# Patient Record
Sex: MALE | Race: WHITE | NOT HISPANIC OR LATINO | Employment: OTHER | URBAN - METROPOLITAN AREA
[De-identification: names, ages, dates, MRNs, and addresses within clinical notes are randomized per-mention and may not be internally consistent; named-entity substitution may affect disease eponyms.]

---

## 2017-03-09 DIAGNOSIS — I10 ESSENTIAL HYPERTENSION, BENIGN: ICD-10-CM

## 2017-03-09 NOTE — TELEPHONE ENCOUNTER
lisinopril (PRINIVIL,ZESTRIL) 40 MG tablet  Last Written Prescription Date: 9/8/2016  Last Fill Quantity: 90, # refills: 2  Last Office Visit with G, P or [facility] prescribing provider: 12/7/2016       Potassium   Date Value Ref Range Status   10/14/2015 3.8 3.4 - 5.3 mmol/L Final     Creatinine   Date Value Ref Range Status   09/03/2016 0.92 0.66 - 1.25 mg/dL Final   04/11/2003 0.8 0.8 - 2.0 mg/dL      BP Readings from Last 3 Encounters:   12/07/16 130/80   09/07/16 135/75   06/10/16 151/78

## 2017-03-10 RX ORDER — LISINOPRIL 40 MG/1
40 TABLET ORAL DAILY
Qty: 90 TABLET | Refills: 2 | Status: SHIPPED | OUTPATIENT
Start: 2017-03-10 | End: 2018-01-10

## 2017-03-10 NOTE — TELEPHONE ENCOUNTER
Routing refill request to provider for review/approval because:  Labs not current:  K+ 10/2015    Thank you  Gina STEVEN RN

## 2017-03-13 DIAGNOSIS — C61 MALIGNANT NEOPLASM OF PROSTATE (H): ICD-10-CM

## 2017-03-13 LAB — PSA SERPL-MCNC: 0.52 UG/L (ref 0–4)

## 2017-03-13 PROCEDURE — 84153 ASSAY OF PSA TOTAL: CPT | Performed by: RADIOLOGY

## 2017-03-13 PROCEDURE — 36415 COLL VENOUS BLD VENIPUNCTURE: CPT | Performed by: RADIOLOGY

## 2017-03-14 ENCOUNTER — OFFICE VISIT (OUTPATIENT)
Dept: RADIATION THERAPY | Facility: OUTPATIENT CENTER | Age: 82
End: 2017-03-14

## 2017-03-14 VITALS
RESPIRATION RATE: 18 BRPM | HEART RATE: 58 BPM | OXYGEN SATURATION: 98 % | SYSTOLIC BLOOD PRESSURE: 177 MMHG | WEIGHT: 248.2 LBS | DIASTOLIC BLOOD PRESSURE: 84 MMHG | BODY MASS INDEX: 34.86 KG/M2

## 2017-03-14 DIAGNOSIS — C61 MALIGNANT NEOPLASM OF PROSTATE (H): Primary | ICD-10-CM

## 2017-03-14 ASSESSMENT — PAIN SCALES - GENERAL: PAINLEVEL: NO PAIN (0)

## 2017-03-14 NOTE — PROGRESS NOTES
RADIATION ONCOLOGY FOLLOW UP  March 14, 2017    DISEASE TREATED: High-risk prostate cancer. Pretreatment PSA 25.5, Chaka score of 4+3=7, clinical stage [id number].     RADIATION THERAPY GIVEN: 8041 cGy in 43 planned treatments, via IMRT     INTERVAL SINCE COMPLETION OF THERAPY:  5 years and 1 months since completion on 02/10/2012.     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 He underwent biopsies in 2002 and 2005 and pathology was negative for malignancy. Due to the persistently rising PSA Dr. Mcintyre performed an MRI-guided biopsy of the prostate gland which revealed Chaka 4+3= 7 disease in 2 of 2 cores with 90% of the cores being positive in the right inferior base and right inferior mid gland. Androgen ablation was discussed with the patient due to his high-risk disease and the patient declined due to comorbidities of the drug. Overall, he completed radiotherapy with very little toxicity.     He now presents to clinic for routine follow-up exam. Today, he denies any pressing issues or complaints and reports that all symptoms have essentially remained stable since his last visit with us one year ago. His AUA is 13/35 (stable) and MARINA is 1/25. Main urinary symptoms include nocturia and urgency. However, he denies any dysuria, hematuria, hematochezia, diarrhea, or loose stools.      PHYSICAL EXAMINATION:   Vital Signs: /84  Pulse 58  Resp 18  Wt 112.6 kg (248 lb 3.2 oz)  SpO2 98%  BMI 34.86 kg/m2   Abdomin is soft with no tenderness.  Bowel sound is active.    LABORATORY DATA:  PSA   Date Value Ref Range Status   03/13/2017 0.52 0 - 4 ug/L Final     Comment:     Assay Method:  Chemiluminescence using Siemens Vista analyzer   06/07/2016 0.48 0 - 4 ug/L Final   12/04/2015 0.43 0 - 4 ug/L Final   06/12/2015 0.33 0 - 4 ug/L Final   06/11/2014 0.24 0 - 4 ug/L Final   04/17/2014 0.24 0 - 4 ug/L  Final   12/12/2013 0.31 0 - 4 ug/L Final   05/22/2013 0.35 0 - 4 ug/L Final   12/13/2012 0.52 0 - 4 ug/L Final   07/12/2012 1.35 0 - 4 ug/L Final   03/12/2012 3.24 0 - 4 ug/L Final   02/23/2012 3.50 0 - 4 ug/L Final   07/06/2011 25.50 (H) 0 - 4 ug/L Final   04/15/2011 22.40 (H) 0 - 4 ug/L Final   01/31/2011 22.70 (H) 0 - 4 ug/L Final   09/10/2010 20.10 (H) 0 - 4 ug/L Final   05/12/2010 20.10 (H) 0 - 4 ug/L Final   09/03/2009 20.10 (H) 0 - 4 ug/L Final   04/10/2009 19.10 (H) 0 - 4 ug/L Final   03/24/2004 8.93 (H) 0 - 4 ug/L Final     ASSESSMENT: Mr. Garcia is a 82 yo gentleman with high-risk prostate cancer s/p definitive radiation therapy completed almost 5 years and 1 months ago. Urinary symptoms are stable and he continues to be biochemically HAYDER.     PLAN:   1. RTC in one year   2. PSA prior to visit  3. Continue routine health maintenance with PCP         Melissa Parnell M.D., Ph.D.      Radiation Oncologist   [facility]   [facility]   Phone: [phone]      CC  Patient Care Team:  Dave Jefferson MD as PCP - General  Melissa Parnell MD as MD (Radiation Oncology)  Stewart Mcintyre MD as MD (Urology)  STEWART MCINTYRE

## 2017-03-14 NOTE — MR AVS SNAPSHOT
After Visit Summary   3/14/2017    Fawad Garcia    MRN: [id number]           Patient Information     Date Of Birth          1935        Visit Information        Provider Department      3/14/2017 11:15 AM Melissa Parnell MD [facility]        Today's Diagnoses     Malignant neoplasm of prostate (H)    -  1       Follow-ups after your visit        Your next 10 appointments already scheduled     Mar 12, 2018 11:00 AM CDT   LAB with [facility] ([facility])    [address] [address] [phone]           Patient must bring picture ID.  Patient should be prepared to give a urine specimen  Please do not eat 10-12 hours before your appointment if you are coming in fasting for labs on lipids, cholesterol, or glucose (sugar).  Pregnant women should follow their Care Team instructions. Water with medications is okay. Do not drink coffee or other fluids.   If you have concerns about taking  your medications, please ask at office or if scheduling via [employer], send a message by clicking on Secure Messaging, Message Your Care Team.            Mar 13, 2018 11:15 AM CDT   Return Visit with Melissa Parnell MD   [facility] ([facility])    [address], [address]  [place] [id number] [phone]              Future tests that were ordered for you today     Open Future Orders        Priority Expected Expires Ordered    PSA tumor marker Routine 3/14/2018 3/14/2018 3/14/2017            Who to contact     Please call your clinic at [phone] to:    Ask questions about your health    Make or cancel appointments    Discuss your medicines    Learn about your test results    Speak to your doctor   If you have compliments or concerns about an experience at your clinic, or if you wish to file a complaint, please contact [facility] Patient Relations at [phone] or email us at  [email or web address]         Additional Information About Your Visit        Keashart Information     Ke[employer]art gives you secure access to your electronic health record. If you see a primary care provider, you can also send messages to your care team and make appointments. If you have questions, please call your primary care clinic.  If you do not have a primary care provider, please call [phone] and they will assist you.      IM[employer]Exchange is an electronic gateway that provides easy, online access to your medical records. With IMedExchange, you can request a clinic appointment, read your test results, renew a prescription or communicate with your care team.     To access your existing account, please contact your [facility] or call [phone] for assistance.        Care EveryWhere ID     This is your Care EveryWhere ID. This could be used by other organizations to access your [facility] medical records  [id number]        Your Vitals Were     Pulse Respirations Pulse Oximetry BMI (Body Mass Index)          58 18 98% 34.86 kg/m2         Blood Pressure from Last 3 Encounters:   03/14/17 177/84   12/07/16 130/80   09/07/16 135/75    Weight from Last 3 Encounters:   03/14/17 112.6 kg (248 lb 3.2 oz)   12/07/16 112.5 kg (248 lb)   09/07/16 111.1 kg (245 lb)               Primary Care Provider Office Phone # Fax #    Dave Jefferson [phone]       [address] [address]        Thank you!     Thank you for choosing [facility]  for your care. Our goal is always to provide you with excellent care. Hearing back from our patients is one way we can continue to improve our services. Please take a few minutes to complete the written survey that you may receive in the mail after your visit with us. Thank you!             Your Updated Medication List - Protect others around you: Learn how to safely use, store and  throw away your medicines at www.disposemymeds.org.          This list is accurate as of: 3/14/17 11:51 AM.  Always use your most recent med list.                   Brand Name Dispense Instructions for use    amLODIPine 5 MG tablet    NORVASC    90 tablet    Take 1 tablet (5 mg) by mouth daily Takes prn for high blood pressure       aspirin  MG EC tablet     90 tablet    Take 1 tablet (325 mg) by mouth daily       * ASPIRIN NOT PRESCRIBED    INTENTIONAL     by Other route continuous prn.       blood glucose monitoring test strip    no brand specified    3 Box    1 strip by In Vitro route daily One touch Ultra.       dorzolamide-timolol 2-0.5 % ophthalmic solution    COSOPT         FIRST-MOUTHWASH BLM Susp     178 mL    Swish and swallow 5-10 mLs in mouth every 6 hours as needed       hydrochlorothiazide 25 MG tablet    HYDRODIURIL    90 tablet    Take 1 tablet (25 mg) by mouth daily       insulin pen needle 31G X 8 MM    B-D U/F    90 each    1 Device daily Use once daily or as directed.       * LANTUS SOLOSTAR 100 UNIT/ML injection   Generic drug:  insulin glargine          * insulin glargine 100 UNIT/ML injection    LANTUS VIAL    3 Month    30 units at bedtime       * insulin glargine 100 UNIT/ML injection    LANTUS SOLOSTAR    3 Month    Inject 30 Units Subcutaneous At Bedtime       lisinopril 40 MG tablet    PRINIVIL/ZESTRIL    90 tablet    Take 1 tablet (40 mg) by mouth daily       metFORMIN 500 MG tablet    GLUCOPHAGE    360 tablet    2 tabs with lunch and  tabs with dinner.       omeprazole 20 MG CR capsule    priLOSEC    90 capsule    Take 1 capsule (20 mg) by mouth daily       ONE TOUCH LANCETS Misc     100 each    1 Device daily One touch delica lancets, what ever is covered by insurance.       ONE TOUCH ULTRA SYSTEM KIT W/DEVICE Kit          order for DME     1 Device    Equipment being ordered:  Glucometer       * STATIN NOT PRESCRIBED (INTENTIONAL)      by Other route continuous prn.        triamcinolone 0.1 % cream    KENALOG         VITAMIN D PO      Take 4,000 Int'l Units/day by mouth daily       * Notice:  This list has 5 medication(s) that are the same as other medications prescribed for you. Read the directions carefully, and ask your doctor or other care provider to review them with you.

## 2017-03-23 DIAGNOSIS — E11.9 TYPE 2 DIABETES, HBA1C GOAL < 7% (H): ICD-10-CM

## 2017-03-23 NOTE — TELEPHONE ENCOUNTER
Ultrafine pen short      Last Written Prescription Date: 03/29/20016  Last Fill Quantity: 90,  # refills: 3   Last Office Visit with G, [facility] or [facility] prescribing provider: 12/07/2016    Carmelo YUEN)

## 2017-04-12 ENCOUNTER — TRANSFERRED RECORDS (OUTPATIENT)
Dept: HEALTH INFORMATION MANAGEMENT | Facility: CLINIC | Age: 82
End: 2017-04-12

## 2017-04-13 ENCOUNTER — TELEPHONE (OUTPATIENT)
Dept: FAMILY MEDICINE | Facility: CLINIC | Age: 82
End: 2017-04-13

## 2017-04-13 DIAGNOSIS — Z79.4 TYPE 2 DIABETES MELLITUS WITHOUT COMPLICATION, WITH LONG-TERM CURRENT USE OF INSULIN (H): Primary | ICD-10-CM

## 2017-04-13 DIAGNOSIS — E11.9 TYPE 2 DIABETES MELLITUS WITHOUT COMPLICATION, WITH LONG-TERM CURRENT USE OF INSULIN (H): Primary | ICD-10-CM

## 2017-04-17 DIAGNOSIS — Z79.4 TYPE 2 DIABETES MELLITUS WITHOUT COMPLICATION, WITH LONG-TERM CURRENT USE OF INSULIN (H): ICD-10-CM

## 2017-04-17 DIAGNOSIS — E11.9 TYPE 2 DIABETES MELLITUS WITHOUT COMPLICATION, WITH LONG-TERM CURRENT USE OF INSULIN (H): ICD-10-CM

## 2017-04-17 DIAGNOSIS — I10 HYPERTENSION, GOAL BELOW 140/90: ICD-10-CM

## 2017-04-17 LAB
ANION GAP SERPL CALCULATED.3IONS-SCNC: 9 MMOL/L (ref 3–14)
BUN SERPL-MCNC: 19 MG/DL (ref 7–30)
CALCIUM SERPL-MCNC: 8.7 MG/DL (ref 8.5–10.1)
CHLORIDE SERPL-SCNC: 103 MMOL/L (ref 94–109)
CO2 SERPL-SCNC: 25 MMOL/L (ref 20–32)
CREAT SERPL-MCNC: 0.93 MG/DL (ref 0.66–1.25)
CREAT UR-MCNC: 101 MG/DL
GFR SERPL CREATININE-BSD FRML MDRD: 78 ML/MIN/1.7M2
GLUCOSE SERPL-MCNC: 118 MG/DL (ref 70–99)
HBA1C MFR BLD: 7.6 % (ref 4.3–6)
MICROALBUMIN UR-MCNC: 204 MG/L
MICROALBUMIN/CREAT UR: 201.98 MG/G CR (ref 0–17)
POTASSIUM SERPL-SCNC: 3.8 MMOL/L (ref 3.4–5.3)
SODIUM SERPL-SCNC: 137 MMOL/L (ref 133–144)
TSH SERPL DL<=0.005 MIU/L-ACNC: 1.8 MU/L (ref 0.4–4)

## 2017-04-17 PROCEDURE — 82043 UR ALBUMIN QUANTITATIVE: CPT | Performed by: FAMILY MEDICINE

## 2017-04-17 PROCEDURE — 36415 COLL VENOUS BLD VENIPUNCTURE: CPT | Performed by: FAMILY MEDICINE

## 2017-04-17 PROCEDURE — 84443 ASSAY THYROID STIM HORMONE: CPT | Performed by: FAMILY MEDICINE

## 2017-04-17 PROCEDURE — 80048 BASIC METABOLIC PNL TOTAL CA: CPT | Performed by: FAMILY MEDICINE

## 2017-04-17 PROCEDURE — 83036 HEMOGLOBIN GLYCOSYLATED A1C: CPT | Performed by: FAMILY MEDICINE

## 2017-04-17 NOTE — TELEPHONE ENCOUNTER
HCTZ      Last Written Prescription Date: 11/27/2015  Last Fill Quantity: 90, # refills: 3  Last Office Visit with G, P or [facility] prescribing provider: 12/07/2016  Next 5 appointments (look out 90 days)     Apr 19, 2017 11:00 AM CDT   SHORT with Dave Jefferson MD   [facility] ([facility])    [address] [address]   [phone]                   Potassium   Date Value Ref Range Status   10/14/2015 3.8 3.4 - 5.3 mmol/L Final     Creatinine   Date Value Ref Range Status   09/03/2016 0.92 0.66 - 1.25 mg/dL Final     BP Readings from Last 3 Encounters:   03/14/17 177/84   12/07/16 130/80   09/07/16 135/75     Carmelo BRANHAM (R)

## 2017-04-18 RX ORDER — HYDROCHLOROTHIAZIDE 25 MG/1
25 TABLET ORAL DAILY
Qty: 90 TABLET | Refills: 3 | Status: SHIPPED | OUTPATIENT
Start: 2017-04-18 | End: 2018-04-04

## 2017-04-19 ENCOUNTER — OFFICE VISIT (OUTPATIENT)
Dept: FAMILY MEDICINE | Facility: CLINIC | Age: 82
End: 2017-04-19
Payer: COMMERCIAL

## 2017-04-19 VITALS
HEIGHT: 71 IN | OXYGEN SATURATION: 95 % | DIASTOLIC BLOOD PRESSURE: 85 MMHG | BODY MASS INDEX: 34.58 KG/M2 | HEART RATE: 57 BPM | WEIGHT: 247 LBS | TEMPERATURE: 98.7 F | SYSTOLIC BLOOD PRESSURE: 135 MMHG

## 2017-04-19 DIAGNOSIS — E11.9 TYPE 2 DIABETES MELLITUS WITHOUT COMPLICATION, WITH LONG-TERM CURRENT USE OF INSULIN (H): Primary | ICD-10-CM

## 2017-04-19 DIAGNOSIS — Z79.4 TYPE 2 DIABETES MELLITUS WITHOUT COMPLICATION, WITH LONG-TERM CURRENT USE OF INSULIN (H): Primary | ICD-10-CM

## 2017-04-19 PROCEDURE — 99207 C FOOT EXAM  NO CHARGE: CPT | Performed by: FAMILY MEDICINE

## 2017-04-19 PROCEDURE — 99214 OFFICE O/P EST MOD 30 MIN: CPT | Performed by: FAMILY MEDICINE

## 2017-04-19 ASSESSMENT — PAIN SCALES - GENERAL: PAINLEVEL: NO PAIN (0)

## 2017-04-19 NOTE — PROGRESS NOTES
SUBJECTIVE:                                                    Fawad Garcia is a 81 year old male who presents to clinic today for the following health issues:    Diabetes: His hemoglobin A1c is 7.6.    Hypertension: His blood pressures are averaging 135/85.    His wife is ill and she is in the house practically all the time. He will be playing golf twice a week but is interested in getting out of the house more often. I talked with him about possibly volunteering at the [facility] or working at the golf course.  Total face to face time: 25 minutes.  Time spent counseling on 15 minutes as outline above.      Diabetes Follow-up    Patient is checking blood sugars: once daily.  Results are as follows:         am - 9:00 AM     Diabetic concerns: None     Symptoms of hypoglycemia (low blood sugar): none     Paresthesias (numbness or burning in feet) or sores: No     Date of last diabetic eye exam:  4/12/2017 With Dr. Acosta        Amount of exercise or physical activity: 2-3 days/week for an average of 30-45 minutes    Problems taking medications regularly: No    Medication side effects: none    Diet: diabetic          Problem list and histories reviewed & adjusted, as indicated.  Additional history: as documented        Reviewed and updated as needed this visit by clinical staff  Tobacco  Allergies  Med Hx  Surg Hx  Fam Hx  Soc Hx      Reviewed and updated as needed this visit by Provider        Medical, surgical, family, social histories, allergies and meds reviewed and updated.    ROS:  General: No change in weight, sleep or appetite.  Normal energy.  No fever or chills  Resp: No coughing, wheezing or shortness of breath  CV: No chest pains or palpitations  GI: No nausea, vomiting,  heartburn, abdominal pain, diarrhea, constipation or change in bowel habits      Exam:  GENERAL APPEARANCE ADULT: Alert, no acute distress  NECK: No adenopathy,masses or thyromegaly  RESP: lungs clear to  auscultation   CV: normal rate, regular rhythm, no murmur or gallop  ABDOMEN: soft, no organomegaly, masses or tenderness  MS: Feet: Sensation 1/4 bilaterally, pulses 2/4 bilaterally    ASSESSMENT:  (E11.9) Type 2 diabetes, HbA1c goal < 7% (H)  Comment:   Plan: blood glucose monitoring (NO BRAND SPECIFIED)         test strip, FOOT EXAM              PLAN:  Orders Placed This Encounter     FOOT EXAM   Recheck in 6 months.    There are no Patient Instructions on file for this visit.      Dave Jefferson

## 2017-04-19 NOTE — NURSING NOTE
"Chief Complaint   Patient presents with     RECHECK     diabetes       Initial /83 (BP Location: Left arm, Patient Position: Chair, Cuff Size: Adult Large)  Pulse 57  Temp 98.7  F (37.1  C) (Tympanic)  Ht 5' 10.75\" (1.797 m)  Wt 247 lb (112 kg)  SpO2 95%  BMI 34.69 kg/m2 Estimated body mass index is 34.69 kg/(m^2) as calculated from the following:    Height as of this encounter: 5' 10.75\" (1.797 m).    Weight as of this encounter: 247 lb (112 kg).  Medication Reconciliation: complete   Yuliya Etienne CMA       "

## 2017-04-19 NOTE — MR AVS SNAPSHOT
After Visit Summary   4/19/2017    Fawad Garcia    MRN: [id number]           Patient Information     Date Of Birth          1935        Visit Information        Provider Department      4/19/2017 11:00 AM Dave Jefferson MD [facility]        Today's Diagnoses     Type 2 diabetes, HbA1c goal < 7% (H)           Follow-ups after your visit        Your next 10 appointments already scheduled     Mar 12, 2018 11:00 AM CDT   LAB with [facility] ([facility])    [address] [address]   [phone]           Patient must bring picture ID.  Patient should be prepared to give a urine specimen  Please do not eat 10-12 hours before your appointment if you are coming in fasting for labs on lipids, cholesterol, or glucose (sugar).  Pregnant women should follow their Care Team instructions. Water with medications is okay. Do not drink coffee or other fluids.   If you have concerns about taking  your medications, please ask at office or if scheduling via [employer], send a message by clicking on Secure Messaging, Message Your Care Team.            Mar 13, 2018 11:15 AM CDT   Return Visit with Melissa Parnell MD   [facility] ([facility])    [address], [address]  [place] [address]   [phone]              Who to contact     If you have questions or need follow up information about today's clinic visit or your schedule please contact [facility] directly at [phone].  Normal or non-critical lab and imaging results will be communicated to you by MyChart, letter or phone within 4 business days after the clinic has received the results. If you do not hear from us within 7 days, please contact the clinic through [employer] or phone. If you have a critical or abnormal lab result, we will notify you by phone as soon as possible.  Submit refill requests through [employer] or call  "your pharmacy and they will forward the refill request to us. Please allow 3 business days for your refill to be completed.          Additional Information About Your Visit        Coderwallhart Information     [employer] gives you secure access to your electronic health record. If you see a primary care provider, you can also send messages to your care team and make appointments. If you have questions, please call your primary care clinic.  If you do not have a primary care provider, please call [phone] and they will assist you.        Care EveryWhere ID     This is your Care EveryWhere ID. This could be used by other organizations to access your [facility] medical records  [id number]        Your Vitals Were     Pulse Temperature Height Pulse Oximetry BMI (Body Mass Index)       57 98.7  F (37.1  C) (Tympanic) 5' 10.75\" (1.797 m) 95% 34.69 kg/m2        Blood Pressure from Last 3 Encounters:   04/19/17 135/85   03/14/17 177/84   12/07/16 130/80    Weight from Last 3 Encounters:   04/19/17 247 lb (112 kg)   03/14/17 248 lb 3.2 oz (112.6 kg)   12/07/16 248 lb (112.5 kg)              We Performed the Following     FOOT EXAM        Primary Care Provider Office Phone # Fax #    Dave Jefferson [phone]       [facility] [address] [address] [address]        Thank you!     Thank you for choosing [facility]  for your care. Our goal is always to provide you with excellent care. Hearing back from our patients is one way we can continue to improve our services. Please take a few minutes to complete the written survey that you may receive in the mail after your visit with us. Thank you!             Your Updated Medication List - Protect others around you: Learn how to safely use, store and throw away your medicines at www.disposemymeds.org.          This list is accurate as of: 4/19/17 11:54 AM.  Always use your most recent med list.                   Brand Name " Dispense Instructions for use    amLODIPine 5 MG tablet    NORVASC    90 tablet    Take 1 tablet (5 mg) by mouth daily Takes prn for high blood pressure       * ASPIRIN NOT PRESCRIBED    INTENTIONAL     by Other route continuous prn Reported on 4/19/2017       blood glucose monitoring test strip    no brand specified    3 Box    1 strip by In Vitro route daily One touch Ultra.       dorzolamide-timolol 2-0.5 % ophthalmic solution    COSOPT         hydrochlorothiazide 25 MG tablet    HYDRODIURIL    90 tablet    Take 1 tablet (25 mg) by mouth daily       insulin pen needle 31G X 8 MM    B-D U/F    90 each    1 Device daily Use once daily or as directed.       * LANTUS SOLOSTAR 100 UNIT/ML injection   Generic drug:  insulin glargine          * insulin glargine 100 UNIT/ML injection    LANTUS VIAL    3 Month    30 units at bedtime       * insulin glargine 100 UNIT/ML injection    LANTUS SOLOSTAR    3 Month    Inject 30 Units Subcutaneous At Bedtime       lidocaine visc 2% & diphenhydramine 12.5mg/5mL & maalox/mylanta w/simethicone (1:1:1 v/v/v) Susp compounding kit     178 mL    Swish and swallow 5-10 mLs in mouth every 6 hours as needed       lisinopril 40 MG tablet    PRINIVIL/ZESTRIL    90 tablet    Take 1 tablet (40 mg) by mouth daily       metFORMIN 500 MG tablet    GLUCOPHAGE    360 tablet    2 tabs with lunch and  tabs with dinner.       omeprazole 20 MG CR capsule    priLOSEC    90 capsule    Take 1 capsule (20 mg) by mouth daily       ONE TOUCH LANCETS Misc     100 each    1 Device daily One touch delica lancets, what ever is covered by insurance.       ONE TOUCH ULTRA SYSTEM KIT W/DEVICE Kit          order for DME     1 Device    Equipment being ordered:  Glucometer       * STATIN NOT PRESCRIBED (INTENTIONAL)      by Other route continuous prn Reported on 4/19/2017       triamcinolone 0.1 % cream    KENALOG         VITAMIN D PO      Take 4,000 Int'l Units/day by mouth daily       * Notice:  This list has 5  medication(s) that are the same as other medications prescribed for you. Read the directions carefully, and ask your doctor or other care provider to review them with you.

## 2017-04-20 ENCOUNTER — TELEPHONE (OUTPATIENT)
Dept: FAMILY MEDICINE | Facility: CLINIC | Age: 82
End: 2017-04-20

## 2017-04-20 NOTE — TELEPHONE ENCOUNTER
"Reason for Call:  Other prescription    Detailed comments[facility]  \"Pretty sure he tests more than daily.  Can we get Rx to reflect that?\"  Request to RN's desk      Call taken on 4/20/2017 at 9:15 AM by Tawana Jonas      "

## 2017-06-14 ENCOUNTER — OFFICE VISIT (OUTPATIENT)
Dept: AUDIOLOGY | Facility: CLINIC | Age: 82
End: 2017-06-14
Payer: COMMERCIAL

## 2017-06-14 DIAGNOSIS — H90.3 SENSORINEURAL HEARING LOSS, BILATERAL: Primary | ICD-10-CM

## 2017-06-14 PROCEDURE — 92557 COMPREHENSIVE HEARING TEST: CPT | Performed by: AUDIOLOGIST

## 2017-06-14 NOTE — PROGRESS NOTES
AUDIOLOGY REPORT    SUBJECTIVE:  Fawad Garcia is a 81 year old male who was seen in the Audiology Clinic at [facility] for an audiologic evaluation, referred by self.  No previous audiograms are available at today's appointment.  The patient reports he would like to know where his hearing is at. He reports that he is having very little difficulty hearing in his environment. The patient denies bilateral tinnitus, bilateral otalgia, bilateral drainage and history of noise exposure.     OBJECTIVE:  Otoscopic exam indicates ears are clear of cerumen bilaterally     Pure Tone Thresholds assessed using conventional audiometry with good  reliability from 250-8000 Hz bilaterally using circumaural headphones     RIGHT:  normal and severe sensorineural hearing loss    LEFT:    normal and severe sensorineural hearing loss    Tympanogram:    RIGHT: DNT    LEFT:   DNT    Speech Reception Threshold:    RIGHT: 15 dB HL    LEFT:   15 dB HL  Word Recognition Score:     RIGHT: 92% at 55 dB HL using W22 recorded word list.    LEFT:   92% at 55 dB HL using W22 recorded word list.      ASSESSMENT:   Normal hearing through 2000 Hz sloping to a severe high frequency sensorineural hearing loss bilaterally.     Today s results were discussed with the patient in detail.     PLAN: It is recommended that the patient return to clinic if notes changes in his hearing. Patient was counseled regarding hearing loss and impact on communication.    Please call this clinic with questions regarding these results or recommendations.        Sindi Washington M.A. RIVAS-AAA  Clinical audiologist [place] # [id number]  6/14/2017

## 2017-06-14 NOTE — MR AVS SNAPSHOT
After Visit Summary   6/14/2017    Fawad Garcia    MRN: [id number]           Patient Information     Date Of Birth          1935        Visit Information        Provider Department      6/14/2017 10:30 AM Sindi [place] [facility]        Today's Diagnoses     Sensorineural hearing loss, bilateral    -  1       Follow-ups after your visit        Your next 10 appointments already scheduled     Mar 12, 2018 11:00 AM CDT   LAB with [facility] ([facility])    [address] [address] [phone]           Patient must bring picture ID.  Patient should be prepared to give a urine specimen  Please do not eat 10-12 hours before your appointment if you are coming in fasting for labs on lipids, cholesterol, or glucose (sugar).  Pregnant women should follow their Care Team instructions. Water with medications is okay. Do not drink coffee or other fluids.   If you have concerns about taking  your medications, please ask at office or if scheduling via PT PAL, send a message by clicking on Secure Messaging, Message Your Care Team.            Mar 13, 2018 11:15 AM CDT   Return Visit with Melissa Parnell MD   [facility])    [address], [address]  [place] [facility] [place] [address]   [phone]              Who to contact     If you have questions or need follow up information about today's clinic visit or your schedule please contact [facility] directly at [phone].  Normal or non-critical lab and imaging results will be communicated to you by MyChart, letter or phone within 4 business days after the clinic has received the results. If you do not hear from us within 7 days, please contact the clinic through MyChart or phone. If you have a critical or abnormal lab result, we will notify you by phone as soon as possible.  Submit refill requests through PT PAL or call your pharmacy  and they will forward the refill request to us. Please allow 3 business days for your refill to be completed.          Additional Information About Your Visit        [employer] Information     [employer] gives you secure access to your electronic health record. If you see a primary care provider, you can also send messages to your care team and make appointments. If you have questions, please call your primary care clinic.  If you do not have a primary care provider, please call [phone] and they will assist you.        Care EveryWhere ID     This is your Care EveryWhere ID. This could be used by other organizations to access your [place] medical records  [id number]         Blood Pressure from Last 3 Encounters:   04/19/17 135/85   03/14/17 177/84   12/07/16 130/80    Weight from Last 3 Encounters:   04/19/17 247 lb (112 kg)   03/14/17 248 lb 3.2 oz (112.6 kg)   12/07/16 248 lb (112.5 kg)              We Performed the Following     AUDIOGRAM/TYMPANOGRAM - INTERFACE     COMPREHENSIVE HEARING TEST        Primary Care Provider Office Phone # Fax #    Dave Jefferson [phone]       [facility] [address] [address] [address]        Thank you!     Thank you for choosing [facility]  for your care. Our goal is always to provide you with excellent care. Hearing back from our patients is one way we can continue to improve our services. Please take a few minutes to complete the written survey that you may receive in the mail after your visit with us. Thank you!             Your Updated Medication List - Protect others around you: Learn how to safely use, store and throw away your medicines at www.disposemymeds.org.          This list is accurate as of: 6/14/17 11:22 AM.  Always use your most recent med list.                   Brand Name Dispense Instructions for use    amLODIPine 5 MG tablet    NORVASC    90 tablet    Take 1 tablet (5 mg) by mouth daily Takes prn  for high blood pressure       * ASPIRIN NOT PRESCRIBED    INTENTIONAL     by Other route continuous prn Reported on 4/19/2017       blood glucose monitoring test strip    no brand specified    3 Box    1 strip by In Vitro route daily One touch Ultra.       dorzolamide-timolol 2-0.5 % ophthalmic solution    COSOPT         hydrochlorothiazide 25 MG tablet    HYDRODIURIL    90 tablet    Take 1 tablet (25 mg) by mouth daily       insulin pen needle 31G X 8 MM    B-D U/F    90 each    1 Device daily Use once daily or as directed.       * LANTUS SOLOSTAR 100 UNIT/ML injection   Generic drug:  insulin glargine          * insulin glargine 100 UNIT/ML injection    LANTUS VIAL    3 Month    30 units at bedtime       * insulin glargine 100 UNIT/ML injection    LANTUS SOLOSTAR    3 Month    Inject 30 Units Subcutaneous At Bedtime       lidocaine visc 2% & diphenhydramine 12.5mg/5mL & maalox/mylanta w/simethicone (1:1:1 v/v/v) Susp compounding kit     178 mL    Swish and swallow 5-10 mLs in mouth every 6 hours as needed       lisinopril 40 MG tablet    PRINIVIL/ZESTRIL    90 tablet    Take 1 tablet (40 mg) by mouth daily       metFORMIN 500 MG tablet    GLUCOPHAGE    360 tablet    2 tabs with lunch and  tabs with dinner.       omeprazole 20 MG CR capsule    priLOSEC    90 capsule    Take 1 capsule (20 mg) by mouth daily       ONE TOUCH LANCETS Misc     100 each    1 Device daily One touch delica lancets, what ever is covered by insurance.       ONE TOUCH ULTRA SYSTEM KIT W/DEVICE Kit          order for DME     1 Device    Equipment being ordered:  Glucometer       * STATIN NOT PRESCRIBED (INTENTIONAL)      by Other route continuous prn Reported on 4/19/2017       triamcinolone 0.1 % cream    KENALOG         VITAMIN D PO      Take 4,000 Int'l Units/day by mouth daily       * Notice:  This list has 5 medication(s) that are the same as other medications prescribed for you. Read the directions carefully, and ask your doctor or other  care provider to review them with you.

## 2017-09-15 ENCOUNTER — ALLIED HEALTH/NURSE VISIT (OUTPATIENT)
Dept: FAMILY MEDICINE | Facility: CLINIC | Age: 82
End: 2017-09-15
Payer: COMMERCIAL

## 2017-09-15 DIAGNOSIS — Z23 NEED FOR PROPHYLACTIC VACCINATION AND INOCULATION AGAINST INFLUENZA: Primary | ICD-10-CM

## 2017-09-15 PROCEDURE — 90662 IIV NO PRSV INCREASED AG IM: CPT

## 2017-09-15 PROCEDURE — 99207 ZZC NO CHARGE NURSE ONLY: CPT

## 2017-09-15 PROCEDURE — 90471 IMMUNIZATION ADMIN: CPT

## 2017-09-15 NOTE — MR AVS SNAPSHOT
After Visit Summary   9/15/2017    Fawad Garcia    MRN: [id number]           Patient Information     Date Of Birth          1935        Visit Information        Provider Department      9/15/2017 8:05 AM [facility]        Today's Diagnoses     Need for prophylactic vaccination and inoculation against influenza    -  1       Follow-ups after your visit        Your next 10 appointments already scheduled     Mar 12, 2018 11:00 AM CDT   LAB with [facility]   [facility]    [address] [address] [phone]           Patient must bring picture ID. Patient should be prepared to give a urine specimen  Please do not eat 10-12 hours before your appointment if you are coming in fasting for labs on lipids, cholesterol, or glucose (sugar). Pregnant women should follow their Care Team instructions. Water with medications is okay. Do not drink coffee or other fluids. If you have concerns about taking  your medications, please ask at office or if scheduling via [employer], send a message by clicking on Secure Messaging, Message Your Care Team.            Mar 13, 2018 11:15 AM CDT   Return Visit with Melissa Parnell MD   [facility] ([facility])    [address], [address]  [facility] [place] [address]   [phone]              Who to contact     If you have questions or need follow up information about today's clinic visit or your schedule please contact [facility] directly at [phone].  Normal or non-critical lab and imaging results will be communicated to you by MyChart, letter or phone within 4 business days after the clinic has received the results. If you do not hear from us within 7 days, please contact the clinic through MyChart or phone. If you have a critical or abnormal lab result, we will notify you by phone as soon as possible.  Submit refill requests through  [employer] or call your pharmacy and they will forward the refill request to us. Please allow 3 business days for your refill to be completed.          Additional Information About Your Visit        MyChart Information     [employer] gives you secure access to your electronic health record. If you see a primary care provider, you can also send messages to your care team and make appointments. If you have questions, please call your primary care clinic.  If you do not have a primary care provider, please call [phone] and they will assist you.        Care EveryWhere ID     This is your Care EveryWhere ID. This could be used by other organizations to access your [place] medical records  [id number]         Blood Pressure from Last 3 Encounters:   04/19/17 135/85   03/14/17 177/84   12/07/16 130/80    Weight from Last 3 Encounters:   04/19/17 247 lb (112 kg)   03/14/17 248 lb 3.2 oz (112.6 kg)   12/07/16 248 lb (112.5 kg)              We Performed the Following     FLU VACCINE, INCREASED ANTIGEN, PRESV FREE, AGE 65+ [41018]     Vaccine Administration, Initial [14387]        Primary Care Provider Office Phone # Fax #    Dave Callum Jefferson [phone] [address] [address]        Equal Access to Services     JACKY DELACRUZ : Hadii aad ku hadasho Soomaali, waaxda luqadaha, qaybta kaalmada adeegyada, waxay ankurin hayazael whipple. So [facility] [phone].    ATENCIÓN: Si habla español, tiene a aaron disposición servicios gratuitos de asistencia lingüística. ame al [phone].    We comply with applicable federal civil rights laws and [place] laws. We do not discriminate on the basis of race, color, national origin, age, disability sex, sexual orientation or gender identity.            Thank you!     Thank you for choosing [facility]  for your care. Our goal is always to provide you with excellent care. Hearing back from our patients is one way we can  continue to improve our services. Please take a few minutes to complete the written survey that you may receive in the mail after your visit with us. Thank you!             Your Updated Medication List - Protect others around you: Learn how to safely use, store and throw away your medicines at www.disposemymeds.org.          This list is accurate as of: 9/15/17  8:13 AM.  Always use your most recent med list.                   Brand Name Dispense Instructions for use Diagnosis    amLODIPine 5 MG tablet    NORVASC    90 tablet    Take 1 tablet (5 mg) by mouth daily Takes prn for high blood pressure    Hypertension, goal below 140/90       * ASPIRIN NOT PRESCRIBED    INTENTIONAL     by Other route continuous prn Reported on 4/19/2017        blood glucose monitoring test strip    no brand specified    3 Box    1 strip by In Vitro route daily One touch Ultra.    Type 2 diabetes mellitus without complication, with long-term current use of insulin (H)       dorzolamide-timolol 2-0.5 % ophthalmic solution    COSOPT          hydrochlorothiazide 25 MG tablet    HYDRODIURIL    90 tablet    Take 1 tablet (25 mg) by mouth daily    Hypertension, goal below 140/90       insulin pen needle 31G X 8 MM    B-D U/F    90 each    1 Device daily Use once daily or as directed.    Type 2 diabetes, HbA1c goal < 7% (H)       * LANTUS SOLOSTAR 100 UNIT/ML injection   Generic drug:  insulin glargine           * insulin glargine 100 UNIT/ML injection    LANTUS VIAL    3 Month    30 units at bedtime    Type 2 diabetes mellitus without complication, with long-term current use of insulin (H)       * insulin glargine 100 UNIT/ML injection    LANTUS SOLOSTAR    3 Month    Inject 30 Units Subcutaneous At Bedtime    Type 2 diabetes mellitus with diabetic peripheral angiopathy without gangrene, with long-term current use of insulin (H)       lidocaine visc 2% & diphenhydramine 12.5mg/5mL & maalox/mylanta w/simethicone (1:1:1 v/v/v) Susp compounding  kit     178 mL    Swish and swallow 5-10 mLs in mouth every 6 hours as needed        lisinopril 40 MG tablet    PRINIVIL/ZESTRIL    90 tablet    Take 1 tablet (40 mg) by mouth daily    Essential hypertension, benign       metFORMIN 500 MG tablet    GLUCOPHAGE    360 tablet    2 tabs with lunch and  tabs with dinner.    Type 2 diabetes mellitus without complication, with long-term current use of insulin (H)       omeprazole 20 MG CR capsule    priLOSEC    90 capsule    Take 1 capsule (20 mg) by mouth daily    GERD (gastroesophageal reflux disease)       ONE TOUCH LANCETS Misc     100 each    1 Device daily One touch delica lancets, what ever is covered by insurance.    Type 2 diabetes mellitus with diabetic peripheral angiopathy without gangrene (H)       ONE TOUCH ULTRA SYSTEM KIT W/DEVICE Kit           order for DME     1 Device    Equipment being ordered:  Glucometer    Type 2 diabetes, HbA1c goal < 7% (H)       * STATIN NOT PRESCRIBED (INTENTIONAL)      by Other route continuous prn Reported on 4/19/2017        triamcinolone 0.1 % cream    KENALOG          VITAMIN D PO      Take 4,000 Int'l Units/day by mouth daily        * Notice:  This list has 5 medication(s) that are the same as other medications prescribed for you. Read the directions carefully, and ask your doctor or other care provider to review them with you.

## 2017-09-15 NOTE — PROGRESS NOTES
Injectable Influenza Immunization Documentation    1.  Are you sick today? (Fever of 100.5 or higher on the day of the clinic)   No    2.  Have you ever had Guillain-Br[place]ville Syndrome within 6 weeks of an influenza vaccionation?  No    3. Do you have a life-threatening allergy to eggs?  No    4. Do you have a life-threatening allergy to a component of the vaccine? May include antibiotics, gelatin or latex.  No     5. Have you ever had a reaction to a dose of flu vaccine that needed immediate medical attention?  No     Form completed by ARLEN VENTURA

## 2017-10-03 ENCOUNTER — TELEPHONE (OUTPATIENT)
Dept: FAMILY MEDICINE | Facility: CLINIC | Age: 82
End: 2017-10-03

## 2017-10-03 DIAGNOSIS — Z79.4 TYPE 2 DIABETES MELLITUS WITHOUT COMPLICATION, WITH LONG-TERM CURRENT USE OF INSULIN (H): Primary | ICD-10-CM

## 2017-10-03 DIAGNOSIS — E11.9 TYPE 2 DIABETES MELLITUS WITHOUT COMPLICATION, WITH LONG-TERM CURRENT USE OF INSULIN (H): Primary | ICD-10-CM

## 2017-10-03 NOTE — TELEPHONE ENCOUNTER
Reason for Call:  Other Orders    Detailed comments: He has a 9:35 appt at [facility] tomorrow.  He needs orders put in for a A1C.  Please advise.    Phone Number Patient can be reached at: Home number on file [phone] (home)    Best Time: any    Can we leave a detailed message on this number? YES    Call taken on 10/3/2017 at 3:58 PM by Isabelle Oquendo

## 2017-10-04 ENCOUNTER — OFFICE VISIT (OUTPATIENT)
Dept: FAMILY MEDICINE | Facility: CLINIC | Age: 82
End: 2017-10-04
Payer: COMMERCIAL

## 2017-10-04 VITALS
SYSTOLIC BLOOD PRESSURE: 113 MMHG | OXYGEN SATURATION: 96 % | DIASTOLIC BLOOD PRESSURE: 65 MMHG | HEART RATE: 67 BPM | WEIGHT: 237 LBS | BODY MASS INDEX: 33.29 KG/M2 | TEMPERATURE: 97.6 F

## 2017-10-04 DIAGNOSIS — E11.9 TYPE 2 DIABETES MELLITUS WITHOUT COMPLICATION, WITH LONG-TERM CURRENT USE OF INSULIN (H): ICD-10-CM

## 2017-10-04 DIAGNOSIS — Z79.4 TYPE 2 DIABETES MELLITUS WITHOUT COMPLICATION, WITH LONG-TERM CURRENT USE OF INSULIN (H): ICD-10-CM

## 2017-10-04 DIAGNOSIS — E11.9 TYPE 2 DIABETES MELLITUS WITHOUT COMPLICATION, WITH LONG-TERM CURRENT USE OF INSULIN (H): Primary | ICD-10-CM

## 2017-10-04 DIAGNOSIS — Z79.4 TYPE 2 DIABETES MELLITUS WITHOUT COMPLICATION, WITH LONG-TERM CURRENT USE OF INSULIN (H): Primary | ICD-10-CM

## 2017-10-04 LAB — HBA1C MFR BLD: 7.2 % (ref 4.3–6)

## 2017-10-04 PROCEDURE — 99214 OFFICE O/P EST MOD 30 MIN: CPT | Performed by: FAMILY MEDICINE

## 2017-10-04 PROCEDURE — 36415 COLL VENOUS BLD VENIPUNCTURE: CPT | Performed by: FAMILY MEDICINE

## 2017-10-04 PROCEDURE — 83036 HEMOGLOBIN GLYCOSYLATED A1C: CPT | Performed by: FAMILY MEDICINE

## 2017-10-04 ASSESSMENT — PAIN SCALES - GENERAL: PAINLEVEL: NO PAIN (0)

## 2017-10-04 NOTE — NURSING NOTE
"Chief Complaint   Patient presents with     RECHECK     diabetes       Initial /65 (BP Location: Right arm, Patient Position: Chair, Cuff Size: Adult Large)  Pulse 67  Temp 97.6  F (36.4  C) (Tympanic)  Wt 237 lb (107.5 kg)  SpO2 96%  BMI 33.29 kg/m2 Estimated body mass index is 33.29 kg/(m^2) as calculated from the following:    Height as of 4/19/17: 5' 10.75\" (1.797 m).    Weight as of this encounter: 237 lb (107.5 kg).  Medication Reconciliation: complete   Yuliya Etienne CMA       "

## 2017-10-04 NOTE — PROGRESS NOTES
SUBJECTIVE:   Fawad Garcia is a 81 year old male who presents to clinic today for the following health issues:    Diabetes: His hemoglobin A1c today is 7.2.  His blood pressures have been running 120-130/70-80.  He is refusing to take statins.  He has refused to take aspirin until now and he says he will consider it.  He plays golf several times a week.  In the last 3 years he has lost 17 pounds. In the last 6 months he has lost 11 pounds.  His lowest blood sugar has been 73.  Total face to face time: 25 minutes.  Time spent counseling on 15 minutes as outline above.        Diabetes Follow-up    Patient is checking blood sugars: once daily.  Results are as follows:       am - 110-130        Diabetic concerns: None     Symptoms of hypoglycemia (low blood sugar): none     Paresthesias (numbness or burning in feet) or sores: Yes sometimes   Date of last diabetic eye exam: 2017      Amount of exercise or physical activity: 2-3 days/week for an average of 30-45 minutes    Problems taking medications regularly: No    Medication side effects: none  Diet: regular (no restrictions)          Problem list and histories reviewed & adjusted, as indicated.  Additional history: as documented        Reviewed and updated as needed this visit by clinical staffTobacco  Allergies  Med Hx  Surg Hx  Fam Hx  Soc Hx      Reviewed and updated as needed this visit by Provider      Vitals: /65 (BP Location: Right arm, Patient Position: Chair, Cuff Size: Adult Large)  Pulse 67  Temp 97.6  F (36.4  C) (Tympanic)  Wt 237 lb (107.5 kg)  SpO2 96%  BMI 33.29 kg/m2  BMI= Body mass index is 33.29 kg/(m^2).    Medical, surgical, family, social histories, allergies and meds reviewed and updated.    ROS:  General: No change in weight, sleep or appetite.  Normal energy.  No fever or chills  Resp: No coughing, wheezing or shortness of breath  CV: No chest pains or palpitations  GI: No nausea, vomiting,  heartburn, abdominal pain,  diarrhea, constipation or change in bowel habits    Exam:  GENERAL APPEARANCE ADULT: Alert, no acute distress  NECK: No adenopathy,masses or thyromegaly  RESP: lungs clear to auscultation   CV: normal rate, regular rhythm, no murmur or gallop    ASSESSMENT:  Diabetes type 2 under good control.    PLAN:  Recheck in 6 months.      Dave Jefferson

## 2017-10-04 NOTE — MR AVS SNAPSHOT
After Visit Summary   10/4/2017    Fawad Garcia    MRN: [id number]           Patient Information     Date Of Birth          1935        Visit Information        Provider Department      10/4/2017 3:20 PM Dave Jefferson MD [facility]         Follow-ups after your visit        Your next 10 appointments already scheduled     Mar 12, 2018 11:00 AM CDT   LAB with [facility] ([facility])    [address] [address]   [phone]           Patient must bring picture ID. Patient should be prepared to give a urine specimen  Please do not eat 10-12 hours before your appointment if you are coming in fasting for labs on lipids, cholesterol, or glucose (sugar). Pregnant women should follow their Care Team instructions. Water with medications is okay. Do not drink coffee or other fluids. If you have concerns about taking  your medications, please ask at office or if scheduling via Luxera, send a message by clicking on Secure Messaging, Message Your Care Team.            Mar 13, 2018 11:15 AM CDT   Return Visit with Melissa Parnell MD   [facility] ([facility])    [address], [address]  [place] [facility] [address]   [phone]              Who to contact     If you have questions or need follow up information about today's clinic visit or your schedule please contact [facility] directly at [phone].  Normal or non-critical lab and imaging results will be communicated to you by MyChart, letter or phone within 4 business days after the clinic has received the results. If you do not hear from us within 7 days, please contact the clinic through MyChart or phone. If you have a critical or abnormal lab result, we will notify you by phone as soon as possible.  Submit refill requests through Luxera or call your pharmacy and they will forward the refill request to us. Please allow  3 business days for your refill to be completed.          Additional Information About Your Visit        MyChart Information     [employer] gives you secure access to your electronic health record. If you see a primary care provider, you can also send messages to your care team and make appointments. If you have questions, please call your primary care clinic.  If you do not have a primary care provider, please call [phone] and they will assist you.        Care EveryWhere ID     This is your Care EveryWhere ID. This could be used by other organizations to access your [place] medical records  [id number]        Your Vitals Were     Pulse Temperature Pulse Oximetry BMI (Body Mass Index)          67 97.6  F (36.4  C) (Tympanic) 96% 33.29 kg/m2         Blood Pressure from Last 3 Encounters:   10/04/17 113/65   04/19/17 135/85   03/14/17 177/84    Weight from Last 3 Encounters:   10/04/17 237 lb (107.5 kg)   04/19/17 247 lb (112 kg)   03/14/17 248 lb 3.2 oz (112.6 kg)              Today, you had the following     No orders found for display       Primary Care Provider Office Phone # Fax #    Dave Callum Jefferson [phone] [address] [address]        Equal Access to Services     JACKY DELACRUZ : Hadii aad ku hadasho Soomaali, waaxda luqadaha, qaybta kaalmada adeegyada, waxay idiin haybrandonn rudy marie la'azael whipple. So [facility] [phone].    ATENCIÓN: Si habla español, tiene a aaron disposición servicios gratuitos de asistencia lingüística. Llame al [phone].    We comply with applicable federal civil rights laws and [place] laws. We do not discriminate on the basis of race, color, national origin, age, disability, sex, sexual orientation, or gender identity.            Thank you!     Thank you for choosing [facility] [place][note 2]  for your care. Our goal is always to provide you with excellent care. Hearing back from our patients is one way we can continue to improve  our services. Please take a few minutes to complete the written survey that you may receive in the mail after your visit with us. Thank you!             Your Updated Medication List - Protect others around you: Learn how to safely use, store and throw away your medicines at www.disposemymeds.org.          This list is accurate as of: 10/4/17  4:31 PM.  Always use your most recent med list.                   Brand Name Dispense Instructions for use Diagnosis    amLODIPine 5 MG tablet    NORVASC    90 tablet    Take 1 tablet (5 mg) by mouth daily Takes prn for high blood pressure    Hypertension, goal below 140/90       * ASPIRIN NOT PRESCRIBED    INTENTIONAL     by Other route continuous prn Reported on 4/19/2017        blood glucose monitoring test strip    no brand specified    3 Box    1 strip by In Vitro route daily One touch Ultra.    Type 2 diabetes mellitus without complication, with long-term current use of insulin (H)       dorzolamide-timolol 2-0.5 % ophthalmic solution    COSOPT          FIRST-MOUTHWASH BLM MT Susp compounding kit     178 mL    Swish and swallow 5-10 mLs in mouth every 6 hours as needed        hydrochlorothiazide 25 MG tablet    HYDRODIURIL    90 tablet    Take 1 tablet (25 mg) by mouth daily    Hypertension, goal below 140/90       insulin pen needle 31G X 8 MM    B-D U/F    90 each    1 Device daily Use once daily or as directed.    Type 2 diabetes, HbA1c goal < 7% (H)       * LANTUS SOLOSTAR 100 UNIT/ML injection   Generic drug:  insulin glargine           * insulin glargine 100 UNIT/ML injection    LANTUS VIAL    3 Month    30 units at bedtime    Type 2 diabetes mellitus without complication, with long-term current use of insulin (H)       * insulin glargine 100 UNIT/ML injection    LANTUS SOLOSTAR    3 Month    Inject 30 Units Subcutaneous At Bedtime    Type 2 diabetes mellitus with diabetic peripheral angiopathy without gangrene, with long-term current use of insulin (H)        lisinopril 40 MG tablet    PRINIVIL/ZESTRIL    90 tablet    Take 1 tablet (40 mg) by mouth daily    Essential hypertension, benign       metFORMIN 500 MG tablet    GLUCOPHAGE    360 tablet    2 tabs with lunch and  tabs with dinner.    Type 2 diabetes mellitus without complication, with long-term current use of insulin (H)       omeprazole 20 MG CR capsule    priLOSEC    90 capsule    Take 1 capsule (20 mg) by mouth daily    GERD (gastroesophageal reflux disease)       ONE TOUCH LANCETS Misc     100 each    1 Device daily One touch delica lancets, what ever is covered by insurance.    Type 2 diabetes mellitus with diabetic peripheral angiopathy without gangrene (H)       ONE TOUCH ULTRA SYSTEM KIT W/DEVICE Kit           order for DME     1 Device    Equipment being ordered:  Glucometer    Type 2 diabetes, HbA1c goal < 7% (H)       * STATIN NOT PRESCRIBED (INTENTIONAL)      by Other route continuous prn Reported on 4/19/2017        triamcinolone 0.1 % cream    KENALOG          VITAMIN D PO      Take 4,000 Int'l Units/day by mouth daily        * Notice:  This list has 5 medication(s) that are the same as other medications prescribed for you. Read the directions carefully, and ask your doctor or other care provider to review them with you.

## 2017-10-11 ENCOUNTER — TRANSFERRED RECORDS (OUTPATIENT)
Dept: HEALTH INFORMATION MANAGEMENT | Facility: CLINIC | Age: 82
End: 2017-10-11

## 2017-10-18 ENCOUNTER — OFFICE VISIT (OUTPATIENT)
Dept: FAMILY MEDICINE | Facility: CLINIC | Age: 82
End: 2017-10-18
Payer: COMMERCIAL

## 2017-10-18 DIAGNOSIS — J20.9 ACUTE BRONCHITIS, UNSPECIFIED ORGANISM: Primary | ICD-10-CM

## 2017-10-18 PROCEDURE — 99213 OFFICE O/P EST LOW 20 MIN: CPT | Performed by: FAMILY MEDICINE

## 2017-10-18 RX ORDER — AZITHROMYCIN 250 MG/1
TABLET, FILM COATED ORAL
Qty: 6 TABLET | Refills: 0 | Status: SHIPPED | OUTPATIENT
Start: 2017-10-18 | End: 2018-04-04

## 2017-10-18 ASSESSMENT — PAIN SCALES - GENERAL: PAINLEVEL: NO PAIN (0)

## 2017-10-18 NOTE — MR AVS SNAPSHOT
After Visit Summary   10/18/2017    Fawad Garcia    MRN: [id number]           Patient Information     Date Of Birth          1935        Visit Information        Provider Department      10/18/2017 1:40 PM Dave Jefferson MD [facility]        Today's Diagnoses     Acute bronchitis, unspecified organism    -  1       Follow-ups after your visit        Your next 10 appointments already scheduled     Mar 12, 2018 11:00 AM CDT   LAB with [facility] ([facility])    [address] [address] [phone]           Patient must bring picture ID. Patient should be prepared to give a urine specimen  Please do not eat 10-12 hours before your appointment if you are coming in fasting for labs on lipids, cholesterol, or glucose (sugar). Pregnant women should follow their Care Team instructions. Water with medications is okay. Do not drink coffee or other fluids. If you have concerns about taking  your medications, please ask at office or if scheduling via OpenSp[employer], send a message by clicking on Secure Messaging, Message Your Care Team.            Mar 13, 2018 11:15 AM CDT   Return Visit with Melissa Parnell MD   [facility] ([facility])    [address], [address]  [facility] [place] [facility] [place] [address]   [phone]              Who to contact     If you have questions or need follow up information about today's clinic visit or your schedule please contact [facility] directly at [phone].  Normal or non-critical lab and imaging results will be communicated to you by MyChart, letter or phone within 4 business days after the clinic has received the results. If you do not hear from us within 7 days, please contact the clinic through MyChart or phone. If you have a critical or abnormal lab result, we will notify you by phone as soon as possible.  Submit refill requests through [employer]ac[employer] or call  your pharmacy and they will forward the refill request to us. Please allow 3 business days for your refill to be completed.          Additional Information About Your Visit        [employer] Information     [employer] gives you secure access to your electronic health record. If you see a primary care provider, you can also send messages to your care team and make appointments. If you have questions, please call your primary care clinic.  If you do not have a primary care provider, please call [phone] and they will assist you.        Care EveryWhere ID     This is your Care EveryWhere ID. This could be used by other organizations to access your [place] medical records  [id number]        Your Vitals Were     Pulse Temperature Pulse Oximetry BMI (Body Mass Index)          94 97.9  F (36.6  C) (Tympanic) 94% 32.45 kg/m2         Blood Pressure from Last 3 Encounters:   10/18/17 150/77   10/04/17 113/65   04/19/17 135/85    Weight from Last 3 Encounters:   10/18/17 231 lb (104.8 kg)   10/04/17 237 lb (107.5 kg)   04/19/17 247 lb (112 kg)              Today, you had the following     No orders found for display         Today's Medication Changes          These changes are accurate as of: 10/18/17  4:00 PM.  If you have any questions, ask your nurse or doctor.               Start taking these medicines.        Dose/Directions    azithromycin 250 MG tablet   Commonly known as:  ZITHROMAX Z-KAUR   Used for:  Acute bronchitis, unspecified organism   Started by:  Dave Jefferson MD        2 tabs stat and 1 tab on Day 2-5   Quantity:  6 tablet   Refills:  0            Where to get your medicines      These medications were sent to [address] [address]     Phone:  [phone]     azithromycin 250 MG tablet                Primary Care Provider Office Phone # Fax #    Dave Jefferson [phone] [address]  [facility]  [address]        Equal Access to Services     [facility]: Hadii hollie thomas zak Dye, wajuan carlosda luqemi, qadorie filemonzacariasgabriel park, bradford whipple. So [facility] [phone].    ATENCIÓN: Si habla español, tiene a aaron disposición servicios gratuitos de asistencia lingüística. Bailey al [phone].    We comply with applicable federal civil rights laws and [place] laws. We do not discriminate on the basis of race, color, national origin, age, disability, sex, sexual orientation, or gender identity.            Thank you!     Thank you for choosing [facility]  for your care. Our goal is always to provide you with excellent care. Hearing back from our patients is one way we can continue to improve our services. Please take a few minutes to complete the written survey that you may receive in the mail after your visit with us. Thank you!             Your Updated Medication List - Protect others around you: Learn how to safely use, store and throw away your medicines at www.disposemymeds.org.          This list is accurate as of: 10/18/17  4:00 PM.  Always use your most recent med list.                   Brand Name Dispense Instructions for use Diagnosis    amLODIPine 5 MG tablet    NORVASC    90 tablet    Take 1 tablet (5 mg) by mouth daily Takes prn for high blood pressure    Hypertension, goal below 140/90       * ASPIRIN NOT PRESCRIBED    INTENTIONAL     by Other route continuous prn Reported on 4/19/2017        azithromycin 250 MG tablet    ZITHROMAX Z-KAUR    6 tablet    2 tabs stat and 1 tab on Day 2-5    Acute bronchitis, unspecified organism       blood glucose monitoring test strip    no brand specified    3 Box    1 strip by In Vitro route daily One touch Ultra.    Type 2 diabetes mellitus without complication, with long-term current use of insulin (H)       dorzolamide-timolol 2-0.5 % ophthalmic solution    COSOPT          FIRST-MOUTHWASH BLM MT Susp compounding kit      178 mL    Swish and swallow 5-10 mLs in mouth every 6 hours as needed        hydrochlorothiazide 25 MG tablet    HYDRODIURIL    90 tablet    Take 1 tablet (25 mg) by mouth daily    Hypertension, goal below 140/90       insulin pen needle 31G X 8 MM    B-D U/F    90 each    1 Device daily Use once daily or as directed.    Type 2 diabetes, HbA1c goal < 7% (H)       * LANTUS SOLOSTAR 100 UNIT/ML injection   Generic drug:  insulin glargine           * insulin glargine 100 UNIT/ML injection    LANTUS VIAL    3 Month    30 units at bedtime    Type 2 diabetes mellitus without complication, with long-term current use of insulin (H)       * insulin glargine 100 UNIT/ML injection    LANTUS SOLOSTAR    3 Month    Inject 30 Units Subcutaneous At Bedtime    Type 2 diabetes mellitus with diabetic peripheral angiopathy without gangrene, with long-term current use of insulin (H)       lisinopril 40 MG tablet    PRINIVIL/ZESTRIL    90 tablet    Take 1 tablet (40 mg) by mouth daily    Essential hypertension, benign       metFORMIN 500 MG tablet    GLUCOPHAGE    360 tablet    2 tabs with lunch and  tabs with dinner.    Type 2 diabetes mellitus without complication, with long-term current use of insulin (H)       omeprazole 20 MG CR capsule    priLOSEC    90 capsule    Take 1 capsule (20 mg) by mouth daily    GERD (gastroesophageal reflux disease)       ONE TOUCH LANCETS Misc     100 each    1 Device daily One touch delica lancets, what ever is covered by insurance.    Type 2 diabetes mellitus with diabetic peripheral angiopathy without gangrene (H)       ONE TOUCH ULTRA SYSTEM KIT W/DEVICE Kit           order for DME     1 Device    Equipment being ordered:  Glucometer    Type 2 diabetes, HbA1c goal < 7% (H)       * STATIN NOT PRESCRIBED (INTENTIONAL)      by Other route continuous prn Reported on 4/19/2017        triamcinolone 0.1 % cream    KENALOG          VITAMIN D PO      Take 4,000 Int'l Units/day by mouth daily        * Notice:   This list has 5 medication(s) that are the same as other medications prescribed for you. Read the directions carefully, and ask your doctor or other care provider to review them with you.

## 2017-10-18 NOTE — NURSING NOTE
"Chief Complaint   Patient presents with     URI     cough, congestion, sinus drainage x 10 days       Initial /77 (BP Location: Right arm, Patient Position: Chair, Cuff Size: Adult Large)  Pulse 94  Temp 97.9  F (36.6  C) (Tympanic)  Wt 231 lb (104.8 kg)  SpO2 94%  BMI 32.45 kg/m2 Estimated body mass index is 32.45 kg/(m^2) as calculated from the following:    Height as of 4/19/17: 5' 10.75\" (1.797 m).    Weight as of this encounter: 231 lb (104.8 kg).  Medication Reconciliation: complete   Yuliya Etienne CMA       "

## 2017-10-18 NOTE — PROGRESS NOTES
SUBJECTIVE:   Fawad Garcia is a 81 year old male who presents to clinic today for the following health issues:  It has been 10 days and is not improving.    Acute Illness   Acute illness concerns: URI  Onset: 10 days    Fever: no    Chills/Sweats: YES    Headache (location?): no    Sinus Pressure:no    Conjunctivitis:  no    Ear Pain: no    Rhinorrhea: YES    Congestion: YES    Sore Throat: no     Cough: YES-non-productive, with shortness of breath    Wheeze: YES    Decreased Appetite: YES    Nausea: YES- little bit    Vomiting: yno    Diarrhea:  no    Dysuria/Freq.: no    Fatigue/Achiness: YES    Sick/Strep Exposure: no     Therapies Tried and outcome: OTC cough syrup            Problem list and histories reviewed & adjusted, as indicated.  Additional history: as documented    Patient Active Problem List   Diagnosis     Obesity     Personal history of noncompliance with medical treatment, presenting hazards to health     Ulcer of other part of foot     Transient cerebral ischemia     Elevated Prostate Specific Antigen (PSA)     HYPERLIPIDEMIA LDL GOAL <100     GERD (gastroesophageal reflux disease)     Peripheral vascular disease (H)     Advanced directives, counseling/discussion     Health Care Home     Malignant neoplasm of prostate (H)     Hypertension, goal below 140/90     Senile nuclear sclerosis     Type 2 diabetes mellitus with diabetic peripheral angiopathy without gangrene (H)     Type 2 diabetes mellitus without complication, with long-term current use of insulin (H)     Past Surgical History:   Procedure Laterality Date     CL AFF SURGICAL PATHOLOGY  2002    prostate biopsy elevated PSA     COLONOSCOPY       HC REMOVE TONSILS/ADENOIDS,<11 Y/O      T & A     PHACOEMULSIFICATION WITH STANDARD INTRAOCULAR LENS IMPLANT Left 4/2/2015    Procedure: PHACOEMULSIFICATION WITH STANDARD INTRAOCULAR LENS IMPLANT;  Surgeon: Joseph Hernandez MD;  Location: [facility] OR     PHACOEMULSIFICATION WITH STANDARD  INTRAOCULAR LENS IMPLANT Right 5/4/2015    Procedure: PHACOEMULSIFICATION WITH STANDARD INTRAOCULAR LENS IMPLANT;  Surgeon: Joseph Hernandez MD;  Location: [facility] OR       Social History   Substance Use Topics     Smoking status: Never Smoker     Smokeless tobacco: Never Used     Alcohol use 1.0 oz/week      Comment: drinks rarely     Family History   Problem Relation Age of Onset     CANCER Mother      intestinal CA     DIABETES Father      DIABETES Brother      DIABETES Son              Reviewed and updated as needed this visit by clinical staffTobacco  Allergies  Meds  Problems  Med Hx  Surg Hx  Fam Hx  Soc Hx        Reviewed and updated as needed this visit by Provider  Meds  Problems         ROS:  CONSTITUTIONAL:fatigue  ENT/MOUTH: NEGATIVE for ear, mouth and throat problems  RESP:cough-productive    OBJECTIVE:     /77 (BP Location: Right arm, Patient Position: Chair, Cuff Size: Adult Large)  Pulse 94  Temp 97.9  F (36.6  C) (Tympanic)  Wt 231 lb (104.8 kg)  SpO2 94%  BMI 32.45 kg/m2  Body mass index is 32.45 kg/(m^2).  GENERAL: healthy, alert and no distress  HENT: ear canals and TM's normal, nose and mouth without ulcers or lesions  NECK: no adenopathy, no asymmetry, masses, or scars and thyroid normal to palpation  RESP: lungs clear to auscultation - no rales, rhonchi or wheezes        ASSESSMENT/PLAN:               ICD-10-CM    1. Acute bronchitis, unspecified organism J20.9 azithromycin (ZITHROMAX Z-KAUR) 250 MG tablet       Recheck in 1 week, if not improving.      Dave Jefferson MD  [facility]

## 2017-11-22 DIAGNOSIS — K21.9 GERD (GASTROESOPHAGEAL REFLUX DISEASE): ICD-10-CM

## 2017-11-28 NOTE — TELEPHONE ENCOUNTER
Requested Prescriptions   Pending Prescriptions Disp Refills     omeprazole (PRILOSEC) 20 MG CR capsule 90 capsule 3     Sig: Take 1 capsule (20 mg) by mouth daily    PPI Protocol Passed    11/22/2017  9:59 AM       Passed - Not on Clopidogrel (unless Pantoprazole ordered)       Passed - No diagnosis of osteoporosis on record       Passed - Recent or future visit with authorizing provider's specialty    Patient had office visit in the last year or has a visit in the next 30 days with authorizing provider.  See chart review.              Passed - Patient is age 18 or older        Prescription approved per [facility] Refill Protocol.

## 2018-01-10 DIAGNOSIS — I10 ESSENTIAL HYPERTENSION, BENIGN: ICD-10-CM

## 2018-01-10 NOTE — TELEPHONE ENCOUNTER
"Requested Prescriptions   Pending Prescriptions Disp Refills     lisinopril (PRINIVIL/ZESTRIL) 40 MG tablet  Last Written Prescription Date:  03/10/2017  Last Fill Quantity: 90,  # refills: 2   Last Office Visit with G, P or [facility] prescribing provider:  10/18/2017   Future Office Visit:      90 tablet 2     Sig: Take 1 tablet (40 mg) by mouth daily    ACE Inhibitors (Including Combos) Protocol Failed    1/10/2018  9:42 AM       Failed - Blood pressure under 140/90    BP Readings from Last 3 Encounters:   10/18/17 150/77   10/04/17 113/65   04/19/17 135/85                Passed - Recent or future visit with authorizing provider's specialty    Patient had office visit in the last year or has a visit in the next 30 days with authorizing provider.  See \"Patient Info\" tab in inbasket, or \"Choose Columns\" in Meds & Orders section of the refill encounter.              Passed - Patient is age 18 or older       Passed - Normal serum creatinine on file in past 12 months    Recent Labs   Lab Test  04/17/17   1102   CR  0.93            Passed - Normal serum potassium on file in past 12 months    Recent Labs   Lab Test  04/17/17   1102   POTASSIUM  3.8             Carmelo Pal RT (R)    "

## 2018-01-11 DIAGNOSIS — E11.51 TYPE 2 DIABETES MELLITUS WITH DIABETIC PERIPHERAL ANGIOPATHY WITHOUT GANGRENE (H): ICD-10-CM

## 2018-01-11 RX ORDER — LISINOPRIL 40 MG/1
40 TABLET ORAL DAILY
Qty: 90 TABLET | Refills: 0 | Status: SHIPPED | OUTPATIENT
Start: 2018-01-11 | End: 2018-04-04

## 2018-01-11 NOTE — TELEPHONE ENCOUNTER
Prescription approved per [facility] Refill Protocol.    Pt ill at last visit with elevated BP.  Odilia MESA RN

## 2018-01-11 NOTE — TELEPHONE ENCOUNTER
Requested Prescriptions   Pending Prescriptions Disp Refills     ONETOUCH LANCETS MISC  Last Written Prescription Date:  10/04/2016  Last Fill Quantity: 100,  # refills: 5   Last Office Visit with G, P or [facility] prescribing provider:  10/18/2017   Future Office Visit:      100 each 5     Si Device daily One touch delica lancets, what ever is covered by insurance.    Diabetic Supplies Protocol Passed    2018  1:13 PM       Passed - Patient is 18 years of age or older       Passed - Patient has had appt within past 6 mos    IV to PO - Antibiotics     None                    Carmelo BRANHAM (R)

## 2018-02-05 DIAGNOSIS — E11.51 TYPE 2 DIABETES MELLITUS WITH DIABETIC PERIPHERAL ANGIOPATHY WITHOUT GANGRENE, WITH LONG-TERM CURRENT USE OF INSULIN (H): ICD-10-CM

## 2018-02-05 DIAGNOSIS — Z79.4 TYPE 2 DIABETES MELLITUS WITH DIABETIC PERIPHERAL ANGIOPATHY WITHOUT GANGRENE, WITH LONG-TERM CURRENT USE OF INSULIN (H): ICD-10-CM

## 2018-02-05 NOTE — TELEPHONE ENCOUNTER
"Requested Prescriptions   Pending Prescriptions Disp Refills     insulin glargine (LANTUS SOLOSTAR) 100 UNIT/ML injection  Last Written Prescription Date:  12/07/2016  Last Fill Quantity: 3 months,  # refills: 3   Last Office Visit with [facility] provider:  10/18/2017   Future Office Visit:            Sig: Inject 30 Units Subcutaneous At Bedtime    Long Acting Insulin Protocol Failed    2/5/2018  1:06 PM       Failed - Blood pressure less than 140/90 in past 6 months    BP Readings from Last 3 Encounters:   10/18/17 150/77   10/04/17 113/65   04/19/17 135/85                Failed - LDL on file in past 12 months    Recent Labs   Lab Test  09/03/16   1041   LDL  95            Passed - Microalbumin on file in past 12 months    Recent Labs   Lab Test  04/17/17   1102   MICROL  204   UMALCR  201.98*            Passed - Serum creatinine on file in past 12 months    Recent Labs   Lab Test  04/17/17   1102   CR  0.93            Passed - HgbA1C in past 3 or 6 months    Recent Labs   Lab Test  10/04/17   0937   A1C  7.2*            Passed - Patient is age 18 or older       Passed - Recent visit with authorizing provider's specialty in past 6 months    Patient had office visit in the last 6 months or has a visit in the next 30 days with authorizing provider.  See \"Patient Info\" tab in inbasket, or \"Choose Columns\" in Meds & Orders section of the refill encounter.            Carmelo Pal RT (R)    "

## 2018-02-06 NOTE — TELEPHONE ENCOUNTER
Per office visit note 10/4/17:    ASSESSMENT:  Diabetes type 2 under good control.     PLAN:  Recheck in 6 months.        Dave Jefferson     Will refill through April 2018, then patient needs appointment.

## 2018-03-09 DIAGNOSIS — C61 MALIGNANT NEOPLASM OF PROSTATE (H): ICD-10-CM

## 2018-03-09 LAB — PSA SERPL-MCNC: 0.79 UG/L (ref 0–4)

## 2018-03-09 PROCEDURE — 84153 ASSAY OF PSA TOTAL: CPT | Performed by: RADIOLOGY

## 2018-03-09 PROCEDURE — 36415 COLL VENOUS BLD VENIPUNCTURE: CPT | Performed by: RADIOLOGY

## 2018-03-12 ENCOUNTER — OFFICE VISIT (OUTPATIENT)
Dept: RADIATION THERAPY | Facility: OUTPATIENT CENTER | Age: 83
End: 2018-03-12
Payer: COMMERCIAL

## 2018-03-12 VITALS
SYSTOLIC BLOOD PRESSURE: 119 MMHG | OXYGEN SATURATION: 98 % | BODY MASS INDEX: 32.92 KG/M2 | RESPIRATION RATE: 18 BRPM | HEART RATE: 63 BPM | DIASTOLIC BLOOD PRESSURE: 77 MMHG | WEIGHT: 234.4 LBS

## 2018-03-12 DIAGNOSIS — C61 MALIGNANT NEOPLASM OF PROSTATE (H): Primary | ICD-10-CM

## 2018-03-12 ASSESSMENT — PAIN SCALES - GENERAL: PAINLEVEL: NO PAIN (0)

## 2018-03-12 NOTE — PROGRESS NOTES
RADIATION ONCOLOGY FOLLOW UP  March 12, 2018    DISEASE TREATED: High-risk prostate cancer. Pretreatment PSA 25.5, Chaka score of 4+3=7, clinical stage [id number].     RADIATION THERAPY GIVEN: 8041 cGy in 43 planned treatments, via IMRT     INTERVAL SINCE COMPLETION OF THERAPY:  5 years and 1 months since completion on 02/10/2012.     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 He underwent biopsies in 2002 and 2005 and pathology was negative for malignancy. Due to the persistently rising PSA Dr. Mcintyre performed an MRI-guided biopsy of the prostate gland which revealed Chaka 4+3= 7 disease in 2 of 2 cores with 90% of the cores being positive in the right inferior base and right inferior mid gland. Androgen ablation was discussed with the patient due to his high-risk disease and the patient declined due to comorbidities of the drug. Overall, he completed radiotherapy with very little toxicity.     He now presents to clinic for routine follow-up exam. Today, he denies any pressing issues or complaints and reports that all symptoms have essentially remained stable since his last visit with us one year ago. His AUA is 12/35 (stable) and MARINA is 0/25. Main urinary symptoms include nocturia and urgency. However, he denies any dysuria, hematuria, hematochezia, diarrhea, or loose stools.  He denies any pain or swelling.    PHYSICAL EXAMINATION:   Vital Signs: /77  Pulse 63  Resp 18  Wt 106.3 kg (234 lb 6.4 oz)  SpO2 98%  BMI 32.92 kg/m2   Abdomin is soft with no tenderness.  Bowel sound is active.  No lymphadenopathy.    LABORATORY DATA:  PSA   Date Value Ref Range Status   03/09/2018 0.79 0 - 4 ug/L Final     Comment:     Assay Method:  Chemiluminescence using Siemens Vista analyzer   03/13/2017 0.52 0 - 4 ug/L Final     Comment:     Assay Method:  Chemiluminescence using Siemens Vista  analyzer   06/07/2016 0.48 0 - 4 ug/L Final   12/04/2015 0.43 0 - 4 ug/L Final   06/12/2015 0.33 0 - 4 ug/L Final   06/11/2014 0.24 0 - 4 ug/L Final   04/17/2014 0.24 0 - 4 ug/L Final   12/12/2013 0.31 0 - 4 ug/L Final   05/22/2013 0.35 0 - 4 ug/L Final   12/13/2012 0.52 0 - 4 ug/L Final   07/12/2012 1.35 0 - 4 ug/L Final   03/12/2012 3.24 0 - 4 ug/L Final   02/23/2012 3.50 0 - 4 ug/L Final   07/06/2011 25.50 (H) 0 - 4 ug/L Final   04/15/2011 22.40 (H) 0 - 4 ug/L Final   01/31/2011 22.70 (H) 0 - 4 ug/L Final   09/10/2010 20.10 (H) 0 - 4 ug/L Final   05/12/2010 20.10 (H) 0 - 4 ug/L Final   09/03/2009 20.10 (H) 0 - 4 ug/L Final   04/10/2009 19.10 (H) 0 - 4 ug/L Final     ASSESSMENT: Mr. Garcia is a 82 yo gentleman with high-risk prostate cancer s/p definitive radiation therapy completed almost 6 years and 1 months ago. Urinary symptoms are stable. PSA continues to very slowly rise.    PLAN:   1. RTC in one year   2. PSA prior to visit  3. Continue routine health maintenance with PCP   4.  PSA's have been very slowly rising.  Would not be concerned at this point and will plan recheck in 1 year.        Kamille Rodríguez NP  Radiation Oncology  [facility]   Rad[facility] Center   Phone: [phone]      CC  Patient Care Team:  Dave Jefferson MD as PCP - Stewart Uriarte MD as MD (Urology)

## 2018-03-12 NOTE — LETTER
3/12/2018      RE: Fawad Garcia  [address] [facility] [address]       RADIATION ONCOLOGY FOLLOW UP  March 12, 2018    DISEASE TREATED: High-risk prostate cancer. Pretreatment PSA 25.5, Chambers score of 4+3=7, clinical stage [id number].     RADIATION THERAPY GIVEN: 8041 cGy in 43 planned treatments, via IMRT     INTERVAL SINCE COMPLETION OF THERAPY:  5 years and 1 months since completion on 02/10/2012.     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 He underwent biopsies in 2002 and 2005 and pathology was negative for malignancy. Due to the persistently rising PSA Dr. Mcintyre performed an MRI-guided biopsy of the prostate gland which revealed Chaka 4+3= 7 disease in 2 of 2 cores with 90% of the cores being positive in the right inferior base and right inferior mid gland. Androgen ablation was discussed with the patient due to his high-risk disease and the patient declined due to comorbidities of the drug. Overall, he completed radiotherapy with very little toxicity.     He now presents to clinic for routine follow-up exam. Today, he denies any pressing issues or complaints and reports that all symptoms have essentially remained stable since his last visit with us one year ago. His AUA is 12/35 (stable) and MARINA is 0/25. Main urinary symptoms include nocturia and urgency. However, he denies any dysuria, hematuria, hematochezia, diarrhea, or loose stools.  He denies any pain or swelling.    PHYSICAL EXAMINATION:   Vital Signs: /77  Pulse 63  Resp 18  Wt 106.3 kg (234 lb 6.4 oz)  SpO2 98%  BMI 32.92 kg/m2   Abdomin is soft with no tenderness.  Bowel sound is active.  No lymphadenopathy.    LABORATORY DATA:  PSA   Date Value Ref Range Status   03/09/2018 0.79 0 - 4 ug/L Final     Comment:     Assay Method:  Chemiluminescence using Siemens Vista analyzer   03/13/2017 0.52 0 - 4  ug/L Final     Comment:     Assay Method:  Chemiluminescence using Siemens Vista analyzer   06/07/2016 0.48 0 - 4 ug/L Final   12/04/2015 0.43 0 - 4 ug/L Final   06/12/2015 0.33 0 - 4 ug/L Final   06/11/2014 0.24 0 - 4 ug/L Final   04/17/2014 0.24 0 - 4 ug/L Final   12/12/2013 0.31 0 - 4 ug/L Final   05/22/2013 0.35 0 - 4 ug/L Final   12/13/2012 0.52 0 - 4 ug/L Final   07/12/2012 1.35 0 - 4 ug/L Final   03/12/2012 3.24 0 - 4 ug/L Final   02/23/2012 3.50 0 - 4 ug/L Final   07/06/2011 25.50 (H) 0 - 4 ug/L Final   04/15/2011 22.40 (H) 0 - 4 ug/L Final   01/31/2011 22.70 (H) 0 - 4 ug/L Final   09/10/2010 20.10 (H) 0 - 4 ug/L Final   05/12/2010 20.10 (H) 0 - 4 ug/L Final   09/03/2009 20.10 (H) 0 - 4 ug/L Final   04/10/2009 19.10 (H) 0 - 4 ug/L Final     ASSESSMENT: Mr. Garcia is a 80 yo gentleman with high-risk prostate cancer s/p definitive radiation therapy completed almost 6 years and 1 months ago. Urinary symptoms are stable. PSA continues to very slowly rise.    PLAN:   1. RTC in one year   2. PSA prior to visit  3. Continue routine health maintenance with PCP   4.  PSA's have been very slowly rising.  Would not be concerned at this point and will plan recheck in 1 year.        Kamille Rodríguez NP  Radiation Oncology  [facility]   Radiation Therapy Center   Phone: [phone]      CC  Patient Care Team:  Dave Jefferson MD as PCP - Stewart Uriarte MD as MD (Urology)

## 2018-03-12 NOTE — MR AVS SNAPSHOT
After Visit Summary   3/12/2018    Fawad Garcia    MRN: [id number]           Patient Information     Date Of Birth          1935        Visit Information        Provider Department      3/12/2018 11:30 AM Kamille Rodríguez APRN CNP Rad[facility]        Today's Diagnoses     Malignant neoplasm of prostate (H)    -  1       Follow-ups after your visit        Future tests that were ordered for you today     Open Future Orders        Priority Expected Expires Ordered    PSA tumor marker Routine 3/12/2019 5/12/2019 3/12/2018            Who to contact     Please call your clinic at [phone] to:    Ask questions about your health    Make or cancel appointments    Discuss your medicines    Learn about your test results    Speak to your doctor            Additional Information About Your Visit        [employer] [employer] Information     [employer] gives you secure access to your electronic health record. If you see a primary care provider, you can also send messages to your care team and make appointments. If you have questions, please call your primary care clinic.  If you do not have a primary care provider, please call [phone] and they will assist you.      [facility] is an electronic gateway that provides easy, online access to your medical records. With [employer], you can request a clinic appointment, read your test results, renew a prescription or communicate with your care team.     To access your existing account, please contact your [facility] or call [phone] for assistance.        Care EveryWhere ID     This is your Care EveryWhere ID. This could be used by other organizations to access your [facility] medical records  [id number]        Your Vitals Were     Pulse Respirations Pulse Oximetry BMI (Body Mass Index)          63 18 98% 32.92 kg/m2         Blood Pressure from Last 3 Encounters:   03/12/18 119/77   10/18/17 150/77   10/04/17 113/65    Weight  from Last 3 Encounters:   03/12/18 106.3 kg (234 lb 6.4 oz)   10/18/17 104.8 kg (231 lb)   10/04/17 107.5 kg (237 lb)               Primary Care Provider Office Phone # Fax #    Dave Callum Jefferson [phone] [id number]  [facility] [address]        Equal Access to Services     MADHAVI DELACRUZ : Hadii aad ku hadasho Soomaali, waaxda luqadaha, qaybta kaalmada adeegyada, waxay idiin hayaan adeeg kharash la'aan ah. So [facility] [place] [phone].    ATENCIÓN: Si sergo mustafa, tiene a aaron disposición servicios gratuitos de asistencia lingüística. Llame al [phone].    We comply with applicable federal civil rights laws and [place] laws. We do not discriminate on the basis of race, color, national origin, age, disability, sex, sexual orientation, or gender identity.            Thank you!     Thank you for choosing [facility]  for your care. Our goal is always to provide you with excellent care. Hearing back from our patients is one way we can continue to improve our services. Please take a few minutes to complete the written survey that you may receive in the mail after your visit with us. Thank you!             Your Updated Medication List - Protect others around you: Learn how to safely use, store and throw away your medicines at www.disposemymeds.org.          This list is accurate as of 3/12/18  1:10 PM.  Always use your most recent med list.                   Brand Name Dispense Instructions for use Diagnosis    amLODIPine 5 MG tablet    NORVASC    90 tablet    Take 1 tablet (5 mg) by mouth daily Takes prn for high blood pressure    Hypertension, goal below 140/90       * ASPIRIN NOT PRESCRIBED    INTENTIONAL     by Other route continuous prn Reported on 4/19/2017        azithromycin 250 MG tablet    ZITHROMAX Z-KAUR    6 tablet    2 tabs stat and 1 tab on Day 2-5    Acute bronchitis, unspecified organism       blood glucose monitoring test strip    no brand specified    3 Box     1 strip by In Vitro route daily One touch Ultra.    Type 2 diabetes mellitus without complication, with long-term current use of insulin (H)       dorzolamide-timolol 2-0.5 % ophthalmic solution    COSOPT          hydrochlorothiazide 25 MG tablet    HYDRODIURIL    90 tablet    Take 1 tablet (25 mg) by mouth daily    Hypertension, goal below 140/90       insulin pen needle 31G X 8 MM    B-D U/F    90 each    1 Device daily Use once daily or as directed.    Type 2 diabetes, HbA1c goal < 7% (H)       * LANTUS SOLOSTAR 100 UNIT/ML injection   Generic drug:  insulin glargine           * insulin glargine 100 UNIT/ML injection    LANTUS VIAL    3 Month    30 units at bedtime    Type 2 diabetes mellitus without complication, with long-term current use of insulin (H)       * insulin glargine 100 UNIT/ML injection    LANTUS SOLOSTAR    9 mL    Inject 30 Units Subcutaneous At Bedtime    Type 2 diabetes mellitus with diabetic peripheral angiopathy without gangrene, with long-term current use of insulin (H)       lisinopril 40 MG tablet    PRINIVIL/ZESTRIL    90 tablet    Take 1 tablet (40 mg) by mouth daily    Essential hypertension, benign       magic mouthwash suspension (diphenhydrAMINE, lidocaine, aluminum-magnesium & simethicone) Susp compounding kit     178 mL    Swish and swallow 5-10 mLs in mouth every 6 hours as needed        metFORMIN 500 MG tablet    GLUCOPHAGE    360 tablet    2 tabs with lunch and  tabs with dinner.    Type 2 diabetes mellitus without complication, with long-term current use of insulin (H)       omeprazole 20 MG CR capsule    priLOSEC    90 capsule    Take 1 capsule (20 mg) by mouth daily    GERD (gastroesophageal reflux disease)       ONETOUCH LANCETS Misc     100 each    1 Device daily One touch delica lancets, what ever is covered by insurance.    Type 2 diabetes mellitus with diabetic peripheral angiopathy without gangrene (H)       Hire [employer] ULTRA SYSTEM KIT W/DEVICE Kit           order for  DME     1 Device    Equipment being ordered:  Glucometer    Type 2 diabetes, HbA1c goal < 7% (H)       * STATIN NOT PRESCRIBED (INTENTIONAL)      by Other route continuous prn Reported on 4/19/2017        triamcinolone 0.1 % cream    KENALOG          VITAMIN D PO      Take 4,000 Int'l Units/day by mouth daily        * Notice:  This list has 5 medication(s) that are the same as other medications prescribed for you. Read the directions carefully, and ask your doctor or other care provider to review them with you.

## 2018-03-14 VITALS
HEART RATE: 94 BPM | WEIGHT: 231 LBS | SYSTOLIC BLOOD PRESSURE: 119 MMHG | TEMPERATURE: 97.9 F | DIASTOLIC BLOOD PRESSURE: 77 MMHG | BODY MASS INDEX: 32.45 KG/M2 | OXYGEN SATURATION: 94 %

## 2018-03-16 DIAGNOSIS — Z79.4 TYPE 2 DIABETES MELLITUS WITHOUT COMPLICATION, WITH LONG-TERM CURRENT USE OF INSULIN (H): ICD-10-CM

## 2018-03-16 DIAGNOSIS — I10 HYPERTENSION, GOAL BELOW 140/90: ICD-10-CM

## 2018-03-16 DIAGNOSIS — E11.9 TYPE 2 DIABETES MELLITUS WITHOUT COMPLICATION, WITH LONG-TERM CURRENT USE OF INSULIN (H): ICD-10-CM

## 2018-03-16 RX ORDER — AMLODIPINE BESYLATE 5 MG/1
5 TABLET ORAL DAILY
Qty: 90 TABLET | Refills: 1 | Status: SHIPPED | OUTPATIENT
Start: 2018-03-16 | End: 2018-04-04

## 2018-03-16 NOTE — TELEPHONE ENCOUNTER
Message left for patient to check the pharmacy.  Needs labs and office visit.  Chrissie BURCH RN

## 2018-03-22 ENCOUNTER — DOCUMENTATION ONLY (OUTPATIENT)
Dept: LAB | Facility: CLINIC | Age: 83
End: 2018-03-22

## 2018-03-22 DIAGNOSIS — Z79.4 TYPE 2 DIABETES MELLITUS WITHOUT COMPLICATION, WITH LONG-TERM CURRENT USE OF INSULIN (H): Primary | ICD-10-CM

## 2018-03-22 DIAGNOSIS — I10 HYPERTENSION, GOAL BELOW 140/90: ICD-10-CM

## 2018-03-22 DIAGNOSIS — E11.9 TYPE 2 DIABETES MELLITUS WITHOUT COMPLICATION, WITH LONG-TERM CURRENT USE OF INSULIN (H): Primary | ICD-10-CM

## 2018-03-22 NOTE — PROGRESS NOTES
This patient has a lab only appointment on Tuesday 03/27/2018. There are no orders in place. He needs refills on medications and was told by nurse that he needed labs and an office visit. There are no orders in place. Please place any lab orders he may need. Thank you.

## 2018-03-27 DIAGNOSIS — Z79.4 TYPE 2 DIABETES MELLITUS WITHOUT COMPLICATION, WITH LONG-TERM CURRENT USE OF INSULIN (H): ICD-10-CM

## 2018-03-27 DIAGNOSIS — E11.9 TYPE 2 DIABETES MELLITUS WITHOUT COMPLICATION, WITH LONG-TERM CURRENT USE OF INSULIN (H): ICD-10-CM

## 2018-03-27 LAB
ANION GAP SERPL CALCULATED.3IONS-SCNC: 7 MMOL/L (ref 3–14)
BUN SERPL-MCNC: 18 MG/DL (ref 7–30)
CALCIUM SERPL-MCNC: 8.7 MG/DL (ref 8.5–10.1)
CHLORIDE SERPL-SCNC: 102 MMOL/L (ref 94–109)
CHOLEST SERPL-MCNC: 178 MG/DL
CO2 SERPL-SCNC: 27 MMOL/L (ref 20–32)
CREAT SERPL-MCNC: 0.86 MG/DL (ref 0.66–1.25)
CREAT UR-MCNC: 105 MG/DL
GFR SERPL CREATININE-BSD FRML MDRD: 85 ML/MIN/1.7M2
GLUCOSE SERPL-MCNC: 125 MG/DL (ref 70–99)
HBA1C MFR BLD: 7.7 % (ref 4.3–6)
HDLC SERPL-MCNC: 54 MG/DL
LDLC SERPL CALC-MCNC: 107 MG/DL
MICROALBUMIN UR-MCNC: 193 MG/L
MICROALBUMIN/CREAT UR: 183.81 MG/G CR (ref 0–17)
NONHDLC SERPL-MCNC: 124 MG/DL
POTASSIUM SERPL-SCNC: 3.9 MMOL/L (ref 3.4–5.3)
SODIUM SERPL-SCNC: 136 MMOL/L (ref 133–144)
TRIGL SERPL-MCNC: 84 MG/DL
TSH SERPL DL<=0.005 MIU/L-ACNC: 1.74 MU/L (ref 0.4–4)

## 2018-03-27 PROCEDURE — 84443 ASSAY THYROID STIM HORMONE: CPT | Performed by: FAMILY MEDICINE

## 2018-03-27 PROCEDURE — 83036 HEMOGLOBIN GLYCOSYLATED A1C: CPT | Performed by: FAMILY MEDICINE

## 2018-03-27 PROCEDURE — 36415 COLL VENOUS BLD VENIPUNCTURE: CPT | Performed by: FAMILY MEDICINE

## 2018-03-27 PROCEDURE — 80061 LIPID PANEL: CPT | Performed by: FAMILY MEDICINE

## 2018-03-27 PROCEDURE — 80048 BASIC METABOLIC PNL TOTAL CA: CPT | Performed by: FAMILY MEDICINE

## 2018-03-27 PROCEDURE — 82043 UR ALBUMIN QUANTITATIVE: CPT | Performed by: FAMILY MEDICINE

## 2018-03-27 NOTE — LETTER
[facility]  [address] [facility] [address]  Phone: [phone]      3/27/2018     Fawad Garcia  [address] [facility] [address]      Dear Fawad:    Thank you for allowing me to participate in your care. Your recent test results were reviewed and listed below.      Your results are provided below for your review    Your hemoglobin A1c is excellent at 7.7.  Bad cholesterol or LDL cholesterol is very good at 107.   The urine for kidney function shows that you still have some mild decrease in function but it has not changed in the last year.  Remainder of labs are normal.     Results for orders placed or performed in visit on 03/27/18   **A1C FUTURE 1yr   Result Value Ref Range    Hemoglobin A1C 7.7 (H) 4.3 - 6.0 %   **TSH with free T4 reflex FUTURE 1yr   Result Value Ref Range    TSH 1.74 0.40 - 4.00 mU/L   **Basic metabolic panel FUTURE 1yr   Result Value Ref Range    Sodium 136 133 - 144 mmol/L    Potassium 3.9 3.4 - 5.3 mmol/L    Chloride 102 94 - 109 mmol/L    Carbon Dioxide 27 20 - 32 mmol/L    Anion Gap 7 3 - 14 mmol/L    Glucose 125 (H) 70 - 99 mg/dL    Urea Nitrogen 18 7 - 30 mg/dL    Creatinine 0.86 0.66 - 1.25 mg/dL    GFR Estimate 85 >60 mL/min/1.7m2    GFR Estimate If Black >90 >60 mL/min/1.7m2    Calcium 8.7 8.5 - 10.1 mg/dL   Lipid panel reflex to direct LDL Fasting   Result Value Ref Range    Cholesterol 178 <200 mg/dL    Triglycerides 84 <150 mg/dL    HDL Cholesterol 54 >39 mg/dL    LDL Cholesterol Calculated 107 (H) <100 mg/dL    Non HDL Cholesterol 124 <130 mg/dL   Albumin Random Urine Quantitative with Creat Ratio   Result Value Ref Range    Creatinine Urine 105 mg/dL    Albumin Urine mg/L 193 mg/L    Albumin Urine mg/g Cr 183.81 (H) 0 - 17 mg/g Cr                 Thank you for choosing [facility]. As a result, please continue with the treatment plan discussed in the office. Return as discussed or sooner if symptoms worsen or fail to improve. If you  have any further questions or concerns, please do not hesitate to contact us.    Sincerely,      Dr. Dave Jefferson /la

## 2018-04-02 ENCOUNTER — MEDICAL CORRESPONDENCE (OUTPATIENT)
Dept: HEALTH INFORMATION MANAGEMENT | Facility: CLINIC | Age: 83
End: 2018-04-02

## 2018-04-02 ENCOUNTER — TELEPHONE (OUTPATIENT)
Dept: FAMILY MEDICINE | Facility: CLINIC | Age: 83
End: 2018-04-02

## 2018-04-02 NOTE — TELEPHONE ENCOUNTER
Reason for Call:  Form, our goal is to have forms completed with 72 hours, however, some forms may require a visit or additional information.    Type of letter, form or note:  medical    Who is the form from?: [employer] (if other please explain)    Where did the form come from: form was faxed in    What clinic location was the form placed at?: [facility]    Where the form was placed: Dr's Box    What number is listed as a contact on the form?: [phone]       Additional comments: form placed in PCP box    Call taken on 4/2/2018 at 11:54 AM by Isabelle Oquendo

## 2018-04-04 ENCOUNTER — OFFICE VISIT (OUTPATIENT)
Dept: FAMILY MEDICINE | Facility: CLINIC | Age: 83
End: 2018-04-04
Payer: COMMERCIAL

## 2018-04-04 VITALS
TEMPERATURE: 97.4 F | DIASTOLIC BLOOD PRESSURE: 72 MMHG | HEIGHT: 71 IN | RESPIRATION RATE: 16 BRPM | BODY MASS INDEX: 33.04 KG/M2 | HEART RATE: 68 BPM | OXYGEN SATURATION: 97 % | WEIGHT: 236 LBS | SYSTOLIC BLOOD PRESSURE: 137 MMHG

## 2018-04-04 DIAGNOSIS — K21.9 GASTROESOPHAGEAL REFLUX DISEASE WITHOUT ESOPHAGITIS: ICD-10-CM

## 2018-04-04 DIAGNOSIS — I10 HYPERTENSION, GOAL BELOW 140/90: ICD-10-CM

## 2018-04-04 DIAGNOSIS — Z79.4 TYPE 2 DIABETES MELLITUS WITHOUT COMPLICATION, WITH LONG-TERM CURRENT USE OF INSULIN (H): Primary | ICD-10-CM

## 2018-04-04 DIAGNOSIS — E11.9 TYPE 2 DIABETES MELLITUS WITHOUT COMPLICATION, WITH LONG-TERM CURRENT USE OF INSULIN (H): Primary | ICD-10-CM

## 2018-04-04 DIAGNOSIS — I10 ESSENTIAL HYPERTENSION, BENIGN: ICD-10-CM

## 2018-04-04 PROCEDURE — 99207 C FOOT EXAM  NO CHARGE: CPT | Performed by: FAMILY MEDICINE

## 2018-04-04 PROCEDURE — 99214 OFFICE O/P EST MOD 30 MIN: CPT | Performed by: FAMILY MEDICINE

## 2018-04-04 RX ORDER — AMLODIPINE BESYLATE 5 MG/1
5 TABLET ORAL DAILY
Qty: 90 TABLET | Refills: 3 | Status: SHIPPED | OUTPATIENT
Start: 2018-04-04 | End: 2019-04-24

## 2018-04-04 RX ORDER — HYDROCHLOROTHIAZIDE 25 MG/1
25 TABLET ORAL DAILY
Qty: 90 TABLET | Refills: 3 | Status: SHIPPED | OUTPATIENT
Start: 2018-04-04 | End: 2019-04-10

## 2018-04-04 RX ORDER — LISINOPRIL 40 MG/1
40 TABLET ORAL DAILY
Qty: 90 TABLET | Refills: 3 | Status: SHIPPED | OUTPATIENT
Start: 2018-04-04 | End: 2019-04-10

## 2018-04-04 ASSESSMENT — PAIN SCALES - GENERAL: PAINLEVEL: NO PAIN (0)

## 2018-04-04 NOTE — PROGRESS NOTES
SUBJECTIVE:   Fawad Garcia is a 82 year old male who presents to clinic today for the following health issues:    Obesity: His body mass index is 31.  He lost some weight 5 years ago and has kept it off.  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    Hypertension: Blood pressure is 137/72    Diabetes Follow-up    Hemoglobin A1c is 7.7  Patient is checking blood sugars: once daily.  Results are as follows:         am - 100-120    Diabetic concerns: None     Symptoms of hypoglycemia (low blood sugar): none     Paresthesias (numbness or burning in feet) or sores: Yes      Date of last diabetic eye exam: 2017    BP Readings from Last 2 Encounters:   04/04/18 137/72   03/12/18 119/77     Hemoglobin A1C (%)   Date Value   03/27/2018 7.7 (H)   10/04/2017 7.2 (H)     LDL Cholesterol Calculated (mg/dL)   Date Value   03/27/2018 107 (H)   09/03/2016 95       Amount of exercise or physical activity: 2-3 days/week for an average of 45-60 minutes    Problems taking medications regularly: No    Medication side effects: none    Diet: regular (no restrictions)            Problem list and histories reviewed & adjusted, as indicated.  Additional history: as documented    Patient Active Problem List   Diagnosis     Obesity     Personal history of noncompliance with medical treatment, presenting hazards to health     Ulcer of other part of foot     Transient cerebral ischemia     Elevated Prostate Specific Antigen (PSA)     HYPERLIPIDEMIA LDL GOAL <100     GERD (gastroesophageal reflux disease)     Peripheral vascular disease (H)     Advanced directives, counseling/discussion     Health Care Home     Malignant neoplasm of prostate (H)     Hypertension, goal below 140/90     Senile nuclear sclerosis     Type 2 diabetes mellitus with diabetic peripheral angiopathy without gangrene (H)     Type 2 diabetes mellitus without complication, with long-term current use of insulin (H)     Past Surgical History:   Procedure Laterality  "Date     CL AFF SURGICAL PATHOLOGY  2002    prostate biopsy elevated PSA     COLONOSCOPY       HC REMOVE TONSILS/ADENOIDS,<13 Y/O      T & A     PHACOEMULSIFICATION WITH STANDARD INTRAOCULAR LENS IMPLANT Left 4/2/2015    Procedure: PHACOEMULSIFICATION WITH STANDARD INTRAOCULAR LENS IMPLANT;  Surgeon: Joseph Hernandez MD;  Location: [facility] OR     PHACOEMULSIFICATION WITH STANDARD INTRAOCULAR LENS IMPLANT Right 5/4/2015    Procedure: PHACOEMULSIFICATION WITH STANDARD INTRAOCULAR LENS IMPLANT;  Surgeon: Joseph Hernandez MD;  Location: [facility] OR       Social History   Substance Use Topics     Smoking status: Never Smoker     Smokeless tobacco: Never Used     Alcohol use 1.0 oz/week      Comment: drinks rarely     Family History   Problem Relation Age of Onset     CANCER Mother      intestinal CA     DIABETES Father      DIABETES Brother      DIABETES Son            Reviewed and updated as needed this visit by clinical staff  Tobacco  Allergies  Med Hx  Surg Hx  Fam Hx  Soc Hx      Reviewed and updated as needed this visit by Provider         ROS:  Constitutional, HEENT, cardiovascular, pulmonary, gi and gu systems are negative, except as otherwise noted.    OBJECTIVE:     /72 (BP Location: Right arm, Patient Position: Chair, Cuff Size: Adult Large)  Pulse 68  Temp 97.4  F (36.3  C) (Tympanic)  Resp 16  Ht 5' 10.75\" (1.797 m)  Wt 236 lb (107 kg)  SpO2 97%  BMI 33.15 kg/m2  Body mass index is 33.15 kg/(m^2).  GENERAL: healthy, alert and no distress  NECK: no adenopathy, no asymmetry, masses, or scars and thyroid normal to palpation  RESP: lungs clear to auscultation - no rales, rhonchi or wheezes  CV: regular rate and rhythm, normal S1 S2, no S3 or S4, no murmur, click or rub, no peripheral edema and peripheral pulses strong        ASSESSMENT/PLAN:     Diabetes Type II, A1c Controlled, insulin dependent   Associated with the following complications    Renal Complications:  None    Ophthalmologic " Complications: None    Neurologic Complications: None    Peripheral Vascular Complications:  None    Other: None   Plan:  No changes in the patient's current treatment plan      Hyperlipidemia; controlled   Plan:  No changes in the patient's current treatment plan    Hypertension; controlled   Associated with the following complications:    Diabetes   Plan:  No changes in the patient's current treatment plan            ICD-10-CM    1. Type 2 diabetes mellitus without complication, with long-term current use of insulin (H) E11.9 FOOT EXAM    Z79.4 metFORMIN (GLUCOPHAGE) 500 MG tablet     insulin glargine (LANTUS SOLOSTAR) 100 UNIT/ML injection   2. Hypertension, goal below 140/90 I10 amLODIPine (NORVASC) 5 MG tablet     hydrochlorothiazide (HYDRODIURIL) 25 MG tablet   3. BENIGN HYPERTENSION I10 lisinopril (PRINIVIL/ZESTRIL) 40 MG tablet   4. Gastroesophageal reflux disease without esophagitis K21.9 omeprazole (PRILOSEC) 20 MG CR capsule       Recheck in 6 months.    Dave Jefferson MD  [facility]

## 2018-04-11 ENCOUNTER — TRANSFERRED RECORDS (OUTPATIENT)
Dept: HEALTH INFORMATION MANAGEMENT | Facility: CLINIC | Age: 83
End: 2018-04-11

## 2018-04-24 ENCOUNTER — TELEPHONE (OUTPATIENT)
Dept: FAMILY MEDICINE | Facility: CLINIC | Age: 83
End: 2018-04-24

## 2018-04-24 NOTE — TELEPHONE ENCOUNTER
Patients MD is retiring and wanting Dr. KEIKO Scott to be his new primary. He was just seen by Dr. ASHLEY Jefferson, so he will not need to be seen for 6 months.  Jazzmine Greene  [facility] [place] [facility]

## 2018-05-01 DIAGNOSIS — E11.9 TYPE 2 DIABETES MELLITUS WITHOUT COMPLICATION, WITH LONG-TERM CURRENT USE OF INSULIN (H): Primary | ICD-10-CM

## 2018-05-01 DIAGNOSIS — Z79.4 TYPE 2 DIABETES MELLITUS WITHOUT COMPLICATION, WITH LONG-TERM CURRENT USE OF INSULIN (H): Primary | ICD-10-CM

## 2018-05-01 NOTE — TELEPHONE ENCOUNTER
"Requested Prescriptions   Pending Prescriptions Disp Refills     insulin pen needle (B-D U/F) 31G X 8 MM  Last Written Prescription Date:  17  Last Fill Quantity: 90,  # refills: 3   Last office visit: 2018 with prescribing provider:  18   Future Office Visit:   90 each 3     Si Device daily Use once daily or as directed.    Diabetic Supplies Protocol Passed    2018  9:43 AM       Passed - Patient is 18 years of age or older       Passed - Recent (6 mo) or future (30 days) visit within the authorizing provider's specialty    Patient had office visit in the last 6 months or has a visit in the next 30 days with authorizing provider.  See \"Patient Info\" tab in inbasket, or \"Choose Columns\" in Meds & Orders section of the refill encounter.              "

## 2018-06-18 DIAGNOSIS — Z79.4 TYPE 2 DIABETES MELLITUS WITHOUT COMPLICATION, WITH LONG-TERM CURRENT USE OF INSULIN (H): ICD-10-CM

## 2018-06-18 DIAGNOSIS — E11.9 TYPE 2 DIABETES MELLITUS WITHOUT COMPLICATION, WITH LONG-TERM CURRENT USE OF INSULIN (H): ICD-10-CM

## 2018-06-18 NOTE — TELEPHONE ENCOUNTER
"Requested Prescriptions   Pending Prescriptions Disp Refills     blood glucose monitoring (NO BRAND SPECIFIED) test strip  Last Written Prescription Date:  2017  Last Fill Quantity: 3,  # refills: 3   Last office visit: 2018 with prescribing provider:  [place]   Future Office Visit:     3 Box 3     Si strip by In Vitro route daily One touch Ultra.    Diabetic Supplies Protocol Passed    2018  3:57 PM       Passed - Patient is 18 years of age or older       Passed - Recent (6 mo) or future (30 days) visit within the authorizing provider's specialty    Patient had office visit in the last 6 months or has a visit in the next 30 days with authorizing provider.  See \"Patient Info\" tab in inbasket, or \"Choose Columns\" in Meds & Orders section of the refill encounter.              "

## 2018-07-30 ENCOUNTER — OFFICE VISIT (OUTPATIENT)
Dept: FAMILY MEDICINE | Facility: CLINIC | Age: 83
End: 2018-07-30
Payer: COMMERCIAL

## 2018-07-30 VITALS
HEIGHT: 71 IN | BODY MASS INDEX: 32.62 KG/M2 | WEIGHT: 233 LBS | HEART RATE: 60 BPM | DIASTOLIC BLOOD PRESSURE: 74 MMHG | SYSTOLIC BLOOD PRESSURE: 130 MMHG | TEMPERATURE: 98.5 F

## 2018-07-30 DIAGNOSIS — E11.29 TYPE 2 DIABETES MELLITUS WITH MICROALBUMINURIA, WITH LONG-TERM CURRENT USE OF INSULIN (H): Primary | ICD-10-CM

## 2018-07-30 DIAGNOSIS — R80.9 TYPE 2 DIABETES MELLITUS WITH MICROALBUMINURIA, WITH LONG-TERM CURRENT USE OF INSULIN (H): Primary | ICD-10-CM

## 2018-07-30 DIAGNOSIS — L98.9 SKIN LESION: ICD-10-CM

## 2018-07-30 DIAGNOSIS — Z79.4 TYPE 2 DIABETES MELLITUS WITH MICROALBUMINURIA, WITH LONG-TERM CURRENT USE OF INSULIN (H): Primary | ICD-10-CM

## 2018-07-30 DIAGNOSIS — E78.5 HYPERLIPIDEMIA LDL GOAL <100: ICD-10-CM

## 2018-07-30 DIAGNOSIS — C61 MALIGNANT NEOPLASM OF PROSTATE (H): ICD-10-CM

## 2018-07-30 LAB
CHOLEST SERPL-MCNC: 176 MG/DL
CREAT UR-MCNC: 70 MG/DL
HBA1C MFR BLD: 7.4 % (ref 0–5.6)
HDLC SERPL-MCNC: 56 MG/DL
LDLC SERPL CALC-MCNC: 108 MG/DL
MICROALBUMIN UR-MCNC: 105 MG/L
MICROALBUMIN/CREAT UR: 149.15 MG/G CR (ref 0–17)
NONHDLC SERPL-MCNC: 120 MG/DL
TRIGL SERPL-MCNC: 59 MG/DL

## 2018-07-30 PROCEDURE — 99214 OFFICE O/P EST MOD 30 MIN: CPT | Performed by: FAMILY MEDICINE

## 2018-07-30 PROCEDURE — 83036 HEMOGLOBIN GLYCOSYLATED A1C: CPT | Performed by: FAMILY MEDICINE

## 2018-07-30 PROCEDURE — 36415 COLL VENOUS BLD VENIPUNCTURE: CPT | Performed by: FAMILY MEDICINE

## 2018-07-30 PROCEDURE — 82043 UR ALBUMIN QUANTITATIVE: CPT | Performed by: FAMILY MEDICINE

## 2018-07-30 PROCEDURE — 80061 LIPID PANEL: CPT | Performed by: FAMILY MEDICINE

## 2018-07-30 NOTE — PROGRESS NOTES
SUBJECTIVE:   Fawad Garcia is a 82 year old male who presents to clinic today for the following health issues:  Chief Complaint   Patient presents with     Diabetes     questions about his macular degeneration     Derm Problem     wants spots on his face checked     He has some brown skin lesions on his face. Wondering what to do about them.    Has one lesion in left ear that cracks and drains.  This has been present for one year.      Diabetes Follow-up    Patient is checking blood sugars: once daily.  Results are as follows:         am - 110-120    Diabetic concerns:  other - macular degeneration.  Lower appetite     Symptoms of hypoglycemia (low blood sugar): shaky, weak, Symptoms occur if sugars < 90.     Paresthesias (numbness or burning in feet) or sores: No     Date of last diabetic eye exam: April 2018 Dr. Colon    Diabetes Management Resources    Hyperlipidemia Follow-Up      Rate your low fat/cholesterol diet?: good    Taking statin?  No  Other lipid medications/supplements?:  Fish oil/Omega 3    Hypertension Follow-up      Outpatient blood pressures are being checked at store.  Results are 130/70s.    Low Salt Diet: no added salt    BP Readings from Last 2 Encounters:   04/04/18 137/72   03/12/18 119/77     Hemoglobin A1C (%)   Date Value   03/27/2018 7.7 (H)   10/04/2017 7.2 (H)     LDL Cholesterol Calculated (mg/dL)   Date Value   03/27/2018 107 (H)   09/03/2016 95     Spots on his face and right ear      Duration: month or so    Description (location/character/radiation): slightly raised    Intensity:  mild    Accompanying signs and symptoms: none    History (similar episodes/previous evaluation): some past cryo of spots    Precipitating or alleviating factors: None    Therapies tried and outcome: none         Amount of exercise or physical activity: [employer] 2 times a week-stays active    Problems taking medications regularly: No    Medication side effects: none    Diet: portion  "control          Problem list and histories reviewed & adjusted, as indicated.  Additional history: as documented        Reviewed and updated as needed this visit by clinical staff       Reviewed and updated as needed this visit by Provider         ROS:  CONSTITUTIONAL:NEGATIVE for fever, chills, change in weight  INTEGUMENTARY/SKIN: as above  EYES: macular degeneration and it is getting worse  RESP:NEGATIVE for significant cough or SOB  CV: NEGATIVE for chest pain, palpitations or peripheral edema  MUSCULOSKELETAL: NEGATIVE for significant arthralgias or myalgia  NEURO: NEGATIVE for weakness, dizziness or paresthesias  ENDOCRINE: diabetes has been controlled  PSYCHIATRIC: NEGATIVE for changes in mood or affect    OBJECTIVE:                                                    /74 (Cuff Size: Adult Large)  Pulse 60  Temp 98.5  F (36.9  C) (Tympanic)  Ht 5' 10.75\" (1.797 m)  Wt 233 lb (105.7 kg)  BMI 32.73 kg/m2  Body mass index is 32.73 kg/(m^2).  GENERAL APPEARANCE: alert, no distress and cooperative  RESP: lungs clear to auscultation - no rales, rhonchi or wheezes  CV: regular rates and rhythm, normal S1 S2, no S3 or S4 and no murmur, click or rub  ABDOMEN: soft, nontender, without hepatosplenomegaly or masses and bowel sounds normal  MS: extremities normal- no gross deformities noted  SKIN: has numerous SK on his face, has one skin lesion in left ear, antihelical region. Mild red, flaky and cracked.    NEURO: Normal strength and tone, mentation intact and speech normal  PSYCH: mentation appears normal and affect normal/bright         ASSESSMENT/PLAN:                                                    1. Type 2 diabetes mellitus with microalbuminuria, with long-term current use of insulin (H)  Need to check labs and make plan  - Albumin Random Urine Quantitative with Creat Ratio  - Hemoglobin A1c  - Lipid panel reflex to direct LDL Fasting    2. Hyperlipidemia LDL goal <100  Need to check    3. Malignant " neoplasm of prostate (H)  Seeing his specialist    4. Skin lesion  AK vs eczema, due to one year duration will get consult  - DERMATOLOGY REFERRAL      See Patient Instructions    Chris Scott MD  [facility]

## 2018-07-30 NOTE — LETTER
July 31, 2018      Fawad Garcia  [address] [facility] [address]        Dear ,    We are writing to inform you of your test results.  Brook Celestin,   Your diabetes has improved to 7.4% which is great! Your urine microalbumin has also improved which is great!  Your cholesterol is ok.  We want to see the LDL less than 100.   Overall very good labs!     Resulted Orders   Albumin Random Urine Quantitative with Creat Ratio   Result Value Ref Range    Creatinine Urine 70 mg/dL    Albumin Urine mg/L 105 mg/L    Albumin Urine mg/g Cr 149.15 (H) 0 - 17 mg/g Cr   Hemoglobin A1c   Result Value Ref Range    Hemoglobin A1C 7.4 (H) 0 - 5.6 %      Comment:      Normal <5.7% Prediabetes 5.7-6.4%  Diabetes 6.5% or higher - adopted from ADA   consensus guidelines.     Lipid panel reflex to direct LDL Fasting   Result Value Ref Range    Cholesterol 176 <200 mg/dL    Triglycerides 59 <150 mg/dL      Comment:      Fasting specimen    HDL Cholesterol 56 >39 mg/dL    LDL Cholesterol Calculated 108 (H) <100 mg/dL      Comment:      Above desirable:  100-129 mg/dl  Borderline High:  130-159 mg/dL  High:             160-189 mg/dL  Very high:       >189 mg/dl      Non HDL Cholesterol 120 <130 mg/dL   If you have any questions or concerns, please call the clinic at the number listed above.   Sincerely,  Chris Scott MD

## 2018-07-30 NOTE — MR AVS SNAPSHOT
After Visit Summary   7/30/2018    Fawad Garcia    MRN: [id number]           Patient Information     Date Of Birth          1935        Visit Information        Provider Department      7/30/2018 10:20 AM Chris Scott MD [facility]        Today's Diagnoses     Type 2 diabetes mellitus with microalbuminuria, with long-term current use of insulin (H)    -  1    Hyperlipidemia LDL goal <100        Malignant neoplasm of prostate (H)        Skin lesion          Care Instructions    Please see dermatology.    Please go to lab.    I will review you labs and let you know the follow up.  If your diabetes is controlled I will recommend 6 month to follow up.      Thank you for choosing [facility].  You may be receiving a survey in the mail from [employer] regarding your visit today.  Please take a few minutes to complete and return the survey to let us know how we are doing.      If you have questions or concerns, please contact us via [employer] or you can contact your care team at [phone].    Our Clinic hours are:  Monday 6:40 am  to 7:00 pm  Tuesday -Friday 6:40 am to 5:00 pm    The [place] outpatient lab hours are:  Monday - Friday 6:10 am to 4:45 pm  Saturdays 7:00 am to 11:00 am  Appointments are required, call [phone]    If you have clinical questions after hours or would like to schedule an appointment,  call the clinic at [phone].            Follow-ups after your visit        Additional Services     DERMATOLOGY REFERRAL       Your provider has referred you to: [facility]: [facility] [phone]   [email or web address]    Please be aware that coverage of these services is subject to the terms and limitations of your health insurance plan.  Call member services at your health plan with any benefit or coverage questions.      Please bring the following with you to your appointment:    (1) Any X-Rays, CTs or MRIs  which have been performed.  Contact the facility where they were done to arrange for  prior to your scheduled appointment.    (2) List of current medications  (3) This referral request   (4) Any documents/labs given to you for this referral    Skin lesion in left ear, crusty for one year.  Not healing. AK vs other skin cancer                  Follow-up notes from your care team     Return in about 6 months (around 1/30/2019).      Your next 10 appointments already scheduled     Mar 18, 2019 11:00 AM CDT   Return Visit with [facility] Provider   [facility] ([facility])    [address], [address]  [place] [address]   [phone]              Who to contact     If you have questions or need follow up information about today's clinic visit or your schedule please contact [facility] directly at [phone].  Normal or non-critical lab and imaging results will be communicated to you by MyChart, letter or phone within 4 business days after the clinic has received the results. If you do not hear from us within 7 days, please contact the clinic through [employer] or phone. If you have a critical or abnormal lab result, we will notify you by phone as soon as possible.  Submit refill requests through [employer] or call your pharmacy and they will forward the refill request to us. Please allow 3 business days for your refill to be completed.          Additional Information About Your Visit        MyChart Information     [employer] gives you secure access to your electronic health record. If you see a primary care provider, you can also send messages to your care team and make appointments. If you have questions, please call your primary care clinic.  If you do not have a primary care provider, please call [phone] and they will assist you.        Care EveryWhere ID     This is your Care EveryWhere ID. This could be used by other organizations to access your [facility]  "records  [id number]        Your Vitals Were     Pulse Temperature Height BMI (Body Mass Index)          60 98.5  F (36.9  C) (Tympanic) 5' 10.75\" (1.797 m) 32.73 kg/m2         Blood Pressure from Last 3 Encounters:   07/30/18 130/74   04/04/18 137/72   03/12/18 119/77    Weight from Last 3 Encounters:   07/30/18 233 lb (105.7 kg)   04/04/18 236 lb (107 kg)   03/12/18 234 lb 6.4 oz (106.3 kg)              We Performed the Following     Albumin Random Urine Quantitative with Creat Ratio     DERMATOLOGY REFERRAL     Hemoglobin A1c     Lipid panel reflex to direct LDL Fasting        Primary Care Provider Office Phone # Fax #    Chris Scott [phone] [address] [address]        Equal Access to Services     JACKY DELACRUZ : Hadii hollie thomas hadasho Soomaali, waaxda luqadaha, qaybta kaalmada adeegyada, waxay ankurin haybrandonn rudy dorado . So [facility] [phone].    ATENCIÓN: Si habla español, tiene a aaron disposición servicios gratuitos de asistencia lingüística. Llame al [phone].    We comply with applicable federal civil rights laws and [place] laws. We do not discriminate on the basis of race, color, national origin, age, disability, sex, sexual orientation, or gender identity.            Thank you!     Thank you for choosing [facility]  for your care. Our goal is always to provide you with excellent care. Hearing back from our patients is one way we can continue to improve our services. Please take a few minutes to complete the written survey that you may receive in the mail after your visit with us. Thank you!             Your Updated Medication List - Protect others around you: Learn how to safely use, store and throw away your medicines at www.disposemymeds.org.          This list is accurate as of 7/30/18 10:54 AM.  Always use your most recent med list.                   Brand Name Dispense Instructions for use Diagnosis    amLODIPine 5 MG tablet    " NORVASC    90 tablet    Take 1 tablet (5 mg) by mouth daily Takes prn for high blood pressure    Hypertension, goal below 140/90       * ASPIRIN NOT PRESCRIBED    INTENTIONAL     by Other route continuous prn Reported on 4/19/2017        blood glucose monitoring test strip    no brand specified    100 each    1 strip by In Vitro route daily One touch Ultra.    Type 2 diabetes mellitus without complication, with long-term current use of insulin (H)       dorzolamide-timolol 2-0.5 % ophthalmic solution    COSOPT          hydrochlorothiazide 25 MG tablet    HYDRODIURIL    90 tablet    Take 1 tablet (25 mg) by mouth daily    Hypertension, goal below 140/90       insulin pen needle 31G X 8 MM    B-D U/F    100 each    1 Device daily Use once daily or as directed.    Type 2 diabetes mellitus without complication, with long-term current use of insulin (H)       * LANTUS SOLOSTAR 100 UNIT/ML injection   Generic drug:  insulin glargine           * insulin glargine 100 UNIT/ML injection    LANTUS VIAL    3 Month    30 units at bedtime    Type 2 diabetes mellitus without complication, with long-term current use of insulin (H)       * insulin glargine 100 UNIT/ML injection    LANTUS SOLOSTAR    9 mL    Inject 30 Units Subcutaneous At Bedtime    Type 2 diabetes mellitus without complication, with long-term current use of insulin (H)       lisinopril 40 MG tablet    PRINIVIL/ZESTRIL    90 tablet    Take 1 tablet (40 mg) by mouth daily    Essential hypertension, benign       metFORMIN 500 MG tablet    GLUCOPHAGE    360 tablet    2 tabs with lunch and 2 tabs with dinner.    Type 2 diabetes mellitus without complication, with long-term current use of insulin (H)       omeprazole 20 MG CR capsule    priLOSEC    90 capsule    Take 1 capsule (20 mg) by mouth daily    Gastroesophageal reflux disease without esophagitis       ONETOUCH LANCETS Misc     100 each    1 Device daily One touch delica lancets, what ever is covered by insurance.     Type 2 diabetes mellitus with diabetic peripheral angiopathy without gangrene (H)       [employer] ULTRA SYSTEM KIT w/Device Kit           order for DME     1 Device    Equipment being ordered:  Glucometer    Type 2 diabetes, HbA1c goal < 7% (H)       PRESERVISION AREDS PO      Take 1 tablet by mouth        * STATIN NOT PRESCRIBED (INTENTIONAL)      by Other route continuous prn Reported on 4/19/2017        triamcinolone 0.1 % cream    KENALOG          VITAMIN D PO      Take 4,000 Int'l Units/day by mouth daily        * Notice:  This list has 5 medication(s) that are the same as other medications prescribed for you. Read the directions carefully, and ask your doctor or other care provider to review them with you.

## 2018-07-30 NOTE — PATIENT INSTRUCTIONS
Please see dermatology.    Please go to lab.    I will review you labs and let you know the follow up.  If your diabetes is controlled I will recommend 6 month to follow up.      Thank you for choosing [facility].  You may be receiving a survey in the mail from Marcia Millard regarding your visit today.  Please take a few minutes to complete and return the survey to let us know how we are doing.      If you have questions or concerns, please contact us via [employer] or you can contact your care team at [phone].    Our Clinic hours are:  Monday 6:40 am  to 7:00 pm  Tuesday -Friday 6:40 am to 5:00 pm    The [place] outpatient lab hours are:  Monday - Friday 6:10 am to 4:45 pm  Saturdays 7:00 am to 11:00 am  Appointments are required, call [phone]    If you have clinical questions after hours or would like to schedule an appointment,  call the clinic at [phone].

## 2018-08-08 ENCOUNTER — TRANSFERRED RECORDS (OUTPATIENT)
Dept: HEALTH INFORMATION MANAGEMENT | Facility: CLINIC | Age: 83
End: 2018-08-08

## 2018-09-11 ENCOUNTER — OFFICE VISIT (OUTPATIENT)
Dept: DERMATOLOGY | Facility: CLINIC | Age: 83
End: 2018-09-11
Payer: COMMERCIAL

## 2018-09-11 VITALS — DIASTOLIC BLOOD PRESSURE: 86 MMHG | HEART RATE: 80 BPM | SYSTOLIC BLOOD PRESSURE: 168 MMHG | OXYGEN SATURATION: 98 %

## 2018-09-11 DIAGNOSIS — D48.5 NEOPLASM OF UNCERTAIN BEHAVIOR OF SKIN: ICD-10-CM

## 2018-09-11 DIAGNOSIS — L57.0 AK (ACTINIC KERATOSIS): Primary | ICD-10-CM

## 2018-09-11 DIAGNOSIS — L82.1 SK (SEBORRHEIC KERATOSIS): ICD-10-CM

## 2018-09-11 DIAGNOSIS — L81.4 LENTIGO: ICD-10-CM

## 2018-09-11 PROCEDURE — 99203 OFFICE O/P NEW LOW 30 MIN: CPT | Mod: 25 | Performed by: DERMATOLOGY

## 2018-09-11 PROCEDURE — 17003 DESTRUCT PREMALG LES 2-14: CPT | Performed by: DERMATOLOGY

## 2018-09-11 PROCEDURE — 11100 HC DESTRUCT PREMALIGNANT LESION, FIRST: CPT | Mod: 59 | Performed by: DERMATOLOGY

## 2018-09-11 PROCEDURE — 17000 DESTRUCT PREMALG LESION: CPT | Mod: 51 | Performed by: DERMATOLOGY

## 2018-09-11 PROCEDURE — 88305 TISSUE EXAM BY PATHOLOGIST: CPT | Mod: TC | Performed by: DERMATOLOGY

## 2018-09-11 NOTE — PROGRESS NOTES
Fawad Garcia , a 82 year old year old male patient, I was asked to see by Dr. Scott for beleding spot on left ear.  Patient states this has been present for months.  Patient reports the following symptoms:  draining .  Patient reports the following previous treatments none.  Patient reports the following modifying factors none.  Associated symptoms: none.  Patient has no other skin complaints today.  Remainder of the HPI, Meds, PMH, Allergies, FH, and SH was reviewed in chart.      Past Medical History:   Diagnosis Date     Diabetes mellitus (H)      Hypertension        Past Surgical History:   Procedure Laterality Date     CL AFF SURGICAL PATHOLOGY  2002    prostate biopsy elevated PSA     COLONOSCOPY       HC REMOVE TONSILS/ADENOIDS,<13 Y/O      T & A     PHACOEMULSIFICATION WITH STANDARD INTRAOCULAR LENS IMPLANT Left 4/2/2015    Procedure: PHACOEMULSIFICATION WITH STANDARD INTRAOCULAR LENS IMPLANT;  Surgeon: Joseph Hernandez MD;  Location: [facility] OR     PHACOEMULSIFICATION WITH STANDARD INTRAOCULAR LENS IMPLANT Right 5/4/2015    Procedure: PHACOEMULSIFICATION WITH STANDARD INTRAOCULAR LENS IMPLANT;  Surgeon: Joseph Hernandez MD;  Location: [facility] OR        Family History   Problem Relation Age of Onset     Cancer Mother      intestinal CA     Diabetes Father      Diabetes Brother      Other Cancer Son      emagan tumor     Other Cancer Brother      pancreatic cancer     Diabetes Son        Social History     Social History     Marital status:      Spouse name: N/A     Number of children: N/A     Years of education: N/A     Occupational History     Not on file.     Social History Main Topics     Smoking status: Never Smoker     Smokeless tobacco: Never Used     Alcohol use 1.0 oz/week      Comment: drinks rarely     Drug use: No     Sexual activity: Not Currently     Partners: Female     Other Topics Concern     Parent/Sibling W/ Cabg, Mi Or Angioplasty Before 65f 55m? No     Social History Narrative        Outpatient Encounter Prescriptions as of 9/11/2018   Medication Sig Dispense Refill     amLODIPine (NORVASC) 5 MG tablet Take 1 tablet (5 mg) by mouth daily Takes prn for high blood pressure 90 tablet 3     ASPIRIN NOT PRESCRIBED, INTENTIONAL, by Other route continuous prn Reported on 4/19/2017  0     blood glucose monitoring (NO BRAND SPECIFIED) test strip 1 strip by In Vitro route daily One touch Ultra. 100 each 3     Blood Glucose Monitoring Suppl (ONE TOUCH ULTRA SYSTEM KIT) W/DEVICE KIT        Cholecalciferol (VITAMIN D PO) Take 4,000 Int'l Units/day by mouth daily       dorzolamide-timolol (COSOPT) 2-0.5 % ophthalmic solution        hydrochlorothiazide (HYDRODIURIL) 25 MG tablet Take 1 tablet (25 mg) by mouth daily 90 tablet 3     insulin glargine (LANTUS SOLOSTAR) 100 UNIT/ML injection Inject 30 Units Subcutaneous At Bedtime 9 mL 11     insulin pen needle (B-D U/F) 31G X 8 MM 1 Device daily Use once daily or as directed. 100 each 3     LANTUS SOLOSTAR 100 UNIT/ML soln        lisinopril (PRINIVIL/ZESTRIL) 40 MG tablet Take 1 tablet (40 mg) by mouth daily 90 tablet 3     metFORMIN (GLUCOPHAGE) 500 MG tablet 2 tabs with lunch and 2 tabs with dinner. 360 tablet 3     Multiple Vitamins-Minerals (PRESERVISION AREDS PO) Take 1 tablet by mouth       omeprazole (PRILOSEC) 20 MG CR capsule Take 1 capsule (20 mg) by mouth daily 90 capsule 3     ONETOUCH LANCETS MISC 1 Device daily One touch delica lancets, what ever is covered by insurance. 100 each 5     ORDER FOR DME Equipment being ordered:   Glucometer 1 Device 1     STATIN NOT PRESCRIBED, INTENTIONAL, by Other route continuous prn Reported on 4/19/2017  0     triamcinolone (KENALOG) 0.1 % cream        insulin glargine (LANTUS VIAL) 100 UNIT/ML injection 30 units at bedtime (Patient not taking: Reported on 7/30/2018) 3 Month 3     No facility-administered encounter medications on file as of 9/11/2018.              Review Of Systems  Skin: As above  Eyes:  negative  Ears/Nose/Throat: negative  Respiratory: No shortness of breath, dyspnea on exertion, cough, or hemoptysis  Cardiovascular: negative  Gastrointestinal: negative  Genitourinary: negative  Musculoskeletal: negative  Neurologic: negative  Psychiatric: negative  Hematologic/Lymphatic/Immunologic: negative  Endocrine: negative      O:   NAD, WDWN, Alert & Oriented, Mood & Affect wnl, Vitals stable   Here today alone   /86  Pulse 80  SpO2 98%   General appearance dona ii   Vitals stable   Alert, oriented and in no acute distress      Following lymph nodes palpated: Occipital, Cervical, Supraclavicular no lad   Stuck on papules and brown macules on trunk and ext    Gritty papules on face   L anithelix 1.2cm bleeding scaly papule           The remainder of expanded problem focused exam was unremarkable; the following areas were examined:  scalp/hair, conjunctiva/lids, face, neck, lips, chest, digits/nails, RUE, LUE.      Eyes: Conjunctivae/lids:Normal     ENT: Lips, buccal mucosa, tongue: normal    MSK:Normal    Cardiovascular: peripheral edema none    Pulm: Breathing Normal    Lymph Nodes: No Head and Neck Lymphadenopathy     Neuro/Psych: Orientation:Normal; Mood/Affect:Normal      A/P:  1. L antihelix r/o squamous cell carcinoma   TANGENTIAL BIOPSY SENT OUT:  After consent, anesthesia with LEC and prep, tangential excision performed and specimen sent out for permanent section histology.  No complications and routine wound care. Patient told to call our office in 1-2 weeks for result.      2. Seborrheic keratosis, letnigo  3. Actinic keratosis  R cheek x2  L cheek x4  LN2:  Treated with LN2 for 5s for 1-2 cycles. Warned risks of blistering, pain, pigment change, scarring, and incomplete resolution.  Advised patient to return if lesions do not completely resolve.  Wound care sheet given.  BENIGN LESIONS DISCUSSED WITH PATIENT:  I discussed the specifics of tumor, prognosis, and genetics of benign lesions.   I explained that treatment of these lesions would be purely cosmetic and not medically neccessary.  I discussed with patient different removal options including excision, cautery and /or laser.      Nature and genetics of benign skin lesions dicussed with patient.  Signs and Symptoms of skin cancer discussed with patient.  ABCDEs of melanoma reviewed with patient.  Patient encouraged to perform monthly skin exams.  UV precautions reviewed with patient.  Patient to follow up with Primary Care provider regarding elevated blood pressure.    Skin care regimen reviewed with patient: Eliminate harsh soaps, i.e. Dial, zest, irsih spring; Mild soaps such as Cetaphil or Dove sensitive skin, avoid hot or cold showers, aggressive use of emollients including vanicream, cetaphil or cerave discussed with patient.    Risks of non-melanoma skin cancer discussed with patient   Return to clinic pending path  Patient to follow up with Primary Care provider regarding elevated blood pressure.

## 2018-09-11 NOTE — LETTER
9/11/2018         RE: Fawad Garcia  [address] [facility] [id number]        Dear Colleague,    Thank you for referring your patient, Fawad Garcia, to the [facility]. Please see a copy of my visit note below.    Fawad Garcia , a 82 year old year old male patient, I was asked to see by Dr. Scott for beleding spot on left ear.  Patient states this has been present for months.  Patient reports the following symptoms:  draining .  Patient reports the following previous treatments none.  Patient reports the following modifying factors none.  Associated symptoms: none.  Patient has no other skin complaints today.  Remainder of the HPI, Meds, PMH, Allergies, FH, and SH was reviewed in chart.      Past Medical History:   Diagnosis Date     Diabetes mellitus (H)      Hypertension        Past Surgical History:   Procedure Laterality Date     CL AFF SURGICAL PATHOLOGY  2002    prostate biopsy elevated PSA     COLONOSCOPY       HC REMOVE TONSILS/ADENOIDS,<11 Y/O      T & A     PHACOEMULSIFICATION WITH STANDARD INTRAOCULAR LENS IMPLANT Left 4/2/2015    Procedure: PHACOEMULSIFICATION WITH STANDARD INTRAOCULAR LENS IMPLANT;  Surgeon: Joseph Hernandez MD;  Location: [facility] OR     PHACOEMULSIFICATION WITH STANDARD INTRAOCULAR LENS IMPLANT Right 5/4/2015    Procedure: PHACOEMULSIFICATION WITH STANDARD INTRAOCULAR LENS IMPLANT;  Surgeon: Joseph Hernandez MD;  Location: [facility] OR        Family History   Problem Relation Age of Onset     Cancer Mother      intestinal CA     Diabetes Father      Diabetes Brother      Other Cancer Son      meagan tumor     Other Cancer Brother      pancreatic cancer     Diabetes Son        Social History     Social History     Marital status:      Spouse name: N/A     Number of children: N/A     Years of education: N/A     Occupational History     Not on file.     Social History Main Topics     Smoking status: Never Smoker     Smokeless tobacco: Never  Used     Alcohol use 1.0 oz/week      Comment: drinks rarely     Drug use: No     Sexual activity: Not Currently     Partners: Female     Other Topics Concern     Parent/Sibling W/ Cabg, Mi Or Angioplasty Before 65f 55m? No     Social History Narrative       Outpatient Encounter Prescriptions as of 9/11/2018   Medication Sig Dispense Refill     amLODIPine (NORVASC) 5 MG tablet Take 1 tablet (5 mg) by mouth daily Takes prn for high blood pressure 90 tablet 3     ASPIRIN NOT PRESCRIBED, INTENTIONAL, by Other route continuous prn Reported on 4/19/2017  0     blood glucose monitoring (NO BRAND SPECIFIED) test strip 1 strip by In Vitro route daily One touch Ultra. 100 each 3     Blood Glucose Monitoring Suppl (ONE TOUCH ULTRA SYSTEM KIT) W/DEVICE KIT        Cholecalciferol (VITAMIN D PO) Take 4,000 Int'l Units/day by mouth daily       dorzolamide-timolol (COSOPT) 2-0.5 % ophthalmic solution        hydrochlorothiazide (HYDRODIURIL) 25 MG tablet Take 1 tablet (25 mg) by mouth daily 90 tablet 3     insulin glargine (LANTUS SOLOSTAR) 100 UNIT/ML injection Inject 30 Units Subcutaneous At Bedtime 9 mL 11     insulin pen needle (B-D U/F) 31G X 8 MM 1 Device daily Use once daily or as directed. 100 each 3     LANTUS SOLOSTAR 100 UNIT/ML soln        lisinopril (PRINIVIL/ZESTRIL) 40 MG tablet Take 1 tablet (40 mg) by mouth daily 90 tablet 3     metFORMIN (GLUCOPHAGE) 500 MG tablet 2 tabs with lunch and 2 tabs with dinner. 360 tablet 3     Multiple Vitamins-Minerals (PRESERVISION AREDS PO) Take 1 tablet by mouth       omeprazole (PRILOSEC) 20 MG CR capsule Take 1 capsule (20 mg) by mouth daily 90 capsule 3     ONETOUCH LANCETS MISC 1 Device daily One touch delica lancets, what ever is covered by insurance. 100 each 5     ORDER FOR DME Equipment being ordered:   Glucometer 1 Device 1     STATIN NOT PRESCRIBED, INTENTIONAL, by Other route continuous prn Reported on 4/19/2017  0     triamcinolone (KENALOG) 0.1 % cream        insulin  glargine (LANTUS VIAL) 100 UNIT/ML injection 30 units at bedtime (Patient not taking: Reported on 7/30/2018) 3 Month 3     No facility-administered encounter medications on file as of 9/11/2018.              Review Of Systems  Skin: As above  Eyes: negative  Ears/Nose/Throat: negative  Respiratory: No shortness of breath, dyspnea on exertion, cough, or hemoptysis  Cardiovascular: negative  Gastrointestinal: negative  Genitourinary: negative  Musculoskeletal: negative  Neurologic: negative  Psychiatric: negative  Hematologic/Lymphatic/Immunologic: negative  Endocrine: negative      O:   NAD, WDWN, Alert & Oriented, Mood & Affect wnl, Vitals stable   Here today alone   /86  Pulse 80  SpO2 98%   General appearance dona ii   Vitals stable   Alert, oriented and in no acute distress      Following lymph nodes palpated: Occipital, Cervical, Supraclavicular no lad   Stuck on papules and brown macules on trunk and ext    Gritty papules on face   L anithelix 1.2cm bleeding scaly papule           The remainder of expanded problem focused exam was unremarkable; the following areas were examined:  scalp/hair, conjunctiva/lids, face, neck, lips, chest, digits/nails, RUE, LUE.      Eyes: Conjunctivae/lids:Normal     ENT: Lips, buccal mucosa, tongue: normal    MSK:Normal    Cardiovascular: peripheral edema none    Pulm: Breathing Normal    Lymph Nodes: No Head and Neck Lymphadenopathy     Neuro/Psych: Orientation:Normal; Mood/Affect:Normal      A/P:  1. L antihelix r/o squamous cell carcinoma   TANGENTIAL BIOPSY SENT OUT:  After consent, anesthesia with LEC and prep, tangential excision performed and specimen sent out for permanent section histology.  No complications and routine wound care. Patient told to call our office in 1-2 weeks for result.      2. Seborrheic keratosis, letnigo  3. Actinic keratosis  R cheek x2  L cheek x4  LN2:  Treated with LN2 for 5s for 1-2 cycles. Warned risks of blistering, pain, pigment  change, scarring, and incomplete resolution.  Advised patient to return if lesions do not completely resolve.  Wound care sheet given.  BENIGN LESIONS DISCUSSED WITH PATIENT:  I discussed the specifics of tumor, prognosis, and genetics of benign lesions.  I explained that treatment of these lesions would be purely cosmetic and not medically neccessary.  I discussed with patient different removal options including excision, cautery and /or laser.      Nature and genetics of benign skin lesions dicussed with patient.  Signs and Symptoms of skin cancer discussed with patient.  ABCDEs of melanoma reviewed with patient.  Patient encouraged to perform monthly skin exams.  UV precautions reviewed with patient.  Patient to follow up with Primary Care provider regarding elevated blood pressure.    Skin care regimen reviewed with patient: Eliminate harsh soaps, i.e. Dial, zest, irsih spring; Mild soaps such as Cetaphil or Dove sensitive skin, avoid hot or cold showers, aggressive use of emollients including vanicream, cetaphil or cerave discussed with patient.    Risks of non-melanoma skin cancer discussed with patient   Return to clinic pending path  Patient to follow up with Primary Care provider regarding elevated blood pressure.        Again, thank you for allowing me to participate in the care of your patient.        Sincerely,        Evgeny Torres MD

## 2018-09-11 NOTE — PATIENT INSTRUCTIONS
Wound Care Instructions     FOR SUPERFICIAL WOUNDS     [facility] [phone]    [facility] [phone]      ear                 AFTER 24 HOURS YOU SHOULD REMOVE THE BANDAGE AND BEGIN DAILY DRESSING CHANGES AS FOLLOWS:     1) Remove Dressing.     2) Clean and dry the area with tap water using a Q-tip or sterile gauze pad.     3) Apply Vaseline, Aquaphor, Polysporin ointment or Bacitracin ointment over entire wound.  Do NOT use Neosporin ointment.     4) Cover the wound with a band-aid, or a sterile non-stick gauze pad and micropore paper tape      REPEAT THESE INSTRUCTIONS AT LEAST ONCE A DAY UNTIL THE WOUND HAS COMPLETELY HEALED.    It is an old wives tale that a wound heals better when it is exposed to air and allowed to dry out. The wound will heal faster with a better cosmetic result if it is kept moist with ointment and covered with a bandage.    **Do not let the wound dry out.**      Supplies Needed:      *Cotton tipped applicators (Q-tips)    *Polysporin Ointment or Bacitracin Ointment (NOT NEOSPORIN)    *Band-aids or non-stick gauze pads and micropore paper tape.      PATIENT INFORMATION:    During the healing process you will notice a number of changes. All wounds develop a small halo of redness surrounding the wound.  This means healing is occurring. Severe itching with extensive redness usually indicates sensitivity to the ointment or bandage tape used to dress the wound.  You should call our office if this develops.      Swelling  and/or discoloration around your surgical site is common, particularly when performed around the eye.    All wounds normally drain.  The larger the wound the more drainage there will be.  After 7-10 days, you will notice the wound beginning to shrink and new skin will begin to grow.  The wound is healed when you can see skin has formed over the entire area.  A healed wound has a healthy, shiny look to the surface and is red to dark pink in  color to normalize.  Wounds may take approximately 4-6 weeks to heal.  Larger wounds may take 6-8 weeks.  After the wound is healed you may discontinue dressing changes.    You may experience a sensation of tightness as your wound heals. This is normal and will gradually subside.    Your healed wound may be sensitive to temperature changes. This sensitivity improves with time, but if you re having a lot of discomfort, try to avoid temperature extremes.    Patients frequently experience itching after their wound appears to have healed because of the continue healing under the skin.  Plain Vaseline will help relieve the itching.        POSSIBLE COMPLICATIONS    BLEEDIN. Leave the bandage in place.  2. Use tightly rolled up gauze or a cloth to apply direct pressure over the bandage for 30  minutes.  3. Reapply pressure for an additional 30 minutes if necessary  4. Use additional gauze and tape to maintain pressure once the bleeding has stopped.    WOUND CARE INSTRUCTIONS   FOR CRYOSURGERY   This area treated with liquid nitrogen should form a blister (areas treated may or may not blister-skin may just turn dark and slough off). You do not need to bandage the area unless a blister forms and breaks (which may be a few days). When the blister breaks, begin daily dressing changes as follows:  1) Clean and dry the area with tap water using clean Q-tip or sterile gauze pad.   2) Apply Polysporin ointment or Bacitracin ointment over entire wound. Do NOT use Neosporin ointment.   3) Cover the wound with a band-aid or sterile non-stick gauze pad and micropore paper tape.   REPEAT THESE INSTRUCTIONS AT LEAST ONCE A DAY UNTIL THE WOUND HAS COMPLETELY HEALED.   It is an old wives tale that a wound heals better when it is exposed to air and allowed to dry out. The wound will heal faster with a better cosmetic result if it is kept moist with ointment and covered with a bandage.   Do not let the wound dry out.   IMPORTANT  INFORMATION ON REVERSE SIDE   Supplies Needed:   *Cotton tipped applicators (Q-tips)   *Polysporin ointment or Bacitracin ointment (NOT NEOSPORIN)   *Band-aids, or non stick gauze pads and micropore paper tape   PATIENT INFORMATION   During the healing process you will notice a number of changes. All wounds develop a small halo of redness surrounding the wound. This means healing is occurring. Severe itching with extensive redness usually indicates sensitivity to the ointment or bandage tape used to dress the wound. You should call our office if this develops.   Swelling and/or discoloration around your surgical site is common, particularly when performed around the eye.   All wounds normally drain. The larger the wound the more drainage there will be. After 7-10 days, you will notice the wound beginning to shrink and new skin will begin to grow. The wound is healed when you can see skin has formed over the entire area. A healed wound has a healthy, shiny look to the surface and is red to dark pink in color to normalize. Wounds may take approximately 4-6 weeks to heal. Larger wounds may take 6-8 weeks. After the wound is healed you may discontinue dressing changes.   You may experience a sensation of tightness as your wound heals. This is normal and will gradually subside.   Your healed wound may be sensitive to temperature changes. This sensitivity improves with time, but if you re having a lot of discomfort, try to avoid temperature extremes.   Patients frequently experience itching after their wound appears to have healed because of the continue healing under the skin. Plain Vaseline will help relieve the itching.

## 2018-09-11 NOTE — NURSING NOTE
Chief Complaint   Patient presents with     Derm Problem       Vitals:    09/11/18 1433   Pulse: 80   SpO2: 98%     Wt Readings from Last 1 Encounters:   07/30/18 105.7 kg (233 lb)       Luz Doss LPN.................9/11/2018

## 2018-09-11 NOTE — MR AVS SNAPSHOT
After Visit Summary   9/11/2018    Fawad Garcia    MRN: [id number]           Patient Information     Date Of Birth          1935        Visit Information        Provider Department      9/11/2018 2:30 PM Evgeny Torres MD [facility]        Today's Diagnoses     AK (actinic keratosis)    -  1    Neoplasm of uncertain behavior of skin        Lentigo        SK (seborrheic keratosis)          Care Instructions          Wound Care Instructions     FOR SUPERFICIAL WOUNDS     [facility] [phone]    [facility] [phone]      ear                 AFTER 24 HOURS YOU SHOULD REMOVE THE BANDAGE AND BEGIN DAILY DRESSING CHANGES AS FOLLOWS:     1) Remove Dressing.     2) Clean and dry the area with tap water using a Q-tip or sterile gauze pad.     3) Apply Vaseline, Aquaphor, Polysporin ointment or Bacitracin ointment over entire wound.  Do NOT use Neosporin ointment.     4) Cover the wound with a band-aid, or a sterile non-stick gauze pad and micropore paper tape      REPEAT THESE INSTRUCTIONS AT LEAST ONCE A DAY UNTIL THE WOUND HAS COMPLETELY HEALED.    It is an old wives tale that a wound heals better when it is exposed to air and allowed to dry out. The wound will heal faster with a better cosmetic result if it is kept moist with ointment and covered with a bandage.    **Do not let the wound dry out.**      Supplies Needed:      *Cotton tipped applicators (Q-tips)    *Polysporin Ointment or Bacitracin Ointment (NOT NEOSPORIN)    *Band-aids or non-stick gauze pads and micropore paper tape.      PATIENT INFORMATION:    During the healing process you will notice a number of changes. All wounds develop a small halo of redness surrounding the wound.  This means healing is occurring. Severe itching with extensive redness usually indicates sensitivity to the ointment or bandage tape used to dress the wound.  You should call our office if this develops.       Swelling  and/or discoloration around your surgical site is common, particularly when performed around the eye.    All wounds normally drain.  The larger the wound the more drainage there will be.  After 7-10 days, you will notice the wound beginning to shrink and new skin will begin to grow.  The wound is healed when you can see skin has formed over the entire area.  A healed wound has a healthy, shiny look to the surface and is red to dark pink in color to normalize.  Wounds may take approximately 4-6 weeks to heal.  Larger wounds may take 6-8 weeks.  After the wound is healed you may discontinue dressing changes.    You may experience a sensation of tightness as your wound heals. This is normal and will gradually subside.    Your healed wound may be sensitive to temperature changes. This sensitivity improves with time, but if you re having a lot of discomfort, try to avoid temperature extremes.    Patients frequently experience itching after their wound appears to have healed because of the continue healing under the skin.  Plain Vaseline will help relieve the itching.        POSSIBLE COMPLICATIONS    BLEEDIN. Leave the bandage in place.  2. Use tightly rolled up gauze or a cloth to apply direct pressure over the bandage for 30  minutes.  3. Reapply pressure for an additional 30 minutes if necessary  4. Use additional gauze and tape to maintain pressure once the bleeding has stopped.    WOUND CARE INSTRUCTIONS   FOR CRYOSURGERY   This area treated with liquid nitrogen should form a blister (areas treated may or may not blister-skin may just turn dark and slough off). You do not need to bandage the area unless a blister forms and breaks (which may be a few days). When the blister breaks, begin daily dressing changes as follows:  1) Clean and dry the area with tap water using clean Q-tip or sterile gauze pad.   2) Apply Polysporin ointment or Bacitracin ointment over entire wound. Do NOT use Neosporin  ointment.   3) Cover the wound with a band-aid or sterile non-stick gauze pad and micropore paper tape.   REPEAT THESE INSTRUCTIONS AT LEAST ONCE A DAY UNTIL THE WOUND HAS COMPLETELY HEALED.   It is an old wives tale that a wound heals better when it is exposed to air and allowed to dry out. The wound will heal faster with a better cosmetic result if it is kept moist with ointment and covered with a bandage.   Do not let the wound dry out.   IMPORTANT INFORMATION ON REVERSE SIDE   Supplies Needed:   *Cotton tipped applicators (Q-tips)   *Polysporin ointment or Bacitracin ointment (NOT NEOSPORIN)   *Band-aids, or non stick gauze pads and micropore paper tape   PATIENT INFORMATION   During the healing process you will notice a number of changes. All wounds develop a small halo of redness surrounding the wound. This means healing is occurring. Severe itching with extensive redness usually indicates sensitivity to the ointment or bandage tape used to dress the wound. You should call our office if this develops.   Swelling and/or discoloration around your surgical site is common, particularly when performed around the eye.   All wounds normally drain. The larger the wound the more drainage there will be. After 7-10 days, you will notice the wound beginning to shrink and new skin will begin to grow. The wound is healed when you can see skin has formed over the entire area. A healed wound has a healthy, shiny look to the surface and is red to dark pink in color to normalize. Wounds may take approximately 4-6 weeks to heal. Larger wounds may take 6-8 weeks. After the wound is healed you may discontinue dressing changes.   You may experience a sensation of tightness as your wound heals. This is normal and will gradually subside.   Your healed wound may be sensitive to temperature changes. This sensitivity improves with time, but if you re having a lot of discomfort, try to avoid temperature extremes.   Patients frequently  experience itching after their wound appears to have healed because of the continue healing under the skin. Plain Vaseline will help relieve the itching.                   Follow-ups after your visit        Your next 10 appointments already scheduled     Mar 18, 2019 11:00 AM CDT   Return Visit with [facility] Provider   [facility]    [address] [place], [address]  [facility] [address]   [phone]              Who to contact     If you have questions or need follow up information about today's clinic visit or your schedule please contact [facility] directly at [phone].  Normal or non-critical lab and imaging results will be communicated to you by Insticatorhart, letter or phone within 4 business days after the clinic has received the results. If you do not hear from us within 7 days, please contact the clinic through Insticatorhart or phone. If you have a critical or abnormal lab result, we will notify you by phone as soon as possible.  Submit refill requests through [employer] or call your pharmacy and they will forward the refill request to us. Please allow 3 business days for your refill to be completed.          Additional Information About Your Visit        MyChart Information     [employer] gives you secure access to your electronic health record. If you see a primary care provider, you can also send messages to your care team and make appointments. If you have questions, please call your primary care clinic.  If you do not have a primary care provider, please call [phone] and they will assist you.        Care EveryWhere ID     This is your Care EveryWhere ID. This could be used by other organizations to access your [place] medical records  [id number]        Your Vitals Were     Pulse Pulse Oximetry                80 98%           Blood Pressure from Last 3 Encounters:   09/11/18 168/86   07/30/18 130/74   04/04/18 137/72    Weight from Last 3 Encounters:    07/30/18 105.7 kg (233 lb)   04/04/18 107 kg (236 lb)   03/12/18 106.3 kg (234 lb 6.4 oz)              We Performed the Following     BIOPSY SKIN/SUBQ/MUC MEM, SINGLE LESION     Dermatological path order and indications     DESTRUCT PREMALIGNANT LESION, 2-14     DESTRUCT PREMALIGNANT LESION, FIRST        Primary Care Provider Office Phone # Fax #    Chris Scott [phone] [address] [facility] [address]        Equal Access to Services     MADHAVI DELACRUZ : Hadii aad ku hadasho Soomaali, waaxda luqadaha, qaybta kaalmada adeegyada, waxay ankurin hayazael dorado . So [facility] [phone].    ATENCIÓN: Si habla español, tiene a aaron disposición servicios gratuitos de asistencia lingüística. Llame al [phone].    We comply with applicable federal civil rights laws and [place] laws. We do not discriminate on the basis of race, color, national origin, age, disability, sex, sexual orientation, or gender identity.            Thank you!     Thank you for choosing [facility]  for your care. Our goal is always to provide you with excellent care. Hearing back from our patients is one way we can continue to improve our services. Please take a few minutes to complete the written survey that you may receive in the mail after your visit with us. Thank you!             Your Updated Medication List - Protect others around you: Learn how to safely use, store and throw away your medicines at www.disposemymeds.org.          This list is accurate as of 9/11/18  2:44 PM.  Always use your most recent med list.                   Brand Name Dispense Instructions for use Diagnosis    amLODIPine 5 MG tablet    NORVASC    90 tablet    Take 1 tablet (5 mg) by mouth daily Takes prn for high blood pressure    Hypertension, goal below 140/90       * ASPIRIN NOT PRESCRIBED    INTENTIONAL     by Other route continuous prn Reported on 4/19/2017        blood glucose monitoring test strip    no  brand specified    100 each    1 strip by In Vitro route daily One touch Ultra.    Type 2 diabetes mellitus without complication, with long-term current use of insulin (H)       dorzolamide-timolol 2-0.5 % ophthalmic solution    COSOPT          hydrochlorothiazide 25 MG tablet    HYDRODIURIL    90 tablet    Take 1 tablet (25 mg) by mouth daily    Hypertension, goal below 140/90       insulin pen needle 31G X 8 MM    B-D U/F    100 each    1 Device daily Use once daily or as directed.    Type 2 diabetes mellitus without complication, with long-term current use of insulin (H)       * LANTUS SOLOSTAR 100 UNIT/ML injection   Generic drug:  insulin glargine           * insulin glargine 100 UNIT/ML injection    LANTUS VIAL    3 Month    30 units at bedtime    Type 2 diabetes mellitus without complication, with long-term current use of insulin (H)       * insulin glargine 100 UNIT/ML injection    LANTUS SOLOSTAR    9 mL    Inject 30 Units Subcutaneous At Bedtime    Type 2 diabetes mellitus without complication, with long-term current use of insulin (H)       lisinopril 40 MG tablet    PRINIVIL/ZESTRIL    90 tablet    Take 1 tablet (40 mg) by mouth daily    Essential hypertension, benign       metFORMIN 500 MG tablet    GLUCOPHAGE    360 tablet    2 tabs with lunch and 2 tabs with dinner.    Type 2 diabetes mellitus without complication, with long-term current use of insulin (H)       omeprazole 20 MG CR capsule    priLOSEC    90 capsule    Take 1 capsule (20 mg) by mouth daily    Gastroesophageal reflux disease without esophagitis       ONETOUCH LANCETS Misc     100 each    1 Device daily One touch delica lancets, what ever is covered by insurance.    Type 2 diabetes mellitus with diabetic peripheral angiopathy without gangrene (H)       ONETOUCH ULTRA SYSTEM KIT w/Device Kit           order for DME     1 Device    Equipment being ordered:  Glucometer    Type 2 diabetes, HbA1c goal < 7% (H)       PRESERVISION AREDS PO       Take 1 tablet by mouth        * STATIN NOT PRESCRIBED (INTENTIONAL)      by Other route continuous prn Reported on 4/19/2017        triamcinolone 0.1 % cream    KENALOG          VITAMIN D PO      Take 4,000 Int'l Units/day by mouth daily        * Notice:  This list has 5 medication(s) that are the same as other medications prescribed for you. Read the directions carefully, and ask your doctor or other care provider to review them with you.

## 2018-09-17 ENCOUNTER — MYC MEDICAL ADVICE (OUTPATIENT)
Dept: DERMATOLOGY | Facility: CLINIC | Age: 83
End: 2018-09-17

## 2018-09-17 LAB — COPATH REPORT: NORMAL

## 2018-09-17 NOTE — LETTER
[facility]  [address] [place]  [facility] [address]  Phone: [phone]    September 18, 2018    Fawad Garcia                                                                                                           [address] [facility] [address]            Dear Mr. Garcia,    You are scheduled for Mohs Surgery on Wednesday October 24 th at 8:00 am.    Please check in at Dermatology Clinic.   (2nd Floor, last  Clinic on right up staircase or elevator -past OB/GYN clinic)    You don't need to arrive more than 5-10 minutes prior to your appointment time.     Be sure to eat a good breakfast and bathe and wash your hair prior to Surgery.    If you are taking any anti-coagulants that are prescribed by your Doctor (such as Coumadin/warfarin, Plavix, Aspirin, Ibuprofen), please continue taking them.     However, If you are taking anti-coagulants over the counter without  a Doctor's order for a Medical condition, please discontinue them 10 days prior to Surgery.      Please wear loose comfortable clothing as it could possibly be 4-6 hours until your surgery is completed depending upon how many layers of tissue need to be removed.     Wi-fi access is available.     Thank you,      Evgeny Torres MD/ Claudette oHlden RN

## 2018-09-18 NOTE — TELEPHONE ENCOUNTER
Pt called - read message as noted below. Mohs appt scheduled 10/24 @ 8:00 am.     Denise Behrendt  [facility] CSS

## 2018-09-24 ENCOUNTER — OFFICE VISIT (OUTPATIENT)
Dept: PODIATRY | Facility: CLINIC | Age: 83
End: 2018-09-24
Payer: COMMERCIAL

## 2018-09-24 VITALS
WEIGHT: 233 LBS | BODY MASS INDEX: 32.62 KG/M2 | SYSTOLIC BLOOD PRESSURE: 133 MMHG | DIASTOLIC BLOOD PRESSURE: 87 MMHG | HEART RATE: 70 BPM | HEIGHT: 71 IN

## 2018-09-24 DIAGNOSIS — L97.401 DIABETIC ULCER OF HEEL ASSOCIATED WITH DIABETES MELLITUS DUE TO UNDERLYING CONDITION, LIMITED TO BREAKDOWN OF SKIN, UNSPECIFIED LATERALITY (H): Primary | ICD-10-CM

## 2018-09-24 DIAGNOSIS — E08.621 DIABETIC ULCER OF HEEL ASSOCIATED WITH DIABETES MELLITUS DUE TO UNDERLYING CONDITION, LIMITED TO BREAKDOWN OF SKIN, UNSPECIFIED LATERALITY (H): Primary | ICD-10-CM

## 2018-09-24 PROCEDURE — 99203 OFFICE O/P NEW LOW 30 MIN: CPT | Mod: 25 | Performed by: PODIATRIST

## 2018-09-24 PROCEDURE — 11042 DBRDMT SUBQ TIS 1ST 20SQCM/<: CPT | Performed by: PODIATRIST

## 2018-09-24 NOTE — PATIENT INSTRUCTIONS
Dr. Rhoades's Silicone Toe cap: size medium  House slippers with memory foam  Lamisil cream rubbed in to feet daily for two weeks.    FOOT ULCER (WOUND) EDUCATION  Ulceration ofthe foot involves a break or hole in the skin. Skin is our best protection against infection. Skin is quite durable, however, the underlying tissues are fragile. For this reason, the wound is likely to deepen rapidly. Deep wounds usually get infected and require amputation. Prompt healing is therefore essential to avoid limb loss.     Foot ulcers do not heal without intervention. Walking on the foot and living your normal life is not typically compatible with healing the sore. Successful healing will require several months of significant alteration of your daily activities.   Ulcer complications frequently develop. This primarily includes infection of skin, which then spreads deep into your joints, bones and tendons. Spreading infection may travel up your leg and into other parts of your body. Deep infection is usually treated with amputation ofpart ofyour foot or your leg. Signs of infection include fever, chills, nausea, vomiting, erratic blood sugars, local redness, pus, strong odor and localized warmth. Signs of infection should be taken seriously. Prompt evaluation in the clinic or hospital emergency room is required.   Ulcer treatment requires debridement or surgical removal of devitalized tissue. Your doctor will trim away callused, moistened, unhealthy tissue from the wound surface and margin. This helps to clean the wound and allows proper inspection. Debridement also stimulates healing even though the wound originally appears larger. Expect some bleeding with each debridement. You will be given instruction regarding wound bandaging. This often includes ointment and gauze. Avoid tape directly on the skin. Hand washing is essential since most infections will come from your fingertips. Ulcer care requires a no touch technique. Your  fingers should not touch the margin or base of the wound.    HELPFUL HEALING TIPS:  1. Debridement: Getting rid of bad tissue makes way for good tissue to promote healing  2. Addressing Foot Deformities: Hammertoes and bunions can cause increased pressure  3. Pressure Reduction: If pressure remains to the wound, it won't heal  4. Good Pulses: If bloodflow is not getting to the foot, the ulcer will not heal  5. Good Nutrition: If you are not getting proper nutrition your body can't heal.Protein!  6. Infection Control: Keep the ulcer clean with wound cleanser. DO NOT SOAK IT!  7. Moisture Control: Keep edema down and make sure that drainage is getting pulled away from the ulcer    IMPORTANCE OF DEBRIDEMENT    Reduces bioburden to help control or reduce infection. Even if an ulcer is not  infected,  the bacterial bioburden causes increased local inflammation.    Allows more accurate visualization of the wound base and edges, which allows for more precise staging.    Removes necrotic/non-viable tissue, which impedes wound healing, causes protein loss and can be a nidus for infection.    Stimulates new circulation (angiogenesis) and allows adequate oxygen delivery to the wound.    Removes undermining and tunneling, and may help reduce abscess formation.    Releases healing growth factors at the edge of the wound.    Prepares the wound bed by leaving only tissues that are capable of regenerating.

## 2018-09-24 NOTE — MR AVS SNAPSHOT
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 Rhoades's Silicone Toe cap: size medium  House slippers with memory foam  Lamisil cream rubbed in to feet daily for two weeks.    FOOT ULCER (WOUND) EDUCATION  Ulceration ofthe foot involves a break or hole in the skin. Skin is our best protection against infection. Skin is quite durable, however, the underlying tissues are fragile. For this reason, the wound is likely to deepen rapidly. Deep wounds usually get infected and require amputation. Prompt healing is therefore essential to avoid limb loss.     Foot ulcers do not heal without intervention. Walking on the foot and living your normal life is not typically compatible with healing the sore. Successful healing will require several months of significant alteration of your daily activities.   Ulcer complications frequently develop. This primarily includes infection of skin, which then spreads deep into your joints, bones and tendons. Spreading infection may travel up your leg and into other parts of your body. Deep infection is usually treated with amputation ofpart ofyour foot or your leg. Signs of infection include fever, chills, nausea, vomiting, erratic blood sugars, local redness, pus, strong odor and localized warmth. Signs of infection should be taken seriously. Prompt evaluation in the clinic or hospital emergency room is required.   Ulcer treatment requires debridement or surgical removal of devitalized tissue. Your doctor will trim away callused, moistened,  unhealthy tissue from the wound surface and margin. This helps to clean the wound and allows proper inspection. Debridement also stimulates healing even though the wound originally appears larger. Expect some bleeding with each debridement. You will be given instruction regarding wound bandaging. This often includes ointment and gauze. Avoid tape directly on the skin. Hand washing is essential since most infections will come from your fingertips. Ulcer care requires a no touch technique. Your fingers should not touch the margin or base of the wound.    HELPFUL HEALING TIPS:  1. Debridement: Getting rid of bad tissue makes way for good tissue to promote healing  2. Addressing Foot Deformities: Hammertoes and bunions can cause increased pressure  3. Pressure Reduction: If pressure remains to the wound, it won't heal  4. Good Pulses: If bloodflow is not getting to the foot, the ulcer will not heal  5. Good Nutrition: If you are not getting proper nutrition your body can't heal.Protein!  6. Infection Control: Keep the ulcer clean with wound cleanser. DO NOT SOAK IT!  7. Moisture Control: Keep edema down and make sure that drainage is getting pulled away from the ulcer    IMPORTANCE OF DEBRIDEMENT    Reduces bioburden to help control or reduce infection. Even if an ulcer is not  infected,  the bacterial bioburden causes increased local inflammation.    Allows more accurate visualization of the wound base and edges, which allows for more precise staging.    Removes necrotic/non-viable tissue, which impedes wound healing, causes protein loss and can be a nidus for infection.    Stimulates new circulation (angiogenesis) and allows adequate oxygen delivery to the wound.    Removes undermining and tunneling, and may help reduce abscess formation.    Releases healing growth factors at the edge of the wound.    Prepares the wound bed by leaving only tissues that are capable of regenerating.              Follow-ups after your  "visit        Follow-up notes from your care team     Return in about 2 weeks (around 10/8/2018).      Your next 10 appointments already scheduled     Oct 24, 2018  8:00 AM CDT   [facility]    [address] [address]   [phone]            Mar 18, 2019 11:00 AM CDT   Return Visit with [facility] Provider   [facility]    [address], [address]  [place] [address]   [phone]              Who to contact     If you have questions or need follow up information about today's clinic visit or your schedule please contact [facility] directly at [phone].  Normal or non-critical lab and imaging results will be communicated to you by [employer], letter or phone within 4 business days after the clinic has received the results. If you do not hear from us within 7 days, please contact the clinic through [employer] or phone. If you have a critical or abnormal lab result, we will notify you by phone as soon as possible.  Submit refill requests through [employer] or call your pharmacy and they will forward the refill request to us. Please allow 3 business days for your refill to be completed.          Additional Information About Your Visit        MyChart Information     [employer] gives you secure access to your electronic health record. If you see a primary care provider, you can also send messages to your care team and make appointments. If you have questions, please call your primary care clinic.  If you do not have a primary care provider, please call [phone] and they will assist you.        Care EveryWhere ID     This is your Care EveryWhere ID. This could be used by other organizations to access your [facility] medical records  [id number]        Your Vitals Were     Pulse Height BMI (Body Mass Index)             70 5' 10.75\" (1.797 m) 32.73 " kg/m2          Blood Pressure from Last 3 Encounters:   09/24/18 133/87   09/11/18 168/86   07/30/18 130/74    Weight from Last 3 Encounters:   09/24/18 233 lb (105.7 kg)   07/30/18 233 lb (105.7 kg)   04/04/18 236 lb (107 kg)              We Performed the Following     DEBRIDE SKIN/SUBQ TISSUE        Primary Care Provider Office Phone # Fax #    Chris Scott [phone] [address] [address]        Equal Access to Services     [facility] : Hadii aad ku hadasho Soomaali, waaxda luqadaha, qaybta kaalmada adeegyagabriel, bradford dorado . So [facility] [phone].    ATENCIÓN: Si habla español, tiene a aaron disposición servicios gratuitos de asistencia lingüística. Llame al [phone].    We comply with applicable federal civil rights laws and [place] laws. We do not discriminate on the basis of race, color, national origin, age, disability, sex, sexual orientation, or gender identity.            Thank you!     Thank you for choosing [facility]  for your care. Our goal is always to provide you with excellent care. Hearing back from our patients is one way we can continue to improve our services. Please take a few minutes to complete the written survey that you may receive in the mail after your visit with us. Thank you!             Your Updated Medication List - Protect others around you: Learn how to safely use, store and throw away your medicines at www.disposemymeds.org.          This list is accurate as of 9/24/18  4:47 PM.  Always use your most recent med list.                   Brand Name Dispense Instructions for use Diagnosis    amLODIPine 5 MG tablet    NORVASC    90 tablet    Take 1 tablet (5 mg) by mouth daily Takes prn for high blood pressure    Hypertension, goal below 140/90       * ASPIRIN NOT PRESCRIBED    INTENTIONAL     by Other route continuous prn Reported on 4/19/2017        blood glucose monitoring  test strip    no brand specified    100 each    1 strip by In Vitro route daily One touch Ultra.    Type 2 diabetes mellitus without complication, with long-term current use of insulin (H)       dorzolamide-timolol 2-0.5 % ophthalmic solution    COSOPT          hydrochlorothiazide 25 MG tablet    HYDRODIURIL    90 tablet    Take 1 tablet (25 mg) by mouth daily    Hypertension, goal below 140/90       insulin pen needle 31G X 8 MM    B-D U/F    100 each    1 Device daily Use once daily or as directed.    Type 2 diabetes mellitus without complication, with long-term current use of insulin (H)       * LANTUS SOLOSTAR 100 UNIT/ML injection   Generic drug:  insulin glargine           * insulin glargine 100 UNIT/ML injection    LANTUS VIAL    3 Month    30 units at bedtime    Type 2 diabetes mellitus without complication, with long-term current use of insulin (H)       * insulin glargine 100 UNIT/ML injection    LANTUS SOLOSTAR    9 mL    Inject 30 Units Subcutaneous At Bedtime    Type 2 diabetes mellitus without complication, with long-term current use of insulin (H)       lisinopril 40 MG tablet    PRINIVIL/ZESTRIL    90 tablet    Take 1 tablet (40 mg) by mouth daily    Essential hypertension, benign       metFORMIN 500 MG tablet    GLUCOPHAGE    360 tablet    2 tabs with lunch and 2 tabs with dinner.    Type 2 diabetes mellitus without complication, with long-term current use of insulin (H)       omeprazole 20 MG CR capsule    priLOSEC    90 capsule    Take 1 capsule (20 mg) by mouth daily    Gastroesophageal reflux disease without esophagitis       ONETOUCH LANCETS Misc     100 each    1 Device daily One touch delica lancets, what ever is covered by insurance.    Type 2 diabetes mellitus with diabetic peripheral angiopathy without gangrene (H)       ONETOUCH ULTRA SYSTEM KIT w/Device Kit           order for DME     1 Device    Equipment being ordered:  Glucometer    Type 2 diabetes, HbA1c goal < 7% (H)        PRESERVISION AREDS PO      Take 1 tablet by mouth        * STATIN NOT PRESCRIBED (INTENTIONAL)      by Other route continuous prn Reported on 4/19/2017        triamcinolone 0.1 % cream    KENALOG          VITAMIN D PO      Take 4,000 Int'l Units/day by mouth daily        * Notice:  This list has 5 medication(s) that are the same as other medications prescribed for you. Read the directions carefully, and ask your doctor or other care provider to review them with you.

## 2018-09-24 NOTE — LETTER
9/24/2018         RE: Fawad Garcia  [address] [facility] [address]        Dear Colleague,    Thank you for referring your patient, Fawad Garcia, to the [facility]. Please see a copy of my visit note below.    Fawad Garcia is a 82 year old male who presents to the office with a chief complaint of an ulcer on the bottom of the left foot.  The patient relates the ulcer has been present for the past several weeks.  The patient denies any fever, chills, nausea or vomiting.  The patient denies any pus or blood draining from the ulcer.  The patient denies any redness or swelling around the ulcer.  The patient denies of any pain involving the ulcer.  The patient denies any previous ulceration on either foot or leg.  The patient relates blood sugars are under control.         REVIEW OF SYSTEMS:  Constitutional, HEENT, cardiovascular, pulmonary, GI, , musculoskeletal, neuro, skin, endocrine and psych systems are negative, except as otherwise noted.     PAST MEDICAL HISTORY:   Past Medical History:   Diagnosis Date     Diabetes mellitus (H)      Hypertension         PAST SURGICAL HISTORY:   Past Surgical History:   Procedure Laterality Date     CL AFF SURGICAL PATHOLOGY  2002    prostate biopsy elevated PSA     COLONOSCOPY       HC REMOVE TONSILS/ADENOIDS,<13 Y/O      T & A     PHACOEMULSIFICATION WITH STANDARD INTRAOCULAR LENS IMPLANT Left 4/2/2015    Procedure: PHACOEMULSIFICATION WITH STANDARD INTRAOCULAR LENS IMPLANT;  Surgeon: Joseph Hernandez MD;  Location: [facility] OR     PHACOEMULSIFICATION WITH STANDARD INTRAOCULAR LENS IMPLANT Right 5/4/2015    Procedure: PHACOEMULSIFICATION WITH STANDARD INTRAOCULAR LENS IMPLANT;  Surgeon: Joseph Hernandez MD;  Location: [facility] OR        MEDICATIONS:   Current Outpatient Prescriptions:      amLODIPine (NORVASC) 5 MG tablet, Take 1 tablet (5 mg) by mouth daily Takes prn for high blood pressure, Disp: 90 tablet, Rfl: 3      ASPIRIN NOT PRESCRIBED, INTENTIONAL,, by Other route continuous prn Reported on 4/19/2017, Disp: , Rfl: 0     blood glucose monitoring (NO BRAND SPECIFIED) test strip, 1 strip by In Vitro route daily One touch Ultra., Disp: 100 each, Rfl: 3     Blood Glucose Monitoring Suppl (ONE TOUCH ULTRA SYSTEM KIT) W/DEVICE KIT, , Disp: , Rfl:      Cholecalciferol (VITAMIN D PO), Take 4,000 Int'l Units/day by mouth daily, Disp: , Rfl:      dorzolamide-timolol (COSOPT) 2-0.5 % ophthalmic solution, , Disp: , Rfl:      hydrochlorothiazide (HYDRODIURIL) 25 MG tablet, Take 1 tablet (25 mg) by mouth daily, Disp: 90 tablet, Rfl: 3     insulin glargine (LANTUS SOLOSTAR) 100 UNIT/ML injection, Inject 30 Units Subcutaneous At Bedtime, Disp: 9 mL, Rfl: 11     insulin pen needle (B-D U/F) 31G X 8 MM, 1 Device daily Use once daily or as directed., Disp: 100 each, Rfl: 3     LANTUS SOLOSTAR 100 UNIT/ML soln, , Disp: , Rfl:      lisinopril (PRINIVIL/ZESTRIL) 40 MG tablet, Take 1 tablet (40 mg) by mouth daily, Disp: 90 tablet, Rfl: 3     metFORMIN (GLUCOPHAGE) 500 MG tablet, 2 tabs with lunch and 2 tabs with dinner., Disp: 360 tablet, Rfl: 3     Multiple Vitamins-Minerals (PRESERVISION AREDS PO), Take 1 tablet by mouth, Disp: , Rfl:      omeprazole (PRILOSEC) 20 MG CR capsule, Take 1 capsule (20 mg) by mouth daily, Disp: 90 capsule, Rfl: 3     ONETOUCH LANCETS MISC, 1 Device daily One touch delica lancets, what ever is covered by insurance., Disp: 100 each, Rfl: 5     ORDER FOR DME, Equipment being ordered:  Glucometer, Disp: 1 Device, Rfl: 1     STATIN NOT PRESCRIBED, INTENTIONAL,, by Other route continuous prn Reported on 4/19/2017, Disp: , Rfl: 0     triamcinolone (KENALOG) 0.1 % cream, , Disp: , Rfl:      insulin glargine (LANTUS VIAL) 100 UNIT/ML injection, 30 units at bedtime (Patient not taking: Reported on 7/30/2018), Disp: 3 Month, Rfl: 3     ALLERGIES:    Allergies   Allergen Reactions     Zocor [Simvastatin - High Dose]       "Bilateral hip aching        SOCIAL HISTORY:   Social History     Social History     Marital status:      Spouse name: N/A     Number of children: N/A     Years of education: N/A     Occupational History     Not on file.     Social History Main Topics     Smoking status: Never Smoker     Smokeless tobacco: Never Used     Alcohol use 1.0 oz/week      Comment: drinks rarely     Drug use: No     Sexual activity: Not Currently     Partners: Female     Other Topics Concern     Parent/Sibling W/ Cabg, Mi Or Angioplasty Before 65f 55m? No     Social History Narrative        FAMILY HISTORY:   Family History   Problem Relation Age of Onset     Cancer Mother      intestinal CA     Diabetes Father      Diabetes Brother      Other Cancer Son      meagan tumor     Other Cancer Brother      pancreatic cancer     Diabetes Son         EXAM:Vitals: /87 (BP Location: Left arm, Patient Position: Sitting, Cuff Size: Adult Regular)  Pulse 70  Ht 5' 10.75\" (1.797 m)  Wt 233 lb (105.7 kg)  BMI 32.73 kg/m2  BMI= Body mass index is 32.73 kg/(m^2).  Weight management plan: Patient was referred to their PCP to discuss a diet and exercise plan.    General appearance: Patient is alert and fully cooperative with history & exam.  No sign of distress is noted during the visit.     Psychiatric: Affect is pleasant & appropriate.  Patient appears motivated to improve health.     Respiratory: Breathing is regular & unlabored while sitting.     HEENT: Hearing is intact to spoken word.  Speech is clear.  No gross evidence of visual impairment that would impact ambulation.     Dermatologic:  One notes grade II ulceration located on the plantar aspect of the heel on the left.  One notes a full-thickness ulceration on the plantar aspect of the left heel with no surrounding erythema or edema noted.  No drainage noted.  Negative probe to bone.    Vascular: DP & PT pulses are intact & regular bilaterally.  No significant edema or varicosities " noted.  CFT and skin temperature is normal to both lower extremities.     Neurologic: Lower extremity sensation is absent to light touch.  No evidence of weakness or contracture in the lower extremities.  Noted evidence of neuropathy.     Musculoskeletal: Patient is ambulatory without assistive device or brace.  No gross ankle deformity noted.  No foot or ankle joint effusion is noted.       Assessment: Grade diabetic ulcer on the left heel.    Plan:  I have explained to Fawad the condition of the diabetic malperforans ulceration.  After verbal consent, #15 blade was used to debride ulcer down to and including subcutaneous tissue.  Bleeding controlled with light pressure.   No drainage noted.  No anesthesia was used due to neuropathy. Dry dressing applied to foot.  Patient tolerated procedure well.  I explained to him  potential risks infection and loss of tissue, including loss of limb.  The patient will follow up in two weeks for further evaluation.        MIMA Vela.PAMANDA., F.A.C.F.A.S.    Again, thank you for allowing me to participate in the care of your patient.        Sincerely,        Ethan Montesinos DPM

## 2018-09-24 NOTE — PROGRESS NOTES
Fawad Garcia is a 82 year old male who presents to the office with a chief complaint of an ulcer on the bottom of the left foot.  The patient relates the ulcer has been present for the past several weeks.  The patient denies any fever, chills, nausea or vomiting.  The patient denies any pus or blood draining from the ulcer.  The patient denies any redness or swelling around the ulcer.  The patient denies of any pain involving the ulcer.  The patient denies any previous ulceration on either foot or leg.  The patient relates blood sugars are under control.         REVIEW OF SYSTEMS:  Constitutional, HEENT, cardiovascular, pulmonary, GI, , musculoskeletal, neuro, skin, endocrine and psych systems are negative, except as otherwise noted.     PAST MEDICAL HISTORY:   Past Medical History:   Diagnosis Date     Diabetes mellitus (H)      Hypertension         PAST SURGICAL HISTORY:   Past Surgical History:   Procedure Laterality Date     CL AFF SURGICAL PATHOLOGY  2002    prostate biopsy elevated PSA     COLONOSCOPY       HC REMOVE TONSILS/ADENOIDS,<13 Y/O      T & A     PHACOEMULSIFICATION WITH STANDARD INTRAOCULAR LENS IMPLANT Left 4/2/2015    Procedure: PHACOEMULSIFICATION WITH STANDARD INTRAOCULAR LENS IMPLANT;  Surgeon: Joseph Hernandez MD;  Location: [facility]     PHACOEMULSIFICATION WITH STANDARD INTRAOCULAR LENS IMPLANT Right 5/4/2015    Procedure: PHACOEMULSIFICATION WITH STANDARD INTRAOCULAR LENS IMPLANT;  Surgeon: Joseph Hernandez MD;  Location: [facility]        MEDICATIONS:   Current Outpatient Prescriptions:      amLODIPine (NORVASC) 5 MG tablet, Take 1 tablet (5 mg) by mouth daily Takes prn for high blood pressure, Disp: 90 tablet, Rfl: 3     ASPIRIN NOT PRESCRIBED, INTENTIONAL,, by Other route continuous prn Reported on 4/19/2017, Disp: , Rfl: 0     blood glucose monitoring (NO BRAND SPECIFIED) test strip, 1 strip by In Vitro route daily One touch Ultra., Disp: 100 each, Rfl: 3     Blood Glucose  Monitoring Suppl (ONE TOUCH ULTRA SYSTEM KIT) W/DEVICE KIT, , Disp: , Rfl:      Cholecalciferol (VITAMIN D PO), Take 4,000 Int'l Units/day by mouth daily, Disp: , Rfl:      dorzolamide-timolol (COSOPT) 2-0.5 % ophthalmic solution, , Disp: , Rfl:      hydrochlorothiazide (HYDRODIURIL) 25 MG tablet, Take 1 tablet (25 mg) by mouth daily, Disp: 90 tablet, Rfl: 3     insulin glargine (LANTUS SOLOSTAR) 100 UNIT/ML injection, Inject 30 Units Subcutaneous At Bedtime, Disp: 9 mL, Rfl: 11     insulin pen needle (B-D U/F) 31G X 8 MM, 1 Device daily Use once daily or as directed., Disp: 100 each, Rfl: 3     LANTUS SOLOSTAR 100 UNIT/ML soln, , Disp: , Rfl:      lisinopril (PRINIVIL/ZESTRIL) 40 MG tablet, Take 1 tablet (40 mg) by mouth daily, Disp: 90 tablet, Rfl: 3     metFORMIN (GLUCOPHAGE) 500 MG tablet, 2 tabs with lunch and 2 tabs with dinner., Disp: 360 tablet, Rfl: 3     Multiple Vitamins-Minerals (PRESERVISION AREDS PO), Take 1 tablet by mouth, Disp: , Rfl:      omeprazole (PRILOSEC) 20 MG CR capsule, Take 1 capsule (20 mg) by mouth daily, Disp: 90 capsule, Rfl: 3     ONETOUCH LANCETS MISC, 1 Device daily One touch delica lancets, what ever is covered by insurance., Disp: 100 each, Rfl: 5     ORDER FOR DME, Equipment being ordered:  Glucometer, Disp: 1 Device, Rfl: 1     STATIN NOT PRESCRIBED, INTENTIONAL,, by Other route continuous prn Reported on 4/19/2017, Disp: , Rfl: 0     triamcinolone (KENALOG) 0.1 % cream, , Disp: , Rfl:      insulin glargine (LANTUS VIAL) 100 UNIT/ML injection, 30 units at bedtime (Patient not taking: Reported on 7/30/2018), Disp: 3 Month, Rfl: 3     ALLERGIES:    Allergies   Allergen Reactions     Zocor [Simvastatin - High Dose]      Bilateral hip aching        SOCIAL HISTORY:   Social History     Social History     Marital status:      Spouse name: N/A     Number of children: N/A     Years of education: N/A     Occupational History     Not on file.     Social History Main Topics      "Smoking status: Never Smoker     Smokeless tobacco: Never Used     Alcohol use 1.0 oz/week      Comment: drinks rarely     Drug use: No     Sexual activity: Not Currently     Partners: Female     Other Topics Concern     Parent/Sibling W/ Cabg, Mi Or Angioplasty Before 65f 55m? No     Social History Narrative        FAMILY HISTORY:   Family History   Problem Relation Age of Onset     Cancer Mother      intestinal CA     Diabetes Father      Diabetes Brother      Other Cancer Son      emagan tumor     Other Cancer Brother      pancreatic cancer     Diabetes Son         EXAM:Vitals: /87 (BP Location: Left arm, Patient Position: Sitting, Cuff Size: Adult Regular)  Pulse 70  Ht 5' 10.75\" (1.797 m)  Wt 233 lb (105.7 kg)  BMI 32.73 kg/m2  BMI= Body mass index is 32.73 kg/(m^2).  Weight management plan: Patient was referred to their PCP to discuss a diet and exercise plan.    General appearance: Patient is alert and fully cooperative with history & exam.  No sign of distress is noted during the visit.     Psychiatric: Affect is pleasant & appropriate.  Patient appears motivated to improve health.     Respiratory: Breathing is regular & unlabored while sitting.     HEENT: Hearing is intact to spoken word.  Speech is clear.  No gross evidence of visual impairment that would impact ambulation.     Dermatologic:  One notes grade II ulceration located on the plantar aspect of the heel on the left.  One notes a full-thickness ulceration on the plantar aspect of the left heel with no surrounding erythema or edema noted.  No drainage noted.  Negative probe to bone.    Vascular: DP & PT pulses are intact & regular bilaterally.  No significant edema or varicosities noted.  CFT and skin temperature is normal to both lower extremities.     Neurologic: Lower extremity sensation is absent to light touch.  No evidence of weakness or contracture in the lower extremities.  Noted evidence of neuropathy.     Musculoskeletal: Patient " is ambulatory without assistive device or brace.  No gross ankle deformity noted.  No foot or ankle joint effusion is noted.       Assessment: Grade diabetic ulcer on the left heel.    Plan:  I have explained to Fawad the condition of the diabetic malperforans ulceration.  After verbal consent, #15 blade was used to debride ulcer down to and including subcutaneous tissue.  Bleeding controlled with light pressure.   No drainage noted.  No anesthesia was used due to neuropathy. Dry dressing applied to foot.  Patient tolerated procedure well.  I explained to him  potential risks infection and loss of tissue, including loss of limb.  The patient will follow up in two weeks for further evaluation.        LIANG Montesinos D.P.M., FWILLIE.F.A.S.

## 2018-10-08 ENCOUNTER — OFFICE VISIT (OUTPATIENT)
Dept: PODIATRY | Facility: CLINIC | Age: 83
End: 2018-10-08
Payer: COMMERCIAL

## 2018-10-08 VITALS
WEIGHT: 233 LBS | DIASTOLIC BLOOD PRESSURE: 82 MMHG | HEART RATE: 68 BPM | BODY MASS INDEX: 32.62 KG/M2 | HEIGHT: 71 IN | SYSTOLIC BLOOD PRESSURE: 126 MMHG

## 2018-10-08 DIAGNOSIS — E11.29 TYPE 2 DIABETES MELLITUS WITH MICROALBUMINURIA, WITH LONG-TERM CURRENT USE OF INSULIN (H): Primary | ICD-10-CM

## 2018-10-08 DIAGNOSIS — L97.511: ICD-10-CM

## 2018-10-08 DIAGNOSIS — Z79.4 TYPE 2 DIABETES MELLITUS WITH MICROALBUMINURIA, WITH LONG-TERM CURRENT USE OF INSULIN (H): Primary | ICD-10-CM

## 2018-10-08 DIAGNOSIS — R80.9 TYPE 2 DIABETES MELLITUS WITH MICROALBUMINURIA, WITH LONG-TERM CURRENT USE OF INSULIN (H): Primary | ICD-10-CM

## 2018-10-08 DIAGNOSIS — E08.621 DIABETIC ULCER OF LEFT MIDFOOT ASSOCIATED WITH DIABETES MELLITUS DUE TO UNDERLYING CONDITION, LIMITED TO BREAKDOWN OF SKIN (H): ICD-10-CM

## 2018-10-08 DIAGNOSIS — L97.421 DIABETIC ULCER OF LEFT MIDFOOT ASSOCIATED WITH DIABETES MELLITUS DUE TO UNDERLYING CONDITION, LIMITED TO BREAKDOWN OF SKIN (H): ICD-10-CM

## 2018-10-08 PROCEDURE — 11042 DBRDMT SUBQ TIS 1ST 20SQCM/<: CPT | Performed by: PODIATRIST

## 2018-10-08 NOTE — LETTER
10/8/2018         RE: Fawad Garcia  [address] [facility] [place] [address]        Dear Colleague,    Thank you for referring your patient, Fawad Garcia, to the [facility]. Please see a copy of my visit note below.    Fawad returns to the office for reevaluation of the left foot.  The patient relates following the instructions given at the last visit with noted less pain.  The patient relates overall more  improvement in pain and function of the left foot.  The patient relates no other problems.    PAST MEDICAL HISTORY:   Past Medical History:   Diagnosis Date     Diabetes mellitus (H)      Hypertension        BMI= Body mass index is 32.73 kg/(m^2).    Weight management plan: Patient was referred to their PCP to discuss a diet and exercise plan.    Physical Exam:    General: The patient appears to have a pleasant mental affect.    Lower extremity physical exam:  Neurovascular status is intact with palpable pedal pulses and intact epicritic sensations.  Muscular exam is within normal limits to major muscle groups.  Integument is intact.      One notes decreased edema.  One notes full-thickness ulceration on the plantar aspect of the left foot.  No surrounding erythema noted.  Hyperkeratotic skin buildup noted around the border of the ulceration.  One notes a second ulceration on the plantar aspect of the right great toe.  No drainage noted.  No surrounding erythema noted.       Assessment:      ICD-10-CM    1. Type 2 diabetes mellitus with microalbuminuria, with long-term current use of insulin (H) E11.29     R80.9     Z79.4    2. 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  At this time, the ulcer on the plantar aspect  of the left foot was sharply debrided with #10 blade down to healthy epidermis and dermis.  Bleeding was stopped with compression.  The wound was dressed with a sterile bandage.  The patient will return in 2 weeks for reevaluation.    Disclaimer: This note consists of symbols derived from keyboarding, dictation and/or voice recognition software. As a result, there may be errors in the script that have gone undetected. Please consider this when interpreting information found in this chart.       MIMA Vela.P.M., F.A.C.F.A.S.      Again, thank you for allowing me to participate in the care of your patient.        Sincerely,        Ethan Montesinos DPM

## 2018-10-08 NOTE — PATIENT INSTRUCTIONS
FOOT ULCER (WOUND) EDUCATION  Ulceration ofthe foot involves a break or hole in the skin. Skin is our best protection against infection. Skin is quite durable, however, the underlying tissues are fragile. For this reason, the wound is likely to deepen rapidly. Deep wounds usually get infected and require amputation. Prompt healing is therefore essential to avoid limb loss.     Foot ulcers do not heal without intervention. Walking on the foot and living your normal life is not typically compatible with healing the sore. Successful healing will require several months of significant alteration of your daily activities.   Ulcer complications frequently develop. This primarily includes infection of skin, which then spreads deep into your joints, bones and tendons. Spreading infection may travel up your leg and into other parts of your body. Deep infection is usually treated with amputation ofpart ofyour foot or your leg. Signs of infection include fever, chills, nausea, vomiting, erratic blood sugars, local redness, pus, strong odor and localized warmth. Signs of infection should be taken seriously. Prompt evaluation in the clinic or hospital emergency room is required.   Ulcer treatment requires debridement or surgical removal of devitalized tissue. Your doctor will trim away callused, moistened, unhealthy tissue from the wound surface and margin. This helps to clean the wound and allows proper inspection. Debridement also stimulates healing even though the wound originally appears larger. Expect some bleeding with each debridement. You will be given instruction regarding wound bandaging. This often includes ointment and gauze. Avoid tape directly on the skin. Hand washing is essential since most infections will come from your fingertips. Ulcer care requires a no touch technique. Your fingers should not touch the margin or base of the wound.    HELPFUL HEALING TIPS:  1. Debridement: Getting rid of bad tissue makes way  for good tissue to promote healing  2. Addressing Foot Deformities: Hammertoes and bunions can cause increased pressure  3. Pressure Reduction: If pressure remains to the wound, it won't heal  4. Good Pulses: If bloodflow is not getting to the foot, the ulcer will not heal  5. Good Nutrition: If you are not getting proper nutrition your body can't heal.Protein!  6. Infection Control: Keep the ulcer clean with wound cleanser. DO NOT SOAK IT!  7. Moisture Control: Keep edema down and make sure that drainage is getting pulled away from the ulcer    IMPORTANCE OF DEBRIDEMENT    Reduces bioburden to help control or reduce infection. Even if an ulcer is not  infected,  the bacterial bioburden causes increased local inflammation.    Allows more accurate visualization of the wound base and edges, which allows for more precise staging.    Removes necrotic/non-viable tissue, which impedes wound healing, causes protein loss and can be a nidus for infection.    Stimulates new circulation (angiogenesis) and allows adequate oxygen delivery to the wound.    Removes undermining and tunneling, and may help reduce abscess formation.    Releases healing growth factors at the edge of the wound.    Prepares the wound bed by leaving only tissues that are capable of regenerating.

## 2018-10-08 NOTE — PROGRESS NOTES
Fawad returns to the office for reevaluation of the left foot.  The patient relates following the instructions given at the last visit with noted less pain.  The patient relates overall more  improvement in pain and function of the left foot.  The patient relates no other problems.    PAST MEDICAL HISTORY:   Past Medical History:   Diagnosis Date     Diabetes mellitus (H)      Hypertension        BMI= Body mass index is 32.73 kg/(m^2).    Weight management plan: Patient was referred to their PCP to discuss a diet and exercise plan.    Physical Exam:    General: The patient appears to have a pleasant mental affect.    Lower extremity physical exam:  Neurovascular status is intact with palpable pedal pulses and intact epicritic sensations.  Muscular exam is within normal limits to major muscle groups.  Integument is intact.      One notes decreased edema.  One notes full-thickness ulceration on the plantar aspect of the left foot.  No surrounding erythema noted.  Hyperkeratotic skin buildup noted around the border of the ulceration.  One notes a second ulceration on the plantar aspect of the right great toe.  No drainage noted.  No surrounding erythema noted.       Assessment:      ICD-10-CM    1. Type 2 diabetes mellitus with microalbuminuria, with long-term current use of insulin (H) E11.29     R80.9     Z79.4    2. 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  At this time, the ulcer on the plantar aspect of the left foot was sharply debrided with #10 blade down to healthy epidermis and dermis.  Bleeding was stopped with compression.  The wound was dressed with a sterile bandage.  The patient will return in 2 weeks for reevaluation.    Disclaimer: This note consists of  symbols derived from keyboarding, dictation and/or voice recognition software. As a result, there may be errors in the script that have gone undetected. Please consider this when interpreting information found in this chart.       LIANG Montesinos D.P.M., FIDEL.F.LORRAINES.

## 2018-10-08 NOTE — MR AVS SNAPSHOT
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 Skin is our best protection against infection. Skin is quite durable, however, the underlying tissues are fragile. For this reason, the wound is likely to deepen rapidly. Deep wounds usually get infected and require amputation. Prompt healing is therefore essential to avoid limb loss.     Foot ulcers do not heal without intervention. Walking on the foot and living your normal life is not typically compatible with healing the sore. Successful healing will require several months of significant alteration of your daily activities.   Ulcer complications frequently develop. This primarily includes infection of skin, which then spreads deep into your joints, bones and tendons. Spreading infection may travel up your leg and into other parts of your body. Deep infection is usually treated with amputation ofpart ofyour foot or your leg. Signs of infection include fever, chills, nausea, vomiting, erratic blood sugars, local redness, pus, strong odor and localized warmth. Signs of infection should be taken seriously. Prompt evaluation in the clinic or hospital emergency room is required.   Ulcer treatment requires debridement or surgical removal of devitalized tissue. Your doctor will trim  away callused, moistened, unhealthy tissue from the wound surface and margin. This helps to clean the wound and allows proper inspection. Debridement also stimulates healing even though the wound originally appears larger. Expect some bleeding with each debridement. You will be given instruction regarding wound bandaging. This often includes ointment and gauze. Avoid tape directly on the skin. Hand washing is essential since most infections will come from your fingertips. Ulcer care requires a no touch technique. Your fingers should not touch the margin or base of the wound.    HELPFUL HEALING TIPS:  1. Debridement: Getting rid of bad tissue makes way for good tissue to promote healing  2. Addressing Foot Deformities: Hammertoes and bunions can cause increased pressure  3. Pressure Reduction: If pressure remains to the wound, it won't heal  4. Good Pulses: If bloodflow is not getting to the foot, the ulcer will not heal  5. Good Nutrition: If you are not getting proper nutrition your body can't heal.Protein!  6. Infection Control: Keep the ulcer clean with wound cleanser. DO NOT SOAK IT!  7. Moisture Control: Keep edema down and make sure that drainage is getting pulled away from the ulcer    IMPORTANCE OF DEBRIDEMENT    Reduces bioburden to help control or reduce infection. Even if an ulcer is not  infected,  the bacterial bioburden causes increased local inflammation.    Allows more accurate visualization of the wound base and edges, which allows for more precise staging.    Removes necrotic/non-viable tissue, which impedes wound healing, causes protein loss and can be a nidus for infection.    Stimulates new circulation (angiogenesis) and allows adequate oxygen delivery to the wound.    Removes undermining and tunneling, and may help reduce abscess formation.    Releases healing growth factors at the edge of the wound.    Prepares the wound bed by leaving only tissues that are capable of regenerating.               Follow-ups after your visit        Follow-up notes from your care team     Return in about 4 weeks (around 11/5/2018), or if symptoms worsen or fail to improve.      Your next 10 appointments already scheduled     Oct 12, 2018  2:50 PM CDT   Nurse Only with [facility] [place] [facility]   [facility]    [address] [address]   [phone]            Oct 24, 2018  8:00 AM CDT   MO[facility] with Evgeny Torres MD   [facility]    [address] [address]   [phone]            Mar 18, 2019 11:00 AM CDT   Return Visit with [facility] Provider   [facility]    [address], [address]  [facility] [address]   [phone]              Who to contact     If you have questions or need follow up information about today's clinic visit or your schedule please contact [facility] directly at [phone].  Normal or non-critical lab and imaging results will be communicated to you by [employer]arm[employer], letter or phone within 4 business days after the clinic has received the results. If you do not hear from us within 7 days, please contact the clinic through [employer]en[employer] or phone. If you have a critical or abnormal lab result, we will notify you by phone as soon as possible.  Submit refill requests through [employer] or call your pharmacy and they will forward the refill request to us. Please allow 3 business days for your refill to be completed.          Additional Information About Your Visit        M[employer] Information     Click[employer] gives you secure access to your electronic health record. If you see a primary care provider, you can also send messages to your care team and make appointments. If you have questions, please call your primary care clinic.  If you do not have a primary care provider, please call [phone] and they will assist you.       "  Care EveryWhere ID     This is your Care EveryWhere ID. This could be used by other organizations to access your [place] medical records  [id number]        Your Vitals Were     Pulse Height BMI (Body Mass Index)             68 5' 10.75\" (1.797 m) 32.73 kg/m2          Blood Pressure from Last 3 Encounters:   10/08/18 126/82   09/24/18 133/87   09/11/18 168/86    Weight from Last 3 Encounters:   10/08/18 233 lb (105.7 kg)   09/24/18 233 lb (105.7 kg)   07/30/18 233 lb (105.7 kg)              We Performed the Following     DEBRIDE SKIN/SUBQ TISSUE        Primary Care Provider Office Phone # Fax #    Chris Scott [phone] [address] [address]        Equal Access to Services     JACKY DELACRUZ : Hadii hollie thomas hadasho Sojannet, waaxda luqadaha, qaybta kaalmada adeegyada, bradford dorado . So [facility] [phone].    ATENCIÓN: Si habla español, tiene a aaron disposición servicios gratuitos de asistencia lingüística. Bailey al [phone].    We comply with applicable federal civil rights laws and [place] laws. We do not discriminate on the basis of race, color, national origin, age, disability, sex, sexual orientation, or gender identity.            Thank you!     Thank you for choosing [facility]  for your care. Our goal is always to provide you with excellent care. Hearing back from our patients is one way we can continue to improve our services. Please take a few minutes to complete the written survey that you may receive in the mail after your visit with us. Thank you!             Your Updated Medication List - Protect others around you: Learn how to safely use, store and throw away your medicines at www.disposemymeds.org.          This list is accurate as of 10/8/18 11:59 PM.  Always use your most recent med list.                   Brand Name Dispense Instructions for use Diagnosis    amLODIPine 5 MG tablet    " NORVASC    90 tablet    Take 1 tablet (5 mg) by mouth daily Takes prn for high blood pressure    Hypertension, goal below 140/90       * ASPIRIN NOT PRESCRIBED    INTENTIONAL     by Other route continuous prn Reported on 4/19/2017        blood glucose monitoring test strip    no brand specified    100 each    1 strip by In Vitro route daily One touch Ultra.    Type 2 diabetes mellitus without complication, with long-term current use of insulin (H)       dorzolamide-timolol 2-0.5 % ophthalmic solution    COSOPT          hydrochlorothiazide 25 MG tablet    HYDRODIURIL    90 tablet    Take 1 tablet (25 mg) by mouth daily    Hypertension, goal below 140/90       insulin pen needle 31G X 8 MM    B-D U/F    100 each    1 Device daily Use once daily or as directed.    Type 2 diabetes mellitus without complication, with long-term current use of insulin (H)       * LANTUS SOLOSTAR 100 UNIT/ML injection   Generic drug:  insulin glargine           * insulin glargine 100 UNIT/ML injection    LANTUS VIAL    3 Month    30 units at bedtime    Type 2 diabetes mellitus without complication, with long-term current use of insulin (H)       * insulin glargine 100 UNIT/ML injection    LANTUS SOLOSTAR    9 mL    Inject 30 Units Subcutaneous At Bedtime    Type 2 diabetes mellitus without complication, with long-term current use of insulin (H)       lisinopril 40 MG tablet    PRINIVIL/ZESTRIL    90 tablet    Take 1 tablet (40 mg) by mouth daily    Essential hypertension, benign       metFORMIN 500 MG tablet    GLUCOPHAGE    360 tablet    2 tabs with lunch and 2 tabs with dinner.    Type 2 diabetes mellitus without complication, with long-term current use of insulin (H)       omeprazole 20 MG CR capsule    priLOSEC    90 capsule    Take 1 capsule (20 mg) by mouth daily    Gastroesophageal reflux disease without esophagitis       ONETOUCH LANCETS Misc     100 each    1 Device daily One touch delica lancets, what ever is covered by insurance.     Type 2 diabetes mellitus with diabetic peripheral angiopathy without gangrene (H)       [employer] ULTRA SYSTEM KIT w/Device Kit           order for DME     1 Device    Equipment being ordered:  Glucometer    Type 2 diabetes, HbA1c goal < 7% (H)       PRESERVISION AREDS PO      Take 1 tablet by mouth        * STATIN NOT PRESCRIBED (INTENTIONAL)      by Other route continuous prn Reported on 4/19/2017        triamcinolone 0.1 % cream    KENALOG          VITAMIN D PO      Take 4,000 Int'l Units/day by mouth daily        * Notice:  This list has 5 medication(s) that are the same as other medications prescribed for you. Read the directions carefully, and ask your doctor or other care provider to review them with you.

## 2018-10-12 ENCOUNTER — ALLIED HEALTH/NURSE VISIT (OUTPATIENT)
Dept: FAMILY MEDICINE | Facility: CLINIC | Age: 83
End: 2018-10-12
Payer: COMMERCIAL

## 2018-10-12 DIAGNOSIS — Z23 NEED FOR PROPHYLACTIC VACCINATION AND INOCULATION AGAINST INFLUENZA: Primary | ICD-10-CM

## 2018-10-12 PROCEDURE — G0008 ADMIN INFLUENZA VIRUS VAC: HCPCS

## 2018-10-12 PROCEDURE — 90662 IIV NO PRSV INCREASED AG IM: CPT

## 2018-10-12 NOTE — PROGRESS NOTES

## 2018-10-12 NOTE — MR AVS SNAPSHOT
After Visit Summary   10/12/2018    Fawad Garcia    MRN: [id number]           Patient Information     Date Of Birth          1935        Visit Information        Provider Department      10/12/2018 2:50 PM [facility]        Today's Diagnoses     Need for prophylactic vaccination and inoculation against influenza    -  1       Follow-ups after your visit        Your next 10 appointments already scheduled     Oct 24, 2018  8:00 AM CDT   [facility] with Evgeny Torres MD   [facility]    [address] [address]   [phone]            Mar 18, 2019 11:00 AM CDT   Return Visit with [facility] Provider   [facility])    [address], [address]  [facility] [address]   [phone]              Who to contact     If you have questions or need follow up information about today's clinic visit or your schedule please contact [facility] directly at [phone].  Normal or non-critical lab and imaging results will be communicated to you by [employer] Mobile[employer], letter or phone within 4 business days after the clinic has received the results. If you do not hear from us within 7 days, please contact the clinic through [employer] or phone. If you have a critical or abnormal lab result, we will notify you by phone as soon as possible.  Submit refill requests through [employer] or call your pharmacy and they will forward the refill request to us. Please allow 3 business days for your refill to be completed.          Additional Information About Your Visit        [employer] Information     [employer] gives you secure access to your electronic health record. If you see a primary care provider, you can also send messages to your care team and make appointments. If you have questions, please call your primary care clinic.  If you do not have a primary care provider, please call  [phone] and they will assist you.        Care EveryWhere ID     This is your Care EveryWhere ID. This could be used by other organizations to access your [place] medical records  [id number]         Blood Pressure from Last 3 Encounters:   10/08/18 126/82   09/24/18 133/87   09/11/18 168/86    Weight from Last 3 Encounters:   10/08/18 233 lb (105.7 kg)   09/24/18 233 lb (105.7 kg)   07/30/18 233 lb (105.7 kg)              We Performed the Following     FLU VACCINE, INCREASED ANTIGEN, PRESV FREE, AGE 65+ [08606]     Vaccine Administration, Initial [36821]        Primary Care Provider Office Phone # Fax #    Chris Scott [phone] [address] [address]        Equal Access to Services     JACKY DELACRUZ : Hadii hollie thomas hadasho Sojannet, waaxda luqadaha, qaybta kaalmada ademichelleyada, bradford knight hayazael dorado . So [facility] [phone].    ATENCIÓN: Si habla español, tiene a aaron disposición servicios gratuitos de asistencia lingüística. Bailey al [phone].    We comply with applicable federal civil rights laws and [place] laws. We do not discriminate on the basis of race, color, national origin, age, disability, sex, sexual orientation, or gender identity.            Thank you!     Thank you for choosing [facility]  for your care. Our goal is always to provide you with excellent care. Hearing back from our patients is one way we can continue to improve our services. Please take a few minutes to complete the written survey that you may receive in the mail after your visit with us. Thank you!             Your Updated Medication List - Protect others around you: Learn how to safely use, store and throw away your medicines at www.disposemymeds.org.          This list is accurate as of 10/12/18  3:35 PM.  Always use your most recent med list.                   Brand Name Dispense Instructions for use Diagnosis    amLODIPine 5 MG tablet    NORVASC     90 tablet    Take 1 tablet (5 mg) by mouth daily Takes prn for high blood pressure    Hypertension, goal below 140/90       * ASPIRIN NOT PRESCRIBED    INTENTIONAL     by Other route continuous prn Reported on 4/19/2017        blood glucose monitoring test strip    no brand specified    100 each    1 strip by In Vitro route daily One touch Ultra.    Type 2 diabetes mellitus without complication, with long-term current use of insulin (H)       dorzolamide-timolol 2-0.5 % ophthalmic solution    COSOPT          hydrochlorothiazide 25 MG tablet    HYDRODIURIL    90 tablet    Take 1 tablet (25 mg) by mouth daily    Hypertension, goal below 140/90       insulin pen needle 31G X 8 MM    B-D U/F    100 each    1 Device daily Use once daily or as directed.    Type 2 diabetes mellitus without complication, with long-term current use of insulin (H)       * LANTUS SOLOSTAR 100 UNIT/ML injection   Generic drug:  insulin glargine           * insulin glargine 100 UNIT/ML injection    LANTUS VIAL    3 Month    30 units at bedtime    Type 2 diabetes mellitus without complication, with long-term current use of insulin (H)       * insulin glargine 100 UNIT/ML injection    LANTUS SOLOSTAR    9 mL    Inject 30 Units Subcutaneous At Bedtime    Type 2 diabetes mellitus without complication, with long-term current use of insulin (H)       lisinopril 40 MG tablet    PRINIVIL/ZESTRIL    90 tablet    Take 1 tablet (40 mg) by mouth daily    Essential hypertension, benign       metFORMIN 500 MG tablet    GLUCOPHAGE    360 tablet    2 tabs with lunch and 2 tabs with dinner.    Type 2 diabetes mellitus without complication, with long-term current use of insulin (H)       omeprazole 20 MG CR capsule    priLOSEC    90 capsule    Take 1 capsule (20 mg) by mouth daily    Gastroesophageal reflux disease without esophagitis       ONETOUCH LANCETS Misc     100 each    1 Device daily One touch delica lancets, what ever is covered by insurance.    Type 2  diabetes mellitus with diabetic peripheral angiopathy without gangrene (H)       [employer] ULTRA SYSTEM KIT w/Device Kit           order for DME     1 Device    Equipment being ordered:  Glucometer    Type 2 diabetes, HbA1c goal < 7% (H)       PRESERVISION AREDS PO      Take 1 tablet by mouth        * STATIN NOT PRESCRIBED (INTENTIONAL)      by Other route continuous prn Reported on 4/19/2017        triamcinolone 0.1 % cream    KENALOG          VITAMIN D PO      Take 4,000 Int'l Units/day by mouth daily        * Notice:  This list has 5 medication(s) that are the same as other medications prescribed for you. Read the directions carefully, and ask your doctor or other care provider to review them with you.

## 2018-10-24 ENCOUNTER — OFFICE VISIT (OUTPATIENT)
Dept: DERMATOLOGY | Facility: CLINIC | Age: 83
End: 2018-10-24
Payer: COMMERCIAL

## 2018-10-24 VITALS
BODY MASS INDEX: 31.6 KG/M2 | SYSTOLIC BLOOD PRESSURE: 164 MMHG | DIASTOLIC BLOOD PRESSURE: 89 MMHG | HEART RATE: 64 BPM | HEIGHT: 72 IN

## 2018-10-24 DIAGNOSIS — C44.229: Primary | ICD-10-CM

## 2018-10-24 PROCEDURE — 17311 MOHS 1 STAGE H/N/HF/G: CPT | Performed by: DERMATOLOGY

## 2018-10-24 PROCEDURE — 17312 MOHS ADDL STAGE: CPT | Performed by: DERMATOLOGY

## 2018-10-24 NOTE — NURSING NOTE
Initial /89  Pulse 64  Ht 1.829 m (6')  BMI 31.6 kg/m2 Estimated body mass index is 31.6 kg/(m^2) as calculated from the following:    Height as of this encounter: 1.829 m (6').    Weight as of 10/8/18: 105.7 kg (233 lb). .

## 2018-10-24 NOTE — MR AVS SNAPSHOT
After Visit Summary   10/24/2018    Fawad Garcia    MRN: [id number]           Patient Information     Date Of Birth          1935        Visit Information        Provider Department      10/24/2018 8:00 AM Evgeny Torres MD [facility]        Care Instructions    Open Wound Care     for ____left Ear_    ? No strenuous activity for 48 hours    ? Take Tylenol as needed for discomfort.                                                .         ? Do not drink alcoholic beverages for 48 hours.    ? Keep the pressure bandage in place for 24 hours. If the bandage becomes blood tinged or loose, reinforce it with gauze and tape.        (Refer to the reverse side of this page for management of bleeding).    ? Remove bandage in 24 hours and begin wound care as follows:     1. Clean area with tap water using a Q tip or gauze pad, (shower / bathe normally)  2. Dry wound with Q tip or gauze pad  3. Apply Aquaphor, Vaseline, Polysporin or Bacitracin Ointment with a Q tip    Do NOT use Neosporin Ointment *  4. Cover the wound with a band-aid or nonstick gauze pad and paper tape.  5. Repeat wound care once a day until wound is completely healed.    It is an old wives tale that a wound heals better when it is exposed to air and allowed to dry out. The wound will heal faster with a better cosmetic result if it is kept moist with ointment and covered with a bandage.  Do not let the wound dry out.      Supplies Needed:                Qtips or gauze pads                Polysporin or Bacitracin Ointment                Bandaids or nonstick gauze pads and paper tape    Wound care kits and brown paper tape are available for purchase at   the pharmacy.    BLEEDIN. Use tightly rolled up gauze or cloth to apply direct pressure over the bandage for 20   minutes.  2. Reapply pressure for an additional 20 minutes if necessary  3. Call the office or go to the nearest emergency room if pressure  fails to stop the bleeding.  4. Use additional gauze and tape to maintain pressure once the bleeding has stopped.  5. Begin wound care 24 hours after surgery as directed.                  WOUND HEALING    1. One week after surgery a pink / red halo will form around the outside of the wound.   This is new skin.  2. The center of the wound will appear yellowish white and produce some drainage.  3. The pink halo will slowly migrate in toward the center of the wound until the wound is covered with new shiny pink skin.  4. There will be no more drainage when the wound is completely healed.  5. It will take six months to one year for the redness to fade.  6. The scar may be itchy, tight and sensitive to extreme temperatures for a year after the surgery.  7. Massaging the area several times a day for several minutes after the wound is completely healed will help the scar soften and normalize faster. Begin massage only after healing is complete.      In case of emergency call: Dr Torres: [phone]     [facility]: [phone]    [facility]: [phone]              Follow-ups after your visit        Follow-up notes from your care team     Return in about 6 months (around 2019) for Skin Check.      Your next 10 appointments already scheduled     Mar 18, 2019  1:00 PM CDT   Return Visit with Kieran Hua MD   [facility])    [address], [address]  [place] [address] [phone]              Who to contact     If you have questions or need follow up information about today's clinic visit or your schedule please contact [facility] directly at [phone].  Normal or non-critical lab and imaging results will be communicated to you by MyChart, letter or phone within 4 business days after the clinic has received the results. If you do not hear from us within 7 days, please contact the clinic through MyChart or phone. If you have a  critical or abnormal lab result, we will notify you by phone as soon as possible.  Submit refill requests through [employer] or call your pharmacy and they will forward the refill request to us. Please allow 3 business days for your refill to be completed.          Additional Information About Your Visit        MyChart Information     [employer] gives you secure access to your electronic health record. If you see a primary care provider, you can also send messages to your care team and make appointments. If you have questions, please call your primary care clinic.  If you do not have a primary care provider, please call [phone] and they will assist you.        Care EveryWhere ID     This is your Care EveryWhere ID. This could be used by other organizations to access your [place] medical records  [id number]        Your Vitals Were     Pulse Height BMI (Body Mass Index)             64 1.829 m (6') 31.6 kg/m2          Blood Pressure from Last 3 Encounters:   10/24/18 164/89   10/08/18 126/82   09/24/18 133/87    Weight from Last 3 Encounters:   10/08/18 105.7 kg (233 lb)   09/24/18 105.7 kg (233 lb)   07/30/18 105.7 kg (233 lb)              Today, you had the following     No orders found for display       Primary Care Provider Office Phone # Fax #    Chris Scott [phone] [address] [address]        Equal Access to Services     [facility] : Hadii aad ku hadasho Soomaali, waaxda luqadaha, qaybta kaalmada ademichelleyada, bradford dorado . So [facility] [phone].    ATENCIÓN: Si habla español, tiene a aaron disposición servicios gratuitos de asistencia lingüística. Llame al [phone].    We comply with applicable federal civil rights laws and [place] laws. We do not discriminate on the basis of race, color, national origin, age, disability, sex, sexual orientation, or gender identity.            Thank you!     Thank you for choosing [facility]  [place]  for your care. Our goal is always to provide you with excellent care. Hearing back from our patients is one way we can continue to improve our services. Please take a few minutes to complete the written survey that you may receive in the mail after your visit with us. Thank you!             Your Updated Medication List - Protect others around you: Learn how to safely use, store and throw away your medicines at www.disposemymeds.org.          This list is accurate as of 10/24/18 10:32 AM.  Always use your most recent med list.                   Brand Name Dispense Instructions for use Diagnosis    amLODIPine 5 MG tablet    NORVASC    90 tablet    Take 1 tablet (5 mg) by mouth daily Takes prn for high blood pressure    Hypertension, goal below 140/90       * ASPIRIN NOT PRESCRIBED    INTENTIONAL     by Other route continuous prn Reported on 4/19/2017        blood glucose monitoring test strip    no brand specified    100 each    1 strip by In Vitro route daily One touch Ultra.    Type 2 diabetes mellitus without complication, with long-term current use of insulin (H)       dorzolamide-timolol 2-0.5 % ophthalmic solution    COSOPT          hydrochlorothiazide 25 MG tablet    HYDRODIURIL    90 tablet    Take 1 tablet (25 mg) by mouth daily    Hypertension, goal below 140/90       insulin pen needle 31G X 8 MM    B-D U/F    100 each    1 Device daily Use once daily or as directed.    Type 2 diabetes mellitus without complication, with long-term current use of insulin (H)       * LANTUS SOLOSTAR 100 UNIT/ML injection   Generic drug:  insulin glargine           * insulin glargine 100 UNIT/ML injection    LANTUS VIAL    3 Month    30 units at bedtime    Type 2 diabetes mellitus without complication, with long-term current use of insulin (H)       * insulin glargine 100 UNIT/ML injection    LANTUS SOLOSTAR    9 mL    Inject 30 Units Subcutaneous At Bedtime    Type 2 diabetes mellitus without complication, with  long-term current use of insulin (H)       lisinopril 40 MG tablet    PRINIVIL/ZESTRIL    90 tablet    Take 1 tablet (40 mg) by mouth daily    Essential hypertension, benign       metFORMIN 500 MG tablet    GLUCOPHAGE    360 tablet    2 tabs with lunch and 2 tabs with dinner.    Type 2 diabetes mellitus without complication, with long-term current use of insulin (H)       omeprazole 20 MG CR capsule    priLOSEC    90 capsule    Take 1 capsule (20 mg) by mouth daily    Gastroesophageal reflux disease without esophagitis       ONETOUCH LANCETS Misc     100 each    1 Device daily One touch delica lancets, what ever is covered by insurance.    Type 2 diabetes mellitus with diabetic peripheral angiopathy without gangrene (H)       Sybari ULTRA SYSTEM KIT w/Device Kit           order for DME     1 Device    Equipment being ordered:  Glucometer    Type 2 diabetes, HbA1c goal < 7% (H)       PRESERVISION AREDS PO      Take 1 tablet by mouth        * STATIN NOT PRESCRIBED (INTENTIONAL)      by Other route continuous prn Reported on 4/19/2017        triamcinolone 0.1 % cream    KENALOG          VITAMIN D PO      Take 4,000 Int'l Units/day by mouth daily        * Notice:  This list has 5 medication(s) that are the same as other medications prescribed for you. Read the directions carefully, and ask your doctor or other care provider to review them with you.

## 2018-10-24 NOTE — PROGRESS NOTES
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  Associated symptoms: none.  Patient has no other skin complaints today.  Remainder of the HPI, Meds, PMH, Allergies, FH, and SH was reviewed in chart.      Past Medical History:   Diagnosis Date     Diabetes mellitus (H)      Hypertension        Past Surgical History:   Procedure Laterality Date     CL AFF SURGICAL PATHOLOGY  2002    prostate biopsy elevated PSA     COLONOSCOPY       HC REMOVE TONSILS/ADENOIDS,<13 Y/O      T & A     PHACOEMULSIFICATION WITH STANDARD INTRAOCULAR LENS IMPLANT Left 4/2/2015    Procedure: PHACOEMULSIFICATION WITH STANDARD INTRAOCULAR LENS IMPLANT;  Surgeon: Joseph Hernandez MD;  Location: [facility]     PHACOEMULSIFICATION WITH STANDARD INTRAOCULAR LENS IMPLANT Right 5/4/2015    Procedure: PHACOEMULSIFICATION WITH STANDARD INTRAOCULAR LENS IMPLANT;  Surgeon: Joseph Hernandez MD;  Location: [facility] OR        Family History   Problem Relation Age of Onset     Cancer Mother      intestinal CA     Diabetes Father      Diabetes Brother      Other Cancer Son      meagan tumor     Other Cancer Brother      pancreatic cancer     Diabetes Son        Social History     Social History     Marital status:      Spouse name: N/A     Number of children: N/A     Years of education: N/A     Occupational History     Not on file.     Social History Main Topics     Smoking status: Never Smoker     Smokeless tobacco: Never Used     Alcohol use 1.0 oz/week      Comment: drinks rarely     Drug use: No     Sexual activity: Not Currently     Partners: Female     Other Topics Concern     Parent/Sibling W/ Cabg, Mi Or Angioplasty Before 65f 55m? No     Social History Narrative       Outpatient Encounter Prescriptions as of 10/24/2018   Medication Sig Dispense Refill     amLODIPine (NORVASC) 5 MG tablet Take 1 tablet (5 mg) by mouth daily Takes prn for high blood pressure 90 tablet 3      ASPIRIN NOT PRESCRIBED, INTENTIONAL, by Other route continuous prn Reported on 4/19/2017  0     blood glucose monitoring (NO BRAND SPECIFIED) test strip 1 strip by In Vitro route daily One touch Ultra. 100 each 3     Blood Glucose Monitoring Suppl (ONE TOUCH ULTRA SYSTEM KIT) W/DEVICE KIT        Cholecalciferol (VITAMIN D PO) Take 4,000 Int'l Units/day by mouth daily       dorzolamide-timolol (COSOPT) 2-0.5 % ophthalmic solution        hydrochlorothiazide (HYDRODIURIL) 25 MG tablet Take 1 tablet (25 mg) by mouth daily 90 tablet 3     insulin glargine (LANTUS SOLOSTAR) 100 UNIT/ML injection Inject 30 Units Subcutaneous At Bedtime 9 mL 11     insulin glargine (LANTUS VIAL) 100 UNIT/ML injection 30 units at bedtime (Patient not taking: Reported on 7/30/2018) 3 Month 3     insulin pen needle (B-D U/F) 31G X 8 MM 1 Device daily Use once daily or as directed. 100 each 3     LANTUS SOLOSTAR 100 UNIT/ML soln        lisinopril (PRINIVIL/ZESTRIL) 40 MG tablet Take 1 tablet (40 mg) by mouth daily 90 tablet 3     metFORMIN (GLUCOPHAGE) 500 MG tablet 2 tabs with lunch and 2 tabs with dinner. 360 tablet 3     Multiple Vitamins-Minerals (PRESERVISION AREDS PO) Take 1 tablet by mouth       omeprazole (PRILOSEC) 20 MG CR capsule Take 1 capsule (20 mg) by mouth daily 90 capsule 3     ONETOUCH LANCETS MISC 1 Device daily One touch delica lancets, what ever is covered by insurance. 100 each 5     ORDER FOR DME Equipment being ordered:   Glucometer 1 Device 1     STATIN NOT PRESCRIBED, INTENTIONAL, by Other route continuous prn Reported on 4/19/2017  0     triamcinolone (KENALOG) 0.1 % cream        No facility-administered encounter medications on file as of 10/24/2018.              Review Of Systems  Skin: As above  Eyes: negative  Ears/Nose/Throat: negative  Respiratory: No shortness of breath, dyspnea on exertion, cough, or hemoptysis  Cardiovascular: negative  Gastrointestinal: negative  Genitourinary: negative  Musculoskeletal:  negative  Neurologic: negative  Psychiatric: negative  Hematologic/Lymphatic/Immunologic: negative  Endocrine: negative      O:   NAD, WDWN, Alert & Oriented, Mood & Affect wnl, Vitals stable   Here today alone   /89  Pulse 64  Ht 1.829 m (6')  BMI 31.6 kg/m2   General appearance normal   Vitals stable   Alert, oriented and in no acute distress      Following lymph nodes palpated: Occipital, Cervical, Supraclavicular no lad   L antihelix 1.2cm red plaque       Eyes: Conjunctivae/lids:Normal     ENT: Lips, buccal mucosa, tongue: normal    MSK:Normal    Cardiovascular: peripheral edema none    Pulm: Breathing Normal    Lymph Nodes: No Head and Neck Lymphadenopathy     Neuro/Psych: Orientation:Normal; Mood/Affect:Normal      A/P:  1. L antihelix squamous cell carcinoma   MOHS:   Location    After PGACAC discussed with patient, decision for Mohs surgery was made. Indication for Mohs was Location. Patient confirmed the site with Dr. Torres.  After anesthesia with LEC, the tumor was excised using standard Mohs technique in 2 stages(s).  CLEAR MARGINS OBTAINED and Final defect size was 2 cm.       REPAIR SECOND INTENT: We discussed the options for wound management in full with the patient including risks/benefits/possible outcomes. Decision made to allow the wound to heal by second intention. EBL minimal; complications none; wound care routine.  The patient was discharged in good condition and will return in one month or prn for wound evaluation.  BENIGN LESIONS DISCUSSED WITH PATIENT:  I discussed the specifics of tumor, prognosis, and genetics of benign lesions.  I explained that treatment of these lesions would be purely cosmetic and not medically neccessary.  I discussed with patient different removal options including excision, cautery and /or laser.      Nature and genetics of benign skin lesions dicussed with patient.  Signs and Symptoms of skin cancer discussed with patient.  ABCDEs of melanoma reviewed  with patient.  Patient encouraged to perform monthly skin exams.  UV precautions reviewed with patient.  Patient to follow up with Primary Care provider regarding elevated blood pressure.  Skin care regimen reviewed with patient: Eliminate harsh soaps, i.e. Dial, zest, irsih spring; Mild soaps such as Cetaphil or Dove sensitive skin, avoid hot or cold showers, aggressive use of emollients including vanicream, cetaphil or cerave discussed with patient.    Risks of non-melanoma skin cancer discussed with patient   Return to clinic 6 months  Patient to follow up with Primary Care provider regarding elevated blood pressure.

## 2018-10-24 NOTE — PATIENT INSTRUCTIONS
Open Wound Care     for ____left Ear_    ? No strenuous activity for 48 hours    ? Take Tylenol as needed for discomfort.                                                .         ? Do not drink alcoholic beverages for 48 hours.    ? Keep the pressure bandage in place for 24 hours. If the bandage becomes blood tinged or loose, reinforce it with gauze and tape.        (Refer to the reverse side of this page for management of bleeding).    ? Remove bandage in 24 hours and begin wound care as follows:     1. Clean area with tap water using a Q tip or gauze pad, (shower / bathe normally)  2. Dry wound with Q tip or gauze pad  3. Apply Aquaphor, Vaseline, Polysporin or Bacitracin Ointment with a Q tip    Do NOT use Neosporin Ointment *  4. Cover the wound with a band-aid or nonstick gauze pad and paper tape.  5. Repeat wound care once a day until wound is completely healed.    It is an old wives tale that a wound heals better when it is exposed to air and allowed to dry out. The wound will heal faster with a better cosmetic result if it is kept moist with ointment and covered with a bandage.  Do not let the wound dry out.      Supplies Needed:                Qtips or gauze pads                Polysporin or Bacitracin Ointment                Bandaids or nonstick gauze pads and paper tape    Wound care kits and brown paper tape are available for purchase at   the pharmacy.    BLEEDIN. Use tightly rolled up gauze or cloth to apply direct pressure over the bandage for 20   minutes.  2. Reapply pressure for an additional 20 minutes if necessary  3. Call the office or go to the nearest emergency room if pressure fails to stop the bleeding.  4. Use additional gauze and tape to maintain pressure once the bleeding has stopped.  5. Begin wound care 24 hours after surgery as directed.                  WOUND HEALING    1. One week after surgery a pink / red halo will form around the outside of the wound.   This is new  skin.  2. The center of the wound will appear yellowish white and produce some drainage.  3. The pink halo will slowly migrate in toward the center of the wound until the wound is covered with new shiny pink skin.  4. There will be no more drainage when the wound is completely healed.  5. It will take six months to one year for the redness to fade.  6. The scar may be itchy, tight and sensitive to extreme temperatures for a year after the surgery.  7. Massaging the area several times a day for several minutes after the wound is completely healed will help the scar soften and normalize faster. Begin massage only after healing is complete.      In case of emergency call: Dr Torres: [phone]     [facility]: [phone]    [facility]: [phone]

## 2018-10-24 NOTE — LETTER
10/24/2018         RE: Fawad Garcia  [address] [facility] [place] [address]        Dear Colleague,    Thank you for referring your patient, Fawad Garcia, to the [facility]. Please see a copy of my visit note below.    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  Associated symptoms: none.  Patient has no other skin complaints today.  Remainder of the HPI, Meds, PMH, Allergies, FH, and SH was reviewed in chart.      Past Medical History:   Diagnosis Date     Diabetes mellitus (H)      Hypertension        Past Surgical History:   Procedure Laterality Date     CL AFF SURGICAL PATHOLOGY  2002    prostate biopsy elevated PSA     COLONOSCOPY       HC REMOVE TONSILS/ADENOIDS,<11 Y/O      T & A     PHACOEMULSIFICATION WITH STANDARD INTRAOCULAR LENS IMPLANT Left 4/2/2015    Procedure: PHACOEMULSIFICATION WITH STANDARD INTRAOCULAR LENS IMPLANT;  Surgeon: Joseph Hernandez MD;  Location: [facility]     PHACOEMULSIFICATION WITH STANDARD INTRAOCULAR LENS IMPLANT Right 5/4/2015    Procedure: PHACOEMULSIFICATION WITH STANDARD INTRAOCULAR LENS IMPLANT;  Surgeon: Joseph Hernandez MD;  Location: [facility] OR        Family History   Problem Relation Age of Onset     Cancer Mother      intestinal CA     Diabetes Father      Diabetes Brother      Other Cancer Son      meagan tumor     Other Cancer Brother      pancreatic cancer     Diabetes Son        Social History     Social History     Marital status:      Spouse name: N/A     Number of children: N/A     Years of education: N/A     Occupational History     Not on file.     Social History Main Topics     Smoking status: Never Smoker     Smokeless tobacco: Never Used     Alcohol use 1.0 oz/week      Comment: drinks rarely     Drug use: No     Sexual activity: Not Currently     Partners: Female     Other Topics Concern     Parent/Sibling W/ Cabg, Mi Or Angioplasty Before 65f  55m? No     Social History Narrative       Outpatient Encounter Prescriptions as of 10/24/2018   Medication Sig Dispense Refill     amLODIPine (NORVASC) 5 MG tablet Take 1 tablet (5 mg) by mouth daily Takes prn for high blood pressure 90 tablet 3     ASPIRIN NOT PRESCRIBED, INTENTIONAL, by Other route continuous prn Reported on 4/19/2017  0     blood glucose monitoring (NO BRAND SPECIFIED) test strip 1 strip by In Vitro route daily One touch Ultra. 100 each 3     Blood Glucose Monitoring Suppl (ONE TOUCH ULTRA SYSTEM KIT) W/DEVICE KIT        Cholecalciferol (VITAMIN D PO) Take 4,000 Int'l Units/day by mouth daily       dorzolamide-timolol (COSOPT) 2-0.5 % ophthalmic solution        hydrochlorothiazide (HYDRODIURIL) 25 MG tablet Take 1 tablet (25 mg) by mouth daily 90 tablet 3     insulin glargine (LANTUS SOLOSTAR) 100 UNIT/ML injection Inject 30 Units Subcutaneous At Bedtime 9 mL 11     insulin glargine (LANTUS VIAL) 100 UNIT/ML injection 30 units at bedtime (Patient not taking: Reported on 7/30/2018) 3 Month 3     insulin pen needle (B-D U/F) 31G X 8 MM 1 Device daily Use once daily or as directed. 100 each 3     LANTUS SOLOSTAR 100 UNIT/ML soln        lisinopril (PRINIVIL/ZESTRIL) 40 MG tablet Take 1 tablet (40 mg) by mouth daily 90 tablet 3     metFORMIN (GLUCOPHAGE) 500 MG tablet 2 tabs with lunch and 2 tabs with dinner. 360 tablet 3     Multiple Vitamins-Minerals (PRESERVISION AREDS PO) Take 1 tablet by mouth       omeprazole (PRILOSEC) 20 MG CR capsule Take 1 capsule (20 mg) by mouth daily 90 capsule 3     ONETOUCH LANCETS MISC 1 Device daily One touch delica lancets, what ever is covered by insurance. 100 each 5     ORDER FOR DME Equipment being ordered:   Glucometer 1 Device 1     STATIN NOT PRESCRIBED, INTENTIONAL, by Other route continuous prn Reported on 4/19/2017  0     triamcinolone (KENALOG) 0.1 % cream        No facility-administered encounter medications on file as of 10/24/2018.              Review  Of Systems  Skin: As above  Eyes: negative  Ears/Nose/Throat: negative  Respiratory: No shortness of breath, dyspnea on exertion, cough, or hemoptysis  Cardiovascular: negative  Gastrointestinal: negative  Genitourinary: negative  Musculoskeletal: negative  Neurologic: negative  Psychiatric: negative  Hematologic/Lymphatic/Immunologic: negative  Endocrine: negative      O:   NAD, WDWN, Alert & Oriented, Mood & Affect wnl, Vitals stable   Here today alone   /89  Pulse 64  Ht 1.829 m (6')  BMI 31.6 kg/m2   General appearance normal   Vitals stable   Alert, oriented and in no acute distress      Following lymph nodes palpated: Occipital, Cervical, Supraclavicular no lad   L antihelix 1.2cm red plaque       Eyes: Conjunctivae/lids:Normal     ENT: Lips, buccal mucosa, tongue: normal    MSK:Normal    Cardiovascular: peripheral edema none    Pulm: Breathing Normal    Lymph Nodes: No Head and Neck Lymphadenopathy     Neuro/Psych: Orientation:Normal; Mood/Affect:Normal      A/P:  1. L antihelix squamous cell carcinoma   MOHS:   Location    After PGACAC discussed with patient, decision for Mohs surgery was made. Indication for Mohs was Location. Patient confirmed the site with Dr. Torres.  After anesthesia with LEC, the tumor was excised using standard Mohs technique in 2 stages(s).  CLEAR MARGINS OBTAINED and Final defect size was 2 cm.       REPAIR SECOND INTENT: We discussed the options for wound management in full with the patient including risks/benefits/possible outcomes. Decision made to allow the wound to heal by second intention. EBL minimal; complications none; wound care routine.  The patient was discharged in good condition and will return in one month or prn for wound evaluation.  BENIGN LESIONS DISCUSSED WITH PATIENT:  I discussed the specifics of tumor, prognosis, and genetics of benign lesions.  I explained that treatment of these lesions would be purely cosmetic and not medically neccessary.  I  discussed with patient different removal options including excision, cautery and /or laser.      Nature and genetics of benign skin lesions dicussed with patient.  Signs and Symptoms of skin cancer discussed with patient.  ABCDEs of melanoma reviewed with patient.  Patient encouraged to perform monthly skin exams.  UV precautions reviewed with patient.  Patient to follow up with Primary Care provider regarding elevated blood pressure.  Skin care regimen reviewed with patient: Eliminate harsh soaps, i.e. Dial, zest, irsih spring; Mild soaps such as Cetaphil or Dove sensitive skin, avoid hot or cold showers, aggressive use of emollients including vanicream, cetaphil or cerave discussed with patient.    Risks of non-melanoma skin cancer discussed with patient   Return to clinic 6 months  Patient to follow up with Primary Care provider regarding elevated blood pressure.        Again, thank you for allowing me to participate in the care of your patient.        Sincerely,        Evgeny Torres MD

## 2018-10-29 ENCOUNTER — TRANSFERRED RECORDS (OUTPATIENT)
Dept: HEALTH INFORMATION MANAGEMENT | Facility: CLINIC | Age: 83
End: 2018-10-29

## 2018-11-05 ENCOUNTER — TELEPHONE (OUTPATIENT)
Dept: DERMATOLOGY | Facility: CLINIC | Age: 83
End: 2018-11-05

## 2018-11-05 NOTE — TELEPHONE ENCOUNTER
Pt reports that graft site is not painful, no redness and no discharge.  Area is greenish to white in color and pt was advised that this is normal healing.  What to monitor for is if this area becomes discolored like it turns grayish to dark brown or black he will need to be seen.  He was advised to continue the treatment that he is doing currently and if site becomes red and inflamed or hot to the touch, or painful to f/u with dermatology triage RN.    Pt agrees with this plan.    Kourtney Perales  [facility] RN

## 2018-11-05 NOTE — TELEPHONE ENCOUNTER
Reason for Call:  Other ear, post surgery    Detailed comments: Pt states his ear is greenish white where surgery was done, is that normal? Please call    Phone Number Patient can be reached at: Home number on file [phone] (home)    Best Time: today    Can we leave a detailed message on this number? YES    Call taken on 11/5/2018 at 12:29 PM by Tasha Willis

## 2018-11-19 ENCOUNTER — OFFICE VISIT (OUTPATIENT)
Dept: FAMILY MEDICINE | Facility: CLINIC | Age: 83
End: 2018-11-19
Payer: COMMERCIAL

## 2018-11-19 ENCOUNTER — NURSE TRIAGE (OUTPATIENT)
Dept: NURSING | Facility: CLINIC | Age: 83
End: 2018-11-19

## 2018-11-19 VITALS
OXYGEN SATURATION: 98 % | SYSTOLIC BLOOD PRESSURE: 134 MMHG | BODY MASS INDEX: 31.46 KG/M2 | WEIGHT: 232 LBS | RESPIRATION RATE: 16 BRPM | HEART RATE: 71 BPM | TEMPERATURE: 98.1 F | DIASTOLIC BLOOD PRESSURE: 68 MMHG

## 2018-11-19 DIAGNOSIS — L98.491 INFECTED SKIN ULCER LIMITED TO BREAKDOWN OF SKIN (H): ICD-10-CM

## 2018-11-19 DIAGNOSIS — L98.499 CALLOUS ULCER, UNSPECIFIED ULCER STAGE (H): ICD-10-CM

## 2018-11-19 DIAGNOSIS — I73.9 PERIPHERAL VASCULAR DISEASE (H): Primary | ICD-10-CM

## 2018-11-19 DIAGNOSIS — E11.29 TYPE 2 DIABETES MELLITUS WITH MICROALBUMINURIA, WITH LONG-TERM CURRENT USE OF INSULIN (H): ICD-10-CM

## 2018-11-19 DIAGNOSIS — Z79.4 TYPE 2 DIABETES MELLITUS WITH MICROALBUMINURIA, WITH LONG-TERM CURRENT USE OF INSULIN (H): ICD-10-CM

## 2018-11-19 DIAGNOSIS — L97.311 CHRONIC ULCER OF RIGHT ANKLE LIMITED TO BREAKDOWN OF SKIN (H): ICD-10-CM

## 2018-11-19 DIAGNOSIS — L08.9 INFECTED SKIN ULCER LIMITED TO BREAKDOWN OF SKIN (H): ICD-10-CM

## 2018-11-19 DIAGNOSIS — R80.9 TYPE 2 DIABETES MELLITUS WITH MICROALBUMINURIA, WITH LONG-TERM CURRENT USE OF INSULIN (H): ICD-10-CM

## 2018-11-19 LAB
BASOPHILS # BLD AUTO: 0.1 10E9/L (ref 0–0.2)
BASOPHILS NFR BLD AUTO: 0.8 %
CRP SERPL-MCNC: 21.5 MG/L (ref 0–8)
DIFFERENTIAL METHOD BLD: NORMAL
EOSINOPHIL # BLD AUTO: 0.5 10E9/L (ref 0–0.7)
EOSINOPHIL NFR BLD AUTO: 5 %
ERYTHROCYTE [DISTWIDTH] IN BLOOD BY AUTOMATED COUNT: 13.1 % (ref 10–15)
HCT VFR BLD AUTO: 40.8 % (ref 40–53)
HGB BLD-MCNC: 13.4 G/DL (ref 13.3–17.7)
LYMPHOCYTES # BLD AUTO: 1.9 10E9/L (ref 0.8–5.3)
LYMPHOCYTES NFR BLD AUTO: 19.4 %
MCH RBC QN AUTO: 29.4 PG (ref 26.5–33)
MCHC RBC AUTO-ENTMCNC: 32.8 G/DL (ref 31.5–36.5)
MCV RBC AUTO: 90 FL (ref 78–100)
MONOCYTES # BLD AUTO: 1 10E9/L (ref 0–1.3)
MONOCYTES NFR BLD AUTO: 9.9 %
NEUTROPHILS # BLD AUTO: 6.3 10E9/L (ref 1.6–8.3)
NEUTROPHILS NFR BLD AUTO: 64.9 %
PLATELET # BLD AUTO: 315 10E9/L (ref 150–450)
RBC # BLD AUTO: 4.56 10E12/L (ref 4.4–5.9)
WBC # BLD AUTO: 9.8 10E9/L (ref 4–11)

## 2018-11-19 PROCEDURE — 36415 COLL VENOUS BLD VENIPUNCTURE: CPT | Performed by: NURSE PRACTITIONER

## 2018-11-19 PROCEDURE — 99215 OFFICE O/P EST HI 40 MIN: CPT | Performed by: NURSE PRACTITIONER

## 2018-11-19 PROCEDURE — 86140 C-REACTIVE PROTEIN: CPT | Performed by: NURSE PRACTITIONER

## 2018-11-19 PROCEDURE — 85025 COMPLETE CBC W/AUTO DIFF WBC: CPT | Performed by: NURSE PRACTITIONER

## 2018-11-19 RX ORDER — CEPHALEXIN 500 MG/1
500 CAPSULE ORAL 4 TIMES DAILY
Qty: 40 CAPSULE | Refills: 0 | Status: ON HOLD | OUTPATIENT
Start: 2018-11-19 | End: 2018-12-11

## 2018-11-19 NOTE — PROGRESS NOTES
SUBJECTIVE:   Fawad Garcia is a 82 year old male who presents to clinic today for the following health issues:    Toe Pain/ Sore    Onset: x 1 month. Patient states he has seen Dr. Montesinos the foot doctor in the past but his schedule was full.    Description:   Location: Right foot, big toe.  Character: red, swollen, and bleeding. Patient denies pain. He state she can't feel a whole lot in his feet.    Intensity: 0/10    Progression of Symptoms: same    Accompanying Signs & Symptoms:  none    History:   Previous similar pain: No      Precipitating factors:   Trauma or overuse: no     Alleviating factors:  Improved by: nothing    Therapies Tried and outcome: polysporin and wrapped it up.    No fever/chills  Reports mild pain or throbbing in right foot - non specific but no worsening.  Some pink/mild erythema surrounding big toe.  Scant bloody drainage from proximal big toe - keeps toe shield on and dressing in place.    Reports neuropathy in bilateral LE.    Reports Type II diabetes.    Left foot great toe with callus and ulcerations - seeing Podiatry and has this wrapped and toe shield  Has appointment with Podiatry in 1 week - but wanted to be seen sooner.      History of chronic right foot ulceration for months.      Problem list and histories reviewed & adjusted, as indicated.  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)     Past Surgical History:   Procedure Laterality Date     CL AFF SURGICAL  PATHOLOGY  2002    prostate biopsy elevated PSA     COLONOSCOPY       HC REMOVE TONSILS/ADENOIDS,<11 Y/O      T & A     PHACOEMULSIFICATION WITH STANDARD INTRAOCULAR LENS IMPLANT Left 4/2/2015    Procedure: PHACOEMULSIFICATION WITH STANDARD INTRAOCULAR LENS IMPLANT;  Surgeon: Joseph Hernandez MD;  Location: [facility]     PHACOEMULSIFICATION WITH STANDARD INTRAOCULAR LENS IMPLANT Right 5/4/2015    Procedure: PHACOEMULSIFICATION WITH STANDARD INTRAOCULAR LENS IMPLANT;  Surgeon: Joseph Hernandez MD;  Location: [facility] OR       Social History   Substance Use Topics     Smoking status: Never Smoker     Smokeless tobacco: Never Used     Alcohol use 1.0 oz/week      Comment: drinks rarely     Family History   Problem Relation Age of Onset     Cancer Mother      intestinal CA     Diabetes Father      Diabetes Brother      Other Cancer Son      meagan tumor     Other Cancer Brother      pancreatic cancer     Diabetes Son          Current Outpatient Prescriptions   Medication Sig Dispense Refill     amLODIPine (NORVASC) 5 MG tablet Take 1 tablet (5 mg) by mouth daily Takes prn for high blood pressure 90 tablet 3     blood glucose monitoring (NO BRAND SPECIFIED) test strip 1 strip by In Vitro route daily One touch Ultra. 100 each 3     Blood Glucose Monitoring Suppl (ONE TOUCH ULTRA SYSTEM KIT) W/DEVICE KIT        cephALEXin (KEFLEX) 500 MG capsule Take 1 capsule (500 mg) by mouth 4 times daily 40 capsule 0     Cholecalciferol (VITAMIN D PO) Take 4,000 Int'l Units/day by mouth daily       dorzolamide-timolol (COSOPT) 2-0.5 % ophthalmic solution        hydrochlorothiazide (HYDRODIURIL) 25 MG tablet Take 1 tablet (25 mg) by mouth daily 90 tablet 3     insulin glargine (LANTUS SOLOSTAR) 100 UNIT/ML injection Inject 30 Units Subcutaneous At Bedtime 9 mL 11     insulin glargine (LANTUS VIAL) 100 UNIT/ML injection 30 units at bedtime 3 Month 3     insulin pen needle (B-D U/F) 31G X 8 MM 1 Device daily Use once daily or as directed. 100  each 3     LANTUS SOLOSTAR 100 UNIT/ML soln        lisinopril (PRINIVIL/ZESTRIL) 40 MG tablet Take 1 tablet (40 mg) by mouth daily 90 tablet 3     metFORMIN (GLUCOPHAGE) 500 MG tablet 2 tabs with lunch and 2 tabs with dinner. 360 tablet 3     Multiple Vitamins-Minerals (PRESERVISION AREDS PO) Take 1 tablet by mouth       omeprazole (PRILOSEC) 20 MG CR capsule Take 1 capsule (20 mg) by mouth daily 90 capsule 3     ONETOUCH LANCETS MISC 1 Device daily One touch delica lancets, what ever is covered by insurance. 100 each 5     ORDER FOR DME Equipment being ordered:   Glucometer 1 Device 1     triamcinolone (KENALOG) 0.1 % cream        ASPIRIN NOT PRESCRIBED, INTENTIONAL, by Other route continuous prn Reported on 4/19/2017  0     STATIN NOT PRESCRIBED, INTENTIONAL, by Other route continuous prn Reported on 4/19/2017  0     Allergies   Allergen Reactions     Zocor [Simvastatin - High Dose]      Bilateral hip aching     Recent Labs   Lab Test  07/30/18   1100  03/27/18   1012  10/04/17   0937  04/17/17   1102  09/03/16   1041   A1C  7.4*  7.7*  7.2*  7.6*  7.8*   LDL  108*  107*   --    --   95   HDL  56  54   --    --   50   TRIG  59  84   --    --   78   CR   --   0.86   --   0.93  0.92   GFRESTIMATED   --   85   --   78  80   GFRESTBLACK   --   >90   --   >90   GFR Calc    >90   GFR Calc     POTASSIUM   --   3.9   --   3.8   --    TSH   --   1.74   --   1.80   --       BP Readings from Last 3 Encounters:   11/19/18 134/68   10/24/18 164/89   10/08/18 126/82    Wt Readings from Last 3 Encounters:   11/19/18 232 lb (105.2 kg)   10/08/18 233 lb (105.7 kg)   09/24/18 233 lb (105.7 kg)                  Labs reviewed in EPIC    Reviewed and updated as needed this visit by clinical staff       Reviewed and updated as needed this visit by Provider         ROS:  Constitutional, HEENT, cardiovascular, pulmonary, GI, , musculoskeletal, neuro, skin, endocrine and psych systems are negative, except  as otherwise noted.    OBJECTIVE:     /68  Pulse 71  Temp 98.1  F (36.7  C) (Tympanic)  Resp 16  Wt 232 lb (105.2 kg)  SpO2 98%  BMI 31.46 kg/m2  Body mass index is 31.46 kg/(m^2).  GENERAL: alert, no distress and obese  RESP: lungs clear to auscultation - no rales, rhonchi or wheezes  CV: regular rate and rhythm, normal S1 S2, no S3 or S4, no murmur, click or rub, no peripheral edema and peripheral pulses strong  ABDOMEN: soft, nontender, no hepatosplenomegaly, no masses and bowel sounds normal  MS: Left foot great toe wrapped.  Right toe with moderate swelling - mild erythema to distal MT area.  No erythema in mid foot or medial foot.  Diminished pedal pulses +1.  + CMS.  Great toe with callus formation and distal with ulceration superficial appearance white surrounding tissue. No necrotic tissue observed.    Diagnostic Test Results:  No results found for this or any previous visit (from the past 24 hour(s)).     CBC/plt and CRP - pending      ASSESSMENT/PLAN:     1. Peripheral vascular disease (H)   No new symptoms    2. Type 2 diabetes mellitus with microalbuminuria, with long-term current use of insulin (H)  Lab Results   Component Value Date    A1C 7.4 07/30/2018    A1C 7.7 03/27/2018    A1C 7.2 10/04/2017    A1C 7.6 04/17/2017    A1C 7.8 09/03/2016     Managed by Dr. Scott as PCP - unable to get in with him today.  Advised close follow up with him to recheck ulcer and diabetes   Has appointment for follow up on Friday due to holidays.    3. Callous ulcer, unspecified ulcer stage (H)   Bilateral - following with Podiatry - [facility] assisting with scheduling follow up in the next week for recheck with new worsening ulcer now infected.  ? Need for debridement.    - CBC with platelets differential  - CRP inflammation  - WOUND CARE REFERRAL  - cephALEXin (KEFLEX) 500 MG capsule; Take 1 capsule (500 mg) by mouth 4 times daily  Dispense: 40 capsule; Refill: 0    4. Chronic ulcer of right ankle limited to  breakdown of skin (H)     - CBC with platelets differential  - CRP inflammation  - WOUND CARE REFERRAL  - cephALEXin (KEFLEX) 500 MG capsule; Take 1 capsule (500 mg) by mouth 4 times daily  Dispense: 40 capsule; Refill: 0    5. Infected skin ulcer limited to breakdown of skin (H)   Labs - unlikely osteomyelitis but will check labs.  Afebrile   Discussed red flags, care at home.  Referral Wound Care - has appointment on Tuesday.    Cephalexin QID X 10 days.    - CBC with platelets differential  - CRP inflammation  - WOUND CARE REFERRAL    Total times spent with patient 40 minutes of which > 50% of the time was spent counseling and coordination of care discussion of above, referrals, labs, discussion of red flags - reasons to go to UC/ED, recommendations, treatment plan and follow up       Patient Instructions   Follow-up with Podiatry in the next 3-5 days for infected toe ulcer - possible debridement of toe ulcer.  Follow-up with PCP on Friday to recheck infection after starting antibiotics.    Referral to Wound care to make recommendations.    Prescription written for antibiotics to be taken four times daily for 10 days.  Advised to take entire course of antibiotic despite improvement in symptoms.    Keep wound clean and dry, cover and pad area to avoid further damage.      Red Flags discussed - will Return to clinic sooner if he develops worsening symptoms, redness, pain or new fever, chills.    Labs today.    PARMJIT Villanueva            Diabetic Foot Care  Diabetes can lead to a number of foot complications. Fortunately, you can prevent most of these with a little extra foot care. If diabetes is not well controlled, it can cause damage to blood vessels and result in poor circulation to the foot. When the skin does not get enough blood flow, it becomes prone to pressure sores and ulcers, which heal slowly.  Diabetes can also damage nerves, interfering with the ability to feel pain and pressure. When you can t  feel your foot normally, it is easy to injure your skin, bones, and joints without knowing it. For these reasons diabetes increases the risk of fungal infections, bunions, and ulcers. An ulcer is a sore or break in the skin. With ulcers, often the skin seems to have worn away. Deep ulcers can lead to bone infection.  Gangrene is the most serious foot complication of diabetes. It usually occurs on the tips of the toes as blackened areas of skin. The black area is dead tissue. In severe cases, gangrene spreads to involve the entire toe, other toes, and the entire foot. Foot or toe amputation may be required. Good foot care and blood sugar control can prevent this.  Home care    Wear comfortable, well-fitting shoes.    Wash your feet daily with warm water and mild soap.    After drying, apply a moisturizing cream or lotion to the top and bottom of your feet. Don't put lotion between toes.    Check your feet daily for skin breaks, blisters, swelling, or redness. Look between your toes as well. If you cannot see the bottoms of your feet, ask someone to look or use a mirror.    Wear cotton socks and change them every day.    Trim toenails carefully, and do not cut your cuticles.    Strive to keep your blood sugar under control with a combination of medicines, diet, and activity.    If you smoke and have diabetes, it is very important that you stop. Smoking reduces blood flow to your foot.    Schedule foot exams at least every year, or more often if you have foot problems.    Put your feet up when sitting, wiggle toes, and move ankles to help improve blood flow.  Avoid activities that increase your risk of foot injury:    Do not walk barefoot.    Do not use heating pads or hot water bottles on your feet.    Do not put your foot in a hot tub without first checking the temperature with your hand.  Follow-up care  Follow up with your healthcare provider, or as advised. Be sure to take off your shoes and socks before your  appointment starts so your healthcare provider will be sure to check your feet. Report any cut, puncture, scrape, blister, or other injury to your foot. Also report if you have a bunion, hammertoes, ingrown toenail, or ulcer on your foot.  When to seek medical advice  Call your healthcare provider right away if any of these occur:    Black skin color anywhere on the foot    Open ulcer with pus draining from the wound    Increasing foot or leg pain    New areas of redness or swelling or tender areas of the foot    Fever of 100.4 F (38 C) or greater  Date Last Reviewed: 5/25/2016 2000-2018 [employer]. [address] [place], [place] [id number]. All rights reserved. This information is not intended as a substitute for professional medical care. Always follow your healthcare professional's instructions.            Maria Antonia Felton NP  [facility]

## 2018-11-19 NOTE — PATIENT INSTRUCTIONS
Follow-up with Podiatry in the next 3-5 days for infected toe ulcer - possible debridement of toe ulcer.  Follow-up with PCP on Friday to recheck infection after starting antibiotics.    Referral to Wound care to make recommendations.    Prescription written for antibiotics to be taken four times daily for 10 days.  Advised to take entire course of antibiotic despite improvement in symptoms.    Keep wound clean and dry, cover and pad area to avoid further damage.      Red Flags discussed - will Return to clinic sooner if he develops worsening symptoms, redness, pain or new fever, chills.    Labs today.    PARMJIT Villanueva            Diabetic Foot Care  Diabetes can lead to a number of foot complications. Fortunately, you can prevent most of these with a little extra foot care. If diabetes is not well controlled, it can cause damage to blood vessels and result in poor circulation to the foot. When the skin does not get enough blood flow, it becomes prone to pressure sores and ulcers, which heal slowly.  Diabetes can also damage nerves, interfering with the ability to feel pain and pressure. When you can t feel your foot normally, it is easy to injure your skin, bones, and joints without knowing it. For these reasons diabetes increases the risk of fungal infections, bunions, and ulcers. An ulcer is a sore or break in the skin. With ulcers, often the skin seems to have worn away. Deep ulcers can lead to bone infection.  Gangrene is the most serious foot complication of diabetes. It usually occurs on the tips of the toes as blackened areas of skin. The black area is dead tissue. In severe cases, gangrene spreads to involve the entire toe, other toes, and the entire foot. Foot or toe amputation may be required. Good foot care and blood sugar control can prevent this.  Home care    Wear comfortable, well-fitting shoes.    Wash your feet daily with warm water and mild soap.    After drying, apply a moisturizing cream  or lotion to the top and bottom of your feet. Don't put lotion between toes.    Check your feet daily for skin breaks, blisters, swelling, or redness. Look between your toes as well. If you cannot see the bottoms of your feet, ask someone to look or use a mirror.    Wear cotton socks and change them every day.    Trim toenails carefully, and do not cut your cuticles.    Strive to keep your blood sugar under control with a combination of medicines, diet, and activity.    If you smoke and have diabetes, it is very important that you stop. Smoking reduces blood flow to your foot.    Schedule foot exams at least every year, or more often if you have foot problems.    Put your feet up when sitting, wiggle toes, and move ankles to help improve blood flow.  Avoid activities that increase your risk of foot injury:    Do not walk barefoot.    Do not use heating pads or hot water bottles on your feet.    Do not put your foot in a hot tub without first checking the temperature with your hand.  Follow-up care  Follow up with your healthcare provider, or as advised. Be sure to take off your shoes and socks before your appointment starts so your healthcare provider will be sure to check your feet. Report any cut, puncture, scrape, blister, or other injury to your foot. Also report if you have a bunion, hammertoes, ingrown toenail, or ulcer on your foot.  When to seek medical advice  Call your healthcare provider right away if any of these occur:    Black skin color anywhere on the foot    Open ulcer with pus draining from the wound    Increasing foot or leg pain    New areas of redness or swelling or tender areas of the foot    Fever of 100.4 F (38 C) or greater  Date Last Reviewed: 5/25/2016 2000-2018 [employer] [address] [place] [address], [place] [address]. All rights reserved. This information is not intended as a substitute for professional medical care. Always follow your healthcare professional's  instructions.

## 2018-11-19 NOTE — TELEPHONE ENCOUNTER
Fawad's big toe on right foot  is inflamed and red and is swollen.  Open sore on toe and is bleeding.  Fawad is diabetic.    Fawad feels he needs an antibiotic and was told Podiatry was booked up for over one month.

## 2018-11-19 NOTE — TELEPHONE ENCOUNTER
Reason for Disposition    Looks like a boil, infected sore, or deep ulcer    Additional Information    Negative: Foot is cool or blue in comparison to other foot    Negative: Purple or black skin on toe  (Exception: simple recalled injury with bruise)    Negative: [1] Looks infected (e.g., spreading redness, red streak, pus) AND [2] fever    Negative: Patient sounds very sick or weak to the triager    Negative: [1] SEVERE pain (e.g., excruciating, unable to do any normal activities) AND [2] not improved after 2 hours of pain medicine    Negative: [1] Redness spreading into foot or red streak into foot AND [2] no fever    Protocols used: TOE PAIN-ADULT-AH

## 2018-11-19 NOTE — MR AVS SNAPSHOT
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  Follow-up with PCP on Friday to recheck infection after starting antibiotics.    Referral to Wound care to make recommendations.    Prescription written for antibiotics to be taken four times daily for 10 days.  Advised to take entire course of antibiotic despite improvement in symptoms.    Keep wound clean and dry, cover and pad area to avoid further damage.      Red Flags discussed - will Return to clinic sooner if he develops worsening symptoms, redness, pain or new fever, chills.    Labs today.    PARMJIT Villanueva            Diabetic Foot Care  Diabetes can lead to a number of foot complications. Fortunately, you can prevent most of these with a little extra foot care. If diabetes is not well controlled, it can cause damage to blood vessels and result in poor circulation to the foot. When the skin does not get enough blood flow, it becomes prone to pressure sores and ulcers, which heal slowly.  Diabetes can also damage nerves, interfering with the ability to feel pain and pressure. When you can t feel your foot normally, it is easy to injure your skin, bones, and joints without knowing it. For these reasons diabetes increases the risk of fungal infections,  bunions, and ulcers. An ulcer is a sore or break in the skin. With ulcers, often the skin seems to have worn away. Deep ulcers can lead to bone infection.  Gangrene is the most serious foot complication of diabetes. It usually occurs on the tips of the toes as blackened areas of skin. The black area is dead tissue. In severe cases, gangrene spreads to involve the entire toe, other toes, and the entire foot. Foot or toe amputation may be required. Good foot care and blood sugar control can prevent this.  Home care    Wear comfortable, well-fitting shoes.    Wash your feet daily with warm water and mild soap.    After drying, apply a moisturizing cream or lotion to the top and bottom of your feet. Don't put lotion between toes.    Check your feet daily for skin breaks, blisters, swelling, or redness. Look between your toes as well. If you cannot see the bottoms of your feet, ask someone to look or use a mirror.    Wear cotton socks and change them every day.    Trim toenails carefully, and do not cut your cuticles.    Strive to keep your blood sugar under control with a combination of medicines, diet, and activity.    If you smoke and have diabetes, it is very important that you stop. Smoking reduces blood flow to your foot.    Schedule foot exams at least every year, or more often if you have foot problems.    Put your feet up when sitting, wiggle toes, and move ankles to help improve blood flow.  Avoid activities that increase your risk of foot injury:    Do not walk barefoot.    Do not use heating pads or hot water bottles on your feet.    Do not put your foot in a hot tub without first checking the temperature with your hand.  Follow-up care  Follow up with your healthcare provider, or as advised. Be sure to take off your shoes and socks before your appointment starts so your healthcare provider will be sure to check your feet. Report any cut, puncture, scrape, blister, or other injury to your foot. Also report if  you have a bunion, hammertoes, ingrown toenail, or ulcer on your foot.  When to seek medical advice  Call your healthcare provider right away if any of these occur:    Black skin color anywhere on the foot    Open ulcer with pus draining from the wound    Increasing foot or leg pain    New areas of redness or swelling or tender areas of the foot    Fever of 100.4 F (38 C) or greater  Date Last Reviewed: 5/25/2016 2000-2018 [employer]. [address] [place] [address]. All rights reserved. This information is not intended as a substitute for professional medical care. Always follow your healthcare professional's instructions.                Follow-ups after your visit        Additional Services     WOUND CARE REFERRAL       Your provider has referred you to: [place]: [facility] (Wound/Enterostomal Therapy Nurse) [phone]   [email or web address]   To schedule an appointment, please contact the Wound Care/Enterostomal Therapy Nurse at [phone] at [facility].      Reason for referral: Wound care      1. [facility] Wound Consult appointment is related to what kind of wound: Diabetic foot ulcer    2. Location of wound: Lower extremity    3. Reason for referral: Assess and treat as indicated    4. Desired treatment if any: Per [facility] nurse     Please be aware that coverage of these services is subject to the terms and limitations of your health insurance plan.  Call member services at your health plan with any benefit or coverage questions.      Please bring the following with you to your appointment:    (1) Any X-Rays, CTs or MRIs which have been performed.  Contact the facility where they were done to arrange for  prior to your scheduled appointment.    (2) List of current medications   (3) This referral request   (4) Any documents/labs given to you for this referral                  Your next 10 appointments  already scheduled     Nov 23, 2018  9:40 AM CST   [facility] Chris Scott MD   [facility]    [address] [address] [phone]            Mar 18, 2019  1:00 PM CDT   Return Visit with Kieran Hua MD   [facility] ([facility])    [address], [address]  [facility] [address]   [phone]              Who to contact     If you have questions or need follow up information about today's clinic visit or your schedule please contact [facility] directly at [phone].  Normal or non-critical lab and imaging results will be communicated to you by [employer], letter or phone within 4 business days after the clinic has received the results. If you do not hear from us within 7 days, please contact the clinic through [employer] or phone. If you have a critical or abnormal lab result, we will notify you by phone as soon as possible.  Submit refill requests through [employer] or call your pharmacy and they will forward the refill request to us. Please allow 3 business days for your refill to be completed.          Additional Information About Your Visit        [employer] Information     [employer] gives you secure access to your electronic health record. If you see a primary care provider, you can also send messages to your care team and make appointments. If you have questions, please call your primary care clinic.  If you do not have a primary care provider, please call [phone] and they will assist you.        Care EveryWhere ID     This is your Care EveryWhere ID. This could be used by other organizations to access your [place] medical records  [id number]        Your Vitals Were     Pulse Temperature Respirations Pulse Oximetry BMI (Body Mass Index)       71 98.1  F (36.7  C) (Tympanic) 16 98% 31.46 kg/m2        Blood Pressure from Last 3 Encounters:   11/19/18 134/68   10/24/18 164/89   10/08/18 126/82     Weight from Last 3 Encounters:   11/19/18 232 lb (105.2 kg)   10/08/18 233 lb (105.7 kg)   09/24/18 233 lb (105.7 kg)              We Performed the Following     CBC with platelets differential     CRP inflammation     WOUND CARE REFERRAL          Today's Medication Changes          These changes are accurate as of 11/19/18  8:52 AM.  If you have any questions, ask your nurse or doctor.               Start taking these medicines.        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 [address] [address]     Phone:  [phone]     cephALEXin 500 MG capsule                Primary Care Provider Office Phone # Fax #    Chris Scott [phone] [address] [facility] [address]        Equal Access to Services     JACKY DELACRUZ AH: Pedrito perezo Sojannet, waaxda luqadaha, qaybta kaalmada adeegyada, bradford whipple. So [facility] [phone].    ATENCIÓN: Si habla español, tiene a aaron disposición servicios gratuitos de asistencia lingüística. Bailey al [phone].    We comply with applicable federal civil rights laws and [place] laws. We do not discriminate on the basis of race, color, national origin, age, disability, sex, sexual orientation, or gender identity.            Thank you!     Thank you for choosing [facility]  for your care. Our goal is always to provide you with excellent care. Hearing back from our patients is one way we can continue to improve our services. Please take a  few minutes to complete the written survey that you may receive in the mail after your visit with us. Thank you!             Your Updated Medication List - Protect others around you: Learn how to safely use, store and throw away your medicines at www.disposemymeds.org.          This list is accurate as of 11/19/18  8:52 AM.  Always use your most recent med list.                   Brand Name Dispense Instructions for use Diagnosis    amLODIPine 5 MG tablet    NORVASC    90 tablet    Take 1 tablet (5 mg) by mouth daily Takes prn for high blood pressure    Hypertension, goal below 140/90       * ASPIRIN NOT PRESCRIBED    INTENTIONAL     by Other route continuous prn Reported on 4/19/2017        blood glucose monitoring test strip    no brand specified    100 each    1 strip by In Vitro route daily One touch Ultra.    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    Type 2 diabetes mellitus without complication, with long-term current use of insulin (H)       * LANTUS SOLOSTAR 100 UNIT/ML injection   Generic drug:  insulin glargine           * insulin glargine 100 UNIT/ML injection    LANTUS VIAL    3 Month    30 units at bedtime    Type 2 diabetes mellitus without complication, with long-term current use of insulin (H)       * insulin glargine 100 UNIT/ML injection    LANTUS SOLOSTAR    9 mL    Inject 30 Units Subcutaneous At Bedtime    Type  2 diabetes mellitus without complication, with long-term current use of insulin (H)       lisinopril 40 MG tablet    PRINIVIL/ZESTRIL    90 tablet    Take 1 tablet (40 mg) by mouth daily    Essential hypertension, benign       metFORMIN 500 MG tablet    GLUCOPHAGE    360 tablet    2 tabs with lunch and 2 tabs with dinner.    Type 2 diabetes mellitus without complication, with long-term current use of insulin (H)       omeprazole 20 MG CR capsule    priLOSEC    90 capsule    Take 1 capsule (20 mg) by mouth daily    Gastroesophageal reflux disease without esophagitis       ONETOUCH LANCETS Misc     100 each    1 Device daily One touch delica lancets, what ever is covered by insurance.    Type 2 diabetes mellitus with diabetic peripheral angiopathy without gangrene (H)       Fwd: Power ULTRA SYSTEM KIT w/Device Kit           order for DME     1 Device    Equipment being ordered:  Glucometer    Type 2 diabetes, HbA1c goal < 7% (H)       PRESERVISION AREDS PO      Take 1 tablet by mouth        * STATIN NOT PRESCRIBED (INTENTIONAL)      by Other route continuous prn Reported on 4/19/2017        triamcinolone 0.1 % cream    KENALOG          VITAMIN D PO      Take 4,000 Int'l Units/day by mouth daily        * Notice:  This list has 5 medication(s) that are the same as other medications prescribed for you. Read the directions carefully, and ask your doctor or other care provider to review them with you.

## 2018-11-20 ENCOUNTER — TELEPHONE (OUTPATIENT)
Dept: PODIATRY | Facility: CLINIC | Age: 83
End: 2018-11-20

## 2018-11-20 NOTE — TELEPHONE ENCOUNTER
Discussed case with Dr Montesinos, would recommend patient follow up at [facility] tomorrow for reassessment.      Spoke to spouse discussed recommendation for follow up appointment.  Patient scheduled for work in appointment at [facility] tomorrow with Dr Montesinos.    Wayne Wolfe ATC

## 2018-11-20 NOTE — TELEPHONE ENCOUNTER
Reason for Call:  Other     Detailed comments: Pt states he needs to be seen - Pt was seen yesterday and started on antibiotics (Cephalexin). Rt big toe is swollen, red - there is yellowish-white dead looking skin at the tip of the toe. The bottom of the toe is open, some drainage. A red ring underneath the tip of the toe. (Information taken from foot care nurse - Madeline)- The whole rt foot is swollen. Bright redness around the nail area, the whole inside of the foot is red. Symptoms have been going on for a few days.      Left foot is odorous and also has an open area.     Pt states both feet are painful. Nurse feels patient needs to be seen.     Phone Number Patient can be reached at: [phone]    Best Time: Any    Can we leave a detailed message on this number? YES    Call taken on 11/20/2018 at 11:30 AM by Denise Behrendt

## 2018-11-21 ENCOUNTER — OFFICE VISIT (OUTPATIENT)
Dept: PODIATRY | Facility: CLINIC | Age: 83
End: 2018-11-21
Payer: COMMERCIAL

## 2018-11-21 ENCOUNTER — RADIANT APPOINTMENT (OUTPATIENT)
Dept: GENERAL RADIOLOGY | Facility: CLINIC | Age: 83
End: 2018-11-21
Attending: PODIATRIST
Payer: COMMERCIAL

## 2018-11-21 VITALS — BODY MASS INDEX: 31.42 KG/M2 | RESPIRATION RATE: 16 BRPM | HEIGHT: 72 IN | WEIGHT: 232 LBS

## 2018-11-21 DIAGNOSIS — E08.621 DIABETIC ULCER OF LEFT MIDFOOT ASSOCIATED WITH DIABETES MELLITUS DUE TO UNDERLYING CONDITION, LIMITED TO BREAKDOWN OF SKIN (H): ICD-10-CM

## 2018-11-21 DIAGNOSIS — L97.421 DIABETIC ULCER OF LEFT MIDFOOT ASSOCIATED WITH DIABETES MELLITUS DUE TO UNDERLYING CONDITION, LIMITED TO BREAKDOWN OF SKIN (H): ICD-10-CM

## 2018-11-21 DIAGNOSIS — L97.512 TOE ULCER, RIGHT, WITH FAT LAYER EXPOSED (H): ICD-10-CM

## 2018-11-21 DIAGNOSIS — R80.9 TYPE 2 DIABETES MELLITUS WITH MICROALBUMINURIA, WITH LONG-TERM CURRENT USE OF INSULIN (H): Primary | ICD-10-CM

## 2018-11-21 DIAGNOSIS — E11.29 TYPE 2 DIABETES MELLITUS WITH MICROALBUMINURIA, WITH LONG-TERM CURRENT USE OF INSULIN (H): Primary | ICD-10-CM

## 2018-11-21 DIAGNOSIS — Z79.4 TYPE 2 DIABETES MELLITUS WITH MICROALBUMINURIA, WITH LONG-TERM CURRENT USE OF INSULIN (H): Primary | ICD-10-CM

## 2018-11-21 DIAGNOSIS — Z78.9 NONPALPABLE PULSE: ICD-10-CM

## 2018-11-21 PROCEDURE — 99213 OFFICE O/P EST LOW 20 MIN: CPT | Performed by: PODIATRIST

## 2018-11-21 PROCEDURE — 73660 X-RAY EXAM OF TOE(S): CPT | Mod: RT

## 2018-11-21 NOTE — PROGRESS NOTES
Fawad returns to the office for reevaluation of the right and left foot.  The patient relates following the instructions given at the last visit with noted more pain.  The patient relates overall less  improvement in pain and function of the right and left foot.  The patient relates no other problems.    PAST MEDICAL HISTORY:   Past Medical History:   Diagnosis Date     Diabetes mellitus (H)      Hypertension        BMI= Body mass index is 31.46 kg/(m^2).    Weight management plan: Patient was referred to their PCP to discuss a diet and exercise plan.    Physical Exam:    General: The patient appears to have a pleasant mental affect.    Lower extremity physical exam:  Neurovascular status reveal non palpable pulses and loss of epicritic sensations.  Muscular exam is within normal limits to major muscle groups.  Integument is intact.      One notes decreased edema.  One notes full thickness ulcer on the plantar aspect of the right great toe.  Negative probe to bone.  Noted full thickness ulcer on the lateral aspect of the right foot limited to breakdown in the skin.    Radiograph evaluation including weightbearing AP, lateral and medial oblique views of the right great toe reveals no periosteal reaction or erosion of the underlying phalanges.      Assessment:      ICD-10-CM    1. Type 2 diabetes mellitus with microalbuminuria, with long-term current use of insulin (H) E11.29     R80.9     Z79.4    2. 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 Toe ulcer, right, with fat layer exposed (H) L97.512 XR Toe Right G/E 2 Views    right great toe   4. Nonpalpable pulse Z78.9     PT/DP bilateral lower extremity       Plan:  I have explained to Fawad about the conditions.  At this time, the patient was ordered an RENATE along with a vascular referral for further evaluation and treatment of possible PVD.       Disclaimer: This note consists of symbols derived from keyboarding, dictation and/or voice recognition software. As a result, there may be errors in the script that have gone undetected. Please consider this when interpreting information found in this chart.       LIANG Montesinos D.P.M., [email or web address].

## 2018-11-21 NOTE — MR AVS SNAPSHOT
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 Skin is our best protection against infection. Skin is quite durable, however, the underlying tissues are fragile. For this reason, the wound is likely to deepen rapidly. Deep wounds usually get infected and require amputation. Prompt healing is therefore essential to avoid limb loss.     Foot ulcers do not heal without intervention. Walking on the foot and living your normal life is not typically compatible with healing the sore. Successful healing will require several months of significant alteration of your daily activities.   Ulcer complications frequently develop. This primarily includes infection of skin, which then spreads deep into your joints, bones and tendons. Spreading infection may travel up your leg and into other parts of your body. Deep infection is usually treated with amputation ofpart ofyour foot or your leg. Signs of infection include fever, chills, nausea, vomiting, erratic blood sugars, local redness, pus, strong odor and localized warmth. Signs of infection should be taken seriously. Prompt evaluation in the clinic or hospital emergency room is required.   Ulcer treatment requires debridement or surgical removal of devitalized tissue. Your doctor will trim away  callused, moistened, unhealthy tissue from the wound surface and margin. This helps to clean the wound and allows proper inspection. Debridement also stimulates healing even though the wound originally appears larger. Expect some bleeding with each debridement. You will be given instruction regarding wound bandaging. This often includes ointment and gauze. Avoid tape directly on the skin. Hand washing is essential since most infections will come from your fingertips. Ulcer care requires a no touch technique. Your fingers should not touch the margin or base of the wound.    HELPFUL HEALING TIPS:  1. Debridement: Getting rid of bad tissue makes way for good tissue to promote healing  2. Addressing Foot Deformities: Hammertoes and bunions can cause increased pressure  3. Pressure Reduction: If pressure remains to the wound, it won't heal  4. Good Pulses: If bloodflow is not getting to the foot, the ulcer will not heal  5. Good Nutrition: If you are not getting proper nutrition your body can't heal.Protein!  6. Infection Control: Keep the ulcer clean with wound cleanser. DO NOT SOAK IT!  7. Moisture Control: Keep edema down and make sure that drainage is getting pulled away from the ulcer    IMPORTANCE OF DEBRIDEMENT    Reduces bioburden to help control or reduce infection. Even if an ulcer is not  infected,  the bacterial bioburden causes increased local inflammation.    Allows more accurate visualization of the wound base and edges, which allows for more precise staging.    Removes necrotic/non-viable tissue, which impedes wound healing, causes protein loss and can be a nidus for infection.    Stimulates new circulation (angiogenesis) and allows adequate oxygen delivery to the wound.    Removes undermining and tunneling, and may help reduce abscess formation.    Releases healing growth factors at the edge of the wound.    Prepares the wound bed by leaving only tissues that are capable of regenerating.               Follow-ups after your visit        Your next 10 appointments already scheduled     Nov 23, 2018  9:40 AM CST   [facility] with [facility] MD   [facility]    [address] [address]   [phone]            Nov 27, 2018 11:00 AM CST   Office Visit with Et Nurse - [facility] ([facility])    [address] [address]   [phone]           Bring a current list of meds and any records pertaining to this visit. For Physicals, please bring immunization records and any forms needing to be filled out. Please arrive 10 minutes early to complete paperwork.            Mar 18, 2019  1:00 PM CDT   Return Visit with Kieran Hua MD   [facility])    [address], [address]  [place] [address]   [phone]              Future tests that were ordered for you today     Open Future Orders        Priority Expected Expires Ordered    US Low Ext Arterial Doppler Seg Pres w Exer Sukhi Routine  11/21/2019 11/21/2018            Who to contact     If you have questions or need follow up information about today's clinic visit or your schedule please contact [facility] directly at [phone].  Normal or non-critical lab and imaging results will be communicated to you by MyChart, letter or phone within 4 business days after the clinic has received the results. If you do not hear from us within 7 days, please contact the clinic through MyChart or phone. If you have a critical or abnormal lab result, we will notify you by phone as soon as possible.  Submit refill requests through [employer] or call your pharmacy and they will forward the refill request to us. Please allow 3 business days for your refill to be completed.          Additional Information About Your Visit        [employer] Information     [employer] gives you secure access to your electronic  health record. If you see a primary care provider, you can also send messages to your care team and make appointments. If you have questions, please call your primary care clinic.  If you do not have a primary care provider, please call [phone] and they will assist you.        Care EveryWhere ID     This is your Care EveryWhere ID. This could be used by other organizations to access your [facility] medical records  [id number]        Your Vitals Were     Respirations Height BMI (Body Mass Index)             16 6' (1.829 m) 31.46 kg/m2          Blood Pressure from Last 3 Encounters:   11/19/18 134/68   10/24/18 164/89   10/08/18 126/82    Weight from Last 3 Encounters:   11/21/18 232 lb (105.2 kg)   11/19/18 232 lb (105.2 kg)   10/08/18 233 lb (105.7 kg)              We Performed the Following     XR Toe Right G/E 2 Views        Primary Care Provider Office Phone # Fax #    Chris Scott [phone] [address] [address]        Equal Access to Services     MADHAVI DELACRUZ : Hadii aad ku hadasho Sobetoali, waaxda luqadaha, qaybta kaalmada xavier, bradford dorado . So [facility] [phone].    ATENCIÓN: Si habla español, tiene a aaron disposición servicios gratuitos de asistencia lingüística. [facility] [phone].    We comply with applicable federal civil rights laws and [place] laws. We do not discriminate on the basis of race, color, national origin, age, disability, sex, sexual orientation, or gender identity.            Thank you!     Thank you for choosing [facility]  for your care. Our goal is always to provide you with excellent care. Hearing back from our patients is one way we can continue to improve our services. Please take a few minutes to complete the written survey that you may receive in the mail after your visit with us. Thank you!             Your Updated Medication List - Protect others around you: Learn how to  safely use, store and throw away your medicines at www.disposemymeds.org.          This list is accurate as of 11/21/18  2:34 PM.  Always use your most recent med list.                   Brand Name Dispense Instructions for use Diagnosis    amLODIPine 5 MG tablet    NORVASC    90 tablet    Take 1 tablet (5 mg) by mouth daily Takes prn for high blood pressure    Hypertension, goal below 140/90       * ASPIRIN NOT PRESCRIBED    INTENTIONAL     by Other route continuous prn Reported on 4/19/2017        blood glucose monitoring test strip    no brand specified    100 each    1 strip by In Vitro route daily One touch Ultra.    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    Type 2 diabetes mellitus without complication, with long-term current use of insulin (H)       * LANTUS SOLOSTAR 100 UNIT/ML injection   Generic drug:  insulin glargine           * insulin glargine 100 UNIT/ML injection    LANTUS VIAL    3 Month    30 units at bedtime    Type 2 diabetes mellitus without complication, with long-term current use of insulin (H)       * insulin glargine 100 UNIT/ML injection    LANTUS SOLOSTAR    9 mL    Inject 30 Units Subcutaneous At Bedtime    Type 2 diabetes mellitus without complication, with long-term current use of insulin (H)       lisinopril 40 MG tablet    PRINIVIL/ZESTRIL    90 tablet    Take 1 tablet (40 mg) by mouth daily     Essential hypertension, benign       metFORMIN 500 MG tablet    GLUCOPHAGE    360 tablet    2 tabs with lunch and 2 tabs with dinner.    Type 2 diabetes mellitus without complication, with long-term current use of insulin (H)       omeprazole 20 MG CR capsule    priLOSEC    90 capsule    Take 1 capsule (20 mg) by mouth daily    Gastroesophageal reflux disease without esophagitis       ONETOUCH LANCETS Misc     100 each    1 Device daily One touch delica lancets, what ever is covered by insurance.    Type 2 diabetes mellitus with diabetic peripheral angiopathy without gangrene (H)       PalkionTOUCH ULTRA SYSTEM KIT w/Device Kit           order for DME     1 Device    Equipment being ordered:  Glucometer    Type 2 diabetes, HbA1c goal < 7% (H)       PRESERVISION AREDS PO      Take 1 tablet by mouth        * STATIN NOT PRESCRIBED (INTENTIONAL)      by Other route continuous prn Reported on 4/19/2017        triamcinolone 0.1 % cream    KENALOG          VITAMIN D PO      Take 4,000 Int'l Units/day by mouth daily        * Notice:  This list has 5 medication(s) that are the same as other medications prescribed for you. Read the directions carefully, and ask your doctor or other care provider to review them with you.

## 2018-11-21 NOTE — NURSING NOTE
Chief Complaint   Patient presents with     RECHECK     infection in right great toe       Initial Resp 16  Ht 6' (1.829 m)  Wt 232 lb (105.2 kg)  BMI 31.46 kg/m2 Estimated body mass index is 31.46 kg/(m^2) as calculated from the following:    Height as of this encounter: 6' (1.829 m).    Weight as of this encounter: 232 lb (105.2 kg).  Medications and allergies reviewed.    Basilio GUILLERMO, CMA

## 2018-11-21 NOTE — LETTER
11/21/2018         RE: Fawad Garcia  [address] [facility] [address]        Dear Colleague,    Thank you for referring your patient, Fawad Garcia, to the [facility]. Please see a copy of my visit note below.    Fawad returns to the office for reevaluation of the right and left foot.  The patient relates following the instructions given at the last visit with noted more pain.  The patient relates overall less  improvement in pain and function of the right and left foot.  The patient relates no other problems.    PAST MEDICAL HISTORY:   Past Medical History:   Diagnosis Date     Diabetes mellitus (H)      Hypertension        BMI= Body mass index is 31.46 kg/(m^2).    Weight management plan: Patient was referred to their PCP to discuss a diet and exercise plan.    Physical Exam:    General: The patient appears to have a pleasant mental affect.    Lower extremity physical exam:  Neurovascular status reveal non palpable pulses and loss of epicritic sensations.  Muscular exam is within normal limits to major muscle groups.  Integument is intact.      One notes decreased edema.  One notes full thickness ulcer on the plantar aspect of the right great toe.  Negative probe to bone.  Noted full thickness ulcer on the lateral aspect of the right foot limited to breakdown in the skin.    Radiograph evaluation including weightbearing AP, lateral and medial oblique views of the right great toe reveals no periosteal reaction or erosion of the underlying phalanges.      Assessment:      ICD-10-CM    1. Type 2 diabetes mellitus with microalbuminuria, with long-term current use of insulin (H) E11.29     R80.9     Z79.4    2. 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 Toe ulcer, right, with fat layer exposed (H) L97.512 XR Toe Right G/E 2 Views    right great toe   4. Nonpalpable  pulse Z78.9     PT/DP bilateral lower extremity       Plan:  I have explained to Fawad about the conditions.  At this time, the patient was ordered an RENATE along with a vascular referral for further evaluation and treatment of possible PVD.      Disclaimer: This note consists of symbols derived from keyboarding, dictation and/or voice recognition software. As a result, there may be errors in the script that have gone undetected. Please consider this when interpreting information found in this chart.       MIMA Vela.P.M., F.A.C.F.A.S.      Again, thank you for allowing me to participate in the care of your patient.        Sincerely,        Ethan Montesinos DPM

## 2018-11-27 ENCOUNTER — HOSPITAL ENCOUNTER (OUTPATIENT)
Dept: ULTRASOUND IMAGING | Facility: CLINIC | Age: 83
End: 2018-11-27
Attending: PODIATRIST
Payer: COMMERCIAL

## 2018-11-27 ENCOUNTER — HOSPITAL ENCOUNTER (OUTPATIENT)
Dept: WOUND CARE | Facility: CLINIC | Age: 83
Discharge: HOME | End: 2018-11-27
Attending: SURGERY | Admitting: SURGERY
Payer: COMMERCIAL

## 2018-11-27 DIAGNOSIS — E11.29 TYPE 2 DIABETES MELLITUS WITH MICROALBUMINURIA, WITH LONG-TERM CURRENT USE OF INSULIN (H): ICD-10-CM

## 2018-11-27 DIAGNOSIS — E08.621 DIABETIC ULCER OF LEFT MIDFOOT ASSOCIATED WITH DIABETES MELLITUS DUE TO UNDERLYING CONDITION, LIMITED TO BREAKDOWN OF SKIN (H): ICD-10-CM

## 2018-11-27 DIAGNOSIS — Z79.4 TYPE 2 DIABETES MELLITUS WITH MICROALBUMINURIA, WITH LONG-TERM CURRENT USE OF INSULIN (H): ICD-10-CM

## 2018-11-27 DIAGNOSIS — Z78.9 NONPALPABLE PULSE: ICD-10-CM

## 2018-11-27 DIAGNOSIS — R80.9 TYPE 2 DIABETES MELLITUS WITH MICROALBUMINURIA, WITH LONG-TERM CURRENT USE OF INSULIN (H): ICD-10-CM

## 2018-11-27 DIAGNOSIS — L97.421 DIABETIC ULCER OF LEFT MIDFOOT ASSOCIATED WITH DIABETES MELLITUS DUE TO UNDERLYING CONDITION, LIMITED TO BREAKDOWN OF SKIN (H): ICD-10-CM

## 2018-11-27 DIAGNOSIS — L97.512 TOE ULCER, RIGHT, WITH FAT LAYER EXPOSED (H): ICD-10-CM

## 2018-11-27 PROCEDURE — G0463 HOSPITAL OUTPT CLINIC VISIT: HCPCS | Mod: 25

## 2018-11-27 PROCEDURE — 93924 LWR XTR VASC STDY BILAT: CPT

## 2018-11-27 NOTE — IP AVS SNAPSHOT
MRN:[id number]                      After Visit Summary   11/27/2018    Fawad Garcia    MRN: [id number]           Visit Information        Provider Department      11/27/2018 11:00 AM Gaurav Mandujano Nurse - [facility]           Review of your medicines      UNREVIEWED medicines. 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)        Dose:  500 mg   Take 1 capsule (500 mg) by mouth 4 times daily   Quantity:  40 capsule   Refills:  0       dorzolamide-timolol 2-0.5 % ophthalmic solution   Commonly known as:  COSOPT        Refills:  0       hydrochlorothiazide 25 MG tablet   Commonly known as:  HYDRODIURIL   Used for:  Hypertension, goal below 140/90        Dose:  25 mg   Take 1 tablet (25 mg) by mouth daily   Quantity:  90 tablet   Refills:  3       * LANTUS SOLOSTAR 100 UNIT/ML pen   Generic drug:  insulin glargine        Refills:  0       * insulin glargine 100 UNIT/ML vial   Commonly known as:  LANTUS VIAL   Used for:  Type 2 diabetes mellitus without complication, with long-term current use of insulin (H)        30 units at bedtime   Quantity:  3 Month   Refills:  3       * insulin glargine 100 UNIT/ML pen   Commonly known as:  LANTUS SOLOSTAR   Used for:  Type 2 diabetes mellitus without complication, with long-term current use of insulin (H)        Dose:  30 Units   Inject 30 Units Subcutaneous At Bedtime   Quantity:  9  mL   Refills:  11       lisinopril 40 MG tablet   Commonly known as:  PRINIVIL/ZESTRIL   Used for:  Essential hypertension, benign        Dose:  40 mg   Take 1 tablet (40 mg) by mouth daily   Quantity:  90 tablet   Refills:  3       metFORMIN 500 MG tablet   Commonly known as:  GLUCOPHAGE   Used for:  Type 2 diabetes mellitus without complication, with long-term current use of insulin (H)        2 tabs with lunch and 2 tabs with dinner.   Quantity:  360 tablet   Refills:  3       omeprazole 20 MG DR capsule   Commonly known as:  priLOSEC   Used for:  Gastroesophageal reflux disease without esophagitis        Dose:  20 mg   Take 1 capsule (20 mg) by mouth daily   Quantity:  90 capsule   Refills:  3       PRESERVISION AREDS PO        Dose:  1 tablet   Take 1 tablet by mouth   Refills:  0       * STATIN NOT PRESCRIBED   Commonly known as:  INTENTIONAL        by Other route continuous prn Reported on 4/19/2017   Refills:  0       triamcinolone 0.1 % cream   Commonly known as:  KENALOG        Refills:  0       VITAMIN D PO        Dose:  4000 Int'l Units/day   Take 4,000 Int'l Units/day by mouth daily   Refills:  0       * Notice:  This list has 5 medication(s) that are the same as other medications prescribed for you. Read the directions carefully, and ask your doctor or other care provider to review them with you.      CONTINUE these medicines which have NOT CHANGED        Dose / Directions    blood glucose monitoring test strip   Commonly known as:  NO BRAND SPECIFIED   Used for:  Type 2 diabetes mellitus without complication, with long-term current use of insulin (H)        Dose:  1 strip   1 strip by In Vitro route daily One touch Ultra.   Quantity:  100 each   Refills:  3       insulin pen needle 31G X 8 MM miscellaneous   Commonly known as:  B-D U/F   Used for:  Type 2 diabetes mellitus without complication, with long-term current use of insulin (H)        Dose:  1 Device   1 Device daily Use once daily or as  directed.   Quantity:  100 each   Refills:  3       ONETOUCH LANCETS Misc   Used for:  Type 2 diabetes mellitus with diabetic peripheral angiopathy without gangrene (H)        Dose:  1 Device   1 Device daily One touch delica lancets, what ever is covered by insurance.   Quantity:  100 each   Refills:  5       ONETOUCH ULTRA SYSTEM KIT w/Device Kit        Refills:  0       order for DME   Used for:  Type 2 diabetes, HbA1c goal < 7% (H)        Equipment being ordered:  Glucometer   Quantity:  1 Device   Refills:  1                Protect others around you: Learn how to safely use, store and throw away your medicines at www.disposemymeds.org.         Follow-ups after your visit        Your next 10 appointments already scheduled     Nov 27, 2018  3:30 PM CST   US LOWER EXTREMITY ARTERIAL DOPPLER SEG PRESSURES W EXER BILAT with [email or web address]   [facility] ([facility])    [address] [address]   [phone]           How do I prepare for my exam? (Food and drink instructions) No Food and Drink Restrictions.  How do I prepare for my exam? (Other instructions) You do not need to do anything special to prepare for your exam.  What should I wear: Wear comfortable clothes.  How long does the exam take: Most ultrasounds take 30 to 60 minutes.  What should I bring: Bring a list of your medicines, including vitamins, minerals and over-the-counter drugs. It is safest to leave personal items at home.  Do I need a :  No  is needed.  What do I need to tell my doctor: Tell your doctor about any allergies you may have.  What should I do after the exam: No restrictions, You may resume normal activities.  What is this test: An ultrasound uses sound waves to make pictures of the body. Sound waves do not cause pain. The only discomfort may be the pressure of the wand against your skin or full bladder.  Who should I call with questions: If you have any questions, please call the  Imaging Department where you will have your exam. Directions, parking instructions, and other information is available on our website, [email or web address].            Dec 11, 2018 11:00 AM CST   Office Visit with Et Nurse - [facility] ([facility])    [address] [address] [phone]           Bring a current list of meds and any records pertaining to this visit. For Physicals, please bring immunization records and any forms needing to be filled out. Please arrive 10 minutes early to complete paperwork.            Mar 18, 2019  1:00 PM CDT   Return Visit with Kieran Hua MD   [facility]    [address], [address]  [place] [address]   [phone]               Care Instructions        Further instructions from your care team       Bandage wounds daily as follows:  1.) Clean with wound cleanser, dry  2.) Apply Iodosorb gel to wound   3.) Then cover with gauze, secure with minimal medipore tape      Use the wound shoe when you are up on your feet.  Try to limit your time on your feet as much as possible      Obtain a U shaped cushion to apply over your wound bandage on your left foot, wound should be centered in the hole to take pressure off this wound.          Signs and symptoms of infection:  Green drainage  Foul odor  Increased pain to wound  Redness or swelling at the site of the wound  Fever    Follow up with your provider ASAP if you notice any of these signs or symptoms.    Kamille Calzada RN, [facility] [phone]           Additional Information About Your Visit        [employer] Information     [employer] gives you secure access to your electronic health record. If you see a primary care provider, you can also send messages to your care team and make appointments. If you have questions, please call your primary care clinic.  If you do not have a primary care provider, please call [phone] and  they will assist you.        Care EveryWhere ID     This is your Care EveryWhere ID. This could be used by other organizations to access your [place] medical records  [id number]         Primary Care Provider Office Phone # Fax #    Chris Scott [phone]      Equal Access to Services     JACKY DELACRUZ : Hadii aad ku hadasho Soomaali, waaxda luqadaha, qaybta kaalmada adeegyada, bradford knight izzyn rudy juan ramonmaryellen whipple. So [facility] [phone].    ATENCIÓN: Si habla español, tiene a aaron disposición servicios gratuitos de asistencia lingüística. [facility] [phone].    We comply with applicable federal civil rights laws and [place] laws. We do not discriminate on the basis of race, color, national origin, age, disability, sex, sexual orientation, or gender identity.            Thank you!     Thank you for choosing [facility] for your care. Our goal is always to provide you with excellent care. Hearing back from our patients is one way we can continue to improve our services. Please take a few minutes to complete the written survey that you may receive in the mail after you visit with us. Thank you!             Medication List: This is a list of all your medications and when to take them. Check marks below indicate your daily home schedule. Keep this list as a reference.      Medications           Morning Afternoon Evening Bedtime As Needed    amLODIPine 5 MG tablet   Commonly known as:  NORVASC   Take 1 tablet (5 mg) by mouth daily Takes prn for high blood pressure                                * ASPIRIN NOT PRESCRIBED   Commonly known as:  INTENTIONAL   by Other route continuous prn Reported on 4/19/2017                                blood glucose monitoring test strip   Commonly known as:  NO BRAND SPECIFIED   1 strip by In Vitro route daily One touch Ultra.                                cephALEXin 500 MG capsule   Commonly known as:  KEFLEX   Take 1 capsule (500 mg) by mouth 4 times  daily                                dorzolamide-timolol 2-0.5 % ophthalmic solution   Commonly known as:  COSOPT                                hydrochlorothiazide 25 MG tablet   Commonly known as:  HYDRODIURIL   Take 1 tablet (25 mg) by mouth daily                                insulin pen needle 31G X 8 MM miscellaneous   Commonly known as:  B-D U/F   1 Device daily Use once daily or as directed.                                * LANTUS SOLOSTAR 100 UNIT/ML pen   Generic drug:  insulin glargine                                * insulin glargine 100 UNIT/ML vial   Commonly known as:  LANTUS VIAL   30 units at bedtime                                * insulin glargine 100 UNIT/ML pen   Commonly known as:  LANTUS SOLOSTAR   Inject 30 Units Subcutaneous At Bedtime                                lisinopril 40 MG tablet   Commonly known as:  PRINIVIL/ZESTRIL   Take 1 tablet (40 mg) by mouth daily                                metFORMIN 500 MG tablet   Commonly known as:  GLUCOPHAGE   2 tabs with lunch and 2 tabs with dinner.                                omeprazole 20 MG DR capsule   Commonly known as:  priLOSEC   Take 1 capsule (20 mg) by mouth daily                                ONETOUCH LANCETS Misc   1 Device daily One touch delica lancets, what ever is covered by insurance.                                ONETOUCH ULTRA SYSTEM KIT w/Device Kit                                order for DME   Equipment being ordered:  Glucometer                                PRESERVISION AREDS PO   Take 1 tablet by mouth                                * STATIN NOT PRESCRIBED   Commonly known as:  INTENTIONAL   by Other route continuous prn Reported on 4/19/2017                                triamcinolone 0.1 % cream   Commonly known as:  KENALOG                                VITAMIN D PO   Take 4,000 Int'l Units/day by mouth daily                                * Notice:  This list has 5 medication(s) that are the same as other  medications prescribed for you. Read the directions carefully, and ask your doctor or other care provider to review them with you.

## 2018-11-27 NOTE — DISCHARGE INSTRUCTIONS
Bandage wounds daily as follows:  1.) Clean with wound cleanser, dry  2.) Apply Iodosorb gel to wound   3.) Then cover with gauze, secure with minimal medipore tape      Use the wound shoe when you are up on your feet.  Try to limit your time on your feet as much as possible      Obtain a U shaped cushion to apply over your wound bandage on your left foot, wound should be centered in the hole to take pressure off this wound.          Signs and symptoms of infection:  Green drainage  Foul odor  Increased pain to wound  Redness or swelling at the site of the wound  Fever    Follow up with your provider ASAP if you notice any of these signs or symptoms.    Kamille Calzada RN, CWOCN [phone]

## 2018-11-27 NOTE — PROGRESS NOTES
"Reason For Visit: Fawad Garcia, 82 year old here on a wound care consultation for a right 1st toe wound.  Pt has been referred by Maria Antonia Felton, GABRIEL but is also seeing Dr. Montesinos, podiatry.  Per pt, wound started on the 16th and occured due to a bandage that was balled up in his stocking that stuck to his toe, when removing it skin was ripped off.  Though pt also states that he has been working with Dr. Montesinos for a \"hole\" this toe prior. In reviewing [facility] records see that he was being treated for a left foot ulceration beginning on 9/24/18 and right toe starting on 10/8/18.  He is on keflex for 10 days, this started 11/19/18.      Pertainent Medical/Surgical History:  Significant for PVD, (he will be having RENATE this afternoon,) and DM II with HgbA1C of 7.4 on 7/30/18.    Personal/social history: Lives with spouse who will not help with wound cares, son helps him.  He is a retired pharmacist.  Typically fairly active walking around home, yard and out to stores, etc.        Wound #1: Right 1st toe, plantar aspect        Toe has some contracture at interphalangeal joint causing increased pressure this location       1.5cm L x 1.5cm W x 0.2cm D with linear extension toward toenail that is 1cm x 0.2cm x0.1cm  Tunneling: no  Undermining: no  Wound bed type/amount: primarily red and in early granulation, scant strands of white near center where is deepest   Wound Edges: attached  Periwound: yellow soft callus, toe with some erythema from end of toe to joint  Drainage amount/type:  Moderate serous  Odor: no  Stage/tissue depth: full thickness  Pain: none  Current treatment: antibiotic ointment and telfa plus bandaide    Wound #2: Left plantar 5th met head        0.6cm L x 0.8cm W x 0.3cm D   Tunneling: no  Undermining: no  Wound bed type/amount: pink and clean   Wound Edges: attached  Periwound: minimal callus, states was recently debrided by Dr. Montesinos  Drainage amount/type:  Small to moderate serous  Odor: " no  Stage/tissue depth: full thickness  Pain: none  Current treatment: had bandaide over area        Mobility: independant  Current offloading/footwear: has orthotic shoes with heat moldable inserts, does not appear inserts have been heat molded but have been molded by wear with impression where this toe lies.  Shoes nearly a year old, (Jan of '18,) and he received them from [employer]  Sensation: minimal feet  HgbA1C: 7.4 Date: 7/30/18  Checks Blood Glucose?:  Average Readings:   Other callusing/areas of concern: States has a callous on left foot so this was assessed also - actually has wound this foot, states this is new however when reviewed EPIC records it appear this is not new.    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  Seems to have poor insight as to cause and presence of wounds and pressure issues      Plan:  Topically recommend Iodosorb and gauze to both wounds for absorption and antimicrobial, changed daily for now.  States will have son help.      Emphasized to pt that most important thing now is getting pressure off wounds.  Pt was fit for DH walker shoe right foot, pegs removed to take pressure off toe.  Emphasized need to wear when up on feet and to limit weight bearing activity as much as able.  Recommend use of motorized cart in store when possible and despite all time spent explaining why offloading is so important pt was surprised to hear this recommendation, (cart use).        Offloading: DH walker shoe right foot.  Pt's  own diabetic shoe left but recommended U shaped corn pad over area to offload wound, printed some examples to share with pt so he may look for one and for today cut a Mepilex 4x4 in shape of one and placed over bandage over area on foot to further illustrate how this works.  Once he is closer to being healed, needs to be fit for new diabetic shoes with CUSTOM offloading inserts.      The following discharge recommendations were provided to pt and reviewed:  Bandage wounds daily as follows:  1.) Clean with wound cleanser, dry  2.) Apply Iodosorb gel to wound   3.) Then cover with gauze, secure with minimal medipore tape      Use the wound shoe when you are up on your feet.  Try to limit your time on your feet as much as possible      Obtain a U shaped cushion to apply over your wound bandage on your left foot, wound should be centered in the hole to take pressure off this wound.          Signs and symptoms of infection:  Green drainage  Foul odor  Increased pain to wound  Redness or swelling at the site of the wound  Fever    Follow up with your provider ASAP if you notice any of these signs or symptoms.    Kamille Calzada RN, CWOCN [phone]          Return visit: 12/11/18 - provided 3 10gm tubes Iodosorb, wound cleanser, cotton topped applicators medipore tape and some gauze - pt may purchase additional gauze.    Approximately 60min spent face to face, much of this time spent educating pt    Kamille Calzada RN, CWOCN

## 2018-11-28 ENCOUNTER — TELEPHONE (OUTPATIENT)
Dept: OTHER | Facility: CLINIC | Age: 83
End: 2018-11-28

## 2018-11-28 NOTE — TELEPHONE ENCOUNTER
"Pt referred to [facility] by Dr. Montesinos for right great toe ulcer and bilateral non-palpable PT/DP pulses.    11/27/18 RENATE in Epic:  \"Right RENATE: 0.89  Left RENATE: 0.85\"    Pt needs to be scheduled for consult with Vascular Surgery.  Will route to scheduling to coordinate an appointment at next available.    GUADALUPE NevilleN RN    "

## 2018-11-28 NOTE — TELEPHONE ENCOUNTER
Left message on home number for patient to call back to schedule consult appointment with vascular jignesh.    Lakeisha Beatty MA

## 2018-12-04 ENCOUNTER — APPOINTMENT (OUTPATIENT)
Dept: LAB | Facility: CLINIC | Age: 83
End: 2018-12-04
Payer: COMMERCIAL

## 2018-12-04 ENCOUNTER — OFFICE VISIT (OUTPATIENT)
Dept: VASCULAR SURGERY | Facility: CLINIC | Age: 83
End: 2018-12-04
Payer: COMMERCIAL

## 2018-12-04 VITALS — RESPIRATION RATE: 16 BRPM | HEART RATE: 90 BPM | SYSTOLIC BLOOD PRESSURE: 152 MMHG | DIASTOLIC BLOOD PRESSURE: 90 MMHG

## 2018-12-04 DIAGNOSIS — Z79.4 TYPE 2 DIABETES MELLITUS WITH DIABETIC NEPHROPATHY, WITH LONG-TERM CURRENT USE OF INSULIN (H): Primary | ICD-10-CM

## 2018-12-04 DIAGNOSIS — E11.21 TYPE 2 DIABETES MELLITUS WITH DIABETIC NEPHROPATHY, WITH LONG-TERM CURRENT USE OF INSULIN (H): Primary | ICD-10-CM

## 2018-12-04 LAB
ANION GAP SERPL CALCULATED.3IONS-SCNC: 6 MMOL/L (ref 3–14)
BUN SERPL-MCNC: 21 MG/DL (ref 7–30)
CALCIUM SERPL-MCNC: 8.8 MG/DL (ref 8.5–10.1)
CHLORIDE SERPL-SCNC: 103 MMOL/L (ref 94–109)
CO2 SERPL-SCNC: 29 MMOL/L (ref 20–32)
CREAT SERPL-MCNC: 0.92 MG/DL (ref 0.66–1.25)
GFR SERPL CREATININE-BSD FRML MDRD: 79 ML/MIN/1.7M2
GLUCOSE SERPL-MCNC: 100 MG/DL (ref 70–99)
POTASSIUM SERPL-SCNC: 3.7 MMOL/L (ref 3.4–5.3)
SODIUM SERPL-SCNC: 138 MMOL/L (ref 133–144)

## 2018-12-04 PROCEDURE — 80048 BASIC METABOLIC PNL TOTAL CA: CPT | Performed by: SURGERY

## 2018-12-04 PROCEDURE — 36415 COLL VENOUS BLD VENIPUNCTURE: CPT | Performed by: SURGERY

## 2018-12-04 PROCEDURE — 99203 OFFICE O/P NEW LOW 30 MIN: CPT | Performed by: SURGERY

## 2018-12-04 NOTE — NURSING NOTE
Initial /90 (BP Location: Right arm, Patient Position: Chair, Cuff Size: Adult Regular)  Pulse 90  Resp 16 Estimated body mass index is 31.46 kg/(m^2) as calculated from the following:    Height as of 11/21/18: 1.829 m (6').    Weight as of 11/21/18: 105.2 kg (232 lb). .    Patient is here for a consult  Great toe right foot ulcer.  alberto gay LPN

## 2018-12-04 NOTE — PROGRESS NOTES
Mr. Garcia is a 83 year old male who has been referred to us for further evaluation of a nonhealing ulcer at the bottom of the right great toe along with bilateral tibial artery disease.  He tells me that this happened started about mid November and has been quite slow to be.  Interestingly he also had a CRP drawn and that was quite elevated.  This started due to a Band-Aid that was balled up in the soft and stuck to his toe and when removing it the skin was ripped off.  In reviewing the epic records the left foot ulcer has been under treatment since 24 September 2018 and right toe starting on 8 October 2018.  He also been on antibiotics for 10 days starting 19 November 2018.  Next    Past medical history is positive for diabetes, microalbuminuria and peripheral artery disease.    Past surgical history: He has had tonsillectomy and adenoidectomy and cataract surgery.    He does not smoke and occasionally consumes alcohol.    On examination: He appears comfortable he is in no acute distress.  Vital signs are reviewed.  HEENT: Head is atraumatic and normocephalic, mucosa pink.  Eyes: Extraocular motions are intact, sclerae are anicteric.  Mental: Alert and oriented x4, judgment and insight are good.  Cardiac: Regular rate and rhythm, S1 plus S2 +0.  Chest: Clear to auscultation bilaterally.  Abdomen: Soft, obese and nontender.  No organomegaly is noted.  Vascular: No carotid bruits.  3+ bilateral radial pulses.,  2+ bilateral femoral pulses.,  Right dorsalis pedis and posterior tibial are monophasic.    Diagnosis: Poor healing of right great toe ulcer and left heel ulcer.    Plan: The right great toe ulcer is bigger than the left heel ulcer and I find the C-reactive protein elevation would be concerning.  As far as the vascular approach is concerned I would recommend proceeding with a right lower extremity arteriogram with an attempt for revascularization to improve tissue perfusion and healing.  He will be booked  for that procedure at [facility].  His last creatinine was evaluated in July 2018.  We will repeat a basic metabolic profile before he leaves today.  All questions are answered.

## 2018-12-06 ENCOUNTER — TELEPHONE (OUTPATIENT)
Dept: OTHER | Facility: CLINIC | Age: 83
End: 2018-12-06

## 2018-12-06 NOTE — TELEPHONE ENCOUNTER
Type of surgery: RIGHT LOWER EXTREMITY ARTERIOGRAM, ANGIOPLASTY AND STENTING  Location of surgery: [facility]  Date and time of surgery: 12/11/18 @ 1:00PM  Surgeon: DR. CASANOVA  Pre-Op Appt Date: PT TO SCHEDULE W/DR. LES MILLER  Post-Op Appt Date: PT TO SCHEDULE   Packet sent out: Yes 12/0/18  Pre-cert/Authorization completed:  Yes  Date: 12/06/18

## 2018-12-11 ENCOUNTER — OFFICE VISIT (OUTPATIENT)
Dept: SURGERY | Facility: PHYSICIAN GROUP | Age: 83
End: 2018-12-11
Payer: COMMERCIAL

## 2018-12-11 ENCOUNTER — HOSPITAL ENCOUNTER (OUTPATIENT)
Facility: CLINIC | Age: 83
Discharge: HOME | End: 2018-12-11
Attending: SURGERY | Admitting: SURGERY
Payer: COMMERCIAL

## 2018-12-11 ENCOUNTER — APPOINTMENT (OUTPATIENT)
Dept: INTERVENTIONAL RADIOLOGY/VASCULAR | Facility: CLINIC | Age: 83
End: 2018-12-11
Attending: SURGERY
Payer: COMMERCIAL

## 2018-12-11 VITALS
WEIGHT: 231.3 LBS | OXYGEN SATURATION: 97 % | HEART RATE: 67 BPM | SYSTOLIC BLOOD PRESSURE: 164 MMHG | HEIGHT: 72 IN | TEMPERATURE: 98 F | BODY MASS INDEX: 31.33 KG/M2 | DIASTOLIC BLOOD PRESSURE: 81 MMHG | RESPIRATION RATE: 15 BRPM

## 2018-12-11 DIAGNOSIS — E11.29 TYPE 2 DIABETES MELLITUS WITH MICROALBUMINURIA, WITH LONG-TERM CURRENT USE OF INSULIN (H): ICD-10-CM

## 2018-12-11 DIAGNOSIS — I70.221 ATHEROSCLEROSIS OF NATIVE ARTERY OF RIGHT LEG WITH REST PAIN (H): ICD-10-CM

## 2018-12-11 DIAGNOSIS — E78.5 HYPERLIPIDEMIA LDL GOAL <70: Primary | ICD-10-CM

## 2018-12-11 DIAGNOSIS — Z79.4 TYPE 2 DIABETES MELLITUS WITH MICROALBUMINURIA, WITH LONG-TERM CURRENT USE OF INSULIN (H): ICD-10-CM

## 2018-12-11 DIAGNOSIS — R80.9 TYPE 2 DIABETES MELLITUS WITH MICROALBUMINURIA, WITH LONG-TERM CURRENT USE OF INSULIN (H): ICD-10-CM

## 2018-12-11 LAB
APTT PPP: 32 SEC (ref 22–37)
CHOLEST SERPL-MCNC: 181 MG/DL
CREAT SERPL-MCNC: 0.94 MG/DL (ref 0.66–1.25)
ERYTHROCYTE [DISTWIDTH] IN BLOOD BY AUTOMATED COUNT: 13.3 % (ref 10–15)
GFR SERPL CREATININE-BSD FRML MDRD: 77 ML/MIN/1.7M2
HBA1C MFR BLD: 8.3 % (ref 0–5.6)
HCT VFR BLD AUTO: 42.3 % (ref 40–53)
HDLC SERPL-MCNC: 59 MG/DL
HGB BLD-MCNC: 14.7 G/DL (ref 13.3–17.7)
INR PPP: 0.93 (ref 0.86–1.14)
LDLC SERPL CALC-MCNC: 107 MG/DL
MCH RBC QN AUTO: 30.2 PG (ref 26.5–33)
MCHC RBC AUTO-ENTMCNC: 34.8 G/DL (ref 31.5–36.5)
MCV RBC AUTO: 87 FL (ref 78–100)
NONHDLC SERPL-MCNC: 122 MG/DL
PLATELET # BLD AUTO: 295 10E9/L (ref 150–450)
RBC # BLD AUTO: 4.87 10E12/L (ref 4.4–5.9)
TRIGL SERPL-MCNC: 75 MG/DL
WBC # BLD AUTO: 10.2 10E9/L (ref 4–11)

## 2018-12-11 PROCEDURE — C1760 CLOSURE DEV, VASC: HCPCS

## 2018-12-11 PROCEDURE — 85610 PROTHROMBIN TIME: CPT | Performed by: SURGERY

## 2018-12-11 PROCEDURE — 80061 LIPID PANEL: CPT | Performed by: SURGERY

## 2018-12-11 PROCEDURE — 36247 INS CATH ABD/L-EXT ART 3RD: CPT | Performed by: SURGERY

## 2018-12-11 PROCEDURE — 75774 ARTERY X-RAY EACH VESSEL: CPT | Mod: 26 | Performed by: SURGERY

## 2018-12-11 PROCEDURE — 27210845 ZZH DEVICE INFLATION CR5

## 2018-12-11 PROCEDURE — 40000853 ZZH STATISTIC ANGIOGRAM, STENT, VERTEBRO PLASTY

## 2018-12-11 PROCEDURE — 36415 COLL VENOUS BLD VENIPUNCTURE: CPT

## 2018-12-11 PROCEDURE — 99152 MOD SED SAME PHYS/QHP 5/>YRS: CPT | Performed by: SURGERY

## 2018-12-11 PROCEDURE — 27210742 ZZH CATH CR1

## 2018-12-11 PROCEDURE — 99204 OFFICE O/P NEW MOD 45 MIN: CPT | Performed by: INTERNAL MEDICINE

## 2018-12-11 PROCEDURE — C1769 GUIDE WIRE: HCPCS

## 2018-12-11 PROCEDURE — 25000128 H RX IP 250 OP 636: Performed by: SURGERY

## 2018-12-11 PROCEDURE — 27210808 ZZH SHEATH CR7

## 2018-12-11 PROCEDURE — 25000125 ZZHC RX 250: Performed by: SURGERY

## 2018-12-11 PROCEDURE — 75710 ARTERY X-RAYS ARM/LEG: CPT | Mod: 26 | Performed by: SURGERY

## 2018-12-11 PROCEDURE — 27210886 ZZH ACCESSORY CR5

## 2018-12-11 PROCEDURE — 25500064 ZZH RX 255 OP 636: Performed by: SURGERY

## 2018-12-11 PROCEDURE — 75710 ARTERY X-RAYS ARM/LEG: CPT | Mod: RT

## 2018-12-11 PROCEDURE — 83036 HEMOGLOBIN GLYCOSYLATED A1C: CPT | Performed by: SURGERY

## 2018-12-11 PROCEDURE — 82565 ASSAY OF CREATININE: CPT | Performed by: SURGERY

## 2018-12-11 PROCEDURE — 85027 COMPLETE CBC AUTOMATED: CPT | Performed by: SURGERY

## 2018-12-11 PROCEDURE — 85730 THROMBOPLASTIN TIME PARTIAL: CPT | Performed by: SURGERY

## 2018-12-11 PROCEDURE — 27210906 ZZH KIT CR8

## 2018-12-11 RX ORDER — FENTANYL CITRATE 50 UG/ML
25-50 INJECTION, SOLUTION INTRAMUSCULAR; INTRAVENOUS EVERY 5 MIN PRN
Status: DISCONTINUED | OUTPATIENT
Start: 2018-12-11 | End: 2018-12-11 | Stop reason: HOSPADM

## 2018-12-11 RX ORDER — LIDOCAINE HYDROCHLORIDE 10 MG/ML
1-30 INJECTION, SOLUTION EPIDURAL; INFILTRATION; INTRACAUDAL; PERINEURAL
Status: COMPLETED | OUTPATIENT
Start: 2018-12-11 | End: 2018-12-11

## 2018-12-11 RX ORDER — FENTANYL CITRATE 50 UG/ML
INJECTION, SOLUTION INTRAMUSCULAR; INTRAVENOUS
Status: DISCONTINUED
Start: 2018-12-11 | End: 2018-12-11 | Stop reason: HOSPADM

## 2018-12-11 RX ORDER — DEXTROSE MONOHYDRATE 25 G/50ML
25-50 INJECTION, SOLUTION INTRAVENOUS
Status: DISCONTINUED | OUTPATIENT
Start: 2018-12-11 | End: 2018-12-11 | Stop reason: HOSPADM

## 2018-12-11 RX ORDER — SODIUM CHLORIDE 9 MG/ML
INJECTION, SOLUTION INTRAVENOUS CONTINUOUS
Status: DISCONTINUED | OUTPATIENT
Start: 2018-12-11 | End: 2018-12-11 | Stop reason: HOSPADM

## 2018-12-11 RX ORDER — PRAVASTATIN SODIUM 10 MG
10 TABLET ORAL DAILY
Qty: 90 TABLET | Refills: 0 | Status: SHIPPED | OUTPATIENT
Start: 2018-12-11 | End: 2019-02-27

## 2018-12-11 RX ORDER — NICOTINE POLACRILEX 4 MG
15-30 LOZENGE BUCCAL
Status: DISCONTINUED | OUTPATIENT
Start: 2018-12-11 | End: 2018-12-11 | Stop reason: HOSPADM

## 2018-12-11 RX ORDER — ACETAMINOPHEN 500 MG
500 TABLET ORAL EVERY 6 HOURS PRN
Status: DISCONTINUED | OUTPATIENT
Start: 2018-12-11 | End: 2018-12-11 | Stop reason: HOSPADM

## 2018-12-11 RX ORDER — IODIXANOL 320 MG/ML
150 INJECTION, SOLUTION INTRAVASCULAR ONCE
Status: COMPLETED | OUTPATIENT
Start: 2018-12-11 | End: 2018-12-11

## 2018-12-11 RX ORDER — LIDOCAINE HYDROCHLORIDE 10 MG/ML
INJECTION, SOLUTION INFILTRATION; PERINEURAL
Status: DISCONTINUED
Start: 2018-12-11 | End: 2018-12-11 | Stop reason: HOSPADM

## 2018-12-11 RX ORDER — HEPARIN SODIUM 1000 [USP'U]/ML
500-6000 INJECTION, SOLUTION INTRAVENOUS; SUBCUTANEOUS
Status: COMPLETED | OUTPATIENT
Start: 2018-12-11 | End: 2018-12-11

## 2018-12-11 RX ORDER — LIDOCAINE 40 MG/G
CREAM TOPICAL
Status: DISCONTINUED | OUTPATIENT
Start: 2018-12-11 | End: 2018-12-11 | Stop reason: HOSPADM

## 2018-12-11 RX ORDER — FLUMAZENIL 0.1 MG/ML
0.2 INJECTION, SOLUTION INTRAVENOUS
Status: DISCONTINUED | OUTPATIENT
Start: 2018-12-11 | End: 2018-12-11 | Stop reason: HOSPADM

## 2018-12-11 RX ORDER — LIRAGLUTIDE 6 MG/ML
INJECTION SUBCUTANEOUS
Qty: 27 ML | Refills: 3 | Status: SHIPPED | OUTPATIENT
Start: 2018-12-11 | End: 2019-04-10

## 2018-12-11 RX ORDER — NALOXONE HYDROCHLORIDE 0.4 MG/ML
.1-.4 INJECTION, SOLUTION INTRAMUSCULAR; INTRAVENOUS; SUBCUTANEOUS
Status: DISCONTINUED | OUTPATIENT
Start: 2018-12-11 | End: 2018-12-11 | Stop reason: HOSPADM

## 2018-12-11 RX ADMIN — FENTANYL CITRATE 25 MCG: 50 INJECTION, SOLUTION INTRAMUSCULAR; INTRAVENOUS at 14:55

## 2018-12-11 RX ADMIN — FENTANYL CITRATE 50 MCG: 50 INJECTION, SOLUTION INTRAMUSCULAR; INTRAVENOUS at 13:22

## 2018-12-11 RX ADMIN — HEPARIN SODIUM 10000 UNITS: 1000 INJECTION INTRAVENOUS; SUBCUTANEOUS at 13:30

## 2018-12-11 RX ADMIN — FENTANYL CITRATE 50 MCG: 50 INJECTION, SOLUTION INTRAMUSCULAR; INTRAVENOUS at 14:16

## 2018-12-11 RX ADMIN — HEPARIN SODIUM 10000 UNITS: 10000 INJECTION, SOLUTION INTRAVENOUS; SUBCUTANEOUS at 13:10

## 2018-12-11 RX ADMIN — MIDAZOLAM HYDROCHLORIDE 1 MG: 1 INJECTION, SOLUTION INTRAMUSCULAR; INTRAVENOUS at 14:16

## 2018-12-11 RX ADMIN — IODIXANOL 55 ML: 320 INJECTION, SOLUTION INTRAVASCULAR at 15:03

## 2018-12-11 RX ADMIN — LIDOCAINE HYDROCHLORIDE 10 ML: 10 INJECTION, SOLUTION INFILTRATION; PERINEURAL at 13:53

## 2018-12-11 RX ADMIN — MIDAZOLAM HYDROCHLORIDE 0.5 MG: 1 INJECTION, SOLUTION INTRAMUSCULAR; INTRAVENOUS at 14:55

## 2018-12-11 RX ADMIN — SODIUM CHLORIDE: 9 INJECTION, SOLUTION INTRAVENOUS at 12:15

## 2018-12-11 RX ADMIN — MIDAZOLAM HYDROCHLORIDE 1 MG: 1 INJECTION, SOLUTION INTRAMUSCULAR; INTRAVENOUS at 13:22

## 2018-12-11 ASSESSMENT — MIFFLIN-ST. JEOR: SCORE: 1787.17

## 2018-12-11 NOTE — PROGRESS NOTES
Returned from IR per bed. (L) groin site C/D/I without hematoma. Angioseal was used. Pulses doppler. VSS. Denies pain. IV infusing at 100/hr. Offered po fluids.

## 2018-12-11 NOTE — CONSULTS
[facility]    Vascular Medicine Consultation     Date of Admission:  12/11/2018  Date of Consult (When I saw the patient): 12/11/18    Assessment & Plan   1. Peripheral arterial disease with critical limb ischemia of non-healing ulcerations     He presented today for an angiogram. Post-procedure cares and follow-up per Dr. Cali. He will at a minimum need aspirin 81 mg daily. If additional interventions are undertaken, then he may also require Plavix for several months. He was hesitant on taking any of these, but does agree to take whatever is needed.     2. Hyperlipidemia    His lipid panel today reveals an LDL of 107, HDL 59, triglycerides 75, and total cholesterol 181. Given his diabetes and peripheral arterial disease, he should optimally be on a moderate to high dose of a maximally efficacious statin with a goal LDL of less than 70. However, he has had trouble with myalgias in the past while on Simvastatin. He is very hesitant on starting a statin. As such, it is recommended that he initiate pravastatin 10 mg daily as this tends to be the best tolerated in patients, despite its low efficacy. He should have a lipid panel repeated in 3 months through his primary care provider. If his LDL is still not at goal, he should see Dr. Encarnacion in the [facility] to discuss statin intensification versus addition of zetia with addition eventually of Repatha if not at goal on a statin and zetia.     3. Type 2 diabetes mellitus    His A1C today is 8.3% while taking Lantus 24 units every evening and metformin 1000 mg BID. Given his PAD and wound, he should be treated more aggressively to an A1C of less than 7.0%. Therefore, we will ask him to continue metformin and Lantus, but add Victoza (so as to control glucose and reduce CV events) 0.6 mg daily times two weeks then 1.2 mg SQ daily times two weeks, then 1.8 mg daily thereafter. He should have an A1C checked in three months through his PCP. If  not at goal of < 7% then he should see Dr. Encarnacion consultatively at the [facility].      Reason for Consult   Reason for consult: Asked by Dr. Cali to evaluate and help manage vascular risk factors in this 83 year old non-smoking male with diabates, peripheral neuropathy, and hypertension who presents today for a lower extremity angiogram due to critical limb ischemia of non-healing wounds.                                                                                               Primary Care Physician   Chris Scott      History of Present Illness   Fawad Garcia is a 83 year old non-smoking male with diabetes, peripheral neuropathy and hypertension who was being seen by Dr. Montesinos of Podiatry since September. He originally presented to Podiatry with a few month history of a left heel ulcer. Then in October he had also developed a new right great toe ulceration. By November, his right great toe ulceration had worsened. He was seen again and it was noted that he had non-palpable pedal pulses. ABIs were obtained and were 0.89 at rest on the right, changing to 0.98 after exercise and 0.85 on the left, changing to 0.94 after exercise. Waveforms were triphasic in the femoral and popliteal arteries, but became monophasic in the tibial vessels. He was seen in consultation with Dr. Cali of Vascular Surgery and an angiogram was recommended for additional evaluation and possible treatment. He presents for this today.     Past Medical History   Past Medical History:   Diagnosis Date     Diabetes mellitus (H)      Hypertension        Past Surgical History   Past Surgical History:   Procedure Laterality Date     [facility] AFF SURGICAL PATHOLOGY  2002    prostate biopsy elevated PSA     COLONOSCOPY       HC REMOVE TONSILS/ADENOIDS,<13 Y/O      T & A     PHACOEMULSIFICATION WITH STANDARD INTRAOCULAR LENS IMPLANT Left 4/2/2015    Procedure: PHACOEMULSIFICATION WITH STANDARD INTRAOCULAR LENS IMPLANT;   Surgeon: Joseph Hernandez MD;  Location: [facility] OR     PHACOEMULSIFICATION WITH STANDARD INTRAOCULAR LENS IMPLANT Right 2015    Procedure: PHACOEMULSIFICATION WITH STANDARD INTRAOCULAR LENS IMPLANT;  Surgeon: Joseph Hernandez MD;  Location: [facility] OR       Prior to Admission Medications   Prior to Admission Medications   Prescriptions Last Dose Informant Patient Reported? Taking?   ASPIRIN NOT PRESCRIBED, INTENTIONAL, Unknown at Unknown time  Yes No   Sig: by Other route continuous prn Reported on 2017   Blood Glucose Monitoring Suppl (ONE TOUCH ULTRA SYSTEM KIT) W/DEVICE KIT 2018 at Unknown time  Yes Yes   Cholecalciferol (VITAMIN D PO) More than a month at Unknown time  Yes No   Sig: Take 4,000 Int'l Units/day by mouth daily   Multiple Vitamins-Minerals (PRESERVISION AREDS PO) 12/10/2018 at Unknown time  Yes Yes   Sig: Take 1 tablet by mouth   ONETOUCH LANCETS MISC 12/10/2018 at Unknown time  No Yes   Si Device daily One touch delica lancets, what ever is covered by insurance.   ORDER FOR DME 12/10/2018 at Unknown time  No Yes   Sig: Equipment being ordered:   Glucometer   STATIN NOT PRESCRIBED, INTENTIONAL, Unknown at Unknown time  Yes No   Sig: by Other route continuous prn Reported on 2017   amLODIPine (NORVASC) 5 MG tablet 12/10/2018 at 1200  No Yes   Sig: Take 1 tablet (5 mg) by mouth daily Takes prn for high blood pressure   blood glucose monitoring (NO BRAND SPECIFIED) test strip 2018 at Unknown time  No Yes   Si strip by In Vitro route daily One touch Ultra.   dorzolamide-timolol (COSOPT) 2-0.5 % ophthalmic solution 12/10/2018 at Unknown time  Yes Yes   hydrochlorothiazide (HYDRODIURIL) 25 MG tablet 12/10/2018 at Unknown time  No Yes   Sig: Take 1 tablet (25 mg) by mouth daily   insulin glargine (LANTUS SOLOSTAR) 100 UNIT/ML injection 12/10/2018 at 2100  No Yes   Sig: Inject 30 Units Subcutaneous At Bedtime   insulin pen needle (B-D U/F) 31G X 8 MM 12/10/2018 at Unknown time   No Yes   Si Device daily Use once daily or as directed.   lisinopril (PRINIVIL/ZESTRIL) 40 MG tablet 12/10/2018 at Unknown time  No Yes   Sig: Take 1 tablet (40 mg) by mouth daily   metFORMIN (GLUCOPHAGE) 500 MG tablet 12/10/2018 at Unknown time  No Yes   Si tabs with lunch and 2 tabs with dinner.   omeprazole (PRILOSEC) 20 MG CR capsule 12/10/2018 at Unknown time  No Yes   Sig: Take 1 capsule (20 mg) by mouth daily   triamcinolone (KENALOG) 0.1 % cream Unknown at Unknown time  Yes No      Facility-Administered Medications: None     Allergies   Allergies   Allergen Reactions     Zocor [Simvastatin - High Dose]      Bilateral hip aching       Social History   Fawad Garcia  reports that  has never smoked. he has never used smokeless tobacco. He reports that he drinks about 1.0 oz of alcohol per week. He reports that he does not use drugs. He's a retired pharmacist.     Family History   Family History   Problem Relation Age of Onset     Cancer Mother         intestinal CA     Diabetes Father      Diabetes Brother      Other Cancer Son         meagan tumor     Other Cancer Brother         pancreatic cancer     Diabetes Son        Review of Systems   The 10 point Review of Systems is negative other than noted in the HPI or here.     Physical Exam   Temp: 98  F (36.7  C) Temp src: Oral BP: 144/86 Pulse: 67 Heart Rate: 56 Resp: 16 SpO2: 98 % O2 Device: None (Room air)    Vital Signs with Ranges  Temp:  [98  F (36.7  C)] 98  F (36.7  C)  Pulse:  [67] 67  Heart Rate:  [54-70] 56  Resp:  [11-25] 16  BP: (127-185)/(72-99) 144/86  SpO2:  [92 %-100 %] 98 %  231 lbs 4.8 oz    Constitutional: awake, alert, cooperative, no apparent distress, and appears stated age  Eyes: Lids and lashes normal, pupils equal, round and reactive to light, extra ocular muscles intact, sclera clear, conjunctiva normal  ENT: normocepalic, without obvious abnormality, oropharynx pink and moist  Hematologic / Lymphatic: no  lymphadenopathy  Respiratory: No increased work of breathing, good air exchange, clear to auscultation bilaterally, no crackles or wheezing  Cardiovascular: regular rate and rhythm, normal S1 and S2 and no murmur noted  GI: Normal bowel sounds, soft, non-distended, non-tender  Skin: no redness, warmth, or swelling, no rashes. Right great toe with wound on plantar surface.   Musculoskeletal: There is no redness, warmth, or swelling of the joints.  Full range of motion noted.  Motor strength is 5 out of 5 all extremities bilaterally.  Tone is normal.  Neurologic: Awake, alert, oriented to name, place and time.  Cranial nerves II-XII are grossly intact.  Motor is 5 out of 5 bilaterally.    Neuropsychiatric:  Normal affect, memory, insight.  Pulses: Monophasic pedal pulses bilaterally. No carotid bruits appreciated.     Data   Most Recent 3 CBC's:  Recent Labs   Lab Test 12/11/18  1150 11/19/18  1201 10/14/15  1035   WBC 10.2 9.8 8.6   HGB 14.7 13.4 14.5   MCV 87 90 89    315 259     Most Recent 3 BMP's:  Recent Labs   Lab Test 12/11/18  1150 12/04/18  0854 03/27/18  1012 04/17/17  1102   NA  --  138 136 137   POTASSIUM  --  3.7 3.9 3.8   CHLORIDE  --  103 102 103   CO2  --  29 27 25   BUN  --  21 18 19   CR 0.94 0.92 0.86 0.93   ANIONGAP  --  6 7 9   CAIN  --  8.8 8.7 8.7   GLC  --  100* 125* 118*     Most Recent 3 INR's:  Recent Labs   Lab Test 12/11/18  1150   INR 0.93     Most Recent Cholesterol Panel:  Recent Labs   Lab Test 12/11/18  1150   CHOL 181   *   HDL 59   TRIG 75     Most Recent Hemoglobin A1c:  Recent Labs   Lab Test 12/11/18  1150   A1C 8.3*

## 2018-12-11 NOTE — IP AVS SNAPSHOT
MRN:[id number]                      After Visit Summary   12/11/2018    Fawad Garcia    MRN: [id number]           Visit Information        Department      12/11/2018 11:02 AM [facility]          Review of your medicines      UNREVIEWED medicines. Ask your doctor about these medicines       Dose / Directions   hydrochlorothiazide 25 MG tablet  Commonly known as:  HYDRODIURIL  Used for:  Hypertension, goal below 140/90      Dose:  25 mg  Take 1 tablet (25 mg) by mouth daily  Quantity:  90 tablet  Refills:  3        START taking       Dose / Directions   liraglutide 18 MG/3ML solution  Commonly known as:  VICTOZA  Used for:  Type 2 diabetes mellitus with microalbuminuria, with long-term current use of insulin (H)      0.6 mg SQ daily times two weeks then  1.2 mg SQ daily times two weeks then  1.8 mg SQ daily thereafter  Quantity:  27 mL  Refills:  3     pravastatin 10 MG tablet  Commonly known as:  PRAVACHOL  Used for:  Hyperlipidemia LDL goal <70      Dose:  10 mg  Take 1 tablet (10 mg) by mouth daily  Quantity:  90 tablet  Refills:  0        CONTINUE these medicines which may have CHANGED, or have new prescriptions. If we are uncertain of the size of tablets/capsules you have at home, strength may be listed as something that might have changed.       Dose / Directions   ASPIRIN NOT PRESCRIBED  Commonly known as:  INTENTIONAL  This may have changed:  Another medication with the same name was removed. Continue taking this medication, and follow the directions you see here.      by Other route continuous prn Reported on 4/19/2017  Refills:  0        CONTINUE these medicines which have NOT CHANGED       Dose / Directions   amLODIPine 5 MG tablet  Commonly known as:  NORVASC  Used for:  Hypertension, goal below 140/90      Dose:  5 mg  Take 1 tablet (5 mg) by mouth daily Takes prn for high blood pressure  Quantity:  90 tablet  Refills:  3     blood glucose monitoring test  strip  Commonly known as:  NO BRAND SPECIFIED  Used for:  Type 2 diabetes mellitus without complication, with long-term current use of insulin (H)      Dose:  1 strip  1 strip by In Vitro route daily One touch Ultra.  Quantity:  100 each  Refills:  3     dorzolamide-timolol 2-0.5 % ophthalmic solution  Commonly known as:  COSOPT      Refills:  0     insulin glargine 100 UNIT/ML pen  Commonly known as:  LANTUS SOLOSTAR  Used for:  Type 2 diabetes mellitus without complication, with long-term current use of insulin (H)      Dose:  30 Units  Inject 30 Units Subcutaneous At Bedtime  Quantity:  9 mL  Refills:  11     insulin pen needle 31G X 8 MM miscellaneous  Commonly known as:  B-D U/F  Used for:  Type 2 diabetes mellitus without complication, with long-term current use of insulin (H)      Dose:  1 Device  1 Device daily Use once daily or as directed.  Quantity:  100 each  Refills:  3     lisinopril 40 MG tablet  Commonly known as:  PRINIVIL/ZESTRIL  Used for:  BENIGN HYPERTENSION      Dose:  40 mg  Take 1 tablet (40 mg) by mouth daily  Quantity:  90 tablet  Refills:  3     metFORMIN 500 MG tablet  Commonly known as:  GLUCOPHAGE  Used for:  Type 2 diabetes mellitus without complication, with long-term current use of insulin (H)      2 tabs with lunch and 2 tabs with dinner.  Quantity:  360 tablet  Refills:  3     omeprazole 20 MG DR capsule  Commonly known as:  priLOSEC  Used for:  Gastroesophageal reflux disease without esophagitis      Dose:  20 mg  Take 1 capsule (20 mg) by mouth daily  Quantity:  90 capsule  Refills:  3     ONETOUCH LANCETS Misc  Used for:  Type 2 diabetes mellitus with diabetic peripheral angiopathy without gangrene (H)      Dose:  1 Device  1 Device daily One touch delica lancets, what ever is covered by insurance.  Quantity:  100 each  Refills:  5     Superpro[employer] ULTRA SYSTEM KIT w/Device Kit      Refills:  0     order for DME  Used for:  Type 2 diabetes, HbA1c goal < 7% (H)      Equipment being  ordered:   Glucometer  Quantity:  1 Device  Refills:  1     PRESERVISION AREDS PO      Dose:  1 tablet  Take 1 tablet by mouth  Refills:  0     triamcinolone 0.1 % external cream  Commonly known as:  KENALOG      Refills:  0     VITAMIN D PO      Dose:  4000 Int'l Units/day  Take 4,000 Int'l Units/day by mouth daily  Refills:  0           Where to get your medicines      These medications were sent to [facility] [place], [place] - [address]  [address], [facility] [address]    Phone:  [phone]   pravastatin 10 MG tablet     Some of these will need a paper prescription and others can be bought over the counter. Ask your nurse if you have questions.    Bring a paper prescription for each of these medications  liraglutide 18 MG/3ML solution           Prescriptions were sent or printed at these locations (2 Prescriptions)            [facility], [place] - [address]   [address], [place] [address]    Telephone:  [phone]   Fax:  [phone]   Hours:                  E-Prescribed (1 of 2)         pravastatin (PRAVACHOL) 10 MG tablet                 Printed at Department/Unit printer (1 of 2)         liraglutide (VICTOZA) 18 MG/3ML solution                Protect others around you: Learn how to safely use, store and throw away your medicines at www.disposemymeds.org.       Follow-ups after your visit       Your next 10 appointments already scheduled    Mar 18, 2019  1:00 PM CDT  Return Visit with Kieran Hua MD  [facility] [address], [address]  [facility] [address] [phone]      Care Instructions       After Care Instructions     Discharge Instructions      Pt to have lipid panel and HgbA1C checked through his PCP in three months. Pt to see Dr. Encarnacion at [facility] ([phone]) if those labs do not reveal an LDL cholesterol < 70 mg/dl and a HgbA1C of < 7%.            Further instructions from your care team       Peripheral Angiogram Discharge Instructions - Femoral     After you go home:      Have an adult stay with you until tomorrow.    Drink extra fluids for 2 days.    You may resume your normal diet.    No smoking       For 24 hours - due to the sedation you received:    Relax and take it easy.    Do NOT make any important or legal decisions.    Do NOT drive or operate machines at home or at work.    Do NOT drink alcohol.    Care of Groin Puncture Site:      For the first 24 hrs - check the puncture site every 1-2 hours while awake.    For 2 days, when you cough, sneeze, laugh or move your bowels, hold your hand over the puncture site and press firmly.    Remove the bandaid after 24 hours. If there is minor oozing, apply another bandaid and remove it after 12 hours.    It is normal to have a small bruise or pea size lump at the site.    You may shower tomorrow.  Do NOT take a bath, or use a hot tub or pool for at least 3 days. Do NOT scrub the site. Do not use lotion or powder near the puncture site.     Activity:            For 2 days:    No stooping or squatting    Do NOT do any heavy activity such as exercise, lifting, or straining.     No housework, yard work or any activity that make you sweat    Do NOT lift more than 10 pounds    Bleeding:      If you start bleeding from the site in your groin, lie down flat and press firmly on/above the site for 10 minutes.     Once bleeding stops, lay flat for 2 hours.     Call the [facility] as soon as you can.       Call 911 right away if you have heavy bleeding or bleeding that does not stop.      Medicines:      If you are on Metformin (Glucophage) and your GFR (kidney function level) is >30, you may continue taking your Metformin.    If you are on Metformin (Glucophage) and your GFR (kidney function level) is <30, do not restart the Metformin for 48 hours after your procedure. Check with your primary care giver  before restarting the Metformin to see if you need to have blood drawn to recheck your kidney function (GFR).    If you are taking an antiplatelet medication such as Plavix, do not stop taking it until you talk to your provider.       Take your medications, including blood thinners, unless your provider tells you not to.  If you take Coumadin (Warfarin), have your INR checked by your provider in  3-5 days. Call your clinic to schedule this.    If you have stopped any medicines, check with your provider about when to restart them.        Follow Up Appointments:      Follow up with [facility] as directed.    Call the clinic if:      You have increased pain or a large or growing hard lump around the site.    The site is red, swollen, hot or tender.    Blood or fluid is draining from the site.    You have chills or a fever greater than 101 F (38 C).    Your leg feels numb, cool or changes color.    You have hives, a rash or unusual itching.    New pain in the back or belly that you cannot control with Tylenol.    Any questions or concerns.    Other Instructions:      If you received a stent - carry your stent card with you at all times.      If you have questions or your original symptoms do not improve, call:         [facility] @ [phone]    Call the above listed clinic in the morning at the above number to ask about taking a baby aspirin.  Call your primary care clinic to see if you need to have your GFR (kidney blood test) checked before starting back on your metformin.  Follow up with your surgeon in 6 weeks. Call the above number to schedule.      Additional Information About Your Visit       [facility] [employer] Information    [employer] gives you secure access to your electronic health record. If you see a primary care provider, you can also send messages to your care team and make appointments. If you have questions, please call your primary care clinic.  If you do not have a primary care provider,  please call [phone] and they will assist you.       Care EveryWhere ID    This is your Care EveryWhere ID. This could be used by other organizations to access your [facility] medical records  [id number]       Your Vitals Were     Blood Pressure   157/83          Pulse   67          Temperature   98  F (36.7  C) (Oral)          Respirations   16          Height   1.829 m (6')             Weight   104.9 kg (231 lb 4.8 oz)    Pulse Oximetry   98%    BMI (Body Mass Index)   31.37 kg/m           Primary Care Provider Office Phone # Fax #    Chris Scott [phone]      Equal Access to Services    [facility] : Hadii aad ku hadasho Soomaali, waaxda luqadaha, qaybta kaalmada adeegyada, waxlaura dorado . So [facility] [phone].    ATENCIÓN: Si habla español, tiene a aaron disposición servicios gratuitos de asistencia lingüística. Llame al [phone].    We comply with applicable federal civil rights laws and [place] laws. We do not discriminate on the basis of race, color, national origin, age, disability, sex, sexual orientation, or gender identity.           Thank you!    Thank you for choosing [facility] for your care. Our goal is always to provide you with excellent care. Hearing back from our patients is one way we can continue to improve our services. Please take a few minutes to complete the written survey that you may receive in the mail after you visit with us. Thank you!            Medication List      Medications          Morning Afternoon Evening Bedtime As Needed    amLODIPine 5 MG tablet  Commonly known as:  NORVASC  Take 1 tablet (5 mg) by mouth daily Takes prn for high blood pressure                     ASPIRIN NOT PRESCRIBED  Commonly known as:  INTENTIONAL  by Other route continuous prn Reported on 4/19/2017  What changed:  Another medication with the same name was removed. Continue taking this medication, and follow the directions you see here.                      blood glucose monitoring test strip  Commonly known as:  NO BRAND SPECIFIED  1 strip by In Vitro route daily One touch Ultra.                     dorzolamide-timolol 2-0.5 % ophthalmic solution  Commonly known as:  COSOPT                     insulin glargine 100 UNIT/ML pen  Commonly known as:  LANTUS SOLOSTAR  Inject 30 Units Subcutaneous At Bedtime                     insulin pen needle 31G X 8 MM miscellaneous  Commonly known as:  B-D U/F  1 Device daily Use once daily or as directed.                     liraglutide 18 MG/3ML solution  Commonly known as:  VICTOZA  0.6 mg SQ daily times two weeks then  1.2 mg SQ daily times two weeks then  1.8 mg SQ daily thereafter                     lisinopril 40 MG tablet  Commonly known as:  PRINIVIL/ZESTRIL  Take 1 tablet (40 mg) by mouth daily                     metFORMIN 500 MG tablet  Commonly known as:  GLUCOPHAGE  2 tabs with lunch and 2 tabs with dinner.                     omeprazole 20 MG DR capsule  Commonly known as:  priLOSEC  Take 1 capsule (20 mg) by mouth daily                     ONETOUCH LANCETS Misc  1 Device daily One touch delica lancets, what ever is covered by insurance.                     ONETOUCH ULTRA SYSTEM KIT w/Device Kit                     order for DME  Equipment being ordered:   Glucometer                     pravastatin 10 MG tablet  Commonly known as:  PRAVACHOL  Take 1 tablet (10 mg) by mouth daily                     PRESERVISION AREDS PO  Take 1 tablet by mouth                     triamcinolone 0.1 % external cream  Commonly known as:  KENALOG                     VITAMIN D PO  Take 4,000 Int'l Units/day by mouth daily                       ASK your doctor about these medications          Morning Afternoon Evening Bedtime As Needed    hydrochlorothiazide 25 MG tablet  Commonly known as:  HYDRODIURIL  Take 1 tablet (25 mg) by mouth daily

## 2018-12-11 NOTE — IP AVS SNAPSHOT
[address] [place] [address]  Phone:  [phone]                                    After Visit Summary   12/11/2018    Fawad Garcia    MRN: [id number]           After Visit Summary Signature Page    I have received my discharge instructions, and my questions have been answered. I have discussed any challenges I see with this plan with the nurse or doctor.    ..........................................................................................................................................  Patient/Patient Representative Signature      ..........................................................................................................................................  Patient Representative Print Name and Relationship to Patient    ..................................................               ................................................  Date                                   Time    ..........................................................................................................................................  Reviewed by Signature/Title    ...................................................              ..............................................  Date                                               Time          22EPIC Rev 08/18

## 2018-12-11 NOTE — IR NOTE
Interventional Radiology Intra-procedural Nursing Note    Patient Name: Fawad Garcia  Medical Record Number: [id number]  Today's Date: December 11, 2018    Start Time: 1322  End of procedure time: 1504  Procedure: right lower extremity angiogram  Report given to: rn in [facility]  Time pt departs:    :     Other Notes: Pt transferred to IR table. Prepped and draped appropriately. Monitoring equipment applied. NC applied. No complaints of pain at this time. Timeout recorded. 5F sheath to the left groin. Sheath pulled at 1500. Angioseal in place. Site CDI, soft. VSS. Pt remains on RA and is flat.       SAUL HORAN

## 2018-12-11 NOTE — CONSULTS
[facility]  Vascular Medicine Consultation     Please see Dr. Encarnacion's full Vascular Medicine consult on this date.

## 2018-12-11 NOTE — DISCHARGE INSTRUCTIONS
Peripheral Angiogram Discharge Instructions - Femoral     After you go home:      Have an adult stay with you until tomorrow.    Drink extra fluids for 2 days.    You may resume your normal diet.    No smoking       For 24 hours - due to the sedation you received:    Relax and take it easy.    Do NOT make any important or legal decisions.    Do NOT drive or operate machines at home or at work.    Do NOT drink alcohol.    Care of Groin Puncture Site:      For the first 24 hrs - check the puncture site every 1-2 hours while awake.    For 2 days, when you cough, sneeze, laugh or move your bowels, hold your hand over the puncture site and press firmly.    Remove the bandaid after 24 hours. If there is minor oozing, apply another bandaid and remove it after 12 hours.    It is normal to have a small bruise or pea size lump at the site.    You may shower tomorrow.  Do NOT take a bath, or use a hot tub or pool for at least 3 days. Do NOT scrub the site. Do not use lotion or powder near the puncture site.     Activity:            For 2 days:    No stooping or squatting    Do NOT do any heavy activity such as exercise, lifting, or straining.     No housework, yard work or any activity that make you sweat    Do NOT lift more than 10 pounds    Bleeding:      If you start bleeding from the site in your groin, lie down flat and press firmly on/above the site for 10 minutes.     Once bleeding stops, lay flat for 2 hours.     Call the [facility] as soon as you can.       Call 911 right away if you have heavy bleeding or bleeding that does not stop.      Medicines:      If you are on Metformin (Glucophage) and your GFR (kidney function level) is >30, you may continue taking your Metformin.    If you are on Metformin (Glucophage) and your GFR (kidney function level) is <30, do not restart the Metformin for 48 hours after your procedure. Check with your primary care giver before restarting the Metformin to see if you need  to have blood drawn to recheck your kidney function (GFR).    If you are taking an antiplatelet medication such as Plavix, do not stop taking it until you talk to your provider.       Take your medications, including blood thinners, unless your provider tells you not to.  If you take Coumadin (Warfarin), have your INR checked by your provider in  3-5 days. Call your clinic to schedule this.    If you have stopped any medicines, check with your provider about when to restart them.        Follow Up Appointments:      Follow up with [facility] as directed.    Call the clinic if:      You have increased pain or a large or growing hard lump around the site.    The site is red, swollen, hot or tender.    Blood or fluid is draining from the site.    You have chills or a fever greater than 101 F (38 C).    Your leg feels numb, cool or changes color.    You have hives, a rash or unusual itching.    New pain in the back or belly that you cannot control with Tylenol.    Any questions or concerns.    Other Instructions:      If you received a stent - carry your stent card with you at all times.      If you have questions or your original symptoms do not improve, call:         [facility] @ [phone]    Call the above listed clinic in the morning at the above number to ask about taking a baby aspirin.  Call your primary care clinic to see if you need to have your GFR (kidney blood test) checked before starting back on your metformin.  Follow up with your surgeon in 6 weeks. Call the above number to schedule.

## 2018-12-11 NOTE — PROGRESS NOTES
Care Suites Arrival    Patient arrived ambulatory to [facility].  Patient is alert, oriented, cooperative and pleasant. Denies pain. PIV started in left arm. Labs drawn. IV fluids begun infusing. Patient denies skin issues. Contrast D/C instructions reviewed with pt with verbal understanding received. All questions & concerns addressed. Pt resting in bed, denies additional needs at this time, call light within reach. Dr Cali here to explain procedure to patient and obtain consent. Pt up to void then taken ambulatory to cath lab with RN

## 2018-12-12 NOTE — PROGRESS NOTES
Discharged per w/c to home with son. 2 new rx's and discharge instructions reviewed and given. IV dc'd. (L) groin site remains stable. Ambulated to  to void and ate dinner, tolerated well. Reinforced follow up appts.

## 2018-12-13 ENCOUNTER — TELEPHONE (OUTPATIENT)
Dept: FAMILY MEDICINE | Facility: CLINIC | Age: 83
End: 2018-12-13

## 2018-12-13 NOTE — TELEPHONE ENCOUNTER
Victoza was prescribed by vascular medicine, Dr Ismael Encarnacion, on 12/11/18.    Dr Encarnacion notes:  Type 2 diabetes mellitus     His A1C today is 8.3% while taking Lantus 24 units every evening and metformin 1000 mg BID. Given his PAD and wound, he should be treated more aggressively to an A1C of less than 7.0%. Therefore, we will ask him to continue metformin and Lantus, but add Victoza (so as to control glucose and reduce CV events) 0.6 mg daily times two weeks then 1.2 mg SQ daily times two weeks, then 1.8 mg daily thereafter. He should have an A1C checked in three months through his PCP. If not at goal of < 7% then he should see Dr. Encarnacion consultatively at the [facility].    Lab Results   Component Value Date    A1C 8.3 12/11/2018    A1C 7.4 07/30/2018    A1C 7.7 03/27/2018    A1C 7.2 10/04/2017    A1C 7.6 04/17/2017         I shared this information with pt from Dr Encarnacion.  Pt will try Victoza.    He was worried about low blood sugar episodes but he knows how to manage these if hypoglycemic events occur.  Pt will call if he has any problems tolerating the addition of Victoza.    Rosa Hernandez RN

## 2018-12-13 NOTE — TELEPHONE ENCOUNTER
Reason for Call:  Other med question    Detailed comments: Patient was started on Victoza and has some questions about whether he should take this or not.    Phone Number Patient can be reached at: Home number on file [phone] (home)    Best Time: any    Can we leave a detailed message on this number? YES    Call taken on 12/13/2018 at 9:11 AM by Мария Mcarthur

## 2018-12-14 DIAGNOSIS — I70.229 CRITICAL ISCHEMIA OF LOWER EXTREMITY (H): Primary | ICD-10-CM

## 2018-12-20 ENCOUNTER — TELEPHONE (OUTPATIENT)
Dept: WOUND CARE | Facility: CLINIC | Age: 83
End: 2018-12-20

## 2018-12-20 DIAGNOSIS — L97.502: Primary | ICD-10-CM

## 2018-12-20 NOTE — TELEPHONE ENCOUNTER
Call from pt.  He is being followed by vascular and did have and angiogram.  States wound seems to be improving and he will be seeing Dr. Montesinos next week.  He is in need of more Iodosorb.      Will place order from [employer] for a 40 gm tube of Iodosorb to be mailed to pt.  Pt will continue to see vascular and Dr. Montesinos and return to [facility] nurse clinic if not healing.    Kamille Calzada RN, CWOCN

## 2018-12-27 ENCOUNTER — OFFICE VISIT (OUTPATIENT)
Dept: PODIATRY | Facility: CLINIC | Age: 83
End: 2018-12-27
Payer: COMMERCIAL

## 2018-12-27 VITALS
SYSTOLIC BLOOD PRESSURE: 140 MMHG | DIASTOLIC BLOOD PRESSURE: 81 MMHG | WEIGHT: 231.26 LBS | HEART RATE: 72 BPM | BODY MASS INDEX: 31.32 KG/M2 | HEIGHT: 72 IN

## 2018-12-27 DIAGNOSIS — E11.51 TYPE 2 DIABETES MELLITUS WITH PERIPHERAL VASCULAR DISEASE (H): Primary | ICD-10-CM

## 2018-12-27 DIAGNOSIS — L97.512 TOE ULCER, RIGHT, WITH FAT LAYER EXPOSED (H): ICD-10-CM

## 2018-12-27 PROCEDURE — 11042 DBRDMT SUBQ TIS 1ST 20SQCM/<: CPT | Performed by: PODIATRIST

## 2018-12-27 ASSESSMENT — MIFFLIN-ST. JEOR: SCORE: 1787

## 2018-12-27 NOTE — NURSING NOTE
Chief Complaint   Patient presents with     RECHECK     ulcer on both feet       Initial /81 (BP Location: Left arm, Patient Position: Chair, Cuff Size: Adult Large)   Pulse 72   Ht 1.829 m (6')   Wt 104.9 kg (231 lb 4.2 oz)   BMI 31.36 kg/m   Estimated body mass index is 31.36 kg/m  as calculated from the following:    Height as of this encounter: 1.829 m (6').    Weight as of this encounter: 104.9 kg (231 lb 4.2 oz).  Medications and allergies reviewed.    Basilio GUILLERMO, CMA

## 2018-12-27 NOTE — PROGRESS NOTES
Fawad returns to the office for reevaluation of the right and left foot.  The patient relates being seen by vascular surgery and subsequently had an attempted angioplasty procedure performed which was unsuccessful due to the amount of plaque buildup in his lower extremity arteries.  The patient relates callus buildup around the ulcerations on the right great toe and bottom of the left foot.      PAST MEDICAL HISTORY:   Past Medical History:   Diagnosis Date     Diabetes mellitus (H)      Hypertension        BMI= Body mass index is 31.36 kg/m .    Weight management plan: Patient was referred to their PCP to discuss a diet and exercise plan.    Physical Exam:    General: The patient appears to have a pleasant mental affect.    Lower extremity physical exam:  Neurovascular status is intact with palpable pedal pulses and intact epicritic sensations.  Muscular exam is within normal limits to major muscle groups.  Integument is intact.      One notes decreased edema.  One notes a full-thickness ulceration on the distal aspect of the right great toe with hyperkeratotic skin buildup around.  One notes a full-thickness ulceration on the plantar aspect of the fifth metatarsal head with hyperkeratotic peripheral wound with undermining.  No erythema or edema noted.  No drainage noted.         Assessment:      ICD-10-CM    1. Type 2 diabetes mellitus with peripheral vascular disease (H) E11.51    2. Toe ulcer, right, with fat layer exposed (H) L97.512        Plan:  I have explained to Fawad about the conditions.  At this time, the ulcers were sharply debrided with a #10 blade down to healthy epidermis.  Bleeding noted to the base of the wounds on both feet.  Wounds were dressed with a sterile dressing.  The patient will follow up with vascular surgery for any further revascularization options.  The patient will return in 2 weeks for reevaluation and repeat wound debridement as necessary.    Disclaimer: This note consists of  symbols derived from keyboarding, dictation and/or voice recognition software. As a result, there may be errors in the script that have gone undetected. Please consider this when interpreting information found in this chart.       LIANG Montesinos D.P.M., FIDEL.F.LORRAINES.

## 2018-12-27 NOTE — LETTER
12/27/2018         RE: Fawad Garcia  [address] [place] [facility] [address]        Dear Colleague,    Thank you for referring your patient, Fawad Garcia, to the [facility]. Please see a copy of my visit note below.    Fawad returns to the office for reevaluation of the right and left foot.  The patient relates being seen by vascular surgery and subsequently had an attempted angioplasty procedure performed which was unsuccessful due to the amount of plaque buildup in his lower extremity arteries.  The patient relates callus buildup around the ulcerations on the right great toe and bottom of the left foot.      PAST MEDICAL HISTORY:   Past Medical History:   Diagnosis Date     Diabetes mellitus (H)      Hypertension        BMI= Body mass index is 31.36 kg/m .    Weight management plan: Patient was referred to their PCP to discuss a diet and exercise plan.    Physical Exam:    General: The patient appears to have a pleasant mental affect.    Lower extremity physical exam:  Neurovascular status is intact with palpable pedal pulses and intact epicritic sensations.  Muscular exam is within normal limits to major muscle groups.  Integument is intact.      One notes decreased edema.  One notes a full-thickness ulceration on the distal aspect of the right great toe with hyperkeratotic skin buildup around.  One notes a full-thickness ulceration on the plantar aspect of the fifth metatarsal head with hyperkeratotic peripheral wound with undermining.  No erythema or edema noted.  No drainage noted.         Assessment:      ICD-10-CM    1. Type 2 diabetes mellitus with peripheral vascular disease (H) E11.51    2. Toe ulcer, right, with fat layer exposed (H) L97.512        Plan:  I have explained to Fawad about the conditions.  At this time, the ulcers were sharply debrided with a #10 blade down to healthy epidermis.  Bleeding noted to the base of the wounds on both feet.   Wounds were dressed with a sterile dressing.  The patient will follow up with vascular surgery for any further revascularization options.  The patient will return in 2 weeks for reevaluation and repeat wound debridement as necessary.    Disclaimer: This note consists of symbols derived from keyboarding, dictation and/or voice recognition software. As a result, there may be errors in the script that have gone undetected. Please consider this when interpreting information found in this chart.       LIANG Montesinos D.P.M., F.A.C.F.A.S.      Again, thank you for allowing me to participate in the care of your patient.        Sincerely,        tEhan Montesinos DPM

## 2019-01-01 ENCOUNTER — NURSE TRIAGE (OUTPATIENT)
Dept: NURSING | Facility: CLINIC | Age: 84
End: 2019-01-01

## 2019-01-01 NOTE — TELEPHONE ENCOUNTER
Patient calling to ask about Iodosorb. States he saw the wound care nurse last week and she prescribed Iodosorb for wound on his foot but it hasn't arrived yet. She told him he could get a couple of little tubes.    Explained that clinics are not open today. Patient will call tomorrow.    Protocol and care advice reviewed  Caller states understanding of the recommended telephone advice.    Advised to call back if further questions or concerns      Additional Information    Caller has medication question only, adult not sick, and triager answers question    Protocols used: MEDICATION QUESTION CALL-ADULT-

## 2019-01-01 NOTE — TELEPHONE ENCOUNTER
Clinic Action Needed: Yes  [facility] Triage Call  Presenting Problem:    Patient calling to ask about Iodosorb. States he saw the wound care nurse last week and she prescribed Iodosorb for wound on his foot but it hasn't arrived yet. She told him he could get a couple of little tubes.    Explained that clinics are not open today. Patient will call tomorrow.    Routed to: Barb Hood RN  [facility]

## 2019-01-03 NOTE — TELEPHONE ENCOUNTER
Iodosorb left at desk for pt, order placed via [employer] for 40 gram tube. Pt notified.    Kamille Calzada RN, CWOCN

## 2019-01-04 ENCOUNTER — TRANSFERRED RECORDS (OUTPATIENT)
Dept: HEALTH INFORMATION MANAGEMENT | Facility: CLINIC | Age: 84
End: 2019-01-04

## 2019-01-07 ENCOUNTER — TELEPHONE (OUTPATIENT)
Dept: OTHER | Facility: CLINIC | Age: 84
End: 2019-01-07

## 2019-01-07 DIAGNOSIS — I10 ESSENTIAL HYPERTENSION, BENIGN: ICD-10-CM

## 2019-01-07 RX ORDER — LISINOPRIL 40 MG/1
40 TABLET ORAL DAILY
Qty: 90 TABLET | Refills: 3 | Status: CANCELLED | OUTPATIENT
Start: 2019-01-07

## 2019-01-07 NOTE — TELEPHONE ENCOUNTER
Per Dr. Cali, pt to be scheduled for OV the week of 1-14-19.     I called pt and left vm to scheduled either the 14th or 17th at [facility] with Dr. Cali.   Awaiting return call.     Venita Villasenor, GUADALUPEN, RN

## 2019-01-07 NOTE — TELEPHONE ENCOUNTER
"Requested Prescriptions   Pending Prescriptions Disp Refills     lisinopril (PRINIVIL/ZESTRIL) 40 MG tablet 90 tablet 3    Last Written Prescription Date:  4/4/18  Last Fill Quantity: 90,  # refills: 3   Last office visit: 11/19/2018 with prescribing provider:  Purnima   Future Office Visit:     Sig: Take 1 tablet (40 mg) by mouth daily    ACE Inhibitors (Including Combos) Protocol Failed - 1/7/2019 11:22 AM       Failed - Blood pressure under 140/90 in past 12 months    BP Readings from Last 3 Encounters:   12/27/18 140/81   12/11/18 164/81   12/04/18 152/90                Passed - Recent (12 mo) or future (30 days) visit within the authorizing provider's specialty    Patient had office visit in the last 12 months or has a visit in the next 30 days with authorizing provider or within the authorizing provider's specialty.  See \"Patient Info\" tab in inbasket, or \"Choose Columns\" in Meds & Orders section of the refill encounter.             Passed - Medication is active on med list       Passed - Patient is age 18 or older       Passed - Normal serum creatinine on file in past 12 months    Recent Labs   Lab Test 12/11/18  1150   CR 0.94            Passed - Normal serum potassium on file in past 12 months    Recent Labs   Lab Test 12/04/18  0854   POTASSIUM 3.7               "

## 2019-01-10 DIAGNOSIS — E11.29 TYPE 2 DIABETES MELLITUS WITH MICROALBUMINURIA, WITH LONG-TERM CURRENT USE OF INSULIN (H): Primary | ICD-10-CM

## 2019-01-10 DIAGNOSIS — Z79.4 TYPE 2 DIABETES MELLITUS WITH MICROALBUMINURIA, WITH LONG-TERM CURRENT USE OF INSULIN (H): Primary | ICD-10-CM

## 2019-01-10 DIAGNOSIS — R80.9 TYPE 2 DIABETES MELLITUS WITH MICROALBUMINURIA, WITH LONG-TERM CURRENT USE OF INSULIN (H): Primary | ICD-10-CM

## 2019-01-10 PROBLEM — E11.3293 TYPE 2 DIABETES MELLITUS WITH BOTH EYES AFFECTED BY MILD NONPROLIFERATIVE RETINOPATHY WITHOUT MACULAR EDEMA, WITH LONG-TERM CURRENT USE OF INSULIN (H): Status: ACTIVE | Noted: 2018-07-30

## 2019-01-10 PROBLEM — H35.30 MACULAR DEGENERATION (SENILE) OF RETINA: Status: ACTIVE | Noted: 2019-01-10

## 2019-01-14 ENCOUNTER — OFFICE VISIT (OUTPATIENT)
Dept: PODIATRY | Facility: CLINIC | Age: 84
End: 2019-01-14
Payer: COMMERCIAL

## 2019-01-14 VITALS
DIASTOLIC BLOOD PRESSURE: 82 MMHG | HEIGHT: 72 IN | SYSTOLIC BLOOD PRESSURE: 149 MMHG | HEART RATE: 79 BPM | BODY MASS INDEX: 31.29 KG/M2 | WEIGHT: 231 LBS

## 2019-01-14 DIAGNOSIS — L97.421 DIABETIC ULCER OF LEFT MIDFOOT ASSOCIATED WITH DIABETES MELLITUS DUE TO UNDERLYING CONDITION, LIMITED TO BREAKDOWN OF SKIN (H): ICD-10-CM

## 2019-01-14 DIAGNOSIS — E11.51 TYPE 2 DIABETES MELLITUS WITH PERIPHERAL VASCULAR DISEASE (H): ICD-10-CM

## 2019-01-14 DIAGNOSIS — E08.621 DIABETIC ULCER OF LEFT MIDFOOT ASSOCIATED WITH DIABETES MELLITUS DUE TO UNDERLYING CONDITION, LIMITED TO BREAKDOWN OF SKIN (H): ICD-10-CM

## 2019-01-14 DIAGNOSIS — L97.512 TOE ULCER, RIGHT, WITH FAT LAYER EXPOSED (H): Primary | ICD-10-CM

## 2019-01-14 PROCEDURE — 11042 DBRDMT SUBQ TIS 1ST 20SQCM/<: CPT | Performed by: PODIATRIST

## 2019-01-14 ASSESSMENT — MIFFLIN-ST. JEOR: SCORE: 1785.81

## 2019-01-14 NOTE — PROGRESS NOTES
Fawad returns to the office for reevaluation of the right and left foot.  The patient relates following the instructions given at the last visit with noted less pain.  The patient relates overall more  improvement in pain and function of the right and left foot.  The patient relates no other problems.      PAST MEDICAL HISTORY:   Past Medical History:   Diagnosis Date     Diabetes mellitus (H)      Hypertension        BMI= Body mass index is 31.33 kg/m .    Weight management plan: Patient was referred to their PCP to discuss a diet and exercise plan.    Physical Exam:    General: The patient appears to have a pleasant mental affect.    Lower extremity physical exam:  Neurovascular status is intact with palpable pedal pulses and intact epicritic sensations.  Muscular exam is within normal limits to major muscle groups.  Integument is intact.      One notes decreased edema.  One notes full-thickness ulcer on the plantar aspect of the right foot.  Noted full thickness ulcer on the plantar aspect of the fifth metatarsal on the left.  No surrounding erythema noted.  Hyperkeratotic borders noted on both ulcers.  Negative probe to bone         Assessment:      ICD-10-CM    1. Toe ulcer, right, with fat layer exposed (H) L97.512 DEBRIDE SKIN/SUBQ TISSUE   2. Type 2 diabetes mellitus with peripheral vascular disease (H) E11.51 DEBRIDE SKIN/SUBQ TISSUE   3. 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  At this time, the ulcers were sharply debrided with a #10 blade down to healthy subdermal tissue.  Bleeding noted.  Wounds were dressed with a sterile bandage.  Patient is to follow-up with vascular for further evaluation and treatment options.  Patient will return in 2 weeks for reevaluation.    Disclaimer: This note consists of symbols  derived from keyboarding, dictation and/or voice recognition software. As a result, there may be errors in the script that have gone undetected. Please consider this when interpreting information found in this chart.       LIANG Montesinos D.P.M., FIDEL.F.A.S.

## 2019-01-14 NOTE — TELEPHONE ENCOUNTER
Pt called back, I offered pt earlier appointments at [facility], pt declined and requested appt in [place]. Dr. Cali in [place] next is 2-5-19, pt scheduled then. Pt notes understanding.     Venita Villasenor, GUADALUPEN, RN

## 2019-01-14 NOTE — LETTER
1/14/2019         RE: Fawad Garcia  [address] [facility] [address]        Dear Colleague,    Thank you for referring your patient, Fawad Garcia, to the [place] [facility]. Please see a copy of my visit note below.    Fawad returns to the office for reevaluation of the right and left foot.  The patient relates following the instructions given at the last visit with noted less pain.  The patient relates overall more  improvement in pain and function of the right and left foot.  The patient relates no other problems.      PAST MEDICAL HISTORY:   Past Medical History:   Diagnosis Date     Diabetes mellitus (H)      Hypertension        BMI= Body mass index is 31.33 kg/m .    Weight management plan: Patient was referred to their PCP to discuss a diet and exercise plan.    Physical Exam:    General: The patient appears to have a pleasant mental affect.    Lower extremity physical exam:  Neurovascular status is intact with palpable pedal pulses and intact epicritic sensations.  Muscular exam is within normal limits to major muscle groups.  Integument is intact.      One notes decreased edema.  One notes full-thickness ulcer on the plantar aspect of the right foot.  Noted full thickness ulcer on the plantar aspect of the fifth metatarsal on the left.  No surrounding erythema noted.  Hyperkeratotic borders noted on both ulcers.  Negative probe to bone         Assessment:      ICD-10-CM    1. Toe ulcer, right, with fat layer exposed (H) L97.512 DEBRIDE SKIN/SUBQ TISSUE   2. Type 2 diabetes mellitus with peripheral vascular disease (H) E11.51 DEBRIDE SKIN/SUBQ TISSUE   3. 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  At this time, the ulcers were sharply debrided with a #10 blade down to  healthy subdermal tissue.  Bleeding noted.  Wounds were dressed with a sterile bandage.  Patient is to follow-up with vascular for further evaluation and treatment options.  Patient will return in 2 weeks for reevaluation.    Disclaimer: This note consists of symbols derived from keyboarding, dictation and/or voice recognition software. As a result, there may be errors in the script that have gone undetected. Please consider this when interpreting information found in this chart.       LIANG Montesinos D.P.M., F.A.C.F.A.S.      Again, thank you for allowing me to participate in the care of your patient.        Sincerely,        Ethan Montesinos DPM

## 2019-01-14 NOTE — PATIENT INSTRUCTIONS
Patient to follow up with Primary Care provider regarding elevated blood pressure.      Follow up with Dr. Cali.

## 2019-01-14 NOTE — NURSING NOTE
Chief Complaint   Patient presents with     RECHECK     Rt great toe pain, callus on left foot, BL feet check       Initial /82 (BP Location: Left arm, Patient Position: Sitting, Cuff Size: Adult Large)   Pulse 79   Ht 1.829 m (6')   Wt 104.8 kg (231 lb)   BMI 31.33 kg/m   Estimated body mass index is 31.33 kg/m  as calculated from the following:    Height as of this encounter: 1.829 m (6').    Weight as of this encounter: 104.8 kg (231 lb).  Medications and allergies reviewed.      Dana GUILLERMO MA

## 2019-01-22 DIAGNOSIS — I10 HYPERTENSION, GOAL BELOW 140/90: ICD-10-CM

## 2019-01-22 DIAGNOSIS — Z79.4 TYPE 2 DIABETES MELLITUS WITH BOTH EYES AFFECTED BY MILD NONPROLIFERATIVE RETINOPATHY WITHOUT MACULAR EDEMA, WITH LONG-TERM CURRENT USE OF INSULIN (H): Primary | ICD-10-CM

## 2019-01-22 DIAGNOSIS — E11.3293 TYPE 2 DIABETES MELLITUS WITH BOTH EYES AFFECTED BY MILD NONPROLIFERATIVE RETINOPATHY WITHOUT MACULAR EDEMA, WITH LONG-TERM CURRENT USE OF INSULIN (H): Primary | ICD-10-CM

## 2019-01-22 RX ORDER — HYDROCHLOROTHIAZIDE 25 MG/1
25 TABLET ORAL DAILY
Qty: 90 TABLET | Refills: 3 | OUTPATIENT
Start: 2019-01-22

## 2019-01-22 NOTE — TELEPHONE ENCOUNTER
"Requested Prescriptions   Pending Prescriptions Disp Refills     hydrochlorothiazide (HYDRODIURIL) 25 MG tablet 90 tablet 3    Last Written Prescription Date:  4/4/18  Last Fill Quantity: 90 tab,  # refills: 3   Last office visit: 11/19/2018 with prescribing provider:  Purnima   Future Office Visit:   Next 5 appointments (look out 90 days)    Jan 30, 2019  2:40 PM CST  [facility] [address], [place] [address]  [phone]   Feb 05, 2019 11:45 AM CST  Return Visit with Ernie Cali MD  [facility] [address] [address]  [phone]          Sig: Take 1 tablet (25 mg) by mouth daily    Diuretics (Including Combos) Protocol Failed - 1/22/2019  8:54 AM       Failed - Blood pressure under 140/90 in past 12 months    BP Readings from Last 3 Encounters:   01/14/19 149/82   12/27/18 140/81   12/11/18 164/81                Passed - Recent (12 mo) or future (30 days) visit within the authorizing provider's specialty    Patient had office visit in the last 12 months or has a visit in the next 30 days with authorizing provider or within the authorizing provider's specialty.  See \"Patient Info\" tab in inbasket, or \"Choose Columns\" in Meds & Orders section of the refill encounter.             Passed - Medication is active on med list       Passed - Patient is age 18 or older       Passed - Normal serum creatinine on file in past 12 months    Recent Labs   Lab Test 12/11/18  1150   CR 0.94             Passed - Normal serum potassium on file in past 12 months    Recent Labs   Lab Test 12/04/18  0854   POTASSIUM 3.7                   Passed - Normal serum sodium on file in past 12 months    Recent Labs   Lab Test 12/04/18  0854                   "

## 2019-01-22 NOTE — TELEPHONE ENCOUNTER
Duplicate. Should not be out.   Medication Detail      Disp Refills Start End JAMARI   hydrochlorothiazide (HYDRODIURIL) 25 MG tablet 90 tablet 3 4/4/2018  No   Sig - Route: Take 1 tablet (25 mg) by mouth daily - Oral   Sent to pharmacy as: hydrochlorothiazide (HYDRODIURIL) 25 MG tablet   Class: E-Prescribe   Order: [id number]   E-Prescribing Status: Receipt confirmed by pharmacy (4/4/2018 12:23 PM CDT)     Radha THOMAS RN

## 2019-01-23 ENCOUNTER — OFFICE VISIT (OUTPATIENT)
Dept: FAMILY MEDICINE | Facility: CLINIC | Age: 84
End: 2019-01-23
Payer: COMMERCIAL

## 2019-01-23 VITALS
HEART RATE: 91 BPM | DIASTOLIC BLOOD PRESSURE: 68 MMHG | SYSTOLIC BLOOD PRESSURE: 128 MMHG | BODY MASS INDEX: 31.18 KG/M2 | HEIGHT: 72 IN | OXYGEN SATURATION: 96 % | TEMPERATURE: 100.7 F | WEIGHT: 230.2 LBS

## 2019-01-23 DIAGNOSIS — E11.621 DIABETIC ULCER OF TOE OF RIGHT FOOT ASSOCIATED WITH TYPE 2 DIABETES MELLITUS, UNSPECIFIED ULCER STAGE (H): ICD-10-CM

## 2019-01-23 DIAGNOSIS — J20.9 ACUTE BRONCHITIS WITH SYMPTOMS > 10 DAYS: Primary | ICD-10-CM

## 2019-01-23 DIAGNOSIS — L97.519 DIABETIC ULCER OF TOE OF RIGHT FOOT ASSOCIATED WITH TYPE 2 DIABETES MELLITUS, UNSPECIFIED ULCER STAGE (H): ICD-10-CM

## 2019-01-23 DIAGNOSIS — C61 MALIGNANT NEOPLASM OF PROSTATE (H): ICD-10-CM

## 2019-01-23 PROCEDURE — 99213 OFFICE O/P EST LOW 20 MIN: CPT | Performed by: FAMILY MEDICINE

## 2019-01-23 RX ORDER — AZITHROMYCIN 250 MG/1
TABLET, FILM COATED ORAL
Qty: 6 TABLET | Refills: 0 | Status: SHIPPED | OUTPATIENT
Start: 2019-01-23 | End: 2019-02-27

## 2019-01-23 ASSESSMENT — MIFFLIN-ST. JEOR: SCORE: 1782.18

## 2019-01-23 NOTE — PATIENT INSTRUCTIONS
I am hearing that you have a bronchitis.    Due to your health conditions and duration I am putting you on azithromycin for 5 days.  This is an antibiotic.    Drink fluids.          Thank you for choosing [facility].  You may be receiving a survey in the mail from Marcia Millard regarding your visit today.  Please take a few minutes to complete and return the survey to let us know how we are doing.      If you have questions or concerns, please contact us via [email or web address] or you can contact your care team at [phone].    Our Clinic hours are:  Monday 6:40 am  to 7:00 pm  Tuesday -Friday 6:40 am to 5:00 pm    The [place] outpatient lab hours are:  Monday - Friday 6:10 am to 4:45 pm  Saturdays 7:00 am to 11:00 am  Appointments are required, call [phone]    If you have clinical questions after hours or would like to schedule an appointment,  call the clinic at [phone].

## 2019-01-23 NOTE — PROGRESS NOTES
SUBJECTIVE:   Fawad Garcia is a 83 year old male who presents to clinic today for the following health issues:        ENT Symptoms             Symptoms: cc Present Absent Comment   Fever/Chills  x  100.7   Fatigue  x     Muscle Aches   x    Eye Irritation   x    Sneezing  x     Nasal Gorge/Drg  x  Drainage, Congestion, runny nose    Sinus Pressure/Pain   x    Loss of smell   x    Dental pain   x    Sore Throat   x    Swollen Glands   x    Ear Pain/Fullness       Cough  x  Productive white/ yellow phlegm    Wheeze  x     Chest Pain   x    Shortness of breath   x    Rash   x    Other         Symptom duration:  week in a half    Symptom severity:  moderate    Treatments tried:  none   Contacts: Wife has same symptoms              Problem list and histories reviewed & adjusted, as indicated.  Additional history: as documented        Reviewed and updated as needed this visit by clinical staff  Tobacco  Allergies  Meds  Med Hx  Surg Hx  Fam Hx  Soc Hx      Reviewed and updated as needed this visit by Provider         ROS:  CONSTITUTIONAL:as above  INTEGUMENTARY/SKIN: NEGATIVE for worrisome rashes, moles or lesions  RESP:as above  CV: NEGATIVE for chest pain, palpitations or peripheral edema  GI: NEGATIVE for nausea, abdominal pain, heartburn, or change in bowel habits  MUSCULOSKELETAL: NEGATIVE for significant arthralgias or myalgia  NEURO: NEGATIVE for weakness, dizziness or paresthesias  PSYCHIATRIC: NEGATIVE for changes in mood or affect    OBJECTIVE:                                                    /68   Pulse 91   Temp 100.7  F (38.2  C) (Tympanic)   Ht 1.829 m (6')   Wt 104.4 kg (230 lb 3.2 oz)   SpO2 96%   BMI 31.22 kg/m     Body mass index is 31.22 kg/m .  GENERAL APPEARANCE: alert, no distress and cooperative  HENT: ear canals and TM's normal and nose and mouth without ulcers or lesions  NECK: no adenopathy, no asymmetry, masses, or scars and thyroid normal to palpation  RESP: diffuse  rhonchi and also productive cough today  CV: regular rates and rhythm, normal S1 S2, no S3 or S4 and no murmur, click or rub  SKIN: no suspicious lesions or rashes  NEURO: Normal strength and tone, mentation intact and speech normal  PSYCH: mentation appears normal and affect normal/bright         ASSESSMENT/PLAN:                                                    1. Acute bronchitis with symptoms > 10 days  Will treat with abx.  - azithromycin (ZITHROMAX) 250 MG tablet; Take 2 tablets (500 mg) by mouth daily for 1 day, THEN 1 tablet (250 mg) daily for 4 days.  Dispense: 6 tablet; Refill: 0    2. Malignant neoplasm of prostate (H)  Seeing specialist    3. Diabetic ulcer of toe of right foot associated with type 2 diabetes mellitus, unspecified ulcer stage (H)  He is seeing specialist.      See Patient Instructions    Chris Scott MD  [facility]

## 2019-02-05 ENCOUNTER — OFFICE VISIT (OUTPATIENT)
Dept: VASCULAR SURGERY | Facility: CLINIC | Age: 84
End: 2019-02-05
Payer: COMMERCIAL

## 2019-02-05 VITALS — RESPIRATION RATE: 16 BRPM | SYSTOLIC BLOOD PRESSURE: 154 MMHG | HEART RATE: 77 BPM | DIASTOLIC BLOOD PRESSURE: 86 MMHG

## 2019-02-05 DIAGNOSIS — I70.229 CRITICAL ISCHEMIA OF LOWER EXTREMITY (H): Primary | ICD-10-CM

## 2019-02-05 PROCEDURE — 99213 OFFICE O/P EST LOW 20 MIN: CPT | Performed by: SURGERY

## 2019-02-05 NOTE — LETTER
[facility] [address]  [place] [address]  Phone: [phone]  Fax: [phone]      2019       Re: Fawad Garcia - 1935    Mr. Garcia is an 83-year-old male who had seen previously for nonhealing wound in the right great toe.  He underwent an arteriogram of the right lower extremity on 2018. This showed there was 1 short segment of a cauliflower-like lesion in the right popliteal artery. Anterior and posterior tibial arteries are occluded.  The runoff was by way of the peroneal artery.  In the terminal leg the peroneal artery reconstituted at the distal posterior tibial artery. There was reconstitution of the dorsalis pedis artery as well.  At that point we had decided to give more time to the right foot wound.  And if the wound is not healing then we can proceed with the popliteal to posterior tibial artery bypass.     In the interval between his angiogram and now there is significant improvement in the wound in his right great toe.     I think it is worthwhile to give him more time to let this heal on its own.  He has been using Iodosorb.     I will see him back for a clinic visit in 3 months.  If the wound is not healing then we can evaluate the possibility of a bypass.  At present I would not recommend any surgical intervention from the vascular surgery standpoint.      LAUREN CASANOVA MD

## 2019-02-05 NOTE — NURSING NOTE
Initial /86 (BP Location: Right arm, Patient Position: Chair, Cuff Size: Adult Regular)   Pulse 77   Resp 16  Estimated body mass index is 31.22 kg/m  as calculated from the following:    Height as of 1/23/19: 1.829 m (6').    Weight as of 1/23/19: 104.4 kg (230 lb 3.2 oz). .    alberto gay LPN

## 2019-02-05 NOTE — PROGRESS NOTES
Mr. Garcia is an 83-year-old male who had seen previously for nonhealing wound in the right great toe.  He underwent an arteriogram of the right lower extremity on 11 December 2018.  This showed there was 1 short segment of a cauliflower-like lesion in the right popliteal artery.  Anterior and posterior tibial arteries are occluded.  The runoff was by way of the peroneal artery.  In the terminal leg the peroneal artery reconstituted at the distal posterior tibial artery. There was reconstitution of the dorsalis pedis artery as well.  At that point we had decided to give more time to the right foot wound.  And if the wound is not healing then we can proceed with the popliteal to posterior tibial artery bypass.    In the interval between his angiogram and now there is significant improvement in the wound in his right great toe.    I think it is worthwhile to give him more time to let this heal on its own.  He has been using Iodosorb.    I will see him back for a clinic visit in 3 months.  If the wound is not healing then we can evaluate the possibility of a bypass.  At present I would not recommend any surgical intervention from the vascular surgery standpoint.

## 2019-02-11 ENCOUNTER — TELEPHONE (OUTPATIENT)
Dept: FAMILY MEDICINE | Facility: CLINIC | Age: 84
End: 2019-02-11

## 2019-02-12 NOTE — TELEPHONE ENCOUNTER
[place] Dept of Trans - diabetic driving form started and routed to DR. Scott for review and signature.

## 2019-02-21 ENCOUNTER — OFFICE VISIT (OUTPATIENT)
Dept: PODIATRY | Facility: CLINIC | Age: 84
End: 2019-02-21
Payer: COMMERCIAL

## 2019-02-21 VITALS
HEIGHT: 72 IN | DIASTOLIC BLOOD PRESSURE: 64 MMHG | WEIGHT: 230 LBS | BODY MASS INDEX: 31.15 KG/M2 | SYSTOLIC BLOOD PRESSURE: 113 MMHG | HEART RATE: 55 BPM

## 2019-02-21 DIAGNOSIS — L97.512 TOE ULCER, RIGHT, WITH FAT LAYER EXPOSED (H): Primary | ICD-10-CM

## 2019-02-21 DIAGNOSIS — L97.421 DIABETIC ULCER OF LEFT MIDFOOT ASSOCIATED WITH DIABETES MELLITUS DUE TO UNDERLYING CONDITION, LIMITED TO BREAKDOWN OF SKIN (H): ICD-10-CM

## 2019-02-21 DIAGNOSIS — E11.51 TYPE 2 DIABETES MELLITUS WITH PERIPHERAL VASCULAR DISEASE (H): ICD-10-CM

## 2019-02-21 DIAGNOSIS — E08.621 DIABETIC ULCER OF LEFT MIDFOOT ASSOCIATED WITH DIABETES MELLITUS DUE TO UNDERLYING CONDITION, LIMITED TO BREAKDOWN OF SKIN (H): ICD-10-CM

## 2019-02-21 PROCEDURE — 11042 DBRDMT SUBQ TIS 1ST 20SQCM/<: CPT | Performed by: PODIATRIST

## 2019-02-21 ASSESSMENT — MIFFLIN-ST. JEOR: SCORE: 1781.27

## 2019-02-21 NOTE — LETTER
2/21/2019         RE: Fawad Garcia  [address] [facility] [address]        Dear Colleague,    Thank you for referring your patient, Fawad Garcia, to the [facility]. Please see a copy of my visit note below.    Fawad returns to the office for reevaluation of the right and left foot.  The patient has recently been seen by vascular who deemed his lower extremity vascular condition stable at this point.  No further treatment was recommended.  The patient has been having his wounds cared for by visiting nurses.  Patient denies any fevers or chills.    PAST MEDICAL HISTORY:   Past Medical History:   Diagnosis Date     Diabetes mellitus (H)      Hypertension        BMI= Body mass index is 31.19 kg/m .    Weight management plan: Patient was referred to their PCP to discuss a diet and exercise plan.    Physical Exam:    General: The patient appears to have a pleasant mental affect.    Lower extremity physical exam:  Neurovascular status is intact with palpable pedal pulses and intact epicritic sensations.  Muscular exam is within normal limits to major muscle groups.  Integument is intact.      One notes decreased edema.  One notes full-thickness ulcer on the plantar aspect of the right foot.  Noted full thickness ulcer on the plantar aspect of the fifth metatarsal on the left.  No surrounding erythema noted.  Hyperkeratotic borders noted on both ulcers.  Negative probe to bone         Assessment:      ICD-10-CM    1. Toe ulcer, right, with fat layer exposed (H) L97.512    2. Type 2 diabetes mellitus with peripheral vascular disease (H) E11.51    3. 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  At this time, the ulcers were sharply debrided with a #10 blade down to healthy subdermal tissue.  Bleeding  noted.  Wounds were dressed with a sterile bandage.  Patient is to follow-up with vascular for further evaluation and treatment options.  Patient will return in 2 weeks for reevaluation.    Disclaimer: This note consists of symbols derived from keyboarding, dictation and/or voice recognition software. As a result, there may be errors in the script that have gone undetected. Please consider this when interpreting information found in this chart.       LIANG Montesinos D.P.M., F.A.C.F.A.S.    Again, thank you for allowing me to participate in the care of your patient.        Sincerely,        Ethan Montesinos DPM

## 2019-02-21 NOTE — PROGRESS NOTES
Fawad returns to the office for reevaluation of the right and left foot.  The patient has recently been seen by vascular who deemed his lower extremity vascular condition stable at this point.  No further treatment was recommended.  The patient has been having his wounds cared for by visiting nurses.  Patient denies any fevers or chills.    PAST MEDICAL HISTORY:   Past Medical History:   Diagnosis Date     Diabetes mellitus (H)      Hypertension        BMI= Body mass index is 31.19 kg/m .    Weight management plan: Patient was referred to their PCP to discuss a diet and exercise plan.    Physical Exam:    General: The patient appears to have a pleasant mental affect.    Lower extremity physical exam:  Neurovascular status is intact with palpable pedal pulses and intact epicritic sensations.  Muscular exam is within normal limits to major muscle groups.  Integument is intact.      One notes decreased edema.  One notes full-thickness ulcer on the plantar aspect of the right foot.  Noted full thickness ulcer on the plantar aspect of the fifth metatarsal on the left.  No surrounding erythema noted.  Hyperkeratotic borders noted on both ulcers.  Negative probe to bone         Assessment:      ICD-10-CM    1. Toe ulcer, right, with fat layer exposed (H) L97.512    2. Type 2 diabetes mellitus with peripheral vascular disease (H) E11.51    3. 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  At this time, the ulcers were sharply debrided with a #10 blade down to healthy subdermal tissue.  Bleeding noted.  Wounds were dressed with a sterile bandage.  Patient is to follow-up with vascular for further evaluation and treatment options.  Patient will return in 2 weeks for reevaluation.    Disclaimer: This note consists of symbols derived from keyboarding, dictation  and/or voice recognition software. As a result, there may be errors in the script that have gone undetected. Please consider this when interpreting information found in this chart.       LIANG Montesinos D.P.M., FRODRICK.[email or web address].F.A.S.

## 2019-02-21 NOTE — NURSING NOTE
Chief Complaint   Patient presents with     RECHECK     F/u on wounds BL foot       Initial /64 (BP Location: Left arm, Patient Position: Sitting, Cuff Size: Adult Regular)   Pulse 55   Ht 1.829 m (6')   Wt 104.3 kg (230 lb)   BMI 31.19 kg/m   Estimated body mass index is 31.19 kg/m  as calculated from the following:    Height as of this encounter: 1.829 m (6').    Weight as of this encounter: 104.3 kg (230 lb).  Medications and allergies reviewed.      Dana GUILLERMO MA

## 2019-02-25 ENCOUNTER — TELEPHONE (OUTPATIENT)
Dept: FAMILY MEDICINE | Facility: CLINIC | Age: 84
End: 2019-02-25

## 2019-02-25 NOTE — TELEPHONE ENCOUNTER
"Fawad Garcia is a 83 year old male who calls with chest pain.     PRESENTING PROBLEM:  Chest discomfort since 2/18/19.     NURSING ASSESSMENT:  Patient complains of chest pain and chest pressure/discomfort.  Onset:  2/18/18  Pain is characterized as squeeze and tightness.    Severity moderate  Located left chest and right chest  Radiates to none.  Duration when rising from a lying to standing position, especially the left side when getting up.   Associated symptoms: increased pain when sneezing or coughing. Patient described as \"if feels like a seatbelt tightening.\" Patient reports there is slight bruising to the left side of chest  Exacerbated by deep inspiration or coughing, sitting up.  Relieved by rest.  Cardiac risk factors: hypertension, diabetes, high cholesterol, obesity, PVD, and Previous TCI.    Allergies:   Allergies   Allergen Reactions     Zocor [Simvastatin - High Dose]      Bilateral hip aching       MEDICATIONS:  Taking medication(s) as prescribed? Yes  Taking over the counter medication(s) ?Yes  Any medication side effects? No significant side effects    Any barriers to taking medication(s) as prescribed?  No  Medication(s) improving/managing symptoms?  Yes  Medication reconciliation completed: Yes    Last exam/Treatment:  1/23/19     NURSING PLAN: Nursing advice to patient be seen today.     RECOMMENDED DISPOSITION:  Call 911, Emergency Department NOW for evaluation, another person to drive - patient declined several times. Patient declined due to below comments. Patient advised several times to call 911, patient declined and was hesitant to schedule an appointment stating \"can't Dr. Scott just see me?\" Advised PCP schedule full, Again advised call 911. Patient again declines and is hesitant to be seen.    Will comply with recommendation: No- Barriers to comply with plan of care Declined to call 911, or seek [facility] to cost and transportation. Patient declined apts offered today, " tomorrow and declined Emergency department. .     Scheduled Wednesday at patient's request with alternate provider Dr. Pa at 2:20pm.   If further questions/concerns or if symptoms do not improve, worsen or new symptoms develop, call your PCP or [facility] as soon as possible.      Guideline used:  Telephone Triage Protocols for Nurses, Fifth Edition, Yuliya Gauthier RN

## 2019-02-27 ENCOUNTER — ANCILLARY PROCEDURE (OUTPATIENT)
Dept: GENERAL RADIOLOGY | Facility: CLINIC | Age: 84
End: 2019-02-27
Attending: INTERNAL MEDICINE
Payer: COMMERCIAL

## 2019-02-27 ENCOUNTER — OFFICE VISIT (OUTPATIENT)
Dept: FAMILY MEDICINE | Facility: CLINIC | Age: 84
End: 2019-02-27
Payer: COMMERCIAL

## 2019-02-27 VITALS
DIASTOLIC BLOOD PRESSURE: 66 MMHG | SYSTOLIC BLOOD PRESSURE: 100 MMHG | WEIGHT: 225.2 LBS | BODY MASS INDEX: 30.5 KG/M2 | HEIGHT: 72 IN | HEART RATE: 82 BPM | TEMPERATURE: 98.5 F | RESPIRATION RATE: 24 BRPM | OXYGEN SATURATION: 98 %

## 2019-02-27 DIAGNOSIS — E78.5 HYPERLIPIDEMIA LDL GOAL <70: ICD-10-CM

## 2019-02-27 DIAGNOSIS — R07.89 LEFT-SIDED CHEST WALL PAIN: Primary | ICD-10-CM

## 2019-02-27 PROCEDURE — 99213 OFFICE O/P EST LOW 20 MIN: CPT | Performed by: INTERNAL MEDICINE

## 2019-02-27 PROCEDURE — 71101 X-RAY EXAM UNILAT RIBS/CHEST: CPT | Mod: LT

## 2019-02-27 RX ORDER — PRAVASTATIN SODIUM 10 MG
10 TABLET ORAL DAILY
Qty: 90 TABLET | Refills: 2 | Status: SHIPPED | OUTPATIENT
Start: 2019-02-27 | End: 2019-04-10

## 2019-02-27 ASSESSMENT — MIFFLIN-ST. JEOR: SCORE: 1759.5

## 2019-02-27 ASSESSMENT — PAIN SCALES - GENERAL: PAINLEVEL: NO PAIN (0)

## 2019-02-27 NOTE — PATIENT INSTRUCTIONS
Consider an antihistamine to decrease sneezing or a cough med to decrease cough so the pain isn't bother you as much.  Continue the ibuprofen as needed for the pain.       Thank you for choosing [facility].  You may be receiving a survey in the mail from Marcia Millard regarding your visit today.  Please take a few minutes to complete and return the survey to let us know how we are doing.      If you have questions or concerns, please contact us via [employer] or you can contact your care team at [phone].    Our Clinic hours are:  Monday 6:40 am  to 7:00 pm  Tuesday -Friday 6:40 am to 5:00 pm    The [place] outpatient lab hours are:  Monday - Friday 6:10 am to 4:45 pm  Saturdays 7:00 am to 11:00 am  Appointments are required, call [phone]    If you have clinical questions after hours or would like to schedule an appointment,  call the clinic at [phone].    Patient Education     Rib Contusion     A rib contusion is a bruise to one or more rib bones. It may cause pain, tenderness, swelling and a purplish discoloration. There may be a sharp pain while breathing.  You will be assessed for other injuries. You will likely be given pain medicine. Rib contusions heal on their own, without further treatment. However, pain may take weeks to months to go away.   Note that a small crack (fracture) in the rib may cause the same symptoms as a rib contusion. The small crack may not be seen on a chest X-ray. However, the conditions are managed in the same way.  Home care    Rest. Avoid heavy lifting, strenuous exertion, or any activity that causes pain.    Ice the area to reduce pain and swelling. Put ice cubes in a plastic bag or use a cold pack. (Wrap the cold source in a thin towel. Do not place it directly on your skin.) Ice the injured area for 20 minutes every 1 to 2 hours the first day. Continue with ice packs 3 to 4 times a day for the next 2 days, then as needed for the relief of pain and swelling.    Take  any prescribed pain medicine as directed by your healthcare provider. If none was prescribed, take acetaminophen, ibuprofen, or naproxen to control pain.    If you have a significant injury, you may be given a device called an incentive spirometer to keep your lungs healthy. Use as directed.  Follow-up care  Follow up with your healthcare provider during the next week or as directed.  When to seek medical advice  Call your healthcare provider for any of the following:    Shortness of breath or trouble breathing    Increasing chest pain with breathing    Coughing    Dizziness, weakness, or fainting    New or worsening pain    Fever of 100.4 F (38 C) or higher, or as directed by your healthcare provider  Date Last Reviewed: 2/1/2017 2000-2018 [employer]. [address] [place] [address], [place] [address]. All rights reserved. This information is not intended as a substitute for professional medical care. Always follow your healthcare professional's instructions.
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
## 2019-03-11 DIAGNOSIS — E11.9 TYPE 2 DIABETES MELLITUS WITHOUT COMPLICATION, WITH LONG-TERM CURRENT USE OF INSULIN (H): ICD-10-CM

## 2019-03-11 DIAGNOSIS — Z79.4 TYPE 2 DIABETES MELLITUS WITHOUT COMPLICATION, WITH LONG-TERM CURRENT USE OF INSULIN (H): ICD-10-CM

## 2019-03-11 NOTE — TELEPHONE ENCOUNTER
"Requested Prescriptions   Pending Prescriptions Disp Refills     blood glucose (NO BRAND SPECIFIED) test strip  Last Written Prescription Date:  18  Last Fill Quantity: 100,  # refills: 3   Last office visit: 2019 with prescribing provider:  Thierno   Future Office Visit:   Next 5 appointments (look out 90 days)    Apr 10, 2019  2:20 PM CDT  SHORT with Chris Scott MD  [facility] ([facility] [address] [address]  [phone]          100 each 3     Si strip by In Vitro route daily One touch Ultra.    Diabetic Supplies Protocol Passed - 3/11/2019  1:53 PM       Passed - Medication is active on med list       Passed - Patient is 18 years of age or older       Passed - Recent (6 mo) or future (30 days) visit within the authorizing provider's specialty    Patient had office visit in the last 6 months or has a visit in the next 30 days with authorizing provider.  See \"Patient Info\" tab in inbasket, or \"Choose Columns\" in Meds & Orders section of the refill encounter.            According to paper faxed refill request, pt is looking for the One Touch Ultra test strips.  "

## 2019-03-12 NOTE — TELEPHONE ENCOUNTER
Prescription approved per [facility] Refill Protocol.    Radha THOMAS RN

## 2019-03-15 ENCOUNTER — TELEPHONE (OUTPATIENT)
Dept: FAMILY MEDICINE | Facility: CLINIC | Age: 84
End: 2019-03-15
Payer: COMMERCIAL

## 2019-03-15 DIAGNOSIS — C61 MALIGNANT NEOPLASM OF PROSTATE (H): ICD-10-CM

## 2019-03-15 DIAGNOSIS — E11.3293 TYPE 2 DIABETES MELLITUS WITH BOTH EYES AFFECTED BY MILD NONPROLIFERATIVE RETINOPATHY WITHOUT MACULAR EDEMA, WITH LONG-TERM CURRENT USE OF INSULIN (H): Primary | ICD-10-CM

## 2019-03-15 DIAGNOSIS — Z79.4 TYPE 2 DIABETES MELLITUS WITH BOTH EYES AFFECTED BY MILD NONPROLIFERATIVE RETINOPATHY WITHOUT MACULAR EDEMA, WITH LONG-TERM CURRENT USE OF INSULIN (H): Primary | ICD-10-CM

## 2019-03-15 LAB
HBA1C MFR BLD: 6.6 % (ref 0–5.6)
PSA SERPL-MCNC: 1.1 UG/L (ref 0–4)

## 2019-03-15 PROCEDURE — 36415 COLL VENOUS BLD VENIPUNCTURE: CPT | Performed by: FAMILY MEDICINE

## 2019-03-15 PROCEDURE — 84153 ASSAY OF PSA TOTAL: CPT | Performed by: NURSE PRACTITIONER

## 2019-03-15 PROCEDURE — 83036 HEMOGLOBIN GLYCOSYLATED A1C: CPT | Performed by: FAMILY MEDICINE

## 2019-03-15 NOTE — TELEPHONE ENCOUNTER
Fawad was in lab today for his PSA check and would like to do his GLYHB at the same time (to save himself another poke). Please place order for him in Epic as he has a follow up scheduled with you for the 10th of April.  Thanks you    Martha Cardona

## 2019-03-18 ENCOUNTER — OFFICE VISIT (OUTPATIENT)
Dept: RADIATION THERAPY | Facility: OUTPATIENT CENTER | Age: 84
End: 2019-03-18
Payer: COMMERCIAL

## 2019-03-18 VITALS
HEART RATE: 75 BPM | RESPIRATION RATE: 16 BRPM | SYSTOLIC BLOOD PRESSURE: 125 MMHG | DIASTOLIC BLOOD PRESSURE: 78 MMHG | OXYGEN SATURATION: 97 % | BODY MASS INDEX: 30.79 KG/M2 | WEIGHT: 227 LBS

## 2019-03-18 DIAGNOSIS — C61 MALIGNANT NEOPLASM OF PROSTATE (H): Primary | ICD-10-CM

## 2019-03-18 NOTE — NURSING NOTE
FOLLOW-UP VISIT    Patient Name: Fawad Garcia      : 1935     Age: 83 year old        ______________________________________________________________________________     Chief Complaint   Patient presents with     Radiation Therapy     Follow up with Dr. Hua     /78 (BP Location: Left arm, Patient Position: Sitting, Cuff Size: Adult Large)   Pulse 75   Resp 16   Wt 103 kg (227 lb)   SpO2 97%   BMI 30.79 kg/m       Date Radiation Completed: 2/10/2012    Pain  Denies pain related to visit.     Meds  Current Med List Reviewed: Yes  Medication Note:     AUA: AUA Score: 13 (19 1300)  MARINA: MARINA Score: 1 (19 0700)    PSA   Date Value Ref Range Status   03/15/2019 1.10 0 - 4 ug/L Final     Comment:     Assay Method:  Chemiluminescence using Siemens Vista analyzer   2018 0.79 0 - 4 ug/L Final     Comment:     Assay Method:  Chemiluminescence using Siemens Vista analyzer   2017 0.52 0 - 4 ug/L Final     Comment:     Assay Method:  Chemiluminescence using Siemens Vista analyzer   2016 0.48 0 - 4 ug/L Final   2015 0.43 0 - 4 ug/L Final   2015 0.33 0 - 4 ug/L Final       Bowel: Normal    Bladder: nocturia, frequency and urgency  Nocturia: 4 - Once every 2 hours    Energy Level: normal    Appointments:   Urologist:       Other Notes:

## 2019-03-18 NOTE — PROGRESS NOTES
RADIATION ONCOLOGY FOLLOW UP  March 18, 2019    DISEASE TREATED: High-risk prostate cancer. Pretreatment PSA 25.5, [place] score of 4+3=7, clinical stage [id number].     RADIATION THERAPY GIVEN: 8041 cGy in 43 planned treatments, via IMRT     INTERVAL SINCE COMPLETION OF THERAPY: 7 years since completion on 02/10/2012.     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 He underwent biopsies in 2002 and 2005 and pathology was negative for malignancy. Due to the persistently rising PSA Dr. Mcintyre performed an MRI-guided biopsy of the prostate gland which revealed [place] 4+3= 7 disease in 2 of 2 cores with 90% of the cores being positive in the right inferior base and right inferior mid gland. Androgen ablation was discussed with the patient due to his high-risk disease and the patient declined due to comorbidities of the drug. Overall, he completed radiotherapy with very little toxicity.     He now presents to clinic for routine follow-up exam. Today, he denies any pressing issues or complaints and reports that all symptoms have essentially remained stable since his last visit with us one year ago. His AUA is 13/35 (stable) and MARINA is 0/25. Main urinary symptoms include nocturia and urgency. However, he denies any dysuria, hematuria, hematochezia, diarrhea, or loose stools.  He denies any pain or swelling.    PSA on 3/15/19 has continued to slowly increase to value of 1.10, previous value of 0.79 on 3/9/18. Estimated PSA-DT is 2 years. Has not met [place] definition of failure from treatment (Lester = 0.24).      PHYSICAL EXAMINATION:   Vital Signs: /78 (BP Location: Left arm, Patient Position: Sitting, Cuff Size: Adult Large)   Pulse 75   Resp 16   Wt 103 kg (227 lb)   SpO2 97%   BMI 30.79 kg/m    Abdomen is soft with no tenderness.  Bowel sound is active.  No lymphadenopathy.    LABORATORY  DATA:  PSA   Date Value Ref Range Status   03/15/2019 1.10 0 - 4 ug/L Final     Comment:     Assay Method:  Chemiluminescence using Siemens Vista analyzer   03/09/2018 0.79 0 - 4 ug/L Final     Comment:     Assay Method:  Chemiluminescence using Siemens Vista analyzer   03/13/2017 0.52 0 - 4 ug/L Final     Comment:     Assay Method:  Chemiluminescence using Siemens Vista analyzer   06/07/2016 0.48 0 - 4 ug/L Final   12/04/2015 0.43 0 - 4 ug/L Final         ASSESSMENT: Mr. Garcia is a 83 year old male with high-risk prostate cancer s/p definitive radiation therapy (prostate alone) completed almost on 2/10/12. Urinary symptoms are stable. PSA continues to very slowly rise.    PLAN:   1. PSA continues to rise and is concerning for biochemical recurrence. However, it has not yet met [place] definition of biochemical failure (2.0 above zohaib). Biochemical failure would be 2.24. Will continue to monitor at this time.     2. PSA in 6 months. RTC thereafter.       Kieran Hua M.D.  Department of Radiation Oncology  [facility]

## 2019-03-18 NOTE — LETTER
3/18/2019      RE: Fawad Garcia  [address] [facility] [address]       RADIATION ONCOLOGY FOLLOW UP  March 18, 2019    DISEASE TREATED: High-risk prostate cancer. Pretreatment PSA 25.5, [place] score of 4+3=7, clinical stage [id number].     RADIATION THERAPY GIVEN: 8041 cGy in 43 planned treatments, via IMRT     INTERVAL SINCE COMPLETION OF THERAPY: 7 years since completion on 02/10/2012.     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 He underwent biopsies in 2002 and 2005 and pathology was negative for malignancy. Due to the persistently rising PSA Dr. Mcintyre performed an MRI-guided biopsy of the prostate gland which revealed [place] 4+3= 7 disease in 2 of 2 cores with 90% of the cores being positive in the right inferior base and right inferior mid gland. Androgen ablation was discussed with the patient due to his high-risk disease and the patient declined due to comorbidities of the drug. Overall, he completed radiotherapy with very little toxicity.     He now presents to clinic for routine follow-up exam. Today, he denies any pressing issues or complaints and reports that all symptoms have essentially remained stable since his last visit with us one year ago. His AUA is 13/35 (stable) and MARINA is 0/25. Main urinary symptoms include nocturia and urgency. However, he denies any dysuria, hematuria, hematochezia, diarrhea, or loose stools.  He denies any pain or swelling.    PSA on 3/15/19 has continued to slowly increase to value of 1.10, previous value of 0.79 on 3/9/18. Estimated PSA-DT is 2 years. Has not met [place] definition of failure from treatment (Lester = 0.24).      PHYSICAL EXAMINATION:   Vital Signs: /78 (BP Location: Left arm, Patient Position: Sitting, Cuff Size: Adult Large)   Pulse 75   Resp 16   Wt 103 kg (227 lb)   SpO2 97%   BMI 30.79 kg/m     Abdomen  is soft with no tenderness.  Bowel sound is active.  No lymphadenopathy.    LABORATORY DATA:  PSA   Date Value Ref Range Status   03/15/2019 1.10 0 - 4 ug/L Final     Comment:     Assay Method:  Chemiluminescence using Siemens Vista analyzer   03/09/2018 0.79 0 - 4 ug/L Final     Comment:     Assay Method:  Chemiluminescence using Siemens Vista analyzer   03/13/2017 0.52 0 - 4 ug/L Final     Comment:     Assay Method:  Chemiluminescence using Siemens Vista analyzer   06/07/2016 0.48 0 - 4 ug/L Final   12/04/2015 0.43 0 - 4 ug/L Final         ASSESSMENT: Mr. Garcia is a 83 year old male with high-risk prostate cancer s/p definitive radiation therapy (prostate alone) completed almost on 2/10/12. Urinary symptoms are stable. PSA continues to very slowly rise.    PLAN:   1. PSA continues to rise and is concerning for biochemical recurrence. However, it has not yet met [place] definition of biochemical failure (2.0 above zohaib). Biochemical failure would be 2.24. Will continue to monitor at this time.     2. PSA in 6 months. RTC thereafter.       Kieran Hua M.D.  Department of Radiation Oncology  [facility]

## 2019-03-19 DIAGNOSIS — E11.51 TYPE 2 DIABETES MELLITUS WITH DIABETIC PERIPHERAL ANGIOPATHY WITHOUT GANGRENE (H): ICD-10-CM

## 2019-03-19 NOTE — TELEPHONE ENCOUNTER
"Requested Prescriptions   Pending Prescriptions Disp Refills     ONETOUCH LANCETS MISC  Last Written Prescription Date:  2018  Last Fill Quantity: 100,  # refills: 5   Last office visit: 2019 with prescribing provider:  Thierno   Future Office Visit:   Next 5 appointments (look out 90 days)    Mar 28, 2019  2:00 PM CDT  Return Visit with Ethan Montesinos DPM  [facility] [place] ([facility]) [address]  [address]  [place] [address]  [phone]   Apr 10, 2019  2:20 PM CDT  SHORT with Chris Scott MD  [facility] ([facility]) [address] [facility] [address]  [phone]          100 each 5     Si Device daily One touch delica lancets, what ever is covered by insurance.    Diabetic Supplies Protocol Passed - 3/19/2019 10:39 AM       Passed - Medication is active on med list       Passed - Patient is 18 years of age or older       Passed - Recent (6 mo) or future (30 days) visit within the authorizing provider's specialty    Patient had office visit in the last 6 months or has a visit in the next 30 days with authorizing provider.  See \"Patient Info\" tab in inbasket, or \"Choose Columns\" in Meds & Orders section of the refill encounter.              "

## 2019-03-28 ENCOUNTER — TELEPHONE (OUTPATIENT)
Dept: PODIATRY | Facility: CLINIC | Age: 84
End: 2019-03-28

## 2019-03-28 ENCOUNTER — ANCILLARY PROCEDURE (OUTPATIENT)
Dept: GENERAL RADIOLOGY | Facility: CLINIC | Age: 84
End: 2019-03-28
Attending: PODIATRIST
Payer: COMMERCIAL

## 2019-03-28 ENCOUNTER — OFFICE VISIT (OUTPATIENT)
Dept: PODIATRY | Facility: CLINIC | Age: 84
End: 2019-03-28
Payer: COMMERCIAL

## 2019-03-28 VITALS
DIASTOLIC BLOOD PRESSURE: 66 MMHG | HEART RATE: 75 BPM | BODY MASS INDEX: 30.75 KG/M2 | WEIGHT: 227 LBS | HEIGHT: 72 IN | SYSTOLIC BLOOD PRESSURE: 119 MMHG

## 2019-03-28 DIAGNOSIS — M86.272 SUBACUTE OSTEOMYELITIS OF LEFT FOOT (H): ICD-10-CM

## 2019-03-28 DIAGNOSIS — L97.512 TOE ULCER, RIGHT, WITH FAT LAYER EXPOSED (H): Primary | ICD-10-CM

## 2019-03-28 DIAGNOSIS — E11.51 TYPE 2 DIABETES MELLITUS WITH PERIPHERAL VASCULAR DISEASE (H): ICD-10-CM

## 2019-03-28 PROCEDURE — 73630 X-RAY EXAM OF FOOT: CPT | Mod: LT

## 2019-03-28 PROCEDURE — 99214 OFFICE O/P EST MOD 30 MIN: CPT | Performed by: PODIATRIST

## 2019-03-28 RX ORDER — CEPHALEXIN 500 MG/1
500 CAPSULE ORAL 4 TIMES DAILY
Qty: 40 CAPSULE | Refills: 0 | Status: SHIPPED | OUTPATIENT
Start: 2019-03-28 | End: 2019-07-22

## 2019-03-28 ASSESSMENT — MIFFLIN-ST. JEOR: SCORE: 1767.67

## 2019-03-28 NOTE — TELEPHONE ENCOUNTER
Type of surgery: [place]  Location of surgery: [place] [facility]  Date and time of surgery: 4-2-19  Surgeon: Dr Montesinos  Pre-Op Appt Date: 4-1-19 @ 2:00 Rodney Hoover  Post-Op Appt Date: 4-8-19 @4:40   Packet sent out: Not Applicable  Pre-cert/Authorization completed:  Not Applicable  Date: 3-28-19

## 2019-03-28 NOTE — PATIENT INSTRUCTIONS
You have elected to proceed with Surgery for fifth metatarsal head resection left foot  Surgeries are performed on Tuesdays at [facility].   To schedule your surgery date please call [phone].  Please leave a message with a good time for our staff to call you back.    - Please have a date in mind for your surgery, you can feel free to leave that date on the message, and we will schedule and call back to confirm.     You can expect receive a call back the same day or on the next business day from Dr. Montesinos s team to assist in the scheduling.   - We will schedule the date of your surgery.  The time will be determined a few days ahead of time.  You can expect a call from Same Day Surgery 2-3 days ahead of time with specific instructions for what time to arrive at the hospital as well as any other preparations you should take prior to surgery.    - You may need to obtain a pre-operative physical from your primary medical provider. This must be done within 30 days of your surgery date.    - We will also schedule your first post-operative appointment for a bandage and wound check for the Monday following your surgery at the [facility].    - You may be non-weight bearing for a period of up to 6 weeks.  Options for this include: (Please indicate which you would prefer so we can provide you with an order and instructions)  o Crutches  o Walker  o Roll-a-bout knee walker.    - If you will need paperwork filled out for your employer you may drop those off at the clinic directly or you may have those faxed to us at [phone].  Please indicate on the form the date you would like the LA to begin if it will not be your surgery date.    The forms are typically filled out for up to 12 weeks, however you may be cleared to return prior to that time depending on your individual healing and job requirements.

## 2019-03-28 NOTE — PROGRESS NOTES
Fawad returns to the office for reevaluation of the right and left foot.  The patient relates increased redness and swelling of the left foot.  The patient relates that the wound has not improved with regards to healing.  The patient relates that his foot care nurse had concerns about the ulcer on the left foot.    PAST MEDICAL HISTORY:   Past Medical History:   Diagnosis Date     Diabetes mellitus (H)      Hypertension        BMI= Body mass index is 30.79 kg/m .        Physical Exam:    General: The patient appears to have a pleasant mental affect.    Lower extremity physical exam:  Neurovascular status is intact with palpable pedal pulses and intact epicritic sensations.  Muscular exam is within normal limits to major muscle groups.  Integument is intact.      One notes decreased edema.  One notes positive exposed bone of the fifth metatarsal head within the center of the ulceration on the left foot.  Slight erythema and edema noted.    Radiograph evaluation including weightbearing AP, lateral and medial oblique views of the left foot reveals no evidence of osseous destruction of the fifth metatarsal head.    Assessment:      ICD-10-CM    1. Toe ulcer, right, with fat layer exposed (H) L97.512    2. Type 2 diabetes mellitus with peripheral vascular disease (H) E11.51    3. Subacute osteomyelitis of left foot (H) M86.272        Plan:  I have explained to Fawad about the conditions.  At this time, the ulcers were sharply debrided with a #15 blade down to healthy epidermis.  The wounds were dressed with bandaging.    At this point, I am recommending surgical treatment of the condition involving fifth metatarsal head resection  on the left foot.  I informed the patient in risks and benefits of the procedure including but not limited to infection, wound complications, swelling, pain, diminished range of motion and function, DVT and reoccurrence of condition.  The procedure will be performed under local with  monitored anesthesia care.  The patient will obtain a preoperative history and physical by the primary care provider.  Consents will be reviewed and signed on the day of surgery.      Disclaimer: This note consists of symbols derived from keyboarding, dictation and/or voice recognition software. As a result, there may be errors in the script that have gone undetected. Please consider this when interpreting information found in this chart.       LIANG Montesinos D.P.M., F.[email or web address].[email or web address].S.

## 2019-03-28 NOTE — LETTER
3/28/2019         RE: Fawad Garcia  [address] [facility] [id number]        Dear Colleague,    Thank you for referring your patient, Fawad Garcia, to the [facility]. Please see a copy of my visit note below.    Fawad returns to the office for reevaluation of the right and left foot.  The patient relates increased redness and swelling of the left foot.  The patient relates that the wound has not improved with regards to healing.  The patient relates that his foot care nurse had concerns about the ulcer on the left foot.    PAST MEDICAL HISTORY:   Past Medical History:   Diagnosis Date     Diabetes mellitus (H)      Hypertension        BMI= Body mass index is 30.79 kg/m .        Physical Exam:    General: The patient appears to have a pleasant mental affect.    Lower extremity physical exam:  Neurovascular status is intact with palpable pedal pulses and intact epicritic sensations.  Muscular exam is within normal limits to major muscle groups.  Integument is intact.      One notes decreased edema.  One notes positive exposed bone of the fifth metatarsal head within the center of the ulceration on the left foot.  Slight erythema and edema noted.    Radiograph evaluation including weightbearing AP, lateral and medial oblique views of the left foot reveals no evidence of osseous destruction of the fifth metatarsal head.    Assessment:      ICD-10-CM    1. Toe ulcer, right, with fat layer exposed (H) L97.512    2. Type 2 diabetes mellitus with peripheral vascular disease (H) E11.51    3. Subacute osteomyelitis of left foot (H) M86.272        Plan:  I have explained to Fawad about the conditions.  At this time, the ulcers were sharply debrided with a #15 blade down to healthy epidermis.  The wounds were dressed with bandaging.    At this point, I am recommending surgical treatment of the condition involving fifth metatarsal head resection  on the left foot.  I  informed the patient in risks and benefits of the procedure including but not limited to infection, wound complications, swelling, pain, diminished range of motion and function, DVT and reoccurrence of condition.  The procedure will be performed under local with monitored anesthesia care.  The patient will obtain a preoperative history and physical by the primary care provider.  Consents will be reviewed and signed on the day of surgery.      Disclaimer: This note consists of symbols derived from keyboarding, dictation and/or voice recognition software. As a result, there may be errors in the script that have gone undetected. Please consider this when interpreting information found in this chart.       LIANG Montesinos D.P.M., F.DELANEY.C.F.A.S.      Again, thank you for allowing me to participate in the care of your patient.        Sincerely,        Ethan Montesinos DPM

## 2019-03-28 NOTE — NURSING NOTE
Chief Complaint   Patient presents with     RECHECK     Left callus, check both feet       Initial /66 (BP Location: Other (Comment), Patient Position: Sitting, Cuff Size: Adult Regular)   Pulse 75   Ht 1.829 m (6')   Wt 103 kg (227 lb)   BMI 30.79 kg/m   Estimated body mass index is 30.79 kg/m  as calculated from the following:    Height as of this encounter: 1.829 m (6').    Weight as of this encounter: 103 kg (227 lb).  Medications and allergies reviewed.      Dana GUILLERMO MA

## 2019-03-29 ENCOUNTER — ANESTHESIA EVENT (OUTPATIENT)
Dept: SURGERY | Facility: CLINIC | Age: 84
End: 2019-03-29
Payer: COMMERCIAL

## 2019-04-01 ENCOUNTER — OFFICE VISIT (OUTPATIENT)
Dept: FAMILY MEDICINE | Facility: CLINIC | Age: 84
End: 2019-04-01
Payer: COMMERCIAL

## 2019-04-01 VITALS
DIASTOLIC BLOOD PRESSURE: 60 MMHG | WEIGHT: 227.4 LBS | RESPIRATION RATE: 14 BRPM | BODY MASS INDEX: 30.84 KG/M2 | OXYGEN SATURATION: 95 % | TEMPERATURE: 99 F | HEART RATE: 91 BPM | SYSTOLIC BLOOD PRESSURE: 118 MMHG

## 2019-04-01 DIAGNOSIS — I10 HYPERTENSION, GOAL BELOW 140/90: ICD-10-CM

## 2019-04-01 DIAGNOSIS — E11.9 TYPE 2 DIABETES MELLITUS WITHOUT COMPLICATION, WITH LONG-TERM CURRENT USE OF INSULIN (H): ICD-10-CM

## 2019-04-01 DIAGNOSIS — Z79.4 TYPE 2 DIABETES MELLITUS WITH BOTH EYES AFFECTED BY MILD NONPROLIFERATIVE RETINOPATHY WITHOUT MACULAR EDEMA, WITH LONG-TERM CURRENT USE OF INSULIN (H): ICD-10-CM

## 2019-04-01 DIAGNOSIS — E11.3293 TYPE 2 DIABETES MELLITUS WITH BOTH EYES AFFECTED BY MILD NONPROLIFERATIVE RETINOPATHY WITHOUT MACULAR EDEMA, WITH LONG-TERM CURRENT USE OF INSULIN (H): ICD-10-CM

## 2019-04-01 DIAGNOSIS — M86.272 SUBACUTE OSTEOMYELITIS OF LEFT FOOT (H): ICD-10-CM

## 2019-04-01 DIAGNOSIS — Z79.4 TYPE 2 DIABETES MELLITUS WITHOUT COMPLICATION, WITH LONG-TERM CURRENT USE OF INSULIN (H): ICD-10-CM

## 2019-04-01 DIAGNOSIS — Z01.818 PREOP GENERAL PHYSICAL EXAM: Primary | ICD-10-CM

## 2019-04-01 LAB
ANION GAP SERPL CALCULATED.3IONS-SCNC: 6 MMOL/L (ref 3–14)
BUN SERPL-MCNC: 22 MG/DL (ref 7–30)
CALCIUM SERPL-MCNC: 8.9 MG/DL (ref 8.5–10.1)
CHLORIDE SERPL-SCNC: 101 MMOL/L (ref 94–109)
CO2 SERPL-SCNC: 27 MMOL/L (ref 20–32)
CREAT SERPL-MCNC: 0.91 MG/DL (ref 0.66–1.25)
GFR SERPL CREATININE-BSD FRML MDRD: 78 ML/MIN/{1.73_M2}
GLUCOSE SERPL-MCNC: 133 MG/DL (ref 70–99)
HGB BLD-MCNC: 13.2 G/DL (ref 13.3–17.7)
POTASSIUM SERPL-SCNC: 3.8 MMOL/L (ref 3.4–5.3)
SODIUM SERPL-SCNC: 134 MMOL/L (ref 133–144)

## 2019-04-01 PROCEDURE — 80048 BASIC METABOLIC PNL TOTAL CA: CPT | Performed by: INTERNAL MEDICINE

## 2019-04-01 PROCEDURE — 36415 COLL VENOUS BLD VENIPUNCTURE: CPT | Performed by: INTERNAL MEDICINE

## 2019-04-01 PROCEDURE — 99215 OFFICE O/P EST HI 40 MIN: CPT | Performed by: INTERNAL MEDICINE

## 2019-04-01 PROCEDURE — 93000 ELECTROCARDIOGRAM COMPLETE: CPT | Performed by: INTERNAL MEDICINE

## 2019-04-01 PROCEDURE — 85018 HEMOGLOBIN: CPT | Performed by: INTERNAL MEDICINE

## 2019-04-01 NOTE — PATIENT INSTRUCTIONS
Take only 16 units of glargine insulin (Lantus) the night before surgery.  Hold Victoza the evening before surgery.  Do not take metformin when you are NPO before surgery.  Hold lisinopril 24 hours before surgery.    Avoid ibuprofen for 1 day prior to surgery, avoid naproxen for 3 days prior, and avoid products containing aspirin for 1 week before surgery.  Tylenol is okay to take for pain if needed.    You are due to follow-up with dermatology later this month- please call them to schedule soon.        Before Your Surgery      Call your surgeon if there is any change in your health. This includes signs of a cold or flu (such as a sore throat, runny nose, cough, rash or fever).    Do not smoke, drink alcohol or take over the counter medicine (unless your surgeon or primary care doctor tells you to) for the 24 hours before and after surgery.    If you take prescribed drugs: Follow your doctor s orders about which medicines to take and which to stop until after surgery.    Eating and drinking prior to surgery: follow the instructions from your surgeon    Take a shower or bath the night before surgery. Use the soap your surgeon gave you to gently clean your skin. If you do not have soap from your surgeon, use your regular soap. Do not shave or scrub the surgery site.  Wear clean pajamas and have clean sheets on your bed.

## 2019-04-01 NOTE — H&P (VIEW-ONLY)
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 Montesinos.  He requires evaluation and anesthesia risk assessment prior to undergoing surgery/procedure for treatment of foot ulcer on left foot with osteomyelitis.    Proposed Surgery/ Procedure: 5th Metatarsal Head Resection  Date of Surgery/ Procedure: 19  Time of Surgery/ Procedure: 10:00  Hospital/Surgical Facility: [facility]     Primary Physician: Chris Scott  Type of Anesthesia Anticipated: Local with MAC    Patient has a Health Care Directive or Living Will:  NO, not interested in information at this time.      1. NO - Do you have a history of heart attack, stroke, stent, bypass or surgery on an artery in the head, neck, heart or legs?  2. NO - Do you ever have any pain or discomfort in your chest?  3. NO - Do you have a history of  Heart Failure?  4. NO - Are you troubled by shortness of breath when: walking on the level, up a slight hill or at night?  5. NO - Do you currently have a cold, bronchitis or other respiratory infection?  6. NO - Do you have a cough, shortness of breath or wheezing?  7. NO - Do you sometimes get pains in the calves of your legs when you walk?  8. NO - Do you or anyone in your family have previous history of blood clots?  9. NO - Do you or does anyone in your family have a serious bleeding problem such as prolonged bleeding following surgeries or cuts?  10. NO - Have you ever had problems with anemia or been told to take iron pills?  11. NO - Have you had any abnormal blood loss such as black, tarry or bloody stools, or abnormal vaginal bleeding?  12. NO - Have you ever had a blood transfusion?  13. NO - Have you  "or any of your relatives ever had problems with anesthesia?  14. NO - Do you have sleep apnea, excessive snoring or daytime drowsiness?  15. NO - Do you have any prosthetic heart valves?  16. NO - Do you have prosthetic joints?  17. NO - Is there any chance that you may be pregnant?      HPI:     HPI related to upcoming procedure: Fawad has been having issues with ongoing ulceration of the left foot and was recently found to have corresponding osteomyelitis, so fifth metatarsal head resection is planned.  He is otherwise feeling well.      See problem list for active medical problems.  Problems all longstanding and stable, except as noted/documented.  See ROS for pertinent symptoms related to these conditions.                                                                                                                                                          .    MEDICAL HISTORY:     Patient Active Problem List    Diagnosis Date Noted     Macular degeneration (senile) of retina 01/10/2019     Priority: Medium     Nearly legally blind per eye doctor.       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) 11/19/2018     Priority: Medium     Type 2 diabetes mellitus with both eyes affected by mild nonproliferative retinopathy without macular edema, with long-term current use of insulin (H) 07/30/2018     Priority: Medium     Senile nuclear sclerosis 04/01/2015     Priority: Medium     Hypertension, goal below 140/90 03/25/2015     Priority: Medium     Malignant neoplasm of prostate (H) 06/16/2014     Priority: Medium     Health Care Home 12/28/2012     Priority: Medium     Nury Prater RN-PHN  A / [facility]   [phone]    DX V65.8 REPLACED WITH [id number] HEALTH CARE HOME (04/08/2013)       Advanced directives, counseling/discussion 02/24/2012     Priority: Medium     Patient does not have an Advance/Health Care Directive (HCD), given \"What is Advance Care Planning?\" flyer, " accepts referral to Facilitator and requests blank HCD form.    Roro Wright  February 24, 2012         Peripheral vascular disease (H) 11/18/2011     Priority: Medium     Problem list name updated by automated process. Provider to review       GERD (gastroesophageal reflux disease) 11/03/2011     Priority: Medium     HYPERLIPIDEMIA LDL GOAL <100 10/31/2010     Priority: Medium     Transient cerebral ischemia 09/03/2009     Priority: Medium     Diagnosis updated by automated process. Provider to review and confirm.       Obesity      Priority: Medium     Obesity  Problem list name updated by automated process. Provider to review        Past Medical History:   Diagnosis Date     Diabetes mellitus (H)      Hypertension      Past Surgical History:   Procedure Laterality Date     [facility] AFF SURGICAL PATHOLOGY  2002    prostate biopsy elevated PSA     COLONOSCOPY       HC REMOVE TONSILS/ADENOIDS,<11 Y/O      T & A     IR LOWER EXTREMITY ANGIOGRAM RIGHT  12/11/2018     PHACOEMULSIFICATION WITH STANDARD INTRAOCULAR LENS IMPLANT Left 4/2/2015    Procedure: PHACOEMULSIFICATION WITH STANDARD INTRAOCULAR LENS IMPLANT;  Surgeon: Joseph Hernandez MD;  Location: [facility] OR     PHACOEMULSIFICATION WITH STANDARD INTRAOCULAR LENS IMPLANT Right 5/4/2015    Procedure: PHACOEMULSIFICATION WITH STANDARD INTRAOCULAR LENS IMPLANT;  Surgeon: Joseph Hernandez MD;  Location: [facility] OR     Current Outpatient Medications   Medication Sig Dispense Refill     amLODIPine (NORVASC) 5 MG tablet Take 1 tablet (5 mg) by mouth daily Takes prn for high blood pressure 90 tablet 3     cephALEXin (KEFLEX) 500 MG capsule Take 1 capsule (500 mg) by mouth 4 times daily 40 capsule 0     dorzolamide-timolol (COSOPT) 2-0.5 % ophthalmic solution        hydrochlorothiazide (HYDRODIURIL) 25 MG tablet Take 1 tablet (25 mg) by mouth daily 90 tablet 3     insulin glargine (LANTUS SOLOSTAR PEN) 100 UNIT/ML pen Inject 24 Units Subcutaneous At Bedtime 21 mL 3     liraglutide  (VICTOZA) 18 MG/3ML solution 0.6 mg SQ daily times two weeks then  1.2 mg SQ daily times two weeks then  1.8 mg SQ daily thereafter 27 mL 3     lisinopril (PRINIVIL/ZESTRIL) 40 MG tablet Take 1 tablet (40 mg) by mouth daily 90 tablet 3     metFORMIN (GLUCOPHAGE) 500 MG tablet 2 tabs with lunch and 2 tabs with dinner. 360 tablet 3     Multiple Vitamins-Minerals (PRESERVISION AREDS PO) Take 1 tablet by mouth       omeprazole (PRILOSEC) 20 MG CR capsule Take 1 capsule (20 mg) by mouth daily 90 capsule 3     pravastatin (PRAVACHOL) 10 MG tablet Take 1 tablet (10 mg) by mouth daily 90 tablet 2     blood glucose (NO BRAND SPECIFIED) test strip 1 strip by In Vitro route daily One touch Ultra. 100 each 0     Blood Glucose Monitoring Suppl (ONE TOUCH ULTRA SYSTEM KIT) W/DEVICE KIT        Cholecalciferol (VITAMIN D PO) Take 4,000 Int'l Units/day by mouth daily       insulin pen needle (B-D U/F) 31G X 8 MM 1 Device daily Use once daily or as directed. 100 each 3     ONETOUCH LANCETS MISC 1 Device daily One touch delica lancets, what ever is covered by insurance. 100 each 5     ORDER FOR DME Equipment being ordered:   Glucometer 1 Device 1     triamcinolone (KENALOG) 0.1 % cream        OTC products: None, except as noted above    Allergies   Allergen Reactions     Zocor [Simvastatin - High Dose]      Bilateral hip aching      Latex Allergy: NO    Social History     Tobacco Use     Smoking status: Never Smoker     Smokeless tobacco: Never Used   Substance Use Topics     Alcohol use: Yes     Alcohol/week: 1.0 oz     Comment: drinks rarely     History   Drug Use No       REVIEW OF SYSTEMS:   Constitutional, neuro, ENT, endocrine, pulmonary, cardiac, gastrointestinal, genitourinary, musculoskeletal, integument and psychiatric systems are negative, except as otherwise noted.    EXAM:   /60 (BP Location: Right arm, Patient Position: Sitting, Cuff Size: Adult Regular)   Pulse 91   Temp 99  F (37.2  C) (Tympanic)   Resp 14    Wt 103.1 kg (227 lb 6.4 oz)   SpO2 95%   BMI 30.84 kg/m        GENERAL APPEARANCE: alert and no distress     HENT: normal ear canals and TMs, scarring and red lesion noted on the helix of the left ear, nose and mouth without ulcers or lesions     NECK: no adenopathy, no asymmetry, masses, or scars     RESP: lungs clear to auscultation - no rales, rhonchi or wheezes     CV: regular rates and rhythm, normal S1 S2, no S3 or S4 and no murmur, click or rub -     ABDOMEN:  soft, nontender, no HSM or masses and bowel sounds normal     MS: Swelling noted in the left lower leg greater than the right, detailed examination of the feet was not performed       NEURO: mentation intact and speech normal     PSYCH: mentation appears normal. and affect normal/bright     LYMPHATICS: No cervical or supraclavicular nodes     DIAGNOSTICS:     EKG: Low voltage but otherwise normal  Labs Resulted Today:   Results for orders placed or performed in visit on 04/01/19   Basic metabolic panel   Result Value Ref Range    Sodium 134 133 - 144 mmol/L    Potassium 3.8 3.4 - 5.3 mmol/L    Chloride 101 94 - 109 mmol/L    Carbon Dioxide 27 20 - 32 mmol/L    Anion Gap 6 3 - 14 mmol/L    Glucose 133 (H) 70 - 99 mg/dL    Urea Nitrogen 22 7 - 30 mg/dL    Creatinine 0.91 0.66 - 1.25 mg/dL    GFR Estimate 78 >60 mL/min/[1.73_m2]    GFR Estimate If Black 90 >60 mL/min/[1.73_m2]    Calcium 8.9 8.5 - 10.1 mg/dL   Hemoglobin   Result Value Ref Range    Hemoglobin 13.2 (L) 13.3 - 17.7 g/dL       Recent Labs   Lab Test 03/15/19  1047 12/11/18  1150 12/04/18  0854 11/19/18  1201  03/27/18  1012   HGB  --  14.7  --  13.4  --   --    PLT  --  295  --  315  --   --    INR  --  0.93  --   --   --   --    NA  --   --  138  --   --  136   POTASSIUM  --   --  3.7  --   --  3.9   CR  --  0.94 0.92  --   --  0.86   A1C 6.6* 8.3*  --   --    < > 7.7*    < > = values in this interval not displayed.        IMPRESSION:   Reason for surgery/procedure: Left fifth metatarsal  head resection for osteomyelitis  Diagnosis/reason for consult: Preop evaluation    The proposed surgical procedure is considered INTERMEDIATE risk.    REVISED CARDIAC RISK INDEX  The patient has the following serious cardiovascular risks for perioperative complications such as (MI, PE, VFib and 3  AV Block):  Diabetes Mellitus (on Insulin)  INTERPRETATION: 1 risks: Class II (low risk - 0.9% complication rate)    The patient has the following additional risks for perioperative complications:  No identified additional risks      ICD-10-CM    1. Preop general physical exam Z01.818 EKG 12-lead complete w/read - Clinics     Basic metabolic panel     Hemoglobin   2. Subacute osteomyelitis of left foot (H) M86.272 EKG 12-lead complete w/read - Clinics     Basic metabolic panel     Hemoglobin   3. Type 2 diabetes mellitus with both eyes affected by mild nonproliferative retinopathy without macular edema, with long-term current use of insulin (H) E11.3293     Z79.4    4. Hypertension, goal below 140/90 I10 Basic metabolic panel   5. Type 2 diabetes mellitus without complication, with long-term current use of insulin (H) E11.9 insulin glargine (LANTUS SOLOSTAR PEN) 100 UNIT/ML pen    Z79.4        RECOMMENDATIONS:       Cardiovascular Risk  EKG today was normal except for low voltage, no cardiac symptoms, no further workup needed      --Patient is to take all scheduled medications on the day of surgery EXCEPT for modifications listed below.    Diabetes Medication Use  Well-controlled  -----Hold usual oral and non-insulin diabetic meds (e.g. Metformin, Actos, Glipizide) while NPO.   -----Take 16 units of long acting insulin (e.g. Lantus, NPH) the night prior to surgery rather than his usual 24      ACE Inhibitor or Angiotensin Receptor Blocker (ARB) Use  Ace inhibitor or Angiotensin Receptor Blocker (ARB) and should HOLD this medication for the 24 hours prior to surgery.      APPROVAL GIVEN to proceed with proposed procedure,  without further diagnostic evaluation       Signed Electronically by: Rodney Pa MD    Copy of this evaluation report is provided to requesting physician.    [facility] Preop Guidelines    Revised Cardiac Risk Index

## 2019-04-01 NOTE — PROGRESS NOTES
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 Montesinos.  He requires evaluation and anesthesia risk assessment prior to undergoing surgery/procedure for treatment of foot ulcer on left foot with osteomyelitis.    Proposed Surgery/ Procedure: 5th Metatarsal Head Resection  Date of Surgery/ Procedure: 19  Time of Surgery/ Procedure: 10:00  Hospital/Surgical Facility: [facility]     Primary Physician: Chris Scott  Type of Anesthesia Anticipated: Local with MAC    Patient has a Health Care Directive or Living Will:  NO, not interested in information at this time.      1. NO - Do you have a history of heart attack, stroke, stent, bypass or surgery on an artery in the head, neck, heart or legs?  2. NO - Do you ever have any pain or discomfort in your chest?  3. NO - Do you have a history of  Heart Failure?  4. NO - Are you troubled by shortness of breath when: walking on the level, up a slight hill or at night?  5. NO - Do you currently have a cold, bronchitis or other respiratory infection?  6. NO - Do you have a cough, shortness of breath or wheezing?  7. NO - Do you sometimes get pains in the calves of your legs when you walk?  8. NO - Do you or anyone in your family have previous history of blood clots?  9. NO - Do you or does anyone in your family have a serious bleeding problem such as prolonged bleeding following surgeries or cuts?  10. NO - Have you ever had problems with anemia or been told to take iron pills?  11. NO - Have you had any abnormal blood loss such as black, tarry or bloody stools, or abnormal vaginal bleeding?  12. NO - Have you ever had a blood transfusion?  13. NO - Have you  "or any of your relatives ever had problems with anesthesia?  14. NO - Do you have sleep apnea, excessive snoring or daytime drowsiness?  15. NO - Do you have any prosthetic heart valves?  16. NO - Do you have prosthetic joints?  17. NO - Is there any chance that you may be pregnant?      HPI:     HPI related to upcoming procedure: Fawad has been having issues with ongoing ulceration of the left foot and was recently found to have corresponding osteomyelitis, so fifth metatarsal head resection is planned.  He is otherwise feeling well.      See problem list for active medical problems.  Problems all longstanding and stable, except as noted/documented.  See ROS for pertinent symptoms related to these conditions.                                                                                                                                                          .    MEDICAL HISTORY:     Patient Active Problem List    Diagnosis Date Noted     Macular degeneration (senile) of retina 01/10/2019     Priority: Medium     Nearly legally blind per eye doctor.       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) 11/19/2018     Priority: Medium     Type 2 diabetes mellitus with both eyes affected by mild nonproliferative retinopathy without macular edema, with long-term current use of insulin (H) 07/30/2018     Priority: Medium     Senile nuclear sclerosis 04/01/2015     Priority: Medium     Hypertension, goal below 140/90 03/25/2015     Priority: Medium     Malignant neoplasm of prostate (H) 06/16/2014     Priority: Medium     Health Care Home 12/28/2012     Priority: Medium     Nury Prater RN-PHN  A / [facility]   [phone]    DX V65.8 REPLACED WITH 19533 HEALTH CARE HOME (04/08/2013)       Advanced directives, counseling/discussion 02/24/2012     Priority: Medium     Patient does not have an Advance/Health Care Directive (HCD), given \"What is Advance Care Planning?\" flyer, " accepts referral to Facilitator and requests blank HCD form.    Roro Wright  February 24, 2012         Peripheral vascular disease (H) 11/18/2011     Priority: Medium     Problem list name updated by automated process. Provider to review       GERD (gastroesophageal reflux disease) 11/03/2011     Priority: Medium     HYPERLIPIDEMIA LDL GOAL <100 10/31/2010     Priority: Medium     Transient cerebral ischemia 09/03/2009     Priority: Medium     Diagnosis updated by automated process. Provider to review and confirm.       Obesity      Priority: Medium     Obesity  Problem list name updated by automated process. Provider to review        Past Medical History:   Diagnosis Date     Diabetes mellitus (H)      Hypertension      Past Surgical History:   Procedure Laterality Date     CL AFF SURGICAL PATHOLOGY  2002    prostate biopsy elevated PSA     COLONOSCOPY       HC REMOVE TONSILS/ADENOIDS,<13 Y/O      T & A     IR LOWER EXTREMITY ANGIOGRAM RIGHT  12/11/2018     PHACOEMULSIFICATION WITH STANDARD INTRAOCULAR LENS IMPLANT Left 4/2/2015    Procedure: PHACOEMULSIFICATION WITH STANDARD INTRAOCULAR LENS IMPLANT;  Surgeon: Joseph Hernandez MD;  Location: [facility] OR     PHACOEMULSIFICATION WITH STANDARD INTRAOCULAR LENS IMPLANT Right 5/4/2015    Procedure: PHACOEMULSIFICATION WITH STANDARD INTRAOCULAR LENS IMPLANT;  Surgeon: Joseph Hernandez MD;  Location: [facility] OR     Current Outpatient Medications   Medication Sig Dispense Refill     amLODIPine (NORVASC) 5 MG tablet Take 1 tablet (5 mg) by mouth daily Takes prn for high blood pressure 90 tablet 3     cephALEXin (KEFLEX) 500 MG capsule Take 1 capsule (500 mg) by mouth 4 times daily 40 capsule 0     dorzolamide-timolol (COSOPT) 2-0.5 % ophthalmic solution        hydrochlorothiazide (HYDRODIURIL) 25 MG tablet Take 1 tablet (25 mg) by mouth daily 90 tablet 3     insulin glargine (LANTUS SOLOSTAR PEN) 100 UNIT/ML pen Inject 24 Units Subcutaneous At Bedtime 21 mL 3     liraglutide  (VICTOZA) 18 MG/3ML solution 0.6 mg SQ daily times two weeks then  1.2 mg SQ daily times two weeks then  1.8 mg SQ daily thereafter 27 mL 3     lisinopril (PRINIVIL/ZESTRIL) 40 MG tablet Take 1 tablet (40 mg) by mouth daily 90 tablet 3     metFORMIN (GLUCOPHAGE) 500 MG tablet 2 tabs with lunch and 2 tabs with dinner. 360 tablet 3     Multiple Vitamins-Minerals (PRESERVISION AREDS PO) Take 1 tablet by mouth       omeprazole (PRILOSEC) 20 MG CR capsule Take 1 capsule (20 mg) by mouth daily 90 capsule 3     pravastatin (PRAVACHOL) 10 MG tablet Take 1 tablet (10 mg) by mouth daily 90 tablet 2     blood glucose (NO BRAND SPECIFIED) test strip 1 strip by In Vitro route daily One touch Ultra. 100 each 0     Blood Glucose Monitoring Suppl (ONE TOUCH ULTRA SYSTEM KIT) W/DEVICE KIT        Cholecalciferol (VITAMIN D PO) Take 4,000 Int'l Units/day by mouth daily       insulin pen needle (B-D U/F) 31G X 8 MM 1 Device daily Use once daily or as directed. 100 each 3     ONETOUCH LANCETS MISC 1 Device daily One touch delica lancets, what ever is covered by insurance. 100 each 5     ORDER FOR DME Equipment being ordered:   Glucometer 1 Device 1     triamcinolone (KENALOG) 0.1 % cream        OTC products: None, except as noted above    Allergies   Allergen Reactions     Zocor [Simvastatin - High Dose]      Bilateral hip aching      Latex Allergy: NO    Social History     Tobacco Use     Smoking status: Never Smoker     Smokeless tobacco: Never Used   Substance Use Topics     Alcohol use: Yes     Alcohol/week: 1.0 oz     Comment: drinks rarely     History   Drug Use No       REVIEW OF SYSTEMS:   Constitutional, neuro, ENT, endocrine, pulmonary, cardiac, gastrointestinal, genitourinary, musculoskeletal, integument and psychiatric systems are negative, except as otherwise noted.    EXAM:   /60 (BP Location: Right arm, Patient Position: Sitting, Cuff Size: Adult Regular)   Pulse 91   Temp 99  F (37.2  C) (Tympanic)   Resp 14    Wt 103.1 kg (227 lb 6.4 oz)   SpO2 95%   BMI 30.84 kg/m        GENERAL APPEARANCE: alert and no distress     HENT: normal ear canals and TMs, scarring and red lesion noted on the helix of the left ear, nose and mouth without ulcers or lesions     NECK: no adenopathy, no asymmetry, masses, or scars     RESP: lungs clear to auscultation - no rales, rhonchi or wheezes     CV: regular rates and rhythm, normal S1 S2, no S3 or S4 and no murmur, click or rub -     ABDOMEN:  soft, nontender, no HSM or masses and bowel sounds normal     MS: Swelling noted in the left lower leg greater than the right, detailed examination of the feet was not performed       NEURO: mentation intact and speech normal     PSYCH: mentation appears normal. and affect normal/bright     LYMPHATICS: No cervical or supraclavicular nodes     DIAGNOSTICS:     EKG: Low voltage but otherwise normal  Labs Resulted Today:   Results for orders placed or performed in visit on 04/01/19   Basic metabolic panel   Result Value Ref Range    Sodium 134 133 - 144 mmol/L    Potassium 3.8 3.4 - 5.3 mmol/L    Chloride 101 94 - 109 mmol/L    Carbon Dioxide 27 20 - 32 mmol/L    Anion Gap 6 3 - 14 mmol/L    Glucose 133 (H) 70 - 99 mg/dL    Urea Nitrogen 22 7 - 30 mg/dL    Creatinine 0.91 0.66 - 1.25 mg/dL    GFR Estimate 78 >60 mL/min/[1.73_m2]    GFR Estimate If Black 90 >60 mL/min/[1.73_m2]    Calcium 8.9 8.5 - 10.1 mg/dL   Hemoglobin   Result Value Ref Range    Hemoglobin 13.2 (L) 13.3 - 17.7 g/dL       Recent Labs   Lab Test 03/15/19  1047 12/11/18  1150 12/04/18  0854 11/19/18  1201  03/27/18  1012   HGB  --  14.7  --  13.4  --   --    PLT  --  295  --  315  --   --    INR  --  0.93  --   --   --   --    NA  --   --  138  --   --  136   POTASSIUM  --   --  3.7  --   --  3.9   CR  --  0.94 0.92  --   --  0.86   A1C 6.6* 8.3*  --   --    < > 7.7*    < > = values in this interval not displayed.        IMPRESSION:   Reason for surgery/procedure: Left fifth metatarsal  head resection for osteomyelitis  Diagnosis/reason for consult: Preop evaluation    The proposed surgical procedure is considered INTERMEDIATE risk.    REVISED CARDIAC RISK INDEX  The patient has the following serious cardiovascular risks for perioperative complications such as (MI, PE, VFib and 3  AV Block):  Diabetes Mellitus (on Insulin)  INTERPRETATION: 1 risks: Class II (low risk - 0.9% complication rate)    The patient has the following additional risks for perioperative complications:  No identified additional risks      ICD-10-CM    1. Preop general physical exam Z01.818 EKG 12-lead complete w/read - Clinics     Basic metabolic panel     Hemoglobin   2. Subacute osteomyelitis of left foot (H) M86.272 EKG 12-lead complete w/read - Clinics     Basic metabolic panel     Hemoglobin   3. Type 2 diabetes mellitus with both eyes affected by mild nonproliferative retinopathy without macular edema, with long-term current use of insulin (H) E11.3293     Z79.4    4. Hypertension, goal below 140/90 I10 Basic metabolic panel   5. Type 2 diabetes mellitus without complication, with long-term current use of insulin (H) E11.9 insulin glargine (LANTUS SOLOSTAR PEN) 100 UNIT/ML pen    Z79.4        RECOMMENDATIONS:       Cardiovascular Risk  EKG today was normal except for low voltage, no cardiac symptoms, no further workup needed      --Patient is to take all scheduled medications on the day of surgery EXCEPT for modifications listed below.    Diabetes Medication Use  Well-controlled  -----Hold usual oral and non-insulin diabetic meds (e.g. Metformin, Actos, Glipizide) while NPO.   -----Take 16 units of long acting insulin (e.g. Lantus, NPH) the night prior to surgery rather than his usual 24      ACE Inhibitor or Angiotensin Receptor Blocker (ARB) Use  Ace inhibitor or Angiotensin Receptor Blocker (ARB) and should HOLD this medication for the 24 hours prior to surgery.      APPROVAL GIVEN to proceed with proposed procedure,  without further diagnostic evaluation       Signed Electronically by: Rodney Pa MD    Copy of this evaluation report is provided to requesting physician.    [place] Preop Guidelines    Revised Cardiac Risk Index

## 2019-04-02 ENCOUNTER — ANESTHESIA (OUTPATIENT)
Dept: SURGERY | Facility: CLINIC | Age: 84
End: 2019-04-02
Payer: COMMERCIAL

## 2019-04-02 ENCOUNTER — HOSPITAL ENCOUNTER (OUTPATIENT)
Facility: CLINIC | Age: 84
Discharge: HOME | End: 2019-04-02
Attending: PODIATRIST | Admitting: PODIATRIST
Payer: COMMERCIAL

## 2019-04-02 VITALS
BODY MASS INDEX: 30.75 KG/M2 | TEMPERATURE: 98.3 F | WEIGHT: 227 LBS | SYSTOLIC BLOOD PRESSURE: 132 MMHG | HEIGHT: 72 IN | DIASTOLIC BLOOD PRESSURE: 77 MMHG | OXYGEN SATURATION: 97 % | HEART RATE: 78 BPM | RESPIRATION RATE: 16 BRPM

## 2019-04-02 DIAGNOSIS — M86.172 ACUTE OSTEOMYELITIS OF METATARSAL BONE OF LEFT FOOT (H): Primary | ICD-10-CM

## 2019-04-02 LAB
GRAM STN SPEC: NORMAL
GRAM STN SPEC: NORMAL
SPECIMEN SOURCE: NORMAL

## 2019-04-02 PROCEDURE — 88311 DECALCIFY TISSUE: CPT | Performed by: PODIATRIST

## 2019-04-02 PROCEDURE — 25000125 ZZHC RX 250: Performed by: NURSE ANESTHETIST, CERTIFIED REGISTERED

## 2019-04-02 PROCEDURE — 87205 SMEAR GRAM STAIN: CPT | Performed by: PODIATRIST

## 2019-04-02 PROCEDURE — 28113 PART REMOVAL OF METATARSAL: CPT | Mod: LT | Performed by: PODIATRIST

## 2019-04-02 PROCEDURE — 87070 CULTURE OTHR SPECIMN AEROBIC: CPT | Performed by: PODIATRIST

## 2019-04-02 PROCEDURE — 37000009 ZZH ANESTHESIA TECHNICAL FEE, EACH ADDTL 15 MIN: Performed by: PODIATRIST

## 2019-04-02 PROCEDURE — 88304 TISSUE EXAM BY PATHOLOGIST: CPT | Performed by: PODIATRIST

## 2019-04-02 PROCEDURE — 25800030 ZZH RX IP 258 OP 636: Performed by: NURSE ANESTHETIST, CERTIFIED REGISTERED

## 2019-04-02 PROCEDURE — 87186 SC STD MICRODIL/AGAR DIL: CPT | Performed by: PODIATRIST

## 2019-04-02 PROCEDURE — 25000128 H RX IP 250 OP 636: Performed by: NURSE ANESTHETIST, CERTIFIED REGISTERED

## 2019-04-02 PROCEDURE — 88304 TISSUE EXAM BY PATHOLOGIST: CPT | Mod: 26 | Performed by: PODIATRIST

## 2019-04-02 PROCEDURE — 88311 DECALCIFY TISSUE: CPT | Mod: 26 | Performed by: PODIATRIST

## 2019-04-02 PROCEDURE — 71000027 ZZH RECOVERY PHASE 2 EACH 15 MINS: Performed by: PODIATRIST

## 2019-04-02 PROCEDURE — 36000058 ZZH SURGERY LEVEL 3 EA 15 ADDTL MIN: Performed by: PODIATRIST

## 2019-04-02 PROCEDURE — 25000125 ZZHC RX 250: Performed by: PODIATRIST

## 2019-04-02 PROCEDURE — 87106 FUNGI IDENTIFICATION YEAST: CPT | Performed by: PODIATRIST

## 2019-04-02 PROCEDURE — 87075 CULTR BACTERIA EXCEPT BLOOD: CPT | Performed by: PODIATRIST

## 2019-04-02 PROCEDURE — 87077 CULTURE AEROBIC IDENTIFY: CPT | Performed by: PODIATRIST

## 2019-04-02 PROCEDURE — 37000008 ZZH ANESTHESIA TECHNICAL FEE, 1ST 30 MIN: Performed by: PODIATRIST

## 2019-04-02 PROCEDURE — 40000305 ZZH STATISTIC PRE PROC ASSESS I: Performed by: PODIATRIST

## 2019-04-02 PROCEDURE — 36000056 ZZH SURGERY LEVEL 3 1ST 30 MIN: Performed by: PODIATRIST

## 2019-04-02 PROCEDURE — 27210794 ZZH OR GENERAL SUPPLY STERILE: Performed by: PODIATRIST

## 2019-04-02 PROCEDURE — 25000128 H RX IP 250 OP 636: Performed by: PODIATRIST

## 2019-04-02 RX ORDER — CEFAZOLIN SODIUM 2 G/100ML
2 INJECTION, SOLUTION INTRAVENOUS
Status: COMPLETED | OUTPATIENT
Start: 2019-04-02 | End: 2019-04-02

## 2019-04-02 RX ORDER — PROPOFOL 10 MG/ML
INJECTION, EMULSION INTRAVENOUS PRN
Status: DISCONTINUED | OUTPATIENT
Start: 2019-04-02 | End: 2019-04-02

## 2019-04-02 RX ORDER — BUPIVACAINE HYDROCHLORIDE 5 MG/ML
INJECTION, SOLUTION PERINEURAL PRN
Status: DISCONTINUED | OUTPATIENT
Start: 2019-04-02 | End: 2019-04-02 | Stop reason: HOSPADM

## 2019-04-02 RX ORDER — SODIUM CHLORIDE, SODIUM LACTATE, POTASSIUM CHLORIDE, CALCIUM CHLORIDE 600; 310; 30; 20 MG/100ML; MG/100ML; MG/100ML; MG/100ML
INJECTION, SOLUTION INTRAVENOUS CONTINUOUS
Status: DISCONTINUED | OUTPATIENT
Start: 2019-04-02 | End: 2019-04-02 | Stop reason: HOSPADM

## 2019-04-02 RX ORDER — OXYCODONE HYDROCHLORIDE 5 MG/1
5 TABLET ORAL
Status: DISCONTINUED | OUTPATIENT
Start: 2019-04-02 | End: 2019-04-02 | Stop reason: HOSPADM

## 2019-04-02 RX ORDER — ONDANSETRON 2 MG/ML
INJECTION INTRAMUSCULAR; INTRAVENOUS PRN
Status: DISCONTINUED | OUTPATIENT
Start: 2019-04-02 | End: 2019-04-02

## 2019-04-02 RX ORDER — LIDOCAINE HYDROCHLORIDE 10 MG/ML
INJECTION, SOLUTION INFILTRATION; PERINEURAL PRN
Status: DISCONTINUED | OUTPATIENT
Start: 2019-04-02 | End: 2019-04-02 | Stop reason: HOSPADM

## 2019-04-02 RX ORDER — LIDOCAINE 40 MG/G
CREAM TOPICAL
Status: DISCONTINUED | OUTPATIENT
Start: 2019-04-02 | End: 2019-04-02 | Stop reason: HOSPADM

## 2019-04-02 RX ORDER — AMOXICILLIN 250 MG
1-2 CAPSULE ORAL 2 TIMES DAILY
Qty: 30 TABLET | Refills: 0 | Status: SHIPPED | OUTPATIENT
Start: 2019-04-02 | End: 2019-08-12

## 2019-04-02 RX ORDER — ONDANSETRON 2 MG/ML
4 INJECTION INTRAMUSCULAR; INTRAVENOUS EVERY 30 MIN PRN
Status: DISCONTINUED | OUTPATIENT
Start: 2019-04-02 | End: 2019-04-02 | Stop reason: HOSPADM

## 2019-04-02 RX ORDER — FENTANYL CITRATE 50 UG/ML
INJECTION, SOLUTION INTRAMUSCULAR; INTRAVENOUS PRN
Status: DISCONTINUED | OUTPATIENT
Start: 2019-04-02 | End: 2019-04-02

## 2019-04-02 RX ORDER — ONDANSETRON 4 MG/1
4 TABLET, ORALLY DISINTEGRATING ORAL EVERY 30 MIN PRN
Status: DISCONTINUED | OUTPATIENT
Start: 2019-04-02 | End: 2019-04-02 | Stop reason: HOSPADM

## 2019-04-02 RX ORDER — HYDROMORPHONE HYDROCHLORIDE 1 MG/ML
.3-.5 INJECTION, SOLUTION INTRAMUSCULAR; INTRAVENOUS; SUBCUTANEOUS EVERY 10 MIN PRN
Status: DISCONTINUED | OUTPATIENT
Start: 2019-04-02 | End: 2019-04-02 | Stop reason: HOSPADM

## 2019-04-02 RX ORDER — NALOXONE HYDROCHLORIDE 0.4 MG/ML
.1-.4 INJECTION, SOLUTION INTRAMUSCULAR; INTRAVENOUS; SUBCUTANEOUS
Status: DISCONTINUED | OUTPATIENT
Start: 2019-04-02 | End: 2019-04-02 | Stop reason: HOSPADM

## 2019-04-02 RX ORDER — OXYCODONE AND ACETAMINOPHEN 5; 325 MG/1; MG/1
1-2 TABLET ORAL EVERY 4 HOURS PRN
Qty: 30 TABLET | Refills: 0 | Status: SHIPPED | OUTPATIENT
Start: 2019-04-02 | End: 2019-07-22

## 2019-04-02 RX ORDER — PROPOFOL 10 MG/ML
INJECTION, EMULSION INTRAVENOUS CONTINUOUS PRN
Status: DISCONTINUED | OUTPATIENT
Start: 2019-04-02 | End: 2019-04-02

## 2019-04-02 RX ORDER — FENTANYL CITRATE 50 UG/ML
25-50 INJECTION, SOLUTION INTRAMUSCULAR; INTRAVENOUS
Status: DISCONTINUED | OUTPATIENT
Start: 2019-04-02 | End: 2019-04-02 | Stop reason: HOSPADM

## 2019-04-02 RX ORDER — CEFAZOLIN SODIUM 1 G/50ML
1 INJECTION, SOLUTION INTRAVENOUS SEE ADMIN INSTRUCTIONS
Status: DISCONTINUED | OUTPATIENT
Start: 2019-04-02 | End: 2019-04-02 | Stop reason: HOSPADM

## 2019-04-02 RX ORDER — MEPERIDINE HYDROCHLORIDE 25 MG/ML
12.5 INJECTION INTRAMUSCULAR; INTRAVENOUS; SUBCUTANEOUS
Status: DISCONTINUED | OUTPATIENT
Start: 2019-04-02 | End: 2019-04-02 | Stop reason: HOSPADM

## 2019-04-02 RX ADMIN — MIDAZOLAM 2 MG: 1 INJECTION INTRAMUSCULAR; INTRAVENOUS at 10:24

## 2019-04-02 RX ADMIN — LIDOCAINE HYDROCHLORIDE 0.1 ML: 10 INJECTION, SOLUTION EPIDURAL; INFILTRATION; INTRACAUDAL; PERINEURAL at 08:59

## 2019-04-02 RX ADMIN — CEFAZOLIN SODIUM 2 G: 2 INJECTION, SOLUTION INTRAVENOUS at 10:50

## 2019-04-02 RX ADMIN — PROPOFOL 50 MCG/KG/MIN: 10 INJECTION, EMULSION INTRAVENOUS at 10:28

## 2019-04-02 RX ADMIN — ONDANSETRON 4 MG: 2 INJECTION INTRAMUSCULAR; INTRAVENOUS at 10:37

## 2019-04-02 RX ADMIN — PROPOFOL 30 MG: 10 INJECTION, EMULSION INTRAVENOUS at 10:28

## 2019-04-02 RX ADMIN — FENTANYL CITRATE 50 MCG: 50 INJECTION, SOLUTION INTRAMUSCULAR; INTRAVENOUS at 10:27

## 2019-04-02 RX ADMIN — FENTANYL CITRATE 50 MCG: 50 INJECTION, SOLUTION INTRAMUSCULAR; INTRAVENOUS at 10:48

## 2019-04-02 RX ADMIN — SODIUM CHLORIDE, POTASSIUM CHLORIDE, SODIUM LACTATE AND CALCIUM CHLORIDE: 600; 310; 30; 20 INJECTION, SOLUTION INTRAVENOUS at 08:58

## 2019-04-02 ASSESSMENT — MIFFLIN-ST. JEOR: SCORE: 1767.67

## 2019-04-02 NOTE — BRIEF OP NOTE
[facility] Brief Operative Note    Pre-operative diagnosis: osteomyelitis 5th metatarsal left foot   Post-operative diagnosis osteomyelitis 5th metatarsal left foot   Procedure: Procedure(s):  5th Metatarsal Head Resection   Surgeon(s): Surgeon(s) and Role:     * Ethan Montesinos DPM - Primary   Estimated blood loss: 10 mL    Specimens: ID Type Source Tests Collected by Time Destination   1 : 5th metatarsal head Bone Foot, Left ANAEROBIC BACTERIAL CULTURE, BONE CULTURE AEROBIC BACTERIAL, GRAM STAIN Ethan Montesinos DPM 4/2/2019 10:48 AM    2 : Left foot open wound Wound Foot, Left ANAEROBIC BACTERIAL CULTURE, GRAM STAIN, WOUND CULTURE AEROBIC BACTERIAL Ethan Montesinos DPM 4/2/2019 10:49 AM    A : 5th metatarsal Bone Foot, Left SURGICAL PATHOLOGY EXAM Ethan Montesinos DPM 4/2/2019 10:50 AM       Findings: Necrotic bone of the fifth metatarsal head, left foot

## 2019-04-02 NOTE — DISCHARGE INSTRUCTIONS
"                    Same Day Surgery Discharge Instructions  Special Precautions After Surgery - Adult    1. It is not unusual to feel lightheaded or faint, up to 24 hours after surgery or while taking pain medication.  If you have these symptoms; sit for a few minutes before standing and have someone assist you when getting up.  2. You should rest and relax for the next 24 hours and must have someone stay with you for at least 24 hours after your discharge.  3. DO NOT DRIVE any vehicle or operate mechanical equipment for 24 hours following the end of your surgery.  DO NOT DRIVE while taking narcotic pain medications that have been prescribed by your physician.  If you had a limb operated on, you must be able to use it fully to drive.  4. DO NOT drink alcoholic beverages for 24 hours following surgery or while taking prescription pain medication.  5. Drink clear liquids (apple juice, ginger ale, broth, 7-Up, etc.).  Progress to your regular diet as you feel able.  6. Any questions call your physician and do not make important decisions for 24 hours.    ACTIVITY  ? { :7055121}     INCISIONAL CARE  ? { :7428199}     Call for an appointment to return to the clinic { :[id number]}    Medications:  ? { :1080172::\"Follow the instructions on the bottle.\"}     Additional discharge instructions: { :1557643::\"None\"}  __________________________________________________________________________________________________________________________________  IMPORTANT NUMBERS:    [facility] [place] Number:  [phone], [phone]  Pharmacy:  [phone]  Same Day Surgery:  [phone], Monday - Friday until 8:30 p.m.                                                                            [facility]:  [phone] option 1          "

## 2019-04-02 NOTE — OP NOTE
PREOPERATIVE DIAGNOSIS: 1.  Osteomyelitis fifth metatarsal, left foot.   POSTOPERATIVE DIAGNOSIS: 1. Osteomyelitis, fifth metatarsal, left foot.   PROCEDURE: 1. Fifth metatarsal head resection, left foot.   PATHOLOGY: Bone and soft tissue, fifth metatarsal , left foot.   ANESTHESIA: Local MAC   ESTIMATED BLOOD LOSS: Less than 10 mL   INDICATIONS FOR PROCEDURE: Fawad Garcia is a 83 year old-year-old male with chronic ulceration of the left foot with clinical evidence of osteomyelitis of the fifth metatarsal head of the left foot. The patient was consented for fifth metatarsal head resection, left foot.   PROCEDURE IN DETAIL: Under mild sedation, the patient in the operating room and placed on the operating table in the supine position.   After adequate sedation, approximately 16 cc of 1% buffered lidocaine was injected around the fifth ray of the left foot. The foot was then scrubbed, prepped and draped in the usual aseptic manner.    Following this, an operative time out was performed according to our institution's policy which confirmed the laterality of the procedure. Preoperative antibiotics were administered to the patient prior to arrival to the OR.     The procedure began with a linear longitudinal incision over the dorsal aspect of the fifth metatarsophalangeal joint  on the left.  The incision was made full thickness down to subcutaneous tissue with care taken to avoid all vital neurovascular structures.  Any active bleeders were cauterized and ligated as needed.  Further dissection was carried down to the fifth metatarsal head.  Utilizing a sagittal saw, the fifth metatarsal head was transected mid shaft on the fifth metatarsal.  The distal segment was resected in toto and sent to microbiology and pathology.  The head of the fifth metatarsal was necrotic and friable.  No purulence noted.  The wound was irrigated with copious amounts of normal sterile saline.  The skin was reapproximated utilizing 3-0  nylon suture in a simple interrupted technique. The area was blocked with approximately 10 cc of 0.5% Marcaine plain. The wound was dressed with sterile Jumpstart dressing,  4 x 4s, cast padding and an Ace wrap.   The patient tolerated these procedures well and was transferred from the operative table to the transport cart and taken from the OR to the PACU.  In PACU, patient and staff given orders and instructions for post-operative care in the following areas: post op pain management, weight-bearing status, dressing/splint care, weight-bearing assistive devices, elevation of the extremity, ice/cold therapy, DVT/blood clot prevention, nutrition and post-operative concerns to be aware.  Case and post-operative care measures were reviewed with the patient and family members present.  A post operative instruction sheet was provided.  If concerns or questions arise they will contact our clinic.  If acute concerns develop they will present emergently to a nearby medical center.      RONNY MERCADO DPM, FACFAS

## 2019-04-02 NOTE — ANESTHESIA PREPROCEDURE EVALUATION
Anesthesia Pre-Procedure Evaluation    Patient: Fawad Garcia   MRN: [id number] : 1935          Preoperative Diagnosis: osteomyelitis 5th metatarsal left foot    Procedure(s):  5th Metatarsal Head Resection    Past Medical History:   Diagnosis Date     Diabetes mellitus (H)      Hypertension      Past Surgical History:   Procedure Laterality Date     CL AFF SURGICAL PATHOLOGY      prostate biopsy elevated PSA     COLONOSCOPY       HC REMOVE TONSILS/ADENOIDS,<14 Y/O      T & A     IR LOWER EXTREMITY ANGIOGRAM RIGHT  2018     PHACOEMULSIFICATION WITH STANDARD INTRAOCULAR LENS IMPLANT Left 2015    Procedure: PHACOEMULSIFICATION WITH STANDARD INTRAOCULAR LENS IMPLANT;  Surgeon: Joseph Hernandez MD;  Location: [facility] OR     PHACOEMULSIFICATION WITH STANDARD INTRAOCULAR LENS IMPLANT Right 2015    Procedure: PHACOEMULSIFICATION WITH STANDARD INTRAOCULAR LENS IMPLANT;  Surgeon: Joseph Hernandez MD;  Location: [facility] OR       Anesthesia Evaluation     . Pt has had prior anesthetic. Type: General and MAC    No history of anesthetic complications          ROS/MED HX    ENT/Pulmonary:  - neg pulmonary ROS     Neurologic: Comment: Transient cerebral ischemia      Cardiovascular:     (+) Dyslipidemia, hypertension-Peripheral Vascular Disease---. : . . . :. .       METS/Exercise Tolerance:     Hematologic:  - neg hematologic  ROS       Musculoskeletal: Comment: Left foot osteomyelitis        GI/Hepatic:     (+) GERD Asymptomatic on medication,       Renal/Genitourinary:  - ROS Renal section negative       Endo:     (+) type II DM Last HgA1c: 6.6 date: 3/15/19 Obesity, .      Psychiatric:  - neg psychiatric ROS       Infectious Disease:  - neg infectious disease ROS       Malignancy:   (+) Malignancy History of Prostate          Other:                          Physical Exam  Normal systems: cardiovascular, pulmonary and dental    Airway   Mallampati: III  TM distance: >3 FB  Neck ROM: full    Dental      Cardiovascular       Pulmonary             Lab Results   Component Value Date    WBC 10.2 12/11/2018    HGB 13.2 (L) 04/01/2019    HCT 42.3 12/11/2018     12/11/2018    CRP 21.5 (H) 11/19/2018    SED 9 03/17/2014     04/01/2019    POTASSIUM 3.8 04/01/2019    CHLORIDE 101 04/01/2019    CO2 27 04/01/2019    BUN 22 04/01/2019    CR 0.91 04/01/2019     (H) 04/01/2019    CAIN 8.9 04/01/2019    AST 16 06/12/2015    PTT 32 12/11/2018    INR 0.93 12/11/2018    TSH 1.74 03/27/2018       Preop Vitals  BP Readings from Last 3 Encounters:   04/02/19 129/83   04/01/19 118/60   03/28/19 119/66    Pulse Readings from Last 3 Encounters:   04/01/19 91   03/28/19 75   03/18/19 75      Resp Readings from Last 3 Encounters:   04/02/19 18   04/01/19 14   03/18/19 16    SpO2 Readings from Last 3 Encounters:   04/02/19 98%   04/01/19 95%   03/18/19 97%      Temp Readings from Last 1 Encounters:   04/02/19 36.8  C (98.3  F) (Oral)    Ht Readings from Last 1 Encounters:   04/02/19 1.829 m (6')      Wt Readings from Last 1 Encounters:   04/02/19 103 kg (227 lb)    Estimated body mass index is 30.79 kg/m  as calculated from the following:    Height as of this encounter: 1.829 m (6').    Weight as of this encounter: 103 kg (227 lb).       Anesthesia Plan      History & Physical Review  History and physical reviewed and following examination; no interval change.    ASA Status:  3 .    NPO Status:  > 8 hours    Plan for MAC Reason for MAC:  Deep or markedly invasive procedure (G8)  PONV prophylaxis:  Ondansetron (or other 5HT-3)       Postoperative Care  Postoperative pain management:  IV analgesics and Oral pain medications.      Consents  Anesthetic plan, risks, benefits and alternatives discussed with:  Patient.  Use of blood products discussed: No .   .                 CORINA Dudley CRNA

## 2019-04-02 NOTE — ANESTHESIA CARE TRANSFER NOTE
Patient: Fawad Garcia    Procedure(s):  5th Metatarsal Head Resection    Diagnosis: osteomyelitis 5th metatarsal left foot  Diagnosis Additional Information: No value filed.    Anesthesia Type:   MAC     Note:  Airway :Face Mask  Patient transferred to:Phase II  Handoff Report: Identifed the Patient, Identified the Reponsible Provider, Reviewed the pertinent medical history, Discussed the surgical course, Reviewed Intra-OP anesthesia mangement and issues during anesthesia, Set expectations for post-procedure period and Allowed opportunity for questions and acknowledgement of understanding      Vitals: (Last set prior to Anesthesia Care Transfer)    CRNA VITALS  4/2/2019 1033 - 4/2/2019 1110      4/2/2019             Pulse:  80    SpO2:  97 %                Electronically Signed By: Alfie Lunsford  April 2, 2019  11:10 AM

## 2019-04-02 NOTE — ANESTHESIA POSTPROCEDURE EVALUATION
Patient: Fawad Cooperga    Procedure(s):  5th Metatarsal Head Resection    Diagnosis:osteomyelitis 5th metatarsal left foot  Diagnosis Additional Information: No value filed.    Anesthesia Type:  MAC    Note:  Anesthesia Post Evaluation    Patient location during evaluation: Phase 2 and Bedside  Patient participation: Able to fully participate in evaluation  Level of consciousness: awake and alert  Pain management: adequate  Airway patency: patent  Cardiovascular status: acceptable  Respiratory status: acceptable  Hydration status: acceptable  PONV: none     Anesthetic complications: None          Last vitals:  Vitals:    04/02/19 0822 04/02/19 1108   BP: 129/83 110/63   Pulse:  78   Resp: 18 16   Temp: 36.8  C (98.3  F)    SpO2: 98%          Electronically Signed By: Alfie Lunsford  April 2, 2019  11:11 AM

## 2019-04-03 LAB
GRAM STN SPEC: NORMAL
SPECIMEN SOURCE: NORMAL

## 2019-04-05 LAB — COPATH REPORT: NORMAL

## 2019-04-07 LAB
BACTERIA SPEC CULT: ABNORMAL
BACTERIA SPEC CULT: ABNORMAL
Lab: ABNORMAL
SPECIMEN SOURCE: ABNORMAL

## 2019-04-08 ENCOUNTER — OFFICE VISIT (OUTPATIENT)
Dept: PODIATRY | Facility: CLINIC | Age: 84
End: 2019-04-08
Payer: COMMERCIAL

## 2019-04-08 ENCOUNTER — ANCILLARY PROCEDURE (OUTPATIENT)
Dept: GENERAL RADIOLOGY | Facility: CLINIC | Age: 84
End: 2019-04-08
Attending: PODIATRIST
Payer: COMMERCIAL

## 2019-04-08 VITALS
WEIGHT: 227 LBS | HEIGHT: 72 IN | DIASTOLIC BLOOD PRESSURE: 85 MMHG | HEART RATE: 76 BPM | SYSTOLIC BLOOD PRESSURE: 160 MMHG | BODY MASS INDEX: 30.75 KG/M2

## 2019-04-08 DIAGNOSIS — M86.172: Primary | ICD-10-CM

## 2019-04-08 LAB
BACTERIA SPEC CULT: ABNORMAL
BACTERIA SPEC CULT: ABNORMAL
SPECIMEN SOURCE: ABNORMAL

## 2019-04-08 PROCEDURE — 73630 X-RAY EXAM OF FOOT: CPT | Mod: LT

## 2019-04-08 PROCEDURE — 99024 POSTOP FOLLOW-UP VISIT: CPT | Performed by: PODIATRIST

## 2019-04-08 ASSESSMENT — MIFFLIN-ST. JEOR: SCORE: 1767.67

## 2019-04-08 NOTE — PROGRESS NOTES
Fawad presents to the office s/p one week fifth metatarsal head resection of the left foot .  The patient relates keeping the bandages clean, dry and intact.  The patient relates good compliance with postoperative instructions.  The patient denies any severe pain, fevers, chills, nausea or vomiting.      There were no vitals taken for this visit.      Physical Exam:    General: The patient appears to be in no distress and in good spirits.  The bandages appear clean, dry and intact with no strikethrough noted. Vascular exam: Neurovascular status unchanged with < 3 sec capillary refill to all digits.  Positive pedal pulses and epicritic sensations intact with no evidence of allodynia.  One notes moderate edema to the forefoot on the left.  Sutures are intact and the skin is well coapted with no erythema noted.      Radiograph:  AP, lateral and medial oblique evaluation of left foot reveals surgical resection of the fifth metatarsal head.    Assessment: Osteomyelitis status post one week fifth metatarsal head resection of the left foot.    Plan:  Sutures remain intact.  A sterile dressing was reapplied.  The patient was instructed to continue non-weightbearing to the left foot.  The patient is to keep the dressings clean, dry and intact and to continue with elevation of the left foot.  The patient will return to the office in one week for suture removal.    Disclaimer: This note consists of symbols derived from keyboarding, dictation and/or voice recognition software. As a result, there may be errors in the script that have gone undetected. Please consider this when interpreting information found in this chart.       LIANG Montesinos D.P.M., FWILLIE.F.A.S.

## 2019-04-08 NOTE — LETTER
4/8/2019         RE: Fawad Garcia  [address] [facility] [address]        Dear Colleague,    Thank you for referring your patient, Fawad Garcia, to the [facility]. Please see a copy of my visit note below.    Fawad presents to the office s/p one week fifth metatarsal head resection of the left foot .  The patient relates keeping the bandages clean, dry and intact.  The patient relates good compliance with postoperative instructions.  The patient denies any severe pain, fevers, chills, nausea or vomiting.      There were no vitals taken for this visit.      Physical Exam:    General: The patient appears to be in no distress and in good spirits.  The bandages appear clean, dry and intact with no strikethrough noted. Vascular exam: Neurovascular status unchanged with < 3 sec capillary refill to all digits.  Positive pedal pulses and epicritic sensations intact with no evidence of allodynia.  One notes moderate edema to the forefoot on the left.  Sutures are intact and the skin is well coapted with no erythema noted.      Radiograph:  AP, lateral and medial oblique evaluation of left foot reveals surgical resection of the fifth metatarsal head.    Assessment: Osteomyelitis status post one week fifth metatarsal head resection of the left foot.    Plan:  Sutures remain intact.  A sterile dressing was reapplied.  The patient was instructed to continue non-weightbearing to the left foot.  The patient is to keep the dressings clean, dry and intact and to continue with elevation of the left foot.  The patient will return to the office in one week for suture removal.    Disclaimer: This note consists of symbols derived from keyboarding, dictation and/or voice recognition software. As a result, there may be errors in the script that have gone undetected. Please consider this when interpreting information found in this chart.       LIANG Montesinos D.P.M.,  RIVAS.[email or web address].S.      Again, thank you for allowing me to participate in the care of your patient.        Sincerely,        Ethan Montesinos DPM

## 2019-04-08 NOTE — NURSING NOTE
Chief Complaint   Patient presents with     Surgical Followup     DOS 4/2/19, XR today, Fifth metatarsal head resection, left foot.        Initial /85 (BP Location: Left arm, Patient Position: Sitting, Cuff Size: Adult Regular)   Pulse 76   Ht 1.829 m (6')   Wt 103 kg (227 lb)   BMI 30.79 kg/m   Estimated body mass index is 30.79 kg/m  as calculated from the following:    Height as of this encounter: 1.829 m (6').    Weight as of this encounter: 103 kg (227 lb).  Medications and allergies reviewed.      Dana GUILLERMO MA

## 2019-04-09 LAB
BACTERIA SPEC CULT: ABNORMAL
BACTERIA SPEC CULT: ABNORMAL
BACTERIA SPEC CULT: NORMAL
Lab: ABNORMAL
Lab: NORMAL
SPECIMEN SOURCE: ABNORMAL
SPECIMEN SOURCE: NORMAL

## 2019-04-10 ENCOUNTER — NURSE TRIAGE (OUTPATIENT)
Dept: NURSING | Facility: CLINIC | Age: 84
End: 2019-04-10

## 2019-04-10 ENCOUNTER — OFFICE VISIT (OUTPATIENT)
Dept: FAMILY MEDICINE | Facility: CLINIC | Age: 84
End: 2019-04-10
Payer: COMMERCIAL

## 2019-04-10 VITALS
DIASTOLIC BLOOD PRESSURE: 62 MMHG | HEART RATE: 80 BPM | RESPIRATION RATE: 16 BRPM | OXYGEN SATURATION: 96 % | HEIGHT: 72 IN | WEIGHT: 226 LBS | SYSTOLIC BLOOD PRESSURE: 110 MMHG | BODY MASS INDEX: 30.61 KG/M2 | TEMPERATURE: 98.1 F

## 2019-04-10 DIAGNOSIS — I10 ESSENTIAL HYPERTENSION, BENIGN: ICD-10-CM

## 2019-04-10 DIAGNOSIS — Z79.4 TYPE 2 DIABETES MELLITUS WITHOUT COMPLICATION, WITH LONG-TERM CURRENT USE OF INSULIN (H): ICD-10-CM

## 2019-04-10 DIAGNOSIS — Z79.4 TYPE 2 DIABETES MELLITUS WITH BOTH EYES AFFECTED BY MILD NONPROLIFERATIVE RETINOPATHY WITHOUT MACULAR EDEMA, WITH LONG-TERM CURRENT USE OF INSULIN (H): Primary | ICD-10-CM

## 2019-04-10 DIAGNOSIS — K21.9 GASTROESOPHAGEAL REFLUX DISEASE WITHOUT ESOPHAGITIS: ICD-10-CM

## 2019-04-10 DIAGNOSIS — E11.9 TYPE 2 DIABETES MELLITUS WITHOUT COMPLICATION, WITH LONG-TERM CURRENT USE OF INSULIN (H): ICD-10-CM

## 2019-04-10 DIAGNOSIS — I10 HYPERTENSION, GOAL BELOW 140/90: ICD-10-CM

## 2019-04-10 DIAGNOSIS — E78.5 HYPERLIPIDEMIA LDL GOAL <70: ICD-10-CM

## 2019-04-10 DIAGNOSIS — E11.3293 TYPE 2 DIABETES MELLITUS WITH BOTH EYES AFFECTED BY MILD NONPROLIFERATIVE RETINOPATHY WITHOUT MACULAR EDEMA, WITH LONG-TERM CURRENT USE OF INSULIN (H): Primary | ICD-10-CM

## 2019-04-10 PROCEDURE — 99214 OFFICE O/P EST MOD 30 MIN: CPT | Performed by: FAMILY MEDICINE

## 2019-04-10 RX ORDER — PRAVASTATIN SODIUM 10 MG
10 TABLET ORAL DAILY
Qty: 90 TABLET | Refills: 3 | Status: SHIPPED | OUTPATIENT
Start: 2019-04-10 | End: 2019-12-18

## 2019-04-10 RX ORDER — HYDROCHLOROTHIAZIDE 25 MG/1
25 TABLET ORAL DAILY
Qty: 90 TABLET | Refills: 3 | Status: SHIPPED | OUTPATIENT
Start: 2019-04-10 | End: 2019-12-18

## 2019-04-10 RX ORDER — LISINOPRIL 40 MG/1
40 TABLET ORAL DAILY
Qty: 90 TABLET | Refills: 3 | Status: SHIPPED | OUTPATIENT
Start: 2019-04-10 | End: 2019-12-18

## 2019-04-10 ASSESSMENT — PAIN SCALES - GENERAL: PAINLEVEL: NO PAIN (0)

## 2019-04-10 ASSESSMENT — MIFFLIN-ST. JEOR: SCORE: 1763.13

## 2019-04-10 NOTE — PROGRESS NOTES
"  SUBJECTIVE:   Fawad Garcia is a 83 year old male who presents to clinic today for the following   health issues:      Diabetes Follow-up    Patient is checking blood sugars: once daily.  Results are as follows:         am - before breakfast - not much of an appetite - blood sugars in the 80s this am    Diabetic concerns: None and other - foot problems 4/2/19 foot procedure - still has stiches     Symptoms of hypoglycemia (low blood sugar): none     Paresthesias (numbness or burning in feet) or sores: No     Date of last diabetic eye exam: Dr. Joseph Hernandez, Dr. Howard and month ago    BP Readings from Last 2 Encounters:   04/10/19 110/62   04/08/19 160/85     Hemoglobin A1C (%)   Date Value   03/15/2019 6.6 (H)   12/11/2018 8.3 (H)     LDL Cholesterol Calculated (mg/dL)   Date Value   12/11/2018 107 (H)   07/30/2018 108 (H)       Diabetes Management Resources    Amount of exercise or physical activity: trying to be active as possible  - problem with foot has held him back recently    Problems taking medications regularly: No    Medication side effects: none    Diet: regular (no restrictions) and not much of an appetite recently.      Wants to ask about \"Victoza\" and a handicap parking  He is not driving.  He needs to have a parking sticker completed, has visual impairement      Additional history: as documented    Reviewed  and updated as needed this visit by clinical staff         Reviewed and updated as needed this visit by Provider             ROS:  CONSTITUTIONAL:NEGATIVE for fever, chills, change in weight  RESP:NEGATIVE for significant cough or SOB  CV: NEGATIVE for chest pain, palpitations or peripheral edema  MUSCULOSKELETAL: just had foot surgery    ENDOCRINE: blood sugars have been under control  PSYCHIATRIC: NEGATIVE for changes in mood or affect    OBJECTIVE:                                                    /62 (BP Location: Right arm, Patient Position: Sitting, Cuff Size: Adult " Large)   Pulse 80   Temp 98.1  F (36.7  C) (Tympanic)   Resp 16   Ht 1.829 m (6')   Wt 102.5 kg (226 lb)   SpO2 96%   BMI 30.65 kg/m    Body mass index is 30.65 kg/m .  GENERAL APPEARANCE: alert, no distress and cooperative  RESP: lungs clear to auscultation - no rales, rhonchi or wheezes  CV: regular rates and rhythm, normal S1 S2, no S3 or S4 and no murmur, click or rub  MS: has post op shoes on  SKIN: no suspicious lesions or rashes  NEURO: Normal strength and tone, mentation intact and speech normal  PSYCH: mentation appears normal and affect normal/bright    Reviewed prior labs     ASSESSMENT/PLAN:                                                    1. Type 2 diabetes mellitus with both eyes affected by mild nonproliferative retinopathy without macular edema, with long-term current use of insulin (H)  Discussed his desires to cut expense.  Will stop victoza.  Monitor blood sugars, recheck hgba1c in 3 months.  Did not want to return in one month. Refilled other meds  - Hemoglobin A1c; Future    2. Hypertension, goal below 140/90  controlled  - hydrochlorothiazide (HYDRODIURIL) 25 MG tablet; Take 1 tablet (25 mg) by mouth daily Hold on file until needed  Dispense: 90 tablet; Refill: 3    3. BENIGN HYPERTENSION    - lisinopril (PRINIVIL/ZESTRIL) 40 MG tablet; Take 1 tablet (40 mg) by mouth daily Hold on file until needed  Dispense: 90 tablet; Refill: 3    4. Type 2 diabetes mellitus without complication, with long-term current use of insulin (H)  Controlled and refilled  - metFORMIN (GLUCOPHAGE) 500 MG tablet; Take 2 tablets (1,000 mg) by mouth 2 times daily (with meals) Hold on file until needed  Dispense: 360 tablet; Refill: 3    5. Gastroesophageal reflux disease without esophagitis  refilled  - omeprazole (PRILOSEC) 20 MG DR capsule; Take 1 capsule (20 mg) by mouth daily Hold on file until needed  Dispense: 90 capsule; Refill: 3    6. Hyperlipidemia LDL goal <70  refilled  - pravastatin (PRAVACHOL) 10 MG  tablet; Take 1 tablet (10 mg) by mouth daily Hold on file until needed  Dispense: 90 tablet; Refill: 3    Completed parking sticker for [place].    See Patient Instructions    Chris Scott MD  [facility]

## 2019-04-10 NOTE — PATIENT INSTRUCTIONS
I refilled your medications.    Follow up in 3 months.    I recommend to recheck your hemoglobin a1c.    Please stop the victoza and monitor your blood sugars.    Thank you for choosing [facility].  You may be receiving an email and/or telephone survey request from [facility] [employer] regarding your visit today.  Please take a few minutes to respond to the survey to let us know how we are doing.      If you have questions or concerns, please contact us via [employer] or you can contact your care team at [phone].    Our Clinic hours are:  Monday 6:40 am  to 7:00 pm  Tuesday -Friday 6:40 am to 5:00 pm    The [place] outpatient lab hours are:  Monday - Friday 6:10 am to 4:45 pm  Saturdays 7:00 am to 11:00 am  Appointments are required, call [phone]    If you have clinical questions after hours or would like to schedule an appointment,  call the clinic at [phone].

## 2019-04-11 NOTE — TELEPHONE ENCOUNTER
Patient calling reporting his dressing on his foot came off. States was told to come in to clinic if the dressing fell off. States he put the dressing back on and applied antibiotic ointment on his foot. Denies fever. Denies pain. Informed RN will page on call Surgeon to call patient directly for second level triage.     Paged on call surgeon BARTOLOME MERCADO MD for [facility] at 9:21pm to call patient directly at [phone].    Advised patient to call back if he has not received a call from surgeon within 20 minutes. Caller verbalized understanding. Denies further questions.      Tej Glasgow RN  [facility]       Reason for Disposition    Caller has URGENT question and triager unable to answer question    Additional Information    Negative: Sounds like a life-threatening emergency to the triager    Negative: [1] Widespread rash AND [2] bright red, sunburn-like    Negative: [1] SEVERE headache AND [2] after spinal (epidural) anesthesia    Negative: [1] Vomiting AND [2] persists > 4 hours    Negative: [1] Vomiting AND [2] abdomen looks much more swollen than usual    Negative: [1] Drinking very little AND [2] dehydration suspected (e.g., no urine > 12 hours, very dry mouth, very lightheaded)    Negative: Patient sounds very sick or weak to the triager    Negative: Sounds like a serious complication to the triager    Negative: Fever > 100.5 F (38.1 C)    Negative: [1] SEVERE post-op pain (e.g., excruciating, pain scale 8-10) AND [2] not controlled with pain medications    Protocols used: POST-OP SYMPTOMS AND QUESTIONS-[facility]

## 2019-04-15 ENCOUNTER — OFFICE VISIT (OUTPATIENT)
Dept: PODIATRY | Facility: CLINIC | Age: 84
End: 2019-04-15
Payer: COMMERCIAL

## 2019-04-15 VITALS
HEIGHT: 72 IN | HEART RATE: 66 BPM | SYSTOLIC BLOOD PRESSURE: 156 MMHG | BODY MASS INDEX: 30.61 KG/M2 | DIASTOLIC BLOOD PRESSURE: 81 MMHG | WEIGHT: 226 LBS

## 2019-04-15 DIAGNOSIS — M86.172: Primary | ICD-10-CM

## 2019-04-15 DIAGNOSIS — Z98.890 S/P FOOT SURGERY, LEFT: ICD-10-CM

## 2019-04-15 PROCEDURE — 99024 POSTOP FOLLOW-UP VISIT: CPT | Performed by: PODIATRIST

## 2019-04-15 RX ORDER — LIRAGLUTIDE 6 MG/ML
INJECTION SUBCUTANEOUS
COMMUNITY
Start: 2018-12-11 | End: 2019-09-18

## 2019-04-15 ASSESSMENT — MIFFLIN-ST. JEOR: SCORE: 1763.13

## 2019-04-15 NOTE — NURSING NOTE
Chief Complaint   Patient presents with     Suture Removal     DOS 4/2/19, Fifth metatarsal head resection, left foot       Initial /81   Pulse 66   Ht 1.829 m (6')   Wt 102.5 kg (226 lb)   BMI 30.65 kg/m   Estimated body mass index is 30.65 kg/m  as calculated from the following:    Height as of this encounter: 1.829 m (6').    Weight as of this encounter: 102.5 kg (226 lb).  Medications and allergies reviewed.      Dana GUILLERMO MA

## 2019-04-15 NOTE — LETTER
4/15/2019         RE: Fawad Garcia  [address] [facility] [address]        Dear Colleague,    Thank you for referring your patient, Fawad Garcia, to the [place] [facility]. Please see a copy of my visit note below.    Fawad presents to the office s/p two weeks fifth metatarsal head resection of the left foot .  The patient relates keeping the bandages clean, dry and intact.  The patient relates good compliance with postoperative instructions.  The patient denies any severe pain, fevers, chills, nausea or vomiting.      /81   Pulse 66   Ht 1.829 m (6')   Wt 102.5 kg (226 lb)   BMI 30.65 kg/m         Physical Exam:    General: The patient appears to be in no distress and in good spirits.  The bandages appear clean, dry and intact with no strikethrough noted. Vascular exam: Neurovascular status unchanged with < 3 sec capillary refill to all digits.  Positive pedal pulses and epicritic sensations intact with no evidence of allodynia.  One notes moderate edema to the forefoot on the left.  Sutures are intact and the skin is well coapted with no erythema noted.           Assessment: Osteomyelitis status post two weeks fifth metatarsal head resection of the left foot.    Plan:  Sutures remain intact.  A sterile dressing was reapplied.  The patient was instructed to continue non-weightbearing to the left foot.  The patient is to keep the dressings clean, dry and intact and to continue with elevation of the left foot.  The patient will return to the office in one week for suture removal.    Disclaimer: This note consists of symbols derived from keyboarding, dictation and/or voice recognition software. As a result, there may be errors in the script that have gone undetected. Please consider this when interpreting information found in this chart.       LIANG Montesinos D.P.M., F.DELANEY.C.F.A.S.      Again, thank you for allowing me to participate in the care of your  patient.        Sincerely,        Ethan Montesinos DPM

## 2019-04-22 ENCOUNTER — OFFICE VISIT (OUTPATIENT)
Dept: PODIATRY | Facility: CLINIC | Age: 84
End: 2019-04-22
Payer: COMMERCIAL

## 2019-04-22 VITALS
WEIGHT: 226 LBS | HEART RATE: 75 BPM | HEIGHT: 72 IN | BODY MASS INDEX: 30.61 KG/M2 | DIASTOLIC BLOOD PRESSURE: 93 MMHG | SYSTOLIC BLOOD PRESSURE: 189 MMHG

## 2019-04-22 DIAGNOSIS — Z98.890 S/P FOOT SURGERY, LEFT: ICD-10-CM

## 2019-04-22 DIAGNOSIS — M86.172: Primary | ICD-10-CM

## 2019-04-22 PROCEDURE — 99024 POSTOP FOLLOW-UP VISIT: CPT | Performed by: PODIATRIST

## 2019-04-22 ASSESSMENT — MIFFLIN-ST. JEOR: SCORE: 1763.13

## 2019-04-22 NOTE — LETTER
4/22/2019         RE: Fawad Garcia  [address] [facility] [address]        Dear Colleague,    Thank you for referring your patient, Fawad Garcia, to the [place] [facility]. Please see a copy of my visit note below.    Fawad presents to the office s/p 3 weeks fifth metatarsal head resection of the left foot .  The patient relates keeping the bandages clean, dry and intact.  The patient relates good compliance with postoperative instructions.  The patient denies any severe pain, fevers, chills, nausea or vomiting.      BP (!) 189/93   Pulse 75   Ht 1.829 m (6')   Wt 102.5 kg (226 lb)   BMI 30.65 kg/m         Physical Exam:    General: The patient appears to be in no distress and in good spirits.  The bandages appear clean, dry and intact with no strikethrough noted. Vascular exam: Neurovascular status unchanged with < 3 sec capillary refill to all digits.  Positive pedal pulses and epicritic sensations intact with no evidence of allodynia.  One notes moderate edema to the forefoot on the left.  Sutures are intact and the skin is well coapted with no erythema noted.           Assessment: Osteomyelitis status post 3 weeks fifth metatarsal head resection of the left foot.    Plan:  Sutures were removed and Steri-Strips applied.  The patient may shower.  The patient was instructed return to the office in 1 month for reevaluation.      Disclaimer: This note consists of symbols derived from keyboarding, dictation and/or voice recognition software. As a result, there may be errors in the script that have gone undetected. Please consider this when interpreting information found in this chart.       LIANG Montesinos D.P.M., F.DELANEY.C.F.A.S.      Again, thank you for allowing me to participate in the care of your patient.        Sincerely,        Ethan Montesinos DPM

## 2019-04-22 NOTE — PATIENT INSTRUCTIONS
Fawad to follow up with Primary Care provider regarding elevated blood pressure.  Your sutures were removed today.   You may now take a shower but do not soak your foot..     .       Return in 2 weeeks

## 2019-04-22 NOTE — NURSING NOTE
Chief Complaint   Patient presents with     Suture Removal     DOS 4/02/19, Fifth metatarsal head resection, left foot.       Initial BP (!) 189/93   Pulse 75   Ht 1.829 m (6')   Wt 102.5 kg (226 lb)   BMI 30.65 kg/m   Estimated body mass index is 30.65 kg/m  as calculated from the following:    Height as of this encounter: 1.829 m (6').    Weight as of this encounter: 102.5 kg (226 lb).  Medications and allergies reviewed.      Dana GUILLERMO MA

## 2019-04-23 NOTE — PROGRESS NOTES
Fawad presents to the office s/p two weeks fifth metatarsal head resection of the left foot .  The patient relates keeping the bandages clean, dry and intact.  The patient relates good compliance with postoperative instructions.  The patient denies any severe pain, fevers, chills, nausea or vomiting.      /81   Pulse 66   Ht 1.829 m (6')   Wt 102.5 kg (226 lb)   BMI 30.65 kg/m        Physical Exam:    General: The patient appears to be in no distress and in good spirits.  The bandages appear clean, dry and intact with no strikethrough noted. Vascular exam: Neurovascular status unchanged with < 3 sec capillary refill to all digits.  Positive pedal pulses and epicritic sensations intact with no evidence of allodynia.  One notes moderate edema to the forefoot on the left.  Sutures are intact and the skin is well coapted with no erythema noted.           Assessment: Osteomyelitis status post two weeks fifth metatarsal head resection of the left foot.    Plan:  Sutures remain intact.  A sterile dressing was reapplied.  The patient was instructed to continue non-weightbearing to the left foot.  The patient is to keep the dressings clean, dry and intact and to continue with elevation of the left foot.  The patient will return to the office in one week for suture removal.    Disclaimer: This note consists of symbols derived from keyboarding, dictation and/or voice recognition software. As a result, there may be errors in the script that have gone undetected. Please consider this when interpreting information found in this chart.       LIANG Montesinos D.P.M., FWILLIE.F.A.S.

## 2019-04-24 DIAGNOSIS — I10 HYPERTENSION, GOAL BELOW 140/90: ICD-10-CM

## 2019-04-24 RX ORDER — AMLODIPINE BESYLATE 5 MG/1
5 TABLET ORAL DAILY
Qty: 90 TABLET | Refills: 3 | Status: SHIPPED | OUTPATIENT
Start: 2019-04-24 | End: 2019-12-18

## 2019-04-24 NOTE — TELEPHONE ENCOUNTER
Sent refills.  Please call the patient,  If the patient is on this medication it should not be a PRN.  It should be taken daily. If 5 mg causes his blood pressure to go to low then he should only take 2.5 mg a day.    Chris Scott MD  Family Medicine

## 2019-04-24 NOTE — TELEPHONE ENCOUNTER
"S:  Request for refill of amlodipine    B:  LOV 4/10/19 no mention of amlodipine  Medication last filled 4/4/18 for a 12 month supply  From 4/10/19 office visit:  \"2. Hypertension, goal below 140/90  controlled  - hydrochlorothiazide (HYDRODIURIL) 25 MG tablet; Take 1 tablet (25 mg) by mouth daily Hold on file until needed  Dispense: 90 tablet; Refill: 3     3. BENIGN HYPERTENSION     - lisinopril (PRINIVIL/ZESTRIL) 40 MG tablet; Take 1 tablet (40 mg) by mouth daily Hold on file until needed  Dispense: 90 tablet; Refill: 3\"      A:  Patient fails [facility] refill protocol   No recent visit with authorizing prescriber   Last BP readings not in range:  Vital Signs 4/10/2019 4/15/2019 4/22/2019   Systolic 110 156 189   Diastolic 62 81 93   Pulse 80 66 75   Temperature 98.1     Respirations 16     Weight (LB) 226 lb 226 lb 226 lb   Height 6' 0\" 6' 0\" 6' 0\"   BMI (Calculated) 30.65 30.65 30.65   Pain      O2 96         R:  Routed to provider:   BP trending upwards  No authorizing prescriber    Murali Teixeira RN        "

## 2019-04-29 NOTE — PROGRESS NOTES
Fawad presents to the office s/p 3 weeks fifth metatarsal head resection of the left foot .  The patient relates keeping the bandages clean, dry and intact.  The patient relates good compliance with postoperative instructions.  The patient denies any severe pain, fevers, chills, nausea or vomiting.      BP (!) 189/93   Pulse 75   Ht 1.829 m (6')   Wt 102.5 kg (226 lb)   BMI 30.65 kg/m        Physical Exam:    General: The patient appears to be in no distress and in good spirits.  The bandages appear clean, dry and intact with no strikethrough noted. Vascular exam: Neurovascular status unchanged with < 3 sec capillary refill to all digits.  Positive pedal pulses and epicritic sensations intact with no evidence of allodynia.  One notes moderate edema to the forefoot on the left.  Sutures are intact and the skin is well coapted with no erythema noted.           Assessment: Osteomyelitis status post 3 weeks fifth metatarsal head resection of the left foot.    Plan:  Sutures were removed and Steri-Strips applied.  The patient may shower.  The patient was instructed return to the office in 1 month for reevaluation.      Disclaimer: This note consists of symbols derived from keyboarding, dictation and/or voice recognition software. As a result, there may be errors in the script that have gone undetected. Please consider this when interpreting information found in this chart.       LIANG Montesinos D.P.M., FIDEL.F.A.S.

## 2019-04-30 ENCOUNTER — OFFICE VISIT (OUTPATIENT)
Dept: DERMATOLOGY | Facility: CLINIC | Age: 84
End: 2019-04-30
Payer: COMMERCIAL

## 2019-04-30 VITALS — OXYGEN SATURATION: 98 % | SYSTOLIC BLOOD PRESSURE: 138 MMHG | DIASTOLIC BLOOD PRESSURE: 74 MMHG | HEART RATE: 83 BPM

## 2019-04-30 DIAGNOSIS — L81.4 LENTIGO: ICD-10-CM

## 2019-04-30 DIAGNOSIS — Z85.828 HISTORY OF SKIN CANCER: Primary | ICD-10-CM

## 2019-04-30 DIAGNOSIS — L82.1 SEBORRHEIC KERATOSIS: ICD-10-CM

## 2019-04-30 DIAGNOSIS — D18.00 ANGIOMA: ICD-10-CM

## 2019-04-30 PROCEDURE — 99213 OFFICE O/P EST LOW 20 MIN: CPT | Performed by: DERMATOLOGY

## 2019-04-30 NOTE — LETTER
4/30/2019         RE: Fawad Garcia  [address] [facility] [address]        Dear Colleague,    Thank you for referring your patient, Fawad Garcia, to the [facility]. Please see a copy of my visit note below.    Fawad Garcia is a 83 year old year old male patient here today for spot on left helix.   .  Patient states this has been present for ?.  Patient reports the following symptoms:  none.  Patient reports the following previous treatments none.  Patient reports the following modifying factors none.  Associated symptoms: none.  Patient has no other skin complaints today.  Remainder of the HPI, Meds, PMH, Allergies, FH, and SH was reviewed in chart.      Past Medical History:   Diagnosis Date     Diabetes mellitus (H)      Hypertension      Squamous cell carcinoma        Past Surgical History:   Procedure Laterality Date     CL AFF SURGICAL PATHOLOGY  2002    prostate biopsy elevated PSA     COLONOSCOPY       HC REMOVE TONSILS/ADENOIDS,<13 Y/O      T & A     IR LOWER EXTREMITY ANGIOGRAM RIGHT  12/11/2018     PHACOEMULSIFICATION WITH STANDARD INTRAOCULAR LENS IMPLANT Left 4/2/2015    Procedure: PHACOEMULSIFICATION WITH STANDARD INTRAOCULAR LENS IMPLANT;  Surgeon: Joseph Hernandez MD;  Location: [facility]     PHACOEMULSIFICATION WITH STANDARD INTRAOCULAR LENS IMPLANT Right 5/4/2015    Procedure: PHACOEMULSIFICATION WITH STANDARD INTRAOCULAR LENS IMPLANT;  Surgeon: Joseph Hernandez MD;  Location: [facility]     REPAIR HAMMER TOE Left 4/2/2019    Procedure: 5th Metatarsal Head Resection;  Surgeon: Ethan Montesinos DPM;  Location: [facility] OR        Family History   Problem Relation Age of Onset     Cancer Mother         intestinal CA     Diabetes Father      Diabetes Brother      Other Cancer Son         meagan tumor     Other Cancer Brother         pancreatic cancer     Diabetes Son        Social History     Socioeconomic History     Marital status:      Spouse name: Not  on file     Number of children: Not on file     Years of education: Not on file     Highest education level: Not on file   Occupational History     Not on file   Social Needs     Financial resource strain: Not on file     Food insecurity:     Worry: Not on file     Inability: Not on file     Transportation needs:     Medical: Not on file     Non-medical: Not on file   Tobacco Use     Smoking status: Never Smoker     Smokeless tobacco: Never Used   Substance and Sexual Activity     Alcohol use: Yes     Alcohol/week: 1.0 oz     Comment: drinks rarely     Drug use: No     Sexual activity: Not Currently     Partners: Female   Lifestyle     Physical activity:     Days per week: Not on file     Minutes per session: Not on file     Stress: Not on file   Relationships     Social connections:     Talks on phone: Not on file     Gets together: Not on file     Attends Hinduism service: Not on file     Active member of club or organization: Not on file     Attends meetings of clubs or organizations: Not on file     Relationship status: Not on file     Intimate partner violence:     Fear of current or ex partner: Not on file     Emotionally abused: Not on file     Physically abused: Not on file     Forced sexual activity: Not on file   Other Topics Concern     Parent/sibling w/ CABG, MI or angioplasty before 65F 55M? No   Social History Narrative     Not on file       Outpatient Encounter Medications as of 4/30/2019   Medication Sig Dispense Refill     amLODIPine (NORVASC) 5 MG tablet Take 1 tablet (5 mg) by mouth daily 90 tablet 3     blood glucose (NO BRAND SPECIFIED) test strip 1 strip by In Vitro route daily One touch Ultra. 100 each 0     Blood Glucose Monitoring Suppl (ONE TOUCH ULTRA SYSTEM KIT) W/DEVICE KIT        cephALEXin (KEFLEX) 500 MG capsule Take 1 capsule (500 mg) by mouth 4 times daily 40 capsule 0     Cholecalciferol (VITAMIN D PO) Take 4,000 Int'l Units/day by mouth daily       dorzolamide-timolol (COSOPT)  2-0.5 % ophthalmic solution        hydrochlorothiazide (HYDRODIURIL) 25 MG tablet Take 1 tablet (25 mg) by mouth daily Hold on file until needed 90 tablet 3     insulin glargine (LANTUS SOLOSTAR PEN) 100 UNIT/ML pen Inject 24 Units Subcutaneous At Bedtime 21 mL 3     insulin pen needle (B-D U/F) 31G X 8 MM 1 Device daily Use once daily or as directed. 100 each 3     lisinopril (PRINIVIL/ZESTRIL) 40 MG tablet Take 1 tablet (40 mg) by mouth daily Hold on file until needed 90 tablet 3     metFORMIN (GLUCOPHAGE) 500 MG tablet Take 2 tablets (1,000 mg) by mouth 2 times daily (with meals) Hold on file until needed 360 tablet 3     Multiple Vitamins-Minerals (PRESERVISION AREDS PO) Take 1 tablet by mouth       omeprazole (PRILOSEC) 20 MG DR capsule Take 1 capsule (20 mg) by mouth daily Hold on file until needed 90 capsule 3     ONETOUCH LANCETS MISC 1 Device daily One touch delica lancets, what ever is covered by insurance. 100 each 5     ORDER FOR DME Equipment being ordered:   Glucometer 1 Device 1     pravastatin (PRAVACHOL) 10 MG tablet Take 1 tablet (10 mg) by mouth daily Hold on file until needed 90 tablet 3     senna-docusate (SENOKOT-S/PERICOLACE) 8.6-50 MG tablet Take 1-2 tablets by mouth 2 times daily 30 tablet 0     triamcinolone (KENALOG) 0.1 % cream        VICTOZA PEN 18 MG/3ML soln        oxyCODONE-acetaminophen (PERCOCET) 5-325 MG tablet Take 1-2 tablets by mouth every 4 hours as needed for moderate to severe pain (Patient not taking: Reported on 4/10/2019) 30 tablet 0     No facility-administered encounter medications on file as of 4/30/2019.              Review Of Systems  Skin: As above  Eyes: negative  Ears/Nose/Throat: negative  Respiratory: No shortness of breath, dyspnea on exertion, cough, or hemoptysis  Cardiovascular: negative  Gastrointestinal: negative  Genitourinary: negative  Musculoskeletal: negative  Neurologic: negative  Psychiatric: negative  Hematologic/Lymphatic/Immunologic:  negative  Endocrine: negative      O:   NAD, WDWN, Alert & Oriented, Mood & Affect wnl, Vitals stable   Here today alone   /74   Pulse 83   SpO2 98%    General appearance normal   Vitals stable   Alert, oriented and in no acute distress      Following lymph nodes palpated: Occipital, Cervical, Supraclavicular no lad   L root of helix stuck on brown papule       Stuck on papules and brown macules on trunk and ext   Red papules on trunk     The remainder of expanded problem focused exam was unremarkable; the following areas were examined:  scalp/hair, conjunctiva/lids, face, neck, lips, chest, digits/nails, RUE, LUE.      Eyes: Conjunctivae/lids:Normal     ENT: Lips, buccal mucosa, tongue: normal    MSK:Normal    Cardiovascular: peripheral edema none    Pulm: Breathing Normal    Lymph Nodes: No Head and Neck Lymphadenopathy     Neuro/Psych: Orientation:Normal; Mood/Affect:Normal      A/P:  1. Seborrheic keratosis, lentigo, angioma, hx of non-melanoma skin cancer   BENIGN LESIONS DISCUSSED WITH PATIENT:  I discussed the specifics of tumor, prognosis, and genetics of benign lesions.  I explained that treatment of these lesions would be purely cosmetic and not medically neccessary.  I discussed with patient different removal options including excision, cautery and /or laser.      Nature and genetics of benign skin lesions dicussed with patient.  Signs and Symptoms of skin cancer discussed with patient.  ABCDEs of melanoma reviewed with patient.  Patient encouraged to perform monthly skin exams.  UV precautions reviewed with patient.  Patient to follow up with Primary Care provider regarding elevated blood pressure.  Skin care regimen reviewed with patient: Eliminate harsh soaps, i.e. Dial, zest, irsih spring; Mild soaps such as Cetaphil or Dove sensitive skin, avoid hot or cold showers, aggressive use of emollients including vanicream, cetaphil or cerave discussed with patient.    Risks of non-melanoma skin  cancer discussed with patient   Return to clinic 6 months      Again, thank you for allowing me to participate in the care of your patient.        Sincerely,        Evgeny Torres MD

## 2019-04-30 NOTE — NURSING NOTE
Chief Complaint   Patient presents with     Derm Problem       Vitals:    04/30/19 1416   BP: 138/74   Pulse: 83   SpO2: 98%     Wt Readings from Last 1 Encounters:   04/22/19 102.5 kg (226 lb)       Luz Doss LPN.................4/30/2019

## 2019-04-30 NOTE — PROGRESS NOTES
Fawad Garcia is a 83 year old year old male patient here today for spot on left helix.   .  Patient states this has been present for ?.  Patient reports the following symptoms:  none.  Patient reports the following previous treatments none.  Patient reports the following modifying factors none.  Associated symptoms: none.  Patient has no other skin complaints today.  Remainder of the HPI, Meds, PMH, Allergies, FH, and SH was reviewed in chart.      Past Medical History:   Diagnosis Date     Diabetes mellitus (H)      Hypertension      Squamous cell carcinoma        Past Surgical History:   Procedure Laterality Date     CL AFF SURGICAL PATHOLOGY  2002    prostate biopsy elevated PSA     COLONOSCOPY       HC REMOVE TONSILS/ADENOIDS,<13 Y/O      T & A     IR LOWER EXTREMITY ANGIOGRAM RIGHT  12/11/2018     PHACOEMULSIFICATION WITH STANDARD INTRAOCULAR LENS IMPLANT Left 4/2/2015    Procedure: PHACOEMULSIFICATION WITH STANDARD INTRAOCULAR LENS IMPLANT;  Surgeon: Joseph Hernandez MD;  Location: [facility]     PHACOEMULSIFICATION WITH STANDARD INTRAOCULAR LENS IMPLANT Right 5/4/2015    Procedure: PHACOEMULSIFICATION WITH STANDARD INTRAOCULAR LENS IMPLANT;  Surgeon: Joseph Hernandez MD;  Location: [facility] OR     REPAIR HAMMER TOE Left 4/2/2019    Procedure: 5th Metatarsal Head Resection;  Surgeon: Ethan Montesinos DPM;  Location: [facility] OR        Family History   Problem Relation Age of Onset     Cancer Mother         intestinal CA     Diabetes Father      Diabetes Brother      Other Cancer Son         meagan tumor     Other Cancer Brother         pancreatic cancer     Diabetes Son        Social History     Socioeconomic History     Marital status:      Spouse name: Not on file     Number of children: Not on file     Years of education: Not on file     Highest education level: Not on file   Occupational History     Not on file   Social Needs     Financial resource strain: Not on file     Food insecurity:     Worry:  Not on file     Inability: Not on file     Transportation needs:     Medical: Not on file     Non-medical: Not on file   Tobacco Use     Smoking status: Never Smoker     Smokeless tobacco: Never Used   Substance and Sexual Activity     Alcohol use: Yes     Alcohol/week: 1.0 oz     Comment: drinks rarely     Drug use: No     Sexual activity: Not Currently     Partners: Female   Lifestyle     Physical activity:     Days per week: Not on file     Minutes per session: Not on file     Stress: Not on file   Relationships     Social connections:     Talks on phone: Not on file     Gets together: Not on file     Attends Restorationism service: Not on file     Active member of club or organization: Not on file     Attends meetings of clubs or organizations: Not on file     Relationship status: Not on file     Intimate partner violence:     Fear of current or ex partner: Not on file     Emotionally abused: Not on file     Physically abused: Not on file     Forced sexual activity: Not on file   Other Topics Concern     Parent/sibling w/ CABG, MI or angioplasty before 65F 55M? No   Social History Narrative     Not on file       Outpatient Encounter Medications as of 4/30/2019   Medication Sig Dispense Refill     amLODIPine (NORVASC) 5 MG tablet Take 1 tablet (5 mg) by mouth daily 90 tablet 3     blood glucose (NO BRAND SPECIFIED) test strip 1 strip by In Vitro route daily One touch Ultra. 100 each 0     Blood Glucose Monitoring Suppl (ONE TOUCH ULTRA SYSTEM KIT) W/DEVICE KIT        cephALEXin (KEFLEX) 500 MG capsule Take 1 capsule (500 mg) by mouth 4 times daily 40 capsule 0     Cholecalciferol (VITAMIN D PO) Take 4,000 Int'l Units/day by mouth daily       dorzolamide-timolol (COSOPT) 2-0.5 % ophthalmic solution        hydrochlorothiazide (HYDRODIURIL) 25 MG tablet Take 1 tablet (25 mg) by mouth daily Hold on file until needed 90 tablet 3     insulin glargine (LANTUS SOLOSTAR PEN) 100 UNIT/ML pen Inject 24 Units Subcutaneous At  Bedtime 21 mL 3     insulin pen needle (B-D U/F) 31G X 8 MM 1 Device daily Use once daily or as directed. 100 each 3     lisinopril (PRINIVIL/ZESTRIL) 40 MG tablet Take 1 tablet (40 mg) by mouth daily Hold on file until needed 90 tablet 3     metFORMIN (GLUCOPHAGE) 500 MG tablet Take 2 tablets (1,000 mg) by mouth 2 times daily (with meals) Hold on file until needed 360 tablet 3     Multiple Vitamins-Minerals (PRESERVISION AREDS PO) Take 1 tablet by mouth       omeprazole (PRILOSEC) 20 MG DR capsule Take 1 capsule (20 mg) by mouth daily Hold on file until needed 90 capsule 3     ONETOUCH LANCETS MISC 1 Device daily One touch delica lancets, what ever is covered by insurance. 100 each 5     ORDER FOR DME Equipment being ordered:   Glucometer 1 Device 1     pravastatin (PRAVACHOL) 10 MG tablet Take 1 tablet (10 mg) by mouth daily Hold on file until needed 90 tablet 3     senna-docusate (SENOKOT-S/PERICOLACE) 8.6-50 MG tablet Take 1-2 tablets by mouth 2 times daily 30 tablet 0     triamcinolone (KENALOG) 0.1 % cream        VICTOZA PEN 18 MG/3ML soln        oxyCODONE-acetaminophen (PERCOCET) 5-325 MG tablet Take 1-2 tablets by mouth every 4 hours as needed for moderate to severe pain (Patient not taking: Reported on 4/10/2019) 30 tablet 0     No facility-administered encounter medications on file as of 4/30/2019.              Review Of Systems  Skin: As above  Eyes: negative  Ears/Nose/Throat: negative  Respiratory: No shortness of breath, dyspnea on exertion, cough, or hemoptysis  Cardiovascular: negative  Gastrointestinal: negative  Genitourinary: negative  Musculoskeletal: negative  Neurologic: negative  Psychiatric: negative  Hematologic/Lymphatic/Immunologic: negative  Endocrine: negative      O:   NAD, WDWN, Alert & Oriented, Mood & Affect wnl, Vitals stable   Here today alone   /74   Pulse 83   SpO2 98%    General appearance normal   Vitals stable   Alert, oriented and in no acute distress      Following  lymph nodes palpated: Occipital, Cervical, Supraclavicular no lad   L root of helix stuck on brown papule       Stuck on papules and brown macules on trunk and ext   Red papules on trunk     The remainder of expanded problem focused exam was unremarkable; the following areas were examined:  scalp/hair, conjunctiva/lids, face, neck, lips, chest, digits/nails, RUE, LUE.      Eyes: Conjunctivae/lids:Normal     ENT: Lips, buccal mucosa, tongue: normal    MSK:Normal    Cardiovascular: peripheral edema none    Pulm: Breathing Normal    Lymph Nodes: No Head and Neck Lymphadenopathy     Neuro/Psych: Orientation:Normal; Mood/Affect:Normal      A/P:  1. Seborrheic keratosis, lentigo, angioma, hx of non-melanoma skin cancer   BENIGN LESIONS DISCUSSED WITH PATIENT:  I discussed the specifics of tumor, prognosis, and genetics of benign lesions.  I explained that treatment of these lesions would be purely cosmetic and not medically neccessary.  I discussed with patient different removal options including excision, cautery and /or laser.      Nature and genetics of benign skin lesions dicussed with patient.  Signs and Symptoms of skin cancer discussed with patient.  ABCDEs of melanoma reviewed with patient.  Patient encouraged to perform monthly skin exams.  UV precautions reviewed with patient.  Patient to follow up with Primary Care provider regarding elevated blood pressure.  Skin care regimen reviewed with patient: Eliminate harsh soaps, i.e. Dial, zest, irsih spring; Mild soaps such as Cetaphil or Dove sensitive skin, avoid hot or cold showers, aggressive use of emollients including vanicream, cetaphil or cerave discussed with patient.    Risks of non-melanoma skin cancer discussed with patient   Return to clinic 6 months

## 2019-05-06 ENCOUNTER — OFFICE VISIT (OUTPATIENT)
Dept: PODIATRY | Facility: CLINIC | Age: 84
End: 2019-05-06
Payer: COMMERCIAL

## 2019-05-06 VITALS
SYSTOLIC BLOOD PRESSURE: 133 MMHG | WEIGHT: 226 LBS | HEART RATE: 90 BPM | DIASTOLIC BLOOD PRESSURE: 77 MMHG | BODY MASS INDEX: 30.61 KG/M2 | HEIGHT: 72 IN

## 2019-05-06 DIAGNOSIS — Z98.890 S/P FOOT SURGERY, LEFT: Primary | ICD-10-CM

## 2019-05-06 PROCEDURE — 99024 POSTOP FOLLOW-UP VISIT: CPT | Performed by: PODIATRIST

## 2019-05-06 ASSESSMENT — MIFFLIN-ST. JEOR: SCORE: 1763.13

## 2019-05-06 NOTE — NURSING NOTE
Chief Complaint   Patient presents with     Surgical Followup     DOS 4/02/19, Fifth metatarsal head resection, left foot.       Initial /77   Pulse 90   Ht 1.829 m (6')   Wt 102.5 kg (226 lb)   BMI 30.65 kg/m   Estimated body mass index is 30.65 kg/m  as calculated from the following:    Height as of this encounter: 1.829 m (6').    Weight as of this encounter: 102.5 kg (226 lb).  Medications and allergies reviewed.      Dana GUILLERMO MA

## 2019-05-06 NOTE — PROGRESS NOTES
Fawad returns to the office for reevaluation of the left foot.  The patient relates following the instructions given at the last visit with noted improvement in healing of both wounds on both feet.  Patient denies any redness swelling or drainage.    PAST MEDICAL HISTORY:   Past Medical History:   Diagnosis Date     Diabetes mellitus (H)      Hypertension      Squamous cell carcinoma        BMI= Body mass index is 30.65 kg/m .        Physical Exam:    General: The patient appears to have a pleasant mental affect.    Lower extremity physical exam:  Neurovascular status is intact with palpable pedal pulses and intact epicritic sensations.  Muscular exam is within normal limits to major muscle groups.  Integument is intact.      One notes decreased edema.  One notes decreased size of the ulceration on the plantar aspect of the fifth metatarsal head on the left foot as well as the distal aspect of the great toe on the right foot.  No surrounding erythema noted.  No drainage noted.         Assessment:      ICD-10-CM    1. S/P foot surgery, left Z98.890        Plan:  I have explained to Fawad about the conditions.  At this time, the patient will continue offloading both feet for further healing to take place.  Patient return in 1 month for reevaluation.    Disclaimer: This note consists of symbols derived from keyboarding, dictation and/or voice recognition software. As a result, there may be errors in the script that have gone undetected. Please consider this when interpreting information found in this chart.       LIANG Montesinos D.P.M., FIDEL.F.A.S.

## 2019-05-06 NOTE — LETTER
5/6/2019         RE: Fawad Garcia  [address] [facility] [address]        Dear Colleague,    Thank you for referring your patient, Fawad Garcia, to the [facility]. Please see a copy of my visit note below.    Fawad returns to the office for reevaluation of the left foot.  The patient relates following the instructions given at the last visit with noted improvement in healing of both wounds on both feet.  Patient denies any redness swelling or drainage.    PAST MEDICAL HISTORY:   Past Medical History:   Diagnosis Date     Diabetes mellitus (H)      Hypertension      Squamous cell carcinoma        BMI= Body mass index is 30.65 kg/m .        Physical Exam:    General: The patient appears to have a pleasant mental affect.    Lower extremity physical exam:  Neurovascular status is intact with palpable pedal pulses and intact epicritic sensations.  Muscular exam is within normal limits to major muscle groups.  Integument is intact.      One notes decreased edema.  One notes decreased size of the ulceration on the plantar aspect of the fifth metatarsal head on the left foot as well as the distal aspect of the great toe on the right foot.  No surrounding erythema noted.  No drainage noted.         Assessment:      ICD-10-CM    1. S/P foot surgery, left Z98.890        Plan:  I have explained to Fawad about the conditions.  At this time, the patient will continue offloading both feet for further healing to take place.  Patient return in 1 month for reevaluation.    Disclaimer: This note consists of symbols derived from keyboarding, dictation and/or voice recognition software. As a result, there may be errors in the script that have gone undetected. Please consider this when interpreting information found in this chart.       GOPI VelaPAMANDA., F.[email or web address].[email or web address].S.      Again, thank you for allowing me to participate in the care of your patient.         Sincerely,        Ethan Montesinos DPM

## 2019-05-15 NOTE — TELEPHONE ENCOUNTER
"Requested Prescriptions   Pending Prescriptions Disp Refills     metFORMIN (GLUCOPHAGE) 500 MG tablet  Last Written Prescription Date:  2016  Last Fill Quantity: 360,  # refills: 3   Last office visit: 10/18/2017 with prescribing provider:  MADHAVI Jefferson   Future Office Visit:     360 tablet 3     Si tabs with lunch and  tabs with dinner.    Biguanide Agents Failed    3/16/2018  4:06 PM       Failed - Patient has documented LDL within the past 12 mos.    Recent Labs   Lab Test  16   1041   LDL  95            Passed - Blood pressure less than 140/90 in past 6 months    BP Readings from Last 3 Encounters:   18 119/77   18 119/77   10/04/17 113/65                Passed - Patient has had a Microalbumin in the past 12 mos.    Recent Labs   Lab Test  17   1102   MICROL  204   UMALCR  201.98*            Passed - Patient is age 10 or older       Passed - Patient has documented A1c within the specified period of time.    Recent Labs   Lab Test  10/04/17   0937   A1C  7.2*            Passed - Patient's CR is NOT>1.4 OR Patient's EGFR is NOT<45 within past 12 mos.    Recent Labs   Lab Test  17   1102   GFRESTIMATED  78   GFRESTBLACK  >90   GFR Calc         Recent Labs   Lab Test  17   1102   CR  0.93            Passed - Patient does NOT have a diagnosis of CHF.       Passed - Recent (6 mo) or future (30 days) visit within the authorizing provider's specialty    Patient had office visit in the last 6 months or has a visit in the next 30 days with authorizing provider or within the authorizing provider's specialty.  See \"Patient Info\" tab in inbasket, or \"Choose Columns\" in Meds & Orders section of the refill encounter.            amLODIPine (NORVASC) 5 MG tablet  Last Written Prescription Date:  2016  Last Fill Quantity: 90,  # refills: 3   Last office visit: 10/18/2017 with prescribing provider:  MADHAVI Jefferson   Future Office Visit:     90 tablet 3     Sig: Take 1 " "tablet (5 mg) by mouth daily Takes prn for high blood pressure    Calcium Channel Blockers Protocol  Passed    3/16/2018  4:06 PM       Passed - Blood pressure under 140/90 in past 12 months    BP Readings from Last 3 Encounters:   03/12/18 119/77   03/14/18 119/77   10/04/17 113/65                Passed - Recent (12 mo) or future (30 days) visit within the authorizing provider's specialty    Patient had office visit in the last 12 months or has a visit in the next 30 days with authorizing provider or within the authorizing provider's specialty.  See \"Patient Info\" tab in inbasket, or \"Choose Columns\" in Meds & Orders section of the refill encounter.           Passed - Patient is age 18 or older       Passed - Normal serum creatinine on file in past 12 months    Recent Labs   Lab Test  04/17/17   1102   CR  0.93             Carmelo Pal RT (R)    " no

## 2019-05-20 ENCOUNTER — OFFICE VISIT (OUTPATIENT)
Dept: PODIATRY | Facility: CLINIC | Age: 84
End: 2019-05-20
Payer: COMMERCIAL

## 2019-05-20 VITALS
HEART RATE: 84 BPM | HEIGHT: 72 IN | BODY MASS INDEX: 30.61 KG/M2 | SYSTOLIC BLOOD PRESSURE: 113 MMHG | DIASTOLIC BLOOD PRESSURE: 79 MMHG | WEIGHT: 226 LBS

## 2019-05-20 DIAGNOSIS — Z98.890 S/P FOOT SURGERY, LEFT: Primary | ICD-10-CM

## 2019-05-20 DIAGNOSIS — L97.511: ICD-10-CM

## 2019-05-20 PROCEDURE — 99024 POSTOP FOLLOW-UP VISIT: CPT | Performed by: PODIATRIST

## 2019-05-20 ASSESSMENT — MIFFLIN-ST. JEOR: SCORE: 1763.13

## 2019-05-20 NOTE — LETTER
5/20/2019         RE: Fawad Garcia  [address] [facility] [address]        Dear Colleague,    Thank you for referring your patient, Fawad Garcia, to the [facility]. Please see a copy of my visit note below.    Fawad returns to the office for reevaluation of the left foot.  The patient relates following the instructions given at the last visit with noted improvement in healing of both wounds on both feet.  Patient denies any redness swelling or drainage.    PAST MEDICAL HISTORY:   Past Medical History:   Diagnosis Date     Diabetes mellitus (H)      Hypertension      Squamous cell carcinoma        BMI= Body mass index is 30.65 kg/m .        Physical Exam:    General: The patient appears to have a pleasant mental affect.    Lower extremity physical exam:  Neurovascular status is intact with palpable pedal pulses and intact epicritic sensations.  Muscular exam is within normal limits to major muscle groups.  Integument is intact.      One notes decreased edema.  One notes decreased size of the ulceration on the plantar aspect of the fifth metatarsal head on the left foot as well as the distal aspect of the great toe on the right foot.  No surrounding erythema noted.  No drainage noted.         Assessment:      ICD-10-CM    1. S/P foot surgery, left Z98.890    2. 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  At this time, the patient will continue offloading both feet for further healing to take place.  The hyperkeratotic skin of tissue was sharply debrided with a #10 blade down to healthy epidermis on the right great toe.  Patient return in 1 month for reevaluation.    Disclaimer: This note consists of symbols derived from keyboarding, dictation and/or voice recognition software. As a result, there may be errors in the script that have gone undetected. Please  consider this when interpreting information found in this chart.       LIANG Montesinos D.P.M., F.[email or web address].[email or web address].S.      Again, thank you for allowing me to participate in the care of your patient.        Sincerely,        Ethan Montesinos DPM

## 2019-05-20 NOTE — NURSING NOTE
Chief Complaint   Patient presents with     Surgical Followup     DOS 4/2/19, Fifth metatarsal head resection, left foot.        Initial /79   Pulse 84   Ht 1.829 m (6')   Wt 102.5 kg (226 lb)   BMI 30.65 kg/m   Estimated body mass index is 30.65 kg/m  as calculated from the following:    Height as of this encounter: 1.829 m (6').    Weight as of this encounter: 102.5 kg (226 lb).  Medications and allergies reviewed.      Dana GUILLERMO MA

## 2019-05-24 NOTE — PROGRESS NOTES
Fawad returns to the office for reevaluation of the left foot.  The patient relates following the instructions given at the last visit with noted improvement in healing of both wounds on both feet.  Patient denies any redness swelling or drainage.    PAST MEDICAL HISTORY:   Past Medical History:   Diagnosis Date     Diabetes mellitus (H)      Hypertension      Squamous cell carcinoma        BMI= Body mass index is 30.65 kg/m .        Physical Exam:    General: The patient appears to have a pleasant mental affect.    Lower extremity physical exam:  Neurovascular status is intact with palpable pedal pulses and intact epicritic sensations.  Muscular exam is within normal limits to major muscle groups.  Integument is intact.      One notes decreased edema.  One notes decreased size of the ulceration on the plantar aspect of the fifth metatarsal head on the left foot as well as the distal aspect of the great toe on the right foot.  No surrounding erythema noted.  No drainage noted.         Assessment:      ICD-10-CM    1. S/P foot surgery, left Z98.890    2. 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  At this time, the patient will continue offloading both feet for further healing to take place.  The hyperkeratotic skin of tissue was sharply debrided with a #10 blade down to healthy epidermis on the right great toe.  Patient return in 1 month for reevaluation.    Disclaimer: This note consists of symbols derived from keyboarding, dictation and/or voice recognition software. As a result, there may be errors in the script that have gone undetected. Please consider this when interpreting information found in this chart.       LIANG Montesinos D.P.M., [email or web address].

## 2019-05-29 DIAGNOSIS — E11.9 TYPE 2 DIABETES MELLITUS WITHOUT COMPLICATION, WITH LONG-TERM CURRENT USE OF INSULIN (H): ICD-10-CM

## 2019-05-29 DIAGNOSIS — Z79.4 TYPE 2 DIABETES MELLITUS WITHOUT COMPLICATION, WITH LONG-TERM CURRENT USE OF INSULIN (H): ICD-10-CM

## 2019-05-29 NOTE — TELEPHONE ENCOUNTER
"Requested Prescriptions   Pending Prescriptions Disp Refills     insulin pen needle (B-D U/F) 31G X 8 MM miscellaneous 100 each 3     Si Device daily Use once daily or as directed.   Last Written Prescription Date:  18  Last Fill Quantity: 100 each,  # refills: 3   Last office visit: 4/10/2019 with prescribing provider:  Chris Scott     Future Office Visit:   Next 5 appointments (look out 90 days)    2019  4:40 PM CDT  Return Visit with Ethan Montesinos DPM  [facility] ([facility]) [address]  [address]  [facility] [address]  [phone]   2019  2:45 PM CDT  Return Visit with Evgeny Torres MD  [facility] ([facility]) [address] [facility] [address]  [phone]             Diabetic Supplies Protocol Passed - 2019  2:17 PM        Passed - Medication is active on med list        Passed - Patient is 18 years of age or older        Passed - Recent (6 mo) or future (30 days) visit within the authorizing provider's specialty     Patient had office visit in the last 6 months or has a visit in the next 30 days with authorizing provider.  See \"Patient Info\" tab in inbasket, or \"Choose Columns\" in Meds & Orders section of the refill encounter.              "

## 2019-05-31 NOTE — TELEPHONE ENCOUNTER
Routing refill request to provider for review/approval because:  Last ordered by Dr. Dave THOMAS RN

## 2019-06-03 ENCOUNTER — OFFICE VISIT (OUTPATIENT)
Dept: PODIATRY | Facility: CLINIC | Age: 84
End: 2019-06-03
Payer: COMMERCIAL

## 2019-06-03 VITALS
WEIGHT: 226 LBS | DIASTOLIC BLOOD PRESSURE: 73 MMHG | BODY MASS INDEX: 30.61 KG/M2 | HEIGHT: 72 IN | SYSTOLIC BLOOD PRESSURE: 140 MMHG | HEART RATE: 70 BPM

## 2019-06-03 DIAGNOSIS — Z98.890 S/P FOOT SURGERY, LEFT: Primary | ICD-10-CM

## 2019-06-03 PROCEDURE — 99024 POSTOP FOLLOW-UP VISIT: CPT | Performed by: PODIATRIST

## 2019-06-03 ASSESSMENT — MIFFLIN-ST. JEOR: SCORE: 1763.13

## 2019-06-03 NOTE — LETTER
6/3/2019         RE: Fawad Garcia  [address] [facility] [address]        Dear Colleague,    Thank you for referring your patient, Fawad Garcia, to the [facility]. Please see a copy of my visit note below.    Fawad returns to the office for reevaluation of the left foot.  The patient relates following the instructions given at the last visit with noted less pain.  The patient relates overall more  improvement in pain and function of the left foot.  The patient relates no other problems.    PAST MEDICAL HISTORY:   Past Medical History:   Diagnosis Date     Diabetes mellitus (H)      Hypertension      Squamous cell carcinoma        BMI= Body mass index is 30.65 kg/m .        Physical Exam:    General: The patient appears to have a pleasant mental affect.    Lower extremity physical exam:  Neurovascular status is intact with palpable pedal pulses and intact epicritic sensations.  Muscular exam is within normal limits to major muscle groups.  Integument is intact.      One notes decreased edema.  One notes complete epithelialization of the ulcerations on the plantar aspect of the left foot and on the distal aspect of the right great toe.  No surrounding erythema noted.       Assessment:      ICD-10-CM    1. S/P foot surgery, left Z98.890        Plan:  I have explained to Fawad about the conditions.  At this time, patient will continue with normal weightbearing and diabetic shoes.  The patient was instructed return to the office if any re-ulceration is noted.    Disclaimer: This note consists of symbols derived from keyboarding, dictation and/or voice recognition software. As a result, there may be errors in the script that have gone undetected. Please consider this when interpreting information found in this chart.       LIANG Montesinos D.P.M., FRUBIC.F.A.S.      Again, thank you for allowing me to participate in the care of your patient.         Sincerely,        Ethan Montesinos DPM

## 2019-06-03 NOTE — PROGRESS NOTES
Fawad returns to the office for reevaluation of the left foot.  The patient relates following the instructions given at the last visit with noted less pain.  The patient relates overall more  improvement in pain and function of the left foot.  The patient relates no other problems.    PAST MEDICAL HISTORY:   Past Medical History:   Diagnosis Date     Diabetes mellitus (H)      Hypertension      Squamous cell carcinoma        BMI= Body mass index is 30.65 kg/m .        Physical Exam:    General: The patient appears to have a pleasant mental affect.    Lower extremity physical exam:  Neurovascular status is intact with palpable pedal pulses and intact epicritic sensations.  Muscular exam is within normal limits to major muscle groups.  Integument is intact.      One notes decreased edema.  One notes complete epithelialization of the ulcerations on the plantar aspect of the left foot and on the distal aspect of the right great toe.  No surrounding erythema noted.       Assessment:      ICD-10-CM    1. S/P foot surgery, left Z98.890        Plan:  I have explained to Fawad about the conditions.  At this time, patient will continue with normal weightbearing and diabetic shoes.  The patient was instructed return to the office if any re-ulceration is noted.    Disclaimer: This note consists of symbols derived from keyboarding, dictation and/or voice recognition software. As a result, there may be errors in the script that have gone undetected. Please consider this when interpreting information found in this chart.       LIANG Montesinos D.P.M., [email or web address].

## 2019-06-03 NOTE — NURSING NOTE
Chief Complaint   Patient presents with     Surgical Followup     DOS 4/2/19, Fifth metatarsal head resection, left foot. Right foot sore.       Initial /73   Pulse 70   Ht 1.829 m (6')   Wt 102.5 kg (226 lb)   BMI 30.65 kg/m   Estimated body mass index is 30.65 kg/m  as calculated from the following:    Height as of this encounter: 1.829 m (6').    Weight as of this encounter: 102.5 kg (226 lb).  Medications and allergies reviewed.      Dana GUILLERMO MA

## 2019-06-05 ENCOUNTER — TELEPHONE (OUTPATIENT)
Dept: DERMATOLOGY | Facility: CLINIC | Age: 84
End: 2019-06-05

## 2019-06-05 ENCOUNTER — OFFICE VISIT (OUTPATIENT)
Dept: DERMATOLOGY | Facility: CLINIC | Age: 84
End: 2019-06-05
Payer: COMMERCIAL

## 2019-06-05 VITALS — TEMPERATURE: 98.7 F | DIASTOLIC BLOOD PRESSURE: 70 MMHG | HEART RATE: 69 BPM | SYSTOLIC BLOOD PRESSURE: 126 MMHG

## 2019-06-05 DIAGNOSIS — D23.9 DERMAL NEVUS: ICD-10-CM

## 2019-06-05 DIAGNOSIS — D18.00 ANGIOMA: ICD-10-CM

## 2019-06-05 DIAGNOSIS — L81.4 LENTIGO: ICD-10-CM

## 2019-06-05 DIAGNOSIS — Z85.828 HISTORY OF SKIN CANCER: Primary | ICD-10-CM

## 2019-06-05 DIAGNOSIS — L82.1 SEBORRHEIC KERATOSIS: ICD-10-CM

## 2019-06-05 DIAGNOSIS — C44.229 SQUAMOUS CELL CANCER OF SKIN OF HELIX OF LEFT EAR: ICD-10-CM

## 2019-06-05 PROCEDURE — 99213 OFFICE O/P EST LOW 20 MIN: CPT | Mod: 25 | Performed by: DERMATOLOGY

## 2019-06-05 PROCEDURE — 69100 BIOPSY OF EXTERNAL EAR: CPT | Performed by: DERMATOLOGY

## 2019-06-05 PROCEDURE — 88331 PATH CONSLTJ SURG 1 BLK 1SPC: CPT | Performed by: DERMATOLOGY

## 2019-06-05 NOTE — PROGRESS NOTES
Fawad Garcia is a 83 year old year old male patient here today for hx of non-melanoma skin cancer.  He notes growth on left ear.   .  Patient states this has been present for ?.  Patient reports the following symptoms:  growing.  Patient reports the following previous treatments none.  Patient reports the following modifying factors none.  Associated symptoms: none.  Patient has no other skin complaints today.  Remainder of the HPI, Meds, PMH, Allergies, FH, and SH was reviewed in chart.      Past Medical History:   Diagnosis Date     Diabetes mellitus (H)      Hypertension      Squamous cell carcinoma        Past Surgical History:   Procedure Laterality Date     CL AFF SURGICAL PATHOLOGY  2002    prostate biopsy elevated PSA     COLONOSCOPY       HC REMOVE TONSILS/ADENOIDS,<11 Y/O      T & A     IR LOWER EXTREMITY ANGIOGRAM RIGHT  12/11/2018     PHACOEMULSIFICATION WITH STANDARD INTRAOCULAR LENS IMPLANT Left 4/2/2015    Procedure: PHACOEMULSIFICATION WITH STANDARD INTRAOCULAR LENS IMPLANT;  Surgeon: Joseph Hernandez MD;  Location: [facility] OR     PHACOEMULSIFICATION WITH STANDARD INTRAOCULAR LENS IMPLANT Right 5/4/2015    Procedure: PHACOEMULSIFICATION WITH STANDARD INTRAOCULAR LENS IMPLANT;  Surgeon: Joseph Hernandez MD;  Location: [facility] OR     REPAIR HAMMER TOE Left 4/2/2019    Procedure: 5th Metatarsal Head Resection;  Surgeon: Ethan Montesinos DPM;  Location: [facility] OR        Family History   Problem Relation Age of Onset     Cancer Mother         intestinal CA     Diabetes Father      Diabetes Brother      Other Cancer Son         meagan tumor     Other Cancer Brother         pancreatic cancer     Diabetes Son      Melanoma No family hx of        Social History     Socioeconomic History     Marital status:      Spouse name: Not on file     Number of children: Not on file     Years of education: Not on file     Highest education level: Not on file   Occupational History     Not on file   Social Needs      Financial resource strain: Not on file     Food insecurity:     Worry: Not on file     Inability: Not on file     Transportation needs:     Medical: Not on file     Non-medical: Not on file   Tobacco Use     Smoking status: Never Smoker     Smokeless tobacco: Never Used   Substance and Sexual Activity     Alcohol use: Yes     Alcohol/week: 1.0 oz     Comment: drinks rarely     Drug use: No     Sexual activity: Not Currently     Partners: Female   Lifestyle     Physical activity:     Days per week: Not on file     Minutes per session: Not on file     Stress: Not on file   Relationships     Social connections:     Talks on phone: Not on file     Gets together: Not on file     Attends Restorationist service: Not on file     Active member of club or organization: Not on file     Attends meetings of clubs or organizations: Not on file     Relationship status: Not on file     Intimate partner violence:     Fear of current or ex partner: Not on file     Emotionally abused: Not on file     Physically abused: Not on file     Forced sexual activity: Not on file   Other Topics Concern     Parent/sibling w/ CABG, MI or angioplasty before 65F 55M? No   Social History Narrative     Not on file       Outpatient Encounter Medications as of 6/5/2019   Medication Sig Dispense Refill     amLODIPine (NORVASC) 5 MG tablet Take 1 tablet (5 mg) by mouth daily 90 tablet 3     blood glucose (NO BRAND SPECIFIED) test strip 1 strip by In Vitro route daily One touch Ultra. 100 each 0     Blood Glucose Monitoring Suppl (ONE TOUCH ULTRA SYSTEM KIT) W/DEVICE KIT        cephALEXin (KEFLEX) 500 MG capsule Take 1 capsule (500 mg) by mouth 4 times daily 40 capsule 0     Cholecalciferol (VITAMIN D PO) Take 4,000 Int'l Units/day by mouth daily       dorzolamide-timolol (COSOPT) 2-0.5 % ophthalmic solution        hydrochlorothiazide (HYDRODIURIL) 25 MG tablet Take 1 tablet (25 mg) by mouth daily Hold on file until needed 90 tablet 3     insulin glargine  (LANTUS SOLOSTAR PEN) 100 UNIT/ML pen Inject 24 Units Subcutaneous At Bedtime 21 mL 3     insulin pen needle (B-D U/F) 31G X 8 MM miscellaneous 1 Device daily Use once daily or as directed. 100 each 3     lisinopril (PRINIVIL/ZESTRIL) 40 MG tablet Take 1 tablet (40 mg) by mouth daily Hold on file until needed 90 tablet 3     metFORMIN (GLUCOPHAGE) 500 MG tablet Take 2 tablets (1,000 mg) by mouth 2 times daily (with meals) Hold on file until needed 360 tablet 3     Multiple Vitamins-Minerals (PRESERVISION AREDS PO) Take 1 tablet by mouth       omeprazole (PRILOSEC) 20 MG DR capsule Take 1 capsule (20 mg) by mouth daily Hold on file until needed 90 capsule 3     ONETOUCH LANCETS MISC 1 Device daily One touch delica lancets, what ever is covered by insurance. 100 each 5     ORDER FOR DME Equipment being ordered:   Glucometer 1 Device 1     pravastatin (PRAVACHOL) 10 MG tablet Take 1 tablet (10 mg) by mouth daily Hold on file until needed 90 tablet 3     senna-docusate (SENOKOT-S/PERICOLACE) 8.6-50 MG tablet Take 1-2 tablets by mouth 2 times daily 30 tablet 0     triamcinolone (KENALOG) 0.1 % cream        VICTOZA PEN 18 MG/3ML soln        oxyCODONE-acetaminophen (PERCOCET) 5-325 MG tablet Take 1-2 tablets by mouth every 4 hours as needed for moderate to severe pain (Patient not taking: Reported on 4/10/2019) 30 tablet 0     No facility-administered encounter medications on file as of 6/5/2019.              Review Of Systems  Skin: As above  Eyes: negative  Ears/Nose/Throat: negative  Respiratory: No shortness of breath, dyspnea on exertion, cough, or hemoptysis  Cardiovascular: negative  Gastrointestinal: negative  Genitourinary: negative  Musculoskeletal: negative  Neurologic: negative  Psychiatric: negative  Hematologic/Lymphatic/Immunologic: negative  Endocrine: negative      O:   NAD, WDWN, Alert & Oriented, Mood & Affect wnl, Vitals stable   Here today alone   /70   Pulse 69   Temp 98.7  F (37.1  C)  (Tympanic)    General appearance normal   Vitals stable   Alert, oriented and in no acute distress      Following lymph nodes palpated: Occipital, Cervical, Supraclavicular no lad   Lhelical root 7mm horn      Stuck on papules and brown macules on trunk and ext   Red papules on trunk  Flesh colored papules on trunk     The remainder of the full exam was unremarkable; the following areas were examined:  conjunctiva/lids, oral mucosa, neck, peripheral vascular system, abdomen, lymph nodes, digits/nails, eccrine and apocrine glands, scalp/hair, face, neck, chest, abdomen, buttocks, back, RUE, LUE, RLE, LLE       Eyes: Conjunctivae/lids:Normal     ENT: Lips, buccal mucosa, tongue: normal    MSK:Normal    Cardiovascular: peripheral edema none    Pulm: Breathing Normal    Lymph Nodes: No Head and Neck Lymphadenopathy     Neuro/Psych: Orientation:Normal; Mood/Affect:Normal      MICRO:   L helical root:There is hyperkeratosis & parakeratosis of the epidermis, with full thickness epidermal involvement by atypical keratinocytes with rare pale vacuolated cells invading dermis.    A/P:  1. 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  No complications and routine wound care.  Patient told result squamous cell carcinoma shceudle excision .      BENIGN LESIONS DISCUSSED WITH PATIENT:  I discussed the specifics of tumor, prognosis, and genetics of benign lesions.  I explained that treatment of these lesions would be purely cosmetic and not medically neccessary.  I discussed with patient different removal options including excision, cautery and /or laser.      Nature and genetics of benign skin lesions dicussed with patient.  Signs and Symptoms of skin cancer discussed with  patient.  ABCDEs of melanoma reviewed with patient.  Patient encouraged to perform monthly skin exams.  UV precautions reviewed with patient.  Patient to follow up with Primary Care provider regarding elevated blood pressure.  Skin care regimen reviewed with patient: Eliminate harsh soaps, i.e. Dial, zest, irsih spring; Mild soaps such as Cetaphil or Dove sensitive skin, avoid hot or cold showers, aggressive use of emollients including vanicream, cetaphil or cerave discussed with patient.    Risks of non-melanoma skin cancer discussed with patient   Return to clinic next appt

## 2019-06-05 NOTE — LETTER
6/5/2019         RE: Fawad Garcia  [address] [facility] [address]        Dear Colleague,    Thank you for referring your patient, Fawad Garcia, to the [facility]. Please see a copy of my visit note below.    Fawad Garcia is a 83 year old year old male patient here today for hx of non-melanoma skin cancer.  He notes growth on left ear.   .  Patient states this has been present for ?.  Patient reports the following symptoms:  growing.  Patient reports the following previous treatments none.  Patient reports the following modifying factors none.  Associated symptoms: none.  Patient has no other skin complaints today.  Remainder of the HPI, Meds, PMH, Allergies, FH, and SH was reviewed in chart.      Past Medical History:   Diagnosis Date     Diabetes mellitus (H)      Hypertension      Squamous cell carcinoma        Past Surgical History:   Procedure Laterality Date     CL AFF SURGICAL PATHOLOGY  2002    prostate biopsy elevated PSA     COLONOSCOPY       HC REMOVE TONSILS/ADENOIDS,<13 Y/O      T & A     IR LOWER EXTREMITY ANGIOGRAM RIGHT  12/11/2018     PHACOEMULSIFICATION WITH STANDARD INTRAOCULAR LENS IMPLANT Left 4/2/2015    Procedure: PHACOEMULSIFICATION WITH STANDARD INTRAOCULAR LENS IMPLANT;  Surgeon: Joseph Hernandez MD;  Location: [facility]     PHACOEMULSIFICATION WITH STANDARD INTRAOCULAR LENS IMPLANT Right 5/4/2015    Procedure: PHACOEMULSIFICATION WITH STANDARD INTRAOCULAR LENS IMPLANT;  Surgeon: Joseph Hernandez MD;  Location: [facility] OR     REPAIR HAMMER TOE Left 4/2/2019    Procedure: 5th Metatarsal Head Resection;  Surgeon: Ethan Montesinos DPM;  Location: [facility] OR        Family History   Problem Relation Age of Onset     Cancer Mother         intestinal CA     Diabetes Father      Diabetes Brother      Other Cancer Son         meagan tumor     Other Cancer Brother         pancreatic cancer     Diabetes Son      Melanoma No family hx of        Social History      Socioeconomic History     Marital status:      Spouse name: Not on file     Number of children: Not on file     Years of education: Not on file     Highest education level: Not on file   Occupational History     Not on file   Social Needs     Financial resource strain: Not on file     Food insecurity:     Worry: Not on file     Inability: Not on file     Transportation needs:     Medical: Not on file     Non-medical: Not on file   Tobacco Use     Smoking status: Never Smoker     Smokeless tobacco: Never Used   Substance and Sexual Activity     Alcohol use: Yes     Alcohol/week: 1.0 oz     Comment: drinks rarely     Drug use: No     Sexual activity: Not Currently     Partners: Female   Lifestyle     Physical activity:     Days per week: Not on file     Minutes per session: Not on file     Stress: Not on file   Relationships     Social connections:     Talks on phone: Not on file     Gets together: Not on file     Attends Church service: Not on file     Active member of club or organization: Not on file     Attends meetings of clubs or organizations: Not on file     Relationship status: Not on file     Intimate partner violence:     Fear of current or ex partner: Not on file     Emotionally abused: Not on file     Physically abused: Not on file     Forced sexual activity: Not on file   Other Topics Concern     Parent/sibling w/ CABG, MI or angioplasty before 65F 55M? No   Social History Narrative     Not on file       Outpatient Encounter Medications as of 6/5/2019   Medication Sig Dispense Refill     amLODIPine (NORVASC) 5 MG tablet Take 1 tablet (5 mg) by mouth daily 90 tablet 3     blood glucose (NO BRAND SPECIFIED) test strip 1 strip by In Vitro route daily One touch Ultra. 100 each 0     Blood Glucose Monitoring Suppl (ONE TOUCH ULTRA SYSTEM KIT) W/DEVICE KIT        cephALEXin (KEFLEX) 500 MG capsule Take 1 capsule (500 mg) by mouth 4 times daily 40 capsule 0     Cholecalciferol (VITAMIN D PO) Take  4,000 Int'l Units/day by mouth daily       dorzolamide-timolol (COSOPT) 2-0.5 % ophthalmic solution        hydrochlorothiazide (HYDRODIURIL) 25 MG tablet Take 1 tablet (25 mg) by mouth daily Hold on file until needed 90 tablet 3     insulin glargine (LANTUS SOLOSTAR PEN) 100 UNIT/ML pen Inject 24 Units Subcutaneous At Bedtime 21 mL 3     insulin pen needle (B-D U/F) 31G X 8 MM miscellaneous 1 Device daily Use once daily or as directed. 100 each 3     lisinopril (PRINIVIL/ZESTRIL) 40 MG tablet Take 1 tablet (40 mg) by mouth daily Hold on file until needed 90 tablet 3     metFORMIN (GLUCOPHAGE) 500 MG tablet Take 2 tablets (1,000 mg) by mouth 2 times daily (with meals) Hold on file until needed 360 tablet 3     Multiple Vitamins-Minerals (PRESERVISION AREDS PO) Take 1 tablet by mouth       omeprazole (PRILOSEC) 20 MG DR capsule Take 1 capsule (20 mg) by mouth daily Hold on file until needed 90 capsule 3     ONETOUCH LANCETS MISC 1 Device daily One touch delica lancets, what ever is covered by insurance. 100 each 5     ORDER FOR DME Equipment being ordered:   Glucometer 1 Device 1     pravastatin (PRAVACHOL) 10 MG tablet Take 1 tablet (10 mg) by mouth daily Hold on file until needed 90 tablet 3     senna-docusate (SENOKOT-S/PERICOLACE) 8.6-50 MG tablet Take 1-2 tablets by mouth 2 times daily 30 tablet 0     triamcinolone (KENALOG) 0.1 % cream        VICTOZA PEN 18 MG/3ML soln        oxyCODONE-acetaminophen (PERCOCET) 5-325 MG tablet Take 1-2 tablets by mouth every 4 hours as needed for moderate to severe pain (Patient not taking: Reported on 4/10/2019) 30 tablet 0     No facility-administered encounter medications on file as of 6/5/2019.              Review Of Systems  Skin: As above  Eyes: negative  Ears/Nose/Throat: negative  Respiratory: No shortness of breath, dyspnea on exertion, cough, or hemoptysis  Cardiovascular: negative  Gastrointestinal: negative  Genitourinary: negative  Musculoskeletal:  negative  Neurologic: negative  Psychiatric: negative  Hematologic/Lymphatic/Immunologic: negative  Endocrine: negative      O:   NAD, WDWN, Alert & Oriented, Mood & Affect wnl, Vitals stable   Here today alone   /70   Pulse 69   Temp 98.7  F (37.1  C) (Tympanic)    General appearance normal   Vitals stable   Alert, oriented and in no acute distress      Following lymph nodes palpated: Occipital, Cervical, Supraclavicular no lad   Lhelical root 7mm horn      Stuck on papules and brown macules on trunk and ext   Red papules on trunk  Flesh colored papules on trunk     The remainder of the full exam was unremarkable; the following areas were examined:  conjunctiva/lids, oral mucosa, neck, peripheral vascular system, abdomen, lymph nodes, digits/nails, eccrine and apocrine glands, scalp/hair, face, neck, chest, abdomen, buttocks, back, RUE, LUE, RLE, LLE       Eyes: Conjunctivae/lids:Normal     ENT: Lips, buccal mucosa, tongue: normal    MSK:Normal    Cardiovascular: peripheral edema none    Pulm: Breathing Normal    Lymph Nodes: No Head and Neck Lymphadenopathy     Neuro/Psych: Orientation:Normal; Mood/Affect:Normal      MICRO:   L helical root:There is hyperkeratosis & parakeratosis of the epidermis, with full thickness epidermal involvement by atypical keratinocytes with rare pale vacuolated cells invading dermis.    A/P:  1. 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  No complications and routine wound care.  Patient told result squamous cell carcinoma shceudle excision .      BENIGN LESIONS DISCUSSED WITH PATIENT:  I discussed the specifics of tumor, prognosis, and genetics of benign lesions.  I explained that treatment of these lesions would be purely cosmetic  and not medically neccessary.  I discussed with patient different removal options including excision, cautery and /or laser.      Nature and genetics of benign skin lesions dicussed with patient.  Signs and Symptoms of skin cancer discussed with patient.  ABCDEs of melanoma reviewed with patient.  Patient encouraged to perform monthly skin exams.  UV precautions reviewed with patient.  Patient to follow up with Primary Care provider regarding elevated blood pressure.  Skin care regimen reviewed with patient: Eliminate harsh soaps, i.e. Dial, zest, irsih spring; Mild soaps such as Cetaphil or Dove sensitive skin, avoid hot or cold showers, aggressive use of emollients including vanicream, cetaphil or cerave discussed with patient.    Risks of non-melanoma skin cancer discussed with patient   Return to clinic next appt     Again, thank you for allowing me to participate in the care of your patient.        Sincerely,        Evgeny Torres MD

## 2019-06-05 NOTE — PATIENT INSTRUCTIONS
Wound Care Instructions     FOR SUPERFICIAL WOUNDS     [facility] [phone]    [facility] [phone]                       AFTER 24 HOURS YOU SHOULD REMOVE THE BANDAGE AND BEGIN DAILY DRESSING CHANGES AS FOLLOWS:     1) Remove Dressing.     2) Clean and dry the area with tap water using a Q-tip or sterile gauze pad.     3) Apply Vaseline, Aquaphor, Polysporin ointment or Bacitracin ointment over entire wound.  Do NOT use Neosporin ointment.     4) Cover the wound with a band-aid, or a sterile non-stick gauze pad and micropore paper tape      REPEAT THESE INSTRUCTIONS AT LEAST ONCE A DAY UNTIL THE WOUND HAS COMPLETELY HEALED.    It is an old wives tale that a wound heals better when it is exposed to air and allowed to dry out. The wound will heal faster with a better cosmetic result if it is kept moist with ointment and covered with a bandage.    **Do not let the wound dry out.**      Supplies Needed:      *Cotton tipped applicators (Q-tips)    *Polysporin Ointment or Bacitracin Ointment (NOT NEOSPORIN)    *Band-aids or non-stick gauze pads and micropore paper tape.      PATIENT INFORMATION:    During the healing process you will notice a number of changes. All wounds develop a small halo of redness surrounding the wound.  This means healing is occurring. Severe itching with extensive redness usually indicates sensitivity to the ointment or bandage tape used to dress the wound.  You should call our office if this develops.      Swelling  and/or discoloration around your surgical site is common, particularly when performed around the eye.    All wounds normally drain.  The larger the wound the more drainage there will be.  After 7-10 days, you will notice the wound beginning to shrink and new skin will begin to grow.  The wound is healed when you can see skin has formed over the entire area.  A healed wound has a healthy, shiny look to the surface and is red to dark pink in color  to normalize.  Wounds may take approximately 4-6 weeks to heal.  Larger wounds may take 6-8 weeks.  After the wound is healed you may discontinue dressing changes.    You may experience a sensation of tightness as your wound heals. This is normal and will gradually subside.    Your healed wound may be sensitive to temperature changes. This sensitivity improves with time, but if you re having a lot of discomfort, try to avoid temperature extremes.    Patients frequently experience itching after their wound appears to have healed because of the continue healing under the skin.  Plain Vaseline will help relieve the itching.        POSSIBLE COMPLICATIONS    BLEEDIN. Leave the bandage in place.  2. Use tightly rolled up gauze or a cloth to apply direct pressure over the bandage for 30  minutes.  3. Reapply pressure for an additional 30 minutes if necessary  4. Use additional gauze and tape to maintain pressure once the bleeding has stopped.

## 2019-06-05 NOTE — LETTER
[facility]  [address]  [facility] [address]  Phone: [phone]    Kinsey 10, 2019    Fawad Garcia                                                                                                           [address] [facility] [address]            Dear Mr. Garcia,    You are scheduled for Mohs Surgery on     Please check in at Dermatology Clinic.   (2nd Floor, last  Clinic on right up staircase or elevator -past OB/GYN clinic)    You don't need to arrive more than 5-10 minutes prior to your appointment time.     Be sure to eat a good breakfast and bathe and wash your hair prior to Surgery.    If you are taking any anti-coagulants that are prescribed by your Doctor (such as Coumadin/warfarin, Plavix, Aspirin, Ibuprofen), please continue taking them.     However, If you are taking anti-coagulants over the counter without  a Doctor's order for a Medical condition, please discontinue them 10 days prior to Surgery.      Please wear loose comfortable clothing as it could possibly be 4-6 hours until your surgery is completed depending upon how many layers of tissue need to be removed.     Wi-fi access is available.     Thank you,      Evgeny Torres MD/ Claudette Holden RN

## 2019-06-05 NOTE — TELEPHONE ENCOUNTER
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

## 2019-06-05 NOTE — NURSING NOTE
Initial /70   Pulse 69   Temp 98.7  F (37.1  C) (Tympanic)  Estimated body mass index is 30.65 kg/m  as calculated from the following:    Height as of 6/3/19: 1.829 m (6').    Weight as of 6/3/19: 102.5 kg (226 lb). .    Lyssa Holley LPN

## 2019-06-10 NOTE — TELEPHONE ENCOUNTER
Message left on listed cell and with female who answered home phone to return call. Claudette Holden RN

## 2019-06-19 NOTE — PROGRESS NOTES
Surgical Office Location :   [facility]  [address] [place] [address]

## 2019-06-24 ENCOUNTER — OFFICE VISIT (OUTPATIENT)
Dept: DERMATOLOGY | Facility: CLINIC | Age: 84
End: 2019-06-24
Payer: COMMERCIAL

## 2019-06-24 VITALS — TEMPERATURE: 97.9 F | DIASTOLIC BLOOD PRESSURE: 85 MMHG | SYSTOLIC BLOOD PRESSURE: 162 MMHG | HEART RATE: 65 BPM

## 2019-06-24 DIAGNOSIS — C44.229 SQUAMOUS CELL CANCER OF SKIN OF HELIX OF LEFT EAR: Primary | ICD-10-CM

## 2019-06-24 PROCEDURE — 17311 MOHS 1 STAGE H/N/HF/G: CPT | Performed by: DERMATOLOGY

## 2019-06-24 NOTE — LETTER
6/24/2019         RE: Fawad Garcia  [address]  [facility] [address]        Dear Colleague,    Thank you for referring your patient, Fawad Garcia, to the [facility]. Please see a copy of my visit note below.    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 Patient reports the following modifying factors none.  Associated symptoms: none.  Patient has no other skin complaints today.  Remainder of the HPI, Meds, PMH, Allergies, FH, and SH was reviewed in chart.      Past Medical History:   Diagnosis Date     Diabetes mellitus (H)      Hypertension      Squamous cell carcinoma        Past Surgical History:   Procedure Laterality Date     CL AFF SURGICAL PATHOLOGY  2002    prostate biopsy elevated PSA     COLONOSCOPY       HC REMOVE TONSILS/ADENOIDS,<12 Y/O      T & A     IR LOWER EXTREMITY ANGIOGRAM RIGHT  12/11/2018     PHACOEMULSIFICATION WITH STANDARD INTRAOCULAR LENS IMPLANT Left 4/2/2015    Procedure: PHACOEMULSIFICATION WITH STANDARD INTRAOCULAR LENS IMPLANT;  Surgeon: Joseph Hernandez MD;  Location: [facility] OR     PHACOEMULSIFICATION WITH STANDARD INTRAOCULAR LENS IMPLANT Right 5/4/2015    Procedure: PHACOEMULSIFICATION WITH STANDARD INTRAOCULAR LENS IMPLANT;  Surgeon: Joseph Hernandez MD;  Location: [facility] OR     REPAIR HAMMER TOE Left 4/2/2019    Procedure: 5th Metatarsal Head Resection;  Surgeon: Ethan Montesinos DPM;  Location: [facility] OR        Family History   Problem Relation Age of Onset     Cancer Mother         intestinal CA     Diabetes Father      Diabetes Brother      Other Cancer Son         meagan tumor     Other Cancer Brother         pancreatic cancer     Diabetes Son      Melanoma No family hx of        Social History     Socioeconomic History     Marital status:       Spouse name: Not on file     Number of children: Not on file     Years of education: Not on file     Highest education level: Not on file   Occupational History     Not on file   Social Needs     Financial resource strain: Not on file     Food insecurity:     Worry: Not on file     Inability: Not on file     Transportation needs:     Medical: Not on file     Non-medical: Not on file   Tobacco Use     Smoking status: Never Smoker     Smokeless tobacco: Never Used   Substance and Sexual Activity     Alcohol use: Yes     Alcohol/week: 1.0 oz     Comment: drinks rarely     Drug use: No     Sexual activity: Not Currently     Partners: Female   Lifestyle     Physical activity:     Days per week: Not on file     Minutes per session: Not on file     Stress: Not on file   Relationships     Social connections:     Talks on phone: Not on file     Gets together: Not on file     Attends Christianity service: Not on file     Active member of club or organization: Not on file     Attends meetings of clubs or organizations: Not on file     Relationship status: Not on file     Intimate partner violence:     Fear of current or ex partner: Not on file     Emotionally abused: Not on file     Physically abused: Not on file     Forced sexual activity: Not on file   Other Topics Concern     Parent/sibling w/ CABG, MI or angioplasty before 65F 55M? No   Social History Narrative     Not on file       Outpatient Encounter Medications as of 6/24/2019   Medication Sig Dispense Refill     amLODIPine (NORVASC) 5 MG tablet Take 1 tablet (5 mg) by mouth daily 90 tablet 3     blood glucose (NO BRAND SPECIFIED) test strip 1 strip by In Vitro route daily One touch Ultra. 100 each 0     Blood Glucose Monitoring Suppl (ONE TOUCH ULTRA SYSTEM KIT) W/DEVICE KIT        cephALEXin (KEFLEX) 500 MG capsule Take 1 capsule (500 mg) by mouth 4 times daily 40 capsule 0     Cholecalciferol (VITAMIN D PO) Take 4,000 Int'l Units/day by mouth daily        dorzolamide-timolol (COSOPT) 2-0.5 % ophthalmic solution        hydrochlorothiazide (HYDRODIURIL) 25 MG tablet Take 1 tablet (25 mg) by mouth daily Hold on file until needed 90 tablet 3     insulin glargine (LANTUS SOLOSTAR PEN) 100 UNIT/ML pen Inject 24 Units Subcutaneous At Bedtime 21 mL 3     insulin pen needle (B-D U/F) 31G X 8 MM miscellaneous 1 Device daily Use once daily or as directed. 100 each 3     lisinopril (PRINIVIL/ZESTRIL) 40 MG tablet Take 1 tablet (40 mg) by mouth daily Hold on file until needed 90 tablet 3     metFORMIN (GLUCOPHAGE) 500 MG tablet Take 2 tablets (1,000 mg) by mouth 2 times daily (with meals) Hold on file until needed 360 tablet 3     Multiple Vitamins-Minerals (PRESERVISION AREDS PO) Take 1 tablet by mouth       omeprazole (PRILOSEC) 20 MG DR capsule Take 1 capsule (20 mg) by mouth daily Hold on file until needed 90 capsule 3     ONETOUCH LANCETS MISC 1 Device daily One touch delica lancets, what ever is covered by insurance. 100 each 5     ORDER FOR DME Equipment being ordered:   Glucometer 1 Device 1     pravastatin (PRAVACHOL) 10 MG tablet Take 1 tablet (10 mg) by mouth daily Hold on file until needed 90 tablet 3     senna-docusate (SENOKOT-S/PERICOLACE) 8.6-50 MG tablet Take 1-2 tablets by mouth 2 times daily 30 tablet 0     triamcinolone (KENALOG) 0.1 % cream        VICTOZA PEN 18 MG/3ML soln        oxyCODONE-acetaminophen (PERCOCET) 5-325 MG tablet Take 1-2 tablets by mouth every 4 hours as needed for moderate to severe pain (Patient not taking: Reported on 4/10/2019) 30 tablet 0     No facility-administered encounter medications on file as of 6/24/2019.              Review Of Systems  Skin: As above  Eyes: negative  Ears/Nose/Throat: negative  Respiratory: No shortness of breath, dyspnea on exertion, cough, or hemoptysis  Cardiovascular: negative  Gastrointestinal: negative  Genitourinary: negative  Musculoskeletal: negative  Neurologic: negative  Psychiatric:  negative  Hematologic/Lymphatic/Immunologic: negative  Endocrine: negative      O:   NAD, WDWN, Alert & Oriented, Mood & Affect wnl, Vitals stable   Here today alone   /85 (BP Location: Left arm, Patient Position: Chair, Cuff Size: Adult Regular)   Pulse 65   Temp 97.9  F (36.6  C) (Tympanic)    General appearance normal   Vitals stable   Alert, oriented and in no acute distress      Following lymph nodes palpated: Occipital, Cervical, Supraclavicular no lad   7MM SCALY PAPULE  l HELICAL root      Eyes: Conjunctivae/lids:Normal     ENT: Lips, buccal mucosa, tongue: normal    MSK:Normal    Cardiovascular: peripheral edema none    Pulm: Breathing Normal    Lymph Nodes: No Head and Neck Lymphadenopathy     Neuro/Psych: Orientation:Normal; Mood/Affect:Normal      A/P:  1. L helical root squamous cell carcinoma   MOHS:   Location    After PGACAC discussed with patient, decision for Mohs surgery was made. Indication for Mohs was Location. Patient confirmed the site with Dr. Torres.  After anesthesia with LEC, the tumor was excised using standard Mohs technique in 1 stages(s).  CLEAR MARGINS OBTAINED and Final defect size was 1 x 1.2 cm.       REPAIR SECOND INTENT: We discussed the options for wound management in full with the patient including risks/benefits/possible outcomes. Decision made to allow the wound to heal by second intention. EBL minimal; complications none; wound care routine.  The patient was discharged in good condition and will return in one month or prn for wound evaluation.  BENIGN LESIONS DISCUSSED WITH PATIENT:  I discussed the specifics of tumor, prognosis, and genetics of benign lesions.  I explained that treatment of these lesions would be purely cosmetic and not medically neccessary.  I discussed with patient different removal options including excision, cautery and /or laser.      Nature and genetics of benign skin lesions dicussed with patient.  Signs and Symptoms of skin cancer  discussed with patient.  Patient encouraged to perform monthly skin exams.  UV precautions reviewed with patient.  Fawad to follow up with Primary Care provider regarding elevated blood pressure.  Skin care regimen reviewed with patient: Eliminate harsh soaps, i.e. Dial, zest, irsih spring; Mild soaps such as Cetaphil or Dove sensitive skin, avoid hot or cold showers, aggressive use of emollients including vanicream, cetaphil or cerave discussed with patient.    Risks of non-melanoma skin cancer discussed with patient   Return to clinic 6 months      Again, thank you for allowing me to participate in the care of your patient.        Sincerely,        Evgney Torres MD

## 2019-06-24 NOTE — NURSING NOTE
Initial /85 (BP Location: Left arm, Patient Position: Chair, Cuff Size: Adult Regular)   Pulse 65   Temp 97.9  F (36.6  C) (Tympanic)  Estimated body mass index is 30.65 kg/m  as calculated from the following:    Height as of 6/3/19: 1.829 m (6').    Weight as of 6/3/19: 102.5 kg (226 lb). .    Samuel HENDRICKSON, CMA

## 2019-06-24 NOTE — PATIENT INSTRUCTIONS
Open Wound Care     for __left ear__        ? No strenuous activity for 48 hours    ? Take Tylenol as needed for discomfort.                                                .         ? Do not drink alcoholic beverages for 48 hours.    ? Keep the pressure bandage in place for 24 hours. If the bandage becomes blood tinged or loose, reinforce it with gauze and tape.        (Refer to the reverse side of this page for management of bleeding).    ? Remove bandage in 24 hours and begin wound care as follows:     1. Clean area with tap water using a Q tip or gauze pad, (shower / bathe normally)  2. Dry wound with Q tip or gauze pad  3. Apply Aquaphor, Vaseline, Polysporin or Bacitracin Ointment with a Q tip    Do NOT use Neosporin Ointment *  4. Cover the wound with a band-aid or nonstick gauze pad and paper tape.  5. Repeat wound care once a day until wound is completely healed.    It is an old wives tale that a wound heals better when it is exposed to air and allowed to dry out. The wound will heal faster with a better cosmetic result if it is kept moist with ointment and covered with a bandage.  Do not let the wound dry out.      Supplies Needed:                Qtips or gauze pads                Polysporin or Bacitracin Ointment                Bandaids or nonstick gauze pads and paper tape    Wound care kits and brown paper tape are available for purchase at   the pharmacy.    BLEEDIN. Use tightly rolled up gauze or cloth to apply direct pressure over the bandage for 20   minutes.  2. Reapply pressure for an additional 20 minutes if necessary  3. Call the office or go to the nearest emergency room if pressure fails to stop the bleeding.  4. Use additional gauze and tape to maintain pressure once the bleeding has stopped.  5. Begin wound care 24 hours after surgery as directed.                  WOUND HEALING    1. One week after surgery a pink / red halo will form around the outside of the wound.   This is new  skin.  2. The center of the wound will appear yellowish white and produce some drainage.  3. The pink halo will slowly migrate in toward the center of the wound until the wound is covered with new shiny pink skin.  4. There will be no more drainage when the wound is completely healed.  5. It will take six months to one year for the redness to fade.  6. The scar may be itchy, tight and sensitive to extreme temperatures for a year after the surgery.  7. Massaging the area several times a day for several minutes after the wound is completely healed will help the scar soften and normalize faster. Begin massage only after healing is complete.      In case of emergency call: Dr Torres: [phone]     [facility]: [phone]    [facility]: [phone]

## 2019-06-24 NOTE — PROGRESS NOTES
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 Patient reports the following modifying factors none.  Associated symptoms: none.  Patient has no other skin complaints today.  Remainder of the HPI, Meds, PMH, Allergies, FH, and SH was reviewed in chart.      Past Medical History:   Diagnosis Date     Diabetes mellitus (H)      Hypertension      Squamous cell carcinoma        Past Surgical History:   Procedure Laterality Date     CL AFF SURGICAL PATHOLOGY  2002    prostate biopsy elevated PSA     COLONOSCOPY       HC REMOVE TONSILS/ADENOIDS,<11 Y/O      T & A     IR LOWER EXTREMITY ANGIOGRAM RIGHT  12/11/2018     PHACOEMULSIFICATION WITH STANDARD INTRAOCULAR LENS IMPLANT Left 4/2/2015    Procedure: PHACOEMULSIFICATION WITH STANDARD INTRAOCULAR LENS IMPLANT;  Surgeon: Joseph Hernandez MD;  Location: [facility]     PHACOEMULSIFICATION WITH STANDARD INTRAOCULAR LENS IMPLANT Right 5/4/2015    Procedure: PHACOEMULSIFICATION WITH STANDARD INTRAOCULAR LENS IMPLANT;  Surgeon: Joseph Hernandez MD;  Location: [facility] OR     REPAIR HAMMER TOE Left 4/2/2019    Procedure: 5th Metatarsal Head Resection;  Surgeon: Ethan Montesinos DPM;  Location: [facility] OR        Family History   Problem Relation Age of Onset     Cancer Mother         intestinal CA     Diabetes Father      Diabetes Brother      Other Cancer Son         meagan tumor     Other Cancer Brother         pancreatic cancer     Diabetes Son      Melanoma No family hx of        Social History     Socioeconomic History     Marital status:      Spouse name: Not on file     Number of children: Not on file     Years of education: Not on file     Highest education level: Not on file   Occupational History     Not on file   Social Needs     Financial resource strain: Not on file     Food insecurity:     Worry: Not on file     Inability: Not on file     Transportation needs:      Medical: Not on file     Non-medical: Not on file   Tobacco Use     Smoking status: Never Smoker     Smokeless tobacco: Never Used   Substance and Sexual Activity     Alcohol use: Yes     Alcohol/week: 1.0 oz     Comment: drinks rarely     Drug use: No     Sexual activity: Not Currently     Partners: Female   Lifestyle     Physical activity:     Days per week: Not on file     Minutes per session: Not on file     Stress: Not on file   Relationships     Social connections:     Talks on phone: Not on file     Gets together: Not on file     Attends [employer] service: Not on file     Active member of club or organization: Not on file     Attends meetings of clubs or organizations: Not on file     Relationship status: Not on file     Intimate partner violence:     Fear of current or ex partner: Not on file     Emotionally abused: Not on file     Physically abused: Not on file     Forced sexual activity: Not on file   Other Topics Concern     Parent/sibling w/ CABG, MI or angioplasty before 65F 55M? No   Social History Narrative     Not on file       Outpatient Encounter Medications as of 6/24/2019   Medication Sig Dispense Refill     amLODIPine (NORVASC) 5 MG tablet Take 1 tablet (5 mg) by mouth daily 90 tablet 3     blood glucose (NO BRAND SPECIFIED) test strip 1 strip by In Vitro route daily One touch Ultra. 100 each 0     Blood Glucose Monitoring Suppl (ONE TOUCH ULTRA SYSTEM KIT) W/DEVICE KIT        cephALEXin (KEFLEX) 500 MG capsule Take 1 capsule (500 mg) by mouth 4 times daily 40 capsule 0     Cholecalciferol (VITAMIN D PO) Take 4,000 Int'l Units/day by mouth daily       dorzolamide-timolol (COSOPT) 2-0.5 % ophthalmic solution        hydrochlorothiazide (HYDRODIURIL) 25 MG tablet Take 1 tablet (25 mg) by mouth daily Hold on file until needed 90 tablet 3     insulin glargine (LANTUS SOLOSTAR PEN) 100 UNIT/ML pen Inject 24 Units Subcutaneous At Bedtime 21 mL 3     insulin pen needle (B-D U/F) 31G X 8 MM miscellaneous  1 Device daily Use once daily or as directed. 100 each 3     lisinopril (PRINIVIL/ZESTRIL) 40 MG tablet Take 1 tablet (40 mg) by mouth daily Hold on file until needed 90 tablet 3     metFORMIN (GLUCOPHAGE) 500 MG tablet Take 2 tablets (1,000 mg) by mouth 2 times daily (with meals) Hold on file until needed 360 tablet 3     Multiple Vitamins-Minerals (PRESERVISION AREDS PO) Take 1 tablet by mouth       omeprazole (PRILOSEC) 20 MG DR capsule Take 1 capsule (20 mg) by mouth daily Hold on file until needed 90 capsule 3     ONETOUCH LANCETS MISC 1 Device daily One touch delica lancets, what ever is covered by insurance. 100 each 5     ORDER FOR DME Equipment being ordered:   Glucometer 1 Device 1     pravastatin (PRAVACHOL) 10 MG tablet Take 1 tablet (10 mg) by mouth daily Hold on file until needed 90 tablet 3     senna-docusate (SENOKOT-S/PERICOLACE) 8.6-50 MG tablet Take 1-2 tablets by mouth 2 times daily 30 tablet 0     triamcinolone (KENALOG) 0.1 % cream        VICTOZA PEN 18 MG/3ML soln        oxyCODONE-acetaminophen (PERCOCET) 5-325 MG tablet Take 1-2 tablets by mouth every 4 hours as needed for moderate to severe pain (Patient not taking: Reported on 4/10/2019) 30 tablet 0     No facility-administered encounter medications on file as of 6/24/2019.              Review Of Systems  Skin: As above  Eyes: negative  Ears/Nose/Throat: negative  Respiratory: No shortness of breath, dyspnea on exertion, cough, or hemoptysis  Cardiovascular: negative  Gastrointestinal: negative  Genitourinary: negative  Musculoskeletal: negative  Neurologic: negative  Psychiatric: negative  Hematologic/Lymphatic/Immunologic: negative  Endocrine: negative      O:   NAD, WDWN, Alert & Oriented, Mood & Affect wnl, Vitals stable   Here today alone   /85 (BP Location: Left arm, Patient Position: Chair, Cuff Size: Adult Regular)   Pulse 65   Temp 97.9  F (36.6  C) (Tympanic)    General appearance normal   Vitals stable   Alert, oriented  and in no acute distress      Following lymph nodes palpated: Occipital, Cervical, Supraclavicular no lad   7MM SCALY PAPULE  l HELICAL root      Eyes: Conjunctivae/lids:Normal     ENT: Lips, buccal mucosa, tongue: normal    MSK:Normal    Cardiovascular: peripheral edema none    Pulm: Breathing Normal    Lymph Nodes: No Head and Neck Lymphadenopathy     Neuro/Psych: Orientation:Normal; Mood/Affect:Normal      A/P:  1. L helical root squamous cell carcinoma   MOHS:   Location    After PGACAC discussed with patient, decision for Mohs surgery was made. Indication for Mohs was Location. Patient confirmed the site with Dr. Torres.  After anesthesia with LEC, the tumor was excised using standard Mohs technique in 1 stages(s).  CLEAR MARGINS OBTAINED and Final defect size was 1 x 1.2 cm.       REPAIR SECOND INTENT: We discussed the options for wound management in full with the patient including risks/benefits/possible outcomes. Decision made to allow the wound to heal by second intention. EBL minimal; complications none; wound care routine.  The patient was discharged in good condition and will return in one month or prn for wound evaluation.  BENIGN LESIONS DISCUSSED WITH PATIENT:  I discussed the specifics of tumor, prognosis, and genetics of benign lesions.  I explained that treatment of these lesions would be purely cosmetic and not medically neccessary.  I discussed with patient different removal options including excision, cautery and /or laser.      Nature and genetics of benign skin lesions dicussed with patient.  Signs and Symptoms of skin cancer discussed with patient.  Patient encouraged to perform monthly skin exams.  UV precautions reviewed with patient.  Fawad to follow up with Primary Care provider regarding elevated blood pressure.  Skin care regimen reviewed with patient: Eliminate harsh soaps, i.e. Dial, zest, irsih spring; Mild soaps such as Cetaphil or Dove sensitive skin, avoid hot or cold showers,  aggressive use of emollients including vanicream, cetaphil or cerave discussed with patient.    Risks of non-melanoma skin cancer discussed with patient   Return to clinic 6 months

## 2019-07-14 DIAGNOSIS — Z79.4 TYPE 2 DIABETES MELLITUS WITHOUT COMPLICATION, WITH LONG-TERM CURRENT USE OF INSULIN (H): ICD-10-CM

## 2019-07-14 DIAGNOSIS — E11.9 TYPE 2 DIABETES MELLITUS WITHOUT COMPLICATION, WITH LONG-TERM CURRENT USE OF INSULIN (H): ICD-10-CM

## 2019-07-15 NOTE — TELEPHONE ENCOUNTER
"Requested Prescriptions   Pending Prescriptions Disp Refills     ONETOUCH ULTRA test strip [Pharmacy Med Name: ONETOUCH ULTRA BLUE TEST STRP  STRIP] 100 strip      Si strip by In Vitro route daily One touch Ultra.  Last Written Prescription Date:  3/12/2019  Last Fill Quantity: 100,  # refills: 0   Last office visit: 4/10/2019 with prescribing provider:  Tyler   Future Office Visit:           Diabetic Supplies Protocol Passed - 2019  5:29 PM        Passed - Medication is active on med list        Passed - Patient is 18 years of age or older        Passed - Recent (6 mo) or future (30 days) visit within the authorizing provider's specialty     Patient had office visit in the last 6 months or has a visit in the next 30 days with authorizing provider.  See \"Patient Info\" tab in inbasket, or \"Choose Columns\" in Meds & Orders section of the refill encounter.              "

## 2019-07-16 NOTE — TELEPHONE ENCOUNTER
Prescription approved per [facility] Refill Protocol.    Thank you    Gina STEVEN RN

## 2019-07-19 ENCOUNTER — OFFICE VISIT (OUTPATIENT)
Dept: PODIATRY | Facility: CLINIC | Age: 84
End: 2019-07-19
Payer: COMMERCIAL

## 2019-07-19 ENCOUNTER — TELEPHONE (OUTPATIENT)
Dept: PODIATRY | Facility: CLINIC | Age: 84
End: 2019-07-19

## 2019-07-19 ENCOUNTER — ANCILLARY PROCEDURE (OUTPATIENT)
Dept: GENERAL RADIOLOGY | Facility: CLINIC | Age: 84
End: 2019-07-19
Attending: PODIATRIST
Payer: COMMERCIAL

## 2019-07-19 VITALS
SYSTOLIC BLOOD PRESSURE: 152 MMHG | BODY MASS INDEX: 30.61 KG/M2 | HEART RATE: 84 BPM | DIASTOLIC BLOOD PRESSURE: 80 MMHG | WEIGHT: 226 LBS | HEIGHT: 72 IN

## 2019-07-19 DIAGNOSIS — M86.9 OSTEOMYELITIS OF GREAT TOE OF RIGHT FOOT (H): Primary | ICD-10-CM

## 2019-07-19 LAB
BASOPHILS # BLD AUTO: 0.1 10E9/L (ref 0–0.2)
BASOPHILS NFR BLD AUTO: 0.7 %
CRP SERPL-MCNC: 82.2 MG/L (ref 0–8)
DIFFERENTIAL METHOD BLD: ABNORMAL
EOSINOPHIL # BLD AUTO: 0.5 10E9/L (ref 0–0.7)
EOSINOPHIL NFR BLD AUTO: 3.5 %
ERYTHROCYTE [DISTWIDTH] IN BLOOD BY AUTOMATED COUNT: 13.2 % (ref 10–15)
ERYTHROCYTE [SEDIMENTATION RATE] IN BLOOD BY WESTERGREN METHOD: 44 MM/H (ref 0–20)
HCT VFR BLD AUTO: 40.5 % (ref 40–53)
HGB BLD-MCNC: 13 G/DL (ref 13.3–17.7)
IMM GRANULOCYTES # BLD: 0.1 10E9/L (ref 0–0.4)
IMM GRANULOCYTES NFR BLD: 0.4 %
LYMPHOCYTES # BLD AUTO: 2 10E9/L (ref 0.8–5.3)
LYMPHOCYTES NFR BLD AUTO: 15.5 %
MCH RBC QN AUTO: 28.4 PG (ref 26.5–33)
MCHC RBC AUTO-ENTMCNC: 32.1 G/DL (ref 31.5–36.5)
MCV RBC AUTO: 89 FL (ref 78–100)
MONOCYTES # BLD AUTO: 1.1 10E9/L (ref 0–1.3)
MONOCYTES NFR BLD AUTO: 8.7 %
NEUTROPHILS # BLD AUTO: 9.3 10E9/L (ref 1.6–8.3)
NEUTROPHILS NFR BLD AUTO: 71.2 %
NRBC # BLD AUTO: 0 10*3/UL
NRBC BLD AUTO-RTO: 0 /100
PLATELET # BLD AUTO: 388 10E9/L (ref 150–450)
RBC # BLD AUTO: 4.57 10E12/L (ref 4.4–5.9)
WBC # BLD AUTO: 13 10E9/L (ref 4–11)

## 2019-07-19 PROCEDURE — 36415 COLL VENOUS BLD VENIPUNCTURE: CPT | Performed by: PODIATRIST

## 2019-07-19 PROCEDURE — 86140 C-REACTIVE PROTEIN: CPT | Performed by: PODIATRIST

## 2019-07-19 PROCEDURE — 99214 OFFICE O/P EST MOD 30 MIN: CPT | Performed by: PODIATRIST

## 2019-07-19 PROCEDURE — 85025 COMPLETE CBC W/AUTO DIFF WBC: CPT | Performed by: PODIATRIST

## 2019-07-19 PROCEDURE — 85652 RBC SED RATE AUTOMATED: CPT | Performed by: PODIATRIST

## 2019-07-19 PROCEDURE — 73630 X-RAY EXAM OF FOOT: CPT | Mod: RT

## 2019-07-19 RX ORDER — CLINDAMYCIN HCL 300 MG
300 CAPSULE ORAL 3 TIMES DAILY
Qty: 42 CAPSULE | Refills: 0 | Status: SHIPPED | OUTPATIENT
Start: 2019-07-19 | End: 2019-08-01

## 2019-07-19 RX ORDER — CIPROFLOXACIN 500 MG/1
500 TABLET, FILM COATED ORAL 2 TIMES DAILY
Qty: 28 TABLET | Refills: 0 | Status: SHIPPED | OUTPATIENT
Start: 2019-07-19 | End: 2019-09-09

## 2019-07-19 ASSESSMENT — MIFFLIN-ST. JEOR: SCORE: 1763.13

## 2019-07-19 NOTE — TELEPHONE ENCOUNTER
Pt left a VM this morning at 7:43 requesting an appointment today to be seen for enlarged toe and swollen ankle.     Please contact pt @: [phone]    Denise Behrendt  [facility] CSS

## 2019-07-19 NOTE — PROGRESS NOTES
Fawad Garcia is a 83 year old male who presents to the office with his son with a chief complaint of an ulcer on the great toe of the right foot.  The patient relates the ulcer has been present for the past several months.  The patient denies any fever, chills, nausea or vomiting.  The patient denies any pus or blood draining from the ulcer.  The patient relates  redness or swelling around the ulcer.  The patient denies of any pain involving the ulcer.  The patient relates a follow odor.  The patient relates blood sugars are under control.        REVIEW OF SYSTEMS:  Constitutional, HEENT, cardiovascular, pulmonary, GI, , musculoskeletal, neuro, skin, endocrine and psych systems are negative, except as otherwise noted.     PAST MEDICAL HISTORY:   Past Medical History:   Diagnosis Date     Diabetes mellitus (H)      Hypertension      Squamous cell carcinoma         PAST SURGICAL HISTORY:   Past Surgical History:   Procedure Laterality Date     CL AFF SURGICAL PATHOLOGY  2002    prostate biopsy elevated PSA     COLONOSCOPY       HC REMOVE TONSILS/ADENOIDS,<13 Y/O      T & A     IR LOWER EXTREMITY ANGIOGRAM RIGHT  12/11/2018     PHACOEMULSIFICATION WITH STANDARD INTRAOCULAR LENS IMPLANT Left 4/2/2015    Procedure: PHACOEMULSIFICATION WITH STANDARD INTRAOCULAR LENS IMPLANT;  Surgeon: Joseph Hernandez MD;  Location: [facility]     PHACOEMULSIFICATION WITH STANDARD INTRAOCULAR LENS IMPLANT Right 5/4/2015    Procedure: PHACOEMULSIFICATION WITH STANDARD INTRAOCULAR LENS IMPLANT;  Surgeon: Joseph Hernandez MD;  Location: [facility]     REPAIR HAMMER TOE Left 4/2/2019    Procedure: 5th Metatarsal Head Resection;  Surgeon: Ethan Montesinos DPM;  Location: [facility] OR        MEDICATIONS:   Current Outpatient Medications:      amLODIPine (NORVASC) 5 MG tablet, Take 1 tablet (5 mg) by mouth daily, Disp: 90 tablet, Rfl: 3     Blood Glucose Monitoring Suppl (ONE TOUCH ULTRA SYSTEM KIT) W/DEVICE KIT, , Disp: , Rfl:      cephALEXin  (KEFLEX) 500 MG capsule, Take 1 capsule (500 mg) by mouth 4 times daily, Disp: 40 capsule, Rfl: 0     Cholecalciferol (VITAMIN D PO), Take 4,000 Int'l Units/day by mouth daily, Disp: , Rfl:      dorzolamide-timolol (COSOPT) 2-0.5 % ophthalmic solution, , Disp: , Rfl:      hydrochlorothiazide (HYDRODIURIL) 25 MG tablet, Take 1 tablet (25 mg) by mouth daily Hold on file until needed, Disp: 90 tablet, Rfl: 3     insulin glargine (LANTUS SOLOSTAR PEN) 100 UNIT/ML pen, Inject 24 Units Subcutaneous At Bedtime, Disp: 21 mL, Rfl: 3     insulin pen needle (B-D U/F) 31G X 8 MM miscellaneous, 1 Device daily Use once daily or as directed., Disp: 100 each, Rfl: 3     lisinopril (PRINIVIL/ZESTRIL) 40 MG tablet, Take 1 tablet (40 mg) by mouth daily Hold on file until needed, Disp: 90 tablet, Rfl: 3     metFORMIN (GLUCOPHAGE) 500 MG tablet, Take 2 tablets (1,000 mg) by mouth 2 times daily (with meals) Hold on file until needed, Disp: 360 tablet, Rfl: 3     Multiple Vitamins-Minerals (PRESERVISION AREDS PO), Take 1 tablet by mouth, Disp: , Rfl:      omeprazole (PRILOSEC) 20 MG DR capsule, Take 1 capsule (20 mg) by mouth daily Hold on file until needed, Disp: 90 capsule, Rfl: 3     ONETOUCH LANCETS MISC, 1 Device daily One touch delica lancets, what ever is covered by insurance., Disp: 100 each, Rfl: 5     ONETOUCH ULTRA test strip, 1 strip by In Vitro route daily One touch Ultra., Disp: 100 strip, Rfl: 1     ORDER FOR DME, Equipment being ordered:  Glucometer, Disp: 1 Device, Rfl: 1     pravastatin (PRAVACHOL) 10 MG tablet, Take 1 tablet (10 mg) by mouth daily Hold on file until needed, Disp: 90 tablet, Rfl: 3     senna-docusate (SENOKOT-S/PERICOLACE) 8.6-50 MG tablet, Take 1-2 tablets by mouth 2 times daily, Disp: 30 tablet, Rfl: 0     triamcinolone (KENALOG) 0.1 % cream, , Disp: , Rfl:      VICTOZA PEN 18 MG/3ML soln, , Disp: , Rfl:      oxyCODONE-acetaminophen (PERCOCET) 5-325 MG tablet, Take 1-2 tablets by mouth every 4 hours as  needed for moderate to severe pain (Patient not taking: Reported on 4/10/2019), Disp: 30 tablet, Rfl: 0     ALLERGIES:    Allergies   Allergen Reactions     Zocor [Simvastatin - High Dose]      Bilateral hip aching        SOCIAL HISTORY:   Social History     Socioeconomic History     Marital status:      Spouse name: Not on file     Number of children: Not on file     Years of education: Not on file     Highest education level: Not on file   Occupational History     Not on file   Social Needs     Financial resource strain: Not on file     Food insecurity:     Worry: Not on file     Inability: Not on file     Transportation needs:     Medical: Not on file     Non-medical: Not on file   Tobacco Use     Smoking status: Never Smoker     Smokeless tobacco: Never Used   Substance and Sexual Activity     Alcohol use: Yes     Alcohol/week: 1.0 oz     Comment: drinks rarely     Drug use: No     Sexual activity: Not Currently     Partners: Female   Lifestyle     Physical activity:     Days per week: Not on file     Minutes per session: Not on file     Stress: Not on file   Relationships     Social connections:     Talks on phone: Not on file     Gets together: Not on file     Attends [employer] service: Not on file     Active member of club or organization: Not on file     Attends meetings of clubs or organizations: Not on file     Relationship status: Not on file     Intimate partner violence:     Fear of current or ex partner: Not on file     Emotionally abused: Not on file     Physically abused: Not on file     Forced sexual activity: Not on file   Other Topics Concern     Parent/sibling w/ CABG, MI or angioplasty before 65F 55M? No   Social History Narrative     Not on file        FAMILY HISTORY:   Family History   Problem Relation Age of Onset     Cancer Mother         intestinal CA     Diabetes Father      Diabetes Brother      Other Cancer Son         meagan tumor     Other Cancer Brother         pancreatic cancer      Diabetes Son      Melanoma No family hx of         EXAM:Vitals: /80   Pulse 84   Ht 1.829 m (6')   Wt 102.5 kg (226 lb)   BMI 30.65 kg/m    BMI= Body mass index is 30.65 kg/m .      General appearance: Patient is alert and fully cooperative with history & exam.  No sign of distress is noted during the visit.     Psychiatric: Affect is pleasant & appropriate.  Patient appears motivated to improve health.     Respiratory: Breathing is regular & unlabored while sitting.     HEENT: Hearing is intact to spoken word.  Speech is clear.  No gross evidence of visual impairment that would impact ambulation.     Dermatologic:  One notes grade II ulceration located on the plantar aspect of the foot underlying the great toe  on the right.  The ulcer measures out to be approximately 2 cm x 2 cm x .5  cm.  One notes positive probing to bone, positive tracking.  One notes a slight serous drainage and presents of mal odor.  There is  noted swelling, purulent drainage and surrounding erythema.    Vascular: DP & PT pulses are intact & regular bilaterally.  No significant edema or varicosities noted.  CFT and skin temperature is normal to both lower extremities.     Neurologic: Lower extremity sensation is absent to light touch.  No evidence of weakness or contracture in the lower extremities.  Noted evidence of neuropathy.     Musculoskeletal: Patient is ambulatory without assistive device or brace.  No gross ankle deformity noted.  No foot or ankle joint effusion is noted.    Radiographic evaluation including an AP, lateral and medial oblique evaluation of the right foot reveals cortical destruction of the distal phalanx of the right great toe.    Assessment: Acute osteomyelitis of the right great toe.      Plan:  I have explained to Fawad and his son the condition of the bone infection.  At this point, I am recommending surgical treatment of the condition involving amputation of the distal phalanx of the great toe on  the right foot.  I informed the patient in risks and benefits of the procedure including but not limited to infection, wound complications, swelling, pain, diminished range of motion and function, DVT and reoccurrence of condition.  The procedure will be performed under local with monitored anesthesia care.  The patient will obtain a preoperative history and physical by the primary care provider.  Consents will be reviewed and signed on the day of surgery.  An MRI was ordered for preoperative evaluation to determine extent of amputation.  Labs were taken for CBC, ESR, CRP.  The patient was placed on oral clindamycin 300 mg 3 times daily and oral ciprofloxacin 500 mg twice daily for 2 weeks.  The patient was instructed to report to the ER if worsening signs of infection are noted.      LIANG Montesinos D.P.M., [email or web address].

## 2019-07-19 NOTE — NURSING NOTE
Chief Complaint   Patient presents with     RECHECK     DOS 4/2/19, Fifth metatarsal head resection, left foot. Right foot sore., Swelling in foot with an oder       Initial /80   Pulse 84   Ht 1.829 m (6')   Wt 102.5 kg (226 lb)   BMI 30.65 kg/m   Estimated body mass index is 30.65 kg/m  as calculated from the following:    Height as of this encounter: 1.829 m (6').    Weight as of this encounter: 102.5 kg (226 lb).  Medications and allergies reviewed.      Dana GUILLERMO MA

## 2019-07-19 NOTE — PATIENT INSTRUCTIONS
Fawad to follow up with Primary Care provider regarding elevated blood pressure.    You have elected to proceed with Surgery for partial amputation of the great toe on the right foot  Surgeries are performed on Tuesdays at [facility].   To schedule your surgery date please call [phone].  Please leave a message with a good time for our staff to call you back.    - Please have a date in mind for your surgery, you can feel free to leave that date on the message, and we will schedule and call back to confirm.     You can expect receive a call back the same day or on the next business day from Dr. Montesinos s team to assist in the scheduling.   - We will schedule the date of your surgery.  The time will be determined a few days ahead of time.  You can expect a call from Same Day Surgery 2-3 days ahead of time with specific instructions for what time to arrive at the hospital as well as any other preparations you should take prior to surgery.    - You may need to obtain a pre-operative physical from your primary medical provider. This must be done within 30 days of your surgery date.    - We will also schedule your first post-operative appointment for a bandage and wound check for the Monday following your surgery at the [facility].    - You may be non-weight bearing for a period of up to 6 weeks.  Options for this include: (Please indicate which you would prefer so we can provide you with an order and instructions)  o Crutches  o Walker  o Roll-a-bout knee walker.    - If you will need paperwork filled out for your employer you may drop those off at the clinic directly or you may have those faxed to us at [phone].  Please indicate on the form the date you would like the FMLA to begin if it will not be your surgery date.    The forms are typically filled out for up to 12 weeks, however you may be cleared to return prior to that time depending on your individual healing and job  requirements.

## 2019-07-19 NOTE — LETTER
7/19/2019         RE: Fawad Garcia  [address]  [facility] [address]        Dear Colleague,    Thank you for referring your patient, Fawad Garcia, to the [facility]. Please see a copy of my visit note below.    Fawad Garcia is a 83 year old male who presents to the office with his son with a chief complaint of an ulcer on the great toe of the right foot.  The patient relates the ulcer has been present for the past several months.  The patient denies any fever, chills, nausea or vomiting.  The patient denies any pus or blood draining from the ulcer.  The patient relates  redness or swelling around the ulcer.  The patient denies of any pain involving the ulcer.  The patient relates a follow odor.  The patient relates blood sugars are under control.        REVIEW OF SYSTEMS:  Constitutional, HEENT, cardiovascular, pulmonary, GI, , musculoskeletal, neuro, skin, endocrine and psych systems are negative, except as otherwise noted.     PAST MEDICAL HISTORY:   Past Medical History:   Diagnosis Date     Diabetes mellitus (H)      Hypertension      Squamous cell carcinoma         PAST SURGICAL HISTORY:   Past Surgical History:   Procedure Laterality Date     CL AFF SURGICAL PATHOLOGY  2002    prostate biopsy elevated PSA     COLONOSCOPY       HC REMOVE TONSILS/ADENOIDS,<11 Y/O      T & A     IR LOWER EXTREMITY ANGIOGRAM RIGHT  12/11/2018     PHACOEMULSIFICATION WITH STANDARD INTRAOCULAR LENS IMPLANT Left 4/2/2015    Procedure: PHACOEMULSIFICATION WITH STANDARD INTRAOCULAR LENS IMPLANT;  Surgeon: Joseph Hernandez MD;  Location: [facility]     PHACOEMULSIFICATION WITH STANDARD INTRAOCULAR LENS IMPLANT Right 5/4/2015    Procedure: PHACOEMULSIFICATION WITH STANDARD INTRAOCULAR LENS IMPLANT;  Surgeon: Joseph Hernandez MD;  Location: [facility]     REPAIR HAMMER TOE Left 4/2/2019    Procedure: 5th Metatarsal Head Resection;  Surgeon: Ethan Montesinos DPM;  Location: [facility]         MEDICATIONS:   Current Outpatient Medications:      amLODIPine (NORVASC) 5 MG tablet, Take 1 tablet (5 mg) by mouth daily, Disp: 90 tablet, Rfl: 3     Blood Glucose Monitoring Suppl (ONE TOUCH ULTRA SYSTEM KIT) W/DEVICE KIT, , Disp: , Rfl:      cephALEXin (KEFLEX) 500 MG capsule, Take 1 capsule (500 mg) by mouth 4 times daily, Disp: 40 capsule, Rfl: 0     Cholecalciferol (VITAMIN D PO), Take 4,000 Int'l Units/day by mouth daily, Disp: , Rfl:      dorzolamide-timolol (COSOPT) 2-0.5 % ophthalmic solution, , Disp: , Rfl:      hydrochlorothiazide (HYDRODIURIL) 25 MG tablet, Take 1 tablet (25 mg) by mouth daily Hold on file until needed, Disp: 90 tablet, Rfl: 3     insulin glargine (LANTUS SOLOSTAR PEN) 100 UNIT/ML pen, Inject 24 Units Subcutaneous At Bedtime, Disp: 21 mL, Rfl: 3     insulin pen needle (B-D U/F) 31G X 8 MM miscellaneous, 1 Device daily Use once daily or as directed., Disp: 100 each, Rfl: 3     lisinopril (PRINIVIL/ZESTRIL) 40 MG tablet, Take 1 tablet (40 mg) by mouth daily Hold on file until needed, Disp: 90 tablet, Rfl: 3     metFORMIN (GLUCOPHAGE) 500 MG tablet, Take 2 tablets (1,000 mg) by mouth 2 times daily (with meals) Hold on file until needed, Disp: 360 tablet, Rfl: 3     Multiple Vitamins-Minerals (PRESERVISION AREDS PO), Take 1 tablet by mouth, Disp: , Rfl:      omeprazole (PRILOSEC) 20 MG DR capsule, Take 1 capsule (20 mg) by mouth daily Hold on file until needed, Disp: 90 capsule, Rfl: 3     ONETOUCH LANCETS MISC, 1 Device daily One touch delica lancets, what ever is covered by insurance., Disp: 100 each, Rfl: 5     ONETOUCH ULTRA test strip, 1 strip by In Vitro route daily One touch Ultra., Disp: 100 strip, Rfl: 1     ORDER FOR DME, Equipment being ordered:  Glucometer, Disp: 1 Device, Rfl: 1     pravastatin (PRAVACHOL) 10 MG tablet, Take 1 tablet (10 mg) by mouth daily Hold on file until needed, Disp: 90 tablet, Rfl: 3     senna-docusate (SENOKOT-S/PERICOLACE) 8.6-50 MG tablet, Take 1-2  tablets by mouth 2 times daily, Disp: 30 tablet, Rfl: 0     triamcinolone (KENALOG) 0.1 % cream, , Disp: , Rfl:      VICTOZA PEN 18 MG/3ML soln, , Disp: , Rfl:      oxyCODONE-acetaminophen (PERCOCET) 5-325 MG tablet, Take 1-2 tablets by mouth every 4 hours as needed for moderate to severe pain (Patient not taking: Reported on 4/10/2019), Disp: 30 tablet, Rfl: 0     ALLERGIES:    Allergies   Allergen Reactions     Zocor [Simvastatin - High Dose]      Bilateral hip aching        SOCIAL HISTORY:   Social History     Socioeconomic History     Marital status:      Spouse name: Not on file     Number of children: Not on file     Years of education: Not on file     Highest education level: Not on file   Occupational History     Not on file   Social Needs     Financial resource strain: Not on file     Food insecurity:     Worry: Not on file     Inability: Not on file     Transportation needs:     Medical: Not on file     Non-medical: Not on file   Tobacco Use     Smoking status: Never Smoker     Smokeless tobacco: Never Used   Substance and Sexual Activity     Alcohol use: Yes     Alcohol/week: 1.0 oz     Comment: drinks rarely     Drug use: No     Sexual activity: Not Currently     Partners: Female   Lifestyle     Physical activity:     Days per week: Not on file     Minutes per session: Not on file     Stress: Not on file   Relationships     Social connections:     Talks on phone: Not on file     Gets together: Not on file     Attends Shinto service: Not on file     Active member of club or organization: Not on file     Attends meetings of clubs or organizations: Not on file     Relationship status: Not on file     Intimate partner violence:     Fear of current or ex partner: Not on file     Emotionally abused: Not on file     Physically abused: Not on file     Forced sexual activity: Not on file   Other Topics Concern     Parent/sibling w/ CABG, MI or angioplasty before 65F 55M? No   Social History Narrative      Not on file        FAMILY HISTORY:   Family History   Problem Relation Age of Onset     Cancer Mother         intestinal CA     Diabetes Father      Diabetes Brother      Other Cancer Son         meagan tumor     Other Cancer Brother         pancreatic cancer     Diabetes Son      Melanoma No family hx of         EXAM:Vitals: /80   Pulse 84   Ht 1.829 m (6')   Wt 102.5 kg (226 lb)   BMI 30.65 kg/m     BMI= Body mass index is 30.65 kg/m .      General appearance: Patient is alert and fully cooperative with history & exam.  No sign of distress is noted during the visit.     Psychiatric: Affect is pleasant & appropriate.  Patient appears motivated to improve health.     Respiratory: Breathing is regular & unlabored while sitting.     HEENT: Hearing is intact to spoken word.  Speech is clear.  No gross evidence of visual impairment that would impact ambulation.     Dermatologic:  One notes grade II ulceration located on the plantar aspect of the foot underlying the great toe  on the right.  The ulcer measures out to be approximately 2 cm x 2 cm x .5  cm.  One notes positive probing to bone, positive tracking.  One notes a slight serous drainage and presents of mal odor.  There is  noted swelling, purulent drainage and surrounding erythema.    Vascular: DP & PT pulses are intact & regular bilaterally.  No significant edema or varicosities noted.  CFT and skin temperature is normal to both lower extremities.     Neurologic: Lower extremity sensation is absent to light touch.  No evidence of weakness or contracture in the lower extremities.  Noted evidence of neuropathy.     Musculoskeletal: Patient is ambulatory without assistive device or brace.  No gross ankle deformity noted.  No foot or ankle joint effusion is noted.    Radiographic evaluation including an AP, lateral and medial oblique evaluation of the right foot reveals cortical destruction of the distal phalanx of the right great toe.    Assessment:  Acute osteomyelitis of the right great toe.      Plan:  I have explained to Fawad and his son the condition of the bone infection.  At this point, I am recommending surgical treatment of the condition involving amputation of the distal phalanx of the great toe on the right foot.  I informed the patient in risks and benefits of the procedure including but not limited to infection, wound complications, swelling, pain, diminished range of motion and function, DVT and reoccurrence of condition.  The procedure will be performed under local with monitored anesthesia care.  The patient will obtain a preoperative history and physical by the primary care provider.  Consents will be reviewed and signed on the day of surgery.  An MRI was ordered for preoperative evaluation to determine extent of amputation.  Labs were taken for CBC, ESR, CRP.  The patient was placed on oral clindamycin 300 mg 3 times daily and oral ciprofloxacin 500 mg twice daily for 2 weeks.  The patient was instructed to report to the ER if worsening signs of infection are noted.      LIANG Montesinos D.P.M., F.A.C.F.A.S.    Again, thank you for allowing me to participate in the care of your patient.        Sincerely,        Ethan Montesinos DPM

## 2019-07-22 ENCOUNTER — OFFICE VISIT (OUTPATIENT)
Dept: FAMILY MEDICINE | Facility: CLINIC | Age: 84
End: 2019-07-22
Payer: COMMERCIAL

## 2019-07-22 ENCOUNTER — ANESTHESIA EVENT (OUTPATIENT)
Dept: SURGERY | Facility: CLINIC | Age: 84
End: 2019-07-22
Payer: COMMERCIAL

## 2019-07-22 ENCOUNTER — HOSPITAL ENCOUNTER (OUTPATIENT)
Dept: MRI IMAGING | Facility: CLINIC | Age: 84
Discharge: HOME | End: 2019-07-22
Attending: PODIATRIST | Admitting: PODIATRIST
Payer: COMMERCIAL

## 2019-07-22 VITALS
RESPIRATION RATE: 16 BRPM | OXYGEN SATURATION: 98 % | SYSTOLIC BLOOD PRESSURE: 122 MMHG | BODY MASS INDEX: 30.46 KG/M2 | WEIGHT: 224.6 LBS | HEART RATE: 80 BPM | TEMPERATURE: 98.4 F | DIASTOLIC BLOOD PRESSURE: 60 MMHG

## 2019-07-22 DIAGNOSIS — I10 HYPERTENSION, GOAL BELOW 140/90: ICD-10-CM

## 2019-07-22 DIAGNOSIS — Z79.4 TYPE 2 DIABETES MELLITUS WITH BOTH EYES AFFECTED BY MILD NONPROLIFERATIVE RETINOPATHY WITHOUT MACULAR EDEMA, WITH LONG-TERM CURRENT USE OF INSULIN (H): ICD-10-CM

## 2019-07-22 DIAGNOSIS — M86.9 OSTEOMYELITIS OF GREAT TOE OF RIGHT FOOT (H): ICD-10-CM

## 2019-07-22 DIAGNOSIS — E11.3293 TYPE 2 DIABETES MELLITUS WITH BOTH EYES AFFECTED BY MILD NONPROLIFERATIVE RETINOPATHY WITHOUT MACULAR EDEMA, WITH LONG-TERM CURRENT USE OF INSULIN (H): ICD-10-CM

## 2019-07-22 DIAGNOSIS — Z01.818 PREOP GENERAL PHYSICAL EXAM: Primary | ICD-10-CM

## 2019-07-22 LAB
CREAT BLD-MCNC: 1.3 MG/DL (ref 0.66–1.25)
GFR SERPL CREATININE-BSD FRML MDRD: 53 ML/MIN/{1.73_M2}

## 2019-07-22 PROCEDURE — 99215 OFFICE O/P EST HI 40 MIN: CPT | Performed by: FAMILY MEDICINE

## 2019-07-22 PROCEDURE — 25500064 ZZH RX 255 OP 636: Performed by: PODIATRIST

## 2019-07-22 PROCEDURE — A9585 GADOBUTROL INJECTION: HCPCS | Performed by: PODIATRIST

## 2019-07-22 PROCEDURE — 73720 MRI LWR EXTREMITY W/O&W/DYE: CPT | Mod: RT

## 2019-07-22 PROCEDURE — 82565 ASSAY OF CREATININE: CPT

## 2019-07-22 RX ORDER — GADOBUTROL 604.72 MG/ML
10 INJECTION INTRAVENOUS ONCE
Status: COMPLETED | OUTPATIENT
Start: 2019-07-22 | End: 2019-07-22

## 2019-07-22 RX ADMIN — GADOBUTROL 10 ML: 604.72 INJECTION INTRAVENOUS at 19:47

## 2019-07-22 NOTE — PATIENT INSTRUCTIONS
Before Your Surgery      Call your surgeon if there is any change in your health. This includes signs of a cold or flu (such as a sore throat, runny nose, cough, rash or fever).    Do not smoke, drink alcohol or take over the counter medicine (unless your surgeon or primary care doctor tells you to) for the 24 hours before and after surgery.    If you take prescribed drugs: Follow your doctor s orders about which medicines to take and which to stop until after surgery.    Eating and drinking prior to surgery: follow the instructions from your surgeon    Take a shower or bath the night before surgery. Use the soap your surgeon gave you to gently clean your skin. If you do not have soap from your surgeon, use your regular soap. Do not shave or scrub the surgery site.  Wear clean pajamas and have clean sheets on your bed.   Patient Education     Presurgery Checklist  You are scheduled to have surgery. The healthcare staff will try to make your stay comfortable. Use the guidelines below to remind yourself what to do before surgery. Be sure to follow any specific pre-op instructions from your surgeon or nurse.  Preparing for surgery  If you are having abdominal surgery, ask what you need to do to clear your bowel.  Tell your surgeon if you have allergies to any medicines, latex, or foods.  Ask your surgeon if you might need a blood transfusion during surgery and if so, how to prepare for it. In some cases, you can donate blood before surgery. If needed, this blood can be given back (transfused) to you during or after surgery.  Arrange for an adult family member or friend to drive you home after surgery. If possible, have someone ready to help you at home as you recover.  Call the surgeon if you get a cold, fever, sore throat, diarrhea, or other health problem just before surgery. Your surgeon can decide whether or not to postpone the surgery.  Medicines  Tell your surgeon about all medicines you take, including  prescription and over-the-counter products such as herbal remedies and vitamins. Ask if you should continue taking them.  If you take ibuprofen, naproxen, or blood thinners (anticoagulants) such as aspirin, clopidogrel, or warfarin, ask your surgeon whether you should stop taking them and how long before surgery you should stop.  You may be told to take antibiotics just before surgery to prevent infection. If so, follow instructions carefully on how to take them.  If you are told to take blood thinners to help prevent blood clots after surgery, be sure to follow the instructions on how to take them.  Stop smoking  If you smoke, healing may take longer. So at least 2 week(s) before surgery, stop smoking.  Bathing or showering before surgery  If instructed, wash with antibacterial soap. Afterward, do not use lotions, oils, or powders.  If you are having surgery on the head, you may be asked to shampoo with antibacterial soap. Follow instructions for doing so.  Do not remove hair from the surgery site  Do not shave hair from the incision site, unless you are given specific instructions to do so. Usually, if hair needs to be removed, it will be done at the hospital right before surgery.  Don t eat or drink  Follow any directions you are given for not eating or drinking before surgery. If you don't follow instructions about when to stop eating and drinking, your procedure may be postponed or rescheduled for another day. This is a safety issue.  You can brush your teeth and rinse your mouth, but don t swallow any water.  Day of surgery  Don't wear makeup. Don't use perfume, deodorant, or hairspray. Remove nail polish and artificial nails.  Leave jewelry (including rings), watches, and other valuables at home.  Be sure to bring health insurance cards or forms and a photo ID.  Bring a list of your medicines (include the name, dose, how often you take them, and the time last dose was taken).  Arrive on time at the hospital  or surgery facility.  Date Last Reviewed: 12/1/2016 2000-2018 [employer]. [address] [place] [address], [place] [address]. All rights reserved. This information is not intended as a substitute for professional medical care. Always follow your healthcare professional's instructions.

## 2019-07-22 NOTE — ANESTHESIA PREPROCEDURE EVALUATION
Anesthesia Pre-Procedure Evaluation    Patient: Fawad Garcia   MRN: [id number] : 1935          Preoperative Diagnosis: osteomyelitis 5th metatarsal left foot    Procedure(s):  5th Metatarsal Head Resection    Past Medical History:   Diagnosis Date     Diabetes mellitus (H)      Hypertension      Squamous cell carcinoma      Past Surgical History:   Procedure Laterality Date     CL AFF SURGICAL PATHOLOGY      prostate biopsy elevated PSA     COLONOSCOPY       HC REMOVE TONSILS/ADENOIDS,<14 Y/O      T & A     IR LOWER EXTREMITY ANGIOGRAM RIGHT  2018     PHACOEMULSIFICATION WITH STANDARD INTRAOCULAR LENS IMPLANT Left 2015    Procedure: PHACOEMULSIFICATION WITH STANDARD INTRAOCULAR LENS IMPLANT;  Surgeon: Joseph Hernandez MD;  Location: [facility]     PHACOEMULSIFICATION WITH STANDARD INTRAOCULAR LENS IMPLANT Right 2015    Procedure: PHACOEMULSIFICATION WITH STANDARD INTRAOCULAR LENS IMPLANT;  Surgeon: Joseph Hernandez MD;  Location: [facility]     REPAIR HAMMER TOE Left 2019    Procedure: 5th Metatarsal Head Resection;  Surgeon: Ethan Montesinos DPM;  Location: [facility]       Anesthesia Evaluation     . Pt has had prior anesthetic. Type: General and MAC    No history of anesthetic complications          ROS/MED HX    ENT/Pulmonary:  - neg pulmonary ROS     Neurologic: Comment: Transient cerebral ischemia      Cardiovascular: Comment: PVD    (+) Dyslipidemia, hypertension-Peripheral Vascular Disease---. : . . . :. . Previous cardiac testing Echodate:11results:Order   Echo bubble complete (ECH09) (Order [id number])   Exam Information     Exam Date Exam Time Accession # Results   11  8:56 AM [id number]   Component  XCELERA     Interpretation Summary  Left ventricular systolic function is normal. The visual ejection fraction is   estimated at 55-60%. Grade II left ventricular diastolic dysfunction is   noted. The ascending aorta is Borderline dilated. There is no color  Doppler   evidence of an atrial shunt. Bubble study was of suboptimal quality and   inconclusive. Suggest ALEKSANDR if clinically apprpriate.  PatientHeight: 72 in  PatientWeight: 271 lbs  SystolicPressure: 128 mmHg  DiastolicPressure: 77 mmHg  HeartRate: 59 bpm  BSA 2.4 m^2        Left Ventricle  The left ventricle is normal in size.  There is moderate concentric left ventricular hypertrophy.  Left ventricular systolic function is normal.  The visual ejection fraction is estimated at 55-60%.  Grade II left ventricular diastolic dysfunction is noted.  E by E prime ratio is between 8 and 15, which is indeterminate for assessment   of left ventricular filling pressures.  No regional wall motion abnormalities noted.     Right Ventricle  The right ventricle is normal size.  The right ventricular systolic function is normal.     Atria  The left atrium is mildly dilated.  Right atrial size is normal.  There is no color Doppler evidence of an atrial shunt.  Bubble study was of suboptimal quality and inconclusive. Suggest ALEKSANDR if   clinically apprpriate.     Mitral Valve  There is no mitral regurgitation noted.     Tricuspid Valve  No tricuspid regurgitation.  Right ventricular systolic pressure could not be approximated due to   inadequate tricuspid regurgitation.     Aortic Valve  The aortic valve is trileaflet.  There is mild trileaflet aortic sclerosis.  No aortic regurgitation is present.  No aortic stenosis is present.     Pulmonic Valve  The pulmonic valve is not well visualized.     Vessels  The aortic root is normal size.  The ascending aorta is Borderline dilated.     Pericardium  There is no pericardial effusion.     Rhythm  The rhythm was normal sinus.     Procedure  Limited Bubble Echo Adult.     MMode 2D Measurements & Calculations  IVSd: 1.6 cm  IVSs: 1.9 cm  LVIDd: 3.6 cm  LVIDs: 2.1 cm  LVPWd: 1.4 cm  LVPWs: 1.7 cm  FS: 42 %  LV mass(C)d: 193 grams  Ao root diam: 3.1 cm  LA dimension: 4.4 cm  asc Aorta: 3.7  cm  LA/Ao: 1.4   Doppler Measurements & Calculations  MV E point: 91 cm/sec  MV A point: 98 cm/sec  MV E/A: 0.93   MV dec time: 0.32 sec  TR Max P mmHg     Interpreting Physician:  Koko Polo MD electronically signed on 2011   14:09:01    date: results:ECG reviewed date:19 results:appears normal, NSR, normal axis, normal intervals, no acute ST/T changes c/w ischemia, no LVH by voltage criteria, unchanged from previous tracings date: results:          METS/Exercise Tolerance:     Hematologic:  - neg hematologic  ROS       Musculoskeletal: Comment: Left foot osteomyelitis        GI/Hepatic:     (+) GERD Asymptomatic on medication,       Renal/Genitourinary:  - ROS Renal section negative       Endo:     (+) type II DM Last HgA1c: 6.6 date: 3/15/19 Obesity, .      Psychiatric:  - neg psychiatric ROS       Infectious Disease:  - neg infectious disease ROS       Malignancy:   (+) Malignancy History of Prostate          Other:                            Physical Exam  Normal systems: cardiovascular, pulmonary and dental    Airway   Mallampati: III  TM distance: >3 FB  Neck ROM: full    Dental     Cardiovascular       Pulmonary             Lab Results   Component Value Date    WBC 13.0 (H) 2019    HGB 13.0 (L) 2019    HCT 40.5 2019     2019    CRP 82.2 (H) 2019    SED 44 (H) 2019     2019    POTASSIUM 3.8 2019    CHLORIDE 101 2019    CO2 27 2019    BUN 22 2019    CR 0.91 2019     (H) 2019    CAIN 8.9 2019    AST 16 2015    PTT 32 2018    INR 0.93 2018    TSH 1.74 2018       Preop Vitals  BP Readings from Last 3 Encounters:   19 152/80   19 162/85   19 126/70    Pulse Readings from Last 3 Encounters:   19 84   19 65   19 69      Resp Readings from Last 3 Encounters:   04/10/19 16   19 16   19 14    SpO2 Readings from Last 3 Encounters:    04/30/19 98%   04/10/19 96%   04/02/19 97%      Temp Readings from Last 1 Encounters:   06/24/19 36.6  C (97.9  F) (Tympanic)    Ht Readings from Last 1 Encounters:   07/19/19 1.829 m (6')      Wt Readings from Last 1 Encounters:   07/19/19 102.5 kg (226 lb)    Estimated body mass index is 30.65 kg/m  as calculated from the following:    Height as of 7/19/19: 1.829 m (6').    Weight as of 7/19/19: 102.5 kg (226 lb).       Anesthesia Plan      History & Physical Review  History and physical reviewed and following examination; no interval change.    ASA Status:  3 .    NPO Status:  > 8 hours    Plan for MAC Reason for MAC:  Deep or markedly invasive procedure (G8)  PONV prophylaxis:  Ondansetron (or other 5HT-3)       Postoperative Care  Postoperative pain management:  IV analgesics and Oral pain medications.      Consents  Anesthetic plan, risks, benefits and alternatives discussed with:  Patient.  Use of blood products discussed: No .   .                   CORINA Still CRNA

## 2019-07-22 NOTE — PROGRESS NOTES
[facility] PRACTICE  [address] [place] [address]  [phone]  Dept: [phone]    PRE-OP EVALUATION:  Today's date: 2019    Fawad Garcia (: 1935) presents for pre-operative evaluation assessment as requested by Dr. Montesinos.  He requires evaluation and anesthesia risk assessment prior to undergoing surgery/procedure for treatment of right greater toe, partial amputation.     Proposed Surgery/ Procedure: Partial amputation, great toe (RIGHT)   Date of Surgery/ Procedure: 2019  Time of Surgery/ Procedure: 8:30am  Hospital/Surgical Facility: [place], [place]  Primary Physician: Chris Scott  Type of Anesthesia Anticipated: Local with MAC    Patient has a Health Care Directive or Living Will:  YES, copy has been scanned into patients chart.     1. NO - Do you have a history of heart attack, stroke, stent, bypass or surgery on an artery in the head, neck, heart or legs?  2. NO - Do you ever have any pain or discomfort in your chest?  3. NO - Do you have a history of  Heart Failure?  4. NO - Are you troubled by shortness of breath when: walking on the level, up a slight hill or at night?  5. NO - Do you currently have a cold, bronchitis or other respiratory infection?  6. NO - Do you have a cough, shortness of breath or wheezing?  7. NO - Do you sometimes get pains in the calves of your legs when you walk?  8. NO - Do you or anyone in your family have previous history of blood clots?  9. NO - Do you or does anyone in your family have a serious bleeding problem such as prolonged bleeding following surgeries or cuts?  10. NO - Have you ever had problems with anemia or been told to take iron pills?  11. NO - Have you had any abnormal blood loss such as black, tarry or bloody stools, or abnormal vaginal bleeding?  12. NO - Have you ever had a blood transfusion?  13. NO - Have you or any of your relatives ever had problems with anesthesia?  14.  NO - Do you have sleep apnea, excessive snoring or daytime drowsiness?  15. NO - Do you have any prosthetic heart valves?  16. NO - Do you have prosthetic joints?  17. NO - Is there any chance that you may be pregnant?      HPI:     HPI related to upcoming procedure: Patient is a 83-year-old male who comes in today for a preop but evaluation.  He has a great toe amputation tomorrow at 8:30 AM.  He has a past medical history significant for type 2 diabetes, hypertension, macular degeneration of the retina, he reports no acute symptoms today.  Denies any history of heart disease that is aware of.  Denies shortness of breath or chest pain.      DIABETES - Patient has a longstanding history of DiabetesType Type II . Patient is being treated with oral agents and insulin injections and denies significant side effects. Control has been good. Complicating factors include but are not limited to: hypertension.     HYPERTENSION - Patient has longstanding history of HTN , currently denies any symptoms referable to elevated blood pressure. Specifically denies chest pain, palpitations, dyspnea, orthopnea, PND or peripheral edema. Blood pressure readings have been in normal range. Current medication regimen is as listed below. Patient denies any side effects of medication.       MEDICAL HISTORY:     Patient Active Problem List    Diagnosis Date Noted     Macular degeneration (senile) of retina 01/10/2019     Priority: Medium     Nearly legally blind per eye doctor.       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) 11/19/2018     Priority: Medium     Type 2 diabetes mellitus with both eyes affected by mild nonproliferative retinopathy without macular edema, with long-term current use of insulin (H) 07/30/2018     Priority: Medium     Senile nuclear sclerosis 04/01/2015     Priority: Medium     Hypertension, goal below 140/90 03/25/2015     Priority: Medium     Malignant neoplasm of prostate (H) 06/16/2014      "Priority: Medium     Health Care Home 12/28/2012     Priority: Medium     Nury Josi RN-PHN  [facility] / [facility]   [phone]    DX V65.8 REPLACED WITH 45047 HEALTH CARE HOME (04/08/2013)       Advanced directives, counseling/discussion 02/24/2012     Priority: Medium     Patient does not have an Advance/Health Care Directive (HCD), given \"What is Advance Care Planning?\" flyer, accepts referral to Facilitator and requests blank HCD form.    Roro Wright  February 24, 2012         Peripheral vascular disease (H) 11/18/2011     Priority: Medium     Problem list name updated by automated process. Provider to review       GERD (gastroesophageal reflux disease) 11/03/2011     Priority: Medium     HYPERLIPIDEMIA LDL GOAL <100 10/31/2010     Priority: Medium     Transient cerebral ischemia 09/03/2009     Priority: Medium     Diagnosis updated by automated process. Provider to review and confirm.       Obesity      Priority: Medium     Obesity  Problem list name updated by automated process. Provider to review        Past Medical History:   Diagnosis Date     Diabetes mellitus (H)      Hypertension      Squamous cell carcinoma      Past Surgical History:   Procedure Laterality Date     CL AFF SURGICAL PATHOLOGY  2002    prostate biopsy elevated PSA     COLONOSCOPY       HC REMOVE TONSILS/ADENOIDS,<11 Y/O      T & A     IR LOWER EXTREMITY ANGIOGRAM RIGHT  12/11/2018     PHACOEMULSIFICATION WITH STANDARD INTRAOCULAR LENS IMPLANT Left 4/2/2015    Procedure: PHACOEMULSIFICATION WITH STANDARD INTRAOCULAR LENS IMPLANT;  Surgeon: Joseph Hernandez MD;  Location: [facility] OR     PHACOEMULSIFICATION WITH STANDARD INTRAOCULAR LENS IMPLANT Right 5/4/2015    Procedure: PHACOEMULSIFICATION WITH STANDARD INTRAOCULAR LENS IMPLANT;  Surgeon: Joseph Hernandez MD;  Location: [facility] OR     REPAIR HAMMER TOE Left 4/2/2019    Procedure: 5th Metatarsal Head Resection;  Surgeon: Ethan Montesinos DPM;  Location: [facility] OR "     Current Outpatient Medications   Medication Sig Dispense Refill     amLODIPine (NORVASC) 5 MG tablet Take 1 tablet (5 mg) by mouth daily 90 tablet 3     Blood Glucose Monitoring Suppl (ONE TOUCH ULTRA SYSTEM KIT) W/DEVICE KIT        ciprofloxacin (CIPRO) 500 MG tablet Take 1 tablet (500 mg) by mouth 2 times daily for 14 days 28 tablet 0     clindamycin (CLEOCIN) 300 MG capsule Take 1 capsule (300 mg) by mouth 3 times daily for 14 days 42 capsule 0     dorzolamide-timolol (COSOPT) 2-0.5 % ophthalmic solution        hydrochlorothiazide (HYDRODIURIL) 25 MG tablet Take 1 tablet (25 mg) by mouth daily Hold on file until needed 90 tablet 3     insulin glargine (LANTUS SOLOSTAR PEN) 100 UNIT/ML pen Inject 24 Units Subcutaneous At Bedtime 21 mL 3     insulin pen needle (B-D U/F) 31G X 8 MM miscellaneous 1 Device daily Use once daily or as directed. 100 each 3     lisinopril (PRINIVIL/ZESTRIL) 40 MG tablet Take 1 tablet (40 mg) by mouth daily Hold on file until needed 90 tablet 3     metFORMIN (GLUCOPHAGE) 500 MG tablet Take 2 tablets (1,000 mg) by mouth 2 times daily (with meals) Hold on file until needed 360 tablet 3     Multiple Vitamins-Minerals (PRESERVISION AREDS PO) Take 1 tablet by mouth       omeprazole (PRILOSEC) 20 MG DR capsule Take 1 capsule (20 mg) by mouth daily Hold on file until needed 90 capsule 3     ONETOUCH LANCETS MISC 1 Device daily One touch delica lancets, what ever is covered by insurance. 100 each 5     ONETOUCH ULTRA test strip 1 strip by In Vitro route daily One touch Ultra. 100 strip 1     ORDER FOR DME Equipment being ordered:   Glucometer 1 Device 1     pravastatin (PRAVACHOL) 10 MG tablet Take 1 tablet (10 mg) by mouth daily Hold on file until needed 90 tablet 3     Cholecalciferol (VITAMIN D PO) Take 4,000 Int'l Units/day by mouth daily       senna-docusate (SENOKOT-S/PERICOLACE) 8.6-50 MG tablet Take 1-2 tablets by mouth 2 times daily (Patient not taking: Reported on 7/22/2019) 30  tablet 0     VICTOZA PEN 18 MG/3ML soln        OTC products: None, except as noted above    Allergies   Allergen Reactions     Zocor [Simvastatin - High Dose]      Bilateral hip aching      Latex Allergy: NO    Social History     Tobacco Use     Smoking status: Never Smoker     Smokeless tobacco: Never Used   Substance Use Topics     Alcohol use: Yes     Alcohol/week: 1.0 oz     Comment: drinks rarely     History   Drug Use No       REVIEW OF SYSTEMS:   CONSTITUTIONAL: NEGATIVE for fever, chills, change in weight  INTEGUMENTARY/SKIN: NEGATIVE for worrisome rashes, moles or lesions  EYES: NEGATIVE for vision changes or irritation  ENT/MOUTH: NEGATIVE for ear, mouth and throat problems  RESP: NEGATIVE for significant cough or SOB  CV: NEGATIVE for chest pain, palpitations or peripheral edema  GI: NEGATIVE for nausea, abdominal pain, heartburn, or change in bowel habits  : NEGATIVE for frequency, dysuria, or hematuria  MUSCULOSKELETAL:POSITIVE  for joint pain foot ulcer  HEME: NEGATIVE for bleeding problems  PSYCHIATRIC: NEGATIVE for changes in mood or affect    EXAM:   /60   Pulse 80   Temp 98.4  F (36.9  C) (Tympanic)   Resp 16   Wt 101.9 kg (224 lb 9.6 oz)   SpO2 98%   BMI 30.46 kg/m      GENERAL APPEARANCE: healthy, alert and no distress     EYES: EOMI,  PERRL     HENT: ear canals and TM's normal and nose and mouth without ulcers or lesions     NECK: no adenopathy, no asymmetry, masses, or scars and thyroid normal to palpation     RESP: lungs clear to auscultation - no rales, rhonchi or wheezes     CV: regular rates and rhythm, normal S1 S2, no S3 or S4 and no murmur, click or rub     ABDOMEN:  soft, nontender, no HSM or masses and bowel sounds normal     MS: extremities normal- no gross deformities noted, no evidence of inflammation in joints, FROM in all extremities.     PSYCH: mentation appears normal. and affect normal/bright    DIAGNOSTICS:   EKG: appears normal, NSR, normal axis, normal  intervals, no acute ST/T changes c/w ischemia, no LVH by voltage criteria, unchanged from previous tracings    Recent Labs   Lab Test 07/19/19  0932 04/01/19  1451 03/15/19  1047 12/11/18  1150 12/04/18  0854   HGB 13.0* 13.2*  --  14.7  --      --   --  295  --    INR  --   --   --  0.93  --    NA  --  134  --   --  138   POTASSIUM  --  3.8  --   --  3.7   CR  --  0.91  --  0.94 0.92   A1C  --   --  6.6* 8.3*  --         IMPRESSION:   Reason for surgery/procedure: diabetic foot ulcer  Diagnosis/reason for consult: Pre op[ evaluation     The proposed surgical procedure is considered LOW risk.    REVISED CARDIAC RISK INDEX  The patient has the following serious cardiovascular risks for perioperative complications such as (MI, PE, VFib and 3  AV Block):  Diabetes Mellitus (on Insulin)  INTERPRETATION: 0 risks: Class I (very low risk - 0.4% complication rate)    The patient has the following additional risks for perioperative complications:  No identified additional risks  The ASCVD Risk score (Vinay FARAZ Jr., et al., 2013) failed to calculate for the following reasons:    The 2013 ASCVD risk score is only valid for ages 40 to 79      ICD-10-CM    1. Preop general physical exam Z01.818    2. Osteomyelitis of great toe of right foot (H) M86.9    3. Hypertension, goal below 140/90 I10    4. Type 2 diabetes mellitus with both eyes affected by mild nonproliferative retinopathy without macular edema, with long-term current use of insulin (H) E11.3293     Z79.4        RECOMMENDATIONS:       Cardiovascular Risk  Performs 4 METs exercise without symptoms (Light housework (dusting, washing dishes) and Climb a flight of stairs) .       Pulmonary Risk  Incentive spirometry post op  Respiratory Therapy (Respiratory Care IP Consult)  post op  NG tube decompression if abdominal distension or significant vomiting       --Patient is to take all scheduled medications on the day of surgery EXCEPT for modifications listed  below.    Diabetes Medication Use  -----Hold usual oral and non-insulin diabetic meds (e.g. Metformin, Actos, Glipizide) while NPO.   -----Take 80% of long acting insulin (e.g. Lantus, NPH) while NPO (fasting)      Hypertension  Anticoagulant or Antiplatelet Medication Use  ASPIRIN: Discontinue ASA 7-10 days prior to procedure to reduce bleeding risk.  It should be resumed post-operatively.  NSAIDS: Ibuprofen (Motrin):         Stop one day prior to surgery  Naproxen (Naprosyn):   Stop 2-3 days prior to surgery        ACE Inhibitor or Angiotensin Receptor Blocker (ARB) Use  Ace inhibitor or Angiotensin Receptor Blocker (ARB) and should HOLD this medication for the 24 hours prior to surgery.      APPROVAL GIVEN to proceed with proposed procedure, without further diagnostic evaluation       Signed Electronically by: Jasmeet Winters MD    Copy of this evaluation report is provided to requesting physician.    [facility] Preop Guidelines    Revised Cardiac Risk Index

## 2019-07-23 ENCOUNTER — ANESTHESIA (OUTPATIENT)
Dept: SURGERY | Facility: CLINIC | Age: 84
End: 2019-07-23
Payer: COMMERCIAL

## 2019-07-23 ENCOUNTER — HOSPITAL ENCOUNTER (OUTPATIENT)
Facility: CLINIC | Age: 84
Discharge: HOME | End: 2019-07-23
Attending: PODIATRIST | Admitting: PODIATRIST
Payer: COMMERCIAL

## 2019-07-23 VITALS
HEART RATE: 73 BPM | WEIGHT: 225 LBS | SYSTOLIC BLOOD PRESSURE: 130 MMHG | DIASTOLIC BLOOD PRESSURE: 68 MMHG | RESPIRATION RATE: 16 BRPM | TEMPERATURE: 98.4 F | OXYGEN SATURATION: 95 % | BODY MASS INDEX: 31.5 KG/M2 | HEIGHT: 71 IN

## 2019-07-23 DIAGNOSIS — M86.9 OSTEOMYELITIS OF GREAT TOE OF RIGHT FOOT (H): Primary | ICD-10-CM

## 2019-07-23 LAB — GLUCOSE BLDC GLUCOMTR-MCNC: 162 MG/DL (ref 70–99)

## 2019-07-23 PROCEDURE — 25000125 ZZHC RX 250: Performed by: NURSE ANESTHETIST, CERTIFIED REGISTERED

## 2019-07-23 PROCEDURE — 88305 TISSUE EXAM BY PATHOLOGIST: CPT | Mod: 26 | Performed by: PODIATRIST

## 2019-07-23 PROCEDURE — 87070 CULTURE OTHR SPECIMN AEROBIC: CPT | Performed by: PODIATRIST

## 2019-07-23 PROCEDURE — 36000050 ZZH SURGERY LEVEL 2 1ST 30 MIN: Performed by: PODIATRIST

## 2019-07-23 PROCEDURE — 87186 SC STD MICRODIL/AGAR DIL: CPT | Performed by: PODIATRIST

## 2019-07-23 PROCEDURE — 88311 DECALCIFY TISSUE: CPT | Mod: 26,76 | Performed by: PODIATRIST

## 2019-07-23 PROCEDURE — 88305 TISSUE EXAM BY PATHOLOGIST: CPT | Performed by: PODIATRIST

## 2019-07-23 PROCEDURE — 88311 DECALCIFY TISSUE: CPT | Performed by: PODIATRIST

## 2019-07-23 PROCEDURE — 28825 PARTIAL AMPUTATION OF TOE: CPT | Mod: T5 | Performed by: PODIATRIST

## 2019-07-23 PROCEDURE — 25000125 ZZHC RX 250: Performed by: PODIATRIST

## 2019-07-23 PROCEDURE — 82962 GLUCOSE BLOOD TEST: CPT

## 2019-07-23 PROCEDURE — 36000052 ZZH SURGERY LEVEL 2 EA 15 ADDTL MIN: Performed by: PODIATRIST

## 2019-07-23 PROCEDURE — 87075 CULTR BACTERIA EXCEPT BLOOD: CPT | Performed by: PODIATRIST

## 2019-07-23 PROCEDURE — 37000009 ZZH ANESTHESIA TECHNICAL FEE, EACH ADDTL 15 MIN: Performed by: PODIATRIST

## 2019-07-23 PROCEDURE — 71000027 ZZH RECOVERY PHASE 2 EACH 15 MINS: Performed by: PODIATRIST

## 2019-07-23 PROCEDURE — 25000128 H RX IP 250 OP 636: Performed by: PODIATRIST

## 2019-07-23 PROCEDURE — 87076 CULTURE ANAEROBE IDENT EACH: CPT | Performed by: PODIATRIST

## 2019-07-23 PROCEDURE — 40000306 ZZH STATISTIC PRE PROC ASSESS II: Performed by: PODIATRIST

## 2019-07-23 PROCEDURE — 25800030 ZZH RX IP 258 OP 636: Performed by: NURSE ANESTHETIST, CERTIFIED REGISTERED

## 2019-07-23 PROCEDURE — 37000008 ZZH ANESTHESIA TECHNICAL FEE, 1ST 30 MIN: Performed by: PODIATRIST

## 2019-07-23 PROCEDURE — 87077 CULTURE AEROBIC IDENTIFY: CPT | Performed by: PODIATRIST

## 2019-07-23 PROCEDURE — 27210794 ZZH OR GENERAL SUPPLY STERILE: Performed by: PODIATRIST

## 2019-07-23 PROCEDURE — 25000128 H RX IP 250 OP 636: Performed by: NURSE ANESTHETIST, CERTIFIED REGISTERED

## 2019-07-23 PROCEDURE — 87147 CULTURE TYPE IMMUNOLOGIC: CPT | Performed by: PODIATRIST

## 2019-07-23 RX ORDER — LIDOCAINE HYDROCHLORIDE 10 MG/ML
INJECTION, SOLUTION INFILTRATION; PERINEURAL PRN
Status: DISCONTINUED | OUTPATIENT
Start: 2019-07-23 | End: 2019-07-23 | Stop reason: HOSPADM

## 2019-07-23 RX ORDER — BACITRACIN ZINC 500 [USP'U]/G
OINTMENT TOPICAL PRN
Status: DISCONTINUED | OUTPATIENT
Start: 2019-07-23 | End: 2019-07-23 | Stop reason: HOSPADM

## 2019-07-23 RX ORDER — SODIUM CHLORIDE, SODIUM LACTATE, POTASSIUM CHLORIDE, CALCIUM CHLORIDE 600; 310; 30; 20 MG/100ML; MG/100ML; MG/100ML; MG/100ML
INJECTION, SOLUTION INTRAVENOUS CONTINUOUS
Status: DISCONTINUED | OUTPATIENT
Start: 2019-07-23 | End: 2019-07-23 | Stop reason: HOSPADM

## 2019-07-23 RX ORDER — PROPOFOL 10 MG/ML
INJECTION, EMULSION INTRAVENOUS PRN
Status: DISCONTINUED | OUTPATIENT
Start: 2019-07-23 | End: 2019-07-23

## 2019-07-23 RX ORDER — PROPOFOL 10 MG/ML
INJECTION, EMULSION INTRAVENOUS CONTINUOUS PRN
Status: DISCONTINUED | OUTPATIENT
Start: 2019-07-23 | End: 2019-07-23

## 2019-07-23 RX ORDER — AMOXICILLIN 250 MG
1-2 CAPSULE ORAL 2 TIMES DAILY
Qty: 30 TABLET | Refills: 0 | Status: SHIPPED | OUTPATIENT
Start: 2019-07-23 | End: 2019-08-12

## 2019-07-23 RX ORDER — LIDOCAINE HYDROCHLORIDE 10 MG/ML
INJECTION, SOLUTION INFILTRATION; PERINEURAL PRN
Status: DISCONTINUED | OUTPATIENT
Start: 2019-07-23 | End: 2019-07-23

## 2019-07-23 RX ORDER — CEFAZOLIN SODIUM 2 G/100ML
2 INJECTION, SOLUTION INTRAVENOUS
Status: COMPLETED | OUTPATIENT
Start: 2019-07-23 | End: 2019-07-23

## 2019-07-23 RX ORDER — LIDOCAINE 40 MG/G
CREAM TOPICAL
Status: DISCONTINUED | OUTPATIENT
Start: 2019-07-23 | End: 2019-07-23 | Stop reason: HOSPADM

## 2019-07-23 RX ORDER — OXYCODONE HYDROCHLORIDE 5 MG/1
5 TABLET ORAL
Status: DISCONTINUED | OUTPATIENT
Start: 2019-07-23 | End: 2019-07-23 | Stop reason: HOSPADM

## 2019-07-23 RX ORDER — CEFAZOLIN SODIUM 1 G/3ML
1 INJECTION, POWDER, FOR SOLUTION INTRAMUSCULAR; INTRAVENOUS SEE ADMIN INSTRUCTIONS
Status: DISCONTINUED | OUTPATIENT
Start: 2019-07-23 | End: 2019-07-23 | Stop reason: HOSPADM

## 2019-07-23 RX ORDER — FENTANYL CITRATE 50 UG/ML
INJECTION, SOLUTION INTRAMUSCULAR; INTRAVENOUS PRN
Status: DISCONTINUED | OUTPATIENT
Start: 2019-07-23 | End: 2019-07-23

## 2019-07-23 RX ORDER — OXYCODONE AND ACETAMINOPHEN 5; 325 MG/1; MG/1
1-2 TABLET ORAL EVERY 4 HOURS PRN
Qty: 20 TABLET | Refills: 0 | Status: SHIPPED | OUTPATIENT
Start: 2019-07-23 | End: 2019-08-12

## 2019-07-23 RX ADMIN — FENTANYL CITRATE 50 MCG: 50 INJECTION, SOLUTION INTRAMUSCULAR; INTRAVENOUS at 08:41

## 2019-07-23 RX ADMIN — LIDOCAINE HYDROCHLORIDE 50 MG: 10 INJECTION, SOLUTION INFILTRATION; PERINEURAL at 08:48

## 2019-07-23 RX ADMIN — CEFAZOLIN SODIUM 2 G: 2 INJECTION, SOLUTION INTRAVENOUS at 08:41

## 2019-07-23 RX ADMIN — PROPOFOL 40 MCG/KG/MIN: 10 INJECTION, EMULSION INTRAVENOUS at 08:51

## 2019-07-23 RX ADMIN — PROPOFOL 40 MG: 10 INJECTION, EMULSION INTRAVENOUS at 08:48

## 2019-07-23 RX ADMIN — SODIUM CHLORIDE, POTASSIUM CHLORIDE, SODIUM LACTATE AND CALCIUM CHLORIDE 1000 ML: 600; 310; 30; 20 INJECTION, SOLUTION INTRAVENOUS at 07:46

## 2019-07-23 RX ADMIN — LIDOCAINE HYDROCHLORIDE 1 ML: 10 INJECTION, SOLUTION EPIDURAL; INFILTRATION; INTRACAUDAL; PERINEURAL at 07:47

## 2019-07-23 RX ADMIN — FENTANYL CITRATE 50 MCG: 50 INJECTION, SOLUTION INTRAMUSCULAR; INTRAVENOUS at 08:44

## 2019-07-23 ASSESSMENT — MIFFLIN-ST. JEOR: SCORE: 1742.72

## 2019-07-23 NOTE — ANESTHESIA CARE TRANSFER NOTE
Patient: Fawad Garcia    Procedure(s):  Partial amputation great toe    Diagnosis: Osteomyelitis distal phalanx right great toe.  Diagnosis Additional Information: No value filed.    Anesthesia Type:   MAC     Note:  Airway :Room Air  Patient transferred to:Phase II  Comments: Patient's VSS. Spontaneous respirations. Patient awake and oriented. IV patent. Report to RN.Handoff Report: Identifed the Patient, Identified the Reponsible Provider, Reviewed the pertinent medical history, Discussed the surgical course, Reviewed Intra-OP anesthesia mangement and issues during anesthesia, Set expectations for post-procedure period and Allowed opportunity for questions and acknowledgement of understanding      Vitals: (Last set prior to Anesthesia Care Transfer)    CRNA VITALS  7/23/2019 0851 - 7/23/2019 0921      7/23/2019             SpO2:  97 %                Electronically Signed By: CORINA Dudley CRNA  July 23, 2019  9:21 AM

## 2019-07-23 NOTE — ANESTHESIA POSTPROCEDURE EVALUATION
Patient: Fawad HENDRICKSON Radha    Procedure(s):  Partial amputation great toe    Diagnosis:Osteomyelitis distal phalanx right great toe.  Diagnosis Additional Information: No value filed.    Anesthesia Type:  MAC    Note:  Anesthesia Post Evaluation    Patient location during evaluation: Phase 2 and Bedside  Patient participation: Able to fully participate in evaluation  Level of consciousness: awake and alert  Pain management: adequate  Airway patency: patent  Cardiovascular status: acceptable  Respiratory status: acceptable  Hydration status: acceptable  PONV: none     Anesthetic complications: None          Last vitals:  Vitals:    07/23/19 0712   BP: 156/74   Resp: 18   Temp: 36.9  C (98.4  F)   SpO2: 99%         Electronically Signed By: CORINA Dudley CRNA  July 23, 2019  9:29 AM

## 2019-07-23 NOTE — OP NOTE
PREOPERATIVE DIAGNOSIS: 1.  Osteomyelitis, distal phalanx, great toe, right foot.   POSTOPERATIVE DIAGNOSIS: 1. Osteomyelitis, distal phalanx, great toe, right foot.   PROCEDURE: 1. Partial amputation of the great toe, right foot.   PATHOLOGY: bone right great toe.   ANESTHESIA: local MAC   ESTIMATED BLOOD LOSS: Less than 10 mL   INDICATIONS FOR PROCEDURE: Fawad Garcia is a 83 year old-year-old male with acute osteomyelitis of the distal phalanx of the great toe of the right foot. The patient was consented for partial amputation of the great toe, right foot.   PROCEDURE IN DETAIL: Under mild sedation, the patient in the operating room and placed on the operating table in the supine position.   After adequate sedation, approximately 10cc of 1% buffered lidocaine was injected around the base of the great toe of the right foot. The foot was then scrubbed, prepped and draped in the usual aseptic manner.    Following this, an operative time out was performed according to our institution's policy which confirmed the laterality of the procedure. Preoperative antibiotics were administered to the patient prior to arrival to the OR.     The procedure began with a circumferential incision around the interphalangeal joint of the great toe on the right.  The incision was made full thickness down to the joint.  The distal phalanx was disarticulated and removed from the operative field.  Cultures were taken of the bone of the distal phalanx and the specimen was sent to pathology.  The head of the proximal phalanx was then resected utilizing a sagittal saw.  As sample of bone was also sent off to pathology for clean margins.  The wound was irrigated with copious amounts of normal sterile saline.    The skin was reapproximated utilizing 3-0 nylon suture in a simple interrupted technique.  The wound was packed with iodoform packing strips and dressed with sterile bacitracin, Xeroform, 4 x 4s, cast padding and an Ace wrap.   The  patient tolerated these procedures well and was transferred from the operative table to the transport cart and taken from the OR to the PACU.  In PACU, patient and staff given orders and instructions for post-operative care in the following areas: post op pain management, weight-bearing status, dressing/splint care, weight-bearing assistive devices, elevation of the extremity, ice/cold therapy, DVT/blood clot prevention, nutrition and post-operative concerns to be aware.  Case and post-operative care measures were reviewed with the patient and family members present.  A post operative instruction sheet was provided.  If concerns or questions arise they will contact our clinic.  If acute concerns develop they will present emergently to a nearby medical center.      RONNY MERCADO DPM, FACFAS

## 2019-07-23 NOTE — OR NURSING
Pt sitting up in chair. Would like more toast to eat. Stated started to feel a little light headed. Bedside chair reclined. Cool cloth to forehead. Denies any nausea.

## 2019-07-23 NOTE — DISCHARGE INSTRUCTIONS
Same Day Surgery Discharge Instructions  Special Precautions After Surgery - Adult    1. It is not unusual to feel lightheaded or faint, up to 24 hours after surgery or while taking pain medication.  If you have these symptoms; sit for a few minutes before standing and have someone assist you when getting up.  2. You should rest and relax for the next 24 hours and must have someone stay with you for at least 24 hours after your discharge.  3. DO NOT DRIVE any vehicle or operate mechanical equipment for 24 hours following the end of your surgery.  DO NOT DRIVE while taking narcotic pain medications that have been prescribed by your physician.  If you had a limb operated on, you must be able to use it fully to drive.  4. DO NOT drink alcoholic beverages for 24 hours following surgery or while taking prescription pain medication.  5. Drink clear liquids (apple juice, ginger ale, broth, 7-Up, etc.).  Progress to your regular diet as you feel able.  6. Any questions call your physician and do not make important decisions for 24 hours.  7. Call for signs of infection such as fever, drainage from wound/incision or increasing pain.  8. Call for bleeding that soaks through your bandage/  9. To avoid nausea take pain pills with a small amount of solid food.  __________________________________________________________________________________________________________________________________  IMPORTANT NUMBERS:    [facility] Main Number:  [phone], [phone]  Pharmacy:  [phone]  Same Day Surgery:  [phone], Monday - Friday until 8:30 p.m.  Urgent Care:  [phone]  Emergency Room:  [phone]      [facility]:  [phone]                                                                             [facility]:  [phone] option 1  [facility]:  [phone]     [facility]:  [phone]   [facility]:  [phone]   Home Medical Equipment:  [phone]  [facility]:  [phone]

## 2019-07-23 NOTE — BRIEF OP NOTE
[facility] Brief Operative Note    Pre-operative diagnosis: Osteomyelitis distal phalanx right great toe.   Post-operative diagnosis Osteomyelitis distal phalanx right great toe   Procedure: Procedure(s):  Partial amputation great toe   Surgeon(s): Surgeon(s) and Role:     * Ethan Montesinos DPM - Primary   Estimated blood loss: * No values recorded between 7/23/2019  8:59 AM and 7/23/2019  9:17 AM *    Specimens: ID Type Source Tests Collected by Time Destination   1 : right great toe Wound Toe WOUND CULTURE AEROBIC BACTERIAL Ethan Montesinos DPM 7/23/2019  9:11 AM    2 : right great toe Wound Toe ANAEROBIC BACTERIAL CULTURE Ethan Montesinos DPM 7/23/2019  9:12 AM    A : proximal phalynx Tissue Toe SURGICAL PATHOLOGY EXAM Ethan Montesinos DPM 7/23/2019  9:03 AM    B : distal phalynx Tissue Toe SURGICAL PATHOLOGY EXAM Ethan Montesinos DPM 7/23/2019  9:04 AM       Findings: Necrotic bone of the distal phalanx.  Proximal phalanx appears viable and healthy.

## 2019-07-27 LAB
BACTERIA SPEC CULT: ABNORMAL
Lab: ABNORMAL
SPECIMEN SOURCE: ABNORMAL

## 2019-07-30 LAB
BACTERIA SPEC CULT: ABNORMAL
BACTERIA SPEC CULT: ABNORMAL
Lab: ABNORMAL
SPECIMEN SOURCE: ABNORMAL

## 2019-08-01 ENCOUNTER — ANCILLARY PROCEDURE (OUTPATIENT)
Dept: GENERAL RADIOLOGY | Facility: CLINIC | Age: 84
End: 2019-08-01
Attending: PODIATRIST
Payer: COMMERCIAL

## 2019-08-01 ENCOUNTER — OFFICE VISIT (OUTPATIENT)
Dept: PODIATRY | Facility: CLINIC | Age: 84
End: 2019-08-01
Payer: COMMERCIAL

## 2019-08-01 VITALS
RESPIRATION RATE: 16 BRPM | SYSTOLIC BLOOD PRESSURE: 130 MMHG | BODY MASS INDEX: 31.5 KG/M2 | HEART RATE: 65 BPM | WEIGHT: 225 LBS | DIASTOLIC BLOOD PRESSURE: 77 MMHG | HEIGHT: 71 IN

## 2019-08-01 DIAGNOSIS — Z98.890 S/P FOOT SURGERY, RIGHT: ICD-10-CM

## 2019-08-01 DIAGNOSIS — Z98.890 S/P FOOT SURGERY, LEFT: ICD-10-CM

## 2019-08-01 DIAGNOSIS — Z98.890 S/P FOOT SURGERY, RIGHT: Primary | ICD-10-CM

## 2019-08-01 DIAGNOSIS — M86.9 OSTEOMYELITIS OF GREAT TOE OF RIGHT FOOT (H): ICD-10-CM

## 2019-08-01 PROCEDURE — 73630 X-RAY EXAM OF FOOT: CPT | Mod: RT

## 2019-08-01 PROCEDURE — 99024 POSTOP FOLLOW-UP VISIT: CPT | Performed by: PODIATRIST

## 2019-08-01 RX ORDER — CLINDAMYCIN HCL 300 MG
300 CAPSULE ORAL 3 TIMES DAILY
Qty: 42 CAPSULE | Refills: 0 | Status: SHIPPED | OUTPATIENT
Start: 2019-08-01 | End: 2019-09-09

## 2019-08-01 ASSESSMENT — MIFFLIN-ST. JEOR: SCORE: 1742.72

## 2019-08-01 NOTE — NURSING NOTE
"Chief Complaint   Patient presents with     Surgical Followup     Partial amputation of the great toe, right foot DOS 7/23/19 XR today       Initial /77 (BP Location: Left arm, Patient Position: Chair, Cuff Size: Adult Regular)   Pulse 65   Resp 16   Ht 1.803 m (5' 11\")   Wt 102.1 kg (225 lb)   BMI 31.38 kg/m   Estimated body mass index is 31.38 kg/m  as calculated from the following:    Height as of this encounter: 1.803 m (5' 11\").    Weight as of this encounter: 102.1 kg (225 lb).  Medications and allergies reviewed.    Basilio GUILLERMO CMA (AAMA)    "

## 2019-08-01 NOTE — PROGRESS NOTES
"Fawad presents to the office s/p one week partial amputation of the great toe of the right foot .  The patient relates keeping the bandages clean, dry and intact.  The patient relates good compliance with postoperative instructions.  The patient denies any severe pain, fevers, chills, nausea or vomiting.      /77 (BP Location: Left arm, Patient Position: Chair, Cuff Size: Adult Regular)   Pulse 65   Resp 16   Ht 1.803 m (5' 11\")   Wt 102.1 kg (225 lb)   BMI 31.38 kg/m    Weight management plan: Patient was referred to their PCP to discuss a diet and exercise plan.    Physical Exam:    General: The patient appears to be in no distress and in good spirits.  The bandages appear clean, dry and intact with no strikethrough noted. Vascular exam: Neurovascular status unchanged with < 3 sec capillary refill to all digits.  Positive pedal pulses and epicritic sensations intact with no evidence of allodynia.  One notes moderate edema to the forefoot on the right.  Sutures are intact and the skin is well coapted with no erythema noted.      Radiograph:  AP, lateral and medial oblique evaluation of right foot reveals surgical amputation of the distal aspect of the great toe..      Assessment: Osteomyelitis status post one week partial amputation great toe of the right foot.    Plan:  Sutures remain intact.  A sterile dressing was reapplied.  The patient was instructed to continue non-weightbearing to the right foot.  The patient is to keep the dressings clean, dry and intact and to continue with elevation of the right foot.  The patient will return to the office in one week for suture removal.  The patient was given a refill for clindamycin 300 mg p.o. daily for 10 days.    Disclaimer: This note consists of symbols derived from keyboarding, dictation and/or voice recognition software. As a result, there may be errors in the script that have gone undetected. Please consider this when interpreting information found " in this chart.       LIANG Montesinos D.P.M., [email or web address].

## 2019-08-11 LAB — COPATH REPORT: NORMAL

## 2019-08-12 ENCOUNTER — OFFICE VISIT (OUTPATIENT)
Dept: PODIATRY | Facility: CLINIC | Age: 84
End: 2019-08-12
Payer: COMMERCIAL

## 2019-08-12 VITALS
BODY MASS INDEX: 31.5 KG/M2 | HEART RATE: 65 BPM | HEIGHT: 71 IN | SYSTOLIC BLOOD PRESSURE: 129 MMHG | DIASTOLIC BLOOD PRESSURE: 77 MMHG | WEIGHT: 225 LBS

## 2019-08-12 DIAGNOSIS — Z98.890 S/P FOOT SURGERY, RIGHT: Primary | ICD-10-CM

## 2019-08-12 PROCEDURE — 99024 POSTOP FOLLOW-UP VISIT: CPT | Performed by: PODIATRIST

## 2019-08-12 RX ORDER — CLINDAMYCIN HCL 150 MG
CAPSULE ORAL
Refills: 0 | COMMUNITY
Start: 2019-08-01 | End: 2019-09-09

## 2019-08-12 ASSESSMENT — MIFFLIN-ST. JEOR: SCORE: 1742.72

## 2019-08-12 NOTE — PATIENT INSTRUCTIONS
Your sutures were removed today.   You may now take a shower but do not soak your foot for the next 3 days. The steri strips will fall off by themselves. Please do not pull them off, trim edges as needed.         Return in 1 month

## 2019-08-12 NOTE — NURSING NOTE
"Chief Complaint   Patient presents with     Suture Removal     DOS 7/23/19, Partial amputation of the great toe, right foot.       Initial /77   Pulse 65   Ht 1.803 m (5' 11\")   Wt 102.1 kg (225 lb)   BMI 31.38 kg/m   Estimated body mass index is 31.38 kg/m  as calculated from the following:    Height as of this encounter: 1.803 m (5' 11\").    Weight as of this encounter: 102.1 kg (225 lb).  Medications and allergies reviewed.      Dana GUILLERMO MA    "

## 2019-08-12 NOTE — LETTER
"    8/12/2019         RE: Fawad Garcia  [address] [facility] [address]        Dear Colleague,    Thank you for referring your patient, Fawad Garcia, to the [facility]. Please see a copy of my visit note below.    Fawad presents to the office s/p 3 weeks partial amputation of the great toe of the right foot .  The patient relates keeping the bandages clean, dry and intact.  The patient relates good compliance with postoperative instructions.  The patient denies any severe pain, fevers, chills, nausea or vomiting.      /77   Pulse 65   Ht 1.803 m (5' 11\")   Wt 102.1 kg (225 lb)   BMI 31.38 kg/m     Weight management plan: Patient was referred to their PCP to discuss a diet and exercise plan.    Physical Exam:    General: The patient appears to be in no distress and in good spirits.  The bandages appear clean, dry and intact with no strikethrough noted. Vascular exam: Neurovascular status unchanged with < 3 sec capillary refill to all digits.  Positive pedal pulses and epicritic sensations intact with no evidence of allodynia.  One notes moderate edema to the forefoot on the right.  Sutures are intact and the skin is well coapted with no erythema noted.           Assessment: Osteomyelitis status post one week partial amputation great toe of the right foot.    Plan:  Sutures were removed.  A sterile dressing was reapplied.  The patient was instructed to continue protected-weightbearing to the right foot.  The patient is to keep the dressings clean, dry and intact for 3 days.  Patient may resume normal activities as tolerated.  Patient return in 1 month for reevaluation.    Disclaimer: This note consists of symbols derived from keyboarding, dictation and/or voice recognition software. As a result, there may be errors in the script that have gone undetected. Please consider this when interpreting information found in this chart.       LIANG Siddiqi " OLEKSANDR Montesinos., F.A.C.F.A.S.    Again, thank you for allowing me to participate in the care of your patient.        Sincerely,        Ethan Montesinos DPM

## 2019-08-14 ENCOUNTER — TELEPHONE (OUTPATIENT)
Dept: PODIATRY | Facility: CLINIC | Age: 84
End: 2019-08-14

## 2019-08-14 ENCOUNTER — TRANSFERRED RECORDS (OUTPATIENT)
Dept: HEALTH INFORMATION MANAGEMENT | Facility: CLINIC | Age: 84
End: 2019-08-14

## 2019-08-14 NOTE — TELEPHONE ENCOUNTER
Pt called back. He wonders about what he can do as far as activity. How much can he be up on it. Please call back.

## 2019-08-14 NOTE — TELEPHONE ENCOUNTER
Reason for Call:  Patient is calling in states that he has been given conflicting instructions in regards to soaking his foot and ambulatory restrictions    Detailed comments: please advise and clarify    Phone Number Patient can be reached at: Home number on file [phone] (home)    Best Time: NA    Can we leave a detailed message on this number? Not Applicable    Call taken on 8/14/2019 at 9:20 AM by Tejal Hernandez

## 2019-08-14 NOTE — TELEPHONE ENCOUNTER
Called patient back and spoke with his wife. I informed her that he does not need to soak the foot. If he would like to, he should wait one to two weeks then soak but Dr. Montesinos stated it was not necessary. Shower as normal.    Dana Epstein MA on 8/14/2019 at 11:17 AM

## 2019-08-16 NOTE — PROGRESS NOTES
"Fawad presents to the office s/p 3 weeks partial amputation of the great toe of the right foot .  The patient relates keeping the bandages clean, dry and intact.  The patient relates good compliance with postoperative instructions.  The patient denies any severe pain, fevers, chills, nausea or vomiting.      /77   Pulse 65   Ht 1.803 m (5' 11\")   Wt 102.1 kg (225 lb)   BMI 31.38 kg/m    Weight management plan: Patient was referred to their PCP to discuss a diet and exercise plan.    Physical Exam:    General: The patient appears to be in no distress and in good spirits.  The bandages appear clean, dry and intact with no strikethrough noted. Vascular exam: Neurovascular status unchanged with < 3 sec capillary refill to all digits.  Positive pedal pulses and epicritic sensations intact with no evidence of allodynia.  One notes moderate edema to the forefoot on the right.  Sutures are intact and the skin is well coapted with no erythema noted.           Assessment: Osteomyelitis status post one week partial amputation great toe of the right foot.    Plan:  Sutures were removed.  A sterile dressing was reapplied.  The patient was instructed to continue protected-weightbearing to the right foot.  The patient is to keep the dressings clean, dry and intact for 3 days.  Patient may resume normal activities as tolerated.  Patient return in 1 month for reevaluation.    Disclaimer: This note consists of symbols derived from keyboarding, dictation and/or voice recognition software. As a result, there may be errors in the script that have gone undetected. Please consider this when interpreting information found in this chart.       LIANG Montesinos D.P.M., [email or web address].  "

## 2019-08-19 ENCOUNTER — OFFICE VISIT (OUTPATIENT)
Dept: AUDIOLOGY | Facility: CLINIC | Age: 84
End: 2019-08-19
Payer: COMMERCIAL

## 2019-08-19 DIAGNOSIS — H90.3 SENSORINEURAL HEARING LOSS, BILATERAL: Primary | ICD-10-CM

## 2019-08-19 PROCEDURE — 99207 ZZC NO CHARGE LOS: CPT | Performed by: AUDIOLOGIST

## 2019-08-19 PROCEDURE — 92550 TYMPANOMETRY & REFLEX THRESH: CPT | Performed by: AUDIOLOGIST

## 2019-08-19 PROCEDURE — 92557 COMPREHENSIVE HEARING TEST: CPT | Performed by: AUDIOLOGIST

## 2019-08-19 NOTE — PROGRESS NOTES
AUDIOLOGY REPORT    SUBJECTIVE:  Fawad Garcia is a 83 year old male who was seen in the Audiology Clinic at [facility] for an audiologic evaluation, referred by self. An ABN was filled out and signed as patient does not have an order for today's toesting.  The patient has been seen previously in this clinic  for assessment and results indicated a high frequency hearing loss bilaterally. The patient reports his spouse feels he is not hearing well. Patient notes he is hearing fine except for soft speech. The patient denies bilateral tinnitus, bilateral otalgia and history of noise exposure.     OBJECTIVE:  Abuse Screening:  Do you feel unsafe at home or work/school? No   Do you feel threatened by someone? No   Does anyone try to keep you from having contact with others, or doing things outside of your home? No   Physical signs of abuse present? No      Otoscopic exam indicates ears are clear of cerumen bilaterally       Pure Tone Thresholds assessed using conventional audiometry with good  reliability from 250-8000 Hz bilaterally using circumaural headphones     RIGHT:  normal, mild-moderate and severe sensorineural hearing loss    LEFT:    normal, moderate and severe sensorineural hearing loss    Tympanogram:    RIGHT: normal eardrum mobility ,1000 Hz ipsi/contra reflexes absent    LEFT:   normal eardrum mobility ,1000 Hz ipsi/contra reflexes absent     Note: Difficulty maintaining seal for reflex testing    Speech Reception Threshold:    RIGHT: 20 dB HL    LEFT:   20 dB HL  Word Recognition Score:     RIGHT: 88% at 60 dB HL using NU-6 recorded word list.    LEFT:   92% at 60 dB HL using NU-6 recorded word list.      ASSESSMENT:   Normal hearing through 1000 Hz in the right ear and 2000 Hz in the left ear sloping to a mild to moderate to severe sensorineural hearing loss bilaterally.     Compared to patient's previous audiogram dated 6/14/2017, hearing has remained stable bilaterally.   Today s results were discussed with the patient in detail. A copy of today's audiogram was given to the patient.     PLAN:  Patient was counseled regarding hearing loss and impact on communication.    Please call this clinic with questions regarding these results or recommendations.        Sindi Washington M.A. RIVAS-AAA  Clinical audiologist [place] [facility] [id number]  8/19/2019

## 2019-09-09 ENCOUNTER — OFFICE VISIT (OUTPATIENT)
Dept: PODIATRY | Facility: CLINIC | Age: 84
End: 2019-09-09
Payer: COMMERCIAL

## 2019-09-09 VITALS
HEART RATE: 76 BPM | SYSTOLIC BLOOD PRESSURE: 150 MMHG | WEIGHT: 225 LBS | BODY MASS INDEX: 31.5 KG/M2 | DIASTOLIC BLOOD PRESSURE: 88 MMHG | HEIGHT: 71 IN

## 2019-09-09 DIAGNOSIS — Z98.890 S/P FOOT SURGERY, RIGHT: Primary | ICD-10-CM

## 2019-09-09 PROCEDURE — 99024 POSTOP FOLLOW-UP VISIT: CPT | Performed by: PODIATRIST

## 2019-09-09 ASSESSMENT — MIFFLIN-ST. JEOR: SCORE: 1742.72

## 2019-09-09 NOTE — NURSING NOTE
"Chief Complaint   Patient presents with     Surgical Followup     DOS 7/23/19, Partial amputation of the great toe, right foot.       Initial BP (!) 150/88   Pulse 76   Ht 1.803 m (5' 11\")   Wt 102.1 kg (225 lb)   BMI 31.38 kg/m   Estimated body mass index is 31.38 kg/m  as calculated from the following:    Height as of this encounter: 1.803 m (5' 11\").    Weight as of this encounter: 102.1 kg (225 lb).  Medications and allergies reviewed.      Dana GUILLERMO MA    "

## 2019-09-09 NOTE — PROGRESS NOTES
Fawad returns to the office for reevaluation of the right foot.  The patient relates following the instructions given at the last visit with noted less pain.  The patient relates overall more  improvement in pain and function of the right foot.  The patient relates no other problems.    PAST MEDICAL HISTORY:   Past Medical History:   Diagnosis Date     Diabetes mellitus (H)      Hypertension      Squamous cell carcinoma        BMI= Body mass index is 31.38 kg/m .    Weight management plan: Patient was referred to their PCP to discuss a diet and exercise plan.    Physical Exam:    General: The patient appears to have a pleasant mental affect.    Lower extremity physical exam:  Neurovascular status is intact with palpable pedal pulses and intact epicritic sensations.  Muscular exam is within normal limits to major muscle groups.  Integument is intact.      One notes decreased edema.  One notes complete epithelialization of the incision of the amputated site of right great toe.  No surrounding erythema.       Assessment:      ICD-10-CM    1. S/P foot surgery, right Z98.890        Plan:  I have explained to Fawad about the conditions.  At this time, the patient was instructed to wear protective supportive shoes to prevent further tissue breakdown on the remaining lesser toes right foot.  Patient was instructed to return to the office if any redness or drainage is noted.    Disclaimer: This note consists of symbols derived from keyboarding, dictation and/or voice recognition software. As a result, there may be errors in the script that have gone undetected. Please consider this when interpreting information found in this chart.       LIANG Montesinos D.P.M., FIDEL.F.A.S.

## 2019-09-09 NOTE — LETTER
9/9/2019         RE: Fawad Garcia  [address]  [facility] [address]        Dear Colleague,    Thank you for referring your patient, Fawad Garcia, to the [facility]. Please see a copy of my visit note below.    Fawad returns to the office for reevaluation of the right foot.  The patient relates following the instructions given at the last visit with noted less pain.  The patient relates overall more  improvement in pain and function of the right foot.  The patient relates no other problems.    PAST MEDICAL HISTORY:   Past Medical History:   Diagnosis Date     Diabetes mellitus (H)      Hypertension      Squamous cell carcinoma        BMI= Body mass index is 31.38 kg/m .    Weight management plan: Patient was referred to their PCP to discuss a diet and exercise plan.    Physical Exam:    General: The patient appears to have a pleasant mental affect.    Lower extremity physical exam:  Neurovascular status is intact with palpable pedal pulses and intact epicritic sensations.  Muscular exam is within normal limits to major muscle groups.  Integument is intact.      One notes decreased edema.  One notes complete epithelialization of the incision of the amputated site of right great toe.  No surrounding erythema.       Assessment:      ICD-10-CM    1. S/P foot surgery, right Z98.890        Plan:  I have explained to Fawad about the conditions.  At this time, the patient was instructed to wear protective supportive shoes to prevent further tissue breakdown on the remaining lesser toes right foot.  Patient was instructed to return to the office if any redness or drainage is noted.    Disclaimer: This note consists of symbols derived from keyboarding, dictation and/or voice recognition software. As a result, there may be errors in the script that have gone undetected. Please consider this when interpreting information found in this chart.       LIANG Siddiqi  OLEKSANDR Montesinos., F.A.C.F.A.S.      Again, thank you for allowing me to participate in the care of your patient.        Sincerely,        Ethan Montesinos DPM

## 2019-09-12 DIAGNOSIS — C61 MALIGNANT NEOPLASM OF PROSTATE (H): ICD-10-CM

## 2019-09-12 DIAGNOSIS — E11.3293 TYPE 2 DIABETES MELLITUS WITH BOTH EYES AFFECTED BY MILD NONPROLIFERATIVE RETINOPATHY WITHOUT MACULAR EDEMA, WITH LONG-TERM CURRENT USE OF INSULIN (H): ICD-10-CM

## 2019-09-12 DIAGNOSIS — Z79.4 TYPE 2 DIABETES MELLITUS WITH BOTH EYES AFFECTED BY MILD NONPROLIFERATIVE RETINOPATHY WITHOUT MACULAR EDEMA, WITH LONG-TERM CURRENT USE OF INSULIN (H): ICD-10-CM

## 2019-09-12 LAB
HBA1C MFR BLD: 6.7 % (ref 0–5.6)
PSA SERPL-MCNC: 1.08 UG/L (ref 0–4)

## 2019-09-12 PROCEDURE — 84153 ASSAY OF PSA TOTAL: CPT | Performed by: RADIOLOGY

## 2019-09-12 PROCEDURE — 83036 HEMOGLOBIN GLYCOSYLATED A1C: CPT | Performed by: FAMILY MEDICINE

## 2019-09-12 PROCEDURE — 36415 COLL VENOUS BLD VENIPUNCTURE: CPT | Performed by: FAMILY MEDICINE

## 2019-09-16 ENCOUNTER — OFFICE VISIT (OUTPATIENT)
Dept: RADIATION THERAPY | Facility: OUTPATIENT CENTER | Age: 84
End: 2019-09-16
Payer: COMMERCIAL

## 2019-09-16 VITALS
WEIGHT: 220.2 LBS | BODY MASS INDEX: 30.71 KG/M2 | SYSTOLIC BLOOD PRESSURE: 112 MMHG | RESPIRATION RATE: 16 BRPM | OXYGEN SATURATION: 97 % | DIASTOLIC BLOOD PRESSURE: 70 MMHG | HEART RATE: 75 BPM

## 2019-09-16 DIAGNOSIS — C61 MALIGNANT NEOPLASM OF PROSTATE (H): Primary | ICD-10-CM

## 2019-09-16 ASSESSMENT — PAIN SCALES - GENERAL: PAINLEVEL: NO PAIN (0)

## 2019-09-16 NOTE — NURSING NOTE
FOLLOW-UP VISIT    Patient Name: Fawad Garcia      : 1935     Age: 83 year old        ______________________________________________________________________________     Chief Complaint   Patient presents with     Radiation Therapy     Return appointment with Dr. Hua     /70 (BP Location: Left arm, Patient Position: Sitting, Cuff Size: Adult Regular)   Pulse 75   Resp 16   Wt 99.9 kg (220 lb 3.2 oz)   SpO2 97%   BMI 30.71 kg/m       Date Radiation Completed: 2/10/12    Pain  Denies    Meds  Current Med List Reviewed: Yes  Medication Note:     AUA: AUA Score: 18 (19 1500)  MARINA:      PSA   Date Value Ref Range Status   2019 1.08 0 - 4 ug/L Final     Comment:     Assay Method:  Chemiluminescence using Siemens Vista analyzer   03/15/2019 1.10 0 - 4 ug/L Final     Comment:     Assay Method:  Chemiluminescence using Siemens Vista analyzer   2018 0.79 0 - 4 ug/L Final     Comment:     Assay Method:  Chemiluminescence using Siemens Vista analyzer   2017 0.52 0 - 4 ug/L Final     Comment:     Assay Method:  Chemiluminescence using Siemens Vista analyzer   2016 0.48 0 - 4 ug/L Final   2015 0.43 0 - 4 ug/L Final       Bowel: Normal    Bladder: nocturia, frequency and hesitancy  Nocturia: 4 - Once every 2 hours    Energy Level: normal    Appointments:   Urologist:       Other Notes:

## 2019-09-16 NOTE — PROGRESS NOTES
RADIATION ONCOLOGY FOLLOW UP  September 16, 2019    DISEASE TREATED: High-risk prostate cancer. Pretreatment PSA 25.5, Chaka score of 4+3=7, clinical stage [id number].     RADIATION THERAPY GIVEN: 8041 cGy in 43 planned treatments, via IMRT     INTERVAL SINCE COMPLETION OF THERAPY: 7.5 years since completion on 02/10/2012.     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 He underwent biopsies in 2002 and 2005 and pathology was negative for malignancy. Due to the persistently rising PSA Dr. Mcintyre performed an MRI-guided biopsy of the prostate gland which revealed Chaka 4+3= 7 disease in 2 of 2 cores with 90% of the cores being positive in the right inferior base and right inferior mid gland. Androgen ablation was discussed with the patient due to his high-risk disease and the patient declined due to comorbidities of the drug. Overall, he completed radiotherapy with very little toxicity.     He now presents to clinic for routine follow-up exam. Today, he denies any pressing issues or complaints and reports that all symptoms have essentially remained stable since his last visit with us one year ago. His AUA is 18/35 (prior 13, but patient reports overall stable) and MARINA is 0/25. Main urinary symptoms include nocturia and urgency. However, he denies any dysuria, hematuria, hematochezia, diarrhea, or loose stools.  He denies any pain or swelling.    PSA on 9/12/19  has continued to remain overall stable at 1.08, prior value of 1.10. Has not met [place] definition of failure from treatment (Lester = 0.24).      PHYSICAL EXAMINATION:   /70 (BP Location: Left arm, Patient Position: Sitting, Cuff Size: Adult Regular)   Pulse 75   Resp 16   Wt 99.9 kg (220 lb 3.2 oz)   SpO2 97%   BMI 30.71 kg/m    Abdomen is soft with no tenderness.  Bowel sound is active.  No lymphadenopathy.    LABORATORY  DATA:  PSA   Date Value Ref Range Status   09/12/2019 1.08 0 - 4 ug/L Final     Comment:     Assay Method:  Chemiluminescence using Siemens Vista analyzer   03/15/2019 1.10 0 - 4 ug/L Final     Comment:     Assay Method:  Chemiluminescence using Siemens Vista analyzer   03/09/2018 0.79 0 - 4 ug/L Final     Comment:     Assay Method:  Chemiluminescence using Siemens Vista analyzer   03/13/2017 0.52 0 - 4 ug/L Final     Comment:     Assay Method:  Chemiluminescence using Siemens Vista analyzer   06/07/2016 0.48 0 - 4 ug/L Final           ASSESSMENT: Mr. Garcia is a 83 year old male with high-risk prostate cancer s/p definitive radiation therapy (prostate alone) completed almost on 2/10/12. Urinary symptoms are stable. PSA continues to very slowly rise.    PLAN:   1. PSA overall stable. Slightly up from zohaib (0.24), but does not yet met [place] definition of biochemical failure (2.0 above zohaib). Biochemical failure would be 2.24. Will continue to monitor at this time.     2. Discussed consideration of flomax at bedtime for nocturia and obstructive flow symptoms. These are overall stable and tolerable to him, and patient defers at this time.     2. PSA in 6 months. RTC thereafter.  Will see NP.     Kieran Hua M.D.  Department of Radiation Oncology  [facility]

## 2019-09-16 NOTE — LETTER
9/16/2019      RE: Fawad Garcia  [address] [facility] [address]       RADIATION ONCOLOGY FOLLOW UP  September 16, 2019    DISEASE TREATED: High-risk prostate cancer. Pretreatment PSA 25.5, Chaka score of 4+3=7, clinical stage [id number].     RADIATION THERAPY GIVEN: 8041 cGy in 43 planned treatments, via IMRT     INTERVAL SINCE COMPLETION OF THERAPY: 7.5 years since completion on 02/10/2012.     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 He underwent biopsies in 2002 and 2005 and pathology was negative for malignancy. Due to the persistently rising PSA Dr. Mcintyre performed an MRI-guided biopsy of the prostate gland which revealed Chaka 4+3= 7 disease in 2 of 2 cores with 90% of the cores being positive in the right inferior base and right inferior mid gland. Androgen ablation was discussed with the patient due to his high-risk disease and the patient declined due to comorbidities of the drug. Overall, he completed radiotherapy with very little toxicity.     He now presents to clinic for routine follow-up exam. Today, he denies any pressing issues or complaints and reports that all symptoms have essentially remained stable since his last visit with us one year ago. His AUA is 18/35 (prior 13, but patient reports overall stable) and MARINA is 0/25. Main urinary symptoms include nocturia and urgency. However, he denies any dysuria, hematuria, hematochezia, diarrhea, or loose stools.  He denies any pain or swelling.    PSA on 9/12/19  has continued to remain overall stable at 1.08, prior value of 1.10. Has not met [place] definition of failure from treatment (Lester = 0.24).      PHYSICAL EXAMINATION:   /70 (BP Location: Left arm, Patient Position: Sitting, Cuff Size: Adult Regular)   Pulse 75   Resp 16   Wt 99.9 kg (220 lb 3.2 oz)   SpO2 97%   BMI 30.71 kg/m      Abdomen is soft with no tenderness.  Bowel sound is active.  No lymphadenopathy.    LABORATORY DATA:  PSA   Date Value Ref Range Status   09/12/2019 1.08 0 - 4 ug/L Final     Comment:     Assay Method:  Chemiluminescence using Siemens Vista analyzer   03/15/2019 1.10 0 - 4 ug/L Final     Comment:     Assay Method:  Chemiluminescence using Siemens Vista analyzer   03/09/2018 0.79 0 - 4 ug/L Final     Comment:     Assay Method:  Chemiluminescence using Siemens Vista analyzer   03/13/2017 0.52 0 - 4 ug/L Final     Comment:     Assay Method:  Chemiluminescence using Siemens Vista analyzer   06/07/2016 0.48 0 - 4 ug/L Final           ASSESSMENT: Mr. Garcia is a 83 year old male with high-risk prostate cancer s/p definitive radiation therapy (prostate alone) completed almost on 2/10/12. Urinary symptoms are stable. PSA continues to very slowly rise.    PLAN:   1. PSA overall stable. Slightly up from zohaib (0.24), but does not yet met [place] definition of biochemical failure (2.0 above zohaib). Biochemical failure would be 2.24. Will continue to monitor at this time.     2. Discussed consideration of flomax at bedtime for nocturia and obstructive flow symptoms. These are overall stable and tolerable to him, and patient defers at this time.     2. PSA in 6 months. RTC thereafter.  Will see NP.     Kieran Hua M.D.  Department of Radiation Oncology  [facility]             Kieran Hua MD

## 2019-09-18 ENCOUNTER — OFFICE VISIT (OUTPATIENT)
Dept: FAMILY MEDICINE | Facility: CLINIC | Age: 84
End: 2019-09-18
Payer: COMMERCIAL

## 2019-09-18 VITALS
HEART RATE: 70 BPM | OXYGEN SATURATION: 96 % | DIASTOLIC BLOOD PRESSURE: 60 MMHG | BODY MASS INDEX: 30.38 KG/M2 | WEIGHT: 217 LBS | HEIGHT: 71 IN | TEMPERATURE: 98.7 F | SYSTOLIC BLOOD PRESSURE: 104 MMHG

## 2019-09-18 DIAGNOSIS — Z79.4 TYPE 2 DIABETES MELLITUS WITH BOTH EYES AFFECTED BY MILD NONPROLIFERATIVE RETINOPATHY WITHOUT MACULAR EDEMA, WITH LONG-TERM CURRENT USE OF INSULIN (H): Primary | ICD-10-CM

## 2019-09-18 DIAGNOSIS — Z79.4 TYPE 2 DIABETES MELLITUS WITHOUT COMPLICATION, WITH LONG-TERM CURRENT USE OF INSULIN (H): ICD-10-CM

## 2019-09-18 DIAGNOSIS — E11.9 TYPE 2 DIABETES MELLITUS WITHOUT COMPLICATION, WITH LONG-TERM CURRENT USE OF INSULIN (H): ICD-10-CM

## 2019-09-18 DIAGNOSIS — R35.1 BENIGN PROSTATIC HYPERPLASIA WITH NOCTURIA: ICD-10-CM

## 2019-09-18 DIAGNOSIS — N40.1 BENIGN PROSTATIC HYPERPLASIA WITH NOCTURIA: ICD-10-CM

## 2019-09-18 DIAGNOSIS — E11.3293 TYPE 2 DIABETES MELLITUS WITH BOTH EYES AFFECTED BY MILD NONPROLIFERATIVE RETINOPATHY WITHOUT MACULAR EDEMA, WITH LONG-TERM CURRENT USE OF INSULIN (H): Primary | ICD-10-CM

## 2019-09-18 LAB
CREAT UR-MCNC: 298 MG/DL
MICROALBUMIN UR-MCNC: 103 MG/L
MICROALBUMIN/CREAT UR: 34.56 MG/G CR (ref 0–17)

## 2019-09-18 PROCEDURE — 82043 UR ALBUMIN QUANTITATIVE: CPT | Performed by: FAMILY MEDICINE

## 2019-09-18 PROCEDURE — 99214 OFFICE O/P EST MOD 30 MIN: CPT | Performed by: FAMILY MEDICINE

## 2019-09-18 RX ORDER — TAMSULOSIN HYDROCHLORIDE 0.4 MG/1
0.4 CAPSULE ORAL DAILY
Qty: 30 CAPSULE | Refills: 11 | Status: SHIPPED | OUTPATIENT
Start: 2019-09-18 | End: 2019-12-18

## 2019-09-18 ASSESSMENT — MIFFLIN-ST. JEOR: SCORE: 1706.44

## 2019-09-18 NOTE — PROGRESS NOTES
Subjective     Fawad Garcia is a 83 year old male who presents to clinic today for the following health issues:  Chief Complaint   Patient presents with     Diabetes     Health Maintenance     declinedflu vaccine when offered       HPI   Diabetes Follow-up      How often are you checking your blood sugar? One time daily    What time of day are you checking your blood sugars (select all that apply)?  Before meals    Have you had any blood sugars above 200?  Yes  Once every 6 months    Have you had any blood sugars below 70?  No    What symptoms do you notice when your blood sugar is low?  Shaky, dizzy and Other: stomach feels funny    What concerns do you have today about your diabetes? None     Do you have any of these symptoms? (Select all that apply)  Numbness in feet, Redness, sores, or blisters on feet, Excessive thirst and Blurry vision  Sees Dr. Montesinos regularly     Have you had a diabetic eye exam in the last 12 months? Yes- Date of last eye exam: goes regularly to [facility]    Diabetes Management Resources    Hyperlipidemia Follow-Up      Are you having any of the following symptoms? (Select all that apply)  No complaints of shortness of breath, chest pain or pressure.  No increased sweating or nausea with activity.  No left-sided neck or arm pain.  No complaints of pain in calves when walking 1-2 blocks.    Are you regularly taking any medication or supplement to lower your cholesterol?   Yes- pravastatin    Are you having muscle aches or other side effects that you think could be caused by your cholesterol lowering medication?  Yes- some slight muscle pain    Hypertension Follow-up      Do you check your blood pressure regularly outside of the clinic? No     Are you following a low salt diet? Yes    Are your blood pressures ever more than 140 on the top number (systolic) OR more   than 90 on the bottom number (diastolic), for example 140/90? No    BP Readings from Last 2 Encounters:   09/18/19  "104/60   09/16/19 112/70     Hemoglobin A1C (%)   Date Value   09/12/2019 6.7 (H)   03/15/2019 6.6 (H)     LDL Cholesterol Calculated (mg/dL)   Date Value   12/11/2018 107 (H)   07/30/2018 108 (H)       How many days per week do you miss taking your medication? 0  He has bph, getting up 3 times a night but waking up 5 times total.  Wondering about flomax.    He has decreased insulin due to lower blood sugars.  Only use 20 units at night.      He also has some mild anxiety due to no longer driving.  He cannot read.  He listens to TV.  Has not been using any audio books which I suggested.                  Reviewed and updated as needed this visit by Provider         Review of Systems   ROS COMP: CONSTITUTIONAL:NEGATIVE for fever, chills, change in weight  INTEGUMENTARY/SKIN: NEGATIVE for worrisome rashes, moles or lesions  EYES: macular degeneration  RESP:NEGATIVE for significant cough or SOB  CV: NEGATIVE for chest pain, palpitations or peripheral edema  GI: NEGATIVE for nausea, abdominal pain, heartburn, or change in bowel habits  NEURO: has some decrease sensation in his feet  ENDOCRINE: as above  HEME/ALLERGY/IMMUNE: NEGATIVE for bleeding problems  PSYCHIATRIC: as above      Objective    /60 (Cuff Size: Adult Large)   Pulse 70   Temp 98.7  F (37.1  C) (Tympanic)   Ht 1.803 m (5' 11\")   Wt 98.4 kg (217 lb)   SpO2 96%   BMI 30.27 kg/m     Body mass index is 30.27 kg/m .  Physical Exam   GENERAL APPEARANCE: alert, no distress and cooperative  RESP: lungs clear to auscultation - no rales, rhonchi or wheezes  CV: regular rates and rhythm, normal S1 S2, no S3 or S4 and no murmur, click or rub  MS: extremities normal- no gross deformities noted  SKIN: noted right great toe amputation  NEURO: Normal strength and tone, mentation intact and speech normal  DIABETIC FOOT EXAM: great toe is gone, no other ulcers, has decrease sensation in both feet.   PSYCH: mentation appears normal and affect " "normal/bright    Results for orders placed or performed in visit on 09/12/19   Hemoglobin A1c   Result Value Ref Range    Hemoglobin A1C 6.7 (H) 0 - 5.6 %   PSA tumor marker   Result Value Ref Range    PSA 1.08 0 - 4 ug/L     Reviewed his labs.        Assessment & Plan     (E11.3293,  Z79.4) Type 2 diabetes mellitus with both eyes affected by mild nonproliferative retinopathy without macular edema, with long-term current use of insulin (H)  (primary encounter diagnosis)  Comment: need to check labs, changed insulin dose to what he is using  Plan: Albumin Random Urine Quantitative with Creat         Ratio, Hemoglobin A1c            (N40.1,  R35.1) Benign prostatic hyperplasia with nocturia  Comment: trial of med and follow up  Plan: tamsulosin (FLOMAX) 0.4 MG capsule            (E11.9,  Z79.4) Type 2 diabetes mellitus without complication, with long-term current use of insulin (H)  Comment:   Plan: insulin glargine (LANTUS SOLOSTAR) 100 UNIT/ML         pen               BMI:   Estimated body mass index is 30.27 kg/m  as calculated from the following:    Height as of this encounter: 1.803 m (5' 11\").    Weight as of this encounter: 98.4 kg (217 lb).   Weight management plan: diet        See Patient Instructions    Return in about 3 months (around 12/18/2019).    Chris Scott MD  [facility]      "

## 2019-09-18 NOTE — PATIENT INSTRUCTIONS
Your diabetes is well controlled.    Please try the flomax 0.4 mg taking one tab a day.    Be careful when getting up at night for the first few nights.    Follow up in 3 months.          Thank you for choosing [facility].  You may be receiving an email and/or telephone survey request from [facility] [employer] regarding your visit today.  Please take a few minutes to respond to the survey to let us know how we are doing.      If you have questions or concerns, please contact us via PushCall or you can contact your care team at [phone].    Our Clinic hours are:  Monday 6:40 am  to 7:00 pm  Tuesday -Friday 6:40 am to 5:00 pm    The [place] outpatient lab hours are:  Monday - Friday 6:10 am to 4:45 pm  Saturdays 7:00 am to 11:00 am  Appointments are required, call [phone]    If you have clinical questions after hours or would like to schedule an appointment,  call the clinic at [phone].

## 2019-09-18 NOTE — LETTER
September 23, 2019      Fawad Garcia  [address] [place] [facility] [address]        Dear ,    We are writing to inform you of your test results.  Brook Celestin,   Your urine microalbumin has improved!  It is getting back to normal.   This is a good result.     Resulted Orders   Albumin Random Urine Quantitative with Creat Ratio   Result Value Ref Range    Creatinine Urine 298 mg/dL    Albumin Urine mg/L 103 mg/L    Albumin Urine mg/g Cr 34.56 (H) 0 - 17 mg/g Cr       If you have any questions or concerns, please call the clinic at the number listed above.       Sincerely,        Chris Scott MD

## 2019-09-30 ENCOUNTER — IMMUNIZATION (OUTPATIENT)
Dept: FAMILY MEDICINE | Facility: CLINIC | Age: 84
End: 2019-09-30
Payer: COMMERCIAL

## 2019-09-30 PROCEDURE — G0008 ADMIN INFLUENZA VIRUS VAC: HCPCS

## 2019-09-30 PROCEDURE — 90662 IIV NO PRSV INCREASED AG IM: CPT

## 2019-11-02 ENCOUNTER — HEALTH MAINTENANCE LETTER (OUTPATIENT)
Age: 84
End: 2019-11-02

## 2019-11-29 ENCOUNTER — OFFICE VISIT (OUTPATIENT)
Dept: PODIATRY | Facility: CLINIC | Age: 84
End: 2019-11-29
Payer: COMMERCIAL

## 2019-11-29 ENCOUNTER — ANCILLARY PROCEDURE (OUTPATIENT)
Dept: GENERAL RADIOLOGY | Facility: CLINIC | Age: 84
End: 2019-11-29
Attending: PODIATRIST
Payer: COMMERCIAL

## 2019-11-29 VITALS
BODY MASS INDEX: 30.68 KG/M2 | WEIGHT: 220 LBS | DIASTOLIC BLOOD PRESSURE: 76 MMHG | HEART RATE: 80 BPM | SYSTOLIC BLOOD PRESSURE: 140 MMHG

## 2019-11-29 DIAGNOSIS — S91.209A AVULSION OF TOENAIL, INITIAL ENCOUNTER: ICD-10-CM

## 2019-11-29 DIAGNOSIS — S92.425B OPEN NONDISPLACED FRACTURE OF DISTAL PHALANX OF LEFT GREAT TOE, INITIAL ENCOUNTER: ICD-10-CM

## 2019-11-29 DIAGNOSIS — E11.51 TYPE 2 DIABETES MELLITUS WITH PERIPHERAL VASCULAR DISEASE (H): ICD-10-CM

## 2019-11-29 DIAGNOSIS — S91.212A LACERATION OF LEFT GREAT TOE WITHOUT FOREIGN BODY WITH DAMAGE TO NAIL, INITIAL ENCOUNTER: ICD-10-CM

## 2019-11-29 DIAGNOSIS — S91.209A AVULSION OF TOENAIL, INITIAL ENCOUNTER: Primary | ICD-10-CM

## 2019-11-29 PROCEDURE — 87070 CULTURE OTHR SPECIMN AEROBIC: CPT | Performed by: PODIATRIST

## 2019-11-29 PROCEDURE — 87186 SC STD MICRODIL/AGAR DIL: CPT | Performed by: PODIATRIST

## 2019-11-29 PROCEDURE — 13131 CMPLX RPR F/C/C/M/N/AX/G/H/F: CPT | Mod: 59 | Performed by: PODIATRIST

## 2019-11-29 PROCEDURE — 11730 AVULSION NAIL PLATE SIMPLE 1: CPT | Mod: TA | Performed by: PODIATRIST

## 2019-11-29 PROCEDURE — 73660 X-RAY EXAM OF TOE(S): CPT | Mod: LT

## 2019-11-29 PROCEDURE — 87077 CULTURE AEROBIC IDENTIFY: CPT | Performed by: PODIATRIST

## 2019-11-29 PROCEDURE — 99204 OFFICE O/P NEW MOD 45 MIN: CPT | Mod: 25 | Performed by: PODIATRIST

## 2019-11-29 RX ORDER — CEPHALEXIN 500 MG/1
500 CAPSULE ORAL 3 TIMES DAILY
Qty: 30 CAPSULE | Refills: 0 | Status: SHIPPED | OUTPATIENT
Start: 2019-11-29 | End: 2019-12-18

## 2019-11-29 NOTE — LETTER
11/29/2019         RE: Fawad Garcia  [address]  [facility] [address]        Dear Colleague,    Thank you for referring your patient, Fawad Garcia, to the [facility]. Please see a copy of my visit note below.    Subjective:    Pt is seen today as a new pt referral with the c/c of stubbing his left big toe.  Patient states that 5 days ago he was trying to get his left sock off with his contralateral foot and he believes he scraped his skin at this time.  He noticed some bleeding with this.  He did not have any pain however the patient has loss of protective sensation in his foot.  He continued to have a little bit of bleeding and drainage throughout the week.  He has not noticed any purulence or odor.  He  cannot really see his toe good so he is not sure if he has had any redness or blistering.  He denies any calf pain fever chills or shortness of breath.  Patient has diabetes, peripheral arterial disease, peripheral neuropathy.  He has a history of a left fifth metatarsal head resection.  He recently had an amputation of his right hallux.  He is a retired pharmacist.  Both his father and brother have diabetes.  He wears diabetic shoes and inserts when he walks.  Patient typically sees Dr. Montesinos but has presented here today since he was off for the holiday      ROS:  A 10-point review of systems was performed and is positive for that noted in the HPI and as seen below.  All other areas are negative.          Allergies   Allergen Reactions     Zocor [Simvastatin - High Dose]      Bilateral hip aching       Current Outpatient Medications   Medication Sig Dispense Refill     cephALEXin (KEFLEX) 500 MG capsule Take 1 capsule (500 mg) by mouth 3 times daily 30 capsule 0     amLODIPine (NORVASC) 5 MG tablet Take 1 tablet (5 mg) by mouth daily 90 tablet 3     Blood Glucose Monitoring Suppl (ONE TOUCH ULTRA SYSTEM KIT) W/DEVICE KIT        dorzolamide-timolol (COSOPT) 2-0.5 %  ophthalmic solution        hydrochlorothiazide (HYDRODIURIL) 25 MG tablet Take 1 tablet (25 mg) by mouth daily Hold on file until needed 90 tablet 3     insulin glargine (LANTUS SOLOSTAR) 100 UNIT/ML pen Inject 20 Units Subcutaneous At Bedtime Hold on file until needed 21 mL 3     insulin pen needle (B-D U/F) 31G X 8 MM miscellaneous 1 Device daily Use once daily or as directed. 100 each 3     lisinopril (PRINIVIL/ZESTRIL) 40 MG tablet Take 1 tablet (40 mg) by mouth daily Hold on file until needed 90 tablet 3     metFORMIN (GLUCOPHAGE) 500 MG tablet Take 2 tablets (1,000 mg) by mouth 2 times daily (with meals) Hold on file until needed 360 tablet 3     Multiple Vitamins-Minerals (PRESERVISION AREDS PO) Take 1 tablet by mouth       omeprazole (PRILOSEC) 20 MG DR capsule Take 1 capsule (20 mg) by mouth daily Hold on file until needed 90 capsule 3     ONETOUCH LANCETS MISC 1 Device daily One touch delica lancets, what ever is covered by insurance. 100 each 5     ONETOUCH ULTRA test strip 1 strip by In Vitro route daily One touch Ultra. 100 strip 1     ORDER FOR DME Equipment being ordered:   Glucometer 1 Device 1     pravastatin (PRAVACHOL) 10 MG tablet Take 1 tablet (10 mg) by mouth daily Hold on file until needed 90 tablet 3     tamsulosin (FLOMAX) 0.4 MG capsule Take 1 capsule (0.4 mg) by mouth daily 30 capsule 11       Patient Active Problem List   Diagnosis     Obesity     Transient cerebral ischemia     HYPERLIPIDEMIA LDL GOAL <100     GERD (gastroesophageal reflux disease)     Peripheral vascular disease (H)     Advanced directives, counseling/discussion     Health Care Home     Malignant neoplasm of prostate (H)     Hypertension, goal below 140/90     Senile nuclear sclerosis     Type 2 diabetes mellitus with both eyes affected by mild nonproliferative retinopathy without macular edema, with long-term current use of insulin (H)     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)     Macular  degeneration (senile) of retina       Past Medical History:   Diagnosis Date     Diabetes mellitus (H)      Hypertension      Squamous cell carcinoma        Past Surgical History:   Procedure Laterality Date     AMPUTATE TOE(S) Right 7/23/2019    Procedure: Partial amputation great toe;  Surgeon: Ethan Montesinos DPM;  Location: [facility] OR     [facility] AFF SURGICAL PATHOLOGY  2002    prostate biopsy elevated PSA     COLONOSCOPY       HC REMOVE TONSILS/ADENOIDS,<13 Y/O      T & A     IR LOWER EXTREMITY ANGIOGRAM RIGHT  12/11/2018     PHACOEMULSIFICATION WITH STANDARD INTRAOCULAR LENS IMPLANT Left 4/2/2015    Procedure: PHACOEMULSIFICATION WITH STANDARD INTRAOCULAR LENS IMPLANT;  Surgeon: Joseph Hernandez MD;  Location: [facility] OR     PHACOEMULSIFICATION WITH STANDARD INTRAOCULAR LENS IMPLANT Right 5/4/2015    Procedure: PHACOEMULSIFICATION WITH STANDARD INTRAOCULAR LENS IMPLANT;  Surgeon: Joseph Hernandez MD;  Location: [facility] OR     REPAIR HAMMER TOE Left 4/2/2019    Procedure: 5th Metatarsal Head Resection;  Surgeon: Ethan Montesinos DPM;  Location: [facility] OR       Family History   Problem Relation Age of Onset     Cancer Mother         intestinal CA     Diabetes Father      Diabetes Brother      Other Cancer Son         meagan tumor     Other Cancer Brother         pancreatic cancer     Diabetes Son      Melanoma No family hx of        Social History     Tobacco Use     Smoking status: Never Smoker     Smokeless tobacco: Never Used   Substance Use Topics     Alcohol use: Yes     Alcohol/week: 1.7 standard drinks     Comment: drinks rarely         Exam:    Vitals: BP (!) 140/76 (BP Location: Right arm)   Pulse 80   Wt 99.8 kg (220 lb)   BMI 30.68 kg/m     BMI: Body mass index is 30.68 kg/m .  Height: Data Unavailable    Constitutional/ general:  Pt is in no apparent distress, appears well-nourished.  Cooperative with history and physical exam.  Patient seen with his son today    Psych:  The patient answered questions  appropriately.  Normal affect.  Seems to have reasonable expectations, in terms of treatment.     Eyes:  Visual scanning/ tracking without deficit.     Ears:  Response to auditory stimuli is normal.  negative hearing aid devices.  Auricles in proper alignment.     Lymphatic:  Popliteal lymph nodes not enlarged.     Lungs:  Non labored breathing, non labored speech. No cough.  No audible wheezing. Even, quiet breathing.       Vascular:  negative pedal pulses bilaterally for both the DP and PT arteries.  CFT < 3 sec. patient has bilateral foot and ankle edema and varicosities making it difficult to palpate his pulses.  negative pedal hair growth.  ULTRASOUND L EXT ART DOPPLER SEG PRESSURES W EXER SOTO November 27, 2018 5:00 PM      HISTORY: Type 2 diabetes mellitus with microalbuminuria, with  long-term current use of insulin (H). Diabetic ulcer of left midfoot  associated with diabetes mellitus due to underlying condition, limited  to breakdown of skin (H).     COMPARISON: None.     FINDINGS:  Right RENATE: 0.89.  Left RENATE: 0.85.     Waveforms:   Bilateral: Triphasic in the femoral, popliteal and monophasic in the  the posterior tibial and dorsalis pedis, suggesting disease in the  distal popliteal tibial peroneal trunk.     Exercise: Patient did foot pedals for 5 minutes.     Right exercise RENATE: 0.98.  Left exercise RENATE: 0.94.                                                                      IMPRESSION:   1. Right RENATE shows moderate arterial insufficiency which normalizes  with exercise.  2. Left RENATE shows moderate arterial insufficiency which becomes mild  with exercise.  3. Triphasic waveforms in the femoral and popliteal arteries  bilaterally with monophasic waveforms in the tibial vessels,  suggesting disease in the distal popliteal or tibial peroneal trunk.    Neuro:  Alert and oriented x 3. Coordinated gait.  Light touch sensation is intact to the L4, L5, S1 distributions. No obvious deficits.  No evidence of  neurological-based weakness, spasticity, or contracture in the lower extremities.  Monofilament absent to mid tibial region bilaterally    Derm: Skin thin shiny and atrophic with no hair growth noted.    Musculoskeletal:    Lower extremity muscle strength is normal.  Patient is ambulatory without an assistive device or brace.  Wearing diabetic shoes and inserts.  No gross deformities.  All his muscle compartments are intact and I see no signs of tendon avulsion on the left foot.  We can see an incision where his left fifth metatarsal head has been resected.  This is healed and healthy.  He has a well-healed right hallux amputation.  Left hallux nail was loose.  The proximal nail border has erythema and blistering.  There is no pain on palpation.  There is no crepitus on palpation.  With removing the nail there is a laceration transversely but proximal nail bed visualized with lifting the proximal nail border.  We can easily see the underlying bone.  There is no purulence odor or drainage at all    Radiographic Exam:  X-Ray Findings: Transverse fracture of the left hallux base distal phalanx noted.  This is nondisplaced.    A:  Diabetes mellitus with peripheral neuropathy, LOPS, history of amputation  Peripheral arterial disease  Left hallux fracture  Left hallux nailbed laceration  Left hallux nail avulsion    P: X-rays 3 views were taken of the left hallux.  We discussed with the patient he has a transverse fracture at the base of his distal phalanx.  It is not displaced and discussed that we will just need immobilization to heal.  We discussed nail avulsion to explore his nail bed.  Patient is in agreement.  No block needed since he has no feeling in his foot.  We had him sign a consent form.  His toe was prepped and draped in normal sterile manner.  His left hallux nail was quite loose.  The remaining soft tissue was removed off this and resected this in toto.  Underneath the proximal nail border was a 13 mm long  laceration which we could visualize the underlying bone.  We took a culture of this.  We sutured this together with 5 simple sutures utilizing 5-0 nylon.  We dressed this with Adaptic gauze and Covan.  Patient tolerated procedure well.  There were no complications.  We will start the patient on Keflex 500 mg 1 p.o. 3 times daily for 10 days.  We gave him a postop shoe to accommodate the dressing and to immobilize his fracture.  He will minimize his walking.  He will try to keep this elevated.  He will not get this wet at all.  The patient will leave the dressing on his foot and follow-up with myself or Dr. Montesinos next week.  We note that if he has increased erythema and edema he should go to either urgent care of the ED.    Rosendo Sarmiento DPM DPM, FACFAS            Again, thank you for allowing me to participate in the care of your patient.        Sincerely,        Rosendo Sarmiento DPM

## 2019-11-29 NOTE — PROGRESS NOTES
Subjective:    Pt is seen today as a new pt referral with the c/c of stubbing his left big toe.  Patient states that 5 days ago he was trying to get his left sock off with his contralateral foot and he believes he scraped his skin at this time.  He noticed some bleeding with this.  He did not have any pain however the patient has loss of protective sensation in his foot.  He continued to have a little bit of bleeding and drainage throughout the week.  He has not noticed any purulence or odor.  He  cannot really see his toe good so he is not sure if he has had any redness or blistering.  He denies any calf pain fever chills or shortness of breath.  Patient has diabetes, peripheral arterial disease, peripheral neuropathy, LOPS.  He has a history of a left fifth metatarsal head resection, right hallux amputation.  He is a retired pharmacist.  Both his father and brother have diabetes.  He wears diabetic shoes and inserts when he walks.  Patient typically sees Dr. Montesinos but has presented here today since he was off for the holiday      ROS:  A 10-point review of systems was performed and is positive for that noted in the HPI and as seen below.  All other areas are negative.          Allergies   Allergen Reactions     Zocor [Simvastatin - High Dose]      Bilateral hip aching       Current Outpatient Medications   Medication Sig Dispense Refill     cephALEXin (KEFLEX) 500 MG capsule Take 1 capsule (500 mg) by mouth 3 times daily 30 capsule 0     amLODIPine (NORVASC) 5 MG tablet Take 1 tablet (5 mg) by mouth daily 90 tablet 3     Blood Glucose Monitoring Suppl (ONE TOUCH ULTRA SYSTEM KIT) W/DEVICE KIT        dorzolamide-timolol (COSOPT) 2-0.5 % ophthalmic solution        hydrochlorothiazide (HYDRODIURIL) 25 MG tablet Take 1 tablet (25 mg) by mouth daily Hold on file until needed 90 tablet 3     insulin glargine (LANTUS SOLOSTAR) 100 UNIT/ML pen Inject 20 Units Subcutaneous At Bedtime Hold on file until needed 21 mL 3      insulin pen needle (B-D U/F) 31G X 8 MM miscellaneous 1 Device daily Use once daily or as directed. 100 each 3     lisinopril (PRINIVIL/ZESTRIL) 40 MG tablet Take 1 tablet (40 mg) by mouth daily Hold on file until needed 90 tablet 3     metFORMIN (GLUCOPHAGE) 500 MG tablet Take 2 tablets (1,000 mg) by mouth 2 times daily (with meals) Hold on file until needed 360 tablet 3     Multiple Vitamins-Minerals (PRESERVISION AREDS PO) Take 1 tablet by mouth       omeprazole (PRILOSEC) 20 MG DR capsule Take 1 capsule (20 mg) by mouth daily Hold on file until needed 90 capsule 3     ONETOUCH LANCETS MISC 1 Device daily One touch delica lancets, what ever is covered by insurance. 100 each 5     ONETOUCH ULTRA test strip 1 strip by In Vitro route daily One touch Ultra. 100 strip 1     ORDER FOR DME Equipment being ordered:   Glucometer 1 Device 1     pravastatin (PRAVACHOL) 10 MG tablet Take 1 tablet (10 mg) by mouth daily Hold on file until needed 90 tablet 3     tamsulosin (FLOMAX) 0.4 MG capsule Take 1 capsule (0.4 mg) by mouth daily 30 capsule 11       Patient Active Problem List   Diagnosis     Obesity     Transient cerebral ischemia     HYPERLIPIDEMIA LDL GOAL <100     GERD (gastroesophageal reflux disease)     Peripheral vascular disease (H)     Advanced directives, counseling/discussion     Health Care Home     Malignant neoplasm of prostate (H)     Hypertension, goal below 140/90     Senile nuclear sclerosis     Type 2 diabetes mellitus with both eyes affected by mild nonproliferative retinopathy without macular edema, with long-term current use of insulin (H)     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)     Macular degeneration (senile) of retina       Past Medical History:   Diagnosis Date     Diabetes mellitus (H)      Hypertension      Squamous cell carcinoma        Past Surgical History:   Procedure Laterality Date     AMPUTATE TOE(S) Right 7/23/2019    Procedure: Partial amputation great toe;   Surgeon: Ethan Montesinos DPM;  Location: [facility]     [facility]  2002    prostate biopsy elevated PSA     COLONOSCOPY       HC REMOVE TONSILS/ADENOIDS,<13 Y/O      T & A     IR LOWER EXTREMITY ANGIOGRAM RIGHT  12/11/2018     PHACOEMULSIFICATION WITH STANDARD INTRAOCULAR LENS IMPLANT Left 4/2/2015    Procedure: PHACOEMULSIFICATION WITH STANDARD INTRAOCULAR LENS IMPLANT;  Surgeon: Joseph Hernandez MD;  Location: [facility]     PHACOEMULSIFICATION WITH STANDARD INTRAOCULAR LENS IMPLANT Right 5/4/2015    Procedure: PHACOEMULSIFICATION WITH STANDARD INTRAOCULAR LENS IMPLANT;  Surgeon: Joseph Hernandez MD;  Location: [facility]     REPAIR HAMMER TOE Left 4/2/2019    Procedure: 5th Metatarsal Head Resection;  Surgeon: Ethan Montesinos DPM;  Location: [facility]       Family History   Problem Relation Age of Onset     Cancer Mother         intestinal CA     Diabetes Father      Diabetes Brother      Other Cancer Son         meagan tumor     Other Cancer Brother         pancreatic cancer     Diabetes Son      Melanoma No family hx of        Social History     Tobacco Use     Smoking status: Never Smoker     Smokeless tobacco: Never Used   Substance Use Topics     Alcohol use: Yes     Alcohol/week: 1.7 standard drinks     Comment: drinks rarely         Exam:    Vitals: BP (!) 140/76 (BP Location: Right arm)   Pulse 80   Wt 99.8 kg (220 lb)   BMI 30.68 kg/m    BMI: Body mass index is 30.68 kg/m .  Height: Data Unavailable    Constitutional/ general:  Pt is in no apparent distress, appears well-nourished.  Cooperative with history and physical exam.  Patient seen with his son today    Psych:  The patient answered questions appropriately.  Normal affect.  Seems to have reasonable expectations, in terms of treatment.     Eyes:  Visual scanning/ tracking without deficit.     Ears:  Response to auditory stimuli is normal.  negative hearing aid devices.  Auricles in proper alignment.     Lymphatic:  Popliteal lymph  nodes not enlarged.     Lungs:  Non labored breathing, non labored speech. No cough.  No audible wheezing. Even, quiet breathing.       Vascular:  negative pedal pulses bilaterally for both the DP and PT arteries.  CFT < 3 sec. patient has bilateral foot and ankle edema and varicosities making it difficult to palpate his pulses.  negative pedal hair growth.  ULTRASOUND L EXT ART DOPPLER SEG PRESSURES W EXER SOTO November 27, 2018 5:00 PM      HISTORY: Type 2 diabetes mellitus with microalbuminuria, with  long-term current use of insulin (H). Diabetic ulcer of left midfoot  associated with diabetes mellitus due to underlying condition, limited  to breakdown of skin (H).     COMPARISON: None.     FINDINGS:  Right RENATE: 0.89.  Left RENATE: 0.85.     Waveforms:   Bilateral: Triphasic in the femoral, popliteal and monophasic in the  the posterior tibial and dorsalis pedis, suggesting disease in the  distal popliteal tibial peroneal trunk.     Exercise: Patient did foot pedals for 5 minutes.     Right exercise RENATE: 0.98.  Left exercise RENATE: 0.94.                                                                      IMPRESSION:   1. Right RENATE shows moderate arterial insufficiency which normalizes  with exercise.  2. Left RENATE shows moderate arterial insufficiency which becomes mild  with exercise.  3. Triphasic waveforms in the femoral and popliteal arteries  bilaterally with monophasic waveforms in the tibial vessels,  suggesting disease in the distal popliteal or tibial peroneal trunk.    Neuro:  Alert and oriented x 3. Coordinated gait.  Light touch sensation is intact to the L4, L5, S1 distributions. No obvious deficits.  No evidence of neurological-based weakness, spasticity, or contracture in the lower extremities.  Monofilament absent to mid tibial region bilaterally    Derm: Skin thin shiny and atrophic with no hair growth noted.    Musculoskeletal:    Lower extremity muscle strength is normal.  Patient is ambulatory  without an assistive device or brace.  Wearing diabetic shoes and inserts.  No gross deformities.  All his muscle compartments are intact and I see no signs of tendon avulsion on the left foot.  We can see an incision where his left fifth metatarsal head has been resected.  This is healed and healthy.  He has a well-healed right hallux amputation.  Left hallux nail was loose.  The proximal nail border has erythema and blistering.  There is no pain on palpation.  There is no crepitus on palpation.  With removing the nail there is a laceration transversely measuring 1.2 cm.   Laceration located proximal nail bed visualized with lifting the proximal nail border.  We can easily see the underlying bone. Wound clean with no foreign debris noted.   There is no purulence odor or drainage at all    Radiographic Exam:  X-Ray Findings: Transverse fracture of the left hallux base distal phalanx noted.  This is nondisplaced.    A:  Diabetes mellitus with peripheral neuropathy, LOPS, history of amputation  Peripheral arterial disease  Left hallux open fracture  Left hallux nailbed laceration  Left hallux nail avulsion    P: X-rays 3 views were taken of the left hallux.  We discussed with the patient he has a transverse fracture at the base of his distal phalanx.  It is not displaced and discussed that we will just need immobilization to heal.  We discussed nail avulsion to explore his nail bed.  Patient is in agreement.  No block needed since he has no feeling in his foot.  We had him sign a consent form.  His toe was prepped and draped in normal sterile manner.  His left hallux nail was quite loose.  The remaining soft tissue was removed off this and resected this in toto.  Underneath the proximal nail border was a 12 mm long laceration which we could visualize the underlying bone.  We took a culture of this.  Would cleaned with copious amounts of betadine.  We sutured this together with 5 simple sutures utilizing 5-0 nylon.  We  dressed this with Adaptic gauze and Covan.  Patient tolerated procedure well.  There were no complications.  We will start the patient on Keflex 500 mg 1 p.o. 3 times daily for 10 days.  We gave him a postop shoe to accommodate the dressing and to immobilize his fracture.  He will minimize his walking.  He will try to keep this elevated.  He will not get this wet at all.  The patient will leave the dressing on his foot and follow-up with myself or Dr. Montesinos next week.  We note that if he has increased erythema and edema he should go to either urgent care of the ED.    Rosendo Sarmiento, VAN ARAUJO, FACFAS

## 2019-11-29 NOTE — PATIENT INSTRUCTIONS
Weight management plan: Patient was referred to their PCP to discuss a diet and exercise plan.     Ray to follow up with Primary Care provider regarding elevated blood pressure.    We wish you continued good healing. If you have any questions or concerns, please do not hesitate to contact us at [phone]    Please remember to call and schedule a follow up appointment if one was recommended at your earliest convenience.   PODIATRY CLINIC HOURS  TELEPHONE NUMBER    Dr. Rosendo Sarmiento D.P.M FAC FAS    Clinics:  [place]  [place]  [facility]    [place]   Tuesday 1PM-6PM  [place]  Wednesday 7AM-2PM  [place]  Thursday 10AM-6PM  [place]  Friday 7AM-3PM  [place]  Specialty schedulers:   [phone] to make an appointment with any Specialty Provider.        Urgent Care locations:    [facility] Monday-Friday 5 pm - 9 pm. Saturday-Sunday 9 am -5pm    Monday-Friday 11 am - 9 pm Saturday 9 am - 5 pm     Monday-Sunday 12 noon-8PM [phone]     If you need a medication refill, please contact us you may need lab work and/or a follow up visit prior to your refill (i.e. Antifungal medications).    [facility] (secure e-mail communication and access to your chart) to send a message or to make an appointment.    If MRI needed please call [facility] at [phone]

## 2019-12-02 LAB
BACTERIA SPEC CULT: ABNORMAL
BACTERIA SPEC CULT: ABNORMAL
Lab: ABNORMAL
SPECIMEN SOURCE: ABNORMAL

## 2019-12-03 ENCOUNTER — TELEPHONE (OUTPATIENT)
Dept: PODIATRY | Facility: CLINIC | Age: 84
End: 2019-12-03

## 2019-12-03 NOTE — TELEPHONE ENCOUNTER
Reason for Call:  Other call back    Detailed comments: pt ankle red, swollen and infected. Taking keflex from Dr. Sarmiento. The toenail has been removed.     Phone Number Patient can be reached at: Home number on file [phone] (home)    Best Time: any     Can we leave a detailed message on this number? YES    Call taken on 12/3/2019 at 2:27 PM by Jessi Tavarez

## 2019-12-04 NOTE — TELEPHONE ENCOUNTER
Called and spoke with patient. Informed patient that he can change the bandage, but not to get it wet because james are stitched in the toe. I told patient to keep taking the antibiotic and that we would see him in clinic tomorrow at 4pm.

## 2019-12-05 ENCOUNTER — APPOINTMENT (OUTPATIENT)
Dept: LAB | Facility: CLINIC | Age: 84
End: 2019-12-05
Payer: COMMERCIAL

## 2019-12-05 ENCOUNTER — OFFICE VISIT (OUTPATIENT)
Dept: PODIATRY | Facility: CLINIC | Age: 84
End: 2019-12-05
Payer: COMMERCIAL

## 2019-12-05 VITALS
DIASTOLIC BLOOD PRESSURE: 77 MMHG | WEIGHT: 220 LBS | SYSTOLIC BLOOD PRESSURE: 158 MMHG | BODY MASS INDEX: 30.8 KG/M2 | HEART RATE: 71 BPM | HEIGHT: 71 IN

## 2019-12-05 DIAGNOSIS — L03.032 CELLULITIS OF GREAT TOE OF LEFT FOOT: Primary | ICD-10-CM

## 2019-12-05 LAB
BASOPHILS # BLD AUTO: 0.1 10E9/L (ref 0–0.2)
BASOPHILS NFR BLD AUTO: 0.8 %
DIFFERENTIAL METHOD BLD: ABNORMAL
EOSINOPHIL # BLD AUTO: 0.4 10E9/L (ref 0–0.7)
EOSINOPHIL NFR BLD AUTO: 4.2 %
ERYTHROCYTE [DISTWIDTH] IN BLOOD BY AUTOMATED COUNT: 13 % (ref 10–15)
HCT VFR BLD AUTO: 37.5 % (ref 40–53)
HGB BLD-MCNC: 12.3 G/DL (ref 13.3–17.7)
LYMPHOCYTES # BLD AUTO: 2 10E9/L (ref 0.8–5.3)
LYMPHOCYTES NFR BLD AUTO: 21.2 %
MCH RBC QN AUTO: 29.6 PG (ref 26.5–33)
MCHC RBC AUTO-ENTMCNC: 32.8 G/DL (ref 31.5–36.5)
MCV RBC AUTO: 90 FL (ref 78–100)
MONOCYTES # BLD AUTO: 1.2 10E9/L (ref 0–1.3)
MONOCYTES NFR BLD AUTO: 12.4 %
NEUTROPHILS # BLD AUTO: 5.7 10E9/L (ref 1.6–8.3)
NEUTROPHILS NFR BLD AUTO: 61.4 %
PLATELET # BLD AUTO: 297 10E9/L (ref 150–450)
RBC # BLD AUTO: 4.16 10E12/L (ref 4.4–5.9)
WBC # BLD AUTO: 9.3 10E9/L (ref 4–11)

## 2019-12-05 PROCEDURE — 36415 COLL VENOUS BLD VENIPUNCTURE: CPT | Performed by: PODIATRIST

## 2019-12-05 PROCEDURE — 99213 OFFICE O/P EST LOW 20 MIN: CPT | Performed by: PODIATRIST

## 2019-12-05 PROCEDURE — 85025 COMPLETE CBC W/AUTO DIFF WBC: CPT | Performed by: PODIATRIST

## 2019-12-05 RX ORDER — CLINDAMYCIN HCL 300 MG
300 CAPSULE ORAL 3 TIMES DAILY
Qty: 30 CAPSULE | Refills: 0 | Status: SHIPPED | OUTPATIENT
Start: 2019-12-05 | End: 2019-12-26

## 2019-12-05 ASSESSMENT — MIFFLIN-ST. JEOR: SCORE: 1715.04

## 2019-12-05 NOTE — PROGRESS NOTES
PATIENT HISTORY:  Fawad returns to clinic for a painful left foot .  The patient relates that he recently suffered an avulsion of the great toenail on the left great toe.  He was seen by Dr. Sarmiento who examined the left great toe and found underlying laceration of the nail bed along with radiographic evidence of transverse fracture of the base of the distal phalanx of the left great toe.  The laceration and fracture was treated and the patient was placed on oral Keflex antibiotics.  The patient relates that the redness and swelling has not subsided.  The patient denies any fevers or chills nausea or vomiting.  The patient relates that his blood sugars are slightly elevated.    REVIEW OF SYSTEMS:  Constitutional, HEENT, cardiovascular, pulmonary, GI, , musculoskeletal, neuro, skin, endocrine and psych systems are negative, except as otherwise noted.     PAST MEDICAL HISTORY:   Past Medical History:   Diagnosis Date     Diabetes mellitus (H)      Hypertension      Squamous cell carcinoma         PAST SURGICAL HISTORY:   Past Surgical History:   Procedure Laterality Date     AMPUTATE TOE(S) Right 7/23/2019    Procedure: Partial amputation great toe;  Surgeon: Ethan Montesinos DPM;  Location: [facility] OR     [facility] SURGICAL PATHOLOGY  2002    prostate biopsy elevated PSA     COLONOSCOPY       HC REMOVE TONSILS/ADENOIDS,<11 Y/O      T & A     IR LOWER EXTREMITY ANGIOGRAM RIGHT  12/11/2018     PHACOEMULSIFICATION WITH STANDARD INTRAOCULAR LENS IMPLANT Left 4/2/2015    Procedure: PHACOEMULSIFICATION WITH STANDARD INTRAOCULAR LENS IMPLANT;  Surgeon: Joseph Hernandez MD;  Location: [facility]     PHACOEMULSIFICATION WITH STANDARD INTRAOCULAR LENS IMPLANT Right 5/4/2015    Procedure: PHACOEMULSIFICATION WITH STANDARD INTRAOCULAR LENS IMPLANT;  Surgeon: Joseph Hernandez MD;  Location: [facility] OR     REPAIR HAMMER TOE Left 4/2/2019    Procedure: 5th Metatarsal Head Resection;  Surgeon: Ethan Montesinos DPM;  Location: [facility]  OR        MEDICATIONS:   Current Outpatient Medications:      amLODIPine (NORVASC) 5 MG tablet, Take 1 tablet (5 mg) by mouth daily, Disp: 90 tablet, Rfl: 3     Blood Glucose Monitoring Suppl (ONE TOUCH ULTRA SYSTEM KIT) W/DEVICE KIT, , Disp: , Rfl:      cephALEXin (KEFLEX) 500 MG capsule, Take 1 capsule (500 mg) by mouth 3 times daily, Disp: 30 capsule, Rfl: 0     dorzolamide-timolol (COSOPT) 2-0.5 % ophthalmic solution, , Disp: , Rfl:      hydrochlorothiazide (HYDRODIURIL) 25 MG tablet, Take 1 tablet (25 mg) by mouth daily Hold on file until needed, Disp: 90 tablet, Rfl: 3     insulin glargine (LANTUS SOLOSTAR) 100 UNIT/ML pen, Inject 20 Units Subcutaneous At Bedtime Hold on file until needed, Disp: 21 mL, Rfl: 3     insulin pen needle (B-D U/F) 31G X 8 MM miscellaneous, 1 Device daily Use once daily or as directed., Disp: 100 each, Rfl: 3     lisinopril (PRINIVIL/ZESTRIL) 40 MG tablet, Take 1 tablet (40 mg) by mouth daily Hold on file until needed, Disp: 90 tablet, Rfl: 3     metFORMIN (GLUCOPHAGE) 500 MG tablet, Take 2 tablets (1,000 mg) by mouth 2 times daily (with meals) Hold on file until needed, Disp: 360 tablet, Rfl: 3     Multiple Vitamins-Minerals (PRESERVISION AREDS PO), Take 1 tablet by mouth, Disp: , Rfl:      omeprazole (PRILOSEC) 20 MG DR capsule, Take 1 capsule (20 mg) by mouth daily Hold on file until needed, Disp: 90 capsule, Rfl: 3     ONETOUCH LANCETS MISC, 1 Device daily One touch delica lancets, what ever is covered by insurance., Disp: 100 each, Rfl: 5     ONETOUCH ULTRA test strip, 1 strip by In Vitro route daily One touch Ultra., Disp: 100 strip, Rfl: 1     ORDER FOR DME, Equipment being ordered:  Glucometer, Disp: 1 Device, Rfl: 1     pravastatin (PRAVACHOL) 10 MG tablet, Take 1 tablet (10 mg) by mouth daily Hold on file until needed, Disp: 90 tablet, Rfl: 3     tamsulosin (FLOMAX) 0.4 MG capsule, Take 1 capsule (0.4 mg) by mouth daily, Disp: 30 capsule, Rfl: 11     ALLERGIES:    Allergies  "  Allergen Reactions     Zocor [Simvastatin - High Dose]      Bilateral hip aching        SOCIAL HISTORY:   Social History     Socioeconomic History     Marital status:      Spouse name: Not on file     Number of children: Not on file     Years of education: Not on file     Highest education level: Not on file   Occupational History     Not on file   Social Needs     Financial resource strain: Not on file     Food insecurity:     Worry: Not on file     Inability: Not on file     Transportation needs:     Medical: Not on file     Non-medical: Not on file   Tobacco Use     Smoking status: Never Smoker     Smokeless tobacco: Never Used   Substance and Sexual Activity     Alcohol use: Yes     Alcohol/week: 1.7 standard drinks     Comment: drinks rarely     Drug use: No     Sexual activity: Not Currently     Partners: Female   Lifestyle     Physical activity:     Days per week: Not on file     Minutes per session: Not on file     Stress: Not on file   Relationships     Social connections:     Talks on phone: Not on file     Gets together: Not on file     Attends Yarsanism service: Not on file     Active member of club or organization: Not on file     Attends meetings of clubs or organizations: Not on file     Relationship status: Not on file     Intimate partner violence:     Fear of current or ex partner: Not on file     Emotionally abused: Not on file     Physically abused: Not on file     Forced sexual activity: Not on file   Other Topics Concern     Parent/sibling w/ CABG, MI or angioplasty before 65F 55M? No   Social History Narrative     Not on file        FAMILY HISTORY:   Family History   Problem Relation Age of Onset     Cancer Mother         intestinal CA     Diabetes Father      Diabetes Brother      Other Cancer Son         meagan tumor     Other Cancer Brother         pancreatic cancer     Diabetes Son      Melanoma No family hx of         EXAM:Vitals: BP (!) 158/77   Pulse 71   Ht 1.803 m (5' 11\")  "  Wt 99.8 kg (220 lb)   BMI 30.68 kg/m    BMI= Body mass index is 30.68 kg/m .    Weight management plan: Patient was referred to their PCP to discuss a diet and exercise plan.    General appearance: Patient is alert and fully cooperative with history & exam.  No sign of distress is noted during the visit.     Psychiatric: Affect is pleasant & appropriate.  Patient appears motivated to improve health.     Respiratory: Breathing is regular & unlabored while sitting.     HEENT: Hearing is intact to spoken word.  Speech is clear.  No gross evidence of visual impairment that would impact ambulation.     Dermatologic: One notes a laceration over the nailbed of the left great toe with sutures intact.  Surrounding erythema and edema noted extending up above the ankle joint.  No exposed bone noted.    Vascular: DP & PT pulses are intact & regular on the left.  Noted edema and erythema to the left foot.  CFT and skin temperature is warm to touch on the left.    Neurologic: Lower extremity sensation is intact to light touch.  No evidence of weakness or contracture in the left lower extremities.  Noted evidence of neuropathy.     Musculoskeletal: Patient is ambulatory without assistive device or brace.  No gross ankle deformity noted.  No foot or ankle joint effusion is noted.       Labs:  CBC: WBC: 9.3    Cultures from 11/29/2019:  Specimen Description Toe    Special Requests Specimen collected in eSwab transport (white cap)    Culture Micro Heavy growth   Normal skin min    Culture Micro Abnormal    Heavy growth   Staphylococcus lugdunensis     Resulting Agency [facility]   Susceptibility      Staphylococcus lugdunensis     MATHEUS     CLINDAMYCIN <=0.25 ug/mL Sensitive     ERYTHROMYCIN <=0.25 ug/mL Sensitive     GENTAMICIN <=0.5 ug/mL Sensitive     OXACILLIN 2 ug/mL Sensitive     TETRACYCLINE <=1 ug/mL Sensitive     Trimethoprim/Sulfa <=0.5/9.5 u... Sensitive     VANCOMYCIN <=0.5 ug/mL  Sensitive           Radiographs were reviewed including AP, lateral and medial oblique views of the left great toe reveals a non-displaced fracture at the base of the distal phalanx of the great toe.    ASSESSMENT / PLAN:     ICD-10-CM    1. Cellulitis of great toe of left foot L03.032        I have explained to Fawad and his son about the conditions.  We discussed the nature of the condition as well as the treatment plan and expected length of recovery.  Due to the increased redness and swelling to the patient was switched to oral clindamycin 300 mg p.o. 3 times daily.  The patient was instructed to return in 1 week for reevaluation.  The patient was also instructed that at any point if any increased redness and swelling are noted or if the patient feels feverish that he report to the ER for further evaluation and treatment.  The patient was informed that he has an increased risk of possible amputation of the left great toe if the infection becomes more severe.    Fawad verbalized agreement with and understanding of the rational for the diagnosis and treatment plan.  All questions were answered to best of my ability and the patient's satisfaction. The patient was advised to contact the clinic with any questions that may arise after the clinic visit.      Disclaimer: This note consists of symbols derived from keyboarding, dictation and/or voice recognition software. As a result, there may be errors in the script that have gone undetected. Please consider this when interpreting information found in this chart.       LIANG Montesinos D.P.M., FWILLIE.F.A.S.

## 2019-12-05 NOTE — NURSING NOTE
"Chief Complaint   Patient presents with     RECHECK     Ankle swelling, total nail avulsion by tammie 11/29/19       Initial BP (!) 158/77   Pulse 71   Ht 1.803 m (5' 11\")   Wt 99.8 kg (220 lb)   BMI 30.68 kg/m   Estimated body mass index is 30.68 kg/m  as calculated from the following:    Height as of this encounter: 1.803 m (5' 11\").    Weight as of this encounter: 99.8 kg (220 lb).  Medications and allergies reviewed.      Dana GUILLERMO MA    "

## 2019-12-05 NOTE — LETTER
12/5/2019         RE: Fawad Garcia  [address]  [facility] [address]        Dear Colleague,    Thank you for referring your patient, Fawad Garcia, to the [facility]. Please see a copy of my visit note below.    PATIENT HISTORY:  Fawad returns to clinic for a painful left foot .  The patient relates that he recently suffered an avulsion of the great toenail on the left great toe.  He was seen by Dr. Sarmiento who examined the left great toe and found underlying laceration of the nail bed along with radiographic evidence of transverse fracture of the base of the distal phalanx of the left great toe.  The laceration and fracture was treated and the patient was placed on oral Keflex antibiotics.  The patient relates that the redness and swelling has not subsided.  The patient denies any fevers or chills nausea or vomiting.  The patient relates that his blood sugars are slightly elevated.    REVIEW OF SYSTEMS:  Constitutional, HEENT, cardiovascular, pulmonary, GI, , musculoskeletal, neuro, skin, endocrine and psych systems are negative, except as otherwise noted.     PAST MEDICAL HISTORY:   Past Medical History:   Diagnosis Date     Diabetes mellitus (H)      Hypertension      Squamous cell carcinoma         PAST SURGICAL HISTORY:   Past Surgical History:   Procedure Laterality Date     AMPUTATE TOE(S) Right 7/23/2019    Procedure: Partial amputation great toe;  Surgeon: Ethan Montesinos DPM;  Location: [facility] OR     [facility] SURGICAL PATHOLOGY  2002    prostate biopsy elevated PSA     COLONOSCOPY       HC REMOVE TONSILS/ADENOIDS,<14 Y/O      T & A     IR LOWER EXTREMITY ANGIOGRAM RIGHT  12/11/2018     PHACOEMULSIFICATION WITH STANDARD INTRAOCULAR LENS IMPLANT Left 4/2/2015    Procedure: PHACOEMULSIFICATION WITH STANDARD INTRAOCULAR LENS IMPLANT;  Surgeon: Joseph Hernandez MD;  Location: [facility] OR     PHACOEMULSIFICATION WITH STANDARD INTRAOCULAR LENS IMPLANT  Right 5/4/2015    Procedure: PHACOEMULSIFICATION WITH STANDARD INTRAOCULAR LENS IMPLANT;  Surgeon: Joseph Hernandez MD;  Location: [facility] OR     REPAIR HAMMER TOE Left 4/2/2019    Procedure: 5th Metatarsal Head Resection;  Surgeon: Ethan Montesinos DPM;  Location: [facility] OR        MEDICATIONS:   Current Outpatient Medications:      amLODIPine (NORVASC) 5 MG tablet, Take 1 tablet (5 mg) by mouth daily, Disp: 90 tablet, Rfl: 3     Blood Glucose Monitoring Suppl (ONE TOUCH ULTRA SYSTEM KIT) W/DEVICE KIT, , Disp: , Rfl:      cephALEXin (KEFLEX) 500 MG capsule, Take 1 capsule (500 mg) by mouth 3 times daily, Disp: 30 capsule, Rfl: 0     dorzolamide-timolol (COSOPT) 2-0.5 % ophthalmic solution, , Disp: , Rfl:      hydrochlorothiazide (HYDRODIURIL) 25 MG tablet, Take 1 tablet (25 mg) by mouth daily Hold on file until needed, Disp: 90 tablet, Rfl: 3     insulin glargine (LANTUS SOLOSTAR) 100 UNIT/ML pen, Inject 20 Units Subcutaneous At Bedtime Hold on file until needed, Disp: 21 mL, Rfl: 3     insulin pen needle (B-D U/F) 31G X 8 MM miscellaneous, 1 Device daily Use once daily or as directed., Disp: 100 each, Rfl: 3     lisinopril (PRINIVIL/ZESTRIL) 40 MG tablet, Take 1 tablet (40 mg) by mouth daily Hold on file until needed, Disp: 90 tablet, Rfl: 3     metFORMIN (GLUCOPHAGE) 500 MG tablet, Take 2 tablets (1,000 mg) by mouth 2 times daily (with meals) Hold on file until needed, Disp: 360 tablet, Rfl: 3     Multiple Vitamins-Minerals (PRESERVISION AREDS PO), Take 1 tablet by mouth, Disp: , Rfl:      omeprazole (PRILOSEC) 20 MG DR capsule, Take 1 capsule (20 mg) by mouth daily Hold on file until needed, Disp: 90 capsule, Rfl: 3     ONETOUCH LANCETS MISC, 1 Device daily One touch delica lancets, what ever is covered by insurance., Disp: 100 each, Rfl: 5     ONETOUCH ULTRA test strip, 1 strip by In Vitro route daily One touch Ultra., Disp: 100 strip, Rfl: 1     ORDER FOR DME, Equipment being ordered:  Glucometer, Disp: 1  Device, Rfl: 1     pravastatin (PRAVACHOL) 10 MG tablet, Take 1 tablet (10 mg) by mouth daily Hold on file until needed, Disp: 90 tablet, Rfl: 3     tamsulosin (FLOMAX) 0.4 MG capsule, Take 1 capsule (0.4 mg) by mouth daily, Disp: 30 capsule, Rfl: 11     ALLERGIES:    Allergies   Allergen Reactions     Zocor [Simvastatin - High Dose]      Bilateral hip aching        SOCIAL HISTORY:   Social History     Socioeconomic History     Marital status:      Spouse name: Not on file     Number of children: Not on file     Years of education: Not on file     Highest education level: Not on file   Occupational History     Not on file   Social Needs     Financial resource strain: Not on file     Food insecurity:     Worry: Not on file     Inability: Not on file     Transportation needs:     Medical: Not on file     Non-medical: Not on file   Tobacco Use     Smoking status: Never Smoker     Smokeless tobacco: Never Used   Substance and Sexual Activity     Alcohol use: Yes     Alcohol/week: 1.7 standard drinks     Comment: drinks rarely     Drug use: No     Sexual activity: Not Currently     Partners: Female   Lifestyle     Physical activity:     Days per week: Not on file     Minutes per session: Not on file     Stress: Not on file   Relationships     Social connections:     Talks on phone: Not on file     Gets together: Not on file     Attends Church service: Not on file     Active member of club or organization: Not on file     Attends meetings of clubs or organizations: Not on file     Relationship status: Not on file     Intimate partner violence:     Fear of current or ex partner: Not on file     Emotionally abused: Not on file     Physically abused: Not on file     Forced sexual activity: Not on file   Other Topics Concern     Parent/sibling w/ CABG, MI or angioplasty before 65F 55M? No   Social History Narrative     Not on file        FAMILY HISTORY:   Family History   Problem Relation Age of Onset     Cancer  "Mother         intestinal CA     Diabetes Father      Diabetes Brother      Other Cancer Son         meagan tumor     Other Cancer Brother         pancreatic cancer     Diabetes Son      Melanoma No family hx of         EXAM:Vitals: BP (!) 158/77   Pulse 71   Ht 1.803 m (5' 11\")   Wt 99.8 kg (220 lb)   BMI 30.68 kg/m     BMI= Body mass index is 30.68 kg/m .    Weight management plan: Patient was referred to their PCP to discuss a diet and exercise plan.    General appearance: Patient is alert and fully cooperative with history & exam.  No sign of distress is noted during the visit.     Psychiatric: Affect is pleasant & appropriate.  Patient appears motivated to improve health.     Respiratory: Breathing is regular & unlabored while sitting.     HEENT: Hearing is intact to spoken word.  Speech is clear.  No gross evidence of visual impairment that would impact ambulation.     Dermatologic: One notes a laceration over the nailbed of the left great toe with sutures intact.  Surrounding erythema and edema noted extending up above the ankle joint.  No exposed bone noted.    Vascular: DP & PT pulses are intact & regular on the left.  Noted edema and erythema to the left foot.  CFT and skin temperature is warm to touch on the left.    Neurologic: Lower extremity sensation is intact to light touch.  No evidence of weakness or contracture in the left lower extremities.  Noted evidence of neuropathy.     Musculoskeletal: Patient is ambulatory without assistive device or brace.  No gross ankle deformity noted.  No foot or ankle joint effusion is noted.       Labs:  CBC: WBC: 9.3    Cultures from 11/29/2019:  Specimen Description Toe    Special Requests Specimen collected in eSwab transport (white cap)    Culture Micro Heavy growth   Normal skin min    Culture Micro Abnormal    Heavy growth   Staphylococcus lugdunensis     [facility]   Susceptibility      Staphylococcus " lugdunensis     MATHEUS     CLINDAMYCIN <=0.25 ug/mL Sensitive     ERYTHROMYCIN <=0.25 ug/mL Sensitive     GENTAMICIN <=0.5 ug/mL Sensitive     OXACILLIN 2 ug/mL Sensitive     TETRACYCLINE <=1 ug/mL Sensitive     Trimethoprim/Sulfa <=0.5/9.5 u... Sensitive     VANCOMYCIN <=0.5 ug/mL Sensitive           Radiographs were reviewed including AP, lateral and medial oblique views of the left great toe reveals a non-displaced fracture at the base of the distal phalanx of the great toe.    ASSESSMENT / PLAN:     ICD-10-CM    1. Cellulitis of great toe of left foot L03.032        I have explained to Fawad and his son about the conditions.  We discussed the nature of the condition as well as the treatment plan and expected length of recovery.  Due to the increased redness and swelling to the patient was switched to oral clindamycin 300 mg p.o. 3 times daily.  The patient was instructed to return in 1 week for reevaluation.  The patient was also instructed that at any point if any increased redness and swelling are noted or if the patient feels feverish that he report to the ER for further evaluation and treatment.  The patient was informed that he has an increased risk of possible amputation of the left great toe if the infection becomes more severe.    Fawad verbalized agreement with and understanding of the rational for the diagnosis and treatment plan.  All questions were answered to best of my ability and the patient's satisfaction. The patient was advised to contact the clinic with any questions that may arise after the clinic visit.      Disclaimer: This note consists of symbols derived from keyboarding, dictation and/or voice recognition software. As a result, there may be errors in the script that have gone undetected. Please consider this when interpreting information found in this chart.       LIANG Montesinos D.P.M., F.DELANEY.C.F.A.S.        Again, thank you for allowing me to participate in the care of your patient.         Sincerely,        Ethan Montesinos DPM

## 2019-12-13 ENCOUNTER — OFFICE VISIT (OUTPATIENT)
Dept: PODIATRY | Facility: CLINIC | Age: 84
End: 2019-12-13
Payer: COMMERCIAL

## 2019-12-13 VITALS
HEART RATE: 78 BPM | BODY MASS INDEX: 30.8 KG/M2 | HEIGHT: 71 IN | SYSTOLIC BLOOD PRESSURE: 134 MMHG | WEIGHT: 220 LBS | DIASTOLIC BLOOD PRESSURE: 86 MMHG

## 2019-12-13 DIAGNOSIS — L03.032 CELLULITIS OF GREAT TOE OF LEFT FOOT: Primary | ICD-10-CM

## 2019-12-13 DIAGNOSIS — S92.425B OPEN NONDISPLACED FRACTURE OF DISTAL PHALANX OF LEFT GREAT TOE, INITIAL ENCOUNTER: ICD-10-CM

## 2019-12-13 DIAGNOSIS — S91.209A AVULSION OF TOENAIL, INITIAL ENCOUNTER: ICD-10-CM

## 2019-12-13 PROCEDURE — 99213 OFFICE O/P EST LOW 20 MIN: CPT | Performed by: PODIATRIST

## 2019-12-13 RX ORDER — LEVOFLOXACIN 500 MG/1
500 TABLET, FILM COATED ORAL DAILY
Qty: 10 TABLET | Refills: 0 | Status: SHIPPED | OUTPATIENT
Start: 2019-12-13 | End: 2019-12-23

## 2019-12-13 ASSESSMENT — MIFFLIN-ST. JEOR: SCORE: 1715.04

## 2019-12-13 NOTE — LETTER
12/13/2019         RE: Fawad Garcia  [address]  [facility] [address]        Dear Colleague,    Thank you for referring your patient, Fawad Garcia, to the [facility]. Please see a copy of my visit note below.    Fawad returns to the office for reevaluation of the right foot.  The patient relates following the instructions given at the last visit with noted decreased redness and swelling.  The patient denies any fevers or chills.  The patient relates his blood sugars are averaging in the 120s.  The patient does relate forming a blister over the dorsal medial aspect of the left great toe.    PAST MEDICAL HISTORY:   Past Medical History:   Diagnosis Date     Diabetes mellitus (H)      Hypertension      Squamous cell carcinoma        BMI= Body mass index is 30.68 kg/m .        Physical Exam:    General: The patient appears to have a pleasant mental affect.    Lower extremity physical exam:  Neurovascular status is intact with palpable pedal pulses and intact epicritic sensations.  Muscular exam is within normal limits to major muscle groups.  Integument is intact.      One notes decreased edema.  One notes decreased erythema with remaining erythema around the left great toe.  One notes superficial blistering of the dorsal medial aspect of the left great toe.  Serous drainage noted.  The sutures are intact.       Assessment:      ICD-10-CM    1. Cellulitis of great toe of left foot L03.032    2. Avulsion of toenail, initial encounter S91.209A    3. Open nondisplaced fracture of distal phalanx of left great toe, initial encounter S92.425B    4. Type 2 diabetes mellitus with peripheral vascular disease (H) E11.51        Plan:  I have explained to Fwaad about the conditions.  At this time, the superficial blister was sharply debrided with a #10 blade.  The wound was dressed with sterile dressing.  The patient was instructed to cleanse the wound with Hibiclens and provide  daily or twice daily dressing changes as needed.  Additional antibiotic coverage consisting of Levaquin 500 mg p.o. daily for 10 days.  The patient will return in 1 week for reevaluation and repeat x-ray.    Disclaimer: This note consists of symbols derived from keyboarding, dictation and/or voice recognition software. As a result, there may be errors in the script that have gone undetected. Please consider this when interpreting information found in this chart.       MIMA Vela.PAMANDA., F.A.C.F.A.S.      Again, thank you for allowing me to participate in the care of your patient.        Sincerely,        Ethan Montesinos DPM

## 2019-12-13 NOTE — PROGRESS NOTES
Fawad returns to the office for reevaluation of the right foot.  The patient relates following the instructions given at the last visit with noted decreased redness and swelling.  The patient denies any fevers or chills.  The patient relates his blood sugars are averaging in the 120s.  The patient does relate forming a blister over the dorsal medial aspect of the left great toe.    PAST MEDICAL HISTORY:   Past Medical History:   Diagnosis Date     Diabetes mellitus (H)      Hypertension      Squamous cell carcinoma        BMI= Body mass index is 30.68 kg/m .        Physical Exam:    General: The patient appears to have a pleasant mental affect.    Lower extremity physical exam:  Neurovascular status is intact with palpable pedal pulses and intact epicritic sensations.  Muscular exam is within normal limits to major muscle groups.  Integument is intact.      One notes decreased edema.  One notes decreased erythema with remaining erythema around the left great toe.  One notes superficial blistering of the dorsal medial aspect of the left great toe.  Serous drainage noted.  The sutures are intact.       Assessment:      ICD-10-CM    1. Cellulitis of great toe of left foot L03.032    2. Avulsion of toenail, initial encounter S91.209A    3. Open nondisplaced fracture of distal phalanx of left great toe, initial encounter S92.425B    4. Type 2 diabetes mellitus with peripheral vascular disease (H) E11.51        Plan:  I have explained to Fawad about the conditions.  At this time, the superficial blister was sharply debrided with a #10 blade.  The wound was dressed with sterile dressing.  The patient was instructed to cleanse the wound with Hibiclens and provide daily or twice daily dressing changes as needed.  Additional antibiotic coverage consisting of Levaquin 500 mg p.o. daily for 10 days.  The patient will return in 1 week for reevaluation and repeat x-ray.    Disclaimer: This note consists of symbols derived  from keyboarding, dictation and/or voice recognition software. As a result, there may be errors in the script that have gone undetected. Please consider this when interpreting information found in this chart.       LIANG Montesinos D.P.M., FIDEL.F.A.S.

## 2019-12-13 NOTE — NURSING NOTE
"Chief Complaint   Patient presents with     RECHECK     Cellulitis of great toe of left foot, \"swelling\"       Initial BP (!) 159/68   Pulse (!) 41   Ht 1.803 m (5' 11\")   Wt 99.8 kg (220 lb)   BMI 30.68 kg/m   Estimated body mass index is 30.68 kg/m  as calculated from the following:    Height as of this encounter: 1.803 m (5' 11\").    Weight as of this encounter: 99.8 kg (220 lb).  Medications and allergies reviewed.      Dana GUILLERMO MA    "

## 2019-12-14 DIAGNOSIS — Z79.4 TYPE 2 DIABETES MELLITUS WITH BOTH EYES AFFECTED BY MILD NONPROLIFERATIVE RETINOPATHY WITHOUT MACULAR EDEMA, WITH LONG-TERM CURRENT USE OF INSULIN (H): ICD-10-CM

## 2019-12-14 DIAGNOSIS — E11.3293 TYPE 2 DIABETES MELLITUS WITH BOTH EYES AFFECTED BY MILD NONPROLIFERATIVE RETINOPATHY WITHOUT MACULAR EDEMA, WITH LONG-TERM CURRENT USE OF INSULIN (H): ICD-10-CM

## 2019-12-14 LAB — HBA1C MFR BLD: 8.1 % (ref 0–5.6)

## 2019-12-14 PROCEDURE — 83036 HEMOGLOBIN GLYCOSYLATED A1C: CPT | Performed by: FAMILY MEDICINE

## 2019-12-14 PROCEDURE — 36415 COLL VENOUS BLD VENIPUNCTURE: CPT | Performed by: FAMILY MEDICINE

## 2019-12-16 ENCOUNTER — OFFICE VISIT (OUTPATIENT)
Dept: DERMATOLOGY | Facility: CLINIC | Age: 84
End: 2019-12-16
Payer: COMMERCIAL

## 2019-12-16 VITALS — DIASTOLIC BLOOD PRESSURE: 53 MMHG | SYSTOLIC BLOOD PRESSURE: 124 MMHG | HEART RATE: 44 BPM | OXYGEN SATURATION: 99 %

## 2019-12-16 DIAGNOSIS — Z85.828 HISTORY OF SKIN CANCER: Primary | ICD-10-CM

## 2019-12-16 DIAGNOSIS — D23.9 DERMAL NEVUS: ICD-10-CM

## 2019-12-16 DIAGNOSIS — L82.1 SEBORRHEIC KERATOSIS: ICD-10-CM

## 2019-12-16 DIAGNOSIS — L81.4 LENTIGO: ICD-10-CM

## 2019-12-16 DIAGNOSIS — D18.01 ANGIOMA OF SKIN: ICD-10-CM

## 2019-12-16 PROCEDURE — 99214 OFFICE O/P EST MOD 30 MIN: CPT | Performed by: DERMATOLOGY

## 2019-12-16 NOTE — NURSING NOTE
"Chief Complaint   Patient presents with     Skin Check       Initial /53 (BP Location: Left arm, Patient Position: Chair, Cuff Size: Adult Regular)   Pulse (!) 44   SpO2 99%  Estimated body mass index is 30.68 kg/m  as calculated from the following:    Height as of 12/13/19: 1.803 m (5' 11\").    Weight as of 12/13/19: 99.8 kg (220 lb).  Medications and allergies reviewed.    Basilio GUILLERMO CMA ([facility])    "

## 2019-12-16 NOTE — LETTER
12/16/2019         RE: Fawad Garcia  [address]  [facility] [id number]        Dear Colleague,    Thank you for referring your patient, Fawad Garcia, to the [facility]. Please see a copy of my visit note below.    Fawad Garcia is a 84 year old year old male patient here today for hx of non-melanoma skin cancer  He notes rough spots on neck.   .  Patient states this has been present for a while.  Patient reports the following symptoms:  none.  Patient reports the following previous treatments none.  These treatments did not work.  Patient reports the following modifying factors none.  Associated symptoms: none.  Patient has no other skin complaints today.  Remainder of the HPI, Meds, PMH, Allergies, FH, and SH was reviewed in chart.      Past Medical History:   Diagnosis Date     Diabetes mellitus (H)      Hypertension      Squamous cell carcinoma        Past Surgical History:   Procedure Laterality Date     AMPUTATE TOE(S) Right 7/23/2019    Procedure: Partial amputation great toe;  Surgeon: Ethan Montesinos DPM;  Location: [facility] OR     [facility] SURGICAL PATHOLOGY  2002    prostate biopsy elevated PSA     COLONOSCOPY       HC REMOVE TONSILS/ADENOIDS,<11 Y/O      T & A     IR LOWER EXTREMITY ANGIOGRAM RIGHT  12/11/2018     PHACOEMULSIFICATION WITH STANDARD INTRAOCULAR LENS IMPLANT Left 4/2/2015    Procedure: PHACOEMULSIFICATION WITH STANDARD INTRAOCULAR LENS IMPLANT;  Surgeon: Joseph Hernandez MD;  Location: [facility] OR     PHACOEMULSIFICATION WITH STANDARD INTRAOCULAR LENS IMPLANT Right 5/4/2015    Procedure: PHACOEMULSIFICATION WITH STANDARD INTRAOCULAR LENS IMPLANT;  Surgeon: Joseph Hernandez MD;  Location: [facility] OR     REPAIR HAMMER TOE Left 4/2/2019    Procedure: 5th Metatarsal Head Resection;  Surgeon: Ethan Montesinos DPM;  Location: [facility] OR        Family History   Problem Relation Age of Onset     Cancer Mother         intestinal CA     Diabetes Father       Diabetes Brother      Other Cancer Son         meagan tumor     Other Cancer Brother         pancreatic cancer     Diabetes Son      Melanoma No family hx of        Social History     Socioeconomic History     Marital status:      Spouse name: Not on file     Number of children: Not on file     Years of education: Not on file     Highest education level: Not on file   Occupational History     Not on file   Social Needs     Financial resource strain: Not on file     Food insecurity:     Worry: Not on file     Inability: Not on file     Transportation needs:     Medical: Not on file     Non-medical: Not on file   Tobacco Use     Smoking status: Never Smoker     Smokeless tobacco: Never Used   Substance and Sexual Activity     Alcohol use: Yes     Alcohol/week: 1.7 standard drinks     Comment: drinks rarely     Drug use: No     Sexual activity: Not Currently     Partners: Female   Lifestyle     Physical activity:     Days per week: Not on file     Minutes per session: Not on file     Stress: Not on file   Relationships     Social connections:     Talks on phone: Not on file     Gets together: Not on file     Attends [employer] service: Not on file     Active member of club or organization: Not on file     Attends meetings of clubs or organizations: Not on file     Relationship status: Not on file     Intimate partner violence:     Fear of current or ex partner: Not on file     Emotionally abused: Not on file     Physically abused: Not on file     Forced sexual activity: Not on file   Other Topics Concern     Parent/sibling w/ CABG, MI or angioplasty before 65F 55M? No   Social History Narrative     Not on file       Outpatient Encounter Medications as of 12/16/2019   Medication Sig Dispense Refill     amLODIPine (NORVASC) 5 MG tablet Take 1 tablet (5 mg) by mouth daily 90 tablet 3     Blood Glucose Monitoring Suppl (ONE TOUCH ULTRA SYSTEM KIT) W/DEVICE KIT        cephALEXin (KEFLEX) 500 MG capsule Take 1 capsule  (500 mg) by mouth 3 times daily 30 capsule 0     dorzolamide-timolol (COSOPT) 2-0.5 % ophthalmic solution        hydrochlorothiazide (HYDRODIURIL) 25 MG tablet Take 1 tablet (25 mg) by mouth daily Hold on file until needed 90 tablet 3     insulin glargine (LANTUS SOLOSTAR) 100 UNIT/ML pen Inject 20 Units Subcutaneous At Bedtime Hold on file until needed 21 mL 3     insulin pen needle (B-D U/F) 31G X 8 MM miscellaneous 1 Device daily Use once daily or as directed. 100 each 3     levofloxacin (LEVAQUIN) 500 MG tablet Take 1 tablet (500 mg) by mouth daily for 10 days 10 tablet 0     lisinopril (PRINIVIL/ZESTRIL) 40 MG tablet Take 1 tablet (40 mg) by mouth daily Hold on file until needed 90 tablet 3     metFORMIN (GLUCOPHAGE) 500 MG tablet Take 2 tablets (1,000 mg) by mouth 2 times daily (with meals) Hold on file until needed 360 tablet 3     Multiple Vitamins-Minerals (PRESERVISION AREDS PO) Take 1 tablet by mouth       omeprazole (PRILOSEC) 20 MG DR capsule Take 1 capsule (20 mg) by mouth daily Hold on file until needed 90 capsule 3     ONETOUCH LANCETS MISC 1 Device daily One touch delica lancets, what ever is covered by insurance. 100 each 5     ONETOUCH ULTRA test strip 1 strip by In Vitro route daily One touch Ultra. 100 strip 1     ORDER FOR DME Equipment being ordered:   Glucometer 1 Device 1     pravastatin (PRAVACHOL) 10 MG tablet Take 1 tablet (10 mg) by mouth daily Hold on file until needed 90 tablet 3     [] clindamycin (CLEOCIN) 300 MG capsule Take 1 capsule (300 mg) by mouth 3 times daily for 10 days 30 capsule 0     tamsulosin (FLOMAX) 0.4 MG capsule Take 1 capsule (0.4 mg) by mouth daily (Patient not taking: Reported on 2019) 30 capsule 11     No facility-administered encounter medications on file as of 2019.              Review Of Systems  Skin: As above  Eyes: negative  Ears/Nose/Throat: negative  Respiratory: No shortness of breath, dyspnea on exertion, cough, or  hemoptysis  Cardiovascular: negative  Gastrointestinal: negative  Genitourinary: negative  Musculoskeletal: negative  Neurologic: negative  Psychiatric: negative  Hematologic/Lymphatic/Immunologic: negative  Endocrine: negative      O:   NAD, WDWN, Alert & Oriented, Mood & Affect wnl, Vitals stable   Here today alone   /53 (BP Location: Left arm, Patient Position: Chair, Cuff Size: Adult Regular)   Pulse (!) 44   SpO2 99%    General appearance normal   Vitals stable   Alert, oriented and in no acute distress      Following lymph nodes palpated: Occipital, Cervical, Supraclavicular no lad   L ear well healed  Neck with stuck o nappules      Stuck on papules and brown macules on trunk and ext   Red papules on trunk  Flesh colored papules on trunk     The remainder of the full exam was unremarkable; the following areas were examined:  conjunctiva/lids, oral mucosa, neck, peripheral vascular system, abdomen, lymph nodes, digits/nails, eccrine and apocrine glands, scalp/hair, face, neck, chest, abdomen, buttocks, back, RUE, LUE, RLE, LLE       Eyes: Conjunctivae/lids:Normal     ENT: Lips, buccal mucosa, tongue: normal    MSK:Normal    Cardiovascular: peripheral edema 2+    Pulm: Breathing Normal    Lymph Nodes: No Head and Neck Lymphadenopathy     Neuro/Psych: Orientation:Alert and Orientedx3 ; Mood/Affect:normal       A/P:  1. Seborrheic keratosis, lentigo, angioma, dermal nevus, hx of non-melanoma skin cancer  2. Edema  Compression discussed with patient he will follow up with pcp   BENIGN LESIONS DISCUSSED WITH PATIENT:  I discussed the specifics of tumor, prognosis, and genetics of benign lesions.  I explained that treatment of these lesions would be purely cosmetic and not medically neccessary.  I discussed with patient different removal options including excision, cautery and /or laser.      Nature and genetics of benign skin lesions dicussed with patient.  Signs and Symptoms of skin cancer discussed with  patient.  ABCDEs of melanoma reviewed with patient.  Patient encouraged to perform monthly skin exams.  UV precautions reviewed with patient.  Patient to follow up with Primary Care provider regarding elevated blood pressure.  Skin care regimen reviewed with patient: Eliminate harsh soaps, i.e. Dial, zest, irsih spring; Mild soaps such as Cetaphil or Dove sensitive skin, avoid hot or cold showers, aggressive use of emollients including vanicream, cetaphil or cerave discussed with patient.    Risks of non-melanoma skin cancer discussed with patient   Return to clinic 12 months      Again, thank you for allowing me to participate in the care of your patient.        Sincerely,        Evgeny Torres MD

## 2019-12-16 NOTE — PROGRESS NOTES
Fawad Garcia is a 84 year old year old male patient here today for hx of non-melanoma skin cancer  He notes rough spots on neck.   .  Patient states this has been present for a while.  Patient reports the following symptoms:  none.  Patient reports the following previous treatments none.  These treatments did not work.  Patient reports the following modifying factors none.  Associated symptoms: none.  Patient has no other skin complaints today.  Remainder of the HPI, Meds, PMH, Allergies, FH, and SH was reviewed in chart.      Past Medical History:   Diagnosis Date     Diabetes mellitus (H)      Hypertension      Squamous cell carcinoma        Past Surgical History:   Procedure Laterality Date     AMPUTATE TOE(S) Right 7/23/2019    Procedure: Partial amputation great toe;  Surgeon: Ethan Montesinos DPM;  Location: [facility] OR     [facility] SURGICAL PATHOLOGY  2002    prostate biopsy elevated PSA     COLONOSCOPY       HC REMOVE TONSILS/ADENOIDS,<13 Y/O      T & A     IR LOWER EXTREMITY ANGIOGRAM RIGHT  12/11/2018     PHACOEMULSIFICATION WITH STANDARD INTRAOCULAR LENS IMPLANT Left 4/2/2015    Procedure: PHACOEMULSIFICATION WITH STANDARD INTRAOCULAR LENS IMPLANT;  Surgeon: Joseph Hernandez MD;  Location: [facility] OR     PHACOEMULSIFICATION WITH STANDARD INTRAOCULAR LENS IMPLANT Right 5/4/2015    Procedure: PHACOEMULSIFICATION WITH STANDARD INTRAOCULAR LENS IMPLANT;  Surgeon: Joseph Hernandez MD;  Location: [facility] OR     REPAIR HAMMER TOE Left 4/2/2019    Procedure: 5th Metatarsal Head Resection;  Surgeon: Ethan Montesinos DPM;  Location: [facility] OR        Family History   Problem Relation Age of Onset     Cancer Mother         intestinal CA     Diabetes Father      Diabetes Brother      Other Cancer Son         meagan tumor     Other Cancer Brother         pancreatic cancer     Diabetes Son      Melanoma No family hx of        Social History     Socioeconomic History     Marital status:      Spouse name: Not on  file     Number of children: Not on file     Years of education: Not on file     Highest education level: Not on file   Occupational History     Not on file   Social Needs     Financial resource strain: Not on file     Food insecurity:     Worry: Not on file     Inability: Not on file     Transportation needs:     Medical: Not on file     Non-medical: Not on file   Tobacco Use     Smoking status: Never Smoker     Smokeless tobacco: Never Used   Substance and Sexual Activity     Alcohol use: Yes     Alcohol/week: 1.7 standard drinks     Comment: drinks rarely     Drug use: No     Sexual activity: Not Currently     Partners: Female   Lifestyle     Physical activity:     Days per week: Not on file     Minutes per session: Not on file     Stress: Not on file   Relationships     Social connections:     Talks on phone: Not on file     Gets together: Not on file     Attends [employer] service: Not on file     Active member of club or organization: Not on file     Attends meetings of clubs or organizations: Not on file     Relationship status: Not on file     Intimate partner violence:     Fear of current or ex partner: Not on file     Emotionally abused: Not on file     Physically abused: Not on file     Forced sexual activity: Not on file   Other Topics Concern     Parent/sibling w/ CABG, MI or angioplasty before 65F 55M? No   Social History Narrative     Not on file       Outpatient Encounter Medications as of 12/16/2019   Medication Sig Dispense Refill     amLODIPine (NORVASC) 5 MG tablet Take 1 tablet (5 mg) by mouth daily 90 tablet 3     Blood Glucose Monitoring Suppl (ONE TOUCH ULTRA SYSTEM KIT) W/DEVICE KIT        cephALEXin (KEFLEX) 500 MG capsule Take 1 capsule (500 mg) by mouth 3 times daily 30 capsule 0     dorzolamide-timolol (COSOPT) 2-0.5 % ophthalmic solution        hydrochlorothiazide (HYDRODIURIL) 25 MG tablet Take 1 tablet (25 mg) by mouth daily Hold on file until needed 90 tablet 3     insulin glargine  (LANTUS SOLOSTAR) 100 UNIT/ML pen Inject 20 Units Subcutaneous At Bedtime Hold on file until needed 21 mL 3     insulin pen needle (B-D U/F) 31G X 8 MM miscellaneous 1 Device daily Use once daily or as directed. 100 each 3     levofloxacin (LEVAQUIN) 500 MG tablet Take 1 tablet (500 mg) by mouth daily for 10 days 10 tablet 0     lisinopril (PRINIVIL/ZESTRIL) 40 MG tablet Take 1 tablet (40 mg) by mouth daily Hold on file until needed 90 tablet 3     metFORMIN (GLUCOPHAGE) 500 MG tablet Take 2 tablets (1,000 mg) by mouth 2 times daily (with meals) Hold on file until needed 360 tablet 3     Multiple Vitamins-Minerals (PRESERVISION AREDS PO) Take 1 tablet by mouth       omeprazole (PRILOSEC) 20 MG DR capsule Take 1 capsule (20 mg) by mouth daily Hold on file until needed 90 capsule 3     ONETOUCH LANCETS MISC 1 Device daily One touch delica lancets, what ever is covered by insurance. 100 each 5     ONETOUCH ULTRA test strip 1 strip by In Vitro route daily One touch Ultra. 100 strip 1     ORDER FOR DME Equipment being ordered:   Glucometer 1 Device 1     pravastatin (PRAVACHOL) 10 MG tablet Take 1 tablet (10 mg) by mouth daily Hold on file until needed 90 tablet 3     [] clindamycin (CLEOCIN) 300 MG capsule Take 1 capsule (300 mg) by mouth 3 times daily for 10 days 30 capsule 0     tamsulosin (FLOMAX) 0.4 MG capsule Take 1 capsule (0.4 mg) by mouth daily (Patient not taking: Reported on 2019) 30 capsule 11     No facility-administered encounter medications on file as of 2019.              Review Of Systems  Skin: As above  Eyes: negative  Ears/Nose/Throat: negative  Respiratory: No shortness of breath, dyspnea on exertion, cough, or hemoptysis  Cardiovascular: negative  Gastrointestinal: negative  Genitourinary: negative  Musculoskeletal: negative  Neurologic: negative  Psychiatric: negative  Hematologic/Lymphatic/Immunologic: negative  Endocrine: negative      O:   NAD, WDWN, Alert & Oriented, Mood &  Affect wnl, Vitals stable   Here today alone   /53 (BP Location: Left arm, Patient Position: Chair, Cuff Size: Adult Regular)   Pulse (!) 44   SpO2 99%    General appearance normal   Vitals stable   Alert, oriented and in no acute distress      Following lymph nodes palpated: Occipital, Cervical, Supraclavicular no lad   L ear well healed  Neck with stuck o nappules      Stuck on papules and brown macules on trunk and ext   Red papules on trunk  Flesh colored papules on trunk     The remainder of the full exam was unremarkable; the following areas were examined:  conjunctiva/lids, oral mucosa, neck, peripheral vascular system, abdomen, lymph nodes, digits/nails, eccrine and apocrine glands, scalp/hair, face, neck, chest, abdomen, buttocks, back, RUE, LUE, RLE, LLE       Eyes: Conjunctivae/lids:Normal     ENT: Lips, buccal mucosa, tongue: normal    MSK:Normal    Cardiovascular: peripheral edema 2+    Pulm: Breathing Normal    Lymph Nodes: No Head and Neck Lymphadenopathy     Neuro/Psych: Orientation:Alert and Orientedx3 ; Mood/Affect:normal       A/P:  1. Seborrheic keratosis, lentigo, angioma, dermal nevus, hx of non-melanoma skin cancer  2. Edema  Compression discussed with patient he will follow up with pcp   BENIGN LESIONS DISCUSSED WITH PATIENT:  I discussed the specifics of tumor, prognosis, and genetics of benign lesions.  I explained that treatment of these lesions would be purely cosmetic and not medically neccessary.  I discussed with patient different removal options including excision, cautery and /or laser.      Nature and genetics of benign skin lesions dicussed with patient.  Signs and Symptoms of skin cancer discussed with patient.  ABCDEs of melanoma reviewed with patient.  Patient encouraged to perform monthly skin exams.  UV precautions reviewed with patient.  Patient to follow up with Primary Care provider regarding elevated blood pressure.  Skin care regimen reviewed with patient:  Eliminate harsh soaps, i.e. Dial, zest, irsih spring; Mild soaps such as Cetaphil or Dove sensitive skin, avoid hot or cold showers, aggressive use of emollients including vanicream, cetaphil or cerave discussed with patient.    Risks of non-melanoma skin cancer discussed with patient   Return to clinic 12 months

## 2019-12-17 ASSESSMENT — ENCOUNTER SYMPTOMS
HEARTBURN: 0
SORE THROAT: 0
FEVER: 0
HEMATURIA: 0
FREQUENCY: 0
CONSTIPATION: 0
DIARRHEA: 0
NERVOUS/ANXIOUS: 0
COUGH: 0
DYSURIA: 0
WEAKNESS: 0
JOINT SWELLING: 0
DIZZINESS: 0
NAUSEA: 0
MYALGIAS: 0
SHORTNESS OF BREATH: 0
HEMATOCHEZIA: 0
ABDOMINAL PAIN: 0
CHILLS: 0
HEADACHES: 0
PARESTHESIAS: 0
EYE PAIN: 0
ARTHRALGIAS: 0
PALPITATIONS: 0

## 2019-12-17 ASSESSMENT — ACTIVITIES OF DAILY LIVING (ADL): CURRENT_FUNCTION: TRANSPORTATION REQUIRES ASSISTANCE

## 2019-12-18 ENCOUNTER — OFFICE VISIT (OUTPATIENT)
Dept: FAMILY MEDICINE | Facility: CLINIC | Age: 84
End: 2019-12-18
Payer: COMMERCIAL

## 2019-12-18 VITALS
OXYGEN SATURATION: 98 % | WEIGHT: 220 LBS | HEIGHT: 71 IN | BODY MASS INDEX: 30.8 KG/M2 | SYSTOLIC BLOOD PRESSURE: 120 MMHG | TEMPERATURE: 97.5 F | DIASTOLIC BLOOD PRESSURE: 68 MMHG | HEART RATE: 80 BPM

## 2019-12-18 DIAGNOSIS — E78.5 HYPERLIPIDEMIA LDL GOAL <70: ICD-10-CM

## 2019-12-18 DIAGNOSIS — E11.3293 TYPE 2 DIABETES MELLITUS WITH BOTH EYES AFFECTED BY MILD NONPROLIFERATIVE RETINOPATHY WITHOUT MACULAR EDEMA, WITH LONG-TERM CURRENT USE OF INSULIN (H): ICD-10-CM

## 2019-12-18 DIAGNOSIS — E11.65 UNCONTROLLED TYPE 2 DIABETES MELLITUS WITH HYPERGLYCEMIA (H): ICD-10-CM

## 2019-12-18 DIAGNOSIS — L03.032 CELLULITIS OF GREAT TOE, LEFT: ICD-10-CM

## 2019-12-18 DIAGNOSIS — I49.9 IRREGULAR HEART BEAT: ICD-10-CM

## 2019-12-18 DIAGNOSIS — K21.9 GASTROESOPHAGEAL REFLUX DISEASE WITHOUT ESOPHAGITIS: ICD-10-CM

## 2019-12-18 DIAGNOSIS — I10 ESSENTIAL HYPERTENSION, BENIGN: ICD-10-CM

## 2019-12-18 DIAGNOSIS — Z00.00 ENCOUNTER FOR MEDICARE ANNUAL WELLNESS EXAM: Primary | ICD-10-CM

## 2019-12-18 DIAGNOSIS — Z79.4 TYPE 2 DIABETES MELLITUS WITHOUT COMPLICATION, WITH LONG-TERM CURRENT USE OF INSULIN (H): ICD-10-CM

## 2019-12-18 DIAGNOSIS — Z79.4 TYPE 2 DIABETES MELLITUS WITH BOTH EYES AFFECTED BY MILD NONPROLIFERATIVE RETINOPATHY WITHOUT MACULAR EDEMA, WITH LONG-TERM CURRENT USE OF INSULIN (H): ICD-10-CM

## 2019-12-18 DIAGNOSIS — E11.9 TYPE 2 DIABETES MELLITUS WITHOUT COMPLICATION, WITH LONG-TERM CURRENT USE OF INSULIN (H): ICD-10-CM

## 2019-12-18 DIAGNOSIS — I10 HYPERTENSION, GOAL BELOW 140/90: ICD-10-CM

## 2019-12-18 PROCEDURE — 93000 ELECTROCARDIOGRAM COMPLETE: CPT | Performed by: FAMILY MEDICINE

## 2019-12-18 PROCEDURE — G0438 PPPS, INITIAL VISIT: HCPCS | Performed by: FAMILY MEDICINE

## 2019-12-18 PROCEDURE — 99214 OFFICE O/P EST MOD 30 MIN: CPT | Mod: 25 | Performed by: FAMILY MEDICINE

## 2019-12-18 RX ORDER — HYDROCHLOROTHIAZIDE 25 MG/1
25 TABLET ORAL DAILY
Qty: 90 TABLET | Refills: 3 | Status: SHIPPED | OUTPATIENT
Start: 2019-12-18 | End: 2021-01-04

## 2019-12-18 RX ORDER — AMLODIPINE BESYLATE 5 MG/1
5 TABLET ORAL DAILY
Qty: 90 TABLET | Refills: 3 | Status: SHIPPED | OUTPATIENT
Start: 2019-12-18 | End: 2021-01-04

## 2019-12-18 RX ORDER — LISINOPRIL 40 MG/1
40 TABLET ORAL DAILY
Qty: 90 TABLET | Refills: 3 | Status: SHIPPED | OUTPATIENT
Start: 2019-12-18 | End: 2021-01-04

## 2019-12-18 RX ORDER — PRAVASTATIN SODIUM 10 MG
10 TABLET ORAL DAILY
Qty: 90 TABLET | Refills: 3 | Status: ON HOLD | OUTPATIENT
Start: 2019-12-18 | End: 2020-02-26

## 2019-12-18 ASSESSMENT — ENCOUNTER SYMPTOMS
EYE PAIN: 0
HEMATURIA: 0
JOINT SWELLING: 0
HEADACHES: 0
NAUSEA: 0
CONSTIPATION: 0
WEAKNESS: 0
MYALGIAS: 0
DIZZINESS: 0
DIARRHEA: 0
FREQUENCY: 0
NERVOUS/ANXIOUS: 0
DYSURIA: 0
ARTHRALGIAS: 0
PARESTHESIAS: 0
SHORTNESS OF BREATH: 0
ABDOMINAL PAIN: 0
HEARTBURN: 0
PALPITATIONS: 0
HEMATOCHEZIA: 0
CHILLS: 0
SORE THROAT: 0
FEVER: 0
COUGH: 0

## 2019-12-18 ASSESSMENT — MIFFLIN-ST. JEOR: SCORE: 1715.04

## 2019-12-18 ASSESSMENT — ACTIVITIES OF DAILY LIVING (ADL): CURRENT_FUNCTION: TRANSPORTATION REQUIRES ASSISTANCE

## 2019-12-18 NOTE — PATIENT INSTRUCTIONS
Your diabetes is not controlled.    I am increasing your lantus medication to 23 units a day.    Please make a fasting lab visit in 3 months to recheck your diabetes and also your cholesterol.        Please be aware that there will be an additional charge during your preventative visit due to either a new diagnosis and/or chronic disease management.    Preventative visits screen for diseases prior to they occur.  They do not cover for any new diagnosis or chronic disease management.     If you have questions regarding your coverage please check with your insurance provider.  At [facility] we need to code correctly to be in compliance with all insurance companies.          Thank you for choosing [facility].  You may be receiving an email and/or telephone survey request from [facility] Customer Experience regarding your visit today.  Please take a few minutes to respond to the survey to let us know how we are doing.      If you have questions or concerns, please contact us via [employer] or you can contact your care team at [phone].    Our Clinic hours are:  Monday 6:40 am  to 7:00 pm  Tuesday -Friday 6:40 am to 5:00 pm    The [place] outpatient lab hours are:  Monday - Friday 6:10 am to 4:45 pm  Saturdays 7:00 am to 11:00 am  Appointments are required, call [phone]    If you have clinical questions after hours or would like to schedule an appointment,  call the clinic at [phone].    Patient Education   Personalized Prevention Plan  You are due for the preventive services outlined below.  Your care team is available to assist you in scheduling these services.  If you have already completed any of these items, please share that information with your care team to update in your medical record.  Health Maintenance Due   Topic Date Due     Zoster (Shingles) Vaccine (1 of 2) 12/04/1986     Annual Wellness Visit  09/13/2011     Discuss Advance Care Planning  02/24/2017     Diabetic Foot Exam  04/04/2019      Cholesterol Lab  12/11/2019     Eye Exam  01/04/2020        Patient Education   Personalized Prevention Plan  You are due for the preventive services outlined below.  Your care team is available to assist you in scheduling these services.  If you have already completed any of these items, please share that information with your care team to update in your medical record.  Health Maintenance Due   Topic Date Due     Zoster (Shingles) Vaccine (1 of 2) 12/04/1986     Annual Wellness Visit  09/13/2011     Discuss Advance Care Planning  02/24/2017     Diabetic Foot Exam  04/04/2019     Cholesterol Lab  12/11/2019     Eye Exam  01/04/2020       Understanding Gist MyPlate  The USDA (U.S. Department of Agriculture) has guidelines to help you make healthy food choices. These are called MyPlate. MyPlate shows the food groups that make up healthy meals using the image of a place setting. Before you eat, think about the healthiest choices for what to put onto your plate or into your cup or bowl. To learn more about building a healthy plate, visit www.choosemyplate.gov.    The food groups    Fruits. Any fruit or 100% fruit juice counts as part of the Fruit Group. Fruits may be fresh, canned, frozen, or dried, and may be whole, cut-up, or pureed. Make half your plate fruits and vegetables.    Vegetables. Any vegetable or 100% vegetable juice counts as a member of the Vegetable Group. Vegetables may be fresh, frozen, canned, or dried. They can be served raw or cooked and may be whole, cut-up, or mashed. Make half your plate fruits and vegetables.    Grains. All foods made from grains are part of the Grains Group. These include wheat, rice, oats, cornmeal, and barley such as bread, pasta, oatmeal, cereal, tortillas, and grits. Grains should be no more than a quarter of your plate. At least half of your grains should be whole grains.    Protein. This group includes meat, poultry, seafood, beans and peas, eggs, processed soy products  (like tofu), nuts (including nut butters), and seeds. Make protein choices no more than a quarter of your plate. Meat and poultry choices should be lean or low fat.    Dairy. All fluid milk products and foods made from milk that contain calcium, like yogurt and cheese, are part of the Dairy Group. (Foods that have little calcium, such as cream, butter, and cream cheese, are not part of the group.) Most dairy choices should be low-fat or fat-free.    Oils. These are fats that are liquid at room temperature. They include canola, corn, olive, soybean, and sunflower oil. Foods that are mainly oil include mayonnaise, certain salad dressings, and soft margarines. You should have only 5 to 7 teaspoons of oils a day. You probably already get this much from the food you eat.  Date Last Reviewed: 8/1/2017 2000-2018 [employer]. [address] [place] [address]. All rights reserved. This information is not intended as a substitute for professional medical care. Always follow your healthcare professional's instructions.        Activities of Daily Living    Your Health Risk Assessment indicates you have difficulties with activities of daily living such as housework, bathing, preparing meals, taking medication, etc. Please make a follow up appointment for us to address this issue in more detail.    Signs of Hearing Loss     Hearing much better with one ear can be a sign of hearing loss.     Hearing loss is a problem shared by many people. In fact, it is one of the most common health conditions, particularly as people age. Most people over age 65 have some hearing loss, and by age 80, almost everyone does. Because hearing loss usually occurs slowly over the years, you may not realize your hearing ability has gotten worse.  Have your hearing checked  Contact your healthcare provider if you:    Have to strain to hear normal conversation    Have to watch other people s faces very carefully to follow what  they re saying    Need to ask people to repeat what they ve said    Often misunderstand what people are saying    Turn the volume of the television or radio up so high that others complain    Feel that people are mumbling when they re talking to you    Find that the effort to hear leaves you feeling tired and irritated    Notice, when using the phone, that you hear better with one ear than the other  Date Last Reviewed: 12/1/2016 2000-2018 [employer] [address] [place] [address]. All rights reserved. This information is not intended as a substitute for professional medical care. Always follow your healthcare professional's instructions.

## 2019-12-18 NOTE — PROGRESS NOTES
"ekgSUBJECTIVE:   Fawad Garcia is a 84 year old male who presents for Preventive Visit.  Are you in the first 12 months of your Medicare coverage?  No    Healthy Habits:     In general, how would you rate your overall health?  Good    Frequency of exercise:  4-5 days/week    Duration of exercise:  N/A    Do you usually eat at least 4 servings of fruit and vegetables a day, include whole grains    & fiber and avoid regularly eating high fat or \"junk\" foods?  No    Taking medications regularly:  Yes    Medication side effects:  Not applicable    Ability to successfully perform activities of daily living:  Transportation requires assistance    Home Safety:  No safety concerns identified    Hearing Impairment:  Difficulty following a conversation in a noisy restaurant or crowded room, feel that people are mumbling or not speaking clearly, difficult to understand a speaker at a public meeting or Confucianist service, need to ask people to speak up or repeat themselves and difficulty understanding soft or whispered speech    In the past 6 months, have you been bothered by leaking of urine?  No    In general, how would you rate your overall mental or emotional health?  Good      PHQ-2 Total Score: 0    Additional concerns today:  No    Do you feel safe in your environment? Yes    Have you ever done Advance Care Planning? (For example, a Health Directive, POLST, or a discussion with a medical provider or your loved ones about your wishes): No, advance care planning information given to patient to review.  Advanced care planning was discussed at today's visit.    There was a concern about his pulse rate was low in dermatology.  Today it is fine.  No resp or cardiac symptoms.  No prior h/o with heart arrhythmias.      Will need medication refills. No side effects.      Fall risk  Fallen 2 or more times in the past year?: No  Any fall with injury in the past year?: No    Cognitive Screening:  Unable to do screening due to " vision impairment.    Diabetes Follow-up    How often are you checking your blood sugar? One time daily  What time of day are you checking your blood sugars (select all that apply)?  Before meals  Have you had any blood sugars above 200?  No  Have you had any blood sugars below 70?  No    What symptoms do you notice when your blood sugar is low?  Shaky, Weak and sweaty    What concerns do you have today about your diabetes? None     Do you have any of these symptoms? (Select all that apply)  Redness, sores, or blisters on feet and Blurry vision. Sees podiatry and opthalmology     Have you had a diabetic eye exam in the last 12 months? Yes- Date of last eye exam: 2 months ago. also goes to retina center    Diabetes Management Resources    Hyperlipidemia Follow-Up      Are you regularly taking any medication or supplement to lower your cholesterol?   Yes- pravastatin    Are you having muscle aches or other side effects that you think could be caused by your cholesterol lowering medication?  No    Hypertension Follow-up      Do you check your blood pressure regularly outside of the clinic? No     Are you following a low salt diet? No    Are your blood pressures ever more than 140 on the top number (systolic) OR more   than 90 on the bottom number (diastolic), for example 140/90? na    BP Readings from Last 2 Encounters:   12/18/19 120/68   12/16/19 124/53     Hemoglobin A1C (%)   Date Value   12/14/2019 8.1 (H)   09/12/2019 6.7 (H)     LDL Cholesterol Calculated (mg/dL)   Date Value   12/11/2018 107 (H)   07/30/2018 108 (H)     Reviewed and updated as needed this visit by clinical staff  Tobacco  Allergies  Meds  Problems  Med Hx  Surg Hx  Fam Hx  Soc Hx        Reviewed and updated as needed this visit by Provider  Tobacco  Allergies  Meds  Problems  Med Hx  Surg Hx  Fam Hx        Social History     Tobacco Use     Smoking status: Never Smoker     Smokeless tobacco: Never Used   Substance Use Topics      Alcohol use: Yes     Alcohol/week: 1.7 standard drinks     Comment: very little       Alcohol Use 12/17/2019   Prescreen: >3 drinks/day or >7 drinks/week? No             Current providers sharing in care for this patient include:   Patient Care Team:  Chris Scott MD as PCP - General (Family Practice)  Stewart Mcintyre MD as MD (Urology)  Chris Scott MD as Assigned PCP  Kieran Hua MD as MD (Radiation Oncology)    The following health maintenance items are reviewed in Epic and correct as of today:  Health Maintenance   Topic Date Due     ZOSTER IMMUNIZATION (1 of 2) 12/04/1986     MEDICARE ANNUAL WELLNESS VISIT  09/13/2011     ADVANCE CARE PLANNING  02/24/2017     DIABETIC FOOT EXAM  04/04/2019     LIPID  12/11/2019     EYE EXAM  01/04/2020     TSH W/FREE T4 REFLEX  03/27/2020     BMP  04/01/2020     FALL RISK ASSESSMENT  04/10/2020     A1C  06/14/2020     MICROALBUMIN  09/18/2020     DTAP/TDAP/TD IMMUNIZATION (3 - Td) 04/17/2024     PHQ-2  Completed     INFLUENZA VACCINE  Completed     PNEUMOCOCCAL IMMUNIZATION 65+ LOW/MEDIUM RISK  Completed     IPV IMMUNIZATION  Aged Out     MENINGITIS IMMUNIZATION  Aged Out           Review of Systems   Constitutional: Negative for chills and fever.   HENT: Negative for congestion, ear pain and sore throat.    Eyes: Negative for pain and visual disturbance.   Respiratory: Negative for cough and shortness of breath.    Cardiovascular: Positive for peripheral edema. Negative for chest pain and palpitations.   Gastrointestinal: Negative for abdominal pain, constipation, diarrhea, heartburn, hematochezia and nausea.   Genitourinary: Negative for discharge, dysuria, frequency, genital sores, hematuria, impotence and urgency.   Musculoskeletal: Negative for arthralgias, joint swelling and myalgias.   Skin: Negative for rash.   Neurological: Negative for dizziness, weakness, headaches and paresthesias.   Psychiatric/Behavioral: Negative for mood changes.  "The patient is not nervous/anxious.          OBJECTIVE:   /68   Pulse 80   Temp 97.5  F (36.4  C) (Tympanic)   Ht 1.803 m (5' 11\")   Wt 99.8 kg (220 lb)   SpO2 98%   BMI 30.68 kg/m   Estimated body mass index is 30.68 kg/m  as calculated from the following:    Height as of this encounter: 1.803 m (5' 11\").    Weight as of this encounter: 99.8 kg (220 lb).  Physical Exam  GENERAL APPEARANCE: alert, no distress and cooperative  HENT: ear canals and TM's normal and nose and mouth without ulcers or lesions  NECK: no adenopathy, no asymmetry, masses, or scars and thyroid normal to palpation  RESP: lungs clear to auscultation - no rales, rhonchi or wheezes  CV: heart has irregular rate will get ekg, no murmur  ABDOMEN: soft, nontender, without hepatosplenomegaly or masses and bowel sounds normal  MS: noted left ankle was swollen, left great toe is bandage has some redness at base, followed by podiatry for cellulitis on levaquin and clinidamycin  SKIN: as above  NEURO: Normal strength and tone, mentation intact and speech normal  DIABETIC FOOT EXAM: normal DP and PT pulses, no trophic changes or ulcerative lesions, noted toe amputations, on each foot has no sensation with monofilament test at toes and plantar surface, not until ankle does he have sensation.  PSYCH: mentation appears normal and affect normal/bright         EKG Interpretation:      Interpreted by Chris Scott MD  Time reviewed:1712   Symptoms at time of EKG: None   Rhythm: Normal sinus   Rate: Normal  Axis: Normal  Ectopy: None  Conduction: Normal  ST Segments/ T Waves: No ST-T wave changes and No acute ischemic changes  Q Waves: None  Comparison to prior: Unchanged    Clinical Impression: normal EKG        ASSESSMENT / PLAN:   (Z00.00) Encounter for Medicare annual wellness exam  (primary encounter diagnosis)  Comment: Discussed healthy lifestyle and preventative cares.    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, with long-term current use of insulin (H)  Comment: not controlled and increase medication  Plan: Hemoglobin A1c, insulin glargine (LANTUS         SOLOSTAR) 100 UNIT/ML pen            (E11.65) Uncontrolled type 2 diabetes mellitus with hyperglycemia (H)  Comment:   Plan: Hemoglobin A1c            (I10) Hypertension, goal below 140/90  Comment: controlled  Plan:     (E78.5) Hyperlipidemia LDL goal <70  Comment: will check lab in 3 months  Plan: Lipid panel reflex to direct LDL Fasting            (L03.032) Cellulitis of great toe, left  Comment: seeing podiatry at this time  Plan:     (I49.9) Irregular heart beat  Comment: checked EKG and it was normal no heart blocks noted  Plan: EKG 12-lead complete w/read - Clinics              COUNSELING:  Reviewed preventive health counseling, as reflected in patient instructions       Regular exercise       Healthy diet/nutrition       Vision screening    Estimated body mass index is 30.68 kg/m  as calculated from the following:    Height as of this encounter: 1.803 m (5' 11\").    Weight as of this encounter: 99.8 kg (220 lb).         reports that he has never smoked. He has never used smokeless tobacco.      Appropriate preventive services were discussed with this patient, including applicable screening as appropriate for cardiovascular disease, diabetes, osteopenia/osteoporosis, and glaucoma.  As appropriate for age/gender, discussed screening for colorectal cancer, prostate cancer, breast cancer, and cervical cancer. Checklist reviewing preventive services available has been given to the patient.    Reviewed patients plan of care and provided an AVS. The Basic Care Plan (routine screening as documented in Health Maintenance) for Fawad meets the Care Plan requirement. This Care Plan has been established and reviewed with the Patient.    Counseling Resources:  ATP IV Guidelines  Pooled Cohorts Equation " Calculator  Breast Cancer Risk Calculator  FRAX Risk Assessment  ICSI Preventive Guidelines  Dietary Guidelines for Americans, 2010  USDA's MyPlate  ASA Prophylaxis  Lung CA Screening    Chris Scott MD  [facility]    Identified Health Risks:    The patient was counseled and encouraged to consider modifying their diet and eating habits. He was provided with information on recommended healthy diet options.    The patient was provided with written information regarding signs of hearing loss.

## 2019-12-20 ENCOUNTER — ANCILLARY PROCEDURE (OUTPATIENT)
Dept: GENERAL RADIOLOGY | Facility: CLINIC | Age: 84
End: 2019-12-20
Attending: PODIATRIST
Payer: COMMERCIAL

## 2019-12-20 ENCOUNTER — OFFICE VISIT (OUTPATIENT)
Dept: PODIATRY | Facility: CLINIC | Age: 84
End: 2019-12-20
Payer: COMMERCIAL

## 2019-12-20 VITALS
HEART RATE: 79 BPM | HEIGHT: 71 IN | SYSTOLIC BLOOD PRESSURE: 143 MMHG | BODY MASS INDEX: 30.8 KG/M2 | WEIGHT: 220 LBS | DIASTOLIC BLOOD PRESSURE: 77 MMHG

## 2019-12-20 DIAGNOSIS — L03.032 CELLULITIS OF GREAT TOE OF LEFT FOOT: ICD-10-CM

## 2019-12-20 DIAGNOSIS — L03.032 CELLULITIS OF GREAT TOE OF LEFT FOOT: Primary | ICD-10-CM

## 2019-12-20 DIAGNOSIS — M86.9 OSTEOMYELITIS OF GREAT TOE OF LEFT FOOT (H): ICD-10-CM

## 2019-12-20 PROCEDURE — 99214 OFFICE O/P EST MOD 30 MIN: CPT | Performed by: PODIATRIST

## 2019-12-20 PROCEDURE — 73630 X-RAY EXAM OF FOOT: CPT | Mod: LT

## 2019-12-20 ASSESSMENT — MIFFLIN-ST. JEOR: SCORE: 1715.04

## 2019-12-20 NOTE — PROGRESS NOTES
Fawad returns to the office for reevaluation of the left foot.  The patient relates following the instructions given at the last visit with noted increased redness and swelling of the left great toe.  The patient denies any fevers or chills.    PAST MEDICAL HISTORY:   Past Medical History:   Diagnosis Date     Diabetes mellitus (H)      Hypertension      Squamous cell carcinoma        BMI= Body mass index is 30.68 kg/m .        Physical Exam:    General: The patient appears to have a pleasant mental affect.    Lower extremity physical exam:  Neurovascular status is intact with palpable pedal pulses and intact epicritic sensations.  Muscular exam is within normal limits to major muscle groups.  Integument is intact.      One notes increased edema.  One notes residual erythema around the left great toe.    Radiograph evaluation including weightbearing AP, lateral and medial oblique views of the left foot reveals osseous destruction of the distal interphalangeal joint involving the distal phalanx primarily.  No gas in the tissue noted.    Assessment:      ICD-10-CM    1. Cellulitis of great toe of left foot L03.032 XR Foot Left G/E 3 Views       Plan:  I have explained to Fawad about the conditions.  At this point, I am recommending surgical treatment of the condition involving amputation of the great toe on the left foot.  I informed the patient in risks and benefits of the procedure including but not limited to infection, wound complications, swelling, pain, diminished range of motion and function, DVT and reoccurrence of condition.  The procedure will be performed under local with monitored anesthesia care.  The patient will obtain a preoperative history and physical by the primary care provider.  Consents will be reviewed and signed on the day of surgery.  Until then the patient will continue with daily bandaging and continue taking the oral clindamycin and Levaquin.  Patient was instructed to report to the ER  if any systemic signs of infection are noted including fevers chills nausea vomiting or uncontrolled blood sugars.    Disclaimer: This note consists of symbols derived from keyboarding, dictation and/or voice recognition software. As a result, there may be errors in the script that have gone undetected. Please consider this when interpreting information found in this chart.       LIANG Montesinos D.P.M., FIDEL.F.A.S.

## 2019-12-20 NOTE — PATIENT INSTRUCTIONS
You have elected to proceed with Surgery for amputation of the great toe , left foot  Surgeries are performed on Tuesdays at [facility].   To schedule your surgery date please call [phone].  Please leave a message with a good time for our staff to call you back.    - Please have a date in mind for your surgery, you can feel free to leave that date on the message, and we will schedule and call back to confirm.     You can expect receive a call back the same day or on the next business day from Dr. Montesinos s team to assist in the scheduling.   - We will schedule the date of your surgery.  The time will be determined a few days ahead of time.  You can expect a call from Same Day Surgery 2-3 days ahead of time with specific instructions for what time to arrive at the hospital as well as any other preparations you should take prior to surgery.    - You may need to obtain a pre-operative physical from your primary medical provider. This must be done within 30 days of your surgery date.    - We will also schedule your first post-operative appointment for a bandage and wound check for the Monday following your surgery at the [facility].    - You may be non-weight bearing for a period of up to 6 weeks.  Options for this include: (Please indicate which you would prefer so we can provide you with an order and instructions)  o Crutches  o Walker  o Roll-a-bout knee walker.    - If you will need paperwork filled out for your employer you may drop those off at the clinic directly or you may have those faxed to us at [phone].  Please indicate on the form the date you would like the LA to begin if it will not be your surgery date.    The forms are typically filled out for up to 12 weeks, however you may be cleared to return prior to that time depending on your individual healing and job requirements.

## 2019-12-20 NOTE — LETTER
12/20/2019         RE: Fawad Garcia  [address]  [place] [address]        Dear Colleague,    Thank you for referring your patient, Fawad Garcia, to the [facility]. Please see a copy of my visit note below.    Fawad returns to the office for reevaluation of the left foot.  The patient relates following the instructions given at the last visit with noted increased redness and swelling of the left great toe.  The patient denies any fevers or chills.    PAST MEDICAL HISTORY:   Past Medical History:   Diagnosis Date     Diabetes mellitus (H)      Hypertension      Squamous cell carcinoma        BMI= Body mass index is 30.68 kg/m .        Physical Exam:    General: The patient appears to have a pleasant mental affect.    Lower extremity physical exam:  Neurovascular status is intact with palpable pedal pulses and intact epicritic sensations.  Muscular exam is within normal limits to major muscle groups.  Integument is intact.      One notes increased edema.  One notes residual erythema around the left great toe.    Radiograph evaluation including weightbearing AP, lateral and medial oblique views of the left foot reveals osseous destruction of the distal interphalangeal joint involving the distal phalanx primarily.  No gas in the tissue noted.    Assessment:      ICD-10-CM    1. Cellulitis of great toe of left foot L03.032 XR Foot Left G/E 3 Views       Plan:  I have explained to Fawad about the conditions.  At this point, I am recommending surgical treatment of the condition involving amputation of the great toe on the left foot.  I informed the patient in risks and benefits of the procedure including but not limited to infection, wound complications, swelling, pain, diminished range of motion and function, DVT and reoccurrence of condition.  The procedure will be performed under local with monitored anesthesia care.  The patient will obtain a preoperative history and  physical by the primary care provider.  Consents will be reviewed and signed on the day of surgery.  Until then the patient will continue with daily bandaging and continue taking the oral clindamycin and Levaquin.  Patient was instructed to report to the ER if any systemic signs of infection are noted including fevers chills nausea vomiting or uncontrolled blood sugars.    Disclaimer: This note consists of symbols derived from keyboarding, dictation and/or voice recognition software. As a result, there may be errors in the script that have gone undetected. Please consider this when interpreting information found in this chart.       LIANG Montesinos D.P.M., F.A.C.F.A.S.      Again, thank you for allowing me to participate in the care of your patient.        Sincerely,        Ethan Montesinos DPM

## 2019-12-20 NOTE — NURSING NOTE
"Chief Complaint   Patient presents with     Suture Removal     Cellulitis of great toe of left foot,XR today       Initial BP (!) 143/77   Pulse 79   Ht 1.803 m (5' 11\")   Wt 99.8 kg (220 lb)   BMI 30.68 kg/m   Estimated body mass index is 30.68 kg/m  as calculated from the following:    Height as of this encounter: 1.803 m (5' 11\").    Weight as of this encounter: 99.8 kg (220 lb).  Medications and allergies reviewed.      Dana GUILLERMO MA    "

## 2019-12-21 ENCOUNTER — ANESTHESIA EVENT (OUTPATIENT)
Dept: SURGERY | Facility: CLINIC | Age: 84
End: 2019-12-21
Payer: COMMERCIAL

## 2019-12-23 NOTE — ANESTHESIA PREPROCEDURE EVALUATION
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 (H) [M86.9]    Procedure(s):  Amputation of the great toe, left foot    Past Medical History:   Diagnosis Date     Diabetes mellitus (H)      Hypertension      Squamous cell carcinoma      Past Surgical History:   Procedure Laterality Date     AMPUTATE TOE(S) Right 2019    Procedure: Partial amputation great toe;  Surgeon: Ethan Montesinos DPM;  Location: [facility] OR     [facility] SURGICAL PATHOLOGY  2002    prostate biopsy elevated PSA     COLONOSCOPY       HC REMOVE TONSILS/ADENOIDS,<13 Y/O      T & A     IR LOWER EXTREMITY ANGIOGRAM RIGHT  2018     PHACOEMULSIFICATION WITH STANDARD INTRAOCULAR LENS IMPLANT Left 2015    Procedure: PHACOEMULSIFICATION WITH STANDARD INTRAOCULAR LENS IMPLANT;  Surgeon: Joseph Hernandez MD;  Location: [facility] OR     PHACOEMULSIFICATION WITH STANDARD INTRAOCULAR LENS IMPLANT Right 2015    Procedure: PHACOEMULSIFICATION WITH STANDARD INTRAOCULAR LENS IMPLANT;  Surgeon: Joseph Hernandez MD;  Location: [facility] OR     REPAIR HAMMER TOE Left 2019    Procedure: 5th Metatarsal Head Resection;  Surgeon: Ethan Montesinos DPM;  Location: [facility] OR       Anesthesia Evaluation     . Pt has had prior anesthetic. Type: General and MAC           ROS/MED HX    ENT/Pulmonary:       Neurologic:     (+)TIA     Cardiovascular:     (+) Dyslipidemia, hypertension-Peripheral Vascular Disease---. : . . . :. . Previous cardiac testing date:results:date: results:ECG reviewed date:19 results:Sinus Rhythm   WITHIN NORMAL LIMITS date: results:          METS/Exercise Tolerance:     Hematologic:         Musculoskeletal:   (+)  other musculoskeletal- Osteomyelitis Left foot      GI/Hepatic:     (+) GERD       Renal/Genitourinary:         Endo:     (+) type II DM Last HgA1c: 8.1 date: 19 Using insulin Diabetic  "complications: retinopathy, Obesity, .      Psychiatric:         Infectious Disease:         Malignancy:   (+) Malignancy History of Prostate          Other: Comment: Macular degeneration                         Physical Exam  Normal systems: cardiovascular, pulmonary and dental    Airway   Mallampati: II  TM distance: >3 FB  Neck ROM: full    Dental     Cardiovascular       Pulmonary             Lab Results   Component Value Date    WBC 9.3 12/05/2019    HGB 12.3 (L) 12/05/2019    HCT 37.5 (L) 12/05/2019     12/05/2019    CRP 82.2 (H) 07/19/2019    SED 44 (H) 07/19/2019     04/01/2019    POTASSIUM 3.8 04/01/2019    CHLORIDE 101 04/01/2019    CO2 27 04/01/2019    BUN 22 04/01/2019    CR 0.91 04/01/2019     (H) 04/01/2019    CAIN 8.9 04/01/2019    AST 16 06/12/2015    PTT 32 12/11/2018    INR 0.93 12/11/2018    TSH 1.74 03/27/2018       Preop Vitals  BP Readings from Last 3 Encounters:   12/20/19 (!) 143/77   12/18/19 120/68   12/16/19 124/53    Pulse Readings from Last 3 Encounters:   12/20/19 79   12/18/19 80   12/16/19 (!) 44      Resp Readings from Last 3 Encounters:   09/16/19 16   08/01/19 16   07/23/19 16    SpO2 Readings from Last 3 Encounters:   12/18/19 98%   12/16/19 99%   09/18/19 96%      Temp Readings from Last 1 Encounters:   12/18/19 36.4  C (97.5  F) (Tympanic)    Ht Readings from Last 1 Encounters:   12/20/19 1.803 m (5' 11\")      Wt Readings from Last 1 Encounters:   12/20/19 99.8 kg (220 lb)    Estimated body mass index is 30.68 kg/m  as calculated from the following:    Height as of 12/20/19: 1.803 m (5' 11\").    Weight as of 12/20/19: 99.8 kg (220 lb).       Anesthesia Plan      History & Physical Review  History and physical reviewed and following examination; no interval change.    ASA Status:  3 .    NPO Status:  > 6 hours    Plan for MAC Reason for MAC:  Deep or markedly invasive procedure (G8)         Postoperative Care      Consents  Anesthetic plan, risks, benefits and " alternatives discussed with:  Patient..                 CORINA Fernandez CRNA

## 2019-12-24 ENCOUNTER — NURSE TRIAGE (OUTPATIENT)
Dept: NURSING | Facility: CLINIC | Age: 84
End: 2019-12-24

## 2019-12-24 ENCOUNTER — OFFICE VISIT (OUTPATIENT)
Dept: FAMILY MEDICINE | Facility: CLINIC | Age: 84
End: 2019-12-24
Payer: COMMERCIAL

## 2019-12-24 ENCOUNTER — HOSPITAL ENCOUNTER (EMERGENCY)
Facility: CLINIC | Age: 84
Discharge: HOME | End: 2019-12-24
Attending: FAMILY MEDICINE | Admitting: FAMILY MEDICINE
Payer: COMMERCIAL

## 2019-12-24 ENCOUNTER — HOSPITAL ENCOUNTER (OUTPATIENT)
Facility: CLINIC | Age: 84
Discharge: HOME | End: 2019-12-24
Attending: PODIATRIST | Admitting: PODIATRIST
Payer: COMMERCIAL

## 2019-12-24 ENCOUNTER — ANESTHESIA (OUTPATIENT)
Dept: SURGERY | Facility: CLINIC | Age: 84
End: 2019-12-24
Payer: COMMERCIAL

## 2019-12-24 VITALS
OXYGEN SATURATION: 96 % | WEIGHT: 222 LBS | HEART RATE: 83 BPM | TEMPERATURE: 98.6 F | DIASTOLIC BLOOD PRESSURE: 70 MMHG | SYSTOLIC BLOOD PRESSURE: 118 MMHG | RESPIRATION RATE: 16 BRPM | BODY MASS INDEX: 31.08 KG/M2 | HEIGHT: 71 IN

## 2019-12-24 VITALS
WEIGHT: 222 LBS | RESPIRATION RATE: 16 BRPM | DIASTOLIC BLOOD PRESSURE: 68 MMHG | TEMPERATURE: 97.5 F | BODY MASS INDEX: 31.08 KG/M2 | SYSTOLIC BLOOD PRESSURE: 126 MMHG | OXYGEN SATURATION: 92 % | HEIGHT: 71 IN | HEART RATE: 67 BPM

## 2019-12-24 VITALS
DIASTOLIC BLOOD PRESSURE: 91 MMHG | SYSTOLIC BLOOD PRESSURE: 155 MMHG | HEIGHT: 71 IN | TEMPERATURE: 97.7 F | BODY MASS INDEX: 30.96 KG/M2 | OXYGEN SATURATION: 96 % | HEART RATE: 97 BPM | RESPIRATION RATE: 16 BRPM

## 2019-12-24 DIAGNOSIS — E78.5 HYPERLIPIDEMIA LDL GOAL <100: ICD-10-CM

## 2019-12-24 DIAGNOSIS — Z01.818 PREOP GENERAL PHYSICAL EXAM: Primary | ICD-10-CM

## 2019-12-24 DIAGNOSIS — M86.9 OSTEOMYELITIS OF GREAT TOE OF LEFT FOOT (H): ICD-10-CM

## 2019-12-24 DIAGNOSIS — Z79.4 TYPE 2 DIABETES MELLITUS WITH BOTH EYES AFFECTED BY MILD NONPROLIFERATIVE RETINOPATHY WITHOUT MACULAR EDEMA, WITH LONG-TERM CURRENT USE OF INSULIN (H): ICD-10-CM

## 2019-12-24 DIAGNOSIS — E11.3293 TYPE 2 DIABETES MELLITUS WITH BOTH EYES AFFECTED BY MILD NONPROLIFERATIVE RETINOPATHY WITHOUT MACULAR EDEMA, WITH LONG-TERM CURRENT USE OF INSULIN (H): ICD-10-CM

## 2019-12-24 DIAGNOSIS — I10 HYPERTENSION, GOAL BELOW 140/90: ICD-10-CM

## 2019-12-24 DIAGNOSIS — R33.9 URINARY RETENTION WITH INCOMPLETE BLADDER EMPTYING: ICD-10-CM

## 2019-12-24 DIAGNOSIS — I73.9 PERIPHERAL VASCULAR DISEASE (H): ICD-10-CM

## 2019-12-24 LAB
ALBUMIN UR-MCNC: NEGATIVE MG/DL
ANION GAP SERPL CALCULATED.3IONS-SCNC: 5 MMOL/L (ref 3–14)
APPEARANCE UR: CLEAR
BILIRUB UR QL STRIP: NEGATIVE
BUN SERPL-MCNC: 25 MG/DL (ref 7–30)
CALCIUM SERPL-MCNC: 8.8 MG/DL (ref 8.5–10.1)
CHLORIDE SERPL-SCNC: 107 MMOL/L (ref 94–109)
CO2 SERPL-SCNC: 28 MMOL/L (ref 20–32)
COLOR UR AUTO: YELLOW
CREAT SERPL-MCNC: 0.96 MG/DL (ref 0.66–1.25)
GFR SERPL CREATININE-BSD FRML MDRD: 73 ML/MIN/{1.73_M2}
GLUCOSE BLDC GLUCOMTR-MCNC: 95 MG/DL (ref 70–99)
GLUCOSE SERPL-MCNC: 150 MG/DL (ref 70–99)
GLUCOSE UR STRIP-MCNC: NEGATIVE MG/DL
GRAM STN SPEC: NORMAL
GRAM STN SPEC: NORMAL
HGB UR QL STRIP: ABNORMAL
HYALINE CASTS #/AREA URNS LPF: 1 /LPF (ref 0–2)
KETONES UR STRIP-MCNC: NEGATIVE MG/DL
LEUKOCYTE ESTERASE UR QL STRIP: NEGATIVE
Lab: NORMAL
NITRATE UR QL: NEGATIVE
PH UR STRIP: 6 PH (ref 5–7)
POTASSIUM SERPL-SCNC: 4.2 MMOL/L (ref 3.4–5.3)
RBC #/AREA URNS AUTO: 6 /HPF (ref 0–2)
SODIUM SERPL-SCNC: 140 MMOL/L (ref 133–144)
SOURCE: ABNORMAL
SP GR UR STRIP: 1.01 (ref 1–1.03)
SPECIMEN SOURCE: NORMAL
UROBILINOGEN UR STRIP-MCNC: 0 MG/DL (ref 0–2)
WBC #/AREA URNS AUTO: 1 /HPF (ref 0–5)

## 2019-12-24 PROCEDURE — 25000128 H RX IP 250 OP 636: Performed by: NURSE ANESTHETIST, CERTIFIED REGISTERED

## 2019-12-24 PROCEDURE — 71000027 ZZH RECOVERY PHASE 2 EACH 15 MINS: Performed by: PODIATRIST

## 2019-12-24 PROCEDURE — 88305 TISSUE EXAM BY PATHOLOGIST: CPT | Mod: 26 | Performed by: PODIATRIST

## 2019-12-24 PROCEDURE — 28820 AMPUTATION OF TOE: CPT | Mod: TA | Performed by: PODIATRIST

## 2019-12-24 PROCEDURE — 36000050 ZZH SURGERY LEVEL 2 1ST 30 MIN: Performed by: PODIATRIST

## 2019-12-24 PROCEDURE — 88305 TISSUE EXAM BY PATHOLOGIST: CPT | Performed by: PODIATRIST

## 2019-12-24 PROCEDURE — 27210794 ZZH OR GENERAL SUPPLY STERILE: Performed by: PODIATRIST

## 2019-12-24 PROCEDURE — 88311 DECALCIFY TISSUE: CPT | Mod: 26,59 | Performed by: PODIATRIST

## 2019-12-24 PROCEDURE — 82962 GLUCOSE BLOOD TEST: CPT

## 2019-12-24 PROCEDURE — 87205 SMEAR GRAM STAIN: CPT | Performed by: PODIATRIST

## 2019-12-24 PROCEDURE — 80048 BASIC METABOLIC PNL TOTAL CA: CPT | Performed by: FAMILY MEDICINE

## 2019-12-24 PROCEDURE — 25000125 ZZHC RX 250: Performed by: NURSE ANESTHETIST, CERTIFIED REGISTERED

## 2019-12-24 PROCEDURE — 87070 CULTURE OTHR SPECIMN AEROBIC: CPT | Performed by: PODIATRIST

## 2019-12-24 PROCEDURE — 81001 URINALYSIS AUTO W/SCOPE: CPT | Performed by: FAMILY MEDICINE

## 2019-12-24 PROCEDURE — 36415 COLL VENOUS BLD VENIPUNCTURE: CPT | Performed by: FAMILY MEDICINE

## 2019-12-24 PROCEDURE — 76775 US EXAM ABDO BACK WALL LIM: CPT | Performed by: FAMILY MEDICINE

## 2019-12-24 PROCEDURE — 88307 TISSUE EXAM BY PATHOLOGIST: CPT | Mod: 26 | Performed by: PODIATRIST

## 2019-12-24 PROCEDURE — 99285 EMERGENCY DEPT VISIT HI MDM: CPT | Mod: 25 | Performed by: FAMILY MEDICINE

## 2019-12-24 PROCEDURE — 36000052 ZZH SURGERY LEVEL 2 EA 15 ADDTL MIN: Performed by: PODIATRIST

## 2019-12-24 PROCEDURE — 99215 OFFICE O/P EST HI 40 MIN: CPT | Performed by: FAMILY MEDICINE

## 2019-12-24 PROCEDURE — 99284 EMERGENCY DEPT VISIT MOD MDM: CPT | Mod: 25 | Performed by: FAMILY MEDICINE

## 2019-12-24 PROCEDURE — 51798 US URINE CAPACITY MEASURE: CPT | Performed by: FAMILY MEDICINE

## 2019-12-24 PROCEDURE — 88311 DECALCIFY TISSUE: CPT | Performed by: PODIATRIST

## 2019-12-24 PROCEDURE — 88307 TISSUE EXAM BY PATHOLOGIST: CPT | Performed by: PODIATRIST

## 2019-12-24 PROCEDURE — 25000125 ZZHC RX 250: Performed by: PODIATRIST

## 2019-12-24 PROCEDURE — 87075 CULTR BACTERIA EXCEPT BLOOD: CPT | Performed by: PODIATRIST

## 2019-12-24 PROCEDURE — 25000128 H RX IP 250 OP 636: Performed by: PODIATRIST

## 2019-12-24 PROCEDURE — 40000306 ZZH STATISTIC PRE PROC ASSESS II: Performed by: PODIATRIST

## 2019-12-24 PROCEDURE — 76775 US EXAM ABDO BACK WALL LIM: CPT | Mod: 26 | Performed by: FAMILY MEDICINE

## 2019-12-24 PROCEDURE — 87176 TISSUE HOMOGENIZATION CULTR: CPT | Performed by: PODIATRIST

## 2019-12-24 PROCEDURE — 25800030 ZZH RX IP 258 OP 636: Performed by: NURSE ANESTHETIST, CERTIFIED REGISTERED

## 2019-12-24 PROCEDURE — 51702 INSERT TEMP BLADDER CATH: CPT | Mod: XU | Performed by: FAMILY MEDICINE

## 2019-12-24 PROCEDURE — 37000009 ZZH ANESTHESIA TECHNICAL FEE, EACH ADDTL 15 MIN: Performed by: PODIATRIST

## 2019-12-24 PROCEDURE — 87076 CULTURE ANAEROBE IDENT EACH: CPT | Performed by: PODIATRIST

## 2019-12-24 PROCEDURE — 37000008 ZZH ANESTHESIA TECHNICAL FEE, 1ST 30 MIN: Performed by: PODIATRIST

## 2019-12-24 RX ORDER — HYDROMORPHONE HYDROCHLORIDE 1 MG/ML
.3-.5 INJECTION, SOLUTION INTRAMUSCULAR; INTRAVENOUS; SUBCUTANEOUS EVERY 10 MIN PRN
Status: DISCONTINUED | OUTPATIENT
Start: 2019-12-24 | End: 2019-12-24 | Stop reason: HOSPADM

## 2019-12-24 RX ORDER — SODIUM CHLORIDE, SODIUM LACTATE, POTASSIUM CHLORIDE, CALCIUM CHLORIDE 600; 310; 30; 20 MG/100ML; MG/100ML; MG/100ML; MG/100ML
INJECTION, SOLUTION INTRAVENOUS CONTINUOUS
Status: DISCONTINUED | OUTPATIENT
Start: 2019-12-24 | End: 2019-12-24 | Stop reason: HOSPADM

## 2019-12-24 RX ORDER — CEFAZOLIN SODIUM 2 G/100ML
2 INJECTION, SOLUTION INTRAVENOUS
Status: COMPLETED | OUTPATIENT
Start: 2019-12-24 | End: 2019-12-24

## 2019-12-24 RX ORDER — BUPIVACAINE HYDROCHLORIDE 5 MG/ML
INJECTION, SOLUTION PERINEURAL PRN
Status: DISCONTINUED | OUTPATIENT
Start: 2019-12-24 | End: 2019-12-24 | Stop reason: HOSPADM

## 2019-12-24 RX ORDER — FENTANYL CITRATE 50 UG/ML
25-50 INJECTION, SOLUTION INTRAMUSCULAR; INTRAVENOUS
Status: DISCONTINUED | OUTPATIENT
Start: 2019-12-24 | End: 2019-12-24 | Stop reason: HOSPADM

## 2019-12-24 RX ORDER — LIDOCAINE 40 MG/G
CREAM TOPICAL
Status: DISCONTINUED | OUTPATIENT
Start: 2019-12-24 | End: 2019-12-24 | Stop reason: HOSPADM

## 2019-12-24 RX ORDER — HYDROCODONE BITARTRATE AND ACETAMINOPHEN 5; 325 MG/1; MG/1
1-2 TABLET ORAL EVERY 4 HOURS PRN
Qty: 30 TABLET | Refills: 0 | Status: SHIPPED | OUTPATIENT
Start: 2019-12-24 | End: 2020-02-28

## 2019-12-24 RX ORDER — NALOXONE HYDROCHLORIDE 0.4 MG/ML
.1-.4 INJECTION, SOLUTION INTRAMUSCULAR; INTRAVENOUS; SUBCUTANEOUS
Status: DISCONTINUED | OUTPATIENT
Start: 2019-12-24 | End: 2019-12-24 | Stop reason: HOSPADM

## 2019-12-24 RX ORDER — GLYCOPYRROLATE 0.2 MG/ML
INJECTION, SOLUTION INTRAMUSCULAR; INTRAVENOUS PRN
Status: DISCONTINUED | OUTPATIENT
Start: 2019-12-24 | End: 2019-12-24

## 2019-12-24 RX ORDER — ALBUTEROL SULFATE 0.83 MG/ML
2.5 SOLUTION RESPIRATORY (INHALATION) EVERY 4 HOURS PRN
Status: DISCONTINUED | OUTPATIENT
Start: 2019-12-24 | End: 2019-12-24 | Stop reason: HOSPADM

## 2019-12-24 RX ORDER — OXYCODONE HYDROCHLORIDE 5 MG/1
5 TABLET ORAL
Status: CANCELLED | OUTPATIENT
Start: 2019-12-24

## 2019-12-24 RX ORDER — LIDOCAINE HYDROCHLORIDE 10 MG/ML
INJECTION, SOLUTION INFILTRATION; PERINEURAL PRN
Status: DISCONTINUED | OUTPATIENT
Start: 2019-12-24 | End: 2019-12-24 | Stop reason: HOSPADM

## 2019-12-24 RX ORDER — AMOXICILLIN 250 MG
1-2 CAPSULE ORAL 2 TIMES DAILY
Qty: 30 TABLET | Refills: 0 | Status: SHIPPED | OUTPATIENT
Start: 2019-12-24 | End: 2020-02-28

## 2019-12-24 RX ORDER — CEFAZOLIN SODIUM 1 G/50ML
1 INJECTION, SOLUTION INTRAVENOUS SEE ADMIN INSTRUCTIONS
Status: DISCONTINUED | OUTPATIENT
Start: 2019-12-24 | End: 2019-12-24 | Stop reason: HOSPADM

## 2019-12-24 RX ORDER — DEXAMETHASONE SODIUM PHOSPHATE 4 MG/ML
INJECTION, SOLUTION INTRA-ARTICULAR; INTRALESIONAL; INTRAMUSCULAR; INTRAVENOUS; SOFT TISSUE PRN
Status: DISCONTINUED | OUTPATIENT
Start: 2019-12-24 | End: 2019-12-24

## 2019-12-24 RX ORDER — PROPOFOL 10 MG/ML
INJECTION, EMULSION INTRAVENOUS CONTINUOUS PRN
Status: DISCONTINUED | OUTPATIENT
Start: 2019-12-24 | End: 2019-12-24

## 2019-12-24 RX ORDER — ONDANSETRON 2 MG/ML
INJECTION INTRAMUSCULAR; INTRAVENOUS PRN
Status: DISCONTINUED | OUTPATIENT
Start: 2019-12-24 | End: 2019-12-24

## 2019-12-24 RX ORDER — ONDANSETRON 2 MG/ML
4 INJECTION INTRAMUSCULAR; INTRAVENOUS EVERY 30 MIN PRN
Status: DISCONTINUED | OUTPATIENT
Start: 2019-12-24 | End: 2019-12-24 | Stop reason: HOSPADM

## 2019-12-24 RX ORDER — ONDANSETRON 4 MG/1
4 TABLET, ORALLY DISINTEGRATING ORAL EVERY 30 MIN PRN
Status: DISCONTINUED | OUTPATIENT
Start: 2019-12-24 | End: 2019-12-24 | Stop reason: HOSPADM

## 2019-12-24 RX ORDER — FENTANYL CITRATE 50 UG/ML
INJECTION, SOLUTION INTRAMUSCULAR; INTRAVENOUS PRN
Status: DISCONTINUED | OUTPATIENT
Start: 2019-12-24 | End: 2019-12-24

## 2019-12-24 RX ORDER — MEPERIDINE HYDROCHLORIDE 25 MG/ML
12.5 INJECTION INTRAMUSCULAR; INTRAVENOUS; SUBCUTANEOUS
Status: DISCONTINUED | OUTPATIENT
Start: 2019-12-24 | End: 2019-12-24 | Stop reason: HOSPADM

## 2019-12-24 RX ADMIN — Medication 1 ML: at 11:00

## 2019-12-24 RX ADMIN — GLYCOPYRROLATE 0.2 MG: 0.2 INJECTION, SOLUTION INTRAMUSCULAR; INTRAVENOUS at 12:14

## 2019-12-24 RX ADMIN — DEXAMETHASONE SODIUM PHOSPHATE 4 MG: 4 INJECTION, SOLUTION INTRA-ARTICULAR; INTRALESIONAL; INTRAMUSCULAR; INTRAVENOUS; SOFT TISSUE at 12:14

## 2019-12-24 RX ADMIN — SODIUM CHLORIDE, POTASSIUM CHLORIDE, SODIUM LACTATE AND CALCIUM CHLORIDE 1000 ML: 600; 310; 30; 20 INJECTION, SOLUTION INTRAVENOUS at 11:00

## 2019-12-24 RX ADMIN — ONDANSETRON 4 MG: 2 INJECTION INTRAMUSCULAR; INTRAVENOUS at 12:14

## 2019-12-24 RX ADMIN — MIDAZOLAM 1 MG: 1 INJECTION INTRAMUSCULAR; INTRAVENOUS at 12:15

## 2019-12-24 RX ADMIN — FENTANYL CITRATE 50 MCG: 50 INJECTION, SOLUTION INTRAMUSCULAR; INTRAVENOUS at 12:20

## 2019-12-24 RX ADMIN — MIDAZOLAM 1 MG: 1 INJECTION INTRAMUSCULAR; INTRAVENOUS at 12:13

## 2019-12-24 RX ADMIN — PROPOFOL 125 MCG/KG/MIN: 10 INJECTION, EMULSION INTRAVENOUS at 12:13

## 2019-12-24 RX ADMIN — FENTANYL CITRATE 50 MCG: 50 INJECTION, SOLUTION INTRAMUSCULAR; INTRAVENOUS at 12:13

## 2019-12-24 RX ADMIN — CEFAZOLIN SODIUM 2 G: 2 INJECTION, SOLUTION INTRAVENOUS at 12:09

## 2019-12-24 RX ADMIN — MIDAZOLAM 2 MG: 1 INJECTION INTRAMUSCULAR; INTRAVENOUS at 12:09

## 2019-12-24 RX ADMIN — MIDAZOLAM 1 MG: 1 INJECTION INTRAMUSCULAR; INTRAVENOUS at 12:20

## 2019-12-24 ASSESSMENT — ENCOUNTER SYMPTOMS
NECK PAIN: 0
SHORTNESS OF BREATH: 0
DIFFICULTY URINATING: 1
ABDOMINAL PAIN: 0
BACK PAIN: 0
CONFUSION: 0
COUGH: 0
VOMITING: 0
NECK STIFFNESS: 0
HEADACHES: 0
DYSURIA: 0
EYE REDNESS: 0
COLOR CHANGE: 0
NAUSEA: 0
ARTHRALGIAS: 0
CONSTIPATION: 0
FREQUENCY: 0
DIARRHEA: 0
CHILLS: 0
SORE THROAT: 0
FEVER: 0

## 2019-12-24 ASSESSMENT — MIFFLIN-ST. JEOR
SCORE: 1724.12
SCORE: 1724.12

## 2019-12-24 NOTE — PATIENT INSTRUCTIONS
No food or drink until the surgery today.    BMP to be measured today in preparation for your surgery.      Before Your Surgery      Call your surgeon if there is any change in your health. This includes signs of a cold or flu (such as a sore throat, runny nose, cough, rash or fever).    Do not smoke, drink alcohol or take over the counter medicine (unless your surgeon or primary care doctor tells you to) for the 24 hours before and after surgery.    If you take prescribed drugs: Follow your doctor s orders about which medicines to take and which to stop until after surgery.    Eating and drinking prior to surgery: follow the instructions from your surgeon    Take a shower or bath the night before surgery. Use the soap your surgeon gave you to gently clean your skin. If you do not have soap from your surgeon, use your regular soap. Do not shave or scrub the surgery site.  Wear clean pajamas and have clean sheets on your bed.

## 2019-12-24 NOTE — ED AVS SNAPSHOT
[facility] Emergency Department  [address] [address]  Phone:  [phone]  Fax:  [phone]                                    Fawad Garcia   MRN: [id number]    Department:  [facility] Emergency Department   Date of Visit:  12/24/2019           After Visit Summary Signature Page    I have received my discharge instructions, and my questions have been answered. I have discussed any challenges I see with this plan with the nurse or doctor.    ..........................................................................................................................................  Patient/Patient Representative Signature      ..........................................................................................................................................  Patient Representative Print Name and Relationship to Patient    ..................................................               ................................................  Date                                   Time    ..........................................................................................................................................  Reviewed by Signature/Title    ...................................................              ..............................................  Date                                               Time          22EPIC Rev 08/18

## 2019-12-24 NOTE — ED PROVIDER NOTES
History     Chief Complaint   Patient presents with     Urinary Retention     HPI  Fawad Garcia is a 84 year old male with a history of prostate cancer, diabetes type 2 on insulin, TIA, and HTN.     Patient presents to the emergency room for urinary retention. Patient states that he had a toe amputation today due to osteomyelitis and was at home for 3 hours and states has been unable to urinate.  Felt the low abdominal sense of distention and discomfort no recent fever. no recent worsening of his BPH symptoms.   Patient states that he took Flomax prior to arrival.  This is been prescribed to him in the last month but he had not yet started it.  He is tolerating food and fluids without nausea or vomiting.    retired pharmacist      Allergies:  Allergies   Allergen Reactions     Zocor [Simvastatin - High Dose]      Bilateral hip aching       Problem List:    Patient Active Problem List    Diagnosis Date Noted     Osteomyelitis of great toe of left foot (H) 12/20/2019     Priority: Medium     Added automatically from request for surgery [id number]       Macular degeneration (senile) of retina 01/10/2019     Priority: Medium     Nearly legally blind per eye doctor.       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) 11/19/2018     Priority: Medium     Type 2 diabetes mellitus with both eyes affected by mild nonproliferative retinopathy without macular edema, with long-term current use of insulin (H) 07/30/2018     Priority: Medium     Senile nuclear sclerosis 04/01/2015     Priority: Medium     Hypertension, goal below 140/90 03/25/2015     Priority: Medium     Malignant neoplasm of prostate (H) 06/16/2014     Priority: Medium     Health Care Home 12/28/2012     Priority: Medium     Nury Prater RN-PHN  [facility] / [facility]   [phone]    DX V65.8 REPLACED WITH [id number] HEALTH CARE HOME (04/08/2013)       Advanced directives, counseling/discussion 02/24/2012     Priority:  "Medium     Patient does not have an Advance/Health Care Directive (HCD), given \"What is Advance Care Planning?\" flyer, accepts referral to Facilitator and requests blank HCD form.    Roro Wright  February 24, 2012         Peripheral vascular disease (H) 11/18/2011     Priority: Medium     Problem list name updated by automated process. Provider to review       GERD (gastroesophageal reflux disease) 11/03/2011     Priority: Medium     HYPERLIPIDEMIA LDL GOAL <100 10/31/2010     Priority: Medium     Transient cerebral ischemia 09/03/2009     Priority: Medium     Diagnosis updated by automated process. Provider to review and confirm.       Obesity      Priority: Medium     Obesity  Problem list name updated by automated process. Provider to review          Past Medical History:    Past Medical History:   Diagnosis Date     Diabetes mellitus (H)      Hypertension      Squamous cell carcinoma        Past Surgical History:    Past Surgical History:   Procedure Laterality Date     AMPUTATE TOE(S) Right 7/23/2019    Procedure: Partial amputation great toe;  Surgeon: Ethan Montesinos DPM;  Location: [place] OR     [facility] SURGICAL PATHOLOGY  2002    prostate biopsy elevated PSA     COLONOSCOPY       HC REMOVE TONSILS/ADENOIDS,<13 Y/O      T & A     IR LOWER EXTREMITY ANGIOGRAM RIGHT  12/11/2018     PHACOEMULSIFICATION WITH STANDARD INTRAOCULAR LENS IMPLANT Left 4/2/2015    Procedure: PHACOEMULSIFICATION WITH STANDARD INTRAOCULAR LENS IMPLANT;  Surgeon: Joseph Hernandez MD;  Location: [place] OR     PHACOEMULSIFICATION WITH STANDARD INTRAOCULAR LENS IMPLANT Right 5/4/2015    Procedure: PHACOEMULSIFICATION WITH STANDARD INTRAOCULAR LENS IMPLANT;  Surgeon: Joseph Hernandez MD;  Location: [place] OR     REPAIR HAMMER TOE Left 4/2/2019    Procedure: 5th Metatarsal Head Resection;  Surgeon: Ethan Montesinos DPM;  Location: [place] OR       Family History:    Family History   Problem Relation Age of Onset     Cancer Mother         " intestinal CA     Diabetes Father      Diabetes Brother      Other Cancer Son         meagan tumor     Other Cancer Brother         pancreatic cancer     Diabetes Son      Melanoma No family hx of        Social History:  Marital Status:   [2]  Social History     Tobacco Use     Smoking status: Never Smoker     Smokeless tobacco: Never Used   Substance Use Topics     Alcohol use: Yes     Alcohol/week: 1.7 standard drinks     Comment: very little     Drug use: No        Medications:    amLODIPine (NORVASC) 5 MG tablet  Blood Glucose Monitoring Suppl (ONE TOUCH ULTRA SYSTEM KIT) W/DEVICE KIT  clindamycin (CLEOCIN) 300 MG capsule  dorzolamide-timolol (COSOPT) 2-0.5 % ophthalmic solution  hydrochlorothiazide (HYDRODIURIL) 25 MG tablet  HYDROcodone-acetaminophen (NORCO) 5-325 MG tablet  insulin glargine (LANTUS SOLOSTAR) 100 UNIT/ML pen  insulin pen needle (B-D U/F) 31G X 8 MM miscellaneous  lisinopril (PRINIVIL/ZESTRIL) 40 MG tablet  metFORMIN (GLUCOPHAGE) 500 MG tablet  Multiple Vitamins-Minerals (PRESERVISION AREDS PO)  omeprazole (PRILOSEC) 20 MG DR capsule  ONETOUCH LANCETS MISC  ONETOUCH ULTRA test strip  ORDER FOR DME  pravastatin (PRAVACHOL) 10 MG tablet  senna-docusate (SENOKOT-S/PERICOLACE) 8.6-50 MG tablet          Review of Systems   Constitutional: Negative for chills and fever.   HENT: Negative for congestion and sore throat.    Eyes: Negative for redness.   Respiratory: Negative for cough and shortness of breath.    Cardiovascular: Negative for chest pain.   Gastrointestinal: Negative for abdominal pain, constipation, diarrhea, nausea and vomiting.   Genitourinary: Positive for difficulty urinating. Negative for discharge, dysuria, frequency, penile pain, penile swelling, scrotal swelling, testicular pain and urgency.   Musculoskeletal: Negative for arthralgias, back pain, neck pain and neck stiffness.   Skin: Negative for color change and rash.   Neurological: Negative for headaches.  "  Psychiatric/Behavioral: Negative for confusion.       Physical Exam   BP: (!) 155/91  Pulse: 97  Temp: 97.7  F (36.5  C)  Resp: 16  Height: 180.3 cm (5' 11\")  SpO2: 96 %      Physical Exam  Constitutional:       General: He is not in acute distress.     Appearance: He is not diaphoretic.   HENT:      Head: Atraumatic.   Eyes:      General: No scleral icterus.     Pupils: Pupils are equal, round, and reactive to light.   Cardiovascular:      Rate and Rhythm: Normal rate and regular rhythm.      Pulses: Normal pulses.      Heart sounds: Normal heart sounds.   Pulmonary:      Effort: Pulmonary effort is normal. No respiratory distress.      Breath sounds: Normal breath sounds. No decreased breath sounds, wheezing, rhonchi or rales.   Abdominal:      General: Bowel sounds are normal.      Palpations: Abdomen is soft.      Tenderness: There is no abdominal tenderness.   Musculoskeletal:         General: No tenderness.   Skin:     General: Skin is warm.      Findings: No rash.         ED Course        Procedures    Pre bladder length 9.45   Height:14.9   Width: 8cm   Urine amount:: 650cc                     Critical Care time:  none               Results for orders placed or performed during the hospital encounter of 12/24/19 (from the past 24 hour(s))   POC US ABDOMEN LIMITED    Impression    [facility] Procedure Note      Limited Bedside ED Ultrasound of the Urinary Bladder:    PERFORMED BY: Dr. Khai Salamanca MD  INDICATIONS:  Possible obstrucion and/or mass  PROBE: Low frequency convex probe  BODY LOCATION:  Abdomen  FINDINGS:  Visualization of the bladder in longitudinal and transverse views demonstrated a distended state.      The bladder dimensions measured:  Pre bladder length 9.45   Height:14.9   Width: 8cm   Urine amount:: 650cc      INTERPRETATION:  Total calculated volume was estimated at 650 cc.  The bladder is abnormally distended.  IMAGE DOCUMENTATION: Images were archived to PACs " system.               UA reflex to Microscopic and Culture   Result Value Ref Range    Color Urine Yellow     Appearance Urine Clear     Glucose Urine Negative NEG^Negative mg/dL    Bilirubin Urine Negative NEG^Negative    Ketones Urine Negative NEG^Negative mg/dL    Specific Gravity Urine 1.011 1.003 - 1.035    Blood Urine Small (A) NEG^Negative    pH Urine 6.0 5.0 - 7.0 pH    Protein Albumin Urine Negative NEG^Negative mg/dL    Urobilinogen mg/dL 0.0 0.0 - 2.0 mg/dL    Nitrite Urine Negative NEG^Negative    Leukocyte Esterase Urine Negative NEG^Negative    Source Catheterized Urine     RBC Urine 6 (H) 0 - 2 /HPF    WBC Urine 1 0 - 5 /HPF    Hyaline Casts 1 0 - 2 /LPF       Medications - No data to display     5:55 PM Patient Assessed.    Assessments & Plan (with Medical Decision Making)     MDM: Fawad Garcia is a 84 year old male who presents with urinary retention symptoms after having had podiatry surgery today, likely catheterize during that time and no urinalysis since arriving home.  Returns with upwards of 650 cc in the bladder.  Otherwise and benign evaluation without tachycardia, hypotension, fever.  Benign abdomen other than a sense of fullness inferiorly.   Discussed Crane catheter placement and he agrees to have this placed and will leave in place until follow-up with urology in approximately 1 week.  I encouraged him to stay on Flomax.  Precautions given for return.  I have reviewed the nursing notes.    I have reviewed the findings, diagnosis, plan and need for follow up with the patient.       New Prescriptions    No medications on file       Final diagnoses:   Urinary retention with incomplete bladder emptying - keep crane in for the next week.  follow-up urology early next week; return for fever.  urinalysis done before discharge       This document serves as a record of the services and decisions personally performed and made by Khai Salamanca MD. It was created on HIS/HER behalf by Judy  Alyssa, a trained medical scribe. The creation of this document is based the provider's statements to the medical scribe.  Judy Shah 5:55 PM 12/24/2019    Provider:   The information in this document, created by the medical scribe for me, accurately reflects the services I personally performed and the decisions made by me. I have reviewed and approved this document for accuracy prior to leaving the patient care area.  Khai Salamanca MD 5:55 PM 12/24/2019 12/24/2019   [facility] EMERGENCY DEPARTMENT     Khai Salamanca MD  12/25/19 0206

## 2019-12-24 NOTE — ADDENDUM NOTE
Addendum  created 12/24/19 1300 by Martha Lazo APRN CRNA    Intraprocedure Flowsheets edited, Intraprocedure Meds edited

## 2019-12-24 NOTE — PROGRESS NOTES
[facility]  [address] [address]  [phone]  Dept: [phone]    PRE-OP EVALUATION:  Today's date: 2019    Fawad Garcia (: 1935) presents for pre-operative evaluation assessment as requested by Dr. Montesinos.  He requires evaluation and anesthesia risk assessment prior to undergoing surgery/procedure for treatment of osteomylitis of left toe .    Fax number for surgical facility: [facility]  Primary Physician: Chris Scott  Type of Anesthesia Anticipated: Local with MAC    Patient has a Health Care Directive or Living Will:  NO    Preop Questions 2019   Who is doing your surgery? Dr. Montesinos   What are you having done? Amputation of the great toe, left foot   Date of Surgery/Procedure: 19 at 12:30   Facility or Hospital where procedure/surgery will be performed: [place], [place]   1.  Do you have a history of Heart attack, stroke, stent, coronary bypass surgery, or other heart surgery? No   2.  Do you ever have any pain or discomfort in your chest? No   3.  Do you have a history of  Heart Failure? No   4.   Are you troubled by shortness of breath when:  walking on a level surface, or up a slight hill, or at night? No   5.  Do you currently have a cold, bronchitis or other respiratory infection? No   6.  Do you have a cough, shortness of breath, or wheezing? No   7.  Do you sometimes get pains in the calves of your legs when you walk? No   8. Do you or anyone in your family have previous history of blood clots? No   9.  Do you or does anyone in your family have a serious bleeding problem such as prolonged bleeding following surgeries or cuts? No   10. Have you ever had problems with anemia or been told to take iron pills? No   11. Have you had any abnormal blood loss such as black, tarry or bloody stools? No   12. Have you ever had a blood transfusion? No   13. Have you or any of your relatives ever had problems with  anesthesia? No   14. Do you have sleep apnea, excessive snoring or daytime drowsiness? No   15. Do you have any prosthetic heart valves? No   16. Do you have prosthetic joints? No         HPI:     HPI related to upcoming procedure: Patient has osteomyelitis of the left great toe. Hence, planned surgery. Reviewed above with patient.      See problem list for active medical problems.  Problems all longstanding and stable, except as noted/documented.  See ROS for pertinent symptoms related to these conditions.    DIABETES - Patient has a longstanding history of DiabetesType Type II . Patient is being treated with oral agents and insulin injections and denies significant side effects. Control has been poor. Complicating factors include but are not limited to: hypertension, hyperlipidemia, foot ulcer and CAD/PVD.     HYPERLIPIDEMIA - Patient has a long history of significant Hyperlipidemia requiring medication for treatment with recent fair control. Patient reports no problems or side effects with the medication.     HYPERTENSION - Patient has longstanding history of HTN , currently denies any symptoms referable to elevated blood pressure. Specifically denies chest pain, palpitations, dyspnea, orthopnea, PND or peripheral edema. Blood pressure readings have been in normal range. Current medication regimen is as listed below. Patient denies any side effects of medication.       Patient had EKG 12/18/19 showing sinus rhythm with no ischemic tracing.    MEDICAL HISTORY:     Patient Active Problem List    Diagnosis Date Noted     Osteomyelitis of great toe of left foot (H) 12/20/2019     Priority: Medium     Added automatically from request for surgery [id number]       Macular degeneration (senile) of retina 01/10/2019     Priority: Medium     Nearly legally blind per eye doctor.       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, with long-term current use of insulin (H) 07/30/2018     Priority: Medium     Senile nuclear sclerosis 04/01/2015     Priority: Medium     Hypertension, goal below 140/90 03/25/2015     Priority: Medium     Malignant neoplasm of prostate (H) 06/16/2014     Priority: Medium     Health Care Home 12/28/2012     Priority: Medium     Nury Prater, RN-PHN  FPA / [facility]   [phone]    DX V65.8 REPLACED WITH 60049 HEALTH CARE HOME (04/08/2013)       Advanced directives, counseling/discussion 02/24/2012     Priority: Medium     Patient does not have an Advance/Health Care Directive (HCD), given \"What is Advance Care Planning?\" flyer, accepts referral to Facilitator and requests blank HCD form.    Roro Wright  February 24, 2012         Peripheral vascular disease (H) 11/18/2011     Priority: Medium     Problem list name updated by automated process. Provider to review       GERD (gastroesophageal reflux disease) 11/03/2011     Priority: Medium     HYPERLIPIDEMIA LDL GOAL <100 10/31/2010     Priority: Medium     Transient cerebral ischemia 09/03/2009     Priority: Medium     Diagnosis updated by automated process. Provider to review and confirm.       Obesity      Priority: Medium     Obesity  Problem list name updated by automated process. Provider to review        Past Medical History:   Diagnosis Date     Diabetes mellitus (H)      Hypertension      Squamous cell carcinoma      Past Surgical History:   Procedure Laterality Date     AMPUTATE TOE(S) Right 7/23/2019    Procedure: Partial amputation great toe;  Surgeon: Ethan Montesinos DPM;  Location: [place] OR     [facility] SURGICAL PATHOLOGY  2002    prostate biopsy elevated PSA     COLONOSCOPY       HC REMOVE TONSILS/ADENOIDS,<13 Y/O      T & A     IR LOWER EXTREMITY ANGIOGRAM RIGHT  12/11/2018     PHACOEMULSIFICATION WITH STANDARD INTRAOCULAR LENS IMPLANT Left 4/2/2015    " "Procedure: PHACOEMULSIFICATION WITH STANDARD INTRAOCULAR LENS IMPLANT;  Surgeon: Joseph Hernandez MD;  Location: [facility] OR     PHACOEMULSIFICATION WITH STANDARD INTRAOCULAR LENS IMPLANT Right 5/4/2015    Procedure: PHACOEMULSIFICATION WITH STANDARD INTRAOCULAR LENS IMPLANT;  Surgeon: Joseph Hernandez MD;  Location: [facility] OR     REPAIR HAMMER TOE Left 4/2/2019    Procedure: 5th Metatarsal Head Resection;  Surgeon: Ethan Montesinos DPM;  Location: [facility] OR     Medication list reviewed with patient today as listed in Epic.  Epic would not migrate list to this note.     OTC products: None, except as noted above    Patient took only 20 units of lantus last night.    Allergies   Allergen Reactions     Zocor [Simvastatin - High Dose]      Bilateral hip aching      Latex Allergy: NO    Social History     Tobacco Use     Smoking status: Never Smoker     Smokeless tobacco: Never Used   Substance Use Topics     Alcohol use: Yes     Alcohol/week: 1.7 standard drinks     Comment: very little     History   Drug Use No       REVIEW OF SYSTEMS:   CONSTITUTIONAL: NEGATIVE for fever, chills, change in weight  INTEGUMENTARY/SKIN: ulcer and swelling of left great toe  EYES: NEGATIVE for vision changes or irritation  ENT/MOUTH: NEGATIVE for ear, mouth and throat problems  RESP: NEGATIVE for significant cough or SOB  CV: NEGATIVE for chest pain, palpitations or peripheral edema  GI: NEGATIVE for nausea, abdominal pain, heartburn, or change in bowel habits  : NEGATIVE for frequency, dysuria, or hematuria  MUSCULOSKELETAL: NEGATIVE for significant arthralgias or myalgia  NEURO: NEGATIVE for weakness, dizziness or paresthesias  ENDOCRINE: NEGATIVE for temperature intolerance, skin/hair changes  HEME: NEGATIVE for bleeding problems  PSYCHIATRIC: NEGATIVE for changes in mood or affect    EXAM:   /70   Pulse 83   Temp 98.6  F (37  C) (Tympanic)   Resp 16   Ht 1.803 m (5' 11\")   Wt 100.7 kg (222 lb)   SpO2 96%   BMI 30.96 kg/m  "   GENERAL APPEARANCE: obese,  alert and no distress; ambulatory w/o assist  EYES: pink conj, no icterus, PERRL, EOMI  HENT: ear canals and TM's normal, nose and mouth without ulcers or lesions, oropharynx clear and oral mucous membranes moist  NECK: no adenopathy, no asymmetry, masses, or scars and thyroid normal to palpation  RESP: lungs clear to auscultation - no rales, rhonchi or wheezes  CV: regular rates and rhythm, normal S1 S2, no S3 or S4, no murmur, click or rub, no peripheral edema and peripheral pulses strong  ABDOMEN: soft, nontender, no hepatosplenomegaly, no masses and bowel sounds normal  MS: no musculoskeletal defects are noted and gait is age appropriate without ataxia; left great toe with intact dressing  SKIN: good turgor, no rash/jaundice/ecchymosis  NEURO: Normal strength and tone, sensory exam grossly normal, mentation intact and speech normal    DIAGNOSTICS:   EKG: appears normal, NSR, normal axis, normal intervals, no acute ST/T changes c/w ischemia, no LVH by voltage criteria, unchanged from previous tracings  Labs Resulted Today:   Results for orders placed or performed in visit on 12/24/19   Basic metabolic panel     Status: Abnormal   Result Value Ref Range    Sodium 140 133 - 144 mmol/L    Potassium 4.2 3.4 - 5.3 mmol/L    Chloride 107 94 - 109 mmol/L    Carbon Dioxide 28 20 - 32 mmol/L    Anion Gap 5 3 - 14 mmol/L    Glucose 150 (H) 70 - 99 mg/dL    Urea Nitrogen 25 7 - 30 mg/dL    Creatinine 0.96 0.66 - 1.25 mg/dL    GFR Estimate 73 >60 mL/min/[1.73_m2]    GFR Estimate If Black 85 >60 mL/min/[1.73_m2]    Calcium 8.8 8.5 - 10.1 mg/dL       Recent Labs   Lab Test 12/14/19  0838 12/05/19  1659 09/12/19  0915 07/19/19  0932 04/01/19  1451  12/11/18  1150 12/04/18  0854   HGB  --  12.3*  --  13.0* 13.2*  --  14.7  --    PLT  --  297  --  388  --   --  295  --    INR  --   --   --   --   --   --  0.93  --    NA  --   --   --   --  134  --   --  138   POTASSIUM  --   --   --   --  3.8  --    --  3.7   CR  --   --   --   --  0.91  --  0.94 0.92   A1C 8.1*  --  6.7*  --   --    < > 8.3*  --     < > = values in this interval not displayed.        IMPRESSION:   Reason for surgery/procedure: osteomyelitis left great toea  Diagnosis/reason for consult: preop evaluation; multiple chronic medical conditions    The proposed surgical procedure is considered INTERMEDIATE risk.    REVISED CARDIAC RISK INDEX  The patient has the following serious cardiovascular risks for perioperative complications such as (MI, PE, VFib and 3  AV Block):  Diabetes Mellitus (on Insulin)  INTERPRETATION: 1 risks: Class II (low risk - 0.9% complication rate)    The patient has the following additional risks for perioperative complications:  The ASCVD Risk score (Vinay RUTH Jr., et al., 2013) failed to calculate for the following reasons:    The 2013 ASCVD risk score is only valid for ages 40 to 79   Peripheral vascular disease      ICD-10-CM    1. Preop general physical exam Z01.818 Basic metabolic panel   2. Osteomyelitis of great toe of left foot (H) M86.9    3. 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 Hyperlipidemia LDL goal <100 E78.5    5. Hypertension, goal below 140/90 I10 Basic metabolic panel   6. Peripheral vascular disease (H) I73.9        RECOMMENDATIONS:     --Consult hospital rounder / IM to assist post-op medical management as appropriate  Routine DVT precautions recommended.    Cardiovascular Risk  Performs 4 METs exercise without symptoms (Light housework (dusting, washing dishes), Climb a flight of stairs and Walk on level ground at 15 minutes per mile (4 miles/hour)) .   Recent EKG did not show ischemic changes.  Blood pressure is in control.      --Patient is to take all scheduled medications on the day of surgery EXCEPT for modifications listed below.    Diabetes  Medication Use  -----Hold usual oral and non-insulin diabetic meds (e.g. Metformin, Actos, Glipizide) while NPO.   -----Take 80% of long acting insulin (e.g. Lantus, NPH) while NPO (fasting)      Anticoagulant or Antiplatelet Medication Use  ASPIRIN: Discontinue ASA 7-10 days prior to procedure to reduce bleeding risk.  It should be resumed post-operatively.  NSAIDS: avoid use        ACE Inhibitor or Angiotensin Receptor Blocker (ARB) Use  Ace inhibitor or Angiotensin Receptor Blocker (ARB) and will continue this medication due to the higher risk of uncontrolled perioperative hypertension.      APPROVAL GIVEN to proceed with proposed procedure, without further diagnostic evaluation       Signed Electronically by: Jimmy Catalan MD    Copy of this evaluation report is provided to requesting physician.    [place] Preop Guidelines    Revised Cardiac Risk Index

## 2019-12-24 NOTE — BRIEF OP NOTE
[facility]    Brief Operative Note    Pre-operative diagnosis: Osteomyelitis of great toe of left foot (H) [M86.9]  Post-operative diagnosis Same as pre-operative diagnosis    Procedure: Procedure(s):  Amputation of the great toe, left foot  Surgeon: Surgeon(s) and Role:     * Ethan Montesinos DPM - Primary  Anesthesia: Combined MAC with Local   Estimated blood loss: Less than 50 ml  Drains: Half-inch Nu Gauze stripping  Specimens:   ID Type Source Tests Collected by Time Destination   1 : Left great toe Wound Toe ANAEROBIC BACTERIAL CULTURE, GRAM STAIN, WOUND CULTURE AEROBIC BACTERIAL Ethan Montesinos DPM 12/24/2019 12:59 PM    A : Left great toe Bone Toe SURGICAL PATHOLOGY EXAM Ethan Montesinos DPM 12/24/2019 12:57 PM    B : Left first metatarsal head Bone Bone SURGICAL PATHOLOGY EXAM Ethan Montesinos DPM 12/24/2019 12:58 PM      Findings:   Necrotic distal phalanx of the left great toe.  Complications: None.  Implants: * No implants in log *

## 2019-12-24 NOTE — ANESTHESIA CARE TRANSFER NOTE
Patient: Fawad Garcia    Procedure(s):  Amputation of the great toe, left foot    Diagnosis: Osteomyelitis of great toe of left foot (H) [M86.9]  Diagnosis Additional Information: No value filed.    Anesthesia Type:   MAC     Note:  Airway :Face Mask  Patient transferred to:Phase II  Handoff Report: Identifed the Patient, Identified the Reponsible Provider, Reviewed the pertinent medical history, Discussed the surgical course, Reviewed Intra-OP anesthesia mangement and issues during anesthesia, Set expectations for post-procedure period and Allowed opportunity for questions and acknowledgement of understanding      Vitals: (Last set prior to Anesthesia Care Transfer)    CRNA VITALS  12/24/2019 1217 - 12/24/2019 1249      12/24/2019             Pulse:  80    SpO2:  99 %                Electronically Signed By: Martha Lazo CRNA, APRN CRNA  December 24, 2019  12:49 PM

## 2019-12-24 NOTE — DISCHARGE INSTRUCTIONS
Same Day Surgery Discharge Instructions  Special Precautions After Surgery - Adult    1. It is not unusual to feel lightheaded or faint, up to 24 hours after surgery or while taking pain medication.  If you have these symptoms; sit for a few minutes before standing and have someone assist you when getting up.  2. You should rest and relax for the next 24 hours and must have someone stay with you for at least 24 hours after your discharge.  3. DO NOT DRIVE any vehicle or operate mechanical equipment for 24 hours following the end of your surgery.  DO NOT DRIVE while taking narcotic pain medications that have been prescribed by your physician.  If you had a limb operated on, you must be able to use it fully to drive.  4. DO NOT drink alcoholic beverages for 24 hours following surgery or while taking prescription pain medication.  5. Drink clear liquids (apple juice, ginger ale, broth, 7-Up, etc.).  Progress to your regular diet as you feel able.  6. Any questions call your physician and do not make important decisions for 24 hours.        __________________________________________________________________________________________________________________________________  IMPORTANT NUMBERS:    [facility] Main Number:  [phone], [phone]  Pharmacy:  [phone]  Same Day Surgery:  [phone], Monday - Friday until 8:30 p.m.  Urgent Care:  [phone]  Emergency Room:  [phone]      [facility]:  [phone]                                                                             [facility]:  [phone] option 1  [facility]:  [phone]     [facility]:  [phone]   [facility]:  [phone]   Home Medical Equipment: [phone]  [facility]:  [phone]

## 2019-12-24 NOTE — PROGRESS NOTES
Blood sugar 95, contacted adam donovan crna who ordered apllejuice 2 ounces which was given to patient.

## 2019-12-24 NOTE — TELEPHONE ENCOUNTER
Patient reports he had a procedure this afternoon, toe removed. Surgery was at [facility]. He got home around 3:30 pm. He is unable to urinate. Feels like his bladder is very full. He said he took Flomax but is still unable to void.   Advised to head back into the ED.     Patient verbalized understanding and had no further questions.    Marcia Carpenter, RN/[facility]    Reason for Disposition    [1] Unable to urinate (or only a few drops) > 4 hours AND     [2] bladder feels very full (e.g., palpable bladder or strong urge to urinate)    Additional Information    Negative: Shock suspected (e.g., cold/pale/clammy skin, too weak to stand, low BP, rapid pulse)    Negative: Sounds like a life-threatening emergency to the triager    Protocols used: URINARY SYMPTOMS-A-AH

## 2019-12-24 NOTE — ANESTHESIA POSTPROCEDURE EVALUATION
Patient: Fawad Garcia    Procedure(s):  Amputation of the great toe, left foot    Diagnosis:Osteomyelitis of great toe of left foot (H) [M86.9]  Diagnosis Additional Information: No value filed.    Anesthesia Type:  MAC    Note:  Anesthesia Post Evaluation    Patient location during evaluation: Bedside  Patient participation: Able to fully participate in evaluation  Level of consciousness: awake and alert  Pain management: adequate  Airway patency: patent  Cardiovascular status: acceptable  Respiratory status: acceptable  Hydration status: acceptable  PONV: none     Anesthetic complications: None          Last vitals:  Vitals:    12/24/19 1025   BP: (!) 142/72   Resp: 18   Temp: 36.4  C (97.5  F)   SpO2: 97%         Electronically Signed By: Martha Lazo CRNA, APRN CRNA  December 24, 2019  12:50 PM

## 2019-12-24 NOTE — OP NOTE
PREOPERATIVE DIAGNOSIS: 1.  Osteomyelitis great toe, left foot.   POSTOPERATIVE DIAGNOSIS: 1.  Osteomyelitis great toe, left foot.   PROCEDURE: 1.  Amputation great toe, left foot.   PATHOLOGY: Bone left great toe and first metatarsal head.   ANESTHESIA: Local with monitored anesthesia care  ESTIMATED BLOOD LOSS: Less than 10 mL   INDICATIONS FOR PROCEDURE: Fawad Garcia is a 84 year old-year-old male with osteomyelitis of the distal phalanx of the  great toe of the left foot. The patient was consented for amputation of the great toe, left foot.   PROCEDURE IN DETAIL: Under mild sedation, the patient in the operating room and placed on the operating table in the supine position.  After adequate sedation, approximately 20 cc of 1% buffered lidocaine was injected around the first ray of the left foot. The foot was then scrubbed, prepped and draped in the usual aseptic manner.      Following this, an operative time out was performed according to our institution's policy which confirmed the laterality of the procedure. Preoperative antibiotics were administered to the patient prior to arrival to the OR.     The procedure began with a linear longitudinal incision over the dorsal aspect of the first metatarsophalangeal joint on the left.  The incision was made full thickness extending down and around the first metatarsal phalangeal joint.  The left great toe was then disarticulated from the first metatarsophalangeal joint and removed from the operative field to the back table where cultures of the distal phalanx were obtained for anaerobic and aerobic evaluation.  The left great toe was sent off to pathology.    Next utilizing a sagittal saw the distal aspect of the first metatarsal head was resected from dorsal distal to plantar proximal thereby removing the cartilage surface.  This bone sample was sent off to pathology for clean margins.  The wound was irrigated with copious amounts of normal sterile saline.   The  skin was reapproximated utilizing 3-0 nylon suture in a simple interrupted technique. The area was blocked with approximately 10 cc of 0.5% Marcaine plain.  The wound cavity was packed with half-inch Nu Gauze stripping.  The wound was dressed with sterile jumpstart dressing, 4 x 4s, ABD pads, cast padding and an Ace wrap.    The patient tolerated these procedures well and was transferred from the operative table to the transport cart and taken from the OR to the PACU.  In PACU, patient and staff given orders and instructions for post-operative care in the following areas: post op pain management, weight-bearing status, dressing/splint care, weight-bearing assistive devices, elevation of the extremity, ice/cold therapy, DVT/blood clot prevention, nutrition and post-operative concerns to be aware.  Case and post-operative care measures were reviewed with the patient and family members present.  A post operative instruction sheet was provided.  If concerns or questions arise they will contact our clinic.  If acute concerns develop they will present emergently to a nearby medical center.      RONNY MERCADO DPM, FACFAS

## 2019-12-25 ENCOUNTER — HOSPITAL ENCOUNTER (EMERGENCY)
Facility: CLINIC | Age: 84
Discharge: HOME | End: 2019-12-25
Attending: FAMILY MEDICINE | Admitting: FAMILY MEDICINE
Payer: COMMERCIAL

## 2019-12-25 ENCOUNTER — TELEPHONE (OUTPATIENT)
Dept: UROLOGY | Facility: CLINIC | Age: 84
End: 2019-12-25

## 2019-12-25 ENCOUNTER — NURSE TRIAGE (OUTPATIENT)
Dept: NURSING | Facility: CLINIC | Age: 84
End: 2019-12-25

## 2019-12-25 VITALS
OXYGEN SATURATION: 98 % | SYSTOLIC BLOOD PRESSURE: 159 MMHG | BODY MASS INDEX: 30.96 KG/M2 | DIASTOLIC BLOOD PRESSURE: 85 MMHG | TEMPERATURE: 98 F | WEIGHT: 222 LBS

## 2019-12-25 DIAGNOSIS — T83.9XXA PROBLEM WITH FOLEY CATHETER, INITIAL ENCOUNTER (H): ICD-10-CM

## 2019-12-25 PROCEDURE — 25000125 ZZHC RX 250: Performed by: FAMILY MEDICINE

## 2019-12-25 PROCEDURE — 99284 EMERGENCY DEPT VISIT MOD MDM: CPT | Mod: 25 | Performed by: FAMILY MEDICINE

## 2019-12-25 PROCEDURE — 76775 US EXAM ABDO BACK WALL LIM: CPT | Performed by: FAMILY MEDICINE

## 2019-12-25 PROCEDURE — 76775 US EXAM ABDO BACK WALL LIM: CPT | Mod: 26 | Performed by: FAMILY MEDICINE

## 2019-12-25 RX ORDER — LIDOCAINE HYDROCHLORIDE 20 MG/ML
JELLY TOPICAL ONCE
Status: COMPLETED | OUTPATIENT
Start: 2019-12-25 | End: 2019-12-25

## 2019-12-25 RX ADMIN — LIDOCAINE HYDROCHLORIDE: 20 JELLY TOPICAL at 11:48

## 2019-12-25 NOTE — DISCHARGE INSTRUCTIONS
Leave catheter in place until you are seen by urology hopefully this coming week.  Push fluids, rest.  Return to the emergency department if further concerns

## 2019-12-25 NOTE — ED PROVIDER NOTES
History     Chief Complaint   Patient presents with     Catheter Problem     pt states discomfort from catheter placed yesterday, was unable to urinate after toe surgery yesterday so catheter was placed.     CASTILLO Garcia is a 84 year old male, past medical history significant for type 2 diabetes, hypertension, prostate cancer, peripheral vascular disease, GERD, hyperlipidemia, TIA, obesity, presents to the emergency department with concerns of catheter discomfort.  History is obtained from the patient and I also reviewed his chart recently for the events occurring yesterday with his ER visit after his surgery occurring yesterday as well.  This patient had a Encarnacion catheter placed due to inability to void in the postoperative setting.  16 Kinyarwanda catheter and it was recommended that this be left in place until he is seen by urology this coming week.  The patient reports that the catheter is uncomfortable it feels like the meatus is being stretched.  He reports good urine output into the leg bag.  He is not taking any pain medication    Allergies:  Allergies   Allergen Reactions     Zocor [Simvastatin - High Dose]      Bilateral hip aching       Problem List:    Patient Active Problem List    Diagnosis Date Noted     Osteomyelitis of great toe of left foot (H) 12/20/2019     Priority: Medium     Added automatically from request for surgery [id number]       Macular degeneration (senile) of retina 01/10/2019     Priority: Medium     Nearly legally blind per eye doctor.       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) 11/19/2018     Priority: Medium     Type 2 diabetes mellitus with both eyes affected by mild nonproliferative retinopathy without macular edema, with long-term current use of insulin (H) 07/30/2018     Priority: Medium     Senile nuclear sclerosis 04/01/2015     Priority: Medium     Hypertension, goal below 140/90 03/25/2015     Priority: Medium     Malignant neoplasm of prostate  "(H) 06/16/2014     Priority: Medium     Health Care Home 12/28/2012     Priority: Medium     Nury Josi, RN-PHN  [facility] / [facility]   [phone]    DX V65.8 REPLACED WITH 99164 HEALTH CARE HOME (04/08/2013)       Advanced directives, counseling/discussion 02/24/2012     Priority: Medium     Patient does not have an Advance/Health Care Directive (HCD), given \"What is Advance Care Planning?\" flyer, accepts referral to Facilitator and requests blank HCD form.    Roro Wright  February 24, 2012         Peripheral vascular disease (H) 11/18/2011     Priority: Medium     Problem list name updated by automated process. Provider to review       GERD (gastroesophageal reflux disease) 11/03/2011     Priority: Medium     HYPERLIPIDEMIA LDL GOAL <100 10/31/2010     Priority: Medium     Transient cerebral ischemia 09/03/2009     Priority: Medium     Diagnosis updated by automated process. Provider to review and confirm.       Obesity      Priority: Medium     Obesity  Problem list name updated by automated process. Provider to review          Past Medical History:    Past Medical History:   Diagnosis Date     Diabetes mellitus (H)      Hypertension      Squamous cell carcinoma        Past Surgical History:    Past Surgical History:   Procedure Laterality Date     AMPUTATE TOE(S) Right 7/23/2019    Procedure: Partial amputation great toe;  Surgeon: Ethan Montesinos DPM;  Location: [facility] OR     [facility] SURGICAL PATHOLOGY  2002    prostate biopsy elevated PSA     COLONOSCOPY       HC REMOVE TONSILS/ADENOIDS,<13 Y/O      T & A     IR LOWER EXTREMITY ANGIOGRAM RIGHT  12/11/2018     PHACOEMULSIFICATION WITH STANDARD INTRAOCULAR LENS IMPLANT Left 4/2/2015    Procedure: PHACOEMULSIFICATION WITH STANDARD INTRAOCULAR LENS IMPLANT;  Surgeon: Joseph Hernandez MD;  Location: [facility] OR     PHACOEMULSIFICATION WITH STANDARD INTRAOCULAR LENS IMPLANT Right 5/4/2015    Procedure: PHACOEMULSIFICATION WITH STANDARD " INTRAOCULAR LENS IMPLANT;  Surgeon: Joseph Hernandez MD;  Location: [facility] OR     REPAIR HAMMER TOE Left 4/2/2019    Procedure: 5th Metatarsal Head Resection;  Surgeon: Ethan Montesinos DPM;  Location: [facility] OR       Family History:    Family History   Problem Relation Age of Onset     Cancer Mother         intestinal CA     Diabetes Father      Diabetes Brother      Other Cancer Son         meagan tumor     Other Cancer Brother         pancreatic cancer     Diabetes Son      Melanoma No family hx of        Social History:  Marital Status:   [2]  Social History     Tobacco Use     Smoking status: Never Smoker     Smokeless tobacco: Never Used   Substance Use Topics     Alcohol use: Yes     Alcohol/week: 1.7 standard drinks     Comment: very little     Drug use: No        Medications:    amLODIPine (NORVASC) 5 MG tablet  Blood Glucose Monitoring Suppl (ONE TOUCH ULTRA SYSTEM KIT) W/DEVICE KIT  dorzolamide-timolol (COSOPT) 2-0.5 % ophthalmic solution  hydrochlorothiazide (HYDRODIURIL) 25 MG tablet  HYDROcodone-acetaminophen (NORCO) 5-325 MG tablet  insulin glargine (LANTUS SOLOSTAR) 100 UNIT/ML pen  insulin pen needle (B-D U/F) 31G X 8 MM miscellaneous  lisinopril (PRINIVIL/ZESTRIL) 40 MG tablet  metFORMIN (GLUCOPHAGE) 500 MG tablet  Multiple Vitamins-Minerals (PRESERVISION AREDS PO)  omeprazole (PRILOSEC) 20 MG DR capsule  ONETOUCH LANCETS MISC  ONETOUCH ULTRA test strip  ORDER FOR DME  pravastatin (PRAVACHOL) 10 MG tablet  senna-docusate (SENOKOT-S/PERICOLACE) 8.6-50 MG tablet          Review of Systems   All other systems reviewed and are negative.      Physical Exam   BP: (!) 159/85  Heart Rate: 86  Temp: 98  F (36.7  C)  Weight: 100.7 kg (222 lb)  SpO2: 98 %      Physical Exam  Vitals signs and nursing note reviewed.   Constitutional:       General: He is not in acute distress.     Appearance: Normal appearance. He is not ill-appearing or toxic-appearing.      Comments: He appears comfortable, no acute  distress.   Abdominal:      General: Abdomen is flat. Bowel sounds are normal.      Palpations: Abdomen is soft.   Genitourinary:     Comments: Encarnacion catheter in place, no irritation around the meatus.  Clear yellow urine in the leg bag.  Neurological:      Mental Status: He is alert.         ED Course        Procedures               Critical Care time:  none               Results for orders placed or performed during the hospital encounter of 12/24/19 (from the past 24 hour(s))   POC US ABDOMEN LIMITED    Impression    [facility] Procedure Note      Limited Bedside ED Ultrasound of the Urinary Bladder:    PERFORMED BY: Dr. Khai Salamanca MD  INDICATIONS:  Possible obstrucion and/or mass  PROBE: Low frequency convex probe  BODY LOCATION:  Abdomen  FINDINGS:  Visualization of the bladder in longitudinal and transverse views demonstrated a distended state.      The bladder dimensions measured:  Pre bladder length 9.45   Height:14.9   Width: 8cm   Urine amount:: 650cc      INTERPRETATION:  Total calculated volume was estimated at 650 cc.  The bladder is abnormally distended.  IMAGE DOCUMENTATION: Images were archived to PACs system.               UA reflex to Microscopic and Culture   Result Value Ref Range    Color Urine Yellow     Appearance Urine Clear     Glucose Urine Negative NEG^Negative mg/dL    Bilirubin Urine Negative NEG^Negative    Ketones Urine Negative NEG^Negative mg/dL    Specific Gravity Urine 1.011 1.003 - 1.035    Blood Urine Small (A) NEG^Negative    pH Urine 6.0 5.0 - 7.0 pH    Protein Albumin Urine Negative NEG^Negative mg/dL    Urobilinogen mg/dL 0.0 0.0 - 2.0 mg/dL    Nitrite Urine Negative NEG^Negative    Leukocyte Esterase Urine Negative NEG^Negative    Source Catheterized Urine     RBC Urine 6 (H) 0 - 2 /HPF    WBC Urine 1 0 - 5 /HPF    Hyaline Casts 1 0 - 2 /LPF       Medications   lidocaine (XYLOCAINE) 2 % external gel (has no administration in time range)     11:46 AM  This  catheter is in appropriate position, is a reasonable size, and is functioning appropriately.  I encouraged the patient to leave this in until he is followed up in clinic with urology as originally recommended to him yesterday.  Discussed the option of removing the catheter, he would be at risk for recurrent retention, I did recommend this course of action and the patient ultimately decided to leave the catheter in.  Contact information for urology was given to him.    Assessments & Plan (with Medical Decision Making)   84-year-old male past medical history reviewed as above who presents the emergency department with concerns of discomfort with appropriate Encarnacion catheter position and function.  After discussion the patient elected to leave the catheter in place and will follow up with urology as planned.  He will return to the emergency department as necessary.      Disclaimer: This note consists of symbols derived from keyboarding, dictation and/or voice recognition software. As a result, there may be errors in the script that have gone undetected. Please consider this when interpreting information found in this chart.      I have reviewed the nursing notes.    I have reviewed the findings, diagnosis, plan and need for follow up with the patient.          New Prescriptions    No medications on file       Final diagnoses:   Problem with Encarnacion catheter, initial encounter (H) - Discomfort with catheter, functioning appropriately       12/25/2019   [facility] EMERGENCY DEPARTMENT     Mateo Nieves MD  12/25/19 1250

## 2019-12-25 NOTE — TELEPHONE ENCOUNTER
Emerson had catheter placed yesterday after toe surgery and experiencing urinary retention.    Reason for Disposition    Patient sounds very sick or weak to the triager    Additional Information    Negative: Shock suspected (e.g., cold/pale/clammy skin, too weak to stand, low BP, rapid pulse)    Negative: Sounds like a life-threatening emergency to the triager    Negative: [1] Catheter was accidentally pulled-out AND [2] bright red continuous bleeding    Negative: SEVERE abdominal pain    Negative: Fever > 100.5 F (38.1 C)    Negative: [1] Drinking very little AND [2] dehydration suspected (e.g., no urine > 12 hours, very dry mouth, very lightheaded)    Protocols used: URINARY CATHETER SYMPTOMS AND [id number] Alston RN  [facility]

## 2019-12-25 NOTE — ED AVS SNAPSHOT
[facility] Emergency Department  [address] [address]  Phone:  [phone]  Fax:  [phone]                                    Fawad Garcia   MRN: [id number]    Department:  [facility] Emergency Department   Date of Visit:  12/25/2019           After Visit Summary Signature Page    I have received my discharge instructions, and my questions have been answered. I have discussed any challenges I see with this plan with the nurse or doctor.    ..........................................................................................................................................  Patient/Patient Representative Signature      ..........................................................................................................................................  Patient Representative Print Name and Relationship to Patient    ..................................................               ................................................  Date                                   Time    ..........................................................................................................................................  Reviewed by Signature/Title    ...................................................              ..............................................  Date                                               Time          22EPIC Rev 08/18

## 2019-12-25 NOTE — DISCHARGE INSTRUCTIONS
1. Urinary retention with incomplete bladder emptying R33.9     keep crane in for the next week.  follow-up urology early next week; return for fever.  urinalysis done before discharge

## 2019-12-25 NOTE — ED NOTES
Education completed with the patient and the patient's Son. Pt aware that he needs to follow up with urology.

## 2019-12-25 NOTE — TELEPHONE ENCOUNTER
Reason for call:  Other   Patient called regarding (reason for call): appointment  Additional comments: Patient was recommended to see Dr. Madsen to have catheter removed on Monday. Dr. Madsen is fully booked. Can he be worked in? Please call patient to discuss as soon as possible.    Phone number to reach patient:  Home number on file [phone] (home)    Best Time:  any    Can we leave a detailed message on this number?  YES     Justine CHAVARRIA  Central Scheduler

## 2019-12-25 NOTE — TELEPHONE ENCOUNTER
Patient had procedure today with Dr. Montesinos to amputate left big toe.  Mahin, son, calls to say there is about a 2 inch x 2 inch area of blood soaking through his dressing at the bottom of his foot.  Reviewed care advice with caller per RN triage protocol.  Paged on-call provider, Dr. Montesinos, at 8:25 pm via page  to call Mahin back re: post-op bleeding.  FNA advised caller to phone back FNA in 20-30 minutes if no response from on call provider and caller agreed.        Reason for Disposition    Dressing soaked with blood or body fluid (e.g., drainage)    Additional Information    Negative: [1] Major abdominal surgical incision AND [2] wound gaping open AND [3] visible internal organs    Negative: Sounds like a life-threatening emergency to the triager    Negative: [1] Bleeding from incision AND [2] won't stop after 10 minutes of direct pressure    Negative: [1] Widespread rash AND [2] bright red, sunburn-like    Negative: Severe pain in the incision    Negative: [1] Suture came out early AND [2] wound gaping AND [3] < 48 hours since sutures placed    Negative: [1] Incision gaping open AND [2] length of opening > 2 inches (5 cm)    Negative: Patient sounds very sick or weak to the triager    Negative: Sounds like a serious complication to the triager    Negative: Fever > 100.5 F (38.1 C)    Negative: [1] Incision looks infected (spreading redness, pain) AND [2] fever > 99.5 F (37.5 C)    Negative: [1] Incision looks infected (spreading redness, pain) AND [2] large red area (> 2 in. or 5 cm)    Negative: [1] Incision looks infected (spreading redness, pain) AND [2] face wound    Negative: [1] Red streak runs from the incision AND [2] longer than 1 inch (2.5 cm)    Negative: [1] Pus or bad-smelling fluid draining from incision AND [2] no fever    Negative: [1] Post-op pain AND [2] not controlled with pain medications    Protocols used: POST-OP INCISION SYMPTOMS-A-AH

## 2019-12-26 ENCOUNTER — OFFICE VISIT (OUTPATIENT)
Dept: PODIATRY | Facility: CLINIC | Age: 84
End: 2019-12-26
Payer: COMMERCIAL

## 2019-12-26 ENCOUNTER — ANCILLARY PROCEDURE (OUTPATIENT)
Dept: GENERAL RADIOLOGY | Facility: CLINIC | Age: 84
End: 2019-12-26
Attending: PODIATRIST
Payer: COMMERCIAL

## 2019-12-26 VITALS
HEART RATE: 84 BPM | HEIGHT: 71 IN | SYSTOLIC BLOOD PRESSURE: 142 MMHG | BODY MASS INDEX: 31.08 KG/M2 | WEIGHT: 222 LBS | DIASTOLIC BLOOD PRESSURE: 71 MMHG

## 2019-12-26 DIAGNOSIS — M86.9 OSTEOMYELITIS OF GREAT TOE OF LEFT FOOT (H): Primary | ICD-10-CM

## 2019-12-26 DIAGNOSIS — L97.512: ICD-10-CM

## 2019-12-26 DIAGNOSIS — Z98.890 S/P FOOT SURGERY, LEFT: ICD-10-CM

## 2019-12-26 DIAGNOSIS — L03.032 CELLULITIS OF GREAT TOE OF LEFT FOOT: ICD-10-CM

## 2019-12-26 PROCEDURE — 99024 POSTOP FOLLOW-UP VISIT: CPT | Performed by: PODIATRIST

## 2019-12-26 PROCEDURE — 11042 DBRDMT SUBQ TIS 1ST 20SQCM/<: CPT | Mod: 58 | Performed by: PODIATRIST

## 2019-12-26 PROCEDURE — 73630 X-RAY EXAM OF FOOT: CPT | Mod: LT

## 2019-12-26 RX ORDER — CLINDAMYCIN HCL 300 MG
300 CAPSULE ORAL 3 TIMES DAILY
Qty: 30 CAPSULE | Refills: 0 | Status: ON HOLD | OUTPATIENT
Start: 2019-12-26 | End: 2020-02-26

## 2019-12-26 ASSESSMENT — MIFFLIN-ST. JEOR: SCORE: 1724.12

## 2019-12-26 NOTE — NURSING NOTE
"Chief Complaint   Patient presents with     Surgical Followup     bandage change     Consult     right foot, 2cd toe       Initial BP (!) 142/71   Pulse 84   Ht 1.803 m (5' 11\")   Wt 100.7 kg (222 lb)   BMI 30.96 kg/m   Estimated body mass index is 30.96 kg/m  as calculated from the following:    Height as of this encounter: 1.803 m (5' 11\").    Weight as of this encounter: 100.7 kg (222 lb).  Medications and allergies reviewed.      Dana GUILLERMO MA    "

## 2019-12-26 NOTE — LETTER
"    12/26/2019         RE: Fawad Garcia  [address] [facility] [address]        Dear Colleague,    Thank you for referring your patient, Fawad Garcia, to the [facility]. Please see a copy of my visit note below.    Fawad presents to the office s/p 2 days amputation of the great toe of the left foot .  The patient relates keeping the bandages clean, dry and intact.  The patient relates good compliance with postoperative instructions.  The patient denies any severe pain, fevers, chills, nausea or vomiting.      BP (!) 142/71   Pulse 84   Ht 1.803 m (5' 11\")   Wt 100.7 kg (222 lb)   BMI 30.96 kg/m         Physical Exam:    General: The patient appears to be in no distress and in good spirits.  The bandages appear clean, dry and intact with no strikethrough noted. Vascular exam: Neurovascular status unchanged with < 3 sec capillary refill to all digits.  Positive pedal pulses and epicritic sensations intact with no evidence of allodynia.  One notes moderate edema to the forefoot on the left.  Sutures are intact and the skin is well coapted with no erythema noted.    One notes a full-thickness ulceration on the distal aspect of the right second toe.  The right second toe is contracted at the PIPJ.  No exposed bone noted.  No surrounding erythema noted.  No drainage noted.    Radiograph:  AP, lateral and medial oblique evaluation of left foot reveals surgical amputation of the great toe, head of the first metatarsal and sesamoid apparatus    Assessment:   1.  Osteomyelitis status post 2 days amputation of the great toe, first metatarsal head and sesamoid apparatus of the left foot.  2.  Full-thickness ulceration right second toe    Plan:  Sutures remain intact.  The drain was removed A sterile dressing was reapplied.  The patient was instructed to continue heel-weightbearing to the left foot.  The patient is to keep the dressings clean, dry and intact and to " continue with elevation of the left foot.  The patient will return to the office in on Monday for reevaluation.  The ulcer was sharply debrided down to subcutaneous tissue on the distal aspect of right second toe.  A sterile bandage was applied.  The patient was instructed to continue bandaging and offloading the second toe on the right foot.  The patient was instructed to heel weight-bear on the left only.  A refill for clindamycin was placed to his pharmacy.    Disclaimer: This note consists of symbols derived from keyboarding, dictation and/or voice recognition software. As a result, there may be errors in the script that have gone undetected. Please consider this when interpreting information found in this chart.       MIMA Vela.P.EDELMIRA., F.A.C.F.A.S.      Again, thank you for allowing me to participate in the care of your patient.        Sincerely,        Ethan Montesinos DPM

## 2019-12-26 NOTE — TELEPHONE ENCOUNTER
Pt seen Francisco Javier last in 2011 and had Gold Seed.   Has followed with Dr Hua since.  Lately he has seen an increase in his PSA.  Discussed with patient that it may be time to return to Dr Mcintyre as well for follow up as he has not had Cysto in all these years.    After discussion, pt understands the benefit of not removing catheter too soon for possibility of continued Urinary retention.  And that this may not have been an acute issue and possibly has had undiagnosed retention over a period of time.    Patient wishes to know if you are willing to see work in early next week or if in your opinion he should see Francisco Javier for follow up and also determine cath removal?    Please advise.    Estephania PABLO   [facility]

## 2019-12-26 NOTE — PROGRESS NOTES
"Fawad presents to the office s/p 2 days amputation of the great toe of the left foot .  The patient relates keeping the bandages clean, dry and intact.  The patient relates good compliance with postoperative instructions.  The patient denies any severe pain, fevers, chills, nausea or vomiting.      BP (!) 142/71   Pulse 84   Ht 1.803 m (5' 11\")   Wt 100.7 kg (222 lb)   BMI 30.96 kg/m        Physical Exam:    General: The patient appears to be in no distress and in good spirits.  The bandages appear clean, dry and intact with no strikethrough noted. Vascular exam: Neurovascular status unchanged with < 3 sec capillary refill to all digits.  Positive pedal pulses and epicritic sensations intact with no evidence of allodynia.  One notes moderate edema to the forefoot on the left.  Sutures are intact and the skin is well coapted with no erythema noted.    One notes a full-thickness ulceration on the distal aspect of the right second toe.  The right second toe is contracted at the PIPJ.  No exposed bone noted.  No surrounding erythema noted.  No drainage noted.    Radiograph:  AP, lateral and medial oblique evaluation of left foot reveals surgical amputation of the great toe, head of the first metatarsal and sesamoid apparatus    Assessment:   1.  Osteomyelitis status post 2 days amputation of the great toe, first metatarsal head and sesamoid apparatus of the left foot.  2.  Full-thickness ulceration right second toe    Plan:  Sutures remain intact.  The drain was removed A sterile dressing was reapplied.  The patient was instructed to continue heel-weightbearing to the left foot.  The patient is to keep the dressings clean, dry and intact and to continue with elevation of the left foot.  The patient will return to the office in on Monday for reevaluation.  The ulcer was sharply debrided down to subcutaneous tissue on the distal aspect of right second toe.  A sterile bandage was applied.  The patient was instructed " to continue bandaging and offloading the second toe on the right foot.  The patient was instructed to heel weight-bear on the left only.  A refill for clindamycin was placed to his pharmacy.    Disclaimer: This note consists of symbols derived from keyboarding, dictation and/or voice recognition software. As a result, there may be errors in the script that have gone undetected. Please consider this when interpreting information found in this chart.       LIANG Montesinos D.P.M., F.DELANEY.C.F.A.S.

## 2019-12-26 NOTE — PATIENT INSTRUCTIONS
Ray to follow up with Primary Care provider regarding elevated blood pressure.    Postoperative instructions    1.  Keep dressings clean, dry and intact; reinforce if any blood comes through.  2.  Keep the foot elevated above your heart level.  3.  Ice the foot or behind the knee 20 min per hour.    Pain management:  1.  Keep the foot elevated and cool  2.  Take the pain medication as directed; do not hold off on taking too long.  3.  If the pain is still high, unwrap the ace wrap and put back on looser.  4.  If this does not work, take Ibuprofen 600 mg TID.  5.  If this does not work, call the nurse line for additional help.

## 2019-12-27 NOTE — TELEPHONE ENCOUNTER
"Should be seen by Francisco Javier per Dr Madsen.  Dr Mcintyre can decide about when cath can come out.    Yue:  I placed pt \"work in\" on Dr Mcintyre's schedule for thursday.   However if Dr Mcintyre would like trial void to see about possible cath removal now and then see for appt/ possible with time for cysto or other work up;  Patient would really like that.    Estephania PABLO   [facility] RN  "

## 2019-12-29 LAB
BACTERIA SPEC CULT: NO GROWTH
Lab: NORMAL
SPECIMEN SOURCE: NORMAL

## 2019-12-30 ENCOUNTER — OFFICE VISIT (OUTPATIENT)
Dept: PODIATRY | Facility: CLINIC | Age: 84
End: 2019-12-30
Payer: COMMERCIAL

## 2019-12-30 VITALS
HEART RATE: 78 BPM | SYSTOLIC BLOOD PRESSURE: 134 MMHG | DIASTOLIC BLOOD PRESSURE: 69 MMHG | WEIGHT: 222 LBS | BODY MASS INDEX: 31.08 KG/M2 | HEIGHT: 71 IN

## 2019-12-30 DIAGNOSIS — M86.9 OSTEOMYELITIS OF GREAT TOE OF LEFT FOOT (H): Primary | ICD-10-CM

## 2019-12-30 PROCEDURE — 99024 POSTOP FOLLOW-UP VISIT: CPT | Performed by: PODIATRIST

## 2019-12-30 RX ORDER — LEVOFLOXACIN 500 MG/1
500 TABLET, FILM COATED ORAL DAILY
Qty: 14 TABLET | Refills: 0 | Status: ON HOLD | OUTPATIENT
Start: 2019-12-30 | End: 2020-02-26

## 2019-12-30 ASSESSMENT — MIFFLIN-ST. JEOR: SCORE: 1724.12

## 2019-12-30 NOTE — NURSING NOTE
"Chief Complaint   Patient presents with     Surgical Followup     DOS 12/24/19, Amputation great toe, left foot. XR taken 12/26/19       Initial /69   Pulse 78   Ht 1.803 m (5' 11\")   Wt 100.7 kg (222 lb)   BMI 30.96 kg/m   Estimated body mass index is 30.96 kg/m  as calculated from the following:    Height as of this encounter: 1.803 m (5' 11\").    Weight as of this encounter: 100.7 kg (222 lb).  Medications and allergies reviewed.      Dana GUILLERMO MA    "

## 2019-12-30 NOTE — PROGRESS NOTES
"Fawad presents to the office s/p 1 week amputation of the great toe of the left foot .  The patient relates keeping the bandages clean, dry and intact.  The patient relates good compliance with postoperative instructions.  The patient denies any severe pain, fevers, chills, nausea or vomiting.      /69   Pulse 78   Ht 1.803 m (5' 11\")   Wt 100.7 kg (222 lb)   BMI 30.96 kg/m        Physical Exam:    General: The patient appears to be in no distress and in good spirits.  The bandages appear clean, dry and intact with no strikethrough noted. Vascular exam: Neurovascular status unchanged with < 3 sec capillary refill to all digits.  Positive pedal pulses and epicritic sensations intact with no evidence of allodynia.  One notes moderate edema to the forefoot on the left.  Sutures are intact and the skin is well coapted with no erythema noted.      One notes a full-thickness ulceration on the distal aspect of the right second toe.  The right second toe is contracted at the PIPJ.  No exposed bone noted.  No surrounding erythema noted.  No drainage noted.    Cultures:     Culture Micro Abnormal  12/24/2019 12:59    Light growth   Finegoldia magna (Peptostreptococcus kira)          Assessment:   1.  Osteomyelitis status post 1 week amputation of the great toe, first metatarsal head and sesamoid apparatus of the left foot.  2.  Full-thickness ulceration right second toe    Plan:  Sutures remain intact.  The drain was removed A sterile dressing was reapplied.  The patient was instructed to continue heel-weightbearing to the left foot.  The patient is to keep the dressings clean, dry and intact and to continue with elevation of the left foot.  The patient will return to the office in in 1 week for reevaluation.   The patient was instructed to heel weight-bear on the left only.  A refill for Levaquin  was placed to his pharmacy.     Disclaimer: This note consists of symbols derived from keyboarding, dictation " and/or voice recognition software. As a result, there may be errors in the script that have gone undetected. Please consider this when interpreting information found in this chart.       LIANG Montesinos D.P.M., [email or web address].

## 2019-12-30 NOTE — PATIENT INSTRUCTIONS
Pain management:  1.  Keep the foot elevated and cool  2.  Keep dressings clean dry and intact.  3.  Take antibiotics as prescribed

## 2019-12-31 LAB
BACTERIA SPEC CULT: ABNORMAL
Lab: ABNORMAL
SPECIMEN SOURCE: ABNORMAL

## 2019-12-31 NOTE — TELEPHONE ENCOUNTER
After speaking with Yue this am,  Confirmed pt needs to wait for Dr Mcintyre appt.    At this time: pt walk in to clinic office stating someone called him after RN spoke with him last week and told him to come in to clinic at 2:30pm Tuesday to get his catheter out.    RN unable to see any documentation of that call. No appt scheduled.  Advised pt and his son,  that Dr Madsen deferred to Dr Mcintyre.  Confirmed the appt once again with pt, that appt is scheduled with Dr Mcintyre.    Reviewed the need for bladder decompression and sufficient time needed to allow for as much normal function of bladder to return.    Pt will come back on Thursday to see Dr. Mcintyre.    Estephania PABLO   [facility]

## 2020-01-02 ENCOUNTER — OFFICE VISIT (OUTPATIENT)
Dept: UROLOGY | Facility: CLINIC | Age: 85
End: 2020-01-02
Payer: COMMERCIAL

## 2020-01-02 VITALS
DIASTOLIC BLOOD PRESSURE: 69 MMHG | TEMPERATURE: 97 F | SYSTOLIC BLOOD PRESSURE: 104 MMHG | HEART RATE: 91 BPM | RESPIRATION RATE: 20 BRPM

## 2020-01-02 DIAGNOSIS — R33.9 URINARY RETENTION: Primary | ICD-10-CM

## 2020-01-02 LAB — COPATH REPORT: NORMAL

## 2020-01-02 PROCEDURE — 51700 IRRIGATION OF BLADDER: CPT | Performed by: UROLOGY

## 2020-01-02 PROCEDURE — 99201 ZZC OFFICE/OUTPT VISIT, NEW, LEVEL I: CPT | Mod: 25 | Performed by: UROLOGY

## 2020-01-02 NOTE — PROGRESS NOTES
"Initial /69 (BP Location: Left arm, Patient Position: Sitting, Cuff Size: Adult Regular)   Pulse 91   Temp 97  F (36.1  C) (Oral)   Resp 20  Estimated body mass index is 30.96 kg/m  as calculated from the following:    Height as of 12/30/19: 1.803 m (5' 11\").    Weight as of 12/30/19: 100.7 kg (222 lb). .    Viktoriya HARDING RN   [facility]   "

## 2020-01-02 NOTE — PROGRESS NOTES
TOV done per Dr. Mcintyre.   210cc steril water instilled into bladder. Patient felt need to urinate. 150cc urinated into urinal. Small amount leaking out prior with deflation of balloon and removal or catheter. Bladder scan revealed 26cc.     Dr. Mcintyre notified of findings.     Viktoriya HARDING RN   [facility]

## 2020-01-03 NOTE — PROGRESS NOTES
Visit Date:   2020      REASON FOR VISIT TODAY:  Urinary retention.      Mr. Garcia is an 84-year-old gentleman who has been followed in our clinic previously for a history of prostate cancer.  The patient was diagnosed with Chaka 4+3 prostate cancer back at the end of  and underwent radiation therapy for this ultimately.  He has had no evidence of recurrence to date.      The patient was recently noted to undergo amputation of his left great toe following which the patient had urinary retention.  A Encarnacion catheter was placed and the patient presents today for possible voiding trial.  The patient notes the catheter is uncomfortable but has otherwise been tolerating it without too much difficulty.      PHYSICAL EXAMINATION:  On exam his blood pressure is 104/69, pulse 91.  He is in no acute distress.      ASSESSMENT AND PLAN:  Over half of today's 15-minute visit was spent counseling the patient regarding his urinary retention.  I suggested to Mr. Garcia that we will go ahead and give him a voiding trial, which he ultimately did pass today without difficulty.  The patient was instructed to take the Flomax tablets he had been prescribed in the short-term to increase the likelihood of him continuing to do well with his voiding at this time.  He can then wean himself off the medications as he did not require them prior to this incident.  The patient is in agreement with the plan.  He will otherwise follow up as needed.         TERRI CEE MD             D: 2020   T: 2020   MT: CRISS      Name:     DEYSI GARCIA   MRN:      [id number]        Account:      [id number]   :      1935           Visit Date:   2020      Document: [id number]

## 2020-01-06 ENCOUNTER — OFFICE VISIT (OUTPATIENT)
Dept: PODIATRY | Facility: CLINIC | Age: 85
End: 2020-01-06
Payer: COMMERCIAL

## 2020-01-06 VITALS
HEART RATE: 85 BPM | WEIGHT: 222 LBS | SYSTOLIC BLOOD PRESSURE: 137 MMHG | DIASTOLIC BLOOD PRESSURE: 75 MMHG | HEIGHT: 71 IN | BODY MASS INDEX: 31.08 KG/M2

## 2020-01-06 DIAGNOSIS — Z98.890 S/P FOOT SURGERY, LEFT: ICD-10-CM

## 2020-01-06 DIAGNOSIS — M86.9 OSTEOMYELITIS OF GREAT TOE OF LEFT FOOT (H): Primary | ICD-10-CM

## 2020-01-06 PROCEDURE — 99024 POSTOP FOLLOW-UP VISIT: CPT | Performed by: PODIATRIST

## 2020-01-06 ASSESSMENT — MIFFLIN-ST. JEOR: SCORE: 1724.12

## 2020-01-06 NOTE — PROGRESS NOTES
"Fawad presents to the office s/p 2 weeks amputation of the great toe of the left foot .  The patient relates keeping the bandages clean, dry and intact.  The patient relates good compliance with postoperative instructions.  The patient denies any severe pain, fevers, chills, nausea or vomiting.      /75   Pulse 85   Ht 1.803 m (5' 11\")   Wt 100.7 kg (222 lb)   BMI 30.96 kg/m        Physical Exam:    General: The patient appears to be in no distress and in good spirits.  The bandages appear clean, dry and intact with no strikethrough noted. Vascular exam: Neurovascular status unchanged with < 3 sec capillary refill to all digits.  Positive pedal pulses and epicritic sensations intact with no evidence of allodynia.  One notes moderate edema to the forefoot on the left.  Sutures are intact and the skin is well coapted with no erythema noted.      One notes a full-thickness ulceration on the distal aspect of the right second toe.  The right second toe is contracted at the PIPJ.  No exposed bone noted.  No surrounding erythema noted.  No drainage noted.           Assessment:   1.  Osteomyelitis status post 2 weeks amputation of the great toe, first metatarsal head and sesamoid apparatus of the left foot.  2.  Full-thickness ulceration right second toe    Plan:  Sutures remain intact.  The drain was removed A sterile dressing was reapplied.  The patient was instructed to continue heel-weightbearing to the left foot.  The patient is to keep the dressings clean, dry and intact and to continue with elevation of the left foot.  The patient will return to the office in in 1 week for reevaluation.   The patient was instructed to heel weight-bear on the left only.  Patient will continue taking Levaquin 500 mg p.o. daily.    Disclaimer: This note consists of symbols derived from keyboarding, dictation and/or voice recognition software. As a result, there may be errors in the script that have gone undetected. Please " consider this when interpreting information found in this chart.       LIANG Montesinos D.P.M., [email or web address].

## 2020-01-06 NOTE — NURSING NOTE
"Chief Complaint   Patient presents with     Suture Removal     DOS 12/24/19, Amputation great toe, left foot.       Initial /75   Pulse 85   Ht 1.803 m (5' 11\")   Wt 100.7 kg (222 lb)   BMI 30.96 kg/m   Estimated body mass index is 30.96 kg/m  as calculated from the following:    Height as of this encounter: 1.803 m (5' 11\").    Weight as of this encounter: 100.7 kg (222 lb).  Medications and allergies reviewed.      Dana GUILLREMO MA    "

## 2020-01-06 NOTE — PATIENT INSTRUCTIONS
Very limited weightbearing on the left foot mainly heel weightbearing only.    Keep the left foot dry.    Bandage changes every other day    Return in 1 week for suture removal

## 2020-01-06 NOTE — LETTER
"    1/6/2020         RE: Fawad Garcia  [address] [facility] [address]        Dear Colleague,    Thank you for referring your patient, Fawad Garcia, to the [facility]. Please see a copy of my visit note below.    Fawad presents to the office s/p 2 weeks amputation of the great toe of the left foot .  The patient relates keeping the bandages clean, dry and intact.  The patient relates good compliance with postoperative instructions.  The patient denies any severe pain, fevers, chills, nausea or vomiting.      /75   Pulse 85   Ht 1.803 m (5' 11\")   Wt 100.7 kg (222 lb)   BMI 30.96 kg/m         Physical Exam:    General: The patient appears to be in no distress and in good spirits.  The bandages appear clean, dry and intact with no strikethrough noted. Vascular exam: Neurovascular status unchanged with < 3 sec capillary refill to all digits.  Positive pedal pulses and epicritic sensations intact with no evidence of allodynia.  One notes moderate edema to the forefoot on the left.  Sutures are intact and the skin is well coapted with no erythema noted.      One notes a full-thickness ulceration on the distal aspect of the right second toe.  The right second toe is contracted at the PIPJ.  No exposed bone noted.  No surrounding erythema noted.  No drainage noted.           Assessment:   1.  Osteomyelitis status post 2 weeks amputation of the great toe, first metatarsal head and sesamoid apparatus of the left foot.  2.  Full-thickness ulceration right second toe    Plan:  Sutures remain intact.  The drain was removed A sterile dressing was reapplied.  The patient was instructed to continue heel-weightbearing to the left foot.  The patient is to keep the dressings clean, dry and intact and to continue with elevation of the left foot.  The patient will return to the office in in 1 week for reevaluation.   The patient was instructed to heel weight-bear on " the left only.  Patient will continue taking Levaquin 500 mg p.o. daily.    Disclaimer: This note consists of symbols derived from keyboarding, dictation and/or voice recognition software. As a result, there may be errors in the script that have gone undetected. Please consider this when interpreting information found in this chart.       LIANG Montesinos D.P.M., F.[email or web address].S.      Again, thank you for allowing me to participate in the care of your patient.        Sincerely,        Ethan Montesinos DPM

## 2020-01-13 ENCOUNTER — OFFICE VISIT (OUTPATIENT)
Dept: PODIATRY | Facility: CLINIC | Age: 85
End: 2020-01-13
Payer: COMMERCIAL

## 2020-01-13 VITALS
HEART RATE: 51 BPM | SYSTOLIC BLOOD PRESSURE: 139 MMHG | HEIGHT: 71 IN | WEIGHT: 222 LBS | BODY MASS INDEX: 31.08 KG/M2 | DIASTOLIC BLOOD PRESSURE: 63 MMHG

## 2020-01-13 DIAGNOSIS — M86.9 OSTEOMYELITIS OF GREAT TOE OF LEFT FOOT (H): Primary | ICD-10-CM

## 2020-01-13 DIAGNOSIS — Z98.890 S/P FOOT SURGERY, LEFT: ICD-10-CM

## 2020-01-13 PROCEDURE — 99024 POSTOP FOLLOW-UP VISIT: CPT | Performed by: PODIATRIST

## 2020-01-13 ASSESSMENT — MIFFLIN-ST. JEOR: SCORE: 1724.12

## 2020-01-13 NOTE — NURSING NOTE
"Chief Complaint   Patient presents with     Suture Removal     DOS 12/24/19, Amputation great toe, left foot.     RECHECK     New diabetic shoes       Initial /63   Pulse 51   Ht 1.803 m (5' 11\")   Wt 100.7 kg (222 lb)   BMI 30.96 kg/m   Estimated body mass index is 30.96 kg/m  as calculated from the following:    Height as of this encounter: 1.803 m (5' 11\").    Weight as of this encounter: 100.7 kg (222 lb).  Medications and allergies reviewed.      Dana GUILLERMO MA    "

## 2020-01-13 NOTE — PATIENT INSTRUCTIONS
Your sutures were removed today.      After 3 days wash your foot in warm water with Epsom's Salt  For 20 minutes 1-2 times per day.     Remain protected-weightbearing unless instructed otherwise     Start simple range of motion exercises      Return in 1 month

## 2020-01-13 NOTE — LETTER
"    1/13/2020         RE: Fawad Garcia  [address] [facility] [address]        Dear Colleague,    Thank you for referring your patient, Fawad Garcia, to the [facility]. Please see a copy of my visit note below.    Fawad presents to the office s/p 3 weeks amputation of the great toe of the left foot .  The patient relates keeping the bandages clean, dry and intact.  The patient relates good compliance with postoperative instructions.  The patient denies any severe pain, fevers, chills, nausea or vomiting.      /63   Pulse 51   Ht 1.803 m (5' 11\")   Wt 100.7 kg (222 lb)   BMI 30.96 kg/m         Physical Exam:    General: The patient appears to be in no distress and in good spirits.  The bandages appear clean, dry and intact with no strikethrough noted. Vascular exam: Neurovascular status unchanged with < 3 sec capillary refill to all digits.  Positive pedal pulses and epicritic sensations intact with no evidence of allodynia.  One notes moderate edema to the forefoot on the left.  Sutures are intact and the skin is well coapted with no erythema noted.      One notes a full-thickness ulceration on the distal aspect of the right second toe.  The right second toe is contracted at the PIPJ.  No exposed bone noted.  No surrounding erythema noted.  No drainage noted.           Assessment:   1.  Osteomyelitis status post 3 weeks amputation of the great toe, first metatarsal head and sesamoid apparatus of the left foot.  2.  Full-thickness ulceration right second toe    Plan:  Sutures were removed.  The drain was removed A sterile dressing was reapplied.  The patient was instructed to continue heel-weightbearing to the left foot.  The patient is to keep the dressings clean, dry and intact and to continue with elevation of the left foot.  The patient will return to the office in one month for reevaluation.   The patient was instructed to heel weight-bear on " the left only.       Disclaimer: This note consists of symbols derived from keyboarding, dictation and/or voice recognition software. As a result, there may be errors in the script that have gone undetected. Please consider this when interpreting information found in this chart.       LIANG Montesinos D.P.M., F.[email or web address].[email or web address].S.      Again, thank you for allowing me to participate in the care of your patient.        Sincerely,        Ethan Montesinos DPM

## 2020-01-13 NOTE — PROGRESS NOTES
"Fawad presents to the office s/p 3 weeks amputation of the great toe of the left foot .  The patient relates keeping the bandages clean, dry and intact.  The patient relates good compliance with postoperative instructions.  The patient denies any severe pain, fevers, chills, nausea or vomiting.      /63   Pulse 51   Ht 1.803 m (5' 11\")   Wt 100.7 kg (222 lb)   BMI 30.96 kg/m        Physical Exam:    General: The patient appears to be in no distress and in good spirits.  The bandages appear clean, dry and intact with no strikethrough noted. Vascular exam: Neurovascular status unchanged with < 3 sec capillary refill to all digits.  Positive pedal pulses and epicritic sensations intact with no evidence of allodynia.  One notes moderate edema to the forefoot on the left.  Sutures are intact and the skin is well coapted with no erythema noted.      One notes a full-thickness ulceration on the distal aspect of the right second toe.  The right second toe is contracted at the PIPJ.  No exposed bone noted.  No surrounding erythema noted.  No drainage noted.           Assessment:   1.  Osteomyelitis status post 3 weeks amputation of the great toe, first metatarsal head and sesamoid apparatus of the left foot.  2.  Full-thickness ulceration right second toe    Plan:  Sutures were removed.  The drain was removed A sterile dressing was reapplied.  The patient was instructed to continue heel-weightbearing to the left foot.  The patient is to keep the dressings clean, dry and intact and to continue with elevation of the left foot.  The patient will return to the office in one month for reevaluation.   The patient was instructed to heel weight-bear on the left only.       Disclaimer: This note consists of symbols derived from keyboarding, dictation and/or voice recognition software. As a result, there may be errors in the script that have gone undetected. Please consider this when interpreting information found in this " chart.       LIANG Montesinos D.P.M., [email or web address].

## 2020-01-17 ENCOUNTER — OFFICE VISIT (OUTPATIENT)
Dept: PODIATRY | Facility: CLINIC | Age: 85
End: 2020-01-17
Payer: COMMERCIAL

## 2020-01-17 VITALS
HEART RATE: 64 BPM | BODY MASS INDEX: 31.08 KG/M2 | WEIGHT: 222 LBS | DIASTOLIC BLOOD PRESSURE: 60 MMHG | HEIGHT: 71 IN | SYSTOLIC BLOOD PRESSURE: 132 MMHG

## 2020-01-17 DIAGNOSIS — Z98.890 S/P FOOT SURGERY, LEFT: ICD-10-CM

## 2020-01-17 DIAGNOSIS — M86.9 OSTEOMYELITIS OF GREAT TOE OF LEFT FOOT (H): Primary | ICD-10-CM

## 2020-01-17 DIAGNOSIS — T81.31XA DEHISCENCE OF OPERATIVE WOUND, INITIAL ENCOUNTER: ICD-10-CM

## 2020-01-17 PROCEDURE — 99024 POSTOP FOLLOW-UP VISIT: CPT | Performed by: PODIATRIST

## 2020-01-17 RX ORDER — LEVOFLOXACIN 500 MG/1
500 TABLET, FILM COATED ORAL DAILY
Qty: 10 TABLET | Refills: 0 | Status: SHIPPED | OUTPATIENT
Start: 2020-01-17 | End: 2020-01-27

## 2020-01-17 ASSESSMENT — MIFFLIN-ST. JEOR: SCORE: 1724.12

## 2020-01-17 NOTE — NURSING NOTE
"Chief Complaint   Patient presents with     Surgical Followup     \"open and draining\", DOS 12/24/19, Amputation great toe, left foot       Initial /60   Pulse 64   Ht 1.803 m (5' 11\")   Wt 100.7 kg (222 lb)   BMI 30.96 kg/m   Estimated body mass index is 30.96 kg/m  as calculated from the following:    Height as of this encounter: 1.803 m (5' 11\").    Weight as of this encounter: 100.7 kg (222 lb).  Medications and allergies reviewed.      Dana GUILLERMO MA    "

## 2020-01-17 NOTE — PROGRESS NOTES
Fawad returns to the office for reevaluation of the left foot.  The patient relates that the incision on the left foot has opened up with surrounding redness and scant amount of drainage.  The patient denies any fevers chills nausea vomiting.    PAST MEDICAL HISTORY:   Past Medical History:   Diagnosis Date     Diabetes mellitus (H)      Hypertension      Squamous cell carcinoma        BMI= Body mass index is 30.96 kg/m .        Physical Exam:    General: The patient appears to have a pleasant mental affect.    Lower extremity physical exam:  Neurovascular status is intact with palpable pedal pulses and intact epicritic sensations.  Muscular exam is within normal limits to major muscle groups.       One notes decreased edema.  One notes superficial wound dehiscence along the incision over the amputation site of the great toe.  Surrounding erythema noted.  No significant drainage noted.  No ascending cellulitis or lymphangitis noted.       Assessment:      ICD-10-CM    1. Osteomyelitis of great toe of left foot (H) M86.9    2. S/P foot surgery, left Z98.890    3. Dehiscence of operative wound, initial encounter T81.31XA        Plan:  I have explained to Fawad about the conditions.  At this time, the wound was dressed with a sterile dressing and an Ace wrap.  The patient will be prescribed a 10-day course of Levaquin 500 mg p.o. daily.  The patient was referred to wound care clinic for further wound care evaluation and treatment options.  The patient will return in 2 weeks for reevaluation.    Disclaimer: This note consists of symbols derived from keyboarding, dictation and/or voice recognition software. As a result, there may be errors in the script that have gone undetected. Please consider this when interpreting information found in this chart.       LIANG Montesinos D.P.M., FIDEL.F.A.S.

## 2020-01-17 NOTE — LETTER
1/17/2020         RE: Fawad Garcia  [address]  [facility] [address]        Dear Colleague,    Thank you for referring your patient, Fawad Garcia, to the [facility]. Please see a copy of my visit note below.    Fawad returns to the office for reevaluation of the left foot.  The patient relates that the incision on the left foot has opened up with surrounding redness and scant amount of drainage.  The patient denies any fevers chills nausea vomiting.    PAST MEDICAL HISTORY:   Past Medical History:   Diagnosis Date     Diabetes mellitus (H)      Hypertension      Squamous cell carcinoma        BMI= Body mass index is 30.96 kg/m .        Physical Exam:    General: The patient appears to have a pleasant mental affect.    Lower extremity physical exam:  Neurovascular status is intact with palpable pedal pulses and intact epicritic sensations.  Muscular exam is within normal limits to major muscle groups.       One notes decreased edema.  One notes superficial wound dehiscence along the incision over the amputation site of the great toe.  Surrounding erythema noted.  No significant drainage noted.  No ascending cellulitis or lymphangitis noted.       Assessment:      ICD-10-CM    1. Osteomyelitis of great toe of left foot (H) M86.9    2. S/P foot surgery, left Z98.890    3. Dehiscence of operative wound, initial encounter T81.31XA        Plan:  I have explained to Fawad about the conditions.  At this time, the wound was dressed with a sterile dressing and an Ace wrap.  The patient will be prescribed a 10-day course of Levaquin 500 mg p.o. daily.  The patient was referred to wound care clinic for further wound care evaluation and treatment options.  The patient will return in 2 weeks for reevaluation.    Disclaimer: This note consists of symbols derived from keyboarding, dictation and/or voice recognition software. As a result, there may be errors in the script that have  gone undetected. Please consider this when interpreting information found in this chart.       LIANG Montesinos D.P.M., F.DELANEY.C.[email or web address].S.      Again, thank you for allowing me to participate in the care of your patient.        Sincerely,        Ethan Montesinos DPM

## 2020-01-21 ENCOUNTER — HOSPITAL ENCOUNTER (OUTPATIENT)
Dept: WOUND CARE | Facility: CLINIC | Age: 85
Discharge: HOME | End: 2020-01-21
Attending: SURGERY | Admitting: SURGERY
Payer: COMMERCIAL

## 2020-01-21 PROCEDURE — G0463 HOSPITAL OUTPT CLINIC VISIT: HCPCS

## 2020-01-21 NOTE — DISCHARGE INSTRUCTIONS
Leave bandage in place until follow-up appointment on Thursday, change it only if needed if saturated.  If saturated remove bandage then replace with new foam, can leave gauze bandage inside wound until Thursday.    I will be asking Dr. Montesinos to place a homecare order so a nurse can help with wound care at home.    If you are not healing, I would highly recommend you follow up with the vascular doctor to check on your blood flow.    Control your blood sugar as much as able.  Consider a nutritional supplement if you are not healing well, there are some that are meant for diabetics, Glucerna is one of them.    Consider foam or gel toe caps to protect your other toes.    No ACE wraps or anything tight on your foot.    Kamille Calzada RN, CWOCN [phone]

## 2020-01-22 ENCOUNTER — TELEPHONE (OUTPATIENT)
Dept: FAMILY MEDICINE | Facility: CLINIC | Age: 85
End: 2020-01-22

## 2020-01-22 NOTE — ADDENDUM NOTE
Addended by: RONNY MERCADO on: 1/22/2020 01:15 PM     Modules accepted: Orders     General General Neuro VIEW ALL

## 2020-01-22 NOTE — TELEPHONE ENCOUNTER
S: Referral for homecare to help with foot wound  B: Amputation, wound dehiscence  A: Patient is not homebound  R: Patient educated to continue to be seen in wound clinic, no insurance for homecare due to not being homebound  Thank you  Ratna Monge RN  [facility] Clinical Coordinator  [phone]

## 2020-01-23 ENCOUNTER — HOSPITAL ENCOUNTER (OUTPATIENT)
Dept: WOUND CARE | Facility: CLINIC | Age: 85
Discharge: HOME | End: 2020-01-23
Attending: FAMILY MEDICINE | Admitting: FAMILY MEDICINE
Payer: COMMERCIAL

## 2020-01-23 PROCEDURE — A6235 HYDROCOLLD DRG >16<=48 W/O B: HCPCS

## 2020-01-23 PROCEDURE — G0463 HOSPITAL OUTPT CLINIC VISIT: HCPCS

## 2020-01-23 NOTE — TELEPHONE ENCOUNTER
Pt needs to be homebound status to stay off foot, [facility] will assess pt as are already seeing spouse. Will send orders to [facility].    Kamille Calzada RN, CWOCN

## 2020-01-23 NOTE — PROGRESS NOTES
Reason For Visit: Fawad Garcia, 82 year old here on a wound care consultation for left foot dehisced surgical wound.  Pt has been referred by Dr. Montesinos, podiatry.  Pt presents with son who is here for transportation but not otherwise helping with cares.  States another son does help with some wound care.      Pertainent Medical/Surgical History:  Left 1st toe amputation due to osteomyelitis on 12/24/19.  Had sutures removed on 1/13/20 and relays that soon after the incision dehisced.  He is now on a 10 day course of Levaquin started on 1/17/20.      History also significant for PAD, had angiogram of right LE Dec '18 and though was disease noted was determined to give it more time for healing as next step would be a popliteal to posterior tibial artery bypass.  He was healing at subsequent follow-up in Feb of '19 so has not had vascular follow-up since.  Last RENATE in Nov '18 was 0.8pre and 0.91post exercise on left but waveforms were monophasic, did not have arteriogram on left.  DM II with HgbA1C of 8.1.      Since last seen by [facility] nurse in Oct '18 has had right 1st toe partial amputation in June '19 and left 5th toe met head resection on April '19.  Does see Dr. Montesinos regularly.    Personal/social history: Lives with spouse who will not help with wound cares.  He is a retired pharmacist.          Wound #1: Left 1st toe amputation site      3.2cm L x 0.9cm W x 1.9cm D   Tunneling: no  Undermining: depth does extend some both proximally and distally, 1-2cm  Wound bed type/amount: Hard to visualize though small opening, noting some blood clotting and white tissue, no granulation tissue noted  Wound Edges: undermining superior and inferior  Periwound: mildly pink up to 4cm around, not warm to touch  Drainage amount/type:  Moderate serous  Odor: no  Stage/tissue depth: full thickness  Pain: states gets occasional shooting pain down leg but this is rare  Current treatment: had gauze and antibiotic ointment in place  secured with coban        Also noted are several areas of eschar -   #2 - Achilles    1.3cm x 0.5cm, dry and non-fluctuant.  Periwound intact    #3 Multiple areas over lateral 5th met head and 4th toe    4x6mm 4th toe, dry non-fluctuant    5.5x2.5cm area lateral 5th met head with several dark areas, these are dry non-fluctuant and stable.    Also has some callusing around left 2nd toe around nail.        Mobility: presents walking on foot, states was told he should limit his walking  Current offloading/footwear: has post-op shoe  Sensation: states has neuropathy but can feel touch  HgbA1C: 8.1 Date: 12 '19  Checks Blood Glucose?:  Average Readings:       Pedal Pulse, palpable: no doppler: monophasic, poor sounding quality as is PT  Hair growth: noted legs, not feet  Capillary Refill: <3 seconds  Feet/toes color: pink, warm  Edema: some foot trace      Diet: states he does not eat much    Smoking: no      Assessment:    Dehisced surgical wound    Multiple areas of eschar suspicious for pressure injury    Possible suboptimal nutrution    PAD          Plan:  Pocket in wound is in need of packing, would use Medihoney to promote debridement and healing. Cover with foam bandage for absorption, change every 2-4 days depending on drainage.  NO pressure wraps, (no ACE, no coban).  He does not think his son would be willing to do this.  Did review Dr. Montesinos's notes and pt has been told to weight bear on heel only.  This is difficult to do and did advise him that he really must limit time on feet as much as able due to this.  Would recommend home care evaluation due this, pt was in agreement.  Will request orders from Dr. Montesinos.    Suggest that since he has know PAD and no RNEATE in over a year and almost a year since last vascular follow-up that we do a follow-up RENATE and follow-up with vascular.  Pt rolls his eyes at this and does express that he does not want to at this time.  States that was told that surgery on arteries is  hard on them.  Discussed with pt that doppler sound of arteries, development of what appear to be multiple pressure areas with eschar and dehiscence if he is not healing really need to have some follow-up as will not heal well with poor blood flow.  Will reassess this in near future.    Discussed importance of good nutrition including protein and vitamins needed for healing.      Wound Care Plan:  1.) wound irrigated with spray wound cleanser  2.) Medihoney impregnated 1/2 inch strip gauze tucked into depth of wound to fill but not tightly  3.) barrier cream applied to periwound skin  4.) Mepilex border heel bandage applied to try to conform to area, did need to dart this and reinforce with Medipore tape - may try 6x6 next bandage change.    Dark areas left open to air though bandage did overlap some on 5th met head and did wrap bandaides over affected toe tips.  Advised pt obtain and use gel or foam toe caps for protection    Pt instructions provided and reviewed:  Leave bandage in place until follow-up appointment on Thursday, change it only if needed if saturated.  If saturated remove bandage then replace with new foam, can leave gauze bandage inside wound until Thursday.    I will be asking Dr. Montesinos to place a homecare order so a nurse can help with wound care at home.    If you are not healing, I would highly recommend you follow up with the vascular doctor to check on your blood flow.    Control your blood sugar as much as able.  Consider a nutritional supplement if you are not healing well, there are some that are meant for diabetics, Glucerna is one of them.    Consider foam or gel toe caps to protect your other toes.    No ACE wraps or anything tight on your foot.    Follow-up:  1/23/20 for bandage change, 1/27/20 for follow up with [facility] nurse and Dr. Montesinos - hoping pt may have home care visit for bandage change between these 2 visits.          Kamille Calzada RN, CWOCN

## 2020-01-23 NOTE — PROGRESS NOTES
"Reason For Visit: Fawad Garcia, 82 year old here for bandage change for left foot dehisced surgical wound.  Pt has been referred by Dr. Montesinos, podiatry.  Discussed with pt today Dr. Montesinos's recommendations of heel only weight bearing.  Pt walks into clinic including entire length of agrawal from parking lot.  States he \"tries to walk carefully\".        Pertainent Medical/Surgical History:  Left 1st toe amputation due to osteomyelitis on 12/24/19.  Had sutures removed on 1/13/20 and relays that soon after the incision dehisced.  He is now on a 10 day course of Levaquin started on 1/17/20.      History also significant for PAD, had angiogram of right LE Dec '18 and though was disease noted was determined to give it more time for healing as next step would be a popliteal to posterior tibial artery bypass.  He was healing at subsequent follow-up in Feb of '19 so has not had vascular follow-up since.  Last RENATE in Nov '18 was 0.8pre and 0.91post exercise on left but waveforms were monophasic, did not have arteriogram on left.  DM II with HgbA1C of 8.1.      Since last seen by [facility] nurse in Oct '18 has had right 1st toe partial amputation in June '19 and left 5th toe met head resection on April '19.  Does see Dr. Montesinos regularly.    Personal/social history: Lives with spouse who will not help with wound cares.  He is a retired pharmacist.  Son will help some with bandage change but pt didn't think would do packing    Assessment:  On exam Mepilex heel bandage and 1/2 inch gauze packing strip impregnated with Medihoney placed on Tuesday 1/21/20 is in place and is 75% saturated with serous drainage and some mild maceration of periwound.  No odor.  Scabbing over distal incision has fallen off and do not a few areas that appear to be open though only 1-2mm and not able to probe any depth.    Wound #1: Left 1st toe amputation site      3.2cm L x 0.9cm W x 1.9cm D (not remeasured today)  Tunneling: no  Undermining: depth does " extend some both proximally and distally, 1-2cm  Wound bed type/amount: Hard to visualize though small opening, noting some old blood clotting and whitish glossy soft gelatinous tissue, no granulation tissue noted  Wound Edges: undermining superior and inferior  Periwound: mildly pink up to 4cm around, not warm to touch  Drainage amount/type:  Moderate serous  Odor: no  Stage/tissue depth: full thickness  Pain: states gets occasional shooting pain down leg but this is rare  Current treatment: 1/2 inch gauze packing with medihoney, mepilex foam        Also noted are several areas of eschar -   #2 - Achilles    1.3cm x 0.5cm, dry and non-fluctuant.  Periwound intact    #3 Multiple areas over lateral 5th met head and 4th toe    4x6mm 4th toe, dry non-fluctuant    5.5x2.5cm area lateral 5th met head with several dark areas, these are dry non-fluctuant and stable.    Also has some callusing around left 2nd toe around nail.        Mobility: presents walking on foot, states was told he should limit his walking  Current offloading/footwear: has  walker- walks on foot, supposed to be heel only but is not doing so  Sensation: states has neuropathy but can feel touch  HgbA1C: 8.1 Date: 12 '19  Checks Blood Glucose?:  Average Readings:       Pedal Pulse, palpable: no doppler: monophasic, poor sounding quality as is PT  Hair growth: noted legs, not feet  Capillary Refill: <3 seconds  Feet/toes color: pink, warm  Edema: some foot trace      Diet: states he does not eat much    Smoking: no      Assessment:    Dehisced surgical wound - non healing    Multiple areas of eschar suspicious for pressure injury - stable    Possible suboptimal nutrution    PAD    Non-compliant with offloading recommendations of heel only weight bearing          Plan:  Pocket in wound is in need of packing but is too wet with Medihoney.  Begin packing with strip of Aquacel Ag for absorption and antimicrobial.  Barrier cream to periwound.  Did also apply  a strip of Mepilex transfer over this today to allow pt to change cover dressings over the next 2 days as needed and leave packing.  Covered with abd pad then kerlix.  Expect will need every 2-3 days bandage change.  [facility] will see pt and planning next visit on Sunday.  If drainage allows will cover with foam bandage rather than abd pad and kerlix.  NO pressure wraps, (no ACE, no coban).      Discussed heel only weight bearing today.  Pt advised this is very difficult to do adequately while still walking.  Encouraged him to consider a knee scooter.  He is not willing to do so at this time.  When asked states he does have a walker.  Have asked him to use this to at least transfer some body weight to upper body and will help balance to get weight more on heel.  He did agree to use a wheelchair to leave clinic.    At prior visit - suggest that since he has know PAD and no RENATE in over a year and almost a year since last vascular follow-up that we do a follow-up RENATE and follow-up with vascular.  Pt rolls his eyes at this and does express that he does not want to at this time.  States that was told that surgery on arteries is hard on them.  Discussed with pt that doppler sound of arteries, development of what appear to be multiple pressure areas with eschar and dehiscence if he is not healing really need to have some follow-up as will not heal well with poor blood flow.  Will reassess this in near future.    Discussed importance of good nutrition including protein and vitamins needed for healing.      Wound Care Plan:  1.) wound irrigated with spray wound cleanser  2.) about 1 inch strip of Aquacel Ag cut with the threading then tucked into depth of wound to fill cavity but not tightly  3.) barrier cream applied to periwound skin  4.) abd pad then kerlix applied followed by a hospital sock then pt's  walker shoe but did remove the toe piece from  walker for less pressure over wound area.    Dark areas are  covered by gauze.  Advised pt obtain and use gel or foam toe caps for protection.    Call placed to homecare nurse Ignacia [phone] to discuss plan.  Notes and orders have been faxed to [facility] at [phone]    Pt instructions provided and reviewed:  Told verbally to change outer bandages as needed until home care nurse sees on Sunday.  Supplies sent for now.  Will re-evalute then order more supplies on Monday    Follow-up:  1/27/20 for follow up with [facility] nurse and Dr. Yamel Calzada, RN, CWOCN [phone]

## 2020-01-27 ENCOUNTER — TELEPHONE (OUTPATIENT)
Dept: FAMILY MEDICINE | Facility: CLINIC | Age: 85
End: 2020-01-27

## 2020-01-27 ENCOUNTER — HOSPITAL ENCOUNTER (OUTPATIENT)
Dept: WOUND CARE | Facility: CLINIC | Age: 85
Discharge: HOME | End: 2020-01-27
Attending: FAMILY MEDICINE | Admitting: FAMILY MEDICINE
Payer: COMMERCIAL

## 2020-01-27 ENCOUNTER — OFFICE VISIT (OUTPATIENT)
Dept: PODIATRY | Facility: CLINIC | Age: 85
End: 2020-01-27
Payer: COMMERCIAL

## 2020-01-27 DIAGNOSIS — T81.31XA DEHISCENCE OF OPERATIVE WOUND, INITIAL ENCOUNTER: ICD-10-CM

## 2020-01-27 DIAGNOSIS — T81.31XA DEHISCENCE OF EXTERNAL SURGICAL WOUND: Primary | ICD-10-CM

## 2020-01-27 DIAGNOSIS — M86.9 OSTEOMYELITIS OF GREAT TOE OF LEFT FOOT (H): Primary | ICD-10-CM

## 2020-01-27 DIAGNOSIS — Z98.890 S/P FOOT SURGERY, LEFT: ICD-10-CM

## 2020-01-27 DIAGNOSIS — L97.529 TYPE 2 DIABETES MELLITUS WITH LEFT DIABETIC FOOT ULCER (H): ICD-10-CM

## 2020-01-27 DIAGNOSIS — E11.51 TYPE 2 DIABETES MELLITUS WITH PERIPHERAL VASCULAR DISEASE (H): ICD-10-CM

## 2020-01-27 DIAGNOSIS — M86.9 OSTEOMYELITIS OF GREAT TOE OF LEFT FOOT (H): ICD-10-CM

## 2020-01-27 DIAGNOSIS — E11.621 TYPE 2 DIABETES MELLITUS WITH LEFT DIABETIC FOOT ULCER (H): ICD-10-CM

## 2020-01-27 PROCEDURE — A6235 HYDROCOLLD DRG >16<=48 W/O B: HCPCS

## 2020-01-27 PROCEDURE — 99213 OFFICE O/P EST LOW 20 MIN: CPT | Mod: 25 | Performed by: PODIATRIST

## 2020-01-27 PROCEDURE — G0463 HOSPITAL OUTPT CLINIC VISIT: HCPCS

## 2020-01-27 PROCEDURE — 11042 DBRDMT SUBQ TIS 1ST 20SQCM/<: CPT | Performed by: PODIATRIST

## 2020-01-27 RX ORDER — LEVOFLOXACIN 500 MG/1
500 TABLET, FILM COATED ORAL DAILY
Qty: 10 TABLET | Refills: 0 | Status: SHIPPED | OUTPATIENT
Start: 2020-01-27 | End: 2020-02-05

## 2020-01-27 NOTE — LETTER
1/27/2020         RE: Fawad Garcia  [address] [facility] [address]        Dear Colleague,    Thank you for referring your patient, Fawad Garcia, to the [facility]. Please see a copy of my visit note below.    Fawad returns to the office for reevaluation of the left foot.  The patient relates the wound has become larger on the left foot.  The patient denies any fevers or chills.  The patient denies any purulent drainage.    PAST MEDICAL HISTORY:   Past Medical History:   Diagnosis Date     Diabetes mellitus (H)      Hypertension      Squamous cell carcinoma        BMI= There is no height or weight on file to calculate BMI.        Physical Exam:    General: The patient appears to have a pleasant mental affect.    Lower extremity physical exam:  Neurovascular status is diminished with palpable pedal pulses and intact epicritic sensations.  Muscular exam is within normal limits to major muscle groups.  Integument is intact.      One notes decreased edema.  One notes increased size and depth of the surgical wound dehiscence on the left foot.  One notes positive probe to bone of the first metatarsal.  No purulent drainage noted minimal surrounding erythema noted.       Assessment:      ICD-10-CM    1. Osteomyelitis of great toe of left foot (H) M86.9 levofloxacin (LEVAQUIN) 500 MG tablet   2. S/P foot surgery, left Z98.890 levofloxacin (LEVAQUIN) 500 MG tablet   3. Dehiscence of operative wound, initial encounter T81.31XA levofloxacin (LEVAQUIN) 500 MG tablet       Plan:  I have explained to Fawad about the conditions.  At this time, the wound was sharply debrided with a #15 blade down to healthy subcutaneous tissue.  The patient was placed on oral Levaquin 500 mg p.o. 4 times daily for 2 weeks.  The patient was prescribed a knee walker. The patient is unsteady utilizing crutches, quad walker or a cane.  The knee walker will allow the patient to ambulate  safely without the use of the operative foot and reduce risk of falls.  The patient was ordered noninvasive vascular testing of the left lower extremity.  The patient will return in 2 weeks for reevaluation.      Disclaimer: This note consists of symbols derived from keyboarding, dictation and/or voice recognition software. As a result, there may be errors in the script that have gone undetected. Please consider this when interpreting information found in this chart.       LIANG Montesinos D.P.M., F.A.C.F.A.S.      Again, thank you for allowing me to participate in the care of your patient.        Sincerely,        Ethan Montesinos DPM

## 2020-01-27 NOTE — PROGRESS NOTES
Fawad returns to the office for reevaluation of the left foot.  The patient relates the wound has become larger on the left foot.  The patient denies any fevers or chills.  The patient denies any purulent drainage.    PAST MEDICAL HISTORY:   Past Medical History:   Diagnosis Date     Diabetes mellitus (H)      Hypertension      Squamous cell carcinoma        BMI= There is no height or weight on file to calculate BMI.        Physical Exam:    General: The patient appears to have a pleasant mental affect.    Lower extremity physical exam:  Neurovascular status is diminished with palpable pedal pulses and intact epicritic sensations.  Muscular exam is within normal limits to major muscle groups.  Integument is intact.      One notes decreased edema.  One notes increased size and depth of the surgical wound dehiscence on the left foot.  One notes positive probe to bone of the first metatarsal.  No purulent drainage noted minimal surrounding erythema noted.       Assessment:      ICD-10-CM    1. Osteomyelitis of great toe of left foot (H) M86.9 levofloxacin (LEVAQUIN) 500 MG tablet   2. S/P foot surgery, left Z98.890 levofloxacin (LEVAQUIN) 500 MG tablet   3. Dehiscence of operative wound, initial encounter T81.31XA levofloxacin (LEVAQUIN) 500 MG tablet       Plan:  I have explained to Fawad about the conditions.  At this time, the wound was sharply debrided with a #15 blade down to healthy subcutaneous tissue.  The patient was placed on oral Levaquin 500 mg p.o. 4 times daily for 2 weeks.  The patient was prescribed a knee walker. The patient is unsteady utilizing crutches, quad walker or a cane.  The knee walker will allow the patient to ambulate safely without the use of the operative foot and reduce risk of falls.  The patient was ordered noninvasive vascular testing of the left lower extremity.  The patient will return in 2 weeks for reevaluation.      Disclaimer: This note consists of symbols derived from  keyboarding, dictation and/or voice recognition software. As a result, there may be errors in the script that have gone undetected. Please consider this when interpreting information found in this chart.       LIANG Montesinos D.P.M., FIDEL.F.DELANEY.S.

## 2020-01-27 NOTE — PROGRESS NOTES
Reason For Visit: Fawad Garcia, 82 year old here for recheck for left foot dehisced surgical wound, this is a co-visit with Dr. Montesinos.  Pt has been referred by Dr. Montesinos, podiatry.  Was seen by homecare on Sunday for bandage change.        Pertainent Medical/Surgical History:  Left 1st toe amputation due to osteomyelitis on 12/24/19.  Had sutures removed on 1/13/20 and relays that soon after the incision dehisced.  He is now on a 10 day course of Levaquin started on 1/17/20.      History also significant for PAD, had angiogram of right LE Dec '18 and though was disease noted was determined to give it more time for healing as next step would be a popliteal to posterior tibial artery bypass.  He was healing at subsequent follow-up in Feb of '19 so has not had vascular follow-up since.  Last RENATE in Nov '18 was 0.8pre and 0.91post exercise on left but waveforms were monophasic, did not have arteriogram on left.  DM II with HgbA1C of 8.1.      Since last seen by [facility] nurse in Oct '18 has had right 1st toe partial amputation in June '19 and left 5th toe met head resection on April '19.  Does see Dr. Montesinos regularly.    Personal/social history: Lives with spouse who will not help with wound cares.  He is a retired pharmacist.  Son will help some with bandage change but pt didn't think would do packing    Assessment:  On exam Mepilex 6x6 bandage and Aquacel Ag packing placed yesterday with saturated Aquacel and about 1 inch area of strike through to Mepilex border bandage.  No odor.  Scabbing over distal incision has fallen off and do not a few areas that appear to be open though only 1-2mm and not able to probe any depth.    Wound #1: Left 1st toe amputation site      3cm L x 1.5cm W x 3.2cm D   Tunneling: no  Undermining: distally 2.7cm and toward 2nd toe 1.7cm  Wound bed type/amount:  whitish glossy soft gelatinous tissue that is loose, when pushing on this serous fluid oozes from area.  This was partly  conservatively debrided by Dr. Montesinos to expose some red clean tissue.   Wound Edges: undermining as noted  Periwound: mildly pink up to 4cm around, not warm to touch  Drainage amount/type: copious serous  Odor: no  Stage/tissue depth: full thickness  Pain: states gets occasional shooting pain down leg but this is rare  Current treatment: Aquacel Ag packing with mepilex foam 6x6        Also noted are several areas of eschar -   #2 - Achilles    1.3cm x 0.5cm, dry and non-fluctuant.  Periwound intact    #3 Multiple areas over lateral 5th met head and 4th toe    4x6mm 4th toe, dry non-fluctuant    5.5x2.5cm area lateral 5th met head with several dark areas, these are dry non-fluctuant and stable.    Also has some callusing around left 2nd toe around nail.        Mobility: presents walking on foot, states was told he should limit his walking  Current offloading/footwear: has DH walker- walks on foot, supposed to be heel only but is not doing so  Sensation: states has neuropathy but can feel touch  HgbA1C: 8.1 Date: 12 '19  Checks Blood Glucose?:  Average Readings:       Pedal Pulse, palpable: no doppler: monophasic, poor sounding quality as is PT  Hair growth: noted legs, not feet  Capillary Refill: <3 seconds  Feet/toes color: pink, warm  Edema: some foot trace      Diet: states he does not eat much    Smoking: no      Assessment:    Dehisced surgical wound - non healing    Multiple areas of eschar suspicious for pressure injury - stable    Possible suboptimal nutrution    PAD    Non-compliant with offloading recommendations of heel only weight bearing          Plan:  Pocket in wound is in need of ongoing packing for absorption and debridement, will continue Aquacel Ag.  Barrier cream to periwound.  Covered with Mepilex border 6x6.  [facility] will see pt M-W-F minimally for bandage change.  NO pressure wraps, (no ACE, no coban).      Discussed heel only weight bearing again today.  Pt has been advised this is  very difficult to do adequately while still walking.  Encouraged him to consider a knee scooter, he is now willing to look into this.  He does have a walker but finds it easier to do with a cane, which he has with today.  He does use a wheelchair to and from clinic.    Since he has know PAD and no RENATE in over a year Dr. Montesinos did place order for follow-up RENATE and pt is willing to do this.    Discussed importance of good nutrition including protein and vitamins needed for healing at prior visit.      Wound Care Plan:  1.) wound irrigated with spray wound cleanser  2.) Strip of Aquacel Ag cut with the threading then tucked into depth of wound to fill cavity but not tightly  3.) barrier cream applied to periwound skin  4.) Mepilex 6x6 border applied and reinforced edges with medipore tape then pt's DH walker shoe applied without the toe piece for less pressure over wound area.    Advised pt obtain and use gel or foam toe caps for protection.    Call placed to homecare nurse Ignacia [phone] to discuss plan.      Pt instructions provided and reviewed:  No new, will order supplies    Follow-up:  2/10/20 with [facility] nurse and Dr. Yamel Calzada RN, CWOCN [phone]

## 2020-01-27 NOTE — TELEPHONE ENCOUNTER
Reason for Call:  Home Health Care  Verbal  Ignacia with  Homecare called regarding (reason for call): Verbal orders    Orders are needed for this patient.     Skilled Nursing: 3x a week for x5 weeks wound cares  Phone Number Homecare Nurse can be reached at: [phone]    Can we leave a detailed message on this number? YES    Phone number patient can be reached at: Home number on file [phone] (home)    Best Time: any    Call taken on 1/27/2020 at 8:29 AM by Julissa Hill

## 2020-01-31 ENCOUNTER — TELEPHONE (OUTPATIENT)
Dept: FAMILY MEDICINE | Facility: CLINIC | Age: 85
End: 2020-01-31

## 2020-01-31 DIAGNOSIS — Z53.9 DIAGNOSIS NOT YET DEFINED: Primary | ICD-10-CM

## 2020-01-31 PROCEDURE — G0180 MD CERTIFICATION HHA PATIENT: HCPCS | Performed by: FAMILY MEDICINE

## 2020-01-31 NOTE — TELEPHONE ENCOUNTER
Home Health Certification and Plan of Care    Signed, faxed and sent to [employer]. Jazzmine Camacho on 1/31/2020 at 12:30 PM

## 2020-02-04 ENCOUNTER — TELEPHONE (OUTPATIENT)
Dept: PODIATRY | Facility: CLINIC | Age: 85
End: 2020-02-04

## 2020-02-04 DIAGNOSIS — Z98.890 S/P FOOT SURGERY, LEFT: ICD-10-CM

## 2020-02-04 DIAGNOSIS — M86.9 OSTEOMYELITIS OF GREAT TOE OF LEFT FOOT (H): ICD-10-CM

## 2020-02-04 DIAGNOSIS — T81.31XA DEHISCENCE OF OPERATIVE WOUND, INITIAL ENCOUNTER: ICD-10-CM

## 2020-02-04 NOTE — TELEPHONE ENCOUNTER
Reason for Call:  Other     Detailed comments: Pt states he is almost out of antibiotic for his foot and thinks he is supposed to continue taking it - Please advise    Phone Number Patient can be reached at: Home number on file [phone] (home)    Best Time: Any    Can we leave a detailed message on this number? YES    Call taken on 2/4/2020 at 11:57 AM by Denise Behrendt

## 2020-02-05 ENCOUNTER — HOSPITAL ENCOUNTER (OUTPATIENT)
Dept: ULTRASOUND IMAGING | Facility: CLINIC | Age: 85
Discharge: HOME | End: 2020-02-05
Attending: PODIATRIST | Admitting: PODIATRIST
Payer: COMMERCIAL

## 2020-02-05 DIAGNOSIS — M86.9 OSTEOMYELITIS OF GREAT TOE OF LEFT FOOT (H): ICD-10-CM

## 2020-02-05 DIAGNOSIS — T81.31XA DEHISCENCE OF OPERATIVE WOUND, INITIAL ENCOUNTER: ICD-10-CM

## 2020-02-05 DIAGNOSIS — L97.529 TYPE 2 DIABETES MELLITUS WITH LEFT DIABETIC FOOT ULCER (H): ICD-10-CM

## 2020-02-05 DIAGNOSIS — E11.621 TYPE 2 DIABETES MELLITUS WITH LEFT DIABETIC FOOT ULCER (H): ICD-10-CM

## 2020-02-05 DIAGNOSIS — E11.51 TYPE 2 DIABETES MELLITUS WITH PERIPHERAL VASCULAR DISEASE (H): ICD-10-CM

## 2020-02-05 PROCEDURE — 93924 LWR XTR VASC STDY BILAT: CPT

## 2020-02-05 RX ORDER — LEVOFLOXACIN 500 MG/1
500 TABLET, FILM COATED ORAL DAILY
Qty: 10 TABLET | Refills: 0 | Status: SHIPPED | OUTPATIENT
Start: 2020-02-05 | End: 2020-02-20

## 2020-02-05 NOTE — TELEPHONE ENCOUNTER
"Pt calling to check status of this - Levoquin - states he is out of the medication. Pharmacy: [facility]  States he called yesterday and \"it looks like you aren't going to do anything for us\"  Upset as he has a ride right now and is avialble to come but it hasn't been addressed.    Informed pt that provider has not been in the clinic and is back in tomorrow.  Will forward again and call pt when addressed.    Ok to leave VM if need be at [phone].    -Juju Reilly  Clinic Station             "

## 2020-02-06 ENCOUNTER — TELEPHONE (OUTPATIENT)
Dept: OTHER | Facility: CLINIC | Age: 85
End: 2020-02-06

## 2020-02-06 NOTE — TELEPHONE ENCOUNTER
Ray is calling regarding a referral Dr. Montesinos stating he should see a vascular doctor regarding poor circulation in his legs.     Would like to be scheduled in [place] with Dr. Cali.

## 2020-02-06 NOTE — TELEPHONE ENCOUNTER
Spoke to spouse discussed that rx had been sent to pharmacy.  He will follow up with wound care as currently scheduled.    Wayne Wolfe, ATC

## 2020-02-06 NOTE — TELEPHONE ENCOUNTER
No referral received.  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 Office notes are in Epic along with RENATE US that was completed yesterday.    Routing to scheduling to schedule patient for consult with Dr. Cali at [facility].    Rose Mary BROWN, RN    [facility]  [facility]  Office: [phone]  Fax: [phone]

## 2020-02-10 ENCOUNTER — OFFICE VISIT (OUTPATIENT)
Dept: PODIATRY | Facility: CLINIC | Age: 85
End: 2020-02-10
Payer: COMMERCIAL

## 2020-02-10 ENCOUNTER — HOSPITAL ENCOUNTER (OUTPATIENT)
Dept: WOUND CARE | Facility: CLINIC | Age: 85
Discharge: HOME | End: 2020-02-10
Attending: FAMILY MEDICINE | Admitting: FAMILY MEDICINE
Payer: COMMERCIAL

## 2020-02-10 DIAGNOSIS — E11.51 TYPE 2 DIABETES MELLITUS WITH PERIPHERAL VASCULAR DISEASE (H): ICD-10-CM

## 2020-02-10 DIAGNOSIS — Z98.890 S/P FOOT SURGERY, LEFT: ICD-10-CM

## 2020-02-10 DIAGNOSIS — M86.9 OSTEOMYELITIS OF GREAT TOE OF LEFT FOOT (H): Primary | ICD-10-CM

## 2020-02-10 DIAGNOSIS — T81.31XA DEHISCENCE OF OPERATIVE WOUND, INITIAL ENCOUNTER: ICD-10-CM

## 2020-02-10 DIAGNOSIS — L97.512: ICD-10-CM

## 2020-02-10 PROCEDURE — A6261 WOUND FILLER GEL/PASTE /OZ: HCPCS

## 2020-02-10 PROCEDURE — G0463 HOSPITAL OUTPT CLINIC VISIT: HCPCS

## 2020-02-10 PROCEDURE — 99024 POSTOP FOLLOW-UP VISIT: CPT | Performed by: PODIATRIST

## 2020-02-10 NOTE — IP AVS SNAPSHOT
STOP!!! DO NOT PRINT OR REFERENCE THIS AVS!!!  AVS displayed here is NOT the version that was given to the patient at discharge.  GO TO CHART REVIEW to print or review a copy of the AVS that was frozen/printed at time of discharge.                           MRN:[id number]                      After Visit Summary   2/10/2020    Fawad Garcia    MRN: [id number]           Visit Information        Provider Department      2/10/2020  3:45 PM Gaurav Mandujano Nurse - [facility] Wound Ostomy           Review of your medicines      Notice    This visit has been closed. A record of the med list at the time of the visit is not available.           Protect others around you: Learn how to safely use, store and throw away your medicines at www.disposemymeds.org.       Follow-ups after your visit       Your next 10 appointments already scheduled    Feb 25, 2020 10:45 AM CST  New Visit with Ernie Cali MD  [facility] [address] [address]  [phone]      Mar 16, 2020  2:00 PM CDT  LAB with [facility] ([facility]) [address] [address]  [phone]   Please do not eat 10-12 hours before your appointment if you are coming in fasting for labs on lipids, cholesterol, or glucose (sugar). Does not apply to pregnant women. Water, tea and black coffee (with nothing added) is okay. Do not drink other fluids, diet soda or gum. If you have concerns about taking your medications, please send a message by clicking on Secure Messaging, Message Your Care Team.     Mar 20, 2020  2:00 PM CDT  Return Visit with CORINA Samayoa CNP  [facility]) [address], [address]  [place] [address]  [phone]         Care Instructions       Further instructions from your care team       Cleanse all wounds with spray wound cleanser and dry well.    Left great  toe incision:  Apply Iodosorb to the nugauze and fill wound cavity to skin level.                                       Apply barrier cream to the periwound skin.                                       Cover with 1/2 or folded 5x9 ABD                                        Secure with medipore tape or kerlex roll gauze if skin gets too irritated from tape.    Left 2nd toe:  Keep eschar dry.  If it breaks open or starts to drain then dress with iodosorb and dry gauze securing with tape.      Right 2nd toe:  Apply Iodosorb to open tip of toe                           Cover with Gauze and secure with tape    All foot care to be done daily until pt is seen by Vascular MD and has a plan/intervention for increasing the blood flow to his feet.    Call with questions [phone]    Emma Richmond RN CWON          Additional Information About Your Visit       [facility] [employer] Information    [facility] gives you secure access to your electronic health record. If you see a primary care provider, you can also send messages to your care team and make appointments. If you have questions, please call your primary care clinic.  If you do not have a primary care provider, please call [phone] and they will assist you.       Care EveryWhere ID    This is your Care EveryWhere ID. This could be used by other organizations to access your [facility] medical records  [id number]        Primary Care Provider Office Phone # Fax #    Chris Scott [phone]      Equal Access to Services    JACKY DELACRUZ : Hadii hollie thomas hadasho Sobetoali, waaxda luqadaha, qaybta kaalmada xavier, bradford whipple. So [facility] [phone].    ATENCIÓN: Si habla español, tiene a aaron disposición servicios gratuitos de asistencia lingüística. Llame al [phone].    We comply with applicable federal and state civil rights laws, including the [place] Human Rights Act. We do not discriminate on the basis of race, color, creed,  Voodoo, national origin, marital status, age, disability, sex, sexual orientation, or gender identity.       Thank you!    Thank you for choosing [facility] for your care. Our goal is always to provide you with excellent care. Hearing back from our patients is one way we can continue to improve our services. Please take a few minutes to complete the written survey that you may receive in the mail after you visit with us. Thank you!         Medication List     Notice    This visit has been closed. A record of the med list at the time of the visit is not available.

## 2020-02-10 NOTE — LETTER
2/10/2020         RE: Fawad Garcia  [address]  [place] [address]        Dear Colleague,    Thank you for referring your patient, Fawad Garcia, to the [facility]. Please see a copy of my visit note below.    Fawad returns to the office for reevaluation of the left foot.  The patient relates the wound has not demonstrated any signs of healing on the left foot.  The patient denies any fevers or chills.  The patient denies any purulent drainage.    PAST MEDICAL HISTORY:   Past Medical History:   Diagnosis Date     Diabetes mellitus (H)      Hypertension      Squamous cell carcinoma        BMI= There is no height or weight on file to calculate BMI.        Physical Exam:    General: The patient appears to have a pleasant mental affect.    Lower extremity physical exam:  Neurovascular status is diminished with palpable pedal pulses and intact epicritic sensations.  Muscular exam is within normal limits to major muscle groups.  Integument is intact.      One notes decreased edema.  One notes residual surgical wound dehiscence on the left foot.  One notes positive probe to bone of the first metatarsal.  No purulent drainage noted minimal surrounding erythema noted.  Noted small pinpoint ulceration on the distal aspect of the second toe on the right foot.  Positive probe to bone.  No surrounding erythema noted.  Slight edema noted.       Assessment:      ICD-10-CM    1. Osteomyelitis of great toe of left foot (H) M86.9    2. S/P foot surgery, left Z98.890    3. Dehiscence of operative wound, initial encounter T81.31XA    4. Type 2 diabetes mellitus with peripheral vascular disease (H) E11.51    5. Skin ulcer of second toe, right, with fat layer exposed (H) L97.512        Plan:  I have explained to Fawad about the conditions.  At this time, the patient will follow up with Dr. West for further vascular evaluation and treatment options.  The wounds were  bandaged by the wound care nurse.  The patient will return in 2 weeks for reevaluation.      Disclaimer: This note consists of symbols derived from keyboarding, dictation and/or voice recognition software. As a result, there may be errors in the script that have gone undetected. Please consider this when interpreting information found in this chart.       LIANG Montesinos D.P.M., F.DELANEY.JENNIFER.F.A.S.      Again, thank you for allowing me to participate in the care of your patient.        Sincerely,        Ethan Montesinos DPM

## 2020-02-10 NOTE — PROGRESS NOTES
Fawad returns to the office for reevaluation of the left foot.  The patient relates the wound has not demonstrated any signs of healing on the left foot.  The patient denies any fevers or chills.  The patient denies any purulent drainage.    PAST MEDICAL HISTORY:   Past Medical History:   Diagnosis Date     Diabetes mellitus (H)      Hypertension      Squamous cell carcinoma        BMI= There is no height or weight on file to calculate BMI.        Physical Exam:    General: The patient appears to have a pleasant mental affect.    Lower extremity physical exam:  Neurovascular status is diminished with palpable pedal pulses and intact epicritic sensations.  Muscular exam is within normal limits to major muscle groups.  Integument is intact.      One notes decreased edema.  One notes residual surgical wound dehiscence on the left foot.  One notes positive probe to bone of the first metatarsal.  No purulent drainage noted minimal surrounding erythema noted.  Noted small pinpoint ulceration on the distal aspect of the second toe on the right foot.  Positive probe to bone.  No surrounding erythema noted.  Slight edema noted.       Assessment:      ICD-10-CM    1. Osteomyelitis of great toe of left foot (H) M86.9    2. S/P foot surgery, left Z98.890    3. Dehiscence of operative wound, initial encounter T81.31XA    4. Type 2 diabetes mellitus with peripheral vascular disease (H) E11.51    5. Skin ulcer of second toe, right, with fat layer exposed (H) L97.512        Plan:  I have explained to Fawad about the conditions.  At this time, the patient will follow up with Dr. West for further vascular evaluation and treatment options.  The wounds were bandaged by the wound care nurse.  The patient will return in 2 weeks for reevaluation.      Disclaimer: This note consists of symbols derived from keyboarding, dictation and/or voice recognition software. As a result, there may be errors in the script that have gone  undetected. Please consider this when interpreting information found in this chart.       LIANG Montesinos D.P.M., FIDEL.F.LORRAINES.

## 2020-02-10 NOTE — DISCHARGE INSTRUCTIONS
Cleanse all wounds with spray wound cleanser and dry well.    Left great toe incision:  Apply Iodosorb to the nugauze and fill wound cavity to skin level.                                       Apply barrier cream to the periwound skin.                                       Cover with 1/2 or folded 5x9 ABD                                        Secure with medipore tape or kerlex roll gauze if skin gets too irritated from tape.    Left 2nd toe:  Keep eschar dry.  If it breaks open or starts to drain then dress with iodosorb and dry gauze securing with tape.      Right 2nd toe:  Apply Iodosorb to open tip of toe                           Cover with Gauze and secure with tape    All foot care to be done daily until pt is seen by Vascular MD and has a plan/intervention for increasing the blood flow to his feet.    Call with questions [phone]    DONALD SloanON

## 2020-02-10 NOTE — IP AVS SNAPSHOT
[facility] Wound Ostomy  [address] [address]  Phone:  [phone]  Fax:  [phone]                                    After Visit Summary   2/10/2020    Fawad Garcia    MRN: [id number]           After Visit Summary Signature Page    I have received my discharge instructions, and my questions have been answered. I have discussed any challenges I see with this plan with the nurse or doctor.    ..........................................................................................................................................  Patient/Patient Representative Signature      ..........................................................................................................................................  Patient Representative Print Name and Relationship to Patient    ..................................................               ................................................  Date                                   Time    ..........................................................................................................................................  Reviewed by Signature/Title    ...................................................              ..............................................  Date                                               Time          22EPIC Rev 08/18

## 2020-02-11 ENCOUNTER — OFFICE VISIT (OUTPATIENT)
Dept: VASCULAR SURGERY | Facility: CLINIC | Age: 85
End: 2020-02-11
Payer: COMMERCIAL

## 2020-02-11 ENCOUNTER — HOSPITAL ENCOUNTER (OUTPATIENT)
Dept: ULTRASOUND IMAGING | Facility: CLINIC | Age: 85
Discharge: HOME | End: 2020-02-11
Attending: SURGERY | Admitting: SURGERY
Payer: COMMERCIAL

## 2020-02-11 VITALS — RESPIRATION RATE: 18 BRPM | DIASTOLIC BLOOD PRESSURE: 72 MMHG | HEART RATE: 72 BPM | SYSTOLIC BLOOD PRESSURE: 130 MMHG

## 2020-02-11 DIAGNOSIS — I70.662: ICD-10-CM

## 2020-02-11 DIAGNOSIS — I70.229 CRITICAL ISCHEMIA OF LOWER EXTREMITY (H): Primary | ICD-10-CM

## 2020-02-11 DIAGNOSIS — I70.229 CRITICAL ISCHEMIA OF LOWER EXTREMITY (H): ICD-10-CM

## 2020-02-11 PROCEDURE — 99213 OFFICE O/P EST LOW 20 MIN: CPT | Performed by: SURGERY

## 2020-02-11 PROCEDURE — 93970 EXTREMITY STUDY: CPT

## 2020-02-11 NOTE — NURSING NOTE
"Initial /72 (BP Location: Left arm, Patient Position: Chair, Cuff Size: Adult Regular)   Pulse 72   Resp 18  Estimated body mass index is 30.96 kg/m  as calculated from the following:    Height as of 1/17/20: 1.803 m (5' 11\").    Weight as of 1/17/20: 100.7 kg (222 lb). .    Patient is here for a wound check.. alberto gay LPN      "

## 2020-02-11 NOTE — PROGRESS NOTES
Mr. Garcia is a pleasant 84-year-old male who is known to myself due to his peripheral arterial disease.  He had developed osteomyelitis of the great toe and his left foot.  On December 24, 2019 he underwent amputation of the great toe of the left foot.  The surgical wound has not healed over this time.    Noninvasive arterial studies show evidence of peripheral arterial disease.    We will proceed with left lower extremity arteriogram and intervention for limb salvage.  We will also get vein mapping.

## 2020-02-12 ENCOUNTER — TELEPHONE (OUTPATIENT)
Dept: FAMILY MEDICINE | Facility: CLINIC | Age: 85
End: 2020-02-12

## 2020-02-12 NOTE — TELEPHONE ENCOUNTER
S-(situation): irregular heart rate    B-(background): LOV 12/24/19 with Dr. Catalan and 12/18/19 with PCP.     A-(assessment): Spoke with Maria Antonia with [facility]. She reports that patient had irregular heart rate today. States it would be then pause, then beat a few times and pause again. Patient has no symptoms. BP was 138/60.     R-(recommendations): Routing to provider.      GUADALUPE JuanN, RN

## 2020-02-12 NOTE — PROGRESS NOTES
Reason For Visit: Fawad Garcia, 82 year old here for recheck for left foot dehisced surgical wound, this is a co-visit with Dr. Montesinos.  Pt has been referred by Dr. Montesinos, podiatry.  Being seen by Homecare 3x weekly.        Pertainent Medical/Surgical History:  Left 1st toe amputation due to osteomyelitis on 12/24/19.  Had sutures removed on 1/13/20 and relays that soon after the incision dehisced.  He is now on a 10 day course of Levaquin started on 1/17/20.      History also significant for PAD, had angiogram of right LE Dec '18 and though was disease noted was determined to give it more time for healing as next step would be a popliteal to posterior tibial artery bypass.  He was healing at subsequent follow-up in Feb of '19 so has not had vascular follow-up since.  Last RENATE in Nov '18 was 0.8pre and 0.91post exercise on left but waveforms were monophasic, did not have arteriogram on left.  DM II with HgbA1C of 8.1.      Since last seen by [facility] nurse in Oct '18 has had right 1st toe partial amputation in June '19 and left 5th toe met head resection on April '19.  Does see Dr. Montesinos regularly.    Personal/social history: Lives with spouse who will not help with wound cares.  He is a retired pharmacist.  Son will help some with bandage change but pt didn't think would do packing    Assessment:  On exam Mepilex 4x4 bandage and Aquacel Ag packing placed yesterday with saturated Aquacel and saturated foam.  No odor.     Wound #1: Left 1st toe amputation site        2/10/20  Dehisced Left great toe incision:  Measures:  3cm L x 1.5cm W x 3.2cm D  Tunneling: no  Undermining: distally 2.7cm and toward 2nd toe 1.7cm  Wound bed type/amount:  whitish glossy soft gelatinous tissue that is loose, when pushing on this serous fluid oozes from area.    Wound Edges: undermining as noted  Periwound: mildly pink up to 4cm around, not warm to touch  Drainage amount/type: copious serous  Odor: no  Stage/tissue depth: full  thickness  Pain: insensate  Current treatment: Aquacel Ag packing with mepilex foam 4x4    2/10/20 New Left 2nd toe:  Tip of toe around the nail is dry black and intact (see above)    #2 -  Left Achilles    1.3cm x 0.5cm, dry and non-fluctuant.  Periwound intact  No change from last visit    #3 Multiple areas over lateral 5th met head and 4th toe    2/10/20 - all areas of black have resolved and sloughed off.          Right 2nd toe - tip:  2/10/20 -Moist thick callous with copious drainage.  All callous was removed conservatively and it did reveal an open area 2mm in diameter at the tip which probed to bone.      Mobility: presents walking on foot, states was told he should limit his walking  Current offloading/footwear: has DH walker- walks on foot, supposed to be heel only but is not doing so  Sensation: states has neuropathy but can feel touch  HgbA1C: 8.1 Date: 12 '19  Checks Blood Glucose?:  Average Readings:       Pedal Pulse, palpable: no doppler: monophasic, poor sounding quality as is PT  Hair growth: noted legs, not feet  Capillary Refill: <3 seconds  Feet/toes color: pink, warm  Edema: some foot trace    US RENATE DOPPLER WITH EXERCISE BILATERAL   2/5/2020 4:05 PM      HISTORY: Type 2 diabetes mellitus with left diabetic foot ulcer (H).  Osteomyelitis of great toe of left foot (H). Dehiscence of operative  wound, initial encounter. Type 2 diabetes mellitus with peripheral  vascular disease (H).     COMPARISON: 11/27/2018     FINDINGS:  Right RENATE:   DP: 0.81  PT: 0.64.     Left RENATE:   DP: 0.61   PT: 0.47.     Waveforms: Monophasic bilaterally     Exercise: Patient exercised by doing foot pumps for 5 minutes.     Right exercise RENATE: 0.71, previously 0.98.  Left exercise RENATE: 0.52, previously 0.94.                                                                      IMPRESSION:  1. Right resting and exercise ABIs show moderate arterial  insufficiency. Resting and exercise ABIs are decreased when compared  to  the prior study.  2. Left resting and exercise ABIs show moderate arterial  insufficiency, resting and exercise ABIs are decreased when compared  to the prior study.     RENAET CRITERIA:  >0.95 Normal  0.90 - 0.94 Mild  0.5 - 0.89 Moderate  0.2 - 0.49 Severe  <0.2 Critical     BIANCA BAIN DO  Diet: states he does not eat much    Smoking: no      Assessment:   Dehisced surgical wound - non healing, worsening arterial insufficiency.  Needs daily cares to control drainage better.      New open area of right 2nd toe,  can palpate bone.    New areas of eschar of  left 2nd toe suspicious for pressure injury - stable    Possible suboptimal nutrution    PAD    Non-compliant with offloading recommendations of heel only weight bearing          Plan:  Pocket in wound is in need of ongoing packing for absorption and debridement, because of arterial issue and copious drainage will change dressing procedure to Nugauze packing impregnated with iodosorb to be changed daily.      Keep left 2nd toe dry.  If it opens or begins to drain then will treat with iodosorb as well.    Right 2nd toe.  Iodosorb and dry gauze daily.     Continue with offloading shoes.      Will attempt to move up patients vascular appointment from 2/25 if able.      Home[facility] contacted.  Talked with chester Beth RN and she will begin daily wound care as above starting tomorrow.      NO pressure wraps, (no ACE, no coban).      Discussed heel only weight bearing again today.  Pt has been advised this is very difficult to do adequately while still walking.  Encouraged him to consider a knee scooter, he is now willing to look into this.  He does have a walker but finds it easier to do with a cane, which he has with today.  He does use a wheelchair to and from clinic.    Discussed importance of good nutrition including protein and vitamins needed for healing at prior visit.      Pt instructions reviewed:  See AVS  Starter supplies sent home with patient.       Follow-up:  Dr. Cali as soon as possible.    F/U with wound care and [place] after pt has bloodflow established.          Emma Richmond RN CWON  [phone]

## 2020-02-12 NOTE — TELEPHONE ENCOUNTER
"Reason for call:  Patient reporting a symptom    Symptom or request: Maria Antonia with [facility] calling.  When she saw pt at home today, he had an \"irregular heartrate.\"  She has since left pt and pt did not have any symptoms, but she is just calling to report finding to MD.  Please call Maria Antonia and advise.      Duration (how long have symptoms been present): today    Have you been treated for this before? Yes    Additional comments:     Phone Number Maria Antonia can be reached at:  Other phone number:  [phone]    Best Time:  any    Can we leave a detailed message on this number:  YES    Call taken on 2/12/2020 at 12:17 PM by Tawana Jonas    "

## 2020-02-12 NOTE — TELEPHONE ENCOUNTER
Covering for PCP (out of clinic today):  I see that he had PVCs on EKG in 2011, so this may be what is causing his irregular rhythm.  However, clinic visit for further evaluation would certainly be reasonable.    Rodney Pa MD

## 2020-02-13 NOTE — TELEPHONE ENCOUNTER
I do recommend a clinic visit to get an EKG and possible further treatment or work up.  Sincerely,  Chris Scott MD

## 2020-02-14 ENCOUNTER — TELEPHONE (OUTPATIENT)
Dept: FAMILY MEDICINE | Facility: CLINIC | Age: 85
End: 2020-02-14

## 2020-02-14 ENCOUNTER — TELEPHONE (OUTPATIENT)
Dept: OTHER | Facility: CLINIC | Age: 85
End: 2020-02-14

## 2020-02-14 ENCOUNTER — MEDICAL CORRESPONDENCE (OUTPATIENT)
Dept: HEALTH INFORMATION MANAGEMENT | Facility: CLINIC | Age: 85
End: 2020-02-14

## 2020-02-14 NOTE — TELEPHONE ENCOUNTER
Pt states he was supposed to hear back from provider about future procedures that may need to be done for poor circulation    Please contact pt @:  [phone] (ok to leave message)    Denise Behrendt  Specialty CSS

## 2020-02-17 ENCOUNTER — TELEPHONE (OUTPATIENT)
Dept: OTHER | Facility: CLINIC | Age: 85
End: 2020-02-17

## 2020-02-17 DIAGNOSIS — I73.9 PAD (PERIPHERAL ARTERY DISEASE) (H): ICD-10-CM

## 2020-02-17 DIAGNOSIS — I70.562: ICD-10-CM

## 2020-02-17 DIAGNOSIS — I70.662: ICD-10-CM

## 2020-02-17 DIAGNOSIS — I70.229 CRITICAL ISCHEMIA OF LOWER EXTREMITY (H): Primary | ICD-10-CM

## 2020-02-17 DIAGNOSIS — M86.9 OSTEOMYELITIS OF GREAT TOE OF LEFT FOOT (H): ICD-10-CM

## 2020-02-17 NOTE — TELEPHONE ENCOUNTER
Discussed with Dr. Cali, he has discussed with pt during OV pt needs left lower extremity arteriogram with possible intervention.     Arteriogram orders placed in Epic.   Sx order given to  to coordinate.     GUADALUPE DamianN, RN  [facility]

## 2020-02-19 DIAGNOSIS — Z98.890 S/P FOOT SURGERY, LEFT: ICD-10-CM

## 2020-02-19 DIAGNOSIS — M86.9 OSTEOMYELITIS OF GREAT TOE OF LEFT FOOT (H): ICD-10-CM

## 2020-02-19 DIAGNOSIS — T81.31XA DEHISCENCE OF OPERATIVE WOUND, INITIAL ENCOUNTER: ICD-10-CM

## 2020-02-20 RX ORDER — LEVOFLOXACIN 500 MG/1
500 TABLET, FILM COATED ORAL DAILY
Qty: 10 TABLET | Refills: 0 | Status: SHIPPED | OUTPATIENT
Start: 2020-02-20 | End: 2020-02-27

## 2020-02-26 ENCOUNTER — APPOINTMENT (OUTPATIENT)
Dept: INTERVENTIONAL RADIOLOGY/VASCULAR | Facility: CLINIC | Age: 85
End: 2020-02-26
Attending: SURGERY
Payer: COMMERCIAL

## 2020-02-26 ENCOUNTER — HOSPITAL ENCOUNTER (OUTPATIENT)
Facility: CLINIC | Age: 85
Discharge: HOME | End: 2020-02-26
Attending: SURGERY | Admitting: SURGERY
Payer: COMMERCIAL

## 2020-02-26 ENCOUNTER — APPOINTMENT (OUTPATIENT)
Dept: SURGERY | Facility: PHYSICIAN GROUP | Age: 85
End: 2020-02-26
Payer: COMMERCIAL

## 2020-02-26 VITALS
BODY MASS INDEX: 29.69 KG/M2 | HEIGHT: 73 IN | TEMPERATURE: 97.6 F | OXYGEN SATURATION: 99 % | RESPIRATION RATE: 16 BRPM | HEART RATE: 71 BPM | WEIGHT: 224 LBS | SYSTOLIC BLOOD PRESSURE: 150 MMHG | DIASTOLIC BLOOD PRESSURE: 82 MMHG

## 2020-02-26 DIAGNOSIS — I70.229 CRITICAL ISCHEMIA OF LOWER EXTREMITY (H): Primary | ICD-10-CM

## 2020-02-26 DIAGNOSIS — E78.5 HYPERLIPIDEMIA LDL GOAL <70: ICD-10-CM

## 2020-02-26 DIAGNOSIS — I70.662: ICD-10-CM

## 2020-02-26 DIAGNOSIS — I70.562: ICD-10-CM

## 2020-02-26 DIAGNOSIS — K21.9 GASTROESOPHAGEAL REFLUX DISEASE WITHOUT ESOPHAGITIS: ICD-10-CM

## 2020-02-26 DIAGNOSIS — I73.9 PAD (PERIPHERAL ARTERY DISEASE) (H): ICD-10-CM

## 2020-02-26 DIAGNOSIS — M86.9 OSTEOMYELITIS OF GREAT TOE OF LEFT FOOT (H): ICD-10-CM

## 2020-02-26 LAB
APTT PPP: 33 SEC (ref 22–37)
CHOLEST SERPL-MCNC: 150 MG/DL
CREAT SERPL-MCNC: 0.82 MG/DL (ref 0.66–1.25)
ERYTHROCYTE [DISTWIDTH] IN BLOOD BY AUTOMATED COUNT: 15.3 % (ref 10–15)
GFR SERPL CREATININE-BSD FRML MDRD: 82 ML/MIN/{1.73_M2}
GLUCOSE BLDC GLUCOMTR-MCNC: 141 MG/DL (ref 70–99)
HBA1C MFR BLD: 7.7 % (ref 0–5.6)
HCT VFR BLD AUTO: 38.7 % (ref 40–53)
HDLC SERPL-MCNC: 54 MG/DL
HGB BLD-MCNC: 12.8 G/DL (ref 13.3–17.7)
INR PPP: 0.92 (ref 0.86–1.14)
LDLC SERPL CALC-MCNC: 85 MG/DL
MCH RBC QN AUTO: 28 PG (ref 26.5–33)
MCHC RBC AUTO-ENTMCNC: 33.1 G/DL (ref 31.5–36.5)
MCV RBC AUTO: 85 FL (ref 78–100)
NONHDLC SERPL-MCNC: 96 MG/DL
PLATELET # BLD AUTO: 264 10E9/L (ref 150–450)
RBC # BLD AUTO: 4.57 10E12/L (ref 4.4–5.9)
TRIGL SERPL-MCNC: 56 MG/DL
WBC # BLD AUTO: 9.5 10E9/L (ref 4–11)

## 2020-02-26 PROCEDURE — 85730 THROMBOPLASTIN TIME PARTIAL: CPT | Mod: GZ | Performed by: SURGERY

## 2020-02-26 PROCEDURE — 83036 HEMOGLOBIN GLYCOSYLATED A1C: CPT | Performed by: SURGERY

## 2020-02-26 PROCEDURE — 25000132 ZZH RX MED GY IP 250 OP 250 PS 637: Performed by: SURGERY

## 2020-02-26 PROCEDURE — C1769 GUIDE WIRE: HCPCS

## 2020-02-26 PROCEDURE — C1760 CLOSURE DEV, VASC: HCPCS

## 2020-02-26 PROCEDURE — 27210914 ZZH SHEATH CR8

## 2020-02-26 PROCEDURE — C1725 CATH, TRANSLUMIN NON-LASER: HCPCS

## 2020-02-26 PROCEDURE — 85027 COMPLETE CBC AUTOMATED: CPT | Performed by: SURGERY

## 2020-02-26 PROCEDURE — C1874 STENT, COATED/COV W/DEL SYS: HCPCS

## 2020-02-26 PROCEDURE — 82565 ASSAY OF CREATININE: CPT | Performed by: SURGERY

## 2020-02-26 PROCEDURE — 37228 ZZHC REVASC TIB/PERON ART, ANGIOPLASTY INIT VESSEL: CPT | Mod: LT | Performed by: SURGERY

## 2020-02-26 PROCEDURE — 27210742 ZZH CATH CR1

## 2020-02-26 PROCEDURE — 27210845 ZZH DEVICE INFLATION CR5

## 2020-02-26 PROCEDURE — 36415 COLL VENOUS BLD VENIPUNCTURE: CPT

## 2020-02-26 PROCEDURE — 75774 ARTERY X-RAY EACH VESSEL: CPT | Mod: XU

## 2020-02-26 PROCEDURE — 27210886 ZZH ACCESSORY CR5

## 2020-02-26 PROCEDURE — 37226 IR LOWER EXTREMITY ANGIOGRAM LEFT: CPT | Mod: LT | Performed by: SURGERY

## 2020-02-26 PROCEDURE — 27210805 ZZH SHEATH CR4

## 2020-02-26 PROCEDURE — 25000128 H RX IP 250 OP 636: Performed by: SURGERY

## 2020-02-26 PROCEDURE — 40000853 ZZH STATISTIC ANGIOGRAM, STENT, VERTEBRO PLASTY

## 2020-02-26 PROCEDURE — 99152 MOD SED SAME PHYS/QHP 5/>YRS: CPT | Performed by: SURGERY

## 2020-02-26 PROCEDURE — 27210743 ZZH CATH CR11

## 2020-02-26 PROCEDURE — 82962 GLUCOSE BLOOD TEST: CPT

## 2020-02-26 PROCEDURE — 85610 PROTHROMBIN TIME: CPT | Performed by: SURGERY

## 2020-02-26 PROCEDURE — 99204 OFFICE O/P NEW MOD 45 MIN: CPT | Performed by: INTERNAL MEDICINE

## 2020-02-26 PROCEDURE — 75710 ARTERY X-RAYS ARM/LEG: CPT | Mod: 26 | Performed by: SURGERY

## 2020-02-26 PROCEDURE — 25000128 H RX IP 250 OP 636

## 2020-02-26 PROCEDURE — 25000125 ZZHC RX 250

## 2020-02-26 PROCEDURE — 40000235 ZZH STATISTIC TELEMETRY

## 2020-02-26 PROCEDURE — 25500064 ZZH RX 255 OP 636: Performed by: SURGERY

## 2020-02-26 PROCEDURE — 25800030 ZZH RX IP 258 OP 636: Performed by: SURGERY

## 2020-02-26 PROCEDURE — 37226 IR LOWER EXTREMITY ANGIOGRAM LEFT: CPT

## 2020-02-26 PROCEDURE — 80061 LIPID PANEL: CPT | Performed by: SURGERY

## 2020-02-26 RX ORDER — HEPARIN SODIUM 1000 [USP'U]/ML
INJECTION, SOLUTION INTRAVENOUS; SUBCUTANEOUS
Status: COMPLETED
Start: 2020-02-26 | End: 2020-02-26

## 2020-02-26 RX ORDER — NICOTINE POLACRILEX 4 MG
15-30 LOZENGE BUCCAL
Status: DISCONTINUED | OUTPATIENT
Start: 2020-02-26 | End: 2020-02-26 | Stop reason: HOSPADM

## 2020-02-26 RX ORDER — FENTANYL CITRATE 50 UG/ML
INJECTION, SOLUTION INTRAMUSCULAR; INTRAVENOUS
Status: COMPLETED
Start: 2020-02-26 | End: 2020-02-26

## 2020-02-26 RX ORDER — HEPARIN SODIUM 1000 [USP'U]/ML
500-6000 INJECTION, SOLUTION INTRAVENOUS; SUBCUTANEOUS
Status: COMPLETED | OUTPATIENT
Start: 2020-02-26 | End: 2020-02-26

## 2020-02-26 RX ORDER — CLOPIDOGREL 300 MG/1
300 TABLET, FILM COATED ORAL ONCE
Status: COMPLETED | OUTPATIENT
Start: 2020-02-26 | End: 2020-02-26

## 2020-02-26 RX ORDER — PROTAMINE SULFATE 10 MG/ML
INJECTION, SOLUTION INTRAVENOUS
Status: COMPLETED
Start: 2020-02-26 | End: 2020-02-26

## 2020-02-26 RX ORDER — PROTAMINE SULFATE 10 MG/ML
50 INJECTION, SOLUTION INTRAVENOUS ONCE
Status: COMPLETED | OUTPATIENT
Start: 2020-02-26 | End: 2020-02-26

## 2020-02-26 RX ORDER — LIDOCAINE HYDROCHLORIDE 10 MG/ML
INJECTION, SOLUTION INFILTRATION; PERINEURAL
Status: COMPLETED
Start: 2020-02-26 | End: 2020-02-26

## 2020-02-26 RX ORDER — PANTOPRAZOLE SODIUM 20 MG/1
20 TABLET, DELAYED RELEASE ORAL DAILY
Qty: 90 TABLET | Refills: 3 | Status: SHIPPED | OUTPATIENT
Start: 2020-02-26 | End: 2021-01-04

## 2020-02-26 RX ORDER — FLUMAZENIL 0.1 MG/ML
0.2 INJECTION, SOLUTION INTRAVENOUS
Status: DISCONTINUED | OUTPATIENT
Start: 2020-02-26 | End: 2020-02-26 | Stop reason: HOSPADM

## 2020-02-26 RX ORDER — LIDOCAINE 40 MG/G
CREAM TOPICAL
Status: DISCONTINUED | OUTPATIENT
Start: 2020-02-26 | End: 2020-02-26 | Stop reason: HOSPADM

## 2020-02-26 RX ORDER — IODIXANOL 320 MG/ML
150 INJECTION, SOLUTION INTRAVASCULAR ONCE
Status: COMPLETED | OUTPATIENT
Start: 2020-02-26 | End: 2020-02-26

## 2020-02-26 RX ORDER — SODIUM CHLORIDE 9 MG/ML
INJECTION, SOLUTION INTRAVENOUS CONTINUOUS
Status: DISCONTINUED | OUTPATIENT
Start: 2020-02-26 | End: 2020-02-26 | Stop reason: HOSPADM

## 2020-02-26 RX ORDER — NALOXONE HYDROCHLORIDE 0.4 MG/ML
.1-.4 INJECTION, SOLUTION INTRAMUSCULAR; INTRAVENOUS; SUBCUTANEOUS
Status: DISCONTINUED | OUTPATIENT
Start: 2020-02-26 | End: 2020-02-26 | Stop reason: HOSPADM

## 2020-02-26 RX ORDER — ROSUVASTATIN CALCIUM 10 MG/1
10 TABLET, COATED ORAL AT BEDTIME
Qty: 90 TABLET | Refills: 3 | Status: SHIPPED | OUTPATIENT
Start: 2020-02-26 | End: 2021-01-04

## 2020-02-26 RX ORDER — ACETAMINOPHEN 500 MG
500 TABLET ORAL EVERY 6 HOURS PRN
Status: DISCONTINUED | OUTPATIENT
Start: 2020-02-26 | End: 2020-02-26 | Stop reason: HOSPADM

## 2020-02-26 RX ORDER — CLOPIDOGREL BISULFATE 75 MG/1
75 TABLET ORAL DAILY
Qty: 90 TABLET | Refills: 3 | Status: SHIPPED | OUTPATIENT
Start: 2020-02-26 | End: 2021-01-04

## 2020-02-26 RX ORDER — DEXTROSE MONOHYDRATE 25 G/50ML
25-50 INJECTION, SOLUTION INTRAVENOUS
Status: DISCONTINUED | OUTPATIENT
Start: 2020-02-26 | End: 2020-02-26 | Stop reason: HOSPADM

## 2020-02-26 RX ORDER — LIDOCAINE HYDROCHLORIDE 10 MG/ML
INJECTION, SOLUTION INFILTRATION; PERINEURAL
Status: DISCONTINUED
Start: 2020-02-26 | End: 2020-02-26 | Stop reason: WASHOUT

## 2020-02-26 RX ORDER — FENTANYL CITRATE 50 UG/ML
25-50 INJECTION, SOLUTION INTRAMUSCULAR; INTRAVENOUS EVERY 5 MIN PRN
Status: DISCONTINUED | OUTPATIENT
Start: 2020-02-26 | End: 2020-02-26 | Stop reason: HOSPADM

## 2020-02-26 RX ADMIN — IODIXANOL 43 ML: 320 INJECTION, SOLUTION INTRAVASCULAR at 10:29

## 2020-02-26 RX ADMIN — FENTANYL CITRATE 25 MCG: 50 INJECTION, SOLUTION INTRAMUSCULAR; INTRAVENOUS at 10:12

## 2020-02-26 RX ADMIN — MIDAZOLAM HYDROCHLORIDE 0.5 MG: 1 INJECTION, SOLUTION INTRAMUSCULAR; INTRAVENOUS at 09:24

## 2020-02-26 RX ADMIN — FENTANYL CITRATE 25 MCG: 50 INJECTION, SOLUTION INTRAMUSCULAR; INTRAVENOUS at 09:24

## 2020-02-26 RX ADMIN — HEPARIN SODIUM 5000 UNITS: 1000 INJECTION, SOLUTION INTRAVENOUS; SUBCUTANEOUS at 09:30

## 2020-02-26 RX ADMIN — PROTAMINE SULFATE 50 MG: 10 INJECTION, SOLUTION INTRAVENOUS at 10:26

## 2020-02-26 RX ADMIN — MIDAZOLAM HYDROCHLORIDE 0.5 MG: 1 INJECTION, SOLUTION INTRAMUSCULAR; INTRAVENOUS at 09:19

## 2020-02-26 RX ADMIN — SODIUM CHLORIDE 100 ML/HR: 9 INJECTION, SOLUTION INTRAVENOUS at 08:08

## 2020-02-26 RX ADMIN — CLOPIDOGREL BISULFATE 300 MG: 300 TABLET, FILM COATED ORAL at 13:19

## 2020-02-26 RX ADMIN — FENTANYL CITRATE 25 MCG: 50 INJECTION, SOLUTION INTRAMUSCULAR; INTRAVENOUS at 09:20

## 2020-02-26 RX ADMIN — LIDOCAINE HYDROCHLORIDE 10 ML: 10 INJECTION, SOLUTION INFILTRATION; PERINEURAL at 09:27

## 2020-02-26 RX ADMIN — HEPARIN SODIUM 5000 UNITS: 1000 INJECTION, SOLUTION INTRAVENOUS; SUBCUTANEOUS at 09:50

## 2020-02-26 RX ADMIN — MIDAZOLAM HYDROCHLORIDE 0.5 MG: 1 INJECTION, SOLUTION INTRAMUSCULAR; INTRAVENOUS at 09:51

## 2020-02-26 RX ADMIN — FENTANYL CITRATE 25 MCG: 50 INJECTION, SOLUTION INTRAMUSCULAR; INTRAVENOUS at 09:51

## 2020-02-26 RX ADMIN — MIDAZOLAM HYDROCHLORIDE 0.5 MG: 1 INJECTION, SOLUTION INTRAMUSCULAR; INTRAVENOUS at 10:11

## 2020-02-26 RX ADMIN — HEPARIN SODIUM 10000 UNITS: 10000 INJECTION INTRAVENOUS; SUBCUTANEOUS at 09:40

## 2020-02-26 RX ADMIN — HEPARIN SODIUM 10000 UNITS: 10000 INJECTION INTRAVENOUS; SUBCUTANEOUS at 09:41

## 2020-02-26 RX ADMIN — FENTANYL CITRATE 25 MCG: 50 INJECTION, SOLUTION INTRAMUSCULAR; INTRAVENOUS at 09:34

## 2020-02-26 RX ADMIN — MIDAZOLAM HYDROCHLORIDE 0.5 MG: 1 INJECTION, SOLUTION INTRAMUSCULAR; INTRAVENOUS at 09:34

## 2020-02-26 ASSESSMENT — MIFFLIN-ST. JEOR: SCORE: 1764.94

## 2020-02-26 NOTE — PLAN OF CARE
Plavix prescription called in to [facility] on [address]. Per pharmacist, medication will be ready for pickup this afternoon.

## 2020-02-26 NOTE — PROGRESS NOTES
Care Suites Post Procedure Note    Patient Information  Name: Fawad Garcia  Age: 84 year old    Post Procedure  Procedure: Left leg angio  Left perineal plasty  Left Left popliteal plasty and distal stent  Time patient returned to Care Suites: 1045  Concerns/ normal assessment: VSS, Right groin access site intact with angioseal  LE Pulses remain doppler  If abnormal assessment, provider notified: N/A  Plan/Other: Bedrest , VS and groin site check per orders.     Care Suites Discharge Nursing Note    Patient Information  Name: Fawad Garcia  Age: 84 year old    Discharge Education:  Discharge instructions reviewed: YES  AVS reviewed with patient and will review with son at time of discharge.   Additional education/resources provided: yes  Angio-seal booklet given, Stent booklet given   Patient/patient representative verbalizes understanding: YES  Patient discharging on new medications: YES  Medication education completed: YES  Plavix script called to Drug store  Script with patient.     Discharge Plans:   Discharge location: home  Discharge ride contacted: YES  Approximate discharge time: 1500  Discharged at 1500    Discharge Criteria:  Discharge criteria met and vital signs stable: YES    Patient Belongs:  Patient belongings returned to patient: YES    Estela Xie RN

## 2020-02-26 NOTE — CONSULTS
[facility]    Vascular Medicine Consultation     Date of Admission:  2/26/2020  Date of Consult (When I saw the patient): 02/26/20         Physician Supervisory Attestation:   I have reviewed and discussed with the physician assistant their history, physical and plan and independently interviewed and examined Fawad Garcia and agree with the plan as stated in the physician assistant note.    We were asked by Dr. Cali to evaluate and help manage vascular risk factors in this 84 year old male non-smoker with a history of diabetes, hyperlipidemia, hypertension, and GERD now presenting for a left lower extremity angiogram due to critical limb ischemia of non-healing left great toe amputation site.    He was seen and evaluated after the intervention today    He underwent distal popliteal artery stenting and angioplasty was done today to his peroneal artery.  Reviewed angiogram  Exercise looks good    He has a multiple poorly controlled atherosclerotic risk factors  A1c is not optimal, taking low-dose low potent statin Pravachol.  LDL is not at goal    At present our recommendations,    Follow post angiogram protocol, bedrest and monitor access site check CMS etc.  IV fluids  He received Plavix 300 mg loading dose today and starting tomorrow 75 mg daily  Discontinue omeprazole instead take pantoprazole 20 mg daily due to (DDI with Plavix)  Increase Lantus dose to 24 units a day  Hold metformin per protocol due to contrast dye load and restart  Discontinue pravastatin instead initiate Crestor 10 mg daily and get lipid panel in 3 months through primary MD    Follow-up with  for stent surveillance in a month    Discussed extensively med changes with the patient, he had a lot of questions and all of them were answered, discussed with nursing staff      Thank you for the consultation !    This note was dictated by utilizing Dragon software    Copy of this dictation to primary care physician and  Dr.Karimi Ibrahima Rodriguez MD, FA, [facility]  Vascular medicine service   2/26/2020          Assessment & Plan   1. Peripheral arterial disease with critical limb ischemia of non-healing left great toe amputation site      He presented today for an angiogram. His distal popliteal artery was stented and angioplasty was done to his peroneal artery. Post-procedure cares and follow-up per Dr. Cali. He will be initiated on Plavix 75 mg daily for at least one year.      2. Hyperlipidemia     His lipid panel today reveals an LDL of 85, HDL 54, triglycerides 56, and total cholesterol 150 while on Pravastatin 10 mg daily. Given his diabetes and peripheral arterial disease, he should optimally be on a moderate to high dose of a maximally efficacious statin with a goal LDL of less than 70. Therefore, his Pravastatin will be changed to Crestor 10 mg daily. He has had trouble with myalgias in the past while on Simvastatin. He is very hesitant on switching statins or increasing his dose. However, he was reassured that Crestor is metabolized down the same pathway as Pravastatin (which is different than the pathway for Simvastatin) and if he is tolerating Pravastatin he should hopefully tolerate Crestor.  He should have a lipid panel repeated in 3 months through his primary care provider. If his LDL is still not at goal and he is tolerating Crestor, consider increasing the Crestor dose or adding Zetia 10 mg daily.      3. Type 2 diabetes mellitus     His A1C today is 7.7% while taking Lantus 20 units every evening and metformin 500 mg BID. Given his PAD and wound, he should be treated more aggressively to an A1C of less than 7.0%. When last seen by us in 2018 he had been initiated on Victoza. However, this was too costly for him and was discontinued. His Lantus dose has been increased to a recommended 23 units at bedtime, but he states he is only taking about 20 units at bedtime. It was advised at this time that he  follow his PCP recommendations and increase his Lantus dose. He should then have an A1C repeated in 3 months through his primary care provider.      4. GERD    Given that he will now be on Plavix, his Omeprazole will be changed to Pantoprazole as the Omeprazole has the potential to decrease the effectiveness of his Plavix.       Reason for Consult   Reason for consult: Asked by Dr. Cali to evaluate and help manage vascular risk factors in this 84 year old male non-smoker with a history of diabetes, hyperlipidemia, hypertension, and GERD now presenting for a left lower extremity angiogram due to critical limb ischemia of non-healing left great toe amputation site.     Primary Care Physician   Chris Scott      History of Present Illness   Fawad Garcia is a 84 year old non-smoking male with diabetes, peripheral neuropathy and hypertension who was being seen by Dr. Montesinos of Podiatry since September 2018. He originally presented to Podiatry with a few month history of a left heel ulcer followed by a left great toe ulcer. These had failed to heal and on 12/11/18 he underwent an angiogram by Dr. Cali of Vascular Surgery where recanalization of an occluded posterior tibial artery was attempted but unsuccessful. He continued to have trouble with wound healing and developed osteomyelitis of the left great toe. He underwent amputation of the left great toe 12/24/19. Unfortunately this has failed to heal. An angiogram was again recommended for further evaluation and possible endovascular intervention to help optimize blood flow so his site can heal. He presented for this today.     Past Medical History   Past Medical History:   Diagnosis Date     Diabetes mellitus (H)      Hypertension      Squamous cell carcinoma        Past Surgical History   Past Surgical History:   Procedure Laterality Date     AMPUTATE TOE(S) Right 7/23/2019    Procedure: Partial amputation great toe;  Surgeon: Ethan Montesinos DPM;   Location: [facility] OR     AMPUTATE TOE(S) Left 2019    Procedure: Amputation of the great toe, left foot;  Surgeon: Ethan Montesinos DPM;  Location: [facility] OR     CL AFF SURGICAL PATHOLOGY  2002    prostate biopsy elevated PSA     COLONOSCOPY       HC REMOVE TONSILS/ADENOIDS,<13 Y/O      T & A     IR LOWER EXTREMITY ANGIOGRAM LEFT  2020     IR LOWER EXTREMITY ANGIOGRAM RIGHT  2018     PHACOEMULSIFICATION WITH STANDARD INTRAOCULAR LENS IMPLANT Left 2015    Procedure: PHACOEMULSIFICATION WITH STANDARD INTRAOCULAR LENS IMPLANT;  Surgeon: Joseph Hernandez MD;  Location: [facility] OR     PHACOEMULSIFICATION WITH STANDARD INTRAOCULAR LENS IMPLANT Right 2015    Procedure: PHACOEMULSIFICATION WITH STANDARD INTRAOCULAR LENS IMPLANT;  Surgeon: Joseph Hernandez MD;  Location: [facility] OR     REPAIR HAMMER TOE Left 2019    Procedure: 5th Metatarsal Head Resection;  Surgeon: Ethan Montesinos DPM;  Location: [facility] OR       Prior to Admission Medications   Prior to Admission Medications   Prescriptions Last Dose Informant Patient Reported? Taking?   Blood Glucose Monitoring Suppl (ONE TOUCH ULTRA SYSTEM KIT) W/DEVICE KIT 2020 at Unknown time  Yes Yes   HYDROcodone-acetaminophen (NORCO) 5-325 MG tablet Unknown at Unknown time  No No   Sig: Take 1-2 tablets by mouth every 4 hours as needed for moderate to severe pain   Multiple Vitamins-Minerals (PRESERVISION AREDS PO) 2020 at Unknown time  Yes Yes   Sig: Take 1 tablet by mouth   ONETOUCH LANCETS MISC   No No   Si Device daily One touch delica lancets, what ever is covered by insurance.   ONETOUCH ULTRA test strip   No No   Si strip by In Vitro route daily One touch Ultra.   ORDER FOR DME   No No   Sig: Equipment being ordered:   Glucometer   amLODIPine (NORVASC) 5 MG tablet 2020 at Unknown time  No Yes   Sig: Take 1 tablet (5 mg) by mouth daily Hold on file until needed.   dorzolamide-timolol (COSOPT) 2-0.5 % ophthalmic solution 2020  at Unknown time  Yes Yes   hydrochlorothiazide (HYDRODIURIL) 25 MG tablet 2020 at Unknown time  No Yes   Sig: Take 1 tablet (25 mg) by mouth daily Hold on file until needed   insulin glargine (LANTUS SOLOSTAR) 100 UNIT/ML pen 2020 at Unknown time  No Yes   Sig: Inject 23 Units Subcutaneous At Bedtime Hold on file until needed   Patient taking differently: Inject 16 Units Subcutaneous At Bedtime Hold on file until needed   insulin pen needle (B-D U/F) 31G X 8 MM miscellaneous   No No   Si Device daily Use once daily or as directed.   levofloxacin 500 MG PO tablet 2020 at Unknown time  No Yes   Sig: Take 1 tablet (500 mg) by mouth daily   lisinopril (PRINIVIL/ZESTRIL) 40 MG tablet 2020 at Unknown time  No Yes   Sig: Take 1 tablet (40 mg) by mouth daily Hold on file until needed   metFORMIN (GLUCOPHAGE) 500 MG tablet 2020 at Unknown time  No Yes   Sig: Take 2 tablets (1,000 mg) by mouth 2 times daily (with meals) Hold on file until needed   omeprazole (PRILOSEC) 20 MG DR capsule 2020 at Unknown time  No No   Sig: Take 1 capsule (20 mg) by mouth daily Hold on file until needed   order for DME   No No   Sig: Knee Walker  Length of use: Three months    The patient was prescribed a knee walker. The patient is unsteady utilizing crutches, quad walker or a cane.  The knee walker will allow the patient to ambulate safely without the use of the operative foot and reduce risk of falls.   pravastatin (PRAVACHOL) 10 MG tablet 2020 at Unknown time  No No   Sig: Take 1 tablet (10 mg) by mouth daily Hold on file until needed   senna-docusate (SENOKOT-S/PERICOLACE) 8.6-50 MG tablet Unknown at Unknown time  No No   Sig: Take 1-2 tablets by mouth 2 times daily      Facility-Administered Medications: None     Allergies   Allergies   Allergen Reactions     Zocor [Simvastatin - High Dose]      Bilateral hip aching       Social History   Fawad Garcia  reports that he has never smoked. He has  never used smokeless tobacco. He reports current alcohol use of about 1.7 standard drinks of alcohol per week. He reports that he does not use drugs.    Family History   Family History   Problem Relation Age of Onset     Cancer Mother         intestinal CA     Diabetes Father      Diabetes Brother      Other Cancer Son         meagan tumor     Other Cancer Brother         pancreatic cancer     Diabetes Son      Melanoma No family hx of        Review of Systems   The 10 point Review of Systems is negative other than noted in the HPI or here.     Physical Exam   Temp: 97.6  F (36.4  C) Temp src: Oral BP: (!) 172/81 Pulse: 76 Heart Rate: 59 Resp: 16 SpO2: 97 % O2 Device: None (Room air) Oxygen Delivery: 2 LPM  Vital Signs with Ranges  Temp:  [97.6  F (36.4  C)] 97.6  F (36.4  C)  Pulse:  [58-81] 76  Heart Rate:  [59-68] 59  Resp:  [10-25] 16  BP: (124-172)/(68-87) 172/81  SpO2:  [93 %-98 %] 97 %  224 lbs 0 oz    Constitutional: awake, alert, cooperative, no apparent distress, and appears stated age  Eyes: Lids and lashes normal, pupils equal, round and reactive to light, extra ocular muscles intact, sclera clear, conjunctiva normal  ENT: normocepalic, without obvious abnormality, oropharynx pink and moist  Hematologic / Lymphatic: no lymphadenopathy  Respiratory: No increased work of breathing, good air exchange, clear to auscultation bilaterally, no crackles or wheezing  Cardiovascular: regular rate and rhythm, normal S1 and S2 and no murmur noted  GI: Normal bowel sounds, soft, non-distended, non-tender  Skin: no redness, warmth, or swelling, no rashes and no lesions. Toe lesion is dressed.   Musculoskeletal: There is no redness, warmth, or swelling of the joints. Motor strength is 5 out of 5 all extremities bilaterally.  Tone is normal.  Neurologic: Awake, alert, oriented to name, place and time.  Cranial nerves II-XII are grossly intact.  Motor is 5 out of 5 bilaterally.    Neuropsychiatric:  Normal affect, memory,  insight.      Data   Most Recent 3 CBC's:  Recent Labs   Lab Test 02/26/20  0750 12/05/19  1659 07/19/19  0932   WBC 9.5 9.3 13.0*   HGB 12.8* 12.3* 13.0*   MCV 85 90 89    297 388     Most Recent 3 BMP's:  Recent Labs   Lab Test 02/26/20  0750 12/24/19  0742 04/01/19  1451  12/04/18  0854   NA  --  140 134  --  138   POTASSIUM  --  4.2 3.8  --  3.7   CHLORIDE  --  107 101  --  103   CO2  --  28 27  --  29   BUN  --  25 22  --  21   CR 0.82 0.96 0.91   < > 0.92   ANIONGAP  --  5 6  --  6   CAIN  --  8.8 8.9  --  8.8   GLC  --  150* 133*  --  100*    < > = values in this interval not displayed.     Most Recent 3 INR's:  Recent Labs   Lab Test 02/26/20  0750 12/11/18  1150   INR 0.92 0.93     Most Recent Cholesterol Panel:  Recent Labs   Lab Test 02/26/20  0750   CHOL 150   LDL 85   HDL 54   TRIG 56     Most Recent Hemoglobin A1c:  Recent Labs   Lab Test 02/26/20  0750   A1C 7.7*

## 2020-02-26 NOTE — PROGRESS NOTES
Care Suites Admission Nursing Note    Patient Information  Name: Fawad Garcia  Age: 84 year old  Reason for admission: Leg angio   Care Suites arrival time: 0720    Patient Admission/Assessment   Pre-procedure assessment complete: YES  DM Type 2- BGM 97  Pre-existing right foot, 2nd toe ulcer and left foot ulcers-UTV- wrapped daily by [facility] RN   If abnormal assessment/labs, provider notified: N/A  NPO: YES  Medications held per instructions/orders: YES and NO  Consent: obtained  Patient oriented to room: YES  Education/questions answered: YES  Plan/other: Left leg angio at 0900  0905- Pt. Voided and to IR on bed    Discharge Planning  Accompanied by: Mahin- son   ** went to work  Discharge name/phone number: cell# [phone]  Work #[phone]  Overnight post sedation caregiver: wife at home  Discharge location: home    Estela Xie RN

## 2020-02-26 NOTE — PRE-PROCEDURE
GENERAL PRE-PROCEDURE:   Procedure:  LEFT LOWER EXTREMITY ARTERIOGRAM, ANGIOPLASTY AND STENTING   Date/Time:  2/26/2020 8:39 AM    Verbal consent obtained?: Yes    Written consent obtained?: Yes    Risks and benefits: Risks, benefits and alternatives were discussed    Consent given by:  Patient  Patient states understanding of procedure being performed: Yes    Patient's understanding of procedure matches consent: Yes    Procedure consent matches procedure scheduled: Yes    Expected level of sedation:  Moderate  Appropriately NPO:  Yes  ASA Class:  Class 3- Severe systemic disease, definite functional limitations  Mallampati  :  Grade 2- soft palate, base of uvula, tonsillar pillars, and portion of posterior pharyngeal wall visible  Lungs:  Lungs clear with good breath sounds bilaterally  Heart:  Normal heart sounds and rate  History & Physical reviewed:  History and physical reviewed and no updates needed  Statement of review:  I have reviewed the lab findings, diagnostic data, medications, and the plan for sedation

## 2020-02-26 NOTE — IR NOTE
Interventional Radiology Intra-procedural Nursing Note    Patient Name: Fawad Garcia  Medical Record Number: [id number]  Today's Date: February 26, 2020    Start Time: 0925  End of procedure time: 1025  Procedure: left lower extremity angiogram, left distal popliteal stent placement, left perineal and popliteal balloon angioplasty  Report given to: DONALD Palmer, care suites  Time pt departs:  1035  : n/a    Other Notes:     Versed 2.5mg IV given  Fentanyl 125mcg IV given  Heparin 10,000 units IV given (total)  Protamine 50mg IV given    Patient tolerated procedure well. VSS. Patient alert, respirations regular and unlabored, no c/o pain at this time.   Right femoral arterial access site is c/d/i, 5f sheath was removed at 1025, 6 angioseal closure device used. No hematoma    Patient transferred back to care suites in stable condition accompanied by myself.    Mora Orellana RN on 2/26/2020 at 11:01 AM

## 2020-02-26 NOTE — DISCHARGE INSTRUCTIONS
Peripheral Angiogram Discharge Instructions - Femoral     After you go home:      Have an adult stay with you until tomorrow.    Drink extra fluids for 2 days.    You may resume your normal diet.    No smoking       For 24 hours - due to the sedation you received:    Relax and take it easy.    Do NOT make any important or legal decisions.    Do NOT drive or operate machines at home or at work.    Do NOT drink alcohol.    Care of Groin Puncture Site:         ** Angioseal Closure device in right groin - see phamphlet    For the first 24 hrs - check the puncture site every 1-2 hours while awake.    For 2 days, when you cough, sneeze, laugh or move your bowels, hold your hand over the puncture site and press firmly.    Remove the bandaid after 24 hours. If there is minor oozing, apply another bandaid and remove it after 12 hours.    It is normal to have a small bruise or pea size lump at the site.    You may shower tomorrow.  Do NOT take a bath, or use a hot tub or pool for at least 3 days. Do NOT scrub the site. Do not use lotion or powder near the puncture site.     Activity:            For 2 days:    No stooping or squatting    Do NOT do any heavy activity such as exercise, lifting, or straining.     No housework, yard work or any activity that make you sweat    Do NOT lift more than 10 pounds    Bleeding:      If you start bleeding from the site in your groin, lie down flat and press firmly on/above the site for 10 minutes.     Once bleeding stops, lay flat for 2 hours.     Call the [facility] as soon as you can.       Call 911 right away if you have heavy bleeding or bleeding that does not stop.      Medicines:      If you are on Metformin (Glucophage) and your GFR (kidney function level) is >30, you may continue taking your Metformin.    If you are taking an antiplatelet medication such as Plavix, do not stop taking it until you talk to your provider.   *Start Plavix 75 mg tomorrow    Take your  medications, including blood thinners, unless your provider tells you not to.      If you have stopped any medicines, check with your provider about when to restart them.        Follow Up Appointments:      Follow up with [facility] as directed.    Call the clinic if:      You have increased pain or a large or growing hard lump around the site.    The site is red, swollen, hot or tender.    Blood or fluid is draining from the site.    You have chills or a fever greater than 101 F (38 C).    Your leg feels numb, cool or changes color.    You have hives, a rash or unusual itching.    New pain in the back or belly that you cannot control with Tylenol.    Any questions or concerns.    Other Instructions:      If you received a stent - carry your stent card with you at all times.      If you have questions or your original symptoms do not improve, call:         [facility] @ [phone]

## 2020-02-27 ENCOUNTER — OFFICE VISIT (OUTPATIENT)
Dept: PODIATRY | Facility: CLINIC | Age: 85
End: 2020-02-27
Payer: COMMERCIAL

## 2020-02-27 ENCOUNTER — ANCILLARY PROCEDURE (OUTPATIENT)
Dept: GENERAL RADIOLOGY | Facility: CLINIC | Age: 85
End: 2020-02-27
Attending: PODIATRIST
Payer: COMMERCIAL

## 2020-02-27 VITALS
SYSTOLIC BLOOD PRESSURE: 135 MMHG | WEIGHT: 224 LBS | HEIGHT: 73 IN | BODY MASS INDEX: 29.69 KG/M2 | DIASTOLIC BLOOD PRESSURE: 83 MMHG | HEART RATE: 98 BPM

## 2020-02-27 DIAGNOSIS — M86.9 OSTEOMYELITIS OF SECOND TOE OF RIGHT FOOT (H): ICD-10-CM

## 2020-02-27 DIAGNOSIS — Z98.890 S/P FOOT SURGERY, LEFT: ICD-10-CM

## 2020-02-27 DIAGNOSIS — M86.9 OSTEOMYELITIS OF SECOND TOE OF RIGHT FOOT (H): Primary | ICD-10-CM

## 2020-02-27 DIAGNOSIS — M86.9 OSTEOMYELITIS OF GREAT TOE OF LEFT FOOT (H): ICD-10-CM

## 2020-02-27 DIAGNOSIS — C61 PROSTATE CANCER (H): Primary | ICD-10-CM

## 2020-02-27 DIAGNOSIS — T81.31XA DEHISCENCE OF OPERATIVE WOUND, INITIAL ENCOUNTER: ICD-10-CM

## 2020-02-27 PROCEDURE — 73660 X-RAY EXAM OF TOE(S): CPT | Mod: TC

## 2020-02-27 PROCEDURE — 99214 OFFICE O/P EST MOD 30 MIN: CPT | Performed by: PODIATRIST

## 2020-02-27 RX ORDER — LEVOFLOXACIN 500 MG/1
500 TABLET, FILM COATED ORAL DAILY
Qty: 10 TABLET | Refills: 0 | Status: SHIPPED | OUTPATIENT
Start: 2020-02-27 | End: 2020-03-20

## 2020-02-27 ASSESSMENT — MIFFLIN-ST. JEOR: SCORE: 1764.94

## 2020-02-27 NOTE — PATIENT INSTRUCTIONS
You have elected to proceed with Surgery for partial amputation of the second toe right foot.  Surgeries are performed on Tuesdays at [facility].   To schedule your surgery date please call [phone].  Please leave a message with a good time for our staff to call you back.    - Please have a date in mind for your surgery, you can feel free to leave that date on the message, and we will schedule and call back to confirm.     You can expect receive a call back the same day or on the next business day from Dr. Montesinos s team to assist in the scheduling.   - We will schedule the date of your surgery.  The time will be determined a few days ahead of time.  You can expect a call from Same Day Surgery 2-3 days ahead of time with specific instructions for what time to arrive at the hospital as well as any other preparations you should take prior to surgery.    - You may need to obtain a pre-operative physical from your primary medical provider. This must be done within 30 days of your surgery date.    - We will also schedule your first post-operative appointment for a bandage and wound check for the Monday following your surgery at the [facility].    - You may be non-weight bearing for a period of up to 6 weeks.  Options for this include: (Please indicate which you would prefer so we can provide you with an order and instructions)  o Crutches  o Walker  o Roll-a-bout knee walker.    - If you will need paperwork filled out for your employer you may drop those off at the clinic directly or you may have those faxed to us at [phone].  Please indicate on the form the date you would like the LA to begin if it will not be your surgery date.    The forms are typically filled out for up to 12 weeks, however you may be cleared to return prior to that time depending on your individual healing and job requirements.

## 2020-02-27 NOTE — PROGRESS NOTES
Fawad returns to the office for reevaluation of the right and left foot.  The patient just underwent revascularization of the left lower extremity.  The patient relates he is doing well.  The patient relates following the instructions given at the last visit with noted less pain on the left.  The patient relates overall more  improvement in pain and function of the left foot.  The patient relates increased swelling and redness of the second toe on the right foot.    PAST MEDICAL HISTORY:   Past Medical History:   Diagnosis Date     Diabetes mellitus (H)      Hypertension      Squamous cell carcinoma        BMI= Body mass index is 29.55 kg/m .        Physical Exam:    General: The patient appears to have a pleasant mental affect.    Lower extremity physical exam:  Neurovascular status is intact with palpable pedal pulses and intact epicritic sensations.  Muscular exam is within normal limits to major muscle groups.  Integument is intact.      One notes decreased edema.  One notes increased edema and erythema of the second toe on the right foot.  The wound on the left foot shows increased granulation tissue and decreased erythema..    Radiograph evaluation including weightbearing AP, lateral and medial oblique views of the right foot reveals osseous destruction of the distal phalanx of the second toe.  No gas in the tissue noted.    Assessment:      ICD-10-CM    1. Osteomyelitis of second toe of right foot (H) M86.9 XR Toe Right G/E 2 Views       Plan:  I have explained to Fawad and his son about the conditions.  At this point, I am recommending surgical treatment of the condition involving partial amputation of the second toe on the right foot.  I informed the patient in risks and benefits of the procedure including but not limited to infection, wound complications, swelling, pain, diminished range of motion and function, DVT and reoccurrence of condition.  The procedure will be performed under local anesthesia.   The patient has already obtained preoperative clearance from the recent vascular surgery.  Consents will be reviewed and signed on the day of surgery.    Disclaimer: This note consists of symbols derived from keyboarding, dictation and/or voice recognition software. As a result, there may be errors in the script that have gone undetected. Please consider this when interpreting information found in this chart.       LIANG Montesinos D.P.M., [email or web address].

## 2020-02-27 NOTE — LETTER
2/27/2020         RE: Fawad Garcia  [address]  [facility] [address]        Dear Colleague,    Thank you for referring your patient, Fawad Garcia, to the [facility]. Please see a copy of my visit note below.    Fawad returns to the office for reevaluation of the right and left foot.  The patient just underwent revascularization of the left lower extremity.  The patient relates he is doing well.  The patient relates following the instructions given at the last visit with noted less pain on the left.  The patient relates overall more  improvement in pain and function of the left foot.  The patient relates increased swelling and redness of the second toe on the right foot.    PAST MEDICAL HISTORY:   Past Medical History:   Diagnosis Date     Diabetes mellitus (H)      Hypertension      Squamous cell carcinoma        BMI= Body mass index is 29.55 kg/m .        Physical Exam:    General: The patient appears to have a pleasant mental affect.    Lower extremity physical exam:  Neurovascular status is intact with palpable pedal pulses and intact epicritic sensations.  Muscular exam is within normal limits to major muscle groups.  Integument is intact.      One notes decreased edema.  One notes increased edema and erythema of the second toe on the right foot.  The wound on the left foot shows increased granulation tissue and decreased erythema..    Radiograph evaluation including weightbearing AP, lateral and medial oblique views of the right foot reveals osseous destruction of the distal phalanx of the second toe.  No gas in the tissue noted.    Assessment:      ICD-10-CM    1. Osteomyelitis of second toe of right foot (H) M86.9 XR Toe Right G/E 2 Views       Plan:  I have explained to Fawad and his son about the conditions.  At this point, I am recommending surgical treatment of the condition involving partial amputation of the second toe on the right foot.   I informed the patient in risks and benefits of the procedure including but not limited to infection, wound complications, swelling, pain, diminished range of motion and function, DVT and reoccurrence of condition.  The procedure will be performed under local anesthesia.  The patient has already obtained preoperative clearance from the recent vascular surgery.  Consents will be reviewed and signed on the day of surgery.    Disclaimer: This note consists of symbols derived from keyboarding, dictation and/or voice recognition software. As a result, there may be errors in the script that have gone undetected. Please consider this when interpreting information found in this chart.       LIANG Montesinos D.P.M., F.A.C.F.A.S.      Again, thank you for allowing me to participate in the care of your patient.        Sincerely,        Ethan Montesinos DPM

## 2020-02-27 NOTE — NURSING NOTE
"Chief Complaint   Patient presents with     RECHECK     Both feet wounds and swollen toe of right foot       Initial /83   Pulse 98   Ht 1.854 m (6' 1\")   Wt 101.6 kg (224 lb)   BMI 29.55 kg/m   Estimated body mass index is 29.55 kg/m  as calculated from the following:    Height as of this encounter: 1.854 m (6' 1\").    Weight as of this encounter: 101.6 kg (224 lb).  Medications and allergies reviewed.      Dana GUILLERMO MA    "

## 2020-02-28 ENCOUNTER — TELEPHONE (OUTPATIENT)
Dept: FAMILY MEDICINE | Facility: CLINIC | Age: 85
End: 2020-02-28

## 2020-02-28 ENCOUNTER — TELEPHONE (OUTPATIENT)
Dept: OTHER | Facility: CLINIC | Age: 85
End: 2020-02-28

## 2020-02-28 ENCOUNTER — TELEPHONE (OUTPATIENT)
Dept: PODIATRY | Facility: CLINIC | Age: 85
End: 2020-02-28

## 2020-02-28 DIAGNOSIS — I73.9 PAD (PERIPHERAL ARTERY DISEASE) (H): Primary | ICD-10-CM

## 2020-02-28 DIAGNOSIS — I70.221 ATHEROSCLEROSIS OF NATIVE ARTERY OF RIGHT LEG WITH REST PAIN (H): ICD-10-CM

## 2020-02-28 PROBLEM — M86.9 OSTEOMYELITIS OF SECOND TOE OF RIGHT FOOT (H): Status: ACTIVE | Noted: 2020-02-28

## 2020-02-28 NOTE — TELEPHONE ENCOUNTER
Reason for Call:  Other prescription    Detailed comments: pt calling stating he is having a procedure done on Tues to remove part of his toe. He was prescribed Plavix on Wed, is it ok if he starts taking this.    Phone Number Patient can be reached at: Home number on file [phone] (home)    Best Time: any     Can we leave a detailed message on this number? YES    Call taken on 2/28/2020 at 3:28 PM by Jessi Tavarez

## 2020-02-28 NOTE — TELEPHONE ENCOUNTER
Pt called to inquire if he may hold Plavix for 3/3/20 partial amputation of right second toe with Dr. Montesinos.     I discussed with Dr. Cali, pt needs to continue Plavix as he has a stent. Pt should not hold Plavix.     I called pt back, explained this and encouraged him to notify Dr. Montesinos that he will Not be holding Plavix.     Pt notes understanding.     GUADALUPE DamianN, RN  [facility] [place]

## 2020-02-28 NOTE — TELEPHONE ENCOUNTER
February 28, 2020    Patient is scheduled for US & Follow-Up appointment on 4/14/2020 with Dr. Cali at [facility].     [facility]  Vascular [facility]    Office: [phone]  Fax: [phone]

## 2020-02-28 NOTE — TELEPHONE ENCOUNTER
Reason for Call:  Other appointment    Detailed comments: Pt calling stating he is seeing Podiatry for a partial toe amputation and is wondering if he should keep his appt with Dr Scott for Irregular HB see phone note 2/12, please advise.    Phone Number Patient can be reached at: Home number on file [phone] (home)    Best Time: any    Can we leave a detailed message on this number? YES    Call taken on 2/28/2020 at 11:35 AM by Kristi Oglesby

## 2020-02-28 NOTE — TELEPHONE ENCOUNTER
Pt is s/p left lower extremity balloon angioplasty on 2/26/20 with Dr. Cali.     Per Dr. Cali, recommended follow up in 1 month with Left lower extremity arterial duplex and OV.     Routing to  to coordinate.     GUADALUPE DamianN, RN  [facility]

## 2020-03-02 DIAGNOSIS — E78.5 HYPERLIPIDEMIA LDL GOAL <70: ICD-10-CM

## 2020-03-02 DIAGNOSIS — C61 MALIGNANT NEOPLASM OF PROSTATE (H): ICD-10-CM

## 2020-03-02 DIAGNOSIS — E11.65 UNCONTROLLED TYPE 2 DIABETES MELLITUS WITH HYPERGLYCEMIA (H): ICD-10-CM

## 2020-03-02 DIAGNOSIS — Z79.4 TYPE 2 DIABETES MELLITUS WITH BOTH EYES AFFECTED BY MILD NONPROLIFERATIVE RETINOPATHY WITHOUT MACULAR EDEMA, WITH LONG-TERM CURRENT USE OF INSULIN (H): ICD-10-CM

## 2020-03-02 DIAGNOSIS — E11.3293 TYPE 2 DIABETES MELLITUS WITH BOTH EYES AFFECTED BY MILD NONPROLIFERATIVE RETINOPATHY WITHOUT MACULAR EDEMA, WITH LONG-TERM CURRENT USE OF INSULIN (H): ICD-10-CM

## 2020-03-02 LAB
CHOLEST SERPL-MCNC: 127 MG/DL
HBA1C MFR BLD: 7.2 % (ref 0–5.6)
HDLC SERPL-MCNC: 59 MG/DL
LDLC SERPL CALC-MCNC: 60 MG/DL
NONHDLC SERPL-MCNC: 68 MG/DL
PSA SERPL-MCNC: 1.23 UG/L (ref 0–4)
TRIGL SERPL-MCNC: 42 MG/DL

## 2020-03-02 PROCEDURE — 80061 LIPID PANEL: CPT | Performed by: FAMILY MEDICINE

## 2020-03-02 PROCEDURE — 83036 HEMOGLOBIN GLYCOSYLATED A1C: CPT | Performed by: FAMILY MEDICINE

## 2020-03-02 PROCEDURE — 36415 COLL VENOUS BLD VENIPUNCTURE: CPT | Performed by: FAMILY MEDICINE

## 2020-03-02 PROCEDURE — 84153 ASSAY OF PSA TOTAL: CPT | Performed by: FAMILY MEDICINE

## 2020-03-02 NOTE — TELEPHONE ENCOUNTER
LVM with detailed information regarding that would recommending holding starting Plavix medication until after his procedure on 3/3.  May contact clinic with further questions.    Wayne Wolfe, ATC

## 2020-03-03 ENCOUNTER — HOSPITAL ENCOUNTER (OUTPATIENT)
Facility: CLINIC | Age: 85
Discharge: HOME | End: 2020-03-03
Attending: PODIATRIST | Admitting: PODIATRIST
Payer: COMMERCIAL

## 2020-03-03 ENCOUNTER — TELEPHONE (OUTPATIENT)
Dept: FAMILY MEDICINE | Facility: CLINIC | Age: 85
End: 2020-03-03

## 2020-03-03 ENCOUNTER — MEDICAL CORRESPONDENCE (OUTPATIENT)
Dept: HEALTH INFORMATION MANAGEMENT | Facility: CLINIC | Age: 85
End: 2020-03-03

## 2020-03-03 VITALS
BODY MASS INDEX: 29.69 KG/M2 | DIASTOLIC BLOOD PRESSURE: 72 MMHG | HEIGHT: 73 IN | WEIGHT: 224 LBS | SYSTOLIC BLOOD PRESSURE: 137 MMHG | RESPIRATION RATE: 16 BRPM | OXYGEN SATURATION: 97 % | HEART RATE: 73 BPM | TEMPERATURE: 98 F

## 2020-03-03 DIAGNOSIS — M86.9 OSTEOMYELITIS OF SECOND TOE OF RIGHT FOOT (H): ICD-10-CM

## 2020-03-03 LAB
GLUCOSE BLDC GLUCOMTR-MCNC: 281 MG/DL (ref 70–99)
GLUCOSE BLDC GLUCOMTR-MCNC: 350 MG/DL (ref 70–99)
GRAM STN SPEC: ABNORMAL
SPECIMEN SOURCE: ABNORMAL

## 2020-03-03 PROCEDURE — 25000128 H RX IP 250 OP 636: Performed by: PODIATRIST

## 2020-03-03 PROCEDURE — 40000306 ZZH STATISTIC PRE PROC ASSESS II: Performed by: PODIATRIST

## 2020-03-03 PROCEDURE — 87205 SMEAR GRAM STAIN: CPT | Performed by: PODIATRIST

## 2020-03-03 PROCEDURE — 88305 TISSUE EXAM BY PATHOLOGIST: CPT | Performed by: PODIATRIST

## 2020-03-03 PROCEDURE — 87181 SC STD AGAR DILUTION PER AGT: CPT | Performed by: PODIATRIST

## 2020-03-03 PROCEDURE — 87070 CULTURE OTHR SPECIMN AEROBIC: CPT | Performed by: PODIATRIST

## 2020-03-03 PROCEDURE — 25800030 ZZH RX IP 258 OP 636: Performed by: PODIATRIST

## 2020-03-03 PROCEDURE — 36000050 ZZH SURGERY LEVEL 2 1ST 30 MIN: Performed by: PODIATRIST

## 2020-03-03 PROCEDURE — 27210794 ZZH OR GENERAL SUPPLY STERILE: Performed by: PODIATRIST

## 2020-03-03 PROCEDURE — 25000125 ZZHC RX 250: Performed by: PODIATRIST

## 2020-03-03 PROCEDURE — 87075 CULTR BACTERIA EXCEPT BLOOD: CPT | Performed by: PODIATRIST

## 2020-03-03 PROCEDURE — 71000027 ZZH RECOVERY PHASE 2 EACH 15 MINS: Performed by: PODIATRIST

## 2020-03-03 PROCEDURE — 88311 DECALCIFY TISSUE: CPT | Mod: 26 | Performed by: PODIATRIST

## 2020-03-03 PROCEDURE — 88311 DECALCIFY TISSUE: CPT | Performed by: PODIATRIST

## 2020-03-03 PROCEDURE — 88305 TISSUE EXAM BY PATHOLOGIST: CPT | Mod: 26 | Performed by: PODIATRIST

## 2020-03-03 PROCEDURE — 87077 CULTURE AEROBIC IDENTIFY: CPT | Performed by: PODIATRIST

## 2020-03-03 PROCEDURE — 28825 PARTIAL AMPUTATION OF TOE: CPT | Mod: T6 | Performed by: PODIATRIST

## 2020-03-03 PROCEDURE — 82962 GLUCOSE BLOOD TEST: CPT

## 2020-03-03 RX ORDER — CEFAZOLIN SODIUM 1 G/50ML
1 INJECTION, SOLUTION INTRAVENOUS SEE ADMIN INSTRUCTIONS
Status: DISCONTINUED | OUTPATIENT
Start: 2020-03-03 | End: 2020-03-03 | Stop reason: HOSPADM

## 2020-03-03 RX ORDER — LIDOCAINE HYDROCHLORIDE 10 MG/ML
INJECTION, SOLUTION INFILTRATION; PERINEURAL PRN
Status: DISCONTINUED | OUTPATIENT
Start: 2020-03-03 | End: 2020-03-03 | Stop reason: HOSPADM

## 2020-03-03 RX ORDER — CEFAZOLIN SODIUM 2 G/100ML
2 INJECTION, SOLUTION INTRAVENOUS
Status: COMPLETED | OUTPATIENT
Start: 2020-03-03 | End: 2020-03-03

## 2020-03-03 RX ORDER — BACITRACIN ZINC 500 [USP'U]/G
OINTMENT TOPICAL PRN
Status: DISCONTINUED | OUTPATIENT
Start: 2020-03-03 | End: 2020-03-03 | Stop reason: HOSPADM

## 2020-03-03 RX ORDER — SODIUM CHLORIDE, SODIUM LACTATE, POTASSIUM CHLORIDE, CALCIUM CHLORIDE 600; 310; 30; 20 MG/100ML; MG/100ML; MG/100ML; MG/100ML
INJECTION, SOLUTION INTRAVENOUS CONTINUOUS
Status: DISCONTINUED | OUTPATIENT
Start: 2020-03-03 | End: 2020-03-03 | Stop reason: HOSPADM

## 2020-03-03 RX ADMIN — SODIUM CHLORIDE, POTASSIUM CHLORIDE, SODIUM LACTATE AND CALCIUM CHLORIDE: 600; 310; 30; 20 INJECTION, SOLUTION INTRAVENOUS at 13:59

## 2020-03-03 RX ADMIN — LIDOCAINE HYDROCHLORIDE 0.1 ML: 10 INJECTION, SOLUTION EPIDURAL; INFILTRATION; INTRACAUDAL; PERINEURAL at 13:59

## 2020-03-03 RX ADMIN — CEFAZOLIN SODIUM 2 G: 2 INJECTION, SOLUTION INTRAVENOUS at 14:19

## 2020-03-03 ASSESSMENT — MIFFLIN-ST. JEOR: SCORE: 1764.94

## 2020-03-03 NOTE — BRIEF OP NOTE
[facility] Brief Operative Note    Pre-operative diagnosis: Osteomyelitis of second toe of right foot (H) [M86.9]   Post-operative diagnosis Same as above   Procedure: Procedure(s):  Partial amputation, second toe, right foot   Surgeon(s): Surgeon(s) and Role:     * Ethan Montesinos DPM - Primary   Estimated blood loss: * No values recorded between 3/3/2020  2:40 PM and 3/3/2020  2:53 PM *    Specimens: ID Type Source Tests Collected by Time Destination   1 : Right Foot Second Toe Bone Foot, Right ANAEROBIC BACTERIAL CULTURE, BONE CULTURE AEROBIC BACTERIAL, GRAM STAIN Ethan Montesinos DPM 3/3/2020  2:48 PM    A : Right Foot Second Toe Bone Foot, Right SURGICAL PATHOLOGY EXAM Ethan Montesinos DPM 3/3/2020  2:47 PM       Findings: Necrotic bone of the distal and middle phalanx of the second toe, right foot

## 2020-03-03 NOTE — OP NOTE
PREOPERATIVE DIAGNOSIS: 1.  Osteomyelitis, second toe, right foot.   POSTOPERATIVE DIAGNOSIS: 1. Osteomyelitis, second toe, right foot.   PROCEDURE: 1. Partial amputation of the second toe, right foot.   PATHOLOGY: bone, second toe, right foot.   ANESTHESIA: Local    ESTIMATED BLOOD LOSS: Less than 10 mL   INDICATIONS FOR PROCEDURE: Fawad Garcia is a 84 year old-year-old male with osteomyelitis of the second toe of the right foot. The patient was consented for partial amputation of the second toe, right foot.   PROCEDURE IN DETAIL: Under mild sedation, the patient in the operating room and placed on the operating table in the supine position. Pneumatic ankle tourniquet was placed around the patient's right ankle. After adequate sedation, approximately 20 cc of 1% buffered lidocaine was injected around the base of the second toe of the right foot. The foot was then scrubbed, prepped and draped in the usual aseptic manner. Esmarch bandage was utilized to exsanguinate the patient's right lower extremity, and the pneumatic ankle tourniquet was inflated to 250 PSI.   Following this, an operative time out was performed according to our institution's policy which confirmed the laterality of the procedure. Preoperative antibiotics were administered to the patient prior to arrival to the OR.     The procedure began with a circumferential incision over the proximal interphalangeal joint of the second toe on the right.  The incision was made full thickness down to bone and the distal aspect of the second toe was resected in toto.  Specimens were sent off to pathology and microbiology for culture and sensitivity.    The wound was irrigated with copious amounts of normal sterile saline.  The skin was reapproximated utilizing 4-0 nylon suture in a continuous running interlocking fashion.  The wound was dressed with sterile bacitracin, Xeroform, 4 x 4s, cast padding and an Ace wrap.   The patient tolerated these procedures  well and was transferred from the operative table to the transport cart and taken from the OR to the PACU.  In PACU, patient and staff given orders and instructions for post-operative care in the following areas: post op pain management, weight-bearing status, dressing/splint care, weight-bearing assistive devices, elevation of the extremity, ice/cold therapy, DVT/blood clot prevention, nutrition and post-operative concerns to be aware.  Case and post-operative care measures were reviewed with the patient and family members present.  A post operative instruction sheet was provided.  If concerns or questions arise they will contact our clinic.  If acute concerns develop they will present emergently to a nearby medical center.      RONNY MERCADO DPM, FACFAS

## 2020-03-03 NOTE — DISCHARGE INSTRUCTIONS
__________________________________________________________________________________________________________________________________  IMPORTANT NUMBERS:    [facility] Main Number:  [phone], [phone]  Pharmacy:  [phone]  Same Day Surgery:  [phone], Monday - Friday until 8:30 p.m.  Urgent Care:  [phone]  Emergency Room:  [phone] [place] [address] Clinic:  [phone]                                                                             [facility]:  [phone] [address] Orthopedics:  [phone]     OB Clinic:  [phone]   [facility]:  [phone]   Home Medical Equipment: [phone]  [facility]:  [phone]

## 2020-03-04 ENCOUNTER — TELEPHONE (OUTPATIENT)
Dept: PODIATRY | Facility: CLINIC | Age: 85
End: 2020-03-04

## 2020-03-04 ENCOUNTER — OFFICE VISIT (OUTPATIENT)
Dept: FAMILY MEDICINE | Facility: CLINIC | Age: 85
End: 2020-03-04
Payer: COMMERCIAL

## 2020-03-04 VITALS
DIASTOLIC BLOOD PRESSURE: 62 MMHG | TEMPERATURE: 98.4 F | HEART RATE: 64 BPM | OXYGEN SATURATION: 98 % | SYSTOLIC BLOOD PRESSURE: 98 MMHG

## 2020-03-04 DIAGNOSIS — E11.3293 TYPE 2 DIABETES MELLITUS WITH BOTH EYES AFFECTED BY MILD NONPROLIFERATIVE RETINOPATHY WITHOUT MACULAR EDEMA, WITH LONG-TERM CURRENT USE OF INSULIN (H): ICD-10-CM

## 2020-03-04 DIAGNOSIS — E78.5 HYPERLIPIDEMIA LDL GOAL <100: ICD-10-CM

## 2020-03-04 DIAGNOSIS — Z79.4 TYPE 2 DIABETES MELLITUS WITH BOTH EYES AFFECTED BY MILD NONPROLIFERATIVE RETINOPATHY WITHOUT MACULAR EDEMA, WITH LONG-TERM CURRENT USE OF INSULIN (H): ICD-10-CM

## 2020-03-04 DIAGNOSIS — E78.5 HYPERLIPIDEMIA LDL GOAL <70: ICD-10-CM

## 2020-03-04 DIAGNOSIS — I49.9 IRREGULAR HEART BEATS: Primary | ICD-10-CM

## 2020-03-04 DIAGNOSIS — I10 HYPERTENSION, GOAL BELOW 140/90: ICD-10-CM

## 2020-03-04 PROCEDURE — 99214 OFFICE O/P EST MOD 30 MIN: CPT | Performed by: FAMILY MEDICINE

## 2020-03-04 PROCEDURE — 93000 ELECTROCARDIOGRAM COMPLETE: CPT | Performed by: FAMILY MEDICINE

## 2020-03-04 NOTE — PATIENT INSTRUCTIONS
You do have irregular heart likely PVC.    I do want to get a zio patch to confirm.    Please get this done to check it out.          Thank you for choosing [facility].  You may be receiving an email and/or telephone survey request from [facility] Customer Experience regarding your visit today.  Please take a few minutes to respond to the survey to let us know how we are doing.      If you have questions or concerns, please contact us via [facility] or you can contact your care team at [phone].    Our Clinic hours are:  Monday 6:40 am  to 7:00 pm  Tuesday -Friday 6:40 am to 5:00 pm    The [place] outpatient lab hours are:  Monday - Friday 6:10 am to 4:45 pm  Saturdays 7:00 am to 11:00 am  Appointments are required, call [phone]    If you have clinical questions after hours or would like to schedule an appointment,  call the clinic at [phone].

## 2020-03-04 NOTE — PROGRESS NOTES
Subjective     Fawad Garcia is a 84 year old male who presents to clinic today for the following health issues:  Chief Complaint   Patient presents with     Irregular Heart Beat     per 2/12/2020 phone note. Home Health nurse noted irregular heart beat during her visit. He never feels it.       HPI     Patient has a nurse that comes out to his place to do vitals.  Noted irregular heart beat.  He has noted it occasionally.  No chest pain pressure or tightness.  He has diabetes and just had a toe amputation done.  Has known PVD.      No edema.  No sob.  Never had this before.        Reviewed and updated as needed this visit by Provider         Review of Systems   ROS COMP: CONSTITUTIONAL:NEGATIVE for fever, chills, change in weight  RESP:NEGATIVE for significant cough or SOB  CV: as above  GI: NEGATIVE for nausea, abdominal pain, heartburn, or change in bowel habits  MUSCULOSKELETAL: just had one toe amputated.  NEURO: NEGATIVE for weakness, dizziness or paresthesias  HEME/ALLERGY/IMMUNE: no bleeding, just had a stent placed due to PVD      Objective    BP 98/62 (Cuff Size: Adult Large)   Pulse 64   Temp 98.4  F (36.9  C) (Tympanic)   SpO2 98%   There is no height or weight on file to calculate BMI.  Physical Exam   GENERAL APPEARANCE: alert, no distress and cooperative  RESP: lungs clear to auscultation - no rales, rhonchi or wheezes  CV: normal S1 S2, no S3 or S4, no murmur, click or rub and regular about 90% of the time, did note a few irregular heart beat.  ABDOMEN: soft, nontender, without hepatosplenomegaly or masses and bowel sounds normal  MS: feet are bandaged  SKIN: no suspicious lesions or rashes  NEURO: Normal strength and tone, mentation intact and speech normal  PSYCH: mentation appears normal and affect normal/bright         EKG Interpretation:      Interpreted by Chris Scott MD  Time reviewed:1700   Symptoms at time of EKG: None   Rhythm: Normal sinus  With a premature ventricular  contration   Rate: Normal  Axis: Left Axis Deviation  Ectopy: Premature ventricular contractions (unifocal)  Conduction: Normal  ST Segments/ T Waves: No ST-T wave changes and No acute ischemic changes  Q Waves: None  Comparison to prior: Unchanged    Clinical Impression: normal EKG            Assessment & Plan     (I49.9) Irregular heart beats  (primary encounter diagnosis)  Comment: likely PVC occurring.  Will get a zio patch to take a look at however frequently of this there is an issue.    Plan: EKG 12-lead complete w/read - Clinics, Zio         Patch Holter Adult Pediatric Greater than 48         hrs            (E78.5) Hyperlipidemia LDL goal <100  Comment:   Plan:     (I10) Hypertension, goal below 140/90  Comment: controlled  Plan: EKG 12-lead complete w/read - Clinics            (E11.3293,  Z79.4) Type 2 diabetes mellitus with both eyes affected by mild nonproliferative retinopathy without macular edema, with long-term current use of insulin (H)  Comment:   Plan: EKG 12-lead complete w/read - Clinics            (E78.5) Hyperlipidemia LDL goal <70  Comment:   Plan: EKG 12-lead complete w/read - Clinics          Noted he has diabetes which is controlled, reviewed his lab from 3/2/2020.  In addition he has hyperlipidemia which is controlled and on medication.    Discussed with his son too.  Counseling/Coordination of care is over 15 min in 25 min appt.         See Patient Instructions    Return in about 4 weeks (around 4/1/2020) for If not better.    Chris Scott MD  [facility]

## 2020-03-04 NOTE — TELEPHONE ENCOUNTER
Reason for Call:  Other call back    Detailed comments: Pt states he had surgery yesterday - states bandage fell off and they did the best they could to re bandage it.    Phone Number Patient can be reached at: Home number on file [phone] (home)    Best Time:     Can we leave a detailed message on this number? YES    Call taken on 3/4/2020 at 9:25 AM by Juju Reilly

## 2020-03-06 ENCOUNTER — HOSPITAL ENCOUNTER (OUTPATIENT)
Dept: WOUND CARE | Facility: CLINIC | Age: 85
Discharge: HOME | End: 2020-03-06
Attending: SURGERY | Admitting: SURGERY
Payer: COMMERCIAL

## 2020-03-06 DIAGNOSIS — T81.31XD POSTOPERATIVE WOUND DEHISCENCE, SUBSEQUENT ENCOUNTER: Primary | ICD-10-CM

## 2020-03-06 LAB
BACTERIA SPEC CULT: ABNORMAL
COPATH REPORT: NORMAL
SPECIMEN SOURCE: ABNORMAL

## 2020-03-06 PROCEDURE — G0463 HOSPITAL OUTPT CLINIC VISIT: HCPCS

## 2020-03-06 NOTE — DISCHARGE INSTRUCTIONS
Every other day wound care for left foot wound:  1.) Remove old packing strip and cleanse in depth of wound wound with sterile cotton tipped applicator and gauze, clean surrounding tissue with gauze and wound   2.) Use wooden end of sterile cotton tipped applicator to tuck 1/2 inch Iodoform gauze into wound taking care to fill in all directions, (but not too tightly) and fill to skin level  3.) Cover wound with a piece of Mepilex Transfer to protect periwound skin and help retain packing strip  4.) Cover with 1/2 of a 5x9 abd pad  5.) Apply Iodosorb to end of second to and apply a piece of gauze over to cover  6.) Secure all with rolled gauze     Kamille Calzada RN, CWOCN [phone]

## 2020-03-06 NOTE — PROGRESS NOTES
Reason For Visit: Fawad Garcia, 82 year old here for recheck for left foot dehisced surgical wound.  Pt has been referred by Dr. Montesinos, podiatry.  Being seen by Homecare for daily wound care, follow-up with WOC requested for left foot wound.  Has surgical bandage in place right foot wound following 2nd toe amputation on Tuesday, was told to leave in place until he sees Dr. Montesinos for follow-up on Monday but homecare nurse did have to re-adjust bandages once since were falling off.      Pertainent Medical/Surgical History:    (Recent) Left 1st toe amputation due to osteomyelitis on 12/24/19.  Had sutures removed on 1/13/20 and relays that soon after the incision dehisced.    On 2/26/20 underwent revascularization with Dr. Cali:  1. Ultrasound-guided right common femoral artery access placement with  selective catheterization of left common iliac, external iliac, common  femoral, superficial femoral, popliteal and peroneal arteries.  2. Left lower extremity arteriogram with runoff.  3. Left distal popliteal artery and proximal peroneal artery balloon  angioplasty using 3.5 mm x 15 cm angioplasty balloon with completion  arteriogram.  4. Left popliteal artery stenting using 4 mm x 24 mm drug eluting  balloon expandable coronary stent with completion arteriogram.  5. Right common femoral artery access site arteriogram and closure  with 6 [place] Angio-Seal device    On 3/3/20 underwent amputation of right second toe for osteomyelitis.         (Prior History) History also significant for PAD, had angiogram of right LE Dec '18 and though was disease noted was determined to give it more time for healing as next step would be a popliteal to posterior tibial artery bypass.  He was healing at subsequent follow-up in Feb of '19 so has not had vascular follow-up since.  Last RENATE in Nov '18 was 0.8pre and 0.91post exercise on left but waveforms were monophasic, did not have arteriogram on left.  DM II with HgbA1C of 8.1.       Since last seen by [facility] nurse in Oct '18 has had right 1st toe partial amputation in June '19 and left 5th toe met head resection on April '19.  Does see Dr. Montesinos regularly.    Personal/social history: Lives with spouse who will not help with wound cares.  He is a retired pharmacist.  Son will help some with bandage change but pt didn't think would do packing    Assessment:    Wound #1: Left 1st toe amputation site        2/10/20  Dehisced Left great toe incision:  Measures:  1.8cm L x 0.7cm W x 2.3cm D  Tunneling: no  Undermining: distally 1.5cm and proximally 1.7cm  Wound bed type/amount:  Appears to be granulating well where visible  Wound Edges: undermining as noted  Periwound: intact  Drainage amount/type: serosanguinous with some strike through on abd pad since yesterday, did not leak through kerlix, packing had fallen out of wound  Odor: no  Stage/tissue depth: full thickness  Pain: insensate  Current treatment: Iodosorb impregnated 1 inch strip gauze    2/10/20 Left 2nd toe:  Tip of toe around the nail is dry black and intact (see above) This is stable this visit and over all dark area measures 1.5x1cm.    #2 -  Left Achilles    0.5x0.3, dry tan scab.  Periwound intact, no signs of infection      #3 Multiple areas over lateral 5th met head and 4th toe    All areas of black have resolved and sloughed off - skin is intact now.          Right 2nd toe - tip:  S/p amputation of this toe on 3/3, site not assessed this visit      Mobility: presents in wheelchair with cane, comments he knows he is supposed to stay off foot and he is trying to follow orders  Current offloading/footwear: has DH walker left, post-op shoe right   Sensation: states has neuropathy but can feel touch  HgbA1C: 8.1 Date: 12 '19  Checks Blood Glucose?:  Average Readings:       Pedal Pulse, palpable: no doppler: monophasic, poor sounding quality as is PT  Hair growth: noted legs, not feet  Capillary Refill: <3 seconds  Feet/toes color:  pink, warm  Edema: some foot trace    US RENATE DOPPLER WITH EXERCISE BILATERAL   2/5/2020 4:05 PM      HISTORY: Type 2 diabetes mellitus with left diabetic foot ulcer (H).  Osteomyelitis of great toe of left foot (H). Dehiscence of operative  wound, initial encounter. Type 2 diabetes mellitus with peripheral  vascular disease (H).     COMPARISON: 11/27/2018     FINDINGS:  Right RENATE:   DP: 0.81  PT: 0.64.     Left RENATE:   DP: 0.61   PT: 0.47.     Waveforms: Monophasic bilaterally     Exercise: Patient exercised by doing foot pumps for 5 minutes.     Right exercise RENATE: 0.71, previously 0.98.  Left exercise RENATE: 0.52, previously 0.94.                                                                      IMPRESSION:  1. Right resting and exercise ABIs show moderate arterial  insufficiency. Resting and exercise ABIs are decreased when compared  to the prior study.  2. Left resting and exercise ABIs show moderate arterial  insufficiency, resting and exercise ABIs are decreased when compared  to the prior study.     RENATE CRITERIA:  >0.95 Normal  0.90 - 0.94 Mild  0.5 - 0.89 Moderate  0.2 - 0.49 Severe  <0.2 Critical     BIANCA BAIN DO  Diet: states he does not eat much    Smoking: no      Assessment:   Dehisced surgical wound - now appears to be granulating and measures smaller s/p revascularization      S/p amputation of right 2nd toe    Stable area of eschar of  left 2nd toe      Possible suboptimal nutrution    PAD    History of non-compliance with offloading recommendations of heel only weight bearing          Plan:  Will use 1/2 inch Iodoform packing with Mepilex transfer over to help hold in place, 1/2 5x9 abd to cover secured with rolled gauze.  See plan below for steps.      Keep left 2nd toe dry, did apply some Iodosorb to site to keep dry.    Right foot surgical bandages in place to be assessed Monday by Dr. Mnotesinos.     Continue with offloading shoe (DH walker) and limited weight bearing    .      Homecare  contacted.  Talked with chester Beth RN regardeing plan.      NO pressure wraps, (no ACE, no coban) left foot.      Previously discussed heel only weight bearing.  Pt has been advised this is very difficult to do adequately while still walking.  Encouraged him to consider a knee scooter, he is now willing to look into this.  He does have a walker but finds it easier to do with a cane, which he has with today.  He does use a wheelchair to and from clinic.    Previously discussed importance of good nutrition including protein and vitamins needed for healing at prior visit.      Pt instructions reviewed:    Every other day wound care for left foot wound:  1.) Remove old packing strip and cleanse in depth of wound wound with sterile cotton tipped applicator and gauze, clean surrounding tissue with gauze and wound   2.) Use wooden end of sterile cotton tipped applicator to tuck 1/2 inch Iodoform gauze into wound taking care to fill in all directions, (but not too tightly) and fill to skin level  3.) Cover wound with a piece of Mepilex Transfer to protect periwound skin and help retain packing strip  4.) Cover with 1/2 of a 5x9 abd pad  5.) Apply Iodosorb to end of second to and apply a piece of gauze over to cover  6.) Secure all with rolled gauze      Sent some supplies with pt and will order more.  Anticipate non-coverage of Mepilex transfer but can get at least 6 changes from one sheet so will send with pt until next visit.    Follow-up:  3/19 with [facility] nurse and Dr. Yamel Calzada RN, CWOCN   [phone]

## 2020-03-09 ENCOUNTER — ANCILLARY PROCEDURE (OUTPATIENT)
Dept: GENERAL RADIOLOGY | Facility: CLINIC | Age: 85
End: 2020-03-09
Attending: PODIATRIST
Payer: COMMERCIAL

## 2020-03-09 ENCOUNTER — OFFICE VISIT (OUTPATIENT)
Dept: PODIATRY | Facility: CLINIC | Age: 85
End: 2020-03-09
Payer: COMMERCIAL

## 2020-03-09 ENCOUNTER — HOSPITAL ENCOUNTER (OUTPATIENT)
Dept: CARDIOLOGY | Facility: CLINIC | Age: 85
Discharge: HOME | End: 2020-03-09
Attending: FAMILY MEDICINE | Admitting: FAMILY MEDICINE
Payer: COMMERCIAL

## 2020-03-09 VITALS
BODY MASS INDEX: 29.69 KG/M2 | HEART RATE: 42 BPM | HEIGHT: 73 IN | SYSTOLIC BLOOD PRESSURE: 110 MMHG | DIASTOLIC BLOOD PRESSURE: 56 MMHG | WEIGHT: 224 LBS

## 2020-03-09 DIAGNOSIS — Z98.890 S/P FOOT SURGERY, RIGHT: Primary | ICD-10-CM

## 2020-03-09 DIAGNOSIS — M86.9 OSTEOMYELITIS OF SECOND TOE OF RIGHT FOOT (H): ICD-10-CM

## 2020-03-09 DIAGNOSIS — I49.9 IRREGULAR HEART BEATS: ICD-10-CM

## 2020-03-09 DIAGNOSIS — Z98.890 S/P FOOT SURGERY, RIGHT: ICD-10-CM

## 2020-03-09 PROCEDURE — 99024 POSTOP FOLLOW-UP VISIT: CPT | Performed by: PODIATRIST

## 2020-03-09 PROCEDURE — 0298T ZIO PATCH HOLTER ADULT PEDIATRIC GREATER THAN 48 HRS: CPT | Performed by: FAMILY MEDICINE

## 2020-03-09 PROCEDURE — 73630 X-RAY EXAM OF FOOT: CPT | Mod: RT

## 2020-03-09 PROCEDURE — 0296T ZIO PATCH HOLTER ADULT PEDIATRIC GREATER THAN 48 HRS: CPT

## 2020-03-09 RX ORDER — SULFAMETHOXAZOLE/TRIMETHOPRIM 800-160 MG
1 TABLET ORAL 2 TIMES DAILY
Qty: 28 TABLET | Refills: 0 | Status: SHIPPED | OUTPATIENT
Start: 2020-03-09 | End: 2020-03-20

## 2020-03-09 ASSESSMENT — MIFFLIN-ST. JEOR: SCORE: 1764.94

## 2020-03-09 NOTE — NURSING NOTE
"Chief Complaint   Patient presents with     Surgical Followup     DOS 03/03/20, XR Today, Partial amputation of the second toe, right foot.        Initial /56   Pulse (!) 42   Ht 1.854 m (6' 1\")   Wt 101.6 kg (224 lb)   BMI 29.55 kg/m   Estimated body mass index is 29.55 kg/m  as calculated from the following:    Height as of this encounter: 1.854 m (6' 1\").    Weight as of this encounter: 101.6 kg (224 lb).  Medications and allergies reviewed.      Dana GUILLERMO MA    "

## 2020-03-09 NOTE — LETTER
"    3/9/2020         RE: Fawad Garcia  [address] [facility] [address]        Dear Colleague,    Thank you for referring your patient, Fawad Garcia, to the [facility]. Please see a copy of my visit note below.    Fawad presents to the office s/p one week partial ampuation second toe of the right foot .  The patient relates keeping the bandages clean, dry and intact.  The patient relates good compliance with postoperative instructions.  The patient denies any severe pain, fevers, chills, nausea or vomiting.      /56   Pulse (!) 42   Ht 1.854 m (6' 1\")   Wt 101.6 kg (224 lb)   BMI 29.55 kg/m        Physical Exam:    General: The patient appears to be in no distress and in good spirits.  The bandages appear clean, dry and intact with no strikethrough noted. Vascular exam: Neurovascular status unchanged with < 3 sec capillary refill to all digits.  Positive pedal pulses and epicritic sensations intact with no evidence of allodynia.  One notes moderate edema to the forefoot on the right.  Sutures are intact and the skin is well coapted with no erythema noted.      Radiograph:  AP, lateral and medial oblique evaluation of right foot reveals partial amputation of the second toe on the right.      Cultures:  Bone: Corynebacterium striatum     Antibiotic  Interpretation  Sensitivity  Method  Status    CEFTRIAXONE  Resistant  48.0  ug/mL  E-TEST  Final    CLINDAMYCIN  Resistant  16.0  ug/mL  E-TEST  Final    PENICILLIN  Resistant  4.0  ug/mL  E-TEST  Final    Trimethoprim/Sulfa  Sensitive  1.0  ug/mL  E-TEST  Final    VANCOMYCIN  Sensitive  0.25  ug/mL  E-TEST  Final          Assessment: Osteomyelitis, second toe status post one week partial amputation of the second toe of the right foot.    Plan:  Sutures remain intact.  A sterile dressing was reapplied.  The patient was instructed to continue non-weightbearing to the right foot.  The patient is to keep " the dressings clean, dry and intact and to continue with elevation of the right foot.  The patient will return to the office in one week for suture removal.  The patient was prescribed a 14 day course of Bactrim DS twice daily.    Disclaimer: This note consists of symbols derived from keyboarding, dictation and/or voice recognition software. As a result, there may be errors in the script that have gone undetected. Please consider this when interpreting information found in this chart.       MIMA Vela.P.M., F.A.C.F.A.S.      Again, thank you for allowing me to participate in the care of your patient.        Sincerely,        Ethan Montesinos DPM

## 2020-03-09 NOTE — PROGRESS NOTES
"Fawad presents to the office s/p one week partial ampuation second toe of the right foot .  The patient relates keeping the bandages clean, dry and intact.  The patient relates good compliance with postoperative instructions.  The patient denies any severe pain, fevers, chills, nausea or vomiting.      /56   Pulse (!) 42   Ht 1.854 m (6' 1\")   Wt 101.6 kg (224 lb)   BMI 29.55 kg/m        Physical Exam:    General: The patient appears to be in no distress and in good spirits.  The bandages appear clean, dry and intact with no strikethrough noted. Vascular exam: Neurovascular status unchanged with < 3 sec capillary refill to all digits.  Positive pedal pulses and epicritic sensations intact with no evidence of allodynia.  One notes moderate edema to the forefoot on the right.  Sutures are intact and the skin is well coapted with no erythema noted.      Radiograph:  AP, lateral and medial oblique evaluation of right foot reveals partial amputation of the second toe on the right.      Cultures:  Bone: Corynebacterium striatum     Antibiotic  Interpretation  Sensitivity  Method  Status    CEFTRIAXONE  Resistant  48.0  ug/mL  E-TEST  Final    CLINDAMYCIN  Resistant  16.0  ug/mL  E-TEST  Final    PENICILLIN  Resistant  4.0  ug/mL  E-TEST  Final    Trimethoprim/Sulfa  Sensitive  1.0  ug/mL  E-TEST  Final    VANCOMYCIN  Sensitive  0.25  ug/mL  E-TEST  Final          Assessment: Osteomyelitis, second toe status post one week partial amputation of the second toe of the right foot.    Plan:  Sutures remain intact.  A sterile dressing was reapplied.  The patient was instructed to continue non-weightbearing to the right foot.  The patient is to keep the dressings clean, dry and intact and to continue with elevation of the right foot.  The patient will return to the office in one week for suture removal.  The patient was prescribed a 14 day course of Bactrim DS twice daily.    Disclaimer: This note consists of symbols " derived from keyboarding, dictation and/or voice recognition software. As a result, there may be errors in the script that have gone undetected. Please consider this when interpreting information found in this chart.       LIANG Montesinos D.P.M., FIDEL.F.A.S.

## 2020-03-10 LAB
BACTERIA SPEC CULT: NORMAL
Lab: NORMAL
SPECIMEN SOURCE: NORMAL

## 2020-03-11 ENCOUNTER — TELEPHONE (OUTPATIENT)
Dept: FAMILY MEDICINE | Facility: CLINIC | Age: 85
End: 2020-03-11

## 2020-03-11 NOTE — TELEPHONE ENCOUNTER
Physician Orders    Signed, faxed and sent to [facility].  Jazzmine Camacho on 3/11/2020 at 9:02 AM

## 2020-03-16 DIAGNOSIS — C61 PROSTATE CANCER (H): Primary | ICD-10-CM

## 2020-03-16 DIAGNOSIS — C61 PROSTATE CANCER (H): ICD-10-CM

## 2020-03-18 ENCOUNTER — TELEPHONE (OUTPATIENT)
Dept: PODIATRY | Facility: CLINIC | Age: 85
End: 2020-03-18

## 2020-03-18 NOTE — TELEPHONE ENCOUNTER
Reason for Call:  Other call back    Detailed comments: pt calling stating he has been taking the Bactrim since Sunday. He has been experiencing diarrhea since sunday and low BP as well. Wondering if the bactrim is causing this     Phone Number Patient can be reached at: Home number on file [phone] (home)    Best Time: any     Can we leave a detailed message on this number? YES    Call taken on 3/18/2020 at 10:35 AM by Jessi Tavarez

## 2020-03-18 NOTE — PROGRESS NOTES
Department of Radiation Oncology  Radiation Therapy Center  [facility] [place] [address]  [phone]       Radiation Oncology Follow-up Visit  2020    Fawad Garcia  MRN: [id number]   : 1935     DISEASE TREATED: High-risk prostate cancer. Pretreatment PSA 25.5, Cushing score of 4+3=7, clinical stage [id number].     RADIATION THERAPY GIVEN: 8041 cGy in 43 planned treatments, via IMRT     INTERVAL SINCE COMPLETION OF THERAPY: 8 years since completion on 02/10/2012.     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 He underwent biopsies in  and  and pathology was negative for malignancy. Due to the persistently rising PSA Dr. Mcintyre performed an MRI-guided biopsy of the prostate gland which revealed Chaka 4+3= 7 disease in 2 of 2 cores with 90% of the cores being positive in the right inferior base and right inferior mid gland. Androgen ablation was discussed with the patient due to his high-risk disease and the patient declined due to comorbidities of the drug. Overall, he completed radiotherapy with very little toxicity.     SUBJECTIVE:   Fawad Garcia is a 84 year old male who is scheduled today for routine follow-up.      Today, he denies any pressing issues or complaints and reports that all symptoms have essentially remained stable since his last visit with us 6 months ago. AUA and MARINA not completed today (TC). Prior  AUA 18/35 (patient reported overall stable) and MARINA was 0/25. Main urinary symptoms continue to be nocturia and urgency. Patient states symptoms are worse with increased intake of fluids. Denies drinking alcohol, and has minimal caffeine. He denies any dysuria, hematuria, hematochezia, diarrhea, or loose stools.  He denies any pain or swelling.     Energy level is good and baseline.      Recent foot surgery 12/24/19 /partial amputation second toe of the right foot for osteomyelitis. Continues to follow-up with podiatry. Had catheter placed after surgery due to inability to void.  Urology seen 1/2/2020 for followup for urinary retention post foot surgery: discontinued catheter after voiding trial and started on flomax. Patient states took Flomax for 2 days but then stopped the medication due to side effects (dizziness and lightheadedness).     States currently wearing a ziopatch monitor: irregular heartbeat (noted at home health visit). Following up with cardiology.       ROS otherwise negative on a 12-system review.         Current Outpatient Medications   Medication     amLODIPine (NORVASC) 5 MG tablet     Blood Glucose Monitoring Suppl (ONE TOUCH ULTRA SYSTEM KIT) W/DEVICE KIT     clopidogrel (PLAVIX) 75 MG tablet     dorzolamide-timolol (COSOPT) 2-0.5 % ophthalmic solution     hydrochlorothiazide (HYDRODIURIL) 25 MG tablet     insulin glargine (LANTUS SOLOSTAR) 100 UNIT/ML pen     insulin pen needle (B-D U/F) 31G X 8 MM miscellaneous     levofloxacin (LEVAQUIN) 500 MG tablet     lisinopril (PRINIVIL/ZESTRIL) 40 MG tablet     metFORMIN (GLUCOPHAGE) 500 MG tablet     Multiple Vitamins-Minerals (PRESERVISION AREDS PO)     ONETOUCH LANCETS [facility]     ONETOUCH ULTRA test strip     order for DME     ORDER FOR DME     pantoprazole (PROTONIX) 20 MG EC tablet     rosuvastatin (CRESTOR) 10 MG tablet     sulfamethoxazole-trimethoprim (BACTRIM DS) 800-160 MG tablet     No current facility-administered medications for this visit.           Allergies   Allergen Reactions     Zocor [Simvastatin - High Dose]      Bilateral hip aching       Past Medical History:   Diagnosis Date     Diabetes mellitus (H)      Hypertension      Squamous cell carcinoma          PHYSICAL EXAM:  There were no vitals taken for this visit.  Telephone visit    LABS AND IMAGING:  Reviewed.  PSA Latest Ref Rng & Units 11/4/2002  3/24/2004 2/22/2005 3/24/2006   PSA 0 - 4 ug/L 6.22 (H) 8.93 (H) 11.96 (H) 11.09 (H)     PSA Latest Ref Rng & Units 10/6/2006 2/6/2007 7/18/2007 11/21/2007   PSA 0 - 4 ug/L 15.10 (H) 14.40 (H) 12.20 (H) 15.20 (H)     PSA Latest Ref Rng & Units 4/16/2008 10/22/2008 4/10/2009 9/3/2009   PSA 0 - 4 ug/L 14.10 (H) 16.10 (H) 19.10 (H) 20.10 (H)     PSA Latest Ref Rng & Units 5/12/2010 9/10/2010 1/31/2011 4/15/2011   PSA 0 - 4 ug/L 20.10 (H) 20.10 (H) 22.70 (H) 22.40 (H)     PSA Latest Ref Rng & Units 7/6/2011 2/23/2012 3/12/2012 7/12/2012   PSA 0 - 4 ug/L 25.50 (H) 3.50 3.24 1.35     PSA Latest Ref Rng & Units 12/13/2012 5/22/2013 12/12/2013 4/17/2014   PSA 0 - 4 ug/L 0.52 0.35 0.31 0.24     PSA Latest Ref Rng & Units 6/11/2014 6/12/2015 12/4/2015 6/7/2016   PSA 0 - 4 ug/L 0.24 0.33 0.43 0.48     PSA Latest Ref Rng & Units 3/13/2017 3/9/2018 3/15/2019 9/12/2019   PSA 0 - 4 ug/L 0.52 0.79 1.10 1.08     PSA Latest Ref Rng & Units 3/2/2020   PSA 0 - 4 ug/L 1.23         IMPRESSION:   Mr. Garcia is a 84 year old male with a high-risk prostate cancer. Pretreatment PSA 25.5, [place] score of 4+3=7, clinical stage [id number]. Complete RT 2/10/2012 8041 cGy in 43 planned treatments, via IMRT.     PSA 3/2/2020 at 1.23, increased from last PSA on 9/12/19 which was 1.08, and prior value of 1.10. Has not met [place] definition of failure from treatment (Zohaib = 0.24).    PLAN:     1. PSA increased from last PSA but does not yet met [place] definition of biochemical failure (2.0 above zohaib with zohaib of 0.24). Biochemical failure would be 2.24. Will continue to closely monitor at this time.      2. PSA in 6 months. RTC thereafter.     3. Discussed bladder irritants. To continue to avoid. Also discussed decreasing intake of fluids in the evenings.      4. Continue to see primary care provider for general health maintenance (HTN, HLD, diabetes) .        Anupama Hope NP  [facility] Radiation Oncology  [facility]    CC:  Patient Care Team:  Chris Scott MD as PCP - General (Family Practice)  Stewart Mcintyre MD as MD (Urology)  Chris Scott MD as Assigned PCP  Kiearn Hua MD as MD (Radiation Oncology)

## 2020-03-19 ENCOUNTER — HOSPITAL ENCOUNTER (OUTPATIENT)
Dept: WOUND CARE | Facility: CLINIC | Age: 85
Discharge: HOME | End: 2020-03-19
Attending: PODIATRIST | Admitting: PODIATRIST
Payer: COMMERCIAL

## 2020-03-19 ENCOUNTER — TELEPHONE (OUTPATIENT)
Dept: FAMILY MEDICINE | Facility: CLINIC | Age: 85
End: 2020-03-19

## 2020-03-19 ENCOUNTER — OFFICE VISIT (OUTPATIENT)
Dept: PODIATRY | Facility: CLINIC | Age: 85
End: 2020-03-19
Payer: COMMERCIAL

## 2020-03-19 ENCOUNTER — MEDICAL CORRESPONDENCE (OUTPATIENT)
Dept: HEALTH INFORMATION MANAGEMENT | Facility: CLINIC | Age: 85
End: 2020-03-19

## 2020-03-19 DIAGNOSIS — T81.31XD DEHISCENCE OF OPERATIVE WOUND, SUBSEQUENT ENCOUNTER: Primary | ICD-10-CM

## 2020-03-19 DIAGNOSIS — M86.9 OSTEOMYELITIS OF GREAT TOE OF LEFT FOOT (H): ICD-10-CM

## 2020-03-19 DIAGNOSIS — Z98.890 S/P FOOT SURGERY, RIGHT: Primary | ICD-10-CM

## 2020-03-19 PROCEDURE — G0463 HOSPITAL OUTPT CLINIC VISIT: HCPCS

## 2020-03-19 PROCEDURE — 99024 POSTOP FOLLOW-UP VISIT: CPT | Performed by: PODIATRIST

## 2020-03-19 NOTE — IP AVS SNAPSHOT
MRN:[id number]                      After Visit Summary   3/19/2020    Fawad Garcia    MRN: [id number]           Visit Information        Provider Department      3/19/2020  3:30 PM Gaurav Mandujano Nurse - [facility]           Review of your medicines      UNREVIEWED medicines. Ask your doctor about these medicines       Dose / Directions   amLODIPine 5 MG tablet  Commonly known as:  NORVASC  Used for:  Hypertension, goal below 140/90      Dose:  5 mg  Take 1 tablet (5 mg) by mouth daily Hold on file until needed.  Quantity:  90 tablet  Refills:  3     clopidogrel 75 MG tablet  Commonly known as:  PLAVIX  Used for:  Critical ischemia of lower extremity      Dose:  75 mg  Take 1 tablet (75 mg) by mouth daily Start taking medication the day after the procedure  Quantity:  90 tablet  Refills:  3     dorzolamide-timolol 2-0.5 % ophthalmic solution  Commonly known as:  COSOPT      Refills:  0     hydrochlorothiazide 25 MG tablet  Commonly known as:  HYDRODIURIL  Used for:  Hypertension, goal below 140/90      Dose:  25 mg  Take 1 tablet (25 mg) by mouth daily Hold on file until needed  Quantity:  90 tablet  Refills:  3     insulin glargine 100 UNIT/ML pen  Commonly known as:  Lantus SoloStar  Used for:  Type 2 diabetes mellitus with both eyes affected by mild nonproliferative retinopathy without macular edema, with long-term current use of insulin (H)      Dose:  23 Units  Inject 23 Units Subcutaneous At Bedtime Hold on file until needed  Quantity:  24 mL  Refills:  3     levofloxacin 500 MG tablet  Commonly known as:  LEVAQUIN  Used for:  Osteomyelitis of great toe of left foot (H), S/P foot surgery, left, Dehiscence of operative wound, initial encounter      Dose:  500 mg  Take 1 tablet (500 mg) by mouth daily  Quantity:  10 tablet  Refills:  0     lisinopril 40 MG tablet  Commonly known as:  ZESTRIL  Used for:  BENIGN HYPERTENSION      Dose:  40 mg  Take 1 tablet (40 mg) by mouth  daily Hold on file until needed  Quantity:  90 tablet  Refills:  3     metFORMIN 500 MG tablet  Commonly known as:  GLUCOPHAGE  Used for:  Type 2 diabetes mellitus without complication, with long-term current use of insulin (H)      Dose:  1,000 mg  Take 2 tablets (1,000 mg) by mouth 2 times daily (with meals) Hold on file until needed  Quantity:  360 tablet  Refills:  3     pantoprazole 20 MG EC tablet  Commonly known as:  PROTONIX  Used for:  Gastroesophageal reflux disease without esophagitis      Dose:  20 mg  Take 1 tablet (20 mg) by mouth daily  Quantity:  90 tablet  Refills:  3     PRESERVISION AREDS PO      Dose:  1 tablet  Take 1 tablet by mouth  Refills:  0     rosuvastatin 10 MG tablet  Commonly known as:  CRESTOR  Used for:  Hyperlipidemia LDL goal <70      Dose:  10 mg  Take 1 tablet (10 mg) by mouth At Bedtime  Quantity:  90 tablet  Refills:  3     sulfamethoxazole-trimethoprim 800-160 MG tablet  Commonly known as:  BACTRIM DS  Used for:  S/P foot surgery, right, Osteomyelitis of second toe of right foot (H)      Dose:  1 tablet  Take 1 tablet by mouth 2 times daily for 14 days  Quantity:  28 tablet  Refills:  0        CONTINUE these medicines which have NOT CHANGED       Dose / Directions   insulin pen needle 31G X 8 MM miscellaneous  Commonly known as:  B-D U/F  Used for:  Type 2 diabetes mellitus without complication, with long-term current use of insulin (H)      Dose:  1 Device  1 Device daily Use once daily or as directed.  Quantity:  100 each  Refills:  3     OneTouch Lancets Misc  Used for:  Type 2 diabetes mellitus with diabetic peripheral angiopathy without gangrene (H)      Dose:  1 Device  1 Device daily One touch delica lancets, what ever is covered by insurance.  Quantity:  100 each  Refills:  5     ONETOUCH ULTRA SYSTEM KIT w/Device Kit      Refills:  0     ONETOUCH ULTRA test strip  Used for:  Type 2 diabetes mellitus without complication, with long-term current use of insulin  (H)  Generic drug:  blood glucose      1 strip by In Vitro route daily One touch Ultra.  Quantity:  100 strip  Refills:  1     order for DME  Used for:  Type 2 diabetes, HbA1c goal < 7% (H)      Equipment being ordered:   Glucometer  Quantity:  1 Device  Refills:  1     order for DME  Used for:  Osteomyelitis of great toe of left foot (H), S/P foot surgery, left, Dehiscence of operative wound, initial encounter, Type 2 diabetes mellitus with peripheral vascular disease (H)      Knee Walker  Length of use: Three months    The patient was prescribed a knee walker. The patient is unsteady utilizing crutches, quad walker or a cane.  The knee walker will allow the patient to ambulate safely without the use of the operative foot and reduce risk of falls.  Quantity:  1 Units  Refills:  0              Protect others around you: Learn how to safely use, store and throw away your medicines at www.disposemymeds.org.       Follow-ups after your visit       Your next 10 appointments already scheduled    Mar 20, 2020  2:00 PM CDT  Telephone Visit with CORINA Samayoa CNP  [facility] ([facility]) [address], [address]  [facility] [place] [facility] [place] [address]  [phone]   Note: this is not an onsite visit; there is no need to come to the facility.     Apr 14, 2020 10:00 AM CDT  US LOWER EXTREMITY ARTERIAL DUPLEX LEFT with MIYA  [facility] ([facility]) [address] [address] [phone]   How do I prepare for my exam? (Food and drink instructions)  No Food and Drink Restrictions.    How do I prepare for my exam? (Other instructions)  You do not need to do anything special to prepare for your exam.    What should I wear: Wear comfortable clothes.    How long does the exam take: Most ultrasounds take 30 to 60 minutes.    What should I bring: Bring a list of your medicines, including vitamins, minerals and over-the-counter drugs. It is safest to leave  "personal items at home.    Do I need a :  No  is needed.    What do I need to tell my doctor: Tell your doctor about any allergies you may have.    What should I do after the exam: No restrictions, You may resume normal activities.    What is this test: An ultrasound uses sound waves to make pictures of the body. Sound waves do not cause pain. The only discomfort may be the pressure of the wand against your skin or full bladder.    Who should I call with questions: If you have any questions, please call the Imaging Department where you will have your exam. Directions, parking instructions, and other information is available on our website, [email or web address].     Apr 14, 2020 10:45 AM CDT  Return Visit with Ernie Cali MD  [facility] ([facility]) [address] [address]  [phone]      Apr 20, 2020  3:30 PM CDT  Return Visit with Ethan Montesinos DPM  [facility] ([facility]) [address]  [address]  [place] [address]  [phone]      Apr 20, 2020  3:45 PM CDT  Office Visit with Et Nurse - [facility] W[facility] Ostomy ([facility]) [address] [address]  [phone]   Bring a current list of meds and any records pertaining to this visit.  For Physicals, please bring immunization records and any forms needing to be filled out. Please arrive 10 minutes early to complete paperwork.        Care Instructions       Further instructions from your care team       Left foot wound:    Pack open area with 1/4\" iodosorb gauze.  The wound tunnels under the skin at 6 oclock so be sure to get the packing into that area.    Cover with foam adhesive   Change every other day I.e. MWF.        Right foot 2nd toe amp site.    Sutures were removed.  Keep covered with dry gauze and watch for any drainage or opening of the incision.  Change gauze MWF     Limit " walking to keep pressure off incisions and wound.     RTC in 1 month or sooner if there are problems.     Emma Richmond RN CWON      Additional Information About Your Visit       [employer] Information    [employer] gives you secure access to your electronic health record. If you see a primary care provider, you can also send messages to your care team and make appointments. If you have questions, please call your primary care clinic.  If you do not have a primary care provider, please call [phone] and they will assist you.       Care EveryWhere ID    This is your Care EveryWhere ID. This could be used by other organizations to access your [facility] medical records  [id number]        Primary Care Provider Office Phone # Fax #    Chris Scott [phone]      Equal Access to Services    JACKY DELACRUZ : Hadii hollie perezo Soomaali, waaxda luqadaha, qaybta kaalmada adeegyada, bradford whipple. So [facility] [phone].    ATENCIÓN: Si habla español, tiene a aaron disposición servicios gratuitos de asistencia lingüística. Llame al [phone].    We comply with applicable federal and state civil rights laws, including the [place] Human Rights Act. We do not discriminate on the basis of race, color, creed, Rastafari, national origin, marital status, age, disability, sex, sexual orientation, or gender identity.       Thank you!    Thank you for choosing [facility] for your care. Our goal is always to provide you with excellent care. Hearing back from our patients is one way we can continue to improve our services. Please take a few minutes to complete the written survey that you may receive in the mail after you visit with us. Thank you!            Medication List      Medications          Morning Afternoon Evening Bedtime As Needed    insulin pen needle 31G X 8 MM miscellaneous  Also known as:  B-D U/F  INSTRUCTIONS:  1 Device daily Use once daily or as directed.                      OneTouch Lancets Misc  INSTRUCTIONS:  1 Device daily One touch delica lancets, what ever is covered by insurance.                     ONETOUCH ULTRA SYSTEM KIT w/Device Kit                     ONETOUCH ULTRA test strip  INSTRUCTIONS:  1 strip by In Vitro route daily One touch Ultra.  Generic drug:  blood glucose                     order for DME  INSTRUCTIONS:  Equipment being ordered:   Glucometer                     order for DME  INSTRUCTIONS:  Knee Walker  Length of use: Three months    The patient was prescribed a knee walker. The patient is unsteady utilizing crutches, quad walker or a cane.  The knee walker will allow the patient to ambulate safely without the use of the operative foot and reduce risk of falls.                       ASK your doctor about these medications          Morning Afternoon Evening Bedtime As Needed    amLODIPine 5 MG tablet  Also known as:  NORVASC  INSTRUCTIONS:  Take 1 tablet (5 mg) by mouth daily Hold on file until needed.                     clopidogrel 75 MG tablet  Also known as:  PLAVIX  INSTRUCTIONS:  Take 1 tablet (75 mg) by mouth daily Start taking medication the day after the procedure  Doctor's comments:  TAKE FOR ONE YEAR AND THEN STOP                     dorzolamide-timolol 2-0.5 % ophthalmic solution  Also known as:  COSOPT                     hydrochlorothiazide 25 MG tablet  Also known as:  HYDRODIURIL  INSTRUCTIONS:  Take 1 tablet (25 mg) by mouth daily Hold on file until needed                     insulin glargine 100 UNIT/ML pen  Also known as:  Lantus SoloStar  INSTRUCTIONS:  Inject 23 Units Subcutaneous At Bedtime Hold on file until needed  Doctor's comments:  If Lantus is not covered by insurance, may substitute Basaglar at same dose and frequency.                       levofloxacin 500 MG tablet  Also known as:  LEVAQUIN  INSTRUCTIONS:  Take 1 tablet (500 mg) by mouth daily                     lisinopril 40 MG tablet  Also known as:   ZESTRIL  INSTRUCTIONS:  Take 1 tablet (40 mg) by mouth daily Hold on file until needed                     metFORMIN 500 MG tablet  Also known as:  GLUCOPHAGE  INSTRUCTIONS:  Take 2 tablets (1,000 mg) by mouth 2 times daily (with meals) Hold on file until needed                     pantoprazole 20 MG EC tablet  Also known as:  PROTONIX  INSTRUCTIONS:  Take 1 tablet (20 mg) by mouth daily                     PRESERVISION AREDS PO  INSTRUCTIONS:  Take 1 tablet by mouth                     rosuvastatin 10 MG tablet  Also known as:  CRESTOR  INSTRUCTIONS:  Take 1 tablet (10 mg) by mouth At Bedtime                     sulfamethoxazole-trimethoprim 800-160 MG tablet  Also known as:  BACTRIM DS  INSTRUCTIONS:  Take 1 tablet by mouth 2 times daily for 14 days

## 2020-03-19 NOTE — IP AVS SNAPSHOT
[facility]ound [facility]  [address] [address]  Phone:  [phone]  Fax:  [phone]                                    After Visit Summary   3/19/2020    Fawad Garcia    MRN: [id number]           After Visit Summary Signature Page    I have received my discharge instructions, and my questions have been answered. I have discussed any challenges I see with this plan with the nurse or doctor.    ..........................................................................................................................................  Patient/Patient Representative Signature      ..........................................................................................................................................  Patient Representative Print Name and Relationship to Patient    ..................................................               ................................................  Date                                   Time    ..........................................................................................................................................  Reviewed by Signature/Title    ...................................................              ..............................................  Date                                               Time          22EPIC Rev 08/18

## 2020-03-19 NOTE — DISCHARGE INSTRUCTIONS
"Left foot wound:    Pack open area with 1/4\" iodosorb gauze.  The wound tunnels under the skin at 6 oclock so be sure to get the packing into that area.    Cover with foam adhesive   Change every other day I.e. MWF.        Right foot 2nd toe amp site.    Sutures were removed.  Keep covered with dry gauze and watch for any drainage or opening of the incision.  Change gauze MWF     Limit walking to keep pressure off incisions and wound.     RTC in 1 month or sooner if there are problems.     Emma Richmond RN CWON    "

## 2020-03-19 NOTE — LETTER
3/19/2020         RE: Fawad Garcia  [address]  [place] [address]        Dear Colleague,    Thank you for referring your patient, Fawad Garcia, to the [facility]. Please see a copy of my visit note below.    Fawad presents to the office s/p two weeks partial ampuation second toe of the right foot .  The patient relates keeping the bandages clean, dry and intact.  The patient relates good compliance with postoperative instructions.  The patient denies any severe pain, fevers, chills, nausea or vomiting.      There were no vitals taken for this visit.      Physical Exam:    General: The patient appears to be in no distress and in good spirits.  The bandages appear clean, dry and intact with no strikethrough noted. Vascular exam: Neurovascular status unchanged with < 3 sec capillary refill to all digits.  Positive pedal pulses and epicritic sensations intact with no evidence of allodynia.  One notes moderate edema to the forefoot on the right.  Sutures are intact and the skin is well coapted with no erythema noted.      Radiograph:  AP, lateral and medial oblique evaluation of right foot reveals partial amputation of the second toe on the right.      Cultures:  Bone: Corynebacterium striatum     Antibiotic  Interpretation  Sensitivity  Method  Status    CEFTRIAXONE  Resistant  48.0  ug/mL  E-TEST  Final    CLINDAMYCIN  Resistant  16.0  ug/mL  E-TEST  Final    PENICILLIN  Resistant  4.0  ug/mL  E-TEST  Final    Trimethoprim/Sulfa  Sensitive  1.0  ug/mL  E-TEST  Final    VANCOMYCIN  Sensitive  0.25  ug/mL  E-TEST  Final          Assessment: Osteomyelitis, second toe status post one week partial amputation of the second toe of the right foot.    Plan:  Sutures were removed.  A sterile dressing was reapplied.  The patient was instructed to continue protected-weightbearing to the right foot.  The patient is to keep the dressings clean, dry and intact and to  continue with elevation of the right foot.  The patient will return to the office in one month for reevaluation.    Disclaimer: This note consists of symbols derived from keyboarding, dictation and/or voice recognition software. As a result, there may be errors in the script that have gone undetected. Please consider this when interpreting information found in this chart.       LIANG Montesinos D.P.M., F.[email or web address].[email or web address].S.      Again, thank you for allowing me to participate in the care of your patient.        Sincerely,        Ethan Montesinos DPM

## 2020-03-20 ENCOUNTER — VIRTUAL VISIT (OUTPATIENT)
Dept: RADIATION THERAPY | Facility: OUTPATIENT CENTER | Age: 85
End: 2020-03-20
Payer: COMMERCIAL

## 2020-03-20 DIAGNOSIS — C61 MALIGNANT NEOPLASM OF PROSTATE (H): Primary | ICD-10-CM

## 2020-03-23 ENCOUNTER — TELEPHONE (OUTPATIENT)
Dept: FAMILY MEDICINE | Facility: CLINIC | Age: 85
End: 2020-03-23

## 2020-03-23 ENCOUNTER — MEDICAL CORRESPONDENCE (OUTPATIENT)
Dept: HEALTH INFORMATION MANAGEMENT | Facility: CLINIC | Age: 85
End: 2020-03-23

## 2020-03-23 NOTE — PROGRESS NOTES
"Fawad Garcia is a 84 year old male who is being evaluated via a billable telephone visit.      The patient has been notified of following:     \"This telephone visit will be conducted via a call between you and your physician/provider. We have found that certain health care needs can be provided without the need for a physical exam.  This service lets us provide the care you need with a short phone conversation.  If a prescription is necessary we can send it directly to your pharmacy.  If lab work is needed we can place an order for that and you can then stop by our lab to have the test done at a later time.    If during the course of the call the physician/provider feels a telephone visit is not appropriate, you will not be charged for this service.\"     Fawad Garcia complains of    Chief Complaint   Patient presents with     Radiation Therapy       I have reviewed and updated the patient's Past Medical History, Social History, Family History and Medication List.    ALLERGIES  Zocor [simvastatin - high dose]     FOLLOW-UP VISIT    Patient Name: Fawad Garcia      : 1935     Age: 84 year old        ______________________________________________________________________________     Chief Complaint   Patient presents with     Radiation Therapy     There were no vitals taken for this visit.     Date Radiation Completed: 2/10/2012    Pain  Denies    Meds  Current Med List Reviewed: Yes  Medication Note:     AUA:  -unable to check over the phone  MARINA:   \"\"    PSA   Date Value Ref Range Status   2020 1.23 0 - 4 ug/L Final     Comment:     Assay Method:  Chemiluminescence using Siemens Vista analyzer   2019 1.08 0 - 4 ug/L Final     Comment:     Assay Method:  Chemiluminescence using Siemens Vista analyzer   03/15/2019 1.10 0 - 4 ug/L Final     Comment:     Assay Method:  Chemiluminescence using Siemens Vista analyzer   2018 0.79 0 - 4 ug/L Final     Comment:     Assay Method:  " Chemiluminescence using Siemens Vista analyzer   03/13/2017 0.52 0 - 4 ug/L Final     Comment:     Assay Method:  Chemiluminescence using Siemens Vista analyzer   06/07/2016 0.48 0 - 4 ug/L Final       Bowel: Normal    Bladder: negative  Nocturia: 1 - Once every 8 hours    Energy Level: normal    Appointments:   Urologist:       Other Notes:         Phone call duration:  8 minutes RN time    Angela GUEVARA RN

## 2020-03-23 NOTE — PROGRESS NOTES
Fawad presents to the office s/p two weeks partial ampuation second toe of the right foot .  The patient relates keeping the bandages clean, dry and intact.  The patient relates good compliance with postoperative instructions.  The patient denies any severe pain, fevers, chills, nausea or vomiting.      There were no vitals taken for this visit.      Physical Exam:    General: The patient appears to be in no distress and in good spirits.  The bandages appear clean, dry and intact with no strikethrough noted. Vascular exam: Neurovascular status unchanged with < 3 sec capillary refill to all digits.  Positive pedal pulses and epicritic sensations intact with no evidence of allodynia.  One notes moderate edema to the forefoot on the right.  Sutures are intact and the skin is well coapted with no erythema noted.      Radiograph:  AP, lateral and medial oblique evaluation of right foot reveals partial amputation of the second toe on the right.      Cultures:  Bone: Corynebacterium striatum     Antibiotic  Interpretation  Sensitivity  Method  Status    CEFTRIAXONE  Resistant  48.0  ug/mL  E-TEST  Final    CLINDAMYCIN  Resistant  16.0  ug/mL  E-TEST  Final    PENICILLIN  Resistant  4.0  ug/mL  E-TEST  Final    Trimethoprim/Sulfa  Sensitive  1.0  ug/mL  E-TEST  Final    VANCOMYCIN  Sensitive  0.25  ug/mL  E-TEST  Final          Assessment: Osteomyelitis, second toe status post one week partial amputation of the second toe of the right foot.    Plan:  Sutures were removed.  A sterile dressing was reapplied.  The patient was instructed to continue protected-weightbearing to the right foot.  The patient is to keep the dressings clean, dry and intact and to continue with elevation of the right foot.  The patient will return to the office in one month for reevaluation.    Disclaimer: This note consists of symbols derived from keyboarding, dictation and/or voice recognition software. As a result, there may be errors in the  script that have gone undetected. Please consider this when interpreting information found in this chart.       LIANG Montesinos D.P.M., [email or web address].

## 2020-03-25 ENCOUNTER — MEDICAL CORRESPONDENCE (OUTPATIENT)
Dept: HEALTH INFORMATION MANAGEMENT | Facility: CLINIC | Age: 85
End: 2020-03-25

## 2020-03-25 ENCOUNTER — TELEPHONE (OUTPATIENT)
Dept: FAMILY MEDICINE | Facility: CLINIC | Age: 85
End: 2020-03-25

## 2020-03-27 NOTE — PROGRESS NOTES
Reason For Visit: Fawad Garcia, 82 year old here for recheck for left foot dehisced surgical wound.  Pt has been referred by Dr. Montesinos, podiatry.  Being seen by [facility] for daily wound care.  This is a covisit with Dr. Montesinos     Subject:  Pt has homecare nursing and the foot is bandaged with dry dressing and roll gauze.        Pertainent Medical/Surgical History:    (Recent) Left 1st toe amputation due to osteomyelitis on 12/24/19.  Had sutures removed on 1/13/20 and relays that soon after the incision dehisced.    On 2/26/20 underwent revascularization with Dr. Cali:  1. Ultrasound-guided right common femoral artery access placement with  selective catheterization of left common iliac, external iliac, common  femoral, superficial femoral, popliteal and peroneal arteries.  2. Left lower extremity arteriogram with runoff.  3. Left distal popliteal artery and proximal peroneal artery balloon  angioplasty using 3.5 mm x 15 cm angioplasty balloon with completion  arteriogram.  4. Left popliteal artery stenting using 4 mm x 24 mm drug eluting  balloon expandable coronary stent with completion arteriogram.  5. Right common femoral artery access site arteriogram and closure  with 6 [place] Angio-Seal device    On 3/3/20 underwent amputation of right second toe for osteomyelitis.         (Prior History) History also significant for PAD, had angiogram of right LE Dec '18 and though was disease noted was determined to give it more time for healing as next step would be a popliteal to posterior tibial artery bypass.  He was healing at subsequent follow-up in Feb of '19 so has not had vascular follow-up since.  Last RENATE in Nov '18 was 0.8pre and 0.91post exercise on left but waveforms were monophasic, did not have arteriogram on left.  DM II with HgbA1C of 8.1.      Since last seen by [facility] nurse in Oct '18 has had right 1st toe partial amputation in June '19 and left 5th toe met head resection on April '19.  Does see   Yamel regularly.    Personal/social history: Lives with spouse who will not help with wound cares.  He is a retired pharmacist.  Son will help some with bandage change but pt didn't think would do packing    Assessment:    Wound #1: Left 1st toe amputation site        2/10/20    2/19/20 No photo    Dehisced Left great toe incision:  Measures: 1.5cm x 0.5cm x 0.3cm deep (previously, 1.8cm L x 0.7cm W x 2.3cm D)  Tunneling: no  Undermining: distally 2.0cm   Wound bed type/amount:  Appears to be granulating well where visible  Wound Edges: undermining as noted  Periwound: intact  Drainage amount/type: serosanguinous with some strike through on gauze.  Odor: no  Stage/tissue depth: full thickness  Pain: insensate  Current treatment: Iodosorb impregnated 1 inch strip gauze    #2 Left 2nd toe:  Tip of toe around the nail is dry black and intact (see above) This is stable this visit     #3 -  Left Achilles  0.5x0.3, dry tan well adherent scab.  Periwound intact, no signs of infection      #4  Right 2nd toe - tip:  S/p amputation of this toe on 3/3, stitches removed by Dr. Montesinos.  Well approximated and dry.      Mobility: presents in wheelchair with cane, comments he knows he is supposed to stay off foot and he is trying to follow orders  Current offloading/footwear: has DH walker left, post-op shoe right   Sensation: states has neuropathy but can feel touch  HgbA1C: 8.1 Date: 12 '19  Checks Blood Glucose?:  Average Readings:       Pedal Pulse, palpable: no doppler: monophasic, poor sounding quality as is PT  Hair growth: noted legs, not feet  Capillary Refill: <3 seconds  Feet/toes color: pink, warm  Edema: some foot trace    Pt underwent revascularization of the left LE on 2/26/20 at [facility] - see note in Epic      Smoking: no      Assessment:   Dehisced surgical wound left great toe - now appears to be granulating and measures smaller s/p revascularization      S/p amputation of right 2nd toe - well healed stitches  "removed today    Stable area of eschar of  left 2nd toe      Possible suboptimal nutrution    PAD    History of non-compliance with offloading recommendations of heel only weight bearing    Plan:    Dr. Montesinos removed the stitches today from right 2nd toe amputation.    Left foot wound:    Pack open area with 1/4\" iodosorb gauze.  The wound tunnels under the skin at 6 oclock so be sure to get the packing into that area.    Cover with foam adhesive   Change every other day I.e. MWF.      Right foot 2nd toe amp site.    Sutures were removed.  Keep covered with dry gauze and watch for any drainage or opening of the incision.  Change gauze [facility]     Starter supplies and writtten instructions sent with pt for [facility] DONALD Beth.    NO pressure wraps, (no ACE, no coban) left foot.      Previously discussed heel only weight bearing.  Pt has been advised this is very difficult to do adequately while still walking.  Encouraged him to consider a knee scooter, he is now willing to look into this.  He does have a walker but finds it easier to do with a cane, which he has with today.  He does use a wheelchair to and from clinic.    Previously discussed importance of good nutrition including protein and vitamins needed for healing at prior visit.      Follow-up:  4/14/20 Dr. Cali after ultrasound  4/20/20 Dr. Montesinos and [facility] DONALD Richmond RN CWON    [phone]              "

## 2020-04-02 ENCOUNTER — TELEPHONE (OUTPATIENT)
Dept: FAMILY MEDICINE | Facility: CLINIC | Age: 85
End: 2020-04-02

## 2020-04-02 DIAGNOSIS — Z53.9 DIAGNOSIS NOT YET DEFINED: Primary | ICD-10-CM

## 2020-04-02 PROCEDURE — G0179 MD RECERTIFICATION HHA PT: HCPCS | Performed by: FAMILY MEDICINE

## 2020-04-06 ENCOUNTER — TELEPHONE (OUTPATIENT)
Dept: FAMILY MEDICINE | Facility: CLINIC | Age: 85
End: 2020-04-06

## 2020-04-06 NOTE — TELEPHONE ENCOUNTER
Spoke with DONALD Medellin with [facility]. They are asking for wound care orders for 2nd toe on R foot. A scab came off and there is an actual wound underneath. Pt had a partial amputation of the second toe of the right foot 3/3/2020.  Per RN, wound care is to cleanse with wound cleanser, apply iodosorb to the wound bed, cover with gauze and secure with tape three times per week.      DONALD Mcdermott reports she left a message for Kamille at wound care clinic as well.    Routing to Dr. Scott as FYI - please let RN know if you do NOT agree with orders.    Radha THOMAS RN, BSN

## 2020-04-06 NOTE — TELEPHONE ENCOUNTER
Reason for Call:  Home Health Care    Lacie with [facility] called regarding (reason for call): Wound care orders    Orders are needed for this patient.     Skilled Nursing: Wound care on Right second toe     Pt Provider: Tyler    Phone Number Homecare Nurse can be reached at: Lacie [phone]    Can we leave a detailed message on this number? YES    Phone number patient can be reached at: Home number on file [phone] (home)    Best Time: any    Call taken on 4/6/2020 at 10:41 AM by Julissa Hill

## 2020-04-07 ENCOUNTER — MEDICAL CORRESPONDENCE (OUTPATIENT)
Dept: HEALTH INFORMATION MANAGEMENT | Facility: CLINIC | Age: 85
End: 2020-04-07

## 2020-04-07 ENCOUNTER — TELEPHONE (OUTPATIENT)
Dept: FAMILY MEDICINE | Facility: CLINIC | Age: 85
End: 2020-04-07

## 2020-04-23 ENCOUNTER — HOSPITAL ENCOUNTER (OUTPATIENT)
Dept: WOUND CARE | Facility: CLINIC | Age: 85
Discharge: HOME | End: 2020-04-23
Attending: PODIATRIST | Admitting: PODIATRIST
Payer: COMMERCIAL

## 2020-04-23 ENCOUNTER — OFFICE VISIT (OUTPATIENT)
Dept: PODIATRY | Facility: CLINIC | Age: 85
End: 2020-04-23
Payer: COMMERCIAL

## 2020-04-23 DIAGNOSIS — Z79.4 TYPE 2 DIABETES MELLITUS WITH BOTH EYES AFFECTED BY MILD NONPROLIFERATIVE RETINOPATHY WITHOUT MACULAR EDEMA, WITH LONG-TERM CURRENT USE OF INSULIN (H): ICD-10-CM

## 2020-04-23 DIAGNOSIS — L97.512: ICD-10-CM

## 2020-04-23 DIAGNOSIS — M86.9 OSTEOMYELITIS OF GREAT TOE OF LEFT FOOT (H): ICD-10-CM

## 2020-04-23 DIAGNOSIS — T81.31XD DEHISCENCE OF OPERATIVE WOUND, SUBSEQUENT ENCOUNTER: ICD-10-CM

## 2020-04-23 DIAGNOSIS — T81.31XD POSTOPERATIVE WOUND DEHISCENCE, SUBSEQUENT ENCOUNTER: Primary | ICD-10-CM

## 2020-04-23 DIAGNOSIS — E11.3293 TYPE 2 DIABETES MELLITUS WITH BOTH EYES AFFECTED BY MILD NONPROLIFERATIVE RETINOPATHY WITHOUT MACULAR EDEMA, WITH LONG-TERM CURRENT USE OF INSULIN (H): ICD-10-CM

## 2020-04-23 DIAGNOSIS — E11.51 TYPE 2 DIABETES MELLITUS WITH PERIPHERAL VASCULAR DISEASE (H): Primary | ICD-10-CM

## 2020-04-23 PROCEDURE — 99024 POSTOP FOLLOW-UP VISIT: CPT | Performed by: PODIATRIST

## 2020-04-23 PROCEDURE — G0463 HOSPITAL OUTPT CLINIC VISIT: HCPCS

## 2020-04-23 NOTE — LETTER
4/23/2020         RE: Fawad Garcia  [address]  [place] [address]        Dear Colleague,    Thank you for referring your patient, Fawad Garcia, to the [facility]. Please see a copy of my visit note below.    Fawad presents to the [facility] wound care clinic for reevaluation of the wound on the left foot and right second toe.  The patient relates no surrounding redness swelling or drainage.  The patient has been wearing his offloading healing shoes as directed.    There were no vitals taken for this visit.      Physical Exam:    General: The patient appears to be in no distress and in good spirits.  The bandages appear clean, dry and intact with no strikethrough noted. Vascular exam: Neurovascular status unchanged with < 3 sec capillary refill to all digits.  Positive pedal pulses and epicritic sensations intact with no evidence of allodynia.  The ulceration on the left foot has fully epithelialized.  Noted small opening  medially with noted increased granulation tissue formation.  Superficial ulceration on the distal aspect of the lesser toe on the right foot is noted with no surrounding erythema or drainage noted.       Assessment: Type 2 diabetes with peripheral vascular disease and ulceration on the right and left foot.    Plan: The wounds were dressed by the wound care nurse.  The patient will continue offloading both feet.  The patient will return in 2 weeks for reevaluation.  The patient was referred to [facility] for custom diabetic extra-depth shoes.    Disclaimer: This note consists of symbols derived from keyboarding, dictation and/or voice recognition software. As a result, there may be errors in the script that have gone undetected. Please consider this when interpreting information found in this chart.       LIANG Montesinos D.P.M., F.DELANEY.C.F.A.S.      Again, thank you for allowing me to participate in the  care of your patient.        Sincerely,        Ethan Montesinos DPM

## 2020-04-23 NOTE — DISCHARGE INSTRUCTIONS
"Left foot -     Continue to change M-W-F  To change:  Amputation site:  1.) Apply a very small amount of medihoney to the wound  2.) Cover with Mepilex Border light  If skin is becoming macerated then discontinue the Medihoney and use the Mepilex light alone    Second toe:  Keep covered to prevent the inadvertent removal of this eschar \"scab\" so we don't traumatize the skin.  This is falling off slowly on it's own.  Nurse can trim this as it comes loose as needed.    Right foot:  Keep covered with Iodosorb and a bandaide, may change M-W-F with nurse.  This is now scabbed and drying.    Call for an appointment to get fit for shoes when able, [phone].  Dr. Montesinos will put in an order.    You will need careful monitoring of your feet when homecare no longer seeing you.  Continue to work with your foot care nurse.    Kamille Calzada RN, CWOCN [phone]          "

## 2020-04-23 NOTE — PROGRESS NOTES
Fawad presents to the [facility] wound care clinic for reevaluation of the wound on the left foot and right second toe.  The patient relates no surrounding redness swelling or drainage.  The patient has been wearing his offloading healing shoes as directed.    There were no vitals taken for this visit.      Physical Exam:    General: The patient appears to be in no distress and in good spirits.  The bandages appear clean, dry and intact with no strikethrough noted. Vascular exam: Neurovascular status unchanged with < 3 sec capillary refill to all digits.  Positive pedal pulses and epicritic sensations intact with no evidence of allodynia.  The ulceration on the left foot has fully epithelialized.  Noted small opening  medially with noted increased granulation tissue formation.  Superficial ulceration on the distal aspect of the lesser toe on the right foot is noted with no surrounding erythema or drainage noted.       Assessment: Type 2 diabetes with peripheral vascular disease and ulceration on the right and left foot.    Plan: The wounds were dressed by the wound care nurse.  The patient will continue offloading both feet.  The patient will return in 2 weeks for reevaluation.  The patient was referred to [facility] for custom diabetic extra-depth shoes.    Disclaimer: This note consists of symbols derived from keyboarding, dictation and/or voice recognition software. As a result, there may be errors in the script that have gone undetected. Please consider this when interpreting information found in this chart.       LIANG Montesinos D.P.M., FIDEL.F.A.S.

## 2020-04-23 NOTE — PATIENT INSTRUCTIONS
"DIABETES AND YOUR FEET  Diabetes can result in several problems in the feet including ulcers (open sores) and amputations. Two of the most important reasons why people develop foot problems when they have diabetes is : 1. Neuropathy (loss of feeling)  2. Vascular disease (loss or decrease of blood flow).    Neuropathy is a term used to describe a loss of nerve function.  Patients with diabetes are at risk of developing neuropathy if their sugars continue to run high and are above the normal value. One theory for neuropathy is that the \"extra\" sugar in the body enters the nerves and is broken down. These by-products build up in the nerve causing it to swell and impairing nerve function. Often times, this can be prevented by controlling your sugars, dieting and exercise.    When a person develops neuropathy, they usually begin to feel numbness or tingling in their feet and sometime in their legs.  Other symptoms may include painful burning or hot feet, tingling or feeling like insects or ants are crawling on your feet or legs.  If the diabetes is sever and the sugars run high for long periods of time, neuropathy can also occur in the hands.    Vascular disease  is a term used to describe a loss or decrease in circulation (blood flow). There is a problem in getting blood and oxygen to areas that need it. Similar to neuropathy, sugars can build up in the walls of the arteries (blood vessels) and cause them to become swollen, thickened and hardened. This decreases the amount of blood that can go to an area that needs it. Though this is common in the legs of diabetic patients, it can also affect other arteries (blood vessels) in the body such as in the heart and eyes.    In the legs, vascular disease usually results in cramping. Patients who develop leg cramps after walking the same distance every time (i.e. One block, half a mile, ect.) need to let their doctors know so that their circulation may be checked. Cramps " causing severe pain in the feet and/or legs while sleeping and the cramps go away when you stand or hang your legs off the side of the bed, may also be a sign of poor blood circulation.  Occasional cramping in cold weather or on rare occasions with activity may not be due to poor circulation, but you should inform your doctor.    PREVENTION OF THESE DISEASES  The key to prevention is good blood sugar control. Poor blood sugar control is a big reason many of these problems start. Physical activity (exercise) is a very good way to help decrease your blood sugars. Exercise can lower your blood sugar, blood pressure, and cholesterol. It also reduces your risk for heart disease and stroke, relieves stress, and strengthens your heart, muscles and bones.  In addition, regular activity helps insulin work better, improves your blood circulation, and keeps your joints flexible. If you're trying to lose weight, a combination of exercise and wise food choices can help you reach your target weight and maintain it.      PAIN MANAGEMENT  1.Blood Sugar Control - Most important  2. Medications such as:  Amytriptylline, duloxetine, gabapentin, lyrica, tramadol  3. Nutritional therapy:  Vitamin B6 (100mg daily), Vitamin B12 (75mcg daily), Vitamin D 2000 IU daily), Alpha-Lipoic Acid (600-1800mg daily), Acetyl-L-Carnitine (500-1000mg TID, L-methyl folate (1500mcg daily)    ** Metformin can block Vitamin B6 and B12 so it is important to supplement**    FOOT CARE RECOMMENDATIONS   1. Wash your feet with lukewarm water and a mild soap and then dry them thoroughly, especially between the toes.     2. Examine your feet daily looking for cuts, corns, blisters, cracks, ect, especially after wearing new shoes. Make sure to look between your toes. If you cannot see the bottom of your feet, set a mirror on the floor and hold your foot over it, or ask a spouse, friend or family member to examine your feet for you. Contact your doctor immediately  if new problems are noted or if sores are not healing.     3. Immediately apply moisturizer to the tops and bottoms of your feet, avoiding areas between the toes. Hand lotion (Intesive Care, Nery, Eucerin, Neutrogena, Curel, ect) is sufficient unless your doctor prescribes a medicated lotion. Apply sunscreen to your feet when going swimming outside.     4. Use clean comfortable shoes, wear white socks (if you have any bleeding or drainage, you will see it on white socks). Socks should not have thick seams or cut off the circulation around the leg. Break in new shoes slowly and rotate with older shoes until broken in. Check the inside of your shoes with your hand to look for areas of irritation or objects that may have fallen into your shoes.       5. Keep slippers by the side of your bed for use during the night.     6.  Shoes should be fitted by a professional and should not cause areas of irritation.  Check your feet regularly when wearing a new pair of shoes and replace them as needed.     7.  Talk to your doctor about proper exercise. Exercise and stretching stimulate blood flow to your feet and maintain proper glucose levels.     8.  Monitor your blood glucose level as instructed by your doctor. Notify your doctor immediately if your blood sugar is abnormally high or low.    9. Cut your nails straight across, but then gently round any sharp edges with a cardboard nail file. If you have neuropathy, peripheral vascular disease or cannot see that well to trim your own toenails contact [employer] ([phone]) or [employer] ([phone]).      THINGS TO AVOID DOING   1.  Do not soak your feet if you have an open sore. Use only lukewarm water and always check the temperature with your hand as hot water can easily burn your feet.       2.  Never use a hot water bottle or heating pad on your feet. Also do not apply cold compresses to your feet. With decreased sensation, you could burn or freeze your feet.        3.  Do not apply any of these to your feet:    -  Over the counter medicine for corns or warts    -  Harsh chemicals like boric acid    -  Do not self-treat corns, cuts, blisters or infections. Always consult your doctor.       4.  Do not wear sandals, slippers or walk barefoot, especially on hot sand or concrete or other harsh surfaces.     5.  If you smoke, stop!!!

## 2020-04-28 NOTE — PROGRESS NOTES
Reason For Visit: Fawad Garcia, 82 year old here for recheck for left foot dehisced surgical wound.  Pt has been referred by Dr. Montesinos, podiatry.  Being seen by [facility] for daily wound care.  This is a covisit with Dr. Montesinos      Pt has homecare nursing seeing for regular wound care.      Pertainent Medical/Surgical History:    (Recent) Left 1st toe amputation due to osteomyelitis on 12/24/19.  Had sutures removed on 1/13/20 and relays that soon after the incision dehisced.    On 2/26/20 underwent revascularization with Dr. Cali:  1. Ultrasound-guided right common femoral artery access placement with  selective catheterization of left common iliac, external iliac, common  femoral, superficial femoral, popliteal and peroneal arteries.  2. Left lower extremity arteriogram with runoff.  3. Left distal popliteal artery and proximal peroneal artery balloon  angioplasty using 3.5 mm x 15 cm angioplasty balloon with completion  arteriogram.  4. Left popliteal artery stenting using 4 mm x 24 mm drug eluting  balloon expandable coronary stent with completion arteriogram.  5. Right common femoral artery access site arteriogram and closure  with 6 Dutch Angio-Seal device    On 3/3/20 underwent amputation of right second toe for osteomyelitis.         (Prior History) History also significant for PAD, had angiogram of right LE Dec '18 and though was disease noted was determined to give it more time for healing as next step would be a popliteal to posterior tibial artery bypass.  He was healing at subsequent follow-up in Feb of '19 so has not had vascular follow-up since.  Last RENATE in Nov '18 was 0.8pre and 0.91post exercise on left but waveforms were monophasic, did not have arteriogram on left.  DM II with HgbA1C of 8.1.      Since last seen by [facility] nurse in Oct '18 has had right 1st toe partial amputation in June '19 and left 5th toe met head resection on April '19.  Does see Dr. Montesinos regularly.    Personal/social  history: Lives with spouse who will not help with wound cares.  He is a retired pharmacist.  Son will help some with bandage change but pt didn't think would do packing    Assessment:    Wound #1: Left 1st toe amputation site        2/10/20  Dehisced Left great toe incision:  Measures: 0.5cm x 0.4cm x 0.1cm deep   Tunneling: no  Undermining: no  Wound bed type/amount:  granular  Wound Edges: attached  Periwound: intact  Drainage amount/type: serosanguinous moderate  Odor: no  Stage/tissue depth: full thickness  Pain: insensate  Current treatment: Iodosorb impregnated 1 inch strip gauze    #2 Left 2nd toe:  Tip of toe around the nail is dry black and intact (see above) This is stable and appears to be slowly detaching from edges with skin intact under    #3 -  Left Achilles  Resolved with small dried area      #4  Right 2nd toe - tip:    S/p amputation of this toe on 3/3.  Light colored area noted in photo is thick dry callus, distal to this there is healing scab. Well approximated and dry.      Mobility: presents in wheelchair with cane, comments he knows he is supposed to stay off foot and he is trying to follow orders  Current offloading/footwear: has DH walker left, post-op shoe right   Sensation: states has neuropathy but can feel touch  HgbA1C: 8.1 Date: 12 '19  Checks Blood Glucose?:  Average Readings:       Pedal Pulse, palpable: no doppler: monophasic, poor sounding quality as is PT  Hair growth: noted legs, not feet  Capillary Refill: <3 seconds  Feet/toes color: pink, warm  Edema: some foot trace    Pt underwent revascularization of the left LE on 2/26/20 at [facility] - see note in Epic      Smoking: no      Assessment:   Dehisced surgical wound left great toe - healing well s/p revascularization      S/p amputation of right 2nd toe - recent tear in skin here per nurse but this appears to be scabded and healing now    Stable area of eschar of  left 2nd toe      Possible suboptimal  "nutrution    PAD    History of non-compliance with offloading recommendations of heel only weight bearing    Plan:    Per discharge instructions provided to pt and reviewed:  Left foot -     Continue to change M-W-F  To change:  Amputation site:  1.) Apply a very small amount of medihoney to the wound  2.) Cover with Mepilex Border light  If skin is becoming macerated then discontinue the Medihoney and use the Mepilex light alone    Second toe:  Keep covered to prevent the inadvertent removal of this eschar \"scab\" so we don't traumatize the skin.  This is falling off slowly on it's own.  Nurse can trim this as it comes loose as needed.    Right foot:  Keep covered with Iodosorb and a bandaide, may change M-W-F with nurse.  This is now scabbed and drying.    Call for an appointment to get fit for shoes when able, [phone].  Dr. Montesinos will put in an order.    You will need careful monitoring of your feet when homecare no longer seeing you.  Continue to work with your foot care nurse.      NO pressure wraps, (no ACE, no coban) left foot.      Previously discussed importance of good nutrition including protein and vitamins needed for healing at prior visit.      Follow-up:  5/21/20 Dr. Montesinos and [facility] RN       Kamille Calzada RN, CWOCN     [phone]              "

## 2020-04-30 ENCOUNTER — MEDICAL CORRESPONDENCE (OUTPATIENT)
Dept: HEALTH INFORMATION MANAGEMENT | Facility: CLINIC | Age: 85
End: 2020-04-30

## 2020-04-30 ENCOUNTER — TELEPHONE (OUTPATIENT)
Dept: PODIATRY | Facility: CLINIC | Age: 85
End: 2020-04-30

## 2020-04-30 ENCOUNTER — TELEPHONE (OUTPATIENT)
Dept: FAMILY MEDICINE | Facility: CLINIC | Age: 85
End: 2020-04-30

## 2020-04-30 NOTE — TELEPHONE ENCOUNTER
Reason for Call:  Other call back    Detailed comments: pt calling stating he is needing to get diabetic shoes it will cost him $2000. Would like to know what other options are available.     Phone Number Patient can be reached at: Home number on file [phone] (home)    Best Time: any     Can we leave a detailed message on this number? YES    Call taken on 4/30/2020 at 3:11 PM by Jessi Tavarez

## 2020-05-01 ENCOUNTER — TELEPHONE (OUTPATIENT)
Dept: FAMILY MEDICINE | Facility: CLINIC | Age: 85
End: 2020-05-01

## 2020-05-01 ENCOUNTER — MEDICAL CORRESPONDENCE (OUTPATIENT)
Dept: HEALTH INFORMATION MANAGEMENT | Facility: CLINIC | Age: 85
End: 2020-05-01

## 2020-05-04 NOTE — TELEPHONE ENCOUNTER
Pt calling to check on status of this request please.    Please advise.    Thanks-    -Juju Reilly  [facility] [place]

## 2020-05-04 NOTE — TELEPHONE ENCOUNTER
Called patient and LVM. Informed patient that if it has been a year medicare should cover 80% of his shoes and that he can also contact prosthetics and discuss this with them as well.

## 2020-05-11 ENCOUNTER — TELEPHONE (OUTPATIENT)
Dept: FAMILY MEDICINE | Facility: CLINIC | Age: 85
End: 2020-05-11

## 2020-05-11 NOTE — TELEPHONE ENCOUNTER
He forgot to take his amlodipine yesterday. Nurse comes out two times a week. He does not check bp at home himself. MELISSA Galloway RN

## 2020-05-11 NOTE — TELEPHONE ENCOUNTER
Reason for Call:  Other High blood pressure reading    Detailed comments: Patient has a high bp reading  160/90 today, 5/11/2020. Patient is asymptomatic.    Phone Number Patient can be reached at: Other phone number:  [facility], [phone]    Best Time: Any    Can we leave a detailed message on this number? YES    Call taken on 5/11/2020 at 4:26 PM by Jazzmine Camacho

## 2020-05-21 ENCOUNTER — TELEPHONE (OUTPATIENT)
Dept: FAMILY MEDICINE | Facility: CLINIC | Age: 85
End: 2020-05-21

## 2020-05-21 ENCOUNTER — OFFICE VISIT (OUTPATIENT)
Dept: PODIATRY | Facility: CLINIC | Age: 85
End: 2020-05-21
Payer: COMMERCIAL

## 2020-05-21 ENCOUNTER — HOSPITAL ENCOUNTER (OUTPATIENT)
Dept: WOUND CARE | Facility: CLINIC | Age: 85
Discharge: HOME | End: 2020-05-21
Attending: FAMILY MEDICINE | Admitting: FAMILY MEDICINE
Payer: COMMERCIAL

## 2020-05-21 ENCOUNTER — MEDICAL CORRESPONDENCE (OUTPATIENT)
Dept: HEALTH INFORMATION MANAGEMENT | Facility: CLINIC | Age: 85
End: 2020-05-21

## 2020-05-21 DIAGNOSIS — E11.51 TYPE 2 DIABETES MELLITUS WITH PERIPHERAL VASCULAR DISEASE (H): Primary | ICD-10-CM

## 2020-05-21 DIAGNOSIS — Z89.421 H/O AMPUTATION OF LESSER TOE, RIGHT (H): ICD-10-CM

## 2020-05-21 PROCEDURE — 99024 POSTOP FOLLOW-UP VISIT: CPT | Performed by: PODIATRIST

## 2020-05-21 PROCEDURE — G0463 HOSPITAL OUTPT CLINIC VISIT: HCPCS

## 2020-05-21 NOTE — LETTER
5/21/2020         RE: Fawad Garcia  [address]  [facility] [address]        Dear Colleague,    Thank you for referring your patient, Fawad Garcia, to the [facility]. Please see a copy of my visit note below.    Fawad presents to the [facility] wound care clinic for reevaluation of the wound on the left foot and right second toe.  The patient relates that he has been following my advise on offloading the feet and has noted that the ulcers have finally healed fully.    There were no vitals taken for this visit.      Physical Exam:    General: The patient appears to be in no distress and in good spirits.  Vascular exam: Neurovascular status unchanged with < 3 sec capillary refill to all digits.  Positive pedal pulses and epicritic sensations intact with no evidence of allodynia.  The skin is intact and the ulcers appear to have fully epithelialized.     Assessment: Type 2 diabetes with peripheral vascular disease and recent  ulceration on the right and left foot. S/p amputation of toe left foot.    Plan: At this point the patient was instructed to continue with normal activity wearing his custom diabetic shoes.    Disclaimer: This note consists of symbols derived from keyboarding, dictation and/or voice recognition software. As a result, there may be errors in the script that have gone undetected. Please consider this when interpreting information found in this chart.       LIANG Montesinos D.P.M., F.A.C.F.A.S.      Again, thank you for allowing me to participate in the care of your patient.        Sincerely,        Ethan Montesinos DPM

## 2020-05-22 ENCOUNTER — MEDICAL CORRESPONDENCE (OUTPATIENT)
Dept: HEALTH INFORMATION MANAGEMENT | Facility: CLINIC | Age: 85
End: 2020-05-22

## 2020-05-22 NOTE — PROGRESS NOTES
Fawad presents to the [facility] wound care clinic for reevaluation of the wound on the left foot and right second toe.  The patient relates that he has been following my advise on offloading the feet and has noted that the ulcers have finally healed fully.    There were no vitals taken for this visit.      Physical Exam:    General: The patient appears to be in no distress and in good spirits.  Vascular exam: Neurovascular status unchanged with < 3 sec capillary refill to all digits.  Positive pedal pulses and epicritic sensations intact with no evidence of allodynia.  The skin is intact and the ulcers appear to have fully epithelialized.     Assessment: Type 2 diabetes with peripheral vascular disease and recent  ulceration on the right and left foot. S/p amputation of toe left foot.    Plan: At this point the patient was instructed to continue with normal activity wearing his custom diabetic shoes.    Disclaimer: This note consists of symbols derived from keyboarding, dictation and/or voice recognition software. As a result, there may be errors in the script that have gone undetected. Please consider this when interpreting information found in this chart.       LIANG Montesinos D.P.M., FWILLIE.F.A.S.

## 2020-06-01 NOTE — PROGRESS NOTES
Reason For Visit: Fawad Garcia, 82 year old here for recheck for left foot dehisced surgical wound.  Pt has been referred by Dr. Montesinos, podiatry.  Being seen by Homecare for daily wound care.  This is a covisit with Dr. Montesinos      Pt has homecare nursing seeing for regular wound care.      Pertainent Medical/Surgical History:    (Recent) Left 1st toe amputation due to osteomyelitis on 12/24/19.  Had sutures removed on 1/13/20 and relays that soon after the incision dehisced.    On 2/26/20 underwent revascularization with Dr. Cali:  1. Ultrasound-guided right common femoral artery access placement with  selective catheterization of left common iliac, external iliac, common  femoral, superficial femoral, popliteal and peroneal arteries.  2. Left lower extremity arteriogram with runoff.  3. Left distal popliteal artery and proximal peroneal artery balloon  angioplasty using 3.5 mm x 15 cm angioplasty balloon with completion  arteriogram.  4. Left popliteal artery stenting using 4 mm x 24 mm drug eluting  balloon expandable coronary stent with completion arteriogram.  5. Right common femoral artery access site arteriogram and closure  with 6 [place] Angio-Seal device    On 3/3/20 underwent amputation of right second toe for osteomyelitis.         (Prior History) History also significant for PAD, had angiogram of right LE Dec '18 and though was disease noted was determined to give it more time for healing as next step would be a popliteal to posterior tibial artery bypass.  He was healing at subsequent follow-up in Feb of '19 so has not had vascular follow-up since.  Last RENATE in Nov '18 was 0.8pre and 0.91post exercise on left but waveforms were monophasic, did not have arteriogram on left.  DM II with HgbA1C of 8.1.      Since last seen by [facility] nurse in Oct '18 has had right 1st toe partial amputation in June '19 and left 5th toe met head resection on April '19.  Does see Dr. Montesinos regularly.    Personal/social  history: Lives with spouse who will not help with wound cares.  He is a retired pharmacist.  Son will help some with bandage change but pt didn't think would do packing    Assessment:    Wound #1: Left 1st toe amputation site        2/10/20  Dehisced Left great toe incision:  Resolved with 2mm thin tan scab    #2 Left 2nd toe:  Raised thick eschar that is dry and loosening from edges, no drainage or signs of infection.  Due to being quite raised and loosening this was carefully trimmed and shaved to reduce to prevent traumatic removal    #3 -  Left Achilles  Resolved       #4  Right 2nd toe - tip:    Resolved with 2mm thin tan scab.     Mobility: presents in wheelchair with cane, comments he knows he is supposed to stay off foot and he is trying to follow orders  Current offloading/footwear: has DH walker left, post-op shoe right   Sensation: states has neuropathy but can feel touch  HgbA1C: 8.1 Date: 12 '19  Checks Blood Glucose?:  Average Readings:       Pedal Pulse, palpable: no doppler: monophasic, poor sounding quality as is PT (not reassessed since revascularization)  Hair growth: noted legs, not feet  Capillary Refill: <3 seconds  Feet/toes color: pink, warm  Edema: some foot trace    Pt underwent revascularization of the left LE on 2/26/20 at [facility] - see note in Epic      Smoking: no      Assessment:   Dehisced surgical wound left great toe - healed    S/p amputation of right 2nd toe - healed    Stable area of eschar of  left 2nd toe - healing      Possible suboptimal nutrution    PAD s/p revascularization    History of non-compliance with offloading recommendations of heel only weight bearing    Plan:  Dr. Montesinos as advised may walk in his new custom orthotic shoes now.  Discussed with pt importance of slowly increasing walking time and monitoring his feet daily for signs of breakdown and needs to be seen ASAP if noted.      Recommended bandaide on left 2nd toe to protect healing eschar, will continue  to loosen and eventually fall off.    Follow-up:  PRN, does have foot/nail care nurse he sees regularly as well       Kamille Calzada RN, CWOCN     [phone]

## 2020-06-03 ENCOUNTER — TELEPHONE (OUTPATIENT)
Dept: FAMILY MEDICINE | Facility: CLINIC | Age: 85
End: 2020-06-03

## 2020-06-03 DIAGNOSIS — Z53.9 DIAGNOSIS NOT YET DEFINED: Primary | ICD-10-CM

## 2020-06-03 PROCEDURE — G0179 MD RECERTIFICATION HHA PT: HCPCS | Performed by: FAMILY MEDICINE

## 2020-06-03 NOTE — TELEPHONE ENCOUNTER
Home Health Cert and plan of care faxed to Mel at [phone] and sent to scanning.    Chrissie Astudillo  [facility]

## 2020-06-04 ENCOUNTER — TELEPHONE (OUTPATIENT)
Dept: FAMILY MEDICINE | Facility: CLINIC | Age: 85
End: 2020-06-04

## 2020-06-04 NOTE — TELEPHONE ENCOUNTER
Reason for Call:  Home Health Care    Desira with [facility] called regarding (reason for call):     Orders are needed for this patient.     PT: eval and treat for balance    OT:     Skilled Nursing:     Pt Provider:     Phone Number Homecare Nurse can be reached at: [phone]    Can we leave a detailed message on this number? YES    Phone number patient can be reached at: Home number on file [phone] (home)    Best Time: any    Call taken on 6/4/2020 at 12:11 PM by Julissa Hill

## 2020-06-09 ENCOUNTER — TELEPHONE (OUTPATIENT)
Dept: FAMILY MEDICINE | Facility: CLINIC | Age: 85
End: 2020-06-09

## 2020-06-11 ENCOUNTER — TELEPHONE (OUTPATIENT)
Dept: FAMILY MEDICINE | Facility: CLINIC | Age: 85
End: 2020-06-11

## 2020-06-11 DIAGNOSIS — Z79.4 TYPE 2 DIABETES MELLITUS WITH BOTH EYES AFFECTED BY MILD NONPROLIFERATIVE RETINOPATHY WITHOUT MACULAR EDEMA, WITH LONG-TERM CURRENT USE OF INSULIN (H): Primary | ICD-10-CM

## 2020-06-11 DIAGNOSIS — E11.3293 TYPE 2 DIABETES MELLITUS WITH BOTH EYES AFFECTED BY MILD NONPROLIFERATIVE RETINOPATHY WITHOUT MACULAR EDEMA, WITH LONG-TERM CURRENT USE OF INSULIN (H): Primary | ICD-10-CM

## 2020-06-11 NOTE — TELEPHONE ENCOUNTER
Reason for Call: Request for an order or referral:    Order or referral being requested: Labs    Date needed: before my next appointment    Has the patient been seen by the PCP for this problem? YES    Additional comments: Patient has appt on Monday with Dr. Scott for a diabetic gaby.  Asking for lab orders to be placed to get done prior to appt.      Phone number Patient can be reached at:  Home number on file [phone] (home)     Best Time:  Any    Can we leave a detailed message on this number?  YES    Call taken on 6/11/2020 at 9:35 AM by Ruby Owens

## 2020-06-11 NOTE — TELEPHONE ENCOUNTER
Lab is ordered.  He can make a lab visit to have his hgba1c checked.  Chris Scott MD  Family Medicine

## 2020-06-15 ENCOUNTER — OFFICE VISIT (OUTPATIENT)
Dept: FAMILY MEDICINE | Facility: CLINIC | Age: 85
End: 2020-06-15
Payer: COMMERCIAL

## 2020-06-15 VITALS
BODY MASS INDEX: 30.48 KG/M2 | HEIGHT: 72 IN | OXYGEN SATURATION: 97 % | DIASTOLIC BLOOD PRESSURE: 72 MMHG | RESPIRATION RATE: 14 BRPM | HEART RATE: 65 BPM | TEMPERATURE: 98.2 F | SYSTOLIC BLOOD PRESSURE: 130 MMHG | WEIGHT: 225 LBS

## 2020-06-15 DIAGNOSIS — E11.65 UNCONTROLLED TYPE 2 DIABETES MELLITUS WITH HYPERGLYCEMIA (H): Primary | ICD-10-CM

## 2020-06-15 DIAGNOSIS — E11.9 TYPE 2 DIABETES MELLITUS WITHOUT COMPLICATION, WITH LONG-TERM CURRENT USE OF INSULIN (H): ICD-10-CM

## 2020-06-15 DIAGNOSIS — Z79.4 TYPE 2 DIABETES MELLITUS WITH BOTH EYES AFFECTED BY MILD NONPROLIFERATIVE RETINOPATHY WITHOUT MACULAR EDEMA, WITH LONG-TERM CURRENT USE OF INSULIN (H): ICD-10-CM

## 2020-06-15 DIAGNOSIS — Z79.4 TYPE 2 DIABETES MELLITUS WITHOUT COMPLICATION, WITH LONG-TERM CURRENT USE OF INSULIN (H): ICD-10-CM

## 2020-06-15 DIAGNOSIS — E11.3293 TYPE 2 DIABETES MELLITUS WITH BOTH EYES AFFECTED BY MILD NONPROLIFERATIVE RETINOPATHY WITHOUT MACULAR EDEMA, WITH LONG-TERM CURRENT USE OF INSULIN (H): ICD-10-CM

## 2020-06-15 LAB — HBA1C MFR BLD: 8.1 % (ref 0–5.6)

## 2020-06-15 PROCEDURE — 83036 HEMOGLOBIN GLYCOSYLATED A1C: CPT | Performed by: FAMILY MEDICINE

## 2020-06-15 PROCEDURE — 36415 COLL VENOUS BLD VENIPUNCTURE: CPT | Performed by: FAMILY MEDICINE

## 2020-06-15 PROCEDURE — 99214 OFFICE O/P EST MOD 30 MIN: CPT | Performed by: FAMILY MEDICINE

## 2020-06-15 ASSESSMENT — MIFFLIN-ST. JEOR: SCORE: 1753.59

## 2020-06-15 NOTE — PROGRESS NOTES
Subjective     Fawad Garcia is a 84 year old male who presents to clinic today for the following health issues:    HPI   Diabetes Follow-up    How often are you checking your blood sugar? One time daily avg is about 115  What time of day are you checking your blood sugars (select all that apply)?  Before meals  Have you had any blood sugars above 200?  No  Have you had any blood sugars below 70?  No    What symptoms do you notice when your blood sugar is low?  None    What concerns do you have today about your diabetes? None     Do you have any of these symptoms? (Select all that apply)  Excessive thirst    Have you had a diabetic eye exam in the last 12 months? Unsure but patient will make appt with eye doctor, patient does think he was within the last 6 mos but with the retina specialist         BP Readings from Last 2 Encounters:   06/15/20 130/72   03/09/20 110/56     Hemoglobin A1C (%)   Date Value   06/15/2020 8.1 (H)   03/02/2020 7.2 (H)     LDL Cholesterol Calculated (mg/dL)   Date Value   03/02/2020 60   02/26/2020 85           How many servings of fruits and vegetables do you eat daily?  0-1    On average, how many sweetened beverages do you drink each day (Examples: soda, juice, sweet tea, etc.  Do NOT count diet or artificially sweetened beverages)?   0    How many days per week do you exercise enough to make your heart beat faster? 3 or less    How many minutes a day do you exercise enough to make your heart beat faster? 10 - 19    How many days per week do you miss taking your medication? 0    Medication Followup of diabetic test strips     Taking Medication as prescribed: yes    Side Effects:  None    Medication Helping Symptoms:  not applicable             Review Of Systems  Skin: negative  Eyes: has macular degeneration, retinopathy, seeing specialist and need to see local eye doctor too. Last eye exam was 2/2020.  Ears/Nose/Throat: negative  Respiratory: No shortness of breath, dyspnea on  exertion, cough, or hemoptysis  Cardiovascular: negative  Gastrointestinal:   Genitourinary:   Musculoskeletal:   Neurologic:   Psychiatric:   Hematologic/Lymphatic/Immunologic:   Endocrine: he feels his blood sugars are controlled.    Reviewed and updated as needed this visit by Provider  Tobacco  Allergies  Meds  Problems  Med Hx  Surg Hx  Fam Hx         Review of Systems         Objective    /72   Pulse 65   Temp 98.2  F (36.8  C) (Tympanic)   Resp 14   Ht 1.829 m (6')   Wt 102.1 kg (225 lb)   SpO2 97%   BMI 30.52 kg/m    Body mass index is 30.52 kg/m .  Physical Exam   GENERAL APPEARANCE: alert, no distress and cooperative  RESP: lungs clear to auscultation - no rales, rhonchi or wheezes  CV: regular rates and rhythm, normal S1 S2, no S3 or S4 and no murmur, click or rub  MS: extremities normal- no gross deformities noted  SKIN: no suspicious lesions or rashes  NEURO: Normal strength and tone, mentation intact and speech normal  PSYCH: mentation appears normal and affect normal/bright    Reviewed his labs        Assessment & Plan     (E11.65) Uncontrolled type 2 diabetes mellitus with hyperglycemia (H)  (primary encounter diagnosis)  Comment: discussed increasing his insulin, he is resistant to increasing.  He was to be taking 23 units per the chart, he has only been taking 20 units.  Discussed going up to 22 units.  He will consider.  Plan: Hemoglobin A1c            (E11.3293,  Z79.4) Type 2 diabetes mellitus with both eyes affected by mild nonproliferative retinopathy without macular edema, with long-term current use of insulin (H)  Comment: seeing specialist and refilled his med with lantus at 22 units  Plan: insulin glargine (LANTUS SOLOSTAR) 100 UNIT/ML         pen, Hemoglobin A1c, DISCONTINUED: insulin         glargine (LANTUS SOLOSTAR) 100 UNIT/ML pen,         DISCONTINUED: insulin glargine (LANTUS         SOLOSTAR) 100 UNIT/ML pen            (E11.9,  Z79.4) Type 2 diabetes mellitus  without complication, with long-term current use of insulin (H)  Comment:   Plan: blood glucose (ONETOUCH ULTRA) test strip,         Hemoglobin A1c                 See Patient Instructions    Return in about 3 months (around 9/15/2020) for Lab Work.    Chris Scott MD  [facility]

## 2020-06-17 ENCOUNTER — MEDICAL CORRESPONDENCE (OUTPATIENT)
Dept: HEALTH INFORMATION MANAGEMENT | Facility: CLINIC | Age: 85
End: 2020-06-17

## 2020-06-17 ENCOUNTER — TELEPHONE (OUTPATIENT)
Dept: FAMILY MEDICINE | Facility: CLINIC | Age: 85
End: 2020-06-17

## 2020-06-17 NOTE — TELEPHONE ENCOUNTER
PT Orders    Signed, faxed to [phone] and sent to [facility]. Jazzmine Camacho on 6/17/2020 at 4:39 PM

## 2020-06-19 ENCOUNTER — TELEPHONE (OUTPATIENT)
Dept: FAMILY MEDICINE | Facility: CLINIC | Age: 85
End: 2020-06-19

## 2020-06-24 ENCOUNTER — HOSPITAL ENCOUNTER (OUTPATIENT)
Dept: ULTRASOUND IMAGING | Facility: CLINIC | Age: 85
Discharge: HOME | End: 2020-06-24
Attending: SURGERY | Admitting: SURGERY
Payer: COMMERCIAL

## 2020-06-24 ENCOUNTER — MEDICAL CORRESPONDENCE (OUTPATIENT)
Dept: HEALTH INFORMATION MANAGEMENT | Facility: CLINIC | Age: 85
End: 2020-06-24

## 2020-06-24 ENCOUNTER — TELEPHONE (OUTPATIENT)
Dept: FAMILY MEDICINE | Facility: CLINIC | Age: 85
End: 2020-06-24

## 2020-06-24 DIAGNOSIS — I73.9 PAD (PERIPHERAL ARTERY DISEASE) (H): ICD-10-CM

## 2020-06-24 DIAGNOSIS — I70.221 ATHEROSCLEROSIS OF NATIVE ARTERY OF RIGHT LEG WITH REST PAIN (H): ICD-10-CM

## 2020-06-24 PROCEDURE — 93926 LOWER EXTREMITY STUDY: CPT | Mod: LT

## 2020-06-29 ENCOUNTER — VIRTUAL VISIT (OUTPATIENT)
Dept: OTHER | Facility: CLINIC | Age: 85
End: 2020-06-29
Attending: SURGERY
Payer: COMMERCIAL

## 2020-06-29 VITALS — WEIGHT: 220 LBS | HEIGHT: 72 IN | BODY MASS INDEX: 29.8 KG/M2

## 2020-06-29 DIAGNOSIS — I73.9 PERIPHERAL VASCULAR DISEASE (H): ICD-10-CM

## 2020-06-29 DIAGNOSIS — I70.229 CRITICAL ISCHEMIA OF EXTREMITY WITH HISTORY OF REVASCULARIZATION OF SAME EXTREMITY (H): Primary | ICD-10-CM

## 2020-06-29 DIAGNOSIS — Z98.890 CRITICAL ISCHEMIA OF EXTREMITY WITH HISTORY OF REVASCULARIZATION OF SAME EXTREMITY (H): Primary | ICD-10-CM

## 2020-06-29 PROCEDURE — 99213 OFFICE O/P EST LOW 20 MIN: CPT | Mod: 95 | Performed by: SURGERY

## 2020-06-29 ASSESSMENT — MIFFLIN-ST. JEOR: SCORE: 1730.91

## 2020-06-29 NOTE — PROGRESS NOTES
"Fawad Garcia is a 84 year old male who is being evaluated via a billable telephone visit.      The patient has been notified of following:     \"This telephone visit will be conducted via a call between you and your physician/provider. We have found that certain health care needs can be provided without the need for a physical exam.  This service lets us provide the care you need with a short phone conversation.  If a prescription is necessary we can send it directly to your pharmacy.  If lab work is needed we can place an order for that and you can then stop by our lab to have the test done at a later time.    Telephone visits are billed at different rates depending on your insurance coverage. During this emergency period, for some insurers they may be billed the same as an in-person visit.  Please reach out to your insurance provider with any questions.    If during the course of the call the physician/provider feels a telephone visit is not appropriate, you will not be charged for this service.\"    Patient has given verbal consent for Telephone visit?  Yes    What phone number would you like to be contacted at? [phone]    How would you like to obtain your AVS? Mail a copy    Summer Briceño MA    I had a phone visit with Emerson corcoran.  He is a very pleasant 84-year-old male who has been under our care for nonhealing wound of the left foot.  In February 2020 he underwent left lower extremity arteriogram with balloon angioplasty of short segment of distal left popliteal artery followed by placement of a 4 mm coronary stent.  On a subsequent evaluations with Dr. Montesinos, the wounds have healed.  He last saw Dr. Montesinos on 21 May 2020.  He continues to wear a specialized shoe.  Interestingly though, he tells me that his blood sugars have been well controlled but his last hemoglobin A1c is 8.1%.  He tells me that if his blood sugar is below 100 then restart having diaphoresis and jitteriness.    I went over the " results which shows a patent distal popliteal artery going to the peroneal artery.  We will repeat the studies in 6 months time to evaluate further inline flow via the peroneal artery.  I have advised him that if there is any recurrent narrowing or recurrent wound that he will require additional repeat left lower extremity arteriogram.  He has verbalizes understanding.    Total phone call time: 5 minutes.

## 2020-07-02 ENCOUNTER — NURSE TRIAGE (OUTPATIENT)
Dept: NURSING | Facility: CLINIC | Age: 85
End: 2020-07-02

## 2020-07-02 ENCOUNTER — TELEPHONE (OUTPATIENT)
Dept: FAMILY MEDICINE | Facility: CLINIC | Age: 85
End: 2020-07-02

## 2020-07-03 ENCOUNTER — TELEPHONE (OUTPATIENT)
Dept: PODIATRY | Facility: CLINIC | Age: 85
End: 2020-07-03

## 2020-07-03 NOTE — TELEPHONE ENCOUNTER
"\"I am a diabetic and I had my great toe amputated(L) foot. I just saw Dr. Montesinos (podiatry) in May(see epic), he said it looked perfect but to watch is closely every day and if I noticed it looking black to call right away. I just noticed tonight at the top of it it's black and looks like it;s deteriorating. No pain. There is also some redness, no fever.\" Patient denies other sx. Triaged and advised ER. Pt is not sure if he will go in tonight, prefers to call MD in the morning.  Rosalina Villarreal RN [facility]    COVID 19 Nurse Triage Plan/Patient Instructions    Please be aware that novel coronavirus (COVID-19) may be circulating in the community. If you develop symptoms such as fever, cough, or SOB or if you have concerns about the presence of another infection including coronavirus (COVID-19), please contact your health care provider or visit [email or web address].     Disposition/Instructions    Patient to go to ED and follow protocol based instructions.     Bring Your Own Device:  Please also bring your smart device(s) (smart phones, tablets, laptops) and their charging cables for your personal use and to communicate with your care team during your visit.    Thank you for taking steps to prevent the spread of this virus.  o Limit your contact with others.  o Wear a simple mask to cover your cough.  o Wash your hands well and often.    Resources    [facility]: About COVID-19: [email or web address]    CDC: What to Do If You're Sick: www.cdc.gov/coronavirus/2019-ncov/about/steps-when-sick.html    CDC: Ending Home Isolation: www.cdc.gov/coronavirus/2019-ncov/hcp/disposition-in-home-patients.html     CDC: Caring for Someone: www.cdc.gov/coronavirus/2019-ncov/if-you-are-sick/care-for-someone.html     MD: Interim Guidance for Hospital Discharge to Home: www.health.[facility].[place]/diseases/coronavirus/hcp/hospdischarge.pdf    [facility] clinical trials (COVID-19 research studies): " clinicalaffairs.[facility]-clinical-trials     Below are the COVID-19 hotlines at the [place] Department of Health ([facility]). Interpreters are available.   o For health questions: Call 040-234-7644 or 1-977.632.8757 (7 a.m. to 7 p.m.)  o For questions about schools and childcare: Call 516-179-2865 or 1-923.598.1922 (7 a.m. to 7 p.m.)                       Additional Information    Negative: SEVERE pain    Negative: Foot is cool or blue in comparison to other side    Negative: [1] Looks infected (spreading redness, red streak, or pus) AND [2] fever    Negative: Patient sounds very sick or weak to the triager    Negative: Fever > 100.5 F (38.1 C)    Negative: [1] Drainage from foot AND [2] new or increased    Negative: [1] Foul-smelling odor from foot AND [2] new or increased    Negative: [1] Spreading redness or red streak AND [2] area > 2 in. (5 cm)    [1] Purple or black skin on foot or toe AND [2] new or increased    Protocols used: DIABETES - FOOT PROBLEMS AND [id number]

## 2020-07-03 NOTE — TELEPHONE ENCOUNTER
Pt reports that his son looked at his foot and noticed area that has turned black, he has redness with red lines.  No fever.    Pt was advised that he will need to be seen in ER for further evaluation, this should not wait.  With the red lines he was advised this could be blood poisoning.  If he is not seen he was advised he could have some serious problems.    He was advised to keep his appt with Dr. Montesinos on Monday but to be seen in the ER today.    Pt agrees with this plan.    Kourtney Perales  [facility] RN

## 2020-07-03 NOTE — TELEPHONE ENCOUNTER
Reason for Call:  Other call back    Detailed comments: pt calling stating he has had his toes amputated and a sore on his left foot. He was told to call if any sores pop up. He is scheduled for Monday.     Phone Number Patient can be reached at: Home number on file [phone] (home)    Best Time: any    Can we leave a detailed message on this number? YES    Call taken on 7/3/2020 at 8:40 AM by Jessi Tavarez

## 2020-07-03 NOTE — TELEPHONE ENCOUNTER
Reason for call:  Other   Patient called regarding (reason for call): call back  Additional comments: Issues with toe that was amputated, black spot that is concerning    Phone number to reach patient:  Home number on file [phone] (home)    Best Time:  As soon as possible    Can we leave a detailed message on this number?  YES    Travel screening: Not Applicable

## 2020-07-06 ENCOUNTER — OFFICE VISIT (OUTPATIENT)
Dept: PODIATRY | Facility: CLINIC | Age: 85
End: 2020-07-06
Payer: COMMERCIAL

## 2020-07-06 VITALS
HEART RATE: 67 BPM | BODY MASS INDEX: 29.8 KG/M2 | WEIGHT: 220 LBS | DIASTOLIC BLOOD PRESSURE: 71 MMHG | TEMPERATURE: 98 F | SYSTOLIC BLOOD PRESSURE: 135 MMHG | HEIGHT: 72 IN

## 2020-07-06 DIAGNOSIS — E11.51 TYPE 2 DIABETES MELLITUS WITH PERIPHERAL VASCULAR DISEASE (H): Primary | ICD-10-CM

## 2020-07-06 PROCEDURE — 99213 OFFICE O/P EST LOW 20 MIN: CPT | Performed by: PODIATRIST

## 2020-07-06 ASSESSMENT — MIFFLIN-ST. JEOR: SCORE: 1730.91

## 2020-07-06 NOTE — LETTER
7/6/2020         RE: Fawad Garcia  [address]  [facility] [address]        Dear Colleague,    Thank you for referring your patient, Fawad Garcia, to the [facility]. Please see a copy of my visit note below.    Fawad presents to clinic for reevaluation of the   the left foot.  The patient is concerned about the look of the left foot where the great toe was amputated.  Patient denies any fever chills redness or swelling.    /71   Pulse 67   Temp 98  F (36.7  C) (Tympanic)   Ht 1.829 m (6')   Wt 99.8 kg (220 lb)   BMI 29.84 kg/m        Physical Exam:    General: The patient appears to be in no distress and in good spirits.  Vascular exam: Neurovascular status unchanged with < 3 sec capillary refill to all digits.  Positive pedal pulses and epicritic sensations intact with no evidence of allodynia.  The skin is intact and the ulcers appear to have fully epithelialized.     Assessment: Type 2 diabetes with peripheral vascular disease and recent  ulceration on the right and left foot. S/p amputation of toe left foot.    Plan: At this point the patient was instructed to continue with normal activity wearing his custom diabetic shoes.    Disclaimer: This note consists of symbols derived from keyboarding, dictation and/or voice recognition software. As a result, there may be errors in the script that have gone undetected. Please consider this when interpreting information found in this chart.       LIANG Montesinos D.P.M., F.A.C.F.A.S.      Again, thank you for allowing me to participate in the care of your patient.        Sincerely,        Ethan Montesinos DPM

## 2020-07-06 NOTE — NURSING NOTE
Chief Complaint   Patient presents with     Wound Check     left foot open wound       Initial /71   Pulse 67   Temp 98  F (36.7  C) (Tympanic)   Ht 1.829 m (6')   Wt 99.8 kg (220 lb)   BMI 29.84 kg/m   Estimated body mass index is 29.84 kg/m  as calculated from the following:    Height as of this encounter: 1.829 m (6').    Weight as of this encounter: 99.8 kg (220 lb).  Medications and allergies reviewed.      Dana GUILLERMO MA

## 2020-07-06 NOTE — NURSING NOTE
Chief Complaint   Patient presents with     Wound Check     left foot small bruise and wrinkling of skin where great toe was amputated       Initial /71   Pulse 67   Temp 98  F (36.7  C) (Tympanic)   Ht 1.829 m (6')   Wt 99.8 kg (220 lb)   BMI 29.84 kg/m   Estimated body mass index is 29.84 kg/m  as calculated from the following:    Height as of this encounter: 1.829 m (6').    Weight as of this encounter: 99.8 kg (220 lb).  Medications and allergies reviewed.      Dana GUILLERMO MA

## 2020-07-06 NOTE — PROGRESS NOTES
Fawad presents to clinic for reevaluation of the   the left foot.  The patient is concerned about the look of the left foot where the great toe was amputated.  Patient denies any fever chills redness or swelling.    /71   Pulse 67   Temp 98  F (36.7  C) (Tympanic)   Ht 1.829 m (6')   Wt 99.8 kg (220 lb)   BMI 29.84 kg/m        Physical Exam:    General: The patient appears to be in no distress and in good spirits.  Vascular exam: Neurovascular status unchanged with < 3 sec capillary refill to all digits.  Positive pedal pulses and epicritic sensations intact with no evidence of allodynia.  The skin is intact and the ulcers appear to have fully epithelialized.     Assessment: Type 2 diabetes with peripheral vascular disease and recent  ulceration on the right and left foot. S/p amputation of toe left foot.    Plan: At this point the patient was instructed to continue with normal activity wearing his custom diabetic shoes.    Disclaimer: This note consists of symbols derived from keyboarding, dictation and/or voice recognition software. As a result, there may be errors in the script that have gone undetected. Please consider this when interpreting information found in this chart.       LIANG Montesinos D.P.M., [email or web address].

## 2020-07-16 ENCOUNTER — MEDICAL CORRESPONDENCE (OUTPATIENT)
Dept: HEALTH INFORMATION MANAGEMENT | Facility: CLINIC | Age: 85
End: 2020-07-16

## 2020-07-16 ENCOUNTER — TELEPHONE (OUTPATIENT)
Dept: FAMILY MEDICINE | Facility: CLINIC | Age: 85
End: 2020-07-16

## 2020-07-21 ENCOUNTER — MEDICAL CORRESPONDENCE (OUTPATIENT)
Dept: HEALTH INFORMATION MANAGEMENT | Facility: CLINIC | Age: 85
End: 2020-07-21

## 2020-08-11 ENCOUNTER — OFFICE VISIT (OUTPATIENT)
Dept: FAMILY MEDICINE | Facility: CLINIC | Age: 85
End: 2020-08-11
Payer: COMMERCIAL

## 2020-08-11 VITALS
HEART RATE: 91 BPM | WEIGHT: 220 LBS | RESPIRATION RATE: 18 BRPM | BODY MASS INDEX: 29.8 KG/M2 | DIASTOLIC BLOOD PRESSURE: 60 MMHG | SYSTOLIC BLOOD PRESSURE: 102 MMHG | HEIGHT: 72 IN | OXYGEN SATURATION: 97 % | TEMPERATURE: 98.1 F

## 2020-08-11 DIAGNOSIS — E11.3293 TYPE 2 DIABETES MELLITUS WITH BOTH EYES AFFECTED BY MILD NONPROLIFERATIVE RETINOPATHY WITHOUT MACULAR EDEMA, WITH LONG-TERM CURRENT USE OF INSULIN (H): Primary | ICD-10-CM

## 2020-08-11 DIAGNOSIS — Z79.4 TYPE 2 DIABETES MELLITUS WITH BOTH EYES AFFECTED BY MILD NONPROLIFERATIVE RETINOPATHY WITHOUT MACULAR EDEMA, WITH LONG-TERM CURRENT USE OF INSULIN (H): Primary | ICD-10-CM

## 2020-08-11 PROCEDURE — 99213 OFFICE O/P EST LOW 20 MIN: CPT | Performed by: FAMILY MEDICINE

## 2020-08-11 ASSESSMENT — MIFFLIN-ST. JEOR: SCORE: 1730.91

## 2020-08-11 NOTE — PATIENT INSTRUCTIONS
You do not need Hep B immunization.    You will be getting your labs done in September, this includes PSA test for cancer, hemoglobin a1c and urine micro albumin.    You do not need any further testing for cancer.    You can consider to get the Shringrex vaccine that is your decision.        Thank you for choosing [facility].  You may be receiving an email and/or telephone survey request from [facility] Customer Experience regarding your visit today.  Please take a few minutes to respond to the survey to let us know how we are doing.      If you have questions or concerns, please contact us via [employer] or you can contact your care team at [phone].    Our Clinic hours are:  Monday 6:40 am  to 7:00 pm  Tuesday -Friday 6:40 am to 5:00 pm    The [place] outpatient lab hours are:  Monday - Friday 6:10 am to 4:45 pm  Saturdays 7:00 am to 11:00 am  Appointments are required, call [phone]    If you have clinical questions after hours or would like to schedule an appointment,  call the clinic at [phone].

## 2020-08-11 NOTE — PROGRESS NOTES
Subjective     Fawad Garcia is a 84 year old male who presents to clinic today for the following health issues:    HPI       Chief Complaint   Patient presents with     Patient Request     Patient has questions about getting Hep B Adult; cancer screening through Medicare; Health Maintenance states due for kidney microalbumin; PSA tumor marker.       Son brought patient in to review health maintainence list.        Reviewed and updated as needed this visit by Provider         Review of Systems         Objective    /60 (BP Location: Left arm, Patient Position: Sitting, Cuff Size: Adult Regular)   Pulse 91   Temp 98.1  F (36.7  C) (Tympanic)   Resp 18   Ht 1.829 m (6')   Wt 99.8 kg (220 lb)   SpO2 97%   BMI 29.84 kg/m    Body mass index is 29.84 kg/m .  Physical Exam               Assessment & Plan     (E11.3293,  Z79.4) Type 2 diabetes mellitus with both eyes affected by mild nonproliferative retinopathy without macular edema, with long-term current use of insulin (H)  (primary encounter diagnosis)  Comment: reviewed his Epic health Maintenance record.  He does not need hep b, declined shingles vaccine.  He has turmor marker test for psa set up, also diabetic labs in 9/2020. He is up to date on everything.   Plan: Albumin Random Urine Quantitative with Creat         Ratio                 Counseling/Coordination of care is over 10 min in 15 min appt.      Return in about 4 weeks (around 9/8/2020) for Lab Work.    Chris Scott MD  [facility]

## 2020-08-12 ENCOUNTER — TRANSFERRED RECORDS (OUTPATIENT)
Dept: HEALTH INFORMATION MANAGEMENT | Facility: CLINIC | Age: 85
End: 2020-08-12

## 2020-08-12 LAB — RETINOPATHY: POSITIVE

## 2020-08-12 NOTE — TELEPHONE ENCOUNTER
Requested Prescriptions   Pending Prescriptions Disp Refills     levofloxacin 500 MG PO tablet 10 tablet 0     Sig: Take 1 tablet (500 mg) by mouth daily       There is no refill protocol information for this order        Pt only has enough for one day has been out since 2/13    Last Written Prescription Date:    Last Fill Quantity: ,  # refills:   Last office visit: 2/10/2020 with prescribing provider:  Yamel Garza Office Visit:   Next 5 appointments (look out 90 days)    Mar 04, 2020  4:20 PM CST  SHORT with Chris Scott MD  [facility]  Arrive at: Clinic [facility] [address] [facility] [id number] [phone]            no

## 2020-08-31 DIAGNOSIS — E11.51 TYPE 2 DIABETES MELLITUS WITH DIABETIC PERIPHERAL ANGIOPATHY WITHOUT GANGRENE (H): ICD-10-CM

## 2020-08-31 RX ORDER — LANCETS
EACH MISCELLANEOUS
Qty: 100 EACH | Refills: 0 | Status: SHIPPED | OUTPATIENT
Start: 2020-08-31 | End: 2021-02-23

## 2020-08-31 NOTE — TELEPHONE ENCOUNTER
"Requested Prescriptions   Pending Prescriptions Disp Refills     blood glucose monitoring (ONE TOUCH ULTRASOFT) lancets [Pharmacy Med Name: OneTouch UltraSoft Lancets] 100 each 0     Si Device daily One touch delica lancets, what ever is covered by insurance.       Diabetic Supplies Protocol Passed - 2020  9:32 AM        Passed - Medication is active on med list        Passed - Patient is 18 years of age or older        Passed - Recent (6 mo) or future (30 days) visit within the authorizing provider's specialty     Patient had office visit in the last 6 months or has a visit in the next 30 days with authorizing provider.  See \"Patient Info\" tab in inbasket, or \"Choose Columns\" in Meds & Orders section of the refill encounter.                 "

## 2020-08-31 NOTE — TELEPHONE ENCOUNTER
"Prescription approved per [facility] Refill Protocol.    Requested Prescriptions   Pending Prescriptions Disp Refills     blood glucose monitoring (ONE TOUCH ULTRASOFT) lancets [Pharmacy Med Name: OneTouch UltraSoft Lancets] 100 each 0     Si Device daily One touch delica lancets, what ever is covered by insurance.       Diabetic Supplies Protocol Passed - 2020 12:53 PM        Passed - Medication is active on med list        Passed - Patient is 18 years of age or older        Passed - Recent (6 mo) or future (30 days) visit within the authorizing provider's specialty     Patient had office visit in the last 6 months or has a visit in the next 30 days with authorizing provider.  See \"Patient Info\" tab in inbasket, or \"Choose Columns\" in Meds & Orders section of the refill encounter.               Radha THOMAS RN, BSN      "

## 2020-09-14 DIAGNOSIS — E11.65 UNCONTROLLED TYPE 2 DIABETES MELLITUS WITH HYPERGLYCEMIA (H): ICD-10-CM

## 2020-09-14 DIAGNOSIS — Z79.4 TYPE 2 DIABETES MELLITUS WITH BOTH EYES AFFECTED BY MILD NONPROLIFERATIVE RETINOPATHY WITHOUT MACULAR EDEMA, WITH LONG-TERM CURRENT USE OF INSULIN (H): ICD-10-CM

## 2020-09-14 DIAGNOSIS — C61 PROSTATE CANCER (H): ICD-10-CM

## 2020-09-14 DIAGNOSIS — E11.3293 TYPE 2 DIABETES MELLITUS WITH BOTH EYES AFFECTED BY MILD NONPROLIFERATIVE RETINOPATHY WITHOUT MACULAR EDEMA, WITH LONG-TERM CURRENT USE OF INSULIN (H): ICD-10-CM

## 2020-09-14 DIAGNOSIS — E11.9 TYPE 2 DIABETES MELLITUS WITHOUT COMPLICATION, WITH LONG-TERM CURRENT USE OF INSULIN (H): ICD-10-CM

## 2020-09-14 DIAGNOSIS — C61 MALIGNANT NEOPLASM OF PROSTATE (H): ICD-10-CM

## 2020-09-14 DIAGNOSIS — Z79.4 TYPE 2 DIABETES MELLITUS WITHOUT COMPLICATION, WITH LONG-TERM CURRENT USE OF INSULIN (H): ICD-10-CM

## 2020-09-14 LAB
CREAT UR-MCNC: 37 MG/DL
HBA1C MFR BLD: 7.9 % (ref 0–5.6)
MICROALBUMIN UR-MCNC: 90 MG/L
MICROALBUMIN/CREAT UR: 243.24 MG/G CR (ref 0–17)
PSA SERPL-MCNC: 1.53 UG/L (ref 0–4)

## 2020-09-14 PROCEDURE — 36415 COLL VENOUS BLD VENIPUNCTURE: CPT | Performed by: NURSE PRACTITIONER

## 2020-09-14 PROCEDURE — 83036 HEMOGLOBIN GLYCOSYLATED A1C: CPT | Performed by: FAMILY MEDICINE

## 2020-09-14 PROCEDURE — 84153 ASSAY OF PSA TOTAL: CPT | Performed by: NURSE PRACTITIONER

## 2020-09-14 PROCEDURE — 82043 UR ALBUMIN QUANTITATIVE: CPT | Performed by: FAMILY MEDICINE

## 2020-09-21 ENCOUNTER — OFFICE VISIT (OUTPATIENT)
Dept: RADIATION THERAPY | Facility: OUTPATIENT CENTER | Age: 85
End: 2020-09-21
Payer: COMMERCIAL

## 2020-09-21 VITALS
RESPIRATION RATE: 16 BRPM | BODY MASS INDEX: 29.51 KG/M2 | HEART RATE: 61 BPM | WEIGHT: 217.6 LBS | DIASTOLIC BLOOD PRESSURE: 80 MMHG | SYSTOLIC BLOOD PRESSURE: 167 MMHG

## 2020-09-21 DIAGNOSIS — C61 MALIGNANT NEOPLASM OF PROSTATE (H): Primary | ICD-10-CM

## 2020-09-21 NOTE — NURSING NOTE
FOLLOW-UP VISIT    Patient Name: Fawad Garcia      : 1935     Age: 84 year old        ______________________________________________________________________________     Chief Complaint   Patient presents with     Radiation Therapy     Return visit, prostate cancer     BP (!) 167/80 (BP Location: Left arm, Cuff Size: Adult Large)   Pulse 61   Resp 16   Wt 98.7 kg (217 lb 9.6 oz)   BMI 29.51 kg/m       Date Radiation Completed: 2/10/2012    Pain  Denies    Meds  Current Med List Reviewed: Yes      PSA   Date Value Ref Range Status   2020 1.53 0 - 4 ug/L Final     Comment:     Assay Method:  Chemiluminescence using Siemens Vista analyzer   2020 1.23 0 - 4 ug/L Final     Comment:     Assay Method:  Chemiluminescence using Siemens Vista analyzer   2019 1.08 0 - 4 ug/L Final     Comment:     Assay Method:  Chemiluminescence using Siemens Vista analyzer   03/15/2019 1.10 0 - 4 ug/L Final     Comment:     Assay Method:  Chemiluminescence using Siemens Vista analyzer   2018 0.79 0 - 4 ug/L Final     Comment:     Assay Method:  Chemiluminescence using Siemens Vista analyzer   2017 0.52 0 - 4 ug/L Final     Comment:     Assay Method:  Chemiluminescence using Siemens Vista analyzer       Bowel: Normal    Bladder: nocturia and frequency  Nocturia: 3 - Once every 3 hours    Energy Level: normal    Appointments:   Urologist:       Other Notes: did trial flomax some time ago, did not tolerate med. Took 3 or 4 times.

## 2020-09-21 NOTE — PROGRESS NOTES
Department of Radiation Oncology  Radiation Therapy Center  [facility] [place] [address]  [phone]       Radiation Oncology Follow-up Visit  2020    Fawad Garcia  MRN: [id number]   : 1935     DISEASE TREATED: High-risk prostate cancer. Pretreatment PSA 25.5, Chaka score of 4+3=7, clinical stage [id number].     RADIATION THERAPY GIVEN: 8041 cGy in 43 planned treatments, via IMRT     INTERVAL SINCE COMPLETION OF THERAPY: 8 years since completion on 02/10/2012.     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 He underwent biopsies in  and  and pathology was negative for malignancy. Due to the persistently rising PSA Dr. Mcintyre performed an MRI-guided biopsy of the prostate gland which revealed [place] 4+3= 7 disease in 2 of 2 cores with 90% of the cores being positive in the right inferior base and right inferior mid gland. Androgen ablation was discussed with the patient due to his high-risk disease and the patient declined due to comorbidities of the drug. Overall, he completed radiotherapy with very little toxicity.     SUBJECTIVE:   Fawad Garcia is a 84 year old male who is scheduled today for routine follow-up.      PSA on 20 was 1.53, up from prior value of 1.23 (zohaib =0.24).    Today, he denies any pressing issues or complaints and reports that all symptoms have essentially remained stable since his last visit with us 6 months ago. AUA and MARINA not completed today (TC). Prior  AUA 18/35 (patient reported overall stable) and MARINA was 0/25. Main urinary symptoms continue to be nocturia and urgency. Patient states symptoms are worse with increased intake of fluids. Denies drinking alcohol, and has minimal caffeine. He denies any dysuria, hematuria, hematochezia, diarrhea, or loose stools.  He  denies any pain or swelling.     Energy level is good and baseline.              Current Outpatient Medications   Medication     amLODIPine (NORVASC) 5 MG tablet     Blood Glucose Monitoring Suppl (ONE TOUCH ULTRA SYSTEM KIT) W/DEVICE KIT     clopidogrel (PLAVIX) 75 MG tablet     dorzolamide-timolol (COSOPT) 2-0.5 % ophthalmic solution     hydrochlorothiazide (HYDRODIURIL) 25 MG tablet     insulin glargine (LANTUS SOLOSTAR) 100 UNIT/ML pen     insulin pen needle (B-D U/F) 31G X 8 MM miscellaneous     levofloxacin (LEVAQUIN) 500 MG tablet     lisinopril (PRINIVIL/ZESTRIL) 40 MG tablet     metFORMIN (GLUCOPHAGE) 500 MG tablet     Multiple Vitamins-Minerals (PRESERVISION AREDS PO)     ONETOUCH LANCETS [facility]     ONETOUCH ULTRA test strip     order for DME     ORDER FOR DME     pantoprazole (PROTONIX) 20 MG EC tablet     rosuvastatin (CRESTOR) 10 MG tablet     sulfamethoxazole-trimethoprim (BACTRIM DS) 800-160 MG tablet     No current facility-administered medications for this visit.           Allergies   Allergen Reactions     Zocor [Simvastatin - High Dose]      Bilateral hip aching       Past Medical History:   Diagnosis Date     Diabetes mellitus (H)      Hypertension      Squamous cell carcinoma          PHYSICAL EXAM:  BP (!) 167/80 (BP Location: Left arm, Cuff Size: Adult Large)   Pulse 61   Resp 16   Wt 98.7 kg (217 lb 9.6 oz)   BMI 29.51 kg/m    No apparent distress       LABS AND IMAGING:  Reviewed.  PSA   Date Value Ref Range Status   09/14/2020 1.53 0 - 4 ug/L Final     Comment:     Assay Method:  Chemiluminescence using Siemens Vista analyzer   03/02/2020 1.23 0 - 4 ug/L Final     Comment:     Assay Method:  Chemiluminescence using Siemens Vista analyzer   09/12/2019 1.08 0 - 4 ug/L Final     Comment:     Assay Method:  Chemiluminescence using Siemens Vista analyzer   03/15/2019 1.10 0 - 4 ug/L Final     Comment:     Assay Method:  Chemiluminescence using Siemens Vista analyzer   03/09/2018 0.79 0 - 4  ug/L Final     Comment:     Assay Method:  Chemiluminescence using Siemens Vista analyzer           IMPRESSION:   Mr. Garcia is a 84 year old male with a high-risk prostate cancer. Pretreatment PSA 25.5, Chaka score of 4+3=7, clinical stage [id number]. Complete RT 2/10/2012 8041 cGy in 43 planned treatments, via IMRT.     PSA 3/2/2020 at 1.23, increased from last PSA on 9/12/19 which was 1.08, and prior value of 1.10. Has not met [place] definition of failure from treatment (Zohaib = 0.24).    PLAN:     1. PSA increased from last PSA but does not yet met [place] definition of biochemical failure (2.0 above zohaib with zohaib of 0.24). Biochemical failure would be 2.24. Will continue to closely monitor at this time.      2. PSA in 6 months. RTC thereafter.       Kieran Hua M.D.  Department of Radiation Oncology  [facility]

## 2020-09-21 NOTE — LETTER
2020         RE: Fawad Garcia  [address] [facility] [address]        Dear Colleague,    Thank you for referring your patient, Fawad Garcia, to the RADIATION THERAPY CENTER. Please see a copy of my visit note below.       Department of Radiation Oncology  Radiation Therapy Center  [facility]  [place] [address]  [phone]       Radiation Oncology Follow-up Visit  2020    Fawad Garcia  MRN: [id number]   : 1935     DISEASE TREATED: High-risk prostate cancer. Pretreatment PSA 25.5, Chaka score of 4+3=7, clinical stage [id number].     RADIATION THERAPY GIVEN: 8041 cGy in 43 planned treatments, via IMRT     INTERVAL SINCE COMPLETION OF THERAPY: 8 years since completion on 02/10/2012.     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 He underwent biopsies in  and  and pathology was negative for malignancy. Due to the persistently rising PSA Dr. Mcintyre performed an MRI-guided biopsy of the prostate gland which revealed [place] 4+3= 7 disease in 2 of 2 cores with 90% of the cores being positive in the right inferior base and right inferior mid gland. Androgen ablation was discussed with the patient due to his high-risk disease and the patient declined due to comorbidities of the drug. Overall, he completed radiotherapy with very little toxicity.     SUBJECTIVE:   Fawad Garcia is a 84 year old male who is scheduled today for routine follow-up.      PSA on 20 was 1.53, up from prior value of 1.23 (zohaib =0.24).    Today, he denies any pressing issues or complaints and reports that all symptoms have essentially remained stable since his last visit with us 6 months ago. AUA and MARINA not completed today (TC). Prior  AUA 18/35 (patient reported overall stable) and MARINA was 0/25. Main  urinary symptoms continue to be nocturia and urgency. Patient states symptoms are worse with increased intake of fluids. Denies drinking alcohol, and has minimal caffeine. He denies any dysuria, hematuria, hematochezia, diarrhea, or loose stools.  He denies any pain or swelling.     Energy level is good and baseline.              Current Outpatient Medications   Medication     amLODIPine (NORVASC) 5 MG tablet     Blood Glucose Monitoring Suppl (ONE TOUCH ULTRA SYSTEM KIT) W/DEVICE KIT     clopidogrel (PLAVIX) 75 MG tablet     dorzolamide-timolol (COSOPT) 2-0.5 % ophthalmic solution     hydrochlorothiazide (HYDRODIURIL) 25 MG tablet     insulin glargine (LANTUS SOLOSTAR) 100 UNIT/ML pen     insulin pen needle (B-D U/F) 31G X 8 MM miscellaneous     levofloxacin (LEVAQUIN) 500 MG tablet     lisinopril (PRINIVIL/ZESTRIL) 40 MG tablet     metFORMIN (GLUCOPHAGE) 500 MG tablet     Multiple Vitamins-Minerals (PRESERVISION AREDS PO)     ONETOUCH LANCETS [facility]     ONETOUCH ULTRA test strip     order for DME     ORDER FOR DME     pantoprazole (PROTONIX) 20 MG EC tablet     rosuvastatin (CRESTOR) 10 MG tablet     sulfamethoxazole-trimethoprim (BACTRIM DS) 800-160 MG tablet     No current facility-administered medications for this visit.           Allergies   Allergen Reactions     Zocor [Simvastatin - High Dose]      Bilateral hip aching       Past Medical History:   Diagnosis Date     Diabetes mellitus (H)      Hypertension      Squamous cell carcinoma          PHYSICAL EXAM:  BP (!) 167/80 (BP Location: Left arm, Cuff Size: Adult Large)   Pulse 61   Resp 16   Wt 98.7 kg (217 lb 9.6 oz)   BMI 29.51 kg/m    No apparent distress       LABS AND IMAGING:  Reviewed.  PSA   Date Value Ref Range Status   09/14/2020 1.53 0 - 4 ug/L Final     Comment:     Assay Method:  Chemiluminescence using Siemens Vista analyzer   03/02/2020 1.23 0 - 4 ug/L Final     Comment:     Assay Method:  Chemiluminescence using Siemens Vista analyzer    09/12/2019 1.08 0 - 4 ug/L Final     Comment:     Assay Method:  Chemiluminescence using Siemens Vista analyzer   03/15/2019 1.10 0 - 4 ug/L Final     Comment:     Assay Method:  Chemiluminescence using Siemens Vista analyzer   03/09/2018 0.79 0 - 4 ug/L Final     Comment:     Assay Method:  Chemiluminescence using Siemens Vista analyzer           IMPRESSION:   Mr. Garcia is a 84 year old male with a high-risk prostate cancer. Pretreatment PSA 25.5, Chaka score of 4+3=7, clinical stage [id number]. Complete RT 2/10/2012 8041 cGy in 43 planned treatments, via IMRT.     PSA 3/2/2020 at 1.23, increased from last PSA on 9/12/19 which was 1.08, and prior value of 1.10. Has not met [place] definition of failure from treatment (Zohaib = 0.24).    PLAN:     1. PSA increased from last PSA but does not yet met [place] definition of biochemical failure (2.0 above zohaib with zohaib of 0.24). Biochemical failure would be 2.24. Will continue to closely monitor at this time.      2. PSA in 6 months. RTC thereafter.       Keiran Hua M.D.  Department of Radiation Oncology  [facility]

## 2020-09-30 ENCOUNTER — VIRTUAL VISIT (OUTPATIENT)
Dept: FAMILY MEDICINE | Facility: CLINIC | Age: 85
End: 2020-09-30
Payer: COMMERCIAL

## 2020-09-30 DIAGNOSIS — I10 HYPERTENSION, GOAL BELOW 140/90: ICD-10-CM

## 2020-09-30 DIAGNOSIS — E11.3293 TYPE 2 DIABETES MELLITUS WITH BOTH EYES AFFECTED BY MILD NONPROLIFERATIVE RETINOPATHY WITHOUT MACULAR EDEMA, WITH LONG-TERM CURRENT USE OF INSULIN (H): Primary | ICD-10-CM

## 2020-09-30 DIAGNOSIS — Z79.4 TYPE 2 DIABETES MELLITUS WITH BOTH EYES AFFECTED BY MILD NONPROLIFERATIVE RETINOPATHY WITHOUT MACULAR EDEMA, WITH LONG-TERM CURRENT USE OF INSULIN (H): Primary | ICD-10-CM

## 2020-09-30 PROCEDURE — 99442 ZZC PHYSICIAN TELEPHONE EVALUATION 11-20 MIN: CPT | Mod: 95 | Performed by: FAMILY MEDICINE

## 2020-09-30 NOTE — PROGRESS NOTES
"Fawad Garcia is a 84 year old male who is being evaluated via a billable telephone visit.      The patient has been notified of following:     \"This telephone visit will be conducted via a call between you and your physician/provider. We have found that certain health care needs can be provided without the need for a physical exam.  This service lets us provide the care you need with a short phone conversation.  If a prescription is necessary we can send it directly to your pharmacy.  If lab work is needed we can place an order for that and you can then stop by our lab to have the test done at a later time.    Telephone visits are billed at different rates depending on your insurance coverage. During this emergency period, for some insurers they may be billed the same as an in-person visit.  Please reach out to your insurance provider with any questions.    If during the course of the call the physician/provider feels a telephone visit is not appropriate, you will not be charged for this service.\"    Patient has given verbal consent for Telephone visit?  Yes    What phone number would you like to be contacted at? [phone]    How would you like to obtain your AVS? Kaylen Mario     Fawad Garcia is a 84 year old male who presents via phone visit today for the following health issues:    HPI    Diabetes Follow-up    How often are you checking your blood sugar? One time daily  What time of day are you checking your blood sugars (select all that apply)?  Before breakfast around 9:00 a.m.  Ranges 100-130  Have you had any blood sugars above 200?  No  Have you had any blood sugars below 70?  A month or two ago may have had a low reading;     What symptoms do you notice when your blood sugar is low?  Shaky and sweaty in middle of night and usually eat  Bowl of cereal.    What concerns do you have today about your diabetes? None     Do you have any of these symptoms? (Select all that apply)  No " numbness or tingling in feet.  No redness, sores or blisters on feet.  No complaints of excessive thirst.  No reports of blurry vision.  No significant changes to weight. Usually drinks 2-3 glasses of water throughout day.    Have you had a diabetic eye exam in the last 12 months? Yes- Date of last eye exam: [facility], [place]; also sees Dr. Chris Acosta, retinologist, [place], [place]      BP Readings from Last 2 Encounters:   09/21/20 (!) 167/80   08/11/20 102/60     Hemoglobin A1C (%)   Date Value   09/14/2020 7.9 (H)   06/15/2020 8.1 (H)     LDL Cholesterol Calculated (mg/dL)   Date Value   03/02/2020 60   02/26/2020 85           How many servings of fruits and vegetables do you eat daily?  2-3    On average, how many sweetened beverages do you drink each day (Examples: soda, juice, sweet tea, etc.  Do NOT count diet or artificially sweetened beverages)?   0    How many days per week do you exercise enough to make your heart beat faster? 3 or less; does enjoy golfing     How many minutes a day do you exercise enough to make your heart beat faster? 9 or less    How many days per week do you miss taking your medication? 0             Review of Systems   Skin; feet are doing well.  resp neg  Cardiac neg       Objective          Vitals:  No vitals were obtained today due to virtual visit.    healthy, alert and no distress  PSYCH: Alert and oriented times 3; coherent speech, normal   rate and volume, able to articulate logical thoughts, able   to abstract reason, no tangential thoughts, no hallucinations   or delusions  His affect is normal  RESP: No cough, no audible wheezing, able to talk in full sentences  Remainder of exam unable to be completed due to telephone visits    Reviewed his recent labs for hgba1c and urine microalbumin        Assessment/Plan:    Assessment & Plan     Type 2 diabetes mellitus with both eyes affected by mild nonproliferative retinopathy without macular edema, with long-term  current use of insulin (H)  Borderline control, will not change medication since he reports have some lower blood sugars.  Try to increase activity as he is able to do and follow up in 3 months with lab, rn blood pressure check  - Albumin Random Urine Quantitative with Creat Ratio; Future  - Hemoglobin A1c; Future     HTN not controlled, last office visit only checked once, will have him do a RN bp check at labs, has been controlled well in the clinic previously.    BMI:   Estimated body mass index is 29.51 kg/m  as calculated from the following:    Height as of 8/11/20: 1.829 m (6').    Weight as of 9/21/20: 98.7 kg (217 lb 9.6 oz).           See Patient Instructions    Return in about 3 months (around 12/30/2020) for Telephone visit, Nurse visit for a blood pressure check, Lab Work.    Chris Scott MD  [facility]    Phone call duration:  11 minutes

## 2020-09-30 NOTE — PATIENT INSTRUCTIONS
Your hemoglobin a1c has improved slightly.    Please do a nurse blood pressure check in 3 months.    Please recheck your lab with urine and hemoglobin a1c in 3 months.    Follow up with a telephone visit after you get your labs and blood pressure.        Thank you for choosing [facility].  You may be receiving an email and/or telephone survey request from [facility] Customer Experience regarding your visit today.  Please take a few minutes to respond to the survey to let us know how we are doing.      If you have questions or concerns, please contact us via [employer] or you can contact your care team at [phone].    Our Clinic hours are:  Monday 6:40 am  to 7:00 pm  Tuesday -Friday 6:40 am to 5:00 pm    The [place] outpatient lab hours are:  Monday - Friday 6:10 am to 4:45 pm  Saturdays 7:00 am to 11:00 am  Appointments are required, call [phone]    If you have clinical questions after hours or would like to schedule an appointment,  call the clinic at [phone].

## 2020-10-05 ENCOUNTER — ALLIED HEALTH/NURSE VISIT (OUTPATIENT)
Dept: FAMILY MEDICINE | Facility: CLINIC | Age: 85
End: 2020-10-05
Payer: COMMERCIAL

## 2020-10-05 ENCOUNTER — TELEPHONE (OUTPATIENT)
Dept: FAMILY MEDICINE | Facility: CLINIC | Age: 85
End: 2020-10-05

## 2020-10-05 DIAGNOSIS — Z23 NEED FOR PROPHYLACTIC VACCINATION AND INOCULATION AGAINST INFLUENZA: Primary | ICD-10-CM

## 2020-10-05 DIAGNOSIS — E11.9 TYPE 2 DIABETES MELLITUS WITHOUT COMPLICATION, WITH LONG-TERM CURRENT USE OF INSULIN (H): ICD-10-CM

## 2020-10-05 DIAGNOSIS — Z79.4 TYPE 2 DIABETES MELLITUS WITHOUT COMPLICATION, WITH LONG-TERM CURRENT USE OF INSULIN (H): ICD-10-CM

## 2020-10-05 PROCEDURE — 90662 IIV NO PRSV INCREASED AG IM: CPT

## 2020-10-05 PROCEDURE — G0008 ADMIN INFLUENZA VIRUS VAC: HCPCS

## 2020-10-05 NOTE — TELEPHONE ENCOUNTER
Reason for Call:  Other appointment    Detailed comments: Patient needs flu shot on Thu, Oct 8 at 3:30 pm because it's the only time he has a ride. Can he be accomodated?    Phone Number Patient can be reached at: Home number on file [phone] (home)    Best Time: Any    Can we leave a detailed message on this number? YES    Call taken on 10/5/2020 at 11:32 AM by Jazzmine Camacho

## 2020-10-05 NOTE — TELEPHONE ENCOUNTER
"Requested Prescriptions   Pending Prescriptions Disp Refills     B-D U/F 31G X 8 MM insulin pen needle [Pharmacy Med Name: BD Ultra-Fine Short Pen Needle 31 gauge x \"]  0     Si Device daily Use once daily or as directed.       Diabetic Supplies Protocol Passed - 10/5/2020  6:05 AM        Passed - Medication is active on med list        Passed - Patient is 18 years of age or older        Passed - Recent (6 mo) or future (30 days) visit within the authorizing provider's specialty     Patient had office visit in the last 6 months or has a visit in the next 30 days with authorizing provider.  See \"Patient Info\" tab in inbasket, or \"Choose Columns\" in Meds & Orders section of the refill encounter.                 "

## 2020-10-06 RX ORDER — PEN NEEDLE, DIABETIC 31 GX5/16"
NEEDLE, DISPOSABLE MISCELLANEOUS
Qty: 100 EACH | Refills: 0 | Status: SHIPPED | OUTPATIENT
Start: 2020-10-06 | End: 2021-08-05

## 2020-10-19 ENCOUNTER — MYC MEDICAL ADVICE (OUTPATIENT)
Dept: FAMILY MEDICINE | Facility: CLINIC | Age: 85
End: 2020-10-19

## 2020-10-21 ENCOUNTER — OFFICE VISIT (OUTPATIENT)
Dept: FAMILY MEDICINE | Facility: CLINIC | Age: 85
End: 2020-10-21
Payer: COMMERCIAL

## 2020-10-21 VITALS
BODY MASS INDEX: 28.31 KG/M2 | SYSTOLIC BLOOD PRESSURE: 128 MMHG | DIASTOLIC BLOOD PRESSURE: 78 MMHG | HEIGHT: 72 IN | HEART RATE: 60 BPM | OXYGEN SATURATION: 99 % | TEMPERATURE: 97.2 F | RESPIRATION RATE: 16 BRPM | WEIGHT: 209 LBS

## 2020-10-21 DIAGNOSIS — M54.32 SCIATICA OF LEFT SIDE: Primary | ICD-10-CM

## 2020-10-21 PROCEDURE — 99213 OFFICE O/P EST LOW 20 MIN: CPT | Performed by: FAMILY MEDICINE

## 2020-10-21 RX ORDER — METHOCARBAMOL 750 MG/1
750 TABLET, FILM COATED ORAL 4 TIMES DAILY PRN
Qty: 40 TABLET | Refills: 0 | Status: ON HOLD | OUTPATIENT
Start: 2020-10-21 | End: 2021-07-12

## 2020-10-21 ASSESSMENT — MIFFLIN-ST. JEOR: SCORE: 1681.02

## 2020-10-21 NOTE — PROGRESS NOTES
Subjective     Fawad Garcia is a 84 year old male who presents to clinic today for the following health issues:    HPI         Musculoskeletal problem/pain  Onset/Duration: For about a month now, has been bothering him on and off   Description  Location:  Starts in hip and goes all the way down to feet - left  Joint Swelling: no  Redness: no  Pain: YES  Warmth: no  Intensity:  Moderate- severe  Progression of Symptoms:  worsening  Accompanying signs and symptoms:   Fevers: no  Numbness/tingling/weakness: no  History  Trauma to the area: YES- about a month ago was doing some yard work more so then normal and now ever since standing and walking irritates him.  Recent illness:  no  Previous similar problem: no  Previous evaluation:  no  Precipitating or alleviating factors:  Aggravating factors include: standing, walking, climbing stairs and overuse  Therapies tried and outcome: rest/inactivity    Patient has been having back discomfort with intermittent radiation to his left lower extremity.  He states that when he was playing golf with using a cart was getting worse at the end of the day.  Resting helps.  It goes to the knee.  The leg thing is 2-3 weeks ago.  No problems with sitting at this time.        Review of Systems   Review Of Systems  Skin:   Eyes:   Ears/Nose/Throat:   Respiratory: No shortness of breath, dyspnea on exertion, cough, or hemoptysis  Cardiovascular: negative  Gastrointestinal: negative  Genitourinary: negative  Musculoskeletal: as above  Neurologic: as above  Psychiatric:   Hematologic/Lymphatic/Immunologic:   Endocrine: has diabetes.        Objective    /78   Pulse 60   Temp 97.2  F (36.2  C) (Tympanic)   Resp 16   Ht 1.829 m (6')   Wt 94.8 kg (209 lb)   SpO2 99%   BMI 28.35 kg/m    Body mass index is 28.35 kg/m .  Physical Exam   GENERAL APPEARANCE: healthy, alert and no distress  Patient is pleasant, cooperative and in no acute distress.  Back:  Fawad Garcia had no  difficulty moving from the chair to the exam table.  Mild  tenderness to palpation in the left lumbar region and buttock region.  No skin changes to the area.    mild left sided tenderness to the buttock area.  Straight leg raise was neg.  Flexion at hip was 75 degrees. Able to hyperextend and lean from side to side.  Muscle/Skeletal:  Strength was 5/5 of lower extremities.  Neuro: Reflexes were 1/4 bilaterally at patella and Achilles.              Assessment & Plan     Sciatica of left side  Symptomatic cares were discussed in details.  Do PT, use medication, follow up if not better  - PHYSICAL THERAPY REFERRAL; Future  - methocarbamol (ROBAXIN) 750 MG tablet; Take 1 tablet (750 mg) by mouth 4 times daily as needed for muscle spasms     BMI:   Estimated body mass index is 28.35 kg/m  as calculated from the following:    Height as of this encounter: 1.829 m (6').    Weight as of this encounter: 94.8 kg (209 lb).            See Patient Instructions    Return in about 4 weeks (around 11/18/2020).    Chris Scott MD  [facility]

## 2020-10-21 NOTE — PATIENT INSTRUCTIONS
Please go to physical therapy for your left sciatica.    You may use the methocarbamol 7580 mg every 6 hours as needed for muscle tightness spasm.          Thank you for choosing [facility].  You may be receiving an email and/or telephone survey request from [facility] Customer Experience regarding your visit today.  Please take a few minutes to respond to the survey to let us know how we are doing.      If you have questions or concerns, please contact us via Agenda or you can contact your care team at [phone].    Our Clinic hours are:  Monday 6:40 am  to 7:00 pm  Tuesday -Friday 6:40 am to 5:00 pm    The [place] outpatient lab hours are:  Monday - Friday 6:10 am to 4:45 pm  Saturdays 7:00 am to 11:00 am  Appointments are required, call [phone]    If you have clinical questions after hours or would like to schedule an appointment,  call the clinic at [phone].

## 2020-10-22 ENCOUNTER — TELEPHONE (OUTPATIENT)
Dept: FAMILY MEDICINE | Facility: CLINIC | Age: 85
End: 2020-10-22

## 2020-10-22 DIAGNOSIS — M54.32 SCIATICA OF LEFT SIDE: ICD-10-CM

## 2020-10-22 DIAGNOSIS — H35.30 MACULAR DEGENERATION (SENILE) OF RETINA: Primary | ICD-10-CM

## 2020-10-22 NOTE — TELEPHONE ENCOUNTER
I called [facility], ph [phone], and pt is no longer getting home care with them.      If pt is homebound, taxing for him to leave his home, he may qualify for home PT.    Routed request from pt to provider for home PT.  He was seen yesterday and PT was ordered for left-sided sciatica.    Rosa Hernandez RN

## 2020-10-22 NOTE — TELEPHONE ENCOUNTER
Reason for Call: Request for an order or referral:    Order or referral being requested: Patient is asking if his home care [facility] can do his PT at home  If the orders can be faxed to [phone]    Date needed: at your convenience    Has the patient been seen by the PCP for this problem? YES    Phone number Patient can be reached at:  Home number on file [phone] (home)    Best Time:  any    Can we leave a detailed message on this number?  YES    Call taken on 10/22/2020 at 12:37 PM by Julissa Hill

## 2020-10-23 NOTE — TELEPHONE ENCOUNTER
I did a referral.  I do not know how he gets connected with [facility].  Reason for home PT was due to patient having macular degeneration.  Chris Scott MD  Family Medicine

## 2020-11-05 NOTE — TELEPHONE ENCOUNTER
Refaxed it was faxed to the phone number not the fax. [phone] is the fax.    Julissa Hill on 11/5/2020 at 11:20 AM

## 2020-11-17 ENCOUNTER — TELEPHONE (OUTPATIENT)
Dept: FAMILY MEDICINE | Facility: CLINIC | Age: 85
End: 2020-11-17

## 2020-11-17 NOTE — TELEPHONE ENCOUNTER
Reason for call:    Symptom or request:     Patient called requesting order for homecare for physical therapy; orders were placed 10/22 for outpatient.  [facility] is reporting that [facility] never got the order.     [facility] agency is [facility] Phone [phone] fax # [phone]    Patient is homebound to macular degeneration.     Associated Diagnoses from previous order    Macular degeneration (senile) of retina [H35.30]  - Primary       Sciatica of left side [M54.32]             Best Time:  Any  Can we leave a detailed message on this number?  YES     Michelle CHAVARRIA  Station

## 2020-12-06 ENCOUNTER — HOSPITAL ENCOUNTER (EMERGENCY)
Facility: CLINIC | Age: 85
Discharge: HOME | End: 2020-12-06
Attending: PHYSICIAN ASSISTANT | Admitting: PHYSICIAN ASSISTANT
Payer: COMMERCIAL

## 2020-12-06 VITALS
OXYGEN SATURATION: 99 % | DIASTOLIC BLOOD PRESSURE: 78 MMHG | TEMPERATURE: 97.1 F | HEIGHT: 72 IN | HEART RATE: 67 BPM | SYSTOLIC BLOOD PRESSURE: 174 MMHG | BODY MASS INDEX: 28.44 KG/M2 | WEIGHT: 210 LBS

## 2020-12-06 DIAGNOSIS — S61.411A LACERATION OF RIGHT HAND WITHOUT FOREIGN BODY, INITIAL ENCOUNTER: ICD-10-CM

## 2020-12-06 PROCEDURE — 12005 RPR S/N/A/GEN/TRK12.6-20.0CM: CPT | Performed by: PHYSICIAN ASSISTANT

## 2020-12-06 PROCEDURE — 99212 OFFICE O/P EST SF 10 MIN: CPT | Mod: 25 | Performed by: PHYSICIAN ASSISTANT

## 2020-12-06 PROCEDURE — G0463 HOSPITAL OUTPT CLINIC VISIT: HCPCS | Performed by: PHYSICIAN ASSISTANT

## 2020-12-06 PROCEDURE — 272N000047 HC ADHESIVE DERMABOND SKIN: Performed by: PHYSICIAN ASSISTANT

## 2020-12-06 ASSESSMENT — MIFFLIN-ST. JEOR: SCORE: 1680.55

## 2020-12-06 ASSESSMENT — ENCOUNTER SYMPTOMS
COUGH: 0
CHILLS: 0
FEVER: 0
WHEEZING: 0
LIGHT-HEADEDNESS: 0
DIZZINESS: 0
WOUND: 1
NUMBNESS: 0
WEAKNESS: 0
HEADACHES: 0
NAUSEA: 0
SHORTNESS OF BREATH: 0
COLOR CHANGE: 0
VOMITING: 0
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
## 2020-12-06 NOTE — ED AVS SNAPSHOT
[facility]  [address] [address]  Phone: [phone]  Fax: [phone]                                    Fawad Garcia   MRN: [id number]    Department: [facility]   Date of Visit: 12/6/2020           After Visit Summary Signature Page    I have received my discharge instructions, and my questions have been answered. I have discussed any challenges I see with this plan with the nurse or doctor.    ..........................................................................................................................................  Patient/Patient Representative Signature      ..........................................................................................................................................  Patient Representative Print Name and Relationship to Patient    ..................................................               ................................................  Date                                   Time    ..........................................................................................................................................  Reviewed by Signature/Title    ...................................................              ..............................................  Date                                               Time          22EPIC Rev 08/18

## 2020-12-17 ENCOUNTER — ALLIED HEALTH/NURSE VISIT (OUTPATIENT)
Dept: FAMILY MEDICINE | Facility: CLINIC | Age: 85
End: 2020-12-17
Payer: COMMERCIAL

## 2020-12-17 DIAGNOSIS — Z48.02 VISIT FOR SUTURE REMOVAL: Primary | ICD-10-CM

## 2020-12-17 PROCEDURE — 99207 PR NO CHARGE NURSE ONLY: CPT

## 2020-12-17 NOTE — PATIENT INSTRUCTIONS
Sutured area: Leave steri-strips on over the sutured area until they fall off on their own. You may put Vaseline or Bacitracin on the area to keep the skin moist.     The two open areas on top of your hand: Clean once daily with sterile water, pat dry and apply Bacitracin ointment to open areas until skin is closed. Cover with a non-adherent or Telfa dressing during the day. Do not use tape on your skin. Wrap with a gauze wrap and secure with a self-adhesive wrapping.     Watch for signs and symptoms of infections: increased redness, pain, drainage, fever and call if any symptoms are present. [phone].

## 2020-12-17 NOTE — PROGRESS NOTES
S-(situation): patient presents for suture removal of the right hand.    B-(background): 12/6/20 Emergency Department dictation:  SKIN REPAIR     Repair method:  Sutures and tissue adhesive (tissue adhesive used to tack down skin tear)    Suture size:  5-0    Suture material:  Nylon    Suture technique:  Simple interrupted    Number of sutures:  8   He was discharged home stable with instructions for suture removal in 10 days.  Suture care instructions, signs of infection, worrisome reasons to return to the ER/UC sooner discussed.    A-(assessment): Fawad Garcia presents to the clinic today for removal of sutures.  The patient has had the sutures in place for 11 days.  There has been no history of infection.   8 sutures are seen located on the right hand between the thumb and index finger.  The wound is healing well with no signs of infection.  Tetanus status is up to date.   All sutures were removed today.    Patient also had two open areas to the top of the right hand, which have granular tissue, have some drainage on the non-adherent pad when old bandage was removed. Dr. Pa assessed the wounds and advised cleanse area, pat dry, Bacitracin daily to open areas until healed, cover with a non-adherent Telfa dressing. This writer cleansed areas with saline, pat dry, applied Bacitracin to open wounds to right hand, covered with a non-adherent Telfa dressing and wrapped with roll gauze and secured with self-adhesive bandage.       R-(recommendations): Routine wound care discussed. Advised of signs and symptoms of infection and to call with questions or concerns. The patient will follow up as needed.

## 2020-12-21 DIAGNOSIS — Z79.4 TYPE 2 DIABETES MELLITUS WITH BOTH EYES AFFECTED BY MILD NONPROLIFERATIVE RETINOPATHY WITHOUT MACULAR EDEMA, WITH LONG-TERM CURRENT USE OF INSULIN (H): Primary | ICD-10-CM

## 2020-12-21 DIAGNOSIS — E11.29 TYPE 2 DIABETES MELLITUS WITH MICROALBUMINURIA, WITH LONG-TERM CURRENT USE OF INSULIN (H): Primary | ICD-10-CM

## 2020-12-21 DIAGNOSIS — Z79.4 TYPE 2 DIABETES MELLITUS WITH MICROALBUMINURIA, WITH LONG-TERM CURRENT USE OF INSULIN (H): Primary | ICD-10-CM

## 2020-12-21 DIAGNOSIS — Z79.4 TYPE 2 DIABETES MELLITUS WITH BOTH EYES AFFECTED BY MILD NONPROLIFERATIVE RETINOPATHY WITHOUT MACULAR EDEMA, WITH LONG-TERM CURRENT USE OF INSULIN (H): ICD-10-CM

## 2020-12-21 DIAGNOSIS — R80.9 TYPE 2 DIABETES MELLITUS WITH MICROALBUMINURIA, WITH LONG-TERM CURRENT USE OF INSULIN (H): Primary | ICD-10-CM

## 2020-12-21 DIAGNOSIS — E11.3293 TYPE 2 DIABETES MELLITUS WITH BOTH EYES AFFECTED BY MILD NONPROLIFERATIVE RETINOPATHY WITHOUT MACULAR EDEMA, WITH LONG-TERM CURRENT USE OF INSULIN (H): ICD-10-CM

## 2020-12-21 DIAGNOSIS — E11.3293 TYPE 2 DIABETES MELLITUS WITH BOTH EYES AFFECTED BY MILD NONPROLIFERATIVE RETINOPATHY WITHOUT MACULAR EDEMA, WITH LONG-TERM CURRENT USE OF INSULIN (H): Primary | ICD-10-CM

## 2020-12-21 LAB
CREAT UR-MCNC: 84 MG/DL
HBA1C MFR BLD: 7.5 % (ref 0–5.6)
MICROALBUMIN UR-MCNC: 206 MG/L
MICROALBUMIN/CREAT UR: 246.41 MG/G CR (ref 0–17)

## 2020-12-21 PROCEDURE — 82043 UR ALBUMIN QUANTITATIVE: CPT | Performed by: FAMILY MEDICINE

## 2020-12-21 PROCEDURE — 36415 COLL VENOUS BLD VENIPUNCTURE: CPT | Performed by: FAMILY MEDICINE

## 2020-12-21 PROCEDURE — 83036 HEMOGLOBIN GLYCOSYLATED A1C: CPT | Performed by: FAMILY MEDICINE

## 2020-12-26 ENCOUNTER — MYC MEDICAL ADVICE (OUTPATIENT)
Dept: FAMILY MEDICINE | Facility: CLINIC | Age: 85
End: 2020-12-26

## 2020-12-28 ENCOUNTER — HOSPITAL ENCOUNTER (OUTPATIENT)
Dept: ULTRASOUND IMAGING | Facility: CLINIC | Age: 85
End: 2020-12-28
Attending: SURGERY
Payer: COMMERCIAL

## 2020-12-28 DIAGNOSIS — I73.9 PERIPHERAL VASCULAR DISEASE (H): ICD-10-CM

## 2020-12-28 PROCEDURE — 93926 LOWER EXTREMITY STUDY: CPT | Mod: LT

## 2020-12-28 PROCEDURE — 93922 UPR/L XTREMITY ART 2 LEVELS: CPT

## 2020-12-31 ENCOUNTER — TELEPHONE (OUTPATIENT)
Dept: OTHER | Facility: CLINIC | Age: 85
End: 2020-12-31

## 2020-12-31 NOTE — TELEPHONE ENCOUNTER
[facility]    Who is the name of the provider?:  Karely      What is the location you see this provider at?: [place]    Reason for call:  Wants results of imaging on 12/28.  States it is difficult to get to [place] location.    Can we leave a detailed message on this number?  YES

## 2020-12-31 NOTE — TELEPHONE ENCOUNTER
Chart reviewed.  Pt will need virtual visit or phone visit with Dr. Cali to discuss results of 12/28/20 imaging.     I called pt back at number provided and at home number, left vm on both, explained this and provided contact number for scheduling. Encouraged to call me back if has further questions.     GUADALUPE DamianN, RN    [facility]  Office:  [phone] Fax: [phone]

## 2021-01-04 ENCOUNTER — OFFICE VISIT (OUTPATIENT)
Dept: FAMILY MEDICINE | Facility: CLINIC | Age: 86
End: 2021-01-04
Payer: COMMERCIAL

## 2021-01-04 VITALS
TEMPERATURE: 97.8 F | BODY MASS INDEX: 32.7 KG/M2 | SYSTOLIC BLOOD PRESSURE: 134 MMHG | DIASTOLIC BLOOD PRESSURE: 68 MMHG | WEIGHT: 220.8 LBS | HEART RATE: 72 BPM | HEIGHT: 69 IN | OXYGEN SATURATION: 98 % | RESPIRATION RATE: 16 BRPM

## 2021-01-04 DIAGNOSIS — Z79.4 TYPE 2 DIABETES MELLITUS WITH BOTH EYES AFFECTED BY MILD NONPROLIFERATIVE RETINOPATHY WITHOUT MACULAR EDEMA, WITH LONG-TERM CURRENT USE OF INSULIN (H): ICD-10-CM

## 2021-01-04 DIAGNOSIS — C61 MALIGNANT NEOPLASM OF PROSTATE (H): ICD-10-CM

## 2021-01-04 DIAGNOSIS — K21.9 GASTROESOPHAGEAL REFLUX DISEASE WITHOUT ESOPHAGITIS: ICD-10-CM

## 2021-01-04 DIAGNOSIS — Z89.421 H/O AMPUTATION OF LESSER TOE, RIGHT (H): ICD-10-CM

## 2021-01-04 DIAGNOSIS — I73.9 PERIPHERAL VASCULAR DISEASE (H): ICD-10-CM

## 2021-01-04 DIAGNOSIS — E11.9 TYPE 2 DIABETES MELLITUS WITHOUT COMPLICATION, WITH LONG-TERM CURRENT USE OF INSULIN (H): ICD-10-CM

## 2021-01-04 DIAGNOSIS — I70.229 CRITICAL ISCHEMIA OF LOWER EXTREMITY (H): ICD-10-CM

## 2021-01-04 DIAGNOSIS — I10 HYPERTENSION, GOAL BELOW 140/90: ICD-10-CM

## 2021-01-04 DIAGNOSIS — Z00.01 ENCOUNTER FOR GENERAL ADULT MEDICAL EXAMINATION WITH ABNORMAL FINDINGS: Primary | ICD-10-CM

## 2021-01-04 DIAGNOSIS — E11.3293 TYPE 2 DIABETES MELLITUS WITH BOTH EYES AFFECTED BY MILD NONPROLIFERATIVE RETINOPATHY WITHOUT MACULAR EDEMA, WITH LONG-TERM CURRENT USE OF INSULIN (H): ICD-10-CM

## 2021-01-04 DIAGNOSIS — Z79.4 TYPE 2 DIABETES MELLITUS WITHOUT COMPLICATION, WITH LONG-TERM CURRENT USE OF INSULIN (H): ICD-10-CM

## 2021-01-04 DIAGNOSIS — E78.5 HYPERLIPIDEMIA LDL GOAL <70: ICD-10-CM

## 2021-01-04 PROBLEM — M86.9 OSTEOMYELITIS OF GREAT TOE OF LEFT FOOT (H): Status: RESOLVED | Noted: 2019-12-20 | Resolved: 2021-01-04

## 2021-01-04 PROBLEM — L97.311: Status: RESOLVED | Noted: 2018-11-19 | Resolved: 2021-01-04

## 2021-01-04 PROBLEM — M86.9 OSTEOMYELITIS OF SECOND TOE OF RIGHT FOOT (H): Status: RESOLVED | Noted: 2020-02-28 | Resolved: 2021-01-04

## 2021-01-04 LAB
ANION GAP SERPL CALCULATED.3IONS-SCNC: 4 MMOL/L (ref 3–14)
BUN SERPL-MCNC: 28 MG/DL (ref 7–30)
CALCIUM SERPL-MCNC: 9.2 MG/DL (ref 8.5–10.1)
CHLORIDE SERPL-SCNC: 104 MMOL/L (ref 94–109)
CO2 SERPL-SCNC: 28 MMOL/L (ref 20–32)
CREAT SERPL-MCNC: 1.07 MG/DL (ref 0.66–1.25)
GFR SERPL CREATININE-BSD FRML MDRD: 63 ML/MIN/{1.73_M2}
GLUCOSE SERPL-MCNC: 229 MG/DL (ref 70–99)
POTASSIUM SERPL-SCNC: 4 MMOL/L (ref 3.4–5.3)
SODIUM SERPL-SCNC: 136 MMOL/L (ref 133–144)

## 2021-01-04 PROCEDURE — 80048 BASIC METABOLIC PNL TOTAL CA: CPT | Performed by: FAMILY MEDICINE

## 2021-01-04 PROCEDURE — 99214 OFFICE O/P EST MOD 30 MIN: CPT | Mod: 25 | Performed by: FAMILY MEDICINE

## 2021-01-04 PROCEDURE — 36415 COLL VENOUS BLD VENIPUNCTURE: CPT | Performed by: FAMILY MEDICINE

## 2021-01-04 PROCEDURE — G0439 PPPS, SUBSEQ VISIT: HCPCS | Performed by: FAMILY MEDICINE

## 2021-01-04 RX ORDER — HYDROCHLOROTHIAZIDE 25 MG/1
25 TABLET ORAL DAILY
Qty: 90 TABLET | Refills: 3 | Status: SHIPPED | OUTPATIENT
Start: 2021-01-04 | End: 2022-01-14

## 2021-01-04 RX ORDER — CLOPIDOGREL BISULFATE 75 MG/1
75 TABLET ORAL DAILY
Qty: 90 TABLET | Refills: 3 | Status: SHIPPED | OUTPATIENT
Start: 2021-01-04 | End: 2022-01-14

## 2021-01-04 RX ORDER — PANTOPRAZOLE SODIUM 20 MG/1
20 TABLET, DELAYED RELEASE ORAL DAILY
Qty: 90 TABLET | Refills: 3 | Status: SHIPPED | OUTPATIENT
Start: 2021-01-04 | End: 2022-01-14

## 2021-01-04 RX ORDER — AMLODIPINE BESYLATE 5 MG/1
5 TABLET ORAL DAILY
Qty: 90 TABLET | Refills: 3 | Status: SHIPPED | OUTPATIENT
Start: 2021-01-04 | End: 2022-01-14

## 2021-01-04 RX ORDER — LISINOPRIL 40 MG/1
40 TABLET ORAL DAILY
Qty: 90 TABLET | Refills: 3 | Status: SHIPPED | OUTPATIENT
Start: 2021-01-04 | End: 2022-01-14

## 2021-01-04 RX ORDER — ROSUVASTATIN CALCIUM 10 MG/1
10 TABLET, COATED ORAL AT BEDTIME
Qty: 90 TABLET | Refills: 3 | Status: SHIPPED | OUTPATIENT
Start: 2021-01-04 | End: 2022-01-14

## 2021-01-04 ASSESSMENT — MIFFLIN-ST. JEOR: SCORE: 1685.88

## 2021-01-04 NOTE — PATIENT INSTRUCTIONS
Please go to lab.    I have refilled your medications.      Please be aware that there will be an additional charge during your preventative visit due to either a new diagnosis and/or chronic disease management.    Preventative visits screen for diseases prior to they occur.  They do not cover for any new diagnosis or chronic disease management which would include medication refills, labs etc.    If you have questions regarding your coverage please check with your insurance provider.  At [facility] we need to code correctly to be in compliance with all insurance companies.        Thank you for choosing [facility].  You may be receiving an email and/or telephone survey request from [facility] Customer Experience regarding your visit today.  Please take a few minutes to respond to the survey to let us know how we are doing.      If you have questions or concerns, please contact us via [employer] or you can contact your care team at [phone].    Our Clinic hours are:  Monday 6:40 am  to 7:00 pm  Tuesday -Friday 6:40 am to 5:00 pm    The [place] outpatient lab hours are:  Monday - Friday 6:10 am to 4:45 pm  Saturdays 7:00 am to 11:00 am  Appointments are required, call [phone]    If you have clinical questions after hours or would like to schedule an appointment,  call the clinic at [phone].    Patient Education   Personalized Prevention Plan  You are due for the preventive services outlined below.  Your care team is available to assist you in scheduling these services.  If you have already completed any of these items, please share that information with your care team to update in your medical record.  Health Maintenance Due   Topic Date Due     Zoster (Shingles) Vaccine (1 of 2) 12/04/1986     Eye Exam  01/04/2020     Diabetic Foot Exam  12/18/2020        Patient Education   Personalized Prevention Plan  You are due for the preventive services outlined below.  Your care team is available to assist you  in scheduling these services.  If you have already completed any of these items, please share that information with your care team to update in your medical record.  Health Maintenance Due   Topic Date Due     Zoster (Shingles) Vaccine (1 of 2) 12/04/1986     Diabetic Foot Exam  12/18/2020

## 2021-01-04 NOTE — PROGRESS NOTES
Assessment & Plan     Encounter for general adult medical examination with abnormal findings  Discussed healthy lifestyle and preventative cares.      Type 2 diabetes mellitus with both eyes affected by mild nonproliferative retinopathy without macular edema, with long-term current use of insulin (H)  Has been controlled, refilled med and recheck  - insulin glargine (LANTUS SOLOSTAR) 100 UNIT/ML pen; Inject 20 Units Subcutaneous At Bedtime  - OFFICE/OUTPT VISIT,EST,LEVL III    Type 2 diabetes mellitus without complication, with long-term current use of insulin (H)    - metFORMIN (GLUCOPHAGE) 500 MG tablet; Take 2 tablets (1,000 mg) by mouth 2 times daily (with meals)  - Lipid panel reflex to direct LDL Fasting; Future  - OFFICE/OUTPT VISIT,EST,LEVL III    Peripheral vascular disease (H)  Noted, on clopidogrel  - OFFICE/OUTPT VISIT,EST,LEVL III    Malignant neoplasm of prostate (H)  Seeing specialist  - OFFICE/OUTPT VISIT,EST,LEVL III    H/O amputation of lesser toe, right (H)    - OFFICE/OUTPT VISIT,EST,LEVL III    Hypertension, goal below 140/90  At goal  - amLODIPine (NORVASC) 5 MG tablet; Take 1 tablet (5 mg) by mouth daily  - hydrochlorothiazide (HYDRODIURIL) 25 MG tablet; Take 1 tablet (25 mg) by mouth daily  - OFFICE/OUTPT VISIT,EST,LEVL III    Hyperlipidemia LDL goal <70  Refilled med and recheck in the future  - rosuvastatin (CRESTOR) 10 MG tablet; Take 1 tablet (10 mg) by mouth At Bedtime  - Lipid panel reflex to direct LDL Fasting; Future  - OFFICE/OUTPT VISIT,EST,LEVL III    Gastroesophageal reflux disease without esophagitis  On med  - pantoprazole (PROTONIX) 20 MG EC tablet; Take 1 tablet (20 mg) by mouth daily  - OFFICE/OUTPT VISIT,EST,LEVL III    Critical ischemia of lower extremity  On md  - clopidogrel (PLAVIX) 75 MG tablet; Take 1 tablet (75 mg) by mouth daily  - OFFICE/OUTPT VISIT,EST,LEVL III      Independent interpretation of a test performed by another physician/other qualified health care  professional (not separately reported) -   Discussion of management or test interpretation with external physician/other qualified healthcare professional/appropriate source -   Diagnosis or treatment significantly limited by social determinants of health -                See Patient Instructions    Return in about 3 months (around 4/4/2021) for Annual Wellness Visit, Lab Work.    Chris Scott MD  [facility]    Shelbi Garcia is a 85 year old male who presents to clinic today for the following health issues  accompanied by his Self:    HPI     Diabetes Follow-up    How often are you checking your blood sugar? One time daily  What time of day are you checking your blood sugars (select all that apply)?  Before meals  Have you had any blood sugars above 200?  No  Have you had any blood sugars below 70?  No    What symptoms do you notice when your blood sugar is low?  None    What concerns do you have today about your diabetes? None     Do you have any of these symptoms? (Select all that apply)  Blurry vision    Have you had a diabetic eye exam in the last 12 months? Yes- Date of last eye exam: 9/2020,  [facility]          Hyperlipidemia Follow-Up      Are you regularly taking any medication or supplement to lower your cholesterol?   Yes- Crestor    Are you having muscle aches or other side effects that you think could be caused by your cholesterol lowering medication?  No    Hypertension Follow-up      Do you check your blood pressure regularly outside of the clinic? No     Are you following a low salt diet? Yes    Are your blood pressures ever more than 140 on the top number (systolic) OR more   than 90 on the bottom number (diastolic), for example 140/90? No    BP Readings from Last 2 Encounters:   01/04/21 134/68   12/06/20 (!) 174/78     Hemoglobin A1C (%)   Date Value   12/21/2020 7.5 (H)   09/14/2020 7.9 (H)     LDL Cholesterol Calculated (mg/dL)  "  Date Value   2020 60   2020 85       Annual Wellness Visit  Patient has been advised of split billing requirements and indicates understanding: Yes     Are you in the first 12 months of your Medicare Part B coverage?  No    Physical Health:    In general, how would you rate your overall physical health? good    Outside of work, how many days during the week do you exercise?2-3 days/week    Outside of work, approximately how many minutes a day do you exercise?15-30 minutes    If you drink alcohol do you typically have >3 drinks per day or >7 drinks per week? No    Do you usually eat at least 4 servings of fruit and vegetables a day, include whole grains & fiber and avoid regularly eating high fat or \"junk\" foods? NO- sometimes    Do you have any problems taking medications regularly? YES    Do you have any side effects from medications? none    Needs assistance for the following daily activities: no assistance needed    Which of the following safety concerns are present in your home?  none identified     Hearing impairment: No    In the past 6 months, have you been bothered by leaking of urine? No, but does have urinary frequency.    Mental Health:    In general, how would you rate your overall mental or emotional health? good  PHQ-2 Score:      Do you feel safe in your environment? Yes    Have you ever done Advance Care Planning? (For example, a Health Directive, POLST, or a discussion with a medical provider or your loved ones about your wishes)? No, advance care planning information given to patient to review.  Patient declined advance care planning discussion at this time.    Fall risk:  Fallen 2 or more times in the past year?: No  Any fall with injury in the past year?: No    Cognitive Screenin) Repeat 3 items (Leader, Season, Table)    2) Clock draw: NORMAL  3) 3 item recall: Recalls 3 objects  Results: 3 items recalled: COGNITIVE IMPAIRMENT LESS LIKELY    Mini-CogTM Copyright EFRAIN Melton. " "Licensed by the author for use in [facility]; reprinted with permission (kelli@[facility]). All rights reserved.      Do you have sleep apnea, excessive snoring or daytime drowsiness?: no    Current providers sharing in care for this patient include:   Patient Care Team:  Chris Scott MD as PCP - General (Family Practice)  Stewart Mcintyre MD as MD (Urology)  Chris Scott MD as Assigned PCP  Kieran Hua MD as MD (Radiation Oncology)  Ethan Montesinos DPM as Assigned Musculoskeletal Provider  Kieran Hua MD as Assigned Cancer Care Provider  Stewart Mcintyre MD as Assigned Surgical Provider    Patient has been advised of split billing requirements and indicates understanding: Yes      Review of Systems   Review Of Systems  Skin: needs to have his feet check  Eyes: seeing his eye doctor  Ears/Nose/Throat: negative  Respiratory: No shortness of breath, dyspnea on exertion, cough, or hemoptysis  Cardiovascular: negative  Gastrointestinal: negative  Genitourinary: negative  Musculoskeletal: negative  Neurologic: negative  Psychiatric: he feels a loss due to his decrease in vision  Hematologic/Lymphatic/Immunologic: negative  Endocrine: as above        Objective    /68   Pulse 72   Temp 97.8  F (36.6  C) (Tympanic)   Resp 16   Ht 1.759 m (5' 9.25\")   Wt 100.2 kg (220 lb 12.8 oz)   SpO2 98%   BMI 32.37 kg/m    Body mass index is 32.37 kg/m .  Physical Exam   GENERAL APPEARANCE: alert, no distress, cooperative and over weight  RESP: lungs clear to auscultation - no rales, rhonchi or wheezes  CV: regular rates and rhythm, normal S1 S2, no S3 or S4 and no murmur, click or rub  ABDOMEN: soft, nontender, without hepatosplenomegaly or masses and bowel sounds normal  MS: extremities normal- no gross deformities noted  SKIN: no suspicious lesions or rashes  NEURO: Normal strength and tone, mentation intact and speech normal  DIABETIC FOOT EXAM: noted feet " have toe amputations. Has scars, no ulcers.  Has some decrease sensation to monofilament test  PSYCH: mentation appears normal and affect normal/bright

## 2021-01-11 ENCOUNTER — VIRTUAL VISIT (OUTPATIENT)
Dept: OTHER | Facility: CLINIC | Age: 86
End: 2021-01-11
Attending: SURGERY
Payer: COMMERCIAL

## 2021-01-11 DIAGNOSIS — Z98.890 CRITICAL ISCHEMIA OF EXTREMITY WITH HISTORY OF REVASCULARIZATION OF SAME EXTREMITY (H): Primary | ICD-10-CM

## 2021-01-11 DIAGNOSIS — I70.229 CRITICAL ISCHEMIA OF EXTREMITY WITH HISTORY OF REVASCULARIZATION OF SAME EXTREMITY (H): Primary | ICD-10-CM

## 2021-01-11 PROCEDURE — 99212 OFFICE O/P EST SF 10 MIN: CPT | Mod: 95 | Performed by: SURGERY

## 2021-01-11 NOTE — PROGRESS NOTES
Emerson is a 85 year old who is being evaluated via a billable telephone visit.      What phone number would you like to be contacted at? [phone]  How would you like to obtain your AVS? Mail a copy      I called Mr. Garcia today.  He is a very pleasant 85-year-old male with peripheral arterial disease.  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  On December 24, 2019 he underwent amputation of the great toe of the left foot.  There was a problem with healing of that wound.  Then he underwent arteriogram with balloon angioplasty and recanalization with stenting of the distal popliteal artery.  His main runoff of the peroneal artery.  The distal peroneal artery reconstitutes the dorsalis pedis and posterior tibial artery with robust collaterals.  Since that time his wound has healed completely.  He does not have any claudication or recurrence of open wounds.    Noninvasive studies that were done on 28 December 2020 showed that the distal popliteal artery and the site of stenting is patent.  His left ankle-brachial index is 0.91 and 0.88.    I have reassured him of the findings.  Unless he has symptoms of nonhealing wound or rest pain I would not recommend any other interventions.  I do not feel the need for continued surveillance as even if the site of the angioplasty and stent occlude he is unlikely to develop any more symptoms.  If he develops any symptoms of nonhealing wounds in his left foot then we will proceed straight to an angiogram.    Phone call duration: 3 minutes

## 2021-01-25 ENCOUNTER — NURSE TRIAGE (OUTPATIENT)
Dept: NURSING | Facility: CLINIC | Age: 86
End: 2021-01-25

## 2021-01-26 NOTE — TELEPHONE ENCOUNTER
Pt is calling.    He is wondering how to register for the COVID-19 vaccine.   Reviewed process, phone number and web site to go on for those 65 and older. Registration opens on the web site at 5:00am and by phone at 9:00am.  I encouraged him to keep checking his [employer] account for when he will be eligible with [facility]. He verbalized understanding.    We are working hard to begin vaccinating more people against COVID-19. Currently, we are only vaccinating Phase 1a workers - healthcare workers who are unable to do their job remotely. Vaccine availability is very limited.      If you are a healthcare worker and you are unable to do your job remotely, please log in to [employer] using this link to see if we have openings and schedule an appointment. At your vaccine appointment, you will be asked to provide proof of employment as a health care worker. If you cannot, you will be turned away.     Vaccine appointments are being added as they become available. Please check your [employer] account frequently for availability.  If you have technical difficulty using [employer], call [phone] for assistance.     You can learn more about the state's phased approach to administering the vaccine, with details on each phase, here.      Phase 1b is the next group that will get vaccinated and includes frontline essential workers and adults 75 years of age and older. When we are able to start vaccinating this group, we will share that information on our website. Check this website to stay up to date on COVID-19 vaccination information.    Yuliya Beebe RN  [facility] Nurse Advisor  1/25/2021 at 7:30 PM      Reason for Disposition    COVID-19 vaccine, Frequently Asked Questions (FAQs)    Additional Information    Negative: [1] Difficulty breathing or swallowing AND [2] starts within 2 hours after injection    Negative: Sounds like a life-threatening emergency to the triager    Negative: [1] COVID-19 exposure AND [2] no  symptoms    Negative: [1] Typical COVID-19 symptoms AND [2] symptoms that are NOT expected from vaccine (e.g., cough, difficulty breathing, loss of taste or smell, runny nose, sore throat)    Negative: [1] Typical COVID-19 symptoms AND [2] started > 3 days after getting vaccine    Negative: Fever > 104 F (40 C)    Negative: Sounds like a severe, unusual reaction to the triager    Negative: [1] Redness or red streak around the injection site AND [2] started > 48 hours after getting vaccine AND [3] fever    Negative: [1] Fever > 101 F (38.3 C) AND [2] age > 60 AND [3] started > 48 hours after getting vaccine    Negative: [1] Fever > 100.0 F (37.8 C) AND [2] bedridden (e.g., nursing home patient, CVA, chronic illness, recovering from surgery) AND [3] started > 48 hours after getting vaccine    Negative: [1] Fever > 100.0 F (37.8 C) AND [2] diabetes mellitus or weak immune system (e.g., HIV positive, cancer chemo, splenectomy, organ transplant, chronic steroids) AND [3] started > 48 hours after getting vaccine    Negative: [1] Redness or red streak around the injection site AND [2] started > 48 hours after getting vaccine AND [3] no fever  (Exception: red area < 1 inch or 2.5 cm wide)    Negative: [1] Pain, tenderness, or swelling at the injection site AND [2] over 3 days (72 hours) since vaccine AND [3] getting worse    Negative: Fever > 100.0 F (37.8 C) present > 3 days (72 hours)    Negative: [1] Fever > 100.0 F (37.8 C) AND [2] healthcare worker    Negative: [1] Pain, tenderness, or swelling at the injection site AND [2] lasts > 7 days    Negative: [1] Requesting COVID-19 vaccine AND [2] healthcare worker (e.g., EMS first responders, doctors, nurses)    Negative: [1] Requesting COVID-19 vaccine AND [2] resident of a long-term care facility (e.g., nursing home)    Negative: [1] Requesting COVID-19 vaccine AND [2] vaccine available in the community for this patient group    Negative: COVID-19 vaccine, injection site  reaction (e.g., pain, redness, swelling), question about    Negative: COVID-19 vaccine, systemic reactions (e.g., fatigue, fever, muscle aches), questions about    Protocols used: CORONAVIRUS (COVID-19) VACCINE QUESTIONS AND [id number] 1.3    Yuliya Beebe RN  [facility] Nurse Advisor  1/25/2021 at 7:32 PM

## 2021-02-04 ENCOUNTER — IMMUNIZATION (OUTPATIENT)
Dept: FAMILY MEDICINE | Facility: CLINIC | Age: 86
End: 2021-02-04
Payer: COMMERCIAL

## 2021-02-04 PROCEDURE — 0001A PR COVID VAC PFIZER DIL RECON 30 MCG/0.3 ML IM: CPT

## 2021-02-04 PROCEDURE — 91300 PR COVID VAC PFIZER DIL RECON 30 MCG/0.3 ML IM: CPT

## 2021-02-10 ENCOUNTER — TRANSFERRED RECORDS (OUTPATIENT)
Dept: HEALTH INFORMATION MANAGEMENT | Facility: CLINIC | Age: 86
End: 2021-02-10

## 2021-02-10 LAB — RETINOPATHY: POSITIVE

## 2021-02-17 DIAGNOSIS — E11.51 TYPE 2 DIABETES MELLITUS WITH DIABETIC PERIPHERAL ANGIOPATHY WITHOUT GANGRENE (H): ICD-10-CM

## 2021-02-19 NOTE — TELEPHONE ENCOUNTER
"Requested Prescriptions   Pending Prescriptions Disp Refills     Lancets (ONETOUCH DELICA PLUS [id number]) MISC [Pharmacy Med Name: OneTouch Delica Plus Lancet 33 gauge]  0     Sig: AS DIRECTED       Diabetic Supplies Protocol Passed - 2/17/2021  5:37 PM        Passed - Medication is active on med list        Passed - Patient is 18 years of age or older        Passed - Recent (6 mo) or future (30 days) visit within the authorizing provider's specialty     Patient had office visit in the last 6 months or has a visit in the next 30 days with authorizing provider.  See \"Patient Info\" tab in inbasket, or \"Choose Columns\" in Meds & Orders section of the refill encounter.                 "

## 2021-02-23 RX ORDER — LANCETS 33 GAUGE
EACH MISCELLANEOUS
Qty: 100 EACH | Refills: 1 | Status: SHIPPED | OUTPATIENT
Start: 2021-02-23 | End: 2021-08-05

## 2021-02-25 ENCOUNTER — IMMUNIZATION (OUTPATIENT)
Dept: FAMILY MEDICINE | Facility: CLINIC | Age: 86
End: 2021-02-25
Attending: FAMILY MEDICINE
Payer: COMMERCIAL

## 2021-02-25 PROCEDURE — 91300 PR COVID VAC PFIZER DIL RECON 30 MCG/0.3 ML IM: CPT

## 2021-02-25 PROCEDURE — 0002A PR COVID VAC PFIZER DIL RECON 30 MCG/0.3 ML IM: CPT

## 2021-03-18 DIAGNOSIS — Z79.4 TYPE 2 DIABETES MELLITUS WITH MICROALBUMINURIA, WITH LONG-TERM CURRENT USE OF INSULIN (H): ICD-10-CM

## 2021-03-18 DIAGNOSIS — Z79.4 TYPE 2 DIABETES MELLITUS WITH BOTH EYES AFFECTED BY MILD NONPROLIFERATIVE RETINOPATHY WITHOUT MACULAR EDEMA, WITH LONG-TERM CURRENT USE OF INSULIN (H): ICD-10-CM

## 2021-03-18 DIAGNOSIS — E11.29 TYPE 2 DIABETES MELLITUS WITH MICROALBUMINURIA, WITH LONG-TERM CURRENT USE OF INSULIN (H): ICD-10-CM

## 2021-03-18 DIAGNOSIS — E11.3293 TYPE 2 DIABETES MELLITUS WITH BOTH EYES AFFECTED BY MILD NONPROLIFERATIVE RETINOPATHY WITHOUT MACULAR EDEMA, WITH LONG-TERM CURRENT USE OF INSULIN (H): ICD-10-CM

## 2021-03-18 DIAGNOSIS — R80.9 TYPE 2 DIABETES MELLITUS WITH MICROALBUMINURIA, WITH LONG-TERM CURRENT USE OF INSULIN (H): ICD-10-CM

## 2021-03-18 DIAGNOSIS — E11.9 TYPE 2 DIABETES MELLITUS WITHOUT COMPLICATION, WITH LONG-TERM CURRENT USE OF INSULIN (H): ICD-10-CM

## 2021-03-18 DIAGNOSIS — Z79.4 TYPE 2 DIABETES MELLITUS WITHOUT COMPLICATION, WITH LONG-TERM CURRENT USE OF INSULIN (H): ICD-10-CM

## 2021-03-18 DIAGNOSIS — C61 MALIGNANT NEOPLASM OF PROSTATE (H): ICD-10-CM

## 2021-03-18 DIAGNOSIS — E78.5 HYPERLIPIDEMIA LDL GOAL <70: ICD-10-CM

## 2021-03-18 LAB
CHOLEST SERPL-MCNC: 121 MG/DL
CREAT UR-MCNC: 50 MG/DL
HBA1C MFR BLD: 8.3 % (ref 0–5.6)
HDLC SERPL-MCNC: 61 MG/DL
LDLC SERPL CALC-MCNC: 47 MG/DL
MICROALBUMIN UR-MCNC: 94 MG/L
MICROALBUMIN/CREAT UR: 187.23 MG/G CR (ref 0–17)
NONHDLC SERPL-MCNC: 60 MG/DL
PSA SERPL-MCNC: 1.78 UG/L (ref 0–4)
TRIGL SERPL-MCNC: 66 MG/DL

## 2021-03-18 PROCEDURE — 83036 HEMOGLOBIN GLYCOSYLATED A1C: CPT | Performed by: FAMILY MEDICINE

## 2021-03-18 PROCEDURE — 36415 COLL VENOUS BLD VENIPUNCTURE: CPT | Performed by: FAMILY MEDICINE

## 2021-03-18 PROCEDURE — 80061 LIPID PANEL: CPT | Performed by: FAMILY MEDICINE

## 2021-03-18 PROCEDURE — 82043 UR ALBUMIN QUANTITATIVE: CPT | Performed by: FAMILY MEDICINE

## 2021-03-18 PROCEDURE — 84153 ASSAY OF PSA TOTAL: CPT | Performed by: RADIOLOGY

## 2021-03-22 ENCOUNTER — OFFICE VISIT (OUTPATIENT)
Dept: RADIATION THERAPY | Facility: OUTPATIENT CENTER | Age: 86
End: 2021-03-22
Payer: COMMERCIAL

## 2021-03-22 ENCOUNTER — PATIENT OUTREACH (OUTPATIENT)
Dept: CARE COORDINATION | Facility: CLINIC | Age: 86
End: 2021-03-22

## 2021-03-22 VITALS
OXYGEN SATURATION: 96 % | SYSTOLIC BLOOD PRESSURE: 147 MMHG | RESPIRATION RATE: 18 BRPM | DIASTOLIC BLOOD PRESSURE: 83 MMHG | BODY MASS INDEX: 33.46 KG/M2 | WEIGHT: 228.2 LBS | HEART RATE: 72 BPM

## 2021-03-22 DIAGNOSIS — C61 MALIGNANT NEOPLASM OF PROSTATE (H): Primary | ICD-10-CM

## 2021-03-22 ASSESSMENT — PAIN SCALES - GENERAL: PAINLEVEL: NO PAIN (0)

## 2021-03-22 NOTE — PROGRESS NOTES
Department of Radiation Oncology  Radiation Therapy Center  [facility] [place] [address]  [phone]       Radiation Oncology Follow-up Visit  2021    Fawad Garcia  MRN: [id number]   : 1935     DISEASE TREATED: High-risk prostate cancer. Pretreatment PSA 25.5, [place] score of 4+3=7, clinical stage [id number].     RADIATION THERAPY GIVEN: 8041 cGy in 43 planned treatments, via IMRT     INTERVAL SINCE COMPLETION OF THERAPY: 9 years since completion on 02/10/2012.     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 He underwent biopsies in  and  and pathology was negative for malignancy. Due to the persistently rising PSA Dr. Mcintyre performed an MRI-guided biopsy of the prostate gland which revealed [place] 4+3= 7 disease in 2 of 2 cores with 90% of the cores being positive in the right inferior base and right inferior mid gland. Androgen ablation was discussed with the patient due to his high-risk disease and the patient declined due to comorbidities of the drug. Overall, he completed radiotherapy with very little toxicity.     SUBJECTIVE:   Fawad Garcia is a 85 year old male who is scheduled today for routine follow-up.      PSA on 3/18/21 was 1.78, up from prior value of 1.53 (zohaib =0.24).    Today, he denies any pressing issues or complaints and reports that all symptoms have essentially remained stable since his last visit with us 6 months ago. AUA and MARINA not completed today (TC). Prior  AUA 18/35 (patient reported overall stable) and MARINA was 0/25. Main urinary symptoms continue to be nocturia and urgency. Patient states symptoms are worse with increased intake of fluids. Denies drinking alcohol, and has minimal caffeine. He denies any dysuria, hematuria, hematochezia, diarrhea, or loose stools.  He denies  any pain or swelling.     Energy level is good and baseline.              Current Outpatient Medications   Medication     amLODIPine (NORVASC) 5 MG tablet     Blood Glucose Monitoring Suppl (ONE TOUCH ULTRA SYSTEM KIT) W/DEVICE KIT     clopidogrel (PLAVIX) 75 MG tablet     dorzolamide-timolol (COSOPT) 2-0.5 % ophthalmic solution     hydrochlorothiazide (HYDRODIURIL) 25 MG tablet     insulin glargine (LANTUS SOLOSTAR) 100 UNIT/ML pen     insulin pen needle (B-D U/F) 31G X 8 MM miscellaneous     levofloxacin (LEVAQUIN) 500 MG tablet     lisinopril (PRINIVIL/ZESTRIL) 40 MG tablet     metFORMIN (GLUCOPHAGE) 500 MG tablet     Multiple Vitamins-Minerals (PRESERVISION AREDS PO)     ONETOUCH LANCETS [facility]     ONETOUCH ULTRA test strip     order for DME     ORDER FOR DME     pantoprazole (PROTONIX) 20 MG EC tablet     rosuvastatin (CRESTOR) 10 MG tablet     sulfamethoxazole-trimethoprim (BACTRIM DS) 800-160 MG tablet     No current facility-administered medications for this visit.           Allergies   Allergen Reactions     Zocor [Simvastatin - High Dose]      Bilateral hip aching       Past Medical History:   Diagnosis Date     Diabetes mellitus (H)      Hypertension      Squamous cell carcinoma          PHYSICAL EXAM:  BP (!) 147/83   Pulse 72   Resp 18   Wt 103.5 kg (228 lb 3.2 oz)   SpO2 96%   BMI 33.46 kg/m    No apparent distress       LABS AND IMAGING:  Reviewed.  PSA   Date Value Ref Range Status   03/18/2021 1.78 0 - 4 ug/L Final     Comment:     Assay Method:  Chemiluminescence using Siemens Vista analyzer   09/14/2020 1.53 0 - 4 ug/L Final     Comment:     Assay Method:  Chemiluminescence using Siemens Vista analyzer   03/02/2020 1.23 0 - 4 ug/L Final     Comment:     Assay Method:  Chemiluminescence using Siemens Vista analyzer   09/12/2019 1.08 0 - 4 ug/L Final     Comment:     Assay Method:  Chemiluminescence using Siemens Vista analyzer   03/15/2019 1.10 0 - 4 ug/L Final     Comment:     Assay Method:   Chemiluminescence using Siemens Vista analyzer           IMPRESSION:   Mr. Garcia is a 85 year old male with a high-risk prostate cancer. Pretreatment PSA 25.5, [place] score of 4+3=7, clinical stage [id number]. Complete RT 2/10/2012,  8041 cGy in 43 planned treatments, via IMRT.       PLAN:     1. PSA increased from last PSA but does not yet met [place] definition of biochemical failure (2.0 above zohaib with zohaib of 0.24).Will continue to closely monitor at this time.       2. No late GI/ toxicity.    3. PSA in 6 months. RTC thereafter.       Kieran Hua M.D.  Department of Radiation Oncology  [facility]

## 2021-03-22 NOTE — LETTER
3/22/2021         RE: Fawad Garcia  [address] [facility] [address]        Dear Colleague,    Thank you for referring your patient, Fawad Garcia, to the RADIATION THERAPY CENTER. Please see a copy of my visit note below.       Department of Radiation Oncology  Radiation Therapy Center  [facility]  [place] [address]  [phone]       Radiation Oncology Follow-up Visit  2021    Fawad Garcia  MRN: [id number]   : 1935     DISEASE TREATED: High-risk prostate cancer. Pretreatment PSA 25.5, [place] score of 4+3=7, clinical stage [id number].     RADIATION THERAPY GIVEN: 8041 cGy in 43 planned treatments, via IMRT     INTERVAL SINCE COMPLETION OF THERAPY: 9 years since completion on 02/10/2012.     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 He underwent biopsies in  and  and pathology was negative for malignancy. Due to the persistently rising PSA Dr. Mcintyre performed an MRI-guided biopsy of the prostate gland which revealed [place] 4+3= 7 disease in 2 of 2 cores with 90% of the cores being positive in the right inferior base and right inferior mid gland. Androgen ablation was discussed with the patient due to his high-risk disease and the patient declined due to comorbidities of the drug. Overall, he completed radiotherapy with very little toxicity.     SUBJECTIVE:   Fawad Garcia is a 85 year old male who is scheduled today for routine follow-up.      PSA on 3/18/21 was 1.78, up from prior value of 1.53 (zohaib =0.24).    Today, he denies any pressing issues or complaints and reports that all symptoms have essentially remained stable since his last visit with us 6 months ago. AUA and MARINA not completed today (TC). Prior  AUA 18 (patient reported overall stable) and MARINA was 0/25. Main  urinary symptoms continue to be nocturia and urgency. Patient states symptoms are worse with increased intake of fluids. Denies drinking alcohol, and has minimal caffeine. He denies any dysuria, hematuria, hematochezia, diarrhea, or loose stools.  He denies any pain or swelling.     Energy level is good and baseline.              Current Outpatient Medications   Medication     amLODIPine (NORVASC) 5 MG tablet     Blood Glucose Monitoring Suppl (ONE TOUCH ULTRA SYSTEM KIT) W/DEVICE KIT     clopidogrel (PLAVIX) 75 MG tablet     dorzolamide-timolol (COSOPT) 2-0.5 % ophthalmic solution     hydrochlorothiazide (HYDRODIURIL) 25 MG tablet     insulin glargine (LANTUS SOLOSTAR) 100 UNIT/ML pen     insulin pen needle (B-D U/F) 31G X 8 MM miscellaneous     levofloxacin (LEVAQUIN) 500 MG tablet     lisinopril (PRINIVIL/ZESTRIL) 40 MG tablet     metFORMIN (GLUCOPHAGE) 500 MG tablet     Multiple Vitamins-Minerals (PRESERVISION AREDS PO)     ONETOUCH LANCETS [facility]     ONETOUCH ULTRA test strip     order for DME     ORDER FOR DME     pantoprazole (PROTONIX) 20 MG EC tablet     rosuvastatin (CRESTOR) 10 MG tablet     sulfamethoxazole-trimethoprim (BACTRIM DS) 800-160 MG tablet     No current facility-administered medications for this visit.           Allergies   Allergen Reactions     Zocor [Simvastatin - High Dose]      Bilateral hip aching       Past Medical History:   Diagnosis Date     Diabetes mellitus (H)      Hypertension      Squamous cell carcinoma          PHYSICAL EXAM:  BP (!) 147/83   Pulse 72   Resp 18   Wt 103.5 kg (228 lb 3.2 oz)   SpO2 96%   BMI 33.46 kg/m    No apparent distress       LABS AND IMAGING:  Reviewed.  PSA   Date Value Ref Range Status   03/18/2021 1.78 0 - 4 ug/L Final     Comment:     Assay Method:  Chemiluminescence using Siemens Vista analyzer   09/14/2020 1.53 0 - 4 ug/L Final     Comment:     Assay Method:  Chemiluminescence using Siemens Vista analyzer   03/02/2020 1.23 0 - 4 ug/L Final      Comment:     Assay Method:  Chemiluminescence using Siemens Vista analyzer   09/12/2019 1.08 0 - 4 ug/L Final     Comment:     Assay Method:  Chemiluminescence using Siemens Vista analyzer   03/15/2019 1.10 0 - 4 ug/L Final     Comment:     Assay Method:  Chemiluminescence using Siemens Vista analyzer           IMPRESSION:   Mr. Garcia is a 85 year old male with a high-risk prostate cancer. Pretreatment PSA 25.5, [place] score of 4+3=7, clinical stage [id number]. Complete RT 2/10/2012,  8041 cGy in 43 planned treatments, via IMRT.       PLAN:     1. PSA increased from last PSA but does not yet met [place] definition of biochemical failure (2.0 above zohaib with zohaib of 0.24).Will continue to closely monitor at this time.       2. No late GI/ toxicity.    3. PSA in 6 months. RTC thereafter.       Kieran Hua M.D.  Department of Radiation Oncology  [facility]

## 2021-03-22 NOTE — NURSING NOTE
FOLLOW-UP VISIT    Patient Name: Fawad Garcia      : 1935     Age: 85 year old        ______________________________________________________________________________     Chief Complaint   Patient presents with     Radiation Therapy     follow up     BP (!) 147/83   Pulse 72   Resp 18   Wt 103.5 kg (228 lb 3.2 oz)   SpO2 96%   BMI 33.46 kg/m       Date Radiation Completed: 12    Pain  Denies    Meds  Current Med List Reviewed: Yes  Medication Note: not on any medication for urination. previously tried flomax - stopped due to SE>     AUA:    MARINA:      PSA   Date Value Ref Range Status   2021 1.78 0 - 4 ug/L Final     Comment:     Assay Method:  Chemiluminescence using Siemens Vista analyzer   2020 1.53 0 - 4 ug/L Final     Comment:     Assay Method:  Chemiluminescence using Siemens Vista analyzer   2020 1.23 0 - 4 ug/L Final     Comment:     Assay Method:  Chemiluminescence using Siemens Vista analyzer   2019 1.08 0 - 4 ug/L Final     Comment:     Assay Method:  Chemiluminescence using Siemens Vista analyzer   03/15/2019 1.10 0 - 4 ug/L Final     Comment:     Assay Method:  Chemiluminescence using Siemens Vista analyzer   2018 0.79 0 - 4 ug/L Final     Comment:     Assay Method:  Chemiluminescence using Siemens Vista analyzer       Bowel: Normal    Bladder: frequency and retention  Nocturia: 2 - Once every 4 hours    Energy Level: normal for age. Has macular degeneration - wants to be active but has some limitations. Golfing    Appointments:   Urologist:       Other Notes: discussed talking with SW - pt faces challenges secondary to vision change which is limiting life/activity, causes stress.

## 2021-03-22 NOTE — PROGRESS NOTES
Social Work Note: Telephone Call  Oncology Clinic    Data/Intervention: 3/22/21  Patient Name:  Fawad Garcia  /Age:  1935 (85 year old)    Reason for Call:  SW was consulted to reach out to Emerson today to assess for resource needs. Emerson shares that he lives at home with his spouse who is supportive to him. Emerson is hoping to stay active in the community and feels challenged due to macular degeneration. SW provided (via email) Emerson with a list of resources for him and his spouse to review. Emerson was appreciative of SW involvement and will update SW if any additional needs arise.     Plan: SW provided contact information. SW will remain available for ongoing resource/support needs.       BAILEY Garay, LGSW  [facility]  Adult Oncology Clinic  Phone: [phone]

## 2021-03-29 ENCOUNTER — OFFICE VISIT (OUTPATIENT)
Dept: FAMILY MEDICINE | Facility: CLINIC | Age: 86
End: 2021-03-29
Payer: COMMERCIAL

## 2021-03-29 VITALS
DIASTOLIC BLOOD PRESSURE: 72 MMHG | WEIGHT: 223.4 LBS | OXYGEN SATURATION: 97 % | BODY MASS INDEX: 32.75 KG/M2 | HEART RATE: 67 BPM | RESPIRATION RATE: 16 BRPM | SYSTOLIC BLOOD PRESSURE: 124 MMHG

## 2021-03-29 DIAGNOSIS — E11.3293 TYPE 2 DIABETES MELLITUS WITH BOTH EYES AFFECTED BY MILD NONPROLIFERATIVE RETINOPATHY WITHOUT MACULAR EDEMA, WITH LONG-TERM CURRENT USE OF INSULIN (H): Primary | ICD-10-CM

## 2021-03-29 DIAGNOSIS — R80.9 TYPE 2 DIABETES MELLITUS WITH MICROALBUMINURIA, WITH LONG-TERM CURRENT USE OF INSULIN (H): ICD-10-CM

## 2021-03-29 DIAGNOSIS — Z79.4 TYPE 2 DIABETES MELLITUS WITH BOTH EYES AFFECTED BY MILD NONPROLIFERATIVE RETINOPATHY WITHOUT MACULAR EDEMA, WITH LONG-TERM CURRENT USE OF INSULIN (H): Primary | ICD-10-CM

## 2021-03-29 DIAGNOSIS — Z79.4 TYPE 2 DIABETES MELLITUS WITH MICROALBUMINURIA, WITH LONG-TERM CURRENT USE OF INSULIN (H): ICD-10-CM

## 2021-03-29 DIAGNOSIS — E11.29 TYPE 2 DIABETES MELLITUS WITH MICROALBUMINURIA, WITH LONG-TERM CURRENT USE OF INSULIN (H): ICD-10-CM

## 2021-03-29 DIAGNOSIS — E11.65 UNCONTROLLED TYPE 2 DIABETES MELLITUS WITH HYPERGLYCEMIA (H): ICD-10-CM

## 2021-03-29 PROCEDURE — 99214 OFFICE O/P EST MOD 30 MIN: CPT | Performed by: FAMILY MEDICINE

## 2021-03-29 NOTE — PATIENT INSTRUCTIONS
Follow up in 3 months for a lab visit.    Please monitor your diet and blood sugars.    Your feet are looking good.      Thank you for choosing [facility].  You may be receiving an email and/or telephone survey request from [facility] Customer Experience regarding your visit today.  Please take a few minutes to respond to the survey to let us know how we are doing.      If you have questions or concerns, please contact us via [employer] or you can contact your care team at [phone].    Our Clinic hours are:  Monday 6:40 am  to 7:00 pm  Tuesday -Friday 6:40 am to 5:00 pm    The [place] outpatient lab hours are:  Monday - Friday 6:10 am to 4:45 pm  Saturdays 7:00 am to 11:00 am  Appointments are required, call [phone]    If you have clinical questions after hours or would like to schedule an appointment,  call the clinic at [phone].

## 2021-03-29 NOTE — PROGRESS NOTES
Assessment & Plan     Type 2 diabetes mellitus with both eyes affected by mild nonproliferative retinopathy without macular edema, with long-term current use of insulin (H)  Not controlled, he will monitor is diet closer, he has stayed the same amount of activity  - Hemoglobin A1c; Future    Uncontrolled type 2 diabetes mellitus with hyperglycemia (H)  Plan to recheck  - Hemoglobin A1c; Future                 Return in about 3 months (around 6/29/2021) for Lab Work.    Chris Scott MD  [facility] BISMARK Norman is a 85 year old who presents for the following health issues     HPI     Diabetes Follow-up    How often are you checking your blood sugar? One time daily  What time of day are you checking your blood sugars (select all that apply)?  Before meals  Have you had any blood sugars above 200?  No  Have you had any blood sugars below 70?  No    What symptoms do you notice when your blood sugar is low?  Shaky, Dizzy and Weak    What concerns do you have today about your diabetes? None     Do you have any of these symptoms? (Select all that apply)  No numbness or tingling in feet.  No redness, sores or blisters on feet.  No complaints of excessive thirst.  No reports of blurry vision.  No significant changes to weight.              Hyperlipidemia Follow-Up      Are you regularly taking any medication or supplement to lower your cholesterol?   Yes- rosuvastatin    Are you having muscle aches or other side effects that you think could be caused by your cholesterol lowering medication?  No    Hypertension Follow-up      Do you check your blood pressure regularly outside of the clinic? No     Are you following a low salt diet? Yes    Are your blood pressures ever more than 140 on the top number (systolic) OR more   than 90 on the bottom number (diastolic), for example 140/90? Yes    BP Readings from Last 2 Encounters:   03/29/21 124/72   03/22/21 (!) 147/83     Hemoglobin A1C (%)    Date Value   03/18/2021 8.3 (H)   12/21/2020 7.5 (H)     LDL Cholesterol Calculated (mg/dL)   Date Value   03/18/2021 47   03/02/2020 60         How many servings of fruits and vegetables do you eat daily?  0-1    On average, how many sweetened beverages do you drink each day (Examples: soda, juice, sweet tea, etc.  Do NOT count diet or artificially sweetened beverages)?   0    How many days per week do you exercise enough to make your heart beat faster? 3 or less    How many minutes a day do you exercise enough to make your heart beat faster? 10 - 19    How many days per week do you miss taking your medication? 0        Review of Systems   Review Of Systems  Skin: toe nails need to be trimmed  Eyes:   Ears/Nose/Throat:   Respiratory: No shortness of breath, dyspnea on exertion, cough, or hemoptysis  Cardiovascular: negative  Gastrointestinal:   Genitourinary:   Musculoskeletal:   Neurologic:   Psychiatric:   Hematologic/Lymphatic/Immunologic:   Endocrine: blood sugars are better        Objective    /72   Pulse 67   Resp 16   Wt 101.3 kg (223 lb 6.4 oz)   SpO2 97%   BMI 32.75 kg/m    Body mass index is 32.75 kg/m .  Physical Exam   GENERAL APPEARANCE: healthy, alert and no distress  SKIN: no suspicious lesions or rashes  NEURO: Normal strength and tone, mentation intact and speech normal  DIABETIC FOOT EXAM: normal DP and PT pulses, no trophic changes or ulcerative lesions and noted longer toe nails, amputation sites are healed, has decrease sensation to monofilament all over his feet bilaterally  PSYCH: mentation appears normal and affect normal/bright    Reviewed his labs of lipid, urine micro, hemoglobin a1c.        time before during and after totaled 32 minutes

## 2021-04-09 ENCOUNTER — TELEPHONE (OUTPATIENT)
Dept: FAMILY MEDICINE | Facility: CLINIC | Age: 86
End: 2021-04-09

## 2021-04-09 NOTE — LETTER
[facility]  [address] [facility] [address]  [phone]      April 9, 2021    Fawad Garcia                                                                                                                     [address] [facility] [address]                  Dear Fawad,      Enclosed is your COVID-19 vaccine card you requested.     Sincerely,     Chris Scott/ DONALD Dalton

## 2021-04-09 NOTE — TELEPHONE ENCOUNTER
Per chart review, RN sees patient did receive both Pfizer COVID-19 vaccines with [facility] in February.   RN reached out to the COVID Nurse Line: [phone] to inquire about this and see if they could mail or patient could  a verification card.  Waited on hold for several minutes without response.  Message sent to supervisor for guidance.     Rachana Teixeira RN  [facility]

## 2021-04-09 NOTE — TELEPHONE ENCOUNTER
Patient received his COVID vaccine with [facility], he never received a verification card. How can he get one?

## 2021-04-09 NOTE — TELEPHONE ENCOUNTER
Supervisors advised new card can be issued, verified injections within the patients chart done 2/4/21 and 2/25/21. Completed new COVID-19 vaccine card as requested.     Attempted to contact patient, no answer, left voice message to call back.    Have vaccine card ready for patient at this RNs desk.   Would patient like to pick it up? Or have us mail it to him?

## 2021-04-22 DIAGNOSIS — Z79.4 TYPE 2 DIABETES MELLITUS WITHOUT COMPLICATION, WITH LONG-TERM CURRENT USE OF INSULIN (H): ICD-10-CM

## 2021-04-22 DIAGNOSIS — E11.9 TYPE 2 DIABETES MELLITUS WITHOUT COMPLICATION, WITH LONG-TERM CURRENT USE OF INSULIN (H): ICD-10-CM

## 2021-04-22 RX ORDER — BLOOD SUGAR DIAGNOSTIC
STRIP MISCELLANEOUS
Qty: 100 STRIP | Refills: 3 | Status: SHIPPED | OUTPATIENT
Start: 2021-04-22 | End: 2022-01-14

## 2021-05-17 ENCOUNTER — OFFICE VISIT (OUTPATIENT)
Dept: FAMILY MEDICINE | Facility: CLINIC | Age: 86
End: 2021-05-17
Payer: COMMERCIAL

## 2021-05-17 VITALS
TEMPERATURE: 96.1 F | HEIGHT: 69 IN | DIASTOLIC BLOOD PRESSURE: 78 MMHG | HEART RATE: 71 BPM | BODY MASS INDEX: 32.88 KG/M2 | OXYGEN SATURATION: 96 % | WEIGHT: 222 LBS | SYSTOLIC BLOOD PRESSURE: 150 MMHG

## 2021-05-17 DIAGNOSIS — J40 BRONCHITIS: Primary | ICD-10-CM

## 2021-05-17 PROCEDURE — 99213 OFFICE O/P EST LOW 20 MIN: CPT | Performed by: FAMILY MEDICINE

## 2021-05-17 RX ORDER — AZITHROMYCIN 250 MG/1
TABLET, FILM COATED ORAL
Qty: 6 TABLET | Refills: 0 | Status: SHIPPED | OUTPATIENT
Start: 2021-05-17 | End: 2021-05-22

## 2021-05-17 ASSESSMENT — MIFFLIN-ST. JEOR: SCORE: 1691.33

## 2021-05-17 NOTE — PROGRESS NOTES
"    (J40) Bronchitis  (primary encounter diagnosis)  Comment:   Appears mildly ill.  Possible bronchitis following URI.  Does not appear to have CHF.  I do not believe imaging is necessary presently.    Plan: azithromycin (ZITHROMAX) 250 MG tablet                Shelbi Norman is a 85 year old who presents for the following health issues     HPI     Cough   Acute illness concerns: Cough   Onset/Duration:   Was told by his family that he is coughing too much.   Symptoms:  Fever: no  Chills/Sweats: no  Headache (location?): no  Sinus Pressure: no  Conjunctivitis:  no  Ear Pain: no  Rhinorrhea: YES- \"to a degree\"  Congestion: YES  Sore Throat: no  Cough: YES-productive of white/yellow sputum. No sob. Nagging cough.   Pt would like to get lungs checked out.   Wheeze: no  Decreased Appetite: no  Nausea: no  Vomiting: no  Diarrhea: no  Dysuria/Freq.: no  Dysuria or Hematuria: no  Fatigue/Achiness: no  Sick/Strep Exposure: no  Therapies tried and outcome: cough medicine, decongestant.     Patient said he had a cold about a week ago and has since been left with a cough.  People are asking him to have this checked.    He is not having fever or malaise or sore throat.  Energy level is good.    He has a history of diabetes since the 1990s and has visual problems.      Review of Systems     No fever.  No wheeze or S OB.  No chest pain.  No edema.            Objective    BP (!) 150/78   Pulse 71   Temp 96.1  F (35.6  C) (Tympanic)   Ht 1.759 m (5' 9.25\")   Wt 100.7 kg (222 lb)   SpO2 96%   BMI 32.55 kg/m    Body mass index is 32.55 kg/m .  Physical Exam     NAD  Pharynx not inflamed.  Tonsils absent.  Neck without adenopathy.  Chest few rhonchi right base, no rales or consolidation.  Cardiac rhythm regular.  Extremities no edema.        "

## 2021-06-21 DIAGNOSIS — Z79.4 TYPE 2 DIABETES MELLITUS WITH MICROALBUMINURIA, WITH LONG-TERM CURRENT USE OF INSULIN (H): ICD-10-CM

## 2021-06-21 DIAGNOSIS — E11.3293 TYPE 2 DIABETES MELLITUS WITH BOTH EYES AFFECTED BY MILD NONPROLIFERATIVE RETINOPATHY WITHOUT MACULAR EDEMA, WITH LONG-TERM CURRENT USE OF INSULIN (H): ICD-10-CM

## 2021-06-21 DIAGNOSIS — E11.65 UNCONTROLLED TYPE 2 DIABETES MELLITUS WITH HYPERGLYCEMIA (H): ICD-10-CM

## 2021-06-21 DIAGNOSIS — R80.9 TYPE 2 DIABETES MELLITUS WITH MICROALBUMINURIA, WITH LONG-TERM CURRENT USE OF INSULIN (H): ICD-10-CM

## 2021-06-21 DIAGNOSIS — E11.29 TYPE 2 DIABETES MELLITUS WITH MICROALBUMINURIA, WITH LONG-TERM CURRENT USE OF INSULIN (H): ICD-10-CM

## 2021-06-21 DIAGNOSIS — Z79.4 TYPE 2 DIABETES MELLITUS WITH BOTH EYES AFFECTED BY MILD NONPROLIFERATIVE RETINOPATHY WITHOUT MACULAR EDEMA, WITH LONG-TERM CURRENT USE OF INSULIN (H): ICD-10-CM

## 2021-06-21 LAB
CREAT UR-MCNC: 44 MG/DL
HBA1C MFR BLD: 8.4 % (ref 0–5.6)
MICROALBUMIN UR-MCNC: 171 MG/L
MICROALBUMIN/CREAT UR: 388.64 MG/G CR (ref 0–17)

## 2021-06-21 PROCEDURE — 82043 UR ALBUMIN QUANTITATIVE: CPT | Performed by: FAMILY MEDICINE

## 2021-06-21 PROCEDURE — 36415 COLL VENOUS BLD VENIPUNCTURE: CPT | Performed by: FAMILY MEDICINE

## 2021-06-21 PROCEDURE — 83036 HEMOGLOBIN GLYCOSYLATED A1C: CPT | Performed by: FAMILY MEDICINE

## 2021-06-22 DIAGNOSIS — E11.3293 TYPE 2 DIABETES MELLITUS WITH BOTH EYES AFFECTED BY MILD NONPROLIFERATIVE RETINOPATHY WITHOUT MACULAR EDEMA, WITH LONG-TERM CURRENT USE OF INSULIN (H): Primary | ICD-10-CM

## 2021-06-22 DIAGNOSIS — Z79.4 TYPE 2 DIABETES MELLITUS WITH BOTH EYES AFFECTED BY MILD NONPROLIFERATIVE RETINOPATHY WITHOUT MACULAR EDEMA, WITH LONG-TERM CURRENT USE OF INSULIN (H): Primary | ICD-10-CM

## 2021-06-22 DIAGNOSIS — E11.65 UNCONTROLLED TYPE 2 DIABETES MELLITUS WITH HYPERGLYCEMIA (H): ICD-10-CM

## 2021-06-29 ENCOUNTER — OFFICE VISIT (OUTPATIENT)
Dept: DERMATOLOGY | Facility: CLINIC | Age: 86
End: 2021-06-29
Payer: COMMERCIAL

## 2021-06-29 VITALS — OXYGEN SATURATION: 96 % | DIASTOLIC BLOOD PRESSURE: 75 MMHG | HEART RATE: 64 BPM | SYSTOLIC BLOOD PRESSURE: 158 MMHG

## 2021-06-29 DIAGNOSIS — L81.4 LENTIGO: ICD-10-CM

## 2021-06-29 DIAGNOSIS — Z85.828 HISTORY OF SKIN CANCER: Primary | ICD-10-CM

## 2021-06-29 DIAGNOSIS — D23.9 DERMAL NEVUS: ICD-10-CM

## 2021-06-29 DIAGNOSIS — L82.1 SEBORRHEIC KERATOSIS: ICD-10-CM

## 2021-06-29 DIAGNOSIS — D18.01 ANGIOMA OF SKIN: ICD-10-CM

## 2021-06-29 PROCEDURE — 99213 OFFICE O/P EST LOW 20 MIN: CPT | Performed by: DERMATOLOGY

## 2021-06-29 NOTE — LETTER
6/29/2021         RE: Fawad Garcia  [address]  [place] [address]        Dear Colleague,    Thank you for referring your patient, Fawad Garcia, to the [facility]. Please see a copy of my visit note below.    Fawad Garcia is an extremely pleasant 85 year old year old male patient here today for hx of non-melanoma skin cancer.  He notes some brown spots on skin.   .   Patient states this has been present for a while.  Patient reports the following symptoms:  brown.  Patient reports the following previous treatments none.  These treatments did not work.  Patient reports the following modifying factors none.  Associated symptoms: none.  Patient has no other skin complaints today.  Remainder of the HPI, Meds, PMH, Allergies, FH, and SH was reviewed in chart.      Past Medical History:   Diagnosis Date     Diabetes mellitus (H)      Hypertension      Squamous cell carcinoma        Past Surgical History:   Procedure Laterality Date     AMPUTATE TOE(S) Right 7/23/2019    Procedure: Partial amputation great toe;  Surgeon: Ethan Montesinos DPM;  Location: [facility] OR     AMPUTATE TOE(S) Left 12/24/2019    Procedure: Amputation of the great toe, left foot;  Surgeon: Ethan Montesinos DPM;  Location: [facility] OR     AMPUTATE TOE(S) Right 3/3/2020    Procedure: Partial amputation, second toe, right foot;  Surgeon: Ethan Montesinos DPM;  Location: [facility] OR     [facility] SURGICAL PATHOLOGY  2002    prostate biopsy elevated PSA     COLONOSCOPY       HC REMOVE TONSILS/ADENOIDS,<13 Y/O      T & A     IR LOWER EXTREMITY ANGIOGRAM LEFT  2/26/2020     IR LOWER EXTREMITY ANGIOGRAM RIGHT  12/11/2018     PHACOEMULSIFICATION WITH STANDARD INTRAOCULAR LENS IMPLANT Left 4/2/2015    Procedure: PHACOEMULSIFICATION WITH STANDARD INTRAOCULAR LENS IMPLANT;  Surgeon: Joseph Hernandez MD;  Location: [facility] OR     PHACOEMULSIFICATION WITH STANDARD INTRAOCULAR LENS IMPLANT Right 5/4/2015     Procedure: PHACOEMULSIFICATION WITH STANDARD INTRAOCULAR LENS IMPLANT;  Surgeon: Joseph Hernandez MD;  Location: [facility] OR     REPAIR HAMMER TOE Left 4/2/2019    Procedure: 5th Metatarsal Head Resection;  Surgeon: Ethan Montesinos DPM;  Location: [facility] OR        Family History   Problem Relation Age of Onset     Cancer Mother         intestinal CA     Diabetes Father      Diabetes Brother      Other Cancer Son         meagan tumor     Other Cancer Brother         pancreatic cancer     Diabetes Son      Melanoma No family hx of        Social History     Socioeconomic History     Marital status:      Spouse name: Not on file     Number of children: Not on file     Years of education: Not on file     Highest education level: Not on file   Occupational History     Not on file   Social Needs     Financial resource strain: Not on file     Food insecurity     Worry: Not on file     Inability: Not on file     Transportation needs     Medical: Not on file     Non-medical: Not on file   Tobacco Use     Smoking status: Never Smoker     Smokeless tobacco: Never Used   Substance and Sexual Activity     Alcohol use: Yes     Alcohol/week: 1.7 standard drinks     Comment: very little     Drug use: No     Sexual activity: Not Currently     Partners: Female   Lifestyle     Physical activity     Days per week: Not on file     Minutes per session: Not on file     Stress: Not on file   Relationships     Social connections     Talks on phone: Not on file     Gets together: Not on file     Attends Zoroastrian service: Not on file     Active member of club or organization: Not on file     Attends meetings of clubs or organizations: Not on file     Relationship status: Not on file     Intimate partner violence     Fear of current or ex partner: Not on file     Emotionally abused: Not on file     Physically abused: Not on file     Forced sexual activity: Not on file   Other Topics Concern     Parent/sibling w/ CABG, MI or angioplasty  before 65F 55M? No   Social History Narrative     Not on file       Outpatient Encounter Medications as of 6/29/2021   Medication Sig Dispense Refill     amLODIPine (NORVASC) 5 MG tablet Take 1 tablet (5 mg) by mouth daily 90 tablet 3     B-D U/F 31G X 8 MM insulin pen needle 1 Device daily Use once daily or as directed. 100 each 0     Blood Glucose Monitoring Suppl (ONE TOUCH ULTRA SYSTEM KIT) W/DEVICE KIT        clopidogrel (PLAVIX) 75 MG tablet Take 1 tablet (75 mg) by mouth daily 90 tablet 3     dorzolamide-timolol (COSOPT) 2-0.5 % ophthalmic solution        hydrochlorothiazide (HYDRODIURIL) 25 MG tablet Take 1 tablet (25 mg) by mouth daily 90 tablet 3     insulin glargine (LANTUS SOLOSTAR) 100 UNIT/ML pen Inject 23 Units Subcutaneous At Bedtime 30 mL 3     Lancets (ONETOUCH DELICA PLUS [id number]) MISC AS DIRECTED 100 each 1     lisinopril (ZESTRIL) 40 MG tablet Take 1 tablet (40 mg) by mouth daily 90 tablet 3     metFORMIN (GLUCOPHAGE) 500 MG tablet Take 2 tablets (1,000 mg) by mouth 2 times daily (with meals) 360 tablet 3     methocarbamol (ROBAXIN) 750 MG tablet Take 1 tablet (750 mg) by mouth 4 times daily as needed for muscle spasms 40 tablet 0     Multiple Vitamins-Minerals (PRESERVISION AREDS PO) Take 1 tablet by mouth       ONETOUCH ULTRA test strip 1 strip by In Vitro route daily One touch Ultra. 100 strip 3     ORDER FOR DME Equipment being ordered:   Glucometer 1 Device 1     pantoprazole (PROTONIX) 20 MG EC tablet Take 1 tablet (20 mg) by mouth daily 90 tablet 3     rosuvastatin (CRESTOR) 10 MG tablet Take 1 tablet (10 mg) by mouth At Bedtime 90 tablet 3     No facility-administered encounter medications on file as of 6/29/2021.              O:   NAD, WDWN, Alert & Oriented, Mood & Affect wnl, Vitals stable   Here today alone   BP (!) 158/75   Pulse 64   SpO2 96%    General appearance normal   Vitals stable   Alert, oriented and in no acute distress      Following lymph nodes palpated: Occipital,  Cervical, Supraclavicular no lad      Stuck on papules and brown macules on trunk and ext   Red papules on trunk  Flesh colored papules on trunk     The remainder of the full exam was normal; the following areas were examined:  conjunctiva/lids, oral mucosa, neck, peripheral vascular system, abdomen, lymph nodes, digits/nails, eccrine and apocrine glands, scalp/hair, face, neck, chest, abdomen, buttocks, back, RUE, LUE, RLE, LLE       Eyes: Conjunctivae/lids:Normal     ENT: Lips, buccal mucosa, tongue: normal    MSK:Normal    Cardiovascular: peripheral edema none    Pulm: Breathing Normal    Lymph Nodes: No Head and Neck Lymphadenopathy     Neuro/Psych: Orientation:Alert and Orientedx3 ; Mood/Affect:normal       A/P:  1. Seborrheic keratosis, lentigo, angioma, dermal nevus, hx of non-melanoma skin cancer   It was a pleasure speaking to Fawad Garcia today.  Previous clinic notes and pertinent laboratory tests were reviewed prior to Fawad Garcia's visit.  Signs and Symptoms of skin cancer discussed with patient.  Patient encouraged to perform monthly skin exams.  UV precautions reviewed with patient.  Risks of non-melanoma skin cancer discussed with patient   Return to clinic 12 months        Again, thank you for allowing me to participate in the care of your patient.        Sincerely,        Evgeny Torres MD

## 2021-06-29 NOTE — PROGRESS NOTES
Fawad Garcia is an extremely pleasant 85 year old year old male patient here today for hx of non-melanoma skin cancer.  He notes some brown spots on skin.   .   Patient states this has been present for a while.  Patient reports the following symptoms:  brown.  Patient reports the following previous treatments none.  These treatments did not work.  Patient reports the following modifying factors none.  Associated symptoms: none.  Patient has no other skin complaints today.  Remainder of the HPI, Meds, PMH, Allergies, FH, and SH was reviewed in chart.      Past Medical History:   Diagnosis Date     Diabetes mellitus (H)      Hypertension      Squamous cell carcinoma        Past Surgical History:   Procedure Laterality Date     AMPUTATE TOE(S) Right 7/23/2019    Procedure: Partial amputation great toe;  Surgeon: Ethan Montesinos DPM;  Location: [facility]     AMPUTATE TOE(S) Left 12/24/2019    Procedure: Amputation of the great toe, left foot;  Surgeon: Ethan Montesinos DPM;  Location: [facility]     AMPUTATE TOE(S) Right 3/3/2020    Procedure: Partial amputation, second toe, right foot;  Surgeon: Ethan Montesinos DPM;  Location: [facility]     [facility] SURGICAL PATHOLOGY  2002    prostate biopsy elevated PSA     COLONOSCOPY       HC REMOVE TONSILS/ADENOIDS,<13 Y/O      T & A     IR LOWER EXTREMITY ANGIOGRAM LEFT  2/26/2020     IR LOWER EXTREMITY ANGIOGRAM RIGHT  12/11/2018     PHACOEMULSIFICATION WITH STANDARD INTRAOCULAR LENS IMPLANT Left 4/2/2015    Procedure: PHACOEMULSIFICATION WITH STANDARD INTRAOCULAR LENS IMPLANT;  Surgeon: Joseph Hernandez MD;  Location: [facility]     PHACOEMULSIFICATION WITH STANDARD INTRAOCULAR LENS IMPLANT Right 5/4/2015    Procedure: PHACOEMULSIFICATION WITH STANDARD INTRAOCULAR LENS IMPLANT;  Surgeon: Joseph Hernandez MD;  Location: [facility]     REPAIR HAMMER TOE Left 4/2/2019    Procedure: 5th Metatarsal Head Resection;  Surgeon: Ethan Montesinos DPM;  Location: [facility]         Family History   Problem Relation Age of Onset     Cancer Mother         intestinal CA     Diabetes Father      Diabetes Brother      Other Cancer Son         meagan tumor     Other Cancer Brother         pancreatic cancer     Diabetes Son      Melanoma No family hx of        Social History     Socioeconomic History     Marital status:      Spouse name: Not on file     Number of children: Not on file     Years of education: Not on file     Highest education level: Not on file   Occupational History     Not on file   Social Needs     Financial resource strain: Not on file     Food insecurity     Worry: Not on file     Inability: Not on file     Transportation needs     Medical: Not on file     Non-medical: Not on file   Tobacco Use     Smoking status: Never Smoker     Smokeless tobacco: Never Used   Substance and Sexual Activity     Alcohol use: Yes     Alcohol/week: 1.7 standard drinks     Comment: very little     Drug use: No     Sexual activity: Not Currently     Partners: Female   Lifestyle     Physical activity     Days per week: Not on file     Minutes per session: Not on file     Stress: Not on file   Relationships     Social connections     Talks on phone: Not on file     Gets together: Not on file     Attends Muslim service: Not on file     Active member of club or organization: Not on file     Attends meetings of clubs or organizations: Not on file     Relationship status: Not on file     Intimate partner violence     Fear of current or ex partner: Not on file     Emotionally abused: Not on file     Physically abused: Not on file     Forced sexual activity: Not on file   Other Topics Concern     Parent/sibling w/ CABG, MI or angioplasty before 65F 55M? No   Social History Narrative     Not on file       Outpatient Encounter Medications as of 6/29/2021   Medication Sig Dispense Refill     amLODIPine (NORVASC) 5 MG tablet Take 1 tablet (5 mg) by mouth daily 90 tablet 3     B-D U/F 31G X 8 MM  insulin pen needle 1 Device daily Use once daily or as directed. 100 each 0     Blood Glucose Monitoring Suppl (ONE TOUCH ULTRA SYSTEM KIT) W/DEVICE KIT        clopidogrel (PLAVIX) 75 MG tablet Take 1 tablet (75 mg) by mouth daily 90 tablet 3     dorzolamide-timolol (COSOPT) 2-0.5 % ophthalmic solution        hydrochlorothiazide (HYDRODIURIL) 25 MG tablet Take 1 tablet (25 mg) by mouth daily 90 tablet 3     insulin glargine (LANTUS SOLOSTAR) 100 UNIT/ML pen Inject 23 Units Subcutaneous At Bedtime 30 mL 3     Lancets (ONETOUCH DELICA PLUS [id number]) MISC AS DIRECTED 100 each 1     lisinopril (ZESTRIL) 40 MG tablet Take 1 tablet (40 mg) by mouth daily 90 tablet 3     metFORMIN (GLUCOPHAGE) 500 MG tablet Take 2 tablets (1,000 mg) by mouth 2 times daily (with meals) 360 tablet 3     methocarbamol (ROBAXIN) 750 MG tablet Take 1 tablet (750 mg) by mouth 4 times daily as needed for muscle spasms 40 tablet 0     Multiple Vitamins-Minerals (PRESERVISION AREDS PO) Take 1 tablet by mouth       ONETOUCH ULTRA test strip 1 strip by In Vitro route daily One touch Ultra. 100 strip 3     ORDER FOR DME Equipment being ordered:   Glucometer 1 Device 1     pantoprazole (PROTONIX) 20 MG EC tablet Take 1 tablet (20 mg) by mouth daily 90 tablet 3     rosuvastatin (CRESTOR) 10 MG tablet Take 1 tablet (10 mg) by mouth At Bedtime 90 tablet 3     No facility-administered encounter medications on file as of 6/29/2021.              O:   NAD, WDWN, Alert & Oriented, Mood & Affect wnl, Vitals stable   Here today alone   BP (!) 158/75   Pulse 64   SpO2 96%    General appearance normal   Vitals stable   Alert, oriented and in no acute distress      Following lymph nodes palpated: Occipital, Cervical, Supraclavicular no lad      Stuck on papules and brown macules on trunk and ext   Red papules on trunk  Flesh colored papules on trunk     The remainder of the full exam was normal; the following areas were examined:  conjunctiva/lids, oral mucosa,  neck, peripheral vascular system, abdomen, lymph nodes, digits/nails, eccrine and apocrine glands, scalp/hair, face, neck, chest, abdomen, buttocks, back, RUE, LUE, RLE, LLE       Eyes: Conjunctivae/lids:Normal     ENT: Lips, buccal mucosa, tongue: normal    MSK:Normal    Cardiovascular: peripheral edema none    Pulm: Breathing Normal    Lymph Nodes: No Head and Neck Lymphadenopathy     Neuro/Psych: Orientation:Alert and Orientedx3 ; Mood/Affect:normal       A/P:  1. Seborrheic keratosis, lentigo, angioma, dermal nevus, hx of non-melanoma skin cancer   It was a pleasure speaking to Fawad Garcia today.  Previous clinic notes and pertinent laboratory tests were reviewed prior to Fawad Garcia's visit.  Signs and Symptoms of skin cancer discussed with patient.  Patient encouraged to perform monthly skin exams.  UV precautions reviewed with patient.  Risks of non-melanoma skin cancer discussed with patient   Return to clinic 12 months

## 2021-06-30 ENCOUNTER — NURSE TRIAGE (OUTPATIENT)
Dept: NURSING | Facility: CLINIC | Age: 86
End: 2021-06-30

## 2021-06-30 NOTE — TELEPHONE ENCOUNTER
Caller stung on arm and knee with  bee or wasp that was in a stump 45 minutes prior   to call; has put alcohol on sting; unable to visualize any stinger   Triage protocol and home care reviewed   Advised to call back for any new or or worseninsng symptoms   Caller understands and will comply   Annette Suazo RN  FNA       Reason for Disposition    Normal local reaction to bee, wasp, or yellow jacket sting    Additional Information    Negative: [1] Life-threatening reaction (anaphylaxis) in the past to sting AND [2] < 2 hours since sting    Negative: Attacked by swarm of bees now    Negative: Passed out (i.e., lost consciousness, collapsed and was not responding)    Negative: Wheezing, stridor, or difficulty breathing    Negative: [1] Hoarseness or cough AND [2] sudden onset following sting    Negative: [1] Tightness in the throat or chest AND [2] sudden onset following sting    Negative: [1] Swollen tongue or difficulty swallowing AND [2] sudden onset following sting    Negative: Sounds like a life-threatening emergency to the triager    Negative: Not a bee, wasp, hornet, or yellow jacket sting    Negative: Ring stuck on swollen finger (or toe) following bee sting    Negative: [1] Hives, itching, or swelling elsewhere on body (i.e., not a site of sting) AND [2] started within 2 hours of sting (Exception: only at site of sting)    Negative: [1] Vomiting or abdominal cramps AND [2] started within 2 hours of sting    Negative: [1] Gave epinephrine shot AND [2] no symptoms now    Negative: Sting inside the mouth    Negative: Sting on eyeball (e.g., cornea)    Negative: Patient sounds very sick or weak to the triager    Negative: More than 50 stings    Negative: [1] Fever AND [2] red area    Negative: [1] Fever AND [2] area is very tender to touch    Negative: [1] Red streak or red line AND [2] length > 2 inches (5 cm)    Negative: [1] Red or very tender (to touch) area AND [2] started over 24 hours after the sting     Negative: [1] Red or very tender (to touch) area AND [2] getting larger over 48 hours after the sting    Negative: Swelling is huge (e.g., > 4 inches or 10 cm, spreads beyond wrist or ankle)    Negative: [1] Hives, itching, or swelling elsewhere on body (i.e., not a site of stings) AND [2] started over 2 hours after sting  (Exception: only at site of sting)    Negative: [1] Scab is present AND [2] it drains pus or increases in size AND [3] not improved after applying  antibiotic ointment for 2 days    Protocols used: BEE OR YELLOW JACKET STING-A-

## 2021-07-11 ENCOUNTER — NURSE TRIAGE (OUTPATIENT)
Dept: NURSING | Facility: CLINIC | Age: 86
End: 2021-07-11

## 2021-07-11 ENCOUNTER — HOSPITAL ENCOUNTER (OUTPATIENT)
Facility: CLINIC | Age: 86
Setting detail: OBSERVATION
Discharge: HOME | End: 2021-07-12
Attending: EMERGENCY MEDICINE | Admitting: INTERNAL MEDICINE
Payer: COMMERCIAL

## 2021-07-11 ENCOUNTER — APPOINTMENT (OUTPATIENT)
Dept: CT IMAGING | Facility: CLINIC | Age: 86
End: 2021-07-11
Attending: EMERGENCY MEDICINE
Payer: COMMERCIAL

## 2021-07-11 DIAGNOSIS — G45.9 TIA (TRANSIENT ISCHEMIC ATTACK): ICD-10-CM

## 2021-07-11 DIAGNOSIS — Z11.52 ENCOUNTER FOR SCREENING LABORATORY TESTING FOR COVID-19 VIRUS: ICD-10-CM

## 2021-07-11 DIAGNOSIS — R79.89 ELEVATED TROPONIN: ICD-10-CM

## 2021-07-11 DIAGNOSIS — N17.9 ACUTE KIDNEY INJURY (H): ICD-10-CM

## 2021-07-11 DIAGNOSIS — R79.89 ELEVATED SERUM CREATININE: ICD-10-CM

## 2021-07-11 LAB
ANION GAP SERPL CALCULATED.3IONS-SCNC: 10 MMOL/L (ref 3–14)
APTT PPP: 32 SECONDS (ref 22–38)
BASOPHILS # BLD AUTO: 0.1 10E3/UL (ref 0–0.2)
BASOPHILS NFR BLD AUTO: 1 %
BUN SERPL-MCNC: 30 MG/DL (ref 7–30)
CALCIUM SERPL-MCNC: 9.3 MG/DL (ref 8.5–10.1)
CHLORIDE BLD-SCNC: 108 MMOL/L (ref 94–109)
CO2 SERPL-SCNC: 22 MMOL/L (ref 20–32)
CREAT SERPL-MCNC: 1.46 MG/DL (ref 0.66–1.25)
EOSINOPHIL # BLD AUTO: 0.4 10E3/UL (ref 0–0.7)
EOSINOPHIL NFR BLD AUTO: 4 %
ERYTHROCYTE [DISTWIDTH] IN BLOOD BY AUTOMATED COUNT: 13.5 % (ref 10–15)
GFR SERPL CREATININE-BSD FRML MDRD: 44 ML/MIN/1.73M2
GLUCOSE BLD-MCNC: 204 MG/DL (ref 70–99)
HCT VFR BLD AUTO: 43.9 % (ref 40–53)
HGB BLD-MCNC: 14.6 G/DL (ref 13.3–17.7)
HOLD SPECIMEN: NORMAL
IMM GRANULOCYTES # BLD: 0 10E3/UL
IMM GRANULOCYTES NFR BLD: 0 %
INR PPP: 0.99 (ref 0.85–1.15)
LYMPHOCYTES # BLD AUTO: 2.4 10E3/UL (ref 0.8–5.3)
LYMPHOCYTES NFR BLD AUTO: 25 %
MCH RBC QN AUTO: 30 PG (ref 26.5–33)
MCHC RBC AUTO-ENTMCNC: 33.3 G/DL (ref 31.5–36.5)
MCV RBC AUTO: 90 FL (ref 78–100)
MONOCYTES # BLD AUTO: 1.1 10E3/UL (ref 0–1.3)
MONOCYTES NFR BLD AUTO: 11 %
NEUTROPHILS # BLD AUTO: 5.7 10E3/UL (ref 1.6–8.3)
NEUTROPHILS NFR BLD AUTO: 59 %
NRBC # BLD AUTO: 0 10E3/UL
NRBC BLD AUTO-RTO: 0 /100
PLATELET # BLD AUTO: 305 10E3/UL (ref 150–450)
POTASSIUM BLD-SCNC: 4.5 MMOL/L (ref 3.4–5.3)
RBC # BLD AUTO: 4.86 10E6/UL (ref 4.4–5.9)
SODIUM SERPL-SCNC: 140 MMOL/L (ref 133–144)
TROPONIN I SERPL-MCNC: 0.07 UG/L (ref 0–0.04)
WBC # BLD AUTO: 9.7 10E3/UL (ref 4–11)

## 2021-07-11 PROCEDURE — 85610 PROTHROMBIN TIME: CPT | Performed by: EMERGENCY MEDICINE

## 2021-07-11 PROCEDURE — 99219 PR INITIAL OBSERVATION CARE,LEVEL II: CPT | Performed by: INTERNAL MEDICINE

## 2021-07-11 PROCEDURE — 93010 ELECTROCARDIOGRAM REPORT: CPT | Performed by: EMERGENCY MEDICINE

## 2021-07-11 PROCEDURE — 87635 SARS-COV-2 COVID-19 AMP PRB: CPT | Performed by: EMERGENCY MEDICINE

## 2021-07-11 PROCEDURE — 99291 CRITICAL CARE FIRST HOUR: CPT | Mod: 25 | Performed by: EMERGENCY MEDICINE

## 2021-07-11 PROCEDURE — 250N000011 HC RX IP 250 OP 636: Performed by: EMERGENCY MEDICINE

## 2021-07-11 PROCEDURE — 85025 COMPLETE CBC W/AUTO DIFF WBC: CPT | Performed by: EMERGENCY MEDICINE

## 2021-07-11 PROCEDURE — 70450 CT HEAD/BRAIN W/O DYE: CPT

## 2021-07-11 PROCEDURE — 70496 CT ANGIOGRAPHY HEAD: CPT

## 2021-07-11 PROCEDURE — 84484 ASSAY OF TROPONIN QUANT: CPT | Performed by: EMERGENCY MEDICINE

## 2021-07-11 PROCEDURE — 250N000009 HC RX 250: Performed by: EMERGENCY MEDICINE

## 2021-07-11 PROCEDURE — C9803 HOPD COVID-19 SPEC COLLECT: HCPCS | Performed by: EMERGENCY MEDICINE

## 2021-07-11 PROCEDURE — 93005 ELECTROCARDIOGRAM TRACING: CPT | Performed by: EMERGENCY MEDICINE

## 2021-07-11 PROCEDURE — G0378 HOSPITAL OBSERVATION PER HR: HCPCS

## 2021-07-11 PROCEDURE — 85730 THROMBOPLASTIN TIME PARTIAL: CPT | Performed by: EMERGENCY MEDICINE

## 2021-07-11 PROCEDURE — 36592 COLLECT BLOOD FROM PICC: CPT | Performed by: EMERGENCY MEDICINE

## 2021-07-11 PROCEDURE — 80048 BASIC METABOLIC PNL TOTAL CA: CPT | Performed by: EMERGENCY MEDICINE

## 2021-07-11 RX ORDER — IOPAMIDOL 755 MG/ML
70 INJECTION, SOLUTION INTRAVASCULAR ONCE
Status: COMPLETED | OUTPATIENT
Start: 2021-07-11 | End: 2021-07-11

## 2021-07-11 RX ADMIN — IOPAMIDOL 70 ML: 755 INJECTION, SOLUTION INTRAVENOUS at 22:14

## 2021-07-11 RX ADMIN — SODIUM CHLORIDE 100 ML: 9 INJECTION, SOLUTION INTRAVENOUS at 22:14

## 2021-07-11 ASSESSMENT — ENCOUNTER SYMPTOMS
FACIAL ASYMMETRY: 0
APPETITE CHANGE: 0
SPEECH DIFFICULTY: 0
DIZZINESS: 0
LIGHT-HEADEDNESS: 0
FEVER: 0
CHILLS: 0
NAUSEA: 0
NECK STIFFNESS: 0
WEAKNESS: 1
NUMBNESS: 1
BACK PAIN: 0
VOMITING: 0
DIARRHEA: 0
FATIGUE: 0
HEADACHES: 0
SHORTNESS OF BREATH: 0
CHEST TIGHTNESS: 0
CONFUSION: 0
ABDOMINAL PAIN: 0

## 2021-07-11 ASSESSMENT — MIFFLIN-ST. JEOR: SCORE: 1712.13

## 2021-07-12 ENCOUNTER — APPOINTMENT (OUTPATIENT)
Dept: MRI IMAGING | Facility: CLINIC | Age: 86
End: 2021-07-12
Attending: INTERNAL MEDICINE
Payer: COMMERCIAL

## 2021-07-12 ENCOUNTER — APPOINTMENT (OUTPATIENT)
Dept: CARDIOLOGY | Facility: CLINIC | Age: 86
End: 2021-07-12
Attending: INTERNAL MEDICINE
Payer: COMMERCIAL

## 2021-07-12 VITALS
DIASTOLIC BLOOD PRESSURE: 79 MMHG | WEIGHT: 223.55 LBS | HEART RATE: 67 BPM | SYSTOLIC BLOOD PRESSURE: 158 MMHG | TEMPERATURE: 97.4 F | OXYGEN SATURATION: 99 % | BODY MASS INDEX: 31.3 KG/M2 | RESPIRATION RATE: 18 BRPM | HEIGHT: 71 IN

## 2021-07-12 LAB
ALBUMIN UR-MCNC: 30 MG/DL
ANION GAP SERPL CALCULATED.3IONS-SCNC: 8 MMOL/L (ref 3–14)
APPEARANCE UR: CLEAR
BILIRUB UR QL STRIP: NEGATIVE
BUN SERPL-MCNC: 29 MG/DL (ref 7–30)
CALCIUM SERPL-MCNC: 8.5 MG/DL (ref 8.5–10.1)
CHLORIDE BLD-SCNC: 111 MMOL/L (ref 94–109)
CHOLEST SERPL-MCNC: 124 MG/DL
CO2 SERPL-SCNC: 23 MMOL/L (ref 20–32)
COLOR UR AUTO: YELLOW
CREAT SERPL-MCNC: 0.95 MG/DL (ref 0.66–1.25)
CREAT SERPL-MCNC: 1.15 MG/DL (ref 0.66–1.25)
CREAT UR-MCNC: 111 MG/DL
ERYTHROCYTE [DISTWIDTH] IN BLOOD BY AUTOMATED COUNT: 13.6 % (ref 10–15)
FASTING STATUS PATIENT QL REPORTED: NORMAL
FRACT EXCRET NA UR+SERPL-RTO: 0.8 %
GFR SERPL CREATININE-BSD FRML MDRD: 58 ML/MIN/1.73M2
GLUCOSE BLD-MCNC: 102 MG/DL (ref 70–99)
GLUCOSE BLDC GLUCOMTR-MCNC: 180 MG/DL (ref 70–99)
GLUCOSE BLDC GLUCOMTR-MCNC: 87 MG/DL (ref 70–99)
GLUCOSE UR STRIP-MCNC: NEGATIVE MG/DL
HCT VFR BLD AUTO: 40.8 % (ref 40–53)
HDLC SERPL-MCNC: 50 MG/DL
HGB BLD-MCNC: 13.4 G/DL (ref 13.3–17.7)
HGB UR QL STRIP: NEGATIVE
KETONES UR STRIP-MCNC: NEGATIVE MG/DL
LDLC SERPL CALC-MCNC: 56 MG/DL
LEUKOCYTE ESTERASE UR QL STRIP: NEGATIVE
LVEF ECHO: NORMAL
MCH RBC QN AUTO: 30 PG (ref 26.5–33)
MCHC RBC AUTO-ENTMCNC: 32.8 G/DL (ref 31.5–36.5)
MCV RBC AUTO: 91 FL (ref 78–100)
MUCOUS THREADS #/AREA URNS LPF: PRESENT /LPF
NITRATE UR QL: NEGATIVE
NONHDLC SERPL-MCNC: 74 MG/DL
PH UR STRIP: 5 [PH] (ref 5–7)
PLATELET # BLD AUTO: 254 10E3/UL (ref 150–450)
POTASSIUM BLD-SCNC: 3.6 MMOL/L (ref 3.4–5.3)
RBC # BLD AUTO: 4.47 10E6/UL (ref 4.4–5.9)
RBC URINE: 1 /HPF
SARS-COV-2 RNA RESP QL NAA+PROBE: NEGATIVE
SODIUM SERPL-SCNC: 139 MMOL/L (ref 133–144)
SODIUM SERPL-SCNC: 142 MMOL/L (ref 133–144)
SODIUM UR-SCNC: 112 MMOL/L
SP GR UR STRIP: 1.02 (ref 1–1.03)
TRIGL SERPL-MCNC: 91 MG/DL
TROPONIN I SERPL-MCNC: 0.07 UG/L (ref 0–0.04)
UROBILINOGEN UR STRIP-MCNC: NORMAL MG/DL
WBC # BLD AUTO: 8.6 10E3/UL (ref 4–11)
WBC URINE: <1 /HPF

## 2021-07-12 PROCEDURE — 84295 ASSAY OF SERUM SODIUM: CPT | Mod: 91

## 2021-07-12 PROCEDURE — 250N000013 HC RX MED GY IP 250 OP 250 PS 637: Performed by: INTERNAL MEDICINE

## 2021-07-12 PROCEDURE — 70553 MRI BRAIN STEM W/O & W/DYE: CPT

## 2021-07-12 PROCEDURE — 81001 URINALYSIS AUTO W/SCOPE: CPT | Performed by: INTERNAL MEDICINE

## 2021-07-12 PROCEDURE — 96372 THER/PROPH/DIAG INJ SC/IM: CPT | Mod: 59 | Performed by: INTERNAL MEDICINE

## 2021-07-12 PROCEDURE — 82565 ASSAY OF CREATININE: CPT | Performed by: INTERNAL MEDICINE

## 2021-07-12 PROCEDURE — 82962 GLUCOSE BLOOD TEST: CPT

## 2021-07-12 PROCEDURE — 999N000208 ECHOCARDIOGRAM COMPLETE

## 2021-07-12 PROCEDURE — 80061 LIPID PANEL: CPT | Performed by: INTERNAL MEDICINE

## 2021-07-12 PROCEDURE — 80048 BASIC METABOLIC PNL TOTAL CA: CPT | Performed by: INTERNAL MEDICINE

## 2021-07-12 PROCEDURE — 93306 TTE W/DOPPLER COMPLETE: CPT | Mod: 26 | Performed by: INTERNAL MEDICINE

## 2021-07-12 PROCEDURE — 85027 COMPLETE CBC AUTOMATED: CPT | Performed by: INTERNAL MEDICINE

## 2021-07-12 PROCEDURE — 99217 PR OBSERVATION CARE DISCHARGE: CPT | Performed by: INTERNAL MEDICINE

## 2021-07-12 PROCEDURE — 250N000012 HC RX MED GY IP 250 OP 636 PS 637: Mod: GY | Performed by: INTERNAL MEDICINE

## 2021-07-12 PROCEDURE — 84484 ASSAY OF TROPONIN QUANT: CPT | Performed by: INTERNAL MEDICINE

## 2021-07-12 PROCEDURE — G0378 HOSPITAL OBSERVATION PER HR: HCPCS

## 2021-07-12 PROCEDURE — 255N000002 HC RX 255 OP 636: Performed by: INTERNAL MEDICINE

## 2021-07-12 PROCEDURE — 258N000003 HC RX IP 258 OP 636: Performed by: INTERNAL MEDICINE

## 2021-07-12 PROCEDURE — 84295 ASSAY OF SERUM SODIUM: CPT | Performed by: INTERNAL MEDICINE

## 2021-07-12 PROCEDURE — A9585 GADOBUTROL INJECTION: HCPCS | Performed by: INTERNAL MEDICINE

## 2021-07-12 PROCEDURE — 36415 COLL VENOUS BLD VENIPUNCTURE: CPT | Performed by: INTERNAL MEDICINE

## 2021-07-12 PROCEDURE — 84300 ASSAY OF URINE SODIUM: CPT | Performed by: INTERNAL MEDICINE

## 2021-07-12 PROCEDURE — 82570 ASSAY OF URINE CREATININE: CPT | Performed by: INTERNAL MEDICINE

## 2021-07-12 RX ORDER — CLOPIDOGREL BISULFATE 75 MG/1
75 TABLET ORAL DAILY
Status: DISCONTINUED | OUTPATIENT
Start: 2021-07-12 | End: 2021-07-12 | Stop reason: HOSPADM

## 2021-07-12 RX ORDER — ONDANSETRON 2 MG/ML
4 INJECTION INTRAMUSCULAR; INTRAVENOUS EVERY 6 HOURS PRN
Status: DISCONTINUED | OUTPATIENT
Start: 2021-07-12 | End: 2021-07-12 | Stop reason: HOSPADM

## 2021-07-12 RX ORDER — ONDANSETRON 4 MG/1
4 TABLET, ORALLY DISINTEGRATING ORAL EVERY 6 HOURS PRN
Status: DISCONTINUED | OUTPATIENT
Start: 2021-07-12 | End: 2021-07-12 | Stop reason: HOSPADM

## 2021-07-12 RX ORDER — HYDROCHLOROTHIAZIDE 25 MG/1
25 TABLET ORAL DAILY
Status: DISCONTINUED | OUTPATIENT
Start: 2021-07-12 | End: 2021-07-12 | Stop reason: HOSPADM

## 2021-07-12 RX ORDER — ASPIRIN 325 MG
325 TABLET ORAL DAILY
Qty: 30 TABLET | Refills: 0 | Status: SHIPPED | OUTPATIENT
Start: 2021-07-13 | End: 2023-03-10

## 2021-07-12 RX ORDER — PANTOPRAZOLE SODIUM 20 MG/1
20 TABLET, DELAYED RELEASE ORAL DAILY
Status: DISCONTINUED | OUTPATIENT
Start: 2021-07-12 | End: 2021-07-12 | Stop reason: HOSPADM

## 2021-07-12 RX ORDER — DEXTROSE MONOHYDRATE 25 G/50ML
25-50 INJECTION, SOLUTION INTRAVENOUS
Status: DISCONTINUED | OUTPATIENT
Start: 2021-07-12 | End: 2021-07-12 | Stop reason: HOSPADM

## 2021-07-12 RX ORDER — ROSUVASTATIN CALCIUM 5 MG/1
10 TABLET, COATED ORAL AT BEDTIME
Status: DISCONTINUED | OUTPATIENT
Start: 2021-07-12 | End: 2021-07-12 | Stop reason: HOSPADM

## 2021-07-12 RX ORDER — NICOTINE POLACRILEX 4 MG
15-30 LOZENGE BUCCAL
Status: DISCONTINUED | OUTPATIENT
Start: 2021-07-12 | End: 2021-07-12 | Stop reason: HOSPADM

## 2021-07-12 RX ORDER — ACETAMINOPHEN 325 MG/1
325 TABLET ORAL EVERY 4 HOURS PRN
Status: DISCONTINUED | OUTPATIENT
Start: 2021-07-12 | End: 2021-07-12 | Stop reason: HOSPADM

## 2021-07-12 RX ORDER — DORZOLAMIDE HYDROCHLORIDE AND TIMOLOL MALEATE 20; 5 MG/ML; MG/ML
1 SOLUTION/ DROPS OPHTHALMIC 2 TIMES DAILY
Status: DISCONTINUED | OUTPATIENT
Start: 2021-07-12 | End: 2021-07-12 | Stop reason: HOSPADM

## 2021-07-12 RX ORDER — ASPIRIN 325 MG
325 TABLET ORAL DAILY
Status: DISCONTINUED | OUTPATIENT
Start: 2021-07-12 | End: 2021-07-12 | Stop reason: HOSPADM

## 2021-07-12 RX ORDER — AMLODIPINE BESYLATE 5 MG/1
5 TABLET ORAL DAILY
Status: DISCONTINUED | OUTPATIENT
Start: 2021-07-12 | End: 2021-07-12 | Stop reason: HOSPADM

## 2021-07-12 RX ORDER — LISINOPRIL 40 MG/1
40 TABLET ORAL DAILY
Status: DISCONTINUED | OUTPATIENT
Start: 2021-07-12 | End: 2021-07-12 | Stop reason: HOSPADM

## 2021-07-12 RX ORDER — GADOBUTROL 604.72 MG/ML
10 INJECTION INTRAVENOUS ONCE
Status: COMPLETED | OUTPATIENT
Start: 2021-07-12 | End: 2021-07-12

## 2021-07-12 RX ADMIN — METFORMIN HYDROCHLORIDE 1000 MG: 500 TABLET ORAL at 08:28

## 2021-07-12 RX ADMIN — AMLODIPINE BESYLATE 5 MG: 5 TABLET ORAL at 08:21

## 2021-07-12 RX ADMIN — PANTOPRAZOLE SODIUM 20 MG: 20 TABLET, DELAYED RELEASE ORAL at 08:22

## 2021-07-12 RX ADMIN — ASPIRIN 325 MG ORAL TABLET 325 MG: 325 PILL ORAL at 08:21

## 2021-07-12 RX ADMIN — HUMAN ALBUMIN MICROSPHERES AND PERFLUTREN 2 ML: 10; .22 INJECTION, SOLUTION INTRAVENOUS at 09:53

## 2021-07-12 RX ADMIN — ASPIRIN 325 MG ORAL TABLET 325 MG: 325 PILL ORAL at 01:18

## 2021-07-12 RX ADMIN — INSULIN GLARGINE 20 UNITS: 100 INJECTION, SOLUTION SUBCUTANEOUS at 01:18

## 2021-07-12 RX ADMIN — HYDROCHLOROTHIAZIDE 25 MG: 25 TABLET ORAL at 08:21

## 2021-07-12 RX ADMIN — SODIUM CHLORIDE 1000 ML: 9 INJECTION, SOLUTION INTRAVENOUS at 01:17

## 2021-07-12 RX ADMIN — GADOBUTROL 10 ML: 604.72 INJECTION INTRAVENOUS at 11:02

## 2021-07-12 ASSESSMENT — MIFFLIN-ST. JEOR: SCORE: 1726.13

## 2021-07-12 NOTE — ED PROVIDER NOTES
History     Chief Complaint   Patient presents with     Numbness     while eating dinner, sudden onset left fingers and left cheek numbness. left facial droop. no dysphagia, no weakness     HPI  Fawad Garcia is a 85 year old male with history of type 2 diabetes, macular degeneration, hypertension, and obesity presenting for evaluation of acute facial numbness and weakness.  Patient noticed some abnormal sensation in his face and left arm and hand around 7:30 PM.  Reports some tingling sensation in the hand with some slight weakness.  Also noticed some tingling in his face.  When symptoms were not improving he came in for evaluation.  Patient denies any headache or vision changes.  Denies any nausea or vomiting.  No chest pain or difficulty breathing.  Denies abdominal pain, nausea, or vomiting.  Patient ports he had some very brief dizziness earlier this afternoon which resolved within seconds.  No history of strokes.  Patient is on Plavix due to history of stent in his leg for poor circulation.  Patient reports that prior to this patient has been feeling well.  Has noticed some increased variability in his blood sugars recently ranging from the 70s up to 300s.    Allergies:  Allergies   Allergen Reactions     Zocor [Simvastatin - High Dose]      Bilateral hip aching       Problem List:    Patient Active Problem List    Diagnosis Date Noted     TIA (transient ischemic attack) 07/11/2021     Priority: Medium     H/O amputation of lesser toe, right (H) 01/04/2021     Priority: Medium     Macular degeneration (senile) of retina 01/10/2019     Priority: Medium     Nearly legally blind per eye doctor.       Type 2 diabetes mellitus with both eyes affected by mild nonproliferative retinopathy without macular edema, with long-term current use of insulin (H) 07/30/2018     Priority: Medium     Senile nuclear sclerosis 04/01/2015     Priority: Medium     Hypertension, goal below 140/90 03/25/2015     Priority: Medium  "    Malignant neoplasm of prostate (H) 06/16/2014     Priority: Medium     Health Care Home 12/28/2012     Priority: Medium     Nury Prater RN-PHN  [facility]   [phone]    DX V65.8 REPLACED WITH [id number] HEALTH CARE HOME (04/08/2013)       Advanced directives, counseling/discussion 02/24/2012     Priority: Medium     Patient does not have an Advance/Health Care Directive (HCD), given \"What is Advance Care Planning?\" flyer, accepts referral to Facilitator and requests blank HCD form.    Roro Hernandezp  February 24, 2012         Peripheral vascular disease (H) 11/18/2011     Priority: Medium     Problem list name updated by automated process. Provider to review       GERD (gastroesophageal reflux disease) 11/03/2011     Priority: Medium     HYPERLIPIDEMIA LDL GOAL <100 10/31/2010     Priority: Medium     Transient cerebral ischemia 09/03/2009     Priority: Medium     Diagnosis updated by automated process. Provider to review and confirm.       Obesity      Priority: Medium     Obesity  Problem list name updated by automated process. Provider to review          Past Medical History:    Past Medical History:   Diagnosis Date     Diabetes mellitus (H)      Hypertension      Squamous cell carcinoma        Past Surgical History:    Past Surgical History:   Procedure Laterality Date     AMPUTATE TOE(S) Right 7/23/2019    Procedure: Partial amputation great toe;  Surgeon: Ethan Montesinos DPM;  Location: [facility]     AMPUTATE TOE(S) Left 12/24/2019    Procedure: Amputation of the great toe, left foot;  Surgeon: Ethan Montesinos DPM;  Location: [facility]     AMPUTATE TOE(S) Right 3/3/2020    Procedure: Partial amputation, second toe, right foot;  Surgeon: Ethan Montesinos DPM;  Location: [facility] OR     CL AFF SURGICAL PATHOLOGY  2002    prostate biopsy elevated PSA     COLONOSCOPY       HC REMOVE TONSILS/ADENOIDS,<11 Y/O      T & A     IR LOWER EXTREMITY ANGIOGRAM LEFT  2/26/2020     IR " LOWER EXTREMITY ANGIOGRAM RIGHT  12/11/2018     PHACOEMULSIFICATION WITH STANDARD INTRAOCULAR LENS IMPLANT Left 4/2/2015    Procedure: PHACOEMULSIFICATION WITH STANDARD INTRAOCULAR LENS IMPLANT;  Surgeon: Joseph Hernandez MD;  Location: [facility]     PHACOEMULSIFICATION WITH STANDARD INTRAOCULAR LENS IMPLANT Right 5/4/2015    Procedure: PHACOEMULSIFICATION WITH STANDARD INTRAOCULAR LENS IMPLANT;  Surgeon: Joseph Hernandez MD;  Location: [facility]     REPAIR HAMMER TOE Left 4/2/2019    Procedure: 5th Metatarsal Head Resection;  Surgeon: Ethan Montesinos DPM;  Location: [facility]       Family History:    Family History   Problem Relation Age of Onset     Cancer Mother         intestinal CA     Diabetes Father      Diabetes Brother      Other Cancer Son         meagan tumor     Other Cancer Brother         pancreatic cancer     Diabetes Son      Melanoma No family hx of        Social History:  Marital Status:   [2]  Social History     Tobacco Use     Smoking status: Never Smoker     Smokeless tobacco: Never Used   Substance Use Topics     Alcohol use: Yes     Alcohol/week: 1.7 standard drinks     Comment: 1 x month     Drug use: No        Medications:    No current outpatient medications on file.        Review of Systems   Constitutional: Negative for appetite change, chills, fatigue and fever.   HENT: Negative for congestion.    Eyes: Negative for visual disturbance.   Respiratory: Negative for chest tightness and shortness of breath.    Cardiovascular: Negative for chest pain.   Gastrointestinal: Negative for abdominal pain, diarrhea, nausea and vomiting.   Genitourinary: Negative for decreased urine volume.   Musculoskeletal: Negative for back pain and neck stiffness.   Skin: Negative for rash.   Neurological: Positive for weakness (Left hand) and numbness. Negative for dizziness, syncope, facial asymmetry, speech difficulty, light-headedness and headaches.   Psychiatric/Behavioral: Negative for confusion.  "  All other systems reviewed and are negative.      Physical Exam   BP: (!) 144/77  Pulse: 88  Temp: 96.8  F (36  C)  Resp: 16  Height: 180.3 cm (5' 11\")  Weight: 100.7 kg (222 lb)  SpO2: 96 %      Physical Exam  Vitals and nursing note reviewed.   Constitutional:       Appearance: Normal appearance. He is obese. He is not ill-appearing or diaphoretic.   HENT:      Head: Atraumatic.      Nose: Nose normal.      Mouth/Throat:      Mouth: Mucous membranes are moist.   Eyes:      Conjunctiva/sclera: Conjunctivae normal.      Pupils: Pupils are equal, round, and reactive to light.   Cardiovascular:      Rate and Rhythm: Normal rate.      Pulses: Normal pulses.   Pulmonary:      Effort: Pulmonary effort is normal.   Abdominal:      Palpations: Abdomen is soft.      Tenderness: There is no abdominal tenderness.   Musculoskeletal:      Cervical back: Normal range of motion.   Skin:     General: Skin is warm and dry.      Capillary Refill: Capillary refill takes less than 2 seconds.   Neurological:      Mental Status: He is alert and oriented to person, place, and time.      Sensory: Sensory deficit (Slight decrease sensation in the left face) present.      Motor: Weakness (Slight left facial droop) present.   Psychiatric:         Mood and Affect: Mood normal.         ED Course        Procedures              EKG Interpretation:      Interpreted by Stewart Conklin MD  Time reviewed: 2208  Symptoms at time of EKG: stroke   Rhythm: normal sinus   Rate: normal  Axis: normal  Ectopy: none  Conduction: normal  ST Segments/ T Waves: No ST-T wave changes  Q Waves: none  Comparison to prior: Not significantly changed compared to previous EKG 3/4/2020    Clinical Impression: normal EKG    The patient has stroke symptoms:         ED Stroke specific documentation           NIHSS PDF     Patient last known well time: 730p  ED Provider first to bedside at: 935p  CT Results received at: 1007p    Thrombolytics:   Not given due to " minor/isolated/quickly resolving symptoms.    If treating with thrombolytics: Ensure SBP<180 and DBP<105 prior to treatment with thrombolytics.  Administering thrombolytics after treatment with IV labetalol, hydralazine, or nicardipine is reasonable once BP control is established.    Endovascular Retrieval:  Not initiated due to absence of proximal vessel occlusion    National [employer] Stroke Scale (Baseline)  Time Performed: 935p     Score    Level of consciousness: (0)   Alert, keenly responsive    LOC questions: (0)   Answers both questions correctly    LOC commands: (0)   Performs both tasks correctly    Best gaze: (0)   Normal    Visual: (0)   No visual loss    Facial palsy: (1)   Minor paralysis (flat nasolabial fold, smile asymmetry)    Motor arm (left): (0)   No drift    Motor arm (right): (0)   No drift    Motor leg (left): (0)   No drift    Motor leg (right): (0)   No drift    Limb ataxia: (0)   Absent    Sensory: (1)   Mild to moderate sensory loss    Best language: (0)   Normal- no aphasia    Dysarthria: (0)   Normal    Extinction and inattention: (0)   No abnormality        Total Score:  2        Stroke Mimics were considered (including migraine headache, seizure disorder, hypoglycemia (or hyperglycemia), head or spinal trauma, CNS infection, Toxin ingestion and shock state (e.g. sepsis) .               Results for orders placed or performed during the hospital encounter of 07/11/21 (from the past 24 hour(s))   CBC with Platelets & Differential    Narrative    The following orders were created for panel order CBC with Platelets & Differential.  Procedure                               Abnormality         Status                     ---------                               -----------         ------                     CBC with platelets and d...[[id number]]                      Final result                 Please view results for these tests on the individual orders.   Basic metabolic panel    Result Value Ref Range    Sodium 140 133 - 144 mmol/L    Potassium 4.5 3.4 - 5.3 mmol/L    Chloride 108 94 - 109 mmol/L    Carbon Dioxide (CO2) 22 20 - 32 mmol/L    Anion Gap 10 3 - 14 mmol/L    Urea Nitrogen 30 7 - 30 mg/dL    Creatinine 1.46 (H) 0.66 - 1.25 mg/dL    Calcium 9.3 8.5 - 10.1 mg/dL    Glucose 204 (H) 70 - 99 mg/dL    GFR Estimate 44 (L) >60 mL/min/1.73m2   INR   Result Value Ref Range    INR 0.99 0.85 - 1.15   Partial thromboplastin time   Result Value Ref Range    aPTT 32 22 - 38 Seconds   Troponin I   Result Value Ref Range    Troponin I 0.071 (H) 0.000 - 0.045 ug/L   [place] Draw    Narrative    The following orders were created for panel order [place] Draw.  Procedure                               Abnormality         Status                     ---------                               -----------         ------                     Extra Blue Top Tube[[id number]]                              Final result               Extra Red Top Tube[[id number]]                               Final result               Extra Green Top (Lithium...[[id number]]                      Final result               Extra Green Top (Lithium...[[id number]]                      Final result               Extra Purple Top Tube[[id number]]                            Final result                 Please view results for these tests on the individual orders.   CBC with platelets and differential   Result Value Ref Range    WBC Count 9.7 4.0 - 11.0 10e3/uL    RBC Count 4.86 4.40 - 5.90 10e6/uL    Hemoglobin 14.6 13.3 - 17.7 g/dL    Hematocrit 43.9 40.0 - 53.0 %    MCV 90 78 - 100 fL    MCH 30.0 26.5 - 33.0 pg    MCHC 33.3 31.5 - 36.5 g/dL    RDW 13.5 10.0 - 15.0 %    Platelet Count 305 150 - 450 10e3/uL    % Neutrophils 59 %    % Lymphocytes 25 %    % Monocytes 11 %    % Eosinophils 4 %    % Basophils 1 %    % Immature Granulocytes 0 %    NRBCs per 100 WBC 0 <1 /100    Absolute Neutrophils 5.7 1.6 - 8.3 10e3/uL    Absolute Lymphocytes  2.4 0.8 - 5.3 10e3/uL    Absolute Monocytes 1.1 0.0 - 1.3 10e3/uL    Absolute Eosinophils 0.4 0.0 - 0.7 10e3/uL    Absolute Basophils 0.1 0.0 - 0.2 10e3/uL    Absolute Immature Granulocytes 0.0 <=0.0 10e3/uL    Absolute NRBCs 0.0 10e3/uL   Extra Blue Top Tube   Result Value Ref Range    Hold Specimen JIC    Extra Red Top Tube   Result Value Ref Range    Hold Specimen JIC    Extra Green Top (Lithium Heparin) Tube   Result Value Ref Range    Hold Specimen JIC    Extra Green Top (Lithium Heparin) Tube   Result Value Ref Range    Hold Specimen JIC    Extra Purple Top Tube   Result Value Ref Range    Hold Specimen JIC    CT Head w/o Contrast    Narrative    EXAM: CT HEAD W/O CONTRAST, CTA  HEAD NECK WITH CONTRAST  LOCATION: [facility]  DATE/TIME: 7/11/2021 9:51 PM    INDICATION: Left-sided weakness. Stroke code. Left facial droop.  COMPARISON: MRI 3/22/2014  CONTRAST: 70 mL Isovue-370  TECHNIQUE: Head and neck CT angiogram with IV contrast. Noncontrast head CT followed by axial helical CT images of the head and neck vessels obtained during the arterial phase of intravenous contrast administration. Axial 2D reconstructed images and   multiplanar 3D MIP reconstructed images of the head and neck vessels were performed by the technologist. Dose reduction techniques were used.     FINDINGS:   NONCONTRAST HEAD CT:   INTRACRANIAL CONTENTS: No intracranial hemorrhage, extraaxial collection, or mass effect.  No CT evidence of acute infarct. Normal parenchymal attenuation. Normal ventricles and sulci.     VISUALIZED ORBITS/SINUSES/MASTOIDS: No intraorbital abnormality. No paranasal sinus mucosal disease. No middle ear or mastoid effusion.    BONES/SOFT TISSUES: No acute abnormality.    HEAD CTA:  ANTERIOR CIRCULATION: No stenosis/occlusion, aneurysm, or high flow vascular malformation. Standard [place] anatomy.    POSTERIOR CIRCULATION: No stenosis/occlusion, aneurysm, or high flow vascular malformation.  Dominant right and smaller left vertebral artery contribute to a normal basilar artery.     DURAL VENOUS SINUSES: Expected enhancement of the major dural venous sinuses.    NECK CTA:  RIGHT CAROTID: Atherosclerotic plaque results in less than 50% stenosis in the right ICA. No dissection.    LEFT CAROTID: Atherosclerotic plaque results in less than 50% stenosis in the left ICA. No dissection.    VERTEBRAL ARTERIES: No focal stenosis or dissection. Dominant right and smaller left vertebral arteries.    AORTIC ARCH: Classic aortic arch anatomy with no significant stenosis at the origin of the great vessels.    NONVASCULAR STRUCTURES: Unremarkable.      Impression    IMPRESSION:   HEAD CT:  1.  No acute findings.  2.  Age-related changes.    HEAD CTA:   1.  No branch vessel occlusion, high-grade stenosis, aneurysm, or high flow vascular malformation involving proximal [place] vessels.    NECK CTA:  1.  No significant stenosis in the neck vessels based on NASCET criteria.  2.  No evidence for dissection.    Noncontrast head CT discussed with Dr. Conklin at 22:06 hours. CTA head and neck discussed with Dr. Conklin at 22:15 hours.   CTA Head Neck with Contrast    Narrative    EXAM: CT HEAD W/O CONTRAST, CTA  HEAD NECK WITH CONTRAST  LOCATION: [facility]  DATE/TIME: 7/11/2021 9:51 PM    INDICATION: Left-sided weakness. Stroke code. Left facial droop.  COMPARISON: MRI 3/22/2014  CONTRAST: 70 mL Isovue-370  TECHNIQUE: Head and neck CT angiogram with IV contrast. Noncontrast head CT followed by axial helical CT images of the head and neck vessels obtained during the arterial phase of intravenous contrast administration. Axial 2D reconstructed images and   multiplanar 3D MIP reconstructed images of the head and neck vessels were performed by the technologist. Dose reduction techniques were used.     FINDINGS:   NONCONTRAST HEAD CT:   INTRACRANIAL CONTENTS: No intracranial hemorrhage, extraaxial collection, or  mass effect.  No CT evidence of acute infarct. Normal parenchymal attenuation. Normal ventricles and sulci.     VISUALIZED ORBITS/SINUSES/MASTOIDS: No intraorbital abnormality. No paranasal sinus mucosal disease. No middle ear or mastoid effusion.    BONES/SOFT TISSUES: No acute abnormality.    HEAD CTA:  ANTERIOR CIRCULATION: No stenosis/occlusion, aneurysm, or high flow vascular malformation. Standard [place] of [place] anatomy.    POSTERIOR CIRCULATION: No stenosis/occlusion, aneurysm, or high flow vascular malformation. Dominant right and smaller left vertebral artery contribute to a normal basilar artery.     DURAL VENOUS SINUSES: Expected enhancement of the major dural venous sinuses.    NECK CTA:  RIGHT CAROTID: Atherosclerotic plaque results in less than 50% stenosis in the right ICA. No dissection.    LEFT CAROTID: Atherosclerotic plaque results in less than 50% stenosis in the left ICA. No dissection.    VERTEBRAL ARTERIES: No focal stenosis or dissection. Dominant right and smaller left vertebral arteries.    AORTIC ARCH: Classic aortic arch anatomy with no significant stenosis at the origin of the great vessels.    NONVASCULAR STRUCTURES: Unremarkable.      Impression    IMPRESSION:   HEAD CT:  1.  No acute findings.  2.  Age-related changes.    HEAD CTA:   1.  No branch vessel occlusion, high-grade stenosis, aneurysm, or high flow vascular malformation involving proximal [place] of [place] vessels.    NECK CTA:  1.  No significant stenosis in the neck vessels based on NASCET criteria.  2.  No evidence for dissection.    Noncontrast head CT discussed with Dr. Conklin at 22:06 hours. CTA head and neck discussed with Dr. Conklin at 22:15 hours.   Asymptomatic COVID-19 Virus (Coronavirus) by PCR Nasopharyngeal    Specimen: Nasopharyngeal; Swab    Narrative    The following orders were created for panel order Asymptomatic COVID-19 Virus (Coronavirus) by PCR Nasopharyngeal.  Procedure                                Abnormality         Status                     ---------                               -----------         ------                     SARS-COV2 (COVID-19) Vir...[[id number]]  Normal              Final result                 Please view results for these tests on the individual orders.   SARS-COV2 (COVID-19) Virus RT-PCR    Specimen: Nasopharyngeal; Swab   Result Value Ref Range    SARS CoV2 PCR Negative Negative    Narrative    Testing was performed using the gracie  SARS-CoV-2 & Influenza A/B Assay on the gracie  [employer]  System.  This test should be ordered for the detection of SARS-COV-2 in individuals who meet SARS-CoV-2 clinical and/or epidemiological criteria. Test performance is unknown in asymptomatic patients.  This test is for in vitro diagnostic use under the FDA EUA for laboratories certified under CLIA to perform moderate and/or high complexity testing. This test has not been FDA cleared or approved.  A negative test does not rule out the presence of PCR inhibitors in the specimen or target RNA in concentration below the limit of detection for the assay. The possibility of a false negative should be considered if the patient's recent exposure or clinical presentation suggests COVID-19.  [facility] are certified under the Clinical Laboratory Improvement Amendments of 1988 (CLIA-88) as qualified to perform moderate and/or high complexity laboratory testing.       Medications   amLODIPine (NORVASC) tablet 5 mg (has no administration in time range)   clopidogrel (PLAVIX) tablet 75 mg (has no administration in time range)   dorzolamide-timolol (COSOPT) ophthalmic solution 1 drop (1 drop Both Eyes Not Given 7/12/21 0119)   hydrochlorothiazide (HYDRODIURIL) tablet 25 mg (has no administration in time range)   insulin glargine (LANTUS PEN) injection 20 Units (20 Units Subcutaneous Given 7/12/21 0118)   lisinopril (ZESTRIL) tablet 40 mg (has no administration in time range)   metFORMIN (GLUCOPHAGE)  tablet 1,000 mg (has no administration in time range)   pantoprazole (PROTONIX) EC tablet 20 mg (has no administration in time range)   rosuvastatin (CRESTOR) tablet 10 mg (has no administration in time range)   melatonin tablet 1 mg (has no administration in time range)   ondansetron (ZOFRAN-ODT) ODT tab 4 mg (has no administration in time range)     Or   ondansetron (ZOFRAN) injection 4 mg (has no administration in time range)   acetaminophen (TYLENOL) tablet 325 mg (has no administration in time range)   aspirin (ASA) tablet 325 mg (325 mg Oral Given 7/12/21 0118)   glucose gel 15-30 g (has no administration in time range)     Or   dextrose 50 % injection 25-50 mL (has no administration in time range)     Or   glucagon injection 1 mg (has no administration in time range)   iopamidol (ISOVUE-370) solution 70 mL (70 mLs Intravenous Given 7/11/21 2214)   sodium chloride 0.9 % bag 500mL for CT scan flush use (100 mLs Intravenous Given 7/11/21 2214)   0.9% sodium chloride BOLUS (1,000 mLs Intravenous New Bag 7/12/21 0117)       9:48 PM: Discussed with Dr Darrell Bradley, stroke neurology. Advised of history and physical exam findings at this time and plan for CT imaging.  He will review imaging and we will discuss the case again.    10:07 PM: Discussed with Dr Darrell Peace, radiology. No acute stroke seen on non contrast ct.    10:08 PM: Discussed with Dr Bradley again. Neg ct. Deescalate stroke code. Recommends admission for MRI and further workup for risk factors.    10:15 PM: Patient updated with plan for admission. Re-assessement shows improved symptoms. No numbness or weakness of face now.    10:16 PM: Discussed with Dr Peace, radiology. No large occlusion on cta. Paging for admission.    10:38 PM: Discussed with Dr Doss, accepts for obs admission.    Assessments & Plan (with Medical Decision Making)  85-year-old male with type 2 diabetes, hypertension, and obesity presenting for evaluation of cute onset of left facial  numbness and weakness as well as weakness in his left upper extremity.  Symptoms began abruptly this evening when he came in for stroke evaluation.  Still with some mild facial droop in the ED but no arm drift or leg drift.  Stroke work-up obtained showed no CT imaging of acute stroke.  Symptoms also improving in the ED with resolution of his facial numbness and weakness.  Given his minimal symptoms and improving symptoms, no indication for TPA.  Admitted to hospital service for further work-up of acute TIA.     I have reviewed the nursing notes.    I have reviewed the findings, diagnosis, plan and need for follow up with the patient.      Critical Care Addendum    My initial assessment, based on my review of nursing observations, review of vital signs, focused history and physical exam, established that Fawad Garcia has focal neurologic abnormalities, which requires immediate intervention, and therefore He is critically ill.     After the initial assessment, the care team initiated multiple lab tests and consulted with neurology and obtained stroke imaging to provide stabilization care. Due to the critical nature of this patient, I reassessed nursing observations, vital signs, physical exam, mental status and neurologic status multiple times prior to He disposition.     Time also spent performing documentation, discussion with family to obtain medical information for decision making, reviewing test results, discussion with consultants and coordination of care.     Critical care time (excluding teaching time and procedures): 35 minutes.       Current Discharge Medication List          Final diagnoses:   TIA (transient ischemic attack)   Elevated troponin   Elevated serum creatinine       7/11/2021   [facility] EMERGENCY DEPT     Conklin, Stewart Robbins MD  07/12/21 4352

## 2021-07-12 NOTE — H&P
[facility]    History and Physical - Hospitalist Service       Date of Admission:  7/11/2021    Assessment & Plan      Fawad Garcia is a 85 year old male with past medical history of hypertension, diabetes-2, hyperlipidemia, GERD, peripheral vascular disease, prostate cancer, admitted on 7/11/2021 for TIA.        TIA: Symptoms of left hand and facial numbness with acute onset on 7/11 7:30 PM.  Symptoms were stuttering over the next 30 to 60 minutes, and resolved shortly after arrival to the ER and have not returned.  Normal CT head and neck.  Stroke neuro was called by ER and stroke code was deactivated quickly with normal imaging.  Stroke mimics were considered.  --Admit to observation  --Aspirin 325 mg now and continue daily x21 days  --Continue prior to admission Plavix  --Echo in the morning  --Lipid panel morning  --Telemetry.  Consider outpatient cardiac monitoring    Elevated troponin without EKG change: Patient denies any recent chest pain or shortness of breath.  EKG unchanged from baseline  --Troponin checked as part of stroke order set.  Recheck troponin in morning labs    Acute kidney injury: Baseline creatinine 0.8-1.07.  Admission creatinine 1.46.  No recent UTI or obstructive symptoms.  --Collect UA  --FENa  --NS 1 L bolus    Essential hypertension: Continue prior to admission is on amlodipine, hydrochlorothiazide, lisinopril    PAD: Follows with vascular Dr. Cali, last visit 1/11/2021.  Underwent amputation of the left great toe due to osteomyelitis in 12/2019.  Had balloon angioplasty and stenting of the distal popliteal artery after the amputation.  He iha continued on Plavix  --Adding aspirin as above    diabetes-2: Prior to admission is on Metformin, Lantus.  Most recent A1c on 6/21/2021 is 8.4.  Has been having low blood sugar in AM - 70s-80s.  The last few nights used 24 units of Lantus. However was having low blood sugar even at 22 units.  --decrease lantus  on discharge   --Continue Metformin    Prostate Cancer: Completed radiation in 2012.  Still follows with radiation oncology, last 3/2021 and the PSA is being followed serially without any active treatment at this time    Hyperlipidemia: Continue prior to admission rosuvastatin    GERD: Continue prior to admission Protonix    Macular Degeneration: continue prior to admission eye drops.     COVID-19 Vaccine: He is fully vaccinated         Diet: Low Saturated Fat Na <2400 mg    DVT Prophylaxis: Low Risk/Ambulatory with no VTE prophylaxis indicated  Encarnacion Catheter: Not present  Central Lines: None  Code Status:   Full    Risk Factors Present on Admission              # Platelet Defect: home medication list includes an antiplatelet medication      Disposition Plan   Expected discharge: tomorrow recommended to prior living arrangement once renal function improved and Testing complete.     The patient's care was discussed with the Patient.    Betzy Doss [facility] [facility]  Securely message with the Vocera Web Console (learn more here)  Text page via [facility] Paging/Directory      ______________________________________________________________________    Chief Complaint   Numbness in left hand and face    History is obtained from the patient    History of Present Illness   Fawad Garcia is a 85 year old male with past medical history of hypertension, diabetes-2, hyperlipidemia, GERD, peripheral vascular disease, prostate cancer, admitted on 7/11/2021 for TIA.    He was in his usual state of health.  He noticed some abnormal sensation in his face and left arm and hand around 7:30 PM.  Reports some tingling sensation in the hand with some slight weakness.  Also noticed some tingling in his face.  When symptoms were not improving he came in for evaluation.    Symptoms lasted about 5 min and went away spontaneously.  Symptoms stuttered over the next 30 min, and had his son bring him to the  emergency room.. He had brief episode while in ER, but only lasted a few minutes.  No history of similar symptoms.      His son thought his left face was drooping a bit.  He has no previous history of migraines.  He has no recent fevers, neck stiffness, chest pains, shortness of breath, UTI symptoms, BPH symptoms    Review of Systems    The 10 point Review of Systems is negative other than noted in the HPI or here.     Past Medical History    I have reviewed this patient's medical history and updated it with pertinent information if needed.   Past Medical History:   Diagnosis Date     Diabetes mellitus (H)      Hypertension      Squamous cell carcinoma        Past Surgical History   I have reviewed this patient's surgical history and updated it with pertinent information if needed.  Past Surgical History:   Procedure Laterality Date     AMPUTATE TOE(S) Right 7/23/2019    Procedure: Partial amputation great toe;  Surgeon: Ethan Montesinos DPM;  Location: [facility]     AMPUTATE TOE(S) Left 12/24/2019    Procedure: Amputation of the great toe, left foot;  Surgeon: Ethan Montesinos DPM;  Location: [facility]     AMPUTATE TOE(S) Right 3/3/2020    Procedure: Partial amputation, second toe, right foot;  Surgeon: Ethan Montesinos DPM;  Location: [facility] OR     CL AFF SURGICAL PATHOLOGY  2002    prostate biopsy elevated PSA     COLONOSCOPY       HC REMOVE TONSILS/ADENOIDS,<11 Y/O      T & A     IR LOWER EXTREMITY ANGIOGRAM LEFT  2/26/2020     IR LOWER EXTREMITY ANGIOGRAM RIGHT  12/11/2018     PHACOEMULSIFICATION WITH STANDARD INTRAOCULAR LENS IMPLANT Left 4/2/2015    Procedure: PHACOEMULSIFICATION WITH STANDARD INTRAOCULAR LENS IMPLANT;  Surgeon: Joseph Hernandez MD;  Location: [facility]     PHACOEMULSIFICATION WITH STANDARD INTRAOCULAR LENS IMPLANT Right 5/4/2015    Procedure: PHACOEMULSIFICATION WITH STANDARD INTRAOCULAR LENS IMPLANT;  Surgeon: Joseph eHrnandez MD;  Location: [facility]     REPAIR HAMMER TOE Left 4/2/2019     Procedure: 5th Metatarsal Head Resection;  Surgeon: Ethan Montesinos DPM;  Location: [facility]       Social History   I have reviewed this patient's social history and updated it with pertinent information if needed.  Social History     Tobacco Use     Smoking status: Never Smoker     Smokeless tobacco: Never Used   Substance Use Topics     Alcohol use: Yes     Alcohol/week: 1.7 standard drinks     Comment: 1 x month     Drug use: No       Family History   I have reviewed this patient's family history and updated it with pertinent information if needed.  Family History   Problem Relation Age of Onset     Cancer Mother         intestinal CA     Diabetes Father      Diabetes Brother      Other Cancer Son         meagan tumor     Other Cancer Brother         pancreatic cancer     Diabetes Son      Melanoma No family hx of        Prior to Admission Medications   Prior to Admission Medications   Prescriptions Last Dose Informant Patient Reported? Taking?   B-D U/F 31G X 8 MM insulin pen needle   No No   Si Device daily Use once daily or as directed.   Blood Glucose Monitoring Suppl (ONE TOUCH ULTRA SYSTEM KIT) W/DEVICE KIT   Yes No   Lancets (ONETOUCH DELICA PLUS [id number]) MISC   No No   Sig: AS DIRECTED   Multiple Vitamins-Minerals (PRESERVISION AREDS PO) 2021 at noon  Yes No   Sig: Take 1 tablet by mouth   ONETOUCH ULTRA test strip   No No   Si strip by In Vitro route daily One touch Ultra.   ORDER FOR DME   No No   Sig: Equipment being ordered:   Glucometer   amLODIPine (NORVASC) 5 MG tablet 2021 at 1200  No No   Sig: Take 1 tablet (5 mg) by mouth daily   clopidogrel (PLAVIX) 75 MG tablet 2021 at 1800  No No   Sig: Take 1 tablet (75 mg) by mouth daily   dorzolamide-timolol (COSOPT) 2-0.5 % ophthalmic solution 7/10/2021 at hs  Yes Yes   hydrochlorothiazide (HYDRODIURIL) 25 MG tablet 2021 at noon  No No   Sig: Take 1 tablet (25 mg) by mouth daily   insulin glargine (LANTUS SOLOSTAR) 100  UNIT/ML pen 7/10/2021 at hs  Yes No   Sig: Inject 23 Units Subcutaneous At Bedtime   lisinopril (ZESTRIL) 40 MG tablet 7/11/2021 at 1800  No No   Sig: Take 1 tablet (40 mg) by mouth daily   metFORMIN (GLUCOPHAGE) 500 MG tablet 7/11/2021 at 1800  No No   Sig: Take 2 tablets (1,000 mg) by mouth 2 times daily (with meals)   pantoprazole (PROTONIX) 20 MG EC tablet 7/11/2021 at noon  No No   Sig: Take 1 tablet (20 mg) by mouth daily   rosuvastatin (CRESTOR) 10 MG tablet 7/11/2021 at dinner time  No No   Sig: Take 1 tablet (10 mg) by mouth At Bedtime      Facility-Administered Medications: None     Allergies   Allergies   Allergen Reactions     Zocor [Simvastatin - High Dose]      Bilateral hip aching       Physical Exam   Vital Signs: Temp: 97.1  F (36.2  C) Temp src: Oral BP: 123/71 Pulse: 68   Resp: 20 SpO2: 96 % O2 Device: None (Room air)    Weight: 220 lbs 7.36 oz    Constitutional: awake, alert, cooperative, no apparent distress, and appears stated age  Eyes: Lids and lashes normal, pupils equal, round and reactive to light, extra ocular muscles intact, sclera clear, conjunctiva normal  ENT: Normocephalic, without obvious abnormality, atraumatic, external ears without lesions, oral pharynx with moist mucous membranes, tonsils without erythema or exudates  Respiratory: No increased work of breathing, good air exchange, clear to auscultation bilaterally, no crackles or wheezing  Cardiovascular: Normal apical impulse, regular rate and rhythm, normal S1 and S2, no S3 or S4, and no murmur noted  GI: No scars, normal bowel sounds, soft, non-distended, non-tender, no masses palpated, no hepatosplenomegally  Skin: no bruising or bleeding, normal skin color, texture, turgor and no redness, warmth, or swelling  Musculoskeletal: Trace bilateral pedal edema  there is no redness, warmth, or swelling of the joints  full range of motion noted  motor strength is 5 out of 5 all extremities bilaterally  Neurologic: Mental Status  Exam:  Level of Alertness:   awake  Orientation:   person, place, time  Memory:   normal  Fund of Knowledge:  normal  Cranial Nerves:  cranial nerves II-XII are grossly intact  Motor Exam:  Motor exam is symmetrical 5 out of 5 all extremities bilaterally  Sensory:  Sensory intact  Coordination:  Finger/Nose:  Right:  normal  Left:  normal  Rapid Alternating Movements:  Right:  normal  Left:  normal    Data   Data reviewed today: I reviewed all medications, new labs and imaging results over the last 24 hours.    Recent Labs   Lab 07/11/21  2147   WBC 9.7   HGB 14.6   MCV 90      INR 0.99      POTASSIUM 4.5   CHLORIDE 108   CO2 22   BUN 30   CR 1.46*   ANIONGAP 10   CAIN 9.3   *  221*   TROPONIN 0.071*     Recent Results (from the past 24 hour(s))   CT Head w/o Contrast    Narrative    EXAM: CT HEAD W/O CONTRAST, CTA  HEAD NECK WITH CONTRAST  LOCATION: [facility]  DATE/TIME: 7/11/2021 9:51 PM    INDICATION: Left-sided weakness. Stroke code. Left facial droop.  COMPARISON: MRI 3/22/2014  CONTRAST: 70 mL Isovue-370  TECHNIQUE: Head and neck CT angiogram with IV contrast. Noncontrast head CT followed by axial helical CT images of the head and neck vessels obtained during the arterial phase of intravenous contrast administration. Axial 2D reconstructed images and   multiplanar 3D MIP reconstructed images of the head and neck vessels were performed by the technologist. Dose reduction techniques were used.     FINDINGS:   NONCONTRAST HEAD CT:   INTRACRANIAL CONTENTS: No intracranial hemorrhage, extraaxial collection, or mass effect.  No CT evidence of acute infarct. Normal parenchymal attenuation. Normal ventricles and sulci.     VISUALIZED ORBITS/SINUSES/MASTOIDS: No intraorbital abnormality. No paranasal sinus mucosal disease. No middle ear or mastoid effusion.    BONES/SOFT TISSUES: No acute abnormality.    HEAD CTA:  ANTERIOR CIRCULATION: No stenosis/occlusion, aneurysm, or high flow  vascular malformation. Standard [place] anatomy.    POSTERIOR CIRCULATION: No stenosis/occlusion, aneurysm, or high flow vascular malformation. Dominant right and smaller left vertebral artery contribute to a normal basilar artery.     DURAL VENOUS SINUSES: Expected enhancement of the major dural venous sinuses.    NECK CTA:  RIGHT CAROTID: Atherosclerotic plaque results in less than 50% stenosis in the right ICA. No dissection.    LEFT CAROTID: Atherosclerotic plaque results in less than 50% stenosis in the left ICA. No dissection.    VERTEBRAL ARTERIES: No focal stenosis or dissection. Dominant right and smaller left vertebral arteries.    AORTIC ARCH: Classic aortic arch anatomy with no significant stenosis at the origin of the great vessels.    NONVASCULAR STRUCTURES: Unremarkable.      Impression    IMPRESSION:   HEAD CT:  1.  No acute findings.  2.  Age-related changes.    HEAD CTA:   1.  No branch vessel occlusion, high-grade stenosis, aneurysm, or high flow vascular malformation involving proximal [place] vessels.    NECK CTA:  1.  No significant stenosis in the neck vessels based on NASCET criteria.  2.  No evidence for dissection.    Noncontrast head CT discussed with Dr. Conklin at 22:06 hours. CTA head and neck discussed with Dr. Conklin at 22:15 hours.   CTA Head Neck with Contrast    Narrative    EXAM: CT HEAD W/O CONTRAST, CTA  HEAD NECK WITH CONTRAST  LOCATION: [facility]  DATE/TIME: 7/11/2021 9:51 PM    INDICATION: Left-sided weakness. Stroke code. Left facial droop.  COMPARISON: MRI 3/22/2014  CONTRAST: 70 mL Isovue-370  TECHNIQUE: Head and neck CT angiogram with IV contrast. Noncontrast head CT followed by axial helical CT images of the head and neck vessels obtained during the arterial phase of intravenous contrast administration. Axial 2D reconstructed images and   multiplanar 3D MIP reconstructed images of the head and neck vessels were performed by the technologist.  Dose reduction techniques were used.     FINDINGS:   NONCONTRAST HEAD CT:   INTRACRANIAL CONTENTS: No intracranial hemorrhage, extraaxial collection, or mass effect.  No CT evidence of acute infarct. Normal parenchymal attenuation. Normal ventricles and sulci.     VISUALIZED ORBITS/SINUSES/MASTOIDS: No intraorbital abnormality. No paranasal sinus mucosal disease. No middle ear or mastoid effusion.    BONES/SOFT TISSUES: No acute abnormality.    HEAD CTA:  ANTERIOR CIRCULATION: No stenosis/occlusion, aneurysm, or high flow vascular malformation. Standard [place] anatomy.    POSTERIOR CIRCULATION: No stenosis/occlusion, aneurysm, or high flow vascular malformation. Dominant right and smaller left vertebral artery contribute to a normal basilar artery.     DURAL VENOUS SINUSES: Expected enhancement of the major dural venous sinuses.    NECK CTA:  RIGHT CAROTID: Atherosclerotic plaque results in less than 50% stenosis in the right ICA. No dissection.    LEFT CAROTID: Atherosclerotic plaque results in less than 50% stenosis in the left ICA. No dissection.    VERTEBRAL ARTERIES: No focal stenosis or dissection. Dominant right and smaller left vertebral arteries.    AORTIC ARCH: Classic aortic arch anatomy with no significant stenosis at the origin of the great vessels.    NONVASCULAR STRUCTURES: Unremarkable.      Impression    IMPRESSION:   HEAD CT:  1.  No acute findings.  2.  Age-related changes.    HEAD CTA:   1.  No branch vessel occlusion, high-grade stenosis, aneurysm, or high flow vascular malformation involving proximal [place] vessels.    NECK CTA:  1.  No significant stenosis in the neck vessels based on NASCET criteria.  2.  No evidence for dissection.    Noncontrast head CT discussed with Dr. Conklin at 22:06 hours. CTA head and neck discussed with Dr. Conklin at 22:15 hours.

## 2021-07-12 NOTE — DISCHARGE SUMMARY
[facility]  Hospitalist Discharge Summary       Date of Admission:  7/11/2021  Date of Discharge:  7/12/2021  2:30 PM  Discharging Provider: Geovanni Abbott MD      Discharge Diagnoses     TIA  Elevated troponin without EKG change  Acute kidney injury  Essential hypertension  PAD  Diabetes mellitus type 2  Prostate Cancer  Hyperlipidemia  GERD  Macular Degeneration  COVID-19 negative    Follow-ups Needed After Discharge   Follow-up Appointments     Follow-up and recommended labs and tests      Follow up with primary care provider, Chris Scott, within 7 days   for hospital follow- up.  The following labs/tests are recommended: BMP   and CBC.           Discharge Disposition   Discharged to home  Condition at discharge: Stable    Hospital Course   Fawad Garcia is a 85 year old male with past medical history of hypertension, diabetes-2, hyperlipidemia, GERD, peripheral vascular disease, prostate cancer, admitted on 7/11/2021 for TIA.       TIA: Symptoms of left hand and facial numbness with acute onset on 7/11 7:30 PM.  Symptoms were stuttering over the next 30 to 60 minutes, and resolved shortly after arrival to the ER and have not returned.  Normal CT head and neck.  Stroke neuro was called by ER and stroke code was deactivated quickly with normal imaging.  Stroke mimics were considered.  --Aspirin 325 mg now and continue daily x21 days  --Continue prior to admission Plavix  --Echo unremarkable  --MRI without acute changes  --LDL 55, continue rosuvastatin  --Telemetry without arrhythmia  --No recurrence of hand numbness during hospitalization  --Discharge home on 20 more days aspirin in addition to Plavix indefinitely     Elevated troponin without EKG change: Patient denies any recent chest pain or shortness of breath.  EKG unchanged from baseline  --Troponin checked as part of stroke order set.  Recheck troponin in morning labs  --Echo without new RWMA     Acute kidney  injury: Baseline creatinine 0.8-1.07.  Admission creatinine 1.46.  No recent UTI or obstructive symptoms.  --UA unremarkalbe  --FENa 0.8%  --NS 1 L bolus  --Cr improved to 0.95 on 7/12     Essential hypertension: Continue prior to admission is on amlodipine, hydrochlorothiazide, lisinopril     PAD: Follows with vascular Dr. Cali, last visit 1/11/2021.  Underwent amputation of the left great toe due to osteomyelitis in 12/2019.  Had balloon angioplasty and stenting of the distal popliteal artery after the amputation.  He iha continued on Plavix  --Adding aspirin as above     Diabetes mellitus type 2: Prior to admission is on Metformin, Lantus.  Most recent A1c on 6/21/2021 is 8.4.  Has been having low blood sugar in AM - 70s-80s.  The last few nights used 24 units of Lantus. However was having low blood sugar even at 22 units.  --Continue Metformin  --Follow up with PCP     Prostate Cancer: Completed radiation in 2012.  Still follows with radiation oncology, last 3/2021 and the PSA is being followed serially without any active treatment at this time     Hyperlipidemia: Continue prior to admission rosuvastatin     GERD: Continue prior to admission Protonix     Macular Degeneration: continue prior to admission eye drops.      COVID-19 negative: He is fully vaccinated    Consultations This Hospital Stay   None    Code Status   Prior    Time Spent on this Encounter   I, Geovanni Abbott MD, personally saw the patient today and spent greater than 30 minutes discharging this patient.       Geovanni Abbott MD  [facility]  ______________________________________________________________________    Physical Exam   Vital Signs: Temp: 97.4  F (36.3  C) Temp src: Oral BP: (!) 158/79 Pulse: 67   Resp: 18 SpO2: 99 % O2 Device: None (Room air)    Weight: 223 lbs 8.74 oz       Gen: Well nourished, well developed, alert and oriented x 3, no acute distressed  HEENT: Atraumatic, normocephalic; sclera  non-injected, anicterric; oral mucosa moist, no lesion, no exudate  Lungs: Clear to ausculation, no wheezes, no rhonchi, no rales  Heart: Regular rate, regular rhythm, no gallops, no rubs, no murmurs  GI: Bowel sound normal, no hepatosplenomegaly, no masses, non-tender, non-distended, no guarding, no rebound tenderness  Lymph: No lymphadenopathy, no edema  Skin: No rashes, no chronic venous stasis     Primary Care Physician   Chris Scott    Discharge Orders      Reason for your hospital stay    This is an 85 year old male admitted with a transient ischemic attack.     Follow-up and recommended labs and tests    Follow up with primary care provider, Chris Scott, within 7 days for hospital follow- up.  The following labs/tests are recommended: BMP and CBC.     Activity    Your activity upon discharge: activity as tolerated     Diet    Follow this diet upon discharge: Orders Placed This Encounter      Low Saturated Fat Na <2400 mg         Significant Results and Procedures   Most Recent 3 CBC's:Recent Labs   Lab Test 07/12/21  0507 07/11/21 2147 02/26/20  0750   WBC 8.6 9.7 9.5   HGB 13.4 14.6 12.8*   MCV 91 90 85    305 264     Most Recent 3 BMP's:Recent Labs   Lab Test 07/12/21  1209 07/12/21  1152 07/12/21  0756 07/12/21  0507 07/11/21 2147 01/04/21  1733     --   --  142 140 136   POTASSIUM  --   --   --  3.6 4.5 4.0   CHLORIDE  --   --   --  111* 108 104   CO2  --   --   --  23 22 28   BUN  --   --   --  29 30 28   CR 0.95  --   --  1.15 1.46* 1.07   ANIONGAP  --   --   --  8 10 4   CAIN  --   --   --  8.5 9.3 9.2   GLC  --  180* 87 102* 204*  221* 229*     Most Recent Cholesterol Panel:Recent Labs   Lab Test 07/12/21  0507   CHOL 124   LDL 56   HDL 50   TRIG 91     Most Recent Hemoglobin A1c:Recent Labs   Lab Test 06/21/21  0833   A1C 8.4*   ,   Results for orders placed or performed during the hospital encounter of 07/11/21   CT Head w/o Contrast    Narrative    EXAM: CT HEAD W/O  CONTRAST, CTA  HEAD NECK WITH CONTRAST  LOCATION: [facility]  DATE/TIME: 7/11/2021 9:51 PM    INDICATION: Left-sided weakness. Stroke code. Left facial droop.  COMPARISON: MRI 3/22/2014  CONTRAST: 70 mL Isovue-370  TECHNIQUE: Head and neck CT angiogram with IV contrast. Noncontrast head CT followed by axial helical CT images of the head and neck vessels obtained during the arterial phase of intravenous contrast administration. Axial 2D reconstructed images and   multiplanar 3D MIP reconstructed images of the head and neck vessels were performed by the technologist. Dose reduction techniques were used.     FINDINGS:   NONCONTRAST HEAD CT:   INTRACRANIAL CONTENTS: No intracranial hemorrhage, extraaxial collection, or mass effect.  No CT evidence of acute infarct. Normal parenchymal attenuation. Normal ventricles and sulci.     VISUALIZED ORBITS/SINUSES/MASTOIDS: No intraorbital abnormality. No paranasal sinus mucosal disease. No middle ear or mastoid effusion.    BONES/SOFT TISSUES: No acute abnormality.    HEAD CTA:  ANTERIOR CIRCULATION: No stenosis/occlusion, aneurysm, or high flow vascular malformation. Standard [place] of [place] anatomy.    POSTERIOR CIRCULATION: No stenosis/occlusion, aneurysm, or high flow vascular malformation. Dominant right and smaller left vertebral artery contribute to a normal basilar artery.     DURAL VENOUS SINUSES: Expected enhancement of the major dural venous sinuses.    NECK CTA:  RIGHT CAROTID: Atherosclerotic plaque results in less than 50% stenosis in the right ICA. No dissection.    LEFT CAROTID: Atherosclerotic plaque results in less than 50% stenosis in the left ICA. No dissection.    VERTEBRAL ARTERIES: No focal stenosis or dissection. Dominant right and smaller left vertebral arteries.    AORTIC ARCH: Classic aortic arch anatomy with no significant stenosis at the origin of the great vessels.    NONVASCULAR STRUCTURES: Unremarkable.      Impression     IMPRESSION:   HEAD CT:  1.  No acute findings.  2.  Age-related changes.    HEAD CTA:   1.  No branch vessel occlusion, high-grade stenosis, aneurysm, or high flow vascular malformation involving proximal [place] of [place] vessels.    NECK CTA:  1.  No significant stenosis in the neck vessels based on NASCET criteria.  2.  No evidence for dissection.    Noncontrast head CT discussed with Dr. Conklin at 22:06 hours. CTA head and neck discussed with Dr. Conklin at 22:15 hours.   CTA Head Neck with Contrast    Narrative    EXAM: CT HEAD W/O CONTRAST, CTA  HEAD NECK WITH CONTRAST  LOCATION: [facility]  DATE/TIME: 7/11/2021 9:51 PM    INDICATION: Left-sided weakness. Stroke code. Left facial droop.  COMPARISON: MRI 3/22/2014  CONTRAST: 70 mL Isovue-370  TECHNIQUE: Head and neck CT angiogram with IV contrast. Noncontrast head CT followed by axial helical CT images of the head and neck vessels obtained during the arterial phase of intravenous contrast administration. Axial 2D reconstructed images and   multiplanar 3D MIP reconstructed images of the head and neck vessels were performed by the technologist. Dose reduction techniques were used.     FINDINGS:   NONCONTRAST HEAD CT:   INTRACRANIAL CONTENTS: No intracranial hemorrhage, extraaxial collection, or mass effect.  No CT evidence of acute infarct. Normal parenchymal attenuation. Normal ventricles and sulci.     VISUALIZED ORBITS/SINUSES/MASTOIDS: No intraorbital abnormality. No paranasal sinus mucosal disease. No middle ear or mastoid effusion.    BONES/SOFT TISSUES: No acute abnormality.    HEAD CTA:  ANTERIOR CIRCULATION: No stenosis/occlusion, aneurysm, or high flow vascular malformation. Standard [place] of [place] anatomy.    POSTERIOR CIRCULATION: No stenosis/occlusion, aneurysm, or high flow vascular malformation. Dominant right and smaller left vertebral artery contribute to a normal basilar artery.     DURAL VENOUS SINUSES: Expected enhancement of the  major dural venous sinuses.    NECK CTA:  RIGHT CAROTID: Atherosclerotic plaque results in less than 50% stenosis in the right ICA. No dissection.    LEFT CAROTID: Atherosclerotic plaque results in less than 50% stenosis in the left ICA. No dissection.    VERTEBRAL ARTERIES: No focal stenosis or dissection. Dominant right and smaller left vertebral arteries.    AORTIC ARCH: Classic aortic arch anatomy with no significant stenosis at the origin of the great vessels.    NONVASCULAR STRUCTURES: Unremarkable.      Impression    IMPRESSION:   HEAD CT:  1.  No acute findings.  2.  Age-related changes.    HEAD CTA:   1.  No branch vessel occlusion, high-grade stenosis, aneurysm, or high flow vascular malformation involving proximal [place] of [place] vessels.    NECK CTA:  1.  No significant stenosis in the neck vessels based on NASCET criteria.  2.  No evidence for dissection.    Noncontrast head CT discussed with Dr. Conklin at 22:06 hours. CTA head and neck discussed with Dr. Conklin at 22:15 hours.   MR Brain w/o & w Contrast    Narrative    MRI BRAIN WITHOUT AND WITH CONTRAST  7/12/2021 11:35 AM     HISTORY:  Transient ischemic attack (TIA). Left-sided numbness and  tingling.    TECHNIQUE:  Multiplanar, multisequence MRI of the brain without and  with 10 mL Gadavist.     COMPARISON: Head CT 7/11/2021. Brain MR 3/22/2014.     FINDINGS: There is no evidence of acute infarct, hemorrhage, mass, or  herniation. Mild diffuse parenchymal volume loss. Mild patchy deep and  subcortical white matter T2 hyperintensities which are nonspecific,  but likely related to chronic microvascular ischemic disease.  Ventricular size within normal limits without hydrocephalus.     There is no abnormal intracranial enhancement. Linear enhancing  developmental venous anomaly in the left parietal/occipital region is  unchanged.    Polypoid mucosal thickening in the maxillary sinuses.       Impression    IMPRESSION:    1. No evidence of acute infarct,  mass, hemorrhage, or herniation.  2. Mild diffuse parenchymal volume loss and white matter changes  likely due to chronic microvascular ischemic disease.     LAURO KNOX MD         SYSTEM ID:  [id number]   Echocardiogram Complete     Value    LVEF  60-65%    Narrative    [id number]  [id number]  [id number]  [id number]^CHRISTY^SHAYAN^MOI     [facility]  Echocardiography Laboratory  [address]  [place] [id number]     Name: DEYSI CARLOS  MRN: [id number]  : 1935  Study Date: 2021 09:28 AM  Age: 85 yrs  Gender: Male  Patient Location: [facility]  Reason For Study: Syncope  Ordering Physician: SHAYAN HARRISON  Referring Physician: Chris Scott  Performed By: Nicci Cui RDCS     BSA: 2.2 m2  Height: 71 in  Weight: 220 lb  HR: 64  BP: 123/71 mmHg  ______________________________________________________________________________  Procedure  Complete Portable Echo Adult. Optison (NDC #[id number]) given intravenously.  ______________________________________________________________________________  Interpretation Summary     Left ventricular size, global systolic function, and wall motion are normal,  estimated LVEF 60-65%.  Right ventricular global function is normal.  No significant valvular abnormalities.  IVC diameter and respiratory changes fall into an intermediate range  suggesting an RA pressure of 8 mmHg.     This study was compared to a TTE from 2011. There has been no significant  change.  ______________________________________________________________________________  Left Ventricle  The left ventricle is normal in size. There is concentric remodeling present.  Left ventricular systolic function is normal. The visual ejection fraction is  60-65%. Left ventricular diastolic function is indeterminate. No regional wall  motion abnormalities noted.     Right Ventricle  The right ventricle is normal in structure, function and size.     Atria  Normal left atrial size. Right  atrial size is normal.     Mitral Valve  The mitral valve is normal in structure and function.     Tricuspid Valve  The tricuspid valve is not well visualized, but is grossly normal. There is  trace tricuspid regurgitation. Right ventricular systolic pressure could not  be approximated due to inadequate tricuspid regurgitation.     Aortic Valve  There is mild trileaflet aortic sclerosis.     Pulmonic Valve  The pulmonic valve is not well seen, but is grossly normal.     Vessels  The aortic root is normal size. Normal size ascending aorta. Dilation of the  inferior vena cava is present with normal respiratory variation in diameter.  IVC diameter and respiratory changes fall into an intermediate range  suggesting an RA pressure of 8 mmHg.     Pericardium  There is no pericardial effusion.     ______________________________________________________________________________  MMode/2D Measurements & Calculations  IVSd: 1.5 cm  LVIDd: 3.8 cm  LVPWd: 1.3 cm  LV mass(C)d: 192.3 grams  LV mass(C)dI: 87.6 grams/m2  Ao root diam: 3.3 cm  LA dimension: 3.8 cm     asc Aorta Diam: 3.2 cm  LA/Ao: 1.2  LA Volume (BP): 44.0 ml  LA Volume Index (BP): 20.0 ml/m2  RWT: 0.69     Doppler Measurements & Calculations  MV E max aleyda: 106.0 cm/sec  MV A max aleyda: 149.0 cm/sec  MV E/A: 0.71  MV dec time: 0.35 sec  E/E' av.2     Lateral E/e': 15.3  Medial E/e': 17.0     ______________________________________________________________________________  Report approved by: Dante Hayden 2021 11:02 AM             Discharge Medications   Current Discharge Medication List      START taking these medications    Details   aspirin (ASA) 325 MG tablet Take 1 tablet (325 mg) by mouth daily  Qty: 30 tablet, Refills: 0    Associated Diagnoses: TIA (transient ischemic attack)         CONTINUE these medications which have NOT CHANGED    Details   dorzolamide-timolol (COSOPT) 2-0.5 % ophthalmic solution       amLODIPine (NORVASC) 5 MG tablet Take 1  tablet (5 mg) by mouth daily  Qty: 90 tablet, Refills: 3    Associated Diagnoses: Hypertension, goal below 140/90      B-D U/F 31G X 8 MM insulin pen needle 1 Device daily Use once daily or as directed.  Qty: 100 each, Refills: 0    Associated Diagnoses: Type 2 diabetes mellitus without complication, with long-term current use of insulin (H)      Blood Glucose Monitoring Suppl (ONE TOUCH ULTRA SYSTEM KIT) W/DEVICE KIT       clopidogrel (PLAVIX) 75 MG tablet Take 1 tablet (75 mg) by mouth daily  Qty: 90 tablet, Refills: 3    Associated Diagnoses: Critical ischemia of lower extremity      hydrochlorothiazide (HYDRODIURIL) 25 MG tablet Take 1 tablet (25 mg) by mouth daily  Qty: 90 tablet, Refills: 3    Associated Diagnoses: Hypertension, goal below 140/90      insulin glargine (LANTUS SOLOSTAR) 100 UNIT/ML pen Inject 23 Units Subcutaneous At Bedtime  Qty: 30 mL, Refills: 3    Comments: If Lantus is not covered by insurance, may substitute Basaglar at same dose and frequency.    Associated Diagnoses: Type 2 diabetes mellitus with both eyes affected by mild nonproliferative retinopathy without macular edema, with long-term current use of insulin (H)      Lancets (ONETOUCH DELICA PLUS [id number]) MISC AS DIRECTED  Qty: 100 each, Refills: 1    Associated Diagnoses: Type 2 diabetes mellitus with diabetic peripheral angiopathy without gangrene (H)      lisinopril (ZESTRIL) 40 MG tablet Take 1 tablet (40 mg) by mouth daily  Qty: 90 tablet, Refills: 3      metFORMIN (GLUCOPHAGE) 500 MG tablet Take 2 tablets (1,000 mg) by mouth 2 times daily (with meals)  Qty: 360 tablet, Refills: 3    Associated Diagnoses: Type 2 diabetes mellitus without complication, with long-term current use of insulin (H)      Multiple Vitamins-Minerals (PRESERVISION AREDS PO) Take 1 tablet by mouth      ONETOUCH ULTRA test strip 1 strip by In Vitro route daily One touch Ultra.  Qty: 100 strip, Refills: 3    Associated Diagnoses: Type 2 diabetes mellitus  without complication, with long-term current use of insulin (H)      ORDER FOR DME Equipment being ordered:   Glucometer  Qty: 1 Device, Refills: 1    Associated Diagnoses: Type 2 diabetes, HbA1c goal < 7% (H)      pantoprazole (PROTONIX) 20 MG EC tablet Take 1 tablet (20 mg) by mouth daily  Qty: 90 tablet, Refills: 3    Associated Diagnoses: Gastroesophageal reflux disease without esophagitis      rosuvastatin (CRESTOR) 10 MG tablet Take 1 tablet (10 mg) by mouth At Bedtime  Qty: 90 tablet, Refills: 3    Associated Diagnoses: Hyperlipidemia LDL goal <70           Allergies   Allergies   Allergen Reactions     Zocor [Simvastatin - High Dose]      Bilateral hip aching

## 2021-07-12 NOTE — PLAN OF CARE
[facility] DISCHARGE NOTE    Patient discharged to home at 2:30 PM via wheel chair. Accompanied by staff. Discharge instructions reviewed with patient, opportunity offered to ask questions. Prescriptions filled and sent with patient upon discharge. All belongings sent with patient.    Aixa Garcia RN

## 2021-07-12 NOTE — PROGRESS NOTES
"[facility] ADMISSION NOTE    Patient admitted to room 2306 at approximately 2329 via wheel chair from emergency room. Patient was accompanied by transport tech.     Verbal SBAR report received from Francisca BENNETT prior to patient arrival.     Patient ambulated to bed with stand-by assist. Patient alert and oriented X 4. The patient is not having any pain.  . Admission vital signs: Blood pressure 123/71, pulse 68, temperature 97.1  F (36.2  C), temperature source Oral, resp. rate 20, height 1.803 m (5' 11\"), weight 100 kg (220 lb 7.4 oz), SpO2 96 %. Patient was oriented to plan of care, call light, bed controls, tv, telephone, bathroom and visiting hours.     Risk Assessment    The following safety risks were identified during admission: none. Yellow risk band applied: NO.     Skin Initial Assessment    This writer admitted this patient and completed a full skin assessment and Abraham score in the Adult PCS flowsheet. Appropriate interventions initiated as needed.    Abraham Risk Assessment  Sensory Perception: 4-->no impairment  Moisture: 4-->rarely moist  Activity: 3-->walks occasionally  Mobility: 4-->no limitation  Nutrition: 3-->adequate  Friction and Shear: 3-->no apparent problem  Abraham Score: 21  Mattress: Standard Hospital Mattress (Foam)  Bed Frame: Standard width and length    Education    Patient has a Halls to Observation order: Yes  Observation education completed and documented: Yes      Juju Pearl RN    "

## 2021-07-12 NOTE — TELEPHONE ENCOUNTER
Reports numbness and tingling in L fingers and L cheek off and on for past 1-2 hours. Has the numbness and tingling now but less severe than earlier tonight. Advised 911. Advised if not willing to call 911 then have another adult take to ED now. He did not refuse but would not commit to ED either. Pt voiced understanding of the above.       Reason for Disposition    [1] Numbness (i.e., loss of sensation) of the face, arm / hand, or leg / foot on one side of the body AND [2] sudden onset AND [3] present now    Additional Information    Negative: [1] SEVERE weakness (i.e., unable to walk or barely able to walk, requires support) AND [2] new onset or worsening    Negative: [1] Weakness (i.e., paralysis, loss of muscle strength) of the face, arm / hand, or leg / foot on one side of the body AND [2] sudden onset AND [3] present now    Protocols used: NEUROLOGIC DEFICIT-A-AH

## 2021-07-12 NOTE — PLAN OF CARE
When pt initially arrived to unit, he still had some residual numbness in his L cheek & all 4 fingers of his L hand. By 0300 neuro check, his facial & finger numbness had resolved. At 0500 his neuros remain stable.

## 2021-07-13 ENCOUNTER — TELEPHONE (OUTPATIENT)
Dept: FAMILY MEDICINE | Facility: CLINIC | Age: 86
End: 2021-07-13

## 2021-07-13 ENCOUNTER — PATIENT OUTREACH (OUTPATIENT)
Dept: CARE COORDINATION | Facility: CLINIC | Age: 86
End: 2021-07-13

## 2021-07-13 DIAGNOSIS — Z71.89 OTHER SPECIFIED COUNSELING: ICD-10-CM

## 2021-07-13 DIAGNOSIS — N17.9 ACUTE KIDNEY INJURY (H): ICD-10-CM

## 2021-07-13 DIAGNOSIS — G45.9 TIA (TRANSIENT ISCHEMIC ATTACK): Primary | ICD-10-CM

## 2021-07-13 NOTE — TELEPHONE ENCOUNTER
TIA (Transient Ischemic Attack)  [facility]    ED/UC/IP follow up phone call:    RN please call to follow up.    Number of ED visits in past 12 months = 0    Jazzmine Camacho on 7/13/2021 at 8:38 AM

## 2021-07-13 NOTE — TELEPHONE ENCOUNTER
Reason for Call:  Other     Detailed comments: Patient son is calling requesting for a lab order for CBC and BMP. Patient was instructed to do the lab first before coming in to se PCP for hospital discharge follow up.    Phone Number Patient can be reached at: Other phone number:  [phone]    Best Time: any    Can we leave a detailed message on this number? YES    Call taken on 7/13/2021 at 10:01 AM by Anna Coleman

## 2021-07-13 NOTE — TELEPHONE ENCOUNTER
"  ED for acute condition Discharge Protocol    \"Hi, my name is Renetta Norwood RN, a registered nurse, and I am calling from [facility].  I am calling to follow up and see how things are going for you after your recent emergency visit.\"    Tell me how you are doing now that you are home?\" Good, he is at his son's house.      Discharge Instructions    \"Let's review your discharge instructions.  What is/are the follow-up recommendations?  Pt. Response: labs and follow up with Dr Scott has been recommended. Pt will make appointments    \"Has an appointment with your primary care provider been scheduled?\"  No (needed - schedule appointment and remind to bring meds)    Medications    \"Tell me what changed about your medicines when you discharged?\"   NA    \"What questions do you have about your medications?\"   \"They have added aspirin\"        Call Summary    \"What questions or concerns do you have about your recent visit and your follow-up care?\"     none    \"If you have questions or things don't continue to improve, we encourage you contact us through the main clinic number (give number).  Even if the clinic is not open, triage nurses are available 24/7 to help you.     We would like you to know that our clinic has extended hours (provide information).  We also have urgent care (provide details on closest location and hours/contact info)\"    \"Thank you for your time and take care!\"                "

## 2021-07-13 NOTE — PROGRESS NOTES
Clinic Care Coordination Contact  [facility]: Post-Discharge Note  SITUATION                                                      Admission:    Admission Date: 07/11/21   Reason for Admission: TIA  Discharge:   Discharge Date: 07/12/21  Discharge Diagnosis: TIA    BACKGROUND                                                      Fawad Garcia is a 85 year old male with past medical history of hypertension, diabetes-2, hyperlipidemia, GERD, peripheral vascular disease, prostate cancer, admitted on 7/11/2021 for TIA.      ASSESSMENT      Upon chart review, clinic RN has made contact with patient already today and completed post-hospital outreach call.  Writer will still initiate TCM episode as patient has scheduled PCP follow up in place.     PLAN                                                      Outpatient Plan:  Follow up with primary care provider, Chris Scott, within 7 days for hospital follow- up.  The following labs/tests are recommended: BMP and CBC    Future Appointments   Date Time Provider Department Center   7/21/2021  2:20 PM Chris Scott MD [facility]   9/21/2021  8:30 AM [facility]   9/27/2021  3:00 PM Kieran Hua MD [facility]         For any urgent concerns, please contact our 24 hour nurse triage line: [phone] ([phone])         Marcia Sutherland RN

## 2021-07-14 LAB
ANION GAP SERPL CALCULATED.3IONS-SCNC: 12 MMOL/L (ref 3–14)
BUN SERPL-MCNC: 25 MG/DL (ref 7–30)
CALCIUM SERPL-MCNC: 9.2 MG/DL (ref 8.5–10.1)
CHLORIDE BLD-SCNC: 108 MMOL/L (ref 94–109)
CO2 SERPL-SCNC: 20 MMOL/L (ref 20–32)
CREAT SERPL-MCNC: 0.96 MG/DL (ref 0.66–1.25)
GFR SERPL CREATININE-BSD FRML MDRD: 72 ML/MIN/1.73M2
GLUCOSE BLD-MCNC: 169 MG/DL (ref 70–99)
POTASSIUM BLD-SCNC: 4.7 MMOL/L (ref 3.4–5.3)
SODIUM SERPL-SCNC: 140 MMOL/L (ref 133–144)

## 2021-07-19 ENCOUNTER — LAB (OUTPATIENT)
Dept: LAB | Facility: CLINIC | Age: 86
End: 2021-07-19
Payer: COMMERCIAL

## 2021-07-19 DIAGNOSIS — Z79.4 TYPE 2 DIABETES MELLITUS WITH BOTH EYES AFFECTED BY MILD NONPROLIFERATIVE RETINOPATHY WITHOUT MACULAR EDEMA, WITH LONG-TERM CURRENT USE OF INSULIN (H): ICD-10-CM

## 2021-07-19 DIAGNOSIS — N17.9 ACUTE KIDNEY INJURY (H): ICD-10-CM

## 2021-07-19 DIAGNOSIS — E11.65 UNCONTROLLED TYPE 2 DIABETES MELLITUS WITH HYPERGLYCEMIA (H): ICD-10-CM

## 2021-07-19 DIAGNOSIS — E11.3293 TYPE 2 DIABETES MELLITUS WITH BOTH EYES AFFECTED BY MILD NONPROLIFERATIVE RETINOPATHY WITHOUT MACULAR EDEMA, WITH LONG-TERM CURRENT USE OF INSULIN (H): ICD-10-CM

## 2021-07-19 DIAGNOSIS — G45.9 TIA (TRANSIENT ISCHEMIC ATTACK): ICD-10-CM

## 2021-07-19 LAB
BASOPHILS # BLD AUTO: 0.1 10E3/UL (ref 0–0.2)
BASOPHILS NFR BLD AUTO: 1 %
CREAT UR-MCNC: 77 MG/DL
EOSINOPHIL # BLD AUTO: 0.4 10E3/UL (ref 0–0.7)
EOSINOPHIL NFR BLD AUTO: 4 %
ERYTHROCYTE [DISTWIDTH] IN BLOOD BY AUTOMATED COUNT: 13.5 % (ref 10–15)
HBA1C MFR BLD: 8 % (ref 0–5.6)
HCT VFR BLD AUTO: 42.6 % (ref 40–53)
HGB BLD-MCNC: 14.2 G/DL (ref 13.3–17.7)
LYMPHOCYTES # BLD AUTO: 2.1 10E3/UL (ref 0.8–5.3)
LYMPHOCYTES NFR BLD AUTO: 19 %
MCH RBC QN AUTO: 30.1 PG (ref 26.5–33)
MCHC RBC AUTO-ENTMCNC: 33.3 G/DL (ref 31.5–36.5)
MCV RBC AUTO: 90 FL (ref 78–100)
MICROALBUMIN UR-MCNC: 206 MG/DL
MICROALBUMIN/CREAT UR: 267.53 MG/G CR (ref 0–17)
MONOCYTES # BLD AUTO: 0.9 10E3/UL (ref 0–1.3)
MONOCYTES NFR BLD AUTO: 9 %
NEUTROPHILS # BLD AUTO: 7.3 10E3/UL (ref 1.6–8.3)
NEUTROPHILS NFR BLD AUTO: 68 %
PLATELET # BLD AUTO: 277 10E3/UL (ref 150–450)
RBC # BLD AUTO: 4.72 10E6/UL (ref 4.4–5.9)
WBC # BLD AUTO: 10.8 10E3/UL (ref 4–11)

## 2021-07-19 PROCEDURE — 82043 UR ALBUMIN QUANTITATIVE: CPT

## 2021-07-19 PROCEDURE — 85025 COMPLETE CBC W/AUTO DIFF WBC: CPT

## 2021-07-19 PROCEDURE — 83036 HEMOGLOBIN GLYCOSYLATED A1C: CPT

## 2021-07-19 PROCEDURE — 36415 COLL VENOUS BLD VENIPUNCTURE: CPT

## 2021-07-21 ENCOUNTER — OFFICE VISIT (OUTPATIENT)
Dept: FAMILY MEDICINE | Facility: CLINIC | Age: 86
End: 2021-07-21
Payer: COMMERCIAL

## 2021-07-21 VITALS
SYSTOLIC BLOOD PRESSURE: 132 MMHG | TEMPERATURE: 97.7 F | HEART RATE: 64 BPM | DIASTOLIC BLOOD PRESSURE: 80 MMHG | OXYGEN SATURATION: 98 % | WEIGHT: 226 LBS | RESPIRATION RATE: 18 BRPM | BODY MASS INDEX: 31.64 KG/M2 | HEIGHT: 71 IN

## 2021-07-21 DIAGNOSIS — N18.31 STAGE 3A CHRONIC KIDNEY DISEASE (H): ICD-10-CM

## 2021-07-21 DIAGNOSIS — Z79.4 TYPE 2 DIABETES MELLITUS WITH BOTH EYES AFFECTED BY MILD NONPROLIFERATIVE RETINOPATHY WITHOUT MACULAR EDEMA, WITH LONG-TERM CURRENT USE OF INSULIN (H): ICD-10-CM

## 2021-07-21 DIAGNOSIS — E11.3293 TYPE 2 DIABETES MELLITUS WITH BOTH EYES AFFECTED BY MILD NONPROLIFERATIVE RETINOPATHY WITHOUT MACULAR EDEMA, WITH LONG-TERM CURRENT USE OF INSULIN (H): ICD-10-CM

## 2021-07-21 DIAGNOSIS — E11.65 UNCONTROLLED TYPE 2 DIABETES MELLITUS WITH HYPERGLYCEMIA (H): ICD-10-CM

## 2021-07-21 DIAGNOSIS — G45.9 TIA (TRANSIENT ISCHEMIC ATTACK): Primary | ICD-10-CM

## 2021-07-21 PROBLEM — N18.30 CHRONIC KIDNEY DISEASE, STAGE 3 (H): Status: ACTIVE | Noted: 2021-07-21

## 2021-07-21 PROCEDURE — 99495 TRANSJ CARE MGMT MOD F2F 14D: CPT | Performed by: FAMILY MEDICINE

## 2021-07-21 ASSESSMENT — MIFFLIN-ST. JEOR: SCORE: 1737.26

## 2021-07-21 NOTE — PROGRESS NOTES
"    Assessment & Plan     TIA (transient ischemic attack)  Brief symptoms, nothing recurrent, had work up and nothing found for intervention. Reviewed discharge recommendations and he is following.  Will recheck labs in 3 months    Type 2 diabetes mellitus with both eyes affected by mild nonproliferative retinopathy without macular edema, with long-term current use of insulin (H)  Recheck in 3 months  - Hemoglobin A1c; Future    Uncontrolled type 2 diabetes mellitus with hyperglycemia (H)    - Hemoglobin A1c; Future    Stage 3a chronic kidney disease  Need to recheck in 3 months  - Basic metabolic panel; Future             BMI:   Estimated body mass index is 31.52 kg/m  as calculated from the following:    Height as of this encounter: 1.803 m (5' 11\").    Weight as of this encounter: 102.5 kg (226 lb).       See Patient Instructions    Return in about 3 months (around 10/21/2021) for Lab Work.    Chris Scott MD  [facility] BISMARK Norman is a 85 year old who presents for the following health issues     HPI       Hospital Follow-up Visit:    Hospital/Nursing Home/ Rehab Facility: [facility]  Date of Admission: 07/11/21  Date of Discharge: 07/12/21  Reason(s) for Admission: TIA (transient ischemic attack)      Was your hospitalization related to COVID-19? No   Problems taking medications regularly:  None  Medication changes since discharge: None  Problems adhering to non-medication therapy:  None    Summary of hospitalization:  [facility] discharge summary reviewed  Diagnostic Tests/Treatments reviewed.  Follow up needed: none  Other Healthcare Providers Involved in Patient s Care:         None  Update since discharge: improved. Post Discharge Medication Reconciliation: discharge medications reconciled and changed, per note/orders.  Plan of care communicated with patient            Review of Systems   Review Of Systems  Skin: " "negative  Eyes: decrease vision due to macular degeneration  Ears/Nose/Throat:   Respiratory: No shortness of breath, dyspnea on exertion, cough, or hemoptysis  Cardiovascular: negative  Gastrointestinal:   Genitourinary:   Musculoskeletal: neg  Neurologic: negative  Psychiatric:   Hematologic/Lymphatic/Immunologic:   Endocrine:         Objective    /80   Pulse 64   Temp 97.7  F (36.5  C) (Tympanic)   Resp 18   Ht 1.803 m (5' 11\")   Wt 102.5 kg (226 lb)   SpO2 98%   BMI 31.52 kg/m    Body mass index is 31.52 kg/m .  Physical Exam   GENERAL APPEARANCE: alert, no distress and cooperative  RESP: lungs clear to auscultation - no rales, rhonchi or wheezes  CV: regular rates and rhythm, normal S1 S2, no S3 or S4 and no murmur, click or rub  MS: extremities normal- no gross deformities noted  SKIN: no suspicious lesions or rashes  NEURO: Normal strength and tone, mentation intact and speech normal  PSYCH: mentation appears normal and affect normal/bright                "

## 2021-07-21 NOTE — PATIENT INSTRUCTIONS
Please follow up in 3 months with labs for hemoglobin a1c and kidney function test; this would be a lab visit.    Please continue the aspirin with the plavix for 20 days after discharge and then go to the plavix/clopidagrel alone.          Thank you for choosing [facility].  You may be receiving an email and/or telephone survey request from [facility] Customer Experience regarding your visit today.  Please take a few minutes to respond to the survey to let us know how we are doing.      If you have questions or concerns, please contact us via veriCAR or you can contact your care team at [phone] option [phone].    Our Clinic hours are:  Monday - Thursday 7am-6pm  Friday 7am-5pm    The [place] outpatient lab hours are:  Monday - Friday 7am-4:30pm    Appointments are required, call [phone]    If you have clinical questions after hours or would like to schedule an appointment,  call the clinic at [phone].

## 2021-08-04 DIAGNOSIS — E11.51 TYPE 2 DIABETES MELLITUS WITH DIABETIC PERIPHERAL ANGIOPATHY WITHOUT GANGRENE (H): ICD-10-CM

## 2021-08-05 DIAGNOSIS — Z79.4 TYPE 2 DIABETES MELLITUS WITHOUT COMPLICATION, WITH LONG-TERM CURRENT USE OF INSULIN (H): ICD-10-CM

## 2021-08-05 DIAGNOSIS — E11.9 TYPE 2 DIABETES MELLITUS WITHOUT COMPLICATION, WITH LONG-TERM CURRENT USE OF INSULIN (H): ICD-10-CM

## 2021-08-05 RX ORDER — PEN NEEDLE, DIABETIC 31 GX5/16"
NEEDLE, DISPOSABLE MISCELLANEOUS
Qty: 100 EACH | Refills: 3 | Status: SHIPPED | OUTPATIENT
Start: 2021-08-05 | End: 2022-01-14

## 2021-08-05 RX ORDER — LANCETS 33 GAUGE
EACH MISCELLANEOUS
Qty: 100 EACH | Refills: 1 | Status: SHIPPED | OUTPATIENT
Start: 2021-08-05 | End: 2022-01-14

## 2021-08-18 ENCOUNTER — TELEPHONE (OUTPATIENT)
Dept: PODIATRY | Facility: CLINIC | Age: 86
End: 2021-08-18

## 2021-08-18 DIAGNOSIS — E11.51 TYPE 2 DIABETES MELLITUS WITH PERIPHERAL VASCULAR DISEASE (H): ICD-10-CM

## 2021-08-18 DIAGNOSIS — Z89.421 H/O AMPUTATION OF LESSER TOE, RIGHT (H): Primary | ICD-10-CM

## 2021-08-18 NOTE — TELEPHONE ENCOUNTER
Reason for Call:  Other     Detailed comments: Pt would like care team to reach out to him to discuss shoes. Pt received phone call from [facility] letting pt know to order new shoes. Pt would like to make sure he would be purchasing the correct shoes- or if he should purchase from [employer]. Please advise    Phone Number Patient can be reached at: Home number on file [phone] (home)    Best Time: any    Can we leave a detailed message on this number? YES    Call taken on 8/18/2021 at 12:02 PM by Edel Arreola

## 2021-08-19 NOTE — TELEPHONE ENCOUNTER
Pt called back and said yes he want a order placed.    Estephania [facility]

## 2021-08-19 NOTE — TELEPHONE ENCOUNTER
Please inform Fawad that he is entitled a new pair of diabetic shoes along with accommodative inserts every year.  He is free to come in to get out fitted with new shoes and inserts at [facility] in [place].  If he wants to do this I will put an order in for new shoes

## 2021-08-19 NOTE — TELEPHONE ENCOUNTER
Pt returning call. Please call pt back at ph: [phone]    [facility]  Specialty [facility] CSS

## 2021-09-01 ENCOUNTER — TRANSFERRED RECORDS (OUTPATIENT)
Dept: HEALTH INFORMATION MANAGEMENT | Facility: CLINIC | Age: 86
End: 2021-09-01
Payer: COMMERCIAL

## 2021-09-01 LAB — RETINOPATHY: NORMAL

## 2021-09-12 ENCOUNTER — HEALTH MAINTENANCE LETTER (OUTPATIENT)
Age: 86
End: 2021-09-12

## 2021-09-20 ENCOUNTER — LAB (OUTPATIENT)
Dept: LAB | Facility: CLINIC | Age: 86
End: 2021-09-20
Payer: COMMERCIAL

## 2021-09-20 DIAGNOSIS — C61 MALIGNANT NEOPLASM OF PROSTATE (H): ICD-10-CM

## 2021-09-20 DIAGNOSIS — Z79.4 TYPE 2 DIABETES MELLITUS WITH BOTH EYES AFFECTED BY MILD NONPROLIFERATIVE RETINOPATHY WITHOUT MACULAR EDEMA, WITH LONG-TERM CURRENT USE OF INSULIN (H): ICD-10-CM

## 2021-09-20 DIAGNOSIS — E11.65 UNCONTROLLED TYPE 2 DIABETES MELLITUS WITH HYPERGLYCEMIA (H): ICD-10-CM

## 2021-09-20 DIAGNOSIS — N18.31 STAGE 3A CHRONIC KIDNEY DISEASE (H): ICD-10-CM

## 2021-09-20 DIAGNOSIS — E11.3293 TYPE 2 DIABETES MELLITUS WITH BOTH EYES AFFECTED BY MILD NONPROLIFERATIVE RETINOPATHY WITHOUT MACULAR EDEMA, WITH LONG-TERM CURRENT USE OF INSULIN (H): ICD-10-CM

## 2021-09-20 LAB
ANION GAP SERPL CALCULATED.3IONS-SCNC: 5 MMOL/L (ref 3–14)
BUN SERPL-MCNC: 18 MG/DL (ref 7–30)
CALCIUM SERPL-MCNC: 8.3 MG/DL (ref 8.5–10.1)
CHLORIDE BLD-SCNC: 109 MMOL/L (ref 94–109)
CO2 SERPL-SCNC: 25 MMOL/L (ref 20–32)
CREAT SERPL-MCNC: 0.82 MG/DL (ref 0.66–1.25)
GFR SERPL CREATININE-BSD FRML MDRD: 81 ML/MIN/1.73M2
GLUCOSE BLD-MCNC: 124 MG/DL (ref 70–99)
HBA1C MFR BLD: 7.8 % (ref 0–5.6)
POTASSIUM BLD-SCNC: 4.2 MMOL/L (ref 3.4–5.3)
PSA SERPL-MCNC: 1.88 UG/L (ref 0–4)
SODIUM SERPL-SCNC: 139 MMOL/L (ref 133–144)

## 2021-09-20 PROCEDURE — 36415 COLL VENOUS BLD VENIPUNCTURE: CPT

## 2021-09-20 PROCEDURE — 80048 BASIC METABOLIC PNL TOTAL CA: CPT

## 2021-09-20 PROCEDURE — 84153 ASSAY OF PSA TOTAL: CPT

## 2021-09-20 PROCEDURE — 83036 HEMOGLOBIN GLYCOSYLATED A1C: CPT

## 2021-09-27 ENCOUNTER — OFFICE VISIT (OUTPATIENT)
Dept: RADIATION THERAPY | Facility: OUTPATIENT CENTER | Age: 86
End: 2021-09-27
Payer: COMMERCIAL

## 2021-09-27 VITALS
OXYGEN SATURATION: 97 % | HEART RATE: 67 BPM | WEIGHT: 223.4 LBS | DIASTOLIC BLOOD PRESSURE: 83 MMHG | SYSTOLIC BLOOD PRESSURE: 151 MMHG | BODY MASS INDEX: 31.16 KG/M2 | RESPIRATION RATE: 16 BRPM

## 2021-09-27 DIAGNOSIS — C61 PROSTATE CANCER (H): Primary | ICD-10-CM

## 2021-09-27 ASSESSMENT — PAIN SCALES - GENERAL: PAINLEVEL: NO PAIN (0)

## 2021-09-27 NOTE — PROGRESS NOTES
Department of Radiation Oncology  Radiation Therapy Center  [facility] [place] [address]  [phone]       Radiation Oncology Follow-up Visit  2021    Fawad Garcia  MRN: [id number]   : 1935     DISEASE TREATED: High-risk prostate cancer. Pretreatment PSA 25.5, Chaka score of 4+3=7, clinical stage [id number].     RADIATION THERAPY GIVEN: 8041 cGy in 43 planned treatments, via IMRT     INTERVAL SINCE COMPLETION OF THERAPY: 9 years since completion on 02/10/2012.     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 He underwent biopsies in  and  and pathology was negative for malignancy. Due to the persistently rising PSA Dr. Mcintyre performed an MRI-guided biopsy of the prostate gland which revealed [place] 4+3= 7 disease in 2 of 2 cores with 90% of the cores being positive in the right inferior base and right inferior mid gland. Androgen ablation was discussed with the patient due to his high-risk disease and the patient declined due to comorbidities of the drug. Overall, he completed radiotherapy with very little toxicity.     SUBJECTIVE:   Fawad Garcia is a 85 year old male who is scheduled today for routine follow-up.      PSA on 21 was 1.88, up from prior value of 1.78 (zohaib =0.24).    Today, he denies any pressing issues or complaints and reports that all symptoms have essentially remained stable since his last visit with us 6 months ago. AUA and MARINA not completed today (TC). Prior  AUA 18/35 (patient reported overall stable) and MARINA was 0/25. Main urinary symptoms continue to be nocturia and urgency. Patient states symptoms are worse with increased intake of fluids. Denies drinking alcohol, and has minimal caffeine. He denies any dysuria, hematuria, hematochezia, diarrhea, or loose stools.  He  denies any pain or swelling.     Energy level is good and baseline.              Current Outpatient Medications   Medication     amLODIPine (NORVASC) 5 MG tablet     Blood Glucose Monitoring Suppl (ONE TOUCH ULTRA SYSTEM KIT) W/DEVICE KIT     clopidogrel (PLAVIX) 75 MG tablet     dorzolamide-timolol (COSOPT) 2-0.5 % ophthalmic solution     hydrochlorothiazide (HYDRODIURIL) 25 MG tablet     insulin glargine (LANTUS SOLOSTAR) 100 UNIT/ML pen     insulin pen needle (B-D U/F) 31G X 8 MM miscellaneous     levofloxacin (LEVAQUIN) 500 MG tablet     lisinopril (PRINIVIL/ZESTRIL) 40 MG tablet     metFORMIN (GLUCOPHAGE) 500 MG tablet     Multiple Vitamins-Minerals (PRESERVISION AREDS PO)     ONETOUCH LANCETS [facility]     ONETOUCH ULTRA test strip     order for DME     ORDER FOR DME     pantoprazole (PROTONIX) 20 MG EC tablet     rosuvastatin (CRESTOR) 10 MG tablet     sulfamethoxazole-trimethoprim (BACTRIM DS) 800-160 MG tablet     No current facility-administered medications for this visit.           Allergies   Allergen Reactions     Zocor [Simvastatin - High Dose]      Bilateral hip aching       Past Medical History:   Diagnosis Date     Diabetes mellitus (H)      Hypertension      Squamous cell carcinoma          PHYSICAL EXAM:  BP (!) 151/83   Pulse 67   Resp 16   Wt 101.3 kg (223 lb 6.4 oz)   SpO2 97%   BMI 31.16 kg/m    No apparent distress       LABS AND IMAGING:  Reviewed.  PSA   Date Value Ref Range Status   03/18/2021 1.78 0 - 4 ug/L Final     Comment:     Assay Method:  Chemiluminescence using Siemens Vista analyzer   09/14/2020 1.53 0 - 4 ug/L Final     Comment:     Assay Method:  Chemiluminescence using Siemens Vista analyzer   03/02/2020 1.23 0 - 4 ug/L Final     Comment:     Assay Method:  Chemiluminescence using Siemens Vista analyzer   09/12/2019 1.08 0 - 4 ug/L Final     Comment:     Assay Method:  Chemiluminescence using Siemens Vista analyzer   03/15/2019 1.10 0 - 4 ug/L Final     Comment:     Assay  Method:  Chemiluminescence using Siemens Vista analyzer     PSA Tumor Marker   Date Value Ref Range Status   09/20/2021 1.88 0.00 - 4.00 ug/L Final           IMPRESSION:   Mr. Garcia is a 85 year old male with a high-risk prostate cancer. Pretreatment PSA 25.5, [place] score of 4+3=7, clinical stage [id number]. Complete RT 2/10/2012,  8041 cGy in 43 planned treatments, via IMRT.       PLAN:     1. PSA increased from last PSA but does not yet met [place] definition of biochemical failure (2.0 above zohaib with zohaib of 0.24).Will continue to closely monitor at this time.       2. No late GI/ toxicity.    3. PSA in 6 months. RTC thereafter.       Kieran Hua M.D.  Department of Radiation Oncology  [facility]

## 2021-09-27 NOTE — NURSING NOTE
FOLLOW-UP VISIT    Patient Name: Fawad Garcia      : 1935     Age: 85 year old        ______________________________________________________________________________     Chief Complaint   Patient presents with     Radiation Therapy     Return appointment with Dr. Hua     BP (!) 151/83   Pulse 67   Resp 16   Wt 101.3 kg (223 lb 6.4 oz)   SpO2 97%   BMI 31.16 kg/m       Date Radiation Completed: 2/10/2012    Pain  Denies    Meds  Current Med List Reviewed: Yes  Medication Note:     AUA: AUA Score: 23 (21 1500)  MARINA: MARINA Score: 6 (21 1500)    PSA   Date Value Ref Range Status   2021 1.78 0 - 4 ug/L Final     Comment:     Assay Method:  Chemiluminescence using Siemens Vista analyzer   2020 1.53 0 - 4 ug/L Final     Comment:     Assay Method:  Chemiluminescence using Siemens Vista analyzer   2020 1.23 0 - 4 ug/L Final     Comment:     Assay Method:  Chemiluminescence using Siemens Vista analyzer   2019 1.08 0 - 4 ug/L Final     Comment:     Assay Method:  Chemiluminescence using Siemens Vista analyzer   03/15/2019 1.10 0 - 4 ug/L Final     Comment:     Assay Method:  Chemiluminescence using Siemens Vista analyzer   2018 0.79 0 - 4 ug/L Final     Comment:     Assay Method:  Chemiluminescence using Siemens Vista analyzer     PSA Tumor Marker   Date Value Ref Range Status   2021 1.88 0.00 - 4.00 ug/L Final       Bowel: Patient reports mixed bowels- cyclic between constipation and then loose stools    Bladder: nocturia, incomplete emptying, weak stream, and frequency  Nocturia: 4 - Once every 2 hours    Energy Level: normal    Appointments:   Urologist:       Other Notes: Follow up with Dr. Hua in 6 months with PSA.    Adia Borja RN

## 2021-09-27 NOTE — LETTER
2021         RE: Fawad Garcia  [address] [facility] [address]        Dear Colleague,    Thank you for referring your patient, Fawad Garcia, to the RADIATION THERAPY CENTER. Please see a copy of my visit note below.       Department of Radiation Oncology  Radiation Therapy Center  [facility]  [place] [address]  [phone]       Radiation Oncology Follow-up Visit  2021    Fawad Garcia  MRN: [id number]   : 1935     DISEASE TREATED: High-risk prostate cancer. Pretreatment PSA 25.5, Chaka score of 4+3=7, clinical stage [id number].     RADIATION THERAPY GIVEN: 8041 cGy in 43 planned treatments, via IMRT     INTERVAL SINCE COMPLETION OF THERAPY: 9 years since completion on 02/10/2012.     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 He underwent biopsies in  and  and pathology was negative for malignancy. Due to the persistently rising PSA Dr. Mcintyre performed an MRI-guided biopsy of the prostate gland which revealed [place] 4+3= 7 disease in 2 of 2 cores with 90% of the cores being positive in the right inferior base and right inferior mid gland. Androgen ablation was discussed with the patient due to his high-risk disease and the patient declined due to comorbidities of the drug. Overall, he completed radiotherapy with very little toxicity.     SUBJECTIVE:   Fawad Garcia is a 85 year old male who is scheduled today for routine follow-up.      PSA on 21 was 1.88, up from prior value of 1.78 (zohaib =0.24).    Today, he denies any pressing issues or complaints and reports that all symptoms have essentially remained stable since his last visit with us 6 months ago. AUA and MARINA not completed today (TC). Prior  AUA 18/35 (patient reported overall stable) and MARINA was 0/25. Main  urinary symptoms continue to be nocturia and urgency. Patient states symptoms are worse with increased intake of fluids. Denies drinking alcohol, and has minimal caffeine. He denies any dysuria, hematuria, hematochezia, diarrhea, or loose stools.  He denies any pain or swelling.     Energy level is good and baseline.              Current Outpatient Medications   Medication     amLODIPine (NORVASC) 5 MG tablet     Blood Glucose Monitoring Suppl (ONE TOUCH ULTRA SYSTEM KIT) W/DEVICE KIT     clopidogrel (PLAVIX) 75 MG tablet     dorzolamide-timolol (COSOPT) 2-0.5 % ophthalmic solution     hydrochlorothiazide (HYDRODIURIL) 25 MG tablet     insulin glargine (LANTUS SOLOSTAR) 100 UNIT/ML pen     insulin pen needle (B-D U/F) 31G X 8 MM miscellaneous     levofloxacin (LEVAQUIN) 500 MG tablet     lisinopril (PRINIVIL/ZESTRIL) 40 MG tablet     metFORMIN (GLUCOPHAGE) 500 MG tablet     Multiple Vitamins-Minerals (PRESERVISION AREDS PO)     ONETOUCH LANCETS [facility]     ONETOUCH ULTRA test strip     order for DME     ORDER FOR DME     pantoprazole (PROTONIX) 20 MG EC tablet     rosuvastatin (CRESTOR) 10 MG tablet     sulfamethoxazole-trimethoprim (BACTRIM DS) 800-160 MG tablet     No current facility-administered medications for this visit.           Allergies   Allergen Reactions     Zocor [Simvastatin - High Dose]      Bilateral hip aching       Past Medical History:   Diagnosis Date     Diabetes mellitus (H)      Hypertension      Squamous cell carcinoma          PHYSICAL EXAM:  BP (!) 151/83   Pulse 67   Resp 16   Wt 101.3 kg (223 lb 6.4 oz)   SpO2 97%   BMI 31.16 kg/m    No apparent distress       LABS AND IMAGING:  Reviewed.  PSA   Date Value Ref Range Status   03/18/2021 1.78 0 - 4 ug/L Final     Comment:     Assay Method:  Chemiluminescence using Siemens Vista analyzer   09/14/2020 1.53 0 - 4 ug/L Final     Comment:     Assay Method:  Chemiluminescence using Siemens Vista analyzer   03/02/2020 1.23 0 - 4 ug/L Final      Comment:     Assay Method:  Chemiluminescence using Siemens Vista analyzer   09/12/2019 1.08 0 - 4 ug/L Final     Comment:     Assay Method:  Chemiluminescence using Siemens Vista analyzer   03/15/2019 1.10 0 - 4 ug/L Final     Comment:     Assay Method:  Chemiluminescence using Siemens Vista analyzer     PSA Tumor Marker   Date Value Ref Range Status   09/20/2021 1.88 0.00 - 4.00 ug/L Final           IMPRESSION:   Mr. Garcia is a 85 year old male with a high-risk prostate cancer. Pretreatment PSA 25.5, Chaka score of 4+3=7, clinical stage [id number]. Complete RT 2/10/2012,  8041 cGy in 43 planned treatments, via IMRT.       PLAN:     1. PSA increased from last PSA but does not yet met [place] definition of biochemical failure (2.0 above zohaib with zohaib of 0.24).Will continue to closely monitor at this time.       2. No late GI/ toxicity.    3. PSA in 6 months. RTC thereafter.       Kieran Hua M.D.  Department of Radiation Oncology  [facility]

## 2021-10-11 ENCOUNTER — IMMUNIZATION (OUTPATIENT)
Dept: FAMILY MEDICINE | Facility: CLINIC | Age: 86
End: 2021-10-11
Payer: COMMERCIAL

## 2021-10-11 DIAGNOSIS — Z23 NEED FOR PROPHYLACTIC VACCINATION AND INOCULATION AGAINST INFLUENZA: Primary | ICD-10-CM

## 2021-10-11 PROCEDURE — 99207 PR NO CHARGE NURSE ONLY: CPT

## 2021-10-11 PROCEDURE — G0008 ADMIN INFLUENZA VIRUS VAC: HCPCS

## 2021-10-11 PROCEDURE — 90662 IIV NO PRSV INCREASED AG IM: CPT

## 2021-10-25 ENCOUNTER — IMMUNIZATION (OUTPATIENT)
Dept: FAMILY MEDICINE | Facility: CLINIC | Age: 86
End: 2021-10-25
Payer: COMMERCIAL

## 2021-10-25 PROCEDURE — 91300 PR COVID VAC PFIZER DIL RECON 30 MCG/0.3 ML IM: CPT

## 2021-10-25 PROCEDURE — 0003A PR COVID VAC PFIZER DIL RECON 30 MCG/0.3 ML IM: CPT

## 2021-12-31 ENCOUNTER — TELEPHONE (OUTPATIENT)
Dept: FAMILY MEDICINE | Facility: CLINIC | Age: 86
End: 2021-12-31
Payer: COMMERCIAL

## 2021-12-31 DIAGNOSIS — Z79.4 TYPE 2 DIABETES MELLITUS WITH BOTH EYES AFFECTED BY MILD NONPROLIFERATIVE RETINOPATHY WITHOUT MACULAR EDEMA, WITH LONG-TERM CURRENT USE OF INSULIN (H): Primary | ICD-10-CM

## 2021-12-31 DIAGNOSIS — E11.3293 TYPE 2 DIABETES MELLITUS WITH BOTH EYES AFFECTED BY MILD NONPROLIFERATIVE RETINOPATHY WITHOUT MACULAR EDEMA, WITH LONG-TERM CURRENT USE OF INSULIN (H): Primary | ICD-10-CM

## 2021-12-31 NOTE — TELEPHONE ENCOUNTER
Reason for Call:  Other appointment    Detailed comments: Patient wants diabetes check up before March 3rd however schedule says nothing availiable until then for some reason. Can patient be seen before that in the [facility]?    Phone Number Patient can be reached at: Cell     Best Time: ANY    Can we leave a detailed message on this number? YES    Call taken on 12/31/2021 at 4:33 PM by Amy Calzada

## 2022-01-05 NOTE — TELEPHONE ENCOUNTER
Dr Scott,    Please see pt phone call.   Due for annual visit/labs/diabetes check in January.  No appt's available until march  Ok to take same day slot in January?    Christine Bishop RN

## 2022-01-05 NOTE — TELEPHONE ENCOUNTER
Called & left message with wife to have pt call back.    *need to schedule appt for January (ok to take same day slot per provider).    Christine Bishop RN

## 2022-01-05 NOTE — TELEPHONE ENCOUNTER
Dr. Scott,    Patient asking for lab orders for upcoming appt.  I looked in chart and pended a few for your consideration. Chrissie BURCH RN

## 2022-01-05 NOTE — TELEPHONE ENCOUNTER
Pt called back & states he is due for annual visit, labs in January, diabetes check. States cannot get appt until March.  Will route to provider to ask if ok to use same day slot.    Christine Bishop RN

## 2022-01-05 NOTE — TELEPHONE ENCOUNTER
Scheduled for 1/14/2022.  Patient is asking for the lab orders to be placed so he can do them before the appointment. Do you want him to do them before?    Julissa Belcher CSS on 1/5/2022 at 2:52 PM

## 2022-01-07 ENCOUNTER — LAB (OUTPATIENT)
Dept: LAB | Facility: CLINIC | Age: 87
End: 2022-01-07
Payer: COMMERCIAL

## 2022-01-07 DIAGNOSIS — E11.3293 TYPE 2 DIABETES MELLITUS WITH BOTH EYES AFFECTED BY MILD NONPROLIFERATIVE RETINOPATHY WITHOUT MACULAR EDEMA, WITH LONG-TERM CURRENT USE OF INSULIN (H): ICD-10-CM

## 2022-01-07 DIAGNOSIS — Z79.4 TYPE 2 DIABETES MELLITUS WITH BOTH EYES AFFECTED BY MILD NONPROLIFERATIVE RETINOPATHY WITHOUT MACULAR EDEMA, WITH LONG-TERM CURRENT USE OF INSULIN (H): ICD-10-CM

## 2022-01-07 LAB — HBA1C MFR BLD: 8.7 % (ref 0–5.6)

## 2022-01-07 PROCEDURE — 83036 HEMOGLOBIN GLYCOSYLATED A1C: CPT

## 2022-01-07 PROCEDURE — 36415 COLL VENOUS BLD VENIPUNCTURE: CPT

## 2022-01-07 ASSESSMENT — ENCOUNTER SYMPTOMS
PALPITATIONS: 0
PARESTHESIAS: 1
HEADACHES: 0
CHILLS: 0
JOINT SWELLING: 0
FREQUENCY: 1
WEAKNESS: 0
EYE PAIN: 0
SHORTNESS OF BREATH: 0
FEVER: 0
SORE THROAT: 0
HEARTBURN: 0
HEMATOCHEZIA: 0
DYSURIA: 0
HEMATURIA: 0
CONSTIPATION: 1
ABDOMINAL PAIN: 0
DIARRHEA: 0
NAUSEA: 0
MYALGIAS: 0
COUGH: 1
ARTHRALGIAS: 0
NERVOUS/ANXIOUS: 1
DIZZINESS: 0

## 2022-01-07 ASSESSMENT — ACTIVITIES OF DAILY LIVING (ADL): CURRENT_FUNCTION: NO ASSISTANCE NEEDED

## 2022-01-14 ENCOUNTER — OFFICE VISIT (OUTPATIENT)
Dept: FAMILY MEDICINE | Facility: CLINIC | Age: 87
End: 2022-01-14
Payer: COMMERCIAL

## 2022-01-14 VITALS
TEMPERATURE: 96.5 F | BODY MASS INDEX: 33.68 KG/M2 | HEIGHT: 69 IN | RESPIRATION RATE: 16 BRPM | DIASTOLIC BLOOD PRESSURE: 76 MMHG | SYSTOLIC BLOOD PRESSURE: 132 MMHG | OXYGEN SATURATION: 97 % | WEIGHT: 227.4 LBS | HEART RATE: 88 BPM

## 2022-01-14 DIAGNOSIS — N18.31 STAGE 3A CHRONIC KIDNEY DISEASE (H): ICD-10-CM

## 2022-01-14 DIAGNOSIS — K21.9 GASTROESOPHAGEAL REFLUX DISEASE WITHOUT ESOPHAGITIS: ICD-10-CM

## 2022-01-14 DIAGNOSIS — Z00.00 ENCOUNTER FOR MEDICARE ANNUAL WELLNESS EXAM: Primary | ICD-10-CM

## 2022-01-14 DIAGNOSIS — I70.229 CRITICAL ISCHEMIA OF EXTREMITY WITH HISTORY OF REVASCULARIZATION OF SAME EXTREMITY (H): ICD-10-CM

## 2022-01-14 DIAGNOSIS — E11.3293 TYPE 2 DIABETES MELLITUS WITH BOTH EYES AFFECTED BY MILD NONPROLIFERATIVE RETINOPATHY WITHOUT MACULAR EDEMA, WITH LONG-TERM CURRENT USE OF INSULIN (H): ICD-10-CM

## 2022-01-14 DIAGNOSIS — I10 HYPERTENSION, GOAL BELOW 140/90: ICD-10-CM

## 2022-01-14 DIAGNOSIS — Z89.421 H/O AMPUTATION OF LESSER TOE, RIGHT (H): ICD-10-CM

## 2022-01-14 DIAGNOSIS — E11.65 UNCONTROLLED TYPE 2 DIABETES MELLITUS WITH HYPERGLYCEMIA (H): ICD-10-CM

## 2022-01-14 DIAGNOSIS — I70.229 CRITICAL ISCHEMIA OF LOWER EXTREMITY (H): ICD-10-CM

## 2022-01-14 DIAGNOSIS — L98.9 SKIN LESION: ICD-10-CM

## 2022-01-14 DIAGNOSIS — Z98.890 CRITICAL ISCHEMIA OF EXTREMITY WITH HISTORY OF REVASCULARIZATION OF SAME EXTREMITY (H): ICD-10-CM

## 2022-01-14 DIAGNOSIS — Z79.4 TYPE 2 DIABETES MELLITUS WITH BOTH EYES AFFECTED BY MILD NONPROLIFERATIVE RETINOPATHY WITHOUT MACULAR EDEMA, WITH LONG-TERM CURRENT USE OF INSULIN (H): ICD-10-CM

## 2022-01-14 DIAGNOSIS — E78.5 HYPERLIPIDEMIA LDL GOAL <70: ICD-10-CM

## 2022-01-14 DIAGNOSIS — C61 PROSTATE CANCER (H): ICD-10-CM

## 2022-01-14 DIAGNOSIS — L30.9 DERMATITIS: ICD-10-CM

## 2022-01-14 PROCEDURE — 99397 PER PM REEVAL EST PAT 65+ YR: CPT | Performed by: FAMILY MEDICINE

## 2022-01-14 PROCEDURE — 99214 OFFICE O/P EST MOD 30 MIN: CPT | Mod: 25 | Performed by: FAMILY MEDICINE

## 2022-01-14 RX ORDER — LISINOPRIL 40 MG/1
40 TABLET ORAL DAILY
Qty: 90 TABLET | Refills: 3 | Status: SHIPPED | OUTPATIENT
Start: 2022-01-14 | End: 2023-02-21

## 2022-01-14 RX ORDER — HYDROCHLOROTHIAZIDE 25 MG/1
25 TABLET ORAL DAILY
Qty: 90 TABLET | Refills: 3 | Status: SHIPPED | OUTPATIENT
Start: 2022-01-14 | End: 2023-03-10

## 2022-01-14 RX ORDER — LANCETS 33 GAUGE
1 EACH MISCELLANEOUS SEE ADMIN INSTRUCTIONS
Qty: 100 EACH | Refills: 3 | Status: SHIPPED | OUTPATIENT
Start: 2022-01-14 | End: 2023-01-02

## 2022-01-14 RX ORDER — PEN NEEDLE, DIABETIC 31 GX5/16"
NEEDLE, DISPOSABLE MISCELLANEOUS
Qty: 100 EACH | Refills: 3 | Status: SHIPPED | OUTPATIENT
Start: 2022-01-14 | End: 2023-01-05

## 2022-01-14 RX ORDER — ROSUVASTATIN CALCIUM 10 MG/1
10 TABLET, COATED ORAL AT BEDTIME
Qty: 90 TABLET | Refills: 3 | Status: SHIPPED | OUTPATIENT
Start: 2022-01-14 | End: 2023-03-10

## 2022-01-14 RX ORDER — PANTOPRAZOLE SODIUM 20 MG/1
20 TABLET, DELAYED RELEASE ORAL DAILY
Qty: 90 TABLET | Refills: 3 | Status: SHIPPED | OUTPATIENT
Start: 2022-01-14 | End: 2023-03-10

## 2022-01-14 RX ORDER — TRIAMCINOLONE ACETONIDE 1 MG/G
CREAM TOPICAL 2 TIMES DAILY
Qty: 80 G | Refills: 1 | Status: SHIPPED | OUTPATIENT
Start: 2022-01-14 | End: 2022-03-08

## 2022-01-14 RX ORDER — AMLODIPINE BESYLATE 5 MG/1
5 TABLET ORAL DAILY
Qty: 90 TABLET | Refills: 3 | Status: SHIPPED | OUTPATIENT
Start: 2022-01-14 | End: 2023-03-10

## 2022-01-14 RX ORDER — BLOOD SUGAR DIAGNOSTIC
STRIP MISCELLANEOUS
Qty: 100 STRIP | Refills: 3 | Status: SHIPPED | OUTPATIENT
Start: 2022-01-14 | End: 2022-11-16

## 2022-01-14 RX ORDER — CLOPIDOGREL BISULFATE 75 MG/1
75 TABLET ORAL DAILY
Qty: 90 TABLET | Refills: 3 | Status: SHIPPED | OUTPATIENT
Start: 2022-01-14 | End: 2023-03-10

## 2022-01-14 RX ORDER — INSULIN GLARGINE 100 [IU]/ML
20 INJECTION, SOLUTION SUBCUTANEOUS AT BEDTIME
Qty: 30 ML | Refills: 11 | Status: SHIPPED | OUTPATIENT
Start: 2022-01-14 | End: 2023-01-24

## 2022-01-14 ASSESSMENT — ENCOUNTER SYMPTOMS
ABDOMINAL PAIN: 0
FEVER: 0
ARTHRALGIAS: 0
SORE THROAT: 0
CHILLS: 0
SHORTNESS OF BREATH: 0
DIARRHEA: 0
DYSURIA: 0
WEAKNESS: 0
HEMATURIA: 0
CONSTIPATION: 1
JOINT SWELLING: 0
EYE PAIN: 0
DIZZINESS: 0
NAUSEA: 0
HEARTBURN: 0
HEMATOCHEZIA: 0
MYALGIAS: 0
HEADACHES: 0
FREQUENCY: 1
PARESTHESIAS: 1
COUGH: 1
PALPITATIONS: 0
NERVOUS/ANXIOUS: 1

## 2022-01-14 ASSESSMENT — ACTIVITIES OF DAILY LIVING (ADL): CURRENT_FUNCTION: NO ASSISTANCE NEEDED

## 2022-01-14 ASSESSMENT — MIFFLIN-ST. JEOR: SCORE: 1710.82

## 2022-01-14 NOTE — PROGRESS NOTES
"SUBJECTIVE:   Fawad Garcia is a 86 year old male who presents for Preventive Visit.      Patient has been advised of split billing requirements and indicates understanding: Yes   Are you in the first 12 months of your Medicare coverage?  No    Healthy Habits:     In general, how would you rate your overall health?  Excellent    Frequency of exercise:  2-3 days/week    Duration of exercise:  15-30 minutes    Do you usually eat at least 4 servings of fruit and vegetables a day, include whole grains    & fiber and avoid regularly eating high fat or \"junk\" foods?  No    Taking medications regularly:  Yes    Medication side effects:  Not applicable and None    Ability to successfully perform activities of daily living:  No assistance needed    Home Safety:  No safety concerns identified    Hearing Impairment:  Difficulty following a conversation in a noisy restaurant or crowded room, difficult to understand a speaker at a public meeting or Hindu service, need to ask people to speak up or repeat themselves, find that men's voices are easier to understand than woman's and difficulty understanding soft or whispered speech    In the past 6 months, have you been bothered by leaking of urine?  No    In general, how would you rate your overall mental or emotional health?  Good      PHQ-2 Total Score: 1    Do you feel safe in your environment? Yes    Have you ever done Advance Care Planning? (For example, a Health Directive, POLST, or a discussion with a medical provider or your loved ones about your wishes): Yes, advance care planning is on file.       Fall risk  Fallen 2 or more times in the past year?: No  Any fall with injury in the past year?: No    Cognitive Screening   1) Repeat 3 items (Leader, Season, Table)    2) Clock draw: ABNORMAL 2 number 9s, no 10s on the clock   3) 3 item recall: Recalls 3 objects  Results: 3 items recalled: COGNITIVE IMPAIRMENT LESS LIKELY    Mini-CogTM Copyright S Geronimo. Licensed by " the author for use in [facility]; reprinted with permission ([email or web address]). All rights reserved.      Do you have sleep apnea, excessive snoring or daytime drowsiness?: no    Reviewed and updated as needed this visit by clinical staff  Tobacco  Allergies  Meds   Med Hx  Surg Hx  Fam Hx  Soc Hx       Reviewed and updated as needed this visit by Provider               Social History     Tobacco Use     Smoking status: Never Smoker     Smokeless tobacco: Never Used   Substance Use Topics     Alcohol use: Yes     Alcohol/week: 1.7 standard drinks     Comment: 1 x month     If you drink alcohol do you typically have >3 drinks per day or >7 drinks per week? No    Alcohol Use 1/14/2022   Prescreen: >3 drinks/day or >7 drinks/week? -   Prescreen: >3 drinks/day or >7 drinks/week? No       Diabetes Follow-up    How often are you checking your blood sugar? One time daily  What time of day are you checking your blood sugars (select all that apply)?  morning  Have you had any blood sugars above 200?  Yes   Have you had any blood sugars below 70?  No    What symptoms do you notice when your blood sugar is low?  None    What concerns do you have today about your diabetes? None     Do you have any of these symptoms? (Select all that apply)  Excessive thirst    Have you had a diabetic eye exam in the last 12 months? Yes- Date of last eye exam: 9/21,  Location[facility]          Hyperlipidemia Follow-Up      Are you regularly taking any medication or supplement to lower your cholesterol?   Yes- rosuvastatin    Are you having muscle aches or other side effects that you think could be caused by your cholesterol lowering medication?  No    Hypertension Follow-up      Do you check your blood pressure regularly outside of the clinic? No     Are you following a low salt diet? Yes    Are your blood pressures ever more than 140 on the top number (systolic) OR more   than 90 on the bottom  number (diastolic), for example 140/90? Yes-elevated in clinic today    BP Readings from Last 2 Encounters:   01/14/22 132/76   09/27/21 (!) 151/83     Hemoglobin A1C POCT (%)   Date Value   06/21/2021 8.4 (H)   03/18/2021 8.3 (H)     Hemoglobin A1C (%)   Date Value   01/07/2022 8.7 (H)   09/20/2021 7.8 (H)     LDL Cholesterol Calculated (mg/dL)   Date Value   07/12/2021 56   03/18/2021 47   03/02/2020 60         Current providers sharing in care for this patient include:   Patient Care Team:  Chris Scott MD as PCP - General (Family Practice)  Stewart Mcintyre MD as MD (Urology)  Chris Scott MD as Assigned PCP  Kieran Hua MD as MD (Radiation Oncology)  Ernie Cali MD as Assigned Heart and Vascular Provider  Ethan Montesinos DPM as Assigned Musculoskeletal Provider  Kieran Hua MD as Assigned Cancer Care Provider  Evgeny Torres MD as MD (Dermatology)  Evgeny Torres MD as Assigned Surgical Provider    The following health maintenance items are reviewed in Epic and correct as of today:  Health Maintenance Due   Topic Date Due     ZOSTER IMMUNIZATION (1 of 2) Never done     EYE EXAM  02/10/2022               Review of Systems   Constitutional: Negative for chills and fever.   HENT: Positive for hearing loss. Negative for congestion, ear pain and sore throat.    Eyes: Positive for visual disturbance. Negative for pain.   Respiratory: Positive for cough. Negative for shortness of breath.    Cardiovascular: Negative for chest pain, palpitations and peripheral edema.   Gastrointestinal: Positive for constipation. Negative for abdominal pain, diarrhea, heartburn, hematochezia and nausea.   Genitourinary: Positive for frequency. Negative for dysuria, genital sores, hematuria, impotence, penile discharge and urgency.   Musculoskeletal: Negative for arthralgias, joint swelling and myalgias.   Skin: Negative for rash.   Neurological: Positive for  "paresthesias. Negative for dizziness, weakness and headaches.   Psychiatric/Behavioral: Negative for mood changes. The patient is nervous/anxious.          OBJECTIVE:   /76   Pulse 88   Temp (!) 96.5  F (35.8  C) (Tympanic)   Resp 16   Ht 1.759 m (5' 9.25\")   Wt 103.1 kg (227 lb 6.4 oz)   SpO2 97%   BMI 33.34 kg/m   Estimated body mass index is 33.34 kg/m  as calculated from the following:    Height as of this encounter: 1.759 m (5' 9.25\").    Weight as of this encounter: 103.1 kg (227 lb 6.4 oz).  Physical Exam  GENERAL APPEARANCE: alert, no distress and cooperative  RESP: lungs clear to auscultation - no rales, rhonchi or wheezes  CV: regular rates and rhythm, normal S1 S2, no S3 or S4 and no murmur, click or rub  MS: extremities normal- no gross deformities noted  SKIN: right at right ankle, larger plaque, red, scaly, some excoriations, c/w dermatitis  Top of left ear, noted small scaly rough lesion about 3 mm diameter, basal cell vs squamous cell in appearing  NEURO: Normal strength and tone, mentation intact and speech normal  DIABETIC FOOT EXAM: normal DP and PT pulses and decrease sensation to monofilament, has bilateral great toe amputations  PSYCH: mentation appears normal and affect normal/bright        ASSESSMENT / PLAN:   (Z00.00) Encounter for Medicare annual wellness exam  (primary encounter diagnosis)  Comment: Discussed healthy lifestyle and preventative cares.    Plan:     (E11.3293,  Z79.4) Type 2 diabetes mellitus with both eyes affected by mild nonproliferative retinopathy without macular edema, with long-term current use of insulin (H)  Comment: not controlled, discussed lifestyle, he adjusts his insulin, mainly using 20 units lantus, his morning reading are looking good, discussed decrease sweets and other foods  Plan: Hemoglobin A1c, insulin glargine (LANTUS         SOLOSTAR) 100 UNIT/ML pen, lisinopril (ZESTRIL)        40 MG tablet, metFORMIN (GLUCOPHAGE) 500 MG         tablet, " "Lancets (ONETOUCH DELICA PLUS         [id number]) MISC, insulin pen needle (B-D U/F)         31G X 8 MM miscellaneous, blood glucose         (ONETOUCH ULTRA) test strip            (E11.65) Uncontrolled type 2 diabetes mellitus with hyperglycemia (H)  Comment:   Plan: Hemoglobin A1c            (Z89.421) H/O amputation of lesser toe, right (H)  Comment: noted  Plan:     (I70.229,  Z98.890) 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 (H)  Comment: stable on meds  Plan:     (C61) Prostate cancer (H)  Comment: seeing specialist  Plan:     (N18.31) Stage 3a chronic kidney disease (H)  Comment: controlled and refilled  Plan: lisinopril (ZESTRIL) 40 MG tablet            (I10) Hypertension, goal below 140/90  Comment:   Plan: amLODIPine (NORVASC) 5 MG tablet,         hydrochlorothiazide (HYDRODIURIL) 25 MG tablet,        lisinopril (ZESTRIL) 40 MG tablet            (I70.229) Critical ischemia of lower extremity (H)  Comment: stable  Plan: clopidogrel (PLAVIX) 75 MG tablet            (K21.9) Gastroesophageal reflux disease without esophagitis  Comment: on med and controlled  Plan: pantoprazole (PROTONIX) 20 MG EC tablet            (E78.5) Hyperlipidemia LDL goal <70  Comment: on med  Plan: rosuvastatin (CRESTOR) 10 MG tablet            (L30.9) Dermatitis  Comment: add medication, discussed plan  Plan: triamcinolone (KENALOG) 0.1 % external cream            (L98.9) Skin lesion  Comment: also skin lesion on top of left ear, referral is done  Plan: Adult Dermatology Referral              Patient has been advised of split billing requirements and indicates understanding: written in avs  COUNSELING:  Reviewed preventive health counseling, as reflected in patient instructions       Regular exercise       Healthy diet/nutrition    Estimated body mass index is 33.34 kg/m  as calculated from the following:    Height as of this encounter: 1.759 m (5' 9.25\").    Weight as of this encounter: 103.1 kg " (227 lb 6.4 oz).    Weight management plan: diet    He reports that he has never smoked. He has never used smokeless tobacco.      Appropriate preventive services were discussed with this patient, including applicable screening as appropriate for cardiovascular disease, diabetes, osteopenia/osteoporosis, and glaucoma.  As appropriate for age/gender, discussed screening for colorectal cancer, prostate cancer, breast cancer, and cervical cancer. Checklist reviewing preventive services available has been given to the patient.    Reviewed patients plan of care and provided an AVS. The Basic Care Plan (routine screening as documented in Health Maintenance) for Fawad meets the Care Plan requirement. This Care Plan has been established and reviewed with the Patient.    Counseling Resources:  ATP IV Guidelines  Pooled Cohorts Equation Calculator  Breast Cancer Risk Calculator  Breast Cancer: Medication to Reduce Risk  FRAX Risk Assessment  ICSI Preventive Guidelines  Dietary Guidelines for Americans, 2010  USDA's MyPlate  ASA Prophylaxis  Lung CA Screening    Chris Scott MD  [facility]    Identified Health Risks:

## 2022-01-14 NOTE — PATIENT INSTRUCTIONS
Please recheck your hemoglobin a1c in 3 months.    For your rash on your right foot please use the triamcinolone cream 0.1% twice a day.  If this does not clear the rash I would then try the 0.5% cream for 2 weeks.    I refilled your medications.        Thank you for choosing [facility].  You may be receiving an email and/or telephone survey request from [facility] Customer Experience regarding your visit today.  Please take a few minutes to respond to the survey to let us know how we are doing.      If you have questions or concerns, please contact us via [employer] or you can contact your care team at [phone] option [phone].    Our Clinic hours are:  Monday - Thursday 7am-6pm  Friday 7am-5pm    The [place] outpatient lab hours are:  Monday - Friday 7am-4:30pm    Appointments are required, call [phone]    If you have clinical questions after hours or would like to schedule an appointment,  call the clinic at [phone].  Please be aware that there will be an additional charge during your preventative visit due to either a new diagnosis and/or chronic disease management.    Preventative visits screen for diseases prior to they occur.  They do not cover for any new diagnosis or chronic disease management which would include medication refills, labs etc.    If you have questions regarding your coverage please check with your insurance provider.  At [facility] we need to code correctly to be in compliance with all insurance companies.      Patient Education   Personalized Prevention Plan  You are due for the preventive services outlined below.  Your care team is available to assist you in scheduling these services.  If you have already completed any of these items, please share that information with your care team to update in your medical record.  Health Maintenance Due   Topic Date Due     ANNUAL REVIEW OF HM ORDERS  Never done     Zoster (Shingles) Vaccine (1 of 2) Never done     Eye Exam  02/10/2022        Understanding USDA MyPlate  The USDA has guidelines to help you make healthy food choices. These are called MyPlate. MyPlate shows the food groups that make up healthy meals using the image of a place setting. Before you eat, think about the healthiest choices for what to put on your plate or in your cup or bowl. To learn more about building a healthy plate, visit www.choosemyplate.gov.    The food groups    Fruits. Any fruit or 100% fruit juice counts as part of the Fruit Group. Fruits may be fresh, canned, frozen, or dried, and may be whole, cut-up, or pureed. Make 1/2 of your plate fruits and vegetables.    Vegetables. Any vegetable or 100% vegetable juice counts as a member of the Vegetable Group. Vegetables may be fresh, frozen, canned, or dried. They can be served raw or cooked and may be whole, cut-up, or mashed. Make 1/2 of your plate fruits and vegetables.    Grains. All foods made from grains are part of the Grains Group. These include wheat, rice, oats, cornmeal, and barley. Grains are often used to make foods such as bread, pasta, oatmeal, cereal, tortillas, and grits. Grains should be no more than 1/4 of your plate. At least half of your grains should be whole grains.    Protein. This group includes meat, poultry, seafood, beans and peas, eggs, processed soy products (such as tofu), nuts (including nut butters), and seeds. Make protein choices no more than 1/4 of your plate. Meat and poultry choices should be lean or low fat.    Dairy. The Dairy Group includes all fluid milk products and foods made from milk that contain calcium, such as yogurt and cheese. (Foods that have little calcium, such as cream, butter, and cream cheese, are not part of this group.) Most dairy choices should be low-fat or fat-free.    Oils. Oils aren't a food group, but they do contain essential nutrients. However it's important to watch your intake of oils. These are fats that are liquid at room temperature. They include  canola, corn, olive, soybean, vegetable, and sunflower oil. Foods that are mainly oil include mayonnaise, certain salad dressings, and soft margarines. You likely already get your daily oil allowance from the foods you eat.  Things to limit  Eating healthy also means limiting these things in your diet:       Salt (sodium). Many processed foods have a lot of sodium. To keep sodium intake down, eat fresh vegetables, meats, poultry, and seafood when possible. Purchase low-sodium, reduced-sodium, or no-salt-added food products at the store. And don't add salt to your meals at home. Instead, season them with herbs and spices such as dill, oregano, cumin, and paprika. Or try adding flavor with lemon or lime zest and juice.    Saturated fat. Saturated fats are most often found in animal products such as beef, pork, and chicken. They are often solid at room temperature, such as butter. To reduce your saturated fat intake, choose leaner cuts of meat and poultry. And try healthier cooking methods such as grilling, broiling, roasting, or baking. For a simple lower-fat swap, use plain nonfat yogurt instead of mayonnaise when making potato salad or macaroni salad.    Added sugars. These are sugars added to foods. They are in foods such as ice cream, candy, soda, fruit drinks, sports drinks, energy drinks, cookies, pastries, jams, and syrups. Cut down on added sugars by sharing sweet treats with a family member or friend. You can also choose fruit for dessert, and drink water or other unsweetened beverages.     [employer] last reviewed this educational content on 6/1/2020 2000-2021 [employer]. All rights reserved. This information is not intended as a substitute for professional medical care. Always follow your healthcare professional's instructions.          Signs of Hearing Loss      Hearing much better with one ear can be a sign of hearing loss.   Hearing loss is a problem shared by many people. In fact, it  is one of the most common health problems, particularly as people age. Most people age 65 and older have some hearing loss. By age 80, almost everyone does. Hearing loss often occurs slowly over the years. So you may not realize your hearing has gotten worse.  Have your hearing checked  Call your healthcare provider if you:    Have to strain to hear normal conversation    Have to watch other people s faces very carefully to follow what they re saying    Need to ask people to repeat what they ve said    Often misunderstand what people are saying    Turn the volume of the television or radio up so high that others complain    Feel that people are mumbling when they re talking to you    Find that the effort to hear leaves you feeling tired and irritated    Notice, when using the phone, that you hear better with one ear than the other  [employer] last reviewed this educational content on 1/1/2020 2000-2021 [employer]. All rights reserved. This information is not intended as a substitute for professional medical care. Always follow your healthcare professional's instructions.

## 2022-01-25 NOTE — LETTER
3/14/2017      RE: Fawad Garcia  [address] [facility] [id number]       RADIATION ONCOLOGY FOLLOW UP  March 14, 2017    DISEASE TREATED: High-risk prostate cancer. Pretreatment PSA 25.5, [place] score of 4+3=7, clinical stage [id number].     RADIATION THERAPY GIVEN: 8041 cGy in 43 planned treatments, via IMRT     INTERVAL SINCE COMPLETION OF THERAPY:  5 years and 1 months since completion on 02/10/2012.     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 He underwent biopsies in 2002 and 2005 and pathology was negative for malignancy. Due to the persistently rising PSA Dr. Mcintyre performed an MRI-guided biopsy of the prostate gland which revealed Chaka 4+3= 7 disease in 2 of 2 cores with 90% of the cores being positive in the right inferior base and right inferior mid gland. Androgen ablation was discussed with the patient due to his high-risk disease and the patient declined due to comorbidities of the drug. Overall, he completed radiotherapy with very little toxicity.     He now presents to clinic for routine follow-up exam. Today, he denies any pressing issues or complaints and reports that all symptoms have essentially remained stable since his last visit with us one year ago. His AUA is 13/35 (stable) and MARINA is 1/25. Main urinary symptoms include nocturia and urgency. However, he denies any dysuria, hematuria, hematochezia, diarrhea, or loose stools.      PHYSICAL EXAMINATION:   Vital Signs: /84  Pulse 58  Resp 18  Wt 112.6 kg (248 lb 3.2 oz)  SpO2 98%  BMI 34.86 kg/m2   Abdomin is soft with no tenderness.  Bowel sound is active.    LABORATORY DATA:  PSA   Date Value Ref Range Status   03/13/2017 0.52 0 - 4 ug/L Final     Comment:     Assay Method:  Chemiluminescence using Siemens Vista analyzer   06/07/2016 0.48 0 - 4 ug/L Final   12/04/2015 0.43 0 - 4 ug/L Final    06/12/2015 0.33 0 - 4 ug/L Final   06/11/2014 0.24 0 - 4 ug/L Final   04/17/2014 0.24 0 - 4 ug/L Final   12/12/2013 0.31 0 - 4 ug/L Final   05/22/2013 0.35 0 - 4 ug/L Final   12/13/2012 0.52 0 - 4 ug/L Final   07/12/2012 1.35 0 - 4 ug/L Final   03/12/2012 3.24 0 - 4 ug/L Final   02/23/2012 3.50 0 - 4 ug/L Final   07/06/2011 25.50 (H) 0 - 4 ug/L Final   04/15/2011 22.40 (H) 0 - 4 ug/L Final   01/31/2011 22.70 (H) 0 - 4 ug/L Final   09/10/2010 20.10 (H) 0 - 4 ug/L Final   05/12/2010 20.10 (H) 0 - 4 ug/L Final   09/03/2009 20.10 (H) 0 - 4 ug/L Final   04/10/2009 19.10 (H) 0 - 4 ug/L Final   03/24/2004 8.93 (H) 0 - 4 ug/L Final     ASSESSMENT: Mr. Garcia is a 80 yo gentleman with high-risk prostate cancer s/p definitive radiation therapy completed almost 5 years and 1 months ago. Urinary symptoms are stable and he continues to be biochemically HAYDER.     PLAN:   1. RTC in one year   2. PSA prior to visit  3. Continue routine health maintenance with PCP         Melissa Parnell M.D., Ph.D.      Radiation Oncologist   [facility]   Phone: [phone]        Patient Care Team:  Dave Jefferson MD as PCP - General  Melissa Parnell MD as MD (Radiation Oncology)  Stewart Mcintyre MD as MD (Urology)     General

## 2022-03-07 DIAGNOSIS — L30.9 DERMATITIS: ICD-10-CM

## 2022-03-08 RX ORDER — TRIAMCINOLONE ACETONIDE 1 MG/G
CREAM TOPICAL 2 TIMES DAILY
Qty: 80 G | Refills: 1 | Status: SHIPPED | OUTPATIENT
Start: 2022-03-08 | End: 2022-09-27

## 2022-03-18 ENCOUNTER — LAB (OUTPATIENT)
Dept: LAB | Facility: CLINIC | Age: 87
End: 2022-03-18
Payer: COMMERCIAL

## 2022-03-18 DIAGNOSIS — C61 PROSTATE CANCER (H): ICD-10-CM

## 2022-03-18 LAB — PSA SERPL-MCNC: 2.49 UG/L (ref 0–4)

## 2022-03-18 PROCEDURE — 84153 ASSAY OF PSA TOTAL: CPT

## 2022-03-18 PROCEDURE — 36415 COLL VENOUS BLD VENIPUNCTURE: CPT

## 2022-03-25 ENCOUNTER — OFFICE VISIT (OUTPATIENT)
Dept: DERMATOLOGY | Facility: CLINIC | Age: 87
End: 2022-03-25
Attending: FAMILY MEDICINE
Payer: COMMERCIAL

## 2022-03-25 VITALS — OXYGEN SATURATION: 99 % | DIASTOLIC BLOOD PRESSURE: 76 MMHG | HEART RATE: 70 BPM | SYSTOLIC BLOOD PRESSURE: 127 MMHG

## 2022-03-25 DIAGNOSIS — D48.5 NEOPLASM OF UNCERTAIN BEHAVIOR OF SKIN: ICD-10-CM

## 2022-03-25 DIAGNOSIS — L81.4 LENTIGO: Primary | ICD-10-CM

## 2022-03-25 DIAGNOSIS — D18.01 CHERRY ANGIOMA: ICD-10-CM

## 2022-03-25 DIAGNOSIS — L82.1 SEBORRHEIC KERATOSIS: ICD-10-CM

## 2022-03-25 DIAGNOSIS — D22.9 MULTIPLE BENIGN NEVI: ICD-10-CM

## 2022-03-25 DIAGNOSIS — L57.0 ACTINIC KERATOSIS: ICD-10-CM

## 2022-03-25 PROCEDURE — 17003 DESTRUCT PREMALG LES 2-14: CPT | Mod: 59 | Performed by: PHYSICIAN ASSISTANT

## 2022-03-25 PROCEDURE — 99213 OFFICE O/P EST LOW 20 MIN: CPT | Mod: 25 | Performed by: PHYSICIAN ASSISTANT

## 2022-03-25 PROCEDURE — 88305 TISSUE EXAM BY PATHOLOGIST: CPT | Performed by: DERMATOLOGY

## 2022-03-25 PROCEDURE — 17000 DESTRUCT PREMALG LESION: CPT | Mod: 59 | Performed by: PHYSICIAN ASSISTANT

## 2022-03-25 PROCEDURE — 11102 TANGNTL BX SKIN SINGLE LES: CPT | Performed by: PHYSICIAN ASSISTANT

## 2022-03-25 NOTE — PATIENT INSTRUCTIONS
Wound Care Instructions     FOR SUPERFICIAL WOUNDS     [facility] [phone]  [facility] [phone]    Left Neck    AFTER 24 HOURS YOU SHOULD REMOVE THE BANDAGE AND BEGIN DAILY DRESSING CHANGES AS FOLLOWS:     1) Remove Dressing.     2) Clean and dry the area with tap water using a Q-tip or sterile gauze pad.     3) Apply Vaseline, Aquaphor, Polysporin ointment or Bacitracin ointment over entire wound.  Do NOT use Neosporin ointment.     4) Cover the wound with a band-aid, or a sterile non-stick gauze pad and micropore paper tape      REPEAT THESE INSTRUCTIONS AT LEAST ONCE A DAY UNTIL THE WOUND HAS COMPLETELY HEALED.    It is an old wives tale that a wound heals better when it is exposed to air and allowed to dry out. The wound will heal faster with a better cosmetic result if it is kept moist with ointment and covered with a bandage.    **Do not let the wound dry out.**      Supplies Needed:      *Cotton tipped applicators (Q-tips)    *Polysporin Ointment or Bacitracin Ointment (NOT NEOSPORIN)    *Band-aids or non-stick gauze pads and micropore paper tape.      PATIENT INFORMATION:    During the healing process you will notice a number of changes. All wounds develop a small halo of redness surrounding the wound.  This means healing is occurring. Severe itching with extensive redness usually indicates sensitivity to the ointment or bandage tape used to dress the wound.  You should call our office if this develops.      Swelling  and/or discoloration around your surgical site is common, particularly when performed around the eye.    All wounds normally drain.  The larger the wound the more drainage there will be.  After 7-10 days, you will notice the wound beginning to shrink and new skin will begin to grow.  The wound is healed when you can see skin has formed over the entire area.  A healed wound has a healthy, shiny look to the surface and is red to dark pink in color to  normalize.  Wounds may take approximately 4-6 weeks to heal.  Larger wounds may take 6-8 weeks.  After the wound is healed you may discontinue dressing changes.    You may experience a sensation of tightness as your wound heals. This is normal and will gradually subside.    Your healed wound may be sensitive to temperature changes. This sensitivity improves with time, but if you re having a lot of discomfort, try to avoid temperature extremes.    Patients frequently experience itching after their wound appears to have healed because of the continue healing under the skin.  Plain Vaseline will help relieve the itching.        POSSIBLE COMPLICATIONS    BLEEDIN. Leave the bandage in place.  2. Use tightly rolled up gauze or a cloth to apply direct pressure over the bandage for 30  minutes.  3. Reapply pressure for an additional 30 minutes if necessary  4. Use additional gauze and tape to maintain pressure once the bleeding has stopped.

## 2022-03-25 NOTE — LETTER
3/25/2022         RE: Fawad Garcia  [address]  [facility] [address]        Dear Colleague,    Thank you for referring your patient, Fawad Garcia, to the [facility]. Please see a copy of my visit note below.    Fawad Garcia is an extremely pleasant 86 year old year old male patient here today for skin check. He a few rough areas on neck. No pain or bleeding skin lesions.  Patient has no other skin complaints today.  Remainder of the HPI, Meds, PMH, Allergies, FH, and SH was reviewed in chart.    Pertinent Hx:   History of SCC  Past Medical History:   Diagnosis Date     Diabetes mellitus (H)      Hypertension      Squamous cell carcinoma        Past Surgical History:   Procedure Laterality Date     AMPUTATE TOE(S) Right 7/23/2019    Procedure: Partial amputation great toe;  Surgeon: Ethan Montesinos DPM;  Location: [facility]     AMPUTATE TOE(S) Left 12/24/2019    Procedure: Amputation of the great toe, left foot;  Surgeon: Ethan Montesinos DPM;  Location: [facility]     AMPUTATE TOE(S) Right 3/3/2020    Procedure: Partial amputation, second toe, right foot;  Surgeon: Ethan Montesinos DPM;  Location: [facility]     CL AFF SURGICAL PATHOLOGY  2002    prostate biopsy elevated PSA     COLONOSCOPY       HC REMOVE TONSILS/ADENOIDS,<13 Y/O      T & A     IR LOWER EXTREMITY ANGIOGRAM LEFT  2/26/2020     IR LOWER EXTREMITY ANGIOGRAM RIGHT  12/11/2018     PHACOEMULSIFICATION WITH STANDARD INTRAOCULAR LENS IMPLANT Left 4/2/2015    Procedure: PHACOEMULSIFICATION WITH STANDARD INTRAOCULAR LENS IMPLANT;  Surgeon: Joseph Hernandez MD;  Location: [facility]     PHACOEMULSIFICATION WITH STANDARD INTRAOCULAR LENS IMPLANT Right 5/4/2015    Procedure: PHACOEMULSIFICATION WITH STANDARD INTRAOCULAR LENS IMPLANT;  Surgeon: Joseph Hernandez MD;  Location: [facility]     REPAIR HAMMER TOE Left 4/2/2019    Procedure: 5th Metatarsal Head Resection;  Surgeon: Ethan Montesinos DPM;   Location: [facility]        Family History   Problem Relation Age of Onset     Cancer Mother         intestinal CA     Diabetes Father      Diabetes Brother      Other Cancer Son         meagan tumor     Other Cancer Brother         pancreatic cancer     Diabetes Son      Melanoma No family hx of        Social History     Socioeconomic History     Marital status:      Spouse name: Not on file     Number of children: Not on file     Years of education: Not on file     Highest education level: Not on file   Occupational History     Not on file   Tobacco Use     Smoking status: Never Smoker     Smokeless tobacco: Never Used   Substance and Sexual Activity     Alcohol use: Yes     Alcohol/week: 1.7 standard drinks     Comment: 1 x month     Drug use: No     Sexual activity: Not Currently     Partners: Female   Other Topics Concern     Parent/sibling w/ CABG, MI or angioplasty before 65F 55M? No   Social History Narrative     Not on file     Social Determinants of Health     Financial Resource Strain: Not on file   Food Insecurity: Not on file   Transportation Needs: Not on file   Physical Activity: Not on file   Stress: Not on file   Social Connections: Not on file   Intimate Partner Violence: Not on file   Housing Stability: Not on file       Outpatient Encounter Medications as of 3/25/2022   Medication Sig Dispense Refill     amLODIPine (NORVASC) 5 MG tablet Take 1 tablet (5 mg) by mouth daily 90 tablet 3     aspirin (ASA) 325 MG tablet Take 1 tablet (325 mg) by mouth daily 30 tablet 0     blood glucose (ONETOUCH ULTRA) test strip 1 strip by In Vitro route daily One touch Ultra. Hold on file until needed. 100 strip 3     Blood Glucose Monitoring Suppl (ONE TOUCH ULTRA SYSTEM KIT) W/DEVICE KIT        clopidogrel (PLAVIX) 75 MG tablet Take 1 tablet (75 mg) by mouth daily 90 tablet 3     dorzolamide-timolol (COSOPT) 2-0.5 % ophthalmic solution        hydrochlorothiazide (HYDRODIURIL) 25 MG tablet Take 1 tablet (25 mg)  by mouth daily 90 tablet 3     insulin glargine (LANTUS SOLOSTAR) 100 UNIT/ML pen Inject 20 Units Subcutaneous At Bedtime 30 mL 11     insulin pen needle (B-D U/F) 31G X 8 MM miscellaneous AS DIRECTED daily.  Hold on file until needed. 100 each 3     Lancets (ONETOUCH DELICA PLUS RDCSAC30H) MISC 1 each by In Vitro route See Admin Instructions Hold on file until needed. 100 each 3     lisinopril (ZESTRIL) 40 MG tablet Take 1 tablet (40 mg) by mouth daily 90 tablet 3     metFORMIN (GLUCOPHAGE) 500 MG tablet Take 2 tablets (1,000 mg) by mouth 2 times daily (with meals) 360 tablet 3     Multiple Vitamins-Minerals (PRESERVISION AREDS PO) Take 1 tablet by mouth       ORDER FOR DME Equipment being ordered:   Glucometer 1 Device 1     pantoprazole (PROTONIX) 20 MG EC tablet Take 1 tablet (20 mg) by mouth daily 90 tablet 3     rosuvastatin (CRESTOR) 10 MG tablet Take 1 tablet (10 mg) by mouth At Bedtime 90 tablet 3     triamcinolone (KENALOG) 0.1 % external cream Apply topically 2 times daily 80 g 1     No facility-administered encounter medications on file as of 3/25/2022.             O:   NAD, WDWN, Alert & Oriented, Mood & Affect wnl, Vitals stable   Here today alone   /76 (BP Location: Left arm, Patient Position: Sitting, Cuff Size: Adult Large)   Pulse 70   SpO2 99%    General appearance normal   Vitals stable   Alert, oriented and in no acute distress     1.0 cm brown irregular thin plaque on left lateral neck   Gritty papules on left ear x 3, right forehead x 2, left cheek x 1, right temple x1  Stuck on papules and brown macules on trunk and ext   Red papules on trunk    The remainder of skin exam is normal       Eyes: Conjunctivae/lids:Normal     ENT: Lips normal    MSK:Normal    Cardiovascular: peripheral edema none    Pulm: Breathing Normal    Neuro/Psych: Orientation:Alert and Orientedx3 ; Mood/Affect:normal     A/P:  1. R/O seborrheic keratosis on left lateral neck   TANGENTIAL BIOPSY SENT OUT:  After  consent, anesthesia with LEC and prep, tangential excision performed and specimen sent out for permanent section histology.  No complications and routine wound care. Patient told to call our office in 1-2 weeks for result.      2. Actinic keratoses on left ear x 3, right forehead x 2, left cheek x 1, right temple x1  LN2:  Treated with LN2 for 5s for 1-2 cycles. Warned risks of blistering, pain, pigment change, scarring, and incomplete resolution.  Advised patient to return if lesions do not completely resolve.  Wound care sheet given.  3. Seborrheic keratosis, lentigo, angioma, benign nevi, history of SCC  BENIGN LESIONS DISCUSSED WITH PATIENT:  I discussed the specifics of tumor, prognosis, and genetics of benign lesions.  I explained that treatment of these lesions would be purely cosmetic and not medically neccessary.  I discussed with patient different removal options including excision, cautery and /or laser.      Nature and genetics of benign skin lesions dicussed with patient.  Signs and Symptoms of skin cancer discussed with patient.  ABCDEs of melanoma reviewed with patient.  Patient encouraged to perform monthly skin exams.  UV precautions reviewed with patient.  Risks of non-melanoma skin cancer discussed with patient   Return to clinic pending biopsy results.         Again, thank you for allowing me to participate in the care of your patient.        Sincerely,        Elysia Brewer PA-C

## 2022-03-25 NOTE — NURSING NOTE
Chief Complaint   Patient presents with     Skin Check     spot on left ear       Vitals:    03/25/22 0755   BP: 127/76   BP Location: Left arm   Patient Position: Sitting   Cuff Size: Adult Large   Pulse: 70   SpO2: 99%     Wt Readings from Last 1 Encounters:   01/14/22 103.1 kg (227 lb 6.4 oz)       Lacie Frederick LPN .................3/25/2022

## 2022-03-28 ENCOUNTER — OFFICE VISIT (OUTPATIENT)
Dept: RADIATION THERAPY | Facility: OUTPATIENT CENTER | Age: 87
End: 2022-03-28
Payer: COMMERCIAL

## 2022-03-28 VITALS
DIASTOLIC BLOOD PRESSURE: 73 MMHG | WEIGHT: 223.4 LBS | SYSTOLIC BLOOD PRESSURE: 147 MMHG | RESPIRATION RATE: 16 BRPM | BODY MASS INDEX: 32.75 KG/M2 | HEART RATE: 60 BPM

## 2022-03-28 DIAGNOSIS — C61 PROSTATE CANCER (H): Primary | ICD-10-CM

## 2022-03-28 NOTE — LETTER
3/28/2022         RE: Fawad Garcia  [address] [facility] [address]        Dear Colleague,    Thank you for referring your patient, Fawad Garcia, to the [facility]. Please see a copy of my visit note below.       Department of Radiation Oncology  Radiation Therapy Center  [facility]  [place] [address]  [phone]       Radiation Oncology Follow-up Visit  3/28/22    Fawad Garcia  MRN: [id number]   : 1935     DISEASE TREATED: High-risk prostate cancer. Pretreatment PSA 25.5, Chaka score of 4+3=7, clinical stage [id number].     RADIATION THERAPY GIVEN: 8041 cGy in 43 planned treatments, via IMRT     INTERVAL SINCE COMPLETION OF THERAPY: 9 years since completion on 02/10/2012.     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 He underwent biopsies in  and  and pathology was negative for malignancy. Due to the persistently rising PSA Dr. Mcintyre performed an MRI-guided biopsy of the prostate gland which revealed Chaka 4+3= 7 disease in 2 of 2 cores with 90% of the cores being positive in the right inferior base and right inferior mid gland. Androgen ablation was discussed with the patient due to his high-risk disease and the patient declined due to comorbidities of the drug. Overall, he completed radiotherapy with very little toxicity.     SUBJECTIVE:   Fawad Garcia is a 86 year old male who is scheduled today for routine follow-up.      PSA on 3/18/22 was 2.49, up from prior value of 1.88 (zohaib =0.24).    Today, he denies any pressing issues or complaints and reports that all symptoms have essentially remained stable since his last visit with us 6 months ago. AUA and MARINA not completed today (TC). Prior  AUA 18 (patient reported overall stable) and MARINA was 0/25. Main urinary  symptoms continue to be nocturia and urgency. Patient states symptoms are worse with increased intake of fluids. Denies drinking alcohol, and has minimal caffeine. He denies any dysuria, hematuria, hematochezia, diarrhea, or loose stools.  He denies any pain or swelling.     Energy level is good and baseline.              Current Outpatient Medications   Medication     amLODIPine (NORVASC) 5 MG tablet     Blood Glucose Monitoring Suppl (ONE TOUCH ULTRA SYSTEM KIT) W/DEVICE KIT     clopidogrel (PLAVIX) 75 MG tablet     dorzolamide-timolol (COSOPT) 2-0.5 % ophthalmic solution     hydrochlorothiazide (HYDRODIURIL) 25 MG tablet     insulin glargine (LANTUS SOLOSTAR) 100 UNIT/ML pen     insulin pen needle (B-D U/F) 31G X 8 MM miscellaneous     levofloxacin (LEVAQUIN) 500 MG tablet     lisinopril (PRINIVIL/ZESTRIL) 40 MG tablet     metFORMIN (GLUCOPHAGE) 500 MG tablet     Multiple Vitamins-Minerals (PRESERVISION AREDS PO)     ONETOUCH [place]ANCETS [facility]     ONETOUCH ULTRA test strip     order for DME     ORDER FOR DME     pantoprazole (PROTONIX) 20 MG EC tablet     rosuvastatin (CRESTOR) 10 MG tablet     sulfamethoxazole-trimethoprim (BACTRIM DS) 800-160 MG tablet     No current facility-administered medications for this visit.           Allergies   Allergen Reactions     Zocor [Simvastatin - High Dose]      Bilateral hip aching       Past Medical History:   Diagnosis Date     Diabetes mellitus (H)      Hypertension      Squamous cell carcinoma          PHYSICAL EXAM:  BP (!) 147/73 (Cuff Size: Adult Large)   Pulse 60   Resp 16   Wt 101.3 kg (223 lb 6.4 oz)   BMI 32.75 kg/m    No apparent distress       LABS AND IMAGING:  Reviewed.  PSA   Date Value Ref Range Status   03/18/2021 1.78 0 - 4 ug/L Final     Comment:     Assay Method:  Chemiluminescence using Siemens Vista analyzer   09/14/2020 1.53 0 - 4 ug/L Final     Comment:     Assay Method:  Chemiluminescence using Siemens Vista analyzer   03/02/2020 1.23 0 - 4 ug/L Final      Comment:     Assay Method:  Chemiluminescence using Siemens Vista analyzer   09/12/2019 1.08 0 - 4 ug/L Final     Comment:     Assay Method:  Chemiluminescence using Siemens Vista analyzer   03/15/2019 1.10 0 - 4 ug/L Final     Comment:     Assay Method:  Chemiluminescence using Siemens Vista analyzer     PSA Tumor Marker   Date Value Ref Range Status   03/18/2022 2.49 0.00 - 4.00 ug/L Final   09/20/2021 1.88 0.00 - 4.00 ug/L Final           IMPRESSION:   Mr. Garcia is a 86 year old male with a high-risk prostate cancer. Pretreatment PSA 25.5, Chaka score of 4+3=7, clinical stage [id number]. Complete RT 2/10/2012,  8041 cGy in 43 planned treatments, via IMRT.       PLAN:     1. PSA increased from last PSA and now meets [place] definition of biochemical failure (2.0 above zohaib with zohaib of 0.24).Will order PSMA PET scan to re-stage.      2. No late GI/ toxicity.    3. RTC after scans to discuss results and potential next steps in management.       Kieran Hua M.D.  Department of Radiation Oncology  [facility]

## 2022-03-28 NOTE — PROGRESS NOTES
Department of Radiation Oncology  Radiation Therapy Center  [facility] [place] [address]  [phone]       Radiation Oncology Follow-up Visit  3/28/22    Fawad Garcia  MRN: [id number]   : 1935     DISEASE TREATED: High-risk prostate cancer. Pretreatment PSA 25.5, Chaka score of 4+3=7, clinical stage [id number].     RADIATION THERAPY GIVEN: 8041 cGy in 43 planned treatments, via IMRT     INTERVAL SINCE COMPLETION OF THERAPY: 9 years since completion on 02/10/2012.     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 He underwent biopsies in  and  and pathology was negative for malignancy. Due to the persistently rising PSA Dr. Mcintyre performed an MRI-guided biopsy of the prostate gland which revealed Chaka 4+3= 7 disease in 2 of 2 cores with 90% of the cores being positive in the right inferior base and right inferior mid gland. Androgen ablation was discussed with the patient due to his high-risk disease and the patient declined due to comorbidities of the drug. Overall, he completed radiotherapy with very little toxicity.     SUBJECTIVE:   Fawad Garcia is a 86 year old male who is scheduled today for routine follow-up.      PSA on 3/18/22 was 2.49, up from prior value of 1.88 (zohaib =0.24).    Today, he denies any pressing issues or complaints and reports that all symptoms have essentially remained stable since his last visit with us 6 months ago. AUA and MARINA not completed today (TC). Prior  AUA 18/35 (patient reported overall stable) and MARINA was 0/25. Main urinary symptoms continue to be nocturia and urgency. Patient states symptoms are worse with increased intake of fluids. Denies drinking alcohol, and has minimal caffeine. He denies any dysuria, hematuria, hematochezia, diarrhea, or loose stools.  He denies any  pain or swelling.     Energy level is good and baseline.              Current Outpatient Medications   Medication     amLODIPine (NORVASC) 5 MG tablet     Blood Glucose Monitoring Suppl (ONE TOUCH ULTRA SYSTEM KIT) W/DEVICE KIT     clopidogrel (PLAVIX) 75 MG tablet     dorzolamide-timolol (COSOPT) 2-0.5 % ophthalmic solution     hydrochlorothiazide (HYDRODIURIL) 25 MG tablet     insulin glargine (LANTUS SOLOSTAR) 100 UNIT/ML pen     insulin pen needle (B-D U/F) 31G X 8 MM miscellaneous     levofloxacin (LEVAQUIN) 500 MG tablet     lisinopril (PRINIVIL/ZESTRIL) 40 MG tablet     metFORMIN (GLUCOPHAGE) 500 MG tablet     Multiple Vitamins-Minerals (PRESERVISION AREDS PO)     [place]CH [place]ANCETS [facility]     ONETOUCH ULTRA test strip     order for DME     ORDER FOR DME     pantoprazole (PROTONIX) 20 MG EC tablet     rosuvastatin (CRESTOR) 10 MG tablet     sulfamethoxazole-trimethoprim (BACTRIM DS) 800-160 MG tablet     No current facility-administered medications for this visit.           Allergies   Allergen Reactions     Zocor [Simvastatin - High Dose]      Bilateral hip aching       Past Medical History:   Diagnosis Date     Diabetes mellitus (H)      Hypertension      Squamous cell carcinoma          PHYSICAL EXAM:  BP (!) 147/73 (Cuff Size: Adult Large)   Pulse 60   Resp 16   Wt 101.3 kg (223 lb 6.4 oz)   BMI 32.75 kg/m    No apparent distress       LABS AND IMAGING:  Reviewed.  PSA   Date Value Ref Range Status   03/18/2021 1.78 0 - 4 ug/L Final     Comment:     Assay Method:  Chemiluminescence using Siemens Vista analyzer   09/14/2020 1.53 0 - 4 ug/L Final     Comment:     Assay Method:  Chemiluminescence using Siemens Vista analyzer   03/02/2020 1.23 0 - 4 ug/L Final     Comment:     Assay Method:  Chemiluminescence using Siemens Vista analyzer   09/12/2019 1.08 0 - 4 ug/L Final     Comment:     Assay Method:  Chemiluminescence using Siemens Vista analyzer   03/15/2019 1.10 0 - 4 ug/L Final     Comment:     Assay  Method:  Chemiluminescence using Siemens Vista analyzer     PSA Tumor Marker   Date Value Ref Range Status   03/18/2022 2.49 0.00 - 4.00 ug/L Final   09/20/2021 1.88 0.00 - 4.00 ug/L Final           IMPRESSION:   Mr. Garcia is a 86 year old male with a high-risk prostate cancer. Pretreatment PSA 25.5, [place] score of 4+3=7, clinical stage [id number]. Complete RT 2/10/2012,  8041 cGy in 43 planned treatments, via IMRT.       PLAN:     1. PSA increased from last PSA and now meets [place] definition of biochemical failure (2.0 above zohaib with zohaib of 0.24).Will order PSMA PET scan to re-stage.      2. No late GI/ toxicity.    3. RTC after scans to discuss results and potential next steps in management.       Kieran Hua M.D.  Department of Radiation Oncology  [facility]

## 2022-03-28 NOTE — NURSING NOTE
"Oncology Rooming Note    March 28, 2022 3:37 PM   Fawad Garcia is a 86 year old male who presents for:    Chief Complaint   Patient presents with     Radiation Therapy     Return visit     Initial Vitals: BP (!) 147/73 (Cuff Size: Adult Large)   Pulse 60   Resp 16   Wt 101.3 kg (223 lb 6.4 oz)   BMI 32.75 kg/m   Estimated body mass index is 32.75 kg/m  as calculated from the following:    Height as of 1/14/22: 1.759 m (5' 9.25\").    Weight as of this encounter: 101.3 kg (223 lb 6.4 oz). Body surface area is 2.22 meters squared.  Data Unavailable Comment: Data Unavailable   No LMP for male patient.  Allergies reviewed: Yes  Medications reviewed: Yes    Medications: Medication refills not needed today.  Pharmacy name entered into EPIC: [facility]. - [place], [place] - [address] [place] [address]    Clinical concerns: Routine follow up appointment. No bowel or bladder changes since last visit.       Marcia Andrea RN              "

## 2022-03-30 LAB
PATH REPORT.COMMENTS IMP SPEC: ABNORMAL
PATH REPORT.COMMENTS IMP SPEC: ABNORMAL
PATH REPORT.COMMENTS IMP SPEC: YES
PATH REPORT.FINAL DX SPEC: ABNORMAL
PATH REPORT.GROSS SPEC: ABNORMAL
PATH REPORT.MICROSCOPIC SPEC OTHER STN: ABNORMAL
PATH REPORT.RELEVANT HX SPEC: ABNORMAL

## 2022-04-08 ENCOUNTER — HOSPITAL ENCOUNTER (OUTPATIENT)
Dept: PET IMAGING | Facility: CLINIC | Age: 87
Setting detail: NUCLEAR MEDICINE
Discharge: HOME | End: 2022-04-08
Attending: RADIOLOGY | Admitting: RADIOLOGY
Payer: COMMERCIAL

## 2022-04-08 DIAGNOSIS — C61 PROSTATE CANCER (H): ICD-10-CM

## 2022-04-08 LAB
CREAT BLD-MCNC: 1 MG/DL (ref 0.7–1.3)
GFR SERPL CREATININE-BSD FRML MDRD: >60 ML/MIN/1.73M2

## 2022-04-08 PROCEDURE — 78815 PET IMAGE W/CT SKULL-THIGH: CPT | Mod: PS

## 2022-04-08 PROCEDURE — 78815 PET IMAGE W/CT SKULL-THIGH: CPT | Mod: 26

## 2022-04-08 PROCEDURE — 74177 CT ABD & PELVIS W/CONTRAST: CPT

## 2022-04-08 PROCEDURE — 71260 CT THORAX DX C+: CPT | Mod: 26

## 2022-04-08 PROCEDURE — A9595 HC RX 343: HCPCS | Performed by: RADIOLOGY

## 2022-04-08 PROCEDURE — 82565 ASSAY OF CREATININE: CPT

## 2022-04-08 PROCEDURE — 250N000011 HC RX IP 250 OP 636: Performed by: RADIOLOGY

## 2022-04-08 PROCEDURE — 343N000001 HC RX 343: Performed by: RADIOLOGY

## 2022-04-08 PROCEDURE — 74177 CT ABD & PELVIS W/CONTRAST: CPT | Mod: 26

## 2022-04-08 RX ORDER — IOPAMIDOL 755 MG/ML
10-135 INJECTION, SOLUTION INTRAVASCULAR ONCE
Status: COMPLETED | OUTPATIENT
Start: 2022-04-08 | End: 2022-04-08

## 2022-04-08 RX ADMIN — IOPAMIDOL 135 ML: 755 INJECTION, SOLUTION INTRAVENOUS at 14:24

## 2022-04-08 RX ADMIN — PIFLUFOLASTAT F-18 9.83 MILLICURIE: 80 INJECTION INTRAVENOUS at 13:24

## 2022-04-11 ENCOUNTER — OFFICE VISIT (OUTPATIENT)
Dept: DERMATOLOGY | Facility: CLINIC | Age: 87
End: 2022-04-11
Payer: COMMERCIAL

## 2022-04-11 ENCOUNTER — LAB (OUTPATIENT)
Dept: LAB | Facility: CLINIC | Age: 87
End: 2022-04-11
Payer: COMMERCIAL

## 2022-04-11 VITALS — DIASTOLIC BLOOD PRESSURE: 85 MMHG | HEART RATE: 74 BPM | SYSTOLIC BLOOD PRESSURE: 136 MMHG | OXYGEN SATURATION: 100 %

## 2022-04-11 DIAGNOSIS — E11.3293 TYPE 2 DIABETES MELLITUS WITH BOTH EYES AFFECTED BY MILD NONPROLIFERATIVE RETINOPATHY WITHOUT MACULAR EDEMA, WITH LONG-TERM CURRENT USE OF INSULIN (H): ICD-10-CM

## 2022-04-11 DIAGNOSIS — D03.4 MELANOMA IN SITU OF NECK (H): Primary | ICD-10-CM

## 2022-04-11 DIAGNOSIS — Z79.4 TYPE 2 DIABETES MELLITUS WITH BOTH EYES AFFECTED BY MILD NONPROLIFERATIVE RETINOPATHY WITHOUT MACULAR EDEMA, WITH LONG-TERM CURRENT USE OF INSULIN (H): Primary | ICD-10-CM

## 2022-04-11 DIAGNOSIS — E11.65 UNCONTROLLED TYPE 2 DIABETES MELLITUS WITH HYPERGLYCEMIA (H): ICD-10-CM

## 2022-04-11 DIAGNOSIS — Z79.4 TYPE 2 DIABETES MELLITUS WITH BOTH EYES AFFECTED BY MILD NONPROLIFERATIVE RETINOPATHY WITHOUT MACULAR EDEMA, WITH LONG-TERM CURRENT USE OF INSULIN (H): ICD-10-CM

## 2022-04-11 DIAGNOSIS — E11.3293 TYPE 2 DIABETES MELLITUS WITH BOTH EYES AFFECTED BY MILD NONPROLIFERATIVE RETINOPATHY WITHOUT MACULAR EDEMA, WITH LONG-TERM CURRENT USE OF INSULIN (H): Primary | ICD-10-CM

## 2022-04-11 LAB — HBA1C MFR BLD: 8.6 % (ref 0–5.6)

## 2022-04-11 PROCEDURE — 88341 IMHCHEM/IMCYTCHM EA ADD ANTB: CPT | Mod: 59 | Performed by: DERMATOLOGY

## 2022-04-11 PROCEDURE — 83036 HEMOGLOBIN GLYCOSYLATED A1C: CPT

## 2022-04-11 PROCEDURE — 36415 COLL VENOUS BLD VENIPUNCTURE: CPT

## 2022-04-11 PROCEDURE — 13132 CMPLX RPR F/C/C/M/N/AX/G/H/F: CPT | Performed by: DERMATOLOGY

## 2022-04-11 PROCEDURE — 88342 IMHCHEM/IMCYTCHM 1ST ANTB: CPT | Mod: 59 | Performed by: DERMATOLOGY

## 2022-04-11 PROCEDURE — 99207 PR NO CHARGE LOS: CPT | Performed by: DERMATOLOGY

## 2022-04-11 PROCEDURE — 12001 RPR S/N/AX/GEN/TRNK 2.5CM/<: CPT | Performed by: DERMATOLOGY

## 2022-04-11 PROCEDURE — 17311 MOHS 1 STAGE H/N/HF/G: CPT | Performed by: DERMATOLOGY

## 2022-04-11 NOTE — PROGRESS NOTES
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  Remainder of the HPI, Meds, PMH, Allergies, FH, and SH was reviewed in chart.      Past Medical History:   Diagnosis Date     Diabetes mellitus (H)      Hypertension      Squamous cell carcinoma        Past Surgical History:   Procedure Laterality Date     AMPUTATE TOE(S) Right 7/23/2019    Procedure: Partial amputation great toe;  Surgeon: Ethan Montesinos DPM;  Location: [facility]     AMPUTATE TOE(S) Left 12/24/2019    Procedure: Amputation of the great toe, left foot;  Surgeon: Ethan Montesinos DPM;  Location: [facility]     AMPUTATE TOE(S) Right 3/3/2020    Procedure: Partial amputation, second toe, right foot;  Surgeon: Ethan Montesinos DPM;  Location: [facility]     CL AFF SURGICAL PATHOLOGY  2002    prostate biopsy elevated PSA     COLONOSCOPY       HC REMOVE TONSILS/ADENOIDS,<11 Y/O      T & A     IR LOWER EXTREMITY ANGIOGRAM LEFT  2/26/2020     IR LOWER EXTREMITY ANGIOGRAM RIGHT  12/11/2018     PHACOEMULSIFICATION WITH STANDARD INTRAOCULAR LENS IMPLANT Left 4/2/2015    Procedure: PHACOEMULSIFICATION WITH STANDARD INTRAOCULAR LENS IMPLANT;  Surgeon: Joseph Hernandez MD;  Location: [facility]     PHACOEMULSIFICATION WITH STANDARD INTRAOCULAR LENS IMPLANT Right 5/4/2015    Procedure: PHACOEMULSIFICATION WITH STANDARD INTRAOCULAR LENS IMPLANT;  Surgeon: Joseph Hernandez MD;  Location: [facility]     REPAIR HAMMER TOE Left 4/2/2019    Procedure: 5th Metatarsal Head Resection;  Surgeon: Ethan Montesinos DPM;  Location: [facility]        Family History   Problem Relation Age of Onset     Cancer Mother         intestinal CA     Diabetes Father      Diabetes Brother      Other Cancer Son         meagan tumor     Other Cancer Brother         pancreatic cancer     Diabetes Son      Melanoma No family hx of        Social History      Socioeconomic History     Marital status:      Spouse name: Not on file     Number of children: Not on file     Years of education: Not on file     Highest education level: Not on file   Occupational History     Not on file   Tobacco Use     Smoking status: Never Smoker     Smokeless tobacco: Never Used   Substance and Sexual Activity     Alcohol use: Yes     Alcohol/week: 1.7 standard drinks     Comment: 1 x month     Drug use: No     Sexual activity: Not Currently     Partners: Female   Other Topics Concern     Parent/sibling w/ CABG, MI or angioplasty before 65F 55M? No   Social History Narrative     Not on file     Social Determinants of Health     Financial Resource Strain: Not on file   Food Insecurity: Not on file   Transportation Needs: Not on file   Physical Activity: Not on file   Stress: Not on file   Social Connections: Not on file   Intimate Partner Violence: Not on file   Housing Stability: Not on file       Outpatient Encounter Medications as of 4/11/2022   Medication Sig Dispense Refill     amLODIPine (NORVASC) 5 MG tablet Take 1 tablet (5 mg) by mouth daily 90 tablet 3     aspirin (ASA) 325 MG tablet Take 1 tablet (325 mg) by mouth daily 30 tablet 0     blood glucose (ONETOUCH ULTRA) test strip 1 strip by In Vitro route daily One touch Ultra. Hold on file until needed. 100 strip 3     Blood Glucose Monitoring Suppl (ONE TOUCH ULTRA SYSTEM KIT) W/DEVICE KIT        clopidogrel (PLAVIX) 75 MG tablet Take 1 tablet (75 mg) by mouth daily 90 tablet 3     dorzolamide-timolol (COSOPT) 2-0.5 % ophthalmic solution        hydrochlorothiazide (HYDRODIURIL) 25 MG tablet Take 1 tablet (25 mg) by mouth daily 90 tablet 3     insulin glargine (LANTUS SOLOSTAR) 100 UNIT/ML pen Inject 20 Units Subcutaneous At Bedtime 30 mL 11     insulin pen needle (B-D U/F) 31G X 8 MM miscellaneous AS DIRECTED daily.  Hold on file until needed. 100 each 3     Lancets (ONETOUCH DELICA PLUS [id number]) MISC 1 each by In Vitro  route See Admin Instructions Hold on file until needed. 100 each 3     lisinopril (ZESTRIL) 40 MG tablet Take 1 tablet (40 mg) by mouth daily 90 tablet 3     metFORMIN (GLUCOPHAGE) 500 MG tablet Take 2 tablets (1,000 mg) by mouth 2 times daily (with meals) 360 tablet 3     Multiple Vitamins-Minerals (PRESERVISION AREDS PO) Take 1 tablet by mouth       ORDER FOR DME Equipment being ordered:   Glucometer 1 Device 1     pantoprazole (PROTONIX) 20 MG EC tablet Take 1 tablet (20 mg) by mouth daily 90 tablet 3     rosuvastatin (CRESTOR) 10 MG tablet Take 1 tablet (10 mg) by mouth At Bedtime 90 tablet 3     triamcinolone (KENALOG) 0.1 % external cream Apply topically 2 times daily 80 g 1     No facility-administered encounter medications on file as of 4/11/2022.             O:   NAD, WDWN, Alert & Oriented, Mood & Affect wnl, Vitals stable   Here today alone   /85   Pulse 74   SpO2 100%    General appearance normal   Vitals stable   Alert, oriented and in no acute distress      Following lymph nodes palpated: Occipital, Cervical, Supraclavicular no lad  L neck 1cm red plaque      Eyes: Conjunctivae/lids:Normal     ENT: Lips, buccal mucosa, tongue: normal    MSK:Normal    Cardiovascular: peripheral edema none    Pulm: Breathing Normal    Lymph Nodes: No Head and Neck Lymphadenopathy     Neuro/Psych: Orientation:Alert and Orientedx3 ; Mood/Affect:normal       A/P:  1. L neck melanoma in situ   MELANOMA DISCUSSED WITH PATIENT:  I discussed the specifics of tumor, prognosis, metachronous melanoma, self exam, and genetics with the patient. I explained the need for monthly skin exams including and taught the patient how to do this. Patient was asked about new or changing moles . I discussed with patient signs and symptoms that could arise in the setting of recurrent locoregional or metastatic disease. In addition, the need to undergo every 4 month dermatologic full skin survey and evaluation given that patients with a  diagnosis of melanoma are at risk of recurrence (local and distant) and of subsequent de handy melanoma.  . I reviewed treatment options, including a discussion of wide excision (the gold standard) versus Mohs surgery with MART-1 immunostains.     Note: MART-1 (Melanoma Antigen Recognized by T-cells) antibody immunostaining was used during Mohs surgery as per standard protocol, in addition to routine processing of all specimens with hematoxylin and eosin. The peripheral margins/edges were processed with the MART-1 stain (2 specimens total). The center was examined with hematoxylin & eosin and MART-1 immunostains. The patient was informed of the procedure and its risk/benefits during the consent for the procedure.    One or more of the reagents used in immunohistochemical testing in this case may not have been cleared or approved by the U.S. Food and Drug Administration (FDA). The FDA has determined that such clearance or approval is not necessary. These tests are used for clinical purposes. They should not be regarded as investigational or for research. These reagents  performance characteristics have been determined by [facility]. This laboratory is certified under the Clinical Laboratory Improvement Amendments of 1988 (CLIA-88) as qualified to perform high complexity clinical laboratory testing.      MOHS:   Aggressive histology    The rationale for Mohs surgery was discussed with the patient and consent was obtained.  The risks and benefits as well as alternatives to therapy were discussed, in detail.  Specifically, the risks of infection, scarring, bleeding, prolonged wound healing, incomplete removal, allergy to anesthesia, nerve injury and recurrence were addressed.  Indication for Mohs was Aggressive histology. Prior to the procedure, the treatment site was clearly identified and, if available, confirmed with previous photos and confirmed by the patient   All components of the Universal  Protocol/PAUSE rule were completed.  The Mohs surgeon operated in two distinct and integrated capacities as the surgeon and pathologist.      The area was prepped with Betasept.  A rim of normal appearing skin was marked circumferentially around the lesion.  The area was infiltrated with local anesthesia.  The tumor was first debulked to remove all clinically apparent tumor.  An incision following the standard Mohs approach was done and the specimen was oriented,mapped and placed in 3 block(s).  Each specimen was then chromacoded and processed in the Mohs laboratory using standard Mohs technique and submitted for frozen section histology.  Frozen section analysis showed no residual tumor but CLEAR MARGINS.      The tumor was excised using standard Mohs technique in 1 stages(s).  MART 1 stains were performed on 2 specimens. CLEAR MARGINS OBTAINED and Final defect size was 2.1 cm.     We discussed the options for wound management in full with the patient including risks/benefits/ possible outcomes.        REPAIR COMPLEX: Because of the tightness of the surrounding skin and Because of the size and full thickness nature of the defect and Because of the tightness of the surrounding skin, a complex closure was planned. After LE anesthesia and prep, Burow's triangles were excised in the relaxed skin tension lines. The wound edges were widely undermined greater than width of the defect on both sides by dissection in the subcutaneous plane until adequate tissue mobility was obtained. Hemostasis was obtained. The wound edges were closed in a layered fashion using Vicryl and Fast Absorbing Plain Gut sutures. Postoperative length was 5.5 cm.   EBL minimal; complications none; wound care routine.  The patient was discharged in good condition and will return in one week for wound evaluation.  It was a pleasure speaking to Fawad Garcia today.  Previous clinic notes and pertinent laboratory tests were reviewed prior to  Fawad Garcia's visit.  Nature and genetics of benign skin lesions dicussed with patient.  Signs and Symptoms of skin cancer discussed with patient.  Patient encouraged to perform monthly skin exams.  UV precautions reviewed with patient.  Risks of non-melanoma skin cancer discussed with patient   Return to clinic 6 months

## 2022-04-11 NOTE — LETTER
4/11/2022         RE: Fawad Garcia  [address]  [place] [address]        Dear Colleague,    Thank you for referring your patient, Fawad Garcia, to the [facility]. Please see a copy of my visit note below.    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  Remainder of the HPI, Meds, PMH, Allergies, FH, and SH was reviewed in chart.      Past Medical History:   Diagnosis Date     Diabetes mellitus (H)      Hypertension      Squamous cell carcinoma        Past Surgical History:   Procedure Laterality Date     AMPUTATE TOE(S) Right 7/23/2019    Procedure: Partial amputation great toe;  Surgeon: Ethan Montesinos DPM;  Location: [facility]     AMPUTATE TOE(S) Left 12/24/2019    Procedure: Amputation of the great toe, left foot;  Surgeon: Ethan Montesinos DPM;  Location: [facility]     AMPUTATE TOE(S) Right 3/3/2020    Procedure: Partial amputation, second toe, right foot;  Surgeon: Ethan Montesinos DPM;  Location: [facility]     CL AFF SURGICAL PATHOLOGY  2002    prostate biopsy elevated PSA     COLONOSCOPY       HC REMOVE TONSILS/ADENOIDS,<11 Y/O      T & A     IR LOWER EXTREMITY ANGIOGRAM LEFT  2/26/2020     IR LOWER EXTREMITY ANGIOGRAM RIGHT  12/11/2018     PHACOEMULSIFICATION WITH STANDARD INTRAOCULAR LENS IMPLANT Left 4/2/2015    Procedure: PHACOEMULSIFICATION WITH STANDARD INTRAOCULAR LENS IMPLANT;  Surgeon: Joseph Hernandez MD;  Location: [facility]     PHACOEMULSIFICATION WITH STANDARD INTRAOCULAR LENS IMPLANT Right 5/4/2015    Procedure: PHACOEMULSIFICATION WITH STANDARD INTRAOCULAR LENS IMPLANT;  Surgeon: Joseph Hernandez MD;  Location: [facility]     REPAIR HAMMER TOE Left 4/2/2019    Procedure: 5th Metatarsal Head  Resection;  Surgeon: Ethan Montesinos DPM;  Location: [facility]        Family History   Problem Relation Age of Onset     Cancer Mother         intestinal CA     Diabetes Father      Diabetes Brother      Other Cancer Son         meagan tumor     Other Cancer Brother         pancreatic cancer     Diabetes Son      Melanoma No family hx of        Social History     Socioeconomic History     Marital status:      Spouse name: Not on file     Number of children: Not on file     Years of education: Not on file     Highest education level: Not on file   Occupational History     Not on file   Tobacco Use     Smoking status: Never Smoker     Smokeless tobacco: Never Used   Substance and Sexual Activity     Alcohol use: Yes     Alcohol/week: 1.7 standard drinks     Comment: 1 x month     Drug use: No     Sexual activity: Not Currently     Partners: Female   Other Topics Concern     Parent/sibling w/ CABG, MI or angioplasty before 65F 55M? No   Social History Narrative     Not on file     Social Determinants of Health     Financial Resource Strain: Not on file   Food Insecurity: Not on file   Transportation Needs: Not on file   Physical Activity: Not on file   Stress: Not on file   Social Connections: Not on file   Intimate Partner Violence: Not on file   Housing Stability: Not on file       Outpatient Encounter Medications as of 4/11/2022   Medication Sig Dispense Refill     amLODIPine (NORVASC) 5 MG tablet Take 1 tablet (5 mg) by mouth daily 90 tablet 3     aspirin (ASA) 325 MG tablet Take 1 tablet (325 mg) by mouth daily 30 tablet 0     blood glucose (ONETOUCH ULTRA) test strip 1 strip by In Vitro route daily One touch Ultra. Hold on file until needed. 100 strip 3     Blood Glucose Monitoring Suppl (ONE TOUCH ULTRA SYSTEM KIT) W/DEVICE KIT        clopidogrel (PLAVIX) 75 MG tablet Take 1 tablet (75 mg) by mouth daily 90 tablet 3     dorzolamide-timolol (COSOPT) 2-0.5 % ophthalmic solution        hydrochlorothiazide  (HYDRODIURIL) 25 MG tablet Take 1 tablet (25 mg) by mouth daily 90 tablet 3     insulin glargine (LANTUS SOLOSTAR) 100 UNIT/ML pen Inject 20 Units Subcutaneous At Bedtime 30 mL 11     insulin pen needle (B-D U/F) 31G X 8 MM miscellaneous AS DIRECTED daily.  Hold on file until needed. 100 each 3     Lancets (ONETOUCH DELICA PLUS [id number]) MISC 1 each by In Vitro route See Admin Instructions Hold on file until needed. 100 each 3     lisinopril (ZESTRIL) 40 MG tablet Take 1 tablet (40 mg) by mouth daily 90 tablet 3     metFORMIN (GLUCOPHAGE) 500 MG tablet Take 2 tablets (1,000 mg) by mouth 2 times daily (with meals) 360 tablet 3     Multiple Vitamins-Minerals (PRESERVISION AREDS PO) Take 1 tablet by mouth       ORDER FOR DME Equipment being ordered:   Glucometer 1 Device 1     pantoprazole (PROTONIX) 20 MG EC tablet Take 1 tablet (20 mg) by mouth daily 90 tablet 3     rosuvastatin (CRESTOR) 10 MG tablet Take 1 tablet (10 mg) by mouth At Bedtime 90 tablet 3     triamcinolone (KENALOG) 0.1 % external cream Apply topically 2 times daily 80 g 1     No facility-administered encounter medications on file as of 4/11/2022.             O:   NAD, WDWN, Alert & Oriented, Mood & Affect wnl, Vitals stable   Here today alone   /85   Pulse 74   SpO2 100%    General appearance normal   Vitals stable   Alert, oriented and in no acute distress      Following lymph nodes palpated: Occipital, Cervical, Supraclavicular no lad  L neck 1cm red plaque      Eyes: Conjunctivae/lids:Normal     ENT: Lips, buccal mucosa, tongue: normal    MSK:Normal    Cardiovascular: peripheral edema none    Pulm: Breathing Normal    Lymph Nodes: No Head and Neck Lymphadenopathy     Neuro/Psych: Orientation:Alert and Orientedx3 ; Mood/Affect:normal       A/P:  1. L neck melanoma in situ   MELANOMA DISCUSSED WITH PATIENT:  I discussed the specifics of tumor, prognosis, metachronous melanoma, self exam, and genetics with the patient. I explained the need  for monthly skin exams including and taught the patient how to do this. Patient was asked about new or changing moles . I discussed with patient signs and symptoms that could arise in the setting of recurrent locoregional or metastatic disease. In addition, the need to undergo every 4 month dermatologic full skin survey and evaluation given that patients with a diagnosis of melanoma are at risk of recurrence (local and distant) and of subsequent de handy melanoma.  . I reviewed treatment options, including a discussion of wide excision (the gold standard) versus Mohs surgery with MART-1 immunostains.     Note: MART-1 (Melanoma Antigen Recognized by T-cells) antibody immunostaining was used during Mohs surgery as per standard protocol, in addition to routine processing of all specimens with hematoxylin and eosin. The peripheral margins/edges were processed with the MART-1 stain (2 specimens total). The center was examined with hematoxylin & eosin and MART-1 immunostains. The patient was informed of the procedure and its risk/benefits during the consent for the procedure.    One or more of the reagents used in immunohistochemical testing in this case may not have been cleared or approved by the U.S. Food and Drug Administration (FDA). The FDA has determined that such clearance or approval is not necessary. These tests are used for clinical purposes. They should not be regarded as investigational or for research. These reagents  performance characteristics have been determined by [facility]. This laboratory is certified under the Clinical Laboratory Improvement Amendments of 1988 (CLIA-88) as qualified to perform high complexity clinical laboratory testing.      MOHS:   Aggressive histology    The rationale for Mohs surgery was discussed with the patient and consent was obtained.  The risks and benefits as well as alternatives to therapy were discussed, in detail.  Specifically, the risks of infection,  scarring, bleeding, prolonged wound healing, incomplete removal, allergy to anesthesia, nerve injury and recurrence were addressed.  Indication for Mohs was Aggressive histology. Prior to the procedure, the treatment site was clearly identified and, if available, confirmed with previous photos and confirmed by the patient   All components of the Universal Protocol/PAUSE rule were completed.  The Mohs surgeon operated in two distinct and integrated capacities as the surgeon and pathologist.      The area was prepped with Betasept.  A rim of normal appearing skin was marked circumferentially around the lesion.  The area was infiltrated with local anesthesia.  The tumor was first debulked to remove all clinically apparent tumor.  An incision following the standard Mohs approach was done and the specimen was oriented,mapped and placed in 3 block(s).  Each specimen was then chromacoded and processed in the Mohs laboratory using standard Mohs technique and submitted for frozen section histology.  Frozen section analysis showed no residual tumor but CLEAR MARGINS.      The tumor was excised using standard Mohs technique in 1 stages(s).  MART 1 stains were performed on 2 specimens. CLEAR MARGINS OBTAINED and Final defect size was 2.1 cm.     We discussed the options for wound management in full with the patient including risks/benefits/ possible outcomes.        REPAIR COMPLEX: Because of the tightness of the surrounding skin and Because of the size and full thickness nature of the defect and Because of the tightness of the surrounding skin, a complex closure was planned. After LE anesthesia and prep, Burow's triangles were excised in the relaxed skin tension lines. The wound edges were widely undermined greater than width of the defect on both sides by dissection in the subcutaneous plane until adequate tissue mobility was obtained. Hemostasis was obtained. The wound edges were closed in a layered fashion using Vicryl and  Fast Absorbing Plain Gut sutures. Postoperative length was 5.5 cm.   EBL minimal; complications none; wound care routine.  The patient was discharged in good condition and will return in one week for wound evaluation.  It was a pleasure speaking to Fawad Garcia today.  Previous clinic notes and pertinent laboratory tests were reviewed prior to Fawad Garcia's visit.  Nature and genetics of benign skin lesions dicussed with patient.  Signs and Symptoms of skin cancer discussed with patient.  Patient encouraged to perform monthly skin exams.  UV precautions reviewed with patient.  Risks of non-melanoma skin cancer discussed with patient   Return to clinic 6 months        Again, thank you for allowing me to participate in the care of your patient.        Sincerely,        Evgeny Torres MD

## 2022-04-11 NOTE — PATIENT INSTRUCTIONS
Sutured Wound Care     [facility]: [phone]  [facility]: [phone]    Left Neck      No strenuous activity for 48 hours. Resume moderate activity in 48 hours. No heavy exercising until you are seen for follow up in one week.     Take Tylenol as needed for discomfort.                         Do not drink alcoholic beverages for 48 hours.     Keep the pressure bandage in place for 24 hours. If the bandage becomes blood tinged or loose, reinforce it with gauze and tape.        (Refer to the reverse side of this page for management of bleeding).    Remove pressure bandage in 24 hours     Leave the flat bandage in place until your follow up appointment.    Keep the bandage dry. Wash around it carefully.    If the tape becomes soiled or starts to come off, reinforce it with additional paper tape.    Do not smoke for 3 weeks; smoking is detrimental to wound healing.    It is normal to have swelling and bruising around the surgical site. The bruising will fade in approximately 10-14 days. Elevate the area to reduce swelling.    Numbness, itchiness and sensitivity to temperature changes can occur after surgery and may take up to 18 months to normalize.      POSSIBLE COMPLICATIONS    BLEEDING:    Leave the bandage in place.  Use tightly rolled up gauze or a cloth to apply direct pressure over the bandage for 20   minutes.  Reapply pressure for an additional 20 minutes if necessary  Call the office or go to the nearest emergency room if pressure fails to stop the bleeding.  Use additional gauze and tape to maintain pressure once the bleeding has stopped.    PAIN:    Post operative pain should slowly get better, never worse.  A severe increase in pain may indicate a problem. Call the office if this occurs.    In case of emergency phone:Dr Torres [phone]

## 2022-04-11 NOTE — NURSING NOTE
"Initial /85   Pulse 74   SpO2 100%  Estimated body mass index is 32.75 kg/m  as calculated from the following:    Height as of 1/14/22: 1.759 m (5' 9.25\").    Weight as of 3/28/22: 101.3 kg (223 lb 6.4 oz). .      "

## 2022-04-12 ENCOUNTER — OFFICE VISIT (OUTPATIENT)
Dept: RADIATION THERAPY | Facility: OUTPATIENT CENTER | Age: 87
End: 2022-04-12
Payer: COMMERCIAL

## 2022-04-12 DIAGNOSIS — R91.1 NODULE OF LEFT LUNG: ICD-10-CM

## 2022-04-12 DIAGNOSIS — C61 PROSTATE CANCER (H): Primary | ICD-10-CM

## 2022-04-12 NOTE — LETTER
2022         RE: Fawad Garcia  [address] [facility] [address]        Dear Colleague,    Thank you for referring your patient, Fawad Garcia, to the RAD[facility]. Please see a copy of my visit note below.       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 Pretreatment PSA 25.5, Chaka score of 4+3=7, clinical stage [id number].     RADIATION THERAPY GIVEN: 8041 cGy in 43 planned treatments, via IMRT     INTERVAL SINCE COMPLETION OF THERAPY: 9 years since completion on 02/10/2012.     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 He underwent biopsies in  and  and pathology was negative for malignancy. Due to the persistently rising PSA Dr. Mcintyre performed an MRI-guided biopsy of the prostate gland which revealed Chaka 4+3= 7 disease in 2 of 2 cores with 90% of the cores being positive in the right inferior base and right inferior mid gland. Androgen ablation was discussed with the patient due to his high-risk disease and the patient declined due to comorbidities of the drug. Overall, he completed radiotherapy with very little toxicity.     SUBJECTIVE:   Fawad Garcia is a 86 year old male who is scheduled today for routine follow-up.      PSA on 3/18/22 was 2.49, up from prior value of 1.88 (zohaib =0.24).    The patient underwent a PSMA PET scan on 22 which demonstrated uptake in the prostate gland.  No clear evidence of regional or distant disease noted.  There was indeterminate 9 mm left lung nodule.    Today, he denies any pressing issues or complaints and reports that all  symptoms have essentially remained stable since his last visit with us 6 months ago. AUA and MARINA not completed today (TC). Prior  AUA 18/35 (patient reported overall stable) and MARINA was 0/25. Main urinary symptoms continue to be nocturia and urgency. Patient states symptoms are worse with increased intake of fluids. Denies drinking alcohol, and has minimal caffeine. He denies any dysuria, hematuria, hematochezia, diarrhea, or loose stools.  He denies any pain or swelling.     Energy level is good and baseline.              Current Outpatient Medications   Medication     amLODIPine (NORVASC) 5 MG tablet     Blood Glucose Monitoring Suppl (ONE TOUCH ULTRA SYSTEM KIT) W/DEVICE KIT     clopidogrel (PLAVIX) 75 MG tablet     dorzolamide-timolol (COSOPT) 2-0.5 % ophthalmic solution     hydrochlorothiazide (HYDRODIURIL) 25 MG tablet     insulin glargine (LANTUS SOLOSTAR) 100 UNIT/ML pen     insulin pen needle (B-D U/F) 31G X 8 MM miscellaneous     levofloxacin (LEVAQUIN) 500 MG tablet     lisinopril (PRINIVIL/ZESTRIL) 40 MG tablet     metFORMIN (GLUCOPHAGE) 500 MG tablet     Multiple Vitamins-Minerals (PRESERVISION AREDS PO)     ONETOUCH LANCETS [facility]     ONETOUCH ULTRA test strip     order for DME     ORDER FOR DME     pantoprazole (PROTONIX) 20 MG EC tablet     rosuvastatin (CRESTOR) 10 MG tablet     sulfamethoxazole-trimethoprim (BACTRIM DS) 800-160 MG tablet     No current facility-administered medications for this visit.           Allergies   Allergen Reactions     Zocor [Simvastatin - High Dose]      Bilateral hip aching       Past Medical History:   Diagnosis Date     Diabetes mellitus (H)      Hypertension      Squamous cell carcinoma          PHYSICAL EXAM:  There were no vitals taken for this visit.  No apparent distress       LABS AND IMAGING:  Reviewed.  PSA   Date Value Ref Range Status   03/18/2021 1.78 0 - 4 ug/L Final     Comment:     Assay Method:  Chemiluminescence using Siemens Vista analyzer    2020 1.53 0 - 4 ug/L Final     Comment:     Assay Method:  Chemiluminescence using Siemens Vista analyzer   2020 1.23 0 - 4 ug/L Final     Comment:     Assay Method:  Chemiluminescence using Siemens Vista analyzer   2019 1.08 0 - 4 ug/L Final     Comment:     Assay Method:  Chemiluminescence using Siemens Vista analyzer   03/15/2019 1.10 0 - 4 ug/L Final     Comment:     Assay Method:  Chemiluminescence using Siemens Vista analyzer     PSA Tumor Marker   Date Value Ref Range Status   2022 2.49 0.00 - 4.00 ug/L Final   2021 1.88 0.00 - 4.00 ug/L Final     IMAGIN22    PSMA PET    IMPRESSION:  1. Focal FDG uptake at the mid/basal anterior central prostate gland,  suspicious for residual prostate cancer.   2. No evidence of metastasis in the body.  3. 9 mm pulmonary nodule in the left upper lobe. Recommend follow-up  chest CT in 3-6 months.        IMPRESSION:   Mr. Garcia is a 86 year old male with a high-risk prostate cancer. Pretreatment PSA 25.5, [place] score of 4+3=7, clinical stage [id number]. Complete RT 2/10/2012,  8041 cGy in 43 planned treatments, via IMRT.       PLAN:     1. PSA increased from last PSA and now meets [place] definition of biochemical failure (2.0 above zohaib with zohaib of 0.24).The patient underwent a PSMA PET scan on 22 which demonstrated uptake in the prostate gland.  No clear evidence of regional or distant disease noted.  There was indeterminate 9 mm left lung nodule.      2. I discussed with the patient that it appears he has locally recurrent disease without evidence of regional or distant spread.  I discussed different options including observation versus ADT versus consideration of local retreatment in the form of reirradiation.  Given prior radiation course, I discussed additional radiation would likely come at the risk of significant toxicity to rectum and urethra. I therefore discussed consideration of referral to medical oncology team to  discuss ADT options. I also discussed that given the patient's age and absence of clinical symptoms at this point, observation would not be unreasonable as well. The patient is willing to meet with our medical oncology colleagues to discuss options.     3. No late GI/ toxicity.    4. We will otherwise plan to order PSA in 3 months with follow up. Will also add on CTC in 3 months to follow up indeterminate 9mm left lung nodule noted.           Kieran Hua M.D.  Department of Radiation Oncology  [facility]

## 2022-04-12 NOTE — PROGRESS NOTES
Department of Radiation Oncology  Radiation Therapy Center  [facility] [place] [address]  [phone]       Radiation Oncology Follow-up Visit  22    Fawad Garcia  MRN: [id number]   : 1935     DISEASE TREATED: High-risk prostate cancer. Pretreatment PSA 25.5, Chaka score of 4+3=7, clinical stage [id number].     RADIATION THERAPY GIVEN: 8041 cGy in 43 planned treatments, via IMRT     INTERVAL SINCE COMPLETION OF THERAPY: 9 years since completion on 02/10/2012.     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 He underwent biopsies in  and  and pathology was negative for malignancy. Due to the persistently rising PSA Dr. Mcintyre performed an MRI-guided biopsy of the prostate gland which revealed Chaka 4+3= 7 disease in 2 of 2 cores with 90% of the cores being positive in the right inferior base and right inferior mid gland. Androgen ablation was discussed with the patient due to his high-risk disease and the patient declined due to comorbidities of the drug. Overall, he completed radiotherapy with very little toxicity.     SUBJECTIVE:   Fawad Garcia is a 86 year old male who is scheduled today for routine follow-up.      PSA on 3/18/22 was 2.49, up from prior value of 1.88 (zohaib =0.24).    The patient underwent a PSMA PET scan on 22 which demonstrated uptake in the prostate gland.  No clear evidence of regional or distant disease noted.  There was indeterminate 9 mm left lung nodule.    Today, he denies any pressing issues or complaints and reports that all symptoms have essentially remained stable since his last visit with us 6 months ago. AUA and MARINA not completed today (TC). Prior  AUA 18/35 (patient reported overall stable) and MARINA was 0/25. Main urinary symptoms continue to be nocturia and urgency.  Patient states symptoms are worse with increased intake of fluids. Denies drinking alcohol, and has minimal caffeine. He denies any dysuria, hematuria, hematochezia, diarrhea, or loose stools.  He denies any pain or swelling.     Energy level is good and baseline.              Current Outpatient Medications   Medication     amLODIPine (NORVASC) 5 MG tablet     Blood Glucose Monitoring Suppl (ONE TOUCH ULTRA SYSTEM KIT) W/DEVICE KIT     clopidogrel (PLAVIX) 75 MG tablet     dorzolamide-timolol (COSOPT) 2-0.5 % ophthalmic solution     hydrochlorothiazide (HYDRODIURIL) 25 MG tablet     insulin glargine (LANTUS SOLOSTAR) 100 UNIT/ML pen     insulin pen needle (B-D U/F) 31G X 8 MM miscellaneous     levofloxacin (LEVAQUIN) 500 MG tablet     lisinopril (PRINIVIL/ZESTRIL) 40 MG tablet     metFORMIN (GLUCOPHAGE) 500 MG tablet     Multiple Vitamins-Minerals (PRESERVISION AREDS PO)     ONETOUCH LANCETS [facility]     ONETOUCH ULTRA test strip     order for DME     ORDER FOR DME     pantoprazole (PROTONIX) 20 MG EC tablet     rosuvastatin (CRESTOR) 10 MG tablet     sulfamethoxazole-trimethoprim (BACTRIM DS) 800-160 MG tablet     No current facility-administered medications for this visit.           Allergies   Allergen Reactions     Zocor [Simvastatin - High Dose]      Bilateral hip aching       Past Medical History:   Diagnosis Date     Diabetes mellitus (H)      Hypertension      Squamous cell carcinoma          PHYSICAL EXAM:  There were no vitals taken for this visit.  No apparent distress       LABS AND IMAGING:  Reviewed.  PSA   Date Value Ref Range Status   03/18/2021 1.78 0 - 4 ug/L Final     Comment:     Assay Method:  Chemiluminescence using Siemens Vista analyzer   09/14/2020 1.53 0 - 4 ug/L Final     Comment:     Assay Method:  Chemiluminescence using Siemens Vista analyzer   03/02/2020 1.23 0 - 4 ug/L Final     Comment:     Assay Method:  Chemiluminescence using Siemens Vista analyzer   09/12/2019 1.08 0 - 4 ug/L Final      Comment:     Assay Method:  Chemiluminescence using Siemens Vista analyzer   03/15/2019 1.10 0 - 4 ug/L Final     Comment:     Assay Method:  Chemiluminescence using Siemens Vista analyzer     PSA Tumor Marker   Date Value Ref Range Status   2022 2.49 0.00 - 4.00 ug/L Final   2021 1.88 0.00 - 4.00 ug/L Final     IMAGIN22    PSMA PET    IMPRESSION:  1. Focal FDG uptake at the mid/basal anterior central prostate gland,  suspicious for residual prostate cancer.   2. No evidence of metastasis in the body.  3. 9 mm pulmonary nodule in the left upper lobe. Recommend follow-up  chest CT in 3-6 months.        IMPRESSION:   Mr. Garcia is a 86 year old male with a high-risk prostate cancer. Pretreatment PSA 25.5, [place] score of 4+3=7, clinical stage [id number]. Complete RT 2/10/2012,  8041 cGy in 43 planned treatments, via IMRT.       PLAN:     1. PSA increased from last PSA and now meets [place] definition of biochemical failure (2.0 above zohaib with zohaib of 0.24).The patient underwent a PSMA PET scan on 22 which demonstrated uptake in the prostate gland.  No clear evidence of regional or distant disease noted.  There was indeterminate 9 mm left lung nodule.      2. I discussed with the patient that it appears he has locally recurrent disease without evidence of regional or distant spread.  I discussed different options including observation versus ADT versus consideration of local retreatment in the form of reirradiation.  Given prior radiation course, I discussed additional radiation would likely come at the risk of significant toxicity to rectum and urethra. I therefore discussed consideration of referral to medical oncology team to discuss ADT options. I also discussed that given the patient's age and absence of clinical symptoms at this point, observation would not be unreasonable as well. The patient is willing to meet with our medical oncology colleagues to discuss options.     3. No late  GI/ toxicity.    4. We will otherwise plan to order PSA in 3 months with follow up. Will also add on CTC in 3 months to follow up indeterminate 9mm left lung nodule noted.           Kieran Hua M.D.  Department of Radiation Oncology  [facility]        Valtrex Pregnancy And Lactation Text: this medication is Pregnancy Category B and is considered safe during pregnancy. This medication is not directly found in breast milk but it's metabolite acyclovir is present.

## 2022-04-12 NOTE — NURSING NOTE
"Oncology Rooming Note    April 12, 2022 2:35 PM   Fawad Garcia is a 86 year old male who presents for:    Chief Complaint   Patient presents with     Radiation Therapy     Return visit, prostate cancer     Initial Vitals: There were no vitals taken for this visit. Estimated body mass index is 32.75 kg/m  as calculated from the following:    Height as of 1/14/22: 1.759 m (5' 9.25\").    Weight as of 3/28/22: 101.3 kg (223 lb 6.4 oz). There is no height or weight on file to calculate BSA.  Data Unavailable Comment: Data Unavailable   No LMP for male patient.  Allergies reviewed: Yes  Medications reviewed: Yes    Medications: Medication refills not needed today.  Pharmacy name entered into EPIC: [facility] - [place], [place] - [address] [place] [address]    Clinical concerns: follow up today to review scans with Dr. Javid Andrea RN              "

## 2022-04-13 ENCOUNTER — PATIENT OUTREACH (OUTPATIENT)
Dept: ONCOLOGY | Facility: CLINIC | Age: 87
End: 2022-04-13
Payer: COMMERCIAL

## 2022-04-13 NOTE — PROGRESS NOTES
New Patient Oncology Nurse Navigator Note     Referring provider: Kieran Hua MD in [facility]     Referred to (specialty): Medical Oncology    Requested provider (if applicable): [place] location     Date Referral Received: 4/12/22     Evaluation for : management of prostate cancer- biochemical failure- discussion of ADT     Clinical History (per Nurse review of records provided):     8041 cGy in 43 planned treatments, via IMRT completed 2/10/12.  Androgen ablation was discussed with the patient due to his high-risk disease and the patient declined due to comorbidities of the drug.      PSA on 3/18/22 was 2.49, up from prior value of 1.88 (zohaib =0.24).    PSMA PET scan on 4/8/22 which demonstrated uptake in the prostate gland.  No clear evidence of regional or distant disease noted.  There was indeterminate 9 mm left lung nodule.    Records Location (Care Everywhere, Media, etc.): [facility]      Records Needed: n/a    I called and LM for Ray to call me back.    1100 AM  Ray returned my call and I discussed the referral to medical oncology.  Plan is for him to see Dr. Barriga at our [place] location, on 5/2/22.  I reviewed what this appointment will entail.  He has no questions at this time.  I conference called [facility] to finalize this appointment.

## 2022-04-18 ENCOUNTER — ALLIED HEALTH/NURSE VISIT (OUTPATIENT)
Dept: DERMATOLOGY | Facility: CLINIC | Age: 87
End: 2022-04-18
Payer: COMMERCIAL

## 2022-04-18 DIAGNOSIS — Z48.01 ENCOUNTER FOR CHANGE OR REMOVAL OF SURGICAL WOUND DRESSING: Primary | ICD-10-CM

## 2022-04-18 PROCEDURE — 99207 PR NO CHARGE NURSE ONLY: CPT

## 2022-04-18 NOTE — PROGRESS NOTES
Pt returned to clinic for post surgery 1 week follow up bandage change. Pt has no complaints, denies pain. Bandage removed from left side neck, area cleansed with normal saline. Site is healing and wound edges approximating well. Reapplied new steri strips and paper tape.    Advised to watch for signs/sx of infection; spreading redness, drainage, odor, fever. Call or report promptly to clinic. Pt given written instructions and informed to rtc as needed. Patient verbalized understanding.     Susan HACKETT,  CMA

## 2022-04-18 NOTE — PATIENT INSTRUCTIONS
WOUND CARE INSTRUCTIONS  for  ONE WEEK AFTER SURGERY          Leave flat bandage on your skin for one week after today s bandage change.  In one week when you remove the bandage, you may resume your regular skin care routine, including washing with mild soap and water, applying moisturizer, make-up and sunscreen.    If there are any open or bleeding areas at the incision/graft site you should begin to cover the area with a bandage daily as follows:    Clean and dry the area with plain tap water using a Q-tip or sterile gauze pad.  Apply Polysporin or Bacitracin ointment to the open area.  Cover the wound with a band-aid or a sterile non-stick gauze pad and micropore paper tape.         SIGNS OF INFECTION  - If you notice any of these signs of infection, call your doctor right away: expanding redness around the wound.  - Yellow or greenish-colored pus or cloudy wound drainage.    - Red streaking spreading from the wound.  - Increased swelling, tenderness, or pain around the wound.   - Fever.    Please remember that yellow and clear drainage from a wound can be normal and related to normal wound healing.  Isolated drainage from a wound without a combination of the above features does not indicate infection.       *Once the bandages are removed, the scar will be red and firm (especially in the lip/chin area). This is normal and will fade in time. It might take 6-12 months for this to happen.     *Massaging the area will help the scar soften and fade quicker. Begin to massage the area one month after the bandages have been removed. To massage apply pressure directly and firmly over the scar with the fingertips and move in a circular motion. Massage the area for a few minutes several times a day. Continue to massage the site for several months.    *Approximately 6-8 weeks after surgery it is not uncommon to see the formation of  tender pimple-like  bump along the scar. This is normal. As the scar continues to mature and  the stitches underneath the skin begin to dissolve, this might occur. Do not pick or squeeze, this will resolve on it s own. Should one break open producing a small amount of drainage, apply Polysporin or Bacitracin ointment a few times a day until the wound is completely healed.    *Numbness in the surgical area is expected. It might take 12-18 months for the feeling to return to normal. During this time sensations of itchiness, tingling and occasional sharp pains might be noted. These feelings are normal and will subside once the nerves have completely healed.         IN CASE OF EMERGENCY: Dr Torres [phone]     If you were seen in [place] call: [phone]    If you were seen in [place] call: [phone]

## 2022-04-20 ENCOUNTER — IMMUNIZATION (OUTPATIENT)
Dept: FAMILY MEDICINE | Facility: CLINIC | Age: 87
End: 2022-04-20
Payer: COMMERCIAL

## 2022-04-20 PROCEDURE — 0054A COVID-19,PF,PFIZER (12+ YRS): CPT

## 2022-04-20 PROCEDURE — 91305 COVID-19,PF,PFIZER (12+ YRS): CPT

## 2022-04-25 NOTE — PROGRESS NOTES
RECORDS STATUS - ALL OTHER DIAGNOSIS      Action    Action Taken 4/25/2022 1:20pm SHARON     I called pt Ray - unavailable.      RECORDS RECEIVED FROM: [facility]   DATE RECEIVED: 5/2/2022   NOTES STATUS DETAILS   OFFICE NOTE from referring provider Complete Epic   Kieran Hua MDV   DISCHARGE REPORT from the ER     MEDICATION LIST Complete [facility]   CLINICAL TRIAL TREATMENTS TO DATE     LABS     PATHOLOGY REPORTS See Prostate Reports from 2011, 2005 in Epic    10/4/2011  A.  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   -  Perineural invasion not identified.   -  Extraprostatic extension not identified.     B.  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   -  Perineural invasion not identified.   -  Extraprostatic extension not identified.     6/7/2005    Prostate gland, right lobe, needle biopsies:        - Negative for malignancy.        - Focal mild chronic inflammation.     B.          Prostate gland, left lobe:        - Negative for malignancy.        - Focal mild chronic inflammation.        - A minute fragment of normal colonic    ANYTHING RELATED TO DIAGNOSIS Complete Labs last updated on 4/12/2022    GENONOMIC TESTING     TYPE:     IMAGING (NEED IMAGES & REPORT)     CT SCANS Complete CT Chest abdomen Pelvis 4/8/2022   MRI     MAMMO     ULTRASOUND     PET Complete 4/8/2022

## 2022-05-02 ENCOUNTER — ONCOLOGY VISIT (OUTPATIENT)
Dept: ONCOLOGY | Facility: CLINIC | Age: 87
End: 2022-05-02
Attending: RADIOLOGY
Payer: COMMERCIAL

## 2022-05-02 ENCOUNTER — PRE VISIT (OUTPATIENT)
Dept: ONCOLOGY | Facility: CLINIC | Age: 87
End: 2022-05-02

## 2022-05-02 VITALS
TEMPERATURE: 98.7 F | SYSTOLIC BLOOD PRESSURE: 130 MMHG | BODY MASS INDEX: 33.62 KG/M2 | RESPIRATION RATE: 12 BRPM | OXYGEN SATURATION: 96 % | HEART RATE: 62 BPM | DIASTOLIC BLOOD PRESSURE: 59 MMHG | HEIGHT: 69 IN | WEIGHT: 227 LBS

## 2022-05-02 DIAGNOSIS — R91.8 PULMONARY NODULES: Primary | ICD-10-CM

## 2022-05-02 DIAGNOSIS — C61 PROSTATE CANCER (H): ICD-10-CM

## 2022-05-02 PROCEDURE — 99205 OFFICE O/P NEW HI 60 MIN: CPT | Performed by: INTERNAL MEDICINE

## 2022-05-02 PROCEDURE — G0463 HOSPITAL OUTPT CLINIC VISIT: HCPCS

## 2022-05-02 ASSESSMENT — PAIN SCALES - GENERAL: PAINLEVEL: NO PAIN (0)

## 2022-05-02 NOTE — LETTER
"    5/2/2022         RE: Fawad Garcia  [address] [facility] [address]        Dear Colleague,    Thank you for referring your patient, Fawad Garcia, to the [facility]. Please see a copy of my visit note below.    Oncology Rooming Note    May 2, 2022 1:14 PM   Fawad Garcia is a 86 year old male who presents for:    Chief Complaint   Patient presents with     Oncology Clinic Visit     New - Malignant neoplasm of prostate      Initial Vitals: /59 (BP Location: Left arm, Patient Position: Sitting, Cuff Size: Adult Regular)   Pulse 62   Temp 98.7  F (37.1  C) (Tympanic)   Resp 12   Ht 1.755 m (5' 9.09\")   Wt 103 kg (227 lb)   SpO2 96%   BMI 33.43 kg/m   Estimated body mass index is 33.43 kg/m  as calculated from the following:    Height as of this encounter: 1.755 m (5' 9.09\").    Weight as of this encounter: 103 kg (227 lb). Body surface area is 2.24 meters squared.  No Pain (0) Comment: Data Unavailable   No LMP for male patient.  Allergies reviewed: Yes  Medications reviewed: Yes    Medications: Medication refills not needed today.  Pharmacy name entered into EPIC: [facility]. - [place], [place] - [address] [place] [address]    Clinical concerns:  None      Che Arthur, [facility] Hematology and Oncology Consult Note    Patient: Fawad Garcia  MRN: [id number]  Date of Service: 05/02/2022      Reason for Visit    Chief Complaint   Patient presents with     Oncology Clinic Visit     New - Malignant neoplasm of prostate          Assessment/Plan    Problem List Items Addressed This Visit    None     Visit Diagnoses     Pulmonary nodules    -  Primary    Relevant Orders    CT Chest w Contrast    Prostate cancer (H)            Recurrent prostate cancer, castrate naïve  He had biochemical recurrence with worsening PSA levels over the last few months.  He is status post radiotherapy in the past.  This was almost 10 years " ago.  PSA went above 2 thus consistent with biochemical progression per criteria.  PSMA PET scan was done which showed no evidence of metastatic disease but he did have uptake in the prostatic bed consistent with local recurrence.  Treatment was discussed and he was deemed not a candidate for reirradiation or surgery.  Hence an appointment with us was made to discuss ADT.  I reviewed the PSMA PET scan images with him along with the PSA levels.  His recurrences is in the prostatic bed.  In this setting ideally we would recommend getting a repeat prostatic biopsy followed by surgery or other local therapies and if they are not candidate for local therapies then recommendation is either to do observation if the PSA level is low and doubling time is slow or ADT.    Reviewed the ADT with him along with his potential side effects and complications.  During that his PSA is still only mildly elevated at 2.49 and doubling time is more than 6 months I do not think he is at risk for any significant disease progression in the near future.  I think in the situation observation is reasonable.  I would repeat his PSA in a couple of months time to see where he is at and make decisions based on that.  He is agreeable with the plan.    He does have millimeters lung nodule in his left upper lobe which was seen in the recent CT scan.  He has some cough but nothing significant.  I think we should repeat his CT scan in a couple of months time to keep an eye on this lung nodule.  Potentially may need a biopsy or a PET scan to look for the functionality of the nodule.     He is agreeable with the plan.  We will see him back in July with repeat labs and CT scan.    ECOG Performance    1 - Can't do physically strenuous work, but fully ambyulatory and can do light sedentary work    Problem List    Patient Active Problem List   Diagnosis     Obesity     Transient cerebral ischemia     Hyperlipidemia LDL goal <70     GERD (gastroesophageal  reflux disease)     Peripheral vascular disease (H)     Advanced directives, counseling/discussion     Health Care Home     Malignant neoplasm of prostate (H)     Hypertension, goal below 140/90     Senile nuclear sclerosis     Type 2 diabetes mellitus with both eyes affected by mild nonproliferative retinopathy without macular edema, with long-term current use of insulin (H)     Macular degeneration (senile) of retina     H/O amputation of lesser toe, right (H)     TIA (transient ischemic attack)     Chronic kidney disease, stage 3 (H)     Dermatitis     Skin lesion     ______________________________________________________________________________    Staging History    Cancer Staging  No matching staging information was found for the patient.      History of presenting illness:  Fawad Garcia is a 86-year-old male with history of early-stage prostate cancer status post radiation who is seen in oncology clinic to discuss further evaluation management of the same.    He has a history of elevated PSA more than 10 starting in 2003.  He was followed periodically with repeat labs.  Prostatic biopsies done in 2002 and 2005 was negative for any malignancy.  In 2011 he underwent MRI guided biopsy of the prostate which came back showing prostatic adenocarcinoma with a Chaka score of 4+3 with 90% of the 2 of the 2 cores positive for malignancy.  He was deemed not a candidate for surgical resection and hence was referred to radiation oncology.  He underwent IMRT in 2012.  He declined ADT.  He was being followed with periodic PSA levels.  Recently it was noted that his PSA was getting elevated again.  It was 1.88 in September and now in March it came up to 2.49.  He had a PSMA PET scan in early April which showed uptake in the prostate gland without any evidence of any other metastatic disease.  This is consistent with possible recurrence in the prostate gland.  He discussed reirradiation therapy with radiation oncology  but again was not deemed a candidate for this due to high risk of complications.  He is here to discuss medical management for the same.    Denies any bone pain.  Denies any other major issues.  He does not have any major medical issues except for diabetes.    The CT part of the PSMA PET scan also showed a 9 mm left upper lobe lung nodule.    Denies any weight loss.  He has some cough but no expectoration.  He does have some increased frequency of urination but no pain.  No blood in urine.    Past History    Past Medical History:   Diagnosis Date     Diabetes mellitus (H)      Hypertension      Squamous cell carcinoma     Family History   Problem Relation Age of Onset     Cancer Mother         intestinal CA     Diabetes Father      Diabetes Brother      Other Cancer Son         meagan tumor     Other Cancer Brother         pancreatic cancer     Diabetes Son      Melanoma No family hx of       Past Surgical History:   Procedure Laterality Date     AMPUTATE TOE(S) Right 7/23/2019    Procedure: Partial amputation great toe;  Surgeon: Ethan Montesinos DPM;  Location: [facility]     AMPUTATE TOE(S) Left 12/24/2019    Procedure: Amputation of the great toe, left foot;  Surgeon: Ethan Montesinos DPM;  Location: [facility]     AMPUTATE TOE(S) Right 3/3/2020    Procedure: Partial amputation, second toe, right foot;  Surgeon: Ethan Montesinos DPM;  Location: [facility] OR     CL AFF SURGICAL PATHOLOGY  2002    prostate biopsy elevated PSA     COLONOSCOPY       HC REMOVE TONSILS/ADENOIDS,<11 Y/O      T & A     IR LOWER EXTREMITY ANGIOGRAM LEFT  2/26/2020     IR LOWER EXTREMITY ANGIOGRAM RIGHT  12/11/2018     PHACOEMULSIFICATION WITH STANDARD INTRAOCULAR LENS IMPLANT Left 4/2/2015    Procedure: PHACOEMULSIFICATION WITH STANDARD INTRAOCULAR LENS IMPLANT;  Surgeon: Joseph Hernandez MD;  Location: [facility]     PHACOEMULSIFICATION WITH STANDARD INTRAOCULAR LENS IMPLANT Right 5/4/2015    Procedure: PHACOEMULSIFICATION WITH STANDARD  INTRAOCULAR LENS IMPLANT;  Surgeon: Joseph Hernandez MD;  Location: [facility]     REPAIR HAMMER TOE Left 4/2/2019    Procedure: 5th Metatarsal Head Resection;  Surgeon: Ethan Montesinos DPM;  Location: [facility]    Social History     Socioeconomic History     Marital status:      Spouse name: Not on file     Number of children: Not on file     Years of education: Not on file     Highest education level: Not on file   Occupational History     Not on file   Tobacco Use     Smoking status: Never Smoker     Smokeless tobacco: Never Used   Substance and Sexual Activity     Alcohol use: Yes     Alcohol/week: 1.7 standard drinks     Comment: 1 x month     Drug use: No     Sexual activity: Not Currently     Partners: Female   Other Topics Concern     Parent/sibling w/ CABG, MI or angioplasty before 65F 55M? No   Social History Narrative     Not on file     Social Determinants of Health     Financial Resource Strain: Not on file   Food Insecurity: Not on file   Transportation Needs: Not on file   Physical Activity: Not on file   Stress: Not on file   Social Connections: Not on file   Intimate Partner Violence: Not on file   Housing Stability: Not on file        Allergies    Allergies   Allergen Reactions     Zocor [Simvastatin - High Dose]      Bilateral hip aching       Review of Systems    Pertinent items are noted in HPI.      Physical Exam    Oncology Vitals 5/2/2022   Height 176 cm   Weight 102.967 kg   BSA (m2) 2.24 m2   /59   Pulse 62   Temp 98.7   Temp src 2   SpO2 96   Pain Score 0   Some recent data might be hidden       General: Alert, cooperative, no distress  HEENT: Atraumatic and normocephalic  Lungs: Clear to auscultation bilaterally  CVS: Regular rate and rhythm, S1-S2 normal  Abdomen: Soft, nontender, no organomegaly  Lymphatic system: No peripheral adenopathy  Skin: No rashes or bruises  Neuro: Alert and oriented x3    Lab Results    No results found for this or any previous visit (from the  past 168 hour(s)).    Imaging Results    CT Chest/Abdomen/Pelvis w Contrast    Result Date: 4/8/2022  Combined Report of: PET and CT on 4/8/2022 2:47 PM : 1. PET of the neck, chest, abdomen, and pelvis. 2. PET CT Fusion for Attenuation Correction and Anatomical Localization:  3. Diagnostic CT scan of the chest, abdomen, and pelvis with intravenous contrast for interpretation. 4. CT of the chest, abdomen and pelvis obtained for diagnostic interpretation. 5. 3D MIP and PET-CT fused images were processed on an independent workstation and archived to PACS and reviewed by a radiologist. Technique: 1. PET: The patient received 9.83 mCi of PSMA, body weight was 101.3 kg. Images were evaluated in the axial, sagittal, and coronal planes as well as the rotational whole body MIP. Images were acquired from the Vertex to the Feet. 2. CT: Volumetric acquisition for clinical interpretation of the chest, abdomen, and pelvis acquired at 3 mm sections . The chest, abdomen, and pelvis were evaluated at 5 mm sections in bone, soft tissue, and lung windows.  The patient received 135 cc of Isovue 370 intravenously for the examination.  3. 3D MIP and PET-CT fused images were processed on an independent workstation and archived to PACS and reviewed by a radiologist. 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 He underwent biopsies in 2002 and 2005 and pathology was negative for malignancy. Due to the persistently rising PSA Dr. Mcintyre performed an MRI-guided biopsy of the prostate gland which revealed [place] 4+3= 7 disease in 2 of 2 cores with 90% of the cores being positive in the right inferior base and right inferior mid gland. Androgen ablation was discussed with the patient due to his high-risk disease and the patient declined due to comorbidities of the drug. Overall, he completed  radiotherapy with very little toxicity.  SUBJECTIVE: Fawad Garcia is a 86 year old male who is scheduled today for routine follow-up.   PSA on 3/18/22 was 2.49, up from prior value of 1.88 (zohaib =0.24). COMPARISON: None. FINDINGS: Liver SUV= 8.8,  Aorta SUV: 3.1 HEAD/NECK: There is no suspicious uptake in the neck. CHEST: There is no suspicious PSMA uptake in the chest. Mild cardiomegaly. Moderate atherosclerosis of the coronary arteries. 9 mm pulmonary nodule in the left upper lobe (series 8 image 49). ABDOMEN AND PELVIS: Post radiotherapy changes with metallic markers in the mid/apex of the prostate gland. Focal FDG uptake at the mid/basal anterior central prostate gland with SUV max of 33.8 (Series 3 image 288). No suspicious lymphadenopathy in the pelvis. Cholelithiasis without evidence of acute cholecystitis. Right-sided hydrocele. BONES: There is no abnormal PSMA uptake in the skeleton. Multilevel spondylosis with grade 1/2 anterolisthesis of L5 on S1. Degenerative changes of both shoulder joints.     IMPRESSION: 1. Focal FDG uptake at the mid/basal anterior central prostate gland, suspicious for residual prostate cancer. 2. No evidence of metastasis in the body. 3. 9 mm pulmonary nodule in the left upper lobe. Recommend follow-up chest CT in 3-6 months. ===================================== I have personally reviewed the examination and initial interpretation and I agree with the findings. LUZ BORJA MD   SYSTEM ID:  [id number]    PET PSMA Eyes to Thighs F-18    Result Date: 4/8/2022  Combined Report of: PET and CT on 4/8/2022 2:47 PM : 1. PET of the neck, chest, abdomen, and pelvis. 2. PET CT Fusion for Attenuation Correction and Anatomical Localization:  3. Diagnostic CT scan of the chest, abdomen, and pelvis with intravenous contrast for interpretation. 4. CT of the chest, abdomen and pelvis obtained for diagnostic interpretation. 5. 3D MIP and PET-CT fused images were processed on an independent  workstation and archived to PACS and reviewed by a radiologist. Technique: 1. PET: The patient received 9.83 mCi of PSMA, body weight was 101.3 kg. Images were evaluated in the axial, sagittal, and coronal planes as well as the rotational whole body MIP. Images were acquired from the Vertex to the Feet. 2. CT: Volumetric acquisition for clinical interpretation of the chest, abdomen, and pelvis acquired at 3 mm sections . The chest, abdomen, and pelvis were evaluated at 5 mm sections in bone, soft tissue, and lung windows.  The patient received 135 cc of Isovue 370 intravenously for the examination.  3. 3D MIP and PET-CT fused images were processed on an independent workstation and archived to PACS and reviewed by a radiologist. 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 He underwent biopsies in 2002 and 2005 and pathology was negative for malignancy. Due to the persistently rising PSA Dr. Mcintyre performed an MRI-guided biopsy of the prostate gland which revealed Chaka 4+3= 7 disease in 2 of 2 cores with 90% of the cores being positive in the right inferior base and right inferior mid gland. Androgen ablation was discussed with the patient due to his high-risk disease and the patient declined due to comorbidities of the drug. Overall, he completed radiotherapy with very little toxicity.  SUBJECTIVE: Fawad Garcia is a 86 year old male who is scheduled today for routine follow-up.   PSA on 3/18/22 was 2.49, up from prior value of 1.88 (zohaib =0.24). COMPARISON: None. FINDINGS: Liver SUV= 8.8,  Aorta SUV: 3.1 HEAD/NECK: There is no suspicious uptake in the neck. CHEST: There is no suspicious PSMA uptake in the chest. Mild cardiomegaly. Moderate atherosclerosis of the coronary arteries. 9 mm pulmonary nodule in the left upper lobe (series 8 image 49). ABDOMEN  AND PELVIS: Post radiotherapy changes with metallic markers in the mid/apex of the prostate gland. Focal FDG uptake at the mid/basal anterior central prostate gland with SUV max of 33.8 (Series 3 image 288). No suspicious lymphadenopathy in the pelvis. Cholelithiasis without evidence of acute cholecystitis. Right-sided hydrocele. BONES: There is no abnormal PSMA uptake in the skeleton. Multilevel spondylosis with grade 1/2 anterolisthesis of L5 on S1. Degenerative changes of both shoulder joints.     IMPRESSION: 1. Focal FDG uptake at the mid/basal anterior central prostate gland, suspicious for residual prostate cancer. 2. No evidence of metastasis in the body. 3. 9 mm pulmonary nodule in the left upper lobe. Recommend follow-up chest CT in 3-6 months. ===================================== I have personally reviewed the examination and initial interpretation and I agree with the findings. LUZ BORJA MD   SYSTEM ID:  [id number]      A total of 60 minutes was spent today on this visit including face to face conversation with the patient, EMR review (labs, imaging studies, pathology reports and outside records), counseling and care co-ordination and documentation.    Signed by: Cheryl Barriga MD        Again, thank you for allowing me to participate in the care of your patient.        Sincerely,        Cheryl Barriga MD

## 2022-05-02 NOTE — PROGRESS NOTES
"Oncology Rooming Note    May 2, 2022 1:14 PM   Fawad Garcia is a 86 year old male who presents for:    Chief Complaint   Patient presents with     Oncology Clinic Visit     New - Malignant neoplasm of prostate      Initial Vitals: /59 (BP Location: Left arm, Patient Position: Sitting, Cuff Size: Adult Regular)   Pulse 62   Temp 98.7  F (37.1  C) (Tympanic)   Resp 12   Ht 1.755 m (5' 9.09\")   Wt 103 kg (227 lb)   SpO2 96%   BMI 33.43 kg/m   Estimated body mass index is 33.43 kg/m  as calculated from the following:    Height as of this encounter: 1.755 m (5' 9.09\").    Weight as of this encounter: 103 kg (227 lb). Body surface area is 2.24 meters squared.  No Pain (0) Comment: Data Unavailable   No LMP for male patient.  Allergies reviewed: Yes  Medications reviewed: Yes    Medications: Medication refills not needed today.  Pharmacy name entered into EPIC: [facility] - [place], [place] - [address] [place] [address]    Clinical concerns:  None      Che Arthur, ARLEN            "

## 2022-05-02 NOTE — PROGRESS NOTES
[facility] Hematology and Oncology Consult Note    Patient: Fawad Garcia  MRN: [id number]  Date of Service: 05/02/2022      Reason for Visit    Chief Complaint   Patient presents with     Oncology Clinic Visit     New - Malignant neoplasm of prostate          Assessment/Plan    Problem List Items Addressed This Visit    None     Visit Diagnoses     Pulmonary nodules    -  Primary    Relevant Orders    CT Chest w Contrast    Prostate cancer (H)            Recurrent prostate cancer, castrate naïve  He had biochemical recurrence with worsening PSA levels over the last few months.  He is status post radiotherapy in the past.  This was almost 10 years ago.  PSA went above 2 thus consistent with biochemical progression per criteria.  PSMA PET scan was done which showed no evidence of metastatic disease but he did have uptake in the prostatic bed consistent with local recurrence.  Treatment was discussed and he was deemed not a candidate for reirradiation or surgery.  Hence an appointment with us was made to discuss ADT.  I reviewed the PSMA PET scan images with him along with the PSA levels.  His recurrences is in the prostatic bed.  In this setting ideally we would recommend getting a repeat prostatic biopsy followed by surgery or other local therapies and if they are not candidate for local therapies then recommendation is either to do observation if the PSA level is low and doubling time is slow or ADT.    Reviewed the ADT with him along with his potential side effects and complications.  During that his PSA is still only mildly elevated at 2.49 and doubling time is more than 6 months I do not think he is at risk for any significant disease progression in the near future.  I think in the situation observation is reasonable.  I would repeat his PSA in a couple of months time to see where he is at and make decisions based on that.  He is agreeable with the plan.    He does have millimeters lung nodule in his  left upper lobe which was seen in the recent CT scan.  He has some cough but nothing significant.  I think we should repeat his CT scan in a couple of months time to keep an eye on this lung nodule.  Potentially may need a biopsy or a PET scan to look for the functionality of the nodule.     He is agreeable with the plan.  We will see him back in July with repeat labs and CT scan.    ECOG Performance    1 - Can't do physically strenuous work, but fully ambyulatory and can do light sedentary work    Problem List    Patient Active Problem List   Diagnosis     Obesity     Transient cerebral ischemia     Hyperlipidemia LDL goal <70     GERD (gastroesophageal reflux disease)     Peripheral vascular disease (H)     Advanced directives, counseling/discussion     Health Care Home     Malignant neoplasm of prostate (H)     Hypertension, goal below 140/90     Senile nuclear sclerosis     Type 2 diabetes mellitus with both eyes affected by mild nonproliferative retinopathy without macular edema, with long-term current use of insulin (H)     Macular degeneration (senile) of retina     H/O amputation of lesser toe, right (H)     TIA (transient ischemic attack)     Chronic kidney disease, stage 3 (H)     Dermatitis     Skin lesion     ______________________________________________________________________________    Staging History    Cancer Staging  No matching staging information was found for the patient.      History of presenting illness:  Fawad Garcia is a 86-year-old male with history of early-stage prostate cancer status post radiation who is seen in oncology clinic to discuss further evaluation management of the same.    He has a history of elevated PSA more than 10 starting in 2003.  He was followed periodically with repeat labs.  Prostatic biopsies done in 2002 and 2005 was negative for any malignancy.  In 2011 he underwent MRI guided biopsy of the prostate which came back showing prostatic adenocarcinoma with a  Chaka score of 4+3 with 90% of the 2 of the 2 cores positive for malignancy.  He was deemed not a candidate for surgical resection and hence was referred to radiation oncology.  He underwent IMRT in 2012.  He declined ADT.  He was being followed with periodic PSA levels.  Recently it was noted that his PSA was getting elevated again.  It was 1.88 in September and now in March it came up to 2.49.  He had a PSMA PET scan in early April which showed uptake in the prostate gland without any evidence of any other metastatic disease.  This is consistent with possible recurrence in the prostate gland.  He discussed reirradiation therapy with radiation oncology but again was not deemed a candidate for this due to high risk of complications.  He is here to discuss medical management for the same.    Denies any bone pain.  Denies any other major issues.  He does not have any major medical issues except for diabetes.    The CT part of the PSMA PET scan also showed a 9 mm left upper lobe lung nodule.    Denies any weight loss.  He has some cough but no expectoration.  He does have some increased frequency of urination but no pain.  No blood in urine.    Past History    Past Medical History:   Diagnosis Date     Diabetes mellitus (H)      Hypertension      Squamous cell carcinoma     Family History   Problem Relation Age of Onset     Cancer Mother         intestinal CA     Diabetes Father      Diabetes Brother      Other Cancer Son         meagan tumor     Other Cancer Brother         pancreatic cancer     Diabetes Son      Melanoma No family hx of       Past Surgical History:   Procedure Laterality Date     AMPUTATE TOE(S) Right 7/23/2019    Procedure: Partial amputation great toe;  Surgeon: Ethan Montesinos DPM;  Location: [facility]     AMPUTATE TOE(S) Left 12/24/2019    Procedure: Amputation of the great toe, left foot;  Surgeon: Ethan Montesinos DPM;  Location: [facility]     AMPUTATE TOE(S) Right 3/3/2020    Procedure:  Partial amputation, second toe, right foot;  Surgeon: Ethan Montesinos DPM;  Location: [facility]      AFF SURGICAL PATHOLOGY  2002    prostate biopsy elevated PSA     COLONOSCOPY       HC REMOVE TONSILS/ADENOIDS,<11 Y/O      T & A     IR LOWER EXTREMITY ANGIOGRAM LEFT  2/26/2020     IR LOWER EXTREMITY ANGIOGRAM RIGHT  12/11/2018     PHACOEMULSIFICATION WITH STANDARD INTRAOCULAR LENS IMPLANT Left 4/2/2015    Procedure: PHACOEMULSIFICATION WITH STANDARD INTRAOCULAR LENS IMPLANT;  Surgeon: Joseph Hernandez MD;  Location: [facility]     PHACOEMULSIFICATION WITH STANDARD INTRAOCULAR LENS IMPLANT Right 5/4/2015    Procedure: PHACOEMULSIFICATION WITH STANDARD INTRAOCULAR LENS IMPLANT;  Surgeon: Joseph Hernandez MD;  Location: [facility]     REPAIR HAMMER TOE Left 4/2/2019    Procedure: 5th Metatarsal Head Resection;  Surgeon: Ethan Montesinos DPM;  Location: [facility]    Social History     Socioeconomic History     Marital status:      Spouse name: Not on file     Number of children: Not on file     Years of education: Not on file     Highest education level: Not on file   Occupational History     Not on file   Tobacco Use     Smoking status: Never Smoker     Smokeless tobacco: Never Used   Substance and Sexual Activity     Alcohol use: Yes     Alcohol/week: 1.7 standard drinks     Comment: 1 x month     Drug use: No     Sexual activity: Not Currently     Partners: Female   Other Topics Concern     Parent/sibling w/ CABG, MI or angioplasty before 65F 55M? No   Social History Narrative     Not on file     Social Determinants of Health     Financial Resource Strain: Not on file   Food Insecurity: Not on file   Transportation Needs: Not on file   Physical Activity: Not on file   Stress: Not on file   Social Connections: Not on file   Intimate Partner Violence: Not on file   Housing Stability: Not on file        Allergies    Allergies   Allergen Reactions     Zocor [Simvastatin - High Dose]      Bilateral hip aching        Review of Systems    Pertinent items are noted in HPI.      Physical Exam    Oncology Vitals 5/2/2022   Height 176 cm   Weight 102.967 kg   BSA (m2) 2.24 m2   /59   Pulse 62   Temp 98.7   Temp src 2   SpO2 96   Pain Score 0   Some recent data might be hidden       General: Alert, cooperative, no distress  HEENT: Atraumatic and normocephalic  Lungs: Clear to auscultation bilaterally  CVS: Regular rate and rhythm, S1-S2 normal  Abdomen: Soft, nontender, no organomegaly  Lymphatic system: No peripheral adenopathy  Skin: No rashes or bruises  Neuro: Alert and oriented x3    Lab Results    No results found for this or any previous visit (from the past 168 hour(s)).    Imaging Results    CT Chest/Abdomen/Pelvis w Contrast    Result Date: 4/8/2022  Combined Report of: PET and CT on 4/8/2022 2:47 PM : 1. PET of the neck, chest, abdomen, and pelvis. 2. PET CT Fusion for Attenuation Correction and Anatomical Localization:  3. Diagnostic CT scan of the chest, abdomen, and pelvis with intravenous contrast for interpretation. 4. CT of the chest, abdomen and pelvis obtained for diagnostic interpretation. 5. 3D MIP and PET-CT fused images were processed on an independent workstation and archived to PACS and reviewed by a radiologist. Technique: 1. PET: The patient received 9.83 mCi of PSMA, body weight was 101.3 kg. Images were evaluated in the axial, sagittal, and coronal planes as well as the rotational whole body MIP. Images were acquired from the Vertex to the Feet. 2. CT: Volumetric acquisition for clinical interpretation of the chest, abdomen, and pelvis acquired at 3 mm sections . The chest, abdomen, and pelvis were evaluated at 5 mm sections in bone, soft tissue, and lung windows.  The patient received 135 cc of Isovue 370 intravenously for the examination.  3. 3D MIP and PET-CT fused images were processed on an independent workstation and archived to PACS and reviewed by a radiologist. 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 He underwent biopsies in 2002 and 2005 and pathology was negative for malignancy. Due to the persistently rising PSA Dr. Mcintyre performed an MRI-guided biopsy of the prostate gland which revealed Chaka 4+3= 7 disease in 2 of 2 cores with 90% of the cores being positive in the right inferior base and right inferior mid gland. Androgen ablation was discussed with the patient due to his high-risk disease and the patient declined due to comorbidities of the drug. Overall, he completed radiotherapy with very little toxicity.  SUBJECTIVE: Fawad Garcia is a 86 year old male who is scheduled today for routine follow-up.   PSA on 3/18/22 was 2.49, up from prior value of 1.88 (zohaib =0.24). COMPARISON: None. FINDINGS: Liver SUV= 8.8,  Aorta SUV: 3.1 HEAD/NECK: There is no suspicious uptake in the neck. CHEST: There is no suspicious PSMA uptake in the chest. Mild cardiomegaly. Moderate atherosclerosis of the coronary arteries. 9 mm pulmonary nodule in the left upper lobe (series 8 image 49). ABDOMEN AND PELVIS: Post radiotherapy changes with metallic markers in the mid/apex of the prostate gland. Focal FDG uptake at the mid/basal anterior central prostate gland with SUV max of 33.8 (Series 3 image 288). No suspicious lymphadenopathy in the pelvis. Cholelithiasis without evidence of acute cholecystitis. Right-sided hydrocele. BONES: There is no abnormal PSMA uptake in the skeleton. Multilevel spondylosis with grade 1/2 anterolisthesis of L5 on S1. Degenerative changes of both shoulder joints.     IMPRESSION: 1. Focal FDG uptake at the mid/basal anterior central prostate gland, suspicious for residual prostate cancer. 2. No evidence of metastasis in the body. 3. 9 mm pulmonary nodule in the left upper lobe. Recommend follow-up chest CT in 3-6  months. ===================================== I have personally reviewed the examination and initial interpretation and I agree with the findings. LUZ BORJA MD   SYSTEM ID:  [id number]    PET PSMA Eyes to Thighs F-18    Result Date: 4/8/2022  Combined Report of: PET and CT on 4/8/2022 2:47 PM : 1. PET of the neck, chest, abdomen, and pelvis. 2. PET CT Fusion for Attenuation Correction and Anatomical Localization:  3. Diagnostic CT scan of the chest, abdomen, and pelvis with intravenous contrast for interpretation. 4. CT of the chest, abdomen and pelvis obtained for diagnostic interpretation. 5. 3D MIP and PET-CT fused images were processed on an independent workstation and archived to PACS and reviewed by a radiologist. Technique: 1. PET: The patient received 9.83 mCi of PSMA, body weight was 101.3 kg. Images were evaluated in the axial, sagittal, and coronal planes as well as the rotational whole body MIP. Images were acquired from the Vertex to the Feet. 2. CT: Volumetric acquisition for clinical interpretation of the chest, abdomen, and pelvis acquired at 3 mm sections . The chest, abdomen, and pelvis were evaluated at 5 mm sections in bone, soft tissue, and lung windows.  The patient received 135 cc of Isovue 370 intravenously for the examination.  3. 3D MIP and PET-CT fused images were processed on an independent workstation and archived to PACS and reviewed by a radiologist. 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 He underwent biopsies in 2002 and 2005 and pathology was negative for malignancy. Due to the persistently rising PSA Dr. Mcintyre performed an MRI-guided biopsy of the prostate gland which revealed [place] 4+3= 7 disease in 2 of 2 cores with 90% of the cores being positive in the right inferior base and right inferior mid gland. Androgen  ablation was discussed with the patient due to his high-risk disease and the patient declined due to comorbidities of the drug. Overall, he completed radiotherapy with very little toxicity.  SUBJECTIVE: Fawad Garcia is a 86 year old male who is scheduled today for routine follow-up.   PSA on 3/18/22 was 2.49, up from prior value of 1.88 (zohaib =0.24). COMPARISON: None. FINDINGS: Liver SUV= 8.8,  Aorta SUV: 3.1 HEAD/NECK: There is no suspicious uptake in the neck. CHEST: There is no suspicious PSMA uptake in the chest. Mild cardiomegaly. Moderate atherosclerosis of the coronary arteries. 9 mm pulmonary nodule in the left upper lobe (series 8 image 49). ABDOMEN AND PELVIS: Post radiotherapy changes with metallic markers in the mid/apex of the prostate gland. Focal FDG uptake at the mid/basal anterior central prostate gland with SUV max of 33.8 (Series 3 image 288). No suspicious lymphadenopathy in the pelvis. Cholelithiasis without evidence of acute cholecystitis. Right-sided hydrocele. BONES: There is no abnormal PSMA uptake in the skeleton. Multilevel spondylosis with grade 1/2 anterolisthesis of L5 on S1. Degenerative changes of both shoulder joints.     IMPRESSION: 1. Focal FDG uptake at the mid/basal anterior central prostate gland, suspicious for residual prostate cancer. 2. No evidence of metastasis in the body. 3. 9 mm pulmonary nodule in the left upper lobe. Recommend follow-up chest CT in 3-6 months. ===================================== I have personally reviewed the examination and initial interpretation and I agree with the findings. LUZ BORJA MD   SYSTEM ID:  [id number]      A total of 60 minutes was spent today on this visit including face to face conversation with the patient, EMR review (labs, imaging studies, pathology reports and outside records), counseling and care co-ordination and documentation.    Signed by: Cheryl Barriga MD

## 2022-06-14 ENCOUNTER — OFFICE VISIT (OUTPATIENT)
Dept: FAMILY MEDICINE | Facility: CLINIC | Age: 87
End: 2022-06-14
Payer: COMMERCIAL

## 2022-06-14 VITALS
SYSTOLIC BLOOD PRESSURE: 136 MMHG | RESPIRATION RATE: 18 BRPM | HEIGHT: 69 IN | TEMPERATURE: 97.7 F | OXYGEN SATURATION: 98 % | BODY MASS INDEX: 33.25 KG/M2 | HEART RATE: 72 BPM | WEIGHT: 224.5 LBS | DIASTOLIC BLOOD PRESSURE: 62 MMHG

## 2022-06-14 DIAGNOSIS — R29.898 LEFT LEG WEAKNESS: Primary | ICD-10-CM

## 2022-06-14 DIAGNOSIS — R25.1 TREMOR OF LEFT HAND: ICD-10-CM

## 2022-06-14 PROCEDURE — 99213 OFFICE O/P EST LOW 20 MIN: CPT | Performed by: INTERNAL MEDICINE

## 2022-06-14 ASSESSMENT — PAIN SCALES - GENERAL: PAINLEVEL: NO PAIN (0)

## 2022-06-14 NOTE — PATIENT INSTRUCTIONS
Xray of the left knee and pelvis  Start physical therapy - if no improvement then would follow-up with Dr. Scott to think about Neurology referral

## 2022-06-14 NOTE — PROGRESS NOTES
Assessment & Plan     Left leg weakness - OA of hip or knee vs generalized muscle weakness/deconditioning vs unilateral diabetic neuropathy vs Parkinsons.  The tremor in the left hand could be an early Parkinson tremor.  Would expect more bilateral versus unilateral weakness due to Parkinson.  If x-rays are negative for significant osteoarthritis, advised to start physical therapy.  If symptoms persist would follow-up with primary care provider for further evaluation - EMG?  - XR Pelvis 1/2 Views; Future  - XR Knee Left 1/2 Views; Future  - Physical Therapy Referral; Future    Tremor of left hand               Patient Instructions   1. Xray of the left knee and pelvis  2. Start physical therapy - if no improvement then would follow-up with Dr. Scott to think about Neurology referral      No follow-ups on file.    Betzy Doss, [facility] Norman is a 86 year old who presents for the following health issues     Musculoskeletal Problem    History of Present Illness       Reason for visit:  Hip  Symptom onset:  1-2 weeks ago  Symptoms include:  Affects walking and getting up from a chair  Symptom intensity:  Moderate  Symptom progression:  Staying the same  Had these symptoms before:  No  What makes it worse:  Not using a cane to walk  What makes it better:  Laying down    He eats 0-1 servings of fruits and vegetables daily.He consumes 1 sweetened beverage(s) daily.He exercises with enough effort to increase his heart rate 20 to 29 minutes per day.  He exercises with enough effort to increase his heart rate 3 or less days per week.   He is taking medications regularly.       Pain History:  When did you first notice your pain? - Acute Pain   Have you seen anyone else for your pain? No  Where in your body do you have pain? Musculoskeletal problem/pain  Onset/Duration: 1 month  Description  Location: leg - left (history of toe removal)  Joint Swelling: no  Redness:  "no  Pain: no; after the weakness lasts a while, pain will come.   Warmth: no  Intensity:  moderate  Progression of Symptoms:  waxing and waning  Accompanying signs and symptoms:   Fevers: no  Numbness/tingling/weakness: YES- weakness and sometimes tingling sometimes discomfort.  History  Trauma to the area: no  Recent illness:  no  Previous similar problem: no  Previous evaluation:  no  Precipitating or alleviating factors:  Aggravating factors include: standing, walking, climbing stairs, exercise and overuse  Therapies tried and outcome: rest/inactivity  --he ambulates withOUT a cane at baseline, but has been using a cane the last 2 weeks  --he describes the leg as 'tricky' and weak' and \"uncomfortable\" but when asked directly he says there is NO pain  --no falls   --he has occ 'discomfort' in the left buttock/low back pain  --no hip or knee; sometimes feels a 'misstep' in the left knee  --denies leg swelling, skin changes, neuropathy symptoms (although does have retinopathy)  --his PCP did make note of a left hand tremor      Current Outpatient Medications   Medication Sig Dispense Refill     amLODIPine (NORVASC) 5 MG tablet Take 1 tablet (5 mg) by mouth daily 90 tablet 3     blood glucose (ONETOUCH ULTRA) test strip 1 strip by In Vitro route daily One touch Ultra. Hold on file until needed. 100 strip 3     Blood Glucose Monitoring Suppl (ONE TOUCH ULTRA SYSTEM KIT) W/DEVICE KIT        clopidogrel (PLAVIX) 75 MG tablet Take 1 tablet (75 mg) by mouth daily 90 tablet 3     dorzolamide-timolol (COSOPT) 2-0.5 % ophthalmic solution        hydrochlorothiazide (HYDRODIURIL) 25 MG tablet Take 1 tablet (25 mg) by mouth daily 90 tablet 3     insulin glargine (LANTUS SOLOSTAR) 100 UNIT/ML pen Inject 20 Units Subcutaneous At Bedtime 30 mL 11     insulin pen needle (B-D U/F) 31G X 8 MM miscellaneous AS DIRECTED daily.  Hold on file until needed. 100 each 3     Lancets (ONETOUCH DELICA PLUS [id number]) MISC 1 each by In Vitro " "route See Admin Instructions Hold on file until needed. 100 each 3     lisinopril (ZESTRIL) 40 MG tablet Take 1 tablet (40 mg) by mouth daily 90 tablet 3     metFORMIN (GLUCOPHAGE) 500 MG tablet Take 2 tablets (1,000 mg) by mouth 2 times daily (with meals) 360 tablet 3     Multiple Vitamins-Minerals (PRESERVISION AREDS PO) Take 1 tablet by mouth       ORDER FOR DME Equipment being ordered:   Glucometer 1 Device 1     pantoprazole (PROTONIX) 20 MG EC tablet Take 1 tablet (20 mg) by mouth daily 90 tablet 3     rosuvastatin (CRESTOR) 10 MG tablet Take 1 tablet (10 mg) by mouth At Bedtime 90 tablet 3     triamcinolone (KENALOG) 0.1 % external cream Apply topically 2 times daily 80 g 1     aspirin (ASA) 325 MG tablet Take 1 tablet (325 mg) by mouth daily (Patient not taking: Reported on 6/14/2022) 30 tablet 0         Review of Systems   Constitutional, HEENT, cardiovascular, pulmonary, gi and gu systems are negative, except as otherwise noted.      Objective    /62 (BP Location: Right arm, Patient Position: Sitting, Cuff Size: Adult Regular)   Pulse 72   Temp 97.7  F (36.5  C) (Tympanic)   Resp 18   Ht 1.753 m (5' 9\")   Wt 101.8 kg (224 lb 8 oz)   SpO2 98%   BMI 33.15 kg/m    Body mass index is 33.15 kg/m .  Physical Exam   GENERAL APPEARANCE: alert, no distress and over weight  LYMPHATICS: no leg edema  MS: 5/5 strength bilateral hip, knee, ankle flex/ext.  Normal muscle bulk and tone.  SKIN: Mild varicose veins of the right leg, none noted of the left leg  NEURO: Normal sensation to light touch in bilateral upper and lower extremities.  Absent reflexes bilateral ankles and knees.  Slight resting tremor of left hand                "

## 2022-07-01 ENCOUNTER — HOSPITAL ENCOUNTER (OUTPATIENT)
Dept: PHYSICAL THERAPY | Facility: CLINIC | Age: 87
Setting detail: THERAPIES SERIES
Discharge: HOME | End: 2022-07-01
Attending: INTERNAL MEDICINE
Payer: COMMERCIAL

## 2022-07-01 DIAGNOSIS — R29.898 LEFT LEG WEAKNESS: ICD-10-CM

## 2022-07-01 PROCEDURE — 97110 THERAPEUTIC EXERCISES: CPT | Mod: GP | Performed by: PHYSICAL THERAPIST

## 2022-07-01 PROCEDURE — 97161 PT EVAL LOW COMPLEX 20 MIN: CPT | Mod: GP | Performed by: PHYSICAL THERAPIST

## 2022-07-01 NOTE — PROGRESS NOTES
"   07/01/22 1400   General Information   Type of Visit Initial OP Ortho PT Evaluation   Start of Care Date 07/01/22   Referring Physician Betzy Doss, DO   Patient/Family Goals Statement To improve the strength in L LE   Orders Evaluate and Treat   Date of Order 06/14/22   Certification Required? Yes   Medical Diagnosis L LE weakness   Body Part(s)   Body Part(s) Knee   Presentation and Etiology   Pertinent history of current problem (include personal factors and/or comorbidities that impact the POC) Pt presents w/ L LE discomfort and weakness x 1 month.  Pt notes intermittent discomfort 2/10 kind of focused on the knee.    Pt states he has started using a cane which has helped.  xrays in chart + hip/ knee and back mild DJD and L knee effusion.  No meds for current problem.   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  Diabetes, PVD, TIA  Mod complexity: comorbidities   Onset date of current episode/exacerbation 05/20/22   Pain exacerbation comment walking--using SEC;  Marked time on stairs.  Awareness w/ sit <> stand   Progression of symptoms since onset: Unchanged   Prior Level of Function   Prior Level of Function-Mobility 6 weeks ago did not use cane   Functional Level Prior Comment plays golf--uses cart 1-2x/week (able to do currently)   Current Level of Function   Patient role/employment history F. Retired   Fall Risk Screen   Fall screen completed by PT   Have you fallen 2 or more times in the past year? No   Have you fallen and had an injury in the past year? No   Is patient a fall risk? No   Abuse Screen (yes response referral indicated)   Feels Unsafe at Home or Work/School no   Feels Threatened by Someone no   Does Anyone Try to Keep You From Having Contact with Others or Doing Things Outside Your Home? no   Physical Signs of Abuse Present no   Knee Objective Findings   Gait/Locomotion R toe out, walking w/ hurricane.  TUG 14\"   Knee/Hip Strength Comments B hip flex 4+/5,  ankle DF 5/5 B "   Observation sit to stand w/ UE assist   Posture forward bent posture   Side (if bilateral, select both right and left) Left   Left Knee Flexion Strength 5/5 B   Left Knee Extension Strength 5/5 B   Left Hip Abduction Strength R 4+/5   L 4/5   Planned Therapy Interventions   Planned Therapy Interventions neuromuscular re-education;strengthening;stretching   Clinical Impression   Criteria for Skilled Therapeutic Interventions Met yes, treatment indicated   PT Diagnosis L LE weakness   Influenced by the following impairments decreased strength, discomfort   Functional limitations due to impairments walking, stairs, sit to stand   Clinical Presentation Stable/Uncomplicated   Clinical Presentation Rationale No recent change since onset   Clinical Decision Making (Complexity) Low complexity   Therapy Frequency other (see comments)   Predicted Duration of Therapy Intervention (days/wks) 1x/ 10-14 days X 4 visits   Risk & Benefits of therapy have been explained Yes   Patient, Family & other staff in agreement with plan of care Yes   Education Assessment   Preferred Learning Style Listening;Reading;Demonstration;Pictures/video   Barriers to Learning No barriers   ORTHO GOALS   PT Ortho Eval Goals 1;2;3   Ortho Goal 1   Goal Identifier 1.   Goal Description Pt will be able to walk indoor w/o assistive device   Target Date 08/20/22   Ortho Goal 2   Goal Identifier 2.   Goal Description Pt will have decreased reliance on UE w/ sit to stand   Target Date 08/20/22   Ortho Goal 3   Goal Identifier 3.   Goal Description Pt will be independent and consistent w/HEP to manage symptoms   Target Date 08/20/22   Total Evaluation Time   PT Eval, Low Complexity Minutes (95896) 30   Therapy Certification   Certification date from 07/01/22   Certification date to 08/20/22   Medical Diagnosis L LE weakness     Thank you for this referral,    Vee Zelaya, PT,   #[id number]  [facility]  [phone]

## 2022-07-01 NOTE — PROGRESS NOTES
[facility]    OUTPATIENT PHYSICAL THERAPY ORTHOPEDIC EVALUATION  PLAN OF TREATMENT FOR OUTPATIENT REHABILITATION  (COMPLETE FOR INITIAL CLAIMS ONLY)  Patient's Last Name, First Name, M.I.  YOB: 1935  Fawad Garcia    Provider s Name:  [facility]   Medical Record No.  [id number]   Start of Care Date:  07/01/22   Onset Date:  05/20/22   Type:     _X__PT   ___OT   ___SLP Medical Diagnosis:  L LE weakness     PT Diagnosis:  L LE weakness   Visits from SOC:  1      _________________________________________________________________________________  Plan of Treatment/Functional Goals:  neuromuscular re-education, strengthening, stretching      Goals  Goal Identifier: 1.  Goal Description: Pt will be able to walk indoor w/o assistive device  Target Date: 08/20/22    Goal Identifier: 2.  Goal Description: Pt will have decreased reliance on UE w/ sit to stand  Target Date: 08/20/22    Goal Identifier: 3.  Goal Description: Pt will be independent and consistent w/HEP to manage symptoms  Target Date: 08/20/22       Therapy Frequency:  other (see comments)  Predicted Duration of Therapy Intervention:  1x/ 10-14 days X 4 visits    Vee Zelaya, PT                 I CERTIFY THE NEED FOR THESE SERVICES FURNISHED UNDER        THIS PLAN OF TREATMENT AND WHILE UNDER MY CARE     (Physician co-signature of this document indicates review and certification of the therapy plan).                     Certification Date From:  07/01/22   Certification Date To:  08/20/22    Referring Provider:  Betzy Doss, DO    Initial Assessment        See Epic Evaluation Start of Care Date: 07/01/22

## 2022-07-08 ENCOUNTER — HOSPITAL ENCOUNTER (OUTPATIENT)
Dept: CT IMAGING | Facility: CLINIC | Age: 87
Discharge: HOME | End: 2022-07-08
Attending: INTERNAL MEDICINE
Payer: COMMERCIAL

## 2022-07-08 ENCOUNTER — LAB (OUTPATIENT)
Dept: LAB | Facility: CLINIC | Age: 87
End: 2022-07-08
Attending: INTERNAL MEDICINE
Payer: COMMERCIAL

## 2022-07-08 DIAGNOSIS — C61 PROSTATE CANCER (H): Primary | ICD-10-CM

## 2022-07-08 DIAGNOSIS — R91.8 PULMONARY NODULES: ICD-10-CM

## 2022-07-08 LAB
CREAT SERPL-MCNC: 0.89 MG/DL (ref 0.66–1.25)
GFR SERPL CREATININE-BSD FRML MDRD: 84 ML/MIN/1.73M2
PSA SERPL-MCNC: 2.71 UG/L (ref 0–4)

## 2022-07-08 PROCEDURE — 250N000011 HC RX IP 250 OP 636: Performed by: RADIOLOGY

## 2022-07-08 PROCEDURE — 71260 CT THORAX DX C+: CPT

## 2022-07-08 PROCEDURE — 36415 COLL VENOUS BLD VENIPUNCTURE: CPT

## 2022-07-08 PROCEDURE — 250N000009 HC RX 250: Performed by: RADIOLOGY

## 2022-07-08 PROCEDURE — 84153 ASSAY OF PSA TOTAL: CPT

## 2022-07-08 PROCEDURE — 82565 ASSAY OF CREATININE: CPT

## 2022-07-08 RX ORDER — IOPAMIDOL 755 MG/ML
90 INJECTION, SOLUTION INTRAVASCULAR ONCE
Status: COMPLETED | OUTPATIENT
Start: 2022-07-08 | End: 2022-07-08

## 2022-07-08 RX ADMIN — IOPAMIDOL 90 ML: 755 INJECTION, SOLUTION INTRAVENOUS at 09:55

## 2022-07-08 RX ADMIN — SODIUM CHLORIDE 68 ML: 9 INJECTION, SOLUTION INTRAVENOUS at 09:56

## 2022-07-12 ENCOUNTER — OFFICE VISIT (OUTPATIENT)
Dept: RADIATION THERAPY | Facility: OUTPATIENT CENTER | Age: 87
End: 2022-07-12
Payer: COMMERCIAL

## 2022-07-12 VITALS
WEIGHT: 226 LBS | OXYGEN SATURATION: 95 % | BODY MASS INDEX: 33.37 KG/M2 | HEART RATE: 67 BPM | SYSTOLIC BLOOD PRESSURE: 135 MMHG | DIASTOLIC BLOOD PRESSURE: 76 MMHG | RESPIRATION RATE: 16 BRPM

## 2022-07-12 DIAGNOSIS — C61 PROSTATE CANCER (H): Primary | ICD-10-CM

## 2022-07-12 NOTE — PROGRESS NOTES
Department of Radiation Oncology  Radiation Therapy Center  [facility] [place] [address]  [phone]       Radiation Oncology Follow-up Visit  22    Fawad Garcia  MRN: [id number]   : 1935     DISEASE TREATED: High-risk prostate cancer. Pretreatment PSA 25.5, Chaka score of 4+3=7, clinical stage [id number].     RADIATION THERAPY GIVEN: 8041 cGy in 43 planned treatments, via IMRT     INTERVAL SINCE COMPLETION OF THERAPY: 9 years since completion on 02/10/2012.     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 He underwent biopsies in  and  and pathology was negative for malignancy. Due to the persistently rising PSA Dr. Mcintyre performed an MRI-guided biopsy of the prostate gland which revealed Chaka 4+3= 7 disease in 2 of 2 cores with 90% of the cores being positive in the right inferior base and right inferior mid gland. Androgen ablation was discussed with the patient due to his high-risk disease and the patient declined due to comorbidities of the drug. Overall, he completed radiotherapy with very little toxicity.     SUBJECTIVE:   Fawad Garcia is a 86 year old male who is scheduled today for routine follow-up.      PSA on 22 was 2.71, up from prior value of 2.49 (zohaib =0.24).    The patient  Has undergone a PSMA PET scan on 22 which demonstrated uptake in the prostate gland.  No clear evidence of regional or distant disease noted.  There was indeterminate 9 mm left lung nodule.     The patient has met with medical oncology team (Dr. Barriga) rediscussed observation versus consideration of initiation of ADT.  Currently continue observation with PSA lab work.    Follow-up CT chest on 2022 to evaluate previously noted left lingular mass was stable.    Today, he denies any pressing issues or  complaints and reports that all symptoms have essentially remained stable since his last visit with us 6 months ago. AUA and MARINA not completed today (TC). Prior  AUA 18/35 (patient reported overall stable) and MARINA was 0/25. Main urinary symptoms continue to be nocturia and urgency. Patient states symptoms are worse with increased intake of fluids. Denies drinking alcohol, and has minimal caffeine. He denies any dysuria, hematuria, hematochezia, diarrhea, or loose stools.  He denies any pain or swelling.     Energy level is good and baseline.              Current Outpatient Medications   Medication     amLODIPine (NORVASC) 5 MG tablet     Blood Glucose Monitoring Suppl (ONE TOUCH ULTRA SYSTEM KIT) W/DEVICE KIT     clopidogrel (PLAVIX) 75 MG tablet     dorzolamide-timolol (COSOPT) 2-0.5 % ophthalmic solution     hydrochlorothiazide (HYDRODIURIL) 25 MG tablet     insulin glargine (LANTUS SOLOSTAR) 100 UNIT/ML pen     insulin pen needle (B-D U/F) 31G X 8 MM miscellaneous     levofloxacin (LEVAQUIN) 500 MG tablet     lisinopril (PRINIVIL/ZESTRIL) 40 MG tablet     metFORMIN (GLUCOPHAGE) 500 MG tablet     Multiple Vitamins-Minerals (PRESERVISION AREDS PO)     ONETOUCH LANCETS [facility]     ONETOUCH ULTRA test strip     order for DME     ORDER FOR DME     pantoprazole (PROTONIX) 20 MG EC tablet     rosuvastatin (CRESTOR) 10 MG tablet     sulfamethoxazole-trimethoprim (BACTRIM DS) 800-160 MG tablet     No current facility-administered medications for this visit.           Allergies   Allergen Reactions     Zocor [Simvastatin - High Dose]      Bilateral hip aching       Past Medical History:   Diagnosis Date     Diabetes mellitus (H)      Hypertension      Squamous cell carcinoma          PHYSICAL EXAM:  There were no vitals taken for this visit.  No apparent distress       LABS AND IMAGING:  Reviewed.  PSA   Date Value Ref Range Status   03/18/2021 1.78 0 - 4 ug/L Final     Comment:     Assay Method:  Chemiluminescence using  S[employer] [place] analyzer   2020 1.53 0 - 4 ug/L Final     Comment:     Assay Method:  Chemiluminescence using Siemens Vista analyzer   2020 1.23 0 - 4 ug/L Final     Comment:     Assay Method:  Chemiluminescence using Siemens Vista analyzer   2019 1.08 0 - 4 ug/L Final     Comment:     Assay Method:  Chemiluminescence using Siemens Vista analyzer   03/15/2019 1.10 0 - 4 ug/L Final     Comment:     Assay Method:  Chemiluminescence using Siemens Vista analyzer     PSA Tumor Marker   Date Value Ref Range Status   2022 2.71 0.00 - 4.00 ug/L Final   2022 2.49 0.00 - 4.00 ug/L Final   2021 1.88 0.00 - 4.00 ug/L Final     IMAGIN22    PSMA PET    IMPRESSION:  1. Focal FDG uptake at the mid/basal anterior central prostate gland,  suspicious for residual prostate cancer.   2. No evidence of metastasis in the body.  3. 9 mm pulmonary nodule in the left upper lobe. Recommend follow-up  chest CT in 3-6 months.    22  CTC    IMPRESSION:   1.  Stable 10 mm nodule in the lingula. This has shown 3 months of  stability. Continued follow-up is recommended.  2.  Cholelithiasis.    IMPRESSION:   Mr. Garcia is a 86 year old male with a high-risk prostate cancer. Pretreatment PSA 25.5, Chaka score of 4+3=7, clinical stage [id number]. Complete RT 2/10/2012,  8041 cGy in 43 planned treatments, via IMRT.       PLAN:     1. PSA continues to increase from from last PSA. It has met [place] definition of biochemical failure (2.0 above zohaib with zohaib of 0.24).The patient underwent a PSMA PET scan on 22 which demonstrated uptake in the prostate gland.  No clear evidence of regional or distant disease noted.  There was indeterminate 9 mm left lung nodule. Follow up CT chest on 2022 to evaluate previously noted left lingular mass was stable.      2. I re-discussed with the patient that it appears he has locally recurrent disease without evidence of regional or distant spread.  I discussed  different options including observation versus ADT versus consideration of local retreatment in the form of reirradiation.  Given prior radiation course, I discussed additional radiation would likely come at the risk of significant toxicity to rectum and urethra. The patient has met with medical oncology team (Dr. Barriga) and discussed observation versus consideration of initiation of ADT.  Currently continue observation with PSA lab work. I also discussed that given the patient's age and absence of clinical symptoms at this point, observation would not be unreasonable as well.     3. The patient will continue to follow up with our medical oncology colleagues for oncologic surveillance. Defer imaging and lab work to medical oncology team.     4. No late GI/ toxicity.    5. RTC in 6 months.        Kieran Hua M.D.  Department of Radiation Oncology  [facility]

## 2022-07-12 NOTE — NURSING NOTE
"Oncology Rooming Note    July 12, 2022 3:12 PM   Fawad Garcia is a 86 year old male who presents for:    Chief Complaint   Patient presents with     Radiation Therapy     Follow up visit, prostate cancer     Initial Vitals: /76 (BP Location: Left arm, Cuff Size: Adult Regular)   Pulse 67   Resp 16   Wt 102.5 kg (226 lb)   SpO2 95%   BMI 33.37 kg/m   Estimated body mass index is 33.37 kg/m  as calculated from the following:    Height as of 6/14/22: 1.753 m (5' 9\").    Weight as of this encounter: 102.5 kg (226 lb). Body surface area is 2.23 meters squared.  Data Unavailable Comment: Data Unavailable   No LMP for male patient.  Allergies reviewed: Yes  Medications reviewed: Yes    Medications: Medication refills not needed today.  Pharmacy name entered into EPIC: [facility]. - [place], [place] - [address] [place] [address]    Clinical concerns: follow up with Dr. Hua.  No new concerns issues, here to discuss PSA and recent CT scan.      Marcia Andrea RN              "

## 2022-07-12 NOTE — LETTER
2022         RE: Fawad Garcia  [address] [facility] [address]        Dear Colleague,    Thank you for referring your patient, Fawad Garcia, to the RADIATION THERAPY CENTER. Please see a copy of my visit note below.       Department of Radiation Oncology  Radiation Therapy Center  [facility]  [place] [address]  [phone]       Radiation Oncology Follow-up Visit  22    Fawad Garcia  MRN: [id number]   : 1935     DISEASE TREATED: High-risk prostate cancer. Pretreatment PSA 25.5, Chaka score of 4+3=7, clinical stage [id number].     RADIATION THERAPY GIVEN: 8041 cGy in 43 planned treatments, via IMRT     INTERVAL SINCE COMPLETION OF THERAPY: 9 years since completion on 02/10/2012.     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 He underwent biopsies in  and  and pathology was negative for malignancy. Due to the persistently rising PSA Dr. Mcintyre performed an MRI-guided biopsy of the prostate gland which revealed Chaka 4+3= 7 disease in 2 of 2 cores with 90% of the cores being positive in the right inferior base and right inferior mid gland. Androgen ablation was discussed with the patient due to his high-risk disease and the patient declined due to comorbidities of the drug. Overall, he completed radiotherapy with very little toxicity.     SUBJECTIVE:   Fawad Garcia is a 86 year old male who is scheduled today for routine follow-up.      PSA on 22 was 2.71, up from prior value of 2.49 (zohaib =0.24).    The patient  Has undergone a PSMA PET scan on 22 which demonstrated uptake in the prostate gland.  No clear evidence of regional or distant disease noted.  There was indeterminate 9 mm left lung nodule.     The patient has met with medical oncology team (Dr. Barriga) rediscussed  observation versus consideration of initiation of ADT.  Currently continue observation with PSA lab work.    Follow-up CT chest on 7/8/2022 to evaluate previously noted left lingular mass was stable.    Today, he denies any pressing issues or complaints and reports that all symptoms have essentially remained stable since his last visit with us 6 months ago. AUA and MARINA not completed today (TC). Prior  AUA 18/35 (patient reported overall stable) and MARINA was 0/25. Main urinary symptoms continue to be nocturia and urgency. Patient states symptoms are worse with increased intake of fluids. Denies drinking alcohol, and has minimal caffeine. He denies any dysuria, hematuria, hematochezia, diarrhea, or loose stools.  He denies any pain or swelling.     Energy level is good and baseline.              Current Outpatient Medications   Medication     amLODIPine (NORVASC) 5 MG tablet     Blood Glucose Monitoring Suppl (ONE TOUCH ULTRA SYSTEM KIT) W/DEVICE KIT     clopidogrel (PLAVIX) 75 MG tablet     dorzolamide-timolol (COSOPT) 2-0.5 % ophthalmic solution     hydrochlorothiazide (HYDRODIURIL) 25 MG tablet     insulin glargine (LANTUS SOLOSTAR) 100 UNIT/ML pen     insulin pen needle (B-D U/F) 31G X 8 MM miscellaneous     levofloxacin (LEVAQUIN) 500 MG tablet     lisinopril (PRINIVIL/ZESTRIL) 40 MG tablet     metFORMIN (GLUCOPHAGE) 500 MG tablet     Multiple Vitamins-Minerals (PRESERVISION AREDS PO)     ONETOUCH LANCETS MISC     ONETOUCH ULTRA test strip     order for DME     ORDER FOR DME     pantoprazole (PROTONIX) 20 MG EC tablet     rosuvastatin (CRESTOR) 10 MG tablet     sulfamethoxazole-trimethoprim (BACTRIM DS) 800-160 MG tablet     No current facility-administered medications for this visit.           Allergies   Allergen Reactions     Zocor [Simvastatin - High Dose]      Bilateral hip aching       Past Medical History:   Diagnosis Date     Diabetes mellitus (H)      Hypertension      Squamous cell carcinoma           PHYSICAL EXAM:  There were no vitals taken for this visit.  No apparent distress       LABS AND IMAGING:  Reviewed.  PSA   Date Value Ref Range Status   2021 1.78 0 - 4 ug/L Final     Comment:     Assay Method:  Chemiluminescence using Siemens Vista analyzer   2020 1.53 0 - 4 ug/L Final     Comment:     Assay Method:  Chemiluminescence using Siemens Vista analyzer   2020 1.23 0 - 4 ug/L Final     Comment:     Assay Method:  Chemiluminescence using Siemens Vista analyzer   2019 1.08 0 - 4 ug/L Final     Comment:     Assay Method:  Chemiluminescence using Siemens Vista analyzer   03/15/2019 1.10 0 - 4 ug/L Final     Comment:     Assay Method:  Chemiluminescence using Siemens Vista analyzer     PSA Tumor Marker   Date Value Ref Range Status   2022 2.71 0.00 - 4.00 ug/L Final   2022 2.49 0.00 - 4.00 ug/L Final   2021 1.88 0.00 - 4.00 ug/L Final     IMAGIN22    PSMA PET    IMPRESSION:  1. Focal FDG uptake at the mid/basal anterior central prostate gland,  suspicious for residual prostate cancer.   2. No evidence of metastasis in the body.  3. 9 mm pulmonary nodule in the left upper lobe. Recommend follow-up  chest CT in 3-6 months.    22  CTC    IMPRESSION:   1.  Stable 10 mm nodule in the lingula. This has shown 3 months of  stability. Continued follow-up is recommended.  2.  Cholelithiasis.    IMPRESSION:   Mr. Garcia is a 86 year old male with a high-risk prostate cancer. Pretreatment PSA 25.5, Chaka score of 4+3=7, clinical stage [id number]. Complete RT 2/10/2012,  8041 cGy in 43 planned treatments, via IMRT.       PLAN:     1. PSA continues to increase from from last PSA. It has met [place] definition of biochemical failure (2.0 above zohaib with zohaib of 0.24).The patient underwent a PSMA PET scan on 22 which demonstrated uptake in the prostate gland.  No clear evidence of regional or distant disease noted.  There was indeterminate 9 mm left lung  nodule. Follow up CT chest on 7/8/2022 to evaluate previously noted left lingular mass was stable.      2. I re-discussed with the patient that it appears he has locally recurrent disease without evidence of regional or distant spread.  I discussed different options including observation versus ADT versus consideration of local retreatment in the form of reirradiation.  Given prior radiation course, I discussed additional radiation would likely come at the risk of significant toxicity to rectum and urethra. The patient has met with medical oncology team (Dr. Barriga) and discussed observation versus consideration of initiation of ADT.  Currently continue observation with PSA lab work. I also discussed that given the patient's age and absence of clinical symptoms at this point, observation would not be unreasonable as well.     3. The patient will continue to follow up with our medical oncology colleagues for oncologic surveillance. Defer imaging and lab work to medical oncology team.     4. No late GI/ toxicity.    5. RTC in 6 months.        Kieran Hua M.D.  Department of Radiation Oncology  [facility]

## 2022-07-18 ENCOUNTER — ONCOLOGY VISIT (OUTPATIENT)
Dept: ONCOLOGY | Facility: CLINIC | Age: 87
End: 2022-07-18
Attending: FAMILY MEDICINE
Payer: COMMERCIAL

## 2022-07-18 VITALS
SYSTOLIC BLOOD PRESSURE: 133 MMHG | HEART RATE: 69 BPM | BODY MASS INDEX: 33.06 KG/M2 | WEIGHT: 223.9 LBS | RESPIRATION RATE: 18 BRPM | OXYGEN SATURATION: 97 % | TEMPERATURE: 98.2 F | DIASTOLIC BLOOD PRESSURE: 69 MMHG

## 2022-07-18 DIAGNOSIS — C61 MALIGNANT NEOPLASM OF PROSTATE (H): ICD-10-CM

## 2022-07-18 DIAGNOSIS — R91.8 PULMONARY NODULES: Primary | ICD-10-CM

## 2022-07-18 PROCEDURE — G0463 HOSPITAL OUTPT CLINIC VISIT: HCPCS

## 2022-07-18 PROCEDURE — 99213 OFFICE O/P EST LOW 20 MIN: CPT | Performed by: INTERNAL MEDICINE

## 2022-07-18 ASSESSMENT — PAIN SCALES - GENERAL: PAINLEVEL: NO PAIN (0)

## 2022-07-18 NOTE — PROGRESS NOTES
"Oncology Rooming Note    July 18, 2022 3:11 PM   Fawad Garcia is a 86 year old male who presents for:    Chief Complaint   Patient presents with     Oncology Clinic Visit     Prostate Cancer      Initial Vitals: /69 (BP Location: Right arm, Patient Position: Sitting, Cuff Size: Adult Regular)   Pulse 69   Temp 98.2  F (36.8  C) (Axillary)   Resp 18   Wt 101.6 kg (223 lb 14.4 oz)   SpO2 97%   BMI 33.06 kg/m   Estimated body mass index is 33.06 kg/m  as calculated from the following:    Height as of 6/14/22: 1.753 m (5' 9\").    Weight as of this encounter: 101.6 kg (223 lb 14.4 oz). Body surface area is 2.22 meters squared.  No Pain (0) Comment: Data Unavailable   No LMP for male patient.  Allergies reviewed: Yes  Medications reviewed: Yes    Medications: Medication refills not needed today.  Pharmacy name entered into EPIC: [facility] - [place], [place] - [address] [place] [address]    Clinical concerns: prostate cancer follow up       Lacie Delcid RN              "

## 2022-07-18 NOTE — PROGRESS NOTES
[facility] Hematology and Oncology Consult Note    Patient: Fawad Garcia  MRN: [id number]  Date of Service: 05/02/2022      Reason for Visit    Chief Complaint   Patient presents with     Oncology Clinic Visit     Prostate Cancer          Assessment/Plan    Problem List Items Addressed This Visit        Urinary    Malignant neoplasm of prostate (H)      Other Visit Diagnoses     Pulmonary nodules    -  Primary    Relevant Orders    CT Chest w/o Contrast        Recurrent prostate cancer, castrate naïve  He had biochemical recurrence with worsening PSA levels over the last few months.  He is status post radiotherapy in the past.  This was almost 10 years ago.  PSA went above 2 thus consistent with biochemical progression per criteria.  PSMA PET scan was done which showed no evidence of metastatic disease but he did have uptake in the prostatic bed consistent with local recurrence.  Treatment was discussed and he was deemed not a candidate for reirradiation or surgery.  Repeat PSA done couple weeks ago came back at 2.71.  This is mildly up from 3 months ago at 2.4.  There is certainly evidence for lower level of recurrence.  But clinically he is doing well without any evidence of disease progression.  He is not symptomatic.  And also on the PSMA PET scan there is no evidence of distant metastatic disease.  In this setting we again reviewed observation versus ADT.  His PSA doubling time is more than 1 year.  In this setting observation is not unreasonable.  He is also on board with this.  We will continue to follow his PSA once every 6 months or so.  Systemic imaging only as clinically indicated or if the PSA starts to rise quickly.    Lung nodule  Previously the CT part of the PSMA PET scan showed an 9 mm left upper lobe lung nodule.  I repeated his CT chest in 3 months.  CT is now showing a 10 mm lung nodule which is pretty much stable compared to last time.  It has grown minimally if any.  But overall  considered to be stable.  Plan is to continue to monitor this.  We will repeat his CT scan of the chest without contrast in 6 months.  Potentially this could be a primary lung malignancy.  If it grows more than a centimeter then we will probably have to biopsy this.  I think this is amenable for bronchoscopy guided biopsy.  He is agreeable with the plan.    ECOG Performance    1 - Can't do physically strenuous work, but fully ambyulatory and can do light sedentary work    Problem List    Patient Active Problem List   Diagnosis     Obesity     Transient cerebral ischemia     Hyperlipidemia LDL goal <70     GERD (gastroesophageal reflux disease)     Peripheral vascular disease (H)     Advanced directives, counseling/discussion     Malignant neoplasm of prostate (H)     Hypertension, goal below 140/90     Senile nuclear sclerosis     Type 2 diabetes mellitus with both eyes affected by mild nonproliferative retinopathy without macular edema, with long-term current use of insulin (H)     Macular degeneration (senile) of retina     H/O amputation of lesser toe, right (H)     TIA (transient ischemic attack)     Chronic kidney disease, stage 3 (H)     Dermatitis     Skin lesion     Tremor of left hand     Left leg weakness     ______________________________________________________________________________    Staging History    Cancer Staging  No matching staging information was found for the patient.      History of presenting illness:  Fawad Garcia is a 86-year-old male with history of early-stage prostate cancer status post radiation who is seen in oncology clinic to discuss further evaluation management of the same.    He has a history of elevated PSA more than 10 starting in 2003.  He was followed periodically with repeat labs.  Prostatic biopsies done in 2002 and 2005 was negative for any malignancy.  In 2011 he underwent MRI guided biopsy of the prostate which came back showing prostatic adenocarcinoma with a  Chaka score of 4+3 with 90% of the 2 of the 2 cores positive for malignancy.  He was deemed not a candidate for surgical resection and hence was referred to radiation oncology.  He underwent IMRT in 2012.  He declined ADT.  He was being followed with periodic PSA levels.  Recently it was noted that his PSA was getting elevated again.  It was 1.88 in September and now in March it came up to 2.49.  He had a PSMA PET scan in early April which showed uptake in the prostate gland without any evidence of any other metastatic disease.  This is consistent with possible recurrence in the prostate gland.  He discussed reirradiation therapy with radiation oncology but again was not deemed a candidate for this due to high risk of complications.  Last time we discussed about observation versus ADT.  He preferred observation.  He had a 9 mm lung nodule in the left upper lobe which did not light up on the PSMA scan however there was concern for possible primary lung malignancy and hence I repeated his CT scan in 3 months.  He is here to review the results and make further follow-up plans.    Denies any new issues since last visit.  No new bone pain.  Denies any cough or shortness of breath which is new for him.    Past History    Past Medical History:   Diagnosis Date     Diabetes mellitus (H)      Hypertension      Squamous cell carcinoma     Family History   Problem Relation Age of Onset     Cancer Mother         intestinal CA     Diabetes Father      Diabetes Brother      Other Cancer Son         meagan tumor     Other Cancer Brother         pancreatic cancer     Diabetes Son      Melanoma No family hx of       Past Surgical History:   Procedure Laterality Date     AMPUTATE TOE(S) Right 7/23/2019    Procedure: Partial amputation great toe;  Surgeon: Ethan Montesinos DPM;  Location: [facility]     AMPUTATE TOE(S) Left 12/24/2019    Procedure: Amputation of the great toe, left foot;  Surgeon: Ethan Montesinos DPM;   Location: [facility]     AMPUTATE TOE(S) Right 3/3/2020    Procedure: Partial amputation, second toe, right foot;  Surgeon: Ethan Montesinos DPM;  Location: [facility]     CL AFF SURGICAL PATHOLOGY  2002    prostate biopsy elevated PSA     COLONOSCOPY       HC REMOVE TONSILS/ADENOIDS,<13 Y/O      T & A     IR LOWER EXTREMITY ANGIOGRAM LEFT  2/26/2020     IR LOWER EXTREMITY ANGIOGRAM RIGHT  12/11/2018     PHACOEMULSIFICATION WITH STANDARD INTRAOCULAR LENS IMPLANT Left 4/2/2015    Procedure: PHACOEMULSIFICATION WITH STANDARD INTRAOCULAR LENS IMPLANT;  Surgeon: Joseph Hernandez MD;  Location: [facility]     PHACOEMULSIFICATION WITH STANDARD INTRAOCULAR LENS IMPLANT Right 5/4/2015    Procedure: PHACOEMULSIFICATION WITH STANDARD INTRAOCULAR LENS IMPLANT;  Surgeon: Joseph Hernandez MD;  Location: [facility]     REPAIR HAMMER TOE Left 4/2/2019    Procedure: 5th Metatarsal Head Resection;  Surgeon: Ethan Montesinos DPM;  Location: [facility]    Social History     Socioeconomic History     Marital status:      Spouse name: Not on file     Number of children: Not on file     Years of education: Not on file     Highest education level: Not on file   Occupational History     Not on file   Tobacco Use     Smoking status: Never Smoker     Smokeless tobacco: Never Used   Vaping Use     Vaping Use: Never used   Substance and Sexual Activity     Alcohol use: Yes     Alcohol/week: 1.7 standard drinks     Comment: 1 x month     Drug use: No     Sexual activity: Not Currently     Partners: Female   Other Topics Concern     Parent/sibling w/ CABG, MI or angioplasty before 65F 55M? No   Social History Narrative     Not on file     Social Determinants of Health     Financial Resource Strain: Not on file   Food Insecurity: Not on file   Transportation Needs: Not on file   Physical Activity: Not on file   Stress: Not on file   Social Connections: Not on file   Intimate Partner Violence: Not on file   Housing Stability: Not on file         Allergies    Allergies   Allergen Reactions     Zocor [Simvastatin - High Dose]      Bilateral hip aching       Review of Systems    Pertinent items are noted in HPI.      Physical Exam    Oncology Vitals 7/18/2022   Height -   Weight 101.56 kg   BSA (m2) 2.22 m2   /69   Pulse 69   Temp 98.2   Temp src 4   SpO2 97   Pain Score 0   Some recent data might be hidden       General: Alert, cooperative, no distress  HEENT: Atraumatic and normocephalic  Neuro: Alert and oriented x3    Lab Results    No results found for this or any previous visit (from the past 168 hour(s)).    Imaging Results    CT Chest w Contrast    Result Date: 7/8/2022  CT CHEST WITH CONTRAST 7/8/2022 10:10 AM CLINICAL HISTORY: Lung nodule, > 8mm; Pulmonary nodules. TECHNIQUE: CT chest with IV contrast. Multiplanar reformats were obtained. Dose reduction techniques were used. CONTRAST: 72 mL Isovue 370 COMPARISON: 4/8/2022 FINDINGS: LUNGS AND PLEURA: Stable 10 mm nodule in the lingula on image 124 of series 4. No infiltrate or effusion. Mild bronchiectasis in the lower lungs. MEDIASTINUM/AXILLAE: No adenopathy or pericardial effusion. Severe coronary artery calcification is present. UPPER ABDOMEN: Dependent stones are seen within the gallbladder. MUSCULOSKELETAL: Degenerative changes are noted through the spine. Old fracture deformity is seen in the mid sternum.     IMPRESSION: 1.  Stable 10 mm nodule in the lingula. This has shown 3 months of stability. Continued follow-up is recommended. 2.  Cholelithiasis. REJI JAIMES MD   SYSTEM ID:  [id number]    A total of 25 minutes was spent today on this visit including face to face conversation with the patient, EMR review (labs, imaging studies, pathology reports and outside records), counseling and care co-ordination and documentation.    Signed by: Cheryl Barriga MD

## 2022-07-18 NOTE — LETTER
7/18/2022         RE: Fawad Garcia  [address]  [place] [address]        Dear Colleague,    Thank you for referring your patient, Fawad Garcia, to the [facility]. Please see a copy of my visit note below.    [facility] Hematology and Oncology Consult Note    Patient: Fawad Garcia  MRN: [id number]  Date of Service: 05/02/2022      Reason for Visit    Chief Complaint   Patient presents with     Oncology Clinic Visit     Prostate Cancer          Assessment/Plan    Problem List Items Addressed This Visit        Urinary    Malignant neoplasm of prostate (H)      Other Visit Diagnoses     Pulmonary nodules    -  Primary    Relevant Orders    CT Chest w/o Contrast        Recurrent prostate cancer, castrate naïve  He had biochemical recurrence with worsening PSA levels over the last few months.  He is status post radiotherapy in the past.  This was almost 10 years ago.  PSA went above 2 thus consistent with biochemical progression per criteria.  PSMA PET scan was done which showed no evidence of metastatic disease but he did have uptake in the prostatic bed consistent with local recurrence.  Treatment was discussed and he was deemed not a candidate for reirradiation or surgery.  Repeat PSA done couple weeks ago came back at 2.71.  This is mildly up from 3 months ago at 2.4.  There is certainly evidence for lower level of recurrence.  But clinically he is doing well without any evidence of disease progression.  He is not symptomatic.  And also on the PSMA PET scan there is no evidence of distant metastatic disease.  In this setting we again reviewed observation versus ADT.  His PSA doubling time is more than 1 year.  In this setting observation is not unreasonable.  He is also on board with this.  We will continue to follow his PSA once every 6 months or so.  Systemic imaging only as clinically indicated or if the PSA starts to rise quickly.    Lung  nodule  Previously the CT part of the PSMA PET scan showed an 9 mm left upper lobe lung nodule.  I repeated his CT chest in 3 months.  CT is now showing a 10 mm lung nodule which is pretty much stable compared to last time.  It has grown minimally if any.  But overall considered to be stable.  Plan is to continue to monitor this.  We will repeat his CT scan of the chest without contrast in 6 months.  Potentially this could be a primary lung malignancy.  If it grows more than a centimeter then we will probably have to biopsy this.  I think this is amenable for bronchoscopy guided biopsy.  He is agreeable with the plan.    ECOG Performance    1 - Can't do physically strenuous work, but fully ambyulatory and can do light sedentary work    Problem List    Patient Active Problem List   Diagnosis     Obesity     Transient cerebral ischemia     Hyperlipidemia LDL goal <70     GERD (gastroesophageal reflux disease)     Peripheral vascular disease (H)     Advanced directives, counseling/discussion     Malignant neoplasm of prostate (H)     Hypertension, goal below 140/90     Senile nuclear sclerosis     Type 2 diabetes mellitus with both eyes affected by mild nonproliferative retinopathy without macular edema, with long-term current use of insulin (H)     Macular degeneration (senile) of retina     H/O amputation of lesser toe, right (H)     TIA (transient ischemic attack)     Chronic kidney disease, stage 3 (H)     Dermatitis     Skin lesion     Tremor of left hand     Left leg weakness     ______________________________________________________________________________    Staging History    Cancer Staging  No matching staging information was found for the patient.      History of presenting illness:  Fawad Garcia is a 86-year-old male with history of early-stage prostate cancer status post radiation who is seen in oncology clinic to discuss further evaluation management of the same.    He has a history of elevated PSA  more than 10 starting in 2003.  He was followed periodically with repeat labs.  Prostatic biopsies done in 2002 and 2005 was negative for any malignancy.  In 2011 he underwent MRI guided biopsy of the prostate which came back showing prostatic adenocarcinoma with a [place] score of 4+3 with 90% of the 2 of the 2 cores positive for malignancy.  He was deemed not a candidate for surgical resection and hence was referred to radiation oncology.  He underwent IMRT in 2012.  He declined ADT.  He was being followed with periodic PSA levels.  Recently it was noted that his PSA was getting elevated again.  It was 1.88 in September and now in March it came up to 2.49.  He had a PSMA PET scan in early April which showed uptake in the prostate gland without any evidence of any other metastatic disease.  This is consistent with possible recurrence in the prostate gland.  He discussed reirradiation therapy with radiation oncology but again was not deemed a candidate for this due to high risk of complications.  Last time we discussed about observation versus ADT.  He preferred observation.  He had a 9 mm lung nodule in the left upper lobe which did not light up on the PSMA scan however there was concern for possible primary lung malignancy and hence I repeated his CT scan in 3 months.  He is here to review the results and make further follow-up plans.    Denies any new issues since last visit.  No new bone pain.  Denies any cough or shortness of breath which is new for him.    Past History    Past Medical History:   Diagnosis Date     Diabetes mellitus (H)      Hypertension      Squamous cell carcinoma     Family History   Problem Relation Age of Onset     Cancer Mother         intestinal CA     Diabetes Father      Diabetes Brother      Other Cancer Son         meagan tumor     Other Cancer Brother         pancreatic cancer     Diabetes Son      Melanoma No family hx of       Past Surgical History:   Procedure Laterality Date      AMPUTATE TOE(S) Right 7/23/2019    Procedure: Partial amputation great toe;  Surgeon: Ethan Montesinos DPM;  Location: [facility]     AMPUTATE TOE(S) Left 12/24/2019    Procedure: Amputation of the great toe, left foot;  Surgeon: Ethan Montesinos DPM;  Location: [facility]     AMPUTATE TOE(S) Right 3/3/2020    Procedure: Partial amputation, second toe, right foot;  Surgeon: Ethan Montesinos DPM;  Location: [facility]     CL AFF SURGICAL PATHOLOGY  2002    prostate biopsy elevated PSA     COLONOSCOPY       HC REMOVE TONSILS/ADENOIDS,<11 Y/O      T & A     IR LOWER EXTREMITY ANGIOGRAM LEFT  2/26/2020     IR LOWER EXTREMITY ANGIOGRAM RIGHT  12/11/2018     PHACOEMULSIFICATION WITH STANDARD INTRAOCULAR LENS IMPLANT Left 4/2/2015    Procedure: PHACOEMULSIFICATION WITH STANDARD INTRAOCULAR LENS IMPLANT;  Surgeon: Joseph Hernandez MD;  Location: [facility]     PHACOEMULSIFICATION WITH STANDARD INTRAOCULAR LENS IMPLANT Right 5/4/2015    Procedure: PHACOEMULSIFICATION WITH STANDARD INTRAOCULAR LENS IMPLANT;  Surgeon: Joseph Hernandez MD;  Location: [facility]     REPAIR HAMMER TOE Left 4/2/2019    Procedure: 5th Metatarsal Head Resection;  Surgeon: Ethan Montesinos DPM;  Location: [facility]    Social History     Socioeconomic History     Marital status:      Spouse name: Not on file     Number of children: Not on file     Years of education: Not on file     Highest education level: Not on file   Occupational History     Not on file   Tobacco Use     Smoking status: Never Smoker     Smokeless tobacco: Never Used   Vaping Use     Vaping Use: Never used   Substance and Sexual Activity     Alcohol use: Yes     Alcohol/week: 1.7 standard drinks     Comment: 1 x month     Drug use: No     Sexual activity: Not Currently     Partners: Female   Other Topics Concern     Parent/sibling w/ CABG, MI or angioplasty before 65F 55M? No   Social History Narrative     Not on file     Social Determinants of Health     Financial Resource  Strain: Not on file   Food Insecurity: Not on file   Transportation Needs: Not on file   Physical Activity: Not on file   Stress: Not on file   Social Connections: Not on file   Intimate Partner Violence: Not on file   Housing Stability: Not on file        Allergies    Allergies   Allergen Reactions     Zocor [Simvastatin - High Dose]      Bilateral hip aching       Review of Systems    Pertinent items are noted in HPI.      Physical Exam    Oncology Vitals 7/18/2022   Height -   Weight 101.56 kg   BSA (m2) 2.22 m2   /69   Pulse 69   Temp 98.2   Temp src 4   SpO2 97   Pain Score 0   Some recent data might be hidden       General: Alert, cooperative, no distress  HEENT: Atraumatic and normocephalic  Neuro: Alert and oriented x3    Lab Results    No results found for this or any previous visit (from the past 168 hour(s)).    Imaging Results    CT Chest w Contrast    Result Date: 7/8/2022  CT CHEST WITH CONTRAST 7/8/2022 10:10 AM CLINICAL HISTORY: Lung nodule, > 8mm; Pulmonary nodules. TECHNIQUE: CT chest with IV contrast. Multiplanar reformats were obtained. Dose reduction techniques were used. CONTRAST: 72 mL Isovue 370 COMPARISON: 4/8/2022 FINDINGS: LUNGS AND PLEURA: Stable 10 mm nodule in the lingula on image 124 of series 4. No infiltrate or effusion. Mild bronchiectasis in the lower lungs. MEDIASTINUM/AXILLAE: No adenopathy or pericardial effusion. Severe coronary artery calcification is present. UPPER ABDOMEN: Dependent stones are seen within the gallbladder. MUSCULOSKELETAL: Degenerative changes are noted through the spine. Old fracture deformity is seen in the mid sternum.     IMPRESSION: 1.  Stable 10 mm nodule in the lingula. This has shown 3 months of stability. Continued follow-up is recommended. 2.  Cholelithiasis. REJI JAIMES MD   SYSTEM ID:  [id number]    A total of 25 minutes was spent today on this visit including face to face conversation with the patient, EMR review (labs, imaging  "studies, pathology reports and outside records), counseling and care co-ordination and documentation.    Signed by: Cheryl Barriga MD    Oncology Rooming Note    July 18, 2022 3:11 PM   Fawad Garcia is a 86 year old male who presents for:    Chief Complaint   Patient presents with     Oncology Clinic Visit     Prostate Cancer      Initial Vitals: /69 (BP Location: Right arm, Patient Position: Sitting, Cuff Size: Adult Regular)   Pulse 69   Temp 98.2  F (36.8  C) (Axillary)   Resp 18   Wt 101.6 kg (223 lb 14.4 oz)   SpO2 97%   BMI 33.06 kg/m   Estimated body mass index is 33.06 kg/m  as calculated from the following:    Height as of 6/14/22: 1.753 m (5' 9\").    Weight as of this encounter: 101.6 kg (223 lb 14.4 oz). Body surface area is 2.22 meters squared.  No Pain (0) Comment: Data Unavailable   No LMP for male patient.  Allergies reviewed: Yes  Medications reviewed: Yes    Medications: Medication refills not needed today.  Pharmacy name entered into EPIC: [facility] - [place], [place] - [address] [place] [address]    Clinical concerns: prostate cancer follow up       Lacie Delcid RN                  Again, thank you for allowing me to participate in the care of your patient.        Sincerely,        Cheryl Barriga MD    "

## 2022-07-28 NOTE — TELEPHONE ENCOUNTER
February 7, 2020    Patient is scheduled for New Patient Consult appointment on 2/25/2020 with [facility] at [facility].     [facility]  [facility]    Office: [phone]  Fax: [phone]      I attempted to reach [place]. No answer. I left a voice mail message that Parvez Serrato reviewed her labs and they are improved. If her diarrhea has resolved or minimal she can restart the abemaciclib at the smaller dose of 150mg by mouth twice a day until she comes for her appointment next week. I left the office phone number if she has any questions or concerns.

## 2022-08-08 ENCOUNTER — HOSPITAL ENCOUNTER (OUTPATIENT)
Dept: PHYSICAL THERAPY | Facility: CLINIC | Age: 87
Setting detail: THERAPIES SERIES
Discharge: HOME | End: 2022-08-08
Attending: INTERNAL MEDICINE
Payer: COMMERCIAL

## 2022-08-08 ENCOUNTER — OFFICE VISIT (OUTPATIENT)
Dept: DERMATOLOGY | Facility: CLINIC | Age: 87
End: 2022-08-08
Payer: COMMERCIAL

## 2022-08-08 DIAGNOSIS — Z85.828 HISTORY OF SKIN CANCER: Primary | ICD-10-CM

## 2022-08-08 DIAGNOSIS — L82.1 SEBORRHEIC KERATOSIS: ICD-10-CM

## 2022-08-08 DIAGNOSIS — D23.9 DERMAL NEVUS: ICD-10-CM

## 2022-08-08 DIAGNOSIS — S91.332D: ICD-10-CM

## 2022-08-08 DIAGNOSIS — L81.4 LENTIGO: ICD-10-CM

## 2022-08-08 DIAGNOSIS — L72.0 EPIDERMAL CYST: ICD-10-CM

## 2022-08-08 DIAGNOSIS — L57.0 AK (ACTINIC KERATOSIS): ICD-10-CM

## 2022-08-08 DIAGNOSIS — D18.01 ANGIOMA OF SKIN: ICD-10-CM

## 2022-08-08 DIAGNOSIS — Z86.006 HISTORY OF MELANOMA IN SITU: ICD-10-CM

## 2022-08-08 PROCEDURE — 17000 DESTRUCT PREMALG LESION: CPT | Performed by: DERMATOLOGY

## 2022-08-08 PROCEDURE — 99213 OFFICE O/P EST LOW 20 MIN: CPT | Mod: 25 | Performed by: DERMATOLOGY

## 2022-08-08 PROCEDURE — 97110 THERAPEUTIC EXERCISES: CPT | Mod: GP | Performed by: PHYSICAL THERAPIST

## 2022-08-08 PROCEDURE — 17003 DESTRUCT PREMALG LES 2-14: CPT | Performed by: DERMATOLOGY

## 2022-08-08 ASSESSMENT — PAIN SCALES - GENERAL: PAINLEVEL: NO PAIN (0)

## 2022-08-08 NOTE — LETTER
8/8/2022         RE: Fawad Garcia  [address]  [place] [address]        Dear Colleague,    Thank you for referring your patient, Fawad Garcia, to the [facility]. Please see a copy of my visit note below.    Fawad Garcia is an extremely pleasant 86 year old year old male patient here today for spot on ear.  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  Patient has no other skin complaints today.  Remainder of the HPI, Meds, PMH, Allergies, FH, and SH was reviewed in chart.      Past Medical History:   Diagnosis Date     Diabetes mellitus (H)      Hypertension      Squamous cell carcinoma        Past Surgical History:   Procedure Laterality Date     AMPUTATE TOE(S) Right 7/23/2019    Procedure: Partial amputation great toe;  Surgeon: Ethan Montesinos DPM;  Location: [facility]     AMPUTATE TOE(S) Left 12/24/2019    Procedure: Amputation of the great toe, left foot;  Surgeon: Ethan Montesinos DPM;  Location: [facility]     AMPUTATE TOE(S) Right 3/3/2020    Procedure: Partial amputation, second toe, right foot;  Surgeon: Ethan Montesinos DPM;  Location: [facility]     CL AFF SURGICAL PATHOLOGY  2002    prostate biopsy elevated PSA     COLONOSCOPY       HC REMOVE TONSILS/ADENOIDS,<11 Y/O      T & A     IR LOWER EXTREMITY ANGIOGRAM LEFT  2/26/2020     IR LOWER EXTREMITY ANGIOGRAM RIGHT  12/11/2018     PHACOEMULSIFICATION WITH STANDARD INTRAOCULAR LENS IMPLANT Left 4/2/2015    Procedure: PHACOEMULSIFICATION WITH STANDARD INTRAOCULAR LENS IMPLANT;  Surgeon: Joseph Hernandez MD;  Location: [facility]     PHACOEMULSIFICATION WITH STANDARD INTRAOCULAR LENS IMPLANT Right 5/4/2015    Procedure: PHACOEMULSIFICATION WITH STANDARD INTRAOCULAR LENS IMPLANT;  Surgeon: Joseph Hernandez MD;  Location: [facility]     REPAIR HAMMER TOE Left 4/2/2019    Procedure: 5th Metatarsal Head Resection;  Surgeon: Ethan Montesinos DPM;  Location: [facility]        Family History   Problem Relation  Age of Onset     Cancer Mother         intestinal CA     Diabetes Father      Diabetes Brother      Other Cancer Son         meagan tumor     Other Cancer Brother         pancreatic cancer     Diabetes Son      Melanoma No family hx of        Social History     Socioeconomic History     Marital status:      Spouse name: Not on file     Number of children: Not on file     Years of education: Not on file     Highest education level: Not on file   Occupational History     Not on file   Tobacco Use     Smoking status: Never Smoker     Smokeless tobacco: Never Used   Vaping Use     Vaping Use: Never used   Substance and Sexual Activity     Alcohol use: Yes     Alcohol/week: 1.7 standard drinks     Comment: 1 x month     Drug use: No     Sexual activity: Not Currently     Partners: Female   Other Topics Concern     Parent/sibling w/ CABG, MI or angioplasty before 65F 55M? No   Social History Narrative     Not on file     Social Determinants of Health     Financial Resource Strain: Not on file   Food Insecurity: Not on file   Transportation Needs: Not on file   Physical Activity: Not on file   Stress: Not on file   Social Connections: Not on file   Intimate Partner Violence: Not on file   Housing Stability: Not on file       Outpatient Encounter Medications as of 8/8/2022   Medication Sig Dispense Refill     amLODIPine (NORVASC) 5 MG tablet Take 1 tablet (5 mg) by mouth daily 90 tablet 3     aspirin (ASA) 325 MG tablet Take 1 tablet (325 mg) by mouth daily (Patient not taking: Reported on 7/18/2022) 30 tablet 0     blood glucose (ONETOUCH ULTRA) test strip 1 strip by In Vitro route daily One touch Ultra. Hold on file until needed. 100 strip 3     Blood Glucose Monitoring Suppl (ONE TOUCH ULTRA SYSTEM KIT) W/DEVICE KIT        clopidogrel (PLAVIX) 75 MG tablet Take 1 tablet (75 mg) by mouth daily 90 tablet 3     dorzolamide-timolol (COSOPT) 2-0.5 % ophthalmic solution        hydrochlorothiazide (HYDRODIURIL) 25 MG  tablet Take 1 tablet (25 mg) by mouth daily 90 tablet 3     insulin glargine (LANTUS SOLOSTAR) 100 UNIT/ML pen Inject 20 Units Subcutaneous At Bedtime 30 mL 11     insulin pen needle (B-D U/F) 31G X 8 MM miscellaneous AS DIRECTED daily.  Hold on file until needed. 100 each 3     Lancets (ONETOUCH DELICA PLUS [id number]) MISC 1 each by In Vitro route See Admin Instructions Hold on file until needed. 100 each 3     lisinopril (ZESTRIL) 40 MG tablet Take 1 tablet (40 mg) by mouth daily 90 tablet 3     metFORMIN (GLUCOPHAGE) 500 MG tablet Take 2 tablets (1,000 mg) by mouth 2 times daily (with meals) 360 tablet 3     Multiple Vitamins-Minerals (PRESERVISION AREDS PO) Take 1 tablet by mouth       ORDER FOR DME Equipment being ordered:   Glucometer 1 Device 1     pantoprazole (PROTONIX) 20 MG EC tablet Take 1 tablet (20 mg) by mouth daily 90 tablet 3     rosuvastatin (CRESTOR) 10 MG tablet Take 1 tablet (10 mg) by mouth At Bedtime 90 tablet 3     triamcinolone (KENALOG) 0.1 % external cream Apply topically 2 times daily 80 g 1     No facility-administered encounter medications on file as of 8/8/2022.             O:   NAD, WDWN, Alert & Oriented, Mood & Affect wnl, Vitals stable   Here today alone   There were no vitals taken for this visit.   General appearance normal   Vitals stable   Alert, oriented and in no acute distress      Following lymph nodes palpated: Occipital, Cervical, Supraclavicular no lad  L helix gritty scaly papule   L PA neck gritty scaly papule   L post ear nodule with comedone   L foot superifical wound      Stuck on papules and brown macules on trunk and ext   Red papules on trunk  Flesh colored papules on trunk     The remainder of the full exam was normal; the following areas were examined:  conjunctiva/lids, oral mucosa, neck, peripheral vascular system, abdomen, lymph nodes, digits/nails, eccrine and apocrine glands, scalp/hair, face, neck, chest, abdomen, buttocks, back, RUE, LUE, RLE, LLE        Eyes: Conjunctivae/lids:Normal     ENT: Lips, buccal mucosa, tongue: normal    MSK:Normal    Cardiovascular: peripheral edema none    Pulm: Breathing Normal    Lymph Nodes: No Head and Neck Lymphadenopathy     Neuro/Psych: Orientation:Alert and Orientedx3 ; Mood/Affect:normal       A/P:  1. Seborrheic keratosis, lentigo, angioma, dermal nevus, hx of melanoma in situ  2. Actinic keratosis   L helix x1, L PA neck x1  3. Epidermal cyst  Excision discussed with patient   4. L foot wound  Wound care discussed with patient   Return to clinic 1 month  It was a pleasure speaking to Fawad Garcia today.  Previous clinic notes and pertinent laboratory tests were reviewed prior to Fawad Garcia's visit.  Nature of benign skin lesions dicussed with patient.  UV precautions reviewed with patient.        Again, thank you for allowing me to participate in the care of your patient.        Sincerely,        Evgeny Torres MD

## 2022-08-08 NOTE — PATIENT INSTRUCTIONS
WOUND CARE INSTRUCTIONS   FOR CRYOSURGERY   Left Ear and Left Neck  This area treated with liquid nitrogen should form a blister (areas treated may or may not blister-skin may just turn dark and slough off). You do not need to bandage the area unless a blister forms and breaks (which may be a few days). When the blister breaks, begin daily dressing changes as follows:   1) Clean and dry the area with tap water using clean Q-tip or sterile gauze pad.   2) Apply Polysporin ointment or Bacitracin ointment over entire wound. Do NOT use Neosporin ointment.   3) Cover the wound with a band-aid or sterile non-stick gauze pad and micropore paper tape.   REPEAT THESE INSTRUCTIONS AT LEAST ONCE A DAY UNTIL THE WOUND HAS COMPLETELY HEALED.   It is an old wives tale that a wound heals better when it is exposed to air and allowed to dry out. The wound will heal faster with a better cosmetic result if it is kept moist with ointment and covered with a bandage.   *Do not let the wound dry out.   Supplies Needed:   *Cotton tipped applicators (Q-tips)   *Polysporin ointment or Bacitracin ointment (NOT NEOSPORIN)   *Band-aids, or non stick gauze pads and micropore paper tape   PATIENT INFORMATION   During the healing process you will notice a number of changes. All wounds develop a small halo of redness surrounding the wound. This means healing is occurring. Severe itching with extensive redness usually indicates sensitivity to the ointment or bandage tape used to dress the wound. You should call our office if this develops.   Swelling and/or discoloration around your surgical site is common, particularly when performed around the eye.   All wounds normally drain. The larger the wound the more drainage there will be. After 7-10 days, you will notice the wound beginning to shrink and new skin will begin to grow. The wound is healed when you can see skin has formed over the entire area. A healed wound has a healthy, shiny look to the  surface and is red to dark pink in color to normalize. Wounds may take approximately 4-6 weeks to heal. Larger wounds may take 6-8 weeks. After the wound is healed you may discontinue dressing changes.   You may experience a sensation of tightness as your wound heals. This is normal and will gradually subside.   Your healed wound may be sensitive to temperature changes. This sensitivity improves with time, but if you re having a lot of discomfort, try to avoid temperature extremes.   Patients frequently experience itching after their wound appears to have healed because of the continue healing under the skin. Plain Vaseline will help relieve the itching.             Wound Care Instructions     FOR SUPERFICIAL WOUNDS     Left Foot    [facility] [phone]    [facility] [phone]                       AFTER 24 HOURS YOU SHOULD REMOVE THE BANDAGE AND BEGIN DAILY DRESSING CHANGES AS FOLLOWS:     1) Remove Dressing.     2) Clean and dry the area with tap water using a Q-tip or sterile gauze pad.     3) Apply Lamisil ointment over entire wound.  Do NOT use Neosporin ointment.     4) Cover the wound with a band-aid, or a sterile non-stick gauze pad and micropore paper tape      REPEAT THESE INSTRUCTIONS AT LEAST ONCE A DAY UNTIL THE WOUND HAS COMPLETELY HEALED.    It is an old wives tale that a wound heals better when it is exposed to air and allowed to dry out. The wound will heal faster with a better cosmetic result if it is kept moist with ointment and covered with a bandage.    **Do not let the wound dry out.**      Supplies Needed:      *Cotton tipped applicators (Q-tips)    *Polysporin Ointment or Bacitracin Ointment (NOT NEOSPORIN)    *Band-aids or non-stick gauze pads and micropore paper tape.      PATIENT INFORMATION:    During the healing process you will notice a number of changes. All wounds develop a small halo of redness surrounding the wound.  This means healing is occurring.  Severe itching with extensive redness usually indicates sensitivity to the ointment or bandage tape used to dress the wound.  You should call our office if this develops.      Swelling  and/or discoloration around your surgical site is common, particularly when performed around the eye.    All wounds normally drain.  The larger the wound the more drainage there will be.  After 7-10 days, you will notice the wound beginning to shrink and new skin will begin to grow.  The wound is healed when you can see skin has formed over the entire area.  A healed wound has a healthy, shiny look to the surface and is red to dark pink in color to normalize.  Wounds may take approximately 4-6 weeks to heal.  Larger wounds may take 6-8 weeks.  After the wound is healed you may discontinue dressing changes.    You may experience a sensation of tightness as your wound heals. This is normal and will gradually subside.    Your healed wound may be sensitive to temperature changes. This sensitivity improves with time, but if you re having a lot of discomfort, try to avoid temperature extremes.    Patients frequently experience itching after their wound appears to have healed because of the continue healing under the skin.  Plain Vaseline will help relieve the itching.        POSSIBLE COMPLICATIONS    BLEEDING:    Leave the bandage in place.  Use tightly rolled up gauze or a cloth to apply direct pressure over the bandage for 30  minutes.  Reapply pressure for an additional 30 minutes if necessary  Use additional gauze and tape to maintain pressure once the bleeding has stopped.           Schedule cyst excision from left ear if it becomes bothersome.

## 2022-08-08 NOTE — PROGRESS NOTES
Fawad Garcia is an extremely pleasant 86 year old year old male patient here today for spot on ear.  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  Patient has no other skin complaints today.  Remainder of the HPI, Meds, PMH, Allergies, FH, and SH was reviewed in chart.      Past Medical History:   Diagnosis Date     Diabetes mellitus (H)      Hypertension      Squamous cell carcinoma        Past Surgical History:   Procedure Laterality Date     AMPUTATE TOE(S) Right 7/23/2019    Procedure: Partial amputation great toe;  Surgeon: Ethan Montesinos DPM;  Location: [facility]     AMPUTATE TOE(S) Left 12/24/2019    Procedure: Amputation of the great toe, left foot;  Surgeon: Ethan Montesinos DPM;  Location: [facility]     AMPUTATE TOE(S) Right 3/3/2020    Procedure: Partial amputation, second toe, right foot;  Surgeon: Ethan Montesinos DPM;  Location: [facility]     CL AFF SURGICAL PATHOLOGY  2002    prostate biopsy elevated PSA     COLONOSCOPY       HC REMOVE TONSILS/ADENOIDS,<13 Y/O      T & A     IR LOWER EXTREMITY ANGIOGRAM LEFT  2/26/2020     IR LOWER EXTREMITY ANGIOGRAM RIGHT  12/11/2018     PHACOEMULSIFICATION WITH STANDARD INTRAOCULAR LENS IMPLANT Left 4/2/2015    Procedure: PHACOEMULSIFICATION WITH STANDARD INTRAOCULAR LENS IMPLANT;  Surgeon: Joseph Hernandez MD;  Location: [facility]     PHACOEMULSIFICATION WITH STANDARD INTRAOCULAR LENS IMPLANT Right 5/4/2015    Procedure: PHACOEMULSIFICATION WITH STANDARD INTRAOCULAR LENS IMPLANT;  Surgeon: Joseph Hernandez MD;  Location: [facility]     REPAIR HAMMER TOE Left 4/2/2019    Procedure: 5th Metatarsal Head Resection;  Surgeon: Ethan Montesinos DPM;  Location: [facility]        Family History   Problem Relation Age of Onset     Cancer Mother         intestinal CA     Diabetes Father      Diabetes Brother      Other Cancer Son         meagan tumor     Other Cancer Brother         pancreatic cancer     Diabetes Son      Melanoma No family hx of        Social History      Socioeconomic History     Marital status:      Spouse name: Not on file     Number of children: Not on file     Years of education: Not on file     Highest education level: Not on file   Occupational History     Not on file   Tobacco Use     Smoking status: Never Smoker     Smokeless tobacco: Never Used   Vaping Use     Vaping Use: Never used   Substance and Sexual Activity     Alcohol use: Yes     Alcohol/week: 1.7 standard drinks     Comment: 1 x month     Drug use: No     Sexual activity: Not Currently     Partners: Female   Other Topics Concern     Parent/sibling w/ CABG, MI or angioplasty before 65F 55M? No   Social History Narrative     Not on file     Social Determinants of Health     Financial Resource Strain: Not on file   Food Insecurity: Not on file   Transportation Needs: Not on file   Physical Activity: Not on file   Stress: Not on file   Social Connections: Not on file   Intimate Partner Violence: Not on file   Housing Stability: Not on file       Outpatient Encounter Medications as of 8/8/2022   Medication Sig Dispense Refill     amLODIPine (NORVASC) 5 MG tablet Take 1 tablet (5 mg) by mouth daily 90 tablet 3     aspirin (ASA) 325 MG tablet Take 1 tablet (325 mg) by mouth daily (Patient not taking: Reported on 7/18/2022) 30 tablet 0     blood glucose (ONETOUCH ULTRA) test strip 1 strip by In Vitro route daily One touch Ultra. Hold on file until needed. 100 strip 3     Blood Glucose Monitoring Suppl (ONE TOUCH ULTRA SYSTEM KIT) W/DEVICE KIT        clopidogrel (PLAVIX) 75 MG tablet Take 1 tablet (75 mg) by mouth daily 90 tablet 3     dorzolamide-timolol (COSOPT) 2-0.5 % ophthalmic solution        hydrochlorothiazide (HYDRODIURIL) 25 MG tablet Take 1 tablet (25 mg) by mouth daily 90 tablet 3     insulin glargine (LANTUS SOLOSTAR) 100 UNIT/ML pen Inject 20 Units Subcutaneous At Bedtime 30 mL 11     insulin pen needle (B-D U/F) 31G X 8 MM miscellaneous AS DIRECTED daily.  Hold on file until  needed. 100 each 3     Lancets (ONETOUCH DELICA PLUS [id number]) MISC 1 each by In Vitro route See Admin Instructions Hold on file until needed. 100 each 3     lisinopril (ZESTRIL) 40 MG tablet Take 1 tablet (40 mg) by mouth daily 90 tablet 3     metFORMIN (GLUCOPHAGE) 500 MG tablet Take 2 tablets (1,000 mg) by mouth 2 times daily (with meals) 360 tablet 3     Multiple Vitamins-Minerals (PRESERVISION AREDS PO) Take 1 tablet by mouth       ORDER FOR DME Equipment being ordered:   Glucometer 1 Device 1     pantoprazole (PROTONIX) 20 MG EC tablet Take 1 tablet (20 mg) by mouth daily 90 tablet 3     rosuvastatin (CRESTOR) 10 MG tablet Take 1 tablet (10 mg) by mouth At Bedtime 90 tablet 3     triamcinolone (KENALOG) 0.1 % external cream Apply topically 2 times daily 80 g 1     No facility-administered encounter medications on file as of 8/8/2022.             O:   NAD, WDWN, Alert & Oriented, Mood & Affect wnl, Vitals stable   Here today alone   There were no vitals taken for this visit.   General appearance normal   Vitals stable   Alert, oriented and in no acute distress      Following lymph nodes palpated: Occipital, Cervical, Supraclavicular no lad  L helix gritty scaly papule   L PA neck gritty scaly papule   L post ear nodule with comedone   L foot superifical wound      Stuck on papules and brown macules on trunk and ext   Red papules on trunk  Flesh colored papules on trunk     The remainder of the full exam was normal; the following areas were examined:  conjunctiva/lids, oral mucosa, neck, peripheral vascular system, abdomen, lymph nodes, digits/nails, eccrine and apocrine glands, scalp/hair, face, neck, chest, abdomen, buttocks, back, RUE, LUE, RLE, LLE       Eyes: Conjunctivae/lids:Normal     ENT: Lips, buccal mucosa, tongue: normal    MSK:Normal    Cardiovascular: peripheral edema none    Pulm: Breathing Normal    Lymph Nodes: No Head and Neck Lymphadenopathy     Neuro/Psych: Orientation:Alert and Orientedx3 ;  Mood/Affect:normal       A/P:  1. Seborrheic keratosis, lentigo, angioma, dermal nevus, hx of melanoma in situ  2. Actinic keratosis   L helix x1, L PA neck x1  LN2:  Treated with LN2 for 5s for 1-2 cycles. Warned risks of blistering, pain, pigment change, scarring, and incomplete resolution.  Advised patient to return if lesions do not completely resolve.  Wound care sheet given.    3. Epidermal cyst  Excision discussed with patient   4. L foot wound  Wound care discussed with patient   Return to clinic 1 month  It was a pleasure speaking to Fawad Garcia today.  Previous clinic notes and pertinent laboratory tests were reviewed prior to Fawad Garcia's visit.  Nature of benign skin lesions dicussed with patient.  UV precautions reviewed with patient.

## 2022-08-23 NOTE — PROGRESS NOTES
Earline Rodríguez  : 1950  Primary: Medicare Part A And B  Secondary: [address] at [facility]  [address] [facility], [address] [place] [address],[address] [place] [address].  [phone]   [phone]        Pt had to cancel session today.     Anand Ramirez PT MSPT Please see pended labs.    Gina STEVEN, RN

## 2022-09-02 ENCOUNTER — HOSPITAL ENCOUNTER (OUTPATIENT)
Dept: PHYSICAL THERAPY | Facility: CLINIC | Age: 87
Setting detail: THERAPIES SERIES
Discharge: HOME | End: 2022-09-02
Attending: INTERNAL MEDICINE
Payer: COMMERCIAL

## 2022-09-02 PROCEDURE — 97110 THERAPEUTIC EXERCISES: CPT | Mod: GP | Performed by: PHYSICAL THERAPIST

## 2022-09-02 NOTE — PROGRESS NOTES
"   OUTPATIENT PHYSICAL THERAPY PROGRESS NOTE    09/02/22 1400   Signing Clinician's Name / Credentials   Signing clinician's name / credentials Vee Zelaya PT   Session Number   Session Number 3   Authorization status cert required   Progress Note/Recertification   Progress Note Due Date 09/20/22   Progress Note Completed Date 09/02/22   Adult Goals   PT Ortho Eval Goals 1;2;3   Ortho Goal 1   Goal Identifier 1.   Goal Description Pt will be able to walk indoor w/o assistive device   Goal Progress 8/8/22 uses cane all the time.  9/2/22 uses cane full time due to toes   Target Date 08/20/22   Date Met   (No longer goal as this may not change)   Ortho Goal 2   Goal Identifier 2.   Goal Description Pt will have decreased reliance on UE w/ sit to stand   Goal Progress 9/2/22 gradual improvement   Target Date 08/20/22   Ortho Goal 3   Goal Identifier 3.   Goal Description Pt will be independent and consistent w/HEP to manage symptoms   Goal Progress 8/8/22 inconsistent.  9/2/22  doing ex 1X/week.   Target Date 08/20/22   Subjective Report   Subjective Report Pt states he is doing pretty good--notes his L leg feels good most of the time.   Pt is golfing--notes he feels stable to swing.  Pt notes the L LE feeling \"weak/ tricky \" is less often   Objective Measures    MMT: hip abd R 4+/5, L 4/5    Tug 14.2\"   Treatment Interventions   Therapeutic Procedure/exercise   Treatment Detail Clam x 20 B.  SLR X   20 B  (cuing to keep knee straight).  Bridges X 20.  Seated LAQ 1#X 20.  Standing w/ B UE support:  marching X 20 B (alternating legs--emphasized soft landings),  Hip ABD R X 15, L X 20 ( difficulty w/ prolonged standing on L LE).  Off set tandem stand 30\" B (no touches).  Sidestepping 5' x 2 (no touches).  Sit to stand to plinth at lower thigh w/o UE assist X 15 (cuing for soft landings)   Plan   Home program ex as above   Plan  Pt to cont on own w/HEP .  Chart open X 30 days in case he has further problems then would " schedule 1 additional visit.   Comments   Comments Pt cont to work towards goals but has a complete ex program --reinforced importance of ex follow thru     RECERTIFICATION    Fawad Garcia  1935  MRN: [id number]    Session Number: 3/ 4 planned [facility] since start of care.    Diagnosis: L LE weakness  Onset Date: 5/20/22  Start of Care: 7/1/22    Reasons for Continuing Treatment:   Ongoing progression of ex to improve L LE strength for daily function    Frequency/Duration  1X/ 2 weeks  for a total of 2 visits.    Recertification Period  8/20/22 - 9/20/22    Physician Signature:    Date:    X_______________________________________________________    Physician Name: Betzy Doss, DO    I certify the need for these services furnished under this plan of treatment and while under my care. Physician co-signature of this document indicates review and certification of the therapy plan.  This signature may be written on paper, or electronically signed within [employer].

## 2022-09-19 ENCOUNTER — OFFICE VISIT (OUTPATIENT)
Dept: DERMATOLOGY | Facility: CLINIC | Age: 87
End: 2022-09-19
Payer: COMMERCIAL

## 2022-09-19 DIAGNOSIS — S81.802S WOUND OF LEFT LOWER EXTREMITY, SEQUELA: Primary | ICD-10-CM

## 2022-09-19 PROCEDURE — 99212 OFFICE O/P EST SF 10 MIN: CPT | Performed by: DERMATOLOGY

## 2022-09-19 NOTE — PROGRESS NOTES
Fawad Garcia is an extremely pleasant 86 year old year old male patient here today for wound check lft foot from trauma. Healing no issues.  Patient has no other skin complaints today.  Remainder of the HPI, Meds, PMH, Allergies, FH, and SH was reviewed in chart.      Past Medical History:   Diagnosis Date     Diabetes mellitus (H)      Hypertension      Squamous cell carcinoma        Past Surgical History:   Procedure Laterality Date     AMPUTATE TOE(S) Right 7/23/2019    Procedure: Partial amputation great toe;  Surgeon: Ethan Montesinos DPM;  Location: [facility]     AMPUTATE TOE(S) Left 12/24/2019    Procedure: Amputation of the great toe, left foot;  Surgeon: Ethan Montesinos DPM;  Location: [facility]     AMPUTATE TOE(S) Right 3/3/2020    Procedure: Partial amputation, second toe, right foot;  Surgeon: Ethan Montesinos DPM;  Location: [facility]     [facility]  2002    prostate biopsy elevated PSA     COLONOSCOPY       HC REMOVE TONSILS/ADENOIDS,<11 Y/O      T & A     IR LOWER EXTREMITY ANGIOGRAM LEFT  2/26/2020     IR LOWER EXTREMITY ANGIOGRAM RIGHT  12/11/2018     PHACOEMULSIFICATION WITH STANDARD INTRAOCULAR LENS IMPLANT Left 4/2/2015    Procedure: PHACOEMULSIFICATION WITH STANDARD INTRAOCULAR LENS IMPLANT;  Surgeon: Joseph Hernandez MD;  Location: [facility]     PHACOEMULSIFICATION WITH STANDARD INTRAOCULAR LENS IMPLANT Right 5/4/2015    Procedure: PHACOEMULSIFICATION WITH STANDARD INTRAOCULAR LENS IMPLANT;  Surgeon: Joseph Hernandez MD;  Location: [facility]     REPAIR HAMMER TOE Left 4/2/2019    Procedure: 5th Metatarsal Head Resection;  Surgeon: Ethan Montesinos DPM;  Location: [facility]        Family History   Problem Relation Age of Onset     Cancer Mother         intestinal CA     Diabetes Father      Diabetes Brother      Other Cancer Son         meagan tumor     Other Cancer Brother         pancreatic cancer     Diabetes Son      Melanoma No family hx of        Social History      Socioeconomic History     Marital status:      Spouse name: Not on file     Number of children: Not on file     Years of education: Not on file     Highest education level: Not on file   Occupational History     Not on file   Tobacco Use     Smoking status: Never Smoker     Smokeless tobacco: Never Used   Vaping Use     Vaping Use: Never used   Substance and Sexual Activity     Alcohol use: Yes     Alcohol/week: 1.7 standard drinks     Comment: 1 x month     Drug use: No     Sexual activity: Not Currently     Partners: Female   Other Topics Concern     Parent/sibling w/ CABG, MI or angioplasty before 65F 55M? No   Social History Narrative     Not on file     Social Determinants of Health     Financial Resource Strain: Not on file   Food Insecurity: Not on file   Transportation Needs: Not on file   Physical Activity: Not on file   Stress: Not on file   Social Connections: Not on file   Intimate Partner Violence: Not on file   Housing Stability: Not on file       Outpatient Encounter Medications as of 9/19/2022   Medication Sig Dispense Refill     amLODIPine (NORVASC) 5 MG tablet Take 1 tablet (5 mg) by mouth daily 90 tablet 3     aspirin (ASA) 325 MG tablet Take 1 tablet (325 mg) by mouth daily (Patient not taking: No sig reported) 30 tablet 0     blood glucose (ONETOUCH ULTRA) test strip 1 strip by In Vitro route daily One touch Ultra. Hold on file until needed. 100 strip 3     Blood Glucose Monitoring Suppl (ONE TOUCH ULTRA SYSTEM KIT) W/DEVICE KIT        clopidogrel (PLAVIX) 75 MG tablet Take 1 tablet (75 mg) by mouth daily 90 tablet 3     dorzolamide-timolol (COSOPT) 2-0.5 % ophthalmic solution        hydrochlorothiazide (HYDRODIURIL) 25 MG tablet Take 1 tablet (25 mg) by mouth daily 90 tablet 3     insulin glargine (LANTUS SOLOSTAR) 100 UNIT/ML pen Inject 20 Units Subcutaneous At Bedtime 30 mL 11     insulin pen needle (B-D U/F) 31G X 8 MM miscellaneous AS DIRECTED daily.  Hold on file until needed.  100 each 3     Lancets (ONETOUCH DELICA PLUS [id number]) MISC 1 each by In Vitro route See Admin Instructions Hold on file until needed. 100 each 3     lisinopril (ZESTRIL) 40 MG tablet Take 1 tablet (40 mg) by mouth daily 90 tablet 3     metFORMIN (GLUCOPHAGE) 500 MG tablet Take 2 tablets (1,000 mg) by mouth 2 times daily (with meals) 360 tablet 3     Multiple Vitamins-Minerals (PRESERVISION AREDS PO) Take 1 tablet by mouth       ORDER FOR DME Equipment being ordered:   Glucometer 1 Device 1     pantoprazole (PROTONIX) 20 MG EC tablet Take 1 tablet (20 mg) by mouth daily 90 tablet 3     rosuvastatin (CRESTOR) 10 MG tablet Take 1 tablet (10 mg) by mouth At Bedtime 90 tablet 3     triamcinolone (KENALOG) 0.1 % external cream Apply topically 2 times daily 80 g 1     No facility-administered encounter medications on file as of 9/19/2022.             O:   NAD, WDWN, Alert & Oriented, Mood & Affect wnl, Vitals stable   Here today alone    General appearance normal   Vitals stable   Alert, oriented and in no acute distress     L medial ankle healthy granulating wound       Eyes: Conjunctivae/lids:Normal     ENT: Lips, buccal mucosa, tongue: normal    MSK:Normal    Cardiovascular: peripheral edema none    Pulm: Breathing Normal    Neuro/Psych: Orientation:Alert and Orientedx3 ; Mood/Affect:normal       A/P:  1. l foot wound healing  Compression discussed with patient he declines  Open wound care discussed with patient   It was a pleasure speaking to Fawad Garcia today.  Return to clinic 1 month  Previous clinic notes and pertinent laboratory tests were reviewed prior to Fawad Garcia's visit.

## 2022-09-19 NOTE — LETTER
9/19/2022         RE: Fawad Garcia  [address]  [facility] [address]        Dear Colleague,    Thank you for referring your patient, Fawad Garcia, to the [facility]. Please see a copy of my visit note below.    Fawad Garcia is an extremely pleasant 86 year old year old male patient here today for wound check lft foot from trauma. Healing no issues.  Patient has no other skin complaints today.  Remainder of the HPI, Meds, PMH, Allergies, FH, and SH was reviewed in chart.      Past Medical History:   Diagnosis Date     Diabetes mellitus (H)      Hypertension      Squamous cell carcinoma        Past Surgical History:   Procedure Laterality Date     AMPUTATE TOE(S) Right 7/23/2019    Procedure: Partial amputation great toe;  Surgeon: Ethan Montesinos DPM;  Location: [facility]     AMPUTATE TOE(S) Left 12/24/2019    Procedure: Amputation of the great toe, left foot;  Surgeon: Ethan Montesinos DPM;  Location: [facility]     AMPUTATE TOE(S) Right 3/3/2020    Procedure: Partial amputation, second toe, right foot;  Surgeon: Ethan Montesinos DPM;  Location: [facility]     CL [facility] SURGICAL PATHOLOGY  2002    prostate biopsy elevated PSA     COLONOSCOPY       HC REMOVE TONSILS/ADENOIDS,<13 Y/O      T & A     IR LOWER EXTREMITY ANGIOGRAM LEFT  2/26/2020     IR LOWER EXTREMITY ANGIOGRAM RIGHT  12/11/2018     PHACOEMULSIFICATION WITH STANDARD INTRAOCULAR LENS IMPLANT Left 4/2/2015    Procedure: PHACOEMULSIFICATION WITH STANDARD INTRAOCULAR LENS IMPLANT;  Surgeon: Joseph Hernandez MD;  Location: [facility]     PHACOEMULSIFICATION WITH STANDARD INTRAOCULAR LENS IMPLANT Right 5/4/2015    Procedure: PHACOEMULSIFICATION WITH STANDARD INTRAOCULAR LENS IMPLANT;  Surgeon: Joseph Hernandez MD;  Location: [facility]     REPAIR HAMMER TOE Left 4/2/2019    Procedure: 5th Metatarsal Head Resection;  Surgeon: Ethan Montesinos DPM;  Location: [facility]        Family History   Problem Relation  Age of Onset     Cancer Mother         intestinal CA     Diabetes Father      Diabetes Brother      Other Cancer Son         meagan tumor     Other Cancer Brother         pancreatic cancer     Diabetes Son      Melanoma No family hx of        Social History     Socioeconomic History     Marital status:      Spouse name: Not on file     Number of children: Not on file     Years of education: Not on file     Highest education level: Not on file   Occupational History     Not on file   Tobacco Use     Smoking status: Never Smoker     Smokeless tobacco: Never Used   Vaping Use     Vaping Use: Never used   Substance and Sexual Activity     Alcohol use: Yes     Alcohol/week: 1.7 standard drinks     Comment: 1 x month     Drug use: No     Sexual activity: Not Currently     Partners: Female   Other Topics Concern     Parent/sibling w/ CABG, MI or angioplasty before 65F 55M? No   Social History Narrative     Not on file     Social Determinants of Health     Financial Resource Strain: Not on file   Food Insecurity: Not on file   Transportation Needs: Not on file   Physical Activity: Not on file   Stress: Not on file   Social Connections: Not on file   Intimate Partner Violence: Not on file   Housing Stability: Not on file       Outpatient Encounter Medications as of 9/19/2022   Medication Sig Dispense Refill     amLODIPine (NORVASC) 5 MG tablet Take 1 tablet (5 mg) by mouth daily 90 tablet 3     aspirin (ASA) 325 MG tablet Take 1 tablet (325 mg) by mouth daily (Patient not taking: No sig reported) 30 tablet 0     blood glucose (ONETOUCH ULTRA) test strip 1 strip by In Vitro route daily One touch Ultra. Hold on file until needed. 100 strip 3     Blood Glucose Monitoring Suppl (ONE TOUCH ULTRA SYSTEM KIT) W/DEVICE KIT        clopidogrel (PLAVIX) 75 MG tablet Take 1 tablet (75 mg) by mouth daily 90 tablet 3     dorzolamide-timolol (COSOPT) 2-0.5 % ophthalmic solution        hydrochlorothiazide (HYDRODIURIL) 25 MG tablet  Take 1 tablet (25 mg) by mouth daily 90 tablet 3     insulin glargine (LANTUS SOLOSTAR) 100 UNIT/ML pen Inject 20 Units Subcutaneous At Bedtime 30 mL 11     insulin pen needle (B-D U/F) 31G X 8 MM miscellaneous AS DIRECTED daily.  Hold on file until needed. 100 each 3     Lancets (ONETOUCH DELICA PLUS [id number]) MISC 1 each by In Vitro route See Admin Instructions Hold on file until needed. 100 each 3     lisinopril (ZESTRIL) 40 MG tablet Take 1 tablet (40 mg) by mouth daily 90 tablet 3     metFORMIN (GLUCOPHAGE) 500 MG tablet Take 2 tablets (1,000 mg) by mouth 2 times daily (with meals) 360 tablet 3     Multiple Vitamins-Minerals (PRESERVISION AREDS PO) Take 1 tablet by mouth       ORDER FOR DME Equipment being ordered:   Glucometer 1 Device 1     pantoprazole (PROTONIX) 20 MG EC tablet Take 1 tablet (20 mg) by mouth daily 90 tablet 3     rosuvastatin (CRESTOR) 10 MG tablet Take 1 tablet (10 mg) by mouth At Bedtime 90 tablet 3     triamcinolone (KENALOG) 0.1 % external cream Apply topically 2 times daily 80 g 1     No facility-administered encounter medications on file as of 9/19/2022.             O:   NAD, WDWN, Alert & Oriented, Mood & Affect wnl, Vitals stable   Here today alone    General appearance normal   Vitals stable   Alert, oriented and in no acute distress     L medial ankle healthy granulating wound       Eyes: Conjunctivae/lids:Normal     ENT: Lips, buccal mucosa, tongue: normal    MSK:Normal    Cardiovascular: peripheral edema none    Pulm: Breathing Normal    Neuro/Psych: Orientation:Alert and Orientedx3 ; Mood/Affect:normal       A/P:  1. l foot wound healing  Compression discussed with patient he declines  Open wound care discussed with patient   It was a pleasure speaking to Fawad Garcia today.  Return to clinic 1 month  Previous clinic notes and pertinent laboratory tests were reviewed prior to Fawad Garcia's visit.        Again, thank you for allowing me to participate in the care  of your patient.        Sincerely,        Evgeny Torres MD

## 2022-09-19 NOTE — PATIENT INSTRUCTIONS
Wound Care Instructions     FOR SUPERFICIAL WOUNDS     [facility] [phone]    [facility] [phone]                       AFTER 24 HOURS YOU SHOULD REMOVE THE BANDAGE AND BEGIN DAILY DRESSING CHANGES AS FOLLOWS:     1) Remove Dressing.     2) Clean and dry the area with tap water using a Q-tip or sterile gauze pad.     3) Apply Vaseline, Aquaphor, Polysporin ointment or Bacitracin ointment over entire wound.  Do NOT use Neosporin ointment.     4) Cover the wound with a band-aid, or a sterile non-stick gauze pad and micropore paper tape      REPEAT THESE INSTRUCTIONS AT LEAST ONCE A DAY UNTIL THE WOUND HAS COMPLETELY HEALED.    It is an old wives tale that a wound heals better when it is exposed to air and allowed to dry out. The wound will heal faster with a better cosmetic result if it is kept moist with ointment and covered with a bandage.    **Do not let the wound dry out.**      Supplies Needed:      *Cotton tipped applicators (Q-tips)    *Polysporin Ointment or Bacitracin Ointment (NOT NEOSPORIN)    *Band-aids or non-stick gauze pads and micropore paper tape.      PATIENT INFORMATION:    During the healing process you will notice a number of changes. All wounds develop a small halo of redness surrounding the wound.  This means healing is occurring. Severe itching with extensive redness usually indicates sensitivity to the ointment or bandage tape used to dress the wound.  You should call our office if this develops.      Swelling  and/or discoloration around your surgical site is common, particularly when performed around the eye.    All wounds normally drain.  The larger the wound the more drainage there will be.  After 7-10 days, you will notice the wound beginning to shrink and new skin will begin to grow.  The wound is healed when you can see skin has formed over the entire area.  A healed wound has a healthy, shiny look to the surface and is red to dark pink in color  to normalize.  Wounds may take approximately 4-6 weeks to heal.  Larger wounds may take 6-8 weeks.  After the wound is healed you may discontinue dressing changes.    You may experience a sensation of tightness as your wound heals. This is normal and will gradually subside.    Your healed wound may be sensitive to temperature changes. This sensitivity improves with time, but if you re having a lot of discomfort, try to avoid temperature extremes.    Patients frequently experience itching after their wound appears to have healed because of the continue healing under the skin.  Plain Vaseline will help relieve the itching.        POSSIBLE COMPLICATIONS    BLEEDING:    Leave the bandage in place.  Use tightly rolled up gauze or a cloth to apply direct pressure over the bandage for 30  minutes.  Reapply pressure for an additional 30 minutes if necessary  Use additional gauze and tape to maintain pressure once the bleeding has stopped.

## 2022-10-07 ENCOUNTER — IMMUNIZATION (OUTPATIENT)
Dept: FAMILY MEDICINE | Facility: CLINIC | Age: 87
End: 2022-10-07
Payer: COMMERCIAL

## 2022-10-07 DIAGNOSIS — Z23 HIGH PRIORITY FOR 2019-NCOV VACCINE: Primary | ICD-10-CM

## 2022-10-07 PROCEDURE — 0124A COVID-19,PF,PFIZER BOOSTER BIVALENT: CPT

## 2022-10-07 PROCEDURE — 91312 COVID-19,PF,PFIZER BOOSTER BIVALENT: CPT

## 2022-10-10 NOTE — PROGRESS NOTES
" OUTPATIENT PHYSICAL THERAPY DISCHARGE SUMMARY    Betzy Doss, DO 7/1/22 to 09/02/22 1400   Signing Clinician's Name / Credentials   Signing clinician's name / credentials Vee Zelaya, PT   Session Number   Session Number 3   Adult Goals   PT Ortho Eval Goals 1;2;3   Ortho Goal 1   Goal Identifier 1.   Goal Description Pt will be able to walk indoor w/o assistive device   Goal Progress 8/8/22 uses cane all the time.  9/2/22 uses cane full time due to toes   Target Date 08/20/22   Date Met    (No longer goal as this may not change)   Ortho Goal 2   Goal Identifier 2.   Goal Description Pt will have decreased reliance on UE w/ sit to stand   Goal Progress 9/2/22 gradual improvement   Target Date 08/20/22   Ortho Goal 3   Goal Identifier 3.   Goal Description Pt will be independent and consistent w/HEP to manage symptoms   Goal Progress 8/8/22 inconsistent.  9/2/22  doing ex 1X/week.   Target Date 08/20/22   Subjective Report   Subjective Report Pt states he is doing pretty good--notes his L leg feels good most of the time.   Pt is golfing--notes he feels stable to swing.  Pt notes the L LE feeling \"weak/ tricky \" is less often   Objective Measures     MMT: hip abd R 4+/5, L 4/5     Tug 14.2\"   Treatment Interventions   Therapeutic Procedure/exercise   Treatment Detail Clam x 20 B.  SLR X   20 B  (cuing to keep knee straight).  Bridges X 20.  Seated LAQ 1#X 20.  Standing w/ B UE support:  marching X 20 B (alternating legs--emphasized soft landings),  Hip ABD R X 15, L X 20 ( difficulty w/ prolonged standing on L LE).  Off set tandem stand 30\" B (no touches).  Sidestepping 5' x 2 (no touches).  Sit to stand to plinth at lower thigh w/o UE assist X 15 (cuing for soft landings)   Plan   Home program ex as above   Plan  Pt to cont on own w/HEP.  Discharge from physical therapy.   Comments   Comments Pt cont to work towards goals but has a complete ex program --reinforced importance of ex follow thru      "

## 2022-10-17 ENCOUNTER — NURSE TRIAGE (OUTPATIENT)
Dept: NURSING | Facility: CLINIC | Age: 87
End: 2022-10-17

## 2022-10-17 NOTE — TELEPHONE ENCOUNTER
Has covid. Coughing for a week. Tested negative Monday 10/10/22 and Thursday 10/14/22. Tested positive on 10/14/22.    Has vomited.  10/14 and 10/15.  Has a cough most of the time anyway, he said because of lisinopril.  Breathing is fine.  Afebrile.  Not interested in antiviral treatment.  He's feeling better.    He is concerned about his:  Blood sugars running high. 259 this a.m.    Encouraged him to rest, drink fluids, monitor temp twice daily.   Understands his cough may persist for another week.  Stated understanding of isolating and to wear a mask for 5 days beyond isolation.  Told to call if his breathing becomes harder.  Call if fever develops.  Call if blood glucose doesn't come down now that he's feeling better.  Call if becomes weak.  Caller stated understanding and agreement of all the above.      Reason for Disposition    Blood glucose 240 - 300 mg/dL (13.3 - 16.7 mmol/L)    [1] HIGH RISK for severe COVID complications (e.g., weak immune system, age > 64 years, obesity with BMI of 30 or higher, pregnant, chronic lung disease or other chronic medical condition) AND [2] COVID symptoms (e.g., cough, fever)  (Exceptions: Already seen by PCP and no new or worsening symptoms.)    Additional Information    Negative: Unconscious or difficult to awaken    Negative: Acting confused (e.g., disoriented, slurred speech)    Negative: Very weak (can't stand)    Negative: Sounds like a life-threatening emergency to the triager    Negative: Vomiting and signs of dehydration (e.g., very dry mouth, lightheaded, dark urine)    Negative: Blood glucose > 240 mg/dL (13.3 mmol/L) and rapid breathing    Negative: Blood glucose > 500 mg/dL (27.8 mmol/L)    Negative: Blood glucose > 240 mg/dL (13.3 mmol/L) AND urine ketones moderate-large (or more than 1+)    Negative: Blood glucose > 240 mg/dL (13.3 mmol/L) and blood ketones > 1.4 mmol/L    Negative: Blood glucose > 240 mg/dL (13.3 mmol/L) AND vomiting AND unable to check for  ketones (in blood or urine)    Negative: Vomiting lasting > 4 hours    Negative: Patient sounds very sick or weak to the triager    Negative: Fever > 100.4 F (38.0 C)    Negative: Caller has URGENT medication or insulin pump question and triager unable to answer question    Negative: Blood glucose > 400 mg/dL (22.2 mmol/L)    Negative: Blood glucose > 300 mg/dL (16.7 mmol/L) AND two or more times in a row    Negative: Urine ketones moderate - large (or blood ketones > 1.4 mmol/L)    Negative: New-onset diabetes mellitus suspected (e.g., frequent urination, weak, weight loss)    Negative: Symptoms of high blood sugar (e.g., frequent urination, weak, weight loss) and not able to test blood glucose    Negative: Patient wants to be seen    Negative: Caller has NON-URGENT medication question about med that PCP prescribed and triager unable to answer question    Negative: SEVERE difficulty breathing (e.g., struggling for each breath, speaks in single words)    Negative: Difficult to awaken or acting confused (e.g., disoriented, slurred speech)    Negative: Bluish (or gray) lips or face now    Negative: Shock suspected (e.g., cold/pale/clammy skin, too weak to stand, low BP, rapid pulse)    Negative: Sounds like a life-threatening emergency to the triager    Negative: SEVERE or constant chest pain or pressure  (Exception: Mild central chest pain, present only when coughing.)    Negative: MODERATE difficulty breathing (e.g., speaks in phrases, SOB even at rest, pulse 100-120)    Negative: Headache and stiff neck (can't touch chin to chest)    Negative: Oxygen level (e.g., pulse oximetry) 90 percent or lower    Negative: Chest pain or pressure  (Exception: MILD central chest pain, present only when coughing)    Negative: Patient sounds very sick or weak to the triager    Negative: MILD difficulty breathing (e.g., minimal/no SOB at rest, SOB with walking, pulse <100)    Negative: Fever > 103 F (39.4 C)    Negative: [1] Fever  > 101 F (38.3 C) AND [2] over 60 years of age    Negative: [1] Fever > 100.0 F (37.8 C) AND [2] bedridden (e.g., nursing home patient, CVA, chronic illness, recovering from surgery)    Protocols used: DIABETES - HIGH BLOOD SUGAR-A-OH, CORONAVIRUS (COVID-19) DIAGNOSED OR [id number]    Isabelle TRONCOSO RN [facility]

## 2022-10-18 ENCOUNTER — HOSPITAL ENCOUNTER (EMERGENCY)
Facility: CLINIC | Age: 87
Discharge: HOME | End: 2022-10-19
Attending: EMERGENCY MEDICINE | Admitting: EMERGENCY MEDICINE
Payer: COMMERCIAL

## 2022-10-18 ENCOUNTER — APPOINTMENT (OUTPATIENT)
Dept: ULTRASOUND IMAGING | Facility: CLINIC | Age: 87
End: 2022-10-18
Attending: EMERGENCY MEDICINE
Payer: COMMERCIAL

## 2022-10-18 VITALS
HEIGHT: 71 IN | WEIGHT: 220 LBS | SYSTOLIC BLOOD PRESSURE: 166 MMHG | OXYGEN SATURATION: 96 % | HEART RATE: 62 BPM | BODY MASS INDEX: 30.8 KG/M2 | DIASTOLIC BLOOD PRESSURE: 98 MMHG | RESPIRATION RATE: 18 BRPM | TEMPERATURE: 98.2 F

## 2022-10-18 DIAGNOSIS — U07.1 INFECTION DUE TO 2019 NOVEL CORONAVIRUS: ICD-10-CM

## 2022-10-18 DIAGNOSIS — R21 RASH AND NONSPECIFIC SKIN ERUPTION: ICD-10-CM

## 2022-10-18 DIAGNOSIS — N43.3 RIGHT HYDROCELE: ICD-10-CM

## 2022-10-18 PROCEDURE — 99284 EMERGENCY DEPT VISIT MOD MDM: CPT | Mod: 25 | Performed by: EMERGENCY MEDICINE

## 2022-10-18 PROCEDURE — 76870 US EXAM SCROTUM: CPT

## 2022-10-18 PROCEDURE — 99282 EMERGENCY DEPT VISIT SF MDM: CPT | Performed by: EMERGENCY MEDICINE

## 2022-10-18 ASSESSMENT — ENCOUNTER SYMPTOMS
CONSTITUTIONAL NEGATIVE: 1
NEUROLOGICAL NEGATIVE: 1
EYES NEGATIVE: 1
HEMATOLOGIC/LYMPHATIC NEGATIVE: 1
GASTROINTESTINAL NEGATIVE: 1
CARDIOVASCULAR NEGATIVE: 1
ENDOCRINE NEGATIVE: 1
PSYCHIATRIC NEGATIVE: 1
RESPIRATORY NEGATIVE: 1
MUSCULOSKELETAL NEGATIVE: 1
ALLERGIC/IMMUNOLOGIC NEGATIVE: 1

## 2022-10-18 ASSESSMENT — ACTIVITIES OF DAILY LIVING (ADL): ADLS_ACUITY_SCORE: 35

## 2022-10-19 ENCOUNTER — TELEPHONE (OUTPATIENT)
Dept: UROLOGY | Facility: CLINIC | Age: 87
End: 2022-10-19

## 2022-10-19 ENCOUNTER — HOSPITAL ENCOUNTER (EMERGENCY)
Facility: CLINIC | Age: 87
Discharge: HOME | End: 2022-10-20
Attending: EMERGENCY MEDICINE | Admitting: EMERGENCY MEDICINE
Payer: COMMERCIAL

## 2022-10-19 DIAGNOSIS — Z13.220 SCREENING FOR HYPERLIPIDEMIA: ICD-10-CM

## 2022-10-19 DIAGNOSIS — Z79.4 TYPE 2 DIABETES MELLITUS WITH BOTH EYES AFFECTED BY MILD NONPROLIFERATIVE RETINOPATHY WITHOUT MACULAR EDEMA, WITH LONG-TERM CURRENT USE OF INSULIN (H): ICD-10-CM

## 2022-10-19 DIAGNOSIS — R73.9 HYPERGLYCEMIA: ICD-10-CM

## 2022-10-19 DIAGNOSIS — E11.3293 TYPE 2 DIABETES MELLITUS WITH BOTH EYES AFFECTED BY MILD NONPROLIFERATIVE RETINOPATHY WITHOUT MACULAR EDEMA, WITH LONG-TERM CURRENT USE OF INSULIN (H): ICD-10-CM

## 2022-10-19 DIAGNOSIS — I10 HYPERTENSION, GOAL BELOW 140/90: ICD-10-CM

## 2022-10-19 LAB
ALBUMIN SERPL BCG-MCNC: 3.5 G/DL (ref 3.5–5.2)
ALBUMIN UR-MCNC: 100 MG/DL
ALP SERPL-CCNC: 75 U/L (ref 40–129)
ALT SERPL W P-5'-P-CCNC: 32 U/L (ref 10–50)
ANION GAP SERPL CALCULATED.3IONS-SCNC: 12 MMOL/L (ref 7–15)
ANION GAP SERPL CALCULATED.3IONS-SCNC: 13 MMOL/L (ref 7–15)
APPEARANCE UR: CLEAR
AST SERPL W P-5'-P-CCNC: 32 U/L (ref 10–50)
BASE EXCESS BLDV CALC-SCNC: 1.7 MMOL/L (ref -7.7–1.9)
BASE EXCESS BLDV CALC-SCNC: 1.8 MMOL/L (ref -7.7–1.9)
BASOPHILS # BLD AUTO: 0.1 10E3/UL (ref 0–0.2)
BASOPHILS # BLD AUTO: 0.1 10E3/UL (ref 0–0.2)
BASOPHILS NFR BLD AUTO: 1 %
BASOPHILS NFR BLD AUTO: 1 %
BILIRUB SERPL-MCNC: 0.4 MG/DL
BILIRUB UR QL STRIP: NEGATIVE
BUN SERPL-MCNC: 30.4 MG/DL (ref 8–23)
BUN SERPL-MCNC: 32.5 MG/DL (ref 8–23)
CALCIUM SERPL-MCNC: 9.3 MG/DL (ref 8.8–10.2)
CALCIUM SERPL-MCNC: 9.5 MG/DL (ref 8.8–10.2)
CHLORIDE SERPL-SCNC: 97 MMOL/L (ref 98–107)
CHLORIDE SERPL-SCNC: 98 MMOL/L (ref 98–107)
COLOR UR AUTO: YELLOW
CREAT SERPL-MCNC: 0.94 MG/DL (ref 0.67–1.17)
CREAT SERPL-MCNC: 0.98 MG/DL (ref 0.67–1.17)
DEPRECATED HCO3 PLAS-SCNC: 22 MMOL/L (ref 22–29)
DEPRECATED HCO3 PLAS-SCNC: 23 MMOL/L (ref 22–29)
EOSINOPHIL # BLD AUTO: 0.7 10E3/UL (ref 0–0.7)
EOSINOPHIL # BLD AUTO: 0.7 10E3/UL (ref 0–0.7)
EOSINOPHIL NFR BLD AUTO: 6 %
EOSINOPHIL NFR BLD AUTO: 6 %
ERYTHROCYTE [DISTWIDTH] IN BLOOD BY AUTOMATED COUNT: 12.9 % (ref 10–15)
ERYTHROCYTE [DISTWIDTH] IN BLOOD BY AUTOMATED COUNT: 13.2 % (ref 10–15)
GFR SERPL CREATININE-BSD FRML MDRD: 76 ML/MIN/1.73M2
GFR SERPL CREATININE-BSD FRML MDRD: 79 ML/MIN/1.73M2
GLUCOSE BLDC GLUCOMTR-MCNC: >600 MG/DL (ref 70–99)
GLUCOSE SERPL-MCNC: 541 MG/DL (ref 70–99)
GLUCOSE SERPL-MCNC: 622 MG/DL (ref 70–99)
GLUCOSE UR STRIP-MCNC: >499 MG/DL
HBA1C MFR BLD: 9.8 %
HCO3 BLDV-SCNC: 26 MMOL/L (ref 21–28)
HCO3 BLDV-SCNC: 27 MMOL/L (ref 21–28)
HCT VFR BLD AUTO: 41.4 % (ref 40–53)
HCT VFR BLD AUTO: 41.7 % (ref 40–53)
HGB BLD-MCNC: 13.9 G/DL (ref 13.3–17.7)
HGB BLD-MCNC: 14 G/DL (ref 13.3–17.7)
HGB UR QL STRIP: ABNORMAL
HOLD SPECIMEN: NORMAL
IMM GRANULOCYTES # BLD: 0 10E3/UL
IMM GRANULOCYTES # BLD: 0.1 10E3/UL
IMM GRANULOCYTES NFR BLD: 0 %
IMM GRANULOCYTES NFR BLD: 1 %
KETONES BLD-SCNC: 0.1 MMOL/L (ref 0–0.6)
KETONES BLD-SCNC: 0.2 MMOL/L (ref 0–0.6)
KETONES UR STRIP-MCNC: NEGATIVE MG/DL
LACTATE SERPL-SCNC: 1.6 MMOL/L (ref 0.7–2)
LEUKOCYTE ESTERASE UR QL STRIP: NEGATIVE
LYMPHOCYTES # BLD AUTO: 2.2 10E3/UL (ref 0.8–5.3)
LYMPHOCYTES # BLD AUTO: 2.5 10E3/UL (ref 0.8–5.3)
LYMPHOCYTES NFR BLD AUTO: 19 %
LYMPHOCYTES NFR BLD AUTO: 23 %
MCH RBC QN AUTO: 29 PG (ref 26.5–33)
MCH RBC QN AUTO: 29.4 PG (ref 26.5–33)
MCHC RBC AUTO-ENTMCNC: 33.6 G/DL (ref 31.5–36.5)
MCHC RBC AUTO-ENTMCNC: 33.6 G/DL (ref 31.5–36.5)
MCV RBC AUTO: 86 FL (ref 78–100)
MCV RBC AUTO: 87 FL (ref 78–100)
MONOCYTES # BLD AUTO: 1 10E3/UL (ref 0–1.3)
MONOCYTES # BLD AUTO: 1.1 10E3/UL (ref 0–1.3)
MONOCYTES NFR BLD AUTO: 10 %
MONOCYTES NFR BLD AUTO: 9 %
NEUTROPHILS # BLD AUTO: 6.3 10E3/UL (ref 1.6–8.3)
NEUTROPHILS # BLD AUTO: 7.1 10E3/UL (ref 1.6–8.3)
NEUTROPHILS NFR BLD AUTO: 59 %
NEUTROPHILS NFR BLD AUTO: 65 %
NITRATE UR QL: NEGATIVE
NRBC # BLD AUTO: 0 10E3/UL
NRBC # BLD AUTO: 0 10E3/UL
NRBC BLD AUTO-RTO: 0 /100
NRBC BLD AUTO-RTO: 0 /100
O2/TOTAL GAS SETTING VFR VENT: 0 %
O2/TOTAL GAS SETTING VFR VENT: 21 %
PCO2 BLDV: 39 MM HG (ref 40–50)
PCO2 BLDV: 41 MM HG (ref 40–50)
PH BLDV: 7.42 [PH] (ref 7.32–7.43)
PH BLDV: 7.44 [PH] (ref 7.32–7.43)
PH UR STRIP: 6 [PH] (ref 5–7)
PLAT MORPH BLD: ABNORMAL
PLATELET # BLD AUTO: 273 10E3/UL (ref 150–450)
PLATELET # BLD AUTO: 287 10E3/UL (ref 150–450)
PO2 BLDV: 41 MM HG (ref 25–47)
PO2 BLDV: 44 MM HG (ref 25–47)
POTASSIUM SERPL-SCNC: 4.6 MMOL/L (ref 3.4–5.3)
POTASSIUM SERPL-SCNC: 4.8 MMOL/L (ref 3.4–5.3)
PROT SERPL-MCNC: 6.9 G/DL (ref 6.4–8.3)
RBC # BLD AUTO: 4.77 10E6/UL (ref 4.4–5.9)
RBC # BLD AUTO: 4.79 10E6/UL (ref 4.4–5.9)
RBC MORPH BLD: ABNORMAL
RBC URINE: 1 /HPF
SODIUM SERPL-SCNC: 132 MMOL/L (ref 136–145)
SODIUM SERPL-SCNC: 133 MMOL/L (ref 136–145)
SP GR UR STRIP: 1.03 (ref 1–1.03)
SQUAMOUS EPITHELIAL: <1 /HPF
UROBILINOGEN UR STRIP-MCNC: NORMAL MG/DL
VARIANT LYMPHS BLD QL SMEAR: PRESENT
WBC # BLD AUTO: 10.8 10E3/UL (ref 4–11)
WBC # BLD AUTO: 11.1 10E3/UL (ref 4–11)
WBC URINE: <1 /HPF

## 2022-10-19 PROCEDURE — 83036 HEMOGLOBIN GLYCOSYLATED A1C: CPT | Performed by: EMERGENCY MEDICINE

## 2022-10-19 PROCEDURE — 99284 EMERGENCY DEPT VISIT MOD MDM: CPT | Performed by: EMERGENCY MEDICINE

## 2022-10-19 PROCEDURE — 82010 KETONE BODYS QUAN: CPT | Performed by: EMERGENCY MEDICINE

## 2022-10-19 PROCEDURE — 96361 HYDRATE IV INFUSION ADD-ON: CPT | Performed by: EMERGENCY MEDICINE

## 2022-10-19 PROCEDURE — 96374 THER/PROPH/DIAG INJ IV PUSH: CPT | Performed by: EMERGENCY MEDICINE

## 2022-10-19 PROCEDURE — 82310 ASSAY OF CALCIUM: CPT | Performed by: EMERGENCY MEDICINE

## 2022-10-19 PROCEDURE — 80053 COMPREHEN METABOLIC PANEL: CPT | Performed by: EMERGENCY MEDICINE

## 2022-10-19 PROCEDURE — 85025 COMPLETE CBC W/AUTO DIFF WBC: CPT | Performed by: EMERGENCY MEDICINE

## 2022-10-19 PROCEDURE — 250N000012 HC RX MED GY IP 250 OP 636 PS 637: Performed by: EMERGENCY MEDICINE

## 2022-10-19 PROCEDURE — 82803 BLOOD GASES ANY COMBINATION: CPT | Mod: 91 | Performed by: EMERGENCY MEDICINE

## 2022-10-19 PROCEDURE — 99284 EMERGENCY DEPT VISIT MOD MDM: CPT | Mod: 25 | Performed by: EMERGENCY MEDICINE

## 2022-10-19 PROCEDURE — 83605 ASSAY OF LACTIC ACID: CPT | Performed by: EMERGENCY MEDICINE

## 2022-10-19 PROCEDURE — 82803 BLOOD GASES ANY COMBINATION: CPT | Performed by: EMERGENCY MEDICINE

## 2022-10-19 PROCEDURE — 81001 URINALYSIS AUTO W/SCOPE: CPT | Performed by: EMERGENCY MEDICINE

## 2022-10-19 PROCEDURE — 36415 COLL VENOUS BLD VENIPUNCTURE: CPT | Performed by: EMERGENCY MEDICINE

## 2022-10-19 PROCEDURE — 80061 LIPID PANEL: CPT

## 2022-10-19 PROCEDURE — 258N000003 HC RX IP 258 OP 636: Performed by: EMERGENCY MEDICINE

## 2022-10-19 RX ORDER — SODIUM CHLORIDE 9 MG/ML
INJECTION, SOLUTION INTRAVENOUS CONTINUOUS
Status: DISCONTINUED | OUTPATIENT
Start: 2022-10-19 | End: 2022-10-20 | Stop reason: HOSPADM

## 2022-10-19 RX ADMIN — SODIUM CHLORIDE 1000 ML: 9 INJECTION, SOLUTION INTRAVENOUS at 22:47

## 2022-10-19 RX ADMIN — INSULIN HUMAN 10 UNITS: 100 INJECTION, SOLUTION PARENTERAL at 22:48

## 2022-10-19 ASSESSMENT — ACTIVITIES OF DAILY LIVING (ADL)
ADLS_ACUITY_SCORE: 35
ADLS_ACUITY_SCORE: 35

## 2022-10-19 ASSESSMENT — ENCOUNTER SYMPTOMS
FEVER: 0
ABDOMINAL PAIN: 0
SHORTNESS OF BREATH: 0

## 2022-10-19 NOTE — TELEPHONE ENCOUNTER
[facility] Call Center    Phone Message    May a detailed message be left on voicemail: yes     Reason for Call: Appointment Intake    Referring Provider Name: Mike Aguirre MD  Diagnosis and/or Symptoms: Right hydrocele    Pt has a urgent referral for dx above. Writer is unable to find an appt within requested timeframe. Please review and reach out to pt asap. Thanks    Action Taken: Other: uro    Travel Screening: Not Applicable

## 2022-10-19 NOTE — ED TRIAGE NOTES
"Pt presents with enlarged testicle. Denies pain or discomfort. Noticed enlargement today. Has had a hx of \"hydrocele\" in that testicle. Hx of radiation of prostate r/t high PSA. Prostate in place. Pt also reports increased incontinence over the last 2 days.     COVID sx start 10/10 with tested positive 10/14/22. Denies concerns regarding COVID.     Triage Assessment     Row Name 10/18/22 0303       Triage Assessment (Adult)    Airway WDL WDL       Respiratory WDL    Respiratory WDL WDL       Skin Circulation/Temperature WDL    Skin Circulation/Temperature WDL WDL       Cardiac WDL    Cardiac WDL WDL       Peripheral/Neurovascular WDL    Peripheral Neurovascular WDL WDL       Cognitive/Neuro/Behavioral WDL    Cognitive/Neuro/Behavioral WDL WDL              "

## 2022-10-19 NOTE — ED NOTES
RN received phone call from lab.  Per lab, CMP was not ran prior to pt leaving department.  They have a critical high BG of 541.      RN relayed this information to Khai Salamanca MD.  Per MD pt is to continue to monitor at home, do prescribed home treatments as normal, and if no improvement or number remains high, pt is to come back to ED.  Otherwise follow up in primary clinic within one week for ED follow up as planned.      RN called patient, and spoke to patient wife, emergency contact, she verbalized understanding.  Will call back with any further questions or concerns.

## 2022-10-19 NOTE — ED PROVIDER NOTES
History     Chief Complaint   Patient presents with     Enlarged Testicle     HPI  Fawad Garcia is a 86 year old male who presents with atraumatic testicular swelling.  On intake patient reported no pain or discomfort and that he appreciated the joint today.  He is previously been treated for hydrocele and prior radiation for prostate cancer with urinary incontinence over the last 2 days.  Patient reports that he tested positive for COVID-19 4 days ago after he developed symptoms 8 days prior.    Medical records show prior diagnosis of GERD, hyperlipidemia, obesity, peripheral vascular disease, type 2 diabetes, stage III chronic kidney disease TIA.    On examination patient reports arrived by car after he was dropped off by his son.  He reports he lives in [facility].  Reports he is recovering from COVID-19 symptoms over the last week and that he has received both COVID-vaccine and booster.  Patient reports that he noticed his testicle was swollen and that he had some urinary incontinence over the last 2 to 3 days.  He also reports his blood glucoses range from 200-300.  He has had no fever.  He also noticed a rash around the groin and lateral abdomen that is somewhat itchy.  He has had no fever or chills.  He reports he spoke with his wife and son who recommended he come in to be evaluated    Allergies:  Allergies   Allergen Reactions     Zocor [Simvastatin - High Dose]      Bilateral hip aching       Problem List:    Patient Active Problem List    Diagnosis Date Noted     Tremor of left hand 06/14/2022     Priority: Medium     Left leg weakness 06/14/2022     Priority: Medium     Dermatitis 01/14/2022     Priority: Medium     Skin lesion 01/14/2022     Priority: Medium     Chronic kidney disease, stage 3 (H) 07/21/2021     Priority: Medium     TIA (transient ischemic attack) 07/11/2021     Priority: Medium     H/O amputation of lesser toe, right (H) 01/04/2021     Priority: Medium     Macular  "degeneration (senile) of retina 01/10/2019     Priority: Medium     Nearly legally blind per eye doctor.       Type 2 diabetes mellitus with both eyes affected by mild nonproliferative retinopathy without macular edema, with long-term current use of insulin (H) 07/30/2018     Priority: Medium     Senile nuclear sclerosis 04/01/2015     Priority: Medium     Hypertension, goal below 140/90 03/25/2015     Priority: Medium     Malignant neoplasm of prostate (H) 06/16/2014     Priority: Medium     Advanced directives, counseling/discussion 02/24/2012     Priority: Medium     Patient does not have an Advance/Health Care Directive (HCD), given \"What is Advance Care Planning?\" flyer, accepts referral to Facilitator and requests blank HCD form.    Roro Hernandezp  February 24, 2012         Peripheral vascular disease (H) 11/18/2011     Priority: Medium     Problem list name updated by automated process. Provider to review       GERD (gastroesophageal reflux disease) 11/03/2011     Priority: Medium     Hyperlipidemia LDL goal <70 10/31/2010     Priority: Medium     Transient cerebral ischemia 09/03/2009     Priority: Medium     Diagnosis updated by automated process. Provider to review and confirm.       Obesity      Priority: Medium     Obesity  Problem list name updated by automated process. Provider to review          Past Medical History:    Past Medical History:   Diagnosis Date     Diabetes mellitus (H)      Hypertension      Squamous cell carcinoma        Past Surgical History:    Past Surgical History:   Procedure Laterality Date     AMPUTATE TOE(S) Right 7/23/2019    Procedure: Partial amputation great toe;  Surgeon: Ethan Montesinos DPM;  Location: [facility]     AMPUTATE TOE(S) Left 12/24/2019    Procedure: Amputation of the great toe, left foot;  Surgeon: Ethan Montesinos DPM;  Location: [facility]     AMPUTATE TOE(S) Right 3/3/2020    Procedure: Partial amputation, second toe, right foot;  Surgeon: Ethan Montesinos" VAN Siddiqi;  Location: [facility] OR     CL AFF SURGICAL PATHOLOGY  2002    prostate biopsy elevated PSA     COLONOSCOPY       HC REMOVE TONSILS/ADENOIDS,<11 Y/O      T & A     IR LOWER EXTREMITY ANGIOGRAM LEFT  2/26/2020     IR LOWER EXTREMITY ANGIOGRAM RIGHT  12/11/2018     PHACOEMULSIFICATION WITH STANDARD INTRAOCULAR LENS IMPLANT Left 4/2/2015    Procedure: PHACOEMULSIFICATION WITH STANDARD INTRAOCULAR LENS IMPLANT;  Surgeon: Joseph Hernandez MD;  Location: [facility]     PHACOEMULSIFICATION WITH STANDARD INTRAOCULAR LENS IMPLANT Right 5/4/2015    Procedure: PHACOEMULSIFICATION WITH STANDARD INTRAOCULAR LENS IMPLANT;  Surgeon: Joseph Hernandez MD;  Location: [facility]     REPAIR HAMMER TOE Left 4/2/2019    Procedure: 5th Metatarsal Head Resection;  Surgeon: Ethan Montesinos DPM;  Location: [facility]       Family History:    Family History   Problem Relation Age of Onset     Cancer Mother         intestinal CA     Diabetes Father      Diabetes Brother      Other Cancer Son         meagan tumor     Other Cancer Brother         pancreatic cancer     Diabetes Son      Melanoma No family hx of        Social History:  Marital Status:   [2]  Social History     Tobacco Use     Smoking status: Never     Smokeless tobacco: Never   Vaping Use     Vaping Use: Never used   Substance Use Topics     Alcohol use: Yes     Alcohol/week: 1.7 standard drinks     Comment: 1 x month     Drug use: No        Medications:    amLODIPine (NORVASC) 5 MG tablet  aspirin (ASA) 325 MG tablet  blood glucose (ONETOUCH ULTRA) test strip  Blood Glucose Monitoring Suppl (ONE TOUCH ULTRA SYSTEM KIT) W/DEVICE KIT  clopidogrel (PLAVIX) 75 MG tablet  dorzolamide-timolol (COSOPT) 2-0.5 % ophthalmic solution  hydrochlorothiazide (HYDRODIURIL) 25 MG tablet  insulin glargine (LANTUS SOLOSTAR) 100 UNIT/ML pen  insulin pen needle (B-D U/F) 31G X 8 MM miscellaneous  Lancets (ONETOUCH DELICA PLUS [id number]) MISC  lisinopril (ZESTRIL) 40 MG tablet  metFORMIN  "(GLUCOPHAGE) 500 MG tablet  Multiple Vitamins-Minerals (PRESERVISION AREDS PO)  ORDER FOR DME  pantoprazole (PROTONIX) 20 MG EC tablet  rosuvastatin (CRESTOR) 10 MG tablet  triamcinolone (KENALOG) 0.1 % external cream          Review of Systems   Constitutional: Negative.    HENT: Negative.    Eyes: Negative.    Respiratory: Negative.    Cardiovascular: Negative.    Gastrointestinal: Negative.    Endocrine: Negative.    Genitourinary: Positive for scrotal swelling.   Musculoskeletal: Negative.    Skin: Positive for rash.   Allergic/Immunologic: Negative.    Neurological: Negative.    Hematological: Negative.    Psychiatric/Behavioral: Negative.    All other systems reviewed and are negative.      Physical Exam   BP: 131/71  Pulse: 92  Temp: 98.2  F (36.8  C)  Resp: 18  Height: 180.3 cm (5' 11\")  Weight: 99.8 kg (220 lb)  SpO2: 98 %      Physical Exam  Constitutional:       General: He is not in acute distress.     Appearance: He is not ill-appearing, toxic-appearing or diaphoretic.   HENT:      Head: Normocephalic and atraumatic.      Nose: Nose normal.      Mouth/Throat:      Mouth: Mucous membranes are moist.   Eyes:      Extraocular Movements: Extraocular movements intact.      Pupils: Pupils are equal, round, and reactive to light.   Cardiovascular:      Rate and Rhythm: Normal rate and regular rhythm.   Pulmonary:      Effort: Pulmonary effort is normal. No respiratory distress.      Breath sounds: No stridor. No wheezing, rhonchi or rales.   Chest:      Chest wall: No tenderness.   Genitourinary:     Penis: Uncircumcised. Swelling present. No phimosis or tenderness.        Musculoskeletal:      Cervical back: Normal range of motion and neck supple.   Skin:     Capillary Refill: Capillary refill takes less than 2 seconds.      Coloration: Skin is not jaundiced or pale.      Findings: No bruising, erythema, lesion or rash.   Neurological:      General: No focal deficit present.      Mental Status: He is alert " "and oriented to person, place, and time.      Cranial Nerves: No cranial nerve deficit.      Sensory: No sensory deficit.      Motor: No weakness.      Coordination: Coordination normal.      Gait: Gait normal.      Deep Tendon Reflexes: Reflexes normal.   Psychiatric:         Mood and Affect: Mood normal.         Behavior: Behavior normal.         Thought Content: Thought content normal.         Judgment: Judgment normal.                           ED Course                 Procedures              Critical Care time:  none               ED medications:        ED Vitals:  Vitals:    10/18/22 2223 10/18/22 2238 10/18/22 2241   BP: 131/71 (!) 166/98    Pulse: 92 62    Resp: 18     Temp: 98.2  F (36.8  C)     TempSrc: Tympanic     SpO2: 98%  96%   Weight: 99.8 kg (220 lb)     Height: 1.803 m (5' 11\")         ED labs and imaging:  Results for orders placed or performed during the hospital encounter of 10/18/22   US Testicular & Scrotum w Doppler Ltd     Status: None    Narrative    EXAM: US TESTICULAR AND SCROTUM WITH DOPPLER LIMITED  LOCATION: [facility]  DATE/TIME: 10/18/2022 11:48 PM    INDICATION: 3 day history of atraumatic testicular swelling with surrounding rash.  Uncircumcised.  Prior history of hydrocele reported and treatment for prostate cancer.  Evaluate for acute process  COMPARISON: None.  TECHNIQUE: Ultrasound of scrotum with color flow and spectral Doppler with waveform analysis performed.    FINDINGS:    RIGHT: Right testicle measures 2.4 x 4.5 x 2.7 cm. Normal testicle with no masses. Normal arterial duplex and normal color flow. Right epididymal cyst measuring up to 1.2 cm. Large right hydrocele. No varicocele.    LEFT: Left testicle measures 2.9 x 4.8 x 2.3 cm. normal arterial duplex and normal color flow. 4 mm left epididymal cyst. Septated cystic region in the left testicle measuring up to 4 mm. No hydrocele. No varicocele.      Impression    IMPRESSION:  1.  Large right " hydrocele.  2.  Small cystic region in the left testicle is likely incidental. Comparison with prior studies is recommended versus short-term follow-up, however.   Lactic acid whole blood     Status: Normal   Result Value Ref Range    Lactic Acid 1.6 0.7 - 2.0 mmol/L   Blood gas venous     Status: Abnormal   Result Value Ref Range    pH Venous 7.44 (H) 7.32 - 7.43    pCO2 Venous 39 (L) 40 - 50 mm Hg    pO2 Venous 44 25 - 47 mm Hg    Bicarbonate Venous 26 21 - 28 mmol/L    Base Excess/Deficit (+/-) 1.7 -7.7 - 1.9 mmol/L    FIO2 0    Ketone Beta-Hydroxybutyrate Quantitative     Status: Normal   Result Value Ref Range    Ketone (Beta-Hydroxybutyrate) Quantitative 0.1 0.0 - 0.6 mmol/L   CBC with platelets and differential     Status: None   Result Value Ref Range    WBC Count 10.8 4.0 - 11.0 10e3/uL    RBC Count 4.79 4.40 - 5.90 10e6/uL    Hemoglobin 13.9 13.3 - 17.7 g/dL    Hematocrit 41.4 40.0 - 53.0 %    MCV 86 78 - 100 fL    MCH 29.0 26.5 - 33.0 pg    MCHC 33.6 31.5 - 36.5 g/dL    RDW 12.9 10.0 - 15.0 %    Platelet Count 273 150 - 450 10e3/uL    % Neutrophils 59 %    % Lymphocytes 23 %    % Monocytes 10 %    % Eosinophils 6 %    % Basophils 1 %    % Immature Granulocytes 1 %    NRBCs per 100 WBC 0 <1 /100    Absolute Neutrophils 6.3 1.6 - 8.3 10e3/uL    Absolute Lymphocytes 2.5 0.8 - 5.3 10e3/uL    Absolute Monocytes 1.1 0.0 - 1.3 10e3/uL    Absolute Eosinophils 0.7 0.0 - 0.7 10e3/uL    Absolute Basophils 0.1 0.0 - 0.2 10e3/uL    Absolute Immature Granulocytes 0.1 <=0.4 10e3/uL    Absolute NRBCs 0.0 10e3/uL   Extra Tube     Status: None    Narrative    The following orders were created for panel order Extra Tube.  Procedure                               Abnormality         Status                     ---------                               -----------         ------                     Extra Green Top (Lithium...[[id number]]                      Final result                 Please view results for these tests on the  individual orders.   Extra Green Top (Lithium Heparin) Tube     Status: None   Result Value Ref Range    Hold Specimen [facility]    RBC and Platelet Morphology     Status: Abnormal   Result Value Ref Range    Platelet Assessment  Automated Count Confirmed. Platelet morphology is normal.     Automated Count Confirmed. Platelet morphology is normal.    Reactive Lymphocytes Present (A) None Seen    RBC Morphology Confirmed RBC Indices    UA with Microscopic reflex to Culture     Status: Abnormal    Specimen: Urine, Midstream   Result Value Ref Range    Color Urine Yellow Colorless, Straw, Light Yellow, Yellow    Appearance Urine Clear Clear    Glucose Urine >499 (A) Negative mg/dL    Bilirubin Urine Negative Negative    Ketones Urine Negative Negative mg/dL    Specific Gravity Urine 1.026 1.003 - 1.035    Blood Urine Small (A) Negative    pH Urine 6.0 5.0 - 7.0    Protein Albumin Urine 100 (A) Negative mg/dL    Urobilinogen Urine Normal Normal, 2.0 mg/dL    Nitrite Urine Negative Negative    Leukocyte Esterase Urine Negative Negative    RBC Urine 1 <=2 /HPF    WBC Urine <1 <=5 /HPF    Squamous Epithelials Urine <1 <=1 /HPF    Narrative    Urine Culture not indicated   CBC with platelets differential     Status: Abnormal    Narrative    The following orders were created for panel order CBC with platelets differential.  Procedure                               Abnormality         Status                     ---------                               -----------         ------                     CBC with platelets and d...[[id number]]                      Final result               RBC and Platelet Morphology[[id number]]  Abnormal            Final result                 Please view results for these tests on the individual orders.         Assessments & Plan (with Medical Decision Making)   Assessment Summary and Clinical Impression: 86-year-old male who presented with atraumatic right testicular swelling.  Patient developed COVID  symptoms 8 days prior and tested positive for COVID-19 4 days prior.  Patient reported that he had been evaluated for hydrocele in the same testicle and received radiation treatment for prostate cancer.  He arrived afebrile and hemodynamically normal.  He noted a rash around the groin and lower abdomen that is itchy.  Rash does not appear to be spreading or generalizing. Uncircumcised, no penile discharge.  No fever or chills.  He reported his blood glucose has been higher than baseline.  He appeared in no acute distress intake blood pressure was 166/98.  He was afebrile.  Abdomen was soft nontender.  See photo images in the abdomen and groin and scrotal area for distribution of the rash.  Photo images were obtained after informed verbal consent from the patient.  A work-up was undertaken to evaluate for emergent conditions that would contribute to patient's symptoms in light of his medical history and comorbidities.  Patient's work-up did not suggest DKA or diabetic emergency.  Right hydrocele was noted and incidental findings on left testicle patient normal left testicular symptoms.  There was no crepitus around the perineum and my suspicion that he has a necrotizing soft tissue infection is low we discussed outpatient follow-up in urology clinic for.  Scrotal swelling and reviewed follow-up in primary care clinic to evaluate his rash- uncertain if rash is related to COVID infection.    ED Course and plan:  Reviewed the medical record. A work-up was undertaken to evaluate for acute scrotal testicular process. With prior history of hydrocele and treatment for prostate cancer.  With elevated blood glucose readings from baseline Work-up was also undertaken to ensure patient is not presenting with DKA as result of his symptoms.    Scrotal ultrasound revealed large right hydrocele.  Small cystic region in left testicle was felt to be incidental per radiology.  Follow-up comparison short-term follow-up is advised.  See  details in radiology report.    Blood work revealed a white count of 10.  Normal ketones normal lactate no acidosis. Urinalysis revealed no evidence for infection.  Patient was reassured by his evaluation.  We discussed follow-up care with urology and I placed a urology referral for this and assessment of his hydrocele.  We discussed his concern about urinary incontinence and the need to monitor for urinary retention or progressive symptoms including increasing swelling pain fever chills or skin changes around the perineum or groin.  Patient expressed comfort, understanding and agreement plan of care      Disclaimer: This note consists of symbols derived from keyboarding, dictation and/or voice recognition software. As a result, there may be errors in the script that have gone undetected. Please consider this when interpreting information found in this chart.  I have reviewed the nursing notes.    I have reviewed the findings, diagnosis, plan and need for follow up with the patient.       New Prescriptions    No medications on file       Final diagnoses:   Right hydrocele - Prior history reported. Atraumatic swelling x 3 days.    Rash and nonspecific skin eruption - overlying lower abdomen and groin area.   Infection due to 2019 novel coronavirus - diagnosed prior to ED arrival. Positive test on 10/14/22       10/18/2022   [facility] EMERGENCY DEPT     Mike Aguirre MD  10/19/22 [id number]

## 2022-10-19 NOTE — DISCHARGE INSTRUCTIONS
1) Your evaluation today revealed that the swelling about your right testicle is from a hydrocele.  Your work-up did not suggest a diabetic emergency and I am not certain about the cause of the rash on the abdomen and thigh.  You have reported no fever or chills or pain. You appear stable for discharge to home at this time however if you develop a fever or have increasing swelling or new concerns you should return to be reevaluated.    2) I placed a urology referral to help with follow care to evaluate your right hydrocele as we discussed.  If you develop pain or have difficulty initiating urinary stream you should be seen in the emergency department.  Additional coordination of care referral needs can be managed by your primary care team.

## 2022-10-20 VITALS
DIASTOLIC BLOOD PRESSURE: 74 MMHG | BODY MASS INDEX: 30.8 KG/M2 | HEIGHT: 71 IN | TEMPERATURE: 97.8 F | WEIGHT: 220 LBS | RESPIRATION RATE: 18 BRPM | SYSTOLIC BLOOD PRESSURE: 157 MMHG | HEART RATE: 77 BPM | OXYGEN SATURATION: 95 %

## 2022-10-20 LAB — GLUCOSE BLDC GLUCOMTR-MCNC: 414 MG/DL (ref 70–99)

## 2022-10-20 NOTE — TELEPHONE ENCOUNTER
"Discussed with Dr Mcintyre.  Per Dr Mcintyre:   If Patient considering eval for draining of hydrocele (if significant fluid collection develops)  then should schedule with Dr Madsen next available new patient consult (\"withing 2-3 months is acceptable\" per Dr Mcintyre)  Otherwise this is not a new issue (\"most likely pre-dates Prostate CA\" per Dr Mcintyre)  and without significant recurrances and surgical correction recommended by another Urologist,  Dr Mcintyre defers to another provider.    Left message on answering machine for patient to call back if wishes detailed explanation/has questions.  Estephania PABLO   [facility] RN    "

## 2022-10-20 NOTE — ED PROVIDER NOTES
History     Chief Complaint   Patient presents with     Hyperglycemia     HPI  Fawad Garcia is a 86 year old male who has past medical history significant for insulin-dependent diabetes, with last A1c of 8.6 in April of this year, presenting the emergency department with concerns regarding elevated blood sugar recordings.  Patient states that his blood sugar this evening was greater than 600, and therefore he called the nurse hotline, and was instructed to come to the emergency department.  Patient otherwise denies any other significant symptoms.  Denies any nausea, or vomiting.  Denies any other recent changes in his health, or medications.  He does use Lantus in the evening, with no recent changes of his Lantus administration.  Patient was seen in the emergency department yesterday, and I reviewed that ED visit.  Patient was seen and noted to have right-sided hydrocele, and was discharged home with urology referral.  Patient denies any significant urinary symptoms.  No fever.  No vomiting.  Denies any severe abdominal pain.  Patient does state that he had breakfast, consisting of cereal, had breakfast burrito for lunch, and did have soup for dinner.  No other recent changes in food intake.  Does have recent COVID positive diagnosis 6 days ago, and is since become asymptomatic from the COVID diagnosis standpoint.    Allergies:  Allergies   Allergen Reactions     Zocor [Simvastatin - High Dose]      Bilateral hip aching       Problem List:    Patient Active Problem List    Diagnosis Date Noted     Tremor of left hand 06/14/2022     Priority: Medium     Left leg weakness 06/14/2022     Priority: Medium     Dermatitis 01/14/2022     Priority: Medium     Skin lesion 01/14/2022     Priority: Medium     Chronic kidney disease, stage 3 (H) 07/21/2021     Priority: Medium     TIA (transient ischemic attack) 07/11/2021     Priority: Medium     H/O amputation of lesser toe, right (H) 01/04/2021     Priority: Medium  "    Macular degeneration (senile) of retina 01/10/2019     Priority: Medium     Nearly legally blind per eye doctor.       Type 2 diabetes mellitus with both eyes affected by mild nonproliferative retinopathy without macular edema, with long-term current use of insulin (H) 07/30/2018     Priority: Medium     Senile nuclear sclerosis 04/01/2015     Priority: Medium     Hypertension, goal below 140/90 03/25/2015     Priority: Medium     Malignant neoplasm of prostate (H) 06/16/2014     Priority: Medium     Advanced directives, counseling/discussion 02/24/2012     Priority: Medium     Patient does not have an Advance/Health Care Directive (HCD), given \"What is Advance Care Planning?\" flyer, accepts referral to Facilitator and requests blank HCD form.    Roro Adriana  February 24, 2012         Peripheral vascular disease (H) 11/18/2011     Priority: Medium     Problem list name updated by automated process. Provider to review       GERD (gastroesophageal reflux disease) 11/03/2011     Priority: Medium     Hyperlipidemia LDL goal <70 10/31/2010     Priority: Medium     Transient cerebral ischemia 09/03/2009     Priority: Medium     Diagnosis updated by automated process. Provider to review and confirm.       Obesity      Priority: Medium     Obesity  Problem list name updated by automated process. Provider to review          Past Medical History:    Past Medical History:   Diagnosis Date     Diabetes mellitus (H)      Hypertension      Squamous cell carcinoma        Past Surgical History:    Past Surgical History:   Procedure Laterality Date     AMPUTATE TOE(S) Right 7/23/2019    Procedure: Partial amputation great toe;  Surgeon: Ethan Montesinos DPM;  Location: [facility]     AMPUTATE TOE(S) Left 12/24/2019    Procedure: Amputation of the great toe, left foot;  Surgeon: Ethan Montesinos DPM;  Location: [facility]     AMPUTATE TOE(S) Right 3/3/2020    Procedure: Partial amputation, second toe, right foot;  Surgeon: " Ethan Montesinos DPM;  Location: [facility] OR     CL AFF SURGICAL PATHOLOGY  2002    prostate biopsy elevated PSA     COLONOSCOPY       HC REMOVE TONSILS/ADENOIDS,<13 Y/O      T & A     IR LOWER EXTREMITY ANGIOGRAM LEFT  2/26/2020     IR LOWER EXTREMITY ANGIOGRAM RIGHT  12/11/2018     PHACOEMULSIFICATION WITH STANDARD INTRAOCULAR LENS IMPLANT Left 4/2/2015    Procedure: PHACOEMULSIFICATION WITH STANDARD INTRAOCULAR LENS IMPLANT;  Surgeon: Joseph Hernandez MD;  Location: [facility] OR     PHACOEMULSIFICATION WITH STANDARD INTRAOCULAR LENS IMPLANT Right 5/4/2015    Procedure: PHACOEMULSIFICATION WITH STANDARD INTRAOCULAR LENS IMPLANT;  Surgeon: Joseph Hernandez MD;  Location: [facility] OR     REPAIR HAMMER TOE Left 4/2/2019    Procedure: 5th Metatarsal Head Resection;  Surgeon: Ethan Montesinos DPM;  Location: [facility] OR       Family History:    Family History   Problem Relation Age of Onset     Cancer Mother         intestinal CA     Diabetes Father      Diabetes Brother      Other Cancer Son         meagan tumor     Other Cancer Brother         pancreatic cancer     Diabetes Son      Melanoma No family hx of        Social History:  Marital Status:   [2]  Social History     Tobacco Use     Smoking status: Never     Smokeless tobacco: Never   Vaping Use     Vaping Use: Never used   Substance Use Topics     Alcohol use: Yes     Alcohol/week: 1.7 standard drinks     Comment: 1 x month     Drug use: No        Medications:    amLODIPine (NORVASC) 5 MG tablet  aspirin (ASA) 325 MG tablet  blood glucose (ONETOUCH ULTRA) test strip  Blood Glucose Monitoring Suppl (ONE TOUCH ULTRA SYSTEM KIT) W/DEVICE KIT  clopidogrel (PLAVIX) 75 MG tablet  dorzolamide-timolol (COSOPT) 2-0.5 % ophthalmic solution  hydrochlorothiazide (HYDRODIURIL) 25 MG tablet  insulin glargine (LANTUS SOLOSTAR) 100 UNIT/ML pen  insulin pen needle (B-D U/F) 31G X 8 MM miscellaneous  Lancets (ONETOUCH DELICA PLUS [id number]) MISC  lisinopril (ZESTRIL) 40 MG  "tablet  metFORMIN (GLUCOPHAGE) 500 MG tablet  Multiple Vitamins-Minerals (PRESERVISION AREDS PO)  ORDER FOR DME  pantoprazole (PROTONIX) 20 MG EC tablet  rosuvastatin (CRESTOR) 10 MG tablet  triamcinolone (KENALOG) 0.1 % external cream          Review of Systems   Constitutional: Negative for fever.   Respiratory: Negative for shortness of breath.    Cardiovascular: Negative for chest pain.   Gastrointestinal: Negative for abdominal pain.   Genitourinary:        See HPI   All other systems reviewed and are negative.      Physical Exam   BP: (!) 176/120  Pulse: 95  Temp: 97.8  F (36.6  C)  Resp: 18  Height: 180.3 cm (5' 11\")  Weight: 99.8 kg (220 lb)  SpO2: 95 %      Physical Exam  BP (!) 157/74   Pulse 77   Temp 97.8  F (36.6  C) (Oral)   Resp 18   Ht 1.803 m (5' 11\")   Wt 99.8 kg (220 lb)   SpO2 95%   BMI 30.68 kg/m    General: alert, interactive, in no apparent distress  Head: atraumatic  Nose: no rhinorrhea or epistaxis  Ears: no external auditory canal discharge or bleeding.    Eyes: Sclera nonicteric. Conjunctiva noninjected. PERRL, EOMI  Mouth: no tonsillar erythema, edema, or exudate  Neck: supple, no palp LAD  Lungs: CTAB  CV: RRR, S1/S2; peripheral pulses palpable and symmetric  Abdomen: soft, nt, nd, no guarding or rebound. Positive bowel sounds  Extremities: no cyanosis or edema  Skin: no rash or diaphoresis  Neuro: CN II-XII grossly intact, strength 5/5 in UE and LEs bilaterally, sensation intact to light touch in UE and LEs bilaterally;       ED Course                 Procedures              Critical Care time:  none               No results found for this or any previous visit (from the past 24 hour(s)).    Medications   0.9% sodium chloride BOLUS (0 mLs Intravenous Stopped 10/20/22 0034)   insulin (regular) (HumuLIN R/NovoLIN R) injection 10 Units (10 Units Intravenous Given 10/19/22 1943)       Assessments & Plan (with Medical Decision Making)  86 year old male, with history of " insulin-dependent diabetes, presenting emergency department with concerns regarding elevated blood sugar recordings.  Patient checked his blood sugar and it was greater than 600.  He did use his Lantus this evening.  Patient states he has had high blood sugars ever since diagnosed with COVID 6 days ago.  No recent fevers over the past couple of days.  No changes in food, liquid intake, no changes in insulin administration.    Patient with laboratory work-up showing no signs of DKA.  Potentially his COVID diagnosis recently contributing to uncontrolled blood sugar recordings.  His A1c is more recently elevated, and therefore questionable compliance on his home dietary regimen.    Patient was given IV fluids, in addition to IV insulin.  His blood sugar improved during ED course.  He is able to tolerate food, and liquids in the emergency department.  No indication for hospitalization as patient does not have any evidence of DKA.  Recommended close monitoring of symptoms, with close monitoring of blood sugars.  Follow-up in clinic, and encouraged to be seen if worsening symptoms develop.     I have reviewed the nursing notes.    I have reviewed the findings, diagnosis, plan and need for follow up with the patient.       Discharge Medication List as of 10/20/2022 12:35 AM          Final diagnoses:   Hyperglycemia   Hypertension, goal below 140/90   Type 2 diabetes mellitus with both eyes affected by mild nonproliferative retinopathy without macular edema, with long-term current use of insulin (H)       10/19/2022   [facility] EMERGENCY DEPT     Mahin Wei MD  10/21/22 1423

## 2022-10-20 NOTE — ED TRIAGE NOTES
Patient reports covid positive day 6. States that his blood sugars have been high since coming down with covid. States about a half hour ago it was >600. Took insulin 30 minutes.     Triage Assessment     Row Name 10/19/22 2110       Triage Assessment (Adult)    Airway WDL WDL       Respiratory WDL    Respiratory WDL WDL       Skin Circulation/Temperature WDL    Skin Circulation/Temperature WDL WDL       Cardiac WDL    Cardiac WDL WDL       Peripheral/Neurovascular WDL    Peripheral Neurovascular WDL WDL       Cognitive/Neuro/Behavioral WDL    Cognitive/Neuro/Behavioral WDL WDL

## 2022-10-20 NOTE — DISCHARGE INSTRUCTIONS
Monitor blood sugars at home.   Continue insulin as prescribed.   Be sure to eat low carbohydrate diet and drink plenty of water.

## 2022-10-24 ENCOUNTER — OFFICE VISIT (OUTPATIENT)
Dept: FAMILY MEDICINE | Facility: CLINIC | Age: 87
End: 2022-10-24
Payer: COMMERCIAL

## 2022-10-24 VITALS
SYSTOLIC BLOOD PRESSURE: 122 MMHG | WEIGHT: 218 LBS | BODY MASS INDEX: 30.52 KG/M2 | RESPIRATION RATE: 18 BRPM | HEIGHT: 71 IN | TEMPERATURE: 98.4 F | OXYGEN SATURATION: 95 % | DIASTOLIC BLOOD PRESSURE: 70 MMHG | HEART RATE: 70 BPM

## 2022-10-24 DIAGNOSIS — E11.3293 TYPE 2 DIABETES MELLITUS WITH BOTH EYES AFFECTED BY MILD NONPROLIFERATIVE RETINOPATHY WITHOUT MACULAR EDEMA, WITH LONG-TERM CURRENT USE OF INSULIN (H): Primary | ICD-10-CM

## 2022-10-24 DIAGNOSIS — N18.31 STAGE 3A CHRONIC KIDNEY DISEASE (H): ICD-10-CM

## 2022-10-24 DIAGNOSIS — I10 HYPERTENSION, GOAL BELOW 140/90: ICD-10-CM

## 2022-10-24 DIAGNOSIS — Z79.4 TYPE 2 DIABETES MELLITUS WITH BOTH EYES AFFECTED BY MILD NONPROLIFERATIVE RETINOPATHY WITHOUT MACULAR EDEMA, WITH LONG-TERM CURRENT USE OF INSULIN (H): Primary | ICD-10-CM

## 2022-10-24 DIAGNOSIS — B37.9 CANDIDA INFECTION: ICD-10-CM

## 2022-10-24 LAB
CHOLEST SERPL-MCNC: 134 MG/DL
HDLC SERPL-MCNC: 38 MG/DL
LDLC SERPL CALC-MCNC: 68 MG/DL
NONHDLC SERPL-MCNC: 96 MG/DL
TRIGL SERPL-MCNC: 141 MG/DL

## 2022-10-24 PROCEDURE — 99214 OFFICE O/P EST MOD 30 MIN: CPT | Performed by: FAMILY MEDICINE

## 2022-10-24 RX ORDER — FLUCONAZOLE 150 MG/1
150 TABLET ORAL
Qty: 2 TABLET | Refills: 0 | Status: SHIPPED | OUTPATIENT
Start: 2022-10-24 | End: 2022-12-29

## 2022-10-24 ASSESSMENT — PAIN SCALES - GENERAL: PAINLEVEL: NO PAIN (0)

## 2022-10-24 NOTE — TELEPHONE ENCOUNTER
Patient was incorrectly scheduled on [facility] Schedule who does not see for this diag.  Advised and  Appt cancelled and explained that if they desire sooner than our next avail with Dr Madsen (months out) then need to consider if able to travel.  Given number to Urology  scheduling to see who and where has the soonest appt and reiterated Dr Mcintyre would not see for this diagnosis despite being a previous pt.    Estephania PABLO   Specialty Clinic RN

## 2022-10-24 NOTE — PATIENT INSTRUCTIONS
As you recover for covid you will likely add your blood pressure medication back.    For your rash please take fluconazole 150 mg once a week for 2 weeks.    Please recheck your hemoglobin a1c in 3 months.      Thank you for choosing [facility].  You may be receiving an email and/or telephone survey request from [facility] Customer Experience regarding your visit today.  Please take a few minutes to respond to the survey to let us know how we are doing.      If you have questions or concerns, please contact us via [employer] or you can contact your care team at [phone] option 2.    Our Clinic hours are:  Monday - Thursday 7am-6pm  Friday 7am-5pm    The [place] outpatient lab hours are:  Monday - Friday 7am-4:30pm    Appointments are required, call [phone]    If you have clinical questions after hours or would like to schedule an appointment,  call the clinic at [phone].

## 2022-10-24 NOTE — PROGRESS NOTES
"  Assessment & Plan     Type 2 diabetes mellitus with both eyes affected by mild nonproliferative retinopathy without macular edema, with long-term current use of insulin (H)  Diabetes is coming under better control with lantus at 30 units.  Will monitor blood sugars.  Will go to 20 units when he gets to 150 on average  - Lipid panel reflex to direct LDL Non-fasting; Future  - Hemoglobin A1c; Future    Stage 3a chronic kidney disease (H)  Check lab  - Albumin Random Urine Quantitative with Creat Ratio; Future    Candida infection  He has a resolving rash in the groin region, plaques of red skin now fading  - fluconazole (DIFLUCAN) 150 MG tablet; Take 1 tablet (150 mg) by mouth every 7 days      HTN is controlled now. Discussed restarting his medication as appetite and intake improve post covid.       BMI:   Estimated body mass index is 30.4 kg/m  as calculated from the following:    Height as of this encounter: 1.803 m (5' 11\").    Weight as of this encounter: 98.9 kg (218 lb).   Weight management plan: diet    See Patient Instructions    Return in about 3 months (around 1/24/2023) for Lab Work.    Chris Scott MD  [facility] BISMARK Norman is a 86 year old, presenting for the following health issues:  ER F/U      HPI     ED/UC Followup:    Facility:  [facility]  Date of visit: 10/19-10/20  Reason for visit: Hyperglycemia   Current Status:  Feeling pretty good     Rash  Onset/Duration: 6-7 days  Description  Location: groin    Character: blotchy, raised, red  Itching: mild  Intensity:  moderate  Progression of Symptoms:  same and constant  Accompanying signs and symptoms:   Fever: No  Body aches or joint pain: No  Sore throat symptoms: No  Recent cold symptoms: No  History:           Previous episodes of similar rash: None  New exposures:  None  Recent travel: No  Exposure to similar rash: No  Precipitating or alleviating factors: States his left testicle is enlarged " "also   Therapies tried and outcome: triamcinolone cream helped a little       Review of Systems         Objective    /70   Pulse 70   Temp 98.4  F (36.9  C) (Tympanic)   Resp 18   Ht 1.803 m (5' 11\")   Wt 98.9 kg (218 lb)   SpO2 95%   BMI 30.40 kg/m    Body mass index is 30.4 kg/m .  Physical Exam   GENERAL: healthy, alert and no distress  MS: no gross musculoskeletal defects noted, no edema  SKIN: rash only in groin, red plaques and satellite red lesions seem to be fading. Candida type of rash.  No where else is the rash                    "

## 2022-10-25 ENCOUNTER — TELEPHONE (OUTPATIENT)
Dept: ONCOLOGY | Facility: CLINIC | Age: 87
End: 2022-10-25

## 2022-10-25 NOTE — TELEPHONE ENCOUNTER
[facility] Call Center    Phone Message    May a detailed message be left on voicemail: yes     Reason for Call: Patients spouse Rosaura called to get patient scheduled for Hydrocele (right). Patient doesn't want to do video so they chose [place] location. Patients referral states within 1-2 weeks to be seen. Patient gave over the phone consent to speak to Rosaura in regards to appointment. Please contact patients spouse in regards to getting him scheduled as soon as possible. Thank you      Action Taken: Other: Urology    Travel Screening: Not Applicable

## 2022-10-28 NOTE — TELEPHONE ENCOUNTER
Rosaura, Emerson's  Wife, called and relayed that she would like to schedule consult for Emerson at [facility], in person. Relayed that our schedulers offered a 1000 consult on 11/29  with 0945 check in. They do rely on their son for transportation and they will need to try and work this out. They would like to schedule he consult and if they end up being unable to make that date work they will call us to reschedule. They were warm transferred to  to arrange apt/Marsha Rodriguez RN

## 2022-10-29 NOTE — TELEPHONE ENCOUNTER
Attempted to contact Erasto Hernandez is busy.     Waleska MORENO BSN, RN    
Attempted to contact chrissie Presley is busy.     Waleska MORENO BSN, RN    
Called the home care and we have the wrong number to the PT.  I have called and left a message for Sakina PT with a V.O. for-    Orders are needed for this patient.      PT: 1x a week for 1 week  2x a week for 2 weeks  1x a week for 3 weeks    Chrissie BURCH RN    
Mary spear.  Laura HARMAN RN BSN    
Reason for Call:  Home Health Care    Sakina with [facility] called regarding (reason for call): PT    Orders are needed for this patient.     PT: 1x a week for 1 week  2x a week for 2 weeks  1x a week for 3 weeks    Phone Number Homecare Nurse can be reached at: [phone]    Can we leave a detailed message on this number? YES    Phone number patient can be reached at: Home number on file [phone] (home)    Best Time: any    Call taken on 6/11/2020 at 9:01 AM by Julissa Hill      
None

## 2022-11-04 ENCOUNTER — IMMUNIZATION (OUTPATIENT)
Dept: FAMILY MEDICINE | Facility: CLINIC | Age: 87
End: 2022-11-04
Payer: COMMERCIAL

## 2022-11-04 DIAGNOSIS — Z23 NEED FOR PROPHYLACTIC VACCINATION AND INOCULATION AGAINST INFLUENZA: Primary | ICD-10-CM

## 2022-11-04 PROCEDURE — 90662 IIV NO PRSV INCREASED AG IM: CPT

## 2022-11-04 PROCEDURE — G0008 ADMIN INFLUENZA VIRUS VAC: HCPCS

## 2022-11-04 PROCEDURE — 99207 PR NO CHARGE NURSE ONLY: CPT

## 2022-11-11 ENCOUNTER — LAB (OUTPATIENT)
Dept: LAB | Facility: CLINIC | Age: 87
End: 2022-11-11
Payer: COMMERCIAL

## 2022-11-11 DIAGNOSIS — Z79.4 TYPE 2 DIABETES MELLITUS WITH HYPERGLYCEMIA, WITH LONG-TERM CURRENT USE OF INSULIN (H): ICD-10-CM

## 2022-11-11 DIAGNOSIS — E11.3293 TYPE 2 DIABETES MELLITUS WITH BOTH EYES AFFECTED BY MILD NONPROLIFERATIVE RETINOPATHY WITHOUT MACULAR EDEMA, WITH LONG-TERM CURRENT USE OF INSULIN (H): ICD-10-CM

## 2022-11-11 DIAGNOSIS — Z79.4 TYPE 2 DIABETES MELLITUS WITH BOTH EYES AFFECTED BY MILD NONPROLIFERATIVE RETINOPATHY WITHOUT MACULAR EDEMA, WITH LONG-TERM CURRENT USE OF INSULIN (H): Primary | ICD-10-CM

## 2022-11-11 DIAGNOSIS — E11.65 UNCONTROLLED TYPE 2 DIABETES MELLITUS WITH HYPERGLYCEMIA (H): ICD-10-CM

## 2022-11-11 DIAGNOSIS — Z79.4 TYPE 2 DIABETES MELLITUS WITH BOTH EYES AFFECTED BY MILD NONPROLIFERATIVE RETINOPATHY WITHOUT MACULAR EDEMA, WITH LONG-TERM CURRENT USE OF INSULIN (H): ICD-10-CM

## 2022-11-11 DIAGNOSIS — N18.31 STAGE 3A CHRONIC KIDNEY DISEASE (H): ICD-10-CM

## 2022-11-11 DIAGNOSIS — E11.65 TYPE 2 DIABETES MELLITUS WITH HYPERGLYCEMIA, WITH LONG-TERM CURRENT USE OF INSULIN (H): ICD-10-CM

## 2022-11-11 DIAGNOSIS — E11.3293 TYPE 2 DIABETES MELLITUS WITH BOTH EYES AFFECTED BY MILD NONPROLIFERATIVE RETINOPATHY WITHOUT MACULAR EDEMA, WITH LONG-TERM CURRENT USE OF INSULIN (H): Primary | ICD-10-CM

## 2022-11-11 LAB
CREAT UR-MCNC: 18.7 MG/DL
HBA1C MFR BLD: 10.4 % (ref 0–5.6)
MICROALBUMIN UR-MCNC: 98.2 MG/L
MICROALBUMIN/CREAT UR: 525.13 MG/G CR (ref 0–17)

## 2022-11-11 PROCEDURE — 83036 HEMOGLOBIN GLYCOSYLATED A1C: CPT

## 2022-11-11 PROCEDURE — 36415 COLL VENOUS BLD VENIPUNCTURE: CPT

## 2022-11-11 PROCEDURE — 82043 UR ALBUMIN QUANTITATIVE: CPT

## 2022-11-14 DIAGNOSIS — E11.65 UNCONTROLLED TYPE 2 DIABETES MELLITUS WITH HYPERGLYCEMIA (H): Primary | ICD-10-CM

## 2022-11-14 DIAGNOSIS — R80.9 TYPE 2 DIABETES MELLITUS WITH MICROALBUMINURIA, WITH LONG-TERM CURRENT USE OF INSULIN (H): ICD-10-CM

## 2022-11-14 DIAGNOSIS — Z79.4 TYPE 2 DIABETES MELLITUS WITH MICROALBUMINURIA, WITH LONG-TERM CURRENT USE OF INSULIN (H): ICD-10-CM

## 2022-11-14 DIAGNOSIS — E11.29 TYPE 2 DIABETES MELLITUS WITH MICROALBUMINURIA, WITH LONG-TERM CURRENT USE OF INSULIN (H): ICD-10-CM

## 2022-11-15 DIAGNOSIS — E11.3293 TYPE 2 DIABETES MELLITUS WITH BOTH EYES AFFECTED BY MILD NONPROLIFERATIVE RETINOPATHY WITHOUT MACULAR EDEMA, WITH LONG-TERM CURRENT USE OF INSULIN (H): ICD-10-CM

## 2022-11-15 DIAGNOSIS — Z79.4 TYPE 2 DIABETES MELLITUS WITH BOTH EYES AFFECTED BY MILD NONPROLIFERATIVE RETINOPATHY WITHOUT MACULAR EDEMA, WITH LONG-TERM CURRENT USE OF INSULIN (H): ICD-10-CM

## 2022-11-16 ENCOUNTER — TELEPHONE (OUTPATIENT)
Dept: FAMILY MEDICINE | Facility: CLINIC | Age: 87
End: 2022-11-16

## 2022-11-16 RX ORDER — BLOOD SUGAR DIAGNOSTIC
STRIP MISCELLANEOUS
Qty: 100 STRIP | Refills: 3 | Status: SHIPPED | OUTPATIENT
Start: 2022-11-16 | End: 2023-10-18

## 2022-11-16 NOTE — TELEPHONE ENCOUNTER
Diabetes Education Scheduling Outreach #1:    Call to patient to schedule. Left message with phone number to call to schedule.    Also sent [employer] message for second attempt. Requested patient to call to schedule.    Justine Garrido OnCall  Diabetes and Nutrition Scheduling

## 2022-11-29 ENCOUNTER — ONCOLOGY VISIT (OUTPATIENT)
Dept: ONCOLOGY | Facility: CLINIC | Age: 87
End: 2022-11-29
Attending: STUDENT IN AN ORGANIZED HEALTH CARE EDUCATION/TRAINING PROGRAM
Payer: COMMERCIAL

## 2022-11-29 VITALS
DIASTOLIC BLOOD PRESSURE: 84 MMHG | OXYGEN SATURATION: 98 % | HEIGHT: 70 IN | SYSTOLIC BLOOD PRESSURE: 156 MMHG | BODY MASS INDEX: 32.3 KG/M2 | RESPIRATION RATE: 18 BRPM | HEART RATE: 68 BPM | WEIGHT: 225.6 LBS

## 2022-11-29 DIAGNOSIS — N43.3 HYDROCELE, UNSPECIFIED HYDROCELE TYPE: Primary | ICD-10-CM

## 2022-11-29 DIAGNOSIS — C61 PROSTATE CANCER (H): ICD-10-CM

## 2022-11-29 DIAGNOSIS — N50.812 LEFT TESTICULAR PAIN: ICD-10-CM

## 2022-11-29 PROCEDURE — 99214 OFFICE O/P EST MOD 30 MIN: CPT | Performed by: STUDENT IN AN ORGANIZED HEALTH CARE EDUCATION/TRAINING PROGRAM

## 2022-11-29 PROCEDURE — G0463 HOSPITAL OUTPT CLINIC VISIT: HCPCS

## 2022-11-29 ASSESSMENT — PAIN SCALES - GENERAL: PAINLEVEL: NO PAIN (0)

## 2022-11-29 NOTE — PROGRESS NOTES
Chief Complaint:   Right hydrocele           Consult or Referral:     Mr. Fawad Garcia is a 86 year old male seen at the request of  No ref. provider found.         History of Present Illness:     Fawad Garcia is a 86 year old male being seen for right hydrocele.  Duration of problem: 10 years  Previous treatments: None   accompanied by his son      Mobility specific complaints but  Has a persistent hydrocele of the last 10 years, which has not caused him any issues  Previously he was seen in the urgent care for COVID and subsequently was found to have persistence of the hydrocele and then referred to us  Known prostate cancer and had undergone radiation therapy currently having a PSA recurrence and recently had a PSMA PET/CT done  Follows Dr. Hua at [facility]             Past Medical History:     Past Medical History:   Diagnosis Date     Diabetes mellitus (H)      Hypertension      Squamous cell carcinoma             Past Surgical History:     Past Surgical History:   Procedure Laterality Date     AMPUTATE TOE(S) Right 7/23/2019    Procedure: Partial amputation great toe;  Surgeon: Ethan Montesinos DPM;  Location: [facility]     AMPUTATE TOE(S) Left 12/24/2019    Procedure: Amputation of the great toe, left foot;  Surgeon: Ethan Montesinos DPM;  [facility]: [facility]     AMPUTATE TOE(S) Right 3/3/2020    Procedure: Partial amputation, second toe, right foot;  Surgeon: Ethan Montesinos DPM;  Location: [facility]     CL AFF SURGICAL PATHOLOGY  2002    prostate biopsy elevated PSA     COLONOSCOPY       HC REMOVE TONSILS/ADENOIDS,<11 Y/O      T & A     IR LOWER EXTREMITY ANGIOGRAM LEFT  2/26/2020     IR LOWER EXTREMITY ANGIOGRAM RIGHT  12/11/2018     PHACOEMULSIFICATION WITH STANDARD INTRAOCULAR LENS IMPLANT Left 4/2/2015    Procedure: PHACOEMULSIFICATION WITH STANDARD INTRAOCULAR LENS IMPLANT;  Surgeon: Joseph Hernandez MD;  [facility]     PHACOEMULSIFICATION WITH STANDARD  INTRAOCULAR LENS IMPLANT Right 5/4/2015    Procedure: PHACOEMULSIFICATION WITH STANDARD INTRAOCULAR LENS IMPLANT;  Surgeon: Joseph Hernandez MD;  [facility]: [facility]     REPAIR HAMMER TOE Left 4/2/2019    Procedure: 5th Metatarsal Head Resection;  Surgeon: Ethan Montesinos DPM;  Location: [facility]            Medications     Current Outpatient Medications   Medication     amLODIPine (NORVASC) 5 MG tablet     blood glucose (ONETOUCH ULTRA) test strip     Blood Glucose Monitoring Suppl (ONE TOUCH ULTRA SYSTEM KIT) W/DEVICE KIT     clopidogrel (PLAVIX) 75 MG tablet     dorzolamide-timolol (COSOPT) 2-0.5 % ophthalmic solution     fluconazole (DIFLUCAN) 150 MG tablet     hydrochlorothiazide (HYDRODIURIL) 25 MG tablet     insulin glargine (LANTUS SOLOSTAR) 100 UNIT/ML pen     insulin pen needle (B-D U/F) 31G X 8 MM miscellaneous     Lancets (ONETOUCH DELICA PLUS [id number]) MISC     lisinopril (ZESTRIL) 40 MG tablet     metFORMIN (GLUCOPHAGE) 500 MG tablet     Multiple Vitamins-Minerals (PRESERVISION AREDS PO)     ORDER FOR DME     pantoprazole (PROTONIX) 20 MG EC tablet     rosuvastatin (CRESTOR) 10 MG tablet     triamcinolone (KENALOG) 0.1 % external cream     aspirin (ASA) 325 MG tablet     No current facility-administered medications for this visit.            Family History:     Family History   Problem Relation Age of Onset     Cancer Mother         intestinal CA     Diabetes Father      Diabetes Brother      Other Cancer Son         meagan tumor     Other Cancer Brother         pancreatic cancer     Diabetes Son      Melanoma No family hx of             Social History:     Social History     Socioeconomic History     Marital status:      Spouse name: Not on file     Number of children: Not on file     Years of education: Not on file     Highest education level: Not on file   Occupational History     Not on file   Tobacco Use     Smoking status: Never     Smokeless tobacco: Never   Vaping Use     Vaping Use:  "Never used   Substance and Sexual Activity     Alcohol use: Yes     Alcohol/week: 1.7 standard drinks     Comment: 1 x month     Drug use: No     Sexual activity: Not Currently     Partners: Female   Other Topics Concern     Parent/sibling w/ CABG, MI or angioplasty before 65F 55M? No   Social History Narrative     Not on file     Social Determinants of Health     Financial Resource Strain: Not on file   Food Insecurity: Not on file   Transportation Needs: Not on file   Physical Activity: Not on file   Stress: Not on file   Social Connections: Not on file   Intimate Partner Violence: Not on file   Housing Stability: Not on file            Allergies:   Zocor [simvastatin - high dose]         Review of Systems:  From intake questionnaire     Skin: negative  Eyes: negative  Ears/Nose/Throat: negative  Respiratory: No shortness of breath, dyspnea on exertion, cough, or hemoptysis  Cardiovascular: No chest pain or palpitations  Gastrointestinal: negative; no nausea/vomiting, constipation or diarrhea  Genitourinary: as per HPI  Musculoskeletal: negative  Neurologic: negative  Psychiatric: negative  Hematologic/Lymphatic/Immunologic: negative  Endocrine: negative         Physical Exam:     Patient is a 86 year old  male   Vitals: Blood pressure (!) 156/84, pulse 68, resp. rate 18, height 1.778 m (5' 10\"), weight 102.3 kg (225 lb 9.6 oz), SpO2 98 %.  Constitutional: Body mass index is 32.37 kg/m .  Alert, no acute distress, oriented, conversant  Eyes: no scleral icterus; extraocular muscles intact, moist conjunctivae  Neck: trachea midline, no thyromegaly  Ears/nose/mouth: throat/mouth:normal, good dentition  Respiratory: no respiratory distress, or pursed lip breathing  Cardiovascular: pulses strong and intact; no obvious jugular venous distension present  Gastrointestinal: soft, nontender, no organomegaly or masses,   Lymphatics: No inguinal adenopathy  Musculoskeletal: extremities normal, no peripheral edema  Skin: no " suspicious lesions or rashes  Neuro: Alert, oriented, speech and mentation normal  Psych: affect and mood normal, alert and oriented to person, place and time  Gait: Normal  : Medium sized right lax hydrocele, left testis normal      Labs and Pathology:    The following labs were reviewed by me and discussed with the patient:    Significant for   Lab Results   Component Value Date    CR 0.94 10/19/2022    CR 0.98 10/19/2022    CR 0.89 07/08/2022    CR 1.0 04/08/2022    CR 0.82 09/20/2021    CR 0.95 07/12/2021    CR 0.96 07/12/2021    CR 1.15 07/12/2021    CR 1.46 07/11/2021    CR 1.07 01/04/2021    CR 0.82 02/26/2020    CR 0.96 12/24/2019    CR 0.91 04/01/2019    CR 0.94 12/11/2018    CR 0.92 12/04/2018    CR 0.86 03/27/2018    CR 0.93 04/17/2017    CR 0.92 09/03/2016    CR 0.95 10/14/2015     PSA   Date Value Ref Range Status   03/18/2021 1.78 0 - 4 ug/L Final     Comment:     Assay Method:  Chemiluminescence using Siemens Vista analyzer   09/14/2020 1.53 0 - 4 ug/L Final     Comment:     Assay Method:  Chemiluminescence using Siemens Vista analyzer   03/02/2020 1.23 0 - 4 ug/L Final     Comment:     Assay Method:  Chemiluminescence using Siemens Vista analyzer   09/12/2019 1.08 0 - 4 ug/L Final     Comment:     Assay Method:  Chemiluminescence using Siemens Vista analyzer   03/15/2019 1.10 0 - 4 ug/L Final     Comment:     Assay Method:  Chemiluminescence using Siemens Vista analyzer   03/09/2018 0.79 0 - 4 ug/L Final     Comment:     Assay Method:  Chemiluminescence using Siemens Vista analyzer   03/13/2017 0.52 0 - 4 ug/L Final     Comment:     Assay Method:  Chemiluminescence using Siemens Vista analyzer   06/07/2016 0.48 0 - 4 ug/L Final   12/04/2015 0.43 0 - 4 ug/L Final   06/12/2015 0.33 0 - 4 ug/L Final     PSA Tumor Marker   Date Value Ref Range Status   07/08/2022 2.71 0.00 - 4.00 ug/L Final   03/18/2022 2.49 0.00 - 4.00 ug/L Final   09/20/2021 1.88 0.00 - 4.00 ug/L Final             Imaging:    The  following imaging exams were independently viewed and interpreted by me and discussed with patient:  PSMA PET CT scan: Abnormal:   IMPRESSION:  1. Focal FDG uptake at the mid/basal anterior central prostate gland,  suspicious for residual prostate cancer.   2. No evidence of metastasis in the body.  3. 9 mm pulmonary nodule in the left upper lobe. Recommend follow-up  chest CT in 3-6 months.             Assessment and Plan:     Hydrocele, unspecified hydrocele type  Unchanged hydrocele for the last many years  Do not recommend any treatment  Concern for a cyst on the left testis  Follow-up in 6 months with ultrasound of the scrotum with his primary care  - US Testicular & Scrotum w [employer]; Future        Prostate cancer (H)  Continue follow-up with radiation oncology and med oncology for the same   PSMA indicated of localized prostate recurrence        Plan:  Follow-up as needed    Orders  Orders Placed This Encounter   Procedures     US Testicular & Scrotum w [facility]       Brayan Nava MD  [facility]      ==========================    Additional Billing and Coding Information:  Review of external notes as documented above   Review of the result(s) of each unique test - PSA, creatinine, PSMA PET/CTP    Independent interpretation of a test performed by another physician/other qualified health care professional (not separately reported) -   N/A     Discussion of management or test interpretation with external physician/other qualified healthcare professional/appropriate source - N/A          18 minutes spent on the date of the encounter doing chart review, review of test results, interpretation of tests, patient visit, documentation and discussion with family     ==========================

## 2022-11-29 NOTE — LETTER
"    11/29/2022         RE: Fawad Garcia  [address] [facility] [address]        Dear Colleague,    Thank you for referring your patient, Fawad Garcia, to the [facility]. Please see a copy of my visit note below.    Urology Rooming Note    November 29, 2022 9:39 AM   Fawad Garcia is a 86 year old male who presents for:    Chief Complaint   Patient presents with     Urology     Right Hydrocele      Initial Vitals: BP (!) 156/84   Pulse 68   Resp 18   Ht 1.778 m (5' 10\")   Wt 102.3 kg (225 lb 9.6 oz)   SpO2 98%   BMI 32.37 kg/m   Estimated body mass index is 32.37 kg/m  as calculated from the following:    Height as of this encounter: 1.778 m (5' 10\").    Weight as of this encounter: 102.3 kg (225 lb 9.6 oz). Body surface area is 2.25 meters squared.  No Pain (0) Comment: Data Unavailable     Allergies reviewed: Yes  Medications reviewed: Yes    Medications: Medication refills not needed today.  Pharmacy name entered into EPIC: [facility] [employer]. - [place], [place] - [address] [place] [address]          Chief Complaint:   Right hydrocele           Consult or Referral:     Mr. Fawad Garcia is a 86 year old male seen at the request of  No ref. provider found.         History of Present Illness:     Fawad Garcia is a 86 year old male being seen for right hydrocele.  Duration of problem: 10 years  Previous treatments: None   accompanied by his son      Mobility specific complaints but  Has a persistent hydrocele of the last 10 years, which has not caused him any issues  Previously he was seen in the urgent care for COVID and subsequently was found to have persistence of the hydrocele and then referred to us  Known prostate cancer and had undergone radiation therapy currently having a PSA recurrence and recently had a PSMA PET/CT done  Follows Dr. Hua at [facility]             Past Medical History:     Past Medical " History:   Diagnosis Date     Diabetes mellitus (H)      Hypertension      Squamous cell carcinoma             Past Surgical History:     Past Surgical History:   Procedure Laterality Date     AMPUTATE TOE(S) Right 7/23/2019    Procedure: Partial amputation great toe;  Surgeon: Ethan Montesinos DPM;  [facility]: [facility]     AMPUTATE TOE(S) Left 12/24/2019    Procedure: Amputation of the great toe, left foot;  Surgeon: Ethan Montesinos DPM;  [facility]     AMPUTATE TOE(S) Right 3/3/2020    Procedure: Partial amputation, second toe, right foot;  Surgeon: Ethan Montesinos DPM;  Location: [facility]     CL AFF SURGICAL PATHOLOGY  2002    prostate biopsy elevated PSA     COLONOSCOPY       HC REMOVE TONSILS/ADENOIDS,<13 Y/O      T & A     IR LOWER EXTREMITY ANGIOGRAM LEFT  2/26/2020     IR LOWER EXTREMITY ANGIOGRAM RIGHT  12/11/2018     PHACOEMULSIFICATION WITH STANDARD INTRAOCULAR LENS IMPLANT Left 4/2/2015    Procedure: PHACOEMULSIFICATION WITH STANDARD INTRAOCULAR LENS IMPLANT;  Surgeon: Joseph Hernandez MD;  [facility]     PHACOEMULSIFICATION WITH STANDARD INTRAOCULAR LENS IMPLANT Right 5/4/2015    Procedure: PHACOEMULSIFICATION WITH STANDARD INTRAOCULAR LENS IMPLANT;  Surgeon: Joseph Hernandez MD;  [facility]     REPAIR HAMMER TOE Left 4/2/2019    Procedure: 5th Metatarsal Head Resection;  Surgeon: Ethan Montesinos DPM;  Location: [facility]            Medications     Current Outpatient Medications   Medication     amLODIPine (NORVASC) 5 MG tablet     blood glucose (ONETOUCH ULTRA) test strip     Blood Glucose Monitoring Suppl (ONE TOUCH ULTRA SYSTEM KIT) W/DEVICE KIT     clopidogrel (PLAVIX) 75 MG tablet     dorzolamide-timolol (COSOPT) 2-0.5 % ophthalmic solution     fluconazole (DIFLUCAN) 150 MG tablet     hydrochlorothiazide (HYDRODIURIL) 25 MG tablet     insulin glargine (LANTUS SOLOSTAR) 100 UNIT/ML pen     insulin pen needle (B-D U/F) 31G X 8 MM miscellaneous     Lancets (ONETOUCH  DELICA PLUS [id number]) MISC     lisinopril (ZESTRIL) 40 MG tablet     metFORMIN (GLUCOPHAGE) 500 MG tablet     Multiple Vitamins-Minerals (PRESERVISION AREDS PO)     ORDER FOR DME     pantoprazole (PROTONIX) 20 MG EC tablet     rosuvastatin (CRESTOR) 10 MG tablet     triamcinolone (KENALOG) 0.1 % external cream     aspirin (ASA) 325 MG tablet     No current facility-administered medications for this visit.            Family History:     Family History   Problem Relation Age of Onset     Cancer Mother         intestinal CA     Diabetes Father      Diabetes Brother      Other Cancer Son         meagan tumor     Other Cancer Brother         pancreatic cancer     Diabetes Son      Melanoma No family hx of             Social History:     Social History     Socioeconomic History     Marital status:      Spouse name: Not on file     Number of children: Not on file     Years of education: Not on file     Highest education level: Not on file   Occupational History     Not on file   Tobacco Use     Smoking status: Never     Smokeless tobacco: Never   Vaping Use     Vaping Use: Never used   Substance and Sexual Activity     Alcohol use: Yes     Alcohol/week: 1.7 standard drinks     Comment: 1 x month     Drug use: No     Sexual activity: Not Currently     Partners: Female   Other Topics Concern     Parent/sibling w/ CABG, MI or angioplasty before 65F 55M? No   Social History Narrative     Not on file     Social Determinants of Health     Financial Resource Strain: Not on file   Food Insecurity: Not on file   Transportation Needs: Not on file   Physical Activity: Not on file   Stress: Not on file   Social Connections: Not on file   Intimate Partner Violence: Not on file   Housing Stability: Not on file            Allergies:   Zocor [simvastatin - high dose]         Review of Systems:  From intake questionnaire     Skin: negative  Eyes: negative  Ears/Nose/Throat: negative  Respiratory: No shortness of breath, dyspnea on  "exertion, cough, or hemoptysis  Cardiovascular: No chest pain or palpitations  Gastrointestinal: negative; no nausea/vomiting, constipation or diarrhea  Genitourinary: as per HPI  Musculoskeletal: negative  Neurologic: negative  Psychiatric: negative  Hematologic/Lymphatic/Immunologic: negative  Endocrine: negative         Physical Exam:     Patient is a 86 year old  male   Vitals: Blood pressure (!) 156/84, pulse 68, resp. rate 18, height 1.778 m (5' 10\"), weight 102.3 kg (225 lb 9.6 oz), SpO2 98 %.  Constitutional: Body mass index is 32.37 kg/m .  Alert, no acute distress, oriented, conversant  Eyes: no scleral icterus; extraocular muscles intact, moist conjunctivae  Neck: trachea midline, no thyromegaly  Ears/nose/mouth: throat/mouth:normal, good dentition  Respiratory: no respiratory distress, or pursed lip breathing  Cardiovascular: pulses strong and intact; no obvious jugular venous distension present  Gastrointestinal: soft, nontender, no organomegaly or masses,   Lymphatics: No inguinal adenopathy  Musculoskeletal: extremities normal, no peripheral edema  Skin: no suspicious lesions or rashes  Neuro: Alert, oriented, speech and mentation normal  Psych: affect and mood normal, alert and oriented to person, place and time  Gait: Normal  : Medium sized right lax hydrocele, left testis normal      Labs and Pathology:    The following labs were reviewed by me and discussed with the patient:    Significant for   Lab Results   Component Value Date    CR 0.94 10/19/2022    CR 0.98 10/19/2022    CR 0.89 07/08/2022    CR 1.0 04/08/2022    CR 0.82 09/20/2021    CR 0.95 07/12/2021    CR 0.96 07/12/2021    CR 1.15 07/12/2021    CR 1.46 07/11/2021    CR 1.07 01/04/2021    CR 0.82 02/26/2020    CR 0.96 12/24/2019    CR 0.91 04/01/2019    CR 0.94 12/11/2018    CR 0.92 12/04/2018    CR 0.86 03/27/2018    CR 0.93 04/17/2017    CR 0.92 09/03/2016    CR 0.95 10/14/2015     PSA   Date Value Ref Range Status   03/18/2021 1.78 0 " - 4 ug/L Final     Comment:     Assay Method:  Chemiluminescence using Siemens Vista analyzer   09/14/2020 1.53 0 - 4 ug/L Final     Comment:     Assay Method:  Chemiluminescence using Siemens Vista analyzer   03/02/2020 1.23 0 - 4 ug/L Final     Comment:     Assay Method:  Chemiluminescence using Siemens Vista analyzer   09/12/2019 1.08 0 - 4 ug/L Final     Comment:     Assay Method:  Chemiluminescence using Siemens Vista analyzer   03/15/2019 1.10 0 - 4 ug/L Final     Comment:     Assay Method:  Chemiluminescence using Siemens Vista analyzer   03/09/2018 0.79 0 - 4 ug/L Final     Comment:     Assay Method:  Chemiluminescence using Siemens Vista analyzer   03/13/2017 0.52 0 - 4 ug/L Final     Comment:     Assay Method:  Chemiluminescence using Siemens Vista analyzer   06/07/2016 0.48 0 - 4 ug/L Final   12/04/2015 0.43 0 - 4 ug/L Final   06/12/2015 0.33 0 - 4 ug/L Final     PSA Tumor Marker   Date Value Ref Range Status   07/08/2022 2.71 0.00 - 4.00 ug/L Final   03/18/2022 2.49 0.00 - 4.00 ug/L Final   09/20/2021 1.88 0.00 - 4.00 ug/L Final             Imaging:    The following imaging exams were independently viewed and interpreted by me and discussed with patient:  PSMA PET CT scan: Abnormal:   IMPRESSION:  1. Focal FDG uptake at the mid/basal anterior central prostate gland,  suspicious for residual prostate cancer.   2. No evidence of metastasis in the body.  3. 9 mm pulmonary nodule in the left upper lobe. Recommend follow-up  chest CT in 3-6 months.             Assessment and Plan:     Hydrocele, unspecified hydrocele type  Unchanged hydrocele for the last many years  Do not recommend any treatment  Concern for a cyst on the left testis  Follow-up in 6 months with ultrasound of the scrotum with his primary care  - US Testicular & Scrotum w [facility]; Future        Prostate cancer (H)  Continue follow-up with radiation oncology and med oncology for the same   PSMA indicated of localized prostate  recurrence        Plan:  Follow-up as needed    Orders  Orders Placed This Encounter   Procedures     US Testicular & Scrotum w [facility]       Brayan Nava MD  [facility]      ==========================    Additional Billing and Coding Information:  Review of external notes as documented above   Review of the result(s) of each unique test - PSA, creatinine, PSMA PET/CTP    Independent interpretation of a test performed by another physician/other qualified health care professional (not separately reported) -   N/A     Discussion of management or test interpretation with external physician/other qualified healthcare professional/appropriate source - N/A          18 minutes spent on the date of the encounter doing chart review, review of test results, interpretation of tests, patient visit, documentation and discussion with family     ==========================      Again, thank you for allowing me to participate in the care of your patient.        Sincerely,        Brayan Nava MD

## 2022-11-29 NOTE — PROGRESS NOTES
"Urology Rooming Note    November 29, 2022 9:39 AM   Fawad Garcia is a 86 year old male who presents for:    Chief Complaint   Patient presents with     Urology     Right Hydrocele      Initial Vitals: BP (!) 156/84   Pulse 68   Resp 18   Ht 1.778 m (5' 10\")   Wt 102.3 kg (225 lb 9.6 oz)   SpO2 98%   BMI 32.37 kg/m   Estimated body mass index is 32.37 kg/m  as calculated from the following:    Height as of this encounter: 1.778 m (5' 10\").    Weight as of this encounter: 102.3 kg (225 lb 9.6 oz). Body surface area is 2.25 meters squared.  No Pain (0) Comment: Data Unavailable     Allergies reviewed: Yes  Medications reviewed: Yes    Medications: Medication refills not needed today.  Pharmacy name entered into EPIC: [facility] [employer]. - [place], [place] - [address] [place] [address]      Genoveva Jefferson LPN  "

## 2022-12-11 NOTE — PATIENT INSTRUCTIONS
"Fawad to follow up with Primary Care provider regarding elevated blood pressure.  DIABETES AND YOUR FEET  Diabetes can result in several problems in the feet including ulcers (open sores) and amputations. Two of the most important reasons why people develop foot problems when they have diabetes is : 1. Neuropathy (loss of feeling)  2. Vascular disease (loss or decrease of blood flow).    Neuropathy is a term used to describe a loss of nerve function.  Patients with diabetes are at risk of developing neuropathy if their sugars continue to run high and are above the normal value. One theory for neuropathy is that the \"extra\" sugar in the body enters the nerves and is broken down. These by-products build up in the nerve causing it to swell and impairing nerve function. Often times, this can be prevented by controlling your sugars, dieting and exercise.    When a person develops neuropathy, they usually begin to feel numbness or tingling in their feet and sometime in their legs.  Other symptoms may include painful burning or hot feet, tingling or feeling like insects or ants are crawling on your feet or legs.  If the diabetes is sever and the sugars run high for long periods of time, neuropathy can also occur in the hands.    Vascular disease  is a term used to describe a loss or decrease in circulation (blood flow). There is a problem in getting blood and oxygen to areas that need it. Similar to neuropathy, sugars can build up in the walls of the arteries (blood vessels) and cause them to become swollen, thickened and hardened. This decreases the amount of blood that can go to an area that needs it. Though this is common in the legs of diabetic patients, it can also affect other arteries (blood vessels) in the body such as in the heart and eyes.    In the legs, vascular disease usually results in cramping. Patients who develop leg cramps after walking the same distance every time (i.e. One block, half a mile, ect.) " need to let their doctors know so that their circulation may be checked. Cramps causing severe pain in the feet and/or legs while sleeping and the cramps go away when you stand or hang your legs off the side of the bed, may also be a sign of poor blood circulation.  Occasional cramping in cold weather or on rare occasions with activity may not be due to poor circulation, but you should inform your doctor.    PREVENTION OF THESE DISEASES  The key to prevention is good blood sugar control. Poor blood sugar control is a big reason many of these problems start. Physical activity (exercise) is a very good way to help decrease your blood sugars. Exercise can lower your blood sugar, blood pressure, and cholesterol. It also reduces your risk for heart disease and stroke, relieves stress, and strengthens your heart, muscles and bones.  In addition, regular activity helps insulin work better, improves your blood circulation, and keeps your joints flexible. If you're trying to lose weight, a combination of exercise and wise food choices can help you reach your target weight and maintain it.      PAIN MANAGEMENT  1.Blood Sugar Control - Most important  2. Medications such as:  Amytriptylline, duloxetine, gabapentin, lyrica, tramadol  3. Nutritional therapy:  Vitamin B6 (100mg daily), Vitamin B12 (75mcg daily), Vitamin D 2000 IU daily), Alpha-Lipoic Acid (600-1800mg daily), Acetyl-L-Carnitine (500-1000mg TID, L-methyl folate (1500mcg daily)    ** Metformin can block Vitamin B6 and B12 so it is important to supplement**    FOOT CARE RECOMMENDATIONS   1. Wash your feet with lukewarm water and a mild soap and then dry them thoroughly, especially between the toes.     2. Examine your feet daily looking for cuts, corns, blisters, cracks, ect, especially after wearing new shoes. Make sure to look between your toes. If you cannot see the bottom of your feet, set a mirror on the floor and hold your foot over it, or ask a spouse, friend  or family member to examine your feet for you. Contact your doctor immediately if new problems are noted or if sores are not healing.     3. Immediately apply moisturizer to the tops and bottoms of your feet, avoiding areas between the toes. Hand lotion (Intesive Care, Nery, Eucerin, Neutrogena, Curel, ect) is sufficient unless your doctor prescribes a medicated lotion. Apply sunscreen to your feet when going swimming outside.     4. Use clean comfortable shoes, wear white socks (if you have any bleeding or drainage, you will see it on white socks). Socks should not have thick seams or cut off the circulation around the leg. Break in new shoes slowly and rotate with older shoes until broken in. Check the inside of your shoes with your hand to look for areas of irritation or objects that may have fallen into your shoes.       5. Keep slippers by the side of your bed for use during the night.     6.  Shoes should be fitted by a professional and should not cause areas of irritation.  Check your feet regularly when wearing a new pair of shoes and replace them as needed.     7.  Talk to your doctor about proper exercise. Exercise and stretching stimulate blood flow to your feet and maintain proper glucose levels.     8.  Monitor your blood glucose level as instructed by your doctor. Notify your doctor immediately if your blood sugar is abnormally high or low.    9. Cut your nails straight across, but then gently round any sharp edges with a cardboard nail file. If you have neuropathy, peripheral vascular disease or cannot see that well to trim your own toenails contact [employer] ([phone]) or [employer] ([phone]).      THINGS TO AVOID DOING   1.  Do not soak your feet if you have an open sore. Use only lukewarm water and always check the temperature with your hand as hot water can easily burn your feet.       2.  Never use a hot water bottle or heating pad on your feet. Also do not apply cold compresses to  your feet. With decreased sensation, you could burn or freeze your feet.       3.  Do not apply any of these to your feet:    -  Over the counter medicine for corns or warts    -  Harsh chemicals like boric acid    -  Do not self-treat corns, cuts, blisters or infections. Always consult your doctor.       4.  Do not wear sandals, slippers or walk barefoot, especially on hot sand or concrete or other harsh surfaces.     5.  If you smoke, stop!!!           No

## 2022-12-29 ENCOUNTER — MYC REFILL (OUTPATIENT)
Dept: FAMILY MEDICINE | Facility: CLINIC | Age: 87
End: 2022-12-29

## 2022-12-29 DIAGNOSIS — B37.9 CANDIDA INFECTION: ICD-10-CM

## 2022-12-30 DIAGNOSIS — C61 MALIGNANT NEOPLASM OF PROSTATE (H): Primary | ICD-10-CM

## 2023-01-03 RX ORDER — FLUCONAZOLE 150 MG/1
150 TABLET ORAL
Qty: 2 TABLET | Refills: 0 | Status: SHIPPED | OUTPATIENT
Start: 2023-01-03 | End: 2023-03-10

## 2023-01-06 ENCOUNTER — LAB (OUTPATIENT)
Dept: LAB | Facility: CLINIC | Age: 88
End: 2023-01-06
Payer: COMMERCIAL

## 2023-01-06 DIAGNOSIS — C61 MALIGNANT NEOPLASM OF PROSTATE (H): ICD-10-CM

## 2023-01-06 LAB — PSA SERPL-MCNC: 2.55 NG/ML

## 2023-01-06 PROCEDURE — 84153 ASSAY OF PSA TOTAL: CPT

## 2023-01-06 PROCEDURE — 36415 COLL VENOUS BLD VENIPUNCTURE: CPT

## 2023-01-20 ENCOUNTER — HOSPITAL ENCOUNTER (OUTPATIENT)
Dept: CT IMAGING | Facility: CLINIC | Age: 88
Discharge: HOME | End: 2023-01-20
Attending: INTERNAL MEDICINE
Payer: COMMERCIAL

## 2023-01-20 ENCOUNTER — LAB (OUTPATIENT)
Dept: LAB | Facility: CLINIC | Age: 88
End: 2023-01-20
Attending: INTERNAL MEDICINE
Payer: COMMERCIAL

## 2023-01-20 DIAGNOSIS — R91.8 PULMONARY NODULES: ICD-10-CM

## 2023-01-20 DIAGNOSIS — C61 PROSTATE CANCER (H): Primary | ICD-10-CM

## 2023-01-20 LAB
ALBUMIN SERPL BCG-MCNC: 3.8 G/DL (ref 3.5–5.2)
ALP SERPL-CCNC: 63 U/L (ref 40–129)
ALT SERPL W P-5'-P-CCNC: 14 U/L (ref 10–50)
ANION GAP SERPL CALCULATED.3IONS-SCNC: 10 MMOL/L (ref 7–15)
AST SERPL W P-5'-P-CCNC: 19 U/L (ref 10–50)
BASOPHILS # BLD AUTO: 0.1 10E3/UL (ref 0–0.2)
BASOPHILS NFR BLD AUTO: 1 %
BILIRUB SERPL-MCNC: 0.5 MG/DL
BUN SERPL-MCNC: 32.2 MG/DL (ref 8–23)
CALCIUM SERPL-MCNC: 9.1 MG/DL (ref 8.8–10.2)
CHLORIDE SERPL-SCNC: 102 MMOL/L (ref 98–107)
CREAT SERPL-MCNC: 0.93 MG/DL (ref 0.67–1.17)
DEPRECATED HCO3 PLAS-SCNC: 24 MMOL/L (ref 22–29)
EOSINOPHIL # BLD AUTO: 0.4 10E3/UL (ref 0–0.7)
EOSINOPHIL NFR BLD AUTO: 6 %
ERYTHROCYTE [DISTWIDTH] IN BLOOD BY AUTOMATED COUNT: 13.5 % (ref 10–15)
GFR SERPL CREATININE-BSD FRML MDRD: 80 ML/MIN/1.73M2
GLUCOSE SERPL-MCNC: 355 MG/DL (ref 70–99)
HCT VFR BLD AUTO: 39.4 % (ref 40–53)
HGB BLD-MCNC: 13.2 G/DL (ref 13.3–17.7)
IMM GRANULOCYTES # BLD: 0 10E3/UL
IMM GRANULOCYTES NFR BLD: 0 %
LYMPHOCYTES # BLD AUTO: 1.8 10E3/UL (ref 0.8–5.3)
LYMPHOCYTES NFR BLD AUTO: 26 %
MCH RBC QN AUTO: 29.6 PG (ref 26.5–33)
MCHC RBC AUTO-ENTMCNC: 33.5 G/DL (ref 31.5–36.5)
MCV RBC AUTO: 88 FL (ref 78–100)
MONOCYTES # BLD AUTO: 0.7 10E3/UL (ref 0–1.3)
MONOCYTES NFR BLD AUTO: 9 %
NEUTROPHILS # BLD AUTO: 4 10E3/UL (ref 1.6–8.3)
NEUTROPHILS NFR BLD AUTO: 58 %
NRBC # BLD AUTO: 0 10E3/UL
NRBC BLD AUTO-RTO: 0 /100
PLATELET # BLD AUTO: 243 10E3/UL (ref 150–450)
POTASSIUM SERPL-SCNC: 4.4 MMOL/L (ref 3.4–5.3)
PROT SERPL-MCNC: 6.7 G/DL (ref 6.4–8.3)
RBC # BLD AUTO: 4.46 10E6/UL (ref 4.4–5.9)
SODIUM SERPL-SCNC: 136 MMOL/L (ref 136–145)
WBC # BLD AUTO: 7 10E3/UL (ref 4–11)

## 2023-01-20 PROCEDURE — 36415 COLL VENOUS BLD VENIPUNCTURE: CPT

## 2023-01-20 PROCEDURE — 85014 HEMATOCRIT: CPT

## 2023-01-20 PROCEDURE — 80053 COMPREHEN METABOLIC PANEL: CPT

## 2023-01-20 PROCEDURE — 71250 CT THORAX DX C-: CPT

## 2023-01-27 ENCOUNTER — LAB (OUTPATIENT)
Dept: LAB | Facility: CLINIC | Age: 88
End: 2023-01-27
Payer: COMMERCIAL

## 2023-01-27 DIAGNOSIS — E11.29 TYPE 2 DIABETES MELLITUS WITH MICROALBUMINURIA, WITH LONG-TERM CURRENT USE OF INSULIN (H): ICD-10-CM

## 2023-01-27 DIAGNOSIS — Z79.4 TYPE 2 DIABETES MELLITUS WITH BOTH EYES AFFECTED BY MILD NONPROLIFERATIVE RETINOPATHY WITHOUT MACULAR EDEMA, WITH LONG-TERM CURRENT USE OF INSULIN (H): Primary | ICD-10-CM

## 2023-01-27 DIAGNOSIS — Z79.4 TYPE 2 DIABETES MELLITUS WITH HYPERGLYCEMIA, WITH LONG-TERM CURRENT USE OF INSULIN (H): ICD-10-CM

## 2023-01-27 DIAGNOSIS — E11.65 TYPE 2 DIABETES MELLITUS WITH HYPERGLYCEMIA, WITH LONG-TERM CURRENT USE OF INSULIN (H): ICD-10-CM

## 2023-01-27 DIAGNOSIS — E11.3293 TYPE 2 DIABETES MELLITUS WITH BOTH EYES AFFECTED BY MILD NONPROLIFERATIVE RETINOPATHY WITHOUT MACULAR EDEMA, WITH LONG-TERM CURRENT USE OF INSULIN (H): ICD-10-CM

## 2023-01-27 DIAGNOSIS — Z79.4 TYPE 2 DIABETES MELLITUS WITH MICROALBUMINURIA, WITH LONG-TERM CURRENT USE OF INSULIN (H): ICD-10-CM

## 2023-01-27 DIAGNOSIS — E11.3293 TYPE 2 DIABETES MELLITUS WITH BOTH EYES AFFECTED BY MILD NONPROLIFERATIVE RETINOPATHY WITHOUT MACULAR EDEMA, WITH LONG-TERM CURRENT USE OF INSULIN (H): Primary | ICD-10-CM

## 2023-01-27 DIAGNOSIS — R80.9 TYPE 2 DIABETES MELLITUS WITH MICROALBUMINURIA, WITH LONG-TERM CURRENT USE OF INSULIN (H): ICD-10-CM

## 2023-01-27 DIAGNOSIS — Z79.4 TYPE 2 DIABETES MELLITUS WITH BOTH EYES AFFECTED BY MILD NONPROLIFERATIVE RETINOPATHY WITHOUT MACULAR EDEMA, WITH LONG-TERM CURRENT USE OF INSULIN (H): ICD-10-CM

## 2023-01-27 DIAGNOSIS — E11.65 UNCONTROLLED TYPE 2 DIABETES MELLITUS WITH HYPERGLYCEMIA (H): ICD-10-CM

## 2023-01-27 LAB
CREAT UR-MCNC: 44.2 MG/DL
HBA1C MFR BLD: 9.6 % (ref 0–5.6)
MICROALBUMIN UR-MCNC: 138.1 MG/L
MICROALBUMIN/CREAT UR: 312.44 MG/G CR (ref 0–17)

## 2023-01-27 PROCEDURE — 36415 COLL VENOUS BLD VENIPUNCTURE: CPT

## 2023-01-27 PROCEDURE — 82043 UR ALBUMIN QUANTITATIVE: CPT

## 2023-01-27 PROCEDURE — 83036 HEMOGLOBIN GLYCOSYLATED A1C: CPT

## 2023-01-27 PROCEDURE — 82570 ASSAY OF URINE CREATININE: CPT

## 2023-01-27 RX ORDER — INSULIN GLARGINE 100 [IU]/ML
25 INJECTION, SOLUTION SUBCUTANEOUS AT BEDTIME
Qty: 30 ML | Refills: 3 | Status: SHIPPED | OUTPATIENT
Start: 2023-01-27 | End: 2023-09-27

## 2023-01-30 ENCOUNTER — ONCOLOGY VISIT (OUTPATIENT)
Dept: ONCOLOGY | Facility: CLINIC | Age: 88
End: 2023-01-30
Attending: RADIOLOGY
Payer: COMMERCIAL

## 2023-01-30 VITALS
OXYGEN SATURATION: 99 % | HEART RATE: 66 BPM | WEIGHT: 232 LBS | TEMPERATURE: 97.6 F | DIASTOLIC BLOOD PRESSURE: 71 MMHG | RESPIRATION RATE: 12 BRPM | BODY MASS INDEX: 33.29 KG/M2 | SYSTOLIC BLOOD PRESSURE: 124 MMHG

## 2023-01-30 DIAGNOSIS — C61 PROSTATE CANCER (H): Primary | ICD-10-CM

## 2023-01-30 DIAGNOSIS — R91.8 PULMONARY NODULES: ICD-10-CM

## 2023-01-30 PROCEDURE — G0463 HOSPITAL OUTPT CLINIC VISIT: HCPCS | Performed by: NURSE PRACTITIONER

## 2023-01-30 PROCEDURE — 99214 OFFICE O/P EST MOD 30 MIN: CPT | Performed by: NURSE PRACTITIONER

## 2023-01-30 ASSESSMENT — PAIN SCALES - GENERAL: PAINLEVEL: NO PAIN (0)

## 2023-01-30 NOTE — PROGRESS NOTES
[facility] Hematology and Oncology Outpatient Progress Note    Patient: Fawad Garcia  MRN: [id number]  Date of Service: Jan 30, 2023          Reason for Visit    1. Prostate cancer    Primary Oncologist: Dr. Barriga      Assessment/Plan  1. Locally recurrent prostate cancer  Initially treated in 2012. Rising PSA and PSMA PET showed local prostate gland uptake (no mets) in 2021 -2022. Reirradiation not feasible, patient declined ADT and has been under observation.     PSA doubling time >1yr.    PSA is stable 2.55 (2.71 six mths ago, 2.49 prior).  No new symptoms of concern.    Plan:  -Continue every 6 mth PSA and exam  -Imaging as needed for symptoms or rapid rise in PSA    2. FREDDIE lung nodule  April PSMA PET incidentally noted a 9 mm FREDDIE nodule without activity, felt unrelated to the prostate cancer but could not rule out a separate lung cancer. Serial CT scans have been stable over the last 6 mths.     Plan:  -Repeat chest CT in 6 mths.     ______________________________________________________________________________    History of Present Illness/ Interval History    Mr. Fawad Garcia  is a 87 year old with locally recurrent prostate cancer, noted a year ago, on observation alone. Returns for 6-mth follow-up.     Doing well.   No weight loss, pain,  symptoms.       ECOG Performance    1        Oncology History/Treatment  Diagnosis/Stage:   2011: early stage prostate cancer  -elevated PSA >10 since 2003. Biopsies 2002 and 2005 negative for cancer  -2011: MRI guided prostate biopsy: adenocarcinoma with a Chaka score of 4+3 with 90% of the 2 of the 2 cores positive for malignancy    2021: biochemical recurrence  -rising PSA: 9/2021 1.88; 3/2022  2.49  -PSMA PET: uptake in prostate gland. No other evidence of mets    Treatment:  2011: Not a surgical candidate    2012: IMRT prostate  -declined ADT    2022: biochemical recurrence/local prostate recurrence  -re-irradiation not feasible  -pt declined  ADT, opted on observation      Physical Exam    GENERAL: Alert and oriented to time place and person. Seated comfortably. In no distress. Son accompanies.  HEAD: Atraumatic and normocephalic. No alopecia.  EYES: RON, EOMI. No erythema. No icterus.  LYMPH NODES: No palpable supraclavicular, cervical lymphadenopathy.  CHEST: clear to auscultation bilaterally. Resonant to percussion throughout bilaterally. Symmetrical breath movements bilaterally.  CVS: RRR  ABDOMEN: Soft. Not tender. Not distended. No palpable hepatomegaly or splenomegaly. No other mass palpable. Bowel sounds present.  EXTREMITIES: Warm. No peripheral edema.  SKIN: no rash, or bruising or purpura.   NEURO: No gross deficit noted. Non-antalgic gait.      Lab Results    Recent Results (from the past 168 hour(s))   Albumin Random Urine Quantitative with Creat Ratio   Result Value Ref Range    Creatinine Urine mg/dL 44.2 mg/dL    Albumin Urine mg/L 138.1 mg/L    Albumin Urine mg/g Cr 312.44 (H) 0.00 - 17.00 mg/g Cr   Hemoglobin A1c   Result Value Ref Range    Hemoglobin A1C 9.6 (H) 0.0 - 5.6 %       Imaging    CT Chest w/o Contrast    Result Date: 1/20/2023  CT CHEST WITHOUT CONTRAST 1/20/2023 12:31 PM CLINICAL HISTORY: Pulmonary nodules. TECHNIQUE: CT chest without IV contrast. Multiplanar reformats were obtained. Dose reduction techniques were used. CONTRAST: None. COMPARISON: 7/8/2022 and 4/8/2022 FINDINGS: LUNGS AND PLEURA: Stable 10 mm nodule in the central aspect of the left upper lobe on image 105 of series 4. No evidence of new or enlarging nodules. No infiltrate or effusion. MEDIASTINUM/AXILLAE: No axillary or mediastinal adenopathy. No pericardial effusion. Severe coronary artery calcification. UPPER ABDOMEN: Dependent calcified stones are seen within the gallbladder. MUSCULOSKELETAL: Degenerative changes are noted through the spine.     IMPRESSION: Stable 10 mm nodule in the central aspect of the left upper lobe. This has been stable for  approximately 9 months at this point. Continued follow-up is recommended. REJI JAIMES MD   SYSTEM ID:  [id number]      Billing  Total time 30 minutes, to include face to face visit, review of EMR, ordering, documentation and coordination of care on date of service    Signed by: Molly Kaye NP

## 2023-01-30 NOTE — LETTER
"    1/30/2023         RE: Fawad Garcia  [address] [facility] [address]        Dear Colleague,    Thank you for referring your patient, Fawad Garcia, to the [facility]. Please see a copy of my visit note below.    Oncology Rooming Note    January 30, 2023 1:45 PM   Fawad Garcia is a 87 year old male who presents for:    Chief Complaint   Patient presents with     Oncology Clinic Visit     Malignant neoplasm of prostate - Provider visit only     Initial Vitals: /71 (BP Location: Right arm, Patient Position: Sitting, Cuff Size: Adult Regular)   Pulse 66   Temp 97.6  F (36.4  C) (Tympanic)   Resp 12   Wt 105.2 kg (232 lb)   SpO2 99%   BMI 33.29 kg/m   Estimated body mass index is 33.29 kg/m  as calculated from the following:    Height as of 11/29/22: 1.778 m (5' 10\").    Weight as of this encounter: 105.2 kg (232 lb). Body surface area is 2.28 meters squared.  No Pain (0) Comment: Data Unavailable   No LMP for male patient.  Allergies reviewed: Yes  Medications reviewed: Yes    Medications: Medication refills not needed today.  Pharmacy name entered into EPIC: [facility] - [place], [place] - [address] [place] [address]    Clinical concerns:  None      Che Arthur, [facility] Hematology and Oncology Outpatient Progress Note    Patient: Fawad Garcia  MRN: [id number]  Date of Service: Jan 30, 2023          Reason for Visit    1. Prostate cancer    Primary Oncologist: Dr. Barriga      Assessment/Plan  1. Locally recurrent prostate cancer  Initially treated in 2012. Rising PSA and PSMA PET showed local prostate gland uptake (no mets) in 2021 -2022. Reirradiation not feasible, patient declined ADT and has been under observation.     PSA doubling time >1yr.    PSA is stable 2.55 (2.71 six mths ago, 2.49 prior).  No new symptoms of concern.    Plan:  -Continue every 6 mth PSA and exam  -Imaging as needed for symptoms or " rapid rise in PSA    2. FREDDIE lung nodule  April PSMA PET incidentally noted a 9 mm FREDDIE nodule without activity, felt unrelated to the prostate cancer but could not rule out a separate lung cancer. Serial CT scans have been stable over the last 6 mths.     Plan:  -Repeat chest CT in 6 mths.     ______________________________________________________________________________    History of Present Illness/ Interval History    Mr. Fawad Garcia  is a 87 year old with locally recurrent prostate cancer, noted a year ago, on observation alone. Returns for 6-mth follow-up.     Doing well.   No weight loss, pain,  symptoms.       ECOG Performance    1        Oncology History/Treatment  Diagnosis/Stage:   2011: early stage prostate cancer  -elevated PSA >10 since 2003. Biopsies 2002 and 2005 negative for cancer  -2011: MRI guided prostate biopsy: adenocarcinoma with a Chaka score of 4+3 with 90% of the 2 of the 2 cores positive for malignancy    2021: biochemical recurrence  -rising PSA: 9/2021 1.88; 3/2022  2.49  -PSMA PET: uptake in prostate gland. No other evidence of mets    Treatment:  2011: Not a surgical candidate    2012: IMRT prostate  -declined ADT    2022: biochemical recurrence/local prostate recurrence  -re-irradiation not feasible  -pt declined ADT, opted on observation      Physical Exam    GENERAL: Alert and oriented to time place and person. Seated comfortably. In no distress. Son accompanies.  HEAD: Atraumatic and normocephalic. No alopecia.  EYES: RON, EOMI. No erythema. No icterus.  LYMPH NODES: No palpable supraclavicular, cervical lymphadenopathy.  CHEST: clear to auscultation bilaterally. Resonant to percussion throughout bilaterally. Symmetrical breath movements bilaterally.  CVS: RRR  ABDOMEN: Soft. Not tender. Not distended. No palpable hepatomegaly or splenomegaly. No other mass palpable. Bowel sounds present.  EXTREMITIES: Warm. No peripheral edema.  SKIN: no rash, or bruising or purpura.    NEURO: No gross deficit noted. Non-antalgic gait.      Lab Results    Recent Results (from the past 168 hour(s))   Albumin Random Urine Quantitative with Creat Ratio   Result Value Ref Range    Creatinine Urine mg/dL 44.2 mg/dL    Albumin Urine mg/L 138.1 mg/L    Albumin Urine mg/g Cr 312.44 (H) 0.00 - 17.00 mg/g Cr   Hemoglobin A1c   Result Value Ref Range    Hemoglobin A1C 9.6 (H) 0.0 - 5.6 %       Imaging    CT Chest w/o Contrast    Result Date: 1/20/2023  CT CHEST WITHOUT CONTRAST 1/20/2023 12:31 PM CLINICAL HISTORY: Pulmonary nodules. TECHNIQUE: CT chest without IV contrast. Multiplanar reformats were obtained. Dose reduction techniques were used. CONTRAST: None. COMPARISON: 7/8/2022 and 4/8/2022 FINDINGS: LUNGS AND PLEURA: Stable 10 mm nodule in the central aspect of the left upper lobe on image 105 of series 4. No evidence of new or enlarging nodules. No infiltrate or effusion. MEDIASTINUM/AXILLAE: No axillary or mediastinal adenopathy. No pericardial effusion. Severe coronary artery calcification. UPPER ABDOMEN: Dependent calcified stones are seen within the gallbladder. MUSCULOSKELETAL: Degenerative changes are noted through the spine.     IMPRESSION: Stable 10 mm nodule in the central aspect of the left upper lobe. This has been stable for approximately 9 months at this point. Continued follow-up is recommended. REJI JAIMES MD   SYSTEM ID:  [id number]      Billing  Total time 30 minutes, to include face to face visit, review of EMR, ordering, documentation and coordination of care on date of service    Signed by: Molly Kaye NP        Again, thank you for allowing me to participate in the care of your patient.        Sincerely,        Molly Kaye NP

## 2023-01-30 NOTE — PROGRESS NOTES
"Oncology Rooming Note    January 30, 2023 1:45 PM   Fawad Garcia is a 87 year old male who presents for:    Chief Complaint   Patient presents with     Oncology Clinic Visit     Malignant neoplasm of prostate - Provider visit only     Initial Vitals: /71 (BP Location: Right arm, Patient Position: Sitting, Cuff Size: Adult Regular)   Pulse 66   Temp 97.6  F (36.4  C) (Tympanic)   Resp 12   Wt 105.2 kg (232 lb)   SpO2 99%   BMI 33.29 kg/m   Estimated body mass index is 33.29 kg/m  as calculated from the following:    Height as of 11/29/22: 1.778 m (5' 10\").    Weight as of this encounter: 105.2 kg (232 lb). Body surface area is 2.28 meters squared.  No Pain (0) Comment: Data Unavailable   No LMP for male patient.  Allergies reviewed: Yes  Medications reviewed: Yes    Medications: Medication refills not needed today.  Pharmacy name entered into EPIC: [email or web address] - [place], [place] - [address] [place] [address]    Clinical concerns:  None      Che Arthur CMA            "

## 2023-02-02 ENCOUNTER — OFFICE VISIT (OUTPATIENT)
Dept: RADIATION THERAPY | Facility: OUTPATIENT CENTER | Age: 88
End: 2023-02-02
Payer: COMMERCIAL

## 2023-02-02 VITALS
BODY MASS INDEX: 33.23 KG/M2 | OXYGEN SATURATION: 99 % | SYSTOLIC BLOOD PRESSURE: 130 MMHG | HEART RATE: 81 BPM | DIASTOLIC BLOOD PRESSURE: 70 MMHG | WEIGHT: 231.6 LBS | RESPIRATION RATE: 16 BRPM

## 2023-02-02 DIAGNOSIS — C61 MALIGNANT NEOPLASM OF PROSTATE (H): Primary | ICD-10-CM

## 2023-02-02 NOTE — NURSING NOTE
FOLLOW-UP VISIT    Patient Name: Fawad Garcia      : 1935     Age: 87 year old        ______________________________________________________________________________     Chief Complaint   Patient presents with     Radiation Therapy     Follow up visit, prostate cancer     /70 (BP Location: Left arm, Cuff Size: Adult Large)   Pulse 81   Resp 16   Wt 105.1 kg (231 lb 9.6 oz)   SpO2 99%   BMI 33.23 kg/m       Date Radiation Completed: 2/10/2012    Pain  Denies    Meds  Current Med List Reviewed: Yes  Medication Note:     AUA:    MARINA:      PSA   Date Value Ref Range Status   2021 1.78 0 - 4 ug/L Final     Comment:     Assay Method:  Chemiluminescence using Siemens Vista analyzer   2020 1.53 0 - 4 ug/L Final     Comment:     Assay Method:  Chemiluminescence using Siemens Vista analyzer   2020 1.23 0 - 4 ug/L Final     Comment:     Assay Method:  Chemiluminescence using Siemens Vista analyzer   2019 1.08 0 - 4 ug/L Final     Comment:     Assay Method:  Chemiluminescence using Siemens Vista analyzer   03/15/2019 1.10 0 - 4 ug/L Final     Comment:     Assay Method:  Chemiluminescence using Siemens Vista analyzer   2018 0.79 0 - 4 ug/L Final     Comment:     Assay Method:  Chemiluminescence using Siemens Vista analyzer     PSA Tumor Marker   Date Value Ref Range Status   2023 2.55 ng/mL Final     Comment:     No reference ranges have been established for patients over 80 years.   2022 2.71 0.00 - 4.00 ug/L Final   2022 2.49 0.00 - 4.00 ug/L Final   2021 1.88 0.00 - 4.00 ug/L Final       Bowel: some mild constipation, has dulcolax if needed    Bladder: slow stream, daytime frequency, feels he is emptying ok, nocturia x 4. No burning or bleeding.  No meds for urination  Energy Level:  Normal for him    Appointments:   Urologist:       Other Notes: also seeing medical oncology, has CT and follow up in July. All stable since last visit, no changes per  patient

## 2023-02-02 NOTE — PROGRESS NOTES
Department of Radiation Oncology  Radiation Therapy Center  [facility] [place] [address]  [phone]       Radiation Oncology Follow-up Visit  23    Fawad Garcia  MRN: [id number]   : 1935     DISEASE TREATED: High-risk prostate cancer. Pretreatment PSA 25.5, [place] score of 4+3=7, clinical stage [id number].     RADIATION THERAPY GIVEN: 8041 cGy in 43 planned treatments, via IMRT     INTERVAL SINCE COMPLETION OF THERAPY: 9 years since completion on 02/10/2012.     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 He underwent biopsies in  and  and pathology was negative for malignancy. Due to the persistently rising PSA Dr. Mcintyre performed an MRI-guided biopsy of the prostate gland which revealed [place] 4+3= 7 disease in 2 of 2 cores with 90% of the cores being positive in the right inferior base and right inferior mid gland. Androgen ablation was discussed with the patient due to his high-risk disease and the patient declined due to comorbidities of the drug. Overall, he completed radiotherapy with very little toxicity.     SUBJECTIVE:   Fawad Garcia is a 87 year old male who is scheduled today for routine follow-up.      PSA on 23 was 2.55, down from up prior value of 2.71(zohaib =0.24).    The patient  has undergone a PSMA PET scan on 22 which demonstrated uptake in the prostate gland.  No clear evidence of regional or distant disease noted.  There was indeterminate 9 mm left lung nodule.     The patient has met with medical oncology team (Dr. Barriga) rediscussed observation versus consideration of initiation of ADT.  Currently continue observation with PSA lab work.    Follow-up CT chest on 23 to evaluate previously noted left lingular mass was stable.    Today, he denies any pressing issues or  complaints and reports that all symptoms have essentially remained stable since his last visit with us 6 months ago. AUA and MARINA not completed today (TC). Prior  AUA 18/35 (patient reported overall stable) and MARINA was 0/25. Main urinary symptoms continue to be nocturia and urgency. Patient states symptoms are worse with increased intake of fluids. Denies drinking alcohol, and has minimal caffeine. He denies any dysuria, hematuria, hematochezia, diarrhea, or loose stools.  He denies any pain or swelling.     Energy level is good and baseline.              Current Outpatient Medications   Medication     amLODIPine (NORVASC) 5 MG tablet     Blood Glucose Monitoring Suppl (ONE TOUCH ULTRA SYSTEM KIT) W/DEVICE KIT     clopidogrel (PLAVIX) 75 MG tablet     dorzolamide-timolol (COSOPT) 2-0.5 % ophthalmic solution     hydrochlorothiazide (HYDRODIURIL) 25 MG tablet     insulin glargine (LANTUS SOLOSTAR) 100 UNIT/ML pen     insulin pen needle (B-D U/F) 31G X 8 MM miscellaneous     levofloxacin (LEVAQUIN) 500 MG tablet     lisinopril (PRINIVIL/ZESTRIL) 40 MG tablet     metFORMIN (GLUCOPHAGE) 500 MG tablet     Multiple Vitamins-Minerals (PRESERVISION AREDS PO)     [place]ETOUCH [place]ANC[place] [facility] [place]     ONETOUCH ULTRA test strip     order for DME     ORDER FOR DME     pantoprazole (PROTONIX) 20 MG EC tablet     rosuvastatin (CRESTOR) 10 MG tablet     sulfamethoxazole-trimethoprim (BACTRIM DS) 800-160 MG tablet     No current facility-administered medications for this visit.           Allergies   Allergen Reactions     Zocor [Simvastatin - High Dose]      Bilateral hip aching       Past Medical History:   Diagnosis Date     Diabetes mellitus (H)      Hypertension      Squamous cell carcinoma          PHYSICAL EXAM:  There were no vitals taken for this visit.  No apparent distress       LABS AND IMAGING:  Reviewed.  PSA   Date Value Ref Range Status   03/18/2021 1.78 0 - 4 ug/L Final     Comment:     Assay Method:  Chemiluminescence using  Siemens [place] analyzer   2020 1.53 0 - 4 ug/L Final     Comment:     Assay Method:  Chemiluminescence using Siemens Vista analyzer   2020 1.23 0 - 4 ug/L Final     Comment:     Assay Method:  Chemiluminescence using Siemens Vista analyzer   2019 1.08 0 - 4 ug/L Final     Comment:     Assay Method:  Chemiluminescence using Siemens Vista analyzer   03/15/2019 1.10 0 - 4 ug/L Final     Comment:     Assay Method:  Chemiluminescence using Siemens Vista analyzer     PSA Tumor Marker   Date Value Ref Range Status   2023 2.55 ng/mL Final     Comment:     No reference ranges have been established for patients over 80 years.   2022 2.71 0.00 - 4.00 ug/L Final   2022 2.49 0.00 - 4.00 ug/L Final   2021 1.88 0.00 - 4.00 ug/L Final     IMAGIN22    PSMA PET    IMPRESSION:  1. Focal FDG uptake at the mid/basal anterior central prostate gland,  suspicious for residual prostate cancer.   2. No evidence of metastasis in the body.  3. 9 mm pulmonary nodule in the left upper lobe. Recommend follow-up  chest CT in 3-6 months.    23  CTC   IMPRESSION:   Stable 10 mm nodule in the central aspect of the left upper lobe. This  has been stable for approximately 9 months at this point. Continued  follow-up is recommended.    IMPRESSION:   Mr. Garcia is a 86 year old male with a high-risk prostate cancer. Pretreatment PSA 25.5, [place] score of 4+3=7, clinical stage [id number]. Complete RT 2/10/2012,  8041 cGy in 43 planned treatments, via IMRT.       PLAN:     1. PSA has met [place] definition of biochemical failure (2.0 above zohaib with zohaib of 0.24).The patient underwent a PSMA PET scan on 22 which demonstrated uptake in the prostate gland.  No clear evidence of regional or distant disease noted.  There was indeterminate 9 mm left lung nodule. Follow up CT chest on 23 to evaluate previously noted left lingular mass was stable.      2. I re-discussed with the patient that it  appears he has locally recurrent disease without evidence of regional or distant spread.  I discussed different options including observation versus ADT versus consideration of local retreatment in the form of reirradiation.  Given prior radiation course, I discussed additional radiation would likely come at the risk of significant toxicity to rectum and urethra. The patient has met with medical oncology team (Dr. Barriga) and discussed observation versus consideration of initiation of ADT.  Currently continue observation with PSA lab work. I also discussed that given the patient's age and absence of clinical symptoms at this point, observation would not be unreasonable as well.     3. The patient will continue to follow up with our medical oncology colleagues for oncologic surveillance. Defer imaging and lab work to medical oncology team.     4. No late GI/ toxicity.    5. RTC in 6 months.        Kieran Hua M.D.  Department of Radiation Oncology  [facility]

## 2023-02-02 NOTE — LETTER
2023         RE: Fawad Garcia  [address] [facility] [id number]        Dear Colleague,    Thank you for referring your patient, Fawad Garcia, to the RADIATION TH[facility]APY CENTER. Please see a copy of my visit note below.       Department of Radiation Oncology  Radiation Therapy Center  [facility]  [place] [address]  [phone]       Radiation Oncology Follow-up Visit  23    Fawad Garcia  MRN: [id number]   : 1935     DISEASE TREATED: High-risk prostate cancer. Pretreatment PSA 25.5, [place] score of 4+3=7, clinical stage [id number].     RADIATION THERAPY GIVEN: 8041 cGy in 43 planned treatments, via IMRT     INTERVAL SINCE COMPLETION OF THERAPY: 9 years since completion on 02/10/2012.     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 He underwent biopsies in  and  and pathology was negative for malignancy. Due to the persistently rising PSA Dr. Mcintyre performed an MRI-guided biopsy of the prostate gland which revealed [place] 4+3= 7 disease in 2 of 2 cores with 90% of the cores being positive in the right inferior base and right inferior mid gland. Androgen ablation was discussed with the patient due to his high-risk disease and the patient declined due to comorbidities of the drug. Overall, he completed radiotherapy with very little toxicity.     SUBJECTIVE:   Fawad Garcia is a 87 year old male who is scheduled today for routine follow-up.      PSA on 23 was 2.55, down from up prior value of 2.71(zohaib =0.24).    The patient  has undergone a PSMA PET scan on 22 which demonstrated uptake in the prostate gland.  No clear evidence of regional or distant disease noted.  There was indeterminate 9 mm left lung nodule.     The patient has met with medical oncology team (Dr. Barriga)  rediscussed observation versus consideration of initiation of ADT.  Currently continue observation with PSA lab work.    Follow-up CT chest on 1/20/23 to evaluate previously noted left lingular mass was stable.    Today, he denies any pressing issues or complaints and reports that all symptoms have essentially remained stable since his last visit with us 6 months ago. AUA and MARINA not completed today (TC). Prior  AUA 18/35 (patient reported overall stable) and MARINA was 0/25. Main urinary symptoms continue to be nocturia and urgency. Patient states symptoms are worse with increased intake of fluids. Denies drinking alcohol, and has minimal caffeine. He denies any dysuria, hematuria, hematochezia, diarrhea, or loose stools.  He denies any pain or swelling.     Energy level is good and baseline.              Current Outpatient Medications   Medication     amLODIPine (NORVASC) 5 MG tablet     Blood Glucose Monitoring Suppl (ONE TOUCH ULTRA SYSTEM KIT) W/DEVICE KIT     clopidogrel (PLAVIX) 75 MG tablet     dorzolamide-timolol (COSOPT) 2-0.5 % ophthalmic solution     hydrochlorothiazide (HYDRODIURIL) 25 MG tablet     insulin glargine (LANTUS SOLOSTAR) 100 UNIT/ML pen     insulin pen needle (B-D U/F) 31G X 8 MM miscellaneous     levofloxacin (LEVAQUIN) 500 MG tablet     lisinopril (PRINIVIL/ZESTRIL) 40 MG tablet     metFORMIN (GLUCOPHAGE) 500 MG tablet     Multiple Vitamins-Minerals (PRESERVISION AREDS PO)     ONETOUCH LANCETS MISC     ONETOUCH ULTRA test strip     order for DME     ORDER FOR DME     pantoprazole (PROTONIX) 20 MG EC tablet     rosuvastatin (CRESTOR) 10 MG tablet     sulfamethoxazole-trimethoprim (BACTRIM DS) 800-160 MG tablet     No current facility-administered medications for this visit.           Allergies   Allergen Reactions     Zocor [Simvastatin - High Dose]      Bilateral hip aching       Past Medical History:   Diagnosis Date     Diabetes mellitus (H)      Hypertension      Squamous cell carcinoma           PHYSICAL EXAM:  There were no vitals taken for this visit.  No apparent distress       LABS AND IMAGING:  Reviewed.  PSA   Date Value Ref Range Status   2021 1.78 0 - 4 ug/L Final     Comment:     Assay Method:  Chemiluminescence using Siemens Vista analyzer   2020 1.53 0 - 4 ug/L Final     Comment:     Assay Method:  Chemiluminescence using Siemens Vista analyzer   2020 1.23 0 - 4 ug/L Final     Comment:     Assay Method:  Chemiluminescence using Siemens Vista analyzer   2019 1.08 0 - 4 ug/L Final     Comment:     Assay Method:  Chemiluminescence using Siemens Vista analyzer   03/15/2019 1.10 0 - 4 ug/L Final     Comment:     Assay Method:  Chemiluminescence using Siemens Vista analyzer     PSA Tumor Marker   Date Value Ref Range Status   2023 2.55 ng/mL Final     Comment:     No reference ranges have been established for patients over 80 years.   2022 2.71 0.00 - 4.00 ug/L Final   2022 2.49 0.00 - 4.00 ug/L Final   2021 1.88 0.00 - 4.00 ug/L Final     IMAGIN22    PSMA PET    IMPRESSION:  1. Focal FDG uptake at the mid/basal anterior central prostate gland,  suspicious for residual prostate cancer.   2. No evidence of metastasis in the body.  3. 9 mm pulmonary nodule in the left upper lobe. Recommend follow-up  chest CT in 3-6 months.    23  CTC   IMPRESSION:   Stable 10 mm nodule in the central aspect of the left upper lobe. This  has been stable for approximately 9 months at this point. Continued  follow-up is recommended.    IMPRESSION:   Mr. Garcia is a 86 year old male with a high-risk prostate cancer. Pretreatment PSA 25.5, [place] score of 4+3=7, clinical stage [id number]. Complete RT 2/10/2012,  8041 cGy in 43 planned treatments, via IMRT.       PLAN:     1. PSA has met [place] definition of biochemical failure (2.0 above zohaib with zohaib of 0.24).The patient underwent a PSMA PET scan on 22 which demonstrated uptake in the prostate  gland.  No clear evidence of regional or distant disease noted.  There was indeterminate 9 mm left lung nodule. Follow up CT chest on 1/20/23 to evaluate previously noted left lingular mass was stable.      2. I re-discussed with the patient that it appears he has locally recurrent disease without evidence of regional or distant spread.  I discussed different options including observation versus ADT versus consideration of local retreatment in the form of reirradiation.  Given prior radiation course, I discussed additional radiation would likely come at the risk of significant toxicity to rectum and urethra. The patient has met with medical oncology team (Dr. Barriga) and discussed observation versus consideration of initiation of ADT.  Currently continue observation with PSA lab work. I also discussed that given the patient's age and absence of clinical symptoms at this point, observation would not be unreasonable as well.     3. The patient will continue to follow up with our medical oncology colleagues for oncologic surveillance. Defer imaging and lab work to medical oncology team.     4. No late GI/ toxicity.    5. RTC in 6 months.        Kieran Hua M.D.  Department of Radiation Oncology  [facility]

## 2023-02-10 ENCOUNTER — OFFICE VISIT (OUTPATIENT)
Dept: AUDIOLOGY | Facility: CLINIC | Age: 88
End: 2023-02-10
Payer: COMMERCIAL

## 2023-02-10 DIAGNOSIS — H90.3 ASYMMETRICAL SENSORINEURAL HEARING LOSS: Primary | ICD-10-CM

## 2023-02-10 PROCEDURE — 99207 PR NO CHARGE LOS: CPT | Performed by: AUDIOLOGIST

## 2023-02-10 PROCEDURE — 92550 TYMPANOMETRY & REFLEX THRESH: CPT | Performed by: AUDIOLOGIST

## 2023-02-10 PROCEDURE — 92557 COMPREHENSIVE HEARING TEST: CPT | Performed by: AUDIOLOGIST

## 2023-02-10 NOTE — PROGRESS NOTES
AUDIOLOGY REPORT    SUBJECTIVE:  Fwaad Garcia is a 87 year old male who was seen in the Audiology Clinic at the [facility] for audiologic evaluation, referred by self. The patient has been seen previously on 8/19/2019 for assessment and results indicated high frequency sensorineural hearing loss that was mild to severe in the right ear and moderate to severe in the left ear. The patient reports that he is unsure if he has had any changes in hearing. 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 The patient reports a family history of hearing loss with age. He reports that he does not have tinnitus, ear pain, ear pressure, or a history of ear problems or ear surgery. The patient reports that he uses an over the counter amplifier, which does help. The patient was accompanied to the appointment by his son.    OBJECTIVE:  Otoscopic exam indicates ears are clear of cerumen bilaterally     Pure Tone Thresholds assessed using conventional audiometry with good  reliability from 250-8000 Hz bilaterally using insert earphones and circumaural headphones     RIGHT:  normal hearing sensitivity through 1000 Hz sloping to mild to severe sensorineural hearing loss    LEFT:    normal hearing sensitivity through 1500 Hz sloping to mild to moderately severe sensorineural hearing loss   NOTE: asymmetry (right ear poorer) at 2226-4536 and 7449-0616 Hz    Tympanogram:    RIGHT: borderline normal eardrum mobility (type As)    LEFT:   normal eardrum mobility    Reflexes (reported by stimulus ear):  RIGHT: Ipsilateral is absent at frequencies tested  RIGHT: Contralateral is absent at frequencies tested  LEFT:   Ipsilateral is absent at frequencies tested  LEFT:   Contralateral is absent at frequencies tested    Speech Reception Threshold:    RIGHT: 20 dB HL    LEFT:   15 dB HL    Word Recognition Score:     RIGHT: 64% at 80 dB HL using NU-6 recorded word list.      88% at  90 dB HL using NU-6 recorded word list.    LEFT:   100% at 80 dB HL using NU-6 recorded word list.      ASSESSMENT:     ICD-10-CM    1. Asymmetrical sensorineural hearing loss  [facility]90.3 [facility] Speech Recog ([id number])     Tymps / Reflex   ([id number])          Compared to patient's previous audiogram dated 8/19/2019, right ear thresholds today are 10-20 dB poorer at 8216-0979 Hz and stable at all other tested frequencies. Left ear thresholds are 10-15 dB better at 500 and 8000 Hz, 10 dB poorer at 5766-7817 Hz, and stable at all other tested frequencies. Word recognition is stable. Today s results were discussed with the patient in detail.     PLAN:  Patient was counseled regarding hearing loss and impact on communication. Patient is a good candidate for amplification at this time.  Handout on good communication strategies and hearing aid use was given to patient. He was counseled that he may have hearing aid benefits that cannot be used at [facility], and he was encouraged to contact his insurance company for more information. The patient was provided with a copy of his audiogram. It is recommended that the patient follow up with ENT regarding the asymmetrical hearing loss. Discussed the hearing aid process if the patient would like to stay at [facility], and he was provided with scheduling information.  Please call this clinic with questions regarding these results or recommendations.      Vinny Enriquez, CCC-A  [place] Licensed Audiologist #[id number]  2/10/2023

## 2023-02-21 DIAGNOSIS — E11.3293 TYPE 2 DIABETES MELLITUS WITH BOTH EYES AFFECTED BY MILD NONPROLIFERATIVE RETINOPATHY WITHOUT MACULAR EDEMA, WITH LONG-TERM CURRENT USE OF INSULIN (H): ICD-10-CM

## 2023-02-21 DIAGNOSIS — N18.31 STAGE 3A CHRONIC KIDNEY DISEASE (H): ICD-10-CM

## 2023-02-21 DIAGNOSIS — I10 HYPERTENSION, GOAL BELOW 140/90: ICD-10-CM

## 2023-02-21 DIAGNOSIS — Z79.4 TYPE 2 DIABETES MELLITUS WITH BOTH EYES AFFECTED BY MILD NONPROLIFERATIVE RETINOPATHY WITHOUT MACULAR EDEMA, WITH LONG-TERM CURRENT USE OF INSULIN (H): ICD-10-CM

## 2023-02-21 RX ORDER — LISINOPRIL 40 MG/1
40 TABLET ORAL DAILY
Qty: 90 TABLET | Refills: 3 | Status: SHIPPED | OUTPATIENT
Start: 2023-02-21 | End: 2023-12-04

## 2023-02-28 NOTE — TELEPHONE ENCOUNTER
Skilled nursing order signed and faxed back.  Julissa Hill on 5/21/2020 at 3:13 PM     Cheilitis Aggressive Treatment: I recommended application of Vaseline or Aquaphor numerous times a day (as often as every hour) and before going to bed. I also prescribed a topical steroid for twice daily use.

## 2023-03-07 ENCOUNTER — TRANSFERRED RECORDS (OUTPATIENT)
Dept: HEALTH INFORMATION MANAGEMENT | Facility: CLINIC | Age: 88
End: 2023-03-07
Payer: COMMERCIAL

## 2023-03-07 LAB — RETINOPATHY: POSITIVE

## 2023-03-10 ENCOUNTER — OFFICE VISIT (OUTPATIENT)
Dept: FAMILY MEDICINE | Facility: CLINIC | Age: 88
End: 2023-03-10
Payer: COMMERCIAL

## 2023-03-10 VITALS
HEIGHT: 70 IN | DIASTOLIC BLOOD PRESSURE: 81 MMHG | HEART RATE: 66 BPM | BODY MASS INDEX: 33.5 KG/M2 | TEMPERATURE: 98.3 F | WEIGHT: 234 LBS | SYSTOLIC BLOOD PRESSURE: 135 MMHG | OXYGEN SATURATION: 99 % | RESPIRATION RATE: 16 BRPM

## 2023-03-10 DIAGNOSIS — I10 HYPERTENSION, GOAL BELOW 140/90: ICD-10-CM

## 2023-03-10 DIAGNOSIS — S98.139A AMPUTATED TOE, UNSPECIFIED LATERALITY (H): ICD-10-CM

## 2023-03-10 DIAGNOSIS — L30.9 DERMATITIS: ICD-10-CM

## 2023-03-10 DIAGNOSIS — Z00.00 ENCOUNTER FOR MEDICARE ANNUAL WELLNESS EXAM: Primary | ICD-10-CM

## 2023-03-10 DIAGNOSIS — E11.3293 TYPE 2 DIABETES MELLITUS WITH BOTH EYES AFFECTED BY MILD NONPROLIFERATIVE RETINOPATHY WITHOUT MACULAR EDEMA, WITH LONG-TERM CURRENT USE OF INSULIN (H): ICD-10-CM

## 2023-03-10 DIAGNOSIS — Z79.4 TYPE 2 DIABETES MELLITUS WITH BOTH EYES AFFECTED BY MILD NONPROLIFERATIVE RETINOPATHY WITHOUT MACULAR EDEMA, WITH LONG-TERM CURRENT USE OF INSULIN (H): ICD-10-CM

## 2023-03-10 DIAGNOSIS — I70.229 CRITICAL ISCHEMIA OF LOWER EXTREMITY (H): ICD-10-CM

## 2023-03-10 DIAGNOSIS — K21.9 GASTROESOPHAGEAL REFLUX DISEASE WITHOUT ESOPHAGITIS: ICD-10-CM

## 2023-03-10 DIAGNOSIS — E78.5 HYPERLIPIDEMIA LDL GOAL <70: ICD-10-CM

## 2023-03-10 DIAGNOSIS — N18.31 STAGE 3A CHRONIC KIDNEY DISEASE (H): ICD-10-CM

## 2023-03-10 DIAGNOSIS — Z89.421 H/O AMPUTATION OF LESSER TOE, RIGHT (H): ICD-10-CM

## 2023-03-10 PROCEDURE — G0439 PPPS, SUBSEQ VISIT: HCPCS | Performed by: FAMILY MEDICINE

## 2023-03-10 PROCEDURE — 99214 OFFICE O/P EST MOD 30 MIN: CPT | Mod: 25 | Performed by: FAMILY MEDICINE

## 2023-03-10 RX ORDER — AMLODIPINE BESYLATE 5 MG/1
5 TABLET ORAL DAILY
Qty: 90 TABLET | Refills: 3 | Status: SHIPPED | OUTPATIENT
Start: 2023-03-10 | End: 2024-01-29

## 2023-03-10 RX ORDER — ROSUVASTATIN CALCIUM 10 MG/1
10 TABLET, COATED ORAL AT BEDTIME
Qty: 90 TABLET | Refills: 3 | Status: SHIPPED | OUTPATIENT
Start: 2023-03-10 | End: 2024-01-29

## 2023-03-10 RX ORDER — TRIAMCINOLONE ACETONIDE 1 MG/G
CREAM TOPICAL 2 TIMES DAILY
Qty: 80 G | Refills: 1 | Status: SHIPPED | OUTPATIENT
Start: 2023-03-10 | End: 2024-04-22

## 2023-03-10 RX ORDER — PANTOPRAZOLE SODIUM 20 MG/1
20 TABLET, DELAYED RELEASE ORAL DAILY
Qty: 90 TABLET | Refills: 3 | Status: SHIPPED | OUTPATIENT
Start: 2023-03-10 | End: 2024-01-03

## 2023-03-10 RX ORDER — CLOPIDOGREL BISULFATE 75 MG/1
75 TABLET ORAL DAILY
Qty: 90 TABLET | Refills: 3 | Status: SHIPPED | OUTPATIENT
Start: 2023-03-10 | End: 2024-01-29

## 2023-03-10 RX ORDER — HYDROCHLOROTHIAZIDE 25 MG/1
25 TABLET ORAL DAILY
Qty: 90 TABLET | Refills: 3 | Status: SHIPPED | OUTPATIENT
Start: 2023-03-10 | End: 2024-01-29

## 2023-03-10 ASSESSMENT — PAIN SCALES - GENERAL: PAINLEVEL: NO PAIN (0)

## 2023-03-10 NOTE — PROGRESS NOTES
SUBJECTIVE:   Emerson is a 87 year old who presents for Preventive Visit.  Patient has been advised of split billing requirements and indicates understanding: Yes  Are you in the first 12 months of your Medicare coverage?  No    Healthy Habits:     Taking medications regularly:  0    PHQ-2 Total Score: 0  History of Present Illness       Diabetes:   He presents for follow up of diabetes.  He is checking home blood glucose one time daily. He checks blood glucose before meals.  Blood glucose is sometimes over 200 and never under 70. He is aware of hypoglycemia symptoms including shakiness and weakness. He is concerned about low blood sugar, several less than 70 in the past few weeks.  He is having numbness in feet. The patient has had a diabetic eye exam in the last 12 months. Eye exam performed on no sure at the moment. Location of last eye exam [facility].        He eats 0-1 servings of fruits and vegetables daily.He consumes 0 sweetened beverage(s) daily.He exercises with enough effort to increase his heart rate 10 to 19 minutes per day.  He exercises with enough effort to increase his heart rate 3 or less days per week.   He is taking medications regularly.    Have you ever done Advance Care Planning? (For example, a Health Directive, POLST, or a discussion with a medical provider or your loved ones about your wishes): Yes, advance care planning is on file.       Fall risk  Fallen 2 or more times in the past year?: No  Any fall with injury in the past year?: No    Cognitive Screening   1) Repeat 3 items (Leader, Season, Table)    2) Clock draw: NORMAL  3) 3 item recall: Recalls 3 objects  Results: 3 items recalled: COGNITIVE IMPAIRMENT LESS LIKELY    Mini-CogTM Copyright EFRAIN Melton. Licensed by the author for use in [place] [employer]; reprinted with permission (kelli@.[email or web address]). All rights reserved.      Do you have sleep apnea, excessive snoring or daytime drowsiness?: no    Reviewed and updated as needed  this visit by clinical staff   Tobacco  Allergies  Meds              Reviewed and updated as needed this visit by Provider                 Social History     Tobacco Use     Smoking status: Never     Smokeless tobacco: Never   Substance Use Topics     Alcohol use: Yes     Alcohol/week: 1.7 standard drinks     Comment: 1 x month         Alcohol Use 1/7/2022   Prescreen: >3 drinks/day or >7 drinks/week? No   No flowsheet data found.        Diabetes Follow-up    How often are you checking your blood sugar? One time daily  What time of day are you checking your blood sugars (select all that apply)?  Before meals  Have you had any blood sugars above 200?  Yes sometimes   Have you had any blood sugars below 70? No     What symptoms do you notice when your blood sugar is low?  Shaky, Dizzy, Weak, Blurred vision and Confusion    What concerns do you have today about your diabetes? None     Do you have any of these symptoms? (Select all that apply)  Redness, sores, or blisters on feet and No numbness or tingling in feet.  No redness, sores or blisters on feet.  No complaints of excessive thirst.  No reports of blurry vision.  No significant changes to weight.      BP Readings from Last 2 Encounters:   03/10/23 135/81   02/02/23 130/70     Hemoglobin A1C (%)   Date Value   01/27/2023 9.6 (H)   11/11/2022 10.4 (H)   06/21/2021 8.4 (H)   03/18/2021 8.3 (H)     LDL Cholesterol Calculated (mg/dL)   Date Value   10/19/2022 68   07/12/2021 56   03/18/2021 47   03/02/2020 60           Current providers sharing in care for this patient include: Answers for HPI/ROS submitted by the patient on 3/3/2023  Frequency of checking blood sugars:: one time daily  What time of day are you checking your blood sugars : before meals  Have you had any blood sugars above 200?: Yes  Have you had any blood sugars below 70?: No  Hypoglycemia symptoms:: shakiness, weakness  Diabetic concerns:: low blood sugar, several less than 70 in the past few  weeks  Paraesthesia present:: numbness in feet  Have you had a diabetic eye exam within the last year?: Yes  Date of last eye exam: : no sure at the moment  Where was this eye exam done?: [facility] in [place]  How many servings of fruits and vegetables do you eat daily?: 0-1  On average, how many sweetened beverages do you drink each day (Examples: soda, juice, sweet tea, etc.  Do NOT count diet or artificially sweetened beverages)?: 0  How many minutes a day do you exercise enough to make your heart beat faster?: 10 to 19  How many days a week do you exercise enough to make your heart beat faster?: 3 or less  How many days per week do you miss taking your medication?: 0      Patient Care Team:  Chris Scott MD as PCP - General (Family Practice)  Stewart Mcintyre MD as MD (Urology)  Chris Scott MD as Assigned PCP  Kieran Hua MD as MD (Radiation Oncology)  Evgeny Torres MD as MD (Dermatology)  Evgeny Torres MD as Assigned Surgical Provider  Elysia Pickard PA-C as Physician Assistant (Dermatology)  Kieran Hua MD as Assigned Cancer Care Provider  Shantell Acosta AuD as Audiologist (Audiology)  Jus Wells MD as MD (Otolaryngology)    The following health maintenance items are reviewed in Epic and correct as of today:  Health Maintenance   Topic Date Due     ZOSTER IMMUNIZATION (1 of 2) Never done     DIABETIC FOOT EXAM  01/14/2023     ANNUAL REVIEW OF HM ORDERS  01/14/2023     A1C  07/27/2023     LIPID  10/19/2023     BMP  01/20/2024     HEMOGLOBIN  01/20/2024     MICROALBUMIN  01/27/2024     EYE EXAM  03/07/2024     MEDICARE ANNUAL WELLNESS VISIT  03/10/2024     FALL RISK ASSESSMENT  03/10/2024     DTAP/TDAP/TD IMMUNIZATION (3 - Td or Tdap) 04/17/2024     ADVANCE CARE PLANNING  03/10/2028     PHQ-2 (once per calendar year)  Completed     INFLUENZA VACCINE  Completed     Pneumococcal Vaccine: 65+ Years  Completed     URINALYSIS  Completed      "COVID-19 Vaccine  Completed     IPV IMMUNIZATION  Aged Out     MENINGITIS IMMUNIZATION  Aged Out               Review of Systems  Review Of Systems  Skin: negative  Eyes: see eye doctor  Ears/Nose/Throat: negative  Respiratory: No shortness of breath, dyspnea on exertion, cough, or hemoptysis  Cardiovascular: slight pain on left side very mild not currenlty  Gastrointestinal: negative  Genitourinary: negative  Musculoskeletal: negative  Neurologic: neuropathy  Psychiatric: negative  Hematologic/Lymphatic/Immunologic: negative  Endocrine: diabetes and not controlled, adjusting med      OBJECTIVE:   /81 (BP Location: Left arm, Patient Position: Sitting, Cuff Size: Adult Large)   Pulse 66   Temp 98.3  F (36.8  C) (Tympanic)   Resp 16   Ht 1.778 m (5' 10\")   Wt 106.1 kg (234 lb)   SpO2 99%   BMI 33.58 kg/m   Estimated body mass index is 33.58 kg/m  as calculated from the following:    Height as of this encounter: 1.778 m (5' 10\").    Weight as of this encounter: 106.1 kg (234 lb).  Physical Exam  GENERAL: alert, no distress and over weight  EYES: Eyes grossly normal to inspection, PERRL and conjunctivae and sclerae normal  HENT: ear canals and TM's normal, nose and mouth without ulcers or lesions  NECK: no adenopathy, no asymmetry, masses, or scars and thyroid normal to palpation  RESP: lungs clear to auscultation - no rales, rhonchi or wheezes  CV: regular rate and rhythm, normal S1 S2, no S3 or S4, no murmur, click or rub, no peripheral edema and peripheral pulses strong  ABDOMEN: soft, nontender, no hepatosplenomegaly, no masses and bowel sounds normal  MS: no gross musculoskeletal defects noted, no edema  SKIN: no suspicious lesions or rashes  NEURO: Normal strength and tone, mentation intact and speech normal  PSYCH: mentation appears normal, affect normal/bright  LYMPH: no cervical adenopathy  Diabetic foot exam: normal DP and PT pulses and Noted both great toes amputated, right second toe. Skin " intact decrease sensation to monofilament.        ASSESSMENT / PLAN:   (Z00.00) Encounter for Medicare annual wellness exam  (primary encounter diagnosis)  Comment: Discussed healthy lifestyle and preventative cares.    Plan:     (Z89.421) H/O amputation of lesser toe, right (H)  Comment: noted  Plan:     (E11.3293,  Z79.4) Type 2 diabetes mellitus with both eyes affected by mild nonproliferative retinopathy without macular edema, with long-term current use of insulin (H)  Comment: refilled med and scheduled future [id number]  Plan: metFORMIN (GLUCOPHAGE) 500 MG tablet,         Miscellaneous Order for DME - ONLY FOR DME,         CANCELED: DIABETIC CUSTOM MOLDED SHOE            (I70.229) Critical ischemia of lower extremity (H)  Comment: on medication to help with flow  Plan: clopidogrel (PLAVIX) 75 MG tablet,         Miscellaneous Order for DME - ONLY FOR DME,         CANCELED: DIABETIC CUSTOM MOLDED SHOE            (N18.31) Stage 3a chronic kidney disease (H)  Comment: following lab  Plan:     (I10) Hypertension, goal below 140/90  Comment: controlled refilled med  Plan: amLODIPine (NORVASC) 5 MG tablet,         hydrochlorothiazide (HYDRODIURIL) 25 MG tablet            (K21.9) Gastroesophageal reflux disease without esophagitis  Comment: stable  Plan: pantoprazole (PROTONIX) 20 MG EC tablet            (E78.5) Hyperlipidemia LDL goal <70  Comment:   Plan: rosuvastatin (CRESTOR) 10 MG tablet            (L30.9) Dermatitis  Comment: refilled cream that he uses prn  Plan: triamcinolone (KENALOG) 0.1 % external cream            (S98.139A) Amputated toe, unspecified laterality (H)  Comment: noted  Plan: Miscellaneous Order for DME - ONLY FOR DME,         CANCELED: DIABETIC CUSTOM MOLDED SHOE              Patient has been advised of split billing requirements and indicates understanding: Yes      COUNSELING:  Reviewed preventive health counseling, as reflected in patient instructions       Regular exercise       Healthy  diet/nutrition        He reports that he has never smoked. He has never used smokeless tobacco.      Appropriate preventive services were discussed with this patient, including applicable screening as appropriate for cardiovascular disease, diabetes, osteopenia/osteoporosis, and glaucoma.  As appropriate for age/gender, discussed screening for colorectal cancer, prostate cancer, breast cancer, and cervical cancer. Checklist reviewing preventive services available has been given to the patient.    Reviewed patients plan of care and provided an AVS. The Basic Care Plan (routine screening as documented in Health Maintenance) for Fawad meets the Care Plan requirement. This Care Plan has been established and reviewed with the Patient.      Chris Scott MD  [facility]    Identified Health Risks:

## 2023-03-10 NOTE — NURSING NOTE
"Chief Complaint   Patient presents with     Physical     Diabetes       Initial There were no vitals taken for this visit. Estimated body mass index is 33.23 kg/m  as calculated from the following:    Height as of 11/29/22: 1.778 m (5' 10\").    Weight as of 2/2/23: 105.1 kg (231 lb 9.6 oz).    Patient presents to the clinic using No DME    Is there anyone who you would like to be able to receive your results? No  If yes have patient fill out LUC    "

## 2023-03-10 NOTE — PATIENT INSTRUCTIONS
Please recheck your hemoglobin a1c in 2 months the end of April 2023.    I refilled your medications.    Please get your diabetic shoes.    Please be aware that there will be an additional charge during your preventative visit due to either a new diagnosis and/or chronic disease management.    Preventative visits screen for diseases prior to they occur.  They do not cover for any new diagnosis or chronic disease management which would include medication refills, labs etc.    If you have questions regarding your coverage please check with your insurance provider.  At [facility] we need to code correctly to be in compliance with all insurance companies.        Thank you for choosing [facility].  You may be receiving an email and/or telephone survey request from [facility] Experience regarding your visit today.  Please take a few minutes to respond to the survey to let us know how we are doing.      If you have questions or concerns, please contact us via [employer] or you can contact your care team at [phone] option 2.    Our Clinic hours are:  Monday - Thursday 7am-6pm  Friday 7am-5pm    The [place] outpatient lab hours are:  Monday - Friday 7am-4:30pm    Appointments are required, call [phone]    If you have clinical questions after hours or would like to schedule an appointment,  call the clinic at [phone].            Patient Education   Personalized Prevention Plan  You are due for the preventive services outlined below.  Your care team is available to assist you in scheduling these services.  If you have already completed any of these items, please share that information with your care team to update in your medical record.  Health Maintenance Due   Topic Date Due    Zoster (Shingles) Vaccine (1 of 2) Never done    Diabetic Foot Exam  01/14/2023    ANNUAL REVIEW OF HM ORDERS  01/14/2023

## 2023-03-13 ENCOUNTER — TELEPHONE (OUTPATIENT)
Dept: PODIATRY | Facility: CLINIC | Age: 88
End: 2023-03-13
Payer: COMMERCIAL

## 2023-03-13 DIAGNOSIS — E11.51 TYPE 2 DIABETES MELLITUS WITH PERIPHERAL VASCULAR DISEASE (H): ICD-10-CM

## 2023-03-13 DIAGNOSIS — M20.41 ACQUIRED BILATERAL HAMMER TOES: ICD-10-CM

## 2023-03-13 DIAGNOSIS — Z89.421 H/O AMPUTATION OF LESSER TOE, RIGHT (H): Primary | ICD-10-CM

## 2023-03-13 DIAGNOSIS — M20.42 ACQUIRED BILATERAL HAMMER TOES: ICD-10-CM

## 2023-03-13 NOTE — TELEPHONE ENCOUNTER
Reason for Call:  Other prescription    Detailed comments: Patient needs new prescription shoes, that were prescribed in the past from Dr. Montesinos. If a new order could be put in so he can get it from the medical equipment store in [place].    Phone Number Patient can be reached at: Home number on file [phone] (home)    Best Time: anytime     Can we leave a detailed message on this number? YES     Thank you,  Yuliya Odonnell  [facility]      Call taken on 3/13/2023 at 11:44 AM by Yuliya Odonnell  .

## 2023-03-13 NOTE — TELEPHONE ENCOUNTER
Last order placed by Dr. Montesinos was for O&P extradepth diabetic shoes with three pair of accommodative inserts. Orders placed by Dr. Chris Scott on 3/10/2023 for DME custom diabetic shoes. Patient last seen by Dr. Montesinos on 7/6/2020. Please advise. Nini Amin ATC

## 2023-04-03 ENCOUNTER — HOSPITAL ENCOUNTER (OUTPATIENT)
Dept: ULTRASOUND IMAGING | Facility: CLINIC | Age: 88
Discharge: HOME | End: 2023-04-03
Attending: STUDENT IN AN ORGANIZED HEALTH CARE EDUCATION/TRAINING PROGRAM | Admitting: STUDENT IN AN ORGANIZED HEALTH CARE EDUCATION/TRAINING PROGRAM
Payer: COMMERCIAL

## 2023-04-03 DIAGNOSIS — N50.812 LEFT TESTICULAR PAIN: ICD-10-CM

## 2023-04-03 DIAGNOSIS — N43.3 HYDROCELE, UNSPECIFIED HYDROCELE TYPE: ICD-10-CM

## 2023-04-03 PROCEDURE — 93976 VASCULAR STUDY: CPT

## 2023-04-12 ENCOUNTER — MEDICAL CORRESPONDENCE (OUTPATIENT)
Dept: HEALTH INFORMATION MANAGEMENT | Facility: CLINIC | Age: 88
End: 2023-04-12

## 2023-05-02 ENCOUNTER — OFFICE VISIT (OUTPATIENT)
Dept: ONCOLOGY | Facility: CLINIC | Age: 88
End: 2023-05-02
Attending: FAMILY MEDICINE
Payer: COMMERCIAL

## 2023-05-02 VITALS
SYSTOLIC BLOOD PRESSURE: 152 MMHG | RESPIRATION RATE: 18 BRPM | HEIGHT: 70 IN | DIASTOLIC BLOOD PRESSURE: 72 MMHG | HEART RATE: 70 BPM | WEIGHT: 226.3 LBS | OXYGEN SATURATION: 97 % | BODY MASS INDEX: 32.4 KG/M2

## 2023-05-02 DIAGNOSIS — N43.3 HYDROCELE, UNSPECIFIED HYDROCELE TYPE: Primary | ICD-10-CM

## 2023-05-02 DIAGNOSIS — C61 PROSTATE CANCER (H): ICD-10-CM

## 2023-05-02 PROCEDURE — G0463 HOSPITAL OUTPT CLINIC VISIT: HCPCS | Performed by: STUDENT IN AN ORGANIZED HEALTH CARE EDUCATION/TRAINING PROGRAM

## 2023-05-02 PROCEDURE — 99213 OFFICE O/P EST LOW 20 MIN: CPT | Performed by: STUDENT IN AN ORGANIZED HEALTH CARE EDUCATION/TRAINING PROGRAM

## 2023-05-02 ASSESSMENT — PAIN SCALES - GENERAL: PAINLEVEL: NO PAIN (0)

## 2023-05-02 NOTE — PROGRESS NOTES
"Urology Rooming Note    May 2, 2023 3:15 PM   Fawad Garcia is a 87 year old male who presents for:    Chief Complaint   Patient presents with     Oncology Clinic Visit     Malignant neoplasm of prostate (H)     Initial Vitals: BP (!) 152/72   Pulse 70   Resp 18   Ht 1.778 m (5' 10\")   Wt 102.6 kg (226 lb 4.8 oz)   SpO2 97%   BMI 32.47 kg/m   Estimated body mass index is 32.47 kg/m  as calculated from the following:    Height as of this encounter: 1.778 m (5' 10\").    Weight as of this encounter: 102.6 kg (226 lb 4.8 oz). Body surface area is 2.25 meters squared.  No Pain (0) Comment: Data Unavailable     Allergies reviewed: Yes  Medications reviewed: Yes    Medications: Medication refills not needed today.  Pharmacy name entered into EPIC: [facility] - [place], [place] - [address] [place] [address]      Genoveva Jefferson LPN  "

## 2023-05-02 NOTE — PROGRESS NOTES
"CHIEF COMPLAINT   It was my pleasure to see Fawad Garcia who is a 87 year old male for follow-up of hydrocele and left epididymal cyst.      HPI:  Fawad Garcia is a 87 year old male being seen for follow-up.  Duration of problem: Many years  Previous treatments: None     accompanied by his son  Reviewed previous notes from Dr. Hua  Locally recurrent prostate cancer currently undergoing surveillance with repeat PSA assessments  Hydrocele which is remained the same and does not affect his daily living    Exam:  BP (!) 152/72   Pulse 70   Resp 18   Ht 1.778 m (5' 10\")   Wt 102.6 kg (226 lb 4.8 oz)   SpO2 97%   BMI 32.47 kg/m    General: age-appropriate appearing male in NAD sitting in an exam chair  Resp: no respiratory distress  CV: heart rate regular  Abdomen: Degree of obesity is moderate. Abdomen is soft and nontender. No organomegaly.  Neuro: grossly non focal. Normal reflexes  Motor: excellent strength throughout    Review of Imaging:  The following imaging exams were independently viewed and interpreted by me and discussed with patient:  Scrotal Ultrasound: Abnormal: Unchanged size of the hydrocele on the right and left epididymal cyst    Review of Labs:  The following labs were reviewed by me and discussed with the patient:      Assessment & Plan     Hydrocele, unspecified hydrocele type  No active interventions indicated at this point  Recommended follow-up as needed    Prostate cancer (H)  Follow-up as per radiation oncology and medical oncology        Brayan Nava MD  [facility]      ==========================    Additional Billing and Coding Information:  Review of external notes as documented above   Review of the result(s) of each unique test - Ultrasound scrotum    Independent interpretation of a test performed by another physician/other qualified health care professional (not separately reported) -       Discussion of management or test interpretation " with external physician/other qualified healthcare professional/appropriate source -           12 minutes spent by me on the date of the encounter doing chart review, review of test results, interpretation of tests, patient visit, documentation and discussion with family     ==========================

## 2023-05-02 NOTE — LETTER
"    5/2/2023         RE: Fawad Garcia  [address]  [facility] [place] [facility] [address]        Dear Colleague,    Thank you for referring your patient, Fawad Garcia, to the [facility]. Please see a copy of my visit note below.    Urology Rooming Note    May 2, 2023 3:15 PM   Fawad Garcia is a 87 year old male who presents for:    Chief Complaint   Patient presents with     Oncology Clinic Visit     Malignant neoplasm of prostate (H)     Initial Vitals: BP (!) 152/72   Pulse 70   Resp 18   Ht 1.778 m (5' 10\")   Wt 102.6 kg (226 lb 4.8 oz)   SpO2 97%   BMI 32.47 kg/m   Estimated body mass index is 32.47 kg/m  as calculated from the following:    Height as of this encounter: 1.778 m (5' 10\").    Weight as of this encounter: 102.6 kg (226 lb 4.8 oz). Body surface area is 2.25 meters squared.  No Pain (0) Comment: Data Unavailable     Allergies reviewed: Yes  Medications reviewed: Yes    Medications: Medication refills not needed today.  Pharmacy name entered into EPIC: [facility] - [place], [place] - [address] [place] [address]      Genoveva Jefferson LPN    CHIEF COMPLAINT   It was my pleasure to see Fawad Garcia who is a 87 year old male for follow-up of hydrocele and left epididymal cyst.      HPI:  Fawad Garcia is a 87 year old male being seen for follow-up.  Duration of problem: Many years  Previous treatments: None     accompanied by his son  Reviewed previous notes from Dr. Hua  Locally recurrent prostate cancer currently undergoing surveillance with repeat PSA assessments  Hydrocele which is remained the same and does not affect his daily living    Exam:  BP (!) 152/72   Pulse 70   Resp 18   Ht 1.778 m (5' 10\")   Wt 102.6 kg (226 lb 4.8 oz)   SpO2 97%   BMI 32.47 kg/m    General: age-appropriate appearing male in NAD sitting in an exam chair  Resp: no respiratory distress  CV: heart rate regular  Abdomen: Degree of obesity is moderate. Abdomen " is soft and nontender. No organomegaly.  Neuro: grossly non focal. Normal reflexes  Motor: excellent strength throughout    Review of Imaging:  The following imaging exams were independently viewed and interpreted by me and discussed with patient:  Scrotal Ultrasound: Abnormal: Unchanged size of the hydrocele on the right and left epididymal cyst    Review of Labs:  The following labs were reviewed by me and discussed with the patient:      Assessment & Plan     Hydrocele, unspecified hydrocele type  No active interventions indicated at this point  Recommended follow-up as needed    Prostate cancer (H)  Follow-up as per radiation oncology and medical oncology        Brayan Nava MD  [facility]      ==========================    Additional Billing and Coding Information:  Review of external notes as documented above   Review of the result(s) of each unique test - Ultrasound scrotum    Independent interpretation of a test performed by another physician/other qualified health care professional (not separately reported) -       Discussion of management or test interpretation with external physician/other qualified healthcare professional/appropriate source -           12 minutes spent by me on the date of the encounter doing chart review, review of test results, interpretation of tests, patient visit, documentation and discussion with family     ==========================      Again, thank you for allowing me to participate in the care of your patient.        Sincerely,        Brayan Nava MD

## 2023-05-14 ENCOUNTER — APPOINTMENT (OUTPATIENT)
Dept: MRI IMAGING | Facility: CLINIC | Age: 88
End: 2023-05-14
Attending: FAMILY MEDICINE
Payer: COMMERCIAL

## 2023-05-14 ENCOUNTER — HOSPITAL ENCOUNTER (EMERGENCY)
Facility: CLINIC | Age: 88
Discharge: HOME | End: 2023-05-14
Attending: FAMILY MEDICINE | Admitting: FAMILY MEDICINE
Payer: COMMERCIAL

## 2023-05-14 VITALS
OXYGEN SATURATION: 99 % | RESPIRATION RATE: 20 BRPM | SYSTOLIC BLOOD PRESSURE: 151 MMHG | TEMPERATURE: 99 F | DIASTOLIC BLOOD PRESSURE: 80 MMHG | HEART RATE: 70 BPM

## 2023-05-14 DIAGNOSIS — L97.321 SKIN ULCER OF LEFT ANKLE, LIMITED TO BREAKDOWN OF SKIN (H): ICD-10-CM

## 2023-05-14 DIAGNOSIS — L03.116 CELLULITIS OF LEFT LOWER EXTREMITY: ICD-10-CM

## 2023-05-14 LAB
ALBUMIN SERPL BCG-MCNC: 3.6 G/DL (ref 3.5–5.2)
ALP SERPL-CCNC: 68 U/L (ref 40–129)
ALT SERPL W P-5'-P-CCNC: 12 U/L (ref 10–50)
ANION GAP SERPL CALCULATED.3IONS-SCNC: 12 MMOL/L (ref 7–15)
AST SERPL W P-5'-P-CCNC: 17 U/L (ref 10–50)
BASOPHILS # BLD AUTO: 0.1 10E3/UL (ref 0–0.2)
BASOPHILS NFR BLD AUTO: 1 %
BILIRUB SERPL-MCNC: 0.6 MG/DL
BUN SERPL-MCNC: 25.5 MG/DL (ref 8–23)
CALCIUM SERPL-MCNC: 9.1 MG/DL (ref 8.8–10.2)
CHLORIDE SERPL-SCNC: 102 MMOL/L (ref 98–107)
CREAT SERPL-MCNC: 1.17 MG/DL (ref 0.67–1.17)
CRP SERPL-MCNC: 29.86 MG/L
DEPRECATED HCO3 PLAS-SCNC: 22 MMOL/L (ref 22–29)
EOSINOPHIL # BLD AUTO: 0.6 10E3/UL (ref 0–0.7)
EOSINOPHIL NFR BLD AUTO: 6 %
ERYTHROCYTE [DISTWIDTH] IN BLOOD BY AUTOMATED COUNT: 13.2 % (ref 10–15)
GFR SERPL CREATININE-BSD FRML MDRD: 61 ML/MIN/1.73M2
GLUCOSE SERPL-MCNC: 274 MG/DL (ref 70–99)
HCT VFR BLD AUTO: 38.7 % (ref 40–53)
HGB BLD-MCNC: 12.9 G/DL (ref 13.3–17.7)
HOLD SPECIMEN: NORMAL
HOLD SPECIMEN: NORMAL
IMM GRANULOCYTES # BLD: 0 10E3/UL
IMM GRANULOCYTES NFR BLD: 0 %
LYMPHOCYTES # BLD AUTO: 1.9 10E3/UL (ref 0.8–5.3)
LYMPHOCYTES NFR BLD AUTO: 20 %
MCH RBC QN AUTO: 29.6 PG (ref 26.5–33)
MCHC RBC AUTO-ENTMCNC: 33.3 G/DL (ref 31.5–36.5)
MCV RBC AUTO: 89 FL (ref 78–100)
MONOCYTES # BLD AUTO: 1.1 10E3/UL (ref 0–1.3)
MONOCYTES NFR BLD AUTO: 11 %
NEUTROPHILS # BLD AUTO: 5.9 10E3/UL (ref 1.6–8.3)
NEUTROPHILS NFR BLD AUTO: 62 %
NRBC # BLD AUTO: 0 10E3/UL
NRBC BLD AUTO-RTO: 0 /100
PLATELET # BLD AUTO: 236 10E3/UL (ref 150–450)
POTASSIUM SERPL-SCNC: 3.9 MMOL/L (ref 3.4–5.3)
PROT SERPL-MCNC: 6.7 G/DL (ref 6.4–8.3)
RBC # BLD AUTO: 4.36 10E6/UL (ref 4.4–5.9)
SODIUM SERPL-SCNC: 136 MMOL/L (ref 136–145)
WBC # BLD AUTO: 9.5 10E3/UL (ref 4–11)

## 2023-05-14 PROCEDURE — 87040 BLOOD CULTURE FOR BACTERIA: CPT | Performed by: FAMILY MEDICINE

## 2023-05-14 PROCEDURE — 73723 MRI JOINT LWR EXTR W/O&W/DYE: CPT | Mod: LT

## 2023-05-14 PROCEDURE — 99285 EMERGENCY DEPT VISIT HI MDM: CPT | Mod: 25 | Performed by: FAMILY MEDICINE

## 2023-05-14 PROCEDURE — 36415 COLL VENOUS BLD VENIPUNCTURE: CPT | Performed by: FAMILY MEDICINE

## 2023-05-14 PROCEDURE — 86140 C-REACTIVE PROTEIN: CPT | Performed by: FAMILY MEDICINE

## 2023-05-14 PROCEDURE — 99284 EMERGENCY DEPT VISIT MOD MDM: CPT | Performed by: FAMILY MEDICINE

## 2023-05-14 PROCEDURE — A9585 GADOBUTROL INJECTION: HCPCS | Performed by: FAMILY MEDICINE

## 2023-05-14 PROCEDURE — 250N000013 HC RX MED GY IP 250 OP 250 PS 637: Performed by: FAMILY MEDICINE

## 2023-05-14 PROCEDURE — 85025 COMPLETE CBC W/AUTO DIFF WBC: CPT | Performed by: FAMILY MEDICINE

## 2023-05-14 PROCEDURE — 80053 COMPREHEN METABOLIC PANEL: CPT | Performed by: FAMILY MEDICINE

## 2023-05-14 PROCEDURE — 255N000002 HC RX 255 OP 636: Performed by: FAMILY MEDICINE

## 2023-05-14 RX ORDER — CEPHALEXIN 500 MG/1
500 CAPSULE ORAL 4 TIMES DAILY
Qty: 40 CAPSULE | Refills: 0 | Status: SHIPPED | OUTPATIENT
Start: 2023-05-14 | End: 2023-05-24

## 2023-05-14 RX ORDER — CEPHALEXIN 500 MG/1
1000 CAPSULE ORAL ONCE
Status: COMPLETED | OUTPATIENT
Start: 2023-05-14 | End: 2023-05-14

## 2023-05-14 RX ORDER — GADOBUTROL 604.72 MG/ML
10 INJECTION INTRAVENOUS ONCE
Status: COMPLETED | OUTPATIENT
Start: 2023-05-14 | End: 2023-05-14

## 2023-05-14 RX ORDER — SULFAMETHOXAZOLE/TRIMETHOPRIM 800-160 MG
1 TABLET ORAL 2 TIMES DAILY
Qty: 20 TABLET | Refills: 0 | Status: SHIPPED | OUTPATIENT
Start: 2023-05-14 | End: 2023-05-24

## 2023-05-14 RX ORDER — SULFAMETHOXAZOLE/TRIMETHOPRIM 800-160 MG
1 TABLET ORAL ONCE
Status: COMPLETED | OUTPATIENT
Start: 2023-05-14 | End: 2023-05-14

## 2023-05-14 RX ADMIN — CEPHALEXIN 1000 MG: 500 CAPSULE ORAL at 19:33

## 2023-05-14 RX ADMIN — SULFAMETHOXAZOLE AND TRIMETHOPRIM 1 TABLET: 800; 160 TABLET ORAL at 19:33

## 2023-05-14 RX ADMIN — GADOBUTROL 10 ML: 604.72 INJECTION INTRAVENOUS at 18:43

## 2023-05-14 ASSESSMENT — ACTIVITIES OF DAILY LIVING (ADL)
ADLS_ACUITY_SCORE: 35
ADLS_ACUITY_SCORE: 35

## 2023-05-14 NOTE — ED NOTES
87-year-old male with past medical history significant for type 2 diabetes mellitus, hypertension, peripheral vascular disease status post multiple toe amputations, stage III chronic kidney disease, hypertension, malignant neoplasm of his prostate, GERD, hyperlipidemia presents to the urgent care with concern over wound on his medial left ankle that is been present for approximately last week per his report.  Patient states that he was wearing ill fitting shoes which caused the wound to develop which has increased in size.  He denies any fever, chills, nauseous, cough, dyspnea, wheezing.  Physical exam patient is noted to have large ulceration with central eschar to the medial left ankle with erythema, edema extending to the mid calf.  Based on patients age, past medical history and appearance of wound, I have some concern that evaluation of leg would be outside typical scope of UC.  I discussed case with ED physician who did agree to transfer patient care and patient is amenable with this plan.       Maki Menard PA-C  05/14/23 [id number]

## 2023-05-14 NOTE — ED PROVIDER NOTES
History     Chief Complaint   Patient presents with     Wound Infection     Left ankle wound      HPI  Fawad Garcia is a 87 year old male, past medical history is significant for stage III renal disease, TIAs, history of toe amputation, type 2 diabetes, hypertension, malignant neoplasm of prostate, peripheral vascular disease, GERD, hyperlipidemia, obesity, presents to the emergency department by way of the urgent care with concerns of ulcer on his medial left ankle that the patient states has been there for approximately 1 week.  The patient suspects that this ulcer area has developed as a consequence of ill fitting shoes.  He denies systemic features such as fever chills sweats nausea or vomiting.    Allergies:  Allergies   Allergen Reactions     Zocor [Simvastatin - High Dose]      Bilateral hip aching       Problem List:    Patient Active Problem List    Diagnosis Date Noted     Tremor of left hand 06/14/2022     Priority: Medium     Left leg weakness 06/14/2022     Priority: Medium     Dermatitis 01/14/2022     Priority: Medium     Skin lesion 01/14/2022     Priority: Medium     Chronic kidney disease, stage 3 (H) 07/21/2021     Priority: Medium     TIA (transient ischemic attack) 07/11/2021     Priority: Medium     H/O amputation of lesser toe, right (H) 01/04/2021     Priority: Medium     Macular degeneration (senile) of retina 01/10/2019     Priority: Medium     Nearly legally blind per eye doctor.       Type 2 diabetes mellitus with both eyes affected by mild nonproliferative retinopathy without macular edema, with long-term current use of insulin (H) 07/30/2018     Priority: Medium     Senile nuclear sclerosis 04/01/2015     Priority: Medium     Hypertension, goal below 140/90 03/25/2015     Priority: Medium     Malignant neoplasm of prostate (H) 06/16/2014     Priority: Medium     Advanced directives, counseling/discussion 02/24/2012     Priority: Medium     Patient does not have an  "Advance/Health Care Directive (HCD), given \"What is Advance Care Planning?\" flyer, accepts referral to Facilitator and requests blank HCD form.    Roro Wright  February 24, 2012         Peripheral vascular disease (H) 11/18/2011     Priority: Medium     Problem list name updated by automated process. Provider to review       GERD (gastroesophageal reflux disease) 11/03/2011     Priority: Medium     Hyperlipidemia LDL goal <70 10/31/2010     Priority: Medium     Transient cerebral ischemia 09/03/2009     Priority: Medium     Diagnosis updated by automated process. Provider to review and confirm.       Obesity      Priority: Medium     Obesity  Problem list name updated by automated process. Provider to review          Past Medical History:    Past Medical History:   Diagnosis Date     Diabetes mellitus (H)      Hypertension      Squamous cell carcinoma        Past Surgical History:    Past Surgical History:   Procedure Laterality Date     AMPUTATE TOE(S) Right 7/23/2019    Procedure: Partial amputation great toe;  Surgeon: Ethan Monetsinos DPM;  Location: [facility]     AMPUTATE TOE(S) Left 12/24/2019    Procedure: Amputation of the great toe, left foot;  Surgeon: Ethan Montesinos DPM;  Location: [facility]     AMPUTATE TOE(S) Right 3/3/2020    Procedure: Partial amputation, second toe, right foot;  Surgeon: Ethan Montesinos DPM;  Location: [facility]     [facility]  2002    prostate biopsy elevated PSA     COLONOSCOPY       HC REMOVE TONSILS/ADENOIDS,<13 Y/O      T & A     IR LOWER EXTREMITY ANGIOGRAM LEFT  2/26/2020     IR LOWER EXTREMITY ANGIOGRAM RIGHT  12/11/2018     PHACOEMULSIFICATION WITH STANDARD INTRAOCULAR LENS IMPLANT Left 4/2/2015    Procedure: PHACOEMULSIFICATION WITH STANDARD INTRAOCULAR LENS IMPLANT;  Surgeon: Joseph Hernandez MD;  Location: [facility]     PHACOEMULSIFICATION WITH STANDARD INTRAOCULAR LENS IMPLANT Right 5/4/2015    Procedure: PHACOEMULSIFICATION WITH STANDARD INTRAOCULAR " LENS IMPLANT;  Surgeon: Joseph Hernandez MD;  Location: [facility]     REPAIR HAMMER TOE Left 4/2/2019    Procedure: 5th Metatarsal Head Resection;  Surgeon: Ethan Montesinos DPM;  Location: [facility]       Family History:    Family History   Problem Relation Age of Onset     Cancer Mother         intestinal CA     Diabetes Father      Diabetes Brother      Other Cancer Son         meagan tumor     Other Cancer Brother         pancreatic cancer     Diabetes Son      Melanoma No family hx of        Social History:  Marital Status:   [2]  Social History     Tobacco Use     Smoking status: Never     Smokeless tobacco: Never   Vaping Use     Vaping status: Never Used   Substance Use Topics     Alcohol use: Yes     Alcohol/week: 1.7 standard drinks of alcohol     Comment: 1 x month     Drug use: No        Medications:    cephALEXin (KEFLEX) 500 MG capsule  sulfamethoxazole-trimethoprim (BACTRIM DS) 800-160 MG tablet  amLODIPine (NORVASC) 5 MG tablet  aspirin (ASA) 81 MG EC tablet  B-D U/F 31G X 8 MM insulin pen needle  blood glucose (ONETOUCH ULTRA) test strip  Blood Glucose Monitoring Suppl (ONE TOUCH ULTRA SYSTEM KIT) W/DEVICE KIT  clopidogrel (PLAVIX) 75 MG tablet  dorzolamide-timolol (COSOPT) 2-0.5 % ophthalmic solution  hydrochlorothiazide (HYDRODIURIL) 25 MG tablet  insulin glargine (LANTUS SOLOSTAR) 100 UNIT/ML pen  Lancets (ONETOUCH DELICA PLUS [id number]) MISC  lisinopril (ZESTRIL) 40 MG tablet  metFORMIN (GLUCOPHAGE) 500 MG tablet  Multiple Vitamins-Minerals (PRESERVISION AREDS PO)  ORDER FOR DME  pantoprazole (PROTONIX) 20 MG EC tablet  rosuvastatin (CRESTOR) 10 MG tablet  triamcinolone (KENALOG) 0.1 % external cream          Review of Systems   All other systems reviewed and are negative.      Physical Exam   BP: (!) 144/73  Pulse: 78  Temp: 99  F (37.2  C)  Resp: 20  SpO2: 96 %      Physical Exam  Vitals and nursing note reviewed.   Constitutional:       General: He is not in acute distress.      Appearance: Normal appearance. He is normal weight. He is not ill-appearing.   Musculoskeletal:        Feet:    Neurological:      Mental Status: He is alert.         ED Course                 Procedures             Results for orders placed or performed during the hospital encounter of 05/14/23 (from the past 24 hour(s))   CBC with platelets, differential    Narrative    The following orders were created for panel order CBC with platelets, differential.  Procedure                               Abnormality         Status                     ---------                               -----------         ------                     CBC with platelets and d...[[id number]]  Abnormal            Final result                 Please view results for these tests on the individual orders.   Comprehensive metabolic panel   Result Value Ref Range    Sodium 136 136 - 145 mmol/L    Potassium 3.9 3.4 - 5.3 mmol/L    Chloride 102 98 - 107 mmol/L    Carbon Dioxide (CO2) 22 22 - 29 mmol/L    Anion Gap 12 7 - 15 mmol/L    Urea Nitrogen 25.5 (H) 8.0 - 23.0 mg/dL    Creatinine 1.17 0.67 - 1.17 mg/dL    Calcium 9.1 8.8 - 10.2 mg/dL    Glucose 274 (H) 70 - 99 mg/dL    Alkaline Phosphatase 68 40 - 129 U/L    AST 17 10 - 50 U/L    ALT 12 10 - 50 U/L    Protein Total 6.7 6.4 - 8.3 g/dL    Albumin 3.6 3.5 - 5.2 g/dL    Bilirubin Total 0.6 <=1.2 mg/dL    GFR Estimate 61 >60 mL/min/1.73m2   CRP Inflammation   Result Value Ref Range    CRP Inflammation 29.86 (H) <5.00 mg/L   [place] Draw    Narrative    The following orders were created for panel order [place] Draw.  Procedure                               Abnormality         Status                     ---------                               -----------         ------                     Extra Blue Top Tube[[id number]]                              Final result               Extra Red Top Tube[[id number]]                               Final result                 Please view results for these tests on  the individual orders.   CBC with platelets and differential   Result Value Ref Range    WBC Count 9.5 4.0 - 11.0 10e3/uL    RBC Count 4.36 (L) 4.40 - 5.90 10e6/uL    Hemoglobin 12.9 (L) 13.3 - 17.7 g/dL    Hematocrit 38.7 (L) 40.0 - 53.0 %    MCV 89 78 - 100 fL    MCH 29.6 26.5 - 33.0 pg    MCHC 33.3 31.5 - 36.5 g/dL    RDW 13.2 10.0 - 15.0 %    Platelet Count 236 150 - 450 10e3/uL    % Neutrophils 62 %    % Lymphocytes 20 %    % Monocytes 11 %    % Eosinophils 6 %    % Basophils 1 %    % Immature Granulocytes 0 %    NRBCs per 100 WBC 0 <1 /100    Absolute Neutrophils 5.9 1.6 - 8.3 10e3/uL    Absolute Lymphocytes 1.9 0.8 - 5.3 10e3/uL    Absolute Monocytes 1.1 0.0 - 1.3 10e3/uL    Absolute Eosinophils 0.6 0.0 - 0.7 10e3/uL    Absolute Basophils 0.1 0.0 - 0.2 10e3/uL    Absolute Immature Granulocytes 0.0 <=0.4 10e3/uL    Absolute NRBCs 0.0 10e3/uL   Extra Blue Top Tube   Result Value Ref Range    Hold Specimen JIC    Extra Red Top Tube   Result Value Ref Range    Hold Specimen JIC    MR Ankle Left w/o & w Contrast    Narrative    EXAM: MR ANKLE LEFT W/O and W CONTRAST  LOCATION: [facility]  DATE/TIME: 5/14/2023 6:44 PM CDT    INDICATION: Ulcer medial left ankle x1 week evaluate for osteomyelitis  COMPARISON: None.  TECHNIQUE: Routine. Additional postgadolinium T1 sequences were obtained.  IV CONTRAST: 10 mL Gadavist    FINDINGS:     TENDONS:   -There is no acute tendon abnormality. No tenosynovitis.     LIGAMENTS:   -No acute ligament abnormality.    JOINTS AND BONES:   -No evidence of osteomyelitis with attention to the medial malleolus. There is no abnormal bone enhancement, bone marrow edema or T1 marrow replacement.    Severe arthrosis talonavicular joint. Partially visualized arthrosis within the midfoot. Cystic change within the calcaneal body versus prominent vascularity, incidental.    SOFT TISSUES:  -Fatty atrophy intrinsic muscles of the foot. There is soft tissue edema in the  subcutaneous tissues without a rim-enhancing fluid collection or abscess. There is a broad-based soft tissue ulceration along the medial aspect of the ankle.    Fatty atrophy intrinsic muscles of the foot.      Impression    IMPRESSION:  1.  No evidence of osteomyelitis, with attention to the medial malleolus.  2.  Broad-based soft tissue ulceration along the medial aspect of the ankle. No abscess.  3.  Severe arthrosis talonavicular joint.     7:39 PM  Results reviewed in the room with the patient.  Given initial dose of oral Keflex and Septra DS here.  Plan for 10 days of therapy.  Requested the patient call and make an appointment with his podiatrist this coming week.  Return to the emergency department if worse or changes.      Medications   gadobutrol (GADAVIST) injection 10 mL (10 mLs Intravenous $Given 5/14/23 1843)   cephALEXin (KEFLEX) capsule 1,000 mg (1,000 mg Oral $Given 5/14/23 1933)   sulfamethoxazole-trimethoprim (BACTRIM DS) 800-160 MG per tablet 1 tablet (1 tablet Oral $Given 5/14/23 1933)       Assessments & Plan (with Medical Decision Making)   Assessments and plan with medical decision making at the time stamp above.      Disclaimer: This note consists of symbols derived from keyboarding, dictation and/or voice recognition software. As a result, there may be errors in the script that have gone undetected. Please consider this when interpreting information found in this chart.      I have reviewed the nursing notes.    I have reviewed the findings, diagnosis, plan and need for follow up with the patient.      New Prescriptions    CEPHALEXIN (KEFLEX) 500 MG CAPSULE    Take 1 capsule (500 mg) by mouth 4 times daily for 10 days    SULFAMETHOXAZOLE-TRIMETHOPRIM (BACTRIM DS) 800-160 MG TABLET    Take 1 tablet by mouth 2 times daily for 10 days       Final diagnoses:   Skin ulcer of left ankle, limited to breakdown of skin (H)   Cellulitis of left lower extremity       5/14/2023   [facility]  [place] [facility]     Mateo Nieves MD  05/14/23 1939

## 2023-05-15 ENCOUNTER — TELEPHONE (OUTPATIENT)
Dept: DERMATOLOGY | Facility: CLINIC | Age: 88
End: 2023-05-15

## 2023-05-15 ENCOUNTER — OFFICE VISIT (OUTPATIENT)
Dept: PODIATRY | Facility: CLINIC | Age: 88
End: 2023-05-15
Payer: COMMERCIAL

## 2023-05-15 VITALS
BODY MASS INDEX: 32.35 KG/M2 | HEIGHT: 70 IN | SYSTOLIC BLOOD PRESSURE: 144 MMHG | HEART RATE: 73 BPM | DIASTOLIC BLOOD PRESSURE: 81 MMHG | WEIGHT: 226 LBS

## 2023-05-15 DIAGNOSIS — I73.9 PVD (PERIPHERAL VASCULAR DISEASE) (H): ICD-10-CM

## 2023-05-15 DIAGNOSIS — I73.9 PAD (PERIPHERAL ARTERY DISEASE) (H): Primary | ICD-10-CM

## 2023-05-15 DIAGNOSIS — L97.529 TYPE 2 DIABETES MELLITUS WITH LEFT DIABETIC FOOT ULCER (H): Primary | ICD-10-CM

## 2023-05-15 DIAGNOSIS — E11.621 TYPE 2 DIABETES MELLITUS WITH LEFT DIABETIC FOOT ULCER (H): Primary | ICD-10-CM

## 2023-05-15 PROCEDURE — 99213 OFFICE O/P EST LOW 20 MIN: CPT | Performed by: PODIATRIST

## 2023-05-15 NOTE — NURSING NOTE
"Chief Complaint   Patient presents with     Consult     Ankle wound       Initial BP (!) 144/81   Pulse 73   Ht 1.778 m (5' 10\")   Wt 102.5 kg (226 lb)   BMI 32.43 kg/m   Estimated body mass index is 32.43 kg/m  as calculated from the following:    Height as of this encounter: 1.778 m (5' 10\").    Weight as of this encounter: 102.5 kg (226 lb).  Medications and allergies reviewed.      Dana GUILLERMO MA    "

## 2023-05-15 NOTE — ED NOTES
Pt taken to BR in WC.  Pt medicated per MD orders.  MD in room to discuss test results and plan of care.  ERT will pull IV and assist pt with getting dressed for discharge to home.

## 2023-05-15 NOTE — PATIENT INSTRUCTIONS
Next Steps:    1.  Change dressing every 2-3 days.  2.  Make appointment with wound care clinic.  3.  Obtain vascular testing.

## 2023-05-15 NOTE — LETTER
5/15/2023         RE: Fawad Garcia  [address]  [place] [address]        Dear Colleague,    Thank you for referring your patient, Fawad Garcia, to the [facility] [place]. Please see a copy of my visit note below.    PATIENT HISTORY:  Fawad Garcia is a 87 year old male who presents to clinic for a diabetic foot evaluation.  The patient relates developing a sore on the inside of the left ankle with surrounding redness.  The patient relates developing the sore after getting new shoes.  The patient relates some numbness to the feet.   The patient relates blood sugars are within normal limits.       REVIEW OF SYSTEMS:  Constitutional, HEENT, cardiovascular, pulmonary, GI, , musculoskeletal, neuro, skin, endocrine and psych systems are negative, except as otherwise noted.     PAST MEDICAL HISTORY:   Past Medical History:   Diagnosis Date     Diabetes mellitus (H)      Hypertension      Squamous cell carcinoma         PAST SURGICAL HISTORY:   Past Surgical History:   Procedure Laterality Date     AMPUTATE TOE(S) Right 7/23/2019    Procedure: Partial amputation great toe;  Surgeon: Ethan Montesinos DPM;  Location: [facility]     AMPUTATE TOE(S) Left 12/24/2019    Procedure: Amputation of the great toe, left foot;  Surgeon: Ethan Montesinos DPM;  Location: [facility]     AMPUTATE TOE(S) Right 3/3/2020    Procedure: Partial amputation, second toe, right foot;  Surgeon: Ethan Montesinos DPM;  Location: [facility]     [facility] SURGICAL PATHOLOGY  2002    prostate biopsy elevated PSA     COLONOSCOPY       HC REMOVE TONSILS/ADENOIDS,<11 Y/O      T & A     IR LOWER EXTREMITY ANGIOGRAM LEFT  2/26/2020     IR LOWER EXTREMITY ANGIOGRAM RIGHT  12/11/2018     PHACOEMULSIFICATION WITH STANDARD INTRAOCULAR LENS IMPLANT Left 4/2/2015    Procedure: PHACOEMULSIFICATION WITH STANDARD INTRAOCULAR LENS IMPLANT;  Surgeon: Joseph Hernandez MD;  Location: [facility]      PHACOEMULSIFICATION WITH STANDARD INTRAOCULAR LENS IMPLANT Right 5/4/2015    Procedure: PHACOEMULSIFICATION WITH STANDARD INTRAOCULAR LENS IMPLANT;  Surgeon: Joseph Hernandez MD;  Location: [facility]     REPAIR HAMMER TOE Left 4/2/2019    Procedure: 5th Metatarsal Head Resection;  Surgeon: Ethan Montesinos DPM;  Location: [facility]        MEDICATIONS:   Current Outpatient Medications:      amLODIPine (NORVASC) 5 MG tablet, Take 1 tablet (5 mg) by mouth daily, Disp: 90 tablet, Rfl: 3     aspirin (ASA) 81 MG EC tablet, Take 1 tablet (81 mg) by mouth daily, Disp: , Rfl:      B-D U/F 31G X 8 MM insulin pen needle, AS DIRECTED daily. Hold on file until needed., Disp: 100 each, Rfl: 3     blood glucose (ONETOUCH ULTRA) test strip, 1 strip by In Vitro route daily One touch Ultra., Disp: 100 strip, Rfl: 3     Blood Glucose Monitoring Suppl (ONE TOUCH ULTRA SYSTEM KIT) W/DEVICE KIT, , Disp: , Rfl:      cephALEXin (KEFLEX) 500 MG capsule, Take 1 capsule (500 mg) by mouth 4 times daily for 10 days, Disp: 40 capsule, Rfl: 0     clopidogrel (PLAVIX) 75 MG tablet, Take 1 tablet (75 mg) by mouth daily, Disp: 90 tablet, Rfl: 3     dorzolamide-timolol (COSOPT) 2-0.5 % ophthalmic solution, , Disp: , Rfl:      hydrochlorothiazide (HYDRODIURIL) 25 MG tablet, Take 1 tablet (25 mg) by mouth daily, Disp: 90 tablet, Rfl: 3     insulin glargine (LANTUS SOLOSTAR) 100 UNIT/ML pen, Inject 25 Units Subcutaneous At Bedtime Hold on file until needed., Disp: 30 mL, Rfl: 3     Lancets (ONETOUCH DELICA PLUS [id number]) MISC, 1 each by In Vitro route See Admin Instructions Hold on file until needed., Disp: 100 each, Rfl: 1     lisinopril (ZESTRIL) 40 MG tablet, Take 1 tablet (40 mg) by mouth daily, Disp: 90 tablet, Rfl: 3     metFORMIN (GLUCOPHAGE) 500 MG tablet, Take 2 tablets (1,000 mg) by mouth 2 times daily (with meals), Disp: 360 tablet, Rfl: 3     Multiple Vitamins-Minerals (PRESERVISION AREDS PO), Take 1 tablet by mouth, Disp: , Rfl:      ORDER  FOR DME, Equipment being ordered:  Glucometer, Disp: 1 Device, Rfl: 1     pantoprazole (PROTONIX) 20 MG EC tablet, Take 1 tablet (20 mg) by mouth daily, Disp: 90 tablet, Rfl: 3     rosuvastatin (CRESTOR) 10 MG tablet, Take 1 tablet (10 mg) by mouth At Bedtime, Disp: 90 tablet, Rfl: 3     sulfamethoxazole-trimethoprim (BACTRIM DS) 800-160 MG tablet, Take 1 tablet by mouth 2 times daily for 10 days, Disp: 20 tablet, Rfl: 0     sulfamethoxazole-trimethoprim (BACTRIM DS) 800-160 MG tablet, Take 1 tablet by mouth 2 times daily for 10 days, Disp: 20 tablet, Rfl: 0     triamcinolone (KENALOG) 0.1 % external cream, Apply topically 2 times daily To dry skin patches as needed.  Hold on file until needed., Disp: 80 g, Rfl: 1     ALLERGIES:    Allergies   Allergen Reactions     Zocor [Simvastatin - High Dose]      Bilateral hip aching        SOCIAL HISTORY:   Social History     Socioeconomic History     Marital status:      Spouse name: Not on file     Number of children: Not on file     Years of education: Not on file     Highest education level: Not on file   Occupational History     Not on file   Tobacco Use     Smoking status: Never     Smokeless tobacco: Never   Vaping Use     Vaping status: Never Used   Substance and Sexual Activity     Alcohol use: Yes     Alcohol/week: 1.7 standard drinks of alcohol     Comment: 1 x month     Drug use: No     Sexual activity: Not Currently     Partners: Female   Other Topics Concern     Parent/sibling w/ CABG, MI or angioplasty before 65F 55M? No   Social History Narrative     Not on file     Social Determinants of Health     Financial Resource Strain: Not on file   Food Insecurity: Not on file   Transportation Needs: Not on file   Physical Activity: Not on file   Stress: Not on file   Social Connections: Not on file   Intimate Partner Violence: Not on file   Housing Stability: Not on file        FAMILY HISTORY:   Family History   Problem Relation Age of Onset     Cancer Mother   "       intestinal CA     Diabetes Father      Diabetes Brother      Other Cancer Son         meagan tumor     Other Cancer Brother         pancreatic cancer     Diabetes Son      Melanoma No family hx of         Vitals: BP (!) 144/81   Pulse 73   Ht 1.778 m (5' 10\")   Wt 102.5 kg (226 lb)   BMI 32.43 kg/m         Lower Extremity Evaluation:     Dermatologic: Noted dark circular lesion measuring approximately 4 cm in diameter over the medial aspect of the left ankle.  Noted surrounding erythema.  No drainage noted.       Vascular: DP & PT pulses are nonpalpable bilaterally.   Capillary filling and skin temperature is diminished to both lower extremities.  There are no varicosities, noted edema and no trophic changes noted.      Neurologic:   No evidence of weakness in the lower extremities.  Noted evidence of neuropathy with diminished sensation bilaterally.    Musculoskeletal: Patient is ambulatory without assistive device or brace.  No gross ankle deformity noted.  No foot or ankle joint effusion is noted.  One notes hammertoe contracture of the lesser toes bilaterally.     Assessment:        ICD-10-CM    1. 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 PVD (peripheral vascular disease) (H)  I73.9 US RENATE with PPG wo Exercise Bilateral     US Lower Extremity Arterial Duplex Left     Wound Care Referral              Plan:  I have explained to the patient the underlying condition affecting the feet.  At this point, the patient was ordered noninvasive vascular testing to the lower extremities to evaluate for underlying PVD.  The patient was referred to [facility] for further evaluation of the sore on the left ankle.    I have discussed with the patient the importance of diabetic foot care and daily inspection of the feet.  The patient was instructed to come in anytime if there is any concern about the feet with redness, " swelling or drainage is noted.    Fawad verbalized agreement with and understanding of the rational for the diagnosis and treatment plan.  All questions were answered to best of my ability and the patient's satisfaction. The patient was advised to contact the clinic with any questions that may arise after the clinic visit.      Disclaimer: This note consists of symbols derived from keyboarding, dictation and/or voice recognition software. As a result, there may be errors in the script that have gone undetected. Please consider this when interpreting information found in this chart.        LIANG Montesinos D.P.M., F.A.C.F.A.S.            Again, thank you for allowing me to participate in the care of your patient.        Sincerely,        Ethan Montesinos DPM

## 2023-05-15 NOTE — DISCHARGE INSTRUCTIONS
Keflex 500 mg p.o. 4 times daily x10 days.  Septra DS 1 tab p.o. twice daily x10 days.  Please call and make an appointment to see your podiatrist Dr. Darrell Montesinos this week in clinic for follow-up.  Return to the emergency department if worse or changes.

## 2023-05-15 NOTE — TELEPHONE ENCOUNTER
[facility] Call Center    Phone Message    May a detailed message be left on voicemail: yes     Reason for Call: Other: Pt needs to see Drewoli about his ear. It is hurting, pieces of skin is raised, tender on top. Next avail is October. Please call [phone]. Thanks!      Action Taken: Message routed to:  [facility] DERM    Travel Screening: Not Applicable

## 2023-05-15 NOTE — PROGRESS NOTES
PATIENT HISTORY:  Fawad Garcia is a 87 year old male who presents to clinic for a diabetic foot evaluation.  The patient relates developing a sore on the inside of the left ankle with surrounding redness.  The patient relates developing the sore after getting new shoes.  The patient relates some numbness to the feet.   The patient relates blood sugars are within normal limits.       REVIEW OF SYSTEMS:  Constitutional, HEENT, cardiovascular, pulmonary, GI, , musculoskeletal, neuro, skin, endocrine and psych systems are negative, except as otherwise noted.     PAST MEDICAL HISTORY:   Past Medical History:   Diagnosis Date     Diabetes mellitus (H)      Hypertension      Squamous cell carcinoma         PAST SURGICAL HISTORY:   Past Surgical History:   Procedure Laterality Date     AMPUTATE TOE(S) Right 7/23/2019    Procedure: Partial amputation great toe;  Surgeon: Ethan Montesinos DPM;  Location: [facility]     AMPUTATE TOE(S) Left 12/24/2019    Procedure: Amputation of the great toe, left foot;  Surgeon: Ethan Montesinos DPM;  Location: [facility]     AMPUTATE TOE(S) Right 3/3/2020    Procedure: Partial amputation, second toe, right foot;  Surgeon: Ethan Montesinos DPM;  Location: [facility]     CL AFF SURGICAL PATHOLOGY  2002    prostate biopsy elevated PSA     COLONOSCOPY       HC REMOVE TONSILS/ADENOIDS,<13 Y/O      T & A     IR LOWER EXTREMITY ANGIOGRAM LEFT  2/26/2020     IR LOWER EXTREMITY ANGIOGRAM RIGHT  12/11/2018     PHACOEMULSIFICATION WITH STANDARD INTRAOCULAR LENS IMPLANT Left 4/2/2015    Procedure: PHACOEMULSIFICATION WITH STANDARD INTRAOCULAR LENS IMPLANT;  Surgeon: Joseph Hernandez MD;  Location: [facility]     PHACOEMULSIFICATION WITH STANDARD INTRAOCULAR LENS IMPLANT Right 5/4/2015    Procedure: PHACOEMULSIFICATION WITH STANDARD INTRAOCULAR LENS IMPLANT;  Surgeon: Joseph Hernandez MD;  Location: [facility]     REPAIR HAMMER TOE Left 4/2/2019    Procedure: 5th Metatarsal Head Resection;  Surgeon:  Ethan Montesinos, VAN;  Location: [facility]        MEDICATIONS:   Current Outpatient Medications:      amLODIPine (NORVASC) 5 MG tablet, Take 1 tablet (5 mg) by mouth daily, Disp: 90 tablet, Rfl: 3     aspirin (ASA) 81 MG EC tablet, Take 1 tablet (81 mg) by mouth daily, Disp: , Rfl:      B-D U/F 31G X 8 MM insulin pen needle, AS DIRECTED daily. Hold on file until needed., Disp: 100 each, Rfl: 3     blood glucose (ONETOUCH ULTRA) test strip, 1 strip by In Vitro route daily One touch Ultra., Disp: 100 strip, Rfl: 3     Blood Glucose Monitoring Suppl (ONE TOUCH ULTRA SYSTEM KIT) W/DEVICE KIT, , Disp: , Rfl:      cephALEXin (KEFLEX) 500 MG capsule, Take 1 capsule (500 mg) by mouth 4 times daily for 10 days, Disp: 40 capsule, Rfl: 0     clopidogrel (PLAVIX) 75 MG tablet, Take 1 tablet (75 mg) by mouth daily, Disp: 90 tablet, Rfl: 3     dorzolamide-timolol (COSOPT) 2-0.5 % ophthalmic solution, , Disp: , Rfl:      hydrochlorothiazide (HYDRODIURIL) 25 MG tablet, Take 1 tablet (25 mg) by mouth daily, Disp: 90 tablet, Rfl: 3     insulin glargine (LANTUS SOLOSTAR) 100 UNIT/ML pen, Inject 25 Units Subcutaneous At Bedtime Hold on file until needed., Disp: 30 mL, Rfl: 3     Lancets (ONETOUCH DELICA PLUS [id number]) MISC, 1 each by In Vitro route See Admin Instructions Hold on file until needed., Disp: 100 each, Rfl: 1     lisinopril (ZESTRIL) 40 MG tablet, Take 1 tablet (40 mg) by mouth daily, Disp: 90 tablet, Rfl: 3     metFORMIN (GLUCOPHAGE) 500 MG tablet, Take 2 tablets (1,000 mg) by mouth 2 times daily (with meals), Disp: 360 tablet, Rfl: 3     Multiple Vitamins-Minerals (PRESERVISION AREDS PO), Take 1 tablet by mouth, Disp: , Rfl:      ORDER FOR DME, Equipment being ordered:  Glucometer, Disp: 1 Device, Rfl: 1     pantoprazole (PROTONIX) 20 MG EC tablet, Take 1 tablet (20 mg) by mouth daily, Disp: 90 tablet, Rfl: 3     rosuvastatin (CRESTOR) 10 MG tablet, Take 1 tablet (10 mg) by mouth At Bedtime, Disp: 90 tablet, Rfl: 3      "sulfamethoxazole-trimethoprim (BACTRIM DS) 800-160 MG tablet, Take 1 tablet by mouth 2 times daily for 10 days, Disp: 20 tablet, Rfl: 0     sulfamethoxazole-trimethoprim (BACTRIM DS) 800-160 MG tablet, Take 1 tablet by mouth 2 times daily for 10 days, Disp: 20 tablet, Rfl: 0     triamcinolone (KENALOG) 0.1 % external cream, Apply topically 2 times daily To dry skin patches as needed.  Hold on file until needed., Disp: 80 g, Rfl: 1     ALLERGIES:    Allergies   Allergen Reactions     Zocor [Simvastatin - High Dose]      Bilateral hip aching        SOCIAL HISTORY:   Social History     Socioeconomic History     Marital status:      Spouse name: Not on file     Number of children: Not on file     Years of education: Not on file     Highest education level: Not on file   Occupational History     Not on file   Tobacco Use     Smoking status: Never     Smokeless tobacco: Never   Vaping Use     Vaping status: Never Used   Substance and Sexual Activity     Alcohol use: Yes     Alcohol/week: 1.7 standard drinks of alcohol     Comment: 1 x month     Drug use: No     Sexual activity: Not Currently     Partners: Female   Other Topics Concern     Parent/sibling w/ CABG, MI or angioplasty before 65F 55M? No   Social History Narrative     Not on file     Social Determinants of Health     Financial Resource Strain: Not on file   Food Insecurity: Not on file   Transportation Needs: Not on file   Physical Activity: Not on file   Stress: Not on file   Social Connections: Not on file   Intimate Partner Violence: Not on file   Housing Stability: Not on file        FAMILY HISTORY:   Family History   Problem Relation Age of Onset     Cancer Mother         intestinal CA     Diabetes Father      Diabetes Brother      Other Cancer Son         meagan tumor     Other Cancer Brother         pancreatic cancer     Diabetes Son      Melanoma No family hx of         Vitals: BP (!) 144/81   Pulse 73   Ht 1.778 m (5' 10\")   Wt 102.5 kg (226 " lb)   BMI 32.43 kg/m         Lower Extremity Evaluation:     Dermatologic: Noted dark circular lesion measuring approximately 4 cm in diameter over the medial aspect of the left ankle.  Noted surrounding erythema.  No drainage noted.       Vascular: DP & PT pulses are nonpalpable bilaterally.   Capillary filling and skin temperature is diminished to both lower extremities.  There are no varicosities, noted edema and no trophic changes noted.      Neurologic:   No evidence of weakness in the lower extremities.  Noted evidence of neuropathy with diminished sensation bilaterally.    Musculoskeletal: Patient is ambulatory without assistive device or brace.  No gross ankle deformity noted.  No foot or ankle joint effusion is noted.  One notes hammertoe contracture of the lesser toes bilaterally.     Assessment:        ICD-10-CM    1. 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 PVD (peripheral vascular disease) (H)  I73.9 US RENATE with PPG wo Exercise Bilateral     US Lower Extremity Arterial Duplex Left     Wound Care Referral              Plan:  I have explained to the patient the underlying condition affecting the feet.  At this point, the patient was ordered noninvasive vascular testing to the lower extremities to evaluate for underlying PVD.  The patient was referred to [facility] for further evaluation of the sore on the left ankle.    I have discussed with the patient the importance of diabetic foot care and daily inspection of the feet.  The patient was instructed to come in anytime if there is any concern about the feet with redness, swelling or drainage is noted.    Fawad verbalized agreement with and understanding of the rational for the diagnosis and treatment plan.  All questions were answered to best of my ability and the patient's satisfaction. The patient was advised to contact the clinic with any questions that  may arise after the clinic visit.      Disclaimer: This note consists of symbols derived from keyboarding, dictation and/or voice recognition software. As a result, there may be errors in the script that have gone undetected. Please consider this when interpreting information found in this chart.        LIANG Montesinos D.P.M., [email or web address].

## 2023-05-16 ENCOUNTER — TELEPHONE (OUTPATIENT)
Dept: DERMATOLOGY | Facility: CLINIC | Age: 88
End: 2023-05-16
Payer: COMMERCIAL

## 2023-05-16 NOTE — TELEPHONE ENCOUNTER
Patients wife already called us back per other encounter and patient is scheduled to be seen by Dr. Torres.    I tried patient back but got voicemail.     I left him a message that his wife has already called us and scheduled a work in appointment for him with Dr. Torres on Wednesday 5-24-23 at 8:30 am.     Call back if that date/time is an issue or has other questions.    Claudette Holden RN

## 2023-05-16 NOTE — TELEPHONE ENCOUNTER
Spoke to Wife, Rosaura, I did advise [employer] to communicate on file does not list her-but does list their 3 sons, so if patient wants her to call on his behalf, will need to sign another [employer] to communicate for her.     Scheduled for spot check appt.     Claudette Holden RN

## 2023-05-16 NOTE — TELEPHONE ENCOUNTER
[facility] Call Center    Phone Message    May a detailed message be left on voicemail: yes     Reason for Call: Other: Pt states he was told her needs to talk to Claudette and then the call was dropped, please call Pt back to discuss, thanks!     Action Taken: Message routed to:  Other: [facility]    Travel Screening: Not Applicable

## 2023-05-19 LAB
BACTERIA BLD CULT: NO GROWTH
BACTERIA BLD CULT: NO GROWTH

## 2023-05-22 ENCOUNTER — HOSPITAL ENCOUNTER (OUTPATIENT)
Dept: ULTRASOUND IMAGING | Facility: CLINIC | Age: 88
Discharge: HOME | End: 2023-05-22
Attending: PODIATRIST
Payer: COMMERCIAL

## 2023-05-22 DIAGNOSIS — I73.9 PVD (PERIPHERAL VASCULAR DISEASE) (H): ICD-10-CM

## 2023-05-22 PROCEDURE — 93926 LOWER EXTREMITY STUDY: CPT | Mod: LT

## 2023-05-22 PROCEDURE — 93922 UPR/L XTREMITY ART 2 LEVELS: CPT

## 2023-05-23 ENCOUNTER — HOSPITAL ENCOUNTER (OUTPATIENT)
Dept: WOUND CARE | Facility: CLINIC | Age: 88
Discharge: HOME | End: 2023-05-23
Attending: FAMILY MEDICINE | Admitting: FAMILY MEDICINE
Payer: COMMERCIAL

## 2023-05-23 DIAGNOSIS — S81.802A OPEN WOUND OF KNEE, LEG, AND ANKLE, LEFT, INITIAL ENCOUNTER: ICD-10-CM

## 2023-05-23 DIAGNOSIS — I73.9 PVD (PERIPHERAL VASCULAR DISEASE) (H): ICD-10-CM

## 2023-05-23 DIAGNOSIS — S91.002A OPEN WOUND OF KNEE, LEG, AND ANKLE, LEFT, INITIAL ENCOUNTER: ICD-10-CM

## 2023-05-23 DIAGNOSIS — I73.9 PERIPHERAL VASCULAR DISEASE (H): Primary | ICD-10-CM

## 2023-05-23 DIAGNOSIS — S81.002A OPEN WOUND OF KNEE, LEG, AND ANKLE, LEFT, INITIAL ENCOUNTER: ICD-10-CM

## 2023-05-23 DIAGNOSIS — L97.529 TYPE 2 DIABETES MELLITUS WITH LEFT DIABETIC FOOT ULCER (H): ICD-10-CM

## 2023-05-23 DIAGNOSIS — E11.621 TYPE 2 DIABETES MELLITUS WITH LEFT DIABETIC FOOT ULCER (H): ICD-10-CM

## 2023-05-23 PROCEDURE — G0463 HOSPITAL OUTPT CLINIC VISIT: HCPCS | Mod: 25

## 2023-05-23 PROCEDURE — 97602 WOUND(S) CARE NON-SELECTIVE: CPT

## 2023-05-23 NOTE — DISCHARGE INSTRUCTIONS
Visit Summary/General Recommendations  A referral has been placed for vascular surgery follow-up given your history of stenting this leg. Expecting that you may be needing some multilayer compression wrapping done in clinic a couple times per week to help with healing but would like vascular to see you first.     If the dead tissue over your wound is not cleaning up well with wound plan of care, we may need to have a doctor see you to remove this, will reassess at next visit.    Please be sure to avoid long periods of sitting and standing. Elevate your leg above the level of your heart for at least 30 minutes 2-3 times daily. Walk regularly. This will help with swelling.          Bandage Change  Wash your hands before and after the dressing change. You may wear gloves if you choose. Gloves are available over the counter at [employer], [employer], [facility], etc.   Use scissors at home that you can designate solely for wound care and cleanse well with each use, (rubbing alcohol or alcohol pads work well for this).    Wash hands.  Remove old dressing.  Wash hands.  1.) Cleanse wound with microklenz wound cleanser on spray setting and wipe with gauze to remove any loose debris as able. Pat wound and surrounding skin dry with gauze.   2.) Apply a layer of medihoney to the wound using cotton tipped applicator   3.) Cover with Mepore dressing   4.) Wear spandagrip sleeve over lower leg, from foot to just below knee- remove this at night and reapply first thing in the morning.  Change dressing every 1-2 days depending on the drainage (if the pad is fully saturated or leaking then change)    Cover area when showering to prevent contamination.        Signs and symptoms of infection:  Bright green drainage  Foul odor  Increasing pain in area  New redness or swelling at the site of the wound or streaks up from wound  New warmth to touch around wound accompanied by redness and swelling  Fever    Need to be seen as soon as possible for  infection concerns.    Follow up: next week    Please call with any questions or concerns.    Kamille Calzada RN, CWOCN [phone]

## 2023-05-23 NOTE — PROGRESS NOTES
[facility]    Start of Care in [facility]: 5/23/2023   Referring Provider: Dr. Montesinos  Primary Care Provider: Chris Scott   Wound Location: left ankle     Wound Clinic Visit:   Initial consult    Reason for Visit:       left ankle wound    Subjective:     Pt states initially thought wound was related to his shoe but has since decided it was not. Started a few weeks ago. He has been keeping it covered with Jumpstart dressing per Dr. Montesinos. Thinks he has had a wound here in the past but is not sure.       Wound History:   Pt initially seen in the ED on 5/14/23 for ulceration, prescribed keflex and bactrim. Had arterial ultrasounds yesterday due to PAD history including distal popliteal stenting of left leg in February of '20.       Past Medical History:  Patient Active Problem List   Diagnosis     Obesity     Transient cerebral ischemia     Hyperlipidemia LDL goal <70     GERD (gastroesophageal reflux disease)     Peripheral vascular disease (H)     Advanced directives, counseling/discussion     Malignant neoplasm of prostate (H)     Hypertension, goal below 140/90     Senile nuclear sclerosis     Type 2 diabetes mellitus with both eyes affected by mild nonproliferative retinopathy without macular edema, with long-term current use of insulin (H)     Macular degeneration (senile) of retina     H/O amputation of lesser toe, right (H)     TIA (transient ischemic attack)     Chronic kidney disease, stage 3 (H)     Dermatitis     Skin lesion     Tremor of left hand     Left leg weakness                     Tobacco Use:     Tobacco Use      Smoking status: Never      Smokeless tobacco: Never    Vaping Use      Vaping status: Never Used       Diabetic: yes  HgbA1C:   Hemoglobin A1C   Date Value Ref Range Status   01/27/2023 9.6 (H) 0.0 - 5.6 % Final     Comment:     Normal <5.7%   Prediabetes 5.7-6.4%    Diabetes 6.5% or higher     Note: Adopted from ADA consensus guidelines.    06/21/2021 8.4 (H) 0 - 5.6 % Final     Comment:     Normal <5.7% Prediabetes 5.7-6.4%  Diabetes 6.5% or higher - adopted from ADA   consensus guidelines.       Checks Blood Glucose?:  Average Readings:       Personal/social history:  Lives with spouse who does help with bandaging. Retired pharmacist. Sons are supportive, one is present today    Objective:   Current treatment plan: Jumpstart dressing and gauze  Last changed: 2 days ago    Wound #1 Left ankle, medial          1st photo from ED visit on 5/14/23      Stage/tissue depth: full thickness  3.3 cm L x 3.3 cm W x 0.1 cm D  Tunneling: no  Undermining: no  Wound bed type/amount: <5% pink, about 20% brown, 75% yellow/white; non fluctuant  Wound Edges: attached  Periwound: intact  Drainage: appears to be small to moderate serosanguinous, gauze over area has strike though drainage  Odor: no  Pain: denies    Dorsalis Pedal Pulse: palpable: no, edema in area   Posterior Tibial Pulse: palpable: no, edema in area   Hair growth: sparse upper lower leg  Capillary Refill: 2 seconds  Feet/toes color: pink, warm  R Leg: Edema trace to 1+  L Leg: Edema 3+ lower leg to ankle, trace in foot.  RENATE 5/22/23:   FINDINGS:  Right RENATE:   DP: 0.49  PT: 0.55.     Left RENATE:   DP: 0.95   PT: 0.69.     Right Digital brachial index: 0.43, 62 mmHg.  Left Digital Brachial index: 0.55, 79 mmHg     Waveforms: Monophasic in the distal tibial arteries    US LOWER EXTREMITY ARTERIAL DUPLEX LEFT  5/22/2023 2:00 PM      HISTORY:  Peripheral vascular disease. Left lower extremity ulcer.     COMPARISON: None     FINDINGS: Color Doppler and spectral waveform analysis performed.     The following peak systolic velocities are measured in cm/s.      LEFT     CFA: 111  PFA: 50  SFA, proximal: 89  SFA, mid: 69  SFA, distal: 84  Popliteal: 61  Anterior tibial artery proximal: 149  Posterior tibial artery: 26  Peroneal artery mid: 80     Waveforms: Multiphasic waveforms are identified in the  visualized  arteries.                                                                      IMPRESSION: Per sonographic velocity criteria, no definite significant  stenosis identified.                                                                      IMPRESSION: Moderate arterial insufficiency in the right lower  extremity. RENATE in the left lower extremity is in the range of normal.  Digital brachial indices would indicate adequate wound healing  potential.       Mobility: independant  Current offloading/footwear: diabetic shoes and inserts appear to fit appropriately without rubbing area  Sensation: some neuropathy of feet but can feel light touch in area    Other callusing/areas of concern: area of small scattered scabbing posterior calf, possibly from scratching    Diet:       Assessment:  Left ankle full thickness necrotic ulceration of uncertain etiology, location suspicious for venous ulceration given presence of lower leg edema    History of PAD though per arterial studies no concerns identified is left lower leg, does have stent in distal popliteal area.     Factors impacting wound healing:     Delayed healing as part of normal aging process  Reduced Blood Supply: inadequate perfusion to heal wound  Obesity: decreases tissue perfusion  Underlying Disease: diabetes mellitis, poor control      Plan:  Initiated topical plan of care to promote autolytic debridement, (it appears has autolytically debrided some since start of care,) using Medihoney. May need sharp debridement.     Initiated conservative compression using spandagrip. Pt states he has issues with discomfort with compression so did start conservatively. Expect he may need multilayer compression bandaging though would like him to follow-up with vascular first given history and location of stent. Vascular referral placed.    Discussed elevation and activity for edema control.       Discussed/Education provided: plan of care with rationale      Topical  care: Wound/surrounding skin cleansed with Vashe and gauze. Patted dry. bandaged per plan of care    Additional recommendations: improved blood glucose control, increased dietary protein    The following discharge instructions were reviewed with and sent home with the patient:  Visit Summary/General Recommendations  A referral has been placed for vascular surgery follow-up given your history of stenting this leg. Expecting that you may be needing some multilayer compression wrapping done in clinic a couple times per week to help with healing but would like vascular to see you first.     If the dead tissue over your wound is not cleaning up well with wound plan of care, we may need to have a doctor see you to remove this, will reassess at next visit.    Please be sure to avoid long periods of sitting and standing. Elevate your leg above the level of your heart for at least 30 minutes 2-3 times daily. Walk regularly. This will help with swelling.          Bandage Change  Wash your hands before and after the dressing change. You may wear gloves if you choose. Gloves are available over the counter at [facility], [facility], [facility], etc.   Use scissors at home that you can designate solely for wound care and cleanse well with each use, (rubbing alcohol or alcohol pads work well for this).    Wash hands.  Remove old dressing.  Wash hands.  1.) Cleanse wound with microklenz wound cleanser on spray setting and wipe with gauze to remove any loose debris as able. Pat wound and surrounding skin dry with gauze.   2.) Apply a layer of medihoney to the wound using cotton tipped applicator   3.) Cover with Mepore dressing   4.) Wear spandagrip sleeve over lower leg, from foot to just below knee- remove this at night and reapply first thing in the morning.  Change dressing every 1-2 days depending on the drainage (if the pad is fully saturated or leaking then change)    Cover area when showering to prevent contamination.        Signs  and symptoms of infection:  Bright green drainage  Foul odor  Increasing pain in area  New redness or swelling at the site of the wound or streaks up from wound  New warmth to touch around wound accompanied by redness and swelling  Fever    Need to be seen as soon as possible for infection concerns.    Follow up: next week    Please call with any questions or concerns.    The following supplies were sent home with the patient:  Medihoney, microklenz, cotton tipped applicators and Mepore pro bandages    Return visit: next week    Verbal, written, & demonstrative education provided.  Face to face time: approximately 60 minutes  Procedure: bandaged to promote autolytic debridement     Kamille Calzada RN, CWOCN

## 2023-05-24 ENCOUNTER — TELEPHONE (OUTPATIENT)
Dept: OTHER | Facility: CLINIC | Age: 88
End: 2023-05-24

## 2023-05-24 ENCOUNTER — OFFICE VISIT (OUTPATIENT)
Dept: DERMATOLOGY | Facility: CLINIC | Age: 88
End: 2023-05-24
Payer: COMMERCIAL

## 2023-05-24 ENCOUNTER — TELEPHONE (OUTPATIENT)
Dept: DERMATOLOGY | Facility: CLINIC | Age: 88
End: 2023-05-24

## 2023-05-24 DIAGNOSIS — Z85.828 HISTORY OF SKIN CANCER: ICD-10-CM

## 2023-05-24 DIAGNOSIS — D18.01 ANGIOMA OF SKIN: ICD-10-CM

## 2023-05-24 DIAGNOSIS — L82.1 SEBORRHEIC KERATOSIS: ICD-10-CM

## 2023-05-24 DIAGNOSIS — L81.4 LENTIGO: ICD-10-CM

## 2023-05-24 DIAGNOSIS — D23.9 DERMAL NEVUS: ICD-10-CM

## 2023-05-24 DIAGNOSIS — D04.22 SQUAMOUS CELL CARCINOMA IN SITU (SCCIS) OF SKIN OF HELIX OF LEFT EAR: Primary | ICD-10-CM

## 2023-05-24 PROCEDURE — 99213 OFFICE O/P EST LOW 20 MIN: CPT | Mod: 25 | Performed by: DERMATOLOGY

## 2023-05-24 PROCEDURE — 88331 PATH CONSLTJ SURG 1 BLK 1SPC: CPT | Performed by: DERMATOLOGY

## 2023-05-24 PROCEDURE — 69100 BIOPSY OF EXTERNAL EAR: CPT | Performed by: DERMATOLOGY

## 2023-05-24 ASSESSMENT — PAIN SCALES - GENERAL: PAINLEVEL: NO PAIN (0)

## 2023-05-24 NOTE — PATIENT INSTRUCTIONS
Wound Care Instructions     FOR SUPERFICIAL WOUNDS     [facility] [phone]    [facility] [phone]                       AFTER 24 HOURS YOU SHOULD REMOVE THE BANDAGE AND BEGIN DAILY DRESSING CHANGES AS FOLLOWS:     1) Remove Dressing.     2) Clean and dry the area with tap water using a Q-tip or sterile gauze pad.     3) Apply Vaseline, Aquaphor, Polysporin ointment or Bacitracin ointment over entire wound.  Do NOT use Neosporin ointment.     4) Cover the wound with a band-aid, or a sterile non-stick gauze pad and micropore paper tape      REPEAT THESE INSTRUCTIONS AT LEAST ONCE A DAY UNTIL THE WOUND HAS COMPLETELY HEALED.    It is an old wives tale that a wound heals better when it is exposed to air and allowed to dry out. The wound will heal faster with a better cosmetic result if it is kept moist with ointment and covered with a bandage.    **Do not let the wound dry out.**      Supplies Needed:      *Cotton tipped applicators (Q-tips)    *Polysporin Ointment or Bacitracin Ointment (NOT NEOSPORIN)    *Band-aids or non-stick gauze pads and micropore paper tape.      PATIENT INFORMATION:    During the healing process you will notice a number of changes. All wounds develop a small halo of redness surrounding the wound.  This means healing is occurring. Severe itching with extensive redness usually indicates sensitivity to the ointment or bandage tape used to dress the wound.  You should call our office if this develops.      Swelling  and/or discoloration around your surgical site is common, particularly when performed around the eye.    All wounds normally drain.  The larger the wound the more drainage there will be.  After 7-10 days, you will notice the wound beginning to shrink and new skin will begin to grow.  The wound is healed when you can see skin has formed over the entire area.  A healed wound has a healthy, shiny look to the surface and is red to dark pink in color  to normalize.  Wounds may take approximately 4-6 weeks to heal.  Larger wounds may take 6-8 weeks.  After the wound is healed you may discontinue dressing changes.    You may experience a sensation of tightness as your wound heals. This is normal and will gradually subside.    Your healed wound may be sensitive to temperature changes. This sensitivity improves with time, but if you re having a lot of discomfort, try to avoid temperature extremes.    Patients frequently experience itching after their wound appears to have healed because of the continue healing under the skin.  Plain Vaseline will help relieve the itching.        POSSIBLE COMPLICATIONS    BLEEDING:    Leave the bandage in place.  Use tightly rolled up gauze or a cloth to apply direct pressure over the bandage for 30  minutes.  Reapply pressure for an additional 30 minutes if necessary  Use additional gauze and tape to maintain pressure once the bleeding has stopped.

## 2023-05-24 NOTE — TELEPHONE ENCOUNTER
Future Appointments   Date Time Provider Department Center   6/19/2023 10:30 AM Ernie Cali MD [facility]

## 2023-05-24 NOTE — PROGRESS NOTES
Fawad Garcia is an extremely pleasant 87 year old year old male patient here today for hx of non-melanoma skin cancer.  HE notes tender spot on left ear.   .   Patient states this has been present for a while.  Patient reports the following symptoms:  painful.  Patient reports the following previous treatments none.  These treatments did not work.  Patient reports the following modifying factors none.  Associated symptoms: none.  Patient has no other skin complaints today.  Remainder of the HPI, Meds, PMH, Allergies, FH, and SH was reviewed in chart.      Past Medical History:   Diagnosis Date     Diabetes mellitus (H)      Hypertension      Squamous cell carcinoma        Past Surgical History:   Procedure Laterality Date     AMPUTATE TOE(S) Right 7/23/2019    Procedure: Partial amputation great toe;  Surgeon: Ethan Montesinos DPM;  Location: [facility]     AMPUTATE TOE(S) Left 12/24/2019    Procedure: Amputation of the great toe, left foot;  Surgeon: Ethan Montesinos DPM;  Location: [facility]     AMPUTATE TOE(S) Right 3/3/2020    Procedure: Partial amputation, second toe, right foot;  Surgeon: Ethan Montesinos DPM;  Location: [facility]     [facility] SURGICAL PATHOLOGY  2002    prostate biopsy elevated PSA     COLONOSCOPY       HC REMOVE TONSILS/ADENOIDS,<13 Y/O      T & A     IR LOWER EXTREMITY ANGIOGRAM LEFT  2/26/2020     IR LOWER EXTREMITY ANGIOGRAM RIGHT  12/11/2018     PHACOEMULSIFICATION WITH STANDARD INTRAOCULAR LENS IMPLANT Left 4/2/2015    Procedure: PHACOEMULSIFICATION WITH STANDARD INTRAOCULAR LENS IMPLANT;  Surgeon: Joseph Hernandez MD;  Location: [facility]     PHACOEMULSIFICATION WITH STANDARD INTRAOCULAR LENS IMPLANT Right 5/4/2015    Procedure: PHACOEMULSIFICATION WITH STANDARD INTRAOCULAR LENS IMPLANT;  Surgeon: Joseph Hernandez MD;  Location: [facility]     REPAIR HAMMER TOE Left 4/2/2019    Procedure: 5th Metatarsal Head Resection;  Surgeon: Ethan Montesinos DPM;  Location: [facility]         Family History   Problem Relation Age of Onset     Cancer Mother         intestinal CA     Diabetes Father      Diabetes Brother      Other Cancer Son         meagan tumor     Other Cancer Brother         pancreatic cancer     Diabetes Son      Melanoma No family hx of        Social History     Socioeconomic History     Marital status:      Spouse name: Not on file     Number of children: Not on file     Years of education: Not on file     Highest education level: Not on file   Occupational History     Not on file   Tobacco Use     Smoking status: Never     Smokeless tobacco: Never   Vaping Use     Vaping status: Never Used   Substance and Sexual Activity     Alcohol use: Yes     Alcohol/week: 1.7 standard drinks of alcohol     Comment: 1 x month     Drug use: No     Sexual activity: Not Currently     Partners: Female   Other Topics Concern     Parent/sibling w/ CABG, MI or angioplasty before 65F 55M? No   Social History Narrative     Not on file     Social Determinants of Health     Financial Resource Strain: Not on file   Food Insecurity: Not on file   Transportation Needs: Not on file   Physical Activity: Not on file   Stress: Not on file   Social Connections: Not on file   Intimate Partner Violence: Not on file   Housing Stability: Not on file       Outpatient Encounter Medications as of 5/24/2023   Medication Sig Dispense Refill     amLODIPine (NORVASC) 5 MG tablet Take 1 tablet (5 mg) by mouth daily 90 tablet 3     aspirin (ASA) 81 MG EC tablet Take 1 tablet (81 mg) by mouth daily       B-D U/F 31G X 8 MM insulin pen needle AS DIRECTED daily. Hold on file until needed. 100 each 3     blood glucose (ONETOUCH ULTRA) test strip 1 strip by In Vitro route daily One touch Ultra. 100 strip 3     Blood Glucose Monitoring Suppl (ONE TOUCH ULTRA SYSTEM KIT) W/DEVICE KIT        cephALEXin (KEFLEX) 500 MG capsule Take 1 capsule (500 mg) by mouth 4 times daily for 10 days 40 capsule 0     clopidogrel (PLAVIX) 75  MG tablet Take 1 tablet (75 mg) by mouth daily 90 tablet 3     dorzolamide-timolol (COSOPT) 2-0.5 % ophthalmic solution        hydrochlorothiazide (HYDRODIURIL) 25 MG tablet Take 1 tablet (25 mg) by mouth daily 90 tablet 3     insulin glargine (LANTUS SOLOSTAR) 100 UNIT/ML pen Inject 25 Units Subcutaneous At Bedtime Hold on file until needed. 30 mL 3     Lancets (ONETOUCH DELICA PLUS [id number]) MISC 1 each by In Vitro route See Admin Instructions Hold on file until needed. 100 each 1     lisinopril (ZESTRIL) 40 MG tablet Take 1 tablet (40 mg) by mouth daily 90 tablet 3     metFORMIN (GLUCOPHAGE) 500 MG tablet Take 2 tablets (1,000 mg) by mouth 2 times daily (with meals) 360 tablet 3     Multiple Vitamins-Minerals (PRESERVISION AREDS PO) Take 1 tablet by mouth       ORDER FOR DME Equipment being ordered:   Glucometer 1 Device 1     pantoprazole (PROTONIX) 20 MG EC tablet Take 1 tablet (20 mg) by mouth daily 90 tablet 3     rosuvastatin (CRESTOR) 10 MG tablet Take 1 tablet (10 mg) by mouth At Bedtime 90 tablet 3     sulfamethoxazole-trimethoprim (BACTRIM DS) 800-160 MG tablet Take 1 tablet by mouth 2 times daily for 10 days 20 tablet 0     sulfamethoxazole-trimethoprim (BACTRIM DS) 800-160 MG tablet Take 1 tablet by mouth 2 times daily for 10 days 20 tablet 0     triamcinolone (KENALOG) 0.1 % external cream Apply topically 2 times daily To dry skin patches as needed.  Hold on file until needed. 80 g 1     No facility-administered encounter medications on file as of 5/24/2023.             O:   NAD, WDWN, Alert & Oriented, Mood & Affect wnl, Vitals stable   Here today alone   General appearance normal   Vitals stable   Alert, oriented and in no acute distress     L helix ill-defined 1cm tender bleeding scaly papule    Stuck on papules and brown macules on trunk and ext   Red papules on trunk  Flesh colored papules on trunk         Eyes: Conjunctivae/lids:Normal     ENT: Lips, buccal mucosa, tongue:  normal    MSK:Normal    Cardiovascular: peripheral edema none    Pulm: Breathing Normal    Lymph Nodes: No Head and Neck Lymphadenopathy     Neuro/Psych: Orientation:Alert and Orientedx3 ; Mood/Affect:normal       MICRO:   L helix:There is hyperkeratosis & parakeratosis of the epidermis, with full thickness epidermal involvement by atypical keratinocytes with rare pale vacuolated cells.  Unremarkable dermis.    A/P:  1. Seborrheic keratosis, lentigo, angioma, dermal nevus, hx of non-melanoma skin cancer   2. L helix r/o squamous cell carcinoma   TANGENTIAL BIOPSY IN HOUSE:  After consent, anesthesia with LEC and prep, tangential excision performed and dx above confirmed with frozen section histology.  No complications and routine wound care.  Patient is on  anticoagulants and risk of bleeding discussed with patient.       I have personally reviewed all specimens and/or slides and used them with my medical judgement to determine or confirm the final diagnosis.     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    It was a pleasure speaking to Fawad Garcia today.  Previous clinic notes and pertinent laboratory tests were reviewed prior to Fawad Garcia's visit.  Signs and Symptoms of skin cancer discussed with patient.  Patient encouraged to perform monthly skin exams.  UV precautions reviewed with patient.  Risks of non-melanoma skin cancer discussed with patient   Return to clinic next appt

## 2023-05-24 NOTE — LETTER
5/24/2023         RE: Fawad Garcia  [address]  [facility] [address]        Dear Colleague,    Thank you for referring your patient, Fawad Garcia, to the [facility]. Please see a copy of my visit note below.    Fawad Garcia is an extremely pleasant 87 year old year old male patient here today for hx of non-melanoma skin cancer.  HE notes tender spot on left ear.   .   Patient states this has been present for a while.  Patient reports the following symptoms:  painful.  Patient reports the following previous treatments none.  These treatments did not work.  Patient reports the following modifying factors none.  Associated symptoms: none.  Patient has no other skin complaints today.  Remainder of the HPI, Meds, PMH, Allergies, FH, and SH was reviewed in chart.      Past Medical History:   Diagnosis Date     Diabetes mellitus (H)      Hypertension      Squamous cell carcinoma        Past Surgical History:   Procedure Laterality Date     AMPUTATE TOE(S) Right 7/23/2019    Procedure: Partial amputation great toe;  Surgeon: Ethan Montesinos DPM;  Location: [facility]     AMPUTATE TOE(S) Left 12/24/2019    Procedure: Amputation of the great toe, left foot;  Surgeon: Ethan Montesinos DPM;  Location: [facility]     AMPUTATE TOE(S) Right 3/3/2020    Procedure: Partial amputation, second toe, right foot;  Surgeon: Ethan Montesinos DPM;  Location: [facility]     [facility] SURGICAL PATHOLOGY  2002    prostate biopsy elevated PSA     COLONOSCOPY       HC REMOVE TONSILS/ADENOIDS,<11 Y/O      T & A     IR LOWER EXTREMITY ANGIOGRAM LEFT  2/26/2020     IR LOWER EXTREMITY ANGIOGRAM RIGHT  12/11/2018     PHACOEMULSIFICATION WITH STANDARD INTRAOCULAR LENS IMPLANT Left 4/2/2015    Procedure: PHACOEMULSIFICATION WITH STANDARD INTRAOCULAR LENS IMPLANT;  Surgeon: Joseph Hernandez MD;  Location: [facility]     PHACOEMULSIFICATION WITH STANDARD INTRAOCULAR LENS IMPLANT Right 5/4/2015     Procedure: PHACOEMULSIFICATION WITH STANDARD INTRAOCULAR LENS IMPLANT;  Surgeon: Joseph Hernandez MD;  Location: [facility]     REPAIR HAMMER TOE Left 4/2/2019    Procedure: 5th Metatarsal Head Resection;  Surgeon: Ethan Montesinos DPM;  Location: [facility]        Family History   Problem Relation Age of Onset     Cancer Mother         intestinal CA     Diabetes Father      Diabetes Brother      Other Cancer Son         meagna tumor     Other Cancer Brother         pancreatic cancer     Diabetes Son      Melanoma No family hx of        Social History     Socioeconomic History     Marital status:      Spouse name: Not on file     Number of children: Not on file     Years of education: Not on file     Highest education level: Not on file   Occupational History     Not on file   Tobacco Use     Smoking status: Never     Smokeless tobacco: Never   Vaping Use     Vaping status: Never Used   Substance and Sexual Activity     Alcohol use: Yes     Alcohol/week: 1.7 standard drinks of alcohol     Comment: 1 x month     Drug use: No     Sexual activity: Not Currently     Partners: Female   Other Topics Concern     Parent/sibling w/ CABG, MI or angioplasty before 65F 55M? No   Social History Narrative     Not on file     Social Determinants of Health     Financial Resource Strain: Not on file   Food Insecurity: Not on file   Transportation Needs: Not on file   Physical Activity: Not on file   Stress: Not on file   Social Connections: Not on file   Intimate Partner Violence: Not on file   Housing Stability: Not on file       Outpatient Encounter Medications as of 5/24/2023   Medication Sig Dispense Refill     amLODIPine (NORVASC) 5 MG tablet Take 1 tablet (5 mg) by mouth daily 90 tablet 3     aspirin (ASA) 81 MG EC tablet Take 1 tablet (81 mg) by mouth daily       B-D U/F 31G X 8 MM insulin pen needle AS DIRECTED daily. Hold on file until needed. 100 each 3     blood glucose (ONETOUCH ULTRA) test strip 1 strip by In Vitro  route daily One touch Ultra. 100 strip 3     Blood Glucose Monitoring Suppl (ONE TOUCH ULTRA SYSTEM KIT) W/DEVICE KIT        cephALEXin (KEFLEX) 500 MG capsule Take 1 capsule (500 mg) by mouth 4 times daily for 10 days 40 capsule 0     clopidogrel (PLAVIX) 75 MG tablet Take 1 tablet (75 mg) by mouth daily 90 tablet 3     dorzolamide-timolol (COSOPT) 2-0.5 % ophthalmic solution        hydrochlorothiazide (HYDRODIURIL) 25 MG tablet Take 1 tablet (25 mg) by mouth daily 90 tablet 3     insulin glargine (LANTUS SOLOSTAR) 100 UNIT/ML pen Inject 25 Units Subcutaneous At Bedtime Hold on file until needed. 30 mL 3     Lancets (ONETOUCH DELICA PLUS [id number]) MISC 1 each by In Vitro route See Admin Instructions Hold on file until needed. 100 each 1     lisinopril (ZESTRIL) 40 MG tablet Take 1 tablet (40 mg) by mouth daily 90 tablet 3     metFORMIN (GLUCOPHAGE) 500 MG tablet Take 2 tablets (1,000 mg) by mouth 2 times daily (with meals) 360 tablet 3     Multiple Vitamins-Minerals (PRESERVISION AREDS PO) Take 1 tablet by mouth       ORDER FOR DME Equipment being ordered:   Glucometer 1 Device 1     pantoprazole (PROTONIX) 20 MG EC tablet Take 1 tablet (20 mg) by mouth daily 90 tablet 3     rosuvastatin (CRESTOR) 10 MG tablet Take 1 tablet (10 mg) by mouth At Bedtime 90 tablet 3     sulfamethoxazole-trimethoprim (BACTRIM DS) 800-160 MG tablet Take 1 tablet by mouth 2 times daily for 10 days 20 tablet 0     sulfamethoxazole-trimethoprim (BACTRIM DS) 800-160 MG tablet Take 1 tablet by mouth 2 times daily for 10 days 20 tablet 0     triamcinolone (KENALOG) 0.1 % external cream Apply topically 2 times daily To dry skin patches as needed.  Hold on file until needed. 80 g 1     No facility-administered encounter medications on file as of 5/24/2023.             O:   NAD, WDWN, Alert & Oriented, Mood & Affect wnl, Vitals stable   Here today alone   General appearance normal   Vitals stable   Alert, oriented and in no acute distress     L  helix ill-defined 1cm tender bleeding scaly papule    Stuck on papules and brown macules on trunk and ext   Red papules on trunk  Flesh colored papules on trunk         Eyes: Conjunctivae/lids:Normal     ENT: Lips, buccal mucosa, tongue: normal    MSK:Normal    Cardiovascular: peripheral edema none    Pulm: Breathing Normal    Lymph Nodes: No Head and Neck Lymphadenopathy     Neuro/Psych: Orientation:Alert and Orientedx3 ; Mood/Affect:normal       MICRO:   L helix:There is hyperkeratosis & parakeratosis of the epidermis, with full thickness epidermal involvement by atypical keratinocytes with rare pale vacuolated cells.  Unremarkable dermis.    A/P:  1. Seborrheic keratosis, lentigo, angioma, dermal nevus, hx of non-melanoma skin cancer   2. L helix r/o squamous cell carcinoma   TANGENTIAL BIOPSY IN HOUSE:  After consent, anesthesia with LEC and prep, tangential excision performed and dx above confirmed with frozen section histology.  No complications and routine wound care.  Patient is on  anticoagulants and risk of bleeding discussed with patient.       I have personally reviewed all specimens and/or slides and used them with my medical judgement to determine or confirm the final diagnosis.     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    It was a pleasure speaking to Fawad Garcia today.  Previous clinic notes and pertinent laboratory tests were reviewed prior to Fawad Garcia's visit.  Signs and Symptoms of skin cancer discussed with patient.  Patient encouraged to perform monthly skin exams.  UV precautions reviewed with patient.  Risks of non-melanoma skin cancer discussed with patient   Return to clinic next appt        Again, thank you for allowing me to participate in the care of your patient.        Sincerely,        Evgeny Torres MD

## 2023-05-24 NOTE — TELEPHONE ENCOUNTER
Pt referred to [facility] by Ethan Montesinos, PM for PAD with left ankle wound.    5/22/23 LLE arterial and RENATE w/o exercise in Epic.    2/26/20 - left popliteal artery stenting with Dr. Cali.  LOV with Dr. Cali (virtual) on 1/11/21.    Pt needs to be scheduled for RETURN PAD virtual visit with Dr. Cali.  Will route to scheduling to coordinate an appointment within 1-2 weeks.    Appt note: Ref by Dr. Ethan Montesinos for PAD with left ankle wound; history of left popliteal artery stenting in 2020; LOV 1/11/21; RENATE and LLE arterial US in Epic.    GUADALUPE NevilleN, RN-[facility] [place]

## 2023-05-24 NOTE — TELEPHONE ENCOUNTER
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

## 2023-05-24 NOTE — TELEPHONE ENCOUNTER
Patient and his wife were transferred to writer. Results were given. Patient denied having any questions. Patient was scheduled with Dr. Torres.   Lacie Frederick LPN

## 2023-06-01 ENCOUNTER — HOSPITAL ENCOUNTER (OUTPATIENT)
Dept: WOUND CARE | Facility: CLINIC | Age: 88
Discharge: HOME | End: 2023-06-01
Attending: FAMILY MEDICINE | Admitting: FAMILY MEDICINE
Payer: COMMERCIAL

## 2023-06-01 PROCEDURE — G0463 HOSPITAL OUTPT CLINIC VISIT: HCPCS

## 2023-06-01 NOTE — PROGRESS NOTES
[facility]    Start of Care in [facility]: 5/23/2023   Referring Provider: Dr. Montesinos  Primary Care Provider: Chris Scott   Wound Location: left ankle     Wound Clinic Visit:   Follow-up    Reason for Visit:       left ankle wound    Subjective:     Pt denies concerns      Wound History:   Pt initially seen in the ED on 5/14/23 for ulceration, prescribed keflex and bactrim. Had arterial ultrasounds yesterday due to PAD history including distal popliteal stenting of left leg in February of '20.       Past Medical History:  Patient Active Problem List   Diagnosis     Obesity     Transient cerebral ischemia     Hyperlipidemia LDL goal <70     GERD (gastroesophageal reflux disease)     Peripheral vascular disease (H)     Advanced directives, counseling/discussion     Malignant neoplasm of prostate (H)     Hypertension, goal below 140/90     Senile nuclear sclerosis     Type 2 diabetes mellitus with both eyes affected by mild nonproliferative retinopathy without macular edema, with long-term current use of insulin (H)     Macular degeneration (senile) of retina     H/O amputation of lesser toe, right (H)     TIA (transient ischemic attack)     Chronic kidney disease, stage 3 (H)     Dermatitis     Skin lesion     Tremor of left hand     Left leg weakness                     Tobacco Use:     Tobacco Use      Smoking status: Never      Smokeless tobacco: Never    Vaping Use      Vaping status: Never Used       Diabetic: yes  HgbA1C:   Hemoglobin A1C   Date Value Ref Range Status   01/27/2023 9.6 (H) 0.0 - 5.6 % Final     Comment:     Normal <5.7%   Prediabetes 5.7-6.4%    Diabetes 6.5% or higher     Note: Adopted from ADA consensus guidelines.   06/21/2021 8.4 (H) 0 - 5.6 % Final     Comment:     Normal <5.7% Prediabetes 5.7-6.4%  Diabetes 6.5% or higher - adopted from ADA   consensus guidelines.       Checks Blood Glucose?:  Average Readings:       Personal/social history:  Lives  with spouse who does help with bandaging. Retired pharmacist. Sons are supportive, one is present today    Objective:   Current treatment plan: Jumpstart dressing and gauze  Last changed: 2 days ago    Wound #1 Left ankle, medial      1st photo from ED visit on 5/14/23    Stage/tissue depth: full thickness  3.5 cm L x 3 cm W x 0.1 cm D  Tunneling: no  Undermining: no  Wound bed type/amount: 85% leathery brown eschar, 15 % yellow/white; non fluctuant  Wound Edges: attached  Periwound: mild pinkness, 1-1.5cm not warm to touch and resolves with elevation  Drainage: difficult to determine what is drainage versus Medihoney, Mepore pro pad is saturated with mix of Medihoney and serosanguinous, suspect moderate drainage  Odor: no  Pain: denies    Dorsalis Pedal Pulse: palpable: no, edema in area   Posterior Tibial Pulse: palpable: no, edema in area   Hair growth: sparse upper lower leg  Capillary Refill: 2 seconds  Feet/toes color: pink, warm  R Leg: Edema trace to 1+  L Leg: Edema 1-2+ lower leg to ankle, trace in foot.  RENATE 5/22/23:   FINDINGS:  Right RENATE:   DP: 0.49  PT: 0.55.     Left RENATE:   DP: 0.95   PT: 0.69.     Right Digital brachial index: 0.43, 62 mmHg.  Left Digital Brachial index: 0.55, 79 mmHg     Waveforms: Monophasic in the distal tibial arteries    US LOWER EXTREMITY ARTERIAL DUPLEX LEFT  5/22/2023 2:00 PM      HISTORY:  Peripheral vascular disease. Left lower extremity ulcer.     COMPARISON: None     FINDINGS: Color Doppler and spectral waveform analysis performed.     The following peak systolic velocities are measured in cm/s.      LEFT     CFA: 111  PFA: 50  SFA, proximal: 89  SFA, mid: 69  SFA, distal: 84  Popliteal: 61  Anterior tibial artery proximal: 149  Posterior tibial artery: 26  Peroneal artery mid: 80     Waveforms: Multiphasic waveforms are identified in the visualized  arteries.                                                                      IMPRESSION: Per sonographic velocity  criteria, no definite significant  stenosis identified.                                                                      IMPRESSION: Moderate arterial insufficiency in the right lower  extremity. RENATE in the left lower extremity is in the range of normal.  Digital brachial indices would indicate adequate wound healing  potential.       Mobility: independant  Current offloading/footwear: diabetic shoes and inserts appear to fit appropriately without rubbing area  Sensation: some neuropathy of feet but can feel light touch in area    Other callusing/areas of concern: area of small scattered scabbing posterior calf, possibly from scratching    Diet:       Assessment:  Left ankle full thickness necrotic ulceration of uncertain etiology, location suspicious for venous ulceration given presence of lower leg edema    Ulceration has become more leathery/thick appearing and is not debriding well with present plan, edema is improved since initiation of tubular support    History of PAD though per arterial studies no concerns identified is left lower leg, does have stent in distal popliteal area.     Factors impacting wound healing:     Delayed healing as part of normal aging process  Reduced Blood Supply: inadequate perfusion to heal wound  Obesity: decreases tissue perfusion  Underlying Disease: diabetes mellitis, poor control      Plan:  Discontinue Medihoney as is not debriding well with this. Will need sharp debridement, writer did contact Dr. Montesinos to discuss. At this time plan is to wait until pt is assessed by vascular on the 19th, then debride if no vascular concerns identified. For now, will use Iodosorb gel for absorption and prevention of infection, cover with Mepore pro.     Continue conservative compression using spandagrip. Pt brought a few of his compression stockings with today, these are lower compression - 15-20 at the most. He may also use these.     Expect he may need multilayer compression bandaging  though would like him to follow-up with vascular first given history and location of stent, and would wait until after debridement to initiate it.    Again discussed elevation and activity for edema control.       Discussed/Education provided: plan of care with rationale      Topical care: Wound/surrounding skin cleansed with Vashe and gauze. Patted dry. bandaged per plan of care    Additional recommendations: improved blood glucose control, increased dietary protein    The following discharge instructions were reviewed with and sent home with the patient:  See AVS    Follow up: 6/12/23    Please call with any questions or concerns.    The following supplies were sent home with the patient:  Iodosorb and several Mepore pro bandages    Verbal, written, & demonstrative education provided.  Face to face time: approximately 40 minutes  Procedure: none    Kamille Calzada RN, CWOCN

## 2023-06-01 NOTE — DISCHARGE INSTRUCTIONS
Visit Summary/General Recommendations  The dead layer of tissue on your wound seem to be thicker and dryer now. Ultimately this may need to be debrided (cut away) for healing to take place but Dr. Montesinos may not want to do this until you see Dr. Cali. I anticipate needing to do a multilayer compression wrap in clinic a couple of times per week, but need the ok from vascular to do this.    For now, we are discontinuing the medihoney and beginning to use Iodosorb gel, this will help to prevent infection and may dry the area as it absorps drainage. If the area becomes dark and dried out this is like your body's biological bandage and is ok as long as there is not pus coming from under the area nor increased redness, pain and swelling around the area.            Bandage Change  Wash your hands before and after the dressing change. You may wear gloves if you choose. Gloves are available over the counter at [facility], [facility], [facility], etc.   Use scissors at home that you can designate solely for wound care and cleanse well with each use, (rubbing alcohol or alcohol pads work well for this).    Wash hands.  Remove old dressing.  Wash hands.  1.) Cleanse wound and surrounding skin with wound cleanser and wipe with gauze.    2.) Protect surrounding skin with layer of barrier cream around the wound   3.) Apply Iodosorb gel to the moist area around the central dry area of the wound   4.) Cover with Mepore pro or similar bandage  Change dressing daily for now        Signs and symptoms of infection:  Bright green drainage  Foul odor  Increasing pain in area  New redness or swelling at the site of the wound or streaks up from wound  New warmth to touch around wound accompanied by redness and swelling  Fever    Need to be seen as soon as possible for infection concerns.    Follow up: Kamille Calzada RN, CWOCN     Please call with any questions or concerns.    Kamille Calzada RN, CWOCN   [phone]

## 2023-06-05 ENCOUNTER — OFFICE VISIT (OUTPATIENT)
Dept: OTOLARYNGOLOGY | Facility: CLINIC | Age: 88
End: 2023-06-05
Payer: COMMERCIAL

## 2023-06-05 VITALS
HEART RATE: 60 BPM | DIASTOLIC BLOOD PRESSURE: 72 MMHG | HEIGHT: 70 IN | WEIGHT: 226 LBS | TEMPERATURE: 97.6 F | SYSTOLIC BLOOD PRESSURE: 118 MMHG | BODY MASS INDEX: 32.35 KG/M2

## 2023-06-05 DIAGNOSIS — H90.3 SENSORINEURAL HEARING LOSS, BILATERAL: Primary | ICD-10-CM

## 2023-06-05 DIAGNOSIS — H90.3 ASYMMETRICAL SENSORINEURAL HEARING LOSS: ICD-10-CM

## 2023-06-05 PROCEDURE — 99203 OFFICE O/P NEW LOW 30 MIN: CPT | Performed by: OTOLARYNGOLOGY

## 2023-06-05 RX ORDER — INSULIN GLARGINE-YFGN 100 [IU]/ML
INJECTION, SOLUTION SUBCUTANEOUS
COMMUNITY
Start: 2023-05-18 | End: 2023-07-05

## 2023-06-05 NOTE — NURSING NOTE
"Initial /72 (BP Location: Right arm, Patient Position: Sitting, Cuff Size: Adult Regular)   Pulse 60   Temp 97.6  F (36.4  C) (Tympanic)   Ht 1.778 m (5' 10\")   Wt 102.5 kg (226 lb)   BMI 32.43 kg/m   Estimated body mass index is 32.43 kg/m  as calculated from the following:    Height as of this encounter: 1.778 m (5' 10\").    Weight as of this encounter: 102.5 kg (226 lb). .    Oanh Villasenor CMA    "

## 2023-06-05 NOTE — PROGRESS NOTES
Chief Complaint   Patient presents with     Ent Problem     Consult after Audio done in February      History of Present Illness   Fawad Garcia is a 87 year old male who presents to me today for ear evaluation.  The patient reports decreased hearing for the last several years.  It was gradual in onset.  The patient has noticed increased difficulty hearing certain sounds and difficulty in understanding others, especially in noisy or crowded situations.  When he is one-on-one or in smaller groups or if the voices are lower, he does not have as much problem hearing.  He has some over-the-counter amplifier that he has been using with some benefit.  There is no history of recent head trauma, or chronic ear disease or ear surgery.  The patient does report both recreational and  noise exposure history.  [facility] not [facility]. No family history of hearing loss at a young age. The patient denies vertigo, otorrhea, otalgia.  He denies any significant tinnitus.    The patient underwent an audiogram performed 2/10/2023.  My review of the audiogram showed normal hearing to 1000 Hz sloping to mild sloping to moderate sloping to moderately severe sensorineural hearing loss in the right ear and normal hearing to 1000 Hz sloping to mild sloping to moderate sensorineural hearing loss in the left ear.  Pure-tone average was 27 dB on the right and 17 dB on the left.  Speech reception threshold was 20 dB on the right and 15 dB on the left.  The patient had 64% word recognition on the right and 100% word recognition on the left.  The patient had a type A tympanogram on the right and a type A tympanogram on the left.    The patient underwent an MRI of the brain on 7/12/2021.  My review of the MRI shows a normal inner ear and brainstem without any signs of retrocochlear pathology.    Past Medical History  Patient Active Problem List   Diagnosis     Obesity     Transient cerebral ischemia      Hyperlipidemia LDL goal <70     GERD (gastroesophageal reflux disease)     Peripheral vascular disease (H)     Advanced directives, counseling/discussion     Malignant neoplasm of prostate (H)     Hypertension, goal below 140/90     Senile nuclear sclerosis     Type 2 diabetes mellitus with both eyes affected by mild nonproliferative retinopathy without macular edema, with long-term current use of insulin (H)     Macular degeneration (senile) of retina     H/O amputation of lesser toe, right (H)     TIA (transient ischemic attack)     Chronic kidney disease, stage 3 (H)     Dermatitis     Skin lesion     Tremor of left hand     Left leg weakness     Current Medications     Current Outpatient Medications:      amLODIPine (NORVASC) 5 MG tablet, Take 1 tablet (5 mg) by mouth daily, Disp: 90 tablet, Rfl: 3     aspirin (ASA) 81 MG EC tablet, Take 1 tablet (81 mg) by mouth daily, Disp: , Rfl:      B-D U/F 31G X 8 MM insulin pen needle, AS DIRECTED daily. Hold on file until needed., Disp: 100 each, Rfl: 3     blood glucose (ONETOUCH ULTRA) test strip, 1 strip by In Vitro route daily One touch Ultra., Disp: 100 strip, Rfl: 3     Blood Glucose Monitoring Suppl (ONE TOUCH ULTRA SYSTEM KIT) W/DEVICE KIT, , Disp: , Rfl:      clopidogrel (PLAVIX) 75 MG tablet, Take 1 tablet (75 mg) by mouth daily, Disp: 90 tablet, Rfl: 3     dorzolamide-timolol (COSOPT) 2-0.5 % ophthalmic solution, , Disp: , Rfl:      hydrochlorothiazide (HYDRODIURIL) 25 MG tablet, Take 1 tablet (25 mg) by mouth daily, Disp: 90 tablet, Rfl: 3     insulin glargine (LANTUS SOLOSTAR) 100 UNIT/ML pen, Inject 25 Units Subcutaneous At Bedtime Hold on file until needed., Disp: 30 mL, Rfl: 3     Insulin Glargine-yfgn 100 UNIT/ML SOPN, Inject 25 Units Subcutaneous At Bedtime Hold on file until needed., Disp: , Rfl:      Lancets (ONETOUCH DELICA PLUS PFMIOI32I) MISC, 1 each by In Vitro route See Admin Instructions Hold on file until needed., Disp: 100 each, Rfl: 1      "lisinopril (ZESTRIL) 40 MG tablet, Take 1 tablet (40 mg) by mouth daily, Disp: 90 tablet, Rfl: 3     metFORMIN (GLUCOPHAGE) 500 MG tablet, Take 2 tablets (1,000 mg) by mouth 2 times daily (with meals), Disp: 360 tablet, Rfl: 3     Multiple Vitamins-Minerals (PRESERVISION AREDS PO), Take 1 tablet by mouth, Disp: , Rfl:      ORDER FOR DME, Equipment being ordered:  Glucometer, Disp: 1 Device, Rfl: 1     pantoprazole (PROTONIX) 20 MG EC tablet, Take 1 tablet (20 mg) by mouth daily, Disp: 90 tablet, Rfl: 3     rosuvastatin (CRESTOR) 10 MG tablet, Take 1 tablet (10 mg) by mouth At Bedtime, Disp: 90 tablet, Rfl: 3     triamcinolone (KENALOG) 0.1 % external cream, Apply topically 2 times daily To dry skin patches as needed.  Hold on file until needed., Disp: 80 g, Rfl: 1    Allergies  Allergies   Allergen Reactions     Zocor [Simvastatin - High Dose]      Bilateral hip aching       Social History   Social History     Socioeconomic History     Marital status:    Tobacco Use     Smoking status: Never     Smokeless tobacco: Never   Vaping Use     Vaping status: Never Used   Substance and Sexual Activity     Alcohol use: Yes     Alcohol/week: 1.7 standard drinks of alcohol     Comment: 1 x month     Drug use: No     Sexual activity: Not Currently     Partners: Female   Other Topics Concern     Parent/sibling w/ CABG, MI or angioplasty before 65F 55M? No       Family History  Family History   Problem Relation Age of Onset     Cancer Mother         intestinal CA     Diabetes Father      Diabetes Brother      Other Cancer Son         meagan tumor     Other Cancer Brother         pancreatic cancer     Diabetes Son      Melanoma No family hx of        Review of Systems  As per HPI and PMHx, otherwise 10+ comprehensive system review is negative.    Physical Exam  /72 (BP Location: Right arm, Patient Position: Sitting, Cuff Size: Adult Regular)   Pulse 60   Temp 97.6  F (36.4  C) (Tympanic)   Ht 1.778 m (5' 10\")   Wt " 102.5 kg (226 lb)   BMI 32.43 kg/m    GENERAL: Patient is a pleasant, cooperative 87 year old male in no acute distress.  HEAD: Normocephalic, atraumatic.  Hair and scalp are normal.  EYES: Pupils are equal, round, reactive to light and accommodation.  Extraocular movements are intact.  The sclera nonicteric without injection.  The extraocular structures are normal.  EARS: Normal shape and symmetry.  No tenderness when palpating the mastoid or tragal areas bilaterally.  Otoscopic exam reveals a minimal amount of cerumen bilaterally.  The bilateral tympanic membranes are round, intact without evidence of effusion, good landmarks.  No retraction, granulation, or drainage.  NOSE: Nares are patent.  Nasal mucosa is pink and moist.  Negative anterior rhinoscopy.  NEUROLOGIC: Cranial nerves II through XII are grossly intact.  Voice is strong.  Patient is House-Brackmann I/VI bilaterally.  CARDIOVASCULAR: Extremities are warm and well-perfused.  No significant peripheral edema.  RESPIRATORY: Patient has nonlabored breathing without cough, wheeze, stridor.  PSYCHIATRIC: Patient is alert and oriented.  Mood and affect appear normal.  SKIN: Warm and dry.  No scalp, face, or neck lesions noted.    Assessment and Plan     ICD-10-CM    1. Sensorineural hearing loss, bilateral  H90.3       2. Asymmetrical sensorineural hearing loss  H90.3         It was my pleasure seeing Fawad Garcia today in clinic.  The patient presents today with bilateral sensorineural hearing loss with asymmetry on the right.  This is most consistent with presbycusis.  He has had relatively recent MRI imaging that was normal.  I can find no evidence of serious CNS disorders or other complicating factors that could be causing this.  We spent the remainder of today's visit on education. We discussed hearing protection in noisy environments.    The patient is medically cleared for consideration of a hearing aid evaluation.    The patient will follow up  as necessary for worsening symptoms or changes in symptoms. I have also recommended repeat audiogram in 1-3 years, or sooner if symptoms warrant.      Jus Wells MD  Department of Otolaryngology-Head and Neck Surgery  [facility]

## 2023-06-05 NOTE — PATIENT INSTRUCTIONS
Per physician instructions      If you have questions or concerns on any instructions given to you by your provider today or if you need to schedule an appointment, you can reach us at [phone].

## 2023-06-05 NOTE — LETTER
6/5/2023         RE: Fawad Garcia  [address] [facility] [address]        Dear Colleague,    Thank you for referring your patient, Fawad Garcia, to the [facility]. Please see a copy of my visit note below.    Chief Complaint   Patient presents with     Ent Problem     Consult after Audio done in February      History of Present Illness   Fawad Garcia is a 87 year old male who presents to me today for ear evaluation.  The patient reports decreased hearing for the last several years.  It was gradual in onset.  The patient has noticed increased difficulty hearing certain sounds and difficulty in understanding others, especially in noisy or crowded situations.  When he is one-on-one or in smaller groups or if the voices are lower, he does not have as much problem hearing.  He has some over-the-counter amplifier that he has been using with some benefit.  There is no history of recent head trauma, or chronic ear disease or ear surgery.  The patient does report both recreational and  noise exposure history.  [facility] not [facility]. No family history of hearing loss at a young age. The patient denies vertigo, otorrhea, otalgia.  He denies any significant tinnitus.    The patient underwent an audiogram performed 2/10/2023.  My review of the audiogram showed normal hearing to 1000 Hz sloping to mild sloping to moderate sloping to moderately severe sensorineural hearing loss in the right ear and normal hearing to 1000 Hz sloping to mild sloping to moderate sensorineural hearing loss in the left ear.  Pure-tone average was 27 dB on the right and 17 dB on the left.  Speech reception threshold was 20 dB on the right and 15 dB on the left.  The patient had 64% word recognition on the right and 100% word recognition on the left.  The patient had a type A tympanogram on the right and a type A tympanogram on the left.    The patient  underwent an MRI of the brain on 7/12/2021.  My review of the MRI shows a normal inner ear and brainstem without any signs of retrocochlear pathology.    Past Medical History  Patient Active Problem List   Diagnosis     Obesity     Transient cerebral ischemia     Hyperlipidemia LDL goal <70     GERD (gastroesophageal reflux disease)     Peripheral vascular disease (H)     Advanced directives, counseling/discussion     Malignant neoplasm of prostate (H)     Hypertension, goal below 140/90     Senile nuclear sclerosis     Type 2 diabetes mellitus with both eyes affected by mild nonproliferative retinopathy without macular edema, with long-term current use of insulin (H)     Macular degeneration (senile) of retina     H/O amputation of lesser toe, right (H)     TIA (transient ischemic attack)     Chronic kidney disease, stage 3 (H)     Dermatitis     Skin lesion     Tremor of left hand     Left leg weakness     Current Medications     Current Outpatient Medications:      amLODIPine (NORVASC) 5 MG tablet, Take 1 tablet (5 mg) by mouth daily, Disp: 90 tablet, Rfl: 3     aspirin (ASA) 81 MG EC tablet, Take 1 tablet (81 mg) by mouth daily, Disp: , Rfl:      B-D U/F 31G X 8 MM insulin pen needle, AS DIRECTED daily. Hold on file until needed., Disp: 100 each, Rfl: 3     blood glucose (ONETOUCH ULTRA) test strip, 1 strip by In Vitro route daily One touch Ultra., Disp: 100 strip, Rfl: 3     Blood Glucose Monitoring Suppl (ONE TOUCH ULTRA SYSTEM KIT) W/DEVICE KIT, , Disp: , Rfl:      clopidogrel (PLAVIX) 75 MG tablet, Take 1 tablet (75 mg) by mouth daily, Disp: 90 tablet, Rfl: 3     dorzolamide-timolol (COSOPT) 2-0.5 % ophthalmic solution, , Disp: , Rfl:      hydrochlorothiazide (HYDRODIURIL) 25 MG tablet, Take 1 tablet (25 mg) by mouth daily, Disp: 90 tablet, Rfl: 3     insulin glargine (LANTUS SOLOSTAR) 100 UNIT/ML pen, Inject 25 Units Subcutaneous At Bedtime Hold on file until needed., Disp: 30 mL, Rfl: 3     Insulin  Glargine-yfgn 100 UNIT/ML SOPN, Inject 25 Units Subcutaneous At Bedtime Hold on file until needed., Disp: , Rfl:      Lancets (ONETOUCH DELICA PLUS [id number]) MISC, 1 each by In Vitro route See Admin Instructions Hold on file until needed., Disp: 100 each, Rfl: 1     lisinopril (ZESTRIL) 40 MG tablet, Take 1 tablet (40 mg) by mouth daily, Disp: 90 tablet, Rfl: 3     metFORMIN (GLUCOPHAGE) 500 MG tablet, Take 2 tablets (1,000 mg) by mouth 2 times daily (with meals), Disp: 360 tablet, Rfl: 3     Multiple Vitamins-Minerals (PRESERVISION AREDS PO), Take 1 tablet by mouth, Disp: , Rfl:      ORDER FOR DME, Equipment being ordered:  Glucometer, Disp: 1 Device, Rfl: 1     pantoprazole (PROTONIX) 20 MG EC tablet, Take 1 tablet (20 mg) by mouth daily, Disp: 90 tablet, Rfl: 3     rosuvastatin (CRESTOR) 10 MG tablet, Take 1 tablet (10 mg) by mouth At Bedtime, Disp: 90 tablet, Rfl: 3     triamcinolone (KENALOG) 0.1 % external cream, Apply topically 2 times daily To dry skin patches as needed.  Hold on file until needed., Disp: 80 g, Rfl: 1    Allergies  Allergies   Allergen Reactions     Zocor [Simvastatin - High Dose]      Bilateral hip aching       Social History   Social History     Socioeconomic History     Marital status:    Tobacco Use     Smoking status: Never     Smokeless tobacco: Never   Vaping Use     Vaping status: Never Used   Substance and Sexual Activity     Alcohol use: Yes     Alcohol/week: 1.7 standard drinks of alcohol     Comment: 1 x month     Drug use: No     Sexual activity: Not Currently     Partners: Female   Other Topics Concern     Parent/sibling w/ CABG, MI or angioplasty before 65F 55M? No       Family History  Family History   Problem Relation Age of Onset     Cancer Mother         intestinal CA     Diabetes Father      Diabetes Brother      Other Cancer Son         meagan tumor     Other Cancer Brother         pancreatic cancer     Diabetes Son      Melanoma No family hx of        Review of  "Systems  As per HPI and PMHx, otherwise 10+ comprehensive system review is negative.    Physical Exam  /72 (BP Location: Right arm, Patient Position: Sitting, Cuff Size: Adult Regular)   Pulse 60   Temp 97.6  F (36.4  C) (Tympanic)   Ht 1.778 m (5' 10\")   Wt 102.5 kg (226 lb)   BMI 32.43 kg/m    GENERAL: Patient is a pleasant, cooperative 87 year old male in no acute distress.  HEAD: Normocephalic, atraumatic.  Hair and scalp are normal.  EYES: Pupils are equal, round, reactive to light and accommodation.  Extraocular movements are intact.  The sclera nonicteric without injection.  The extraocular structures are normal.  EARS: Normal shape and symmetry.  No tenderness when palpating the mastoid or tragal areas bilaterally.  Otoscopic exam reveals a minimal amount of cerumen bilaterally.  The bilateral tympanic membranes are round, intact without evidence of effusion, good landmarks.  No retraction, granulation, or drainage.  NOSE: Nares are patent.  Nasal mucosa is pink and moist.  Negative anterior rhinoscopy.  NEUROLOGIC: Cranial nerves II through XII are grossly intact.  Voice is strong.  Patient is House-Brackmann I/VI bilaterally.  CARDIOVASCULAR: Extremities are warm and well-perfused.  No significant peripheral edema.  RESPIRATORY: Patient has nonlabored breathing without cough, wheeze, stridor.  PSYCHIATRIC: Patient is alert and oriented.  Mood and affect appear normal.  SKIN: Warm and dry.  No scalp, face, or neck lesions noted.    Assessment and Plan     ICD-10-CM    1. Sensorineural hearing loss, bilateral  H90.3       2. Asymmetrical sensorineural hearing loss  H90.3         It was my pleasure seeing Fawad Laldarlynga today in clinic.  The patient presents today with bilateral sensorineural hearing loss with asymmetry on the right.  This is most consistent with presbycusis.  He has had relatively recent MRI imaging that was normal.  I can find no evidence of serious CNS disorders or other " complicating factors that could be causing this.  We spent the remainder of today's visit on education. We discussed hearing protection in noisy environments.    The patient is medically cleared for consideration of a hearing aid evaluation.    The patient will follow up as necessary for worsening symptoms or changes in symptoms. I have also recommended repeat audiogram in 1-3 years, or sooner if symptoms warrant.      Jus Wells MD  Department of Otolaryngology-Head and Neck Surgery  [facility]       Again, thank you for allowing me to participate in the care of your patient.        Sincerely,        Jus Wells MD

## 2023-06-12 ENCOUNTER — OFFICE VISIT (OUTPATIENT)
Dept: PODIATRY | Facility: CLINIC | Age: 88
End: 2023-06-12
Payer: COMMERCIAL

## 2023-06-12 ENCOUNTER — HOSPITAL ENCOUNTER (OUTPATIENT)
Dept: WOUND CARE | Facility: CLINIC | Age: 88
Discharge: HOME | End: 2023-06-12
Attending: PODIATRIST | Admitting: PODIATRIST
Payer: COMMERCIAL

## 2023-06-12 ENCOUNTER — LAB (OUTPATIENT)
Dept: LAB | Facility: CLINIC | Age: 88
End: 2023-06-12
Payer: COMMERCIAL

## 2023-06-12 DIAGNOSIS — E11.65 TYPE 2 DIABETES MELLITUS WITH HYPERGLYCEMIA, WITH LONG-TERM CURRENT USE OF INSULIN (H): ICD-10-CM

## 2023-06-12 DIAGNOSIS — I73.9 PVD (PERIPHERAL VASCULAR DISEASE) (H): ICD-10-CM

## 2023-06-12 DIAGNOSIS — N18.31 STAGE 3A CHRONIC KIDNEY DISEASE (H): Primary | ICD-10-CM

## 2023-06-12 DIAGNOSIS — Z89.421 H/O AMPUTATION OF LESSER TOE, RIGHT (H): ICD-10-CM

## 2023-06-12 DIAGNOSIS — E11.65 UNCONTROLLED TYPE 2 DIABETES MELLITUS WITH HYPERGLYCEMIA (H): ICD-10-CM

## 2023-06-12 DIAGNOSIS — I10 HYPERTENSION, GOAL BELOW 140/90: ICD-10-CM

## 2023-06-12 DIAGNOSIS — Z79.4 TYPE 2 DIABETES MELLITUS WITH HYPERGLYCEMIA, WITH LONG-TERM CURRENT USE OF INSULIN (H): ICD-10-CM

## 2023-06-12 DIAGNOSIS — Z79.4 TYPE 2 DIABETES MELLITUS WITH BOTH EYES AFFECTED BY MILD NONPROLIFERATIVE RETINOPATHY WITHOUT MACULAR EDEMA, WITH LONG-TERM CURRENT USE OF INSULIN (H): ICD-10-CM

## 2023-06-12 DIAGNOSIS — E11.3293 TYPE 2 DIABETES MELLITUS WITH BOTH EYES AFFECTED BY MILD NONPROLIFERATIVE RETINOPATHY WITHOUT MACULAR EDEMA, WITH LONG-TERM CURRENT USE OF INSULIN (H): ICD-10-CM

## 2023-06-12 DIAGNOSIS — E11.621 TYPE 2 DIABETES MELLITUS WITH LEFT DIABETIC FOOT ULCER (H): Primary | ICD-10-CM

## 2023-06-12 DIAGNOSIS — L97.529 TYPE 2 DIABETES MELLITUS WITH LEFT DIABETIC FOOT ULCER (H): Primary | ICD-10-CM

## 2023-06-12 LAB
CREAT UR-MCNC: 59.7 MG/DL
HBA1C MFR BLD: 10.2 % (ref 0–5.6)
MICROALBUMIN UR-MCNC: 822.9 MG/L
MICROALBUMIN/CREAT UR: 1378.39 MG/G CR (ref 0–17)

## 2023-06-12 PROCEDURE — 11042 DBRDMT SUBQ TIS 1ST 20SQCM/<: CPT | Performed by: PODIATRIST

## 2023-06-12 PROCEDURE — G0463 HOSPITAL OUTPT CLINIC VISIT: HCPCS

## 2023-06-12 PROCEDURE — 83036 HEMOGLOBIN GLYCOSYLATED A1C: CPT

## 2023-06-12 PROCEDURE — 36415 COLL VENOUS BLD VENIPUNCTURE: CPT

## 2023-06-12 PROCEDURE — 82570 ASSAY OF URINE CREATININE: CPT

## 2023-06-12 PROCEDURE — 99213 OFFICE O/P EST LOW 20 MIN: CPT | Mod: 25 | Performed by: PODIATRIST

## 2023-06-12 PROCEDURE — 82043 UR ALBUMIN QUANTITATIVE: CPT

## 2023-06-12 NOTE — DISCHARGE INSTRUCTIONS
Continue same cares except, mix the iodosorb with the thick moisture barrier paste and apply directly to the open area right up to the black area. Continue with daily change.    Work hard at limiting your sweets. Need to work with your primary care provider on improving your blood sugar, ask about a continuous blood glucose monitor.     Work at getting two 30 minute periods of elevation during the day.    Kamille Calzada RN, CWOCN [phone]

## 2023-06-12 NOTE — PROGRESS NOTES
[facility]    Start of Care in [facility]: 5/23/2023   Referring Provider: Dr. Montesinos  Primary Care Provider: Chris Scott   Wound Location: left ankle     Wound Clinic Visit:   Follow-up    Reason for Visit:       left ankle wound    Subjective/Interval History:     Pt denies concerns.    Just had HgbA1C drawn today, this is 10.2.       Wound History:   Pt initially seen in the ED on 5/14/23 for ulceration, prescribed keflex and bactrim. Had arterial ultrasounds yesterday due to PAD history including distal popliteal stenting of left leg in February of '20.       Past Medical History:  Patient Active Problem List   Diagnosis     Obesity     Transient cerebral ischemia     Hyperlipidemia LDL goal <70     GERD (gastroesophageal reflux disease)     Peripheral vascular disease (H)     Advanced directives, counseling/discussion     Malignant neoplasm of prostate (H)     Hypertension, goal below 140/90     Senile nuclear sclerosis     Type 2 diabetes mellitus with both eyes affected by mild nonproliferative retinopathy without macular edema, with long-term current use of insulin (H)     Macular degeneration (senile) of retina     H/O amputation of lesser toe, right (H)     TIA (transient ischemic attack)     Chronic kidney disease, stage 3 (H)     Dermatitis     Skin lesion     Tremor of left hand     Left leg weakness                     Tobacco Use:     Tobacco Use      Smoking status: Never      Smokeless tobacco: Never    Vaping Use      Vaping status: Never Used       Diabetic: yes  HgbA1C:   Hemoglobin A1C   Date Value Ref Range Status   06/12/2023 10.2 (H) 0.0 - 5.6 % Final     Comment:     Normal <5.7%   Prediabetes 5.7-6.4%    Diabetes 6.5% or higher     Note: Adopted from ADA consensus guidelines.   06/21/2021 8.4 (H) 0 - 5.6 % Final     Comment:     Normal <5.7% Prediabetes 5.7-6.4%  Diabetes 6.5% or higher - adopted from ADA   consensus guidelines.       Checks Blood  "Glucose?: yes  Average Readings: states usually in low 100's so he is surprised at high HgbA1C. Was 336 yesterday after eating pizza and pie, \"likes his sweets\" per son. Was 99 this am per pt.       Personal/social history:  Lives with spouse who does help with bandaging. 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   Last changed: Yesterday, with removal of the dressing noted barrier cream to be essentially mixed with Iodosorb as son had been applying the barrier cream directly to open area with a small amount of Iodosorb just at edge of the eschar which is actively . Iodosorb had lost it's color since yesterday.      Wound #1 Left ankle, medial    1st photo from ED visit on 5/14/23      Pre and post conservative debridement.        Stage/tissue depth: full thickness  3.4 cm L x 3.2 cm W x 0.1 cm D  Tunneling: no  Undermining: no  Wound bed type/amount: 75% eschar, 25 % yellow/white with some granular buds prior to debridement, following debridement is about 50% eschar, 50% yellow/red mix with granular buds; non fluctuant  Wound Edges: attached  Periwound: mild pinkness, 1-1.5cm not warm to touch and consistent with prior visits  Drainage: Iodosorb has lost color and there appears to be small to moderate serosanguinous drainage with some strike through to Mepore pro since yesterday.  Odor: no  Pain: denies    Dorsalis Pedal Pulse: palpable: no, edema in area   Posterior Tibial Pulse: palpable: no, edema in area   Hair growth: sparse upper lower leg  Capillary Refill: 2 seconds  Feet/toes color: pink, warm  R Leg: Edema trace to 1+  L Leg: Edema trace to 1+ lower leg to ankle, trace in foot.  Ankle 25.5  Calf 38    RENATE 5/22/23:   FINDINGS:  Right RENATE:   DP: 0.49  PT: 0.55.     Left RENATE:   DP: 0.95   PT: 0.69.     Right Digital brachial index: 0.43, 62 mmHg.  Left " Digital Brachial index: 0.55, 79 mmHg     Waveforms: Monophasic in the distal tibial arteries    US LOWER EXTREMITY ARTERIAL DUPLEX LEFT  5/22/2023 2:00 PM      HISTORY:  Peripheral vascular disease. Left lower extremity ulcer.     COMPARISON: None     FINDINGS: Color Doppler and spectral waveform analysis performed.     The following peak systolic velocities are measured in cm/s.      LEFT     CFA: 111  PFA: 50  SFA, proximal: 89  SFA, mid: 69  SFA, distal: 84  Popliteal: 61  Anterior tibial artery proximal: 149  Posterior tibial artery: 26  Peroneal artery mid: 80     Waveforms: Multiphasic waveforms are identified in the visualized  arteries.                                                                      IMPRESSION: Per sonographic velocity criteria, no definite significant  stenosis identified.                                                                      IMPRESSION: Moderate arterial insufficiency in the right lower  extremity. RENATE in the left lower extremity is in the range of normal.  Digital brachial indices would indicate adequate wound healing  potential.       Mobility: independant  Current offloading/footwear: diabetic shoes and inserts appear to fit appropriately without rubbing area  Sensation: some neuropathy of feet but can feel light touch in area    Other callusing/areas of concern: area of small scattered scabbing posterior calf, possibly from scratching    Son asking about 2 tan patches of skin on right LE near ankle, noted skin to be WNL feeling to touch but lightly tan. Pt states he uses triamcinolone on these areas and they improve.      Diet: eating moderate amount of sweets.       Assessment:  Left ankle full thickness necrotic ulceration of uncertain etiology, location suspicious for venous ulceration given presence of lower leg edema    Eschar is debriding well with current cares with granular buds and slough noted beneath      Edema is improved since initiation of tubular  "support    History of PAD though per arterial studies no concerns identified is left lower leg, does have stent in distal popliteal area.     Factors impacting wound healing:     Delayed healing as part of normal aging process  Reduced Blood Supply: inadequate perfusion to heal wound  Obesity: decreases tissue perfusion  Underlying Disease: diabetes mellitis, poor control      Plan:  Some conservative sharp debridement done by Dr. Montesinos. Will continue with this as area debrides autolytically. Current therapy working well to promote autolytic debridement.  Continue Iodosorb gel and criticaide paste barrier cream, mixed today to apply to wound as are essentially mixing in wound and working well. Cover with Mepore pro.     Continue conservative compression using spandagrip OR his compression stockings, (these are lower compression - 15-20 at the most so he can get them on).     Expect he may need multilayer compression bandaging though would like him to follow-up with vascular first given history and location of stent.    Again discussed elevation and activity for edema control. Per son he has \"not been elevating\".     Discussed need to limit sweets and work with provider on BG control. Suggested he inquire about a continuous BG meter to help better understand diet in relation to BG and try to get better control.       Discussed/Education provided: plan of care with rationale      Topical care: Wound/surrounding skin cleansed with Vashe and gauze. Patted dry. bandaged per plan of care    Additional recommendations: improved blood glucose control, increased dietary protein    The following discharge instructions were reviewed with and sent home with the patient:  See AVS    Follow up: 6/22/23    Please call with any questions or concerns.    The following supplies were sent home with the patient:  Iodosorb remainder and several Mepore pro bandages    Verbal, written, & demonstrative education provided.  Face to face " time: approximately 40 minutes  Procedure: none    Kamille Calzada RN, CWOCN

## 2023-06-12 NOTE — PROGRESS NOTES
Fawad returns to the [facility] wound care clinic for reevaluation of the left foot.  The patient relates following the wound care instructions given at the last visit with noted minimal improvement in the appearance of the wound.  The patient denies any redness, swelling or drainage around the ulcer.  The patient denies any fevers, chills, nausea or vomiting.        Lower Extremity Physical Exam:      Neurovascular status remains unchanged with palpable pedal pulses and diminished protective sensation on the left foot.  Noted lower extremity edema.  Muscular exam is within normal limits to major muscle groups.      Noted full-thickness ulceration located on the medial aspect of the left ankle.  The ulcer measures approximately 4 cm x 4 cm x 0.2cm in diameter and depth.  Noted hyperkeratotic border tissue with fibrogranular base.  Noted undermining of the wound border.  No tracking.  No drainage.  No surrounding erythema or edema noted.    Assessment:     ICD-10-CM    1. Type 2 diabetes mellitus with left diabetic foot ulcer (H)  E11.621     L97.529       2. PVD (peripheral vascular disease) (H)  I73.9           Plan:      I have explained to Fawad about the progress of the ulceration.  At this time, after verbal consent, the ulcer was cleansed with betadine.  Next, excisional debridement was performed on the ulcer utilizing a #10 blade down to and including subcutaneous tissue. Bleeding was controlled with light pressure.   No drainage noted.  No anesthesia was used due to underlying neuropathy.  The patient tolerated procedure well.    There is low risk of morbidity with the procedure.  There was no overlap in work associated with the evaluation/management and the work associated with the procedure.    The ulcer was dressed by wound care nurse and the patient will continue following her instructions on wound care bandage changes.  The patient will be reevaluated in 2 weeks.    It was reiterated to  the patient the importance of offloading the ulceration by any means.  It was explained to the patient that the main reason ulcerations do not heal quickly is due to pressure that damages the newly formed skin cells.  The patient was instructed to monitor for any signs of infection including increased redness, swelling and drainage from around the ulceration.  If the patient notices any fevers, chills, night sweats, nausea or vomiting patient report to the nearest ER for further evaluation of a possible, more serious infection.    Fawad verbalized agreement with and understanding of the rational for the diagnosis and treatment plan.  All questions were answered to best of my ability and the patient's satisfaction. The patient was advised to contact the clinic with any questions that may arise after the clinic visit.      Disclaimer: This note consists of symbols derived from keyboarding, dictation and/or voice recognition software. As a result, there may be errors in the script that have gone undetected. Please consider this when interpreting information found in this chart.       LIANG Montesinos D.P.M., F.DELANEY.C.F.A.S.

## 2023-06-12 NOTE — LETTER
6/12/2023         RE: Fawad Garcia  [address]  [facility] [address]        Dear Colleague,    Thank you for referring your patient, Fawad Garcia, to the [facility]. Please see a copy of my visit note below.    Fawad returns to the [facility] wound care clinic for reevaluation of the left foot.  The patient relates following the wound care instructions given at the last visit with noted minimal improvement in the appearance of the wound.  The patient denies any redness, swelling or drainage around the ulcer.  The patient denies any fevers, chills, nausea or vomiting.        Lower Extremity Physical Exam:      Neurovascular status remains unchanged with palpable pedal pulses and diminished protective sensation on the left foot.  Noted lower extremity edema.  Muscular exam is within normal limits to major muscle groups.      Noted full-thickness ulceration located on the medial aspect of the left ankle.  The ulcer measures approximately 4 cm x 4 cm x 0.2cm in diameter and depth.  Noted hyperkeratotic border tissue with fibrogranular base.  Noted undermining of the wound border.  No tracking.  No drainage.  No surrounding erythema or edema noted.    Assessment:     ICD-10-CM    1. Type 2 diabetes mellitus with left diabetic foot ulcer (H)  E11.621     L97.529       2. PVD (peripheral vascular disease) (H)  I73.9           Plan:      I have explained to Fawad about the progress of the ulceration.  At this time, after verbal consent, the ulcer was cleansed with betadine.  Next, excisional debridement was performed on the ulcer utilizing a #10 blade down to and including subcutaneous tissue. Bleeding was controlled with light pressure.   No drainage noted.  No anesthesia was used due to underlying neuropathy.  The patient tolerated procedure well.    There is low risk of morbidity with the procedure.  There was no overlap in work associated with  the evaluation/management and the work associated with the procedure.    The ulcer was dressed by wound care nurse and the patient will continue following her instructions on wound care bandage changes.  The patient will be reevaluated in 2 weeks.    It was reiterated to the patient the importance of offloading the ulceration by any means.  It was explained to the patient that the main reason ulcerations do not heal quickly is due to pressure that damages the newly formed skin cells.  The patient was instructed to monitor for any signs of infection including increased redness, swelling and drainage from around the ulceration.  If the patient notices any fevers, chills, night sweats, nausea or vomiting patient report to the nearest ER for further evaluation of a possible, more serious infection.    Fawad verbalized agreement with and understanding of the rational for the diagnosis and treatment plan.  All questions were answered to best of my ability and the patient's satisfaction. The patient was advised to contact the clinic with any questions that may arise after the clinic visit.      Disclaimer: This note consists of symbols derived from keyboarding, dictation and/or voice recognition software. As a result, there may be errors in the script that have gone undetected. Please consider this when interpreting information found in this chart.       LIANG Montesinos D.P.M., F.A.C.F.A.S.        Again, thank you for allowing me to participate in the care of your patient.        Sincerely,        Ethan Montesinos DPM

## 2023-06-13 ENCOUNTER — MYC MEDICAL ADVICE (OUTPATIENT)
Dept: FAMILY MEDICINE | Facility: CLINIC | Age: 88
End: 2023-06-13
Payer: COMMERCIAL

## 2023-06-15 NOTE — TELEPHONE ENCOUNTER
Omeprazole      Last Written Prescription Date: 11/22/2016  Last Fill Quantity: 90,  # refills: 3   Last Office Visit with [facility] or [facility] prescribing provider: 10/18/2017      Carmelo YUEN)                                                  This is a 35 y.o. male with primary hyperparathyroidism . Pt was evaluated for elevated calcium levels , Ct done . Pt evaluated by Dr Haddad . Pt is scheduled for surgery 6/23/23. Pt complains of nausea which he states will subside , frequent since on wegovy. prescribed by Dr Hurt.  This is a 35 y.o. male with primary hyperparathyroidism . Pt was evaluated for elevated calcium levels , scan done . Pt evaluated by Dr Haddad . Pt is scheduled for surgery 6/23/23. Pt complains of nausea which he states will subside , frequent since on wegovy. prescribed by Dr Hurt.

## 2023-06-19 ENCOUNTER — OFFICE VISIT (OUTPATIENT)
Dept: OTHER | Facility: CLINIC | Age: 88
End: 2023-06-19
Attending: SURGERY
Payer: COMMERCIAL

## 2023-06-19 VITALS
DIASTOLIC BLOOD PRESSURE: 82 MMHG | BODY MASS INDEX: 31.67 KG/M2 | OXYGEN SATURATION: 97 % | WEIGHT: 226.2 LBS | SYSTOLIC BLOOD PRESSURE: 136 MMHG | HEART RATE: 67 BPM | HEIGHT: 71 IN

## 2023-06-19 DIAGNOSIS — S91.002A OPEN WOUND OF KNEE, LEG, AND ANKLE, LEFT, INITIAL ENCOUNTER: ICD-10-CM

## 2023-06-19 DIAGNOSIS — I70.222 CRITICAL LIMB ISCHEMIA OF LEFT LOWER EXTREMITY (H): Primary | ICD-10-CM

## 2023-06-19 DIAGNOSIS — I73.9 PERIPHERAL VASCULAR DISEASE (H): ICD-10-CM

## 2023-06-19 DIAGNOSIS — S81.002A OPEN WOUND OF KNEE, LEG, AND ANKLE, LEFT, INITIAL ENCOUNTER: ICD-10-CM

## 2023-06-19 DIAGNOSIS — S81.802A OPEN WOUND OF KNEE, LEG, AND ANKLE, LEFT, INITIAL ENCOUNTER: ICD-10-CM

## 2023-06-19 PROCEDURE — G0463 HOSPITAL OUTPT CLINIC VISIT: HCPCS

## 2023-06-19 PROCEDURE — 99213 OFFICE O/P EST LOW 20 MIN: CPT | Performed by: SURGERY

## 2023-06-19 NOTE — PROGRESS NOTES
Mr. Fawad Garcia is an 87-year-old male who is well-known to myself.  He has critical limb ischemia.  In February 2020 we did a left lower extremity arteriogram for nonhealing wound.  We balloon angioplasty of the tibioperoneal trunk and distal popliteal artery and placed a 4 mm stent in the distal popliteal artery.  He now has redeveloped a wound in the left ankle area.  This has not been healing properly.  When I attempted to listen for the posterior tibial artery signal by handheld Doppler I could not find a good signal in that area.    His noninvasive arterial studies show a normal toe pressures but does not specifically define the angiosome of where the wound is located in the left medial malleolus area.    We will proceed with left lower extremity arteriogram with possible intervention to further assess the perfusion of that area.

## 2023-06-19 NOTE — PROGRESS NOTES
"Patient is here to discuss consult    /83 (BP Location: Left arm, Patient Position: Chair, Cuff Size: Adult Regular)   Pulse 68   Ht 5' 11\" (1.803 m)   Wt 226 lb 3.2 oz (102.6 kg)   SpO2 97%   BMI 31.55 kg/m      Questions patient would like addressed today are: N/A.    Refills are needed: No    Has homecare services and agency name:  Yennifer CAMPOS    "

## 2023-06-19 NOTE — NURSING NOTE
Patient Education  Learner(s):patient and pts son.  Method: Listening  Barriers to Learning:No Barrier  Outcome: Patient did verbalize understanding of above education.  STEPHANIE Damian, RN  [facility]  Office:  [phone] Fax: [phone]

## 2023-06-19 NOTE — PATIENT INSTRUCTIONS
Fawad Garcia,    Your visit to [facility] for your procedure is coming soon and we look forward to seeing you! This friendly reminder and pre-procedure checklist will help to ensure your procedure goes smoothly and meets your expectations. At [facility], our goal is to provide you with a great patient experience and to deliver genuine, professional care to every patient.     Please complete all the steps in advance of your visit. If you have any questions about the items listed below, please give our office a call. We can be reached at [phone] or visit our website at [email or web address] for more information.     Procedure: Left  Leg  Angiogram     Procedure Date :  TBD    Procedure Time :  TBD    Arrival Time: TBD    2 week Post Procedure Appointment with   and also ultrasound: TBD    Admission Type: Outpatient    Surgeon:     Procedure Location: [facility]:  [address] [place] [id number] (Fax: [phone])      If you take blood thinners: SEE SPECIFIC INSTRUCTIONS BELOW    PLEASE DO NOT STOP YOUR ASPIRIN OR PLAVIX UNLESS SPECIFICALLY DIRECTED BY THE VASCULAR SURGEON TO STOP!  In most cases Vascular providers want you to continue these. This is different from most NON vascular surgeries and may not be well known by your Primary Care Provider DON'T STOP ASPIRIN OR PLAVIX. CONTINUE ASPIRIN AND PLAVIX.       Prepare for the peripheral angiography as follows:  Do not eat 8 hours before your procedure. You may have clear liquids up to 2 hour before surgery.  Tell your healthcare provider about all medicines you take and any allergies you may have.  Arrange for a family member or friend to drive you home.  If you are on Metformin, please HOLD for 48 hours after the procedure.  Please be sure to address your diabetic medications with your Primary Care Physician prior to your  angiogram.    Peripheral Angiography    Peripheral angiography is an outpatient procedure that makes a  map  of the vessels (arteries) in your lower body, legs, and arms, using X-ray and dye.This map can show where blood flow may be blocked.    An angiogram is commonly performed under sedation with the use of local anesthesia.    The procedure usually starts with a needle put into the femoral (groin) artery. From one treatment site, areas all over the body can be treated.  After access is established, catheters (thin tubes) and wires are threaded through the arterial system to a specific area of interest or throughout the entire body.  As a contrast agent (iodine dye) is injected, X-ray images are taken to let your provider view the flow of the dye and identify blockages. The surgeon can then choose the best mode of therapy for you - whether during or following the angiogram. This decision depends on your symptoms and the severity and characteristics of the blockages.  Two common therapies that can be provided during the angiogram are balloon angioplasty and stent placement.                   Angioplasty can be used to open arterial blockages. Guided by X-ray, your provider navigates through the blockage with a wire and introduces a special device equipped with an inflatable balloon. After positioning the balloon device across the blocked portion of the artery, the provider inflates the balloon to expand the artery and compress the blockage. The balloon is then deflated and removed while keeping the wire in place across the area that has been treated. Next, contrast dye is injected to assess the result. Treatment is considered a success if blood flow is improved and less than 30% of the blockage remains. If the vessel is still considerably narrowed, placing a stent may be the next step.    Stents are used to prop open an artery at the site of a narrowing. Stents are generally placed after balloon angioplasty when  there is residual narrowing or insufficient blood flow in a treated vessel. Stents are considered a permanent implant and cannot be used if you have a metal allergy. Stents that are used in the leg are constructed of a nickel-titanium alloy (Nitinol), a memory-shaped metal. This alloy has a predetermined size and shape at body temperature and expands to this size and shape after being introduced through a catheter. These stents resist kinking and are flexible so that damage from activities that involve your legs is minimized.  Your procedure may require other techniques to address the problem or plaque.     If surgery is felt to be a better option, your vascular provider will obtain any additional X-ray images needed to plan a surgical bypass of the blocked vessel/s and will then conclude the angiogram.      During the procedure  Here is what to expect:  You may get medicine through an IV (intravenous) line to relax you. You re given an injection to numb the insertion site. Then, a tiny skin cut (incision) is made near an artery in your groin.  Your provider inserts a thin tube (catheter) through the incision. He or she then threads the catheter into an artery while looking at a video monitor.  Contrast  dye  is injected into the catheter to confirm position. You may feel warmth or pressure in your legs and back. You lie still as X-rays are taken. The catheter is then taken out.  After the procedure  You ll be taken to a recovery area. A healthcare provider will apply pressure to the site for about 10 minutes. Your healthcare provider will tell you how long to lie down and keep the insertion site still. Your healthcare provider will discuss the results with you soon after the procedure.      Angiogram Procedure Discharge Instructions:     1. If you received sedation for your procedure: Do not drive or operate heavy machinery for the rest of the day.  2. Avoid strenuous activity for 72 hours (3 days):                         - Do not lift greater than 10 pounds.                        - Excessive exercise                        - Straining                        - Return to your normal activities as you tolerate after the 3 days restriction  3. Avoid tub baths, Jacuzzis, hot tubs and pools for 72 hours (3 days) or until puncture site is will healed.  4. You may shower beginning tomorrow. Do not scrub puncture site(s) until well healed, pat dry.  5. You can expect to return to work 1-2 days after your procedure - depending on the nature of your profession.  6. It is normal to have some tenderness and minimal swelling at puncture site. A small area of discoloration may be present. Tenderness typically subsides in 24-48 hours. A small knot may also be present at puncture site for 6-8 weeks, this can be a normal part of the healing process.       After the angiogram If you:      1. Experience any bleeding or active swelling from puncture site: Lie down, firmly apply pressure to puncture site and CALL 9-1-1  2. Fever greater than 101 degrees Fahrenheit.  3. Redness, swelling, warmth to touch, or purulent (yellow/green/foul smelling) drainage from the puncture site.  4. Increasing pain, tenderness or swelling at puncture site OR of arm/leg near puncture site.  5. Feeling weak or faint.  6. Change in color, temperature, or sensation of arm/leg where puncture was made.  7. You can t feel your thrill (pulse at your dialysis fistula site) or it feels weak (If you had fistulogram done).     Call us with any other questions or concerns after your procedure: [phone]      All invasive procedures can have complications. While the risk of an angiogram is low it is not zero. The most common complications are related to the arterial access site.       Risks/ Complications   Bruising is Common  You will likely have bruising (ecchymosis) where the artery was entered.    Pain and Bleeding  Less commonly, patients experience pain and bleeding  "that may include blood collecting under the skin (hematoma).    Blockage and Leakage   In rare cases, the access artery can become blocked. Infrequently, patients experience persistent leakage of blood where the artery was entered, which can result in the formation of a pseudoaneurysm--a blood-filled sac--that may require further treatment.  Other complications related to an angiogram include:   Allergic reaction to the iodine contrast dye, which can lead to the development of kidney failure.  Very rarely during balloon angioplasty and/or stent placement, part of the arterial blockage can break off (embolism) and travel to more distant arteries. This can worsen blood flow.    Pre-Procedure checklist:    [x] A Pre-op physical within 30 days of the procedure is required. You will need to set up an appointment with your primary care provider.  [x] Contact your insurance regarding coverage  If you would like a [place] Estimate for your upcoming procedure at [facility] Location, contact Cost of Care Estimates   Advocates are available Monday through Friday 8am - 5pm     You may also submit a request online at [email or web address]  - Complete the secure online form found under \"Services and Procedure Pricing\"   If your procedure is at [facility], please contact the numbers below for Cost of Care Estimates.   - Facility Charge: [phone]    Anesthesiology charge:  [phone]   [x] DO NOT BRING FMLA WITH TO SURGERY.  These should be sent to the provider's office by fax to [phone].     [x] Day of Surgery  Medications - Take as indicated with sips of water.   Wear comfortable loose-fitting clothes. Wear your glasses-Not contacts. Do not wear jewelry and remove body piercings. Surgery may be cancelled if they are not removed.   You may have 1 family member wait in the lobby during your surgery. Visitor restrictions are subject to change. Please verify with the surgery nurse " when they call.   If same day surgery-Have a someone come with you to surgery that can help you understand the surgeon's instructions, drive you home and stay with you overnight the first night.    [x] You will receive a call from a nurse 1-3 days prior to the procedure. They will go over more details with you    Notify our office right away, if you have any changes in your health status, or if you develop a cold, flu, diarrhea, infection, fever or sore throat before your scheduled surgery date. We can be reached at  [phone] Monday-Friday 8 am-4:30 pm if you have any questions.   Thank you for choosing [facility]

## 2023-06-20 ENCOUNTER — TELEPHONE (OUTPATIENT)
Dept: OTHER | Facility: CLINIC | Age: 88
End: 2023-06-20

## 2023-06-20 ENCOUNTER — OFFICE VISIT (OUTPATIENT)
Dept: DERMATOLOGY | Facility: CLINIC | Age: 88
End: 2023-06-20
Payer: COMMERCIAL

## 2023-06-20 VITALS — OXYGEN SATURATION: 97 % | DIASTOLIC BLOOD PRESSURE: 89 MMHG | SYSTOLIC BLOOD PRESSURE: 165 MMHG | HEART RATE: 73 BPM

## 2023-06-20 DIAGNOSIS — L81.4 LENTIGO: ICD-10-CM

## 2023-06-20 DIAGNOSIS — L82.1 SEBORRHEIC KERATOSIS: ICD-10-CM

## 2023-06-20 DIAGNOSIS — D18.01 ANGIOMA OF SKIN: ICD-10-CM

## 2023-06-20 DIAGNOSIS — D23.9 DERMAL NEVUS: ICD-10-CM

## 2023-06-20 DIAGNOSIS — D04.22 SQUAMOUS CELL CARCINOMA IN SITU (SCCIS) OF SKIN OF HELIX OF LEFT EAR: Primary | ICD-10-CM

## 2023-06-20 DIAGNOSIS — L30.0 NUMMULAR DERMATITIS: ICD-10-CM

## 2023-06-20 PROCEDURE — 99213 OFFICE O/P EST LOW 20 MIN: CPT | Mod: 25 | Performed by: DERMATOLOGY

## 2023-06-20 PROCEDURE — 17311 MOHS 1 STAGE H/N/HF/G: CPT | Performed by: DERMATOLOGY

## 2023-06-20 RX ORDER — BETAMETHASONE DIPROPIONATE 0.5 MG/G
CREAM TOPICAL
Qty: 100 G | Refills: 3 | Status: SHIPPED | OUTPATIENT
Start: 2023-06-20

## 2023-06-20 ASSESSMENT — PAIN SCALES - GENERAL: PAINLEVEL: NO PAIN (0)

## 2023-06-20 NOTE — LETTER
6/20/2023         RE: Fawad Garcia  [address]  [place] [address]        Dear Colleague,    Thank you for referring your patient, Fawad Garcia, to the [facility]. Please see a copy of my visit note below.    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  Today e notes rash on legs.   .   Patient states this has been present for a while.  Patient reports the following symptoms:  itching.  Patient reports the following previous treatments TAC.  These treatments did not work.  Patient reports the following modifying factors none.  Associated symptoms: none.  Patient has no other skin complaints today.  Remainder of the HPI, Meds, PMH, Allergies, FH, and SH was reviewed in chart.      Past Medical History:   Diagnosis Date     Diabetes mellitus (H)      Hypertension      Squamous cell carcinoma        Past Surgical History:   Procedure Laterality Date     AMPUTATE TOE(S) Right 7/23/2019    Procedure: Partial amputation great toe;  Surgeon: Ethan Montesinos DPM;  Location: [facility]     AMPUTATE TOE(S) Left 12/24/2019    Procedure: Amputation of the great toe, left foot;  Surgeon: Ethan Montesinos DPM;  Location: [facility]     AMPUTATE TOE(S) Right 3/3/2020    Procedure: Partial amputation, second toe, right foot;  Surgeon: Ethan Montesinos DPM;  Location: [facility] OR     CL [facility]  2002    prostate biopsy elevated PSA     COLONOSCOPY       HC REMOVE TONSILS/ADENOIDS,<11 Y/O      T & A     IR LOWER EXTREMITY ANGIOGRAM LEFT  2/26/2020     IR LOWER EXTREMITY ANGIOGRAM RIGHT  12/11/2018     PHACOEMULSIFICATION WITH STANDARD INTRAOCULAR LENS IMPLANT Left 4/2/2015    Procedure: PHACOEMULSIFICATION WITH STANDARD INTRAOCULAR LENS IMPLANT;   Surgeon: Joseph Hernandez MD;  Location: [facility]     PHACOEMULSIFICATION WITH STANDARD INTRAOCULAR LENS IMPLANT Right 5/4/2015    Procedure: PHACOEMULSIFICATION WITH STANDARD INTRAOCULAR LENS IMPLANT;  Surgeon: Joseph Hernandez MD;  Location: [facility]     REPAIR HAMMER TOE Left 4/2/2019    Procedure: 5th Metatarsal Head Resection;  Surgeon: Ethan Montesinos DPM;  Location: [facility] OR        Family History   Problem Relation Age of Onset     Cancer Mother         intestinal CA     Diabetes Father      Diabetes Brother      Other Cancer Son         meagan tumor     Other Cancer Brother         pancreatic cancer     Diabetes Son      Melanoma No family hx of        Social History     Socioeconomic History     Marital status:      Spouse name: Not on file     Number of children: Not on file     Years of education: Not on file     Highest education level: Not on file   Occupational History     Not on file   Tobacco Use     Smoking status: Never     Smokeless tobacco: Never   Vaping Use     Vaping status: Never Used   Substance and Sexual Activity     Alcohol use: Not Currently     Alcohol/week: 1.7 standard drinks of alcohol     Types: 2 Standard drinks or equivalent per week     Comment: 1 x month     Drug use: No     Sexual activity: Not Currently     Partners: Female   Other Topics Concern     Parent/sibling w/ CABG, MI or angioplasty before 65F 55M? No   Social History Narrative     Not on file     Social Determinants of Health     Financial Resource Strain: Not on file   Food Insecurity: Not on file   Transportation Needs: Not on file   Physical Activity: Not on file   Stress: Not on file   Social Connections: Not on file   Intimate Partner Violence: Not on file   Housing Stability: Not on file       Outpatient Encounter Medications as of 6/20/2023   Medication Sig Dispense Refill     amLODIPine (NORVASC) 5 MG tablet Take 1 tablet (5 mg) by mouth daily 90 tablet 3     aspirin (ASA) 81 MG EC tablet Take 1  tablet (81 mg) by mouth daily       B-D U/F 31G X 8 MM insulin pen needle AS DIRECTED daily. Hold on file until needed. 100 each 3     blood glucose (ONETOUCH ULTRA) test strip 1 strip by In Vitro route daily One touch Ultra. 100 strip 3     Blood Glucose Monitoring Suppl (ONE TOUCH ULTRA SYSTEM KIT) W/DEVICE KIT        clopidogrel (PLAVIX) 75 MG tablet Take 1 tablet (75 mg) by mouth daily 90 tablet 3     dorzolamide-timolol (COSOPT) 2-0.5 % ophthalmic solution        hydrochlorothiazide (HYDRODIURIL) 25 MG tablet Take 1 tablet (25 mg) by mouth daily 90 tablet 3     insulin glargine (LANTUS SOLOSTAR) 100 UNIT/ML pen Inject 25 Units Subcutaneous At Bedtime Hold on file until needed. 30 mL 3     Insulin Glargine-yfgn 100 UNIT/ML SOPN Inject 25 Units Subcutaneous At Bedtime Hold on file until needed.       Lancets (ONETOUCH DELICA PLUS NEZACE82Z) MISC 1 each by In Vitro route See Admin Instructions Hold on file until needed. 100 each 1     lisinopril (ZESTRIL) 40 MG tablet Take 1 tablet (40 mg) by mouth daily 90 tablet 3     metFORMIN (GLUCOPHAGE) 500 MG tablet Take 2 tablets (1,000 mg) by mouth 2 times daily (with meals) 360 tablet 3     Multiple Vitamins-Minerals (PRESERVISION AREDS PO) Take 1 tablet by mouth       ORDER FOR DME Equipment being ordered:   Glucometer 1 Device 1     pantoprazole (PROTONIX) 20 MG EC tablet Take 1 tablet (20 mg) by mouth daily 90 tablet 3     rosuvastatin (CRESTOR) 10 MG tablet Take 1 tablet (10 mg) by mouth At Bedtime 90 tablet 3     triamcinolone (KENALOG) 0.1 % external cream Apply topically 2 times daily To dry skin patches as needed.  Hold on file until needed. 80 g 1     No facility-administered encounter medications on file as of 6/20/2023.             O:   NAD, WDWN, Alert & Oriented, Mood & Affect wnl, Vitals stable   Here today alone   BP (!) 165/89 (BP Location: Left arm, Patient Position: Sitting, Cuff Size: Adult Regular)   Pulse 73   SpO2 97%    General appearance  normal   Vitals stable   Alert, oriented and in no acute distress      Following lymph nodes palpated: Occipital, Cervical, Supraclavicular no lad  L h elix 1cm red scaly papule   Legs eczematous plaques     Stuck on papules and brown macules on trunk and ext   Red papules on trunk  Flesh colored papules on trunk         Eyes: Conjunctivae/lids:Normal     ENT: Lips, buccal mucosa, tongue: normal    MSK:Normal    Cardiovascular: peripheral edema none    Pulm: Breathing Normal    Lymph Nodes: No Head and Neck Lymphadenopathy     Neuro/Psych: Orientation:Alert and Orientedx3 ; Mood/Affect:normal       A/P:  1. Seborrheic keratosis, lentigo, angioma, dermal nevus  2. l helix squamous cell carcinoma in situ   3. Nummular derm  Betamethasone twice daily  It was a pleasure speaking to Fawad Garcia today.  Previous clinic notes and pertinent laboratory tests were reviewed prior to Fawad Garcia's visit.  Signs and Symptoms of skin cancer discussed with patient.  Patient encouraged to perform monthly skin exams.  UV precautions reviewed with patient.  Skin care regimen reviewed with patient: Eliminate harsh soaps, i.e. Dial, zest, irsih spring; Mild soaps such as Cetaphil or Dove sensitive skin, avoid hot or cold showers, aggressive use of emollients including vanicream, cetaphil or cerave discussed with patient.    Risks of non-melanoma skin cancer discussed with patient   Return to clinic 3 months  PROCEDURE NOTE  L helix squamous cell carcinoma in situ   MOHS:   Location    The rationale for Mohs surgery was discussed with the patient and consent was obtained.  The risks and benefits as well as alternatives to therapy were discussed, in detail.  Specifically, the risks of infection, scarring, bleeding, prolonged wound healing, incomplete removal, allergy to anesthesia, nerve injury and recurrence were addressed.  Indication for Mohs was Location. Prior to the procedure, the treatment site was clearly identified  and, if available, confirmed with previous photos and confirmed by the patient   All components of the Universal Protocol/PAUSE rule were completed.  The Mohs surgeon operated in two distinct and integrated capacities as the surgeon and pathologist.      The area was prepped with Betasept.  A rim of normal appearing skin was marked circumferentially around the lesion.  The area was infiltrated with local anesthesia.  The tumor was first debulked to remove all clinically apparent tumor.  An incision following the standard Mohs approach was done and the specimen was oriented,mapped and placed in 1 block(s).  Each specimen was then chromacoded and processed in the Mohs laboratory using standard Mohs technique and submitted for frozen section histology.  Frozen section analysis showed no residual tumor but CLEAR MARGINS.      The tumor was excised using standard Mohs technique in 1 stages(s).  CLEAR MARGINS OBTAINED and Final defect size was 1.5 cm.     We discussed the options for wound management in full with the patient including risks/benefits/ possible outcomes.        REPAIR SECOND INTENT: We discussed the options for wound management in full with the patient including risks/benefits/possible outcomes. Decision made to allow the wound to heal by second intention. Cautery was used for for hemostasis. EBL minimal; complications none; wound care routine.  The patient was discharged in good condition and will return in one month or prn for wound evaluation.        Again, thank you for allowing me to participate in the care of your patient.        Sincerely,        Evgeny Torres MD

## 2023-06-20 NOTE — NURSING NOTE
Chief Complaint   Patient presents with     Derm Problem     Mohs       Vitals:    06/20/23 0714   BP: (!) 165/89   BP Location: Left arm   Patient Position: Sitting   Cuff Size: Adult Regular   Pulse: 73   SpO2: 97%     Wt Readings from Last 1 Encounters:   06/19/23 102.6 kg (226 lb 3.2 oz)       Lacie Frederick LPN .................6/20/2023

## 2023-06-20 NOTE — PATIENT INSTRUCTIONS
Open Wound Care   Left ear  for ______________        No strenuous activity for 48 hours    Take Tylenol as needed for discomfort.                                                .         Do not drink alcoholic beverages for 48 hours.    Keep the pressure bandage in place for 24 hours. If the bandage becomes blood tinged or loose, reinforce it with gauze and tape.        (Refer to the reverse side of this page for management of bleeding).    Remove bandage in 24 hours and begin wound care as follows:     Clean area with tap water using a Q tip or gauze pad, (shower / bathe normally)  Dry wound with Q tip or gauze pad  Apply Aquaphor, Vaseline, Polysporin or Bacitracin Ointment with a Q tip  Do NOT use Neosporin Ointment *  Cover the wound with a band-aid or nonstick gauze pad and paper tape.  Repeat wound care once a day until wound is completely healed.    It is an old wives tale that a wound heals better when it is exposed to air and allowed to dry out. The wound will heal faster with a better cosmetic result if it is kept moist with ointment and covered with a bandage.  Do not let the wound dry out.      Supplies Needed:                Qtips or gauze pads                Polysporin or Bacitracin Ointment                Bandaids or nonstick gauze pads and paper tape    Wound care kits and brown paper tape are available for purchase at   the pharmacy.    BLEEDING:    Use tightly rolled up gauze or cloth to apply direct pressure over the bandage for 20   minutes.  Reapply pressure for an additional 20 minutes if necessary  Call the office or go to the nearest emergency room if pressure fails to stop the bleeding.  Use additional gauze and tape to maintain pressure once the bleeding has stopped.  Begin wound care 24 hours after surgery as directed.                  WOUND HEALING    One week after surgery a pink / red halo will form around the outside of the wound.   This is new skin.  The center of the wound will  appear yellowish white and produce some drainage.  The pink halo will slowly migrate in toward the center of the wound until the wound is covered with new shiny pink skin.  There will be no more drainage when the wound is completely healed.  It will take six months to one year for the redness to fade.  The scar may be itchy, tight and sensitive to extreme temperatures for a year after the surgery.  Massaging the area several times a day for several minutes after the wound is completely healed will help the scar soften and normalize faster. Begin massage only after healing is complete.      In case of emergency call: Dr Torres: [phone]    [facility]: [phone]    [facility]:[phone] Patient Education       Proper skin care from [facility]:    -Eliminate harsh soaps as they strip the natural oils from the skin, often resulting in dry itchy skin ( i.e. Dial, Zest, Sravanthi Spring)  -Use mild soaps such as Cetaphil or Dove Sensitive Skin in the shower. You do not need to use soap on arms, legs, and trunk every time you shower unless visibly soiled.   -Avoid hot or cold showers.  -After showering, lightly dry off and apply moisturizing within 2-3 minutes. This will help trap moisture in the skin.   -Aggressive use of a moisturizer at least 1-2 times a day to the entire body (including -Vanicream, Cetaphil, Aquaphor or Cerave) and moisturize hands after every washing.  -We recommend using moisturizers that come in a tub that needs to be scooped out, not a pump. This has more of an oil base. It will hold moisture in your skin much better than a water base moisturizer. The above recommended are non-pore clogging.      Wear a sunscreen with at least SPF 30 on your face, ears, neck and V of the chest daily. Wear sunscreen on other areas of the body if those areas are exposed to the sun throughout the day. Sunscreens can contain physical and/or chemical blockers. Physical blockers are  less likely to clog pores, these include zinc oxide and titanium dioxide. Reapply every two hour and after swimming.     Sunscreen examples: https://www.ewg.org/sunscreen/    UV radiation  UVA radiation remains constant throughout the day and throughout the year. It is a longer wavelength than UVB and therefore penetrates deeper into the skin leading to immediate and delayed tanning, photoaging, and skin cancer. 70-80% of UVA and UVB radiation occurs between the hours of 10am-2pm.  UVB radiation  UVB radiation causes the most harmful effects and is more significant during the summer months. However, snow and ice can reflect UVB radiation leading to skin damage during the winter months as well. UVB radiation is responsible for tanning, burning, inflammation, delayed erythema (pinkness), pigmentation (brown spots), and skin cancer.     I recommend self monthly full body exams and yearly full body exams with a dermatology provider. If you develop a new or changing lesion please follow up for examination. Most skin cancers are pink and scaly or pink and pearly. However, we do see blue/brown/black skin cancers.  Consider the ABCDEs of melanoma when giving yourself your monthly full body exam ( don't forget the groin, buttocks, feet, toes, etc). A-asymmetry, B-borders, C-color, D-diameter, E-elevation or evolving. If you see any of these changes please follow up in clinic. If you cannot see your back I recommend purchasing a hand held mirror to use with a larger wall mirror.       Checking for Skin Cancer  You can find cancer early by checking your skin each month. There are 3 kinds of skin cancer. They are melanoma, basal cell carcinoma, and squamous cell carcinoma. Doing monthly skin checks is the best way to find new marks or skin changes. Follow the instructions below for checking your skin.   The ABCDEs of checking moles for melanoma   Check your moles or growths for signs of melanoma using ABCDE:   Asymmetry: the  sides of the mole or growth don t match  Border: the edges are ragged, notched, or blurred  Color: the color within the mole or growth varies  Diameter: the mole or growth is larger than 6 mm (size of a pencil eraser)  Evolving: the size, shape, or color of the mole or growth is changing (evolving is not shown in the images below)    Checking for other types of skin cancer  Basal cell carcinoma or squamous cell carcinoma have symptoms such as:     A spot or mole that looks different from all other marks on your skin  Changes in how an area feels, such as itching, tenderness, or pain  Changes in the skin's surface, such as oozing, bleeding, or scaliness  A sore that does not heal  New swelling or redness beyond the border of a mole    Who s at risk?  Anyone can get skin cancer. But you are at greater risk if you have:   Fair skin, light-colored hair, or light-colored eyes  Many moles or abnormal moles on your skin  A history of sunburns from sunlight or tanning beds  A family history of skin cancer  A history of exposure to radiation or chemicals  A weakened immune system  If you have had skin cancer in the past, you are at risk for recurring skin cancer.   How to check your skin  Do your monthly skin checkups in front of a full-length mirror. Check all parts of your body, including your:   Head (ears, face, neck, and scalp)  Torso (front, back, and sides)  Arms (tops, undersides, upper, and lower armpits)  Hands (palms, backs, and fingers, including under the nails)  Buttocks and genitals  Legs (front, back, and sides)  Feet (tops, soles, toes, including under the nails, and between toes)  If you have a lot of moles, take digital photos of them each month. Make sure to take photos both up close and from a distance. These can help you see if any moles change over time.   Most skin changes are not cancer. But if you see any changes in your skin, call your doctor right away. Only he or she can diagnose a problem. If  you have skin cancer, seeing your doctor can be the first step toward getting the treatment that could save your life.   [employer] last reviewed this educational content on 4/1/2019 2000-2020 [employer]. [address] [place] [address], [place] [address]. All rights reserved. This information is not intended as a substitute for professional medical care. Always follow your healthcare professional's instructions.       When should I call my doctor?  If you are worsening or not improving, please, contact us or seek urgent care as noted below.     Who should I call with questions (adults)?  [facility] (adult and pediatric): [phone]  [facility] (adult): [phone]  [facility] ([facility] and [place]) [phone]  For urgent needs outside of business hours call the [facility] at [phone] and ask for the dermatology resident on call to be paged  If this is a medical emergency and you are unable to reach an ER, Call [phone]      If you need a prescription refill, please contact your pharmacy. Refills are approved or denied by our Physicians during normal business hours, Monday through Fridays  Per office policy, refills will not be granted if you have not been seen within the past year (or sooner depending on your child's condition)

## 2023-06-20 NOTE — PROGRESS NOTES
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  Todayh e notes rash on legs.   .   Patient states this has been present for a while.  Patient reports the following symptoms:  itching.  Patient reports the following previous treatments TAC.  These treatments did not work.  Patient reports the following modifying factors none.  Associated symptoms: none.  Patient has no other skin complaints today.  Remainder of the HPI, Meds, PMH, Allergies, FH, and SH was reviewed in chart.      Past Medical History:   Diagnosis Date     Diabetes mellitus (H)      Hypertension      Squamous cell carcinoma        Past Surgical History:   Procedure Laterality Date     AMPUTATE TOE(S) Right 7/23/2019    Procedure: Partial amputation great toe;  Surgeon: Ethan Montesinos DPM;  Location: [facility]     AMPUTATE TOE(S) Left 12/24/2019    Procedure: Amputation of the great toe, left foot;  Surgeon: Ethan Montesinos DPM;  Location: [facility] OR     AMPUTATE TOE(S) Right 3/3/2020    Procedure: Partial amputation, second toe, right foot;  Surgeon: Ethan Montesinos DPM;  Location: [facility] OR     CL AFF SURGICAL PATHOLOGY  2002    prostate biopsy elevated PSA     COLONOSCOPY       HC REMOVE TONSILS/ADENOIDS,<11 Y/O      T & A     IR LOWER EXTREMITY ANGIOGRAM LEFT  2/26/2020     IR LOWER EXTREMITY ANGIOGRAM RIGHT  12/11/2018     PHACOEMULSIFICATION WITH STANDARD INTRAOCULAR LENS IMPLANT Left 4/2/2015    Procedure: PHACOEMULSIFICATION WITH STANDARD INTRAOCULAR LENS IMPLANT;  Surgeon: Joseph Hernandez MD;  Location: [facility] OR     PHACOEMULSIFICATION WITH STANDARD INTRAOCULAR LENS IMPLANT Right 5/4/2015    Procedure: PHACOEMULSIFICATION WITH STANDARD INTRAOCULAR LENS IMPLANT;  Surgeon: Joseph Hernandez MD;  Location: [facility] OR     REPAIR HAMMER TOE Left 4/2/2019    Procedure: 5th Metatarsal Head Resection;  Surgeon: Ethan Montesinos  VAN Siddiqi;  Location: [facility] OR        Family History   Problem Relation Age of Onset     Cancer Mother         intestinal CA     Diabetes Father      Diabetes Brother      Other Cancer Son         meagan tumor     Other Cancer Brother         pancreatic cancer     Diabetes Son      Melanoma No family hx of        Social History     Socioeconomic History     Marital status:      Spouse name: Not on file     Number of children: Not on file     Years of education: Not on file     Highest education level: Not on file   Occupational History     Not on file   Tobacco Use     Smoking status: Never     Smokeless tobacco: Never   Vaping Use     Vaping status: Never Used   Substance and Sexual Activity     Alcohol use: Not Currently     Alcohol/week: 1.7 standard drinks of alcohol     Types: 2 Standard drinks or equivalent per week     Comment: 1 x month     Drug use: No     Sexual activity: Not Currently     Partners: Female   Other Topics Concern     Parent/sibling w/ CABG, MI or angioplasty before 65F 55M? No   Social History Narrative     Not on file     Social Determinants of Health     Financial Resource Strain: Not on file   Food Insecurity: Not on file   Transportation Needs: Not on file   Physical Activity: Not on file   Stress: Not on file   Social Connections: Not on file   Intimate Partner Violence: Not on file   Housing Stability: Not on file       Outpatient Encounter Medications as of 6/20/2023   Medication Sig Dispense Refill     amLODIPine (NORVASC) 5 MG tablet Take 1 tablet (5 mg) by mouth daily 90 tablet 3     aspirin (ASA) 81 MG EC tablet Take 1 tablet (81 mg) by mouth daily       B-D U/F 31G X 8 MM insulin pen needle AS DIRECTED daily. Hold on file until needed. 100 each 3     blood glucose (ONETOUCH ULTRA) test strip 1 strip by In Vitro route daily One touch Ultra. 100 strip 3     Blood Glucose Monitoring Suppl (ONE TOUCH ULTRA SYSTEM KIT) W/DEVICE KIT        clopidogrel (PLAVIX) 75 MG tablet Take  1 tablet (75 mg) by mouth daily 90 tablet 3     dorzolamide-timolol (COSOPT) 2-0.5 % ophthalmic solution        hydrochlorothiazide (HYDRODIURIL) 25 MG tablet Take 1 tablet (25 mg) by mouth daily 90 tablet 3     insulin glargine (LANTUS SOLOSTAR) 100 UNIT/ML pen Inject 25 Units Subcutaneous At Bedtime Hold on file until needed. 30 mL 3     Insulin Glargine-yfgn 100 UNIT/ML SOPN Inject 25 Units Subcutaneous At Bedtime Hold on file until needed.       Lancets (ONETOUCH DELICA PLUS [id number]) MISC 1 each by In Vitro route See Admin Instructions Hold on file until needed. 100 each 1     lisinopril (ZESTRIL) 40 MG tablet Take 1 tablet (40 mg) by mouth daily 90 tablet 3     metFORMIN (GLUCOPHAGE) 500 MG tablet Take 2 tablets (1,000 mg) by mouth 2 times daily (with meals) 360 tablet 3     Multiple Vitamins-Minerals (PRESERVISION AREDS PO) Take 1 tablet by mouth       ORDER FOR DME Equipment being ordered:   Glucometer 1 Device 1     pantoprazole (PROTONIX) 20 MG EC tablet Take 1 tablet (20 mg) by mouth daily 90 tablet 3     rosuvastatin (CRESTOR) 10 MG tablet Take 1 tablet (10 mg) by mouth At Bedtime 90 tablet 3     triamcinolone (KENALOG) 0.1 % external cream Apply topically 2 times daily To dry skin patches as needed.  Hold on file until needed. 80 g 1     No facility-administered encounter medications on file as of 6/20/2023.             O:   NAD, WDWN, Alert & Oriented, Mood & Affect wnl, Vitals stable   Here today alone   BP (!) 165/89 (BP Location: Left arm, Patient Position: Sitting, Cuff Size: Adult Regular)   Pulse 73   SpO2 97%    General appearance normal   Vitals stable   Alert, oriented and in no acute distress      Following lymph nodes palpated: Occipital, Cervical, Supraclavicular no lad  L h elix 1cm red scaly papule   Legs eczematous plaques     Stuck on papules and brown macules on trunk and ext   Red papules on trunk  Flesh colored papules on trunk         Eyes: Conjunctivae/lids:Normal     ENT: Lips,  buccal mucosa, tongue: normal    MSK:Normal    Cardiovascular: peripheral edema none    Pulm: Breathing Normal    Lymph Nodes: No Head and Neck Lymphadenopathy     Neuro/Psych: Orientation:Alert and Orientedx3 ; Mood/Affect:normal       A/P:  1. Seborrheic keratosis, lentigo, angioma, dermal nevus  2. l helix squamous cell carcinoma in situ   3. Nummular derm  Betamethasone twice daily  It was a pleasure speaking to Fawad Garcia today.  Previous clinic notes and pertinent laboratory tests were reviewed prior to Fawad Garcia's visit.  Signs and Symptoms of skin cancer discussed with patient.  Patient encouraged to perform monthly skin exams.  UV precautions reviewed with patient.  Skin care regimen reviewed with patient: Eliminate harsh soaps, i.e. Dial, zest, irsih spring; Mild soaps such as Cetaphil or Dove sensitive skin, avoid hot or cold showers, aggressive use of emollients including vanicream, cetaphil or cerave discussed with patient.    Risks of non-melanoma skin cancer discussed with patient   Return to clinic 3 months  PROCEDURE NOTE  L helix squamous cell carcinoma in situ   MOHS:   Location    The rationale for Mohs surgery was discussed with the patient and consent was obtained.  The risks and benefits as well as alternatives to therapy were discussed, in detail.  Specifically, the risks of infection, scarring, bleeding, prolonged wound healing, incomplete removal, allergy to anesthesia, nerve injury and recurrence were addressed.  Indication for Mohs was Location. Prior to the procedure, the treatment site was clearly identified and, if available, confirmed with previous photos and confirmed by the patient   All components of the Universal Protocol/PAUSE rule were completed.  The Mohs surgeon operated in two distinct and integrated capacities as the surgeon and pathologist.      The area was prepped with Betasept.  A rim of normal appearing skin was marked circumferentially around the  lesion.  The area was infiltrated with local anesthesia.  The tumor was first debulked to remove all clinically apparent tumor.  An incision following the standard Mohs approach was done and the specimen was oriented,mapped and placed in 1 block(s).  Each specimen was then chromacoded and processed in the Mohs laboratory using standard Mohs technique and submitted for frozen section histology.  Frozen section analysis showed no residual tumor but CLEAR MARGINS.      The tumor was excised using standard Mohs technique in 1 stages(s).  CLEAR MARGINS OBTAINED and Final defect size was 1.5 cm.     We discussed the options for wound management in full with the patient including risks/benefits/ possible outcomes.        REPAIR SECOND INTENT: We discussed the options for wound management in full with the patient including risks/benefits/possible outcomes. Decision made to allow the wound to heal by second intention. Cautery was used for for hemostasis. EBL minimal; complications none; wound care routine.  The patient was discharged in good condition and will return in one month or prn for wound evaluation.

## 2023-06-22 ENCOUNTER — HOSPITAL ENCOUNTER (OUTPATIENT)
Dept: WOUND CARE | Facility: CLINIC | Age: 88
Discharge: HOME | End: 2023-06-22
Attending: PODIATRIST | Admitting: PODIATRIST
Payer: COMMERCIAL

## 2023-06-22 ENCOUNTER — OFFICE VISIT (OUTPATIENT)
Dept: FAMILY MEDICINE | Facility: CLINIC | Age: 88
End: 2023-06-22
Payer: COMMERCIAL

## 2023-06-22 ENCOUNTER — OFFICE VISIT (OUTPATIENT)
Dept: PODIATRY | Facility: CLINIC | Age: 88
End: 2023-06-22
Payer: COMMERCIAL

## 2023-06-22 VITALS
TEMPERATURE: 98.3 F | HEART RATE: 81 BPM | BODY MASS INDEX: 31.22 KG/M2 | SYSTOLIC BLOOD PRESSURE: 122 MMHG | HEIGHT: 71 IN | RESPIRATION RATE: 18 BRPM | WEIGHT: 223 LBS | OXYGEN SATURATION: 93 % | DIASTOLIC BLOOD PRESSURE: 58 MMHG

## 2023-06-22 DIAGNOSIS — N30.01 ACUTE CYSTITIS WITH HEMATURIA: Primary | ICD-10-CM

## 2023-06-22 DIAGNOSIS — E11.621 TYPE 2 DIABETES MELLITUS WITH LEFT DIABETIC FOOT ULCER (H): Primary | ICD-10-CM

## 2023-06-22 DIAGNOSIS — L97.529 TYPE 2 DIABETES MELLITUS WITH LEFT DIABETIC FOOT ULCER (H): Primary | ICD-10-CM

## 2023-06-22 LAB
ALBUMIN UR-MCNC: >=300 MG/DL
APPEARANCE UR: ABNORMAL
BACTERIA #/AREA URNS HPF: ABNORMAL /HPF
BILIRUB UR QL STRIP: ABNORMAL
COLOR UR AUTO: ABNORMAL
GLUCOSE UR STRIP-MCNC: NEGATIVE MG/DL
HGB UR QL STRIP: ABNORMAL
KETONES UR STRIP-MCNC: ABNORMAL MG/DL
LEUKOCYTE ESTERASE UR QL STRIP: ABNORMAL
NITRATE UR QL: POSITIVE
PH UR STRIP: 5.5 [PH] (ref 5–7)
RBC #/AREA URNS AUTO: >100 /HPF
SP GR UR STRIP: >=1.03 (ref 1–1.03)
UROBILINOGEN UR STRIP-ACNC: 1 E.U./DL
WBC #/AREA URNS AUTO: ABNORMAL /HPF

## 2023-06-22 PROCEDURE — 99213 OFFICE O/P EST LOW 20 MIN: CPT | Performed by: NURSE PRACTITIONER

## 2023-06-22 PROCEDURE — 97602 WOUND(S) CARE NON-SELECTIVE: CPT

## 2023-06-22 PROCEDURE — 87086 URINE CULTURE/COLONY COUNT: CPT | Performed by: NURSE PRACTITIONER

## 2023-06-22 PROCEDURE — 87186 SC STD MICRODIL/AGAR DIL: CPT | Performed by: NURSE PRACTITIONER

## 2023-06-22 PROCEDURE — 99213 OFFICE O/P EST LOW 20 MIN: CPT | Performed by: PODIATRIST

## 2023-06-22 PROCEDURE — 81001 URINALYSIS AUTO W/SCOPE: CPT | Performed by: NURSE PRACTITIONER

## 2023-06-22 RX ORDER — CEFDINIR 300 MG/1
300 CAPSULE ORAL 2 TIMES DAILY
Qty: 14 CAPSULE | Refills: 0 | Status: SHIPPED | OUTPATIENT
Start: 2023-06-22 | End: 2023-06-29

## 2023-06-22 ASSESSMENT — PAIN SCALES - GENERAL: PAINLEVEL: NO PAIN (0)

## 2023-06-22 NOTE — PROGRESS NOTES
Fawad returns to the [facility] wound care clinic for reevaluation of the left foot.  The patient relates following the wound care instructions given at the last visit with noted overall improvement in the appearance of the wound.  The patient denies any redness, swelling or drainage around the ulcer.  The patient denies any fevers, chills, nausea or vomiting.        Lower Extremity Physical Exam:      Neurovascular status remains unchanged with palpable pedal pulses and diminished protective sensation on the left foot.  Noted lower extremity edema.  Muscular exam is within normal limits to major muscle groups.      Noted the skin of the previous dry eschar over the wound.  The wound now appears to have a fibrogranular base.  No tracking.  No drainage.  No surrounding erythema or edema noted.    Assessment:     ICD-10-CM    1. 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  At this time, the ulcer was dressed by wound care nurse and the patient will continue following her instructions on wound care bandage changes.  The patient will be reevaluated in 4 weeks.    It was reiterated to the patient the importance of offloading the ulceration by any means.  It was explained to the patient that the main reason ulcerations do not heal quickly is due to pressure that damages the newly formed skin cells.  The patient was instructed to monitor for any signs of infection including increased redness, swelling and drainage from around the ulceration.  If the patient notices any fevers, chills, night sweats, nausea or vomiting patient report to the nearest ER for further evaluation of a possible, more serious infection.    Fawad verbalized agreement with and understanding of the rational for the diagnosis and treatment plan.  All questions were answered to best of my ability and the patient's  satisfaction. The patient was advised to contact the clinic with any questions that may arise after the clinic visit.      Disclaimer: This note consists of symbols derived from keyboarding, dictation and/or voice recognition software. As a result, there may be errors in the script that have gone undetected. Please consider this when interpreting information found in this chart.       LIANG Montesinos D.P.M., FIDEL.F.LORRAINES.

## 2023-06-22 NOTE — NURSING NOTE
"Initial /58   Pulse 81   Temp 98.3  F (36.8  C) (Tympanic)   Resp 18   Ht 1.803 m (5' 11\")   Wt 101.2 kg (223 lb)   SpO2 93%   BMI 31.10 kg/m   Estimated body mass index is 31.1 kg/m  as calculated from the following:    Height as of this encounter: 1.803 m (5' 11\").    Weight as of this encounter: 101.2 kg (223 lb). .      "

## 2023-06-22 NOTE — LETTER
6/22/2023         RE: Fawad Garcia  [address]  [place] [facility] [address]        Dear Colleague,    Thank you for referring your patient, Fawad Garcia, to the [facility] [place]. Please see a copy of my visit note below.    Fawad returns to the [facility] wound care clinic for reevaluation of the left foot.  The patient relates following the wound care instructions given at the last visit with noted overall improvement in the appearance of the wound.  The patient denies any redness, swelling or drainage around the ulcer.  The patient denies any fevers, chills, nausea or vomiting.        Lower Extremity Physical Exam:      Neurovascular status remains unchanged with palpable pedal pulses and diminished protective sensation on the left foot.  Noted lower extremity edema.  Muscular exam is within normal limits to major muscle groups.      Noted the skin of the previous dry eschar over the wound.  The wound now appears to have a fibrogranular base.  No tracking.  No drainage.  No surrounding erythema or edema noted.    Assessment:     ICD-10-CM    1. 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  At this time, the ulcer was dressed by wound care nurse and the patient will continue following her instructions on wound care bandage changes.  The patient will be reevaluated in 4 weeks.    It was reiterated to the patient the importance of offloading the ulceration by any means.  It was explained to the patient that the main reason ulcerations do not heal quickly is due to pressure that damages the newly formed skin cells.  The patient was instructed to monitor for any signs of infection including increased redness, swelling and drainage from around the ulceration.  If the patient notices any fevers, chills, night sweats, nausea or vomiting  patient report to the nearest ER for further evaluation of a possible, more serious infection.    Fawad verbalized agreement with and understanding of the rational for the diagnosis and treatment plan.  All questions were answered to best of my ability and the patient's satisfaction. The patient was advised to contact the clinic with any questions that may arise after the clinic visit.      Disclaimer: This note consists of symbols derived from keyboarding, dictation and/or voice recognition software. As a result, there may be errors in the script that have gone undetected. Please consider this when interpreting information found in this chart.       LIANG Montesinos D.P.M., F.A.C.F.A.S.        Again, thank you for allowing me to participate in the care of your patient.        Sincerely,        Ethan Montesinos DPM

## 2023-06-22 NOTE — DISCHARGE INSTRUCTIONS
Continue same cares with minor change as below:  1.) Clean area per usual  Apply 3M no-sting where the adhesive tape will go and let it dry  2.) Mix some iodosorb (about 75%) with the thick moisture barrier paste (about 25%) and apply directly to the open area  3.) Apply some moisture barrier paste alone to a rim of surrounding skin  4.) Cover with Mepore pro bandage  Change every 1-2 days, (if not leaking then can go 2 days).     Try using your diprolene AF to the rash on your skin where you can (where tape not needed).      Continue to use the sleeve or your compression stocking and elevate your leg regularly.     Kamille Calzada RN, CWOCN [phone]

## 2023-06-22 NOTE — PATIENT INSTRUCTIONS
Start cefdinir twice daily.  Repeat urine test in 2 weeks to ensure blood has cleared. If it has not, we will send you to urology.  If fevers, pain, go to ER.

## 2023-06-22 NOTE — PROGRESS NOTES
"  Assessment & Plan     Acute cystitis with hematuria  Positive nitrites,  white cells, and greater than 100 red cells on micro.  We will cover for a UTI while culture is pending with cefdinir.  Discussed that I would like him to repeat a UA in 2 weeks, if hematuria persists, will refer to urology for further evaluation.  - UA Macroscopic with reflex to Microscopic and Culture  - Urine Microscopic Exam  - Urine Culture  - cefdinir (OMNICEF) 300 MG capsule; Take 1 capsule (300 mg) by mouth 2 times daily for 7 days  - UA with Microscopic reflex to Culture - lab collect; Future       BMI:   Estimated body mass index is 31.1 kg/m  as calculated from the following:    Height as of this encounter: 1.803 m (5' 11\").    Weight as of this encounter: 101.2 kg (223 lb).       Patient Instructions   Start cefdinir twice daily.  Repeat urine test in 2 weeks to ensure blood has cleared. If it has not, we will send you to urology.  If fevers, pain, go to ER.      [facility] is a 87 year old, presenting for the following health issues:  Urinary Problem         No data to display              History of Present Illness       Reason for visit:  Blood in urine  Symptom onset:  3-7 days ago  Symptoms include:  No pain, blood coming out when urinating  Symptom intensity:  Mild  Symptom progression:  Worsening  Had these symptoms before:  No  What makes it worse:  No  What makes it better:  Unknown    He eats 0-1 servings of fruits and vegetables daily.He consumes 1 sweetened beverage(s) daily.He exercises with enough effort to increase his heart rate 9 or less minutes per day.  He exercises with enough effort to increase his heart rate 3 or less days per week.   He is taking medications regularly.     Above HPI reviewed. Additionally, on June 11, he had an episode of gross hematuria after what he describes as a \"difficult urination\".  Had a very difficult time " "starting his urination he had a difficult time completely emptying his bladder.  He then noted blood in his underwear as well.  He had an additional episode of dark-colored urine and has been clear since that time.  He has not had dysuria.  He has urinary frequency at baseline, and this does not seem worsened.  He denies back pain, flank pain, abdominal pain, fevers, chills, ill symptoms.  He has no testicular pain.  His urination patterns are unchanged.  He has never had similar symptoms previously.        Review of Systems   Constitutional, HEENT, cardiovascular, pulmonary, gi and gu systems are negative, except as otherwise noted.      Objective    /58   Pulse 81   Temp 98.3  F (36.8  C) (Tympanic)   Resp 18   Ht 1.803 m (5' 11\")   Wt 101.2 kg (223 lb)   SpO2 93%   BMI 31.10 kg/m    Body mass index is 31.1 kg/m .  Physical Exam  Vitals and nursing note reviewed.   Constitutional:       Appearance: Normal appearance.   HENT:      Head: Normocephalic and atraumatic.      Mouth/Throat:      Mouth: Mucous membranes are moist.   Cardiovascular:      Rate and Rhythm: Normal rate.   Pulmonary:      Effort: Pulmonary effort is normal.   Musculoskeletal:      Cervical back: Neck supple.   Skin:     General: Skin is warm and dry.   Neurological:      General: No focal deficit present.      Mental Status: He is alert.   Psychiatric:         Mood and Affect: Mood normal.         Behavior: Behavior normal.            Results for orders placed or performed in visit on 06/22/23 (from the past 24 hour(s))   UA Macroscopic with reflex to Microscopic and Culture    Specimen: Urine, Clean Catch   Result Value Ref Range    Color Urine Pink (A) Colorless, Straw, Light Yellow, Yellow    Appearance Urine Cloudy (A) Clear    Glucose Urine Negative Negative mg/dL    Bilirubin Urine Small (A) Negative    Ketones Urine Trace (A) Negative mg/dL    Specific Gravity Urine >=1.030 1.003 - 1.035    Blood Urine Large (A) Negative    " pH Urine 5.5 5.0 - 7.0    Protein Albumin Urine >=300 (A) Negative mg/dL    Urobilinogen Urine 1.0 0.2, 1.0 E.U./dL    Nitrite Urine Positive (A) Negative    Leukocyte Esterase Urine Moderate (A) Negative   Urine Microscopic Exam   Result Value Ref Range    Bacteria Urine Moderate (A) None Seen /HPF    RBC Urine >100 (A) 0-2 /HPF /HPF    WBC Urine  (A) 0-5 /HPF /HPF

## 2023-06-23 LAB — BACTERIA UR CULT: ABNORMAL

## 2023-06-23 NOTE — TELEPHONE ENCOUNTER
LM asking Ray to call and let me know what the earliest date is that he would like to have procedure done and any dates to avoid.

## 2023-06-29 ENCOUNTER — HOSPITAL ENCOUNTER (OUTPATIENT)
Dept: WOUND CARE | Facility: CLINIC | Age: 88
Discharge: HOME | End: 2023-06-29
Attending: FAMILY MEDICINE | Admitting: FAMILY MEDICINE
Payer: COMMERCIAL

## 2023-06-29 DIAGNOSIS — S81.802D OPEN WOUND OF LEFT KNEE, LEG, AND ANKLE, SUBSEQUENT ENCOUNTER: Primary | ICD-10-CM

## 2023-06-29 DIAGNOSIS — S91.002D OPEN WOUND OF LEFT KNEE, LEG, AND ANKLE, SUBSEQUENT ENCOUNTER: Primary | ICD-10-CM

## 2023-06-29 DIAGNOSIS — S81.002D OPEN WOUND OF LEFT KNEE, LEG, AND ANKLE, SUBSEQUENT ENCOUNTER: Primary | ICD-10-CM

## 2023-06-29 PROCEDURE — 97602 WOUND(S) CARE NON-SELECTIVE: CPT

## 2023-06-29 NOTE — TELEPHONE ENCOUNTER
Spoke with patient.  Rescheduled his procedure to Wednesday, 7/19/23 @ 10:00am with a check-in time of 8:30am.    Patient is scheduled for a post-op RENATE and post-op appointment with Dr. Cali on Monday, 8/7/23.

## 2023-06-29 NOTE — TELEPHONE ENCOUNTER
LM for patient to call me.    There is not an earlier time available.      Patient needs to schedule a post-op RENATE and  appointment.

## 2023-06-29 NOTE — TELEPHONE ENCOUNTER
Spoke with patient and informed him of his scheduled procedure date/time of Tuesday, 7/18/23 @ 1:00pm with a check-in time of 11:30am at [facility] Desk.    He has a pre-op exam appointment scheduled Wednesday,  7/5/23.    Patient would would like an earlier time if available for the procedure if possible.  Informed him I would look and get back to him.

## 2023-06-29 NOTE — DISCHARGE INSTRUCTIONS
Continue same cares, I provided you with Triad paste, use this in place of the thick moisture barrier paste.     Use the Sureprep (don't need the cavilon barrier film,) on the skin where the adhesive will go any allow to dry prior to placing bandage.    Kamille Calzada RN, CWOCN

## 2023-07-03 NOTE — PROGRESS NOTES
Ha Marcos is a 26year old female. Patient presents with:  Bump: bilateral bumps under arms since las tuesday. Not getting better after being on ATB for 8 days. HPI:   Patient is seen for follow-up.   States noticed painful bumps bilateral armpit a [facility]    Start of Care in [facility]: 5/23/2023   Referring Provider: Dr. Montseinos  Primary Care Provider: Chris Scott   Wound Location: left ankle     Wound Clinic Visit:   Follow-up    Reason for Visit:       left ankle wound    Subjective/Interval History:     Pt denies concerns.  Saw Dr. Cali on the 19th and will be having an angiogram, not yet scheduled. Trying to work on BG improvement.       Wound History:   Pt initially seen in the ED on 5/14/23 for ulceration, prescribed keflex and bactrim. Had arterial ultrasounds yesterday due to PAD history including distal popliteal stenting of left leg in February of '20.       Past Medical History:  Patient Active Problem List   Diagnosis     Obesity     Transient cerebral ischemia     Hyperlipidemia LDL goal <70     GERD (gastroesophageal reflux disease)     Peripheral vascular disease (H)     Advanced directives, counseling/discussion     Malignant neoplasm of prostate (H)     Hypertension, goal below 140/90     Senile nuclear sclerosis     Type 2 diabetes mellitus with both eyes affected by mild nonproliferative retinopathy without macular edema, with long-term current use of insulin (H)     Macular degeneration (senile) of retina     H/O amputation of lesser toe, right (H)     TIA (transient ischemic attack)     Chronic kidney disease, stage 3 (H)     Dermatitis     Skin lesion     Tremor of left hand     Left leg weakness                     Tobacco Use:     Tobacco Use      Smoking status: Never      Smokeless tobacco: Never       Diabetic: yes  HgbA1C:   Hemoglobin A1C   Date Value Ref Range Status   06/12/2023 10.2 (H) 0.0 - 5.6 % Final     Comment:     Normal <5.7%   Prediabetes 5.7-6.4%    Diabetes 6.5% or higher     Note: Adopted from ADA consensus guidelines.   06/21/2021 8.4 (H) 0 - 5.6 % Final     Comment:     Normal <5.7% Prediabetes 5.7-6.4%  Diabetes 6.5% or higher - adopted from ADA   consensus  "guidelines.       Checks Blood Glucose?: yes  Average Readings: states usually in low 100's so he is surprised at high HgbA1C. Was 336 yesterday after eating pizza and pie, \"likes his sweets\" per son. Was 99 this am per pt.       Personal/social history:  Lives with spouse who does help with bandaging. 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   Last changed: Yesterday      Wound #1 Left ankle, medial    1st photo from ED visit on 5/14/23      Pre and post conservative debridement.        Stage/tissue depth: full thickness  3.4 cm L x 2.9 cm W x 0.3 cm estimated D  Tunneling: no  Undermining: no  Wound bed type/amount: Eshar came off with bandage, remaining wound bed following conservative debridement is about 75% yellow/white slough, 25% early granulation; non-fluctuant  Wound Edges: attached  Periwound: mild pinkness, 1-1.5cm not warm to touch and consistent with prior visits, some dermatitis of ankle area  Drainage: Iodosorb has some lost color and there appears to be small to moderate serosanguinous drainage with some strike through to Mepore pro since yesterday.  Odor: no  Pain: denies    Dorsalis Pedal Pulse: palpable: no, edema in area   Posterior Tibial Pulse: palpable: no, edema in area   Hair growth: sparse upper lower leg  Capillary Refill: 2 seconds  Feet/toes color: pink, warm  R Leg: Edema trace to 1+  L Leg: Edema trace to 1+ lower leg to ankle, trace in foot.  Ankle 25.5 (not remeasured)  Calf 38 (not remeasured)    RENATE 5/22/23:   FINDINGS:  Right RENATE:   DP: 0.49  PT: 0.55.     Left RENATE:   DP: 0.95   PT: 0.69.     Right Digital brachial index: 0.43, 62 mmHg.  Left Digital Brachial index: 0.55, 79 mmHg     Waveforms: Monophasic in the distal tibial arteries    US LOWER EXTREMITY ARTERIAL DUPLEX LEFT  5/22/2023 2:00 PM      HISTORY:  Peripheral vascular disease. " Disp:  Rfl:       Past Medical History:   Diagnosis Date   • Motion sickness    • Visual impairment     glasses/contacts      Social History:  Social History    Tobacco Use      Smoking status: Never Smoker      Smokeless tobacco: Never Used    Alcohol use Left lower extremity ulcer.     COMPARISON: None     FINDINGS: Color Doppler and spectral waveform analysis performed.     The following peak systolic velocities are measured in cm/s.      LEFT     CFA: 111  PFA: 50  SFA, proximal: 89  SFA, mid: 69  SFA, distal: 84  Popliteal: 61  Anterior tibial artery proximal: 149  Posterior tibial artery: 26  Peroneal artery mid: 80     Waveforms: Multiphasic waveforms are identified in the visualized  arteries.                                                                      IMPRESSION: Per sonographic velocity criteria, no definite significant  stenosis identified.                                                                      IMPRESSION: Moderate arterial insufficiency in the right lower  extremity. RENATE in the left lower extremity is in the range of normal.  Digital brachial indices would indicate adequate wound healing  potential.       Mobility: independant  Current offloading/footwear: diabetic shoes and inserts appear to fit appropriately without rubbing area  Sensation: some neuropathy of feet but can feel light touch in area    Other callusing/areas of concern: area of small scattered scabbing posterior calf, possibly from scratching    Son asking about 2 tan patches of skin on right LE near ankle, noted skin to be WNL feeling to touch but lightly tan. Pt states he uses triamcinolone on these areas and they improve.      Diet: eating moderate amount of sweets.       Assessment:  Left ankle full thickness necrotic ulceration of uncertain etiology, location suspicious for venous ulceration given presence of lower leg edema    Eschar is resolved and wound continues to debride with early granulation and smaller measurements indication some contraction of wound     Edema is improved since initiation of tubular support    History of PAD though per arterial studies no concerns identified is left lower leg, does have stent in distal popliteal area, angiogram to be  "done     Factors impacting wound healing:     Delayed healing as part of normal aging process  Reduced Blood Supply: inadequate perfusion to heal wound  Obesity: decreases tissue perfusion  Underlying Disease: diabetes mellitis, poor control      Plan:  Some conservative sharp debridement done using adson's and #15 blade, no bleeding. Will continue with this each visit as area continues to debride autolytically. Current therapy working well to promote autolytic debridement.  Continue Iodosorb gel and criticaide paste barrier cream, mixed today, (about 75% Iodosorb and 25% criticaide paste). Cover with Mepore pro. Change daily.    Continue conservative compression using spandagrip OR his compression stockings, (these are lower compression - 15-20 at the most so he can get them on).     Pt has topical cream for dermatitis, suggested he use on ankle. Initiated skin barrier wipe under adhesive.     Consider multilayer compression bandaging depending on direction from vascular following upcoming angiogram.     Have discussed elevation and activity for edema control. Per son he has \"not been elevating\" (prior visit, states is trying).      Discussed need to limit sweets and work with provider on BG control. Suggested he inquire about a continuous BG meter to help better understand diet in relation to BG and try to get better control, upcoming visit with diabetes ed but this is not until August 1st.      Discussed/Education provided: plan of care with rationale      Topical care: Wound/surrounding skin cleansed with Vashe and gauze. Patted dry. bandaged per plan of care    Additional recommendations: improved blood glucose control, increased dietary protein    The following discharge instructions were reviewed with and sent home with the patient:  Continue same cares with minor change as below:  1.) Clean area per usual  Apply 3M no-sting where the adhesive tape will go and let it dry  2.) Mix some iodosorb (about 75%) " with the thick moisture barrier paste (about 25%) and apply directly to the open area  3.) Apply some moisture barrier paste alone to a rim of surrounding skin  4.) Cover with Mepore pro bandage  Change every 1-2 days, (if not leaking then can go 2 days).     Try using your diprolene AF to the rash on your skin where you can (where tape not needed).      Continue to use the sleeve or your compression stocking and elevate your leg regularly.     Follow up: 6/29/23    Please call with any questions or concerns.    The following supplies were sent home with the patient:  Iodosorb remainder and several Mepore pro bandages, sureprep    Verbal, written, & demonstrative education provided.  Face to face time: approximately 40 minutes  Procedure: conservative debridement    Kamille Calzada RN, CWOCN

## 2023-07-04 NOTE — PROGRESS NOTES
[facility]    Start of Care in [facility]: 5/23/2023   Referring Provider: Dr. Montesinos  Primary Care Provider: Chris Scott   Wound Location: left ankle     Wound Clinic Visit:   Follow-up    Reason for Visit:       left ankle wound    Subjective/Interval History:     Pt denies concerns.  Saw Dr. Cali on the June 19th and will be having an angiogram, now scheduled for July 19th. Trying to work on BG improvement, states he takes his insulin as ordered but sometimes takes more when his numbers are high.       Wound History:   Pt initially seen in the ED on 5/14/23 for ulceration, prescribed keflex and bactrim. Had arterial ultrasounds yesterday due to PAD history including distal popliteal stenting of left leg in February of '20.       Past Medical History:  Patient Active Problem List   Diagnosis     Obesity     Transient cerebral ischemia     Hyperlipidemia LDL goal <70     GERD (gastroesophageal reflux disease)     Peripheral vascular disease (H)     Advanced directives, counseling/discussion     Malignant neoplasm of prostate (H)     Hypertension, goal below 140/90     Senile nuclear sclerosis     Type 2 diabetes mellitus with both eyes affected by mild nonproliferative retinopathy without macular edema, with long-term current use of insulin (H)     Macular degeneration (senile) of retina     H/O amputation of lesser toe, right (H)     TIA (transient ischemic attack)     Chronic kidney disease, stage 3 (H)     Dermatitis     Skin lesion     Tremor of left hand     Left leg weakness                     Tobacco Use:     Tobacco Use      Smoking status: Never      Smokeless tobacco: Never       Diabetic: yes  HgbA1C:   Hemoglobin A1C   Date Value Ref Range Status   06/12/2023 10.2 (H) 0.0 - 5.6 % Final     Comment:     Normal <5.7%   Prediabetes 5.7-6.4%    Diabetes 6.5% or higher     Note: Adopted from ADA consensus guidelines.   06/21/2021 8.4 (H) 0 - 5.6 % Final     Comment:  "    Normal <5.7% Prediabetes 5.7-6.4%  Diabetes 6.5% or higher - adopted from ADA   consensus guidelines.       Checks Blood Glucose?: yes  Average Readings: states usually in low 100's so he is surprised at high HgbA1C. Was 336 yesterday after eating pizza and pie, \"likes his sweets\" per son. Was 99 this am per pt.       Personal/social history:  Lives with spouse who does help with bandaging. 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   Last changed: Yesterday      Wound #1 Left ankle, medial    1st photo from ED visit on 5/14/23            Pre and post conservative debridement.    Stage/tissue depth: full thickness  3.4 cm L x 2.9 cm W x 0.5 cm estimated D  Tunneling: no  Undermining: no  Wound bed type/amount:  following conservative debridement is about 50-60% yellow/white slough, 40-50% early granulation; non-fluctuant  Wound Edges: attached  Periwound: irregular mild pinkness, 1-1.5cm not warm to touch and consistent with prior visits, some dermatitis of ankle area  Drainage: Iodosorb has some lost color and there appears to be small to moderate serosanguinous drainage with some strike through to Mepore pro since yesterday.  Odor: no  Pain: denies    Dorsalis Pedal Pulse: palpable: no, edema in area   Posterior Tibial Pulse: palpable: no, edema in area   Hair growth: sparse upper lower leg  Capillary Refill: 2 seconds  Feet/toes color: pink, warm  R Leg: Edema trace to 1+  L Leg: Edema trace to 1+ lower leg to ankle, trace in foot.  Ankle 25.5 (not remeasured)  Calf 38 (not remeasured)    RENATE 5/22/23:   FINDINGS:  Right RENATE:   DP: 0.49  PT: 0.55.     Left RENATE:   DP: 0.95   PT: 0.69.     Right Digital brachial index: 0.43, 62 mmHg.  Left Digital Brachial index: 0.55, 79 mmHg     Waveforms: Monophasic in the distal tibial arteries    US LOWER EXTREMITY ARTERIAL DUPLEX LEFT "  5/22/2023 2:00 PM      HISTORY:  Peripheral vascular disease. Left lower extremity ulcer.     COMPARISON: None     FINDINGS: Color Doppler and spectral waveform analysis performed.     The following peak systolic velocities are measured in cm/s.      LEFT     CFA: 111  PFA: 50  SFA, proximal: 89  SFA, mid: 69  SFA, distal: 84  Popliteal: 61  Anterior tibial artery proximal: 149  Posterior tibial artery: 26  Peroneal artery mid: 80     Waveforms: Multiphasic waveforms are identified in the visualized  arteries.                                                                      IMPRESSION: Per sonographic velocity criteria, no definite significant  stenosis identified.                                                                      IMPRESSION: Moderate arterial insufficiency in the right lower  extremity. RENATE in the left lower extremity is in the range of normal.  Digital brachial indices would indicate adequate wound healing  potential.       Mobility: independant  Current offloading/footwear: diabetic shoes and inserts appear to fit appropriately without rubbing area  Sensation: some neuropathy of feet but can feel light touch in area    Other callusing/areas of concern: none noted       Diet: eating moderate amount of sweets.       Assessment:  Left ankle full thickness necrotic ulceration of uncertain etiology, location suspicious for venous ulceration given presence of lower leg edema    Eschar is resolved and wound continues to debride with early granulation present     Edema is improved since initiation of tubular support    History of PAD though per arterial studies no concerns identified is left lower leg, does have stent in distal popliteal area, angiogram to be done     Factors impacting wound healing:     Delayed healing as part of normal aging process  Reduced Blood Supply: inadequate perfusion to heal wound  Obesity: decreases tissue perfusion  Underlying Disease: diabetes mellitis, poor  "control      Plan:  Some conservative sharp debridement done using adson's and #15 blade, no bleeding. Will continue with this each visit as area continues to debride autolytically. Current therapy working well to promote autolytic debridement.  Continue Iodosorb gel but did substitute triad paste for criticaide barrier cream today as pt is running out of barrier cream and triad has more absorbancy, mixed about 75% Iodosorb and 25% triad. Cover with Mepore pro. Change daily.    Continue conservative compression using spandagrip OR his compression stockings, (these are lower compression - 15-20 at the most so he can get them on).     Pt has topical cream for dermatitis, suggested he use on ankle.Continue skin barrier wipe under adhesive.     Consider multilayer compression bandaging depending on direction from vascular following upcoming angiogram.     Have discussed elevation and activity for edema control. Per son he has \"not been elevating\" (prior visit, states is trying).      Prior visit- Discussed need to limit sweets and work with provider on BG control. Suggested he inquire about a continuous BG meter to help better understand diet in relation to BG and try to get better control, upcoming visit with diabetes ed but this is not until August 1st.      Discussed/Education provided: plan of care with rationale      Topical care: Wound/surrounding skin cleansed with Vashe and gauze. Patted dry. bandaged per plan of care    Additional recommendations: improved blood glucose control, increased dietary protein    The following discharge instructions were reviewed with and sent home with the patient:  See AVS    Follow up: 7/13/23    Please call with any questions or concerns.    The following supplies were sent home with the patient:  Iodosorb and triad remainder and several Mepore pro bandages, sureprep    Verbal, written, & demonstrative education provided.  Face to face time: approximately 40 minutes  Procedure: " conservative debridement    Kamille Calzada RN, CWOCN

## 2023-07-05 ENCOUNTER — LAB (OUTPATIENT)
Dept: LAB | Facility: CLINIC | Age: 88
End: 2023-07-05
Payer: COMMERCIAL

## 2023-07-05 ENCOUNTER — OFFICE VISIT (OUTPATIENT)
Dept: FAMILY MEDICINE | Facility: CLINIC | Age: 88
End: 2023-07-05
Payer: COMMERCIAL

## 2023-07-05 VITALS
OXYGEN SATURATION: 97 % | HEART RATE: 65 BPM | BODY MASS INDEX: 31.78 KG/M2 | WEIGHT: 227 LBS | DIASTOLIC BLOOD PRESSURE: 64 MMHG | HEIGHT: 71 IN | SYSTOLIC BLOOD PRESSURE: 116 MMHG | RESPIRATION RATE: 14 BRPM | TEMPERATURE: 96.8 F

## 2023-07-05 DIAGNOSIS — E11.621 DIABETIC ULCER OF LEFT FOOT ASSOCIATED WITH TYPE 2 DIABETES MELLITUS, UNSPECIFIED PART OF FOOT, UNSPECIFIED ULCER STAGE (H): ICD-10-CM

## 2023-07-05 DIAGNOSIS — I70.229 CRITICAL ISCHEMIA OF LOWER EXTREMITY (H): ICD-10-CM

## 2023-07-05 DIAGNOSIS — C61 PROSTATE CANCER (H): ICD-10-CM

## 2023-07-05 DIAGNOSIS — Z79.4 TYPE 2 DIABETES MELLITUS WITH BOTH EYES AFFECTED BY MILD NONPROLIFERATIVE RETINOPATHY WITHOUT MACULAR EDEMA, WITH LONG-TERM CURRENT USE OF INSULIN (H): ICD-10-CM

## 2023-07-05 DIAGNOSIS — E11.65 UNCONTROLLED TYPE 2 DIABETES MELLITUS WITH HYPERGLYCEMIA (H): ICD-10-CM

## 2023-07-05 DIAGNOSIS — N30.01 ACUTE CYSTITIS WITH HEMATURIA: ICD-10-CM

## 2023-07-05 DIAGNOSIS — I10 HYPERTENSION, GOAL BELOW 140/90: ICD-10-CM

## 2023-07-05 DIAGNOSIS — L97.529 DIABETIC ULCER OF LEFT FOOT ASSOCIATED WITH TYPE 2 DIABETES MELLITUS, UNSPECIFIED PART OF FOOT, UNSPECIFIED ULCER STAGE (H): ICD-10-CM

## 2023-07-05 DIAGNOSIS — Z01.818 PREOP GENERAL PHYSICAL EXAM: Primary | ICD-10-CM

## 2023-07-05 DIAGNOSIS — E11.3293 TYPE 2 DIABETES MELLITUS WITH BOTH EYES AFFECTED BY MILD NONPROLIFERATIVE RETINOPATHY WITHOUT MACULAR EDEMA, WITH LONG-TERM CURRENT USE OF INSULIN (H): ICD-10-CM

## 2023-07-05 DIAGNOSIS — Z89.421 H/O AMPUTATION OF LESSER TOE, RIGHT (H): ICD-10-CM

## 2023-07-05 LAB
ALBUMIN SERPL BCG-MCNC: 3.7 G/DL (ref 3.5–5.2)
ALBUMIN UR-MCNC: NEGATIVE MG/DL
ALP SERPL-CCNC: 57 U/L (ref 40–129)
ALT SERPL W P-5'-P-CCNC: 16 U/L (ref 0–70)
ANION GAP SERPL CALCULATED.3IONS-SCNC: 10 MMOL/L (ref 7–15)
APPEARANCE UR: CLEAR
AST SERPL W P-5'-P-CCNC: 20 U/L (ref 0–45)
BASOPHILS # BLD AUTO: 0.1 10E3/UL (ref 0–0.2)
BASOPHILS NFR BLD AUTO: 1 %
BILIRUB SERPL-MCNC: 0.4 MG/DL
BILIRUB UR QL STRIP: NEGATIVE
BUN SERPL-MCNC: 32.6 MG/DL (ref 8–23)
CALCIUM SERPL-MCNC: 9.3 MG/DL (ref 8.8–10.2)
CHLORIDE SERPL-SCNC: 103 MMOL/L (ref 98–107)
COLOR UR AUTO: YELLOW
CREAT SERPL-MCNC: 1.08 MG/DL (ref 0.67–1.17)
DEPRECATED HCO3 PLAS-SCNC: 22 MMOL/L (ref 22–29)
EOSINOPHIL # BLD AUTO: 0.5 10E3/UL (ref 0–0.7)
EOSINOPHIL NFR BLD AUTO: 7 %
ERYTHROCYTE [DISTWIDTH] IN BLOOD BY AUTOMATED COUNT: 13.5 % (ref 10–15)
GFR SERPL CREATININE-BSD FRML MDRD: 67 ML/MIN/1.73M2
GLUCOSE SERPL-MCNC: 266 MG/DL (ref 70–99)
GLUCOSE UR STRIP-MCNC: 250 MG/DL
HCT VFR BLD AUTO: 40.1 % (ref 40–53)
HGB BLD-MCNC: 12.9 G/DL (ref 13.3–17.7)
HGB UR QL STRIP: NEGATIVE
IMM GRANULOCYTES # BLD: 0 10E3/UL
IMM GRANULOCYTES NFR BLD: 0 %
KETONES UR STRIP-MCNC: NEGATIVE MG/DL
LEUKOCYTE ESTERASE UR QL STRIP: NEGATIVE
LYMPHOCYTES # BLD AUTO: 2.2 10E3/UL (ref 0.8–5.3)
LYMPHOCYTES NFR BLD AUTO: 27 %
MCH RBC QN AUTO: 29.1 PG (ref 26.5–33)
MCHC RBC AUTO-ENTMCNC: 32.2 G/DL (ref 31.5–36.5)
MCV RBC AUTO: 90 FL (ref 78–100)
MONOCYTES # BLD AUTO: 0.8 10E3/UL (ref 0–1.3)
MONOCYTES NFR BLD AUTO: 10 %
NEUTROPHILS # BLD AUTO: 4.5 10E3/UL (ref 1.6–8.3)
NEUTROPHILS NFR BLD AUTO: 56 %
NITRATE UR QL: NEGATIVE
PH UR STRIP: 5.5 [PH] (ref 5–7)
PLATELET # BLD AUTO: 246 10E3/UL (ref 150–450)
POTASSIUM SERPL-SCNC: 4.4 MMOL/L (ref 3.4–5.3)
PROT SERPL-MCNC: 6.7 G/DL (ref 6.4–8.3)
PSA SERPL DL<=0.01 NG/ML-MCNC: 3.68 NG/ML
RBC # BLD AUTO: 4.44 10E6/UL (ref 4.4–5.9)
RBC #/AREA URNS AUTO: ABNORMAL /HPF
SODIUM SERPL-SCNC: 135 MMOL/L (ref 136–145)
SP GR UR STRIP: 1.02 (ref 1–1.03)
SQUAMOUS #/AREA URNS AUTO: ABNORMAL /LPF
UROBILINOGEN UR STRIP-ACNC: 0.2 E.U./DL
WBC # BLD AUTO: 8.1 10E3/UL (ref 4–11)
WBC #/AREA URNS AUTO: ABNORMAL /HPF

## 2023-07-05 PROCEDURE — 85025 COMPLETE CBC W/AUTO DIFF WBC: CPT | Performed by: FAMILY MEDICINE

## 2023-07-05 PROCEDURE — 99214 OFFICE O/P EST MOD 30 MIN: CPT | Performed by: FAMILY MEDICINE

## 2023-07-05 PROCEDURE — 81001 URINALYSIS AUTO W/SCOPE: CPT

## 2023-07-05 PROCEDURE — 36415 COLL VENOUS BLD VENIPUNCTURE: CPT | Performed by: FAMILY MEDICINE

## 2023-07-05 PROCEDURE — 93000 ELECTROCARDIOGRAM COMPLETE: CPT | Performed by: FAMILY MEDICINE

## 2023-07-05 PROCEDURE — 80053 COMPREHEN METABOLIC PANEL: CPT | Performed by: FAMILY MEDICINE

## 2023-07-05 PROCEDURE — 84153 ASSAY OF PSA TOTAL: CPT | Performed by: FAMILY MEDICINE

## 2023-07-05 ASSESSMENT — PAIN SCALES - GENERAL: PAINLEVEL: NO PAIN (0)

## 2023-07-05 NOTE — PATIENT INSTRUCTIONS
For informational purposes only. Not to replace the advice of your health care provider. Copyright   2003,  [place] [employer] [facility]. All rights reserved. Clinically reviewed by Elzbieta Ro MD. [employer] [id number] - REV .  Preparing for Your Surgery  Getting started  A nurse will call you to review your health history and instructions. They will give you an arrival time based on your scheduled surgery time. Please be ready to share:  Your doctor's clinic name and phone number  Your medical, surgical, and anesthesia history  A list of allergies and sensitivities  A list of medicines, including herbal treatments and over-the-counter drugs  Whether the patient has a legal guardian (ask how to send us the papers in advance)  Please tell us if you're pregnant--or if there's any chance you might be pregnant. Some surgeries may injure a fetus (unborn baby), so they require a pregnancy test. Surgeries that are safe for a fetus don't always need a test, and you can choose whether to have one.   If you have a child who's having surgery, please ask for a copy of Preparing for Your Child's Surgery.    Preparing for surgery  Within 10 to 30 days of surgery: Have a pre-op exam (sometimes called an H&P, or History and Physical). This can be done at a clinic or pre-operative center.  If you're having a , you may not need this exam. Talk to your care team.  At your pre-op exam, talk to your care team about all medicines you take. If you need to stop any medicines before surgery, ask when to start taking them again.  We do this for your safety. Many medicines can make you bleed too much during surgery. Some change how well surgery (anesthesia) drugs work.  Call your insurance company to let them know you're having surgery. (If you don't have insurance, call [phone].)  Call your clinic if there's any change in your health. This includes signs of a cold or flu (sore throat, runny nose, cough, rash, fever). It  also includes a scrape or scratch near the surgery site.  If you have questions on the day of surgery, call your hospital or surgery center.  Eating and drinking guidelines  For your safety: Unless your surgeon tells you otherwise, follow the guidelines below.  Eat and drink as usual until 8 hours before you arrive for surgery. After that, no food or milk.  Drink clear liquids until 2 hours before you arrive. These are liquids you can see through, like water, Gatorade, and Propel Water. They also include plain black coffee and tea (no cream or milk), candy, and breath mints. You can spit out gum when you arrive.  If you drink alcohol: Stop drinking it the night before surgery.  If your care team tells you to take medicine on the morning of surgery, it's okay to take it with a sip of water.  Preventing infection  Shower or bathe the night before and morning of your surgery. Follow the instructions your clinic gave you. (If no instructions, use regular soap.)  Don't shave or clip hair near your surgery site. We'll remove the hair if needed.  Don't smoke or vape the morning of surgery. You may chew nicotine gum up to 2 hours before surgery. A nicotine patch is okay.  Note: Some surgeries require you to completely quit smoking and nicotine. Check with your surgeon.  Your care team will make every effort to keep you safe from infection. We will:  Clean our hands often with soap and water (or an alcohol-based hand rub).  Clean the skin at your surgery site with a special soap that kills germs.  Give you a special gown to keep you warm. (Cold raises the risk of infection.)  Wear special hair covers, masks, gowns and gloves during surgery.  Give antibiotic medicine, if prescribed. Not all surgeries need antibiotics.  What to bring on the day of surgery  Photo ID and insurance card  Copy of your health care directive, if you have one  Glasses and hearing aids (bring cases)  You can't wear contacts during surgery  Inhaler and  eye drops, if you use them (tell us about these when you arrive)  CPAP machine or breathing device, if you use them  A few personal items, if spending the night  If you have . . .  A pacemaker, ICD (cardiac defibrillator) or other implant: Bring the ID card.  An implanted stimulator: Bring the remote control.  A legal guardian: Bring a copy of the certified (court-stamped) guardianship papers.  Please remove any jewelry, including body piercings. Leave jewelry and other valuables at home.  If you're going home the day of surgery  You must have a responsible adult drive you home. They should stay with you overnight as well.  If you don't have someone to stay with you, and you aren't safe to go home alone, we may keep you overnight. Insurance often won't pay for this.  After surgery  If it's hard to control your pain or you need more pain medicine, please call your surgeon's office.  Questions?   If you have any questions for your care team, list them here: _________________________________________________________________________________________________________________________________________________________________________ ____________________________________ ____________________________________ ____________________________________    How to Take Your Medication Before Surgery  - Please cut your insulin glargine / lantus in half the night before which will be 13 units instead of 25 units you usually take. You state you take your lisinopril at night.  If by chance you take lisinopril in the morning please take it the night before at dinner.  Please hold the rest of your morning medications.

## 2023-07-05 NOTE — PROGRESS NOTES
[facility]  [address] [address]  Phone: [phone]  Primary Provider: Chris Miller  Pre-op Performing Provider: CHRIS MILLER      PREOPERATIVE EVALUATION:  Today's date: 7/5/2023    Fawad Garcia is a 87 year old male who presents for a preoperative evaluation.      7/5/2023    10:16 AM   Additional Questions   Roomed by Kristi BALTAZAR   Accompanied by Son Prakash          7/5/2023    10:16 AM   Patient Reported Additional Medications   Patient reports taking the following new medications no new meds     Surgical Information:  Surgery/Procedure: Left leg circulation evaluation   Surgery Location: [facility]   Surgeon: Dr. Cali  Surgery Date: 07/19/23  Time of Surgery: arrive at 830, procedure is at 10  Where patient plans to recover: At home with family  Fax number for surgical facility: Note does not need to be faxed, will be available electronically in Epic.    Assessment & Plan     The proposed surgical procedure is considered INTERMEDIATE risk.    Preop general physical exam    - EKG 12-lead complete w/read - Clinics  - CBC with Platelets & Differential; Future    Diabetic ulcer of left foot associated with type 2 diabetes mellitus, unspecified part of foot, unspecified ulcer stage (H)      Critical ischemia of lower extremity (H)    - EKG 12-lead complete w/read - Clinics  - CBC with Platelets & Differential; Future    Uncontrolled type 2 diabetes mellitus with hyperglycemia (H)    - EKG 12-lead complete w/read - Clinics  - Basic metabolic panel; Future    H/O amputation of lesser toe, right (H)    - EKG 12-lead complete w/read - Clinics    Hypertension, goal below 140/90  Check basic lab  - EKG 12-lead complete w/read - Clinics  - Basic metabolic panel; Future    Type 2 diabetes mellitus with both eyes affected by mild nonproliferative retinopathy without macular edema, with long-term current use of insulin (H)  Check blood cell count  -  EKG 12-lead complete w/read - Clinics  - Basic metabolic panel; Future            - No identified additional risk factors other than previously addressed    Antiplatelet or Anticoagulation Medication Instructions:      Additional Medication Instructions:  reviewed with patient    RECOMMENDATION:  APPROVAL GIVEN to proceed with proposed procedure, without further diagnostic evaluation.            Subjective       HPI related to upcoming procedure: having evaluation of left lower extremity circulation due to diabetic complications/ulcer of foot        6/28/2023     3:38 PM   Preop Questions   1. Have you ever had a heart attack or stroke? No   2. Have you ever had surgery on your heart or blood vessels, such as a stent placement, a coronary artery bypass, or surgery on an artery in your head, neck, heart, or legs? No   3. Do you have chest pain with activity? No   4. Do you have a history of  heart failure? No   5. Do you currently have a cold, bronchitis or symptoms of other infection? No   6. Do you have a cough, shortness of breath, or wheezing? No   7. Do you or anyone in your family have previous history of blood clots? No   8. Do you or does anyone in your family have a serious bleeding problem such as prolonged bleeding following surgeries or cuts? No   9. Have you ever had problems with anemia or been told to take iron pills? No   10. Have you had any abnormal blood loss such as black, tarry or bloody stools? No   11. Have you ever had a blood transfusion? No   12. Are you willing to have a blood transfusion if it is medically needed before, during, or after your surgery? Yes   13. Have you or any of your relatives ever had problems with anesthesia? No   14. Do you have sleep apnea, excessive snoring or daytime drowsiness? No   15. Do you have any artifical heart valves or other implanted medical devices like a pacemaker, defibrillator, or continuous glucose monitor? No   16. Do you have artificial joints? No    17. Are you allergic to latex? No       Health Care Directive:  Patient has a Health Care Directive on file      Preoperative Review of :   reviewed - no record of controlled substances prescribed.      Status of Chronic Conditions:  See problem list for active medical problems.  Problems all longstanding and stable, except as noted/documented.  See ROS for pertinent symptoms related to these conditions.      Review of Systems  Review Of Systems  Constitutional: negative   Skin: ulcer on foot. left  Eyes: near blindness due to diabetes  Ears/Nose/Throat: negative  Respiratory: No shortness of breath, dyspnea on exertion, cough, or hemoptysis  Cardiovascular: negative  Gastrointestinal: negative  Genitourinary: negative  Musculoskeletal: as above  Neurologic: complications of diabetes.  Psychiatric: negative  Hematologic/Lymphatic/Immunologic: negative  Endocrine: diabetes is not controlled      Patient Active Problem List    Diagnosis Date Noted     Tremor of left hand 06/14/2022     Priority: Medium     Left leg weakness 06/14/2022     Priority: Medium     Dermatitis 01/14/2022     Priority: Medium     Skin lesion 01/14/2022     Priority: Medium     Chronic kidney disease, stage 3 (H) 07/21/2021     Priority: Medium     TIA (transient ischemic attack) 07/11/2021     Priority: Medium     H/O amputation of lesser toe, right (H) 01/04/2021     Priority: Medium     Macular degeneration (senile) of retina 01/10/2019     Priority: Medium     Nearly legally blind per eye doctor.       Type 2 diabetes mellitus with both eyes affected by mild nonproliferative retinopathy without macular edema, with long-term current use of insulin (H) 07/30/2018     Priority: Medium     Senile nuclear sclerosis 04/01/2015     Priority: Medium     Hypertension, goal below 140/90 03/25/2015     Priority: Medium     Malignant neoplasm of prostate (H) 06/16/2014     Priority: Medium     Advanced directives, counseling/discussion  "02/24/2012     Priority: Medium     Patient does not have an Advance/Health Care Directive (HCD), given \"What is Advance Care Planning?\" flyer, accepts referral to Facilitator and requests blank HCD form.    Roro Wright  February 24, 2012         Peripheral vascular disease (H) 11/18/2011     Priority: Medium     Problem list name updated by automated process. Provider to review       GERD (gastroesophageal reflux disease) 11/03/2011     Priority: Medium     Hyperlipidemia LDL goal <70 10/31/2010     Priority: Medium     Transient cerebral ischemia 09/03/2009     Priority: Medium     Diagnosis updated by automated process. Provider to review and confirm.       Obesity      Priority: Medium     Obesity  Problem list name updated by automated process. Provider to review        Past Medical History:   Diagnosis Date     Diabetes mellitus (H)      Hypertension      Squamous cell carcinoma      Past Surgical History:   Procedure Laterality Date     AMPUTATE TOE(S) Right 7/23/2019    Procedure: Partial amputation great toe;  Surgeon: Ethan Montesinos DPM;  Location: [facility]     AMPUTATE TOE(S) Left 12/24/2019    Procedure: Amputation of the great toe, left foot;  Surgeon: Ethan Montesinos DPM;  Location: [facility]     AMPUTATE TOE(S) Right 3/3/2020    Procedure: Partial amputation, second toe, right foot;  Surgeon: Ethan Montesinos DPM;  Location: [facility]     CL AFF SURGICAL PATHOLOGY  2002    prostate biopsy elevated PSA     COLONOSCOPY       HC REMOVE TONSILS/ADENOIDS,<13 Y/O      T & A     IR LOWER EXTREMITY ANGIOGRAM LEFT  2/26/2020     IR LOWER EXTREMITY ANGIOGRAM RIGHT  12/11/2018     PHACOEMULSIFICATION WITH STANDARD INTRAOCULAR LENS IMPLANT Left 4/2/2015    Procedure: PHACOEMULSIFICATION WITH STANDARD INTRAOCULAR LENS IMPLANT;  Surgeon: Joseph Hernandez MD;  Location: [facility]     PHACOEMULSIFICATION WITH STANDARD INTRAOCULAR LENS IMPLANT Right 5/4/2015    Procedure: PHACOEMULSIFICATION WITH STANDARD " INTRAOCULAR LENS IMPLANT;  Surgeon: Joseph Hernandez MD;  Location: [facility]     REPAIR HAMMER TOE Left 4/2/2019    Procedure: 5th Metatarsal Head Resection;  Surgeon: Ethan Montesinos DPM;  Location: [facility]     Current Outpatient Medications   Medication Sig Dispense Refill     amLODIPine (NORVASC) 5 MG tablet Take 1 tablet (5 mg) by mouth daily 90 tablet 3     aspirin (ASA) 81 MG EC tablet Take 1 tablet (81 mg) by mouth daily       augmented betamethasone dipropionate (DIPROLENE AF) 0.05 % external cream Apply sparingly to affected area twice daily as needed.  Do not apply to face. 100 g 3     clopidogrel (PLAVIX) 75 MG tablet Take 1 tablet (75 mg) by mouth daily 90 tablet 3     dorzolamide-timolol (COSOPT) 2-0.5 % ophthalmic solution        hydrochlorothiazide (HYDRODIURIL) 25 MG tablet Take 1 tablet (25 mg) by mouth daily 90 tablet 3     insulin glargine (LANTUS SOLOSTAR) 100 UNIT/ML pen Inject 25 Units Subcutaneous At Bedtime Hold on file until needed. 30 mL 3     lisinopril (ZESTRIL) 40 MG tablet Take 1 tablet (40 mg) by mouth daily 90 tablet 3     metFORMIN (GLUCOPHAGE) 500 MG tablet Take 2 tablets (1,000 mg) by mouth 2 times daily (with meals) 360 tablet 3     Multiple Vitamins-Minerals (PRESERVISION AREDS PO) Take 1 tablet by mouth       pantoprazole (PROTONIX) 20 MG EC tablet Take 1 tablet (20 mg) by mouth daily 90 tablet 3     rosuvastatin (CRESTOR) 10 MG tablet Take 1 tablet (10 mg) by mouth At Bedtime 90 tablet 3     triamcinolone (KENALOG) 0.1 % external cream Apply topically 2 times daily To dry skin patches as needed.  Hold on file until needed. 80 g 1     B-D U/F 31G X 8 MM insulin pen needle AS DIRECTED daily. Hold on file until needed. 100 each 3     blood glucose (ONETOUCH ULTRA) test strip 1 strip by In Vitro route daily One touch Ultra. 100 strip 3     Blood Glucose Monitoring Suppl (ONE TOUCH ULTRA SYSTEM KIT) W/DEVICE KIT        Insulin Glargine-yfgn 100 UNIT/ML SOPN Inject 25 Units  "Subcutaneous At Bedtime Hold on file until needed.       Lancets (ONETOUCH DELICA PLUS [id number]) MISC 1 each by In Vitro route See Admin Instructions Hold on file until needed. 100 each 1     ORDER FOR DME Equipment being ordered:   Glucometer 1 Device 1       Allergies   Allergen Reactions     Zocor [Simvastatin - High Dose]      Bilateral hip aching        Social History     Tobacco Use     Smoking status: Never     Smokeless tobacco: Never   Substance Use Topics     Alcohol use: Yes     Alcohol/week: 1.7 standard drinks of alcohol     Types: 2 Standard drinks or equivalent per week     Comment: very little     Family History   Problem Relation Age of Onset     Cancer Mother         intestinal CA     Other Cancer Mother         cancer     Diabetes Father      Cerebrovascular Disease Father      Diabetes Brother      Other Cancer Son         GIST cancer     Other Cancer Brother         Pancreatic cancer     Diabetes Son      Melanoma No family hx of      History   Drug Use No         Objective     /64   Pulse 65   Temp 96.8  F (36  C) (Tympanic)   Resp 14   Ht 1.803 m (5' 11\")   Wt 103 kg (227 lb)   SpO2 97%   BMI 31.66 kg/m      Physical Exam    GENERAL APPEARANCE: healthy, alert and no distress    GENERAL APPEARANCE: cooperative and obese     EYES: EOMI,  PERRL     HENT: ear canals and TM's normal and nose and mouth without ulcers or lesions     NECK: no adenopathy, no asymmetry, masses, or scars and thyroid normal to palpation     RESP: lungs clear to auscultation - no rales, rhonchi or wheezes     CV: regular rates and rhythm, normal S1 S2, no S3 or S4 and no murmur, click or rub     ABDOMEN:  soft, nontender, no HSM or masses and bowel sounds normal     MS: extremities normal- no gross deformities noted, no evidence of inflammation in joints, FROM in all extremities.     SKIN: no suspicious lesions or rashes     NEURO: Normal strength and tone, sensory exam grossly normal, mentation intact and " speech normal     PSYCH: mentation appears normal. and affect normal/bright     LYMPHATICS: No cervical adenopathy    Recent Labs   Lab Test 06/12/23  0830 05/14/23  1735 01/27/23  1040 01/20/23  1201 07/12/21  0507 07/11/21  2147   HGB  --  12.9*  --  13.2*   < > 14.6   PLT  --  236  --  243   < > 305   INR  --   --   --   --   --  0.99   NA  --  136  --  136   < > 140   POTASSIUM  --  3.9  --  4.4   < > 4.5   CR  --  1.17  --  0.93   < > 1.46*   A1C 10.2*  --  9.6*  --    < >  --     < > = values in this interval not displayed.        Diagnostics:  Labs pending at this time.  Results will be reviewed when available.   Last hemoglobin a1c is 10.2% on 6/12/2023       EKG Interpretation:      Interpreted by Chris Scott MD  Time reviewed:1500   Symptoms at time of EKG: None   Rhythm: Normal sinus   Rate: Normal  Axis: Normal  Ectopy: None  Conduction: Normal  ST Segments/ T Waves: No ST-T wave changes and No acute ischemic changes  Q Waves: None  Comparison to prior: Unchanged from 7/11/2021    Clinical Impression: normal EKG    Reviewed his recent recheck of urine due to UTI about 10 days ago and the UA was negative along with micro.    Revised Cardiac Risk Index (RCRI):  The patient has the following serious cardiovascular risks for perioperative complications:   - Diabetes Mellitus (on Insulin) = 1 point     RCRI Interpretation: 1 point: Class II (low risk - 0.9% complication rate)           Signed Electronically by: Chris Scott MD  Copy of this evaluation report is provided to requesting physician.

## 2023-07-11 NOTE — ADDENDUM NOTE
Encounter addended by: Kamille Calzada on: 7/11/2023 2:51 PM   Actions taken: Visit diagnoses modified, Order list changed, Diagnosis association updated

## 2023-07-12 DIAGNOSIS — Z79.4 TYPE 2 DIABETES MELLITUS WITH BOTH EYES AFFECTED BY MILD NONPROLIFERATIVE RETINOPATHY WITHOUT MACULAR EDEMA, WITH LONG-TERM CURRENT USE OF INSULIN (H): ICD-10-CM

## 2023-07-12 DIAGNOSIS — E11.3293 TYPE 2 DIABETES MELLITUS WITH BOTH EYES AFFECTED BY MILD NONPROLIFERATIVE RETINOPATHY WITHOUT MACULAR EDEMA, WITH LONG-TERM CURRENT USE OF INSULIN (H): ICD-10-CM

## 2023-07-12 RX ORDER — LANCETS 33 GAUGE
1 EACH MISCELLANEOUS SEE ADMIN INSTRUCTIONS
Qty: 100 EACH | Refills: 1 | Status: SHIPPED | OUTPATIENT
Start: 2023-07-12 | End: 2023-10-30

## 2023-07-12 NOTE — TELEPHONE ENCOUNTER
Prescription approved per [facility] Refill Protocol     Bernadette Bradley     RN MSN

## 2023-07-13 ENCOUNTER — TELEPHONE (OUTPATIENT)
Dept: FAMILY MEDICINE | Facility: CLINIC | Age: 88
End: 2023-07-13

## 2023-07-13 NOTE — TELEPHONE ENCOUNTER
Dr Hua    Just an FYI-pt had PSA tumor marker drawn 7/5 and it looks like results didn't go to you. Just wanted you be updated with results.         Harsha Daniels RN

## 2023-07-13 NOTE — TELEPHONE ENCOUNTER
Pt calls to see if still needs lab appt in July. Looks like was scheduled for oncology PSA marker however pt was seen by another provider in July and she agreed to draw this for him. Will cancel that lab appt. Pt will have a1c drawn in sept.     Harsha Daniels RN

## 2023-07-14 ENCOUNTER — HOSPITAL ENCOUNTER (OUTPATIENT)
Dept: WOUND CARE | Facility: CLINIC | Age: 88
Discharge: HOME | End: 2023-07-14
Attending: FAMILY MEDICINE | Admitting: FAMILY MEDICINE
Payer: COMMERCIAL

## 2023-07-14 PROCEDURE — 97602 WOUND(S) CARE NON-SELECTIVE: CPT

## 2023-07-14 NOTE — DISCHARGE INSTRUCTIONS
Continue same cares until follow-up on the 27th.     I have ordered more Mepore bandages from [employer] in [place], you should get them soon.    I provided more Sureprep- I am sure if this is helping to prevent your dermatitis at all so may just try stopping it. If you want to continue this, place an order on the Internet for more (I won't order from [employer] because insurance will not cover this and they will charge more than if you just buy it yourself).     I provided more Iodosorb and cotton tipped applicators to last until next appointment.     You are healing really well.    Kamille Calzada RN, CWOCN [phone]

## 2023-07-14 NOTE — PROGRESS NOTES
[facility]    Start of Care in [facility]ound Clinic: 5/23/2023   Referring Provider: Dr. Montesinos  Primary Care Provider: Chris Scott   Wound Location: left ankle     Wound Clinic Visit:   Follow-up    Reason for Visit:       left ankle wound    Subjective/Interval History:     Pt denies concerns.      Saw Dr. Cali on the June 19th and will be having an angiogram, now scheduled for July 19th.       Wound History:   Pt initially seen in the ED on 5/14/23 for ulceration, prescribed keflex and bactrim. Had arterial ultrasounds yesterday due to PAD history including distal popliteal stenting of left leg in February of '20.       Past Medical History:  Patient Active Problem List   Diagnosis    Obesity    Transient cerebral ischemia    Hyperlipidemia LDL goal <70    GERD (gastroesophageal reflux disease)    Peripheral vascular disease (H)    Advanced directives, counseling/discussion    Malignant neoplasm of prostate (H)    Hypertension, goal below 140/90    Senile nuclear sclerosis    Type 2 diabetes mellitus with both eyes affected by mild nonproliferative retinopathy without macular edema, with long-term current use of insulin (H)    Macular degeneration (senile) of retina    H/O amputation of lesser toe, right (H)    TIA (transient ischemic attack)    Chronic kidney disease, stage 3 (H)    Dermatitis    Skin lesion    Tremor of left hand    Left leg weakness                     Tobacco Use:     Tobacco Use      Smoking status: Never      Smokeless tobacco: Never       Diabetic: yes  HgbA1C:   Hemoglobin A1C   Date Value Ref Range Status   06/12/2023 10.2 (H) 0.0 - 5.6 % Final     Comment:     Normal <5.7%   Prediabetes 5.7-6.4%    Diabetes 6.5% or higher     Note: Adopted from ADA consensus guidelines.   06/21/2021 8.4 (H) 0 - 5.6 % Final     Comment:     Normal <5.7% Prediabetes 5.7-6.4%  Diabetes 6.5% or higher - adopted from ADA   consensus guidelines.       Checks Blood Glucose?: yes   Average Readings: states usually in low 100's so he is surprised at high HgbA1C.       Personal/social history:  Lives with spouse who does help with bandaging. 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   Last changed: Yesterday      Wound #1 Left ankle, medial    1st photo from ED visit on 5/14/23            Pre and post conservative debridement.    Stage/tissue depth: full thickness  2.8 (was 3.4) cm L x 2.5 (was 2.9) cm W x 0.3 cm estimated D  Tunneling: no  Undermining: no  Wound bed type/amount:  following conservative debridement is about 10% white, 90% granulation; non-fluctuant  Wound Edges: attached  Periwound: irregular mild pinkness, 1-1.5cm not warm to touch and consistent with prior visits, some dermatitis of ankle area  Drainage: Iodosorb has some lost color and there appears to be small to moderate serosanguinous drainage with some strike through to Mepore pro since yesterday.  Odor: no  Pain: denies    Dorsalis Pedal Pulse: palpable: no, edema in area   Posterior Tibial Pulse: palpable: no, edema in area   Hair growth: sparse upper lower leg  Capillary Refill: 2 seconds  Feet/toes color: pink, warm  R Leg: Edema trace to 1+  L Leg: Edema trace to 1+ lower leg to ankle, trace in foot.  Ankle 25.5 (not remeasured)  Calf 38 (not remeasured)    RENATE 5/22/23:   FINDINGS:  Right RENATE:   DP: 0.49  PT: 0.55.     Left RENATE:   DP: 0.95   PT: 0.69.     Right Digital brachial index: 0.43, 62 mmHg.  Left Digital Brachial index: 0.55, 79 mmHg     Waveforms: Monophasic in the distal tibial arteries    US LOWER EXTREMITY ARTERIAL DUPLEX LEFT  5/22/2023 2:00 PM      HISTORY:  Peripheral vascular disease. Left lower extremity ulcer.     COMPARISON: None     FINDINGS: Color Doppler and spectral waveform analysis performed.     The following peak systolic velocities are measured in  cm/s.      LEFT     CFA: 111  PFA: 50  SFA, proximal: 89  SFA, mid: 69  SFA, distal: 84  Popliteal: 61  Anterior tibial artery proximal: 149  Posterior tibial artery: 26  Peroneal artery mid: 80     Waveforms: Multiphasic waveforms are identified in the visualized  arteries.                                                                      IMPRESSION: Per sonographic velocity criteria, no definite significant  stenosis identified.                                                                      IMPRESSION: Moderate arterial insufficiency in the right lower  extremity. RENATE in the left lower extremity is in the range of normal.  Digital brachial indices would indicate adequate wound healing  potential.       Mobility: independant  Current offloading/footwear: diabetic shoes and inserts appear to fit appropriately without rubbing area  Sensation: some neuropathy of feet but can feel light touch in area    Other callusing/areas of concern: none noted       Diet: eating moderate amount of sweets.       Assessment:  Left ankle full thickness necrotic ulceration of uncertain etiology, location suspicious for venous ulceration given presence of lower leg edema    Eschar is resolved and wound continues to debride, now nearly fully granular     Edema is improved since initiation of tubular support    History of PAD though per arterial studies no concerns identified is left lower leg, does have stent in distal popliteal area, angiogram to be done     Factors impacting wound healing:     Delayed healing as part of normal aging process  Reduced Blood Supply: inadequate perfusion to heal wound  Obesity: decreases tissue perfusion  Underlying Disease: diabetes mellitis, poor control      Plan:  Some conservative sharp debridement done using adson's and #15 blade, no bleeding. Area continues to debride autolytically. Current therapy working well to promote autolytic debridement and granulation.  Continue Iodosorb gel and  "triad paste about 75% Iodosorb and 25% triad. Cover with Mepore pro. Change daily.    Continue conservative compression using spandagrip OR his compression stockings, (these are lower compression - 15-20 at the most so he can get them on).     Pt has topical cream for dermatitis, suggested he use on ankle. Continue skin barrier wipe under adhesive.       Have discussed elevation and activity for edema control. Per son he has \"not been elevating\" (prior visit, states is trying).      Prior visit- Discussed need to limit sweets and work with provider on BG control. Suggested he inquire about a continuous BG meter to help better understand diet in relation to BG and try to get better control, upcoming visit with diabetes ed but this is not until August 1st.      Discussed/Education provided: plan of care with rationale      Topical care: Wound/surrounding skin cleansed with Vashe and gauze. Patted dry. bandaged per plan of care    Additional recommendations: improved blood glucose control, increased dietary protein    The following discharge instructions were reviewed with and sent home with the patient:  See AVS    Follow up: 7/13/23    Please call with any questions or concerns.    The following supplies were sent home with the patient:  Iodosorb and a few Mepore pro bandages, sureprep, a few cotton tipped applicators. Mepore pro has been ordered from [employer] prior to this visit, not yet arrived    Verbal, written, & demonstrative education provided.  Face to face time: approximately 40 minutes  Procedure: conservative debridement    Kamille Calzada RN, CWOCN   "

## 2023-07-19 ENCOUNTER — HOSPITAL ENCOUNTER (OUTPATIENT)
Facility: CLINIC | Age: 88
Discharge: HOME | End: 2023-07-19
Admitting: SURGERY
Payer: COMMERCIAL

## 2023-07-19 ENCOUNTER — APPOINTMENT (OUTPATIENT)
Dept: INTERVENTIONAL RADIOLOGY/VASCULAR | Facility: CLINIC | Age: 88
End: 2023-07-19
Attending: SURGERY
Payer: COMMERCIAL

## 2023-07-19 ENCOUNTER — APPOINTMENT (OUTPATIENT)
Dept: SURGERY | Facility: PHYSICIAN GROUP | Age: 88
End: 2023-07-19
Payer: COMMERCIAL

## 2023-07-19 VITALS
SYSTOLIC BLOOD PRESSURE: 134 MMHG | HEART RATE: 75 BPM | OXYGEN SATURATION: 98 % | RESPIRATION RATE: 16 BRPM | HEIGHT: 71 IN | BODY MASS INDEX: 31.63 KG/M2 | DIASTOLIC BLOOD PRESSURE: 78 MMHG | TEMPERATURE: 97.5 F | WEIGHT: 225.9 LBS

## 2023-07-19 DIAGNOSIS — I70.222 CRITICAL LIMB ISCHEMIA OF LEFT LOWER EXTREMITY (H): Primary | ICD-10-CM

## 2023-07-19 LAB
ANION GAP SERPL CALCULATED.3IONS-SCNC: 10 MMOL/L (ref 7–15)
APTT PPP: 28 SECONDS (ref 22–38)
BUN SERPL-MCNC: 33.9 MG/DL (ref 8–23)
CALCIUM SERPL-MCNC: 9.1 MG/DL (ref 8.8–10.2)
CHLORIDE SERPL-SCNC: 104 MMOL/L (ref 98–107)
CHOLEST SERPL-MCNC: 116 MG/DL
CREAT SERPL-MCNC: 1.09 MG/DL (ref 0.67–1.17)
DEPRECATED HCO3 PLAS-SCNC: 26 MMOL/L (ref 22–29)
ERYTHROCYTE [DISTWIDTH] IN BLOOD BY AUTOMATED COUNT: 13.6 % (ref 10–15)
GFR SERPL CREATININE-BSD FRML MDRD: 66 ML/MIN/1.73M2
GLUCOSE SERPL-MCNC: 206 MG/DL (ref 70–99)
HBA1C MFR BLD: 10.3 %
HCT VFR BLD AUTO: 39.3 % (ref 40–53)
HDLC SERPL-MCNC: 52 MG/DL
HGB BLD-MCNC: 13.1 G/DL (ref 13.3–17.7)
INR PPP: 0.98 (ref 0.85–1.15)
LDLC SERPL CALC-MCNC: 53 MG/DL
MCH RBC QN AUTO: 29.9 PG (ref 26.5–33)
MCHC RBC AUTO-ENTMCNC: 33.3 G/DL (ref 31.5–36.5)
MCV RBC AUTO: 90 FL (ref 78–100)
NONHDLC SERPL-MCNC: 64 MG/DL
PLATELET # BLD AUTO: 243 10E3/UL (ref 150–450)
POTASSIUM SERPL-SCNC: 4.2 MMOL/L (ref 3.4–5.3)
RBC # BLD AUTO: 4.38 10E6/UL (ref 4.4–5.9)
SODIUM SERPL-SCNC: 140 MMOL/L (ref 136–145)
TRIGL SERPL-MCNC: 53 MG/DL
WBC # BLD AUTO: 8.1 10E3/UL (ref 4–11)

## 2023-07-19 PROCEDURE — 80061 LIPID PANEL: CPT | Performed by: SURGERY

## 2023-07-19 PROCEDURE — C1760 CLOSURE DEV, VASC: HCPCS

## 2023-07-19 PROCEDURE — 272N000567 HC SHEATH CR4

## 2023-07-19 PROCEDURE — 999N000012 HC STATISTIC ANGIOGRAM, STENT, VERTEBRO PLASTY

## 2023-07-19 PROCEDURE — 36247 INS CATH ABD/L-EXT ART 3RD: CPT

## 2023-07-19 PROCEDURE — 99152 MOD SED SAME PHYS/QHP 5/>YRS: CPT | Performed by: SURGERY

## 2023-07-19 PROCEDURE — C1769 GUIDE WIRE: HCPCS

## 2023-07-19 PROCEDURE — 85610 PROTHROMBIN TIME: CPT | Performed by: SURGERY

## 2023-07-19 PROCEDURE — 272N000124 HC CATH CR11

## 2023-07-19 PROCEDURE — 75710 ARTERY X-RAYS ARM/LEG: CPT | Mod: LT

## 2023-07-19 PROCEDURE — 272N000196 HC ACCESSORY CR5

## 2023-07-19 PROCEDURE — 36415 COLL VENOUS BLD VENIPUNCTURE: CPT | Performed by: SURGERY

## 2023-07-19 PROCEDURE — 36591 DRAW BLOOD OFF VENOUS DEVICE: CPT

## 2023-07-19 PROCEDURE — 76937 US GUIDE VASCULAR ACCESS: CPT

## 2023-07-19 PROCEDURE — 255N000002 HC RX 255 OP 636: Performed by: SURGERY

## 2023-07-19 PROCEDURE — 85027 COMPLETE CBC AUTOMATED: CPT | Performed by: SURGERY

## 2023-07-19 PROCEDURE — 258N000003 HC RX IP 258 OP 636: Performed by: SURGERY

## 2023-07-19 PROCEDURE — 75774 ARTERY X-RAY EACH VESSEL: CPT | Mod: XS

## 2023-07-19 PROCEDURE — 250N000011 HC RX IP 250 OP 636: Performed by: SURGERY

## 2023-07-19 PROCEDURE — 250N000009 HC RX 250: Performed by: SURGERY

## 2023-07-19 PROCEDURE — 75710 ARTERY X-RAYS ARM/LEG: CPT | Mod: 26 | Performed by: SURGERY

## 2023-07-19 PROCEDURE — 85730 THROMBOPLASTIN TIME PARTIAL: CPT | Performed by: SURGERY

## 2023-07-19 PROCEDURE — 272N000116 HC CATH CR1

## 2023-07-19 PROCEDURE — 99152 MOD SED SAME PHYS/QHP 5/>YRS: CPT

## 2023-07-19 PROCEDURE — 80048 BASIC METABOLIC PNL TOTAL CA: CPT | Performed by: SURGERY

## 2023-07-19 PROCEDURE — 36247 INS CATH ABD/L-EXT ART 3RD: CPT | Performed by: SURGERY

## 2023-07-19 PROCEDURE — 83036 HEMOGLOBIN GLYCOSYLATED A1C: CPT | Performed by: SURGERY

## 2023-07-19 PROCEDURE — 76937 US GUIDE VASCULAR ACCESS: CPT | Mod: 26 | Performed by: SURGERY

## 2023-07-19 RX ORDER — FENTANYL CITRATE 50 UG/ML
25-50 INJECTION, SOLUTION INTRAMUSCULAR; INTRAVENOUS EVERY 5 MIN PRN
Status: DISCONTINUED | OUTPATIENT
Start: 2023-07-19 | End: 2023-07-19 | Stop reason: HOSPADM

## 2023-07-19 RX ORDER — HEPARIN SODIUM 200 [USP'U]/100ML
1 INJECTION, SOLUTION INTRAVENOUS CONTINUOUS PRN
Status: DISCONTINUED | OUTPATIENT
Start: 2023-07-19 | End: 2023-07-19 | Stop reason: HOSPADM

## 2023-07-19 RX ORDER — NALOXONE HYDROCHLORIDE 0.4 MG/ML
0.2 INJECTION, SOLUTION INTRAMUSCULAR; INTRAVENOUS; SUBCUTANEOUS
Status: DISCONTINUED | OUTPATIENT
Start: 2023-07-19 | End: 2023-07-19 | Stop reason: HOSPADM

## 2023-07-19 RX ORDER — SODIUM CHLORIDE 9 MG/ML
INJECTION, SOLUTION INTRAVENOUS CONTINUOUS
Status: DISCONTINUED | OUTPATIENT
Start: 2023-07-19 | End: 2023-07-19 | Stop reason: HOSPADM

## 2023-07-19 RX ORDER — NITROGLYCERIN 5 MG/ML
100-500 VIAL (ML) INTRAVENOUS EVERY 5 MIN PRN
Status: DISCONTINUED | OUTPATIENT
Start: 2023-07-19 | End: 2023-07-19 | Stop reason: HOSPADM

## 2023-07-19 RX ORDER — NALOXONE HYDROCHLORIDE 0.4 MG/ML
0.4 INJECTION, SOLUTION INTRAMUSCULAR; INTRAVENOUS; SUBCUTANEOUS
Status: DISCONTINUED | OUTPATIENT
Start: 2023-07-19 | End: 2023-07-19 | Stop reason: HOSPADM

## 2023-07-19 RX ORDER — FLUMAZENIL 0.1 MG/ML
0.2 INJECTION, SOLUTION INTRAVENOUS
Status: DISCONTINUED | OUTPATIENT
Start: 2023-07-19 | End: 2023-07-19 | Stop reason: HOSPADM

## 2023-07-19 RX ORDER — LIDOCAINE 40 MG/G
CREAM TOPICAL
Status: DISCONTINUED | OUTPATIENT
Start: 2023-07-19 | End: 2023-07-19 | Stop reason: HOSPADM

## 2023-07-19 RX ORDER — IODIXANOL 320 MG/ML
50 INJECTION, SOLUTION INTRAVASCULAR ONCE
Status: COMPLETED | OUTPATIENT
Start: 2023-07-19 | End: 2023-07-19

## 2023-07-19 RX ADMIN — MIDAZOLAM 0.5 MG: 1 INJECTION INTRAMUSCULAR; INTRAVENOUS at 10:22

## 2023-07-19 RX ADMIN — FENTANYL CITRATE 25 MCG: 50 INJECTION, SOLUTION INTRAMUSCULAR; INTRAVENOUS at 10:22

## 2023-07-19 RX ADMIN — HEPARIN SODIUM 3 BAG: 200 INJECTION, SOLUTION INTRAVENOUS at 09:51

## 2023-07-19 RX ADMIN — MIDAZOLAM 1 MG: 1 INJECTION INTRAMUSCULAR; INTRAVENOUS at 10:00

## 2023-07-19 RX ADMIN — LIDOCAINE HYDROCHLORIDE 10 ML: 10 INJECTION, SOLUTION INFILTRATION; PERINEURAL at 10:08

## 2023-07-19 RX ADMIN — FENTANYL CITRATE 25 MCG: 50 INJECTION, SOLUTION INTRAMUSCULAR; INTRAVENOUS at 10:44

## 2023-07-19 RX ADMIN — MIDAZOLAM 0.5 MG: 1 INJECTION INTRAMUSCULAR; INTRAVENOUS at 10:44

## 2023-07-19 RX ADMIN — NITROGLYCERIN 300 MCG: 10 INJECTION INTRAVENOUS at 10:42

## 2023-07-19 RX ADMIN — IODIXANOL 48 ML: 320 INJECTION, SOLUTION INTRAVASCULAR at 10:45

## 2023-07-19 RX ADMIN — FENTANYL CITRATE 50 MCG: 50 INJECTION, SOLUTION INTRAMUSCULAR; INTRAVENOUS at 10:00

## 2023-07-19 RX ADMIN — SODIUM CHLORIDE: 9 INJECTION, SOLUTION INTRAVENOUS at 08:56

## 2023-07-19 ASSESSMENT — ACTIVITIES OF DAILY LIVING (ADL)
ADLS_ACUITY_SCORE: 35

## 2023-07-19 NOTE — DISCHARGE INSTRUCTIONS
Peripheral Angiogram Discharge Instructions - Femoral     After you go home:    Have an adult stay with you until tomorrow.  Drink extra fluids for 2 days.  You may resume your normal diet.  No smoking       For 24 hours - due to the sedation you received:  Relax and take it easy.  Do NOT make any important or legal decisions.  Do NOT drive or operate machines at home or at work.  Do NOT drink alcohol.    Care of Groin Puncture Site:    For the first 24 hrs - check the puncture site every 1-2 hours while awake.  For 2 days, when you cough, sneeze, laugh or move your bowels, hold your hand over the puncture site and press firmly.  Remove the bandaid after 24 hours. If there is minor oozing, apply another bandaid and remove it after 12 hours.  It is normal to have a small bruise or pea size lump at the site.  You may shower tomorrow.  Do NOT take a bath, or use a hot tub or pool for at least 3 days. Do NOT scrub the site. Do not use lotion or powder near the puncture site.     Activity:            For 2 days:  No stooping or squatting  Do NOT do any heavy activity such as exercise, lifting, or straining.   No housework, yard work or any activity that make you sweat  Do NOT lift more than 10 pounds    Bleeding:    If you start bleeding from the site in your groin, lie down flat and press firmly on/above the site for 10 minutes.   Once bleeding stops, lay flat for 2 hours.   Call the [facility] as soon as you can.       Call 911 right away if you have heavy bleeding or bleeding that does not stop.      Medicines:    If you are taking an antiplatelet medication such as Plavix, do not stop taking it until you talk to your provider.     Take your medications, including blood thinners, unless your provider tells you not to.    If you have stopped any medicines, check with your provider about when to restart them.        Follow Up Appointments:    Follow up with [facility] as directed.    Call the  clinic if:    You have increased pain or a large or growing hard lump around the site.  The site is red, swollen, hot or tender.  Blood or fluid is draining from the site.  You have chills or a fever greater than 101 F (38 C).  Your leg feels numb, cool or changes color.  You have hives, a rash or unusual itching.  New pain in the back or belly that you cannot control with Tylenol.  Any questions or concerns      If you have questions or your original symptoms do not improve, call:         [facility] @ [phone]

## 2023-07-19 NOTE — IR NOTE
Patient Name: Fawad Garcia  Medical Record Number: [id number]  Today's Date: 7/19/2023    Procedure: Left Lower Extremity Angiogram  Proceduralist: Dr. Cali  Pathology present: no    Procedure Start: 0959  Procedure end: 1054  Sedation medications administered: Last Dose: 1044, Fentanyl 100 mcg, Versed 2 mg, Lidocaine 10 ml's.    Report given to: BLAYNE Dalton RN  : no    Other Notes: Pt will require 2 bedrest post procedure. Pt arrived to IR room 2 from  3. Consent reviewed. Pt denies any questions or concerns regarding procedure. Pt positioned supine and monitored per protocol. Pt tolerated procedure without any noted complications.

## 2023-07-19 NOTE — PROGRESS NOTES
Pt discharged via wheelchair to private vehicle at this time. No pain or complaints at time of discharge.     Krystle Truong RN on 7/19/2023 at 4:23 PM

## 2023-07-19 NOTE — PRE-PROCEDURE
GENERAL PRE-PROCEDURE:   Procedure:  LEFT LOWER EXTREMITY ARTERIOGRAM WITH POSSIBLE INTERVENTION   Date/Time:  7/19/2023 9:29 AM    Verbal consent obtained?: No    Written consent obtained?: Yes    Risks and benefits: Risks, benefits and alternatives were discussed    Consent given by:  Patient  Patient states understanding of procedure being performed: Yes    Patient's understanding of procedure matches consent: Yes    Procedure consent matches procedure scheduled: Yes    Expected level of sedation:  Moderate  Appropriately NPO:  Yes  ASA Class:  3  Mallampati  :  Grade 2- soft palate, base of uvula, tonsillar pillars, and portion of posterior pharyngeal wall visible  Lungs:  Lungs clear with good breath sounds bilaterally  Heart:  Normal heart sounds and rate  History & Physical reviewed:  History and physical reviewed and no updates needed  Statement of review:  I have reviewed the lab findings, diagnostic data, medications, and the plan for sedation

## 2023-07-19 NOTE — PROGRESS NOTES
Care Suites Post Procedure Note    Patient Information  Name: Fawad Garcia  Age: 87 year old    Post Procedure  Time patient returned to Care Suites: 1100  Concerns/abnormal assessment: none at this time  If abnormal assessment, provider notified: N/A  Plan/Other: Proceed as planned per orders. R) groin site CDI, CMS intact    Krystle Truong RN

## 2023-07-19 NOTE — PROGRESS NOTES
Care Suites Admission Nursing Note    Patient Information  Name: Fawad Garcia  Age: 87 year old  Reason for admission: lower extremity angiogram  Care Suites arrival time: 0825    Visitor Information  Name: Mahin sebastian     Patient Admission/Assessment   Pre-procedure assessment complete: Yes  If abnormal assessment/labs, provider notified: N/A  NPO: Yes  Medications held per instructions/orders: N/A  Consent: obtained  Patient oriented to room: Yes  Education/questions answered: Yes  Plan/other: Proceed as planned per orders    Discharge Planning  Discharge name/phone number: Mahin sebastian [phone]  Overnight post sedation caregiver: Mahin  Discharge location: home    Krystle Truong RN

## 2023-07-19 NOTE — PROGRESS NOTES
Care Suites Discharge Nursing Note    Patient Information  Name: Fawad Garcia  Age: 87 year old    Discharge Education:  Discharge instructions reviewed: Yes  Additional education/resources provided: n/a  Patient/patient representative verbalizes understanding: Yes  Patient discharging on new medications: No  Medication education completed: N/A    Pt has ambulated, voided, and is tolerating PO foods/fluids without issue. Son, Mahin, is unable to pick pt up until later this afternoon. Pt resting comfortably in room. Given word search to pass time. No pain or complaints at this time.     rKystle Truong RN

## 2023-07-20 ENCOUNTER — TELEPHONE (OUTPATIENT)
Dept: OTHER | Facility: CLINIC | Age: 88
End: 2023-07-20
Payer: COMMERCIAL

## 2023-07-20 NOTE — TELEPHONE ENCOUNTER
----- Message from Ernie Cali MD sent at 7/19/2023 11:55 AM CDT -----  Regarding: follow up  Did not do an intervention. No follow up is needed.   Fred     Called pt, left vm noting RENATE and OV cancellation and reason cancelled,   Pt to follow up with wound clinic as directed by wound care MD.   Requested pt call back to confirm receipt of voice message.     GUADALUPE DamianN, RN  [facility]  Office:  [phone] Fax: [phone]

## 2023-07-21 ENCOUNTER — HOSPITAL ENCOUNTER (OUTPATIENT)
Dept: CT IMAGING | Facility: CLINIC | Age: 88
Discharge: HOME | End: 2023-07-21
Attending: NURSE PRACTITIONER | Admitting: NURSE PRACTITIONER
Payer: COMMERCIAL

## 2023-07-21 DIAGNOSIS — R91.8 PULMONARY NODULES: ICD-10-CM

## 2023-07-21 DIAGNOSIS — C61 PROSTATE CANCER (H): ICD-10-CM

## 2023-07-21 PROCEDURE — 250N000011 HC RX IP 250 OP 636: Performed by: RADIOLOGY

## 2023-07-21 PROCEDURE — 250N000009 HC RX 250: Performed by: RADIOLOGY

## 2023-07-21 PROCEDURE — 71260 CT THORAX DX C+: CPT

## 2023-07-21 RX ORDER — IOPAMIDOL 755 MG/ML
100 INJECTION, SOLUTION INTRAVASCULAR ONCE
Status: COMPLETED | OUTPATIENT
Start: 2023-07-21 | End: 2023-07-21

## 2023-07-21 RX ADMIN — IOPAMIDOL 100 ML: 755 INJECTION, SOLUTION INTRAVENOUS at 11:03

## 2023-07-21 RX ADMIN — SODIUM CHLORIDE 68 ML: 9 INJECTION, SOLUTION INTRAVENOUS at 11:03

## 2023-07-24 ENCOUNTER — OFFICE VISIT (OUTPATIENT)
Dept: RADIATION THERAPY | Facility: OUTPATIENT CENTER | Age: 88
End: 2023-07-24
Payer: COMMERCIAL

## 2023-07-24 VITALS
OXYGEN SATURATION: 95 % | WEIGHT: 224.8 LBS | DIASTOLIC BLOOD PRESSURE: 71 MMHG | HEART RATE: 66 BPM | BODY MASS INDEX: 31.35 KG/M2 | SYSTOLIC BLOOD PRESSURE: 115 MMHG

## 2023-07-24 DIAGNOSIS — C61 MALIGNANT NEOPLASM OF PROSTATE (H): Primary | ICD-10-CM

## 2023-07-24 NOTE — LETTER
2023         RE: Fawad Garcia  [address] [facility] [address]        Dear Colleague,    Thank you for referring your patient, Fawad Garcia, to the [facility]. Please see a copy of my visit note below.       Department of Radiation Oncology  Radiation Therapy Center  [facility]  [place] [id number]  [phone]       Radiation Oncology Follow-up Visit  23    Fawad Garcia  MRN: [id number]   : 1935     DISEASE TREATED: High-risk prostate cancer. Pretreatment PSA 25.5, [place] score of 4+3=7, clinical stage [id number].     RADIATION THERAPY GIVEN: 8041 cGy in 43 planned treatments, via IMRT     INTERVAL SINCE COMPLETION OF THERAPY: 9 years since completion on 02/10/2012.     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 He underwent biopsies in  and  and pathology was negative for malignancy. Due to the persistently rising PSA Dr. Mcintyre performed an MRI-guided biopsy of the prostate gland which revealed [place] 4+3= 7 disease in 2 of 2 cores with 90% of the cores being positive in the right inferior base and right inferior mid gland. Androgen ablation was discussed with the patient due to his high-risk disease and the patient declined due to comorbidities of the drug. Overall, he completed radiotherapy with very little toxicity.     SUBJECTIVE:   Fawad Garcia is a 87 year old male who is scheduled today for routine follow-up.      PSA on 23 was 3.68, up from up prior value of 2.55 (zohaib =0.24).    The patient  has undergone a PSMA PET scan on 22 which demonstrated uptake in the prostate gland.  No clear evidence of regional or distant disease noted.  There was indeterminate 9 mm left lung nodule.     The patient has met with medical oncology team (  Linus) rediscussed observation versus consideration of initiation of ADT.  Currently continue observation with PSA lab work.    Follow-up CT chest on 1/20/23 to evaluate previously noted left lingular mass was stable.    Today, he denies any pressing issues or complaints and reports that all symptoms have essentially remained stable since his last visit with us 6 months ago. AUA and MARINA not completed today (TC). Prior  AUA 18/35 (patient reported overall stable) and MARINA was 0/25. Main urinary symptoms continue to be nocturia and urgency. Patient states symptoms are worse with increased intake of fluids. Denies drinking alcohol, and has minimal caffeine. He denies any dysuria, hematuria, hematochezia, diarrhea, or loose stools.  He denies any pain or swelling.     Energy level is good and baseline.              Current Outpatient Medications   Medication    amLODIPine (NORVASC) 5 MG tablet    Blood Glucose Monitoring Suppl (ONE TOUCH ULTRA SYSTEM KIT) W/DEVICE KIT    clopidogrel (PLAVIX) 75 MG tablet    dorzolamide-timolol (COSOPT) 2-0.5 % ophthalmic solution    hydrochlorothiazide (HYDRODIURIL) 25 MG tablet    insulin glargine (LANTUS SOLOSTAR) 100 UNIT/ML pen    insulin pen needle (B-D U/F) 31G X 8 MM miscellaneous    levofloxacin (LEVAQUIN) 500 MG tablet    lisinopril (PRINIVIL/ZESTRIL) 40 MG tablet    metFORMIN (GLUCOPHAGE) 500 MG tablet    Multiple Vitamins-Minerals (PRESERVISION AREDS PO)    ONETOUCH LANCETS [facility]    ONETOUCH ULTRA test strip    order for DME    ORDER FOR DME    pantoprazole (PROTONIX) 20 MG EC tablet    rosuvastatin (CRESTOR) 10 MG tablet    sulfamethoxazole-trimethoprim (BACTRIM DS) 800-160 MG tablet     No current facility-administered medications for this visit.           Allergies   Allergen Reactions    Zocor [Simvastatin - High Dose]      Bilateral hip aching       Past Medical History:   Diagnosis Date    Diabetes mellitus (H)     Hypertension     Squamous cell carcinoma           PHYSICAL EXAM:  /71 (BP Location: Left arm, Patient Position: Sitting, Cuff Size: Adult Regular)   Pulse 66   Wt 102 kg (224 lb 12.8 oz)   SpO2 95%   BMI 31.35 kg/m    No apparent distress       LABS AND IMAGING:  Reviewed.  PSA   Date Value Ref Range Status   2021 1.78 0 - 4 ug/L Final     Comment:     Assay Method:  Chemiluminescence using Siemens Vista analyzer   2020 1.53 0 - 4 ug/L Final     Comment:     Assay Method:  Chemiluminescence using Siemens Vista analyzer   2020 1.23 0 - 4 ug/L Final     Comment:     Assay Method:  Chemiluminescence using Siemens Vista analyzer   2019 1.08 0 - 4 ug/L Final     Comment:     Assay Method:  Chemiluminescence using Siemens Vista analyzer   03/15/2019 1.10 0 - 4 ug/L Final     Comment:     Assay Method:  Chemiluminescence using Siemens Vista analyzer     PSA Tumor Marker   Date Value Ref Range Status   2023 3.68 ng/mL Final     Comment:     No reference ranges have been established for patients over 80 years.   2023 2.55 ng/mL Final     Comment:     No reference ranges have been established for patients over 80 years.   2022 2.71 0.00 - 4.00 ug/L Final   2022 2.49 0.00 - 4.00 ug/L Final   2021 1.88 0.00 - 4.00 ug/L Final     IMAGIN22    PSMA PET    IMPRESSION:  1. Focal FDG uptake at the mid/basal anterior central prostate gland,  suspicious for residual prostate cancer.   2. No evidence of metastasis in the body.  3. 9 mm pulmonary nodule in the left upper lobe. Recommend follow-up  chest CT in 3-6 months.    23  CTC   IMPRESSION:   Stable 10 mm nodule in the central aspect of the left upper lobe. This  has been stable for approximately 9 months at this point. Continued  follow-up is recommended.    IMPRESSION:   Mr. Garcia is a 87 year old male with a high-risk prostate cancer. Pretreatment PSA 25.5, [place] score of 4+3=7, clinical stage [id number]. Complete RT 2/10/2012,  8041 cGy in 43  planned treatments, via IMRT.       PLAN:     1. PSA has met [place] definition of biochemical failure (2.0 above zohaib with zohaib of 0.24).The patient underwent a PSMA PET scan on 4/8/22 which demonstrated uptake in the prostate gland.  No clear evidence of regional or distant disease noted.  There was indeterminate 9 mm left lung nodule. Follow up CT chest on 1/20/23 to evaluate previously noted left lingular mass was stable.      2. I re-discussed with the patient that it appears he has locally recurrent disease without evidence of regional or distant spread.  I discussed different options including observation versus ADT versus consideration of local retreatment in the form of reirradiation.  Given prior radiation course, I discussed additional radiation would likely come at the risk of significant toxicity to rectum and urethra. The patient has met with medical oncology team (Dr. Barriga) and discussed observation versus consideration of initiation of ADT.  Currently plan is to continue observation with PSA lab work. His PSA most recently david at a slightly higher rate, although absolute value does not remain very high. I therefore discussed  discussed that initiation of ADT could be consider. I also discussed that  given the patient's age and absence of clinical symptoms at this point, observation would not be unreasonable as well.     3. The patient will continue to follow up with our medical oncology colleagues for oncologic surveillance. Would defer start of ADT and further labwork to our medical oncology colleagues.     4. No late GI/ toxicity.    5. RTC in 6 months.        Kieran Hua M.D.  Department of Radiation Oncology  [facility]

## 2023-07-24 NOTE — NURSING NOTE
FOLLOW-UP VISIT    Patient Name: Fawad Garcia      : 1935     Age: 87 year old        ______________________________________________________________________________     Chief Complaint   Patient presents with     Radiation Therapy     Follow up visit with Dr. Hua     /71 (BP Location: Left arm, Patient Position: Sitting, Cuff Size: Adult Regular)   Pulse 66   Wt 102 kg (224 lb 12.8 oz)   SpO2 95%   BMI 31.35 kg/m       Date Radiation Completed: 2/10/2012    Pain  Denies    Meds  Current Med List Reviewed: Yes  Medication Note:     AUA: AUA Score: 25 (23 1500)  MARINA:      PSA   Date Value Ref Range Status   2021 1.78 0 - 4 ug/L Final     Comment:     Assay Method:  Chemiluminescence using Siemens Vista analyzer   2020 1.53 0 - 4 ug/L Final     Comment:     Assay Method:  Chemiluminescence using Siemens Vista analyzer   2020 1.23 0 - 4 ug/L Final     Comment:     Assay Method:  Chemiluminescence using Siemens Vista analyzer   2019 1.08 0 - 4 ug/L Final     Comment:     Assay Method:  Chemiluminescence using Siemens Vista analyzer   03/15/2019 1.10 0 - 4 ug/L Final     Comment:     Assay Method:  Chemiluminescence using Siemens Vista analyzer   2018 0.79 0 - 4 ug/L Final     Comment:     Assay Method:  Chemiluminescence using Siemens Vista analyzer     PSA Tumor Marker   Date Value Ref Range Status   2023 3.68 ng/mL Final     Comment:     No reference ranges have been established for patients over 80 years.   2023 2.55 ng/mL Final     Comment:     No reference ranges have been established for patients over 80 years.   2022 2.71 0.00 - 4.00 ug/L Final   2022 2.49 0.00 - 4.00 ug/L Final   2021 1.88 0.00 - 4.00 ug/L Final       Bowel: Normal    Bladder: frequency and takes time to fully empty bladder  Nocturia: 3 - Once every 3 hours    Energy Level: low    Appointments:   Urologist:   Medical oncology : Dr. Barriga    23      Other Notes:

## 2023-07-24 NOTE — PROGRESS NOTES
Department of Radiation Oncology  Radiation Therapy Center  [facility] [place] [address]  [phone]       Radiation Oncology Follow-up Visit  23    Fawad Garcia  MRN: [id number]   : 1935     DISEASE TREATED: High-risk prostate cancer. Pretreatment PSA 25.5, Chaka score of 4+3=7, clinical stage [id number].     RADIATION THERAPY GIVEN: 8041 cGy in 43 planned treatments, via IMRT     INTERVAL SINCE COMPLETION OF THERAPY: 9 years since completion on 02/10/2012.     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 He underwent biopsies in  and  and pathology was negative for malignancy. Due to the persistently rising PSA Dr. Mcintyre performed an MRI-guided biopsy of the prostate gland which revealed Chaka 4+3= 7 disease in 2 of 2 cores with 90% of the cores being positive in the right inferior base and right inferior mid gland. Androgen ablation was discussed with the patient due to his high-risk disease and the patient declined due to comorbidities of the drug. Overall, he completed radiotherapy with very little toxicity.     SUBJECTIVE:   Fawad Garcia is a 87 year old male who is scheduled today for routine follow-up.      PSA on 23 was 3.68, up from up prior value of 2.55 (zohaib =0.24).    The patient  has undergone a PSMA PET scan on 22 which demonstrated uptake in the prostate gland.  No clear evidence of regional or distant disease noted.  There was indeterminate 9 mm left lung nodule.     The patient has met with medical oncology team (Dr. Barriga) rediscussed observation versus consideration of initiation of ADT.  Currently continue observation with PSA lab work.    Follow-up CT chest on 23 to evaluate previously noted left lingular mass was stable.    Today, he denies any pressing issues or  complaints and reports that all symptoms have essentially remained stable since his last visit with us 6 months ago. AUA and MARINA not completed today (TC). Prior  AUA 18/35 (patient reported overall stable) and MARINA was 0/25. Main urinary symptoms continue to be nocturia and urgency. Patient states symptoms are worse with increased intake of fluids. Denies drinking alcohol, and has minimal caffeine. He denies any dysuria, hematuria, hematochezia, diarrhea, or loose stools.  He denies any pain or swelling.     Energy level is good and baseline.              Current Outpatient Medications   Medication     amLODIPine (NORVASC) 5 MG tablet     Blood Glucose Monitoring Suppl (ONE TOUCH ULTRA SYSTEM KIT) W/DEVICE KIT     clopidogrel (PLAVIX) 75 MG tablet     dorzolamide-timolol (COSOPT) 2-0.5 % ophthalmic solution     hydrochlorothiazide (HYDRODIURIL) 25 MG tablet     insulin glargine (LANTUS SOLOSTAR) 100 UNIT/ML pen     insulin pen needle (B-D U/F) 31G X 8 MM miscellaneous     levofloxacin (LEVAQUIN) 500 MG tablet     lisinopril (PRINIVIL/ZESTRIL) 40 MG tablet     metFORMIN (GLUCOPHAGE) 500 MG tablet     Multiple Vitamins-Minerals (PRESERVISION AREDS PO)     ONETOUCH LANCETS [facility]     ONETOUCH ULTRA test strip     order for DME     ORDER FOR DME     pantoprazole (PROTONIX) 20 MG EC tablet     rosuvastatin (CRESTOR) 10 MG tablet     sulfamethoxazole-trimethoprim (BACTRIM DS) 800-160 MG tablet     No current facility-administered medications for this visit.           Allergies   Allergen Reactions     Zocor [Simvastatin - High Dose]      Bilateral hip aching       Past Medical History:   Diagnosis Date     Diabetes mellitus (H)      Hypertension      Squamous cell carcinoma          PHYSICAL EXAM:  /71 (BP Location: Left arm, Patient Position: Sitting, Cuff Size: Adult Regular)   Pulse 66   Wt 102 kg (224 lb 12.8 oz)   SpO2 95%   BMI 31.35 kg/m    No apparent distress       LABS AND IMAGING:  Reviewed.  PSA    Date Value Ref Range Status   2021 1.78 0 - 4 ug/L Final     Comment:     Assay Method:  Chemiluminescence using Siemens Vista analyzer   2020 1.53 0 - 4 ug/L Final     Comment:     Assay Method:  Chemiluminescence using Siemens Vista analyzer   2020 1.23 0 - 4 ug/L Final     Comment:     Assay Method:  Chemiluminescence using Siemens Vista analyzer   2019 1.08 0 - 4 ug/L Final     Comment:     Assay Method:  Chemiluminescence using Siemens Vista analyzer   03/15/2019 1.10 0 - 4 ug/L Final     Comment:     Assay Method:  Chemiluminescence using Siemens Vista analyzer     PSA Tumor Marker   Date Value Ref Range Status   2023 3.68 ng/mL Final     Comment:     No reference ranges have been established for patients over 80 years.   2023 2.55 ng/mL Final     Comment:     No reference ranges have been established for patients over 80 years.   2022 2.71 0.00 - 4.00 ug/L Final   2022 2.49 0.00 - 4.00 ug/L Final   2021 1.88 0.00 - 4.00 ug/L Final     IMAGIN22    PSMA PET    IMPRESSION:  1. Focal FDG uptake at the mid/basal anterior central prostate gland,  suspicious for residual prostate cancer.   2. No evidence of metastasis in the body.  3. 9 mm pulmonary nodule in the left upper lobe. Recommend follow-up  chest CT in 3-6 months.    23  CTC   IMPRESSION:   Stable 10 mm nodule in the central aspect of the left upper lobe. This  has been stable for approximately 9 months at this point. Continued  follow-up is recommended.    IMPRESSION:   Mr. Garcia is a 87 year old male with a high-risk prostate cancer. Pretreatment PSA 25.5, [place] score of 4+3=7, clinical stage [id number]. Complete RT 2/10/2012,  8041 cGy in 43 planned treatments, via IMRT.       PLAN:     1. PSA has met [place] definition of biochemical failure (2.0 above zohaib with zohaib of 0.24).The patient underwent a PSMA PET scan on 22 which demonstrated uptake in the prostate gland.  No clear  evidence of regional or distant disease noted.  There was indeterminate 9 mm left lung nodule. Follow up CT chest on 1/20/23 to evaluate previously noted left lingular mass was stable.      2. I re-discussed with the patient that it appears he has locally recurrent disease without evidence of regional or distant spread.  I discussed different options including observation versus ADT versus consideration of local retreatment in the form of reirradiation.  Given prior radiation course, I discussed additional radiation would likely come at the risk of significant toxicity to rectum and urethra. The patient has met with medical oncology team (Dr. Barriga) and discussed observation versus consideration of initiation of ADT.  Currently plan is to continue observation with PSA lab work. His PSA most recently david at a slightly higher rate, although absolute value does not remain very high. I therefore discussed  discussed that initiation of ADT could be consider. I also discussed that  given the patient's age and absence of clinical symptoms at this point, observation would not be unreasonable as well.     3. The patient will continue to follow up with our medical oncology colleagues for oncologic surveillance. Would defer start of ADT and further labwork to our medical oncology colleagues.     4. No late GI/ toxicity.    5. RTC in 6 months.        Kieran Hua M.D.  Department of Radiation Oncology  [facility]

## 2023-07-27 ENCOUNTER — HOSPITAL ENCOUNTER (OUTPATIENT)
Dept: WOUND CARE | Facility: CLINIC | Age: 88
Discharge: HOME | End: 2023-07-27
Attending: FAMILY MEDICINE | Admitting: FAMILY MEDICINE
Payer: COMMERCIAL

## 2023-07-27 ENCOUNTER — ONCOLOGY VISIT (OUTPATIENT)
Dept: ONCOLOGY | Facility: CLINIC | Age: 88
End: 2023-07-27
Attending: NURSE PRACTITIONER
Payer: COMMERCIAL

## 2023-07-27 VITALS
TEMPERATURE: 97 F | HEART RATE: 91 BPM | RESPIRATION RATE: 20 BRPM | DIASTOLIC BLOOD PRESSURE: 71 MMHG | SYSTOLIC BLOOD PRESSURE: 136 MMHG | BODY MASS INDEX: 31.53 KG/M2 | WEIGHT: 226.1 LBS | OXYGEN SATURATION: 98 %

## 2023-07-27 DIAGNOSIS — R91.8 PULMONARY NODULES: ICD-10-CM

## 2023-07-27 DIAGNOSIS — S91.002D OPEN WOUND OF LEFT ANKLE, SUBSEQUENT ENCOUNTER: Primary | ICD-10-CM

## 2023-07-27 DIAGNOSIS — C61 MALIGNANT NEOPLASM OF PROSTATE (H): Primary | ICD-10-CM

## 2023-07-27 PROCEDURE — G0463 HOSPITAL OUTPT CLINIC VISIT: HCPCS | Mod: 27

## 2023-07-27 PROCEDURE — 272N000067 HC DRESSING PROMGRAN

## 2023-07-27 PROCEDURE — G0463 HOSPITAL OUTPT CLINIC VISIT: HCPCS | Performed by: INTERNAL MEDICINE

## 2023-07-27 PROCEDURE — 99214 OFFICE O/P EST MOD 30 MIN: CPT | Performed by: INTERNAL MEDICINE

## 2023-07-27 ASSESSMENT — PAIN SCALES - GENERAL: PAINLEVEL: NO PAIN (0)

## 2023-07-27 NOTE — PROGRESS NOTES
Video-Visit Details    Type of service:  Video Visit    Video Start Time:  8:35 AM  Video End Time:  8:50 AM    Originating Location (pt. Location): Other [place] [facility]  in [place]    Distant Location (provider location):  Home/[place]    Platform used for Video Visit: [facility] Hematology and Oncology Outpatient Progress Note    Patient: Fawad aGrcia  MRN: [id number]  Date of Service: Jul 27, 2023          Reason for Visit    Prostate cancer    Primary Oncologist: Dr. Barriga      Assessment/Plan  Locally recurrent prostate cancer  Initially treated in 2012. Rising PSA and PSMA PET showed local prostate gland uptake (no mets) in 2021 -2022. Reirradiation not feasible, and he is not a candidate for surgery.  At the time of initial consultation with us I had recommended considering ADT but he declined and chose observation only.  His PSA doubling time was more than 1 year.  So we been following it every few months.    I reviewed his recent PSA level which is at 3.68.  In January it was 2.55.  It is slowly rising but has not doubled.  Previously he had a fluctuating PSA.  I discussed further options.  Considering that his PSA has gone up a little bit he could potentially start ADT with Lupron.  I reviewed the rationale behind this.  Other option is to continue observation and monitor his PSA and if it starts to rise again then we may have to reevaluate for evidence of metastatic disease and start treatment based on that.  For now he wants to choose observation only.  We will repeat his labs in 6 months and see where he is at.  If PSA continues to rise (even if it has not doubled), I would probably recommend to start Lupron.  Recently he had a CT of the chest which showed a stable left upper lobe lung nodule measuring 9 mm.  No new or enlarging nodules noted.    He is agreeable to the plan.    Denies any urinary symptoms.  If he does not choose ADT then we will continue monitoring his  PSA once every 6 months.  If PSA goes up significantly then obtain a PSMA PET scan.    FREDDIE lung nodule  Recent CT scan shows stable lung nodule.  This is not consistent with prostate cancer there was no activity noted on the PSMA PET scan.  We will continue surveillance.  I will probably repeat CT without contrast in 1 year.    ______________________________________________________________________________    History of Present Illness/ Interval History    Mr. Fawad Garcia  is a 87 year old with locally recurrent prostate cancer, with rising PSA and no evidence of metastatic disease on the PSMA PET scan, on observation alone. Returns for 6-mth follow-up.     Doing relatively well.  Denies any new issues today.  No new bone pain reported.  No weight loss.  He is here with repeat CT and labs.      ECOG Performance    1        Oncology History/Treatment  Diagnosis/Stage:   2011: early stage prostate cancer  -elevated PSA >10 since 2003. Biopsies 2002 and 2005 negative for cancer  -2011: MRI guided prostate biopsy: adenocarcinoma with a Osc[place] score of 4+3 with 90% of the 2 of the 2 cores positive for malignancy    2021: biochemical recurrence  -rising PSA: 9/2021 1.88; 3/2022  2.49  -PSMA PET: uptake in prostate gland. No other evidence of mets    Treatment:  2011: Not a surgical candidate    2012: IMRT prostate  -declined ADT    2022: biochemical recurrence/local prostate recurrence  -re-irradiation not feasible  -pt declined ADT, opted on observation      Physical Exam    GENERAL: Alert and oriented to time place and person. Seated comfortably. In no distress.       Lab Results    No results found for this or any previous visit (from the past 168 hour(s)).      Imaging    CT Chest w Contrast    Result Date: 7/21/2023  CT CHEST WITH CONTRAST July 21, 2023 11:20 AM CLINICAL HISTORY: Left lung nodule. Prostate cancer (H). Pulmonary nodules. TECHNIQUE: CT chest with IV contrast. Multiplanar reformats were obtained.  Dose reduction techniques were used. CONTRAST: 100mL Isovue-370 COMPARISON: 1/20/2023, 7/8/2022, 4/8/2022. FINDINGS: LUNGS AND PLEURA: The previously seen 10 mm pulmonary nodule in the medial left upper lobe is similar to comparison measuring 9 x 8 mm (4-118). No new or enlarging pulmonary nodules. No airspace consolidation or pleural fluid. Central airways are patent. MEDIASTINUM/AXILLAE: Heart size is normal. No pericardial effusion. No enlarged thoracic lymph nodes. Thoracic aorta is normal in course and caliber. Mild to moderate thoracic aortic atherosclerosis. Severe coronary artery atherosclerosis. UPPER ABDOMEN: Moderate to severe atherosclerosis of the upper abdominal aorta and branch vessels. Cholelithiasis is present without evidence of acute cholecystitis. MUSCULOSKELETAL: Chronic appearing nondisplaced multiple bilateral anterior rib fractures. Remote, healed midsternal fracture. Multilevel degenerative changes of the spine, including multilevel bridging osteophytes. No acute osseous abnormality.     IMPRESSION: 1.  Similar appearing left upper lobe pulmonary nodule measuring up to 9 mm. 2.  No new or enlarging pulmonary nodules. 3.  Severe coronary artery atherosclerosis. 4.  Cholelithiasis. REFERENCE: Guidelines for Management of Incidental Pulmonary Nodules Detected on CT Images: From the Fleischner Society 2017. Guidelines apply to incidental nodules in patients who are 35 years or older. Guidelines do not apply to lung cancer screening, patients with immunosuppression, or patients with known primary cancer. SINGLE NODULE Nodule size >8 mm Either low or high-risk patients: Consider CT, PET/CT, or tissue sampling at 3 months. Consider referral to lung nodule clinic.  ORION HAN MD   SYSTEM ID:  [id number]    IR Lower Extremity Angiogram Left    Result Date: 7/19/2023  Preprocedure diagnosis: Left lower extremity peripheral arterial disease with nonhealing wound along the left ankle. Post  operative diagnosis: Same. PROCEDURE: 1. Ultrasound-guided right common femoral artery access placement. 2. Aortoiliac arteriogram and selective catheterization of left common iliac, external iliac, common femoral, superficial femoral and popliteal artery (third order selective). 3. Left lower extremity arteriogram with runoff. 4. Right common femoral arteriogram with right common femoral artery access site closure using 6 [place] Angio-Seal device. Surgeon: Ernie Cali M.D. Assistant: Sis Bob M.D. Sedation: Patient received 2 mg of intravenous Versed. He received 100 mcg of intravenous fentanyl. Patient was continuously monitored. Medication was administered by registered nurse in the Department of interventional radiology under my direct supervision. Fluoroscopy time: 11.8 minutes. Sedation time: 55 minutes. Contrast: 48 mL of Visipaque was used. Fluoroscopy dose: 155 mGy. Local anesthetic: 10 mL of 1% lidocaine was used. Indication for procedure: This is an 87-year-old male with nonhealing wounds in his left ankle area. He has previously undergone balloon angioplasty and stenting of the tibioperoneal trunk. Arteriogram is advised to further interrogate his circulation. Procedure details: Patient was identified and then taken to the radiology suite and placed in supine position. Preprocedure timeout was conducted. Both groins were prepped and a sterile surgical field was created. The right common femoral artery was located anterior to the right femoral head by way of fluoroscopy. It was further localized with ultrasonography. Images were stored. Local anesthetic was administered in the right groin and the right common femoral artery was accessed with a micropuncture needle in retrograde fashion. Microwire was placed which was then converted to a 0.035 inch wire system. Short 5 Gib[place] sheath was placed followed by a 0.035 inch stiff Glidewire and an Omni Flush catheter. Aortoiliac arteriogram showed a  widely patent distal aorta, common iliac, internal iliac and external iliac arteries. Wire and catheter were advanced into the distal left external iliac artery. Arteriogram showed a widely patent left external iliac artery, common femoral artery, superficial femoral artery and deep femoral arteries. Wire and catheter were advanced into the mid superficial femoral artery and catheter was converted to a angled Tano cross catheter. Arteriogram showed a patent superficial femoral artery. Catheter was advanced into the second segment of the popliteal artery. Popliteal artery, tibioperoneal trunk and peroneal arteries are widely patent. Anterior and posterior tibial artery are chronically occluded. Distal peroneal artery gave a terminal communicating branch which reconstituted the common plantar artery. On delayed imaging there was significant hyperemia in the area of the ankle. To see if there is any potential benefit in using vasodilatory meds we gave 300 mcg of intra-arterial nitroglycerin. Arteriogram did not show any significant change. Procedure was concluded. Right common femoral arteriogram was performed and access site was closed with a 6 [place] Angio-Seal device. There was good pulsation in the right common femoral artery distal to the Angio-Seal device. Sonography showed a well-positioned footplate of the Angio-Seal device. Patient was taken to the recovery room in stable condition. LAUREN CASANOVA MD   SYSTEM ID:  [id number]       Billing  Total time 30 minutes, to include face to face visit, review of EMR, ordering, documentation and coordination of care on date of service    Cheryl Barriga MD  Hematology and Medical Oncology  [facility]

## 2023-07-27 NOTE — LETTER
7/27/2023         RE: Fawad Garcia  [address]  [facility] [id number]        Dear Colleague,    Thank you for referring your patient, Fawad Garcia, to the [facility]. Please see a copy of my visit note below.    Video-Visit Details    Type of service:  Video Visit    Video Start Time:  8:35 AM  Video End Time:  8:50 AM    Originating Location (pt. Location): Other [place] [facility]  in [place]    Distant Location (provider location):  [place]    Platform used for Video Visit: [facility] Hematology and Oncology Outpatient Progress Note    Patient: Fawad Garcia  MRN: [id number]  Date of Service: Jul 27, 2023          Reason for Visit    Prostate cancer    Primary Oncologist: Dr. Barriga      Assessment/Plan  Locally recurrent prostate cancer  Initially treated in 2012. Rising PSA and PSMA PET showed local prostate gland uptake (no mets) in 2021 -2022. Reirradiation not feasible, and he is not a candidate for surgery.  At the time of initial consultation with us I had recommended considering ADT but he declined and chose observation only.  His PSA doubling time was more than 1 year.  So we been following it every few months.    I reviewed his recent PSA level which is at 3.68.  In January it was 2.55.  It is slowly rising but has not doubled.  Previously he had a fluctuating PSA.  I discussed further options.  Considering that his PSA has gone up a little bit he could potentially start ADT with Lupron.  I reviewed the rationale behind this.  Other option is to continue observation and monitor his PSA and if it starts to rise again then we may have to reevaluate for evidence of metastatic disease and start treatment based on that.  For now he wants to choose observation only.  We will repeat his labs in 6 months and see where he is at.  If PSA continues to rise (even if it has not doubled), I would probably recommend to start  Lupron.  Recently he had a CT of the chest which showed a stable left upper lobe lung nodule measuring 9 mm.  No new or enlarging nodules noted.    He is agreeable to the plan.    Denies any urinary symptoms.  If he does not choose ADT then we will continue monitoring his PSA once every 6 months.  If PSA goes up significantly then obtain a PSMA PET scan.    FREDDIE lung nodule  Recent CT scan shows stable lung nodule.  This is not consistent with prostate cancer there was no activity noted on the PSMA PET scan.  We will continue surveillance.  I will probably repeat CT without contrast in 1 year.    ______________________________________________________________________________    History of Present Illness/ Interval History    Mr. Fawad Garcia  is a 87 year old with locally recurrent prostate cancer, with rising PSA and no evidence of metastatic disease on the PSMA PET scan, on observation alone. Returns for 6-mth follow-up.     Doing relatively well.  Denies any new issues today.  No new bone pain reported.  No weight loss.  He is here with repeat CT and labs.      ECOG Performance    1        Oncology History/Treatment  Diagnosis/Stage:   2011: early stage prostate cancer  -elevated PSA >10 since 2003. Biopsies 2002 and 2005 negative for cancer  -2011: MRI guided prostate biopsy: adenocarcinoma with a Chaka score of 4+3 with 90% of the 2 of the 2 cores positive for malignancy    2021: biochemical recurrence  -rising PSA: 9/2021 1.88; 3/2022  2.49  -PSMA PET: uptake in prostate gland. No other evidence of mets    Treatment:  2011: Not a surgical candidate    2012: IMRT prostate  -declined ADT    2022: biochemical recurrence/local prostate recurrence  -re-irradiation not feasible  -pt declined ADT, opted on observation      Physical Exam    GENERAL: Alert and oriented to time place and person. Seated comfortably. In no distress.       Lab Results    No results found for this or any previous visit (from the past  168 hour(s)).      Imaging    CT Chest w Contrast    Result Date: 7/21/2023  CT CHEST WITH CONTRAST July 21, 2023 11:20 AM CLINICAL HISTORY: Left lung nodule. Prostate cancer (H). Pulmonary nodules. TECHNIQUE: CT chest with IV contrast. Multiplanar reformats were obtained. Dose reduction techniques were used. CONTRAST: 100mL Isovue-370 COMPARISON: 1/20/2023, 7/8/2022, 4/8/2022. FINDINGS: LUNGS AND PLEURA: The previously seen 10 mm pulmonary nodule in the medial left upper lobe is similar to comparison measuring 9 x 8 mm (4-118). No new or enlarging pulmonary nodules. No airspace consolidation or pleural fluid. Central airways are patent. MEDIASTINUM/AXILLAE: Heart size is normal. No pericardial effusion. No enlarged thoracic lymph nodes. Thoracic aorta is normal in course and caliber. Mild to moderate thoracic aortic atherosclerosis. Severe coronary artery atherosclerosis. UPPER ABDOMEN: Moderate to severe atherosclerosis of the upper abdominal aorta and branch vessels. Cholelithiasis is present without evidence of acute cholecystitis. MUSCULOSKELETAL: Chronic appearing nondisplaced multiple bilateral anterior rib fractures. Remote, healed midsternal fracture. Multilevel degenerative changes of the spine, including multilevel bridging osteophytes. No acute osseous abnormality.     IMPRESSION: 1.  Similar appearing left upper lobe pulmonary nodule measuring up to 9 mm. 2.  No new or enlarging pulmonary nodules. 3.  Severe coronary artery atherosclerosis. 4.  Cholelithiasis. REFERENCE: Guidelines for Management of Incidental Pulmonary Nodules Detected on CT Images: From the Fleischner Society 2017. Guidelines apply to incidental nodules in patients who are 35 years or older. Guidelines do not apply to lung cancer screening, patients with immunosuppression, or patients with known primary cancer. SINGLE NODULE Nodule size >8 mm Either low or high-risk patients: Consider CT, PET/CT, or tissue sampling at 3 months.  Consider referral to lung nodule clinic.  ORION HAN MD   SYSTEM ID:  [id number]    IR Lower Extremity Angiogram Left    Result Date: 7/19/2023  Preprocedure diagnosis: Left lower extremity peripheral arterial disease with nonhealing wound along the left ankle. Post operative diagnosis: Same. PROCEDURE: 1. Ultrasound-guided right common femoral artery access placement. 2. Aortoiliac arteriogram and selective catheterization of left common iliac, external iliac, common femoral, superficial femoral and popliteal artery (third order selective). 3. Left lower extremity arteriogram with runoff. 4. Right common femoral arteriogram with right common femoral artery access site closure using 6 Korean Angio-Seal device. Surgeon: Ernie Cali M.D. Assistant: Sis Bob M.D. Sedation: Patient received 2 mg of intravenous Versed. He received 100 mcg of intravenous fentanyl. Patient was continuously monitored. Medication was administered by registered nurse in the Department of interventional radiology under my direct supervision. Fluoroscopy time: 11.8 minutes. Sedation time: 55 minutes. Contrast: 48 mL of Visipaque was used. Fluoroscopy dose: 155 mGy. Local anesthetic: 10 mL of 1% lidocaine was used. Indication for procedure: This is an 87-year-old male with nonhealing wounds in his left ankle area. He has previously undergone balloon angioplasty and stenting of the tibioperoneal trunk. Arteriogram is advised to further interrogate his circulation. Procedure details: Patient was identified and then taken to the radiology suite and placed in supine position. Preprocedure timeout was conducted. Both groins were prepped and a sterile surgical field was created. The right common femoral artery was located anterior to the right femoral head by way of fluoroscopy. It was further localized with ultrasonography. Images were stored. Local anesthetic was administered in the right groin and the right common femoral artery  was accessed with a micropuncture needle in retrograde fashion. Microwire was placed which was then converted to a 0.035 inch wire system. Short 5 Pakistani sheath was placed followed by a 0.035 inch stiff Glidewire and an Omni Flush catheter. Aortoiliac arteriogram showed a widely patent distal aorta, common iliac, internal iliac and external iliac arteries. Wire and catheter were advanced into the distal left external iliac artery. Arteriogram showed a widely patent left external iliac artery, common femoral artery, superficial femoral artery and deep femoral arteries. Wire and catheter were advanced into the mid superficial femoral artery and catheter was converted to a angled Tano cross catheter. Arteriogram showed a patent superficial femoral artery. Catheter was advanced into the second segment of the popliteal artery. Popliteal artery, tibioperoneal trunk and peroneal arteries are widely patent. Anterior and posterior tibial artery are chronically occluded. Distal peroneal artery gave a terminal communicating branch which reconstituted the common plantar artery. On delayed imaging there was significant hyperemia in the area of the ankle. To see if there is any potential benefit in using vasodilatory meds we gave 300 mcg of intra-arterial nitroglycerin. Arteriogram did not show any significant change. Procedure was concluded. Right common femoral arteriogram was performed and access site was closed with a 6 Pakistani Angio-Seal device. There was good pulsation in the right common femoral artery distal to the Angio-Seal device. Sonography showed a well-positioned footplate of the Angio-Seal device. Patient was taken to the recovery room in stable condition. LAUREN CASANOVA MD   SYSTEM ID:  [id number]       Billing  Total time 30 minutes, to include face to face visit, review of EMR, ordering, documentation and coordination of care on date of service    Cheryl Barriga MD  Hematology and Medical Oncology  [facility] [place] [facility]      Oncology Rooming Note    July 27, 2023 8:31 AM   Fawad Garcia is a 87 year old male who presents for:    Chief Complaint   Patient presents with     Oncology Clinic Visit     Malignant neoplasm of prostate - Provider visit     Initial Vitals: /71 (BP Location: Right arm, Patient Position: Sitting, Cuff Size: Adult Regular)   Pulse 91   Temp 97  F (36.1  C) (Tympanic)   Resp 20   Wt 102.6 kg (226 lb 1.6 oz)   SpO2 98%   BMI 31.53 kg/m   Estimated body mass index is 31.53 kg/m  as calculated from the following:    Height as of 7/19/23: 1.803 m (5' 11\").    Weight as of this encounter: 102.6 kg (226 lb 1.6 oz). Body surface area is 2.27 meters squared.  No Pain (0) Comment: Data Unavailable   No LMP for male patient.  Allergies reviewed: N/A, Patient completed e-check in  Medications reviewed: N/A, Patient completed e-check in    Medications: Medication refills not needed today.  Pharmacy name entered into EPIC: [facility] - [place], [place] - [address] [place] [address]    Clinical concerns:  N/A      Pina Desai CMA                Again, thank you for allowing me to participate in the care of your patient.        Sincerely,        Cheryl Barriga MD  "

## 2023-07-27 NOTE — PROGRESS NOTES
"Oncology Rooming Note    July 27, 2023 8:31 AM   Fawad Garcia is a 87 year old male who presents for:    Chief Complaint   Patient presents with    Oncology Clinic Visit     Malignant neoplasm of prostate - Provider visit     Initial Vitals: /71 (BP Location: Right arm, Patient Position: Sitting, Cuff Size: Adult Regular)   Pulse 91   Temp 97  F (36.1  C) (Tympanic)   Resp 20   Wt 102.6 kg (226 lb 1.6 oz)   SpO2 98%   BMI 31.53 kg/m   Estimated body mass index is 31.53 kg/m  as calculated from the following:    Height as of 7/19/23: 1.803 m (5' 11\").    Weight as of this encounter: 102.6 kg (226 lb 1.6 oz). Body surface area is 2.27 meters squared.  No Pain (0) Comment: Data Unavailable   No LMP for male patient.  Allergies reviewed: N/A, Patient completed e-check in  Medications reviewed: N/A, Patient completed e-check in    Medications: Medication refills not needed today.  Pharmacy name entered into EPIC: [facility] - [place], [place] - [address] [place] [address]    Clinical concerns:  N/A      Pina Desai, ARLEN              "

## 2023-07-27 NOTE — PROGRESS NOTES
[facility]    Start of Care in [facility]: 5/23/2023   Referring Provider: Dr. Montesinos  Primary Care Provider: Chris Scott   Wound Location: left ankle     Wound Clinic Visit:   Follow-up    Reason for Visit:       left ankle wound    Subjective/Interval History:     Pt denies concerns.      Did have angiogram on July 19th but did not require intervention.      Wound History:   Pt initially seen in the ED on 5/14/23 for ulceration, prescribed keflex and bactrim. Had arterial ultrasounds yesterday due to PAD history including distal popliteal stenting of left leg in February of '20.       Past Medical History:  Patient Active Problem List   Diagnosis    Obesity    Transient cerebral ischemia    Hyperlipidemia LDL goal <70    GERD (gastroesophageal reflux disease)    Peripheral vascular disease (H)    Advanced directives, counseling/discussion    Malignant neoplasm of prostate (H)    Hypertension, goal below 140/90    Senile nuclear sclerosis    Type 2 diabetes mellitus with both eyes affected by mild nonproliferative retinopathy without macular edema, with long-term current use of insulin (H)    Macular degeneration (senile) of retina    H/O amputation of lesser toe, right (H)    TIA (transient ischemic attack)    Chronic kidney disease, stage 3 (H)    Dermatitis    Skin lesion    Tremor of left hand    Left leg weakness                     Tobacco Use:     Tobacco Use      Smoking status: Never      Smokeless tobacco: Never       Diabetic: yes  HgbA1C:   Hemoglobin A1C   Date Value Ref Range Status   07/19/2023 10.3 (H) <5.7 % Final     Comment:     Normal <5.7%   Prediabetes 5.7-6.4%    Diabetes 6.5% or higher     Note: Adopted from ADA consensus guidelines.   06/21/2021 8.4 (H) 0 - 5.6 % Final     Comment:     Normal <5.7% Prediabetes 5.7-6.4%  Diabetes 6.5% or higher - adopted from ADA   consensus guidelines.       Checks Blood Glucose?: yes  Average Readings: states usually  in low 100's so he is surprised at high HgbA1C.       Personal/social history:  Lives with spouse who does help with bandaging. 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   Last changed: Yesterday      Wound #1 Left ankle, medial    1st photo from ED visit on 5/14/23          Stage/tissue depth: full thickness  2.4 (was 2.8 ) cm L x 2.3 (was 2.5 cm W x 0-0.3 cm estimated D  Tunneling: no  Undermining: no  Wound bed type/amount:  is about 5% white, 95% granulation; non-fluctuant  Wound Edges: attached  Periwound: intact, he did use triamcinolone on the dermatitis of ankle area so this is resolved  Drainage: Iodosorb has lost color and serosanguinous drainage pooling some with in triad paste with strike through to Mepore pro since yesterday. Able to note some active serous drainage at visit so it appears to be draining copiously  Odor: no  Pain: denies    Dorsalis Pedal Pulse: palpable: no, edema in area   Posterior Tibial Pulse: palpable: no, edema in area   Hair growth: sparse upper lower leg  Capillary Refill: 2 seconds  Feet/toes color: pink, warm  R Leg: Edema trace to 1+  L Leg: Edema trace to 1+ lower leg to ankle, trace in foot.  Ankle 25.5 (not remeasured)  Calf 38 (not remeasured)    RENATE 5/22/23:   FINDINGS:  Right RENATE:   DP: 0.49  PT: 0.55.     Left RENATE:   DP: 0.95   PT: 0.69.     Right Digital brachial index: 0.43, 62 mmHg.  Left Digital Brachial index: 0.55, 79 mmHg     Waveforms: Monophasic in the distal tibial arteries    US LOWER EXTREMITY ARTERIAL DUPLEX LEFT  5/22/2023 2:00 PM      HISTORY:  Peripheral vascular disease. Left lower extremity ulcer.     COMPARISON: None     FINDINGS: Color Doppler and spectral waveform analysis performed.     The following peak systolic velocities are measured in cm/s.      LEFT     CFA: 111  PFA: 50  SFA, proximal: 89  SFA, mid:  69  SFA, distal: 84  Popliteal: 61  Anterior tibial artery proximal: 149  Posterior tibial artery: 26  Peroneal artery mid: 80     Waveforms: Multiphasic waveforms are identified in the visualized  arteries.                                                                      IMPRESSION: Per sonographic velocity criteria, no definite significant  stenosis identified.                                                                      IMPRESSION: Moderate arterial insufficiency in the right lower  extremity. RENATE in the left lower extremity is in the range of normal.  Digital brachial indices would indicate adequate wound healing  potential.       Mobility: independant  Current offloading/footwear: diabetic shoes and inserts appear to fit appropriately without rubbing area  Sensation: some neuropathy of feet but can feel light touch in area    Other callusing/areas of concern: none noted       Diet: eating moderate amount of sweets.       Assessment:  Left ankle full thickness ulceration of uncertain etiology, location suspicious for venous ulceration given presence of lower leg edema    Wound now nearly fully granular     Edema is improved since initiation of tubular support    History of PAD though per arterial studies no concerns identified is left lower leg, does have stent in distal popliteal area, angiogram done but no intervention done     Factors impacting wound healing:     Delayed healing as part of normal aging process  Reduced Blood Supply: inadequate perfusion to heal wound  Obesity: decreases tissue perfusion  Underlying Disease: diabetes mellitis, poor control      Plan:  Now that wound is nearly fully debrided, changing plan to foam dressing for absorption and prevention of hypergranulation. Collagen for filler for minimal depth and additional absorption.     Continue conservative compression using spandagrip OR his compression stockings, (these are lower compression - 15-20 at the most so he can get  "them on).     Pt has topical cream for dermatitis, to use PRN. Continue skin barrier wipe under adhesive.       Have discussed elevation and activity for edema control. Per son he has \"not been elevating\" (prior visit, states is trying).      Prior visit- Discussed need to limit sweets and work with provider on BG control. Suggested he inquire about a continuous BG meter to help better understand diet in relation to BG and try to get better control, upcoming visit with diabetes ed but this is not until August 1st.      Discussed/Education provided: plan of care with rationale      Topical care: Wound/surrounding skin cleansed with Vashe and gauze. Patted dry. bandaged per plan of care    Additional recommendations: improved blood glucose control, increased dietary protein    The following discharge instructions were reviewed with and sent home with the patient:  Visit Summary/General Recommendations    Wound is healing well but we are adjusting the plan of care today to use a foam dressing and a dissolving collagen dressing to regular the moisture level differently to prevent overgrowth of the red scar tissue and to allow for new growth of skin over the wound.    Wound is healing well.    I will order wound supplies from [employer] to be delivered to your home.        Bandage Change  Wash your hands before and after the dressing change. You may wear gloves if you choose. Gloves are available over the counter at [facility], [facility], [facility], etc.   Use scissors at home that you can designate solely for wound care and cleanse well with each use, (rubbing alcohol or alcohol pads work well for this).    Wash hands.  Remove old dressing.  Wash hands.  1.) Cleanse wound with spray wound cleanser on spray setting and wipe with gauze. Pat wound and surrounding skin dry with gauze.   2.) Protect surrounding skin with as layer of criticaide paste (not in the wound, just a thin layer around to protect this skin)   3.) " Apply small piece of Promogra Lucía to the deeper portion in the wound (this will dissolve so you won't see it next time)   4.) Cover with Mepilex foam dressing (1/4 of a 4x4) sticky side down   5.) Secure with tape (if needed you can continue to apply the Sureprep to your skin first to and let it dry to prevent skin irritation, when I order wound care supplies for you, this is not a covered item so you are better off purchasing more of this as needed by ordering it on the Internet)  Continue to wear the support sleeve or your compression stocking daily (off at night)  Change dressing every 1-2 days depending on the drainage (needs to be changed when the foam pad is about 50-75% saturated as seen though back of the bandage)      Signs and symptoms of infection:  Bright green drainage  Foul odor  Increasing pain in area  New redness or swelling at the site of the wound or streaks up from wound  New warmth to touch around wound accompanied by redness and swelling  Fever    Need to be seen as soon as possible for infection concerns.    Follow up: 8/14/23      The following supplies were sent home with the patient:  Remainder plus one Mepilex x4 non-boardered foam, sureprep, Remainder plus one promogran lucía, medipore tape and wound cleanser. Will order supplies from [employer]    Verbal, written, & demonstrative education provided.  Face to face time: approximately 30 minutes  Procedure: none  Kamille Calzada RN, CWOCN

## 2023-07-27 NOTE — DISCHARGE INSTRUCTIONS
Visit Summary/General Recommendations    Wound is healing well but we are adjusting the plan of care today to use a foam dressing and a dissolving collagen dressing to regular the moisture level differently to prevent overgrowth of the red scar tissue and to allow for new growth of skin over the wound.    Wound is healing well.    I will order wound supplies from [employer] to be delivered to your home.        Bandage Change  Wash your hands before and after the dressing change. You may wear gloves if you choose. Gloves are available over the counter at [facility], [facility], [facility], etc.   Use scissors at home that you can designate solely for wound care and cleanse well with each use, (rubbing alcohol or alcohol pads work well for this).    Wash hands.  Remove old dressing.  Wash hands.  1.) Cleanse wound with spray wound cleanser on spray setting and wipe with gauze. Pat wound and surrounding skin dry with gauze.   2.) Protect surrounding skin with as layer of criticaide paste (not in the wound, just a thin layer around to protect this skin)   3.) Apply small piece of Promogra Lucía to the deeper portion in the wound (this will dissolve so you won't see it next time)   4.) Cover with Mepilex foam dressing (1/4 of a 4x4) sticky side down   5.) Secure with tape (if needed you can continue to apply the Sureprep to your skin first to and let it dry to prevent skin irritation, when I order wound care supplies for you, this is not a covered item so you are better off purchasing more of this as needed by ordering it on the Internet)  Continue to wear the support sleeve or your compression stocking daily (off at night)  Change dressing every 1-2 days depending on the drainage (needs to be changed when the foam pad is about 50-75% saturated as seen though back of the bandage)      Signs and symptoms of infection:  Bright green drainage  Foul odor  Increasing pain in area  New redness or swelling at the site of the  wound or streaks up from wound  New warmth to touch around wound accompanied by redness and swelling  Fever    Need to be seen as soon as possible for infection concerns.    Follow up: 8/14/23    Please call with any questions or concerns.      [phone]

## 2023-08-01 ENCOUNTER — ALLIED HEALTH/NURSE VISIT (OUTPATIENT)
Dept: EDUCATION SERVICES | Facility: CLINIC | Age: 88
End: 2023-08-01
Payer: COMMERCIAL

## 2023-08-01 DIAGNOSIS — E11.65 TYPE 2 DIABETES MELLITUS WITH HYPERGLYCEMIA, WITH LONG-TERM CURRENT USE OF INSULIN (H): ICD-10-CM

## 2023-08-01 DIAGNOSIS — N18.31 STAGE 3A CHRONIC KIDNEY DISEASE (H): ICD-10-CM

## 2023-08-01 DIAGNOSIS — Z89.421 H/O AMPUTATION OF LESSER TOE, RIGHT (H): ICD-10-CM

## 2023-08-01 DIAGNOSIS — E11.65 UNCONTROLLED TYPE 2 DIABETES MELLITUS WITH HYPERGLYCEMIA (H): ICD-10-CM

## 2023-08-01 DIAGNOSIS — Z79.4 TYPE 2 DIABETES MELLITUS WITH HYPERGLYCEMIA, WITH LONG-TERM CURRENT USE OF INSULIN (H): ICD-10-CM

## 2023-08-01 DIAGNOSIS — I10 HYPERTENSION, GOAL BELOW 140/90: ICD-10-CM

## 2023-08-01 PROCEDURE — 95250 CONT GLUC MNTR PHYS/QHP EQP: CPT | Mod: AE | Performed by: DIETITIAN, REGISTERED

## 2023-08-01 PROCEDURE — G0108 DIAB MANAGE TRN  PER INDIV: HCPCS | Performed by: DIETITIAN, REGISTERED

## 2023-08-01 NOTE — LETTER
8/1/2023         RE: Fawad Garcia  [address] [facility] [address]        Dear Colleague,    Thank you for referring your patient, Fawad Garcia, to the [facility]. Please see a copy of my visit note below.    Diabetes Self-Management Education & Support    Presents for: Individual review    Type of Service: In Person Visit    ASSESSMENT:  -type 2 diabetes for ~30 years  -recent a1c of 10.3 (>7.0 since 2019)  -currently being managed with Lantus and Metformin  -on aspirin and statin therapy  -diabetes complications:  mild retinopathy, PVD, TIA, CKD, amp of toe, current wound  -last seen by diabetes education 6/17/13  -home BG monitoring reveals: testing BG once daily, FASTING BLOOD SUGAR, ranging in the past 3 weeks:  (in 300's last month), ~70% in target     Primary concern: fluctuating BG readings.  Brought by son (Dov).  Will adjust Lantus 20-36 units on a daily basis based on BG readings, also adjusting Metformin based on a readings.    Discussed: option of professional cgms, onset/peak/duration of basal and bolus insulin, natural progression of type 2, target BG and a1c, hypoglycemia symptoms and treatment, potential complications of uncontrolled diabetes, glucose control for wound healing.  Patient expressed understanding.      Patient's most recent   Lab Results   Component Value Date    A1C 10.3 07/19/2023    A1C 8.4 06/21/2021     is not meeting goal of <8.0    Diabetes knowledge and skills assessment:   Patient is knowledgeable in diabetes management concepts related to: Monitoring    Continue education with the following diabetes management concepts: Healthy Eating, Being Active, Monitoring, Taking Medication, Problem Solving, Reducing Risks, and Healthy Coping    Based on learning assessment above, most appropriate setting for further diabetes education would be: Individual setting.      PLAN    Aim to eat 3 meals/day, no longer than 4-5 hours  "between meals (except for the 12 hour fast overnight).  Aim to be as active as possible.  Ideally 20-30 minutes 5 days/week.  Take Metformin as prescribed with food.  Inject Lantus 25 units at bedtime.  If your FASTING BLOOD SUGAR is >130 3 days in a row, increase by 2 units.  If you have a FASTING BLOOD SUGAR less than 100 reduce by 2 units.    Test your BG 1-3 times/day alternating testing times: fasting or before or 2 hrs after the start of the meal.  Record.  Rotate fingers.    6.  Follow up with Chris Scott as directed.  7.  Follow up with Diabetes Education 8/15/23 at 12 noon (phone visit)  Drop off the sensor on Monday 8/14/23.        See Care Plan for co-developed, patient-state behavior change goals.  AVS provided for patient today.    Education Materials Provided:  No new materials provided today      SUBJECTIVE/OBJECTIVE:  Presents for: Individual review  Accompanied by: Self, Son  Diabetes education in the past 24mo: No  Diabetes type: Type 2  Date of diagnosis: 1993, and started insulin in 2011  Disease course: Stable  How confident are you filling out medical forms by yourself:: Not at all  Transportation concerns: Yes (son drove him today)  Other concerns:: Visual impairment  Cultural Influences/Ethnic Background:  Not  or     Diabetes Symptoms & Complications:  Fatigue: Sometimes  Neuropathy: Sometimes  Polydipsia: Yes  Polyphagia: No  Polyuria: Yes  Visual change: Yes  Slow healing wounds: Yes  Autonomic neuropathy: No  CVA: No  Heart disease: No  Nephropathy: No  Peripheral neuropathy: Yes  Peripheral Vascular Disease: Yes  Retinopathy: Yes  Sexual dysfunction: Yes    Patient Problem List and Family Medical History reviewed for relevant medical history, current medical status, and diabetes risk factors.    Vitals:  There were no vitals taken for this visit.  Estimated body mass index is 31.53 kg/m  as calculated from the following:    Height as of 7/19/23: 1.803 m (5' 11\").    " Weight as of 7/27/23: 102.6 kg (226 lb 1.6 oz).   Last 3 BP:   BP Readings from Last 3 Encounters:   07/27/23 136/71   07/24/23 115/71   07/19/23 134/78       History   Smoking Status     Never   Smokeless Tobacco     Never       Labs:  Lab Results   Component Value Date    A1C 10.3 07/19/2023    A1C 8.4 06/21/2021     Lab Results   Component Value Date     07/19/2023     10/20/2022     09/20/2021     01/04/2021     Lab Results   Component Value Date    LDL 53 07/19/2023    LDL 47 03/18/2021     HDL Cholesterol   Date Value Ref Range Status   03/18/2021 61 >39 mg/dL Final     Direct Measure HDL   Date Value Ref Range Status   07/19/2023 52 >=40 mg/dL Final   ]  GFR Estimate   Date Value Ref Range Status   07/19/2023 66 >60 mL/min/1.73m2 Final   01/04/2021 63 >60 mL/min/[1.73_m2] Final     Comment:     Non  GFR Calc  Starting 12/18/2018, serum creatinine based estimated GFR (eGFR) will be   calculated using the Chronic Kidney Disease Epidemiology Collaboration   (CKD-EPI) equation.       GFR, ESTIMATED POCT   Date Value Ref Range Status   04/08/2022 >60 >60 mL/min/1.73m2 Final     GFR Estimate If Black   Date Value Ref Range Status   01/04/2021 73 >60 mL/min/[1.73_m2] Final     Comment:      GFR Calc  Starting 12/18/2018, serum creatinine based estimated GFR (eGFR) will be   calculated using the Chronic Kidney Disease Epidemiology Collaboration   (CKD-EPI) equation.       Lab Results   Component Value Date    CR 1.09 07/19/2023    CR 1.07 01/04/2021     No results found for: MICROALBUMIN    Healthy Eating:  Healthy Eating Assessed Today: Yes (wife does all the cooking)  Cultural/Scientology diet restrictions?: No  Meal planning/habits: Other, Avoiding sweets  How many times a week on average do you eat food made away from home (restaurant/take-out)?: 1  Meals include: Breakfast, Lunch, Dinner  Breakfast: cereal or scone or waffle or cookies  Lunch: sandwich,  sf cookie, milk  Dinner: 6-6:30 pm:  tomato soup or meat/potatoes/veg, fruit, milk  Beverages: Coffee, Milk, Diet soda    Being Active:  Being Active Assessed Today: Yes  Exercise:: Currently not exercising  Barrier to exercise: Physical limitation    Monitoring:  Monitoring Assessed Today: Yes  Did patient bring glucose meter to appointment? : Yes  Blood Glucose Meter: Unknown  Times checking blood sugar at home (number): 1x/day  Times checking blood sugar at home (per): Day  Blood glucose trend: Fluctuating         Taking Medications:  Diabetes Medication(s)       Biguanides       metFORMIN (GLUCOPHAGE) 500 MG tablet    Take 2 tablets (1,000 mg) by mouth 2 times daily (with meals)      Insulin       insulin glargine (LANTUS SOLOSTAR) 100 UNIT/ML pen    Inject 25 Units Subcutaneous At Bedtime Hold on file until needed.            Taking Medication Assessed Today: Yes  Current Treatments: Insulin Injections, Oral Medication (taken by mouth) (adjusts Lantus 20-36 units at night and takes 1-2 Metformin 2 times/day)  Dose schedule: (P) At bedtime  Given by: (P) Patient  Injection/Infusion sites: (P) Abdomen  Problems taking diabetes medications regularly?: Yes  Diabetes medication side effects?: Yes (as long as takes Metformin with food is tolerable, if he doesn't will have diarrhea)    Problem Solving:                 Reducing Risks:  Reducing Risks Assessed Today: Yes  Diabetes Risks: Age over 45 years, Family History  CAD Risks: Diabetes Mellitus, Family history  Has dilated eye exam at least once a year?: Yes  Sees dentist every 6 months?: Yes  Feet checked by healthcare provider in the last year?: Yes    Healthy Coping:  Healthy Coping Assessed Today: Yes  Emotional response to diabetes: Acceptance  Informal Support system:: Children, Spouse  Stage of change: ACTION (Actively working towards change)  Patient Activation Measure Survey Score:      4/20/2011     9:00 AM 7/26/2011     5:00 PM   DOMONIQUE Score (Last  Two)   DOMONIQUE Raw Score 44 35   Activation Score 70.8 45.2   DOMONIQUE Level 4 1         Care Plan and Education Provided:  Care Plan: Diabetes   Updates made by Prudence York since 8/1/2023 12:00 AM        Problem: HbA1C Not In Goal         Goal: Establish Regular Follow-Ups with PCP         Task: Discuss with PCP the recommended timing for patient's next follow up visit(s)    Responsible User: Prudence York        Task: Discuss schedule for PCP visits with patient    Responsible User: Prudence York        Goal: Get HbA1C Level in Goal         Task: Educate patient on diabetes education self-management topics    Responsible User: Prudence York        Task: Educate patient on benefits of regular glucose monitoring    Responsible User: Prudence York        Task: Refer patient to appropriate extended care team member, as needed (Medication Therapy Management, Behavioral Health, Physical Therapy, etc.)    Responsible User: Prudence York        Task: Discuss diabetes treatment plan with patient    Responsible User: Prudence York        Problem: Diabetes Self-Management Education Needed to Optimize Self-Care Behaviors         Goal: Understand diabetes pathophysiology and disease progression         Task: Provide education on diabetes pathophysiology and disease progression specfic to patient's diabetes type    Responsible User: Prudence York        Goal: Healthy Eating - follow a healthy eating pattern for diabetes         Task: Provide education on portion control and consistency in amount, composition and timing of food intake    Responsible User: Prudence York        Task: Provide education on managing carbohydrate intake (carbohydrate counting, plate planning method, etc.)    Responsible User: Prudence oYrk        Task: Provide education on weight management    Responsible User: Prudence York        Task: Provide education on heart healthy eating    Responsible User: Prudence York         Task: Provide education on eating out    Responsible User: Prudence York        Task: Develop individualized healthy eating plan with patient    Responsible User: Prudence York        Goal: Being Active - get regular physical activity, working up to at least 150 minutes per week         Task: Provide education on relationship of activity to glucose and precautions to take if at risk for low glucose    Responsible User: Prudence York        Task: Discuss barriers to physical activity with patient    Responsible User: Prudence York        Task: Develop physical activity plan with patient    Responsible User: Prudence York        Task: Explore community resources including walking groups, assistance programs, and home videos    Responsible User: Prudence York        Goal: Monitoring - monitor glucose and ketones as directed    This Visit's Progress: 30%        Task: Provide education on blood glucose monitoring (purpose, proper technique, frequency, glucose targets, interpreting results, when to use glucose control solution, sharps disposal)    Responsible User: Prudence York        Task: Provide education on continuous glucose monitoring (sensor placement, use of norma or /reader, understanding glucose trends, alerts and alarms, differences between sensor glucose and blood glucose)    Responsible User: Prudence York        Task: Provide education on ketone monitoring (when to monitor, frequency, etc.)    Responsible User: Prudence York        Goal: Taking Medication - patient is consistently taking medications as directed    This Visit's Progress: 30%        Task: Provide education on action of prescribed medication, including when to take and possible side effects    Responsible User: Prudence York        Task: Provide education on insulin and injectable diabetes medications, including administration, storage, site selection and rotation for injection sites    Responsible User:  Prudence York        Task: Discuss barriers to medication adherence with patient and provide management technique ideas as appropriate    Responsible User: Prudence York        Task: Provide education on frequency and refill details of medications    Responsible User: Prudence York        Goal: Problem Solving - know how to prevent and manage short-term diabetes complications    This Visit's Progress: 30%        Task: Provide education on high blood glucose - causes, signs/symptoms, prevention and treatment    Responsible User: Prudence York        Task: Provide education on low blood glucose - causes, signs/symptoms, prevention, treatment, carrying a carbohydrate source at all times, and medical identification    Responsible User: Prudence York        Task: Provide education on safe travel with diabetes    Responsible User: Prudence York        Task: Provide education on how to care for diabetes on sick days    Responsible User: Prudence York        Task: Provide education on when to call a health care provider    Responsible User: Prudence York        Goal: Reducing Risks - know how to prevent and treat long-term diabetes complications    This Visit's Progress: 30%        Task: Provide education on major complications of diabetes, prevention, early diagnostic measures and treatment of complications    Responsible User: Prudence York        Task: Provide education on recommended care for dental, eye and foot health    Responsible User: Prudence York        Task: Provide education on Hemoglobin A1c - goals and relationship to blood glucose levels    Responsible User: Prudence York        Task: Provide education on recommendations for heart health - lipid levels and goals, blood pressure and goals, and aspirin therapy, if indicated    Responsible User: Prudence York        Task: Provide education on tobacco cessation    Responsible User: Prudence York        Goal: Healthy Coping  - use available resources to cope with the challenges of managing diabetes         Task: Discuss recognizing feelings about having diabetes    Responsible User: Prudence York        Task: Provide education on the benefits of making appropriate lifestyle changes    Responsible User: Prudence York        Task: Provide education on benefits of utilizing support systems    Responsible User: Prudence York        Task: Discuss methods for coping with stress    Responsible User: Prudence York        Task: Provide education on when to seek professional counseling    Responsible User: Prudence York RD, [facility], 4:35 PM, 8/1/2023      Time Spent: 60 minutes DSMT, 25 minutes cgms  Encounter Type: Individual    Any diabetes medication dose changes were made via the CDE Protocol per the patient's referring provider. A copy of this encounter was shared with the provider.    Diabetes Self-Management Education & Support  Professional Continuous Glucose Monitor Insertion    SUBJECTIVE/OBJECTIVE:     Fawad Garcia presents for professional Continuous Glucose Monitor Insertion.     Patient comments/concerns: fluctuating BG 's    Lab Results:  Lab Results   Component Value Date    A1C 10.3 07/19/2023    A1C 8.4 06/21/2021      Lab Results   Component Value Date     07/19/2023     10/20/2022     09/20/2021     01/04/2021       Medication:  Diabetes Medication(s)       Biguanides       metFORMIN (GLUCOPHAGE) 500 MG tablet    Take 2 tablets (1,000 mg) by mouth 2 times daily (with meals)      Insulin       insulin glargine (LANTUS SOLOSTAR) 100 UNIT/ML pen    Inject 25 Units Subcutaneous At Bedtime Hold on file until needed.            ASSESSMENT:    CGM study indicated for:   fluctuating BG readings    INTERVENTION:   Sensor started today.         Sensor was inserted with no resistance or bleeding at insertion site.      Pt will plan to wear the sensor through  8/14/23.    WRITTEN AND VERBAL INFORMATION GIVEN TO SUPPORT UNDERSTANDING OF:  [employer] CGM: Sensor insertion, intention of monitoring for 14 days. Keep records of BG, food intake, exercise, and medication dosing during wear.       Patient verbalizes understanding of how to remove sensor, if needed, and all instructions provided.     Educational and other materials:  Food/exercise/medication log sheets  Contact information    PLAN:  Pt was given instructions for tracking BG, medications, food intake and activity.  Patient to return all items associated with the professional Continuous Glucose Monitor System.  See Patient Instructions, AVS printed and provided to patient today.    Follow-up:    Follow up on 8/15/23.    Time spent in DSMT: 60 minutes   Time spent in CGM insertion: 25 minutes, in addition to time spent in DSMT  Encounter Type: Individual

## 2023-08-01 NOTE — PROGRESS NOTES
Diabetes Self-Management Education & Support    Presents for: Individual review    Type of Service: In Person Visit    ASSESSMENT:  -type 2 diabetes for ~30 years  -recent a1c of 10.3 (>7.0 since 2019)  -currently being managed with Lantus and Metformin  -on aspirin and statin therapy  -diabetes complications:  mild retinopathy, PVD, TIA, CKD, amp of toe, current wound  -last seen by diabetes education 6/17/13  -home BG monitoring reveals: testing BG once daily, FASTING BLOOD SUGAR, ranging in the past 3 weeks:  (in 300's last month), ~70% in target     Primary concern: fluctuating BG readings.  Brought by son (Dov).  Will adjust Lantus 20-36 units on a daily basis based on BG readings, also adjusting Metformin based on a readings.    Discussed: option of professional cgms, onset/peak/duration of basal and bolus insulin, natural progression of type 2, target BG and a1c, hypoglycemia symptoms and treatment, potential complications of uncontrolled diabetes, glucose control for wound healing.  Patient expressed understanding.      Patient's most recent   Lab Results   Component Value Date    A1C 10.3 07/19/2023    A1C 8.4 06/21/2021     is not meeting goal of <8.0    Diabetes knowledge and skills assessment:   Patient is knowledgeable in diabetes management concepts related to: Monitoring    Continue education with the following diabetes management concepts: Healthy Eating, Being Active, Monitoring, Taking Medication, Problem Solving, Reducing Risks, and Healthy Coping    Based on learning assessment above, most appropriate setting for further diabetes education would be: Individual setting.      PLAN    Aim to eat 3 meals/day, no longer than 4-5 hours between meals (except for the 12 hour fast overnight).  Aim to be as active as possible.  Ideally 20-30 minutes 5 days/week.  Take Metformin as prescribed with food.  Inject Lantus 25 units at bedtime.  If your FASTING BLOOD SUGAR is >130 3 days in a row, increase  "by 2 units.  If you have a FASTING BLOOD SUGAR less than 100 reduce by 2 units.    Test your BG 1-3 times/day alternating testing times: fasting or before or 2 hrs after the start of the meal.  Record.  Rotate fingers.    6.  Follow up with Chris Scott as directed.  7.  Follow up with Diabetes Education 8/15/23 at 12 noon (phone visit)  Drop off the sensor on Monday 8/14/23.        See Care Plan for co-developed, patient-state behavior change goals.  AVS provided for patient today.    Education Materials Provided:  No new materials provided today      SUBJECTIVE/OBJECTIVE:  Presents for: Individual review  Accompanied by: Self, Son  Diabetes education in the past 24mo: No  Diabetes type: Type 2  Date of diagnosis: 1993, and started insulin in 2011  Disease course: Stable  How confident are you filling out medical forms by yourself:: Not at all  Transportation concerns: Yes (son drove him today)  Other concerns:: Visual impairment  Cultural Influences/Ethnic Background:  Not  or     Diabetes Symptoms & Complications:  Fatigue: Sometimes  Neuropathy: Sometimes  Polydipsia: Yes  Polyphagia: No  Polyuria: Yes  Visual change: Yes  Slow healing wounds: Yes  Autonomic neuropathy: No  CVA: No  Heart disease: No  Nephropathy: No  Peripheral neuropathy: Yes  Peripheral Vascular Disease: Yes  Retinopathy: Yes  Sexual dysfunction: Yes    Patient Problem List and Family Medical History reviewed for relevant medical history, current medical status, and diabetes risk factors.    Vitals:  There were no vitals taken for this visit.  Estimated body mass index is 31.53 kg/m  as calculated from the following:    Height as of 7/19/23: 1.803 m (5' 11\").    Weight as of 7/27/23: 102.6 kg (226 lb 1.6 oz).   Last 3 BP:   BP Readings from Last 3 Encounters:   07/27/23 136/71   07/24/23 115/71   07/19/23 134/78       History   Smoking Status    Never   Smokeless Tobacco    Never       Labs:  Lab Results   Component Value " Date    A1C 10.3 07/19/2023    A1C 8.4 06/21/2021     Lab Results   Component Value Date     07/19/2023     10/20/2022     09/20/2021     01/04/2021     Lab Results   Component Value Date    LDL 53 07/19/2023    LDL 47 03/18/2021     HDL Cholesterol   Date Value Ref Range Status   03/18/2021 61 >39 mg/dL Final     Direct Measure HDL   Date Value Ref Range Status   07/19/2023 52 >=40 mg/dL Final   ]  GFR Estimate   Date Value Ref Range Status   07/19/2023 66 >60 mL/min/1.73m2 Final   01/04/2021 63 >60 mL/min/[1.73_m2] Final     Comment:     Non  GFR Calc  Starting 12/18/2018, serum creatinine based estimated GFR (eGFR) will be   calculated using the Chronic Kidney Disease Epidemiology Collaboration   (CKD-EPI) equation.       GFR, ESTIMATED POCT   Date Value Ref Range Status   04/08/2022 >60 >60 mL/min/1.73m2 Final     GFR Estimate If Black   Date Value Ref Range Status   01/04/2021 73 >60 mL/min/[1.73_m2] Final     Comment:      GFR Calc  Starting 12/18/2018, serum creatinine based estimated GFR (eGFR) will be   calculated using the Chronic Kidney Disease Epidemiology Collaboration   (CKD-EPI) equation.       Lab Results   Component Value Date    CR 1.09 07/19/2023    CR 1.07 01/04/2021     No results found for: MICROALBUMIN    Healthy Eating:  Healthy Eating Assessed Today: Yes (wife does all the cooking)  Cultural/[employer] diet restrictions?: No  Meal planning/habits: Other, Avoiding sweets  How many times a week on average do you eat food made away from home (restaurant/take-out)?: 1  Meals include: Breakfast, Lunch, Dinner  Breakfast: cereal or scone or waffle or cookies  Lunch: sandwich, sf cookie, milk  Dinner: 6-6:30 pm:  tomato soup or meat/potatoes/veg, fruit, milk  Beverages: Coffee, Milk, Diet soda    Being Active:  Being Active Assessed Today: Yes  Exercise:: Currently not exercising  Barrier to exercise: Physical  limitation    Monitoring:  Monitoring Assessed Today: Yes  Did patient bring glucose meter to appointment? : Yes  Blood Glucose Meter: Unknown  Times checking blood sugar at home (number): 1x/day  Times checking blood sugar at home (per): Day  Blood glucose trend: Fluctuating         Taking Medications:  Diabetes Medication(s)       Biguanides       metFORMIN (GLUCOPHAGE) 500 MG tablet    Take 2 tablets (1,000 mg) by mouth 2 times daily (with meals)      Insulin       insulin glargine (LANTUS SOLOSTAR) 100 UNIT/ML pen    Inject 25 Units Subcutaneous At Bedtime Hold on file until needed.            Taking Medication Assessed Today: Yes  Current Treatments: Insulin Injections, Oral Medication (taken by mouth) (adjusts Lantus 20-36 units at night and takes 1-2 Metformin 2 times/day)  Dose schedule: (P) At bedtime  Given by: (P) Patient  Injection/Infusion sites: (P) Abdomen  Problems taking diabetes medications regularly?: Yes  Diabetes medication side effects?: Yes (as long as takes Metformin with food is tolerable, if he doesn't will have diarrhea)    Problem Solving:                 Reducing Risks:  Reducing Risks Assessed Today: Yes  Diabetes Risks: Age over 45 years, Family History  CAD Risks: Diabetes Mellitus, Family history  Has dilated eye exam at least once a year?: Yes  Sees dentist every 6 months?: Yes  Feet checked by healthcare provider in the last year?: Yes    Healthy Coping:  Healthy Coping Assessed Today: Yes  Emotional response to diabetes: Acceptance  Informal Support system:: Children, Spouse  Stage of change: ACTION (Actively working towards change)  Patient Activation Measure Survey Score:      4/20/2011     9:00 AM 7/26/2011     5:00 PM   DOMONIQUE Score (Last Two)   DOMONIQUE Raw Score 44 35   Activation Score 70.8 45.2   DOMONIQUE Level 4 1         Care Plan and Education Provided:  Care Plan: Diabetes   Updates made by Prudence York since 8/1/2023 12:00 AM        Problem: HbA1C Not In Goal          Goal: Establish Regular Follow-Ups with PCP         Task: Discuss with PCP the recommended timing for patient's next follow up visit(s)    Responsible User: Prudnece York        Task: Discuss schedule for PCP visits with patient    Responsible User: Prudence York        Goal: Get HbA1C Level in Goal         Task: Educate patient on diabetes education self-management topics    Responsible User: Prudence York        Task: Educate patient on benefits of regular glucose monitoring    Responsible User: Prudence York        Task: Refer patient to appropriate extended care team member, as needed (Medication Therapy Management, Behavioral Health, Physical Therapy, etc.)    Responsible User: Prudence York        Task: Discuss diabetes treatment plan with patient    Responsible User: Prudence York        Problem: Diabetes Self-Management Education Needed to Optimize Self-Care Behaviors         Goal: Understand diabetes pathophysiology and disease progression         Task: Provide education on diabetes pathophysiology and disease progression specfic to patient's diabetes type    Responsible User: Prudence York        Goal: Healthy Eating - follow a healthy eating pattern for diabetes         Task: Provide education on portion control and consistency in amount, composition and timing of food intake    Responsible User: Prudence York        Task: Provide education on managing carbohydrate intake (carbohydrate counting, plate planning method, etc.)    Responsible User: Prudence York        Task: Provide education on weight management    Responsible User: Prudence York        Task: Provide education on heart healthy eating    Responsible User: Prudence York        Task: Provide education on eating out    Responsible User: Prudence York        Task: Develop individualized healthy eating plan with patient    Responsible User: Prudence York        Goal: Being Active - get regular physical  activity, working up to at least 150 minutes per week         Task: Provide education on relationship of activity to glucose and precautions to take if at risk for low glucose    Responsible User: Prudence York        Task: Discuss barriers to physical activity with patient    Responsible User: Prudence York        Task: Develop physical activity plan with patient    Responsible User: Prudence York        Task: Explore community resources including walking groups, assistance programs, and home videos    Responsible User: Prudence York        Goal: Monitoring - monitor glucose and ketones as directed    This Visit's Progress: 30%        Task: Provide education on blood glucose monitoring (purpose, proper technique, frequency, glucose targets, interpreting results, when to use glucose control solution, sharps disposal)    Responsible User: Prudence York        Task: Provide education on continuous glucose monitoring (sensor placement, use of norma or /reader, understanding glucose trends, alerts and alarms, differences between sensor glucose and blood glucose)    Responsible User: Prudence York        Task: Provide education on ketone monitoring (when to monitor, frequency, etc.)    Responsible User: Prudence York        Goal: Taking Medication - patient is consistently taking medications as directed    This Visit's Progress: 30%        Task: Provide education on action of prescribed medication, including when to take and possible side effects    Responsible User: Prudence York        Task: Provide education on insulin and injectable diabetes medications, including administration, storage, site selection and rotation for injection sites    Responsible User: Prudence York        Task: Discuss barriers to medication adherence with patient and provide management technique ideas as appropriate    Responsible User: Prudence York        Task: Provide education on frequency and refill  details of medications    Responsible User: Prudence York        Goal: Problem Solving - know how to prevent and manage short-term diabetes complications    This Visit's Progress: 30%        Task: Provide education on high blood glucose - causes, signs/symptoms, prevention and treatment    Responsible User: Prudence York        Task: Provide education on low blood glucose - causes, signs/symptoms, prevention, treatment, carrying a carbohydrate source at all times, and medical identification    Responsible User: Prudence York        Task: Provide education on safe travel with diabetes    Responsible User: Prudence York        Task: Provide education on how to care for diabetes on sick days    Responsible User: Prudence York        Task: Provide education on when to call a health care provider    Responsible User: Prudence York        Goal: Reducing Risks - know how to prevent and treat long-term diabetes complications    This Visit's Progress: 30%        Task: Provide education on major complications of diabetes, prevention, early diagnostic measures and treatment of complications    Responsible User: Prudence York        Task: Provide education on recommended care for dental, eye and foot health    Responsible User: Prudence York        Task: Provide education on Hemoglobin A1c - goals and relationship to blood glucose levels    Responsible User: Prudence York        Task: Provide education on recommendations for heart health - lipid levels and goals, blood pressure and goals, and aspirin therapy, if indicated    Responsible User: Prudence York        Task: Provide education on tobacco cessation    Responsible User: Prudence York        Goal: Healthy Coping - use available resources to cope with the challenges of managing diabetes         Task: Discuss recognizing feelings about having diabetes    Responsible User: Prudence York        Task: Provide education on the benefits of  making appropriate lifestyle changes    Responsible User: Prudence York        Task: Provide education on benefits of utilizing support systems    Responsible User: Prudence York        Task: Discuss methods for coping with stress    Responsible User: Prudence York        Task: Provide education on when to seek professional counseling    Responsible User: Prudence York RD, [facility], 4:35 PM, 8/1/2023      Time Spent: 60 minutes DSMT, 25 minutes cgms  Encounter Type: Individual    Any diabetes medication dose changes were made via the CDE Protocol per the patient's referring provider. A copy of this encounter was shared with the provider.    Diabetes Self-Management Education & Support  Professional Continuous Glucose Monitor Insertion    SUBJECTIVE/OBJECTIVE:     Fawad Garcia presents for professional Continuous Glucose Monitor Insertion.     Patient comments/concerns: fluctuating BG 's    Lab Results:  Lab Results   Component Value Date    A1C 10.3 07/19/2023    A1C 8.4 06/21/2021      Lab Results   Component Value Date     07/19/2023     10/20/2022     09/20/2021     01/04/2021       Medication:  Diabetes Medication(s)       Biguanides       metFORMIN (GLUCOPHAGE) 500 MG tablet    Take 2 tablets (1,000 mg) by mouth 2 times daily (with meals)      Insulin       insulin glargine (LANTUS SOLOSTAR) 100 UNIT/ML pen    Inject 25 Units Subcutaneous At Bedtime Hold on file until needed.            ASSESSMENT:    CGM study indicated for:   fluctuating BG readings    INTERVENTION:   Sensor started today.         Sensor was inserted with no resistance or bleeding at insertion site.      Pt will plan to wear the sensor through 8/14/23.    WRITTEN AND VERBAL INFORMATION GIVEN TO SUPPORT UNDERSTANDING OF:  Libre[employer] CGM: Sensor insertion, intention of monitoring for 14 days. Keep records of BG, food intake, exercise, and medication dosing during wear.        Patient verbalizes understanding of how to remove sensor, if needed, and all instructions provided.     Educational and other materials:  Food/exercise/medication log sheets  Contact information    PLAN:  Pt was given instructions for tracking BG, medications, food intake and activity.  Patient to return all items associated with the professional Continuous Glucose Monitor System.  See Patient Instructions, AVS printed and provided to patient today.    Follow-up:    Follow up on 8/15/23.    Time spent in DSMT: 60 minutes   Time spent in CGM insertion: 25 minutes, in addition to time spent in DSMT  Encounter Type: Individual

## 2023-08-01 NOTE — PATIENT INSTRUCTIONS
PLAN    Aim to eat 3 meals/day, no longer than 4-5 hours between meals (except for the 12 hour fast overnight).  Aim to be as active as possible.  Ideally 20-30 minutes 5 days/week.  Take Metformin as prescribed with food.  Inject Lantus 25 units at bedtime.  If your FASTING BLOOD SUGAR is >130 3 days in a row, increase by 2 units.  If you have a FASTING BLOOD SUGAR less than 100 reduce by 2 units.    Test your BG 1-3 times/day alternating testing times: fasting or before or 2 hrs after the start of the meal.  Record.  Rotate fingers.    6.  Follow up with Chris Scott as directed.  7.  Follow up with Diabetes Education 8/15/23 at 12 noon (phone visit)  Drop off the sensor on Monday 8/14/23.    Prudence York RD, [facility], 4:15 PM, 8/1/2023

## 2023-08-14 ENCOUNTER — HOSPITAL ENCOUNTER (OUTPATIENT)
Dept: WOUND CARE | Facility: CLINIC | Age: 88
Discharge: HOME | End: 2023-08-14
Attending: FAMILY MEDICINE | Admitting: FAMILY MEDICINE
Payer: COMMERCIAL

## 2023-08-14 PROCEDURE — G0463 HOSPITAL OUTPT CLINIC VISIT: HCPCS

## 2023-08-14 PROCEDURE — 272N000067 HC DRESSING PROMGRAN

## 2023-08-14 NOTE — DISCHARGE INSTRUCTIONS
Continue same cares. Can change every other day based on the amount of drainage I observed today.     When this area heals, the skin will tend to be quite dry here for some time. Recommend daily application of dye free, scent free moisturizer. Aquaphor is a good one.    You will need to wear compression daily to prevent re-wounding here.     Compression stockings should be applied first thing in the morning and removed at night when going to bed. They are too tight to wear 24 hours a day. They must be applied when you first get up in the morning, if you wait you will already begin to swell making it harder to get them on. Replace your stockings at least every 6 months as the compression will begin to lose strength and could result in swelling leading to re-ulceration. Even mild swelling that you may not be able to see can cause problems.     You may order though the initial company your order was placed with or find other suppliers. There are many medical suppliers and compression stocking manufacturers to choose from.  The size of stocking you need from a  will be based on your measurements not size (i.e. small, medium, large) and size needed may be different from one brand to the next. Measure your ankle and your calf to determine this, try to get your non-swollen measurements. Medical supply stores can help you with this. At present your ankle is 25cm, your calf is 38cm.  Recommend knee high   And the strength of compression garments you should be wearing: ok to use 15-20mmHg so you can get them on.       Some offer closed toe versus open toe and different colors and materials to choose from.    If you have a recently healed wound, you may want to keep the wound covered with a simple dressing for several weeks to protect it from being rubbed on.      Kamille Calzada RN, CWOCN [phone]

## 2023-08-14 NOTE — PROGRESS NOTES
[facility]    Start of Care in [facility]: 5/23/2023   Referring Provider: Dr. Montesinos  Primary Care Provider: Chris Scott   Wound Location: left ankle     Wound Clinic Visit:   Follow-up    Reason for Visit:       left ankle wound    Subjective/Interval History:     Pt denies concerns.         Wound History:   Pt initially seen in the ED on 5/14/23 for ulceration, prescribed keflex and bactrim. Had arterial ultrasounds yesterday due to PAD history including distal popliteal stenting of left leg in February of '20.       Past Medical History:  Patient Active Problem List   Diagnosis    Obesity    Transient cerebral ischemia    Hyperlipidemia LDL goal <70    GERD (gastroesophageal reflux disease)    Peripheral vascular disease (H)    Advanced directives, counseling/discussion    Malignant neoplasm of prostate (H)    Hypertension, goal below 140/90    Senile nuclear sclerosis    Type 2 diabetes mellitus with both eyes affected by mild nonproliferative retinopathy without macular edema, with long-term current use of insulin (H)    Macular degeneration (senile) of retina    H/O amputation of lesser toe, right (H)    TIA (transient ischemic attack)    Chronic kidney disease, stage 3 (H)    Dermatitis    Skin lesion    Tremor of left hand    Left leg weakness                     Tobacco Use:     Tobacco Use      Smoking status: Never      Smokeless tobacco: Never       Diabetic: yes  HgbA1C:   Hemoglobin A1C   Date Value Ref Range Status   07/19/2023 10.3 (H) <5.7 % Final     Comment:     Normal <5.7%   Prediabetes 5.7-6.4%    Diabetes 6.5% or higher     Note: Adopted from ADA consensus guidelines.   06/21/2021 8.4 (H) 0 - 5.6 % Final     Comment:     Normal <5.7% Prediabetes 5.7-6.4%  Diabetes 6.5% or higher - adopted from ADA   consensus guidelines.       Checks Blood Glucose?: yes  Average Readings: states usually in low 100's so he is surprised at high HgbA1C.        Personal/social history:  Lives with spouse who does help with bandaging. Retired pharmacist. Sons are supportive, one is present today    Objective:   Current treatment plan: Promogran alexx and Mepilex foam secured with Medipore tape, have been changing daily.   Last changed: last night      Wound #1 Left ankle, medial    1st photo from ED visit on 5/14/23          Stage/tissue depth: full thickness  1.3 (was 2.4 ) cm L x 0.8 (was 2.3) W x 0-0.1 cm estimated D  Tunneling: no  Undermining: no  Wound bed type/amount:  100% granulation; non-fluctuant  Wound Edges: attached  Periwound: intact  Drainage: Iodosorb not completely dissolved, strike though serous drainage noted on foam, drainage is moderate  Odor: no  Pain: denies    Dorsalis Pedal Pulse: palpable: no, edema in area   Posterior Tibial Pulse: palpable: no, edema in area   Hair growth: sparse upper lower leg  Capillary Refill: 2 seconds  Feet/toes color: pink, warm  R Leg: Edema trace to 1+  L Leg: Edema trace  lower leg to ankle, foot.  Ankle 25.5   Calf 38     RENATE 5/22/23:   FINDINGS:  Right RENATE:   DP: 0.49  PT: 0.55.     Left RENATE:   DP: 0.95   PT: 0.69.     Right Digital brachial index: 0.43, 62 mmHg.  Left Digital Brachial index: 0.55, 79 mmHg     Waveforms: Monophasic in the distal tibial arteries    US LOWER EXTREMITY ARTERIAL DUPLEX LEFT  5/22/2023 2:00 PM      HISTORY:  Peripheral vascular disease. Left lower extremity ulcer.     COMPARISON: None     FINDINGS: Color Doppler and spectral waveform analysis performed.     The following peak systolic velocities are measured in cm/s.      LEFT     CFA: 111  PFA: 50  SFA, proximal: 89  SFA, mid: 69  SFA, distal: 84  Popliteal: 61  Anterior tibial artery proximal: 149  Posterior tibial artery: 26  Peroneal artery mid: 80     Waveforms: Multiphasic waveforms are identified in the visualized  arteries.                                                                      IMPRESSION: Per sonographic  velocity criteria, no definite significant  stenosis identified.                                                                      IMPRESSION: Moderate arterial insufficiency in the right lower  extremity. RENATE in the left lower extremity is in the range of normal.  Digital brachial indices would indicate adequate wound healing  potential.       Mobility: independant  Current offloading/footwear: diabetic shoes and inserts appear to fit appropriately without rubbing area  Sensation: some neuropathy of feet but can feel light touch in area    Other callusing/areas of concern: none noted       Diet: eating moderate amount of sweets.       Assessment:  Left ankle full thickness ulceration of uncertain etiology, location suspicious for venous ulceration given presence of lower leg edema    Wound now nearly fully granular and smaller, healing well     Edema is improved since initiation of tubular support    History of PAD though per arterial studies no concerns identified is left lower leg, does have stent in distal popliteal area, angiogram done but no intervention done     Factors impacting wound healing:     Delayed healing as part of normal aging process  Reduced Blood Supply: inadequate perfusion to heal wound  Obesity: decreases tissue perfusion  Underlying Disease: diabetes mellitis, poor control      Plan:  Continue foam dressing for absorption and prevention of hypergranulation. Collagen for filler for minimal depth and additional absorption.     Continue conservative compression using spandagrip OR his compression stockings, (these are lower compression - 15-20 at the most so he can get them on).     Pt has topical cream for dermatitis, to use PRN. Continue skin barrier wipe under adhesive.     Advised of long term need for compression, his lower compression stockings would likely be adequate as he is doing well with the spandagrip. He is able to repeat this back to me, states he understands.      Have  "discussed elevation and activity for edema control. Per son he has \"not been elevating\" (prior visit, states is trying now).      Prior visit- Discussed need to limit sweets and work with provider on BG control.     He did get a continuous BG meter and is working with diabetes ed on this.      Topical care: Wound/surrounding skin cleansed with Vashe and gauze. Patted dry. bandaged per plan of care    Additional recommendations: improved blood glucose control, increased dietary protein    The following discharge instructions were reviewed with and sent home with the patient:  See AVS    Follow up: 2 weeks, can cancel of healed      The following supplies were sent home with the patient:  Remainder Mepilex x4 non-boardered foam, some sureprep, Remainder plus one promogran alexx and barrier cream. Did get supply order from [facility]    Verbal, written, education provided.  Face to face time: approximately 30 minutes  Procedure: none  Kamille Calzada RN, CWOCN   "

## 2023-08-14 NOTE — ADDENDUM NOTE
Encounter addended by: Kamille Calzada on: 8/14/2023 4:16 PM   Actions taken: Charge Capture section accepted

## 2023-08-15 ENCOUNTER — TELEPHONE (OUTPATIENT)
Dept: EDUCATION SERVICES | Facility: CLINIC | Age: 88
End: 2023-08-15

## 2023-08-15 ENCOUNTER — VIRTUAL VISIT (OUTPATIENT)
Dept: EDUCATION SERVICES | Facility: CLINIC | Age: 88
End: 2023-08-15
Payer: COMMERCIAL

## 2023-08-15 DIAGNOSIS — E11.3293 TYPE 2 DIABETES MELLITUS WITH BOTH EYES AFFECTED BY MILD NONPROLIFERATIVE RETINOPATHY WITHOUT MACULAR EDEMA, WITH LONG-TERM CURRENT USE OF INSULIN (H): Primary | ICD-10-CM

## 2023-08-15 DIAGNOSIS — Z79.4 TYPE 2 DIABETES MELLITUS WITH BOTH EYES AFFECTED BY MILD NONPROLIFERATIVE RETINOPATHY WITHOUT MACULAR EDEMA, WITH LONG-TERM CURRENT USE OF INSULIN (H): Primary | ICD-10-CM

## 2023-08-15 PROCEDURE — 99207 PR GLUCOSE MONITORING CONTINUOUS, >=72 HR: CPT | Performed by: DIETITIAN, REGISTERED

## 2023-08-15 PROCEDURE — G0108 DIAB MANAGE TRN  PER INDIV: HCPCS | Mod: 95 | Performed by: DIETITIAN, REGISTERED

## 2023-08-15 NOTE — TELEPHONE ENCOUNTER
Download of professional cgms (8/1/23-8/14/23) revealed:  39% in target, 35% >250mg/dL, 3% less than 70mg/dL         Latest Reference Range & Units 07/19/23 08:57   Hemoglobin A1C <5.7 % 10.3 (H)   (H): Data is abnormally high   Latest Reference Range & Units 07/19/23 08:57   GFR Estimate >60 mL/min/1.73m2 66     No history of pancreatitis or family history of thyroid cancer.      Currently being managed with:  Lantus 25 units at HS   Metformin 500 mg 2 tabs 2 times/day    Recommend:  Reduce Lantus to 23 units at HS.  Continue Metformin 500 mg 2 tabs 2 times/day  Add Victoza 0.6 mg once daily for one week, then increase to 1.2 mg once weekly.      Prudence York RD, [facility], 1:16 PM, 8/15/2023

## 2023-08-15 NOTE — PATIENT INSTRUCTIONS
PLAN:    Aim to eat 3 meals/day, no longer than 4-5 hours between meals (except for the 12 hour fast overnight).  Aim to be as active as possible.  Ideally 20-30 minutes 5 days/week.  Take Metformin as prescribed with food.  Reduce Lantus 23 units at bedtime.  If your FASTING BLOOD SUGAR is less than 100 reduce by 2 units.    Continue to test your BG 1-3 times/day alternating testing times: fasting or before or 2 hrs after the start of the meal.  Record.  Rotate fingers.  Start the Victoza 0.6 mg once daily for 7 days, then increase to 1.2 mg once daily.  If you have 2 or more readings less than 70 mg/dL in one week please reach out to Dr. Scott or Diabetes Education (triage number: [phone]).       8.  Follow up with Chris Scott as directed.  9.  Follow up with Diabetes Education 9/26/23 at 4pm (in person visit) (phone visit).    Prudence York RD, [facility], 1:30 PM, 8/15/2023

## 2023-08-15 NOTE — PROGRESS NOTES
Diabetes Self-Management Education & Support    Presents for: Individual review    Type of Service: Telephone Visit    Originating Location (Patient Location): Home  Distant Location (Provider Location): [facility]  Mode of Communication:  Telephone    Telephone Visit Start Time:  12:01pm  Telephone Visit End Time (telephone visit stop time): 12:47pm    How would patient like to obtain AVS? Fayehart    Assessment Type:   REPORTS:  Professional Jeanette 8/1/23-8/14/23:            Pt verbalized understanding of concepts discussed and recommendations provided today.       Continue education with the following diabetes management concepts: Healthy Eating, Being Active, Monitoring, Taking Medication, Problem Solving, Reducing Risks, and Healthy Coping    ASSESSMENT:    -type 2 diabetes for ~30 years  -recent a1c of 10.3 (>7.0 since 2019)  -currently being managed with Lantus and Metformin  (states he is being more consistent in the amount he is taking)  -on aspirin and statin therapy  -diabetes complications:  mild retinopathy, PVD, TIA, CKD, amp of toe, current wound  -last seen by diabetes education 6/17/13  -home BG monitoring reveals: testing BG once daily, FASTING BLOOD SUGAR, ranging in the past 3 weeks:  (in 300's last month), ~70% in target     Primary concern: download of cgms.      Discussed:  jeanette pro results (It appears Lantus 25 units is about max dose of basal insulin at this time, due to impact on BG over the night, but still having high BG from 9am until 1am)  , additional pharmacological options (ie glp-1, sglt-2), use of Victoza pen, side effects, hypoglycemia symptoms and treatment, .  Patient expressed understanding.      PLAN:    Aim to eat 3 meals/day, no longer than 4-5 hours between meals (except for the 12 hour fast overnight).  Aim to be as active as possible.  Ideally 20-30 minutes 5 days/week.  Take Metformin as prescribed with food.  Reduce Lantus 23 units at  "bedtime.  If your FASTING BLOOD SUGAR is less than 100 reduce by 2 units.    Continue to test your BG 1-3 times/day alternating testing times: fasting or before or 2 hrs after the start of the meal.  Record.  Rotate fingers.  Start the Victoza 0.6 mg once daily for 7 days, then increase to 1.2 mg once daily.  If you have 2 or more readings less than 70 mg/dL in one week please reach out to Dr. Scott or Diabetes Education (triage number: [phone]).       8.  Follow up with Chris Scott as directed.  9.  Follow up with Diabetes Education 9/26/23 at 4pm (in person visit)         See Care Plan for co-developed, patient-state behavior change goals.  AVS provided for patient today.    Education Materials Provided:  Medication Information on Victoza      SUBJECTIVE/OBJECTIVE:  Presents for: Individual review  Accompanied by: Self  Diabetes education in the past 24mo: No  Diabetes type: Type 2  Date of diagnosis: 1993, and started insulin in 2011  Disease course: Stable  How confident are you filling out medical forms by yourself:: quite a bit  Diabetes management related comments/concerns: download of professional cgms  Transportation concerns: Yes  Other concerns:: Visual impairment  Cultural Influences/Ethnic Background:  Not  or     Diabetes Symptoms & Complications:  Fatigue: Sometimes  Neuropathy: Sometimes  Polydipsia: Yes  Polyphagia: No  Polyuria: Yes  Visual change: Yes  Slow healing wounds: Yes  Autonomic neuropathy: No  CVA: No  Heart disease: No  Nephropathy: No  Peripheral neuropathy: Yes  Peripheral Vascular Disease: Yes  Retinopathy: Yes  Sexual dysfunction: Yes    Patient Problem List and Family Medical History reviewed for relevant medical history, current medical status, and diabetes risk factors.    Vitals:  There were no vitals taken for this visit.  Estimated body mass index is 31.53 kg/m  as calculated from the following:    Height as of 7/19/23: 1.803 m (5' 11\").    Weight " as of 7/27/23: 102.6 kg (226 lb 1.6 oz).   Last 3 BP:   BP Readings from Last 3 Encounters:   07/27/23 136/71   07/24/23 115/71   07/19/23 134/78       History   Smoking Status    Never   Smokeless Tobacco    Never       Labs:  Lab Results   Component Value Date    A1C 10.3 07/19/2023    A1C 8.4 06/21/2021     Lab Results   Component Value Date     07/19/2023     10/20/2022     09/20/2021     01/04/2021     Lab Results   Component Value Date    LDL 53 07/19/2023    LDL 47 03/18/2021     HDL Cholesterol   Date Value Ref Range Status   03/18/2021 61 >39 mg/dL Final     Direct Measure HDL   Date Value Ref Range Status   07/19/2023 52 >=40 mg/dL Final   ]  GFR Estimate   Date Value Ref Range Status   07/19/2023 66 >60 mL/min/1.73m2 Final   01/04/2021 63 >60 mL/min/[1.73_m2] Final     Comment:     Non  GFR Calc  Starting 12/18/2018, serum creatinine based estimated GFR (eGFR) will be   calculated using the Chronic Kidney Disease Epidemiology Collaboration   (CKD-EPI) equation.       GFR, ESTIMATED POCT   Date Value Ref Range Status   04/08/2022 >60 >60 mL/min/1.73m2 Final     GFR Estimate If Black   Date Value Ref Range Status   01/04/2021 73 >60 mL/min/[1.73_m2] Final     Comment:      GFR Calc  Starting 12/18/2018, serum creatinine based estimated GFR (eGFR) will be   calculated using the Chronic Kidney Disease Epidemiology Collaboration   (CKD-EPI) equation.       Lab Results   Component Value Date    CR 1.09 07/19/2023    CR 1.07 01/04/2021     No results found for: MICROALBUMIN    Healthy Eating:  Healthy Eating Assessed Today: Yes (wife does all the cooking)  Cultural/Mormonism diet restrictions?: No  Meal planning/habits: Other, Avoiding sweets  How many times a week on average do you eat food made away from home (restaurant/take-out)?: 1  Meals include: Breakfast, Lunch, Dinner  Breakfast: cereal or scone or waffle or cookies  Lunch: sandwich, sf cookie,  milk  Dinner: 6-6:30 pm:  tomato soup or meat/potatoes/veg, fruit, milk  Beverages: Coffee, Milk, Diet soda    Being Active:  Being Active Assessed Today: Yes  Exercise:: Currently not exercising  Barrier to exercise: Physical limitation    Monitoring:  Monitoring Assessed Today: Yes  Did patient bring glucose meter to appointment? : Yes  Blood Glucose Meter: Unknown  Times checking blood sugar at home (number): 5+  Times checking blood sugar at home (per): Day  Blood glucose trend: Fluctuating, states FASTING BLOOD SUGAR , 2 hr post prandial (supper) 200's    Taking Medications:  Diabetes Medication(s)       Biguanides       metFORMIN (GLUCOPHAGE) 500 MG tablet    Take 2 tablets (1,000 mg) by mouth 2 times daily (with meals)      Insulin       insulin glargine (LANTUS SOLOSTAR) 100 UNIT/ML pen    Inject 25 Units Subcutaneous At Bedtime Hold on file until needed.            Taking Medication Assessed Today: Yes  Current Treatments: Insulin Injections, Oral Medication (taken by mouth) (adjusts Lantus 20-36 units at night and takes 1-2 Metformin 2 times/day)  Dose schedule: At bedtime  Given by: Patient  Injection/Infusion sites: Abdomen  Problems taking diabetes medications regularly?: Yes  Diabetes medication side effects?: Yes (as long as takes Metformin with food is tolerable, if he doesn't will have diarrhea)    Problem Solving:                 Reducing Risks:  Reducing Risks Assessed Today: Yes  Diabetes Risks: Age over 45 years, Family History  CAD Risks: Diabetes Mellitus, Family history  Has dilated eye exam at least once a year?: Yes  Sees dentist every 6 months?: Yes  Feet checked by healthcare provider in the last year?: Yes    Healthy Coping:  Healthy Coping Assessed Today: Yes  Emotional response to diabetes: Acceptance  Informal Support system:: Children, Spouse  Stage of change: ACTION (Actively working towards change)  Patient Activation Measure Survey Score:      4/20/2011     9:00 AM 7/26/2011      5:00 PM   DOMONIQUE Score (Last Two)   DOMONIQUE Raw Score 44 35   Activation Score 70.8 45.2   DOMONIQUE Level 4 1         Care Plan and Education Provided:  GLP-1 administration technique discussed today. Side effects were discussed, if patient has any abdominal pain, with or without nausea and/or vomiting, stop medication, call provider, clinic or go to the emergency room.  Care Plan: Diabetes   Updates made by Prudence York since 8/15/2023 12:00 AM        Problem: Diabetes Self-Management Education Needed to Optimize Self-Care Behaviors         Goal: Monitoring - monitor glucose and ketones as directed    This Visit's Progress: 30%        Task: Provide education on continuous glucose monitoring (sensor placement, use of norma or /reader, understanding glucose trends, alerts and alarms, differences between sensor glucose and blood glucose) Completed 8/15/2023   Responsible User: Prudence York        Goal: Taking Medication - patient is consistently taking medications as directed    This Visit's Progress: 70%   Recent Progress: 30%        Goal: Problem Solving - know how to prevent and manage short-term diabetes complications    This Visit's Progress: 50%   Recent Progress: 30%        Goal: Reducing Risks - know how to prevent and treat long-term diabetes complications    This Visit's Progress: 40%   Recent Progress: 30%          Prudence York RD, [facility], 1:24 PM, 8/15/2023      Time Spent: 46 minutes  Encounter Type: Individual    Any diabetes medication dose changes were made via the CDE Protocol per the patient's primary care provider. A copy of this encounter was shared with the provider.

## 2023-08-15 NOTE — LETTER
8/15/2023         RE: Fawad Garcia  [address]  [facility] [address]        Dear Colleague,    Thank you for referring your patient, Fawad Garcia, to the [facility]. Please see a copy of my visit note below.    Diabetes Self-Management Education & Support    Presents for: Individual review    Type of Service: Telephone Visit    Originating Location (Patient Location): Home  Distant Location (Provider Location): [facility]  Mode of Communication:  Telephone    Telephone Visit Start Time:  12:01pm  Telephone Visit End Time (telephone visit stop time): 12:47pm    How would patient like to obtain AVS? MyChart    Assessment Type:   REPORTS:  Professional Jeanette 8/1/23-8/14/23:            Pt verbalized understanding of concepts discussed and recommendations provided today.       Continue education with the following diabetes management concepts: Healthy Eating, Being Active, Monitoring, Taking Medication, Problem Solving, Reducing Risks, and Healthy Coping    ASSESSMENT:    -type 2 diabetes for ~30 years  -recent a1c of 10.3 (>7.0 since 2019)  -currently being managed with Lantus and Metformin  (states he is being more consistent in the amount he is taking)  -on aspirin and statin therapy  -diabetes complications:  mild retinopathy, PVD, TIA, CKD, amp of toe, current wound  -last seen by diabetes education 6/17/13  -home BG monitoring reveals: testing BG once daily, FASTING BLOOD SUGAR, ranging in the past 3 weeks:  (in 300's last month), ~70% in target     Primary concern: download of cgms.      Discussed:  jeanette pro results (It appears Lantus 25 units is about max dose of basal insulin at this time, due to impact on BG over the night, but still having high BG from 9am until 1am)  , additional pharmacological options (ie glp-1, sglt-2), use of Victoza pen, side effects, hypoglycemia symptoms and treatment, .  Patient expressed  understanding.      PLAN:    Aim to eat 3 meals/day, no longer than 4-5 hours between meals (except for the 12 hour fast overnight).  Aim to be as active as possible.  Ideally 20-30 minutes 5 days/week.  Take Metformin as prescribed with food.  Reduce Lantus 23 units at bedtime.  If your FASTING BLOOD SUGAR is less than 100 reduce by 2 units.    Continue to test your BG 1-3 times/day alternating testing times: fasting or before or 2 hrs after the start of the meal.  Record.  Rotate fingers.  Start the Victoza 0.6 mg once daily for 7 days, then increase to 1.2 mg once daily.  If you have 2 or more readings less than 70 mg/dL in one week please reach out to Dr. Scott or Diabetes Education (triage number: [phone]).       8.  Follow up with Chris Scott as directed.  9.  Follow up with Diabetes Education 9/26/23 at 4pm (in person visit)         See Care Plan for co-developed, patient-state behavior change goals.  AVS provided for patient today.    Education Materials Provided:  Medication Information on Victoza      SUBJECTIVE/OBJECTIVE:  Presents for: Individual review  Accompanied by: Self  Diabetes education in the past 24mo: No  Diabetes type: Type 2  Date of diagnosis: 1993, and started insulin in 2011  Disease course: Stable  How confident are you filling out medical forms by yourself:: quite a bit  Diabetes management related comments/concerns: download of professional cgms  Transportation concerns: Yes  Other concerns:: Visual impairment  Cultural Influences/Ethnic Background:  Not  or     Diabetes Symptoms & Complications:  Fatigue: Sometimes  Neuropathy: Sometimes  Polydipsia: Yes  Polyphagia: No  Polyuria: Yes  Visual change: Yes  Slow healing wounds: Yes  Autonomic neuropathy: No  CVA: No  Heart disease: No  Nephropathy: No  Peripheral neuropathy: Yes  Peripheral Vascular Disease: Yes  Retinopathy: Yes  Sexual dysfunction: Yes    Patient Problem List and Family Medical History  "reviewed for relevant medical history, current medical status, and diabetes risk factors.    Vitals:  There were no vitals taken for this visit.  Estimated body mass index is 31.53 kg/m  as calculated from the following:    Height as of 7/19/23: 1.803 m (5' 11\").    Weight as of 7/27/23: 102.6 kg (226 lb 1.6 oz).   Last 3 BP:   BP Readings from Last 3 Encounters:   07/27/23 136/71   07/24/23 115/71   07/19/23 134/78       History   Smoking Status     Never   Smokeless Tobacco     Never       Labs:  Lab Results   Component Value Date    A1C 10.3 07/19/2023    A1C 8.4 06/21/2021     Lab Results   Component Value Date     07/19/2023     10/20/2022     09/20/2021     01/04/2021     Lab Results   Component Value Date    LDL 53 07/19/2023    LDL 47 03/18/2021     HDL Cholesterol   Date Value Ref Range Status   03/18/2021 61 >39 mg/dL Final     Direct Measure HDL   Date Value Ref Range Status   07/19/2023 52 >=40 mg/dL Final   ]  GFR Estimate   Date Value Ref Range Status   07/19/2023 66 >60 mL/min/1.73m2 Final   01/04/2021 63 >60 mL/min/[1.73_m2] Final     Comment:     Non  GFR Calc  Starting 12/18/2018, serum creatinine based estimated GFR (eGFR) will be   calculated using the Chronic Kidney Disease Epidemiology Collaboration   (CKD-EPI) equation.       GFR, ESTIMATED POCT   Date Value Ref Range Status   04/08/2022 >60 >60 mL/min/1.73m2 Final     GFR Estimate If Black   Date Value Ref Range Status   01/04/2021 73 >60 mL/min/[1.73_m2] Final     Comment:      GFR Calc  Starting 12/18/2018, serum creatinine based estimated GFR (eGFR) will be   calculated using the Chronic Kidney Disease Epidemiology Collaboration   (CKD-EPI) equation.       Lab Results   Component Value Date    CR 1.09 07/19/2023    CR 1.07 01/04/2021     No results found for: MICROALBUMIN    Healthy Eating:  Healthy Eating Assessed Today: Yes (wife does all the cooking)  Cultural/Mu-ism diet " restrictions?: No  Meal planning/habits: Other, Avoiding sweets  How many times a week on average do you eat food made away from home (restaurant/take-out)?: 1  Meals include: Breakfast, Lunch, Dinner  Breakfast: cereal or scone or waffle or cookies  Lunch: sandwich, sf cookie, milk  Dinner: 6-6:30 pm:  tomato soup or meat/potatoes/veg, fruit, milk  Beverages: Coffee, Milk, Diet soda    Being Active:  Being Active Assessed Today: Yes  Exercise:: Currently not exercising  Barrier to exercise: Physical limitation    Monitoring:  Monitoring Assessed Today: Yes  Did patient bring glucose meter to appointment? : Yes  Blood Glucose Meter: Unknown  Times checking blood sugar at home (number): 5+  Times checking blood sugar at home (per): Day  Blood glucose trend: Fluctuating, states FASTING BLOOD SUGAR , 2 hr post prandial (supper) 200's    Taking Medications:  Diabetes Medication(s)       Biguanides       metFORMIN (GLUCOPHAGE) 500 MG tablet    Take 2 tablets (1,000 mg) by mouth 2 times daily (with meals)      Insulin       insulin glargine (LANTUS SOLOSTAR) 100 UNIT/ML pen    Inject 25 Units Subcutaneous At Bedtime Hold on file until needed.            Taking Medication Assessed Today: Yes  Current Treatments: Insulin Injections, Oral Medication (taken by mouth) (adjusts Lantus 20-36 units at night and takes 1-2 Metformin 2 times/day)  Dose schedule: At bedtime  Given by: Patient  Injection/Infusion sites: Abdomen  Problems taking diabetes medications regularly?: Yes  Diabetes medication side effects?: Yes (as long as takes Metformin with food is tolerable, if he doesn't will have diarrhea)    Problem Solving:                 Reducing Risks:  Reducing Risks Assessed Today: Yes  Diabetes Risks: Age over 45 years, Family History  CAD Risks: Diabetes Mellitus, Family history  Has dilated eye exam at least once a year?: Yes  Sees dentist every 6 months?: Yes  Feet checked by healthcare provider in the last year?:  Yes    Healthy Coping:  Healthy Coping Assessed Today: Yes  Emotional response to diabetes: Acceptance  Informal Support system:: Children, Spouse  Stage of change: ACTION (Actively working towards change)  Patient Activation Measure Survey Score:      4/20/2011     9:00 AM 7/26/2011     5:00 PM   DOMONIQUE Score (Last Two)   DOMONIQUE Raw Score 44 35   Activation Score 70.8 45.2   DOMONIQUE Level 4 1         Care Plan and Education Provided:  GLP-1 administration technique discussed today. Side effects were discussed, if patient has any abdominal pain, with or without nausea and/or vomiting, stop medication, call provider, clinic or go to the emergency room.  Care Plan: Diabetes   Updates made by Prudence York since 8/15/2023 12:00 AM        Problem: Diabetes Self-Management Education Needed to Optimize Self-Care Behaviors         Goal: Monitoring - monitor glucose and ketones as directed    This Visit's Progress: 30%        Task: Provide education on continuous glucose monitoring (sensor placement, use of norma or /reader, understanding glucose trends, alerts and alarms, differences between sensor glucose and blood glucose) Completed 8/15/2023   Responsible User: Prudence York        Goal: Taking Medication - patient is consistently taking medications as directed    This Visit's Progress: 70%   Recent Progress: 30%        Goal: Problem Solving - know how to prevent and manage short-term diabetes complications    This Visit's Progress: 50%   Recent Progress: 30%        Goal: Reducing Risks - know how to prevent and treat long-term diabetes complications    This Visit's Progress: 40%   Recent Progress: 30%          Prudence York RD, [facility], 1:24 PM, 8/15/2023      Time Spent: 46 minutes  Encounter Type: Individual    Any diabetes medication dose changes were made via the CDE Protocol per the patient's primary care provider. A copy of this encounter was shared with the provider.

## 2023-08-16 RX ORDER — PEN NEEDLE, DIABETIC 31 GX5/16"
NEEDLE, DISPOSABLE MISCELLANEOUS
Qty: 100 EACH | Refills: 3 | Status: SHIPPED | OUTPATIENT
Start: 2023-08-16 | End: 2023-12-26

## 2023-08-16 RX ORDER — LIRAGLUTIDE 6 MG/ML
INJECTION SUBCUTANEOUS
Qty: 6 ML | Refills: 0 | Status: SHIPPED | OUTPATIENT
Start: 2023-08-16 | End: 2023-08-30 | Stop reason: SINTOL

## 2023-08-16 NOTE — TELEPHONE ENCOUNTER
Noted.  Called and informed patient.  He expressed understanding.      Prudence York RD, [facility], 12:56 PM, 8/16/2023

## 2023-08-16 NOTE — TELEPHONE ENCOUNTER
Covering for PCP.  Reviewed med list and recent labs.  Patient may try victoza as recommended.  Further follow up and recheck per DM education and PCP preference.

## 2023-08-18 ENCOUNTER — TELEPHONE (OUTPATIENT)
Dept: EDUCATION SERVICES | Facility: CLINIC | Age: 88
End: 2023-08-18
Payer: COMMERCIAL

## 2023-08-18 NOTE — TELEPHONE ENCOUNTER
Pt received call from , the phone rang but no one was there. Message forwarded high priority to Angel Estevez to call pt back at his home number. Chrissie Story RN

## 2023-08-18 NOTE — TELEPHONE ENCOUNTER
Emerson was not available when I called. I spoke to his wife and will plan to try him back around 4pm today.    Jennifer Estevez RN, [facility]

## 2023-08-18 NOTE — TELEPHONE ENCOUNTER
"Patient calls with further questions for ROWDY Foss.    He states s/e of Victoza were discussed to possibly including \"belching up of food\".  He reports similar side effects last evening, and doesn't sound like he vomited, but possibly a bit of regurgitation/reflux.   He states he's not fond of this s/e and isn't sure he wants to continue the medication, would like to discuss with DE.   RN advised will send a message to Prudence to see if she can reach out to patient.    Rachana Teixeira RN  [facility]    "

## 2023-08-18 NOTE — TELEPHONE ENCOUNTER
I spoke with Emerson. He took his first dose of Victoza yesterday. He noted last evening he had some reflux of his dinner. This also caused him some trouble sleeping. He did take his victoza again this morning, so far so good. He said he has also been focusing on taking 25 units of lantus instead of different doses. He was hoping for a pill to be used instead. I reviewed that because of his CKD (he said he was not aware he had CKD) that Prudence needs to be careful about which medications can be used. He is willing to continue to give the victoza a try. He will reach out if continued side-effects and wants to stop victoza and try something else.    I told him I would update Prudence.    Jennifer Estevez RN, [facility]

## 2023-08-22 NOTE — TELEPHONE ENCOUNTER
Called patient to follow up on conversation he had with [facility] on 8/18 regarding the s/e of the Victoza.  No answer.  Left brief message.  Asked him to return call if needed.  He does have a follow up scheduled for 9/26/23.    Prudence York RD, [facility], 11:49 AM, 8/22/2023

## 2023-08-28 ENCOUNTER — TELEPHONE (OUTPATIENT)
Dept: EDUCATION SERVICES | Facility: CLINIC | Age: 88
End: 2023-08-28
Payer: COMMERCIAL

## 2023-08-28 NOTE — TELEPHONE ENCOUNTER
Patient left voice mail,  he stopped the Victoza due to side effects upset stomach, vomiting, and nausea, he has high evening blood sugar and is wondering about Rybelsus, not having side effects,     He requests a call back to  [phone].     Angelica BUTCHERN, RN, PHN, [facility]

## 2023-08-28 NOTE — TELEPHONE ENCOUNTER
Returned call, wife states he is not available, left message to return call to triage line at [phone].  Angelica BUTCHERN, RN, PHN, [facility]

## 2023-08-29 ENCOUNTER — HOSPITAL ENCOUNTER (OUTPATIENT)
Dept: WOUND CARE | Facility: CLINIC | Age: 88
Discharge: HOME | End: 2023-08-29
Attending: FAMILY MEDICINE | Admitting: FAMILY MEDICINE
Payer: COMMERCIAL

## 2023-08-29 ENCOUNTER — TELEPHONE (OUTPATIENT)
Dept: EDUCATION SERVICES | Facility: CLINIC | Age: 88
End: 2023-08-29
Payer: COMMERCIAL

## 2023-08-29 DIAGNOSIS — Z79.4 TYPE 2 DIABETES MELLITUS WITH BOTH EYES AFFECTED BY MILD NONPROLIFERATIVE RETINOPATHY WITHOUT MACULAR EDEMA, WITH LONG-TERM CURRENT USE OF INSULIN (H): Primary | ICD-10-CM

## 2023-08-29 DIAGNOSIS — E11.3293 TYPE 2 DIABETES MELLITUS WITH BOTH EYES AFFECTED BY MILD NONPROLIFERATIVE RETINOPATHY WITHOUT MACULAR EDEMA, WITH LONG-TERM CURRENT USE OF INSULIN (H): Primary | ICD-10-CM

## 2023-08-29 PROCEDURE — G0463 HOSPITAL OUTPT CLINIC VISIT: HCPCS

## 2023-08-29 NOTE — TELEPHONE ENCOUNTER
Emerson left a message on diabetes triage line that was difficult to understand. It sounded like he was returning a call from Prudence for biotech? Emerson called again a few minutes later stating that he is actually calling about Victoza. He would like to speak with Prudence, since she is the one that recommended it. Requesting call back.     Imelda Mena RD LD [facility]  Triage: [phone]  [phone]

## 2023-08-29 NOTE — CONFIDENTIAL NOTE
Medication Question or Refill        What medication are you calling about (include dose and sig)?: Victosa for diabetes     Preferred Pharmacy:   [facility] - [place], [place] - [address]  [address] [address]  Phone: [phone] Fax: [phone]      Controlled Substance Agreement on file:   CSA -- Patient Level:    CSA: None found at the patient level.       Who prescribed the medication?: Prudence York Diabetic Ed per pt     Do you need a refill? No    When did you use the medication last? 8/25/23    Patient offered an appointment? No    Do you have any questions or concerns?  Yes: patient is experiencing Possible side effects from medication prescribed. Nausea, Vomiting Weakness Gagging at night. Please advise at the number provided Triage line was taking another VM as seen below.      [facility] or would you prefer to receive a phone call?:   Patient would prefer a phone call   Okay to leave a detailed message?: Yes at Home number on file [phone] (home)

## 2023-08-29 NOTE — TELEPHONE ENCOUNTER
Called and spoke with patient.      He continues to have side effects from the Victoza at 1.2 mg once daily.  He stopped taking it on 8/26/23 due to the side effects.      He asked about other options ie Rybelsus.  Discussed that it is in the same category as Victoza, so he doesn't want that.  He states history of yeast infections so doesn't want sglt-2's.      Reports BG this morning of 70, but states usually in the 90's.  And BG last night at 247.       Latest Reference Range & Units 07/19/23 08:57   Hemoglobin A1C <5.7 % 10.3 (H)   (H): Data is abnormally high    He is open to meal time insulin, in addition to the Lantus and Metformin.  Discussed: onset/peak/duration of meal time insulin.  He expressed understanding.      Recommend:   Stop Victoza.  Continue Lantus 23 units at bedtime.  Continue Metformin 1000 mg 2 times daily.  Add meal time insulin (Novolog) 3 units at supper to start.    Continue to test BG 1-3 times/day.  Follow up with Diabetes Education 9/26/23.    Prudence York RD, [facility], 4:13 PM, 8/29/2023

## 2023-08-29 NOTE — PROGRESS NOTES
[facility]    Start of Care in [facility]: 5/23/2023   Referring Provider: Dr. Montesinos  Primary Care Provider: Chris Scott   Wound Location: left ankle     Wound Clinic Visit:   Follow-up    Reason for Visit:       left ankle wound    Subjective/Interval History:     Pt denies concerns.         Wound History:   Pt initially seen in the ED on 5/14/23 for ulceration, prescribed keflex and bactrim. Had arterial ultrasounds yesterday due to PAD history including distal popliteal stenting of left leg in February of '20.       Past Medical History:  Patient Active Problem List   Diagnosis    Obesity    Transient cerebral ischemia    Hyperlipidemia LDL goal <70    GERD (gastroesophageal reflux disease)    Peripheral vascular disease (H)    Advanced directives, counseling/discussion    Malignant neoplasm of prostate (H)    Hypertension, goal below 140/90    Senile nuclear sclerosis    Type 2 diabetes mellitus with both eyes affected by mild nonproliferative retinopathy without macular edema, with long-term current use of insulin (H)    Macular degeneration (senile) of retina    H/O amputation of lesser toe, right (H)    TIA (transient ischemic attack)    Chronic kidney disease, stage 3 (H)    Dermatitis    Skin lesion    Tremor of left hand    Left leg weakness                     Tobacco Use:     Tobacco Use      Smoking status: Never      Smokeless tobacco: Never       Diabetic: yes  HgbA1C:   Hemoglobin A1C   Date Value Ref Range Status   07/19/2023 10.3 (H) <5.7 % Final     Comment:     Normal <5.7%   Prediabetes 5.7-6.4%    Diabetes 6.5% or higher     Note: Adopted from ADA consensus guidelines.   06/21/2021 8.4 (H) 0 - 5.6 % Final     Comment:     Normal <5.7% Prediabetes 5.7-6.4%  Diabetes 6.5% or higher - adopted from ADA   consensus guidelines.       Checks Blood Glucose?: yes  Average Readings: states usually in low 100's so he is surprised at high HgbA1C.        Personal/social history:  Lives with spouse who does help with bandaging. Retired pharmacist. Sons are supportive, one is present today    Objective:   Current treatment plan: Promogran alexx and Mepilex foam secured with Medipore tape, every other day change   Last changed: last night      Wound #1 Left ankle, medial    1st photo from ED visit on 5/14/23          Stage/tissue depth: full thickness  0.2 (was 1.3 ) cm L x 0.3 (was 0.8) W x 0-0.1 cm estimated D  Tunneling: no  Undermining: no  Wound bed type/amount:  100% pink; non-fluctuant  Wound Edges: attached  Periwound: intact  Drainage: scant serous  Odor: no  Pain: denies    Dorsalis Pedal Pulse: palpable: no, edema in area   Posterior Tibial Pulse: palpable: no, edema in area   Hair growth: sparse upper lower leg  Capillary Refill: 2 seconds  Feet/toes color: pink, warm  R Leg: Edema trace to 1+  L Leg: Edema trace  lower leg to ankle, foot.  Ankle 25.5   Calf 38     RENATE 5/22/23:   FINDINGS:  Right RENATE:   DP: 0.49  PT: 0.55.     Left RENATE:   DP: 0.95   PT: 0.69.     Right Digital brachial index: 0.43, 62 mmHg.  Left Digital Brachial index: 0.55, 79 mmHg     Waveforms: Monophasic in the distal tibial arteries    US LOWER EXTREMITY ARTERIAL DUPLEX LEFT  5/22/2023 2:00 PM      HISTORY:  Peripheral vascular disease. Left lower extremity ulcer.     COMPARISON: None     FINDINGS: Color Doppler and spectral waveform analysis performed.     The following peak systolic velocities are measured in cm/s.      LEFT     CFA: 111  PFA: 50  SFA, proximal: 89  SFA, mid: 69  SFA, distal: 84  Popliteal: 61  Anterior tibial artery proximal: 149  Posterior tibial artery: 26  Peroneal artery mid: 80     Waveforms: Multiphasic waveforms are identified in the visualized  arteries.                                                                      IMPRESSION: Per sonographic velocity criteria, no definite significant  stenosis identified.                                        "                               IMPRESSION: Moderate arterial insufficiency in the right lower  extremity. RENATE in the left lower extremity is in the range of normal.  Digital brachial indices would indicate adequate wound healing  potential.       Mobility: independant  Current offloading/footwear: diabetic shoes and inserts appear to fit appropriately without rubbing area  Sensation: some neuropathy of feet but can feel light touch in area    Other callusing/areas of concern: none noted       Diet: eating moderate amount of sweets.       Assessment:  Left ankle full thickness ulceration of uncertain etiology, location suspicious for venous ulceration given presence of lower leg edema    Wound is nearly resolved     Edema is improved since initiation of tubular support    History of PAD though per arterial studies no concerns identified is left lower leg, does have stent in distal popliteal area, angiogram done but no intervention done     Factors impacting wound healing:     Delayed healing as part of normal aging process  Reduced Blood Supply: inadequate perfusion to heal wound  Obesity: decreases tissue perfusion  Underlying Disease: diabetes mellitis, poor control      Plan:  Continue foam dressing for absorption discontinue promogran, change every 3-4 days.     Continue conservative compression using spandagrip OR his compression stockings, (these are lower compression - 15-20 at the most so he can get them on).     Pt has topical cream for dermatitis, to use PRN. Continue skin barrier wipe under adhesive.     Reviewed need for long term need for compression, his lower compression stockings would likely be adequate as he is doing well with the spandagrip. He is able to repeat this back to me, states he understands.      Have discussed elevation and activity for edema control. Per son he has \"not been elevating\" (prior visit, states is trying now).      Prior visit- Discussed need to limit sweets and work with " provider on BG control.     He did get a continuous BG meter and is working with diabetes ed on this.      Topical care: Wound/surrounding skin cleansed with Vashe and gauze. Patted dry. bandaged per plan of care    Additional recommendations: improved blood glucose control, increased dietary protein    The following discharge instructions were reviewed with and sent home with the patient:  See AVS    Follow up: PRN      The following supplies were sent home with the patient:  Remainder Mepilex x4 non-boardered foam, some sureprep    Verbal, written, education provided.  Face to face time: approximately 20 minutes  Procedure: none  Kamille Calzada RN, CWOCN

## 2023-08-29 NOTE — DISCHARGE INSTRUCTIONS
Wound is nearly closed.  Stop using the Promogran Lucía, this is no longer needed and may just dry on the wound.  Continue to keep wound covered with the foam bandage but can decrease dressing change frequency to every 3-4 days and as needed, do not need to apply the cream around wound.      Continue to wear the compression sleeve or stocking. I recommend you always wear a compression stocking during the day. If your leg swells up again, this would may open again.     Follow-up as needed if area fails to heal or worsens.    Kamille Calzada RN, CWOCN [phone]

## 2023-08-30 NOTE — TELEPHONE ENCOUNTER
Spoke with patient.      Reported BG today:  FASTING BLOOD SUGAR 110, 2 hr after dinner 323.    Reviewed plan.  Also alternate testing times to include other post prandial readings.  Reviewed hypoglycemia symptoms and treatment.  If you have 2 or more readings less than 70mg/dL call diabetes education or provider. Patient expressed understanding.      Prudence York RD, [facility], 12:21 PM, 8/30/2023

## 2023-09-11 NOTE — NURSING NOTE
Immunizations Administered     Name Date Dose VIS Date Route    COVID-19,PF,Pfizer 12+ YRS BIVALENT Booster 10/7/22  1:40 PM 0.3 mL EUA,08/31/2022,Given today Intramuscular        After obtaining informed and written consent, the Pfizer Bivalent booster immunization is in the left deltoid. Patient verified all vaccine questions prior to administration. Patient advised to remain in clinic for 15 minutes to monitor for adverse reactions.    LIANG Bird LPN on 10/7/2022 at 1:49 PM     There are no Wet Read(s) to document.

## 2023-09-12 NOTE — TELEPHONE ENCOUNTER
Reason for Call: Request for an order:    Order being requested: Skilled nursing 3x/wk for 3 weeks for 3 weeks for wound management.    Date needed: as soon as possible    Has the patient been seen by the PCP for this problem? YES    Phone number Patient can be reached at:  Other phone number:  Mel Beth [phone]    Best Time:  Any    Can we leave a detailed message on this number?  YES    Call taken on 3/25/2020 at 1:19 PM by Jazzmine Camacho     (1) Other Diagnosis

## 2023-09-26 ENCOUNTER — TELEPHONE (OUTPATIENT)
Dept: EDUCATION SERVICES | Facility: CLINIC | Age: 88
End: 2023-09-26

## 2023-09-26 ENCOUNTER — ALLIED HEALTH/NURSE VISIT (OUTPATIENT)
Dept: EDUCATION SERVICES | Facility: CLINIC | Age: 88
End: 2023-09-26
Payer: COMMERCIAL

## 2023-09-26 DIAGNOSIS — Z79.4 TYPE 2 DIABETES MELLITUS WITH BOTH EYES AFFECTED BY MILD NONPROLIFERATIVE RETINOPATHY WITHOUT MACULAR EDEMA, WITH LONG-TERM CURRENT USE OF INSULIN (H): ICD-10-CM

## 2023-09-26 DIAGNOSIS — E11.3293 TYPE 2 DIABETES MELLITUS WITH BOTH EYES AFFECTED BY MILD NONPROLIFERATIVE RETINOPATHY WITHOUT MACULAR EDEMA, WITH LONG-TERM CURRENT USE OF INSULIN (H): Primary | ICD-10-CM

## 2023-09-26 DIAGNOSIS — Z79.4 TYPE 2 DIABETES MELLITUS WITH BOTH EYES AFFECTED BY MILD NONPROLIFERATIVE RETINOPATHY WITHOUT MACULAR EDEMA, WITH LONG-TERM CURRENT USE OF INSULIN (H): Primary | ICD-10-CM

## 2023-09-26 DIAGNOSIS — E11.3293 TYPE 2 DIABETES MELLITUS WITH BOTH EYES AFFECTED BY MILD NONPROLIFERATIVE RETINOPATHY WITHOUT MACULAR EDEMA, WITH LONG-TERM CURRENT USE OF INSULIN (H): ICD-10-CM

## 2023-09-26 PROCEDURE — G0108 DIAB MANAGE TRN  PER INDIV: HCPCS | Performed by: DIETITIAN, REGISTERED

## 2023-09-26 NOTE — LETTER
9/26/2023         RE: Fawad Garcia  [address] [facility] [address]        Dear Colleague,    Thank you for referring your patient, Fawad Garcia, to the [facility]. Please see a copy of my visit note below.    Diabetes Self-Management Education & Support    Presents for: Follow-up    Type of Service: In Person Visit (son is present)    ASSESSMENT:  -type 2 diabetes for ~30 years  -recent a1c of 10.3  -currently being managed with Lantus, Novolog (at supper only), Metformin  -Victoza discontinued due to lack of tolerability  -on aspirin and statin therapy  -diabetes complications:   mild retinopathy, PVD, TIA, CKD, amp of toe, current wound   -last seen by diabetes education 8/15/23  -home BG monitoring reveals:  58% in target     Primary concern: woke up at 3:30am with a BG of 74.  Overall BG is more stable since he is injecting a consistent dose of Lantus (had been adjusting Lantus daily, taking anywhere from 22-30 units/day).    Current total daily dose of insulin:29 units.    Discussed:  onset/peak/duration of meal time and basal insulin, hypoglycemia symptoms and treatment.  Patient expressed understanding.    Recommended new TDD: 29 units (Lantus 20 units, Novolog 9 units - 2 units at breakfast, 2 units at lunch, 5 units at supper).        Patient's most recent   Lab Results   Component Value Date    A1C 10.3 07/19/2023    A1C 8.4 06/21/2021     is not meeting goal of <8.0    Diabetes knowledge and skills assessment:   Patient is knowledgeable in diabetes management concepts related to: Monitoring and Taking Medication    Continue education with the following diabetes management concepts: Healthy Eating, Being Active, Monitoring, Taking Medication, Problem Solving, Reducing Risks, and Healthy Coping    Based on learning assessment above, most appropriate setting for further diabetes education would be: Individual setting.      PLAN    1.  Reduce Lantus  "from 25 units to 20 units.  2.  Start plan of Novolog 2 units before breakfast and dinner and increase to 5 units before supper.      3.  Continue Metformin.    4.  Stop and  some glucose tablets at the pharmacy.   4.  Continue to test your BG 2 times/day.  5.  Follow up phone call on 10/5/23 at 3pm.      See Care Plan for co-developed, patient-state behavior change goals.  AVS provided for patient today.    Education Materials Provided:  No new materials provided today      SUBJECTIVE/OBJECTIVE:  Presents for: Follow-up  Accompanied by: Self, Son  Diabetes education in the past 24mo: No  Focus of Visit: Taking Medication  Diabetes type: Type 2  Date of diagnosis: 1993, and started insulin in 2011  Disease course: Stable  How confident are you filling out medical forms by yourself:: Quite a bit  Transportation concerns: Yes  Difficulty affording diabetes medication?: No  Difficulty affording diabetes testing supplies?: No  Other concerns:: Visual impairment  Cultural Influences/Ethnic Background:  Not  or       Diabetes Symptoms & Complications:  Fatigue: Sometimes  Neuropathy: Sometimes  Polydipsia: Yes  Polyphagia: No  Polyuria: Yes  Visual change: Yes  Slow healing wounds: Yes  Autonomic neuropathy: No  CVA: No  Heart disease: No  Nephropathy: No  Peripheral neuropathy: Yes  Peripheral Vascular Disease: Yes  Retinopathy: Yes  Sexual dysfunction: Yes    Patient Problem List and Family Medical History reviewed for relevant medical history, current medical status, and diabetes risk factors.    Vitals:  There were no vitals taken for this visit.  Estimated body mass index is 31.53 kg/m  as calculated from the following:    Height as of 7/19/23: 1.803 m (5' 11\").    Weight as of 7/27/23: 102.6 kg (226 lb 1.6 oz).   Last 3 BP:   BP Readings from Last 3 Encounters:   07/27/23 136/71   07/24/23 115/71   07/19/23 134/78       History   Smoking Status     Never   Smokeless Tobacco     Never "       Labs:  Lab Results   Component Value Date    A1C 10.3 07/19/2023    A1C 8.4 06/21/2021     Lab Results   Component Value Date     07/19/2023     10/20/2022     09/20/2021     01/04/2021     Lab Results   Component Value Date    LDL 53 07/19/2023    LDL 47 03/18/2021     HDL Cholesterol   Date Value Ref Range Status   03/18/2021 61 >39 mg/dL Final     Direct Measure HDL   Date Value Ref Range Status   07/19/2023 52 >=40 mg/dL Final   ]  GFR Estimate   Date Value Ref Range Status   07/19/2023 66 >60 mL/min/1.73m2 Final   01/04/2021 63 >60 mL/min/[1.73_m2] Final     Comment:     Non  GFR Calc  Starting 12/18/2018, serum creatinine based estimated GFR (eGFR) will be   calculated using the Chronic Kidney Disease Epidemiology Collaboration   (CKD-EPI) equation.       GFR, ESTIMATED POCT   Date Value Ref Range Status   04/08/2022 >60 >60 mL/min/1.73m2 Final     GFR Estimate If Black   Date Value Ref Range Status   01/04/2021 73 >60 mL/min/[1.73_m2] Final     Comment:      GFR Calc  Starting 12/18/2018, serum creatinine based estimated GFR (eGFR) will be   calculated using the Chronic Kidney Disease Epidemiology Collaboration   (CKD-EPI) equation.       Lab Results   Component Value Date    CR 1.09 07/19/2023    CR 1.07 01/04/2021     No results found for: MICROALBUMIN    Healthy Eating:  Healthy Eating Assessed Today: Yes (wife does all the cooking)  Cultural/Confucianism diet restrictions?: No  Meal planning/habits: Other, Avoiding sweets  How many times a week on average do you eat food made away from home (restaurant/take-out)?: 1  Meals include: Breakfast, Lunch, Dinner  Breakfast: cereal or scone or waffle or cookies  Lunch: sandwich, sf cookie, milk  Dinner: 6-6:30 pm:  tomato soup or meat/potatoes/veg, fruit, milk  Beverages: Coffee, Milk, Diet soda    Being Active:  Being Active Assessed Today: Yes  Exercise:: Currently not exercising  Barrier to  exercise: Physical limitation    Monitoring:  Monitoring Assessed Today: Yes  Did patient bring glucose meter to appointment? : Yes  Blood Glucose Meter: Unknown  Times checking blood sugar at home (number): 5+  Times checking blood sugar at home (per): Day  Blood glucose trend: Fluctuating      Date Breakfast  Lunch  Dinner  Bedtime    Before After Before After Before After 10pm   9/26 74         9/25 115      242    103      316    97      229    137      208    116          113      233     10pm readings ranging 208-443    Taking Medications:  Diabetes Medication(s)       Biguanides       metFORMIN (GLUCOPHAGE) 500 MG tablet    Take 2 tablets (1,000 mg) by mouth 2 times daily (with meals)      Insulin       insulin aspart (NOVOLOG PEN) 100 UNIT/ML pen    Inject 3 units before supper meal once daily.     insulin glargine (LANTUS SOLOSTAR) 100 UNIT/ML pen    Inject 25 Units Subcutaneous At Bedtime Hold on file until needed.            Taking Medication Assessed Today: Yes  Current Treatments: Insulin Injections, Oral Medication (taken by mouth) (adjusts Lantus 20-36 units at night and takes 1-2 Metformin 2 times/day)  Dose schedule: At bedtime, Pre-dinner  Given by: Patient  Injection/Infusion sites: Abdomen  Problems taking diabetes medications regularly?: Yes  Diabetes medication side effects?: Yes (as long as takes Metformin with food is tolerable, if he doesn't will have diarrhea)    Problem Solving:                 Reducing Risks:  Reducing Risks Assessed Today: Yes  Diabetes Risks: Age over 45 years, Family History  CAD Risks: Diabetes Mellitus, Family history  Has dilated eye exam at least once a year?: Yes  Sees dentist every 6 months?: Yes  Feet checked by healthcare provider in the last year?: Yes    Healthy Coping:  Healthy Coping Assessed Today: Yes  Emotional response to diabetes: Acceptance  Informal Support system:: Children, Spouse  Stage of change: ACTION (Actively working towards change)  Patient  Activation Measure Survey Score:      4/20/2011     9:00 AM 7/26/2011     5:00 PM   DOMONIQUE Score (Last Two)   DOMONIQUE Raw Score 44 35   Activation Score 70.8 45.2   DOMONIQUE Level 4 1         Care Plan and Education Provided:  Care Plan: Diabetes   Updates made by Prudence York since 9/26/2023 12:00 AM        Problem: Diabetes Self-Management Education Needed to Optimize Self-Care Behaviors         Goal: Monitoring - monitor glucose and ketones as directed    This Visit's Progress: 70%   Recent Progress: 30%        Goal: Taking Medication - patient is consistently taking medications as directed    This Visit's Progress: 90%   Recent Progress: 70%        Task: Provide education on insulin and injectable diabetes medications, including administration, storage, site selection and rotation for injection sites Completed 9/26/2023   Responsible User: Prudence York        Task: Discuss barriers to medication adherence with patient and provide management technique ideas as appropriate Completed 9/26/2023   Responsible User: Prudence York        Goal: Problem Solving - know how to prevent and manage short-term diabetes complications    This Visit's Progress: 70%   Recent Progress: 50%        Task: Provide education on high blood glucose - causes, signs/symptoms, prevention and treatment Completed 9/26/2023   Responsible User: Prudence York        Task: Provide education on low blood glucose - causes, signs/symptoms, prevention, treatment, carrying a carbohydrate source at all times, and medical identification Completed 9/26/2023   Responsible User: Prudence York        Goal: Reducing Risks - know how to prevent and treat long-term diabetes complications    This Visit's Progress: 70%   Recent Progress: 40%        Task: Provide education on recommended care for dental, eye and foot health Completed 9/26/2023   Responsible User: Prudence York        Task: Provide education on Hemoglobin A1c - goals and relationship to  blood glucose levels Completed 9/26/2023   Responsible User: Prudence York RD, [facility], 5:11 PM, 9/26/2023    Time Spent: 45 minutes  Encounter Type: Individual    Any diabetes medication dose changes were made via the CDE Protocol per the patient's primary care provider. A copy of this encounter was shared with the provider.

## 2023-09-26 NOTE — PATIENT INSTRUCTIONS
PLAN    1.  Reduce Lantus from 25 units to 20 units.  2.  Start plan of Novolog 2 units before breakfast and dinner and increase to 5 units before supper.      3.  Continue Metformin.    4.  Stop and  some glucose tablets at the pharmacy.   4.  Continue to test your BG 2 times/day.  5.  Follow up phone with Diabetes Education call on 10/5/23 at 3pm.      Prudence York RD, [facility], 4:48 PM, 9/26/2023

## 2023-09-26 NOTE — PROGRESS NOTES
Diabetes Self-Management Education & Support    Presents for: Follow-up    Type of Service: In Person Visit (son is present)    ASSESSMENT:  -type 2 diabetes for ~30 years  -recent a1c of 10.3  -currently being managed with Lantus, Novolog (at supper only), Metformin  -Victoza discontinued due to lack of tolerability  -on aspirin and statin therapy  -diabetes complications:   mild retinopathy, PVD, TIA, CKD, amp of toe, current wound   -last seen by diabetes education 8/15/23  -home BG monitoring reveals:  58% in target     Primary concern: woke up at 3:30am with a BG of 74.  Overall BG is more stable since he is injecting a consistent dose of Lantus (had been adjusting Lantus daily, taking anywhere from 22-30 units/day).    Current total daily dose of insulin:29 units.    Discussed:  onset/peak/duration of meal time and basal insulin, hypoglycemia symptoms and treatment.  Patient expressed understanding.    Recommended new TDD: 29 units (Lantus 20 units, Novolog 9 units - 2 units at breakfast, 2 units at lunch, 5 units at supper).        Patient's most recent   Lab Results   Component Value Date    A1C 10.3 07/19/2023    A1C 8.4 06/21/2021     is not meeting goal of <8.0    Diabetes knowledge and skills assessment:   Patient is knowledgeable in diabetes management concepts related to: Monitoring and Taking Medication    Continue education with the following diabetes management concepts: Healthy Eating, Being Active, Monitoring, Taking Medication, Problem Solving, Reducing Risks, and Healthy Coping    Based on learning assessment above, most appropriate setting for further diabetes education would be: Individual setting.      PLAN    1.  Reduce Lantus from 25 units to 20 units.  2.  Start plan of Novolog 2 units before breakfast and dinner and increase to 5 units before supper.      3.  Continue Metformin.    4.  Stop and  some glucose tablets at the pharmacy.   4.  Continue to test your BG 2 times/day.  5.   "Follow up phone call on 10/5/23 at 3pm.      See Care Plan for co-developed, patient-state behavior change goals.  AVS provided for patient today.    Education Materials Provided:  No new materials provided today      SUBJECTIVE/OBJECTIVE:  Presents for: Follow-up  Accompanied by: Self, Son  Diabetes education in the past 24mo: No  Focus of Visit: Taking Medication  Diabetes type: Type 2  Date of diagnosis: 1993, and started insulin in 2011  Disease course: Stable  How confident are you filling out medical forms by yourself:: Quite a bit  Transportation concerns: Yes  Difficulty affording diabetes medication?: No  Difficulty affording diabetes testing supplies?: No  Other concerns:: Visual impairment  Cultural Influences/Ethnic Background:  Not  or       Diabetes Symptoms & Complications:  Fatigue: Sometimes  Neuropathy: Sometimes  Polydipsia: Yes  Polyphagia: No  Polyuria: Yes  Visual change: Yes  Slow healing wounds: Yes  Autonomic neuropathy: No  CVA: No  Heart disease: No  Nephropathy: No  Peripheral neuropathy: Yes  Peripheral Vascular Disease: Yes  Retinopathy: Yes  Sexual dysfunction: Yes    Patient Problem List and Family Medical History reviewed for relevant medical history, current medical status, and diabetes risk factors.    Vitals:  There were no vitals taken for this visit.  Estimated body mass index is 31.53 kg/m  as calculated from the following:    Height as of 7/19/23: 1.803 m (5' 11\").    Weight as of 7/27/23: 102.6 kg (226 lb 1.6 oz).   Last 3 BP:   BP Readings from Last 3 Encounters:   07/27/23 136/71   07/24/23 115/71   07/19/23 134/78       History   Smoking Status    Never   Smokeless Tobacco    Never       Labs:  Lab Results   Component Value Date    A1C 10.3 07/19/2023    A1C 8.4 06/21/2021     Lab Results   Component Value Date     07/19/2023     10/20/2022     09/20/2021     01/04/2021     Lab Results   Component Value Date    LDL 53 07/19/2023    " LDL 47 03/18/2021     HDL Cholesterol   Date Value Ref Range Status   03/18/2021 61 >39 mg/dL Final     Direct Measure HDL   Date Value Ref Range Status   07/19/2023 52 >=40 mg/dL Final   ]  GFR Estimate   Date Value Ref Range Status   07/19/2023 66 >60 mL/min/1.73m2 Final   01/04/2021 63 >60 mL/min/[1.73_m2] Final     Comment:     Non  GFR Calc  Starting 12/18/2018, serum creatinine based estimated GFR (eGFR) will be   calculated using the Chronic Kidney Disease Epidemiology Collaboration   (CKD-EPI) equation.       GFR, ESTIMATED POCT   Date Value Ref Range Status   04/08/2022 >60 >60 mL/min/1.73m2 Final     GFR Estimate If Black   Date Value Ref Range Status   01/04/2021 73 >60 mL/min/[1.73_m2] Final     Comment:      GFR Calc  Starting 12/18/2018, serum creatinine based estimated GFR (eGFR) will be   calculated using the Chronic Kidney Disease Epidemiology Collaboration   (CKD-EPI) equation.       Lab Results   Component Value Date    CR 1.09 07/19/2023    CR 1.07 01/04/2021     No results found for: MICROALBUMIN    Healthy Eating:  Healthy Eating Assessed Today: Yes (wife does all the cooking)  Cultural/Taoist diet restrictions?: No  Meal planning/habits: Other, Avoiding sweets  How many times a week on average do you eat food made away from home (restaurant/take-out)?: 1  Meals include: Breakfast, Lunch, Dinner  Breakfast: cereal or scone or waffle or cookies  Lunch: sandwich, sf cookie, milk  Dinner: 6-6:30 pm:  tomato soup or meat/potatoes/veg, fruit, milk  Beverages: Coffee, Milk, Diet soda    Being Active:  Being Active Assessed Today: Yes  Exercise:: Currently not exercising  Barrier to exercise: Physical limitation    Monitoring:  Monitoring Assessed Today: Yes  Did patient bring glucose meter to appointment? : Yes  Blood Glucose Meter: Unknown  Times checking blood sugar at home (number): 5+  Times checking blood sugar at home (per): Day  Blood glucose trend:  Fluctuating      Date Breakfast  Lunch  Dinner  Bedtime    Before After Before After Before After 10pm   9/26 74         9/25 115      242    103      316    97      229    137      208    116          113      233     10pm readings ranging 208-443    Taking Medications:  Diabetes Medication(s)       Biguanides       metFORMIN (GLUCOPHAGE) 500 MG tablet    Take 2 tablets (1,000 mg) by mouth 2 times daily (with meals)      Insulin       insulin aspart (NOVOLOG PEN) 100 UNIT/ML pen    Inject 3 units before supper meal once daily.     insulin glargine (LANTUS SOLOSTAR) 100 UNIT/ML pen    Inject 25 Units Subcutaneous At Bedtime Hold on file until needed.            Taking Medication Assessed Today: Yes  Current Treatments: Insulin Injections, Oral Medication (taken by mouth) (adjusts Lantus 20-36 units at night and takes 1-2 Metformin 2 times/day)  Dose schedule: At bedtime, Pre-dinner  Given by: Patient  Injection/Infusion sites: Abdomen  Problems taking diabetes medications regularly?: Yes  Diabetes medication side effects?: Yes (as long as takes Metformin with food is tolerable, if he doesn't will have diarrhea)    Problem Solving:                 Reducing Risks:  Reducing Risks Assessed Today: Yes  Diabetes Risks: Age over 45 years, Family History  CAD Risks: Diabetes Mellitus, Family history  Has dilated eye exam at least once a year?: Yes  Sees dentist every 6 months?: Yes  Feet checked by healthcare provider in the last year?: Yes    Healthy Coping:  Healthy Coping Assessed Today: Yes  Emotional response to diabetes: Acceptance  Informal Support system:: Children, Spouse  Stage of change: ACTION (Actively working towards change)  Patient Activation Measure Survey Score:      4/20/2011     9:00 AM 7/26/2011     5:00 PM   DOMONIQUE Score (Last Two)   DOMONIQUE Raw Score 44 35   Activation Score 70.8 45.2   DOMONIQUE Level 4 1         Care Plan and Education Provided:  Care Plan: Diabetes   Updates made by Prudence York since  9/26/2023 12:00 AM        Problem: Diabetes Self-Management Education Needed to Optimize Self-Care Behaviors         Goal: Monitoring - monitor glucose and ketones as directed    This Visit's Progress: 70%   Recent Progress: 30%        Goal: Taking Medication - patient is consistently taking medications as directed    This Visit's Progress: 90%   Recent Progress: 70%        Task: Provide education on insulin and injectable diabetes medications, including administration, storage, site selection and rotation for injection sites Completed 9/26/2023   Responsible User: Prudence York        Task: Discuss barriers to medication adherence with patient and provide management technique ideas as appropriate Completed 9/26/2023   Responsible User: Prudence York        Goal: Problem Solving - know how to prevent and manage short-term diabetes complications    This Visit's Progress: 70%   Recent Progress: 50%        Task: Provide education on high blood glucose - causes, signs/symptoms, prevention and treatment Completed 9/26/2023   Responsible User: Prudence York        Task: Provide education on low blood glucose - causes, signs/symptoms, prevention, treatment, carrying a carbohydrate source at all times, and medical identification Completed 9/26/2023   Responsible User: Prudence York        Goal: Reducing Risks - know how to prevent and treat long-term diabetes complications    This Visit's Progress: 70%   Recent Progress: 40%        Task: Provide education on recommended care for dental, eye and foot health Completed 9/26/2023   Responsible User: Prudence York        Task: Provide education on Hemoglobin A1c - goals and relationship to blood glucose levels Completed 9/26/2023   Responsible User: Prudence York RD, [facility], 5:11 PM, 9/26/2023    Time Spent: 45 minutes  Encounter Type: Individual    Any diabetes medication dose changes were made via the CDE Protocol per the patient's  primary care provider. A copy of this encounter was shared with the provider.

## 2023-09-26 NOTE — TELEPHONE ENCOUNTER
Visited with patient today.      Current diabetes management:  Lantus 25 units at bedtime  Novolog 3 units before supper (patient admits to taking 4 units most evening)  Metformin 500 mg 2 tabs 2 times/day  Total Daily Dose of insulin: 29 units (using reported 4 units of Novolog)    Reported BG:  FASTING BLOOD SUGAR usually , but sometimes in the 70's (at 3:30am).  Bedtime BG readings rangin-[phone]    Also see results of professional cgms on 8/15/23 that showed elevated BG from ~11am to 1am.      Recommendation (Total Daily Dose of insulin still at 29units):  Reduce Lantus from 25 units to 20 units.  Increase Novolog at supper from 4 to 5 units.  Add Novolog 2 units at breakfast and lunch/dinner  Continue Metformin.    Prudence York RD, [facility], 5:02 PM, 2023

## 2023-09-27 RX ORDER — INSULIN GLARGINE 100 [IU]/ML
20 INJECTION, SOLUTION SUBCUTANEOUS AT BEDTIME
Qty: 30 ML | Refills: 3 | Status: SHIPPED | OUTPATIENT
Start: 2023-09-27 | End: 2024-01-29

## 2023-09-27 NOTE — TELEPHONE ENCOUNTER
Noted.  Adjusted prescriptions to reflect these changes.    Prudence York RD, [facility], 9:21 AM, 9/27/2023

## 2023-10-05 ENCOUNTER — VIRTUAL VISIT (OUTPATIENT)
Dept: EDUCATION SERVICES | Facility: CLINIC | Age: 88
End: 2023-10-05
Payer: COMMERCIAL

## 2023-10-05 DIAGNOSIS — Z79.4 TYPE 2 DIABETES MELLITUS WITH BOTH EYES AFFECTED BY MILD NONPROLIFERATIVE RETINOPATHY WITHOUT MACULAR EDEMA, WITH LONG-TERM CURRENT USE OF INSULIN (H): Primary | ICD-10-CM

## 2023-10-05 DIAGNOSIS — E11.3293 TYPE 2 DIABETES MELLITUS WITH BOTH EYES AFFECTED BY MILD NONPROLIFERATIVE RETINOPATHY WITHOUT MACULAR EDEMA, WITH LONG-TERM CURRENT USE OF INSULIN (H): Primary | ICD-10-CM

## 2023-10-05 PROCEDURE — G0108 DIAB MANAGE TRN  PER INDIV: HCPCS | Mod: 95 | Performed by: DIETITIAN, REGISTERED

## 2023-10-05 NOTE — PATIENT INSTRUCTIONS
PLAN     1.  Continue Lantus 20 units.  2.  Continue Novolog 2 units before breakfast and dinner and increase to 5 units before supper.      3.  Continue Metformin.    4.  Continue to test your BG 2 times/day, alternate some of the testing times to help us evaluate if the Novolog is appropriate.  Test 2 hrs after the start of a meal.    6.  Set up routine Diabetes visit for October with Dr. Scott or another of his colleagues.    7.  Have your A1c checked after October 19th (you will need to call and schedule a lab visit).    8.  Follow up Diabetes Education phone call on 11/2/23 at 11am (phone visit)    Prudence York RD, [facility], 10:59 AM, 10/5/2023

## 2023-10-05 NOTE — LETTER
10/5/2023         RE: Fawad Garcia  [address] [facility] [address]        Dear Colleague,    Thank you for referring your patient, Fawad Garcia, to the [facility]. Please see a copy of my visit note below.    Diabetes Education Follow-up  Type of Service: Telephone Visit    Originating Location (Patient Location): Home  Distant Location (Provider Location): [facility]  Mode of Communication:  Telephone    Telephone Visit Start Time:  10:30am  Telephone Visit End Time (telephone visit stop time): 11am    How would patient like to obtain AVS? MyChart    Subjective/Objective:    Fawad Garcia sent in blood glucose log for review. Last date of communication was: 9/26/23.    Diabetes is being managed with Diabetes Medications   Diabetes Medication(s)       Biguanides       metFORMIN (GLUCOPHAGE) 500 MG tablet    Take 2 tablets (1,000 mg) by mouth 2 times daily (with meals)      Insulin       insulin aspart (NOVOLOG PEN) 100 UNIT/ML pen    Inject 2 units before breakfast and lunch and 5 units before supper.     insulin glargine (LANTUS SOLOSTAR) 100 UNIT/ML pen    Inject 20 Units Subcutaneous At Bedtime Hold on file until needed.        States is taking these medications 100% of the time.        BG/Food Log:   Date Breakfast  Lunch  Dinner  Bedtime  10pm    Before After Before After Before After    10/5 85         10/4 157      185   10/3 105      157   10/2 158      226   10/1 98      141   9/30 133      212   9/29 168      263   9/28 115      246   9/27 125      299      Latest Reference Range & Units 07/19/23 08:57   Hemoglobin A1C <5.7 % 10.3 (H)   (H): Data is abnormally high    Assessment:  Patient recently added Novolog at supper to diabetes regimen and last week added Novolog to breakfast and lunch. Patient reports taking insulin as prescribed 100% of the time.  No indication of any low BG.  No longer having BG in 300's.   Willing to come in for recheck of A1c and schedule 3 month diabetes followup visit with provider.       PLAN     1.  Continue Lantus 20 units.  2.  Continue Novolog 2 units before breakfast and dinner and increase to 5 units before supper.      3.  Continue Metformin.    4.  Continue to test your BG 2 times/day, alternate some of the testing times to help us evaluate if the Novolog is appropriate.  Test 2 hrs after the start of a meal.    6.  Set up routine Diabetes visit for October with Dr. Scott or another of his colleagues.    7.  Have your A1c checked after October 19th (you will need to call and schedule a lab visit).    8.  Follow up Diabetes Education phone call on 11/2/23 at 11am (phone visit)    Prudence York RD, [facility], 1:01 PM, 10/5/2023    Time spent with patient: 30 minutes.    Any diabetes medication dose changes were made via the CDE Protocol and Collaborative Practice Agreement with the patient's primary care provider. A copy of this encounter was shared with the provider.

## 2023-10-05 NOTE — PROGRESS NOTES
Diabetes Education Follow-up  Type of Service: Telephone Visit    Originating Location (Patient Location): Home  Distant Location (Provider Location): [facility]  Mode of Communication:  Telephone    Telephone Visit Start Time:  10:30am  Telephone Visit End Time (telephone visit stop time): 11am    How would patient like to obtain AVS? Kaylen    Subjective/Objective:    Fawad Garcia sent in blood glucose log for review. Last date of communication was: 9/26/23.    Diabetes is being managed with Diabetes Medications   Diabetes Medication(s)       Biguanides       metFORMIN (GLUCOPHAGE) 500 MG tablet    Take 2 tablets (1,000 mg) by mouth 2 times daily (with meals)      Insulin       insulin aspart (NOVOLOG PEN) 100 UNIT/ML pen    Inject 2 units before breakfast and lunch and 5 units before supper.     insulin glargine (LANTUS SOLOSTAR) 100 UNIT/ML pen    Inject 20 Units Subcutaneous At Bedtime Hold on file until needed.        States is taking these medications 100% of the time.        BG/Food Log:   Date Breakfast  Lunch  Dinner  Bedtime  10pm    Before After Before After Before After    10/5 85         10/4 157      185   10/3 105      157   10/2 158      226   10/1 98      141   9/30 133      212   9/29 168      263   9/28 115      246   9/27 125      299      Latest Reference Range & Units 07/19/23 08:57   Hemoglobin A1C <5.7 % 10.3 (H)   (H): Data is abnormally high    Assessment:  Patient recently added Novolog at supper to diabetes regimen and last week added Novolog to breakfast and lunch. Patient reports taking insulin as prescribed 100% of the time.  No indication of any low BG.  No longer having BG in 300's.  Willing to come in for recheck of A1c and schedule 3 month diabetes followup visit with provider.       PLAN     1.  Continue Lantus 20 units.  2.  Continue Novolog 2 units before breakfast and dinner and increase to 5 units before supper.      3.  Continue Metformin.     4.  Continue to test your BG 2 times/day, alternate some of the testing times to help us evaluate if the Novolog is appropriate.  Test 2 hrs after the start of a meal.    6.  Set up routine Diabetes visit for October with Dr. Scott or another of his colleagues.    7.  Have your A1c checked after October 19th (you will need to call and schedule a lab visit).    8.  Follow up Diabetes Education phone call on 11/2/23 at 11am (phone visit)    Prudence York RD, [facility], 1:01 PM, 10/5/2023    Time spent with patient: 30 minutes.    Any diabetes medication dose changes were made via the CDE Protocol and Collaborative Practice Agreement with the patient's primary care provider. A copy of this encounter was shared with the provider.

## 2023-10-18 DIAGNOSIS — Z79.4 TYPE 2 DIABETES MELLITUS WITH BOTH EYES AFFECTED BY MILD NONPROLIFERATIVE RETINOPATHY WITHOUT MACULAR EDEMA, WITH LONG-TERM CURRENT USE OF INSULIN (H): ICD-10-CM

## 2023-10-18 DIAGNOSIS — E11.3293 TYPE 2 DIABETES MELLITUS WITH BOTH EYES AFFECTED BY MILD NONPROLIFERATIVE RETINOPATHY WITHOUT MACULAR EDEMA, WITH LONG-TERM CURRENT USE OF INSULIN (H): ICD-10-CM

## 2023-10-18 RX ORDER — BLOOD SUGAR DIAGNOSTIC
STRIP MISCELLANEOUS
Qty: 100 STRIP | Refills: 3 | Status: SHIPPED | OUTPATIENT
Start: 2023-10-18 | End: 2023-10-30 | Stop reason: ALTCHOICE

## 2023-10-19 ENCOUNTER — NURSE TRIAGE (OUTPATIENT)
Dept: NURSING | Facility: CLINIC | Age: 88
End: 2023-10-19
Payer: COMMERCIAL

## 2023-10-20 ENCOUNTER — LAB (OUTPATIENT)
Dept: LAB | Facility: CLINIC | Age: 88
End: 2023-10-20
Payer: COMMERCIAL

## 2023-10-20 DIAGNOSIS — E11.3293 TYPE 2 DIABETES MELLITUS WITH BOTH EYES AFFECTED BY MILD NONPROLIFERATIVE RETINOPATHY WITHOUT MACULAR EDEMA, WITH LONG-TERM CURRENT USE OF INSULIN (H): ICD-10-CM

## 2023-10-20 DIAGNOSIS — E11.65 UNCONTROLLED TYPE 2 DIABETES MELLITUS WITH HYPERGLYCEMIA (H): ICD-10-CM

## 2023-10-20 DIAGNOSIS — Z79.4 TYPE 2 DIABETES MELLITUS WITH BOTH EYES AFFECTED BY MILD NONPROLIFERATIVE RETINOPATHY WITHOUT MACULAR EDEMA, WITH LONG-TERM CURRENT USE OF INSULIN (H): ICD-10-CM

## 2023-10-20 DIAGNOSIS — Z79.4 TYPE 2 DIABETES MELLITUS WITH HYPERGLYCEMIA, WITH LONG-TERM CURRENT USE OF INSULIN (H): ICD-10-CM

## 2023-10-20 DIAGNOSIS — E11.65 TYPE 2 DIABETES MELLITUS WITH HYPERGLYCEMIA, WITH LONG-TERM CURRENT USE OF INSULIN (H): ICD-10-CM

## 2023-10-20 DIAGNOSIS — N18.31 STAGE 3A CHRONIC KIDNEY DISEASE (H): ICD-10-CM

## 2023-10-20 LAB — HBA1C MFR BLD: 8.7 % (ref 0–5.6)

## 2023-10-20 PROCEDURE — 36415 COLL VENOUS BLD VENIPUNCTURE: CPT

## 2023-10-20 PROCEDURE — 83036 HEMOGLOBIN GLYCOSYLATED A1C: CPT

## 2023-10-20 NOTE — TELEPHONE ENCOUNTER
Pt call wondering if he needs to fast for A1c blood test tomorrow.  Appointment is in the afternoon, pt is diabetic and will not be able to hold off that long.    Advise pt he does not need to fast. Pt verbalized understanding, no further questions.    Silvia Oviedo, RN, BSN  10/19/2023 at 9:20 PM  [facility]    Reason for Disposition    Health Information question, no triage required and triager able to answer question    Protocols used: Information Only Call - No Triage-A-

## 2023-10-27 ENCOUNTER — IMMUNIZATION (OUTPATIENT)
Dept: FAMILY MEDICINE | Facility: CLINIC | Age: 88
End: 2023-10-27
Payer: COMMERCIAL

## 2023-10-27 PROCEDURE — G0008 ADMIN INFLUENZA VIRUS VAC: HCPCS

## 2023-10-27 PROCEDURE — 91320 SARSCV2 VAC 30MCG TRS-SUC IM: CPT

## 2023-10-27 PROCEDURE — 90662 IIV NO PRSV INCREASED AG IM: CPT

## 2023-10-27 PROCEDURE — 90480 ADMN SARSCOV2 VAC 1/ONLY CMP: CPT

## 2023-10-29 ENCOUNTER — MYC MEDICAL ADVICE (OUTPATIENT)
Dept: EDUCATION SERVICES | Facility: CLINIC | Age: 88
End: 2023-10-29
Payer: COMMERCIAL

## 2023-10-29 DIAGNOSIS — Z79.4 TYPE 2 DIABETES MELLITUS WITH BOTH EYES AFFECTED BY MILD NONPROLIFERATIVE RETINOPATHY WITHOUT MACULAR EDEMA, WITH LONG-TERM CURRENT USE OF INSULIN (H): Primary | ICD-10-CM

## 2023-10-29 DIAGNOSIS — E11.3293 TYPE 2 DIABETES MELLITUS WITH BOTH EYES AFFECTED BY MILD NONPROLIFERATIVE RETINOPATHY WITHOUT MACULAR EDEMA, WITH LONG-TERM CURRENT USE OF INSULIN (H): Primary | ICD-10-CM

## 2023-10-30 DIAGNOSIS — Z79.4 TYPE 2 DIABETES MELLITUS WITH BOTH EYES AFFECTED BY MILD NONPROLIFERATIVE RETINOPATHY WITHOUT MACULAR EDEMA, WITH LONG-TERM CURRENT USE OF INSULIN (H): ICD-10-CM

## 2023-10-30 DIAGNOSIS — E11.3293 TYPE 2 DIABETES MELLITUS WITH BOTH EYES AFFECTED BY MILD NONPROLIFERATIVE RETINOPATHY WITHOUT MACULAR EDEMA, WITH LONG-TERM CURRENT USE OF INSULIN (H): ICD-10-CM

## 2023-10-30 RX ORDER — LANCETS 33 GAUGE
1 EACH MISCELLANEOUS SEE ADMIN INSTRUCTIONS
Qty: 100 EACH | Refills: 4 | Status: SHIPPED | OUTPATIENT
Start: 2023-10-30

## 2023-10-30 RX ORDER — BLOOD SUGAR DIAGNOSTIC
STRIP MISCELLANEOUS
Qty: 100 STRIP | Refills: 3 | Status: SHIPPED | OUTPATIENT
Start: 2023-10-30 | End: 2024-04-22

## 2023-10-30 RX ORDER — BLOOD-GLUCOSE METER
EACH MISCELLANEOUS
Qty: 1 KIT | Refills: 0 | Status: SHIPPED | OUTPATIENT
Start: 2023-10-30

## 2023-10-30 RX ORDER — LANCETS 33 GAUGE
1 EACH MISCELLANEOUS SEE ADMIN INSTRUCTIONS
Qty: 100 EACH | Refills: 1 | Status: SHIPPED | OUTPATIENT
Start: 2023-10-30

## 2023-10-30 NOTE — TELEPHONE ENCOUNTER
"Prescription approved per [facility] Refill Protocol.     Requested Prescriptions   Pending Prescriptions Disp Refills    Lancets (ONETOUCH DELICA PLUS [id number]) MISC [Pharmacy Med Name: OneTouch Delica Plus Lancet 33 gauge] 100 each 1     Si each by In Vitro route See Admin Instructions Hold on file until needed.       Diabetic Supplies Protocol Passed - 10/30/2023  6:45 AM        Passed - Medication is active on med list        Passed - Patient is 18 years of age or older        Passed - Recent (6 mo) or future (30 days) visit within the authorizing provider's specialty     Patient had office visit in the last 6 months or has a visit in the next 30 days with authorizing provider.  See \"Patient Info\" tab in inbasket, or \"Choose Columns\" in Meds & Orders section of the refill encounter.                     Lacie Hussein RN 10/30/23 2:27 PM   "

## 2023-11-02 ENCOUNTER — VIRTUAL VISIT (OUTPATIENT)
Dept: EDUCATION SERVICES | Facility: CLINIC | Age: 88
End: 2023-11-02
Payer: COMMERCIAL

## 2023-11-02 DIAGNOSIS — Z79.4 TYPE 2 DIABETES MELLITUS WITH HYPERGLYCEMIA, WITH LONG-TERM CURRENT USE OF INSULIN (H): Primary | ICD-10-CM

## 2023-11-02 DIAGNOSIS — E11.65 TYPE 2 DIABETES MELLITUS WITH HYPERGLYCEMIA, WITH LONG-TERM CURRENT USE OF INSULIN (H): Primary | ICD-10-CM

## 2023-11-02 NOTE — PROGRESS NOTES
Diabetes Education Follow-up    Subjective/Objective:    Fawad Garcia sent in blood glucose log for review. Last date of communication was: 10/5/23.    Diabetes is being managed with Diabetes Medications   Diabetes Medication(s)       Biguanides       metFORMIN (GLUCOPHAGE) 500 MG tablet    Take 2 tablets (1,000 mg) by mouth 2 times daily (with meals)      Insulin       insulin aspart (NOVOLOG PEN) 100 UNIT/ML pen    Inject 2 units before breakfast and lunch and 5 units before supper.     insulin glargine (LANTUS SOLOSTAR) 100 UNIT/ML pen    Inject 20 Units Subcutaneous At Bedtime Hold on file until needed.            BG/Food Log:   Date Breakfast  Lunch  Dinner  Bedtime    Before After Before After Before After    11/2 146         11/1 181      191   10/31 105      257   10/30 153      179   10/29 129      303   10/28 189      269   10/27 166      167   10/26 143      142   10/25 88      143   10/24 83      164   10/23 93      209   10/22 105      166   10/21 88      231   Patient denies any episodes of low BG.          Assessment:    Patient feels recent readings are slightly elevated due to eating Halloween candy.  A1c in October is improved from July and additional adjustments to insulin have been made in Sept and October so likely next A1c will likely be lower.  No adjustments made today.      Plan/Response:    1.  Keep up the good work.  2.  Continue Lantus, Novolog and Metformin as prescribed.  3.  Continue to test your BG and record.  4.   Follow up with Dr. Scott as directed.  5.  Follow up with Diabetes Education  12/14/23 at 11am (phone visit).      Prudence York RD, [facility][note 3], 11:58 AM, 11/2/2023    Time spent: 20 minutes.  No charge.    Any diabetes medication dose changes were made via the CDE Protocol and Collaborative Practice Agreement with the patient's primary care provider. A copy of this encounter was shared with the provider.

## 2023-11-02 NOTE — LETTER
11/2/2023         RE: Fawad Garcia  [address] [facility] [address]        Dear Colleague,    Thank you for referring your patient, Fawad Garcia, to the [facility]. Please see a copy of my visit note below.    Diabetes Education Follow-up    Subjective/Objective:    Fawad Garcia sent in blood glucose log for review. Last date of communication was: 10/5/23.    Diabetes is being managed with Diabetes Medications   Diabetes Medication(s)       Biguanides       metFORMIN (GLUCOPHAGE) 500 MG tablet    Take 2 tablets (1,000 mg) by mouth 2 times daily (with meals)      Insulin       insulin aspart (NOVOLOG PEN) 100 UNIT/ML pen    Inject 2 units before breakfast and lunch and 5 units before supper.     insulin glargine (LANTUS SOLOSTAR) 100 UNIT/ML pen    Inject 20 Units Subcutaneous At Bedtime Hold on file until needed.            BG/Food Log:   Date Breakfast  Lunch  Dinner  Bedtime    Before After Before After Before After    11/2 146         11/1 181      191   10/31 105      257   10/30 153      179   10/29 129      303   10/28 189      269   10/27 166      167   10/26 143      142   10/25 88      143   10/24 83      164   10/23 93      209   10/22 105      166   10/21 88      231   Patient denies any episodes of low BG.          Assessment:    Patient feels recent readings are slightly elevated due to eating Halloween candy.  A1c in October is improved from July and additional adjustments to insulin have been made in Sept and October so likely next A1c will likely be lower.  No adjustments made today.      Plan/Response:    1.  Keep up the good work.  2.  Continue Lantus, Novolog and Metformin as prescribed.  3.  Continue to test your BG and record.  4.   Follow up with Dr. Scott as directed.  5.  Follow up with Diabetes Education  12/14/23 at 11am (phone visit).      Prudence York RD, [facility], 11:58 AM, 11/2/2023    Time spent: 20 minutes.  No  charge.    Any diabetes medication dose changes were made via the CDE Protocol and Collaborative Practice Agreement with the patient's primary care provider. A copy of this encounter was shared with the provider.

## 2023-11-17 ENCOUNTER — TELEPHONE (OUTPATIENT)
Dept: FAMILY MEDICINE | Facility: CLINIC | Age: 88
End: 2023-11-17
Payer: COMMERCIAL

## 2023-11-17 NOTE — TELEPHONE ENCOUNTER
Pt has a sore throat and cough and runny nose.  No shortness of breath.  No fever.  No chest pain.  Urinating normal amounts.  No GI symptoms  He wants to know if he should be seen.  Pt will increase fluids, take over the counter medicine and monitor at home.  Reasons to go to ER were gone over.  Pt will buy and take a home Covid test and call us if positive.

## 2023-12-04 DIAGNOSIS — Z79.4 TYPE 2 DIABETES MELLITUS WITH BOTH EYES AFFECTED BY MILD NONPROLIFERATIVE RETINOPATHY WITHOUT MACULAR EDEMA, WITH LONG-TERM CURRENT USE OF INSULIN (H): ICD-10-CM

## 2023-12-04 DIAGNOSIS — I10 HYPERTENSION, GOAL BELOW 140/90: ICD-10-CM

## 2023-12-04 DIAGNOSIS — N18.31 STAGE 3A CHRONIC KIDNEY DISEASE (H): ICD-10-CM

## 2023-12-04 DIAGNOSIS — E11.3293 TYPE 2 DIABETES MELLITUS WITH BOTH EYES AFFECTED BY MILD NONPROLIFERATIVE RETINOPATHY WITHOUT MACULAR EDEMA, WITH LONG-TERM CURRENT USE OF INSULIN (H): ICD-10-CM

## 2023-12-04 RX ORDER — LISINOPRIL 40 MG/1
40 TABLET ORAL DAILY
Qty: 90 TABLET | Refills: 1 | Status: SHIPPED | OUTPATIENT
Start: 2023-12-04 | End: 2024-01-29

## 2023-12-13 ENCOUNTER — TELEPHONE (OUTPATIENT)
Dept: NUTRITION | Facility: CLINIC | Age: 88
End: 2023-12-13
Payer: COMMERCIAL

## 2023-12-13 NOTE — TELEPHONE ENCOUNTER
Reason for Call:  Other appointment    Detailed comments: Conflict with 12/14 appt, resched to 1/17 but can it be sooner.     Phone Number Patient can be reached at: Home number on file [phone] (home)    Best Time: ASAP    Can we leave a detailed message on this number? YES please.    Call taken on 12/13/2023 at 5:23 PM by Laura Kanavel

## 2023-12-14 NOTE — TELEPHONE ENCOUNTER
Saw cancellation on 12/19 at 11am.  Called Ray and left message asking him to call back and confirm if this time would work for him.  CDE did schedule him into this slot so it was not booked up before he calls back.     Did also keep the 1/17/24 visit since it looks like he has monthly follow up's.    Nancy Pineda RDN, LD, [facility]

## 2023-12-15 NOTE — TELEPHONE ENCOUNTER
General Call    Contacts         Type Contact Phone/Fax    12/13/2023 05:16 PM CST Phone (Incoming) [place]isong[place]Emerson (Self) [phone] (H)     Conflict with 12/14 appt, resched to 1/17 but can it be sooner.    12/14/2023 04:01 PM CST Phone (Outgoing) Radha Emerson MADHAVI (Self) [phone] (H)    Left Message -  He was outside. Left msg asking him to verify if tel visit on 12/19 at 11am worked. Call back number provided.    12/15/2023 12:53 PM CST Phone (Incoming) Emerson Garcia (Self) [phone] (H)          Reason for Call:  returning call    What are your questions or concerns:  patient is aware of appointment on 12/19/23 and states that will work fine    Date of last appointment with provider:     Could we send this information to you in [facility] or would you prefer to receive a phone call?:   no call back needed

## 2023-12-18 ENCOUNTER — OFFICE VISIT (OUTPATIENT)
Dept: DERMATOLOGY | Facility: CLINIC | Age: 88
End: 2023-12-18
Payer: COMMERCIAL

## 2023-12-18 DIAGNOSIS — D18.01 CHERRY ANGIOMA: ICD-10-CM

## 2023-12-18 DIAGNOSIS — Z86.006 HISTORY OF MELANOMA IN SITU: ICD-10-CM

## 2023-12-18 DIAGNOSIS — Z85.828 HISTORY OF SKIN CANCER: Primary | ICD-10-CM

## 2023-12-18 DIAGNOSIS — L82.1 SEBORRHEIC KERATOSIS: ICD-10-CM

## 2023-12-18 DIAGNOSIS — L81.4 LENTIGO: ICD-10-CM

## 2023-12-18 DIAGNOSIS — D23.9 DERMAL NEVUS: ICD-10-CM

## 2023-12-18 PROCEDURE — 99213 OFFICE O/P EST LOW 20 MIN: CPT | Performed by: DERMATOLOGY

## 2023-12-18 NOTE — PROGRESS NOTES
Fawad Garcia is an extremely pleasant 88 year old year old male patient here today for hx of melanoma in situ and non-melanoma skin cancer.  Patient has no other skin complaints today.  Remainder of the HPI, Meds, PMH, Allergies, FH, and SH was reviewed in chart.      Past Medical History:   Diagnosis Date    Diabetes mellitus (H)     Hypertension     Squamous cell carcinoma        Past Surgical History:   Procedure Laterality Date    AMPUTATE TOE(S) Right 7/23/2019    Procedure: Partial amputation great toe;  Surgeon: Ethan Montesinos DPM;  Location: [facility] OR    AMPUTATE TOE(S) Left 12/24/2019    Procedure: Amputation of the great toe, left foot;  Surgeon: Ethan Montesinos DPM;  Location: [facility]    AMPUTATE TOE(S) Right 3/3/2020    Procedure: Partial amputation, second toe, right foot;  Surgeon: Ethan Montesinos DPM;  Location: [facility] OR    [facility] AFF SURGICAL PATHOLOGY  2002    prostate biopsy elevated PSA    COLONOSCOPY      HC REMOVE TONSILS/ADENOIDS,<11 Y/O      T & A    IR LOWER EXTREMITY ANGIOGRAM LEFT  2/26/2020    IR LOWER EXTREMITY ANGIOGRAM LEFT  7/19/2023    IR LOWER EXTREMITY ANGIOGRAM RIGHT  12/11/2018    PHACOEMULSIFICATION WITH STANDARD INTRAOCULAR LENS IMPLANT Left 4/2/2015    Procedure: PHACOEMULSIFICATION WITH STANDARD INTRAOCULAR LENS IMPLANT;  Surgeon: Joseph Hernandez MD;  Location: [facility]    PHACOEMULSIFICATION WITH STANDARD INTRAOCULAR LENS IMPLANT Right 5/4/2015    Procedure: PHACOEMULSIFICATION WITH STANDARD INTRAOCULAR LENS IMPLANT;  Surgeon: Joseph Hernandez MD;  Location: [facility]    REPAIR HAMMER TOE Left 4/2/2019    Procedure: 5th Metatarsal Head Resection;  Surgeon: Ethan Montesinos DPM;  Location: [facility] OR        Family History   Problem Relation Age of Onset    Cancer Mother         intestinal CA    Other Cancer Mother         cancer    Diabetes Father     Cerebrovascular Disease Father     Diabetes Brother     Other Cancer Son         GIST cancer    Other Cancer  Brother         Pancreatic cancer    Diabetes Son     Melanoma No family hx of        Social History     Socioeconomic History    Marital status:      Spouse name: Not on file    Number of children: Not on file    Years of education: Not on file    Highest education level: Not on file   Occupational History    Not on file   Tobacco Use    Smoking status: Never    Smokeless tobacco: Never   Vaping Use    Vaping Use: Never used   Substance and Sexual Activity    Alcohol use: Not Currently     Alcohol/week: 1.7 standard drinks of alcohol     Types: 2 Standard drinks or equivalent per week     Comment: very little    Drug use: No    Sexual activity: Not Currently     Partners: Female   Other Topics Concern    Parent/sibling w/ CABG, MI or angioplasty before 65F 55M? No   Social History Narrative    Not on file     Social Determinants of Health     Financial Resource Strain: Low Risk  (10/27/2023)    Financial Resource Strain     Within the past 12 months, have you or your family members you live with been unable to get utilities (heat, electricity) when it was really needed?: No   Food Insecurity: Low Risk  (10/27/2023)    Food Insecurity     Within the past 12 months, did you worry that your food would run out before you got money to buy more?: No     Within the past 12 months, did the food you bought just not last and you didn t have money to get more?: No   Transportation Needs: Low Risk  (10/27/2023)    Transportation Needs     Within the past 12 months, has lack of transportation kept you from medical appointments, getting your medicines, non-medical meetings or appointments, work, or from getting things that you need?: No   Physical Activity: Not on file   Stress: Not on file   Social Connections: Not on file   Interpersonal Safety: Not on file   Housing Stability: Low Risk  (10/27/2023)    Housing Stability     Do you have housing? : Yes     Are you worried about losing your housing?: No       Outpatient  Encounter Medications as of 12/18/2023   Medication Sig Dispense Refill    amLODIPine (NORVASC) 5 MG tablet Take 1 tablet (5 mg) by mouth daily 90 tablet 3    aspirin (ASA) 81 MG EC tablet Take 1 tablet (81 mg) by mouth daily      augmented betamethasone dipropionate (DIPROLENE AF) 0.05 % external cream Apply sparingly to affected area twice daily as needed.  Do not apply to face. 100 g 3    blood glucose (ONETOUCH ULTRA) test strip 2 strips by In Vitro route daily One touch Ultra 2. 100 strip 3    blood glucose monitoring (ONE TOUCH ULTRA 2) meter device kit Use to test blood sugar 2 times daily or as directed. 1 kit 0    clopidogrel (PLAVIX) 75 MG tablet Take 1 tablet (75 mg) by mouth daily 90 tablet 3    dorzolamide-timolol (COSOPT) 2-0.5 % ophthalmic solution       hydrochlorothiazide (HYDRODIURIL) 25 MG tablet Take 1 tablet (25 mg) by mouth daily 90 tablet 3    insulin aspart (NOVOLOG PEN) 100 UNIT/ML pen Inject 2 units before breakfast and lunch and 5 units before supper. 15 mL 0    insulin glargine (LANTUS SOLOSTAR) 100 UNIT/ML pen Inject 20 Units Subcutaneous At Bedtime Hold on file until needed. 30 mL 3    insulin pen needle (B-D U/F) 31G X 8 MM miscellaneous Use 2 pen needles daily or as directed. 100 each 3    Lancets (ONETOUCH DELICA PLUS [id number]) MISC 1 each by In Vitro route See Admin Instructions Hold on file until needed. 100 each 1    Lancets (ONETOUCH DELICA PLUS [id number]) MISC 1 each by In Vitro route See Admin Instructions Hold on file until needed. 100 each 4    lisinopril (ZESTRIL) 40 MG tablet Take 1 tablet (40 mg) by mouth daily 90 tablet 1    metFORMIN (GLUCOPHAGE) 500 MG tablet Take 2 tablets (1,000 mg) by mouth 2 times daily (with meals) 360 tablet 3    Multiple Vitamins-Minerals (PRESERVISION AREDS PO) Take 1 tablet by mouth      ORDER FOR DME Equipment being ordered:   Glucometer 1 Device 1    pantoprazole (PROTONIX) 20 MG EC tablet Take 1 tablet (20 mg) by mouth daily 90 tablet 3     rosuvastatin (CRESTOR) 10 MG tablet Take 1 tablet (10 mg) by mouth At Bedtime 90 tablet 3    triamcinolone (KENALOG) 0.1 % external cream Apply topically 2 times daily To dry skin patches as needed.  Hold on file until needed. 80 g 1     No facility-administered encounter medications on file as of 12/18/2023.             O:   NAD, WDWN, Alert & Oriented, Mood & Affect wnl, Vitals stable   General appearance normal   Vitals stable   Alert, oriented and in no acute distress      Following lymph nodes palpated: Occipital, Cervical, Supraclavicular no lad        Stuck on papules and brown macules on trunk and ext   Red papules on trunk  Flesh colored papules on trunk     The remainder of the full exam was normal; the following areas were examined:  conjunctiva/lids, , neck, peripheral vascular system, abdomen, lymph nodes, digits/nails, eccrine and apocrine glands, scalp/hair, face, neck, chest, abdomen, buttocks, back, RUE, LUE, RLE, LLE       Eyes: Conjunctivae/lids:Normal     ENT: Lips, buccal mucosa, tongue: normal    MSK:Normal    Cardiovascular: peripheral edema none    Pulm: Breathing Normal    Lymph Nodes: No Head and Neck Lymphadenopathy     Neuro/Psych: Orientation:Alert and Orientedx3 ; Mood/Affect:normal       A/P:  1. Seborrheic keratosis, lentigo, angioma, dermal nevus, hx of non-melanoma skin cancer, hx of melanomain situ   It was a pleasure speaking to Fawad Garcia today.  Previous clinic notes and pertinent laboratory tests were reviewed prior to Fawad Garcia's visit.  Nature and genetics of benign skin lesions dicussed with patient.  Signs and Symptoms of skin cancer discussed with patient.  Patient encouraged to perform monthly skin exams.  UV precautions reviewed with patient.  Risks of non-melanoma skin cancer discussed with patient   Return to clinic 6 months     Abdomen soft, nontender, nondistended, bowel sounds present in all 4 quadrants.

## 2023-12-18 NOTE — LETTER
12/18/2023         RE: Fawad Garcia  [address]  [facility] [address]        Dear Colleague,    Thank you for referring your patient, Fawad Garcia, to the [facility]. Please see a copy of my visit note below.    Fawad Garcia is an extremely pleasant 88 year old year old male patient here today for hx of melanoma in situ and non-melanoma skin cancer.  Patient has no other skin complaints today.  Remainder of the HPI, Meds, PMH, Allergies, FH, and SH was reviewed in chart.      Past Medical History:   Diagnosis Date     Diabetes mellitus (H)      Hypertension      Squamous cell carcinoma        Past Surgical History:   Procedure Laterality Date     AMPUTATE TOE(S) Right 7/23/2019    Procedure: Partial amputation great toe;  Surgeon: Ethan Montesinos DPM;  Location: [facility]     AMPUTATE TOE(S) Left 12/24/2019    Procedure: Amputation of the great toe, left foot;  Surgeon: Ethan Montesinos DPM;  Location: [facility]     AMPUTATE TOE(S) Right 3/3/2020    Procedure: Partial amputation, second toe, right foot;  Surgeon: Ethan Montesinos DPM;  Location: [facility] OR     CL AFF SURGICAL PATHOLOGY  2002    prostate biopsy elevated PSA     COLONOSCOPY       HC REMOVE TONSILS/ADENOIDS,<11 Y/O      T & A     IR LOWER EXTREMITY ANGIOGRAM LEFT  2/26/2020     IR LOWER EXTREMITY ANGIOGRAM LEFT  7/19/2023     IR LOWER EXTREMITY ANGIOGRAM RIGHT  12/11/2018     PHACOEMULSIFICATION WITH STANDARD INTRAOCULAR LENS IMPLANT Left 4/2/2015    Procedure: PHACOEMULSIFICATION WITH STANDARD INTRAOCULAR LENS IMPLANT;  Surgeon: Joseph Hernandez MD;  Location: [facility]     PHACOEMULSIFICATION WITH STANDARD INTRAOCULAR LENS IMPLANT Right 5/4/2015    Procedure: PHACOEMULSIFICATION WITH STANDARD INTRAOCULAR LENS IMPLANT;  Surgeon: Joseph Hernandez MD;  Location: [facility]     REPAIR HAMMER TOE Left 4/2/2019    Procedure: 5th Metatarsal Head Resection;  Surgeon: Ethan Montesinos DPM;   Location: [facility]        Family History   Problem Relation Age of Onset     Cancer Mother         intestinal CA     Other Cancer Mother         cancer     Diabetes Father      Cerebrovascular Disease Father      Diabetes Brother      Other Cancer Son         GIST cancer     Other Cancer Brother         Pancreatic cancer     Diabetes Son      Melanoma No family hx of        Social History     Socioeconomic History     Marital status:      Spouse name: Not on file     Number of children: Not on file     Years of education: Not on file     Highest education level: Not on file   Occupational History     Not on file   Tobacco Use     Smoking status: Never     Smokeless tobacco: Never   Vaping Use     Vaping Use: Never used   Substance and Sexual Activity     Alcohol use: Not Currently     Alcohol/week: 1.7 standard drinks of alcohol     Types: 2 Standard drinks or equivalent per week     Comment: very little     Drug use: No     Sexual activity: Not Currently     Partners: Female   Other Topics Concern     Parent/sibling w/ CABG, MI or angioplasty before 65F 55M? No   Social History Narrative     Not on file     Social Determinants of Health     Financial Resource Strain: Low Risk  (10/27/2023)    Financial Resource Strain      Within the past 12 months, have you or your family members you live with been unable to get utilities (heat, electricity) when it was really needed?: No   Food Insecurity: Low Risk  (10/27/2023)    Food Insecurity      Within the past 12 months, did you worry that your food would run out before you got money to buy more?: No      Within the past 12 months, did the food you bought just not last and you didn t have money to get more?: No   Transportation Needs: Low Risk  (10/27/2023)    Transportation Needs      Within the past 12 months, has lack of transportation kept you from medical appointments, getting your medicines, non-medical meetings or appointments, work, or from getting things that  you need?: No   Physical Activity: Not on file   Stress: Not on file   Social Connections: Not on file   Interpersonal Safety: Not on file   Housing Stability: Low Risk  (10/27/2023)    Housing Stability      Do you have housing? : Yes      Are you worried about losing your housing?: No       Outpatient Encounter Medications as of 12/18/2023   Medication Sig Dispense Refill     amLODIPine (NORVASC) 5 MG tablet Take 1 tablet (5 mg) by mouth daily 90 tablet 3     aspirin (ASA) 81 MG EC tablet Take 1 tablet (81 mg) by mouth daily       augmented betamethasone dipropionate (DIPROLENE AF) 0.05 % external cream Apply sparingly to affected area twice daily as needed.  Do not apply to face. 100 g 3     blood glucose (ONETOUCH ULTRA) test strip 2 strips by In Vitro route daily One touch Ultra 2. 100 strip 3     blood glucose monitoring (ONE TOUCH ULTRA 2) meter device kit Use to test blood sugar 2 times daily or as directed. 1 kit 0     clopidogrel (PLAVIX) 75 MG tablet Take 1 tablet (75 mg) by mouth daily 90 tablet 3     dorzolamide-timolol (COSOPT) 2-0.5 % ophthalmic solution        hydrochlorothiazide (HYDRODIURIL) 25 MG tablet Take 1 tablet (25 mg) by mouth daily 90 tablet 3     insulin aspart (NOVOLOG PEN) 100 UNIT/ML pen Inject 2 units before breakfast and lunch and 5 units before supper. 15 mL 0     insulin glargine (LANTUS SOLOSTAR) 100 UNIT/ML pen Inject 20 Units Subcutaneous At Bedtime Hold on file until needed. 30 mL 3     insulin pen needle (B-D U/F) 31G X 8 MM miscellaneous Use 2 pen needles daily or as directed. 100 each 3     Lancets (ONETOUCH DELICA PLUS [id number]) MISC 1 each by In Vitro route See Admin Instructions Hold on file until needed. 100 each 1     Lancets (ONETOUCH DELICA PLUS [id number]) MISC 1 each by In Vitro route See Admin Instructions Hold on file until needed. 100 each 4     lisinopril (ZESTRIL) 40 MG tablet Take 1 tablet (40 mg) by mouth daily 90 tablet 1     metFORMIN (GLUCOPHAGE) 500 MG  tablet Take 2 tablets (1,000 mg) by mouth 2 times daily (with meals) 360 tablet 3     Multiple Vitamins-Minerals (PRESERVISION AREDS PO) Take 1 tablet by mouth       ORDER FOR DME Equipment being ordered:   Glucometer 1 Device 1     pantoprazole (PROTONIX) 20 MG EC tablet Take 1 tablet (20 mg) by mouth daily 90 tablet 3     rosuvastatin (CRESTOR) 10 MG tablet Take 1 tablet (10 mg) by mouth At Bedtime 90 tablet 3     triamcinolone (KENALOG) 0.1 % external cream Apply topically 2 times daily To dry skin patches as needed.  Hold on file until needed. 80 g 1     No facility-administered encounter medications on file as of 12/18/2023.             O:   NAD, WDWN, Alert & Oriented, Mood & Affect wnl, Vitals stable   General appearance normal   Vitals stable   Alert, oriented and in no acute distress      Following lymph nodes palpated: Occipital, Cervical, Supraclavicular no lad        Stuck on papules and brown macules on trunk and ext   Red papules on trunk  Flesh colored papules on trunk     The remainder of the full exam was normal; the following areas were examined:  conjunctiva/lids, , neck, peripheral vascular system, abdomen, lymph nodes, digits/nails, eccrine and apocrine glands, scalp/hair, face, neck, chest, abdomen, buttocks, back, RUE, LUE, RLE, LLE       Eyes: Conjunctivae/lids:Normal     ENT: Lips, buccal mucosa, tongue: normal    MSK:Normal    Cardiovascular: peripheral edema none    Pulm: Breathing Normal    Lymph Nodes: No Head and Neck Lymphadenopathy     Neuro/Psych: Orientation:Alert and Orientedx3 ; Mood/Affect:normal       A/P:  1. Seborrheic keratosis, lentigo, angioma, dermal nevus, hx of non-melanoma skin cancer, hx of melanomain situ   It was a pleasure speaking to Fawad Garcia today.  Previous clinic notes and pertinent laboratory tests were reviewed prior to Fawad Garcia's visit.  Nature and genetics of benign skin lesions dicussed with patient.  Signs and Symptoms of skin cancer  discussed with patient.  Patient encouraged to perform monthly skin exams.  UV precautions reviewed with patient.  Risks of non-melanoma skin cancer discussed with patient   Return to clinic 6 months      Again, thank you for allowing me to participate in the care of your patient.        Sincerely,        Evgeny Torres MD

## 2023-12-19 ENCOUNTER — VIRTUAL VISIT (OUTPATIENT)
Dept: EDUCATION SERVICES | Facility: CLINIC | Age: 88
End: 2023-12-19
Payer: COMMERCIAL

## 2023-12-19 DIAGNOSIS — Z79.4 TYPE 2 DIABETES MELLITUS WITH BOTH EYES AFFECTED BY MILD NONPROLIFERATIVE RETINOPATHY WITHOUT MACULAR EDEMA, WITH LONG-TERM CURRENT USE OF INSULIN (H): ICD-10-CM

## 2023-12-19 DIAGNOSIS — E11.3293 TYPE 2 DIABETES MELLITUS WITH BOTH EYES AFFECTED BY MILD NONPROLIFERATIVE RETINOPATHY WITHOUT MACULAR EDEMA, WITH LONG-TERM CURRENT USE OF INSULIN (H): ICD-10-CM

## 2023-12-19 PROCEDURE — G0108 DIAB MANAGE TRN  PER INDIV: HCPCS | Mod: 95 | Performed by: DIETITIAN, REGISTERED

## 2023-12-19 NOTE — LETTER
12/19/2023         RE: Fawad Garcia  [address] [facility] [address]        Dear Colleague,    Thank you for referring your patient, Fawad Garcia, to the [facility]. Please see a copy of my visit note below.    Diabetes Self-Management Education & Support    Presents for: Follow-up    Type of Service: Telephone Visit    Originating Location (Patient Location): Home  Distant Location (Provider Location): [facility]  Mode of Communication:  Telephone    Telephone Visit Start Time:  11:05am  Telephone Visit End Time (telephone visit stop time): 11:40am    How would patient like to obtain AVS? MyChart      ASSESSMENT:  -type 2 diabetes for ~30 years  -recent a1c of 8.7 (previous 10.3)  -currently being managed with Metformin, Lantus, Novolog  -on aspirin and statin therapy  -diabetes complications:  mild retinopathy, PVD, TIA, CKD, amp of toe   -last seen by diabetes education 11/2/23  -home BG monitoring reveals:  54% in target, no documented lows, but one episode of feeling sympomatic over the night and he got up and ate something    Primary concern: no concerns.    Discussed:  hypoglycemia symptoms and treatment, adjustment of meal time insulin.  Patient expressed understanding.      Patient's most recent   Lab Results   Component Value Date    A1C 8.7 10/20/2023    A1C 8.4 06/21/2021     is not meeting goal of <8.0    Diabetes knowledge and skills assessment:   Patient is knowledgeable in diabetes management concepts related to: Monitoring and Taking Medication    Continue education with the following diabetes management concepts: Healthy Eating, Being Active, Monitoring, Taking Medication, Problem Solving, Reducing Risks, and Healthy Coping    Based on learning assessment above, most appropriate setting for further diabetes education would be: Individual setting.      PLAN     1.  Continue Lantus 20 units.  2.  Adjust Novolog from  "2-2-5-0 to Novolog 2-3-5-0 units.        3.  Continue Metformin.    4.  Continue to test your BG 2 times/day.  5.  Schedule a follow up visit for your diabetes with a provider for January 2024.  6.  Follow up with Diabetes Education 2/6/24 at 12pm (phone visit).        See Care Plan for co-developed, patient-state behavior change goals.  AVS provided for patient today.    Education Materials Provided:  No new materials provided today      SUBJECTIVE/OBJECTIVE:  Presents for: Follow-up  Accompanied by: Self  Diabetes education in the past 24mo: Yes  Focus of Visit: Taking Medication  Diabetes type: Type 2  Date of diagnosis: 1993, and started insulin in 2011  Disease course: Stable  How confident are you filling out medical forms by yourself:: Quite a bit  Transportation concerns: Yes  Difficulty affording diabetes medication?: No  Difficulty affording diabetes testing supplies?: No  Other concerns:: Visual impairment  Cultural Influences/Ethnic Background:  Not  or     Diabetes Symptoms & Complications:  Fatigue: Sometimes  Neuropathy: Sometimes  Polydipsia: Yes  Polyphagia: No  Polyuria: Yes  Visual change: Yes  Slow healing wounds: Yes  Autonomic neuropathy: No  CVA: No  Heart disease: No  Nephropathy: No  Peripheral neuropathy: Yes  Peripheral Vascular Disease: Yes  Retinopathy: Yes  Sexual dysfunction: Yes    Patient Problem List and Family Medical History reviewed for relevant medical history, current medical status, and diabetes risk factors.    Vitals:  There were no vitals taken for this visit.  Estimated body mass index is 31.53 kg/m  as calculated from the following:    Height as of 7/19/23: 1.803 m (5' 11\").    Weight as of 7/27/23: 102.6 kg (226 lb 1.6 oz).   Last 3 BP:   BP Readings from Last 3 Encounters:   07/27/23 136/71   07/24/23 115/71   07/19/23 134/78       History   Smoking Status     Never   Smokeless Tobacco     Never       Labs:  Lab Results   Component Value Date    A1C 8.7 " "10/20/2023    A1C 8.4 06/21/2021     Lab Results   Component Value Date     07/19/2023     10/20/2022     09/20/2021     01/04/2021     Lab Results   Component Value Date    LDL 53 07/19/2023    LDL 47 03/18/2021     HDL Cholesterol   Date Value Ref Range Status   03/18/2021 61 >39 mg/dL Final     Direct Measure HDL   Date Value Ref Range Status   07/19/2023 52 >=40 mg/dL Final   ]  GFR Estimate   Date Value Ref Range Status   07/19/2023 66 >60 mL/min/1.73m2 Final   01/04/2021 63 >60 mL/min/[1.73_m2] Final     Comment:     Non  GFR Calc  Starting 12/18/2018, serum creatinine based estimated GFR (eGFR) will be   calculated using the Chronic Kidney Disease Epidemiology Collaboration   (CKD-EPI) equation.       GFR, ESTIMATED POCT   Date Value Ref Range Status   04/08/2022 >60 >60 mL/min/1.73m2 Final     GFR Estimate If Black   Date Value Ref Range Status   01/04/2021 73 >60 mL/min/[1.73_m2] Final     Comment:      GFR Calc  Starting 12/18/2018, serum creatinine based estimated GFR (eGFR) will be   calculated using the Chronic Kidney Disease Epidemiology Collaboration   (CKD-EPI) equation.       Lab Results   Component Value Date    CR 1.09 07/19/2023    CR 1.07 01/04/2021     No results found for: \"MICROALBUMIN\"    Healthy Eating:  Healthy Eating Assessed Today: Yes (wife does all the cooking)  Cultural/Jehovah's witness diet restrictions?: No  Meal planning/habits: Other, Avoiding sweets  How many times a week on average do you eat food made away from home (restaurant/take-out)?: 1  Meals include: Breakfast, Lunch, Dinner  Breakfast: scone, coffee-w/ creamer  Lunch: 1/2 sl bread, ham & cheese, milk or eggnog, cookie  Dinner: 6-6:30 pm:  roast, potatoes, carrots, 1/2 dinner roll, milk, cookie  Snacks: HS:  nothing  Beverages: Coffee, Milk, Diet soda    Being Active:  Being Active Assessed Today: Yes  Exercise:: Currently not exercising  Barrier to exercise: Physical " limitation    Monitoring:  Monitoring Assessed Today: Yes  Did patient bring glucose meter to appointment? : Yes  Blood Glucose Meter: One Touch  Times checking blood sugar at home (number): 5+  Times checking blood sugar at home (per): Day  Blood glucose trend: Fluctuating    Date Breakfast  Lunch  Dinner  Bedtime    Before After Before After Before After    12/19 88         12/18 161     178    12/17 179     246    12/16 102     239    12/15 87     241    12/14 121     162    12/13 80     203    Felt funny one day in the middle of the night and had some cereal.  But no other indications of low BG.      Taking Medications:  Diabetes Medication(s)       Biguanides       metFORMIN (GLUCOPHAGE) 500 MG tablet    Take 2 tablets (1,000 mg) by mouth 2 times daily (with meals)      Insulin       insulin aspart (NOVOLOG PEN) 100 UNIT/ML pen    Inject 2 units before breakfast and lunch and 5 units before supper.     insulin glargine (LANTUS SOLOSTAR) 100 UNIT/ML pen    Inject 20 Units Subcutaneous At Bedtime Hold on file until needed.            Taking Medication Assessed Today: Yes  Current Treatments: Insulin Injections, Oral Medication (taken by mouth) (adjusts Lantus 20-36 units at night and takes 1-2 Metformin 2 times/day)  Dose schedule: At bedtime, Pre-dinner, Pre-breakfast, Pre-lunch (AM dose sometimes injected before leaves for eating breakfast out)  Given by: Patient  Injection/Infusion sites: Abdomen  Problems taking diabetes medications regularly?: Yes  Diabetes medication side effects?: Yes (as long as takes Metformin with food is tolerable, if he doesn't will have diarrhea)    Problem Solving:                 Reducing Risks:  Reducing Risks Assessed Today: Yes  Diabetes Risks: Age over 45 years, Family History  CAD Risks: Diabetes Mellitus, Family history  Has dilated eye exam at least once a year?: Yes  Sees dentist every 6 months?: Yes  Feet checked by healthcare provider in the last year?: Yes    Healthy  Coping:  Healthy Coping Assessed Today: Yes  Emotional response to diabetes: Acceptance  Informal Support system:: Children, Spouse  Stage of change: ACTION (Actively working towards change)  Patient Activation Measure Survey Score:      4/20/2011     9:00 AM 7/26/2011     5:00 PM   DOMONIQUE Score (Last Two)   DOMONIQUE Raw Score 44 35   Activation Score 70.8 45.2   DOMONIQUE Level 4 1         Care Plan and Education Provided:  Care Plan: Diabetes   Updates made by Prudence York since 12/19/2023 12:00 AM        Problem: Diabetes Self-Management Education Needed to Optimize Self-Care Behaviors         Goal: Monitoring - monitor glucose and ketones as directed    This Visit's Progress: 70%   Recent Progress: 70%        Goal: Taking Medication - patient is consistently taking medications as directed    This Visit's Progress: 90%   Recent Progress: 90%        Goal: Problem Solving - know how to prevent and manage short-term diabetes complications    This Visit's Progress: 70%   Recent Progress: 70%        Goal: Reducing Risks - know how to prevent and treat long-term diabetes complications    This Visit's Progress: 70%   Recent Progress: 70%          Prudence York RD, [facility], 1:16 PM, 12/19/2023    Time Spent: 30 minutes  Encounter Type: Individual    Any diabetes medication dose changes were made via the CDE Protocol per the patient's primary care provider. A copy of this encounter was shared with the provider.

## 2023-12-19 NOTE — PROGRESS NOTES
Diabetes Self-Management Education & Support    Presents for: Follow-up    Type of Service: Telephone Visit    Originating Location (Patient Location): Home  Distant Location (Provider Location): [facility]  Mode of Communication:  Telephone    Telephone Visit Start Time:  11:05am  Telephone Visit End Time (telephone visit stop time): 11:40am    How would patient like to obtain AVS? Kaylen      ASSESSMENT:  -type 2 diabetes for ~30 years  -recent a1c of 8.7 (previous 10.3)  -currently being managed with Metformin, Lantus, Novolog  -on aspirin and statin therapy  -diabetes complications:  mild retinopathy, PVD, TIA, CKD, amp of toe   -last seen by diabetes education 11/2/23  -home BG monitoring reveals:  54% in target, no documented lows, but one episode of feeling sympomatic over the night and he got up and ate something    Primary concern: no concerns.    Discussed:  hypoglycemia symptoms and treatment, adjustment of meal time insulin.  Patient expressed understanding.      Patient's most recent   Lab Results   Component Value Date    A1C 8.7 10/20/2023    A1C 8.4 06/21/2021     is not meeting goal of <8.0    Diabetes knowledge and skills assessment:   Patient is knowledgeable in diabetes management concepts related to: Monitoring and Taking Medication    Continue education with the following diabetes management concepts: Healthy Eating, Being Active, Monitoring, Taking Medication, Problem Solving, Reducing Risks, and Healthy Coping    Based on learning assessment above, most appropriate setting for further diabetes education would be: Individual setting.      PLAN     1.  Continue Lantus 20 units.  2.  Adjust Novolog from 2-2-5-0 to Novolog 2-3-5-0 units.        3.  Continue Metformin.    4.  Continue to test your BG 2 times/day.  5.  Schedule a follow up visit for your diabetes with a provider for January 2024.  6.  Follow up with Diabetes Education 2/6/24 at 12pm (phone visit).     "    See Care Plan for co-developed, patient-state behavior change goals.  AVS provided for patient today.    Education Materials Provided:  No new materials provided today      SUBJECTIVE/OBJECTIVE:  Presents for: Follow-up  Accompanied by: Self  Diabetes education in the past 24mo: Yes  Focus of Visit: Taking Medication  Diabetes type: Type 2  Date of diagnosis: 1993, and started insulin in 2011  Disease course: Stable  How confident are you filling out medical forms by yourself:: Quite a bit  Transportation concerns: Yes  Difficulty affording diabetes medication?: No  Difficulty affording diabetes testing supplies?: No  Other concerns:: Visual impairment  Cultural Influences/Ethnic Background:  Not  or     Diabetes Symptoms & Complications:  Fatigue: Sometimes  Neuropathy: Sometimes  Polydipsia: Yes  Polyphagia: No  Polyuria: Yes  Visual change: Yes  Slow healing wounds: Yes  Autonomic neuropathy: No  CVA: No  Heart disease: No  Nephropathy: No  Peripheral neuropathy: Yes  Peripheral Vascular Disease: Yes  Retinopathy: Yes  Sexual dysfunction: Yes    Patient Problem List and Family Medical History reviewed for relevant medical history, current medical status, and diabetes risk factors.    Vitals:  There were no vitals taken for this visit.  Estimated body mass index is 31.53 kg/m  as calculated from the following:    Height as of 7/19/23: 1.803 m (5' 11\").    Weight as of 7/27/23: 102.6 kg (226 lb 1.6 oz).   Last 3 BP:   BP Readings from Last 3 Encounters:   07/27/23 136/71   07/24/23 115/71   07/19/23 134/78       History   Smoking Status    Never   Smokeless Tobacco    Never       Labs:  Lab Results   Component Value Date    A1C 8.7 10/20/2023    A1C 8.4 06/21/2021     Lab Results   Component Value Date     07/19/2023     10/20/2022     09/20/2021     01/04/2021     Lab Results   Component Value Date    LDL 53 07/19/2023    LDL 47 03/18/2021     HDL Cholesterol   Date " "Value Ref Range Status   03/18/2021 61 >39 mg/dL Final     Direct Measure HDL   Date Value Ref Range Status   07/19/2023 52 >=40 mg/dL Final   ]  GFR Estimate   Date Value Ref Range Status   07/19/2023 66 >60 mL/min/1.73m2 Final   01/04/2021 63 >60 mL/min/[1.73_m2] Final     Comment:     Non  GFR Calc  Starting 12/18/2018, serum creatinine based estimated GFR (eGFR) will be   calculated using the Chronic Kidney Disease Epidemiology Collaboration   (CKD-EPI) equation.       GFR, ESTIMATED POCT   Date Value Ref Range Status   04/08/2022 >60 >60 mL/min/1.73m2 Final     GFR Estimate If Black   Date Value Ref Range Status   01/04/2021 73 >60 mL/min/[1.73_m2] Final     Comment:      GFR Calc  Starting 12/18/2018, serum creatinine based estimated GFR (eGFR) will be   calculated using the Chronic Kidney Disease Epidemiology Collaboration   (CKD-EPI) equation.       Lab Results   Component Value Date    CR 1.09 07/19/2023    CR 1.07 01/04/2021     No results found for: \"MICROALBUMIN\"    Healthy Eating:  Healthy Eating Assessed Today: Yes (wife does all the cooking)  Cultural/[employer] diet restrictions?: No  Meal planning/habits: Other, Avoiding sweets  How many times a week on average do you eat food made away from home (restaurant/take-out)?: 1  Meals include: Breakfast, Lunch, Dinner  Breakfast: scone, coffee-w/ creamer  Lunch: 1/2 sl bread, ham & cheese, milk or eggnog, cookie  Dinner: 6-6:30 pm:  roast, potatoes, carrots, 1/2 dinner roll, milk, cookie  Snacks: HS:  nothing  Beverages: Coffee, Milk, Diet soda    Being Active:  Being Active Assessed Today: Yes  Exercise:: Currently not exercising  Barrier to exercise: Physical limitation    Monitoring:  Monitoring Assessed Today: Yes  Did patient bring glucose meter to appointment? : Yes  Blood Glucose Meter: One Touch  Times checking blood sugar at home (number): 5+  Times checking blood sugar at home (per): Day  Blood glucose trend: " Fluctuating    Date Breakfast  Lunch  Dinner  Bedtime    Before After Before After Before After    12/19 88         12/18 161     178    12/17 179     246    12/16 102     239    12/15 87     241    12/14 121     162    12/13 80     203    Felt funny one day in the middle of the night and had some cereal.  But no other indications of low BG.      Taking Medications:  Diabetes Medication(s)       Biguanides       metFORMIN (GLUCOPHAGE) 500 MG tablet    Take 2 tablets (1,000 mg) by mouth 2 times daily (with meals)      Insulin       insulin aspart (NOVOLOG PEN) 100 UNIT/ML pen    Inject 2 units before breakfast and lunch and 5 units before supper.     insulin glargine (LANTUS SOLOSTAR) 100 UNIT/ML pen    Inject 20 Units Subcutaneous At Bedtime Hold on file until needed.            Taking Medication Assessed Today: Yes  Current Treatments: Insulin Injections, Oral Medication (taken by mouth) (adjusts Lantus 20-36 units at night and takes 1-2 Metformin 2 times/day)  Dose schedule: At bedtime, Pre-dinner, Pre-breakfast, Pre-lunch (AM dose sometimes injected before leaves for eating breakfast out)  Given by: Patient  Injection/Infusion sites: Abdomen  Problems taking diabetes medications regularly?: Yes  Diabetes medication side effects?: Yes (as long as takes Metformin with food is tolerable, if he doesn't will have diarrhea)    Problem Solving:                 Reducing Risks:  Reducing Risks Assessed Today: Yes  Diabetes Risks: Age over 45 years, Family History  CAD Risks: Diabetes Mellitus, Family history  Has dilated eye exam at least once a year?: Yes  Sees dentist every 6 months?: Yes  Feet checked by healthcare provider in the last year?: Yes    Healthy Coping:  Healthy Coping Assessed Today: Yes  Emotional response to diabetes: Acceptance  Informal Support system:: Children, Spouse  Stage of change: ACTION (Actively working towards change)  Patient Activation Measure Survey Score:      4/20/2011     9:00 AM  7/26/2011     5:00 PM   DOMONIQUE Score (Last Two)   DOMONIQUE Raw Score 44 35   Activation Score 70.8 45.2   DOMONIQUE Level 4 1         Care Plan and Education Provided:  Care Plan: Diabetes   Updates made by Prudence York since 12/19/2023 12:00 AM        Problem: Diabetes Self-Management Education Needed to Optimize Self-Care Behaviors         Goal: Monitoring - monitor glucose and ketones as directed    This Visit's Progress: 70%   Recent Progress: 70%        Goal: Taking Medication - patient is consistently taking medications as directed    This Visit's Progress: 90%   Recent Progress: 90%        Goal: Problem Solving - know how to prevent and manage short-term diabetes complications    This Visit's Progress: 70%   Recent Progress: 70%        Goal: Reducing Risks - know how to prevent and treat long-term diabetes complications    This Visit's Progress: 70%   Recent Progress: 70%          Prudence York RD, [facility], 1:16 PM, 12/19/2023    Time Spent: 30 minutes  Encounter Type: Individual    Any diabetes medication dose changes were made via the CDE Protocol per the patient's primary care provider. A copy of this encounter was shared with the provider.

## 2023-12-19 NOTE — PATIENT INSTRUCTIONS
PLAN     1.  Continue Lantus 20 units.  2.  Adjust Novolog from 2-2-5-0 to Novolog 2-3-5-0 units.        3.  Continue Metformin.    4.  Continue to test your BG 2 times/day.  5.  Schedule a follow up visit for your diabetes with a provider for January 2024.  6.  Follow up with Diabetes Education 2/6/24 at 12pm (phone visit).        Prudence York RD, [facility], 1:19 PM, 12/19/2023

## 2023-12-26 DIAGNOSIS — E11.3293 TYPE 2 DIABETES MELLITUS WITH BOTH EYES AFFECTED BY MILD NONPROLIFERATIVE RETINOPATHY WITHOUT MACULAR EDEMA, WITH LONG-TERM CURRENT USE OF INSULIN (H): ICD-10-CM

## 2023-12-26 DIAGNOSIS — Z79.4 TYPE 2 DIABETES MELLITUS WITH BOTH EYES AFFECTED BY MILD NONPROLIFERATIVE RETINOPATHY WITHOUT MACULAR EDEMA, WITH LONG-TERM CURRENT USE OF INSULIN (H): ICD-10-CM

## 2023-12-26 RX ORDER — PEN NEEDLE, DIABETIC 31 GX5/16"
NEEDLE, DISPOSABLE MISCELLANEOUS
Qty: 100 EACH | Refills: 1 | Status: SHIPPED | OUTPATIENT
Start: 2023-12-26 | End: 2024-03-04

## 2023-12-26 NOTE — TELEPHONE ENCOUNTER
Medication Question or Refill    Contacts         Type Contact Phone/Fax    12/26/2023 12:04 PM CST Phone (Incoming) Emerson Garcia (Self) [phone] (H)            What medication are you calling about (include dose and sig)?: Diabetic needles    Preferred Pharmacy:   [facility] - [address] [address]  Phone: [phone] Fax: [phone]      Controlled Substance Agreement on file:   CSA -- Patient Level:    CSA: None found at the patient level.       Who prescribed the medication?: Dr. Scott    Do you need a refill? Yes, No    When did you use the medication last? Daily    Patient offered an appointment? No    Do you have any questions or concerns?  Yes:       Could we send this information to you in [facility] or would you prefer to receive a phone call?:   Patient usually uses 1/2 in needles for insulin, can he use 1/4 inch ones for insulin, they were given to him

## 2023-12-26 NOTE — TELEPHONE ENCOUNTER
Prescription approved per [facility] Refill Protocol.    Rachana Teixeira RN  [facility]

## 2024-01-03 DIAGNOSIS — K21.9 GASTROESOPHAGEAL REFLUX DISEASE WITHOUT ESOPHAGITIS: ICD-10-CM

## 2024-01-03 RX ORDER — PANTOPRAZOLE SODIUM 20 MG/1
20 TABLET, DELAYED RELEASE ORAL DAILY
Qty: 90 TABLET | Refills: 0 | Status: SHIPPED | OUTPATIENT
Start: 2024-01-03 | End: 2024-01-29

## 2024-01-15 DIAGNOSIS — C61 MALIGNANT NEOPLASM OF PROSTATE (H): Primary | ICD-10-CM

## 2024-01-16 ENCOUNTER — PATIENT OUTREACH (OUTPATIENT)
Dept: CARE COORDINATION | Facility: CLINIC | Age: 89
End: 2024-01-16
Payer: COMMERCIAL

## 2024-01-16 NOTE — TELEPHONE ENCOUNTER
Social Work - Transportation  [facility]    Data/Intervention:  Patient Name: Fawad Garcia Goes By: Emerson    /Age: 1935 (88 year old)  Referral From: STEVEN Cespedes [place]   Reason for Referral:  support requested for patient transportation needs.   Assessment:  called Emerson to assess transportation needs. He was able to arrange for family to take him to his upcoming appts. He is also aware of [employer]. SW gave contact information in case needs arise in the future.   Plan:  is available to assist with any other needs if they arise.   BAILEY Murray, [facility]  [facility]  [place] (M,W), [place] (T) & [place] ()  *I am off Friday  Office: [phone]

## 2024-01-17 ENCOUNTER — TRANSFERRED RECORDS (OUTPATIENT)
Dept: MULTI SPECIALTY CLINIC | Facility: CLINIC | Age: 89
End: 2024-01-17

## 2024-01-17 LAB — RETINOPATHY: NORMAL

## 2024-01-29 ENCOUNTER — OFFICE VISIT (OUTPATIENT)
Dept: FAMILY MEDICINE | Facility: CLINIC | Age: 89
End: 2024-01-29
Payer: COMMERCIAL

## 2024-01-29 ENCOUNTER — LAB (OUTPATIENT)
Dept: LAB | Facility: CLINIC | Age: 89
End: 2024-01-29
Payer: COMMERCIAL

## 2024-01-29 VITALS
DIASTOLIC BLOOD PRESSURE: 74 MMHG | OXYGEN SATURATION: 98 % | HEIGHT: 69 IN | WEIGHT: 227 LBS | HEART RATE: 68 BPM | TEMPERATURE: 98 F | RESPIRATION RATE: 14 BRPM | SYSTOLIC BLOOD PRESSURE: 116 MMHG | BODY MASS INDEX: 33.62 KG/M2

## 2024-01-29 DIAGNOSIS — E78.5 HYPERLIPIDEMIA LDL GOAL <70: ICD-10-CM

## 2024-01-29 DIAGNOSIS — Z79.4 TYPE 2 DIABETES MELLITUS WITH BOTH EYES AFFECTED BY MILD NONPROLIFERATIVE RETINOPATHY WITHOUT MACULAR EDEMA, WITH LONG-TERM CURRENT USE OF INSULIN (H): ICD-10-CM

## 2024-01-29 DIAGNOSIS — N18.31 STAGE 3A CHRONIC KIDNEY DISEASE (H): ICD-10-CM

## 2024-01-29 DIAGNOSIS — K21.9 GASTROESOPHAGEAL REFLUX DISEASE WITHOUT ESOPHAGITIS: ICD-10-CM

## 2024-01-29 DIAGNOSIS — I10 HYPERTENSION, GOAL BELOW 140/90: ICD-10-CM

## 2024-01-29 DIAGNOSIS — E11.3293 TYPE 2 DIABETES MELLITUS WITH BOTH EYES AFFECTED BY MILD NONPROLIFERATIVE RETINOPATHY WITHOUT MACULAR EDEMA, WITH LONG-TERM CURRENT USE OF INSULIN (H): ICD-10-CM

## 2024-01-29 DIAGNOSIS — I73.9 PERIPHERAL VASCULAR DISEASE (H): ICD-10-CM

## 2024-01-29 DIAGNOSIS — C61 MALIGNANT NEOPLASM OF PROSTATE (H): ICD-10-CM

## 2024-01-29 DIAGNOSIS — Z89.421 H/O AMPUTATION OF LESSER TOE, RIGHT (H): ICD-10-CM

## 2024-01-29 DIAGNOSIS — C61 MALIGNANT NEOPLASM OF PROSTATE (H): Primary | ICD-10-CM

## 2024-01-29 LAB
ANION GAP SERPL CALCULATED.3IONS-SCNC: 14 MMOL/L (ref 7–15)
BUN SERPL-MCNC: 26.2 MG/DL (ref 8–23)
CALCIUM SERPL-MCNC: 9.3 MG/DL (ref 8.8–10.2)
CHLORIDE SERPL-SCNC: 105 MMOL/L (ref 98–107)
CREAT SERPL-MCNC: 1.1 MG/DL (ref 0.67–1.17)
DEPRECATED HCO3 PLAS-SCNC: 21 MMOL/L (ref 22–29)
EGFRCR SERPLBLD CKD-EPI 2021: 65 ML/MIN/1.73M2
GLUCOSE SERPL-MCNC: 101 MG/DL (ref 70–99)
HBA1C MFR BLD: 8.8 %
HGB BLD-MCNC: 13.3 G/DL (ref 13.3–17.7)
HOLD SPECIMEN: NORMAL
POTASSIUM SERPL-SCNC: 4.4 MMOL/L (ref 3.4–5.3)
PSA SERPL DL<=0.01 NG/ML-MCNC: 4 NG/ML
SODIUM SERPL-SCNC: 140 MMOL/L (ref 135–145)

## 2024-01-29 PROCEDURE — 99207 PR FOOT EXAM NO CHARGE: CPT | Performed by: FAMILY MEDICINE

## 2024-01-29 PROCEDURE — 99214 OFFICE O/P EST MOD 30 MIN: CPT | Performed by: FAMILY MEDICINE

## 2024-01-29 PROCEDURE — 36415 COLL VENOUS BLD VENIPUNCTURE: CPT

## 2024-01-29 PROCEDURE — 83036 HEMOGLOBIN GLYCOSYLATED A1C: CPT

## 2024-01-29 PROCEDURE — 84153 ASSAY OF PSA TOTAL: CPT

## 2024-01-29 PROCEDURE — 85018 HEMOGLOBIN: CPT

## 2024-01-29 PROCEDURE — 80048 BASIC METABOLIC PNL TOTAL CA: CPT

## 2024-01-29 RX ORDER — INSULIN GLARGINE 100 [IU]/ML
20 INJECTION, SOLUTION SUBCUTANEOUS AT BEDTIME
Qty: 30 ML | Refills: 3 | Status: SHIPPED | OUTPATIENT
Start: 2024-01-29 | End: 2024-02-06

## 2024-01-29 RX ORDER — CLOPIDOGREL BISULFATE 75 MG/1
75 TABLET ORAL DAILY
Qty: 90 TABLET | Refills: 3 | Status: SHIPPED | OUTPATIENT
Start: 2024-01-29

## 2024-01-29 RX ORDER — LISINOPRIL 40 MG/1
40 TABLET ORAL DAILY
Qty: 90 TABLET | Refills: 3 | Status: SHIPPED | OUTPATIENT
Start: 2024-01-29

## 2024-01-29 RX ORDER — PANTOPRAZOLE SODIUM 20 MG/1
20 TABLET, DELAYED RELEASE ORAL DAILY
Qty: 90 TABLET | Refills: 3 | Status: SHIPPED | OUTPATIENT
Start: 2024-01-29

## 2024-01-29 RX ORDER — AMLODIPINE BESYLATE 5 MG/1
5 TABLET ORAL DAILY
Qty: 90 TABLET | Refills: 3 | Status: SHIPPED | OUTPATIENT
Start: 2024-01-29

## 2024-01-29 RX ORDER — ROSUVASTATIN CALCIUM 10 MG/1
10 TABLET, COATED ORAL AT BEDTIME
Qty: 90 TABLET | Refills: 3 | Status: SHIPPED | OUTPATIENT
Start: 2024-01-29

## 2024-01-29 RX ORDER — HYDROCHLOROTHIAZIDE 25 MG/1
25 TABLET ORAL DAILY
Qty: 90 TABLET | Refills: 3 | Status: SHIPPED | OUTPATIENT
Start: 2024-01-29

## 2024-01-29 ASSESSMENT — PAIN SCALES - GENERAL: PAINLEVEL: NO PAIN (0)

## 2024-01-29 NOTE — PROGRESS NOTES
Assessment & Plan     Hypertension, goal below 140/90  Controlled.  Low salt, low fat diet.   Exercise as tolerated.  Take meds as prescribed; call if with side effects.    - amLODIPine (NORVASC) 5 MG tablet  Dispense: 90 tablet; Refill: 3  - hydrochlorothiazide (HYDRODIURIL) 25 MG tablet  Dispense: 90 tablet; Refill: 3  - lisinopril (ZESTRIL) 40 MG tablet  Dispense: 90 tablet; Refill: 3    Type 2 diabetes mellitus with both eyes affected by mild nonproliferative retinopathy without macular edema, with long-term current use of insulin (H)  Repeat lab today to assess control. Per patient his BGs at home have improved since last A1c last year.  Reinforced carbohydrate control.  Regular exercise. Regular foot care, annual eye exam.  Flu vaccine every year.   Adjust meds as appropriate.  Consider DM educ and endo consults as appropriate.  - insulin aspart (NOVOLOG PEN) 100 UNIT/ML pen  Dispense: 15 mL; Refill: 3  - insulin glargine (LANTUS SOLOSTAR) 100 UNIT/ML pen  Dispense: 30 mL; Refill: 3  - lisinopril (ZESTRIL) 40 MG tablet  Dispense: 90 tablet; Refill: 3  - metFORMIN (GLUCOPHAGE) 500 MG tablet  Dispense: 360 tablet; Refill: 3  - Basic metabolic panel  - Hemoglobin A1c  - FOOT EXAM    Stage 3a chronic kidney disease (H)  Stable renal function on recent labs.  Maintain hydration. Avoid nephrotoxins.   - lisinopril (ZESTRIL) 40 MG tablet  Dispense: 90 tablet; Refill: 3  - Hemoglobin    Gastroesophageal reflux disease without esophagitis  Stable per patient.  - pantoprazole (PROTONIX) 20 MG EC tablet  Dispense: 90 tablet; Refill: 3  - Hemoglobin    Hyperlipidemia LDL goal <70  Reinforced heart healthy lifestyle.  - rosuvastatin (CRESTOR) 10 MG tablet  Dispense: 90 tablet; Refill: 3    H/O amputation of lesser toe, right (H24)  Stable - stumps with no ulcer nor erythema    Malignant neoplasm of prostate (H)  Sees urology.    Peripheral vascular disease (H24)  Sees [facility] clinic provider. Currently asymptomatic.  -  "clopidogrel (PLAVIX) 75 MG tablet  Dispense: 90 tablet; Refill: 3        BMI  Estimated body mass index is 34.01 kg/m  as calculated from the following:    Height as of this encounter: 1.74 m (5' 8.5\").    Weight as of this encounter: 103 kg (227 lb).   Weight management plan: Discussed healthy diet and exercise guidelines    Patient Instructions   Be consistent with low salt, low trans fat and low saturated fat diet.  Eat food rich in omega-3-fatty acids as you tolerate. (salmon, olive oil)  Eat 5 cups of vegetables, fruits and whole grains per day.  Limit starchy food (white rice, white bread, white pasta, white potatoes) to less than a cup per meal.  Minimize sweets, junk food and fastfood. Limit soda beverages to one serving per day; best to avoid it altogether though.    Exercise: moderate intensity sustained for at least 30 mins per episode, goal of 150 mins per week at least  Combine cardiovascular and resistance exercises.  These exercise recommendations are in addition to your daily activity at work or home.  Work on losing weight.      Regular foot care; don't walk barefoot  Annual eye exam.  Please request your eye doctor to furnish a copy of the eye exam report to the clinic to be placed in your record.  Flu vaccine by the end of October yearly.  Be sure to update any other recommended vaccinations for diabetics.    Blood tests: You will be contacted in 1-2 days for results of your lab tests.     See your specialists as planned.    Get RSV vaccine from the pharmacy  You may get the Shingrix vaccine for shingles if you desire, and after you verify with insurance how they cover the vaccine.        Shelbi Norman is a 88 year old, presenting for the following health issues:  Diabetes (Recheck. Has been going to a diabetic educator. )        1/29/2024     9:49 AM   Additional Questions   Roomed by Kristi BALTAZAR   Accompanied by self         1/29/2024     9:49 AM   Patient Reported Additional Medications "   Patient reports taking the following new medications no new meds     Would like scripts on file.     History of Present Illness       Reason for visit:  Follow up on A1C, krishnat items,    He eats 0-1 servings of fruits and vegetables daily.He consumes 0 sweetened beverage(s) daily.He exercises with enough effort to increase his heart rate 10 to 19 minutes per day.  He exercises with enough effort to increase his heart rate 3 or less days per week.   He is taking medications regularly.     Diabetes Follow-up    How often are you checking your blood sugar? Two times daily  Blood sugar testing frequency justification:  Uncontrolled diabetes and Patient modifying lifestyle changes (diet, exercise) with blood sugars  What time of day are you checking your blood sugars (select all that apply)?  Before and after meals  Have you had any blood sugars above 200?  Yes just 1 time   Have you had any blood sugars below 70?  Yes just 1 in the 60's  What symptoms do you notice when your blood sugar is low?  None  What concerns do you have today about your diabetes? None   Do you have any of these symptoms? (Select all that apply)  Redness, sores, or blisters on feet, Excessive thirst, and Blurry vision          Hyperlipidemia Follow-Up    Are you regularly taking any medication or supplement to lower your cholesterol?   Yes- rosuvastatin   Are you having muscle aches or other side effects that you think could be caused by your cholesterol lowering medication?  No    Hypertension Follow-up    Do you check your blood pressure regularly outside of the clinic? No   Are you following a low salt diet? Yes, no extra salting   Are your blood pressures ever more than 140 on the top number (systolic) OR more   than 90 on the bottom number (diastolic), for example 140/90? No    BP Readings from Last 2 Encounters:   01/29/24 116/74   07/27/23 136/71     Hemoglobin A1C (%)   Date Value   10/20/2023 8.7 (H)   07/19/2023 10.3 (H)   06/21/2021  "8.4 (H)   03/18/2021 8.3 (H)     LDL Cholesterol Calculated (mg/dL)   Date Value   07/19/2023 53   10/19/2022 68   03/18/2021 47   03/02/2020 60         Review of Systems  Constitutional, neuro, ENT, endocrine, pulmonary, cardiac, gastrointestinal, genitourinary, musculoskeletal, integument and psychiatric systems are negative, except as otherwise noted.      Objective    /74   Pulse 68   Temp 98  F (36.7  C) (Tympanic)   Resp 14   Ht 1.74 m (5' 8.5\")   Wt 103 kg (227 lb)   SpO2 98%   BMI 34.01 kg/m    Body mass index is 34.01 kg/m .  Physical Exam   GENERAL: obese, alert and no distress, ambulatory w/o assist  EYES: no icterus; pink conjunctivae.  NECK: no tenderness, no adenopathy,  Thyroid not enlarged  RESP: lungs clear to auscultation - no rales, no rhonchi, no wheezes  CV: regular rates and rhythm, no murmur  MS: no edema  SKIN: no suspicious lesions, no rashes  NEURO: strength and tone- normal, sensory exam- grossly normal, mentation- intact, speech- normal, reflexes- symmetric  ABD:  nontender  FOOT EXAM: no ulcer/erythema; bilateral amputation of great toes; pedal pulses weak bilaterally; monofilament: patient reports no difference in sensation between feet     Results for orders placed or performed in visit on 01/29/24   PSA tumor marker     Status: None   Result Value Ref Range    PSA Tumor Marker 4.00 ng/mL    Narrative    This result is obtained using the Roche Elecsys total PSA method on the gracie e601 immunoassay analyzer. Results obtained with different assay methods or kits cannot be used interchangeably.   Extra Purple Top EDTA (LAB USE ONLY)     Status: None   Result Value Ref Range    Hold Specimen JIC            Signed Electronically by: Jimmy Catalan MD    "

## 2024-01-29 NOTE — PATIENT INSTRUCTIONS
Be consistent with low salt, low trans fat and low saturated fat diet.  Eat food rich in omega-3-fatty acids as you tolerate. (salmon, olive oil)  Eat 5 cups of vegetables, fruits and whole grains per day.  Limit starchy food (white rice, white bread, white pasta, white potatoes) to less than a cup per meal.  Minimize sweets, junk food and fastfood. Limit soda beverages to one serving per day; best to avoid it altogether though.    Exercise: moderate intensity sustained for at least 30 mins per episode, goal of 150 mins per week at least  Combine cardiovascular and resistance exercises.  These exercise recommendations are in addition to your daily activity at work or home.  Work on losing weight.      Regular foot care; don't walk barefoot  Annual eye exam.  Please request your eye doctor to furnish a copy of the eye exam report to the clinic to be placed in your record.  Flu vaccine by the end of October yearly.  Be sure to update any other recommended vaccinations for diabetics.    Blood tests: You will be contacted in 1-2 days for results of your lab tests.     See your specialists as planned.    Get RSV vaccine from the pharmacy  You may get the Shingrix vaccine for shingles if you desire, and after you verify with insurance how they cover the vaccine.

## 2024-01-31 ENCOUNTER — VIRTUAL VISIT (OUTPATIENT)
Dept: ONCOLOGY | Facility: CLINIC | Age: 89
End: 2024-01-31
Attending: NURSE PRACTITIONER
Payer: COMMERCIAL

## 2024-01-31 DIAGNOSIS — C61 MALIGNANT NEOPLASM OF PROSTATE (H): Primary | ICD-10-CM

## 2024-01-31 PROCEDURE — 99215 OFFICE O/P EST HI 40 MIN: CPT | Mod: 95 | Performed by: NURSE PRACTITIONER

## 2024-01-31 NOTE — LETTER
1/31/2024         RE: Fawad Garcia  [address]  [facility] [address]        Dear Colleague,    Thank you for referring your patient, Fawad Garcia, to the [facility] CANCER [facility] ([place], [place]). Please see a copy of my visit note below.    Virtual Visit Details    Type of service:  Video Visit   Video Start Time: 10:50 AM  Video End Time:11:20 AM    Originating Location (pt. Location): Home    Distant Location (provider location):  On-site  Platform used for Video Visit: [facility] Hematology and Oncology Outpatient Progress Note    Patient: Fawad Garcia  MRN: [id number]  Date of Service: Jan 31, 2024          Reason for Visit    Prostate cancer    Primary Oncologist: Dr. Barriga      Assessment/Plan  Locally recurrent prostate cancer  Initially treated in 2012.   Rising PSA and PSMA PET showed local prostate gland uptake (no mets) in 2021 -2022. Reirradiation not feasible, and he is not a candidate for surgery.  ADT was recommended, but he declined and chose observation only.  He's been observed for 2 years.     His PSA doubling time has been > 1 year.      Current PSA is 4.0 (3.68 six months ago, 2.55 a year ago). Continues to slowly rise, but to a lesser degree over last 6 mths. Doubling time remains well over 2 yr currently.     No new symptoms suggesting mets.    Dr. Barriga has previously discussed consideration of restaging scans to assess for metastatic disease and starting Lupron vs observation, until more progression to start. Dr. Barriga did suggest watching another 6 mths and if PSA continued to rise (even if not doubling), would consider starting Lupron.      Patient asked several pertinent questions which I answered to the best of by ability.   We discussed reliable response rates on Lupron (~90%) for several months - a few years. Discussed side effects of Lupron. Discussed future strategies when Lupron is not effective alone.     Patient is inclined  to continue monitoring for now, but is interested in reassessing extent of cancer progression in near future as this may sway his decision to start treatment.    Plan:  -Return in 6 mths with PSA and PSMA PET scan. See Dr. Barriga (in-person) following this.  -Consider starting Lupron with continued rise in PSA or new mets/progression on scan  -Patient will call with any new symptoms sooner, which would be an indication to restage sooner    FREDDIE lung nodule  Chronic/stable small nodule. This was not previously avid on PSMA PET. Last CT 7/2023 showed stable.     Plan:  -Assess on PSMA PET in 6 mths    ______________________________________________________________________________    History of Present Illness/ Interval History    Mr. Fawad Garcia  is a 88 year old with locally recurrent prostate cancer, with rising PSA and no evidence of metastatic disease on the PSMA PET scan, on observation alone. Returns for 6-mth follow-up.     Doing relatively well, denies new concerns.  Stable fair appetite and stable weight. No new  symptoms; chronic urinary hesitancy stable. Did not tolerate Flomax so is not taking that. Chronic low back discomfort, no changes and no new bone pain reported.        ECOG Performance    1      Oncology History/Treatment  Diagnosis/Stage:   2011: early stage prostate cancer  -elevated PSA >10 since 2003. Biopsies 2002 and 2005 negative for cancer  -2011: MRI guided prostate biopsy: adenocarcinoma with a Chaka score of 4+3 with 90% of the 2 of the 2 cores positive for malignancy    2021: biochemical recurrence  -rising PSA: 9/2021 1.88; 3/2022  2.49  -PSMA PET: uptake in prostate gland. No other evidence of mets    Treatment:  2011: Not a surgical candidate    2012: IMRT prostate  -declined ADT    2022: biochemical recurrence/local prostate recurrence  -re-irradiation not feasible  -pt declined ADT, opted on observation      Physical Exam    GENERAL: Alert and oriented to time place and  person. Seated comfortably. In no distress.   HEENT: No icterus. No alopecia  LUNGS: Regular respiratory effort.  NEURO: Non-focal      Lab Results    Recent Results (from the past 168 hour(s))   PSA tumor marker   Result Value Ref Range    PSA Tumor Marker 4.00 ng/mL   Extra Purple Top EDTA (LAB USE ONLY)   Result Value Ref Range    Hold Specimen [facility]    Basic metabolic panel   Result Value Ref Range    Sodium 140 135 - 145 mmol/L    Potassium 4.4 3.4 - 5.3 mmol/L    Chloride 105 98 - 107 mmol/L    Carbon Dioxide (CO2) 21 (L) 22 - 29 mmol/L    Anion Gap 14 7 - 15 mmol/L    Urea Nitrogen 26.2 (H) 8.0 - 23.0 mg/dL    Creatinine 1.10 0.67 - 1.17 mg/dL    GFR Estimate 65 >60 mL/min/1.73m2    Calcium 9.3 8.8 - 10.2 mg/dL    Glucose 101 (H) 70 - 99 mg/dL   Hemoglobin   Result Value Ref Range    Hemoglobin 13.3 13.3 - 17.7 g/dL   Hemoglobin A1c   Result Value Ref Range    Hemoglobin A1C 8.8 (H) <5.7 %         Imaging    No results found.      Billing  Total time 40 minutes, to include face to face visit, review of EMR, ordering, documentation and coordination of care on date of service    Molly Kaye NP      Again, thank you for allowing me to participate in the care of your patient.        Sincerely,        Molly Kaye NP

## 2024-01-31 NOTE — NURSING NOTE
Is the patient currently in the state of [place]? YES    Visit mode:VIDEO    If the visit is dropped, the patient can be reconnected by: VIDEO VISIT: Send to e-mail at: [email or web address]    Will anyone else be joining the visit? NO  (If patient encounters technical issues they should call [phone]    How would you like to obtain your AVS? MyChart    Are changes needed to the allergy or medication list? No    Reason for visit: AMISHA AGGARWAL

## 2024-01-31 NOTE — PROGRESS NOTES
Virtual Visit Details    Type of service:  Video Visit   Video Start Time: 10:50 AM  Video End Time:11:20 AM    Originating Location (pt. Location): Home    Distant Location (provider location):  On-site  Platform used for Video Visit: [facility] Hematology and Oncology Outpatient Progress Note    Patient: Fawad Garcia  MRN: [id number]  Date of Service: Jan 31, 2024          Reason for Visit    Prostate cancer    Primary Oncologist: Dr. Barriga      Assessment/Plan  Locally recurrent prostate cancer  Initially treated in 2012.   Rising PSA and PSMA PET showed local prostate gland uptake (no mets) in 2021 -2022. Reirradiation not feasible, and he is not a candidate for surgery.  ADT was recommended, but he declined and chose observation only.  He's been observed for 2 years.     His PSA doubling time has been > 1 year.      Current PSA is 4.0 (3.68 six months ago, 2.55 a year ago). Continues to slowly rise, but to a lesser degree over last 6 mths. Doubling time remains well over 2 yr currently.     No new symptoms suggesting mets.    Dr. Barriga has previously discussed consideration of restaging scans to assess for metastatic disease and starting Lupron vs observation, until more progression to start. Dr. Barriga did suggest watching another 6 mths and if PSA continued to rise (even if not doubling), would consider starting Lupron.      Patient asked several pertinent questions which I answered to the best of by ability.   We discussed reliable response rates on Lupron (~90%) for several months - a few years. Discussed side effects of Lupron. Discussed future strategies when Lupron is not effective alone.     Patient is inclined to continue monitoring for now, but is interested in reassessing extent of cancer progression in near future as this may sway his decision to start treatment.    Plan:  -Return in 6 mths with PSA and PSMA PET scan. See Dr. Barriga (in-person) following this.  -Consider  starting Lupron with continued rise in PSA or new mets/progression on scan  -Patient will call with any new symptoms sooner, which would be an indication to restage sooner    FREDDIE lung nodule  Chronic/stable small nodule. This was not previously avid on PSMA PET. Last CT 7/2023 showed stable.     Plan:  -Assess on PSMA PET in 6 mths    ______________________________________________________________________________    History of Present Illness/ Interval History    Mr. Fawad Garcia  is a 88 year old with locally recurrent prostate cancer, with rising PSA and no evidence of metastatic disease on the PSMA PET scan, on observation alone. Returns for 6-mth follow-up.     Doing relatively well, denies new concerns.  Stable fair appetite and stable weight. No new  symptoms; chronic urinary hesitancy stable. Did not tolerate Flomax so is not taking that. Chronic low back discomfort, no changes and no new bone pain reported.        ECOG Performance    1      Oncology History/Treatment  Diagnosis/Stage:   2011: early stage prostate cancer  -elevated PSA >10 since 2003. Biopsies 2002 and 2005 negative for cancer  -2011: MRI guided prostate biopsy: adenocarcinoma with a Chaka score of 4+3 with 90% of the 2 of the 2 cores positive for malignancy    2021: biochemical recurrence  -rising PSA: 9/2021 1.88; 3/2022  2.49  -PSMA PET: uptake in prostate gland. No other evidence of mets    Treatment:  2011: Not a surgical candidate    2012: IMRT prostate  -declined ADT    2022: biochemical recurrence/local prostate recurrence  -re-irradiation not feasible  -pt declined ADT, opted on observation      Physical Exam    GENERAL: Alert and oriented to time place and person. Seated comfortably. In no distress.   HEENT: No icterus. No alopecia  LUNGS: Regular respiratory effort.  NEURO: Non-focal      Lab Results    Recent Results (from the past 168 hour(s))   PSA tumor marker   Result Value Ref Range    PSA Tumor Marker 4.00 ng/mL    Extra Purple Top EDTA (LAB USE ONLY)   Result Value Ref Range    Hold Specimen [facility]    Basic metabolic panel   Result Value Ref Range    Sodium 140 135 - 145 mmol/L    Potassium 4.4 3.4 - 5.3 mmol/L    Chloride 105 98 - 107 mmol/L    Carbon Dioxide (CO2) 21 (L) 22 - 29 mmol/L    Anion Gap 14 7 - 15 mmol/L    Urea Nitrogen 26.2 (H) 8.0 - 23.0 mg/dL    Creatinine 1.10 0.67 - 1.17 mg/dL    GFR Estimate 65 >60 mL/min/1.73m2    Calcium 9.3 8.8 - 10.2 mg/dL    Glucose 101 (H) 70 - 99 mg/dL   Hemoglobin   Result Value Ref Range    Hemoglobin 13.3 13.3 - 17.7 g/dL   Hemoglobin A1c   Result Value Ref Range    Hemoglobin A1C 8.8 (H) <5.7 %         Imaging    No results found.      Billing  Total time 40 minutes, to include face to face visit, review of EMR, ordering, documentation and coordination of care on date of service    Molly Kaye NP

## 2024-01-31 NOTE — LETTER
1/31/2024         RE: Fawad Garcia  [address]  [facility] [address]        Dear Colleague,    Thank you for referring your patient, Fawad Garcia, to the [facility] CANCER [facility]. Please see a copy of my visit note below.    Virtual Visit Details    Type of service:  Video Visit   Video Start Time: 10:50 AM  Video End Time:11:20 AM    Originating Location (pt. Location): Home    Distant Location (provider location):  On-site  Platform used for Video Visit: [facility] Hematology and Oncology Outpatient Progress Note    Patient: Fawad Garcia  MRN: [id number]  Date of Service: Jan 31, 2024          Reason for Visit    Prostate cancer    Primary Oncologist: Dr. Barriga      Assessment/Plan  Locally recurrent prostate cancer  Initially treated in 2012.   Rising PSA and PSMA PET showed local prostate gland uptake (no mets) in 2021 -2022. Reirradiation not feasible, and he is not a candidate for surgery.  ADT was recommended, but he declined and chose observation only.  He's been observed for 2 years.     His PSA doubling time has been > 1 year.      Current PSA is 4.0 (3.68 six months ago, 2.55 a year ago). Continues to slowly rise, but to a lesser degree over last 6 mths. Doubling time remains well over 2 yr currently.     No new symptoms suggesting mets.    Dr. Barriga has previously discussed consideration of restaging scans to assess for metastatic disease and starting Lupron vs observation, until more progression to start. Dr. Barriga did suggest watching another 6 mths and if PSA continued to rise (even if not doubling), would consider starting Lupron.      Patient asked several pertinent questions which I answered to the best of by ability.   We discussed reliable response rates on Lupron (~90%) for several months - a few years. Discussed side effects of Lupron. Discussed future strategies when Lupron is not effective alone.     Patient is inclined  to continue monitoring for now, but is interested in reassessing extent of cancer progression in near future as this may sway his decision to start treatment.    Plan:  -Return in 6 mths with PSA and PSMA PET scan. See Dr. Barriga (in-person) following this.  -Consider starting Lupron with continued rise in PSA or new mets/progression on scan  -Patient will call with any new symptoms sooner, which would be an indication to restage sooner    FREDDIE lung nodule  Chronic/stable small nodule. This was not previously avid on PSMA PET. Last CT 7/2023 showed stable.     Plan:  -Assess on PSMA PET in 6 mths    ______________________________________________________________________________    History of Present Illness/ Interval History    Mr. Fawad Garcia  is a 88 year old with locally recurrent prostate cancer, with rising PSA and no evidence of metastatic disease on the PSMA PET scan, on observation alone. Returns for 6-mth follow-up.     Doing relatively well, denies new concerns.  Stable fair appetite and stable weight. No new  symptoms; chronic urinary hesitancy stable. Did not tolerate Flomax so is not taking that. Chronic low back discomfort, no changes and no new bone pain reported.        ECOG Performance    1      Oncology History/Treatment  Diagnosis/Stage:   2011: early stage prostate cancer  -elevated PSA >10 since 2003. Biopsies 2002 and 2005 negative for cancer  -2011: MRI guided prostate biopsy: adenocarcinoma with a Chaka score of 4+3 with 90% of the 2 of the 2 cores positive for malignancy    2021: biochemical recurrence  -rising PSA: 9/2021 1.88; 3/2022  2.49  -PSMA PET: uptake in prostate gland. No other evidence of mets    Treatment:  2011: Not a surgical candidate    2012: IMRT prostate  -declined ADT    2022: biochemical recurrence/local prostate recurrence  -re-irradiation not feasible  -pt declined ADT, opted on observation      Physical Exam    GENERAL: Alert and oriented to time place and  person. Seated comfortably. In no distress.   HEENT: No icterus. No alopecia  LUNGS: Regular respiratory effort.  NEURO: Non-focal      Lab Results    Recent Results (from the past 168 hour(s))   PSA tumor marker   Result Value Ref Range    PSA Tumor Marker 4.00 ng/mL   Extra Purple Top EDTA (LAB USE ONLY)   Result Value Ref Range    Hold Specimen [facility]    Basic metabolic panel   Result Value Ref Range    Sodium 140 135 - 145 mmol/L    Potassium 4.4 3.4 - 5.3 mmol/L    Chloride 105 98 - 107 mmol/L    Carbon Dioxide (CO2) 21 (L) 22 - 29 mmol/L    Anion Gap 14 7 - 15 mmol/L    Urea Nitrogen 26.2 (H) 8.0 - 23.0 mg/dL    Creatinine 1.10 0.67 - 1.17 mg/dL    GFR Estimate 65 >60 mL/min/1.73m2    Calcium 9.3 8.8 - 10.2 mg/dL    Glucose 101 (H) 70 - 99 mg/dL   Hemoglobin   Result Value Ref Range    Hemoglobin 13.3 13.3 - 17.7 g/dL   Hemoglobin A1c   Result Value Ref Range    Hemoglobin A1C 8.8 (H) <5.7 %         Imaging    No results found.      Billing  Total time 40 minutes, to include face to face visit, review of EMR, ordering, documentation and coordination of care on date of service    Molly Kaye NP      Again, thank you for allowing me to participate in the care of your patient.        Sincerely,        Molly Kaye NP

## 2024-02-06 ENCOUNTER — VIRTUAL VISIT (OUTPATIENT)
Dept: EDUCATION SERVICES | Facility: CLINIC | Age: 89
End: 2024-02-06
Payer: COMMERCIAL

## 2024-02-06 DIAGNOSIS — I73.9 PERIPHERAL VASCULAR DISEASE (H): ICD-10-CM

## 2024-02-06 DIAGNOSIS — Z79.4 TYPE 2 DIABETES MELLITUS WITH BOTH EYES AFFECTED BY MILD NONPROLIFERATIVE RETINOPATHY WITHOUT MACULAR EDEMA, WITH LONG-TERM CURRENT USE OF INSULIN (H): ICD-10-CM

## 2024-02-06 DIAGNOSIS — Z89.421 H/O AMPUTATION OF LESSER TOE, RIGHT (H): ICD-10-CM

## 2024-02-06 DIAGNOSIS — E78.5 HYPERLIPIDEMIA LDL GOAL <70: ICD-10-CM

## 2024-02-06 DIAGNOSIS — E11.3293 TYPE 2 DIABETES MELLITUS WITH BOTH EYES AFFECTED BY MILD NONPROLIFERATIVE RETINOPATHY WITHOUT MACULAR EDEMA, WITH LONG-TERM CURRENT USE OF INSULIN (H): ICD-10-CM

## 2024-02-06 DIAGNOSIS — I10 HYPERTENSION, GOAL BELOW 140/90: ICD-10-CM

## 2024-02-06 DIAGNOSIS — N18.31 STAGE 3A CHRONIC KIDNEY DISEASE (H): ICD-10-CM

## 2024-02-06 PROCEDURE — G0108 DIAB MANAGE TRN  PER INDIV: HCPCS | Mod: 93 | Performed by: DIETITIAN, REGISTERED

## 2024-02-06 RX ORDER — INSULIN GLARGINE 100 [IU]/ML
21 INJECTION, SOLUTION SUBCUTANEOUS AT BEDTIME
Qty: 30 ML | Refills: 3 | Status: SHIPPED | OUTPATIENT
Start: 2024-02-06 | End: 2024-04-22

## 2024-02-06 NOTE — LETTER
2/6/2024         RE: Fawad Garcia  [address]  [facility] [address]        Dear Colleague,    Thank you for referring your patient, Fawad Garcia, to the [facility]. Please see a copy of my visit note below.    Diabetes Self-Management Education & Support    Presents for: Follow-up    Type of Service: Telephone Visit    Originating Location (Patient Location): Home  Distant Location (Provider Location): [facility]  Mode of Communication:  Telephone    Telephone Visit Start Time:  12:00  Telephone Visit End Time (telephone visit stop time): 12:31pm    How would patient like to obtain AVS? MyChart      ASSESSMENT:  -type 2 diabetes for ~31 years  -recent a1c of 8.8 (previous 8.7, 10.3)  -currently being managed with Metformin, Lantus, Novolog  -on aspirin and statin therapy  -diabetes complications:  PVD, retinopathy, TIA, CKD  -last seen by diabetes education 12/19/23  -home BG monitoring reveals:  52% in target, one low episode (BG was at 78 by the time he tested)    Primary concern: Was having lows at in the increased dose of Lantus (24 units increased from 20 units), so went back to lower dose (20 units).      Discussed:  BG readings/patterns, adjusted insulin, meal plan, hypoglycemia symptoms and treatment.  Patient expressed understanding.      Patient's most recent   Lab Results   Component Value Date    A1C 8.8 01/29/2024    A1C 8.4 06/21/2021     is not meeting goal of <8.0    Diabetes knowledge and skills assessment:   Patient is knowledgeable in diabetes management concepts related to: Monitoring and Taking Medication    Continue education with the following diabetes management concepts: Healthy Eating, Being Active, Monitoring, Taking Medication, Problem Solving, Reducing Risks, and Healthy Coping    Based on learning assessment above, most appropriate setting for further diabetes education would be: Individual setting.   "    PLAN    1.  Adjust Lantus to 21 units.  2.  Adjust Novolog from 2-3-5-0 to Novolog 3-4-6-0 units.        3.  Continue Metformin.    4.  Continue to test your BG 2 times/day.  5.  Schedule a follow up with Jimmy Catalan as directed.  6.  Follow up with Diabetes Education 2/21/24 at 3pm (phone visit).    See Care Plan for co-developed, patient-state behavior change goals.  AVS provided for patient today.    Education Materials Provided:  No new materials provided today      SUBJECTIVE/OBJECTIVE:  Presents for: Follow-up  Accompanied by: Self  Diabetes education in the past 24mo: Yes  Focus of Visit: Taking Medication  Diabetes type: Type 2  Date of diagnosis: 1993, and started insulin in 2011  Disease course: Stable  How confident are you filling out medical forms by yourself:: Quite a bit  Transportation concerns: Yes  Difficulty affording diabetes medication?: No  Difficulty affording diabetes testing supplies?: No  Other concerns:: Visual impairment  Cultural Influences/Ethnic Background:  Not  or     Diabetes Symptoms & Complications:  Disease course: Stable  Autonomic neuropathy: No  CVA: No  Heart disease: No  Nephropathy: No  Peripheral neuropathy: Yes  Peripheral Vascular Disease: Yes  Retinopathy: Yes  Sexual dysfunction: Yes    Patient Problem List and Family Medical History reviewed for relevant medical history, current medical status, and diabetes risk factors.    Vitals:  There were no vitals taken for this visit.  Estimated body mass index is 34.01 kg/m  as calculated from the following:    Height as of 1/29/24: 1.74 m (5' 8.5\").    Weight as of 1/29/24: 103 kg (227 lb).   Last 3 BP:   BP Readings from Last 3 Encounters:   01/29/24 116/74   07/27/23 136/71   07/24/23 115/71       History   Smoking Status     Never   Smokeless Tobacco     Never       Labs:  Lab Results   Component Value Date    A1C 8.8 01/29/2024    A1C 8.4 06/21/2021     Lab Results   Component Value Date    " " 01/29/2024     10/20/2022     09/20/2021     01/04/2021     Lab Results   Component Value Date    LDL 53 07/19/2023    LDL 47 03/18/2021     HDL Cholesterol   Date Value Ref Range Status   03/18/2021 61 >39 mg/dL Final     Direct Measure HDL   Date Value Ref Range Status   07/19/2023 52 >=40 mg/dL Final   ]  GFR Estimate   Date Value Ref Range Status   01/29/2024 65 >60 mL/min/1.73m2 Final   01/04/2021 63 >60 mL/min/[1.73_m2] Final     Comment:     Non  GFR Calc  Starting 12/18/2018, serum creatinine based estimated GFR (eGFR) will be   calculated using the Chronic Kidney Disease Epidemiology Collaboration   (CKD-EPI) equation.       GFR, ESTIMATED POCT   Date Value Ref Range Status   04/08/2022 >60 >60 mL/min/1.73m2 Final     GFR Estimate If Black   Date Value Ref Range Status   01/04/2021 73 >60 mL/min/[1.73_m2] Final     Comment:      GFR Calc  Starting 12/18/2018, serum creatinine based estimated GFR (eGFR) will be   calculated using the Chronic Kidney Disease Epidemiology Collaboration   (CKD-EPI) equation.       Lab Results   Component Value Date    CR 1.10 01/29/2024    CR 1.07 01/04/2021     No results found for: \"MICROALBUMIN\"    Healthy Eating:  Healthy Eating Assessed Today: Yes (wife does all the cooking)  Cultural/Hoahaoism diet restrictions?: No  Meal planning/habits: Other, Avoiding sweets  How many times a week on average do you eat food made away from home (restaurant/take-out)?: 1  Meals include: Breakfast, Lunch, Dinner  Breakfast: 10am: scone, coffee-w/ creamer  Lunch: 1/2 sl bread, ham salad, salad, apple cake, milk  Dinner: 7 pm:  chicken strips, salad, green beans, 1/2 slice bread, milk, apple cake  Snacks: HS: 2pcs licorice, pretzels  Beverages: Coffee, Milk, Diet soda    Being Active:  Being Active Assessed Today: Yes  Exercise:: Currently not exercising  Barrier to exercise: Physical limitation    Monitoring:  Monitoring Assessed " Today: Yes  Did patient bring glucose meter to appointment? : Yes  Blood Glucose Meter: One Touch Ultra 2  Times checking blood sugar at home (number): 2  Times checking blood sugar at home (per): Day  Blood glucose trend: Fluctuating    Date Breakfast  Lunch  Dinner  Bedtime     Before After Before After Before After     2/6 94          2/5 117      191 Lantus 20   2/4 88      274 Lantus 20   2/3 88      122 Lantus 24   2/2 111      219    2/1 78*      191    1/31 81      114    1/30 105      211    1/24 141      271    1/23 108      329    1/21 138      442    *woke up feeling funny, had cereal and/or glass of OJ  52% in target, one episode of low    Taking Medications:  Diabetes Medication(s)       Biguanides       metFORMIN (GLUCOPHAGE) 500 MG tablet Take 2 tablets (1,000 mg) by mouth 2 times daily (with meals)       Insulin       insulin aspart (NOVOLOG PEN) 100 UNIT/ML pen Inject 2 units before breakfast, 3 units before lunch and 5 units before supper.     insulin glargine (LANTUS SOLOSTAR) 100 UNIT/ML pen Inject 20 Units Subcutaneous at bedtime Hold on file until needed.            Taking Medication Assessed Today: Yes  Current Treatments: Insulin Injections, Oral Medication (taken by mouth)  Dose schedule: At bedtime, Pre-dinner, Pre-breakfast, Pre-lunch (AM dose sometimes injected before leaves for eating breakfast out)  Given by: Patient  Injection/Infusion sites: Abdomen  Problems taking diabetes medications regularly?: Yes  Diabetes medication side effects?: Yes (as long as takes Metformin with food is tolerable, if he doesn't will have diarrhea)    Problem Solving:                 Reducing Risks:  Reducing Risks Assessed Today: Yes  Diabetes Risks: Age over 45 years, Family History  CAD Risks: Diabetes Mellitus, Family history  Has dilated eye exam at least once a year?: Yes  Sees dentist every 6 months?: Yes  Feet checked by healthcare provider in the last year?: Yes    Healthy Coping:  Healthy Coping  Assessed Today: Yes  Emotional response to diabetes: Acceptance  Informal Support system:: Children, Spouse  Stage of change: ACTION (Actively working towards change)  Patient Activation Measure Survey Score:      4/20/2011     9:00 AM 7/26/2011     5:00 PM   DOMONIQUE Score (Last Two)   DOMONIQUE Raw Score 44 35   Activation Score 70.8 45.2   DOMONIQUE Level 4 1         Care Plan and Education Provided:  Care Plan: Diabetes   Updates made by Prudence York since 2/6/2024 12:00 AM        Problem: Diabetes Self-Management Education Needed to Optimize Self-Care Behaviors         Goal: Monitoring - monitor glucose and ketones as directed    This Visit's Progress: 90%   Recent Progress: 70%        Goal: Taking Medication - patient is consistently taking medications as directed    This Visit's Progress: 90%   Recent Progress: 90%        Goal: Problem Solving - know how to prevent and manage short-term diabetes complications    This Visit's Progress: 90%   Recent Progress: 70%        Goal: Reducing Risks - know how to prevent and treat long-term diabetes complications    This Visit's Progress: 70%   Recent Progress: 70%          Prudence York RD, [facility], 1:10 PM, 2/6/2024    Time Spent: 30 minutes  Encounter Type: Individual    Any diabetes medication dose changes were made via the CDE Protocol per the patient's primary care provider. A copy of this encounter was shared with the provider.

## 2024-02-06 NOTE — PROGRESS NOTES
Diabetes Self-Management Education & Support    Presents for: Follow-up    Type of Service: Telephone Visit    Originating Location (Patient Location): Home  Distant Location (Provider Location): [facility]  Mode of Communication:  Telephone    Telephone Visit Start Time:  12:00  Telephone Visit End Time (telephone visit stop time): 12:31pm    How would patient like to obtain AVS? Kaylen      ASSESSMENT:  -type 2 diabetes for ~31 years  -recent a1c of 8.8 (previous 8.7, 10.3)  -currently being managed with Metformin, Lantus, Novolog  -on aspirin and statin therapy  -diabetes complications:  PVD, retinopathy, TIA, CKD  -last seen by diabetes education 12/19/23  -home BG monitoring reveals:  52% in target, one low episode (BG was at 78 by the time he tested)    Primary concern: Was having lows at in the increased dose of Lantus (24 units increased from 20 units), so went back to lower dose (20 units).      Discussed:  BG readings/patterns, adjusted insulin, meal plan, hypoglycemia symptoms and treatment.  Patient expressed understanding.      Patient's most recent   Lab Results   Component Value Date    A1C 8.8 01/29/2024    A1C 8.4 06/21/2021     is not meeting goal of <8.0    Diabetes knowledge and skills assessment:   Patient is knowledgeable in diabetes management concepts related to: Monitoring and Taking Medication    Continue education with the following diabetes management concepts: Healthy Eating, Being Active, Monitoring, Taking Medication, Problem Solving, Reducing Risks, and Healthy Coping    Based on learning assessment above, most appropriate setting for further diabetes education would be: Individual setting.      PLAN    1.  Adjust Lantus to 21 units.  2.  Adjust Novolog from 2-3-5-0 to Novolog 3-4-6-0 units.        3.  Continue Metformin.    4.  Continue to test your BG 2 times/day.  5.  Schedule a follow up with Jimmy Catalan as directed.  6.  Follow up with  "Diabetes Education 2/21/24 at 3pm (phone visit).    See Care Plan for co-developed, patient-state behavior change goals.  AVS provided for patient today.    Education Materials Provided:  No new materials provided today      SUBJECTIVE/OBJECTIVE:  Presents for: Follow-up  Accompanied by: Self  Diabetes education in the past 24mo: Yes  Focus of Visit: Taking Medication  Diabetes type: Type 2  Date of diagnosis: 1993, and started insulin in 2011  Disease course: Stable  How confident are you filling out medical forms by yourself:: Quite a bit  Transportation concerns: Yes  Difficulty affording diabetes medication?: No  Difficulty affording diabetes testing supplies?: No  Other concerns:: Visual impairment  Cultural Influences/Ethnic Background:  Not  or     Diabetes Symptoms & Complications:  Disease course: Stable  Autonomic neuropathy: No  CVA: No  Heart disease: No  Nephropathy: No  Peripheral neuropathy: Yes  Peripheral Vascular Disease: Yes  Retinopathy: Yes  Sexual dysfunction: Yes    Patient Problem List and Family Medical History reviewed for relevant medical history, current medical status, and diabetes risk factors.    Vitals:  There were no vitals taken for this visit.  Estimated body mass index is 34.01 kg/m  as calculated from the following:    Height as of 1/29/24: 1.74 m (5' 8.5\").    Weight as of 1/29/24: 103 kg (227 lb).   Last 3 BP:   BP Readings from Last 3 Encounters:   01/29/24 116/74   07/27/23 136/71   07/24/23 115/71       History   Smoking Status    Never   Smokeless Tobacco    Never       Labs:  Lab Results   Component Value Date    A1C 8.8 01/29/2024    A1C 8.4 06/21/2021     Lab Results   Component Value Date     01/29/2024     10/20/2022     09/20/2021     01/04/2021     Lab Results   Component Value Date    LDL 53 07/19/2023    LDL 47 03/18/2021     HDL Cholesterol   Date Value Ref Range Status   03/18/2021 61 >39 mg/dL Final     Direct Measure " "HDL   Date Value Ref Range Status   07/19/2023 52 >=40 mg/dL Final   ]  GFR Estimate   Date Value Ref Range Status   01/29/2024 65 >60 mL/min/1.73m2 Final   01/04/2021 63 >60 mL/min/[1.73_m2] Final     Comment:     Non  GFR Calc  Starting 12/18/2018, serum creatinine based estimated GFR (eGFR) will be   calculated using the Chronic Kidney Disease Epidemiology Collaboration   (CKD-EPI) equation.       GFR, ESTIMATED POCT   Date Value Ref Range Status   04/08/2022 >60 >60 mL/min/1.73m2 Final     GFR Estimate If Black   Date Value Ref Range Status   01/04/2021 73 >60 mL/min/[1.73_m2] Final     Comment:      GFR Calc  Starting 12/18/2018, serum creatinine based estimated GFR (eGFR) will be   calculated using the Chronic Kidney Disease Epidemiology Collaboration   (CKD-EPI) equation.       Lab Results   Component Value Date    CR 1.10 01/29/2024    CR 1.07 01/04/2021     No results found for: \"MICROALBUMIN\"    Healthy Eating:  Healthy Eating Assessed Today: Yes (wife does all the cooking)  Cultural/Christian diet restrictions?: No  Meal planning/habits: Other, Avoiding sweets  How many times a week on average do you eat food made away from home (restaurant/take-out)?: 1  Meals include: Breakfast, Lunch, Dinner  Breakfast: 10am: scone, coffee-w/ creamer  Lunch: 1/2 sl bread, ham salad, salad, apple cake, milk  Dinner: 7 pm:  chicken strips, salad, green beans, 1/2 slice bread, milk, apple cake  Snacks: HS: 2pcs licorice, pretzels  Beverages: Coffee, Milk, Diet soda    Being Active:  Being Active Assessed Today: Yes  Exercise:: Currently not exercising  Barrier to exercise: Physical limitation    Monitoring:  Monitoring Assessed Today: Yes  Did patient bring glucose meter to appointment? : Yes  Blood Glucose Meter: One Touch Ultra 2  Times checking blood sugar at home (number): 2  Times checking blood sugar at home (per): Day  Blood glucose trend: Fluctuating    Date Breakfast  Lunch  Dinner "  Bedtime     Before After Before After Before After     2/6 94          2/5 117      191 Lantus 20   2/4 88      274 Lantus 20   2/3 88      122 Lantus 24   2/2 111      219    2/1 78*      191    1/31 81      114    1/30 105      211    1/24 141      271    1/23 108      329    1/21 138      442    *woke up feeling funny, had cereal and/or glass of OJ  52% in target, one episode of low    Taking Medications:  Diabetes Medication(s)       Biguanides       metFORMIN (GLUCOPHAGE) 500 MG tablet Take 2 tablets (1,000 mg) by mouth 2 times daily (with meals)       Insulin       insulin aspart (NOVOLOG PEN) 100 UNIT/ML pen Inject 2 units before breakfast, 3 units before lunch and 5 units before supper.     insulin glargine (LANTUS SOLOSTAR) 100 UNIT/ML pen Inject 20 Units Subcutaneous at bedtime Hold on file until needed.            Taking Medication Assessed Today: Yes  Current Treatments: Insulin Injections, Oral Medication (taken by mouth)  Dose schedule: At bedtime, Pre-dinner, Pre-breakfast, Pre-lunch (AM dose sometimes injected before leaves for eating breakfast out)  Given by: Patient  Injection/Infusion sites: Abdomen  Problems taking diabetes medications regularly?: Yes  Diabetes medication side effects?: Yes (as long as takes Metformin with food is tolerable, if he doesn't will have diarrhea)    Problem Solving:                 Reducing Risks:  Reducing Risks Assessed Today: Yes  Diabetes Risks: Age over 45 years, Family History  CAD Risks: Diabetes Mellitus, Family history  Has dilated eye exam at least once a year?: Yes  Sees dentist every 6 months?: Yes  Feet checked by healthcare provider in the last year?: Yes    Healthy Coping:  Healthy Coping Assessed Today: Yes  Emotional response to diabetes: Acceptance  Informal Support system:: Children, Spouse  Stage of change: ACTION (Actively working towards change)  Patient Activation Measure Survey Score:      4/20/2011     9:00 AM 7/26/2011     5:00 PM   DOMONIQUE  Score (Last Two)   DOMONIQUE Raw Score 44 35   Activation Score 70.8 45.2   DOMONIQUE Level 4 1         Care Plan and Education Provided:  Care Plan: Diabetes   Updates made by Prudence York since 2/6/2024 12:00 AM        Problem: Diabetes Self-Management Education Needed to Optimize Self-Care Behaviors         Goal: Monitoring - monitor glucose and ketones as directed    This Visit's Progress: 90%   Recent Progress: 70%        Goal: Taking Medication - patient is consistently taking medications as directed    This Visit's Progress: 90%   Recent Progress: 90%        Goal: Problem Solving - know how to prevent and manage short-term diabetes complications    This Visit's Progress: 90%   Recent Progress: 70%        Goal: Reducing Risks - know how to prevent and treat long-term diabetes complications    This Visit's Progress: 70%   Recent Progress: 70%          Prudence York RD, [facility], 1:10 PM, 2/6/2024    Time Spent: 30 minutes  Encounter Type: Individual    Any diabetes medication dose changes were made via the CDE Protocol per the patient's primary care provider. A copy of this encounter was shared with the provider.

## 2024-02-08 ENCOUNTER — OFFICE VISIT (OUTPATIENT)
Dept: RADIATION THERAPY | Facility: OUTPATIENT CENTER | Age: 89
End: 2024-02-08
Payer: COMMERCIAL

## 2024-02-08 VITALS
DIASTOLIC BLOOD PRESSURE: 65 MMHG | HEART RATE: 74 BPM | RESPIRATION RATE: 16 BRPM | OXYGEN SATURATION: 97 % | BODY MASS INDEX: 35.33 KG/M2 | SYSTOLIC BLOOD PRESSURE: 129 MMHG | WEIGHT: 235.8 LBS

## 2024-02-08 DIAGNOSIS — C61 MALIGNANT NEOPLASM OF PROSTATE (H): Primary | ICD-10-CM

## 2024-02-08 NOTE — LETTER
2024         RE: Fawad Garcia  [address] [facility] [address]        Dear Colleague,    Thank you for referring your patient, Fawad Garcia, to the [facility] RADIATION THERAPY [facility]INIC. Please see a copy of my visit note below.       Department of Radiation Oncology  Radiation Therapy Center  [facility]  [place] [address]  [phone]       Radiation Oncology Follow-up Visit  24    Fawad Garcia  MRN: [id number]   : 1935     DISEASE TREATED: High-risk prostate cancer. Pretreatment PSA 25.5, Chaka score of 4+3=7, clinical stage [id number].     RADIATION THERAPY GIVEN: 8041 cGy in 43 planned treatments, via IMRT     INTERVAL SINCE COMPLETION OF THERAPY: 9 years since completion on 02/10/2012.     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 He underwent biopsies in  and  and pathology was negative for malignancy. Due to the persistently rising PSA Dr. Mcintyre performed an MRI-guided biopsy of the prostate gland which revealed Calvin 4+3= 7 disease in 2 of 2 cores with 90% of the cores being positive in the right inferior base and right inferior mid gland. Androgen ablation was discussed with the patient due to his high-risk disease and the patient declined due to comorbidities of the drug. Overall, he completed radiotherapy with very little toxicity.     SUBJECTIVE:   Fawad Garcia is a 88 year old male who is scheduled today for routine follow-up.      PSA on 24 was 4.00, up from up prior value of 3.68 (zohaib =0.24).    The patient  has undergone a PSMA PET scan on 22 which demonstrated uptake in the prostate gland.  No clear evidence of regional or distant disease noted.  There was indeterminate 9 mm left lung nodule.     The patient has met with medical oncology team (  Linus) rediscussed observation versus consideration of initiation of ADT.  Currently continue observation with PSA lab work.    Follow-up CT chest on 1/20/23 to evaluate previously noted left lingular mass was stable.    Today, he denies any pressing issues or complaints and reports that all symptoms have essentially remained stable since his last visit with us 6 months ago. AUA and MARINA not completed today (TC). Prior  AUA 18/35 (patient reported overall stable) and MARINA was 0/25. Main urinary symptoms continue to be nocturia and urgency. Patient states symptoms are worse with increased intake of fluids. Denies drinking alcohol, and has minimal caffeine. He denies any dysuria, hematuria, hematochezia, diarrhea, or loose stools.  He denies any pain or swelling.     Energy level is good and baseline.              Current Outpatient Medications   Medication     amLODIPine (NORVASC) 5 MG tablet     Blood Glucose Monitoring Suppl (ONE TOUCH ULTRA SYSTEM KIT) W/DEVICE KIT     clopidogrel (PLAVIX) 75 MG tablet     dorzolamide-timolol (COSOPT) 2-0.5 % ophthalmic solution     hydrochlorothiazide (HYDRODIURIL) 25 MG tablet     insulin glargine (LANTUS SOLOSTAR) 100 UNIT/ML pen     insulin pen needle (B-D U/F) 31G X 8 MM miscellaneous     levofloxacin (LEVAQUIN) 500 MG tablet     lisinopril (PRINIVIL/ZESTRIL) 40 MG tablet     metFORMIN (GLUCOPHAGE) 500 MG tablet     Multiple Vitamins-Minerals (PRESERVISION AREDS PO)     ONETOUCH LANCETS [facility]     ONETOUCH ULTRA test strip     order for DME     ORDER FOR DME     pantoprazole (PROTONIX) 20 MG EC tablet     rosuvastatin (CRESTOR) 10 MG tablet     sulfamethoxazole-trimethoprim (BACTRIM DS) 800-160 MG tablet     No current facility-administered medications for this visit.           Allergies   Allergen Reactions     Zocor [Simvastatin - High Dose]      Bilateral hip aching       Past Medical History:   Diagnosis Date     Diabetes mellitus (H)      Hypertension      Squamous cell  carcinoma          PHYSICAL EXAM:  /65 (BP Location: Left arm, Cuff Size: Adult Large)   Pulse 74   Resp 16   Wt 107 kg (235 lb 12.8 oz)   SpO2 97%   BMI 35.33 kg/m    No apparent distress       LABS AND IMAGING:  Reviewed.  PSA   Date Value Ref Range Status   2021 1.78 0 - 4 ug/L Final     Comment:     Assay Method:  Chemiluminescence using Siemens Vista analyzer   2020 1.53 0 - 4 ug/L Final     Comment:     Assay Method:  Chemiluminescence using Siemens Vista analyzer   2020 1.23 0 - 4 ug/L Final     Comment:     Assay Method:  Chemiluminescence using Siemens Vista analyzer   2019 1.08 0 - 4 ug/L Final     Comment:     Assay Method:  Chemiluminescence using Siemens Vista analyzer   03/15/2019 1.10 0 - 4 ug/L Final     Comment:     Assay Method:  Chemiluminescence using Siemens Vista analyzer     PSA Tumor Marker   Date Value Ref Range Status   2024 4.00 ng/mL Final     Comment:     No reference ranges have been established for patients over 80 years.   2023 3.68 ng/mL Final     Comment:     No reference ranges have been established for patients over 80 years.   2023 2.55 ng/mL Final     Comment:     No reference ranges have been established for patients over 80 years.   2022 2.71 0.00 - 4.00 ug/L Final   2022 2.49 0.00 - 4.00 ug/L Final   2021 1.88 0.00 - 4.00 ug/L Final     [id number]    PSMA PET    IMPRESSION:  1. Focal FDG uptake at the mid/basal anterior central prostate gland,  suspicious for residual prostate cancer.   2. No evidence of metastasis in the body.  3. 9 mm pulmonary nodule in the left upper lobe. Recommend follow-up  chest CT in 3-6 months.    23  CTC   IMPRESSION:   Stable 10 mm nodule in the central aspect of the left upper lobe. This  has been stable for approximately 9 months at this point. Continued  follow-up is recommended.    IMPRESSION:   Mr. Garcia is a 88 year old male with a high-risk prostate cancer.  Pretreatment PSA 25.5, [place] score of 4+3=7, clinical stage [id number]. Complete RT 2/10/2012,  8041 cGy in 43 planned treatments, via IMRT.       PLAN:     1. PSA has met [place] definition of biochemical failure (2.0 above zohaib with zohaib of 0.24).The patient underwent a PSMA PET scan on 4/8/22 which demonstrated uptake in the prostate gland.  No clear evidence of regional or distant disease noted.  There was indeterminate 9 mm left lung nodule. Follow up CT chest on 1/20/23 to evaluate previously noted left lingular mass was stable.      2. I re-discussed with the patient that it appears he has locally recurrent disease without evidence of regional or distant spread.  I discussed different options including observation versus ADT versus consideration of local retreatment in the form of reirradiation.  Given prior radiation course, I discussed additional radiation would likely come at the risk of significant toxicity to rectum and urethra. The patient has met with medical oncology team (Dr. Barriga) and discussed observation versus consideration of initiation of ADT.  Currently plan is to continue observation with PSA lab work. His PSA most recently david at a slightly slower rate. I therefore discussed  discussed that initiation of ADT could be consider. I also discussed that  given the patient's age and absence of clinical symptoms at this point, observation would not be unreasonable as well.     3. The patient will continue to follow up with our medical oncology colleagues for oncologic surveillance. Would defer start of ADT and further labwork to our medical oncology colleagues.     4. No late GI/ toxicity.    5. RTC in 6 months.        Kieran Hua M.D.  Department of Radiation Oncology  [facility]         Again, thank you for allowing me to participate in the care of your patient.        Sincerely,        Kieran Hua MD

## 2024-02-08 NOTE — PROGRESS NOTES
Department of Radiation Oncology  Radiation Therapy Center  [facility] [place] [address]  [phone]       Radiation Oncology Follow-up Visit  24    Fawad Garcia  MRN: [id number]   : 1935     DISEASE TREATED: High-risk prostate cancer. Pretreatment PSA 25.5, Savage score of 4+3=7, clinical stage [id number].     RADIATION THERAPY GIVEN: 8041 cGy in 43 planned treatments, via IMRT     INTERVAL SINCE COMPLETION OF THERAPY: 9 years since completion on 02/10/2012.     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 He underwent biopsies in  and  and pathology was negative for malignancy. Due to the persistently rising PSA Dr. Mcintyre performed an MRI-guided biopsy of the prostate gland which revealed Savage 4+3= 7 disease in 2 of 2 cores with 90% of the cores being positive in the right inferior base and right inferior mid gland. Androgen ablation was discussed with the patient due to his high-risk disease and the patient declined due to comorbidities of the drug. Overall, he completed radiotherapy with very little toxicity.     SUBJECTIVE:   Fawad Garcia is a 88 year old male who is scheduled today for routine follow-up.      PSA on 24 was 4.00, up from up prior value of 3.68 (zohaib =0.24).    The patient  has undergone a PSMA PET scan on 22 which demonstrated uptake in the prostate gland.  No clear evidence of regional or distant disease noted.  There was indeterminate 9 mm left lung nodule.     The patient has met with medical oncology team (Dr. Barriga) rediscussed observation versus consideration of initiation of ADT.  Currently continue observation with PSA lab work.    Follow-up CT chest on 23 to evaluate previously noted left lingular mass was stable.    Today, he denies any pressing issues or  complaints and reports that all symptoms have essentially remained stable since his last visit with us 6 months ago. AUA and MARINA not completed today (TC). Prior  AUA 18/35 (patient reported overall stable) and MARINA was 0/25. Main urinary symptoms continue to be nocturia and urgency. Patient states symptoms are worse with increased intake of fluids. Denies drinking alcohol, and has minimal caffeine. He denies any dysuria, hematuria, hematochezia, diarrhea, or loose stools.  He denies any pain or swelling.     Energy level is good and baseline.              Current Outpatient Medications   Medication    amLODIPine (NORVASC) 5 MG tablet    Blood Glucose Monitoring Suppl (ONE TOUCH ULTRA SYSTEM KIT) W/DEVICE KIT    clopidogrel (PLAVIX) 75 MG tablet    dorzolamide-timolol (COSOPT) 2-0.5 % ophthalmic solution    hydrochlorothiazide (HYDRODIURIL) 25 MG tablet    insulin glargine (LANTUS SOLOSTAR) 100 UNIT/ML pen    insulin pen needle (B-D U/F) 31G X 8 MM miscellaneous    levofloxacin (LEVAQUIN) 500 MG tablet    lisinopril (PRINIVIL/ZESTRIL) 40 MG tablet    metFORMIN (GLUCOPHAGE) 500 MG tablet    Multiple Vitamins-Minerals (PRESERVISION AREDS PO)    ONETOUCH LANCETS [facility]    ONETOUCH ULTRA test strip    order for DME    ORDER FOR DME    pantoprazole (PROTONIX) 20 MG EC tablet    rosuvastatin (CRESTOR) 10 MG tablet    sulfamethoxazole-trimethoprim (BACTRIM DS) 800-160 MG tablet     No current facility-administered medications for this visit.           Allergies   Allergen Reactions    Zocor [Simvastatin - High Dose]      Bilateral hip aching       Past Medical History:   Diagnosis Date    Diabetes mellitus (H)     Hypertension     Squamous cell carcinoma          PHYSICAL EXAM:  /65 (BP Location: Left arm, Cuff Size: Adult Large)   Pulse 74   Resp 16   Wt 107 kg (235 lb 12.8 oz)   SpO2 97%   BMI 35.33 kg/m    No apparent distress       LABS AND IMAGING:  Reviewed.  PSA   Date Value Ref Range Status   03/18/2021  1.78 0 - 4 ug/L Final     Comment:     Assay Method:  Chemiluminescence using Siemens Vista analyzer   2020 1.53 0 - 4 ug/L Final     Comment:     Assay Method:  Chemiluminescence using Siemens Vista analyzer   2020 1.23 0 - 4 ug/L Final     Comment:     Assay Method:  Chemiluminescence using Siemens Vista analyzer   2019 1.08 0 - 4 ug/L Final     Comment:     Assay Method:  Chemiluminescence using Siemens Vista analyzer   03/15/2019 1.10 0 - 4 ug/L Final     Comment:     Assay Method:  Chemiluminescence using Siemens Vista analyzer     PSA Tumor Marker   Date Value Ref Range Status   2024 4.00 ng/mL Final     Comment:     No reference ranges have been established for patients over 80 years.   2023 3.68 ng/mL Final     Comment:     No reference ranges have been established for patients over 80 years.   2023 2.55 ng/mL Final     Comment:     No reference ranges have been established for patients over 80 years.   2022 2.71 0.00 - 4.00 ug/L Final   2022 2.49 0.00 - 4.00 ug/L Final   2021 1.88 0.00 - 4.00 ug/L Final     IMAGIN22    PSMA PET    IMPRESSION:  1. Focal FDG uptake at the mid/basal anterior central prostate gland,  suspicious for residual prostate cancer.   2. No evidence of metastasis in the body.  3. 9 mm pulmonary nodule in the left upper lobe. Recommend follow-up  chest CT in 3-6 months.    23  CTC   IMPRESSION:   Stable 10 mm nodule in the central aspect of the left upper lobe. This  has been stable for approximately 9 months at this point. Continued  follow-up is recommended.    IMPRESSION:   Mr. Garcia is a 88 year old male with a high-risk prostate cancer. Pretreatment PSA 25.5, [place] score of 4+3=7, clinical stage [id number]. Complete RT 2/10/2012,  8041 cGy in 43 planned treatments, via IMRT.       PLAN:     1. PSA has met [place] definition of biochemical failure (2.0 above zohaib with zohaib of 0.24).The patient underwent a PSMA PET  scan on 4/8/22 which demonstrated uptake in the prostate gland.  No clear evidence of regional or distant disease noted.  There was indeterminate 9 mm left lung nodule. Follow up CT chest on 1/20/23 to evaluate previously noted left lingular mass was stable.      2. I re-discussed with the patient that it appears he has locally recurrent disease without evidence of regional or distant spread.  I discussed different options including observation versus ADT versus consideration of local retreatment in the form of reirradiation.  Given prior radiation course, I discussed additional radiation would likely come at the risk of significant toxicity to rectum and urethra. The patient has met with medical oncology team (Dr. Barriga) and discussed observation versus consideration of initiation of ADT.  Currently plan is to continue observation with PSA lab work. His PSA most recently david at a slightly slower rate. I therefore discussed  discussed that initiation of ADT could be consider. I also discussed that  given the patient's age and absence of clinical symptoms at this point, observation would not be unreasonable as well.     3. The patient will continue to follow up with our medical oncology colleagues for oncologic surveillance. Would defer start of ADT and further labwork to our medical oncology colleagues.     4. No late GI/ toxicity.    5. RTC in 6 months.        Kieran Hua M.D.  Department of Radiation Oncology  [facility]

## 2024-02-08 NOTE — NURSING NOTE
"Oncology Rooming Note    February 8, 2024 3:58 PM   Fawad Garcia is a 88 year old male who presents for:    Chief Complaint   Patient presents with    Radiation Therapy     Return visit, prostate cancer     Initial Vitals: /65 (BP Location: Left arm, Cuff Size: Adult Large)   Pulse 74   Resp 16   Wt 107 kg (235 lb 12.8 oz)   SpO2 97%   BMI 35.33 kg/m   Estimated body mass index is 35.33 kg/m  as calculated from the following:    Height as of 1/29/24: 1.74 m (5' 8.5\").    Weight as of this encounter: 107 kg (235 lb 12.8 oz). Body surface area is 2.27 meters squared.  Data Unavailable Comment: Data Unavailable   No LMP for male patient.  Allergies reviewed: Yes  Medications reviewed: Yes    Medications: Medication refills not needed today.  Pharmacy name entered into EPIC: [facility] - [place], [place] - [address] [place] [address]    Frailty Screening:   Is the patient here for a new oncology consult visit in cancer care? 2. No      Clinical concerns:  follow up today with Dr. Javid Andrea, RN              "

## 2024-02-09 ENCOUNTER — PATIENT OUTREACH (OUTPATIENT)
Dept: CARE COORDINATION | Facility: CLINIC | Age: 89
End: 2024-02-09
Payer: COMMERCIAL

## 2024-02-12 ENCOUNTER — TELEPHONE (OUTPATIENT)
Dept: RADIATION THERAPY | Facility: OUTPATIENT CENTER | Age: 89
End: 2024-02-12

## 2024-02-21 ENCOUNTER — VIRTUAL VISIT (OUTPATIENT)
Dept: EDUCATION SERVICES | Facility: CLINIC | Age: 89
End: 2024-02-21
Payer: COMMERCIAL

## 2024-02-21 DIAGNOSIS — Z79.4 TYPE 2 DIABETES MELLITUS WITH HYPERGLYCEMIA, WITH LONG-TERM CURRENT USE OF INSULIN (H): Primary | ICD-10-CM

## 2024-02-21 DIAGNOSIS — E11.65 TYPE 2 DIABETES MELLITUS WITH HYPERGLYCEMIA, WITH LONG-TERM CURRENT USE OF INSULIN (H): Primary | ICD-10-CM

## 2024-02-21 NOTE — LETTER
"    2/21/2024         RE: Fawad Garcia  [address] [facility] [address]        Dear Colleague,    Thank you for referring your patient, Fawad Garcia, to the [facility]. Please see a copy of my visit note below.    Diabetes Education Follow-up    Subjective/Objective:    Called Fawad Garcia for blood glucose log review. Last date of communication was: 2/6/24.    Diabetes is being managed with Diabetes Medications   Diabetes Medication(s)       Biguanides       metFORMIN (GLUCOPHAGE) 500 MG tablet Take 2 tablets (1,000 mg) by mouth 2 times daily (with meals)       Insulin       insulin aspart (NOVOLOG PEN) 100 UNIT/ML pen Inject 3 units before breakfast, 4 units before lunch and 6 units before supper.     insulin glargine (LANTUS SOLOSTAR) 100 UNIT/ML pen Inject 21 Units Subcutaneous at bedtime Hold on file until needed.        States he was still adjusting the Lantus up to 24 units.    BG/Food Log:   Date Breakfast  Lunch  Dinner  Bedtime Lantus    Before After Before After Before After     2/21 70 3am   264    Did not take Novolog at lunch   2/20 81      179 22 units, ate ice cream   2/19 157      138    2/18 154      198    2/17 75      359    2/16 121      184    2/15 106      118      \"179 is a low reading, so I ate some ice cream and took Lantus 22 units\"  If FASTING BLOOD SUGAR is reading is low, your more likely to go with 2-3-5  On 2/17 used Novolog 2-3-5    Assessment:    Supper : hotdog and soup OR spaghetti and meatballs  PM: ice cream cone  Lunch: hotdog OR bread soup, fruit cup, dessert  Breakfast:cereal, OJ, waffle    Discussed: BG results, recommended following the prescribed insulin dosages, benefits of cgms.  Patient expressed understanding.      Plan/Response:    1. Keep Lantus at 21 units.    2.  Continue the Novolog 3-4-6-0  3.  Record how much insulin you are taking.  4   Be sure to recheck your BG after 15 minutes when you have a low BG.  "   5.  Follow up with Jimmy Catalan as directed.  6.  Follow up with Diabetes Education 3/13/24 at 3pm (phone visit).      Prudence [place] RD, [facility], 4:22 PM, 2/21/2024      Any diabetes medication dose changes were made via the CDE Protocol and Collaborative Practice Agreement with the patient's primary care provider. A copy of this encounter was shared with the provider.

## 2024-02-21 NOTE — LETTER
"    2/21/2024         RE: Fawad Garcia  [address] [facility] [address]        Dear Colleague,    Thank you for referring your patient, Fawad Garcia, to the [facility]. Please see a copy of my visit note below.    Diabetes Education Follow-up    Subjective/Objective:    Called Fawad Garcia for blood glucose log review. Last date of communication was: 2/6/24.    Diabetes is being managed with Diabetes Medications   Diabetes Medication(s)       Biguanides       metFORMIN (GLUCOPHAGE) 500 MG tablet Take 2 tablets (1,000 mg) by mouth 2 times daily (with meals)       Insulin       insulin aspart (NOVOLOG PEN) 100 UNIT/ML pen Inject 3 units before breakfast, 4 units before lunch and 6 units before supper.     insulin glargine (LANTUS SOLOSTAR) 100 UNIT/ML pen Inject 21 Units Subcutaneous at bedtime Hold on file until needed.        States he was still adjusting the Lantus up to 24 units.    BG/Food Log:   Date Breakfast  Lunch  Dinner  Bedtime Lantus    Before After Before After Before After     2/21 70 3am   264    Did not take Novolog at lunch   2/20 81      179 22 units, ate ice cream   2/19 157      138    2/18 154      198    2/17 75      359    2/16 121      184    2/15 106      118      \"179 is a low reading, so I ate some ice cream and took Lantus 22 units\"  If FASTING BLOOD SUGAR is reading is low, your more likely to go with 2-3-5  On 2/17 used Novolog 2-3-5    Assessment:    Supper : hotdog and soup OR spaghetti and meatballs  PM: ice cream cone  Lunch: hotdog OR bread soup, fruit cup, dessert  Breakfast:cereal, OJ, waffle    Discussed: BG results, recommended following the prescribed insulin dosages, benefits of cgms***.  Patient expressed understanding.      Plan/Response:    1. Keep Lantus at 21 units.    2.  Continue the Novolog 3-4-6-0  3.  Record how much insulin you are taking.  4   Be sure to recheck your BG after 15 minutes when you have a low " BG.    5.  Follow up with Jimmy Catalan as directed.  6.  Follow up with Diabetes Education 3/13/24 at 3pm (phone visit).      Prudence [place] RD, [facility], 4:22 PM, 2/21/2024      Any diabetes medication dose changes were made via the CDE Protocol and Collaborative Practice Agreement with the patient's primary care provider. A copy of this encounter was shared with the provider.

## 2024-02-21 NOTE — PROGRESS NOTES
"Diabetes Education Follow-up    Subjective/Objective:    Called Fawad Garcia for blood glucose log review. Last date of communication was: 2/6/24.    Diabetes is being managed with Diabetes Medications   Diabetes Medication(s)       Biguanides       metFORMIN (GLUCOPHAGE) 500 MG tablet Take 2 tablets (1,000 mg) by mouth 2 times daily (with meals)       Insulin       insulin aspart (NOVOLOG PEN) 100 UNIT/ML pen Inject 3 units before breakfast, 4 units before lunch and 6 units before supper.     insulin glargine (LANTUS SOLOSTAR) 100 UNIT/ML pen Inject 21 Units Subcutaneous at bedtime Hold on file until needed.        States he was still adjusting the Lantus up to 24 units.    BG/Food Log:   Date Breakfast  Lunch  Dinner  Bedtime Lantus    Before After Before After Before After     2/21 70 3am   264    Did not take Novolog at lunch   2/20 81      179 22 units, ate ice cream   2/19 157      138    2/18 154      198    2/17 75      359    2/16 121      184    2/15 106      118      \"179 is a low reading, so I ate some ice cream and took Lantus 22 units\"  If FASTING BLOOD SUGAR is reading is low, your more likely to go with 2-3-5  On 2/17 used Novolog 2-3-5    Assessment:    Supper : hotdog and soup OR spaghetti and meatballs  PM: ice cream cone  Lunch: hotdog OR bread soup, fruit cup, dessert  Breakfast:cereal, OJ, waffle    Discussed: BG results, recommended following the prescribed insulin dosages, benefits of cgms.  Patient expressed understanding.      Plan/Response:    1. Keep Lantus at 21 units.    2.  Continue the Novolog 3-4-6-0  3.  Record how much insulin you are taking.  4   Be sure to recheck your BG after 15 minutes when you have a low BG.    5.  Follow up with Jimmy Catalan as directed.  6.  Follow up with Diabetes Education 3/13/24 at 3pm (phone visit).      Prudence [place] RD, [facility], 4:22 PM, 2/21/2024      Any diabetes medication dose changes were made via the CDE Protocol and " Collaborative Practice Agreement with the patient's primary care provider. A copy of this encounter was shared with the provider.

## 2024-02-21 NOTE — PATIENT INSTRUCTIONS
Plan/Response:     1. Keep Lantus at 21 units.    2.  Continue the Novolog 3-4-6-0  3.  Record how much insulin you are taking.  4   Be sure to recheck your BG after 15 minutes when you have a low BG.    5.  Follow up with Jimmy Catalan as directed.  6.  Follow up with Diabetes Education 3/13/24 at 3pm (phone visit).       Prudence [place] RD, [facility], 4:22 PM, 2/21/2024

## 2024-02-22 ENCOUNTER — TELEPHONE (OUTPATIENT)
Dept: FAMILY MEDICINE | Facility: CLINIC | Age: 89
End: 2024-02-22
Payer: COMMERCIAL

## 2024-02-23 ENCOUNTER — PATIENT OUTREACH (OUTPATIENT)
Dept: CARE COORDINATION | Facility: CLINIC | Age: 89
End: 2024-02-23
Payer: COMMERCIAL

## 2024-02-23 NOTE — TELEPHONE ENCOUNTER
Patient's insurance will not cover Glargin yfgn inj 100 unit(s)/ml.    They will cover  Lantus vial  Lantus solostar insulin pen  Toujeo solostar insulin pen

## 2024-03-02 DIAGNOSIS — E11.3293 TYPE 2 DIABETES MELLITUS WITH BOTH EYES AFFECTED BY MILD NONPROLIFERATIVE RETINOPATHY WITHOUT MACULAR EDEMA, WITH LONG-TERM CURRENT USE OF INSULIN (H): ICD-10-CM

## 2024-03-02 DIAGNOSIS — Z79.4 TYPE 2 DIABETES MELLITUS WITH BOTH EYES AFFECTED BY MILD NONPROLIFERATIVE RETINOPATHY WITHOUT MACULAR EDEMA, WITH LONG-TERM CURRENT USE OF INSULIN (H): ICD-10-CM

## 2024-03-04 RX ORDER — METHYLPREDNISOLONE SODIUM SUCCINATE 125 MG/2ML
INJECTION, POWDER, FOR SOLUTION INTRAMUSCULAR; INTRAVENOUS
Qty: 200 EACH | Refills: 3 | Status: SHIPPED | OUTPATIENT
Start: 2024-03-04

## 2024-03-12 ENCOUNTER — TRANSFERRED RECORDS (OUTPATIENT)
Dept: HEALTH INFORMATION MANAGEMENT | Facility: CLINIC | Age: 89
End: 2024-03-12

## 2024-03-12 LAB — RETINOPATHY: POSITIVE

## 2024-03-13 ENCOUNTER — VIRTUAL VISIT (OUTPATIENT)
Dept: EDUCATION SERVICES | Facility: CLINIC | Age: 89
End: 2024-03-13
Payer: COMMERCIAL

## 2024-03-13 DIAGNOSIS — E11.65 UNCONTROLLED TYPE 2 DIABETES MELLITUS WITH HYPERGLYCEMIA (H): Primary | ICD-10-CM

## 2024-03-13 NOTE — PROGRESS NOTES
Diabetes Education Follow-up    Subjective/Objective:    Fawad Garcia sent in blood glucose log for review. Last date of communication was: 2/21/24.    Diabetes is being managed with Diabetes Medications   Diabetes Medication(s)       Biguanides       metFORMIN (GLUCOPHAGE) 500 MG tablet Take 2 tablets (1,000 mg) by mouth 2 times daily (with meals)       Insulin       insulin aspart (NOVOLOG PEN) 100 UNIT/ML pen Inject 3 units before breakfast, 4 units before lunch and 6 units before supper.     insulin glargine (LANTUS SOLOSTAR) 100 UNIT/ML pen Inject 21 Units Subcutaneous at bedtime Hold on file until needed.           Latest Reference Range & Units 01/29/24 08:29   Hemoglobin A1C <5.7 % 8.8 (H)   (H): Data is abnormally high    BG/Food Log:   Date Breakfast  Lunch  Dinner  Bedtime    Before After Before After Before After    3/13 139 4am  95  2 units         3/12 88  2 units    3 units  Was at meeting  272   3/11 112  2 units    3 units    5 units  224   3/10 121  2 units    3 units    5 units     3/9 158  3 units    4 units    6 units  240   3/8 91  2 units    3 units    5 units  283   3/7 154  2 units    4 units    6 units  232   3/6 77  2 units      6 units  336   3/5 87  2 units    3 units    5 units  150     Readings in the 70's  might be in the night and he is getting up to eat a bowl of cereal.      Assessment:    Continues to adjust insulin independently.  Talks about taking Novolog an hour before eating a breakfast occasionally.      Discussed: use of professional cgms to better evaluate overnight lows, proper use of Novolog (take 5a-15 minutes before meals),  target A1c, potential complications of elevated BG readings.          Plan/Response:    Keep Lantus at 21 units.    2.  Try to take the Novolog 3-4-6-0.  Do not inject it more than 15 minutes before the meal.    3.  Record how much insulin you are taking.  4   Be sure to recheck your BG after 15 minutes when you have a low BG.    5.  Check BG  2 times/day and more often if you feel you are having a low BG.    6.   Recommend doing a professional cgms to better evaluate if having overnight lows.    7.  Schedule follow up with Jimmy Catalan end of April.  8.  Follow up with Diabetes Education 5/15/24 at 3pm (phone visit).      Prudence York RD, [facility] [place], 4:38 PM, 3/13/2024      Any diabetes medication dose changes were made via the CDE Protocol and Collaborative Practice Agreement with the patient's primary care provider. A copy of this encounter was shared with the provider.

## 2024-03-13 NOTE — LETTER
3/13/2024         RE: Fawad Garcia  [address] [facility] [address]        Dear Colleague,    Thank you for referring your patient, Fawad Gracia, to the [facility]. Please see a copy of my visit note below.    Diabetes Education Follow-up    Subjective/Objective:    Fawad Garcia sent in blood glucose log for review. Last date of communication was: 2/21/24.    Diabetes is being managed with Diabetes Medications   Diabetes Medication(s)       Biguanides       metFORMIN (GLUCOPHAGE) 500 MG tablet Take 2 tablets (1,000 mg) by mouth 2 times daily (with meals)       Insulin       insulin aspart (NOVOLOG PEN) 100 UNIT/ML pen Inject 3 units before breakfast, 4 units before lunch and 6 units before supper.     insulin glargine (LANTUS SOLOSTAR) 100 UNIT/ML pen Inject 21 Units Subcutaneous at bedtime Hold on file until needed.           Latest Reference Range & Units 01/29/24 08:29   Hemoglobin A1C <5.7 % 8.8 (H)   (H): Data is abnormally high    BG/Food Log:   Date Breakfast  Lunch  Dinner  Bedtime    Before After Before After Before After    3/13 139 4am  95  2 units         3/12 88  2 units    3 units  Was at meeting  272   3/11 112  2 units    3 units    5 units  224   3/10 121  2 units    3 units    5 units     3/9 158  3 units    4 units    6 units  240   3/8 91  2 units    3 units    5 units  283   3/7 154  2 units    4 units    6 units  232   3/6 77  2 units      6 units  336   3/5 87  2 units    3 units    5 units  150     Readings in the 70's  might be in the night and he is getting up to eat a bowl of cereal.      Assessment:    Continues to adjust insulin independently.  Talks about taking Novolog an hour before eating a breakfast occasionally.      Discussed: use of professional cgms to better evaluate overnight lows, proper use of Novolog (take 5a-15 minutes before meals),  target A1c, potential complications of elevated BG readings.           Plan/Response:    Keep Lantus at 21 units.    2.  Try to take the Novolog 3-4-6-0.  Do not inject it more than 15 minutes before the meal.    3.  Record how much insulin you are taking.  4   Be sure to recheck your BG after 15 minutes when you have a low BG.    5.  Check BG 2 times/day and more often if you feel you are having a low BG.    6.   Recommend doing a professional cgms to better evaluate if having overnight lows.    7.  Schedule follow up with Jimmy Catalan end of April.  8.  Follow up with Diabetes Education 5/15/24 at 3pm (phone visit).      Prudence York RD, [facility], 4:38 PM, 3/13/2024      Any diabetes medication dose changes were made via the CDE Protocol and Collaborative Practice Agreement with the patient's primary care provider. A copy of this encounter was shared with the provider.

## 2024-04-19 ENCOUNTER — LAB (OUTPATIENT)
Dept: LAB | Facility: CLINIC | Age: 89
End: 2024-04-19
Payer: COMMERCIAL

## 2024-04-19 DIAGNOSIS — Z79.4 TYPE 2 DIABETES MELLITUS WITH BOTH EYES AFFECTED BY MILD NONPROLIFERATIVE RETINOPATHY WITHOUT MACULAR EDEMA, WITH LONG-TERM CURRENT USE OF INSULIN (H): ICD-10-CM

## 2024-04-19 DIAGNOSIS — E11.65 TYPE 2 DIABETES MELLITUS WITH HYPERGLYCEMIA, WITH LONG-TERM CURRENT USE OF INSULIN (H): ICD-10-CM

## 2024-04-19 DIAGNOSIS — E11.65 UNCONTROLLED TYPE 2 DIABETES MELLITUS WITH HYPERGLYCEMIA (H): ICD-10-CM

## 2024-04-19 DIAGNOSIS — Z79.4 TYPE 2 DIABETES MELLITUS WITH HYPERGLYCEMIA, WITH LONG-TERM CURRENT USE OF INSULIN (H): ICD-10-CM

## 2024-04-19 DIAGNOSIS — N18.31 STAGE 3A CHRONIC KIDNEY DISEASE (H): ICD-10-CM

## 2024-04-19 DIAGNOSIS — E11.3293 TYPE 2 DIABETES MELLITUS WITH BOTH EYES AFFECTED BY MILD NONPROLIFERATIVE RETINOPATHY WITHOUT MACULAR EDEMA, WITH LONG-TERM CURRENT USE OF INSULIN (H): ICD-10-CM

## 2024-04-19 LAB
CREAT UR-MCNC: 71.9 MG/DL
HBA1C MFR BLD: 8.4 % (ref 0–5.6)
MICROALBUMIN UR-MCNC: 50.9 MG/L
MICROALBUMIN/CREAT UR: 70.79 MG/G CR (ref 0–17)

## 2024-04-19 PROCEDURE — 83036 HEMOGLOBIN GLYCOSYLATED A1C: CPT

## 2024-04-19 PROCEDURE — 36415 COLL VENOUS BLD VENIPUNCTURE: CPT

## 2024-04-19 PROCEDURE — 82570 ASSAY OF URINE CREATININE: CPT

## 2024-04-19 PROCEDURE — 82043 UR ALBUMIN QUANTITATIVE: CPT

## 2024-04-21 DIAGNOSIS — E11.3293 TYPE 2 DIABETES MELLITUS WITH BOTH EYES AFFECTED BY MILD NONPROLIFERATIVE RETINOPATHY WITHOUT MACULAR EDEMA, WITH LONG-TERM CURRENT USE OF INSULIN (H): ICD-10-CM

## 2024-04-21 DIAGNOSIS — Z79.4 TYPE 2 DIABETES MELLITUS WITH BOTH EYES AFFECTED BY MILD NONPROLIFERATIVE RETINOPATHY WITHOUT MACULAR EDEMA, WITH LONG-TERM CURRENT USE OF INSULIN (H): ICD-10-CM

## 2024-04-22 ENCOUNTER — OFFICE VISIT (OUTPATIENT)
Dept: FAMILY MEDICINE | Facility: CLINIC | Age: 89
End: 2024-04-22
Payer: COMMERCIAL

## 2024-04-22 VITALS
OXYGEN SATURATION: 95 % | SYSTOLIC BLOOD PRESSURE: 138 MMHG | TEMPERATURE: 98.5 F | DIASTOLIC BLOOD PRESSURE: 78 MMHG | HEART RATE: 69 BPM | RESPIRATION RATE: 16 BRPM | WEIGHT: 230.8 LBS | HEIGHT: 69 IN | BODY MASS INDEX: 34.18 KG/M2

## 2024-04-22 DIAGNOSIS — Z79.4 TYPE 2 DIABETES MELLITUS WITH BOTH EYES AFFECTED BY MILD NONPROLIFERATIVE RETINOPATHY WITHOUT MACULAR EDEMA, WITH LONG-TERM CURRENT USE OF INSULIN (H): Primary | ICD-10-CM

## 2024-04-22 DIAGNOSIS — R01.1 NEWLY RECOGNIZED HEART MURMUR: ICD-10-CM

## 2024-04-22 DIAGNOSIS — Z23 NEED FOR SHINGLES VACCINE: ICD-10-CM

## 2024-04-22 DIAGNOSIS — Z29.11 NEED FOR VACCINATION AGAINST RESPIRATORY SYNCYTIAL VIRUS: ICD-10-CM

## 2024-04-22 DIAGNOSIS — N43.3 RIGHT HYDROCELE: ICD-10-CM

## 2024-04-22 DIAGNOSIS — E11.3293 TYPE 2 DIABETES MELLITUS WITH BOTH EYES AFFECTED BY MILD NONPROLIFERATIVE RETINOPATHY WITHOUT MACULAR EDEMA, WITH LONG-TERM CURRENT USE OF INSULIN (H): Primary | ICD-10-CM

## 2024-04-22 DIAGNOSIS — L30.9 DERMATITIS: ICD-10-CM

## 2024-04-22 DIAGNOSIS — Z23 NEED FOR TDAP VACCINATION: ICD-10-CM

## 2024-04-22 PROCEDURE — 99214 OFFICE O/P EST MOD 30 MIN: CPT | Performed by: FAMILY MEDICINE

## 2024-04-22 RX ORDER — INSULIN GLARGINE 100 [IU]/ML
23 INJECTION, SOLUTION SUBCUTANEOUS AT BEDTIME
Qty: 30 ML | Refills: 3 | Status: SHIPPED | OUTPATIENT
Start: 2024-04-22

## 2024-04-22 RX ORDER — RESPIRATORY SYNCYTIAL VIRUS VACCINE 120MCG/0.5
0.5 KIT INTRAMUSCULAR ONCE
Qty: 1 EACH | Refills: 0 | Status: CANCELLED | OUTPATIENT
Start: 2024-04-22 | End: 2024-04-22

## 2024-04-22 RX ORDER — TRIAMCINOLONE ACETONIDE 1 MG/G
CREAM TOPICAL 2 TIMES DAILY
Qty: 80 G | Refills: 1 | Status: SHIPPED | OUTPATIENT
Start: 2024-04-22

## 2024-04-22 RX ORDER — BLOOD SUGAR DIAGNOSTIC
STRIP MISCELLANEOUS
Qty: 100 STRIP | Refills: 3 | Status: SHIPPED | OUTPATIENT
Start: 2024-04-22

## 2024-04-22 ASSESSMENT — PAIN SCALES - GENERAL: PAINLEVEL: NO PAIN (0)

## 2024-04-22 NOTE — PATIENT INSTRUCTIONS
See diabetes educator as planned.  Increase lantus to 23 units per dose.  Repeat A1c in 3 months.    Schedule echocardiogram for the heart murmur evaluation.  Contact Cardiac Services  at [phone], to schedule this appointment if no call after 3-5 business days.  If you have persistent chest pain, persistent or worsening breathing difficulty, palpitation, lightheadedness or other concerning symptoms, you should be brought to the ER.     Referrals to urology has been signed for consult for the hydrocele. Schedulers will call you in the next 3-5 business days.     Get both tetanus vaccine (TDAP) and shingles vaccine at pharmacy for medicare to cover.  Get RSV vaccine in the fall.

## 2024-04-22 NOTE — PROGRESS NOTES
Assessment & Plan     Type 2 diabetes mellitus with both eyes affected by mild nonproliferative retinopathy without macular edema, with long-term current use of insulin (H)  Uncontrolled but A1c better than Jan 2024.  Adjusted insulin dose. Patient will see DM education in a few weeks.  Reinforced carbohydrate control.  Regular exercise 30 mins per day, 3 times a week.  Regular foot care, annual eye exam.  Flu vaccine every year.   - insulin glargine (LANTUS SOLOSTAR) 100 UNIT/ML pen  Dispense: 30 mL; Refill: 3  - Hemoglobin A1c    Dermatitis  No active rash per patient.   Advised safe use of topical steroid.  - triamcinolone (KENALOG) 0.1 % external cream  Dispense: 80 g; Refill: 1    Need for shingles vaccine  Patient to check with insurance about coverage     Need for Tdap vaccination  Patient will obtain from pharmacy due to coverage.     Need for vaccination against respiratory syncytial virus  Patient preferred to get this in the fall although he was skeptical about this.    Right hydrocele  No tenderness today.  Referred back to urology as patient reports gradual enlargement  - Adult Urology  Referral    Newly recognized heart murmur  Asym[ptomatic otherwise.  Suspect new aortic stenosis. Advised possible risks if this is present and progresses.  Patient agreed to pursue echocardiogram. Refer to cardiology if with significant findings.  - Echocardiogram Complete      Blood sugar testing frequency justification:  Uncontrolled diabetes    Patient Instructions   See diabetes educator as planned.  Increase lantus to 23 units per dose.  Repeat A1c in 3 months.    Schedule echocardiogram for the heart murmur evaluation.  Contact Cardiac Services  at [phone], to schedule this appointment if no call after 3-5 business days.  If you have persistent chest pain, persistent or worsening breathing difficulty, palpitation, lightheadedness or other concerning symptoms, you should be brought to the  ER.     Referrals to urology has been signed for consult for the hydrocele. Schedulers will call you in the next 3-5 business days.     Get both tetanus vaccine (TDAP) and shingles vaccine at pharmacy for medicare to cover.  Get RSV vaccine in the fall.        Shelbi Norman is a 88 year old, presenting for the following health issues:  Diabetes and Testicular Problem (Pt has history of right testicular hydrocele, pt states that he is again experiencing symptoms. )        4/22/2024     4:26 PM   Additional Questions   Roomed by Cathi GUILLERMO MA   Accompanied by Self         4/22/2024     4:26 PM   Patient Reported Additional Medications   Patient reports taking the following new medications none     Via the Health Maintenance questionnaire, the patient has reported the following services have been completed -Eye Exam, this information has been sent to the abstraction team.  History of Present Illness       Diabetes:   He presents for follow up of diabetes.  He is checking home blood glucose two times daily.   He checks blood glucose before and after meals.  Blood glucose is sometimes over 200 and never under 70. He is aware of hypoglycemia symptoms including shakiness.   He is concerned about blood sugar frequently over 200 and low blood sugar, several less than 70 in the past few weeks.   He is having numbness in feet, blurry vision and weight loss.  The patient has had a diabetic eye exam in the last 12 months. Eye exam performed on January of 2024. Location of last eye exam [facility] in [place], [place].        He eats 0-1 servings of fruits and vegetables daily.He consumes 0 sweetened beverage(s) daily.He exercises with enough effort to increase his heart rate 10 to 19 minutes per day.  He exercises with enough effort to increase his heart rate 4 days per week.   He is taking medications regularly.     Recent Labs   Lab Test 04/19/24  0801 01/29/24  0829 10/20/23  1315 07/19/23  0857 07/05/23  1517 06/12/23  0830  05/14/23  1735 01/27/23  1040 01/20/23  1201 11/11/22  1148 10/19/22  2242 07/12/21  1209 07/12/21  0507 03/18/21  0809 01/04/21  1733 03/02/20  1102 02/26/20  0750 07/30/18  1100 03/27/18  1012 10/04/17  0937 04/17/17  1102   A1C 8.4* 8.8* 8.7* 10.3*  --    < >  --    < >  --    < > 9.8*   < >  --    < >  --    < > 7.7*   < > 7.7*   < > 7.6*   LDL  --   --   --  53  --   --   --   --   --   --  68  --  56   < >  --    < > 85   < > 107*  --   --    HDL  --   --   --  52  --   --   --   --   --   --  38*  --  50   < >  --    < > 54   < > 54  --   --    TRIG  --   --   --  53  --   --   --   --   --   --  141  --  91   < >  --    < > 56   < > 84  --   --    ALT  --   --   --   --  16  --  12  --  14  --   --    < >  --   --   --   --   --   --   --   --   --    CR  --  1.10  --  1.09 1.08  --  1.17  --  0.93  --  0.94   < > 1.15   < > 1.07  --  0.82   < > 0.86  --  0.93   GFRESTIMATED  --  65  --  66 67  --  61  --  80  --  79   < > 58*   < > 63  --  82   < > 85  --  78   GFRESTBLACK  --   --   --   --   --   --   --   --   --   --   --   --   --   --  73  --  >90   < > >90  --  >90  African American GFR Calc     POTASSIUM  --  4.4  --  4.2 4.4  --  3.9  --  4.4  --  4.8   < > 3.6   < > 4.0  --   --    < > 3.9  --  3.8   TSH  --   --   --   --   --   --   --   --   --   --   --   --   --   --   --   --   --   --  1.74  --  1.80    < > = values in this interval not displayed.       Patient denies missing doses of meds.  Patient reports spouse generally cooks at home - also a diabetic.  Tries to eat carb -controlled diet.  Patient states he tries to stay active. Tries to play golf.  Patient did not bring glucometer or log book.    History right hydrocele. Seen by previous PCPs. Saw a urologist also - was told to observe.  Patient reports it is larger now.    Review of Systems  Constitutional, HEENT, cardiovascular, pulmonary, GI, , musculoskeletal, neuro, skin, endocrine and psych systems are negative, except as  "otherwise noted.      Objective    /78 (BP Location: Right arm, Patient Position: Sitting, Cuff Size: Adult Regular)   Pulse 69   Temp 98.5  F (36.9  C) (Tympanic)   Resp 16   Ht 1.74 m (5' 8.5\")   Wt 104.7 kg (230 lb 12.8 oz)   SpO2 95%   BMI 34.58 kg/m    Body mass index is 34.58 kg/m .  Physical Exam   GENERAL:  alert and no distress, ambulatory w/o assist  EYES: no icterus; pink conjunctivae.  NECK: no tenderness, no adenopathy,  Thyroid not enlarged  RESP: lungs clear to auscultation - no rales, no rhonchi, no wheezes  CV: regular rates and rhythm, no murmur  MS: no edema  SKIN: no suspicious lesions, no rashes  NEURO: strength and tone- normal, sensory exam- grossly normal, mentation- intact, speech- normal, reflexes- symmetric  ABD:  nontender    Lab on 04/19/2024   Component Date Value Ref Range Status    Creatinine Urine mg/dL 04/19/2024 71.9  mg/dL Final    The reference ranges have not been established in urine creatinine. The results should be integrated into the clinical context for interpretation.    Albumin Urine mg/L 04/19/2024 50.9  mg/L Final    The reference ranges have not been established in urine albumin. The results should be integrated into the clinical context for interpretation.    Albumin Urine mg/g Cr 04/19/2024 70.79 (H)  0.00 - 17.00 mg/g Cr Final    Microalbuminuria is defined as an albumin:creatinine ratio of 17 to 299 for males and 25 to 299 for females. A ratio of albumin:creatinine of 300 or higher is indicative of overt proteinuria.  Due to biologic variability, positive results should be confirmed by a second, first-morning random or 24-hour timed urine specimen. If there is discrepancy, a third specimen is recommended. When 2 out of 3 results are in the microalbuminuria range, this is evidence for incipient nephropathy and warrants increased efforts at glucose control, blood pressure control, and institution of therapy with an angiotensin-converting-enzyme (ACE) " inhibitor (if the patient can tolerate it).      Hemoglobin A1C 04/19/2024 8.4 (H)  0.0 - 5.6 % Final    Normal <5.7%   Prediabetes 5.7-6.4%    Diabetes 6.5% or higher     Note: Adopted from ADA consensus guidelines.           Signed Electronically by: Jimmy Catalan MD

## 2024-05-12 ENCOUNTER — HOSPITAL ENCOUNTER (EMERGENCY)
Facility: CLINIC | Age: 89
Discharge: HOME | End: 2024-05-12
Attending: EMERGENCY MEDICINE | Admitting: EMERGENCY MEDICINE
Payer: COMMERCIAL

## 2024-05-12 VITALS
RESPIRATION RATE: 18 BRPM | WEIGHT: 222 LBS | OXYGEN SATURATION: 94 % | SYSTOLIC BLOOD PRESSURE: 143 MMHG | HEIGHT: 71 IN | TEMPERATURE: 98.1 F | DIASTOLIC BLOOD PRESSURE: 71 MMHG | HEART RATE: 95 BPM | BODY MASS INDEX: 31.08 KG/M2

## 2024-05-12 DIAGNOSIS — L03.032 CELLULITIS OF THIRD TOE OF LEFT FOOT: ICD-10-CM

## 2024-05-12 DIAGNOSIS — L03.032 CELLULITIS OF FOURTH TOE OF LEFT FOOT: ICD-10-CM

## 2024-05-12 PROCEDURE — 250N000013 HC RX MED GY IP 250 OP 250 PS 637: Performed by: EMERGENCY MEDICINE

## 2024-05-12 PROCEDURE — 99283 EMERGENCY DEPT VISIT LOW MDM: CPT | Performed by: EMERGENCY MEDICINE

## 2024-05-12 RX ORDER — CEPHALEXIN 500 MG/1
500 CAPSULE ORAL 4 TIMES DAILY
Qty: 40 CAPSULE | Refills: 0 | Status: SHIPPED | OUTPATIENT
Start: 2024-05-12 | End: 2024-08-19

## 2024-05-12 RX ORDER — CEPHALEXIN 500 MG/1
500 CAPSULE ORAL ONCE
Status: COMPLETED | OUTPATIENT
Start: 2024-05-12 | End: 2024-05-12

## 2024-05-12 RX ADMIN — CEPHALEXIN 500 MG: 500 CAPSULE ORAL at 20:42

## 2024-05-12 ASSESSMENT — COLUMBIA-SUICIDE SEVERITY RATING SCALE - C-SSRS
6. HAVE YOU EVER DONE ANYTHING, STARTED TO DO ANYTHING, OR PREPARED TO DO ANYTHING TO END YOUR LIFE?: NO
2. HAVE YOU ACTUALLY HAD ANY THOUGHTS OF KILLING YOURSELF IN THE PAST MONTH?: NO
1. IN THE PAST MONTH, HAVE YOU WISHED YOU WERE DEAD OR WISHED YOU COULD GO TO SLEEP AND NOT WAKE UP?: NO

## 2024-05-13 NOTE — ED PROVIDER NOTES
ED Provider Note  [facility]      History     Chief Complaint   Patient presents with    Wound Infection     HPI  Fawad Garcia is a 88 year old male who has medical history significant for insulin-dependent diabetes, presenting emergency department with his son for concerns regarding discoloration of the third and fourth digits of the left foot.  Wife had just noticed this yesterday evening.  Patient had not noticed any discoloration recently.  No recent surgeries of the feet.  Patient with decreased sensation secondary to his diabetes.  Prior great toe amputation.  No fever.  No other concerns.  No recent antibiotics.        Independent Historian:        Review of External Notes:          Allergies:  Allergies   Allergen Reactions    Zocor [Simvastatin - High Dose]      Bilateral hip aching       Problem List:    Patient Active Problem List    Diagnosis Date Noted    Tremor of left hand 06/14/2022     Priority: Medium    Left leg weakness 06/14/2022     Priority: Medium    Dermatitis 01/14/2022     Priority: Medium    Skin lesion 01/14/2022     Priority: Medium    Chronic kidney disease, stage 3 (H) 07/21/2021     Priority: Medium    TIA (transient ischemic attack) 07/11/2021     Priority: Medium    H/O amputation of lesser toe, right (H24) 01/04/2021     Priority: Medium    Macular degeneration (senile) of retina 01/10/2019     Priority: Medium     Nearly legally blind per eye doctor.      Type 2 diabetes mellitus with both eyes affected by mild nonproliferative retinopathy without macular edema, with long-term current use of insulin (H) 07/30/2018     Priority: Medium    Senile nuclear sclerosis 04/01/2015     Priority: Medium    Hypertension, goal below 140/90 03/25/2015     Priority: Medium    Malignant neoplasm of prostate (H) 06/16/2014     Priority: Medium    Advanced directives, counseling/discussion 02/24/2012     Priority: Medium     Patient does not have an Advance/Health  "Care Directive (HCD), given \"What is Advance Care Planning?\" flyer, accepts referral to Facilitator and requests blank HCD form.    Roro Wright  February 24, 2012        Peripheral vascular disease (H24) 11/18/2011     Priority: Medium     Problem list name updated by automated process. Provider to review      GERD (gastroesophageal reflux disease) 11/03/2011     Priority: Medium    Hyperlipidemia LDL goal <70 10/31/2010     Priority: Medium    Transient cerebral ischemia 09/03/2009     Priority: Medium     Diagnosis updated by automated process. Provider to review and confirm.      Obesity      Priority: Medium     Obesity  Problem list name updated by automated process. Provider to review          Past Medical History:    Past Medical History:   Diagnosis Date    Diabetes mellitus (H)     Hypertension     Squamous cell carcinoma        Past Surgical History:    Past Surgical History:   Procedure Laterality Date    AMPUTATE TOE(S) Right 7/23/2019    Procedure: Partial amputation great toe;  Surgeon: Ethan Montesinos DPM;  Location: [facility] OR    AMPUTATE TOE(S) Left 12/24/2019    Procedure: Amputation of the great toe, left foot;  Surgeon: Ethan Montesinos DPM;  Location: [facility] OR    AMPUTATE TOE(S) Right 3/3/2020    Procedure: Partial amputation, second toe, right foot;  Surgeon: Ethan Montesinos DPM;  Location: [facility] OR    CL AFF SURGICAL PATHOLOGY  2002    prostate biopsy elevated PSA    COLONOSCOPY      HC REMOVE TONSILS/ADENOIDS,<11 Y/O      T & A    IR LOWER EXTREMITY ANGIOGRAM LEFT  2/26/2020    IR LOWER EXTREMITY ANGIOGRAM LEFT  7/19/2023    IR LOWER EXTREMITY ANGIOGRAM RIGHT  12/11/2018    PHACOEMULSIFICATION WITH STANDARD INTRAOCULAR LENS IMPLANT Left 4/2/2015    Procedure: PHACOEMULSIFICATION WITH STANDARD INTRAOCULAR LENS IMPLANT;  Surgeon: Joseph Hernandez MD;  Location: [facility] OR    PHACOEMULSIFICATION WITH STANDARD INTRAOCULAR LENS IMPLANT Right 5/4/2015    Procedure: PHACOEMULSIFICATION WITH " STANDARD INTRAOCULAR LENS IMPLANT;  Surgeon: Joseph Hernandez MD;  Location: [facility] OR    REPAIR HAMMER TOE Left 4/2/2019    Procedure: 5th Metatarsal Head Resection;  Surgeon: Ethan Montesinos DPM;  Location: [facility] OR       Family History:    Family History   Problem Relation Age of Onset    Cancer Mother         intestinal CA    Other Cancer Mother         cancer    Diabetes Father     Cerebrovascular Disease Father     Diabetes Brother     Other Cancer Son         GIST cancer    Other Cancer Brother         Pancreatic cancer    Diabetes Son     Melanoma No family hx of        Social History:  Marital Status:   [2]  Social History     Tobacco Use    Smoking status: Never    Smokeless tobacco: Never   Vaping Use    Vaping status: Never Used   Substance Use Topics    Alcohol use: Not Currently     Alcohol/week: 1.7 standard drinks of alcohol     Types: 2 Standard drinks or equivalent per week     Comment: very little    Drug use: No        Medications:    cephALEXin (KEFLEX) 500 MG capsule  amLODIPine (NORVASC) 5 MG tablet  aspirin (ASA) 81 MG EC tablet  augmented betamethasone dipropionate (DIPROLENE AF) 0.05 % external cream  blood glucose monitoring (ONE TOUCH ULTRA 2) meter device kit  clopidogrel (PLAVIX) 75 MG tablet  dorzolamide-timolol (COSOPT) 2-0.5 % ophthalmic solution  hydrochlorothiazide (HYDRODIURIL) 25 MG tablet  insulin aspart (NOVOLOG PEN) 100 UNIT/ML pen  insulin glargine (LANTUS SOLOSTAR) 100 UNIT/ML pen  insulin pen needle (ULTICARE SHORT) 31G X 8 MM miscellaneous  Lancets (ONETOUCH DELICA PLUS [id number]) MISC  Lancets (ONETOUCH DELICA PLUS [id number]) MISC  lisinopril (ZESTRIL) 40 MG tablet  metFORMIN (GLUCOPHAGE) 500 MG tablet  Multiple Vitamins-Minerals (PRESERVISION AREDS PO)  ONETOUCH ULTRA test strip  ORDER FOR DME  pantoprazole (PROTONIX) 20 MG EC tablet  rosuvastatin (CRESTOR) 10 MG tablet  triamcinolone (KENALOG) 0.1 % external cream          Review of Systems  A medically  "appropriate review of systems was performed with pertinent positives and negatives noted in the HPI, and all other systems negative.    Physical Exam   Patient Vitals for the past 24 hrs:   BP Temp Temp src Pulse Resp SpO2 Height Weight   05/12/24 2005 (!) 143/71 98.1  F (36.7  C) Tympanic 95 18 97 % 1.803 m (5' 11\") 100.7 kg (222 lb)          Physical Exam  General: alert and in no acute distress on arrival  Head: atraumatic, normocephalic  Lungs:  nonlabored  CV:  extremities warm and perfused  Abd: nondistended  Skin: As below.  Status post great toe amputation.  There is small wounds, near the cuticle areas and toenail of digits 3 and 4 of the left foot.          Neuro: Patient awake, alert, speech is fluent,   Psychiatric: affect/mood normal,        ED Course                 Procedures                           No results found. However, due to the size of the patient record, not all encounters were searched. Please check Results Review for a complete set of results.    MEDICATIONS GIVEN IN THE EMERGENCY DEPARTMENT:  Medications   cephALEXin (KEFLEX) capsule 500 mg (has no administration in time range)           Independent Interpretation (X-rays, CTs, rhythm strip):  None    Consultations/Discussion of Management or Tests:  None       Social Determinants of Health affecting care:         Assessments & Plan (with Medical Decision Making)  88 year old male who presents to the Emergency Department for evaluation of left foot and left toe discoloration.  Patient had his wife noticed discoloration of the digits 3 and 4 of the left foot which occurred yesterday evening.  No fever.  No spreading redness.  Patient with no right-sided symptoms.  Has had prior toe amputation, however no recent surgeries.  Patient will be prescribed cephalexin.  Instructed on soaks with warm soapy water, and applying topical antibiotic ointment.  Follow-up recommended in clinic if not improving, with return instructions discussed if " fever, spreading redness, or other concerns develop.  Patient's blood sugars have reportedly been much improved as of recent as well.       I have reviewed the nursing notes.    I have reviewed the findings, diagnosis, plan and need for follow up with the patient.        Critical Care time:  none      NEW PRESCRIPTIONS STARTED AT TODAY'S ER VISIT  Current Discharge Medication List        START taking these medications    Details   cephALEXin (KEFLEX) 500 MG capsule Take 1 capsule (500 mg) by mouth 4 times daily  Qty: 40 capsule, Refills: 0             Final diagnoses:   Cellulitis of fourth toe of left foot   Cellulitis of third toe of left foot       5/12/2024   [facility] EMERGENCY DEPT       Eriberto, Mahin Robbins MD  05/12/24 2039

## 2024-05-13 NOTE — ED TRIAGE NOTES
Pt's spouse noticed yesterday that pt's 3rd and 4th toes on his L foot are reddened and appear infected.  Pt is diabetic and has had some toes amputated.      Triage Assessment (Adult)       Row Name 05/12/24 2005          Triage Assessment    Airway WDL WDL        Cognitive/Neuro/Behavioral WDL    Cognitive/Neuro/Behavioral WDL WDL

## 2024-05-13 NOTE — DISCHARGE INSTRUCTIONS
Recommend soaking foot in soapy water a couple times daily.  Apply bacitracin/Neosporin/triple antibiotic ointment a few times daily.    Antibiotics as prescribed.    Return or be seen if spreading redness, fever, or other concerns develop.

## 2024-05-15 ENCOUNTER — VIRTUAL VISIT (OUTPATIENT)
Dept: EDUCATION SERVICES | Facility: CLINIC | Age: 89
End: 2024-05-15
Payer: COMMERCIAL

## 2024-05-15 ENCOUNTER — TELEPHONE (OUTPATIENT)
Dept: FAMILY MEDICINE | Facility: CLINIC | Age: 89
End: 2024-05-15

## 2024-05-15 DIAGNOSIS — E11.3293 TYPE 2 DIABETES MELLITUS WITH BOTH EYES AFFECTED BY MILD NONPROLIFERATIVE RETINOPATHY WITHOUT MACULAR EDEMA, WITH LONG-TERM CURRENT USE OF INSULIN (H): Primary | ICD-10-CM

## 2024-05-15 DIAGNOSIS — Z79.4 TYPE 2 DIABETES MELLITUS WITH BOTH EYES AFFECTED BY MILD NONPROLIFERATIVE RETINOPATHY WITHOUT MACULAR EDEMA, WITH LONG-TERM CURRENT USE OF INSULIN (H): Primary | ICD-10-CM

## 2024-05-15 PROCEDURE — G0108 DIAB MANAGE TRN  PER INDIV: HCPCS | Mod: 93 | Performed by: DIETITIAN, REGISTERED

## 2024-05-15 NOTE — PROGRESS NOTES
Diabetes Education Follow-up    Type of Service: Telephone Visit    Originating Location (Patient Location): Home  Distant Location (Provider Location): [facility]  Mode of Communication:  Telephone    Telephone Visit Start Time:  3:02pm  Telephone Visit End Time (telephone visit stop time): 3:45pm    How would patient like to obtain AVS? Kaylen    Subjective/Objective:    Fawad Garcia sent in blood glucose log for review. Last date of communication was: 3/13/24.        Diabetes is being managed with Diabetes Medications   Diabetes Medication(s)       Biguanides       metFORMIN (GLUCOPHAGE) 500 MG tablet Take 2 tablets (1,000 mg) by mouth 2 times daily (with meals)       Insulin       insulin aspart (NOVOLOG PEN) 100 UNIT/ML pen Inject 3 units before breakfast, 4 units before lunch and 6 units before supper.     insulin glargine (LANTUS SOLOSTAR) 100 UNIT/ML pen Inject 23 Units Subcutaneous at bedtime Hold on file until needed.        Pt states taking Novolog 2-3-5-0    BG/Food Log:   Date Breakfast  Lunch  Dinner  6pm  Bedtime    Before  8-9am After Before After Before After   9:30-  10pm   5/15 100         5/14 112      281   5/13 112      279   5/12 104      112   5/11 96      252   5/10 95      85   5/9 86      138   5/8 128      185   5/7 86      124   Pt gives me the readings and then thinks he gave me them backwards.    Assessment:    Fasting blood glucose: 100% in target.  Bedtime glucose: 50% in target.  BG look improved from previous visit on 3/13/24.    Went to ED on 5/12/24 w/ cellulitis of 3rd and 4th toes.      Discussed impact of infection on BG readings and importance of following all guidelines given for him to care for his toes and feet.    Pt reports often eating or taking OJ to prevent low BG.  Suggested doing a cgms because if he is feeling the need to eat to prevent lows, we might be giving him too much insulin.  He is not interested in cgms (personal or  professional).  He admits enjoying foods that are high in sugar (contributing to inconsistent intake of carbohydrate).      Plan/Response:     Keep Lantus at 23 units.  It would be helpful to test BG more frequently to help evaluate use of Novolog and BG control.  Increase BG testing to 4 times/day (fasting, presupper, 2 hr after supper and HS).    Continue Novolog like you are taking 2-3-5-0.  Continue Metformin as prescribed.  Follow up with Jimmy Catalan as scheduled 7/22/24.  Follow up with Diabetes Education 8/13/24 at 2pm (phone visit).    Prudence York RD, [facility], 3:50 PM, 5/15/2024    Time spent: 43 minutes, billed 30 minutes.    Any diabetes medication dose changes were made via the CDE Protocol and Collaborative Practice Agreement with the patient's primary care provider. A copy of this encounter was shared with the provider.

## 2024-05-15 NOTE — LETTER
5/15/2024         RE: Fawad Garcia  [address]  [facility] [address]        Dear Colleague,    Thank you for referring your patient, Fawad Garcia, to the [facility]. Please see a copy of my visit note below.    Diabetes Education Follow-up    Type of Service: Telephone Visit    Originating Location (Patient Location): Home  Distant Location (Provider Location): [facility]  Mode of Communication:  Telephone    Telephone Visit Start Time:  3:02pm  Telephone Visit End Time (telephone visit stop time): 3:45pm    How would patient like to obtain AVS? MyChart    Subjective/Objective:    Fawad Garcia sent in blood glucose log for review. Last date of communication was: 3/13/24.        Diabetes is being managed with Diabetes Medications   Diabetes Medication(s)       Biguanides       metFORMIN (GLUCOPHAGE) 500 MG tablet Take 2 tablets (1,000 mg) by mouth 2 times daily (with meals)       Insulin       insulin aspart (NOVOLOG PEN) 100 UNIT/ML pen Inject 3 units before breakfast, 4 units before lunch and 6 units before supper.     insulin glargine (LANTUS SOLOSTAR) 100 UNIT/ML pen Inject 23 Units Subcutaneous at bedtime Hold on file until needed.        Pt states taking Novolog 2-3-5-0    BG/Food Log:   Date Breakfast  Lunch  Dinner  6pm  Bedtime    Before  8-9am After Before After Before After   9:30-  10pm   5/15 100         5/14 112      281   5/13 112      279   5/12 104      112   5/11 96      252   5/10 95      85   5/9 86      138   5/8 128      185   5/7 86      124   Pt gives me the readings and then thinks he gave me them backwards.    Assessment:    Fasting blood glucose: 100% in target.  Bedtime glucose: 50% in target.  BG look improved from previous visit on 3/13/24.    Went to ED on 5/12/24 w/ cellulitis of 3rd and 4th toes.      Discussed impact of infection on BG readings and importance of following all guidelines given for  him to care for his toes and feet.    Pt reports often eating or taking OJ to prevent low BG.  Suggested doing a cgms because if he is feeling the need to eat to prevent lows, we might be giving him too much insulin.  He is not interested in cgms (personal or professional).  He admits enjoying foods that are high in sugar (contributing to inconsistent intake of carbohydrate).      Plan/Response:     Keep Lantus at 23 units.  It would be helpful to test BG more frequently to help evaluate use of Novolog and BG control.  Increase BG testing to 4 times/day (fasting, presupper, 2 hr after supper and HS).    Continue Novolog like you are taking 2-3-5-0.  Continue Metformin as prescribed.  Follow up with Jimmy Catalan as scheduled 7/22/24.  Follow up with Diabetes Education 8/13/24 at 2pm (phone visit).    Prudence York RD, [facility], 3:50 PM, 5/15/2024    Time spent: 43 minutes, billed 30 minutes.    Any diabetes medication dose changes were made via the CDE Protocol and Collaborative Practice Agreement with the patient's primary care provider. A copy of this encounter was shared with the provider.

## 2024-05-15 NOTE — PATIENT INSTRUCTIONS
Plan/Response:      Keep Lantus at 23 units.  It would be helpful to test BG more frequently to help evaluate use of Novolog and BG control.  Increase BG testing to 4 times/day (fasting, presupper, 2 hr after supper and HS).    Continue Novolog like you are taking 2-3-5-0.  Continue Metformin as prescribed.  Follow up with Jimmy Catalan as scheduled 7/22/24.  Follow up with Diabetes Education 8/13/24 at 2pm (phone visit).

## 2024-05-15 NOTE — TELEPHONE ENCOUNTER
"Symptoms    Describe your symptoms: Pt was seen in ER 5/12 for toe cellulitis.  He wants to know if he can go \"disabled golfing.\"  He will only need to stand to hit the ball.  Pt has no pain, but was told to stay off his feet.  Please call patient and advise.      Any pain: No    How long have you been having symptoms: Ongoing      Have you been seen for this:  Yes: ER    Appointment offered?: No    Triage offered?: Yes:     Home remedies tried:     Preferred Pharmacy:   [facility]. - [address] [address]  Phone: [phone] Fax: [phone]    Could we send this information to you in [facility] or would you prefer to receive a phone call?:   Patient would prefer a phone call     Okay to leave a detailed message?: Yes at Home number on file [phone] (home)    "

## 2024-05-17 ENCOUNTER — HOSPITAL ENCOUNTER (OUTPATIENT)
Dept: CARDIOLOGY | Facility: CLINIC | Age: 89
Discharge: HOME | End: 2024-05-17
Attending: FAMILY MEDICINE | Admitting: FAMILY MEDICINE
Payer: COMMERCIAL

## 2024-05-17 DIAGNOSIS — R01.1 NEWLY RECOGNIZED HEART MURMUR: ICD-10-CM

## 2024-05-17 LAB — LVEF ECHO: NORMAL

## 2024-05-17 PROCEDURE — 255N000002 HC RX 255 OP 636: Performed by: FAMILY MEDICINE

## 2024-05-17 PROCEDURE — C8929 TTE W OR WO FOL WCON,DOPPLER: HCPCS

## 2024-05-17 PROCEDURE — 93306 TTE W/DOPPLER COMPLETE: CPT | Mod: 26 | Performed by: INTERNAL MEDICINE

## 2024-05-17 RX ADMIN — HUMAN ALBUMIN MICROSPHERES AND PERFLUTREN 4 ML: 10; .22 INJECTION, SOLUTION INTRAVENOUS at 11:35

## 2024-05-17 NOTE — TELEPHONE ENCOUNTER
Patient returned call to care team, he says he did end up going disabled golfing, but used a golf cart and only got up to cindy off but then did not walk around, says it is healing well, wonders if he should continue to do the warm soapy soaks and antibiotic ointment till sees podiatry next week, he is told yes to continue this per ED provider note on 5-12-24 until sees podiatry, no further questions.      DONALD Contreras

## 2024-05-20 DIAGNOSIS — I51.7 CONCENTRIC LEFT VENTRICULAR HYPERTROPHY: ICD-10-CM

## 2024-05-20 DIAGNOSIS — I35.0 MODERATE AORTIC STENOSIS: Primary | ICD-10-CM

## 2024-05-23 ENCOUNTER — OFFICE VISIT (OUTPATIENT)
Dept: PODIATRY | Facility: CLINIC | Age: 89
End: 2024-05-23
Payer: COMMERCIAL

## 2024-05-23 VITALS
HEART RATE: 71 BPM | HEIGHT: 71 IN | DIASTOLIC BLOOD PRESSURE: 76 MMHG | WEIGHT: 222 LBS | BODY MASS INDEX: 31.08 KG/M2 | SYSTOLIC BLOOD PRESSURE: 127 MMHG

## 2024-05-23 DIAGNOSIS — L97.529 TYPE 2 DIABETES MELLITUS WITH LEFT DIABETIC FOOT ULCER (H): Primary | ICD-10-CM

## 2024-05-23 DIAGNOSIS — E11.621 TYPE 2 DIABETES MELLITUS WITH LEFT DIABETIC FOOT ULCER (H): Primary | ICD-10-CM

## 2024-05-23 PROCEDURE — 99213 OFFICE O/P EST LOW 20 MIN: CPT | Performed by: PODIATRIST

## 2024-05-23 NOTE — PATIENT INSTRUCTIONS
"DIABETES AND YOUR FEET  Diabetes can result in several problems in the feet including ulcers (open sores) and amputations. Two of the most important reasons why people develop foot problems when they have diabetes is : 1. Neuropathy (loss of feeling)  2. Vascular disease (loss or decrease of blood flow).    Neuropathy is a term used to describe a loss of nerve function.  Patients with diabetes are at risk of developing neuropathy if their sugars continue to run high and are above the normal value. One theory for neuropathy is that the \"extra\" sugar in the body enters the nerves and is broken down. These by-products build up in the nerve causing it to swell and impairing nerve function. Often times, this can be prevented by controlling your sugars, dieting and exercise.    When a person develops neuropathy, they usually begin to feel numbness or tingling in their feet and sometime in their legs.  Other symptoms may include painful burning or hot feet, tingling or feeling like insects or ants are crawling on your feet or legs.  If the diabetes is sever and the sugars run high for long periods of time, neuropathy can also occur in the hands.    Vascular disease  is a term used to describe a loss or decrease in circulation (blood flow). There is a problem in getting blood and oxygen to areas that need it. Similar to neuropathy, sugars can build up in the walls of the arteries (blood vessels) and cause them to become swollen, thickened and hardened. This decreases the amount of blood that can go to an area that needs it. Though this is common in the legs of diabetic patients, it can also affect other arteries (blood vessels) in the body such as in the heart and eyes.    In the legs, vascular disease usually results in cramping. Patients who develop leg cramps after walking the same distance every time (i.e. One block, half a mile, ect.) need to let their doctors know so that their circulation may be checked. Cramps " "causing severe pain in the feet and/or legs while sleeping and the cramps go away when you stand or hang your legs off the side of the bed, may also be a sign of poor blood circulation.  Occasional cramping in cold weather or on rare occasions with activity may not be due to poor circulation, but you should inform your doctor.    PREVENTION OF THESE DISEASES  The key to prevention is good blood sugar control. Poor blood sugar control is a big reason many of these problems start. Physical activity (exercise) is a very good way to help decrease your blood sugars. Exercise can lower your blood sugar, blood pressure, and cholesterol. It also reduces your risk for heart disease and stroke, relieves stress, and strengthens your heart, muscles and bones.  In addition, regular activity helps insulin work better, improves your blood circulation, and keeps your joints flexible. If you're trying to lose weight, a combination of exercise and wise food choices can help you reach your target weight and maintain it.      PAIN MANAGEMENT (**Please speak with your primary doctor about any medications**)  1.Blood Sugar Control - Most important  2. Medications such as:  Amytriptylline, duloxetine, gabapentin, lyrica, tramadol (talk with your primary care doctor about this).     NUTRITION:  Nutrition is also important to help with healing. If your body does not have what it needs, it can't heal.   Increasing your protein intake is important.  With wounds you need 60-90gm of protein a day to help with healing. Over the counter protein shakes such as Derek, Glucerna, Ensure, ect... can help to supplement your daily protein intake.   It is also important to take Vitamins to help with healing.  Vitamins such as B12, B6 and Vitamin D3 are important for healing. These can be gotten over the counter at pharmacies or at stores like [employer] or the [employer].    I can also prescribe a dietary supplement called \"Rheumate\" that has a lot of " essential vitamins in one capsule.  This may not be covered by insurance though.     FOOT CARE RECOMMENDATIONS   1. Wash your feet with lukewarm water and a mild soap and then dry them thoroughly, especially between the toes.     2. Examine your feet daily looking for cuts, corns, blisters, cracks, ect, especially after wearing new shoes. Make sure to look between your toes. If you cannot see the bottom of your feet, set a mirror on the floor and hold your foot over it, or ask a spouse, friend or family member to examine your feet for you. Contact your doctor immediately if new problems are noted or if sores are not healing.     3. Immediately apply moisturizer to the tops and bottoms of your feet, avoiding areas between the toes. Hand lotion (Intesive Care, Nery, Eucerin, Neutrogena, Curel, ect) is sufficient unless your doctor prescribes a medicated lotion. Apply sunscreen to your feet when going swimming outside.     4. Use clean comfortable shoes, wear white socks (if you have any bleeding or drainage, you will see it on white socks). Socks should not have thick seams or cut off the circulation around the leg. Break in new shoes slowly and rotate with older shoes until broken in. Check the inside of your shoes with your hand to look for areas of irritation or objects that may have fallen into your shoes.       5. Keep slippers by the side of your bed for use during the night.     6.  Shoes should be fitted by a professional and should not cause areas of irritation.  Check your feet regularly when wearing a new pair of shoes and replace them as needed.     7.  Talk to your doctor about proper exercise. Exercise and stretching stimulate blood flow to your feet and maintain proper glucose levels.     8.  Monitor your blood glucose level as instructed by your doctor. Notify your doctor immediately if your blood sugar is abnormally high or low.    9. Cut your nails straight across, but then gently round any sharp  edges with a cardboard nail file. If you have neuropathy, peripheral vascular disease or cannot see that well to trim your own toenails contact [employer] ([phone]) or [employer] ([phone]).      THINGS TO AVOID DOING   1.  Do not soak your feet if you have an open sore. Use only lukewarm water and always check the temperature with your hand as hot water can easily burn your feet.       2.  Never use a hot water bottle or heating pad on your feet. Also do not apply cold compresses to your feet. With decreased sensation, you could burn or freeze your feet.       3.  Do not apply any of these to your feet:    -  Over the counter medicine for corns or warts    -  Harsh chemicals like boric acid    -  Do not self-treat corns, cuts, blisters or infections. Always consult your doctor.       4.  Do not wear sandals, slippers or walk barefoot, especially on hot sand or concrete or other harsh surfaces.     5.  If you smoke, stop!!!

## 2024-05-23 NOTE — Clinical Note
5/23/2024         RE: Fawad Garcia  [address]  [facility] [address]        Dear Colleague,    Thank you for referring your patient, Fawad Garcia, to the [facility] [place]. Please see a copy of my visit note below.    PATIENT HISTORY:  Fawad Garcia is a 88 year old male who presents to clinic for a diabetic foot evaluation.  *** The patient relates some numbness to the feet.  The patient denies any redness, swelling or open sores on both feet.  The patient relates blood sugars are within normal limits.  The patient was sent by  *** for consultation on the {RIGHT /LEFT:[id number]} foot.       REVIEW OF SYSTEMS:  Constitutional, HEENT, cardiovascular, pulmonary, GI, , musculoskeletal, neuro, skin, endocrine and psych systems are negative, except as otherwise noted.     PAST MEDICAL HISTORY:   Past Medical History:   Diagnosis Date    Diabetes mellitus (H)     Hypertension     Squamous cell carcinoma         PAST SURGICAL HISTORY:   Past Surgical History:   Procedure Laterality Date    AMPUTATE TOE(S) Right 7/23/2019    Procedure: Partial amputation great toe;  Surgeon: Ethan Montesinos DPM;  Location: [facility]    AMPUTATE TOE(S) Left 12/24/2019    Procedure: Amputation of the great toe, left foot;  Surgeon: Ethan Montesinos DPM;  Location: [facility]    AMPUTATE TOE(S) Right 3/3/2020    Procedure: Partial amputation, second toe, right foot;  Surgeon: Ethan Montesinos DPM;  Location: [facility]    [facility] SURGICAL PATHOLOGY  2002    prostate biopsy elevated PSA    COLONOSCOPY      HC REMOVE TONSILS/ADENOIDS,<13 Y/O      T & A    IR LOWER EXTREMITY ANGIOGRAM LEFT  2/26/2020    IR LOWER EXTREMITY ANGIOGRAM LEFT  7/19/2023    IR LOWER EXTREMITY ANGIOGRAM RIGHT  12/11/2018    PHACOEMULSIFICATION WITH STANDARD INTRAOCULAR LENS IMPLANT Left 4/2/2015    Procedure: PHACOEMULSIFICATION WITH STANDARD INTRAOCULAR LENS IMPLANT;  Surgeon: Joseph Hernandez MD;   Location: [facility]    PHACOEMULSIFICATION WITH STANDARD INTRAOCULAR LENS IMPLANT Right 5/4/2015    Procedure: PHACOEMULSIFICATION WITH STANDARD INTRAOCULAR LENS IMPLANT;  Surgeon: Joseph Hernandez MD;  Location: [facility]    REPAIR HAMMER TOE Left 4/2/2019    Procedure: 5th Metatarsal Head Resection;  Surgeon: Ethan Montesinos DPM;  Location: [facility]        MEDICATIONS:   Current Outpatient Medications:     amLODIPine (NORVASC) 5 MG tablet, Take 1 tablet (5 mg) by mouth daily, Disp: 90 tablet, Rfl: 3    aspirin (ASA) 81 MG EC tablet, Take 1 tablet (81 mg) by mouth daily, Disp: , Rfl:     augmented betamethasone dipropionate (DIPROLENE AF) 0.05 % external cream, Apply sparingly to affected area twice daily as needed.  Do not apply to face., Disp: 100 g, Rfl: 3    blood glucose monitoring (ONE TOUCH ULTRA 2) meter device kit, Use to test blood sugar 2 times daily or as directed., Disp: 1 kit, Rfl: 0    cephALEXin (KEFLEX) 500 MG capsule, Take 1 capsule (500 mg) by mouth 4 times daily, Disp: 40 capsule, Rfl: 0    clopidogrel (PLAVIX) 75 MG tablet, Take 1 tablet (75 mg) by mouth daily, Disp: 90 tablet, Rfl: 3    dorzolamide-timolol (COSOPT) 2-0.5 % ophthalmic solution, , Disp: , Rfl:     hydrochlorothiazide (HYDRODIURIL) 25 MG tablet, Take 1 tablet (25 mg) by mouth daily, Disp: 90 tablet, Rfl: 3    insulin aspart (NOVOLOG PEN) 100 UNIT/ML pen, Inject 3 units before breakfast, 4 units before lunch and 6 units before supper., Disp: 15 mL, Rfl: 3    insulin glargine (LANTUS SOLOSTAR) 100 UNIT/ML pen, Inject 23 Units Subcutaneous at bedtime Hold on file until needed., Disp: 30 mL, Rfl: 3    insulin pen needle (ULTICARE SHORT) 31G X 8 MM miscellaneous, Use 2 pen needles daily or as directed., Disp: 200 each, Rfl: 3    Lancets (ONETOUCH DELICA PLUS [id number]) MISC, 1 each by In Vitro route See Admin Instructions Hold on file until needed., Disp: 100 each, Rfl: 1    Lancets (ONETOUCH DELICA PLUS [id number]) MISC, 1 each by In  Vitro route See Admin Instructions Hold on file until needed., Disp: 100 each, Rfl: 4    lisinopril (ZESTRIL) 40 MG tablet, Take 1 tablet (40 mg) by mouth daily, Disp: 90 tablet, Rfl: 3    metFORMIN (GLUCOPHAGE) 500 MG tablet, Take 2 tablets (1,000 mg) by mouth 2 times daily (with meals), Disp: 360 tablet, Rfl: 3    Multiple Vitamins-Minerals (PRESERVISION AREDS PO), Take 1 tablet by mouth, Disp: , Rfl:     ONETOUCH ULTRA test strip, 2 strips by In Vitro route daily One touch Ultra 2., Disp: 100 strip, Rfl: 3    ORDER FOR DME, Equipment being ordered:  Glucometer, Disp: 1 Device, Rfl: 1    pantoprazole (PROTONIX) 20 MG EC tablet, Take 1 tablet (20 mg) by mouth daily, Disp: 90 tablet, Rfl: 3    rosuvastatin (CRESTOR) 10 MG tablet, Take 1 tablet (10 mg) by mouth at bedtime, Disp: 90 tablet, Rfl: 3    triamcinolone (KENALOG) 0.1 % external cream, Apply topically 2 times daily To dry skin patches as needed.  Hold on file until needed., Disp: 80 g, Rfl: 1     ALLERGIES:    Allergies   Allergen Reactions    Zocor [Simvastatin - High Dose]      Bilateral hip aching        SOCIAL HISTORY:   Social History     Socioeconomic History    Marital status:      Spouse name: Not on file    Number of children: Not on file    Years of education: Not on file    Highest education level: Not on file   Occupational History    Not on file   Tobacco Use    Smoking status: Never    Smokeless tobacco: Never   Vaping Use    Vaping status: Never Used   Substance and Sexual Activity    Alcohol use: Not Currently     Alcohol/week: 1.7 standard drinks of alcohol     Types: 2 Standard drinks or equivalent per week     Comment: very little    Drug use: No    Sexual activity: Not Currently     Partners: Female   Other Topics Concern    Parent/sibling w/ CABG, MI or angioplasty before 65F 55M? No   Social History Narrative    Not on file     Social Determinants of Health     Financial Resource Strain: Low Risk  (1/15/2024)    Financial  Resource Strain     Within the past 12 months, have you or your family members you live with been unable to get utilities (heat, electricity) when it was really needed?: No   Food Insecurity: Low Risk  (1/15/2024)    Food Insecurity     Within the past 12 months, did you worry that your food would run out before you got money to buy more?: No     Within the past 12 months, did the food you bought just not last and you didn t have money to get more?: No   Transportation Needs: Low Risk  (1/15/2024)    Transportation Needs     Within the past 12 months, has lack of transportation kept you from medical appointments, getting your medicines, non-medical meetings or appointments, work, or from getting things that you need?: No   Physical Activity: Not on file   Stress: Not on file   Social Connections: Not on file   Interpersonal Safety: Low Risk  (1/29/2024)    Interpersonal Safety     Do you feel physically and emotionally safe where you currently live?: Yes     Within the past 12 months, have you been hit, slapped, kicked or otherwise physically hurt by someone?: No     Within the past 12 months, have you been humiliated or emotionally abused in other ways by your partner or ex-partner?: No   Housing Stability: Low Risk  (1/15/2024)    Housing Stability     Do you have housing? : Yes     Are you worried about losing your housing?: No        FAMILY HISTORY:   Family History   Problem Relation Age of Onset    Cancer Mother         intestinal CA    Other Cancer Mother         cancer    Diabetes Father     Cerebrovascular Disease Father     Diabetes Brother     Other Cancer Son         GIST cancer    Other Cancer Brother         Pancreatic cancer    Diabetes Son     Melanoma No family hx of         Vitals: There were no vitals taken for this visit.       Lower Extremity Evaluation:     Dermatologic: Skin is intact to both lower extremities without significant lesions, rash or abrasion.  No paronychia or evidence of soft  tissue infection is noted.  The nails are hypertrophic and discolored bilateral feet.     Vascular: DP & PT pulses are intact & regular bilaterally.   Capillary filling and skin temperature is normal to both lower extremities.  There are no varicosities, no edema and no trophic changes noted.      Neurologic:   No evidence of weakness in the lower extremities.  Noted evidence of neuropathy with diminished sensation bilaterally.       Musculoskeletal: Patient is ambulatory without assistive device or brace.  No gross ankle deformity noted.  No foot or ankle joint effusion is noted.  One notes hammertoe contracture of the lesser toes bilaterally.    Labs:      Assessment:        ICD-10-CM    1. 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  At this point, ***.  The patient was given information on local certified foot care nursing services that can provide routine nail care.    There is low risk of morbidity with the procedure.  There was no overlap in work associated with the evaluation/management and the work associated with the procedure.    I have discussed with the patient the importance of diabetic foot care and daily inspection of the feet.  The patient was instructed to come in anytime if there is any concern about the feet with redness, swelling or drainage is noted.    Fawad verbalized agreement with and understanding of the rational for the diagnosis and treatment plan.  All questions were answered to best of my ability and the patient's satisfaction. The patient was advised to contact the clinic with any questions that may arise after the clinic visit.      Disclaimer: This note consists of symbols derived from keyboarding, dictation and/or voice recognition software. As a result, there may be errors in the script that have gone undetected. Please consider this when  interpreting information found in this chart.        LIANG Montesinos D.P.M., [email or web address].            Again, thank you for allowing me to participate in the care of your patient.        Sincerely,        Ethan Montesinos DPM

## 2024-05-23 NOTE — NURSING NOTE
"Chief Complaint   Patient presents with    Consult     Left foot- third and fourth digit       Initial /76   Pulse 71   Ht 1.803 m (5' 11\")   Wt 100.7 kg (222 lb)   BMI 30.96 kg/m   Estimated body mass index is 30.96 kg/m  as calculated from the following:    Height as of this encounter: 1.803 m (5' 11\").    Weight as of this encounter: 100.7 kg (222 lb).  Medications and allergies reviewed.      Dana GUILLERMO MA    "

## 2024-06-12 ENCOUNTER — OFFICE VISIT (OUTPATIENT)
Dept: UROLOGY | Facility: CLINIC | Age: 89
End: 2024-06-12
Attending: STUDENT IN AN ORGANIZED HEALTH CARE EDUCATION/TRAINING PROGRAM
Payer: COMMERCIAL

## 2024-06-12 VITALS
WEIGHT: 222 LBS | BODY MASS INDEX: 30.96 KG/M2 | SYSTOLIC BLOOD PRESSURE: 148 MMHG | HEART RATE: 81 BPM | TEMPERATURE: 98.3 F | DIASTOLIC BLOOD PRESSURE: 77 MMHG

## 2024-06-12 DIAGNOSIS — N43.3 RIGHT HYDROCELE: ICD-10-CM

## 2024-06-12 PROCEDURE — 99213 OFFICE O/P EST LOW 20 MIN: CPT | Performed by: STUDENT IN AN ORGANIZED HEALTH CARE EDUCATION/TRAINING PROGRAM

## 2024-06-12 ASSESSMENT — PAIN SCALES - GENERAL: PAINLEVEL: NO PAIN (0)

## 2024-06-12 NOTE — NURSING NOTE
"Initial BP (!) 148/77 (BP Location: Right arm, Patient Position: Chair, Cuff Size: Adult Large)   Pulse 81   Temp 98.3  F (36.8  C) (Tympanic)   Wt 100.7 kg (222 lb)   BMI 30.96 kg/m   Estimated body mass index is 30.96 kg/m  as calculated from the following:    Height as of 5/23/24: 1.803 m (5' 11\").    Weight as of this encounter: 100.7 kg (222 lb). .  Daria Bai LPN    "

## 2024-06-12 NOTE — PROGRESS NOTES
UROLOGY OUTPATIENT VISIT      Chief Complaint:   Scrotal pain      Synopsis   Fawad Garcia is a very pleasant AGE: 88 year old year old person who presents for right testicular swelling    This has been going on for about 10 years.  It has been stable in size  Does not affect his daily living    He was seen by my partner Dr Nava a couple years ago and a scrotal ultrasound was ordered due to concern of a cyst in the left testicle.  This was imaged and also noted to be stable in size    The patient also has a history of prostate cancer and underwent radiation therapy in 2011 and is being followed by Radiation oncology. His PSA slowly rising now most recently 4.00    Today he reports feeling well.  He denies any discomfort in the scrotum.  He continues to feel like the hydroceles not affecting his life.    Imaging  4/3/2023  Scrotal US  IMPRESSION:   1. No change in size of the cystic lesion in the upper left testicle.  There is no septation seen on today's exam. No evidence for solid  component or other solid mass.  2. No evidence for torsion on either side.  3. Large right-sided hydrocele appears unchanged. Small left hydrocele  is present as well.     PSA   Date Value Ref Range Status   03/18/2021 1.78 0 - 4 ug/L Final     Comment:     Assay Method:  Chemiluminescence using Siemens Vista analyzer     PSA Tumor Marker   Date Value Ref Range Status   01/29/2024 4.00 ng/mL Final     Comment:     No reference ranges have been established for patients over 80 years.   07/08/2022 2.71 0.00 - 4.00 ug/L Final           Medical Comorbidities      Past Medical History:   Diagnosis Date    Diabetes mellitus (H)     Hypertension     Squamous cell carcinoma                Medications     Current Outpatient Medications   Medication Sig Dispense Refill    amLODIPine (NORVASC) 5 MG tablet Take 1 tablet (5 mg) by mouth daily 90 tablet 3    aspirin (ASA) 81 MG EC tablet Take 1 tablet (81 mg) by mouth daily      augmented  betamethasone dipropionate (DIPROLENE AF) 0.05 % external cream Apply sparingly to affected area twice daily as needed.  Do not apply to face. 100 g 3    blood glucose monitoring (ONE TOUCH ULTRA 2) meter device kit Use to test blood sugar 2 times daily or as directed. 1 kit 0    cephALEXin (KEFLEX) 500 MG capsule Take 1 capsule (500 mg) by mouth 4 times daily 40 capsule 0    clopidogrel (PLAVIX) 75 MG tablet Take 1 tablet (75 mg) by mouth daily 90 tablet 3    dorzolamide-timolol (COSOPT) 2-0.5 % ophthalmic solution       hydrochlorothiazide (HYDRODIURIL) 25 MG tablet Take 1 tablet (25 mg) by mouth daily 90 tablet 3    insulin aspart (NOVOLOG PEN) 100 UNIT/ML pen Inject 3 units before breakfast, 4 units before lunch and 6 units before supper. 15 mL 3    insulin glargine (LANTUS SOLOSTAR) 100 UNIT/ML pen Inject 23 Units Subcutaneous at bedtime Hold on file until needed. 30 mL 3    insulin pen needle (ULTICARE SHORT) 31G X 8 MM miscellaneous Use 2 pen needles daily or as directed. 200 each 3    Lancets (ONETOUCH DELICA PLUS [id number]) MISC 1 each by In Vitro route See Admin Instructions Hold on file until needed. 100 each 1    Lancets (ONETOUCH DELICA PLUS [id number]) MISC 1 each by In Vitro route See Admin Instructions Hold on file until needed. 100 each 4    lisinopril (ZESTRIL) 40 MG tablet Take 1 tablet (40 mg) by mouth daily 90 tablet 3    metFORMIN (GLUCOPHAGE) 500 MG tablet Take 2 tablets (1,000 mg) by mouth 2 times daily (with meals) 360 tablet 3    Multiple Vitamins-Minerals (PRESERVISION AREDS PO) Take 1 tablet by mouth      ONETOUCH ULTRA test strip 2 strips by In Vitro route daily One touch Ultra 2. 100 strip 3    ORDER FOR DME Equipment being ordered:   Glucometer 1 Device 1    pantoprazole (PROTONIX) 20 MG EC tablet Take 1 tablet (20 mg) by mouth daily 90 tablet 3    rosuvastatin (CRESTOR) 10 MG tablet Take 1 tablet (10 mg) by mouth at bedtime 90 tablet 3    triamcinolone (KENALOG) 0.1 % external cream  Apply topically 2 times daily To dry skin patches as needed.  Hold on file until needed. 80 g 1     No current facility-administered medications for this visit.            EXAM     Visually the scrotum and inguinal region without any skin changes or swelling or scars  The right hemiscrotum with moderate hydrocele.  Difficult to palpate the testicle.  Left testicle is soft.  Small cystic structure palpated in the superior portion of the left testicle         Assessment/Plan   88 year old year old person with right hydrocele and left testicular cyst.    No change in the last year.  Previously had an ultrasound that showed it was stable and benign in appearance.    Not bothersome for him.  We did discuss that if it becomes bothersome there are treatment options such as aspiration versus hydrocelectomy    We will follow-up with him on an as-needed basis    CC:  Jimmy Catalan [facility]    Problems:  3 -- one stable chronic illness    Level of risk:  2 -- Minimal risk of morbidity

## 2024-06-13 ENCOUNTER — HOSPITAL ENCOUNTER (EMERGENCY)
Facility: CLINIC | Age: 89
Discharge: HOME | End: 2024-06-13
Attending: EMERGENCY MEDICINE | Admitting: EMERGENCY MEDICINE
Payer: COMMERCIAL

## 2024-06-13 VITALS
DIASTOLIC BLOOD PRESSURE: 75 MMHG | SYSTOLIC BLOOD PRESSURE: 126 MMHG | HEIGHT: 70 IN | WEIGHT: 220 LBS | OXYGEN SATURATION: 97 % | BODY MASS INDEX: 31.5 KG/M2 | RESPIRATION RATE: 16 BRPM | TEMPERATURE: 98.3 F | HEART RATE: 98 BPM

## 2024-06-13 DIAGNOSIS — M79.5 FOREIGN BODY (FB) IN SOFT TISSUE: ICD-10-CM

## 2024-06-13 PROCEDURE — 99283 EMERGENCY DEPT VISIT LOW MDM: CPT | Performed by: EMERGENCY MEDICINE

## 2024-06-13 PROCEDURE — 99282 EMERGENCY DEPT VISIT SF MDM: CPT | Performed by: EMERGENCY MEDICINE

## 2024-06-13 ASSESSMENT — COLUMBIA-SUICIDE SEVERITY RATING SCALE - C-SSRS
2. HAVE YOU ACTUALLY HAD ANY THOUGHTS OF KILLING YOURSELF IN THE PAST MONTH?: NO
6. HAVE YOU EVER DONE ANYTHING, STARTED TO DO ANYTHING, OR PREPARED TO DO ANYTHING TO END YOUR LIFE?: NO
1. IN THE PAST MONTH, HAVE YOU WISHED YOU WERE DEAD OR WISHED YOU COULD GO TO SLEEP AND NOT WAKE UP?: NO

## 2024-06-14 NOTE — ED TRIAGE NOTES
Pt states he saw a wood tick crawling on his hand and thinks in may of gotten under his skin behind the left ear, attempted to remove it without success     Triage Assessment (Adult)       Row Name 06/13/24 2007          Triage Assessment    Airway WDL WDL        Respiratory WDL    Respiratory WDL WDL        Skin Circulation/Temperature WDL    Skin Circulation/Temperature WDL WDL        Cardiac WDL    Cardiac WDL WDL        Peripheral/Neurovascular WDL    Peripheral Neurovascular WDL WDL        Cognitive/Neuro/Behavioral WDL    Cognitive/Neuro/Behavioral WDL WDL

## 2024-06-14 NOTE — ED PROVIDER NOTES
"ED Provider Note  [facility]      History     Chief Complaint   Patient presents with    Mass    Tick Removal     HPI  Fawad Garcia is a 88 year old male who presents to the emergency department with concerns of a masslike sensation behind the left earlobe.  Family members were attempting to pick out object that was behind the left ear, concerned it may have been a tick, and therefore presents to the emergency department currently.        Independent Historian:        Review of External Notes:          Allergies:  Allergies   Allergen Reactions    Zocor [Simvastatin - High Dose]      Bilateral hip aching       Problem List:    Patient Active Problem List    Diagnosis Date Noted    Tremor of left hand 06/14/2022     Priority: Medium    Left leg weakness 06/14/2022     Priority: Medium    Dermatitis 01/14/2022     Priority: Medium    Skin lesion 01/14/2022     Priority: Medium    Chronic kidney disease, stage 3 (H) 07/21/2021     Priority: Medium    TIA (transient ischemic attack) 07/11/2021     Priority: Medium    H/O amputation of lesser toe, right (H24) 01/04/2021     Priority: Medium    Macular degeneration (senile) of retina 01/10/2019     Priority: Medium     Nearly legally blind per eye doctor.      Type 2 diabetes mellitus with both eyes affected by mild nonproliferative retinopathy without macular edema, with long-term current use of insulin (H) 07/30/2018     Priority: Medium    Senile nuclear sclerosis 04/01/2015     Priority: Medium    Hypertension, goal below 140/90 03/25/2015     Priority: Medium    Malignant neoplasm of prostate (H) 06/16/2014     Priority: Medium    Advanced directives, counseling/discussion 02/24/2012     Priority: Medium     Patient does not have an Advance/Health Care Directive (HCD), given \"What is Advance Care Planning?\" flyer, accepts referral to Facilitator and requests blank HCD form.    Roro Wright  February 24, 2012        Peripheral vascular " disease (H24) 11/18/2011     Priority: Medium     Problem list name updated by automated process. Provider to review      GERD (gastroesophageal reflux disease) 11/03/2011     Priority: Medium    Hyperlipidemia LDL goal <70 10/31/2010     Priority: Medium    Transient cerebral ischemia 09/03/2009     Priority: Medium     Diagnosis updated by automated process. Provider to review and confirm.      Obesity      Priority: Medium     Obesity  Problem list name updated by automated process. Provider to review          Past Medical History:    Past Medical History:   Diagnosis Date    Diabetes mellitus (H)     Hypertension     Squamous cell carcinoma        Past Surgical History:    Past Surgical History:   Procedure Laterality Date    AMPUTATE TOE(S) Right 7/23/2019    Procedure: Partial amputation great toe;  Surgeon: Ethan Montesinos DPM;  Location: [facility]    AMPUTATE TOE(S) Left 12/24/2019    Procedure: Amputation of the great toe, left foot;  Surgeon: Ethan Montesinos DPM;  Location: [facility]    AMPUTATE TOE(S) Right 3/3/2020    Procedure: Partial amputation, second toe, right foot;  Surgeon: Ethan Montesinos DPM;  Location: [facility] OR    [facility] SURGICAL PATHOLOGY  2002    prostate biopsy elevated PSA    COLONOSCOPY      HC REMOVE TONSILS/ADENOIDS,<13 Y/O      T & A    IR LOWER EXTREMITY ANGIOGRAM LEFT  2/26/2020    IR LOWER EXTREMITY ANGIOGRAM LEFT  7/19/2023    IR LOWER EXTREMITY ANGIOGRAM RIGHT  12/11/2018    PHACOEMULSIFICATION WITH STANDARD INTRAOCULAR LENS IMPLANT Left 4/2/2015    Procedure: PHACOEMULSIFICATION WITH STANDARD INTRAOCULAR LENS IMPLANT;  Surgeon: Joseph Hernandez MD;  Location: [facility] OR    PHACOEMULSIFICATION WITH STANDARD INTRAOCULAR LENS IMPLANT Right 5/4/2015    Procedure: PHACOEMULSIFICATION WITH STANDARD INTRAOCULAR LENS IMPLANT;  Surgeon: Joseph Hernandez MD;  Location: [facility] OR    REPAIR HAMMER TOE Left 4/2/2019    Procedure: 5th Metatarsal Head Resection;  Surgeon: Ethan Montesinos  VAN Siddiqi;  Location: [facility] OR       Family History:    Family History   Problem Relation Age of Onset    Cancer Mother         intestinal CA    Other Cancer Mother         cancer    Diabetes Father     Cerebrovascular Disease Father     Diabetes Brother     Other Cancer Brother         Pancreatic cancer    Other Cancer Son         GIST cancer    Diabetes Son     Melanoma No family hx of        Social History:  Marital Status:   [2]  Social History     Tobacco Use    Smoking status: Never    Smokeless tobacco: Never   Vaping Use    Vaping status: Never Used   Substance Use Topics    Alcohol use: Yes     Alcohol/week: 1.7 standard drinks of alcohol     Types: 2 Standard drinks or equivalent per week     Comment: very little    Drug use: No        Medications:    amLODIPine (NORVASC) 5 MG tablet  aspirin (ASA) 81 MG EC tablet  augmented betamethasone dipropionate (DIPROLENE AF) 0.05 % external cream  blood glucose monitoring (ONE TOUCH ULTRA 2) meter device kit  cephALEXin (KEFLEX) 500 MG capsule  clopidogrel (PLAVIX) 75 MG tablet  dorzolamide-timolol (COSOPT) 2-0.5 % ophthalmic solution  hydrochlorothiazide (HYDRODIURIL) 25 MG tablet  insulin aspart (NOVOLOG PEN) 100 UNIT/ML pen  insulin glargine (LANTUS SOLOSTAR) 100 UNIT/ML pen  insulin pen needle (ULTICARE SHORT) 31G X 8 MM miscellaneous  Lancets (ONETOUCH DELICA PLUS [id number]) MISC  Lancets (ONETOUCH DELICA PLUS [id number]) MISC  lisinopril (ZESTRIL) 40 MG tablet  metFORMIN (GLUCOPHAGE) 500 MG tablet  Multiple Vitamins-Minerals (PRESERVISION AREDS PO)  ONETOUCH ULTRA test strip  ORDER FOR DME  pantoprazole (PROTONIX) 20 MG EC tablet  rosuvastatin (CRESTOR) 10 MG tablet  triamcinolone (KENALOG) 0.1 % external cream          Review of Systems  A medically appropriate review of systems was performed with pertinent positives and negatives noted in the HPI, and all other systems negative.    Physical Exam   Patient Vitals for the past 24 hrs:   BP Temp Temp  "src Pulse Resp SpO2 Height Weight   06/13/24 2005 126/75 98.3  F (36.8  C) Oral 98 16 97 % 1.778 m (5' 10\") 99.8 kg (220 lb)          Physical Exam  General: alert and in no acute distress on arrival  Head: atraumatic, normocephalic  Left ear: External ear normal.  There is small area of darkened masslike area with open 0.5 cm area/cut to the skin.  This is just behind the earlobe  Lungs:  nonlabored  CV:  extremities warm and perfused  Abd: nondistended  Skin: no rashes, no diaphoresis and skin color normal  Neuro: Patient awake, alert, speech is fluent,   Psychiatric: affect/mood normal,        ED Course                 Procedures                           No results found. However, due to the size of the patient record, not all encounters were searched. Please check Results Review for a complete set of results.    MEDICATIONS GIVEN IN THE EMERGENCY DEPARTMENT:  Medications - No data to display        Independent Interpretation (X-rays, CTs, rhythm strip):  None    Consultations/Discussion of Management or Tests:  None       Social Determinants of Health affecting care:         Assessments & Plan (with Medical Decision Making)  88 year old male who presents to the Emergency Department for evaluation of foreign body behind the left earlobe.  Family members noticed this today.  After examination, this is darkened colored area, and that does have circular opening extending to the skin layer.  Concern was for potential abscess that had been draining, and now has what appears to be dried tissue.  Attempts with tweezers does show that the area of concern has darkened tissue which is being pulled off, and potential for dried abscess versus other dried skin.  See picture below.  Ultimately I was able to squeeze the surrounding tissue area, and this entire foreign body/masslike sensation was removed in its entirety.  Return precautions discussed.           I have reviewed the nursing notes.    I have reviewed the findings, " diagnosis, plan and need for follow up with the patient.       Critical Care time:  none      NEW PRESCRIPTIONS STARTED AT TODAY'S ER VISIT  New Prescriptions    No medications on file       Final diagnoses:   Foreign body (FB) in soft tissue       6/13/2024   [facility] EMERGENCY DEPT       Mahin Wei MD  06/13/24 2023

## 2024-06-16 ENCOUNTER — HEALTH MAINTENANCE LETTER (OUTPATIENT)
Age: 89
End: 2024-06-16

## 2024-07-15 ENCOUNTER — HOSPITAL ENCOUNTER (OUTPATIENT)
Dept: PET IMAGING | Facility: HOSPITAL | Age: 89
Discharge: HOME | End: 2024-07-15
Attending: NURSE PRACTITIONER | Admitting: NURSE PRACTITIONER
Payer: COMMERCIAL

## 2024-07-15 DIAGNOSIS — C61 MALIGNANT NEOPLASM OF PROSTATE (H): ICD-10-CM

## 2024-07-15 PROCEDURE — 78815 PET IMAGE W/CT SKULL-THIGH: CPT | Mod: PS

## 2024-07-15 PROCEDURE — A9596 HC RX 343: HCPCS | Performed by: NURSE PRACTITIONER

## 2024-07-15 PROCEDURE — 343N000001 HC RX 343: Performed by: NURSE PRACTITIONER

## 2024-07-15 RX ADMIN — KIT FOR THE PREPARATION OF GALLIUM GA 68 GOZETOTIDE INJECTION 5.27 MILLICURIE: KIT INTRAVENOUS at 13:39

## 2024-07-16 ENCOUNTER — TELEPHONE (OUTPATIENT)
Dept: FAMILY MEDICINE | Facility: CLINIC | Age: 89
End: 2024-07-16
Payer: COMMERCIAL

## 2024-07-16 NOTE — TELEPHONE ENCOUNTER
Patient calling today for results on recent PET scan that was done yesterday.    Patient advised this was done through Oncology provider and to follow-up with their office.     Patient was also advised that results were not interpreted by provider yet, so to give it a day or 2 and if he has not heard back to call oncology office for follow-up.    Loreto ADEN RN  [facility]  [phone]

## 2024-07-19 ENCOUNTER — LAB (OUTPATIENT)
Dept: LAB | Facility: CLINIC | Age: 89
End: 2024-07-19
Payer: COMMERCIAL

## 2024-07-19 DIAGNOSIS — Z79.4 TYPE 2 DIABETES MELLITUS WITH BOTH EYES AFFECTED BY MILD NONPROLIFERATIVE RETINOPATHY WITHOUT MACULAR EDEMA, WITH LONG-TERM CURRENT USE OF INSULIN (H): ICD-10-CM

## 2024-07-19 DIAGNOSIS — C61 MALIGNANT NEOPLASM OF PROSTATE (H): ICD-10-CM

## 2024-07-19 DIAGNOSIS — E11.3293 TYPE 2 DIABETES MELLITUS WITH BOTH EYES AFFECTED BY MILD NONPROLIFERATIVE RETINOPATHY WITHOUT MACULAR EDEMA, WITH LONG-TERM CURRENT USE OF INSULIN (H): ICD-10-CM

## 2024-07-19 LAB
HBA1C MFR BLD: 7.7 % (ref 0–5.6)
PSA SERPL DL<=0.01 NG/ML-MCNC: 4.61 NG/ML

## 2024-07-19 PROCEDURE — 36415 COLL VENOUS BLD VENIPUNCTURE: CPT

## 2024-07-19 PROCEDURE — 84153 ASSAY OF PSA TOTAL: CPT

## 2024-07-19 PROCEDURE — 83036 HEMOGLOBIN GLYCOSYLATED A1C: CPT

## 2024-07-22 ENCOUNTER — OFFICE VISIT (OUTPATIENT)
Dept: FAMILY MEDICINE | Facility: CLINIC | Age: 89
End: 2024-07-22
Payer: COMMERCIAL

## 2024-07-22 VITALS
TEMPERATURE: 98.5 F | BODY MASS INDEX: 33.98 KG/M2 | HEIGHT: 69 IN | WEIGHT: 229.4 LBS | OXYGEN SATURATION: 93 % | SYSTOLIC BLOOD PRESSURE: 120 MMHG | DIASTOLIC BLOOD PRESSURE: 54 MMHG | RESPIRATION RATE: 20 BRPM | HEART RATE: 70 BPM

## 2024-07-22 DIAGNOSIS — Z79.4 TYPE 2 DIABETES MELLITUS WITH BOTH EYES AFFECTED BY MILD NONPROLIFERATIVE RETINOPATHY WITHOUT MACULAR EDEMA, WITH LONG-TERM CURRENT USE OF INSULIN (H): Primary | ICD-10-CM

## 2024-07-22 DIAGNOSIS — Z29.11 NEED FOR VACCINATION AGAINST RESPIRATORY SYNCYTIAL VIRUS: ICD-10-CM

## 2024-07-22 DIAGNOSIS — Z86.31 HISTORY OF DIABETIC ULCER OF FOOT: ICD-10-CM

## 2024-07-22 DIAGNOSIS — Z23 NEED FOR SHINGLES VACCINE: ICD-10-CM

## 2024-07-22 DIAGNOSIS — E11.3293 TYPE 2 DIABETES MELLITUS WITH BOTH EYES AFFECTED BY MILD NONPROLIFERATIVE RETINOPATHY WITHOUT MACULAR EDEMA, WITH LONG-TERM CURRENT USE OF INSULIN (H): Primary | ICD-10-CM

## 2024-07-22 DIAGNOSIS — Z89.421 H/O AMPUTATION OF LESSER TOE, RIGHT (H): ICD-10-CM

## 2024-07-22 PROCEDURE — 99214 OFFICE O/P EST MOD 30 MIN: CPT | Performed by: FAMILY MEDICINE

## 2024-07-22 ASSESSMENT — PAIN SCALES - GENERAL: PAINLEVEL: NO PAIN (0)

## 2024-07-22 NOTE — PATIENT INSTRUCTIONS
Be consistent with low salt, low trans fat and low saturated fat diet.  Eat food rich in omega-3-fatty acids as you tolerate. (salmon, olive oil)  Eat 5 cups of vegetables, fruits and whole grains per day.  Limit starchy food (white rice, white bread, white pasta, white potatoes) to less than a cup per meal.  Minimize sweets, junk food and fastfood. Limit soda beverages to one serving per day; best to avoid it altogether though.    Exercise: moderate intensity sustained for at least 30 mins per episode, goal of 150 mins per week at least  Combine cardiovascular and resistance exercises.  These exercise recommendations are in addition to your daily activity at work or home.  Work on losing weight.      Regular foot care; don't walk barefoot  Annual eye exam.  Please request your eye doctor to furnish a copy of the eye exam report to the clinic to be placed in your record.  Flu vaccine by the end of October yearly.  Be sure to update any other recommended vaccinations for diabetics.    Blood tests: October 2024 - be fasting 8 hours before blood draw.    Measure blood sugars before breakfast and 1-2 hours after dinner.  If you see morning blood sugars more than 180 frequently, or more than 200 at night in the next 2-3 weeks, contact the care team.

## 2024-07-22 NOTE — PROGRESS NOTES
Assessment & Plan     Type 2 diabetes mellitus with both eyes affected by mild nonproliferative retinopathy without macular edema, with long-term current use of insulin (H)  A1c is within goal for his age (<8%).  Patient will monitor BGs at home and report to care team if oputside parameters given to him.  Reinforced carbohydrate control.  Regular exercise as tolerated. Regular foot care, annual eye exam.  Flu vaccine every year.   - Lipid panel reflex to direct LDL Fasting  - Hemoglobin A1c  - Miscellaneous DME Order    H/O amputation of lesser toe, right (H24)  - Miscellaneous DME Order    History of diabetic ulcer of foot  - Miscellaneous DME Order    Need for shingles vaccine  Patient to check with insurance about coverage     Need for vaccination against respiratory syncytial virus  Patient will get vaccine from pharmacy for better insurance coverage.       Patient Instructions   Be consistent with low salt, low trans fat and low saturated fat diet.  Eat food rich in omega-3-fatty acids as you tolerate. (salmon, olive oil)  Eat 5 cups of vegetables, fruits and whole grains per day.  Limit starchy food (white rice, white bread, white pasta, white potatoes) to less than a cup per meal.  Minimize sweets, junk food and fastfood. Limit soda beverages to one serving per day; best to avoid it altogether though.    Exercise: moderate intensity sustained for at least 30 mins per episode, goal of 150 mins per week at least  Combine cardiovascular and resistance exercises.  These exercise recommendations are in addition to your daily activity at work or home.  Work on losing weight.      Regular foot care; don't walk barefoot  Annual eye exam.  Please request your eye doctor to furnish a copy of the eye exam report to the clinic to be placed in your record.  Flu vaccine by the end of October yearly.  Be sure to update any other recommended vaccinations for diabetics.    Blood tests: October 2024 - be fasting 8 hours  before blood draw.    Measure blood sugars before breakfast and 1-2 hours after dinner.  If you see morning blood sugars more than 180 frequently, or more than 200 at night in the next 2-3 weeks, contact the care team.     Shelbi Norman is a 88 year old, presenting for the following health issues:  Diabetes and Pt request (Pt would like to discuss how to get a new pair of shoes. )        7/22/2024     4:10 PM   Additional Questions   Roomed by Cathi GUILLERMO MA   Accompanied by Son Dov         7/22/2024     4:10 PM   Patient Reported Additional Medications   Patient reports taking the following new medications none     History of Present Illness       Diabetes:   He presents for follow up of diabetes.  He is checking home blood glucose two times daily.   He checks blood glucose before meals and at bedtime.  Blood glucose is sometimes over 200 and never under 70. He is aware of hypoglycemia symptoms including shakiness, weakness and blurred vision.   He is concerned about blood sugar frequently over 200.   He is having numbness in feet and blurry vision.            He eats 0-1 servings of fruits and vegetables daily.He consumes 0 sweetened beverage(s) daily.He exercises with enough effort to increase his heart rate 10 to 19 minutes per day.  He exercises with enough effort to increase his heart rate 3 or less days per week.   He is taking medications regularly.     Patient said he sees BG >200 at night between dinner and bedtime - occurs multiple times a week.  Uses fingerstick glucometer.  Meds reviewed as below - taking as listed.    Current Outpatient Medications   Medication Sig Dispense Refill    amLODIPine (NORVASC) 5 MG tablet Take 1 tablet (5 mg) by mouth daily 90 tablet 3    aspirin (ASA) 81 MG EC tablet Take 1 tablet (81 mg) by mouth daily      augmented betamethasone dipropionate (DIPROLENE AF) 0.05 % external cream Apply sparingly to affected area twice daily as needed.  Do not apply to face. 100 g 3     blood glucose monitoring (ONE TOUCH ULTRA 2) meter device kit Use to test blood sugar 2 times daily or as directed. 1 kit 0    clopidogrel (PLAVIX) 75 MG tablet Take 1 tablet (75 mg) by mouth daily 90 tablet 3    dorzolamide-timolol (COSOPT) 2-0.5 % ophthalmic solution       hydrochlorothiazide (HYDRODIURIL) 25 MG tablet Take 1 tablet (25 mg) by mouth daily 90 tablet 3    insulin aspart (NOVOLOG PEN) 100 UNIT/ML pen Inject 3 units before breakfast, 4 units before lunch and 6 units before supper. 15 mL 3    insulin glargine (LANTUS SOLOSTAR) 100 UNIT/ML pen Inject 23 Units Subcutaneous at bedtime Hold on file until needed. 30 mL 3    insulin pen needle (ULTICARE SHORT) 31G X 8 MM miscellaneous Use 2 pen needles daily or as directed. 200 each 3    Lancets (ONETOUCH DELICA PLUS [id number]) MISC 1 each by In Vitro route See Admin Instructions Hold on file until needed. 100 each 1    Lancets (ONETOUCH DELICA PLUS [id number]) MISC 1 each by In Vitro route See Admin Instructions Hold on file until needed. 100 each 4    lisinopril (ZESTRIL) 40 MG tablet Take 1 tablet (40 mg) by mouth daily 90 tablet 3    metFORMIN (GLUCOPHAGE) 500 MG tablet Take 2 tablets (1,000 mg) by mouth 2 times daily (with meals) 360 tablet 3    Multiple Vitamins-Minerals (PRESERVISION AREDS PO) Take 1 tablet by mouth      ONETOUCH ULTRA test strip 2 strips by In Vitro route daily One touch Ultra 2. 100 strip 3    ORDER FOR DME Equipment being ordered:   Glucometer 1 Device 1    pantoprazole (PROTONIX) 20 MG EC tablet Take 1 tablet (20 mg) by mouth daily 90 tablet 3    rosuvastatin (CRESTOR) 10 MG tablet Take 1 tablet (10 mg) by mouth at bedtime 90 tablet 3    triamcinolone (KENALOG) 0.1 % external cream Apply topically 2 times daily To dry skin patches as needed.  Hold on file until needed. 80 g 1    cephALEXin (KEFLEX) 500 MG capsule Take 1 capsule (500 mg) by mouth 4 times daily (Patient not taking: Reported on 7/22/2024) 40 capsule 0     Recent Labs    Lab Test 07/19/24  0801 04/19/24  0801 01/29/24  0829 10/20/23  1315 07/19/23  0857 07/05/23  1517 06/12/23  0830 05/14/23  1735 01/27/23  1040 01/20/23  1201 11/11/22  1148 10/19/22  2242 07/12/21  1209 07/12/21  0507 03/18/21  0809 01/04/21  1733 03/02/20  1102 02/26/20  0750 07/30/18  1100 03/27/18  1012 10/04/17  0937 04/17/17  1102   A1C 7.7* 8.4* 8.8*   < > 10.3*  --    < >  --    < >  --    < > 9.8*   < >  --    < >  --    < > 7.7*   < > 7.7*   < > 7.6*   LDL  --   --   --   --  53  --   --   --   --   --   --  68  --  56   < >  --    < > 85   < > 107*  --   --    HDL  --   --   --   --  52  --   --   --   --   --   --  38*  --  50   < >  --    < > 54   < > 54  --   --    TRIG  --   --   --   --  53  --   --   --   --   --   --  141  --  91   < >  --    < > 56   < > 84  --   --    ALT  --   --   --   --   --  16  --  12  --  14  --   --    < >  --   --   --   --   --   --   --   --   --    CR  --   --  1.10  --  1.09 1.08  --  1.17  --  0.93  --  0.94   < > 1.15   < > 1.07  --  0.82   < > 0.86  --  0.93   GFRESTIMATED  --   --  65  --  66 67  --  61  --  80  --  79   < > 58*   < > 63  --  82   < > 85  --  78   GFRESTBLACK  --   --   --   --   --   --   --   --   --   --   --   --   --   --   --  73  --  >90   < > >90  --  >90  African American GFR Calc     POTASSIUM  --   --  4.4  --  4.2 4.4  --  3.9  --  4.4  --  4.8   < > 3.6   < > 4.0  --   --    < > 3.9  --  3.8   TSH  --   --   --   --   --   --   --   --   --   --   --   --   --   --   --   --   --   --   --  1.74  --  1.80    < > = values in this interval not displayed.           Review of Systems  Constitutional, neuro, ENT, endocrine, pulmonary, cardiac, gastrointestinal, genitourinary, musculoskeletal, integument and psychiatric systems are negative, except as otherwise noted.      Objective    /54 (BP Location: Right arm, Patient Position: Sitting, Cuff Size: Adult Regular)   Pulse 70   Temp 98.5  F (36.9  C) (Tympanic)   Resp 20   Ht  "1.74 m (5' 8.5\")   Wt 104.1 kg (229 lb 6.4 oz)   SpO2 93%   BMI 34.37 kg/m    Body mass index is 34.37 kg/m .  Physical Exam   GENERAL: alert and no distress, ambulatory w/ a cane, obese  EYES: no icterus; pink conjunctivae.  NECK: no tenderness, no adenopathy,  Thyroid not enlarged  RESP: lungs clear to auscultation - no rales, no rhonchi, no wheezes  CV: regular rates and rhythm, no murmur  MS: no edema  SKIN: no suspicious lesions, no rashes  NEURO: strength and tone- normal, sensory exam- grossly normal, mentation- intact, speech- normal, reflexes- symmetric  ABD:  nontender  FOOT EXAM: no ulcer/erythema; partial amputation of the right first and second toes and total amputation of the left great toe; pedal pulses fair bilaterally; monofilament: no deficit either foot     Lab on 07/19/2024   Component Date Value Ref Range Status    PSA Tumor Marker 07/19/2024 4.61  ng/mL Final    No reference ranges have been established for patients over 80 years.    Hemoglobin A1C 07/19/2024 7.7 (H)  0.0 - 5.6 % Final    Normal <5.7%   Prediabetes 5.7-6.4%    Diabetes 6.5% or higher     Note: Adopted from ADA consensus guidelines.           Signed Electronically by: Jimmy Catalan MD  DME (Durable Medical Equipment) Orders and Documentation  Orders Placed This Encounter   Procedures    Miscellaneous DME Order        The patient was assessed and it was determined the patient is in need of the following listed DME Supplies/Equipment. Please complete supporting documentation below to demonstrate medical necessity.         "

## 2024-07-30 ENCOUNTER — ONCOLOGY VISIT (OUTPATIENT)
Dept: ONCOLOGY | Facility: CLINIC | Age: 89
End: 2024-07-30
Attending: INTERNAL MEDICINE
Payer: COMMERCIAL

## 2024-07-30 VITALS
HEART RATE: 73 BPM | RESPIRATION RATE: 16 BRPM | DIASTOLIC BLOOD PRESSURE: 75 MMHG | SYSTOLIC BLOOD PRESSURE: 164 MMHG | HEIGHT: 69 IN | OXYGEN SATURATION: 97 % | TEMPERATURE: 98.1 F | WEIGHT: 230 LBS | BODY MASS INDEX: 34.07 KG/M2

## 2024-07-30 DIAGNOSIS — C61 MALIGNANT NEOPLASM OF PROSTATE (H): Primary | ICD-10-CM

## 2024-07-30 PROCEDURE — 99213 OFFICE O/P EST LOW 20 MIN: CPT | Performed by: INTERNAL MEDICINE

## 2024-07-30 PROCEDURE — G2211 COMPLEX E/M VISIT ADD ON: HCPCS | Performed by: INTERNAL MEDICINE

## 2024-07-30 PROCEDURE — G0463 HOSPITAL OUTPT CLINIC VISIT: HCPCS | Performed by: INTERNAL MEDICINE

## 2024-07-30 ASSESSMENT — PAIN SCALES - GENERAL: PAINLEVEL: NO PAIN (0)

## 2024-07-30 NOTE — PROGRESS NOTES
[facility] Hematology and Oncology Outpatient Progress Note    Patient: Fawad Garcia  MRN: [id number]  Date of Service: Jul 30, 2024          Reason for Visit    Prostate cancer    Primary Oncologist: Dr. Barriga      Assessment/Plan  Locally recurrent prostate cancer  Initially treated in 2012.   Rising PSA and PSMA PET showed local prostate gland uptake (no mets) in 2021 -2022. Reirradiation not feasible, and he is not a candidate for surgery.  ADT was recommended, but he declined and chose observation only.  He's been observed for 2 years.     His PSA has been on a slow rise open doubling time is still more than 1 year. He had a repeat PSMA PET scan done recently to look for evidence of metastatic disease.  I reviewed the PET scan images and report personally and independently interpreted the results to my ability.  Current PET scan is showing evidence of radiotracer uptake within the prostate gland consistent with local recurrence.  No evidence of any metastatic disease.  When compared to the PET scan from April 2022 there is no significant change.  His PSA again has gone up a little bit as currently at 4.61.  6 months ago this was 4.  Prior to that was 3.68.  So overall although PSA is going up it is not rising rapidly.  I again discussed further management.  He is not a candidate for surgery and reirradiation is not possible.  So only other option is systemic therapy with ADT with or without second-generation antiandrogen therapy.  Considering his age and other medical issues, single agent ADT with either Lupron or Eligard is probably very reasonable.  Since he is not clinically symptomatic at this point in time he wants to hold off on this and continue to watch.  If PSA continues to go up going forward then he would consider starting Lupron.  Again I told him that there is no 1 particular number that we are looking for but if PSA gems to double digits then I think he should probably start.  He  is agreeable to the plan.  I will see him back in 6 months with repeat labs.    FREDDIE lung nodule  Chronic/stable small nodule.     ______________________________________________________________________________    History of Present Illness/ Interval History    Mr. Fawad Garcia  is a 88 year old with locally recurrent prostate cancer, with rising PSA and no evidence of metastatic disease on the PSMA PET scan, on observation alone. Returns for 6-mth follow-up.     He is currently on observation.  Denies any new issues today.  No weight loss.  No new bone pain reported.  Here with repeat labs PSMA PET scan.      ECOG Performance    1      Oncology History/Treatment  Diagnosis/Stage:   2011: early stage prostate cancer  -elevated PSA >10 since 2003. Biopsies 2002 and 2005 negative for cancer  -2011: MRI guided prostate biopsy: adenocarcinoma with a Chaka score of 4+3 with 90% of the 2 of the 2 cores positive for malignancy    2021: biochemical recurrence  -rising PSA: 9/2021 1.88; 3/2022  2.49  -PSMA PET: uptake in prostate gland. No other evidence of mets    Treatment:  2011: Not a surgical candidate    2012: IMRT prostate  -declined ADT    2022: biochemical recurrence/local prostate recurrence  -re-irradiation not feasible  -pt declined ADT, opted on observation      Physical Exam    GENERAL: Alert and oriented to time place and person. Seated comfortably. In no distress.   HEENT: No icterus. No alopecia  LUNGS: Regular respiratory effort.  NEURO: Non-focal      Lab Results    No results found for this or any previous visit (from the past 168 hour(s)).        Imaging    PET PSMA Eyes to Thighs    Result Date: 7/16/2024  EXAM: PET PSMA EYES TO THIGHS, Ga-68 PSMA LOCATION: [facility] DATE: 7/15/2024 INDICATION: Prostate cancer, restaging. Malignant neoplasm of prostate. Prior radiation therapy. Elevated PSA consistent with biochemical recurrence. Subsequent treatment strategy. COMPARISON:  PSMA PET/CT 4/8/2022. CT chest 7/21/2023 also reviewed. TECHNIQUE: 60 minutes post intravenous administration of 5.3 mCi Ga-68 PSMA, PET imaging was performed from the skull vertex to mid thigh utilizing attenuation correction with concurrent axial CT and PET/CT image fusion. Dose reduction techniques were used. FINDINGS: As compared to 4/8/2022, redemonstrated focal area of activity centered in the anterior prostate mid gland consistent with site of known prostate malignancy, which has not substantially changed in size or degree of uptake. Normal physiologic radiotracer uptake elsewhere. No evidence of metastasis. No radiotracer avid adenopathy in the pelvis or elsewhere. No suspicious focal uptake in the liver or skeleton. A subcentimeter nonradiotracer avid pulmonary nodule in the central left upper lobe is unchanged since 4/8/2022, consistent with a benign etiology. Moderate senescent intracranial changes. Marked atherosclerotic calcifications including the coronary arteries. Mild reticular scarring and/or atelectasis in the lower lungs. Dense mitral annulus calcification. Tiny gallstones. Few colonic diverticula. Radiation seeds in the prostate. Medium size right scrotal hydrocele. Mild thoracolumbar curve. Moderate scattered hypertrophic degenerative changes in the spine. 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     IMPRESSION: 1. No substantial change since 4/8/2022. 2. Radiotracer avid primary prostate malignancy. No evidence of metastasis. 3. Stable benign subcentimeter central left upper lobe pulmonary nodule.     The longitudinal plan of care for the diagnosis(es)/condition(s) as documented were addressed during this visit. Due to the added complexity in care, I will continue to support Ray in the subsequent management and with ongoing continuity of care.

## 2024-07-30 NOTE — PROGRESS NOTES
"Oncology Rooming Note    July 30, 2024 12:30 PM   Fawad Garcia is a 88 year old male who presents for:    Chief Complaint   Patient presents with    Oncology Clinic Visit     Malignant neoplasm of prostate - Provider visit only     Initial Vitals: BP (!) 164/75 (BP Location: Right arm, Patient Position: Sitting, Cuff Size: Adult Large)   Pulse 73   Temp 98.1  F (36.7  C) (Tympanic)   Resp 16   Ht 1.74 m (5' 8.5\")   Wt 104.3 kg (230 lb)   SpO2 97%   BMI 34.46 kg/m   Estimated body mass index is 34.46 kg/m  as calculated from the following:    Height as of this encounter: 1.74 m (5' 8.5\").    Weight as of this encounter: 104.3 kg (230 lb). Body surface area is 2.25 meters squared.  No Pain (0) Comment: Data Unavailable   No LMP for male patient.  Allergies reviewed: Yes  Medications reviewed: Yes    Medications: Medication refills not needed today.  Pharmacy name entered into EPIC: [facility]. - [place], [place] - [address] [place] [address]    Frailty Screening:   Is the patient here for a new oncology consult visit in cancer care? 2. No      Clinical concerns:  None      Che Arthur CMA            "

## 2024-07-30 NOTE — LETTER
"7/30/2024      Fawad Garcia  [address] [facility] [address]      Dear Colleague,    Thank you for referring your patient, Fawad Garcia, to the [facility] CAN[facility] ([place], [place]). Please see a copy of my visit note below.    Oncology Rooming Note    July 30, 2024 12:30 PM   Fawad Garcia is a 88 year old male who presents for:    Chief Complaint   Patient presents with     Oncology Clinic Visit     Malignant neoplasm of prostate - Provider visit only     Initial Vitals: BP (!) 164/75 (BP Location: Right arm, Patient Position: Sitting, Cuff Size: Adult Large)   Pulse 73   Temp 98.1  F (36.7  C) (Tympanic)   Resp 16   Ht 1.74 m (5' 8.5\")   Wt 104.3 kg (230 lb)   SpO2 97%   BMI 34.46 kg/m   Estimated body mass index is 34.46 kg/m  as calculated from the following:    Height as of this encounter: 1.74 m (5' 8.5\").    Weight as of this encounter: 104.3 kg (230 lb). Body surface area is 2.25 meters squared.  No Pain (0) Comment: Data Unavailable   No LMP for male patient.  Allergies reviewed: Yes  Medications reviewed: Yes    Medications: Medication refills not needed today.  Pharmacy name entered into EPIC: [facility]. - [place], [place] - [address] [place] [address]    Frailty Screening:   Is the patient here for a new oncology consult visit in cancer care? 2. No      Clinical concerns:  None      Che Arthur, [facility] Hematology and Oncology Outpatient Progress Note    Patient: Fawad Garcia  MRN: [id number]  Date of Service: Jul 30, 2024          Reason for Visit    Prostate cancer    Primary Oncologist: Dr. Barriga      Assessment/Plan  Locally recurrent prostate cancer  Initially treated in 2012.   Rising PSA and PSMA PET showed local prostate gland uptake (no mets) in 2021 -2022. Reirradiation not feasible, and he is not a candidate for surgery.  ADT was recommended, but he declined and chose observation only.  He's been observed for 2 " years.     His PSA has been on a slow rise open doubling time is still more than 1 year. He had a repeat PSMA PET scan done recently to look for evidence of metastatic disease.  I reviewed the PET scan images and report personally and independently interpreted the results to my ability.  Current PET scan is showing evidence of radiotracer uptake within the prostate gland consistent with local recurrence.  No evidence of any metastatic disease.  When compared to the PET scan from April 2022 there is no significant change.  His PSA again has gone up a little bit as currently at 4.61.  6 months ago this was 4.  Prior to that was 3.68.  So overall although PSA is going up it is not rising rapidly.  I again discussed further management.  He is not a candidate for surgery and reirradiation is not possible.  So only other option is systemic therapy with ADT with or without second-generation antiandrogen therapy.  Considering his age and other medical issues, single agent ADT with either Lupron or Eligard is probably very reasonable.  Since he is not clinically symptomatic at this point in time he wants to hold off on this and continue to watch.  If PSA continues to go up going forward then he would consider starting Lupron.  Again I told him that there is no 1 particular number that we are looking for but if PSA gems to double digits then I think he should probably start.  He is agreeable to the plan.  I will see him back in 6 months with repeat labs.    FREDDIE lung nodule  Chronic/stable small nodule.     ______________________________________________________________________________    History of Present Illness/ Interval History    Mr. Fawad Garcia  is a 88 year old with locally recurrent prostate cancer, with rising PSA and no evidence of metastatic disease on the PSMA PET scan, on observation alone. Returns for 6-mth follow-up.     He is currently on observation.  Denies any new issues today.  No weight loss.  No  new bone pain reported.  Here with repeat labs PSMA PET scan.      ECOG Performance    1      Oncology History/Treatment  Diagnosis/Stage:   2011: early stage prostate cancer  -elevated PSA >10 since 2003. Biopsies 2002 and 2005 negative for cancer  -2011: MRI guided prostate biopsy: adenocarcinoma with a Chaka score of 4+3 with 90% of the 2 of the 2 cores positive for malignancy    2021: biochemical recurrence  -rising PSA: 9/2021 1.88; 3/2022  2.49  -PSMA PET: uptake in prostate gland. No other evidence of mets    Treatment:  2011: Not a surgical candidate    2012: IMRT prostate  -declined ADT    2022: biochemical recurrence/local prostate recurrence  -re-irradiation not feasible  -pt declined ADT, opted on observation      Physical Exam    GENERAL: Alert and oriented to time place and person. Seated comfortably. In no distress.   HEENT: No icterus. No alopecia  LUNGS: Regular respiratory effort.  NEURO: Non-focal      Lab Results    No results found for this or any previous visit (from the past 168 hour(s)).        Imaging    PET PSMA Eyes to Thighs    Result Date: 7/16/2024  EXAM: PET PSMA EYES TO THIGHS, Ga-68 PSMA LOCATION: [facility] DATE: 7/15/2024 INDICATION: Prostate cancer, restaging. Malignant neoplasm of prostate. Prior radiation therapy. Elevated PSA consistent with biochemical recurrence. Subsequent treatment strategy. COMPARISON: PSMA PET/CT 4/8/2022. CT chest 7/21/2023 also reviewed. TECHNIQUE: 60 minutes post intravenous administration of 5.3 mCi Ga-68 PSMA, PET imaging was performed from the skull vertex to mid thigh utilizing attenuation correction with concurrent axial CT and PET/CT image fusion. Dose reduction techniques were used. FINDINGS: As compared to 4/8/2022, redemonstrated focal area of activity centered in the anterior prostate mid gland consistent with site of known prostate malignancy, which has not substantially changed in size or degree of uptake. Normal  physiologic radiotracer uptake elsewhere. No evidence of metastasis. No radiotracer avid adenopathy in the pelvis or elsewhere. No suspicious focal uptake in the liver or skeleton. A subcentimeter nonradiotracer avid pulmonary nodule in the central left upper lobe is unchanged since 4/8/2022, consistent with a benign etiology. Moderate senescent intracranial changes. Marked atherosclerotic calcifications including the coronary arteries. Mild reticular scarring and/or atelectasis in the lower lungs. Dense mitral annulus calcification. Tiny gallstones. Few colonic diverticula. Radiation seeds in the prostate. Medium size right scrotal hydrocele. Mild thoracolumbar curve. Moderate scattered hypertrophic degenerative changes in the spine. 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     IMPRESSION: 1. No substantial change since 4/8/2022. 2. Radiotracer avid primary prostate malignancy. No evidence of metastasis. 3. Stable benign subcentimeter central left upper lobe pulmonary nodule.     The longitudinal plan of care for the diagnosis(es)/condition(s) as documented were addressed during this visit. Due to the added complexity in care, I will continue to support Ray in the subsequent management and with ongoing continuity of care.      Again, thank you for allowing me to participate in the care of your patient.        Sincerely,        Cheryl Barriga MD

## 2024-07-31 NOTE — PROGRESS NOTES
CARDIOLOGY CONSULT    REASON FOR CONSULT: Aortic stenosis    PRIMARY CARE PHYSICIAN:  Jimmy Catalan    HISTORY OF PRESENT ILLNESS:  88-year-old male seen for aortic stenosis.  He has TIA, hypertension, dyslipidemia, PAD, diabetes type 2.    He has PAD, in 2020 he underwent angioplasty of the tibioperoneal trunk and distal popliteal artery with stent to the distal popliteal artery.    Echo May 2024 showed EF 60%, mild mitral stenosis with mean 4 mmHg, aortic stenosis with mean 20 mmHg, V-max 2.7 m/s, area 1.2 cm , DI 0.34.    He does some light walking with a cane, he will only go short distances.  He has some gait imbalance.  He denies any chest pain, shortness of breath, or lower extremity edema.  He will go golfing, using a cart.    PAST MEDICAL HISTORY:  Past Medical History:   Diagnosis Date    Diabetes mellitus (H)     Hypertension     Squamous cell carcinoma        MEDICATIONS:  Current Outpatient Medications   Medication Sig Dispense Refill    amLODIPine (NORVASC) 5 MG tablet Take 1 tablet (5 mg) by mouth daily 90 tablet 3    aspirin (ASA) 81 MG EC tablet Take 1 tablet (81 mg) by mouth daily      augmented betamethasone dipropionate (DIPROLENE AF) 0.05 % external cream Apply sparingly to affected area twice daily as needed.  Do not apply to face. 100 g 3    blood glucose monitoring (ONE TOUCH ULTRA 2) meter device kit Use to test blood sugar 2 times daily or as directed. 1 kit 0    cephALEXin (KEFLEX) 500 MG capsule Take 1 capsule (500 mg) by mouth 4 times daily (Patient not taking: Reported on 7/22/2024) 40 capsule 0    clopidogrel (PLAVIX) 75 MG tablet Take 1 tablet (75 mg) by mouth daily 90 tablet 3    dorzolamide-timolol (COSOPT) 2-0.5 % ophthalmic solution       hydrochlorothiazide (HYDRODIURIL) 25 MG tablet Take 1 tablet (25 mg) by mouth daily 90 tablet 3    insulin aspart (NOVOLOG PEN) 100 UNIT/ML pen Inject 3 units before breakfast, 4 units before lunch and 6 units before supper. 15 mL 3     insulin glargine (LANTUS SOLOSTAR) 100 UNIT/ML pen Inject 23 Units Subcutaneous at bedtime Hold on file until needed. 30 mL 3    insulin pen needle (ULTICARE SHORT) 31G X 8 MM miscellaneous Use 2 pen needles daily or as directed. 200 each 3    Lancets (ONETOUCH DELICA PLUS [id number]) MISC 1 each by In Vitro route See Admin Instructions Hold on file until needed. 100 each 1    Lancets (ONETOUCH DELICA PLUS [id number]) MISC 1 each by In Vitro route See Admin Instructions Hold on file until needed. 100 each 4    lisinopril (ZESTRIL) 40 MG tablet Take 1 tablet (40 mg) by mouth daily 90 tablet 3    metFORMIN (GLUCOPHAGE) 500 MG tablet Take 2 tablets (1,000 mg) by mouth 2 times daily (with meals) 360 tablet 3    Multiple Vitamins-Minerals (PRESERVISION AREDS PO) Take 1 tablet by mouth      ONETOUCH ULTRA test strip 2 strips by In Vitro route daily One touch Ultra 2. 100 strip 3    ORDER FOR DME Equipment being ordered:   Glucometer 1 Device 1    pantoprazole (PROTONIX) 20 MG EC tablet Take 1 tablet (20 mg) by mouth daily 90 tablet 3    rosuvastatin (CRESTOR) 10 MG tablet Take 1 tablet (10 mg) by mouth at bedtime 90 tablet 3    triamcinolone (KENALOG) 0.1 % external cream Apply topically 2 times daily To dry skin patches as needed.  Hold on file until needed. 80 g 1     No current facility-administered medications for this visit.       ALLERGIES:  Allergies   Allergen Reactions    Zocor [Simvastatin - High Dose]      Bilateral hip aching       SOCIAL HISTORY:  I have reviewed this patient's social history and updated it with pertinent information if needed. Fawad Garcia  reports that he has never smoked. He has never used smokeless tobacco. He reports current alcohol use of about 1.7 standard drinks of alcohol per week. He reports that he does not use drugs.    FAMILY HISTORY:  I have reviewed this patient's family history and updated it with pertinent information if needed.   Family History   Problem Relation Age of  Onset    Cancer Mother         intestinal CA    Other Cancer Mother         cancer    Diabetes Father     Cerebrovascular Disease Father     Diabetes Brother     Other Cancer Brother         Pancreatic cancer    Other Cancer Son         GIST cancer    Diabetes Son     Melanoma No family hx of        REVIEW OF SYSTEMS:  Constitutional:  No weight loss, fever, chills  HEENT:  Eyes:  No visual loss, blurred vision, double vision or yellow sclerae. No hearing loss, sneezing, congestion, runny nose or sore throat.  Skin:  No rash or itching.  Cardiovascular: per HPI  Respiratory: per HPI  GI:  No anorexia, nausea, vomiting or diarrhea. No abdominal pain or blood.  :  No dysurea, hematuria  Neurologic:  No headache, paralysis, ataxia, numbness or tingling in the extremities. No change in bowel or bladder control.  Musculoskeletal:  No muscle pain  Hematologic:  No bleeding or bruising.  Lymphatics:  No enlarged nodes. No history of splenectomy.  Endocrine:  No reports of sweating, cold or heat intolerance. No polyuria or polydipsia.  Allergies:  No history of asthma, hives, eczema or rhinitis.    PHYSICAL EXAM:  BP (!) 149/81   Pulse 88   Wt 103 kg (227 lb)   SpO2 97%   BMI 34.01 kg/m        Constitutional: awake, alert, no distress  Eyes: PERRL, sclera nonicteric  ENT: trachea midline  Respiratory: Lungs clear  Cardiovascular: Regular rate and rhythm, 2/6 mid peaking systolic murmur at the upper sternal border, no lower extremity edema  GI: nondistended, nontender, bowel sounds present  Lymph/Hematologic: no lymphadenopathy  Skin: dry, no rash  Musculoskeletal: good muscle tone, strength 5/5 in upper and lower extremities  Neurologic: no focal deficits  Neuropsychiatric: appropriate affact    DATA:  Labs:   January 2024: Potassium 4.4, creatinine 1.1  July 2024: A1c 7.7  Recent Labs   Lab Test 07/19/23  0857 10/19/22  2242   CHOL 116 134   HDL 52 38*   LDL 53 68   TRIG 53 141     EKG: August 2024: Sinus rhythm,  rate 67    ASSESSMENT:  88-year-old male seen for aortic stenosis.  He has moderate aortic stenosis, he has no symptoms as expected.  We talked about the natural history of aortic stenosis.  He is probably several years away at least from needing valve replacement.  Echo will be repeated in 1 year.    RECOMMENDATIONS:  1.  Moderate aortic stenosis  -Repeat echo in 1 year    Follow-up  with LYLE in 1 year after echo.    Devang Galicia MD  Cardiology - [facility]  Pager:  [phone]  Text Page  August 5, 2024

## 2024-08-05 ENCOUNTER — OFFICE VISIT (OUTPATIENT)
Dept: CARDIOLOGY | Facility: CLINIC | Age: 89
End: 2024-08-05
Attending: FAMILY MEDICINE
Payer: COMMERCIAL

## 2024-08-05 VITALS
OXYGEN SATURATION: 97 % | SYSTOLIC BLOOD PRESSURE: 149 MMHG | HEART RATE: 88 BPM | WEIGHT: 227 LBS | BODY MASS INDEX: 34.01 KG/M2 | DIASTOLIC BLOOD PRESSURE: 81 MMHG

## 2024-08-05 DIAGNOSIS — I35.0 MODERATE AORTIC STENOSIS: ICD-10-CM

## 2024-08-05 DIAGNOSIS — R01.1 NEWLY RECOGNIZED HEART MURMUR: Primary | ICD-10-CM

## 2024-08-05 DIAGNOSIS — I51.7 CONCENTRIC LEFT VENTRICULAR HYPERTROPHY: ICD-10-CM

## 2024-08-05 PROCEDURE — 93000 ELECTROCARDIOGRAM COMPLETE: CPT | Performed by: INTERNAL MEDICINE

## 2024-08-05 PROCEDURE — 99204 OFFICE O/P NEW MOD 45 MIN: CPT | Performed by: INTERNAL MEDICINE

## 2024-08-05 NOTE — LETTER
8/5/2024    Jimmy Catalan MD  [address] [facility] [address]    RE: Fawad Garcia       Dear Colleague,     I had the pleasure of seeing Fawad Garcia in the [facility].  CARDIOLOGY CONSULT    REASON FOR CONSULT: Aortic stenosis    PRIMARY CARE PHYSICIAN:  Jimmy Catalan    HISTORY OF PRESENT ILLNESS:  88-year-old male seen for aortic stenosis.  He has TIA, hypertension, dyslipidemia, PAD, diabetes type 2.    He has PAD, in 2020 he underwent angioplasty of the tibioperoneal trunk and distal popliteal artery with stent to the distal popliteal artery.    Echo May 2024 showed EF 60%, mild mitral stenosis with mean 4 mmHg, aortic stenosis with mean 20 mmHg, V-max 2.7 m/s, area 1.2 cm , DI 0.34.    He does some light walking with a cane, he will only go short distances.  He has some gait imbalance.  He denies any chest pain, shortness of breath, or lower extremity edema.  He will go golfing, using a cart.    PAST MEDICAL HISTORY:  Past Medical History:   Diagnosis Date    Diabetes mellitus (H)     Hypertension     Squamous cell carcinoma        MEDICATIONS:  Current Outpatient Medications   Medication Sig Dispense Refill    amLODIPine (NORVASC) 5 MG tablet Take 1 tablet (5 mg) by mouth daily 90 tablet 3    aspirin (ASA) 81 MG EC tablet Take 1 tablet (81 mg) by mouth daily      augmented betamethasone dipropionate (DIPROLENE AF) 0.05 % external cream Apply sparingly to affected area twice daily as needed.  Do not apply to face. 100 g 3    blood glucose monitoring (ONE TOUCH ULTRA 2) meter device kit Use to test blood sugar 2 times daily or as directed. 1 kit 0    cephALEXin (KEFLEX) 500 MG capsule Take 1 capsule (500 mg) by mouth 4 times daily (Patient not taking: Reported on 7/22/2024) 40 capsule 0    clopidogrel (PLAVIX) 75 MG tablet Take 1 tablet (75 mg) by mouth daily 90 tablet 3    dorzolamide-timolol (COSOPT) 2-0.5 % ophthalmic solution        hydrochlorothiazide (HYDRODIURIL) 25 MG tablet Take 1 tablet (25 mg) by mouth daily 90 tablet 3    insulin aspart (NOVOLOG PEN) 100 UNIT/ML pen Inject 3 units before breakfast, 4 units before lunch and 6 units before supper. 15 mL 3    insulin glargine (LANTUS SOLOSTAR) 100 UNIT/ML pen Inject 23 Units Subcutaneous at bedtime Hold on file until needed. 30 mL 3    insulin pen needle (ULTICARE SHORT) 31G X 8 MM miscellaneous Use 2 pen needles daily or as directed. 200 each 3    Lancets (ONETOUCH DELICA PLUS [id number]) MISC 1 each by In Vitro route See Admin Instructions Hold on file until needed. 100 each 1    Lancets (ONETOUCH DELICA PLUS [id number]) MISC 1 each by In Vitro route See Admin Instructions Hold on file until needed. 100 each 4    lisinopril (ZESTRIL) 40 MG tablet Take 1 tablet (40 mg) by mouth daily 90 tablet 3    metFORMIN (GLUCOPHAGE) 500 MG tablet Take 2 tablets (1,000 mg) by mouth 2 times daily (with meals) 360 tablet 3    Multiple Vitamins-Minerals (PRESERVISION AREDS PO) Take 1 tablet by mouth      ONETOUCH ULTRA test strip 2 strips by In Vitro route daily One touch Ultra 2. 100 strip 3    ORDER FOR DME Equipment being ordered:   Glucometer 1 Device 1    pantoprazole (PROTONIX) 20 MG EC tablet Take 1 tablet (20 mg) by mouth daily 90 tablet 3    rosuvastatin (CRESTOR) 10 MG tablet Take 1 tablet (10 mg) by mouth at bedtime 90 tablet 3    triamcinolone (KENALOG) 0.1 % external cream Apply topically 2 times daily To dry skin patches as needed.  Hold on file until needed. 80 g 1     No current facility-administered medications for this visit.       ALLERGIES:  Allergies   Allergen Reactions    Zocor [Simvastatin - High Dose]      Bilateral hip aching       SOCIAL HISTORY:  I have reviewed this patient's social history and updated it with pertinent information if needed. Fawad Garcia  reports that he has never smoked. He has never used smokeless tobacco. He reports current alcohol use of about 1.7  standard drinks of alcohol per week. He reports that he does not use drugs.    FAMILY HISTORY:  I have reviewed this patient's family history and updated it with pertinent information if needed.   Family History   Problem Relation Age of Onset    Cancer Mother         intestinal CA    Other Cancer Mother         cancer    Diabetes Father     Cerebrovascular Disease Father     Diabetes Brother     Other Cancer Brother         Pancreatic cancer    Other Cancer Son         GIST cancer    Diabetes Son     Melanoma No family hx of        REVIEW OF SYSTEMS:  Constitutional:  No weight loss, fever, chills  HEENT:  Eyes:  No visual loss, blurred vision, double vision or yellow sclerae. No hearing loss, sneezing, congestion, runny nose or sore throat.  Skin:  No rash or itching.  Cardiovascular: per HPI  Respiratory: per HPI  GI:  No anorexia, nausea, vomiting or diarrhea. No abdominal pain or blood.  :  No dysurea, hematuria  Neurologic:  No headache, paralysis, ataxia, numbness or tingling in the extremities. No change in bowel or bladder control.  Musculoskeletal:  No muscle pain  Hematologic:  No bleeding or bruising.  Lymphatics:  No enlarged nodes. No history of splenectomy.  Endocrine:  No reports of sweating, cold or heat intolerance. No polyuria or polydipsia.  Allergies:  No history of asthma, hives, eczema or rhinitis.    PHYSICAL EXAM:  BP (!) 149/81   Pulse 88   Wt 103 kg (227 lb)   SpO2 97%   BMI 34.01 kg/m        Constitutional: awake, alert, no distress  Eyes: PERRL, sclera nonicteric  ENT: trachea midline  Respiratory: Lungs clear  Cardiovascular: Regular rate and rhythm, 2/6 mid peaking systolic murmur at the upper sternal border, no lower extremity edema  GI: nondistended, nontender, bowel sounds present  Lymph/Hematologic: no lymphadenopathy  Skin: dry, no rash  Musculoskeletal: good muscle tone, strength 5/5 in upper and lower extremities  Neurologic: no focal deficits  Neuropsychiatric:  appropriate affact    DATA:  Labs:   January 2024: Potassium 4.4, creatinine 1.1  July 2024: A1c 7.7  Recent Labs   Lab Test 07/19/23  0857 10/19/22  2242   CHOL 116 134   HDL 52 38*   LDL 53 68   TRIG 53 141     EKG: August 2024: Sinus rhythm, rate 67    ASSESSMENT:  88-year-old male seen for aortic stenosis.  He has moderate aortic stenosis, he has no symptoms as expected.  We talked about the natural history of aortic stenosis.  He is probably several years away at least from needing valve replacement.  Echo will be repeated in 1 year.    RECOMMENDATIONS:  1.  Moderate aortic stenosis  -Repeat echo in 1 year    Follow-up  with LYLE in 1 year after echo.    Devang Galicia MD  Cardiology - [facility]  Pager:  [phone]  Text Page  August 5, 2024        Thank you for allowing me to participate in the care of your patient.      Sincerely,     Devang Galicia MD     [facility]  cc:   Jimmy Catalan MD  [address] [place] [address]

## 2024-08-05 NOTE — PATIENT INSTRUCTIONS
Your echocardiogram shows that one of the valves in your heart, the aortic valve, is thickened and is not opening well.  The medical term for this is aortic stenosis.  This is in the moderate range, meaning that the valve is tight, making it harder for the heart to pump the blood out.     The natural history of aortic stenosis is to gradually worsen over the years.  When it becomes severely tight, it can cause stress on the heart and cause symptoms of shortness of breath, chest pain, or fainting.    When the aortic stenosis becomes severe, the only treatment is to replace the valve with a new one.  There are two ways of doing this.  First option is an open heart surgery where a cardiovascular surgeon replaces the aortic valve.  The second option is a transcatheter aortic valve replacement (TAVR) where a cardiologist puts a new valve inside of the old valve, delivered by a catheter that usually goes through a blood vessel in the leg.     We have a team of people at [facility] who work together that order the necessary tests and then meet in a conference to determine the best option for replacing the aortic valve.

## 2024-08-13 ENCOUNTER — VIRTUAL VISIT (OUTPATIENT)
Dept: EDUCATION SERVICES | Facility: CLINIC | Age: 89
End: 2024-08-13
Payer: COMMERCIAL

## 2024-08-13 DIAGNOSIS — E11.3293 TYPE 2 DIABETES MELLITUS WITH BOTH EYES AFFECTED BY MILD NONPROLIFERATIVE RETINOPATHY WITHOUT MACULAR EDEMA, WITH LONG-TERM CURRENT USE OF INSULIN (H): Primary | ICD-10-CM

## 2024-08-13 DIAGNOSIS — Z79.4 TYPE 2 DIABETES MELLITUS WITH BOTH EYES AFFECTED BY MILD NONPROLIFERATIVE RETINOPATHY WITHOUT MACULAR EDEMA, WITH LONG-TERM CURRENT USE OF INSULIN (H): Primary | ICD-10-CM

## 2024-08-13 PROCEDURE — G0108 DIAB MANAGE TRN  PER INDIV: HCPCS | Mod: 93 | Performed by: DIETITIAN, REGISTERED

## 2024-08-13 NOTE — PROGRESS NOTES
Diabetes Education Follow-up    Type of Service: Telephone Visit    Originating Location (Patient Location): Home  Distant Location (Provider Location): [facility]  Mode of Communication:  Telephone    Telephone Visit Start Time:  2:03pm  Telephone Visit End Time (telephone visit stop time): 2:36pm    How would patient like to obtain AVS? Kaylen    Subjective/Objective:    Fawad Garcia sent in blood glucose log for review. Last date of communication was: 5/15/24.    Diabetes is being managed with Diabetes Medications   Diabetes Medication(s)       Biguanides       metFORMIN (GLUCOPHAGE) 500 MG tablet Take 2 tablets (1,000 mg) by mouth 2 times daily (with meals)       Insulin       insulin aspart (NOVOLOG PEN) 100 UNIT/ML pen Inject 3 units before breakfast, 4 units before lunch and 6 units before supper.     insulin glargine (LANTUS SOLOSTAR) 100 UNIT/ML pen Inject 23 Units Subcutaneous at bedtime Hold on file until needed.            BG/Food Log:   Date Breakfast  Lunch  Dinner  Bedtime    Before  8-9am After Before After Before After    8/13 90         8/12       256   8/11 149      205   8/10 130      127   8/9 125      209   8/7 123         8/7 85  Ate multiple rice krispie bars    564   8/6 115  8/5 111  8/4 110  8/3  111  8/1 102  7/29 103  7/26 124      Assessment:    Fasting blood glucose: 92% in target.  Bedtime glucose: 20% in target.    Recent A1c is less than 8.0%.  No indication of BG <70.  Patient reports feeling bad in the 80-90's and will treat with carbohydrate.  Likes HS reading in the 200's.      Plan/Response:      Keep Lantus at 23 units.  Continue Novolog like you are taking 3-4-6-0.  Continue Metformin as prescribed.  Follow up with Jimmy Catalan as scheduled 10/21/24.      Follow up with Diabetes Education 11/12/24 at 3pm (phone visit).    Prudence York RD, [facility], 2:45 PM, 8/13/2024    Any diabetes medication dose changes were made via the CDE  Protocol and Collaborative Practice Agreement with the patient's primary care provider. A copy of this encounter was shared with the provider.

## 2024-08-13 NOTE — PATIENT INSTRUCTIONS
Keep Lantus at 23 units.  Continue Novolog like you are taking 3-4-6-0.  Continue Metformin as prescribed.  Follow up with Jimmy Catalan as scheduled 10/21/24.      Follow up with Diabetes Education 11/12/24 at 3pm (phone visit).    Prudence York RD, [facility], 2:47 PM, 8/13/2024

## 2024-08-13 NOTE — LETTER
8/13/2024         RE: Fawad Garcia  [address] [facility] [address]        Dear Colleague,    Thank you for referring your patient, Fawad Garcia, to the [facility]. Please see a copy of my visit note below.    Diabetes Education Follow-up    Type of Service: Telephone Visit    Originating Location (Patient Location): Home  Distant Location (Provider Location): [facility]  Mode of Communication:  Telephone    Telephone Visit Start Time:  2:03pm  Telephone Visit End Time (telephone visit stop time): 2:36pm    How would patient like to obtain AVS? MyChart    Subjective/Objective:    Fawad Garcia sent in blood glucose log for review. Last date of communication was: 5/15/24.    Diabetes is being managed with Diabetes Medications   Diabetes Medication(s)       Biguanides       metFORMIN (GLUCOPHAGE) 500 MG tablet Take 2 tablets (1,000 mg) by mouth 2 times daily (with meals)       Insulin       insulin aspart (NOVOLOG PEN) 100 UNIT/ML pen Inject 3 units before breakfast, 4 units before lunch and 6 units before supper.     insulin glargine (LANTUS SOLOSTAR) 100 UNIT/ML pen Inject 23 Units Subcutaneous at bedtime Hold on file until needed.            BG/Food Log:   Date Breakfast  Lunch  Dinner  Bedtime    Before  8-9am After Before After Before After    8/13 90         8/12       256   8/11 149      205   8/10 130      127   8/9 125      209   8/7 123         8/7 85  Ate multiple rice krispie bars    564   8/6 115  8/5 111  8/4 110  8/3  111  8/1 102  7/29 103  7/26 124      Assessment:    Fasting blood glucose: 92% in target.  Bedtime glucose: 20% in target.    Recent A1c is less than 8.0%.  No indication of BG <70.  Patient reports feeling bad in the 80-90's and will treat with carbohydrate.  Likes HS reading in the 200's.      Plan/Response:      Keep Lantus at 23 units.  Continue Novolog like you are taking 3-4-6-0.  Continue  Metformin as prescribed.  Follow up with Jimmy Catalan as scheduled 10/21/24.      Follow up with Diabetes Education 11/12/24 at 3pm (phone visit).    Prudence York RD, [facility], 2:45 PM, 8/13/2024    Any diabetes medication dose changes were made via the CDE Protocol and Collaborative Practice Agreement with the patient's primary care provider. A copy of this encounter was shared with the provider.

## 2024-08-19 ENCOUNTER — OFFICE VISIT (OUTPATIENT)
Dept: RADIATION THERAPY | Facility: OUTPATIENT CENTER | Age: 89
End: 2024-08-19
Payer: COMMERCIAL

## 2024-08-19 VITALS
WEIGHT: 229 LBS | OXYGEN SATURATION: 96 % | SYSTOLIC BLOOD PRESSURE: 109 MMHG | HEART RATE: 93 BPM | BODY MASS INDEX: 34.31 KG/M2 | DIASTOLIC BLOOD PRESSURE: 70 MMHG

## 2024-08-19 DIAGNOSIS — C61 MALIGNANT NEOPLASM OF PROSTATE (H): Primary | ICD-10-CM

## 2024-08-19 NOTE — LETTER
2024      Fawad Garcia  [address] [facility] [address]      Dear Colleague,    Thank you for referring your patient, Fawad Garcia, to the [facility]. Please see a copy of my visit note below.       Department of Radiation Oncology  Radiation Therapy Center  [facility]  [place] [address]  [phone]       Radiation Oncology Follow-up Visit  24    Fawad Garcia  MRN: [id number]   : 1935     DISEASE TREATED: High-risk prostate cancer. Pretreatment PSA 25.5, Chaka score of 4+3=7, clinical stage [id number].     RADIATION THERAPY GIVEN: 8041 cGy in 43 planned treatments, via IMRT     INTERVAL SINCE COMPLETION OF THERAPY: 9 years since completion on 02/10/2012.     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 He underwent biopsies in  and  and pathology was negative for malignancy. Due to the persistently rising PSA Dr. Mcintyre performed an MRI-guided biopsy of the prostate gland which revealed Chaka 4+3= 7 disease in 2 of 2 cores with 90% of the cores being positive in the right inferior base and right inferior mid gland. Androgen ablation was discussed with the patient due to his high-risk disease and the patient declined due to comorbidities of the drug. Overall, he completed radiotherapy with very little toxicity.     SUBJECTIVE:   Fawad Garcia is a 88 year old male who is scheduled today for routine follow-up.      PSA on 2024 was 4.61 up from up prior value of 4.00 on 2024 (zohaib =0.24).    The patient  has prior undergone a PSMA PET scan on 22 which demonstrated uptake in the prostate gland.  No clear evidence of regional or distant disease noted.  There was indeterminate 9 mm left lung nodule.  More recent PSMA PET scan 2024 demonstrated  residual activity in the prostate gland without evidence of regional or distant disease.  Prior noted lung nodule was stable suggesting benign entity.    The patient has met with medical oncology team (Dr. Barriga) rediscussed observation versus consideration of initiation of ADT.  Currently continue observation with PSA lab work.    Today, he denies any pressing issues or complaints and reports that all symptoms have essentially remained stable since his last visit with us 6 months ago. AUA and MARINA not completed today (TC). Prior  AUA 18/35 (patient reported overall stable) and MARINA was 0/25. Main urinary symptoms continue to be nocturia and urgency. Patient states symptoms are worse with increased intake of fluids. Denies drinking alcohol, and has minimal caffeine. He denies any dysuria, hematuria, hematochezia, diarrhea, or loose stools.  He denies any pain or swelling.     Energy level is good and baseline.              Current Outpatient Medications   Medication     amLODIPine (NORVASC) 5 MG tablet     Blood Glucose Monitoring Suppl (ONE TOUCH ULTRA SYSTEM KIT) W/DEVICE KIT     clopidogrel (PLAVIX) 75 MG tablet     dorzolamide-timolol (COSOPT) 2-0.5 % ophthalmic solution     hydrochlorothiazide (HYDRODIURIL) 25 MG tablet     insulin glargine (LANTUS SOLOSTAR) 100 UNIT/ML pen     insulin pen needle (B-D U/F) 31G X 8 MM miscellaneous     levofloxacin (LEVAQUIN) 500 MG tablet     lisinopril (PRINIVIL/ZESTRIL) 40 MG tablet     metFORMIN (GLUCOPHAGE) 500 MG tablet     Multiple Vitamins-Minerals (PRESERVISION AREDS PO)     ONETOUCH LANCETS [facility]     ONETOUCH ULTRA test strip     order for DME     ORDER FOR DME     pantoprazole (PROTONIX) 20 MG EC tablet     rosuvastatin (CRESTOR) 10 MG tablet     sulfamethoxazole-trimethoprim (BACTRIM DS) 800-160 MG tablet     No current facility-administered medications for this visit.           Allergies   Allergen Reactions     Zocor [Simvastatin - High Dose]      Bilateral hip  aching       Past Medical History:   Diagnosis Date     Diabetes mellitus (H)      Hypertension      Squamous cell carcinoma          PHYSICAL EXAM:  /70 (BP Location: Left arm, Patient Position: Sitting, Cuff Size: Adult Regular)   Pulse 93   Wt 103.9 kg (229 lb)   SpO2 96%   BMI 34.31 kg/m    No apparent distress       LABS AND IMAGING:  Reviewed.  PSA   Date Value Ref Range Status   2021 1.78 0 - 4 ug/L Final     Comment:     Assay Method:  Chemiluminescence using Siemens Vista analyzer   2020 1.53 0 - 4 ug/L Final     Comment:     Assay Method:  Chemiluminescence using Siemens Vista analyzer   2020 1.23 0 - 4 ug/L Final     Comment:     Assay Method:  Chemiluminescence using Siemens Vista analyzer   2019 1.08 0 - 4 ug/L Final     Comment:     Assay Method:  Chemiluminescence using Siemens Vista analyzer   03/15/2019 1.10 0 - 4 ug/L Final     Comment:     Assay Method:  Chemiluminescence using Siemens Vista analyzer     PSA Tumor Marker   Date Value Ref Range Status   2024 4.61 ng/mL Final     Comment:     No reference ranges have been established for patients over 80 years.   2024 4.00 ng/mL Final     Comment:     No reference ranges have been established for patients over 80 years.   2023 3.68 ng/mL Final     Comment:     No reference ranges have been established for patients over 80 years.   2023 2.55 ng/mL Final     Comment:     No reference ranges have been established for patients over 80 years.   2022 2.71 0.00 - 4.00 ug/L Final   2022 2.49 0.00 - 4.00 ug/L Final   2021 1.88 0.00 - 4.00 ug/L Final     [id number]    PSMA PET    IMPRESSION:  1. Focal FDG uptake at the mid/basal anterior central prostate gland,  suspicious for residual prostate cancer.   2. No evidence of metastasis in the body.  3. 9 mm pulmonary nodule in the left upper lobe. Recommend follow-up  chest CT in 3-6 months.    23  CTC   IMPRESSION:   Stable  10 mm nodule in the central aspect of the left upper lobe. This  has been stable for approximately 9 months at this point. Continued  follow-up is recommended.    7/15/24  PSMA PET  FINDINGS: As compared to 4/8/2022, redemonstrated focal area of activity centered in the anterior prostate mid gland consistent with site of known prostate malignancy, which has not substantially changed in size or degree of uptake. Normal physiologic   radiotracer uptake elsewhere. No evidence of metastasis. No radiotracer avid adenopathy in the pelvis or elsewhere. No suspicious focal uptake in the liver or skeleton.     A subcentimeter nonradiotracer avid pulmonary nodule in the central left upper lobe is unchanged since 4/8/2022, consistent with a benign etiology. Moderate senescent intracranial changes. Marked atherosclerotic calcifications including the coronary   arteries. Mild reticular scarring and/or atelectasis in the lower lungs. Dense mitral annulus calcification. Tiny gallstones. Few colonic diverticula. Radiation seeds in the prostate. Medium size right scrotal hydrocele. Mild thoracolumbar curve.   Moderate scattered hypertrophic degenerative changes in the spine. 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                                                                      IMPRESSION:  1. No substantial change since 4/8/2022.   2. Radiotracer avid primary prostate malignancy. No evidence of metastasis.  3. Stable benign subcentimeter central left upper lobe pulmonary nodule.    IMPRESSION:   Mr. Garcia is a 88 year old male with a high-risk prostate cancer. Pretreatment PSA 25.5, Raton score of 4+3=7, clinical stage [id number]. Complete RT 2/10/2012,  8041 cGy in 43 planned treatments, via IMRT.       PLAN:     1. PSA has met [place] definition of biochemical failure (2.0 above zohaib with zohaib of 0.24).The patient underwent a PSMA PET scan on 4/8/22 which demonstrated uptake in the prostate  gland.  No clear evidence of regional or distant disease noted.  There was indeterminate 9 mm left lung nodule. Follow up CT chest on 1/20/23 to evaluate previously noted left lingular mass was stable. Repeat PSMA PET scan 7/13/2024 demonstrated residual activity in the prostate gland without evidence of regional or distant disease.  Prior noted lung nodule was stable suggesting benign entity.      2. I re-discussed with the patient that it appears he has locally recurrent disease without evidence of regional or distant spread.  I discussed different options including observation versus ADT versus consideration of local retreatment in the form of reirradiation.  Given prior radiation course, I discussed additional radiation would likely come at the risk of significant toxicity to rectum and urethra. The patient has met with medical oncology team (Dr. Barriga) and discussed observation versus consideration of initiation of ADT.  Currently plan is to continue observation with PSA lab work. His PSA has risen, but overall at a slower rate. I therefore discussed  discussed that initiation of ADT could be consider. I also discussed that  given the patient's age and absence of clinical symptoms at this point, observation would not be unreasonable as well.     3. The patient will continue to follow up with our medical oncology colleagues for oncologic surveillance. Would defer start of ADT and further labwork to our medical oncology colleagues.     4. No late GI/ toxicity.    5. RTC in 6 months.    30 minutes spent on the date of the encounter doing chart review, review of outside records, review of test results, interpretation of tests, patient visit, documentation, discussion, and plan.        Kieran Hua M.D.  Department of Radiation Oncology  [facility]         Again, thank you for allowing me to participate in the care of your patient.        Sincerely,        Kieran Hua MD

## 2024-08-19 NOTE — PROGRESS NOTES
Department of Radiation Oncology  Radiation [facility] Center  [facility] [place] [address]  [phone]       Radiation Oncology Follow-up Visit  24    Fawad Garcia  MRN: [id number]   : 1935     DISEASE TREATED: High-risk prostate cancer. Pretreatment PSA 25.5, Chaka score of 4+3=7, clinical stage [id number].     RADIATION THERAPY GIVEN: 8041 cGy in 43 planned treatments, via IMRT     INTERVAL SINCE COMPLETION OF THERAPY: 9 years since completion on 02/10/2012.     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 He underwent biopsies in  and  and pathology was negative for malignancy. Due to the persistently rising PSA Dr. Mcintyre performed an MRI-guided biopsy of the prostate gland which revealed Chaka 4+3= 7 disease in 2 of 2 cores with 90% of the cores being positive in the right inferior base and right inferior mid gland. Androgen ablation was discussed with the patient due to his high-risk disease and the patient declined due to comorbidities of the drug. Overall, he completed radiotherapy with very little toxicity.     SUBJECTIVE:   Fawad Garcia is a 88 year old male who is scheduled today for routine follow-up.      PSA on 2024 was 4.61 up from up prior value of 4.00 on 2024 (zohaib =0.24).    The patient  has prior undergone a PSMA PET scan on 22 which demonstrated uptake in the prostate gland.  No clear evidence of regional or distant disease noted.  There was indeterminate 9 mm left lung nodule.  More recent PSMA PET scan 2024 demonstrated residual activity in the prostate gland without evidence of regional or distant disease.  Prior noted lung nodule was stable suggesting benign entity.    The patient has met with medical oncology team (Dr. Barriga) rediscussed observation versus  consideration of initiation of ADT.  Currently continue observation with PSA lab work.    Today, he denies any pressing issues or complaints and reports that all symptoms have essentially remained stable since his last visit with us 6 months ago. AUA and MARINA not completed today (TC). Prior  AUA 18/35 (patient reported overall stable) and MARINA was 0/25. Main urinary symptoms continue to be nocturia and urgency. Patient states symptoms are worse with increased intake of fluids. Denies drinking alcohol, and has minimal caffeine. He denies any dysuria, hematuria, hematochezia, diarrhea, or loose stools.  He denies any pain or swelling.     Energy level is good and baseline.              Current Outpatient Medications   Medication    amLODIPine (NORVASC) 5 MG tablet    Blood Glucose Monitoring Suppl (ONE TOUCH ULTRA SYSTEM KIT) W/DEVICE KIT    clopidogrel (PLAVIX) 75 MG tablet    dorzolamide-timolol (COSOPT) 2-0.5 % ophthalmic solution    hydrochlorothiazide (HYDRODIURIL) 25 MG tablet    insulin glargine (LANTUS SOLOSTAR) 100 UNIT/ML pen    insulin pen needle (B-D U/F) 31G X 8 MM miscellaneous    levofloxacin (LEVAQUIN) 500 MG tablet    lisinopril (PRINIVIL/ZESTRIL) 40 MG tablet    metFORMIN (GLUCOPHAGE) 500 MG tablet    Multiple Vitamins-Minerals (PRESERVISION AREDS PO)    ONETOUCH LANCETS MISC    ONETOUCH ULTRA test strip    order for DME    ORDER FOR DME    pantoprazole (PROTONIX) 20 MG EC tablet    rosuvastatin (CRESTOR) 10 MG tablet    sulfamethoxazole-trimethoprim (BACTRIM DS) 800-160 MG tablet     No current facility-administered medications for this visit.           Allergies   Allergen Reactions    Zocor [Simvastatin - High Dose]      Bilateral hip aching       Past Medical History:   Diagnosis Date    Diabetes mellitus (H)     Hypertension     Squamous cell carcinoma          PHYSICAL EXAM:  /70 (BP Location: Left arm, Patient Position: Sitting, Cuff Size: Adult Regular)   Pulse 93   Wt 103.9 kg (229  lb)   SpO2 96%   BMI 34.31 kg/m    No apparent distress       LABS AND IMAGING:  Reviewed.  PSA   Date Value Ref Range Status   2021 1.78 0 - 4 ug/L Final     Comment:     Assay Method:  Chemiluminescence using Siemens Vista analyzer   2020 1.53 0 - 4 ug/L Final     Comment:     Assay Method:  Chemiluminescence using Siemens Vista analyzer   2020 1.23 0 - 4 ug/L Final     Comment:     Assay Method:  Chemiluminescence using Siemens Vista analyzer   2019 1.08 0 - 4 ug/L Final     Comment:     Assay Method:  Chemiluminescence using Siemens Vista analyzer   03/15/2019 1.10 0 - 4 ug/L Final     Comment:     Assay Method:  Chemiluminescence using Siemens Vista analyzer     PSA Tumor Marker   Date Value Ref Range Status   2024 4.61 ng/mL Final     Comment:     No reference ranges have been established for patients over 80 years.   2024 4.00 ng/mL Final     Comment:     No reference ranges have been established for patients over 80 years.   2023 3.68 ng/mL Final     Comment:     No reference ranges have been established for patients over 80 years.   2023 2.55 ng/mL Final     Comment:     No reference ranges have been established for patients over 80 years.   2022 2.71 0.00 - 4.00 ug/L Final   2022 2.49 0.00 - 4.00 ug/L Final   2021 1.88 0.00 - 4.00 ug/L Final     [id number]    PSMA PET    IMPRESSION:  1. Focal FDG uptake at the mid/basal anterior central prostate gland,  suspicious for residual prostate cancer.   2. No evidence of metastasis in the body.  3. 9 mm pulmonary nodule in the left upper lobe. Recommend follow-up  chest CT in 3-6 months.    23  CTC   IMPRESSION:   Stable 10 mm nodule in the central aspect of the left upper lobe. This  has been stable for approximately 9 months at this point. Continued  follow-up is recommended.    7/15/24  PSMA PET  FINDINGS: As compared to 2022, redemonstrated focal area of activity centered in the  anterior prostate mid gland consistent with site of known prostate malignancy, which has not substantially changed in size or degree of uptake. Normal physiologic   radiotracer uptake elsewhere. No evidence of metastasis. No radiotracer avid adenopathy in the pelvis or elsewhere. No suspicious focal uptake in the liver or skeleton.     A subcentimeter nonradiotracer avid pulmonary nodule in the central left upper lobe is unchanged since 4/8/2022, consistent with a benign etiology. Moderate senescent intracranial changes. Marked atherosclerotic calcifications including the coronary   arteries. Mild reticular scarring and/or atelectasis in the lower lungs. Dense mitral annulus calcification. Tiny gallstones. Few colonic diverticula. Radiation seeds in the prostate. Medium size right scrotal hydrocele. Mild thoracolumbar curve.   Moderate scattered hypertrophic degenerative changes in the spine. 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                                                                      IMPRESSION:  1. No substantial change since 4/8/2022.   2. Radiotracer avid primary prostate malignancy. No evidence of metastasis.  3. Stable benign subcentimeter central left upper lobe pulmonary nodule.    IMPRESSION:   Mr. Garcia is a 88 year old male with a high-risk prostate cancer. Pretreatment PSA 25.5, Chaka score of 4+3=7, clinical stage [id number]. Complete RT 2/10/2012,  8041 cGy in 43 planned treatments, via IMRT.       PLAN:     1. PSA has met [place] definition of biochemical failure (2.0 above zohaib with zohaib of 0.24).The patient underwent a PSMA PET scan on 4/8/22 which demonstrated uptake in the prostate gland.  No clear evidence of regional or distant disease noted.  There was indeterminate 9 mm left lung nodule. Follow up CT chest on 1/20/23 to evaluate previously noted left lingular mass was stable. Repeat PSMA PET scan 7/13/2024 demonstrated residual activity in the  prostate gland without evidence of regional or distant disease.  Prior noted lung nodule was stable suggesting benign entity.      2. I re-discussed with the patient that it appears he has locally recurrent disease without evidence of regional or distant spread.  I discussed different options including observation versus ADT versus consideration of local retreatment in the form of reirradiation.  Given prior radiation course, I discussed additional radiation would likely come at the risk of significant toxicity to rectum and urethra. The patient has met with medical oncology team (Dr. Barriga) and discussed observation versus consideration of initiation of ADT.  Currently plan is to continue observation with PSA lab work. His PSA has risen, but overall at a slower rate. I therefore discussed  discussed that initiation of ADT could be consider. I also discussed that  given the patient's age and absence of clinical symptoms at this point, observation would not be unreasonable as well.     3. The patient will continue to follow up with our medical oncology colleagues for oncologic surveillance. Would defer start of ADT and further labwork to our medical oncology colleagues.     4. No late GI/ toxicity.    5. RTC in 6 months.    30 minutes spent on the date of the encounter doing chart review, review of outside records, review of test results, interpretation of tests, patient visit, documentation, discussion, and plan.        Kieran Hua M.D.  Department of Radiation Oncology  [facility]

## 2024-08-19 NOTE — NURSING NOTE
"Oncology Rooming Note    August 19, 2024 3:20 PM   Fawad Garcia is a 88 year old male who presents for:    Chief Complaint   Patient presents with    Radiation Therapy     Follow up visit with Dr. Hua     Initial Vitals: /70 (BP Location: Left arm, Patient Position: Sitting, Cuff Size: Adult Regular)   Pulse 93   Wt 103.9 kg (229 lb)   SpO2 96%   BMI 34.31 kg/m   Estimated body mass index is 34.31 kg/m  as calculated from the following:    Height as of 7/30/24: 1.74 m (5' 8.5\").    Weight as of this encounter: 103.9 kg (229 lb). Body surface area is 2.24 meters squared.  Data Unavailable Comment: Data Unavailable   No LMP for male patient.  Allergies reviewed: Yes  Medications reviewed: Yes    Medications: Medication refills not needed today.  Pharmacy name entered into EPIC: [facility] - [place], [place] - [address] [place] [address]    Frailty Screening:   Is the patient here for a new oncology consult visit in cancer care? 2. No      Clinical concerns: follow up today with Dr. Javid Andrea RN              "

## 2024-09-06 ENCOUNTER — PATIENT OUTREACH (OUTPATIENT)
Dept: CARE COORDINATION | Facility: CLINIC | Age: 89
End: 2024-09-06
Payer: COMMERCIAL

## 2024-09-09 ENCOUNTER — TELEPHONE (OUTPATIENT)
Dept: FAMILY MEDICINE | Facility: CLINIC | Age: 89
End: 2024-09-09
Payer: COMMERCIAL

## 2024-09-10 ENCOUNTER — MEDICAL CORRESPONDENCE (OUTPATIENT)
Dept: HEALTH INFORMATION MANAGEMENT | Facility: CLINIC | Age: 89
End: 2024-09-10
Payer: COMMERCIAL

## 2024-09-11 NOTE — TELEPHONE ENCOUNTER
Form completed, signed, and faxed to [facility] at [phone]. Copy sent to scan and copy placed in cabinet.

## 2024-09-30 ENCOUNTER — IMMUNIZATION (OUTPATIENT)
Dept: FAMILY MEDICINE | Facility: CLINIC | Age: 89
End: 2024-09-30
Payer: COMMERCIAL

## 2024-09-30 DIAGNOSIS — Z23 HIGH PRIORITY FOR 2019-NCOV VACCINE: ICD-10-CM

## 2024-09-30 DIAGNOSIS — Z23 NEED FOR PROPHYLACTIC VACCINATION AND INOCULATION AGAINST INFLUENZA: Primary | ICD-10-CM

## 2024-09-30 PROCEDURE — 90662 IIV NO PRSV INCREASED AG IM: CPT

## 2024-09-30 PROCEDURE — 90480 ADMN SARSCOV2 VAC 1/ONLY CMP: CPT

## 2024-09-30 PROCEDURE — 91320 SARSCV2 VAC 30MCG TRS-SUC IM: CPT

## 2024-09-30 PROCEDURE — G0008 ADMIN INFLUENZA VIRUS VAC: HCPCS

## 2024-10-18 ENCOUNTER — LAB (OUTPATIENT)
Dept: LAB | Facility: CLINIC | Age: 89
End: 2024-10-18
Payer: COMMERCIAL

## 2024-10-18 DIAGNOSIS — E11.3293 TYPE 2 DIABETES MELLITUS WITH BOTH EYES AFFECTED BY MILD NONPROLIFERATIVE RETINOPATHY WITHOUT MACULAR EDEMA, WITH LONG-TERM CURRENT USE OF INSULIN (H): ICD-10-CM

## 2024-10-18 DIAGNOSIS — Z79.4 TYPE 2 DIABETES MELLITUS WITH BOTH EYES AFFECTED BY MILD NONPROLIFERATIVE RETINOPATHY WITHOUT MACULAR EDEMA, WITH LONG-TERM CURRENT USE OF INSULIN (H): ICD-10-CM

## 2024-10-18 LAB
CHOLEST SERPL-MCNC: 111 MG/DL
EST. AVERAGE GLUCOSE BLD GHB EST-MCNC: 183 MG/DL
FASTING STATUS PATIENT QL REPORTED: YES
HBA1C MFR BLD: 8 % (ref 0–5.6)
HDLC SERPL-MCNC: 48 MG/DL
LDLC SERPL CALC-MCNC: 50 MG/DL
NONHDLC SERPL-MCNC: 63 MG/DL
TRIGL SERPL-MCNC: 67 MG/DL

## 2024-10-18 PROCEDURE — 36415 COLL VENOUS BLD VENIPUNCTURE: CPT

## 2024-10-18 PROCEDURE — 83036 HEMOGLOBIN GLYCOSYLATED A1C: CPT

## 2024-10-18 PROCEDURE — 80061 LIPID PANEL: CPT

## 2024-10-21 ENCOUNTER — OFFICE VISIT (OUTPATIENT)
Dept: FAMILY MEDICINE | Facility: CLINIC | Age: 89
End: 2024-10-21
Payer: COMMERCIAL

## 2024-10-21 VITALS
SYSTOLIC BLOOD PRESSURE: 134 MMHG | TEMPERATURE: 97.9 F | OXYGEN SATURATION: 94 % | HEART RATE: 70 BPM | WEIGHT: 232 LBS | BODY MASS INDEX: 34.36 KG/M2 | RESPIRATION RATE: 16 BRPM | DIASTOLIC BLOOD PRESSURE: 72 MMHG | HEIGHT: 69 IN

## 2024-10-21 DIAGNOSIS — Z23 NEED FOR SHINGLES VACCINE: ICD-10-CM

## 2024-10-21 DIAGNOSIS — Z29.11 NEED FOR VACCINATION AGAINST RESPIRATORY SYNCYTIAL VIRUS: ICD-10-CM

## 2024-10-21 DIAGNOSIS — I35.0 MODERATE AORTIC STENOSIS: ICD-10-CM

## 2024-10-21 DIAGNOSIS — Z79.4 TYPE 2 DIABETES MELLITUS WITH BOTH EYES AFFECTED BY MILD NONPROLIFERATIVE RETINOPATHY WITHOUT MACULAR EDEMA, WITH LONG-TERM CURRENT USE OF INSULIN (H): ICD-10-CM

## 2024-10-21 DIAGNOSIS — E11.3293 TYPE 2 DIABETES MELLITUS WITH BOTH EYES AFFECTED BY MILD NONPROLIFERATIVE RETINOPATHY WITHOUT MACULAR EDEMA, WITH LONG-TERM CURRENT USE OF INSULIN (H): ICD-10-CM

## 2024-10-21 DIAGNOSIS — Z00.00 ENCOUNTER FOR MEDICARE ANNUAL WELLNESS EXAM: Primary | ICD-10-CM

## 2024-10-21 PROCEDURE — G0439 PPPS, SUBSEQ VISIT: HCPCS | Performed by: FAMILY MEDICINE

## 2024-10-21 PROCEDURE — 99214 OFFICE O/P EST MOD 30 MIN: CPT | Mod: 25 | Performed by: FAMILY MEDICINE

## 2024-10-21 RX ORDER — INSULIN GLARGINE 100 [IU]/ML
24 INJECTION, SOLUTION SUBCUTANEOUS AT BEDTIME
Qty: 30 ML | Refills: 3 | Status: SHIPPED | OUTPATIENT
Start: 2024-10-21

## 2024-10-21 SDOH — HEALTH STABILITY: PHYSICAL HEALTH: ON AVERAGE, HOW MANY MINUTES DO YOU ENGAGE IN EXERCISE AT THIS LEVEL?: 0 MIN

## 2024-10-21 SDOH — HEALTH STABILITY: PHYSICAL HEALTH: ON AVERAGE, HOW MANY DAYS PER WEEK DO YOU ENGAGE IN MODERATE TO STRENUOUS EXERCISE (LIKE A BRISK WALK)?: 0 DAYS

## 2024-10-21 ASSESSMENT — SOCIAL DETERMINANTS OF HEALTH (SDOH): HOW OFTEN DO YOU GET TOGETHER WITH FRIENDS OR RELATIVES?: MORE THAN THREE TIMES A WEEK

## 2024-10-21 ASSESSMENT — PAIN SCALES - GENERAL: PAINLEVEL: NO PAIN (0)

## 2024-10-21 NOTE — PROGRESS NOTES
"Preventive Care Visit  [facility]  Jimmy Catalan MD, Family Medicine  Oct 21, 2024      Assessment & Plan     Encounter for Medicare annual wellness exam  Patient was advised on recommended screening and preventive health recommendations.  He verbalized understanding and agreed to the plans below.     Type 2 diabetes mellitus with both eyes affected by mild nonproliferative retinopathy without macular edema, with long-term current use of insulin (H)  Slightly uncontrolled. A1c less than 8% could be permissible due to risk of hypoglycemia.  Advised to increase lantus to 24 units per day.  Advised ways to reduce overnight hypoglycemia as patient is very anxious about this.  Reinforced carbohydrate control.  Regular exercise 30 mins per day, 3 times a week.  Regular foot care, annual eye exam.   Recheck lab in 3 months.  - insulin glargine (LANTUS SOLOSTAR) 100 UNIT/ML pen  Dispense: 30 mL; Refill: 3  - OFFICE/OUTPT VISIT,EST,LEVL IV  - Hemoglobin A1c    Moderate aortic stenosis  Has seen cardiology and ha splans for recheck in a year.  Advsed this can cause dizziness or dyspnea. Return precautions discussed and given to patient.     Need for shingles vaccine  Patient will obtain from pharmacy due to coverage.     Need for vaccination against respiratory syncytial virus  Patient will get vaccine from pharmacy for better insurance coverage.       Patient has been advised of split billing requirements and indicates understanding: Yes        BMI  Estimated body mass index is 34.26 kg/m  as calculated from the following:    Height as of this encounter: 1.753 m (5' 9\").    Weight as of this encounter: 105.2 kg (232 lb).   Weight management plan: Discussed healthy diet and exercise guidelines    Counseling  Appropriate preventive services were addressed with this patient via screening, questionnaire, or discussion as appropriate for fall prevention, nutrition, physical activity, Tobacco-use " cessation, social engagement, weight loss and cognition.  Checklist reviewing preventive services available has been given to the patient.  Reviewed patient's diet, addressing concerns and/or questions.   Patient reported safety concerns were addressed today.The patient was provided with written information regarding signs of hearing loss.       Patient Instructions   Patient Education    Increase lantus to 24 units at night.  All other medications to remain the same.    Eat a small snack (few pieces of fruit) around bedtime.    Be consistent with low salt, low trans fat and low saturated fat diet.  Eat food rich in omega-3-fatty acids as you tolerate. (salmon, olive oil)  Eat 5 cups of vegetables, fruits and whole grains per day.  Limit starchy food (white rice, white bread, white pasta, white potatoes) to less than a cup per meal.  Minimize sweets, junk food and fastfood. Limit soda beverages to one serving per day; best to avoid it altogether though.    Exercise: moderate intensity sustained for at least 30 mins per episode, goal of 150 mins per week at least  Combine cardiovascular and resistance exercises.  These exercise recommendations are in addition to your daily activity at work or home.  Work on losing weight.      Regular foot care; don't walk barefoot  Annual eye exam.  Please request your eye doctor to furnish a copy of the eye exam report to the clinic to be placed in your record.  Flu vaccine by the end of October yearly.  Be sure to update any other recommended vaccinations for diabetics.    Blood tests: repeat A1c in 3 months.    Get shingles and RSV vaccines from your pharmacy and update the care team when you complete them.    Have [email or web address] blood sugar monitor handy when you meet with Prudence in a couple of weeks.      Preventative Care Visits include: Yearly physicals, Well-child visits, Welcome to Medicare visits, & Medicare yearly wellness exams.    The purpose of these visits is to discuss your  medical history and prevent health problems before you are sick.  You may need to pay a copay, coinsurance or deductible if your visit today includes testing or treating for a new or existing condition.    Additional charges may be incurred for today's visit. If you have questions about what your insurance plan covers, please contact your Insurance Company's member service department.  If you have questions specific to a bill you have already received from [employer], please contact the [facility] Billing Office at [phone].     Preventive Care Advice   This is general advice given by our system to help you stay healthy. However, your care team may have specific advice just for you. Please talk to your care team about your preventive care needs.  Nutrition  Eat 5 or more servings of fruits and vegetables each day.  Try wheat bread, brown rice and whole grain pasta (instead of white bread, rice, and pasta).  Get enough calcium and vitamin D. Check the label on foods and aim for 100% of the RDA (recommended daily allowance).  Lifestyle  Exercise at least 150 minutes each week  (30 minutes a day, 5 days a week).  Do muscle strengthening activities 2 days a week. These help control your weight and prevent disease.  No smoking.  Wear sunscreen to prevent skin cancer.  Have a dental exam and cleaning every 6 months.  Yearly exams  See your health care team every year to talk about:  Any changes in your health.  Any medicines your care team has prescribed.  Preventive care, family planning, and ways to prevent chronic diseases.  Shots (vaccines)   HPV shots (up to age 26), if you've never had them before.  Hepatitis B shots (up to age 59), if you've never had them before.  COVID-19 shot: Get this shot when it's due.  Flu shot: Get a flu shot every year.  Tetanus shot: Get a tetanus shot every 10 years.  Pneumococcal, hepatitis A, and RSV shots: Ask your care team if you need these based on your risk.  Shingles shot (for  age 50 and up)  General health tests  Diabetes screening:  Starting at age 35, Get screened for diabetes at least every 3 years.  If you are younger than age 35, ask your care team if you should be screened for diabetes.  Cholesterol test: At age 39, start having a cholesterol test every 5 years, or more often if advised.  Bone density scan (DEXA): At age 50, ask your care team if you should have this scan for osteoporosis (brittle bones).  Hepatitis C: Get tested at least once in your life.  STIs (sexually transmitted infections)  Before age 24: Ask your care team if you should be screened for STIs.  After age 24: Get screened for STIs if you're at risk. You are at risk for STIs (including HIV) if:  You are sexually active with more than one person.  You don't use condoms every time.  You or a partner was diagnosed with a sexually transmitted infection.  If you are at risk for HIV, ask about PrEP medicine to prevent HIV.  Get tested for HIV at least once in your life, whether you are at risk for HIV or not.  Cancer screening tests  Cervical cancer screening: If you have a cervix, begin getting regular cervical cancer screening tests starting at age 21.  Breast cancer scan (mammogram): If you've ever had breasts, begin having regular mammograms starting at age 40. This is a scan to check for breast cancer.  Colon cancer screening: It is important to start screening for colon cancer at age 45.  Have a colonoscopy test every 10 years (or more often if you're at risk) Or, ask your provider about stool tests like a FIT test every year or Cologuard test every 3 years.  To learn more about your testing options, visit:   .  For help making a decision, visit:   https://bit.ly/rs63518.  Prostate cancer screening test: If you have a prostate, ask your care team if a prostate cancer screening test (PSA) at age 55 is right for you.  Lung cancer screening: If you are a current or former smoker ages 50 to 80, ask your care team  if ongoing lung cancer screenings are right for you.  For informational purposes only. Not to replace the advice of your health care provider. Copyright   2023 [facility]. All rights reserved. Clinically reviewed by the [facility]. [employer] 769450 - REV 01/24.  Learning About Activities of Daily Living  What are activities of daily living?     Activities of daily living (ADLs) are the basic self-care tasks you do every day. These include eating, bathing, dressing, and moving around.  As you age, and if you have health problems, you may find that it's harder to do some of these tasks. If so, your doctor can suggest ideas that may help.  To measure what kind of help you may need, your doctor will ask how well you are able to do ADLs. Let your doctor know if there are any tasks that you are having trouble doing. This is an important first step to getting help. And when you have the help you need, you can stay as independent as possible.  How will a doctor assess your ADLs?  Asking about ADLs is part of a routine health checkup your doctor will likely do as you age. Your health check might be done in a doctor's office, in your home, or at a hospital. The goal is to find out if you are having any problems that could make it hard to care for yourself or that make it unsafe for you to be on your own.  To measure your ADLs, your doctor will ask how hard it is for you to do routine tasks. Your doctor may also want to know if you have changed the way you do a task because of a health problem. Your doctor may watch how you:  Walk back and forth.  Keep your balance while you stand or walk.  Move from sitting to standing or from a bed to a chair.  Button or unbutton a shirt or sweater.  Remove and put on your shoes.  It's common to feel a little worried or anxious if you find you can't do all the things you used to be able to do. Talking with your doctor about ADLs is a way to make sure  you're as safe as possible and able to care for yourself as well as you can. You may want to bring a caregiver, friend, or family member to your checkup. They can help you talk to your doctor.  Follow-up care is a key part of your treatment and safety. Be sure to make and go to all appointments, and call your doctor if you are having problems. It's also a good idea to know your test results and keep a list of the medicines you take.  Current as of: October 24, 2023  Content Version: 14.2    2024 [employer].   Care instructions adapted under license by your healthcare professional. If you have questions about a medical condition or this instruction, always ask your healthcare professional. [employer] disclaims any warranty or liability for your use of this information.    Preventing Falls: Care Instructions  Injuries and health problems such as trouble walking or poor eyesight can increase your risk of falling. So can some medicines. But there are things you can do to help prevent falls. You can exercise to get stronger. You can also arrange your home to make it safer.    Talk to your doctor about the medicines you take. Ask if any of them increase the risk of falls and whether they can be changed or stopped.   Try to exercise regularly. It can help improve your strength and balance. This can help lower your risk of falling.         Practice fall safety and prevention.   Wear low-heeled shoes that fit well and give your feet good support. Talk to your doctor if you have foot problems that make this hard.  Carry a cellphone or wear a medical alert device that you can use to call for help.  Use stepladders instead of chairs to reach high objects. Don't climb if you're at risk for falls. Ask for help, if needed.  Wear the correct eyeglasses, if you need them.        Make your home safer.   Remove rugs, cords, clutter, and furniture from walkways.  Keep your house well lit. Use night-lights in  "hallways and bathrooms.  Install and use sturdy handrails on stairways.  Wear nonskid footwear, even inside. Don't walk barefoot or in socks without shoes.        Be safe outside.   Use handrails, curb cuts, and ramps whenever possible.  Keep your hands free by using a shoulder bag or backpack.  Try to walk in well-lit areas. Watch out for uneven ground, changes in pavement, and debris.  Be careful in the winter. Walk on the grass or gravel when sidewalks are slippery. Use de-icer on steps and walkways. Add non-slip devices to shoes.    Put grab bars and nonskid mats in your shower or tub and near the toilet. Try to use a shower chair or bath bench when bathing.   Get into a tub or shower by putting in your weaker leg first. Get out with your strong side first. Have a phone or medical alert device in the bathroom with you.   Where can you learn more?  Go to https://www.[employer].net/patiented  Enter G117 in the search box to learn more about \"Preventing Falls: Care Instructions.\"  Current as of: July 17, 2023  Content Version: 14.2 2024 [facility] [employer].   Care instructions adapted under license by your healthcare professional. If you have questions about a medical condition or this instruction, always ask your healthcare professional. [employer] Inc[employer] disclaims any warranty or liability for your use of this information.    Hearing Loss: Care Instructions  Overview     Hearing loss is a sudden or slow decrease in how well you hear. It can range from slight to profound. Permanent hearing loss can occur with aging. It also can happen when you are exposed long-term to loud noise. Examples include listening to loud music, riding motorcycles, or being around other loud machines.  Hearing loss can affect your work and home life. It can make you feel lonely or depressed. You may feel that you have lost your independence. But hearing aids and other devices can help you hear better and feel connected to " others.  Follow-up care is a key part of your treatment and safety. Be sure to make and go to all appointments, and call your doctor if you are having problems. It's also a good idea to know your test results and keep a list of the medicines you take.  How can you care for yourself at home?  Avoid loud noises whenever possible. This helps keep your hearing from getting worse.  Always wear hearing protection around loud noises.  Wear a hearing aid as directed.  A professional can help you pick a hearing aid that will work best for you.  You can also get hearing aids over the counter for mild to moderate hearing loss.  Have hearing tests as your doctor suggests. They can show whether your hearing has changed. Your hearing aid may need to be adjusted.  Use other devices as needed. These may include:  Telephone amplifiers and hearing aids that can connect to a television, stereo, radio, or microphone.  Devices that use lights or vibrations. These alert you to the doorbell, a ringing telephone, or a baby monitor.  Television closed-captioning. This shows the words at the bottom of the screen. Most new TVs can do this.  TTY (text telephone). This lets you type messages back and forth on the telephone instead of talking or listening. These devices are also called TDD. When messages are typed on the keyboard, they are sent over the phone line to a receiving TTY. The message is shown on a monitor.  Use text messaging, social media, and email if it is hard for you to communicate by telephone.  Try to learn a listening technique called speechreading. It is not lipreading. You pay attention to people's gestures, expressions, posture, and tone of voice. These clues can help you understand what a person is saying. Face the person you are talking to, and have them face you. Make sure the lighting is good. You need to see the other person's face clearly.  Think about counseling if you need help to adjust to your hearing loss.  When  "should you call for help?  Watch closely for changes in your health, and be sure to contact your doctor if:    You think your hearing is getting worse.     You have new symptoms, such as dizziness or nausea.   Where can you learn more?  Go to https://www.[employer].net/patiented  Enter R798 in the search box to learn more about \"Hearing Loss: Care Instructions.\"  Current as of: September 27, 2023  Content Version: 14.2    2024 [employer].   Care instructions adapted under license by your healthcare professional. If you have questions about a medical condition or this instruction, always ask your healthcare professional. [employer] disclaims any warranty or liability for your use of this information.         Shelbi Norman is a 88 year old, presenting for the following:  Physical, Hypertension, Diabetes, and Lipids        10/21/2024     3:59 PM   Additional Questions   Roomed by Cathi GUILLERMO MA   Accompanied by Son Dov         10/21/2024     3:59 PM   Patient Reported Additional Medications   Patient reports taking the following new medications none         Health Care Directive  Patient has a Health Care Directive on file  Advance care planning document is on file and is current.    HPI      Diabetes Follow-up    How often are you checking your blood sugar? Two times daily  Blood sugar testing frequency justification:  Uncontrolled diabetes  What time of day are you checking your blood sugars (select all that apply)?  Before and after meals  Have you had any blood sugars above 200?  Yes - evening blood sugars typically >200  Have you had any blood sugars below 70?  Maybe rarely  What symptoms do you notice when your blood sugar is low?  Weak, jumpy - if blood sugars go below 80.  What concerns do you have today about your diabetes? None, Blood sugar is often over 200, and Low blood sugar   Do you have any of these symptoms? (Select all that apply)  No numbness or tingling in feet.  No " redness, sores or blisters on feet.  No complaints of excessive thirst.  No reports of blurry vision.  No significant changes to weight.      BP Readings from Last 2 Encounters:   10/21/24 134/72   08/19/24 109/70     Hemoglobin A1C (%)   Date Value   10/18/2024 8.0 (H)   07/19/2024 7.7 (H)   06/21/2021 8.4 (H)   03/18/2021 8.3 (H)     LDL Cholesterol Calculated (mg/dL)   Date Value   10/18/2024 50   07/19/2023 53   03/18/2021 47   03/02/2020 60     Patient gives himself 22 units lantus at night.        10/21/2024   General Health   How would you rate your overall physical health? Good   Feel stress (tense, anxious, or unable to sleep) Not at all            10/21/2024   Nutrition   Diet: Diabetic    Carbohydrate counting       Multiple values from one day are sorted in reverse-chronological order         10/21/2024   Exercise   Days per week of moderate/strenous exercise 0 days   Average minutes spent exercising at this level 0 min      (!) EXERCISE CONCERN      10/21/2024   Social Factors   Frequency of gathering with friends or relatives More than three times a week   Worry food won't last until get money to buy more No   Food not last or not have enough money for food? No   Do you have housing? (Housing is defined as stable permanent housing and does not include staying ouside in a car, in a tent, in an abandoned building, in an overnight shelter, or couch-surfing.) Yes   Are you worried about losing your housing? No   Lack of transportation? No   Unable to get utilities (heat,electricity)? No            10/21/2024   Fall Risk   Fallen 2 or more times in the past year? No   Trouble with walking or balance? Yes              10/21/2024   Activities of Daily Living- Home Safety   Needs help with the following daily activites None of the above   Safety concerns in the home Throw rugs in the hallway            10/21/2024   Dental   Dentist two times every year? Yes            10/21/2024   Hearing Screening   Hearing  concerns? (!) I FEEL THAT PEOPLE ARE MUMBLING OR NOT SPEAKING CLEARLY.    (!) I NEED TO ASK PEOPLE TO SPEAK UP OR REPEAT THEMSELVES.    (!) IT'S HARDER TO UNDERSTAND WOMEN'S VOICES THAN MEN'S VOICES.    (!) IT'S HARD TO FOLLOW A CONVERSATION IN A NOISY RESTAURANT OR CROWDED ROOM.    (!) TROUBLE UNDERSTANDING SOFT OR WHISPERED SPEECH.       Multiple values from one day are sorted in reverse-chronological order         10/21/2024   Driving Risk Screening   Patient/family members have concerns about driving No            10/21/2024   General Alertness/Fatigue Screening   Have you been more tired than usual lately? No            10/21/2024   Urinary Incontinence Screening   Bothered by leaking urine in past 6 months No            10/21/2024   TB Screening   Were you born outside of the [place]? No            Today's PHQ-2 Score:       10/21/2024     3:59 PM   PHQ-2 ( 1999 [employer])   PHQ-2 Score Incomplete           10/21/2024   Substance Use   Alcohol more than 3/day or more than 7/wk No   Do you have a current opioid prescription? No   How severe/bad is pain from 1 to 10? 0/10 (No Pain)   Do you use any other substances recreationally? No        Social History     Tobacco Use    Smoking status: Never    Smokeless tobacco: Never   Vaping Use    Vaping status: Never Used   Substance Use Topics    Alcohol use: Yes     Alcohol/week: 1.7 standard drinks of alcohol     Types: 2 Standard drinks or equivalent per week     Comment: very little    Drug use: No             Reviewed and updated as needed this visit by Provider     Meds                Past Medical History:   Diagnosis Date    Diabetes mellitus (H)     Hypertension     Squamous cell carcinoma      Past Surgical History:   Procedure Laterality Date    AMPUTATE TOE(S) Right 7/23/2019    Procedure: Partial amputation great toe;  Surgeon: Ethan Montesinos DPM;  Location: [facility]    AMPUTATE TOE(S) Left 12/24/2019    Procedure: Amputation of the great toe, left foot;   Surgeon: Ethan Montesinos DPM;  Location: [facility]    AMPUTATE TOE(S) Right 3/3/2020    Procedure: Partial amputation, second toe, right foot;  Surgeon: Ethan Montesinos DPM;  Location: [facility]    [facility] SURGICAL PATHOLOGY  2002    prostate biopsy elevated PSA    COLONOSCOPY      HC REMOVE TONSILS/ADENOIDS,<13 Y/O      T & A    IR LOWER EXTREMITY ANGIOGRAM LEFT  2/26/2020    IR LOWER EXTREMITY ANGIOGRAM LEFT  7/19/2023    IR LOWER EXTREMITY ANGIOGRAM RIGHT  12/11/2018    PHACOEMULSIFICATION WITH STANDARD INTRAOCULAR LENS IMPLANT Left 4/2/2015    Procedure: PHACOEMULSIFICATION WITH STANDARD INTRAOCULAR LENS IMPLANT;  Surgeon: Joseph Hernandez MD;  Location: [facility]    PHACOEMULSIFICATION WITH STANDARD INTRAOCULAR LENS IMPLANT Right 5/4/2015    Procedure: PHACOEMULSIFICATION WITH STANDARD INTRAOCULAR LENS IMPLANT;  Surgeon: Joseph Hernandez MD;  Location: [facility]    REPAIR HAMMER TOE Left 4/2/2019    Procedure: 5th Metatarsal Head Resection;  Surgeon: Ethan Montesinos DPM;  Location: [facility]     Patient Active Problem List   Diagnosis    Obesity    Transient cerebral ischemia    Hyperlipidemia LDL goal <70    GERD (gastroesophageal reflux disease)    Peripheral vascular disease (H)    Malignant neoplasm of prostate (H)    Hypertension, goal below 140/90    Senile nuclear sclerosis    Type 2 diabetes mellitus with both eyes affected by mild nonproliferative retinopathy without macular edema, with long-term current use of insulin (H)    Macular degeneration (senile) of retina    H/O amputation of lesser toe, right (H)    TIA (transient ischemic attack)    Chronic kidney disease, stage 3 (H)    Dermatitis    Skin lesion    Tremor of left hand    Left leg weakness    Moderate aortic stenosis     Past Surgical History:   Procedure Laterality Date    AMPUTATE TOE(S) Right 7/23/2019    Procedure: Partial amputation great toe;  Surgeon: Ethan Montesinos DPM;  Location: [facility]    AMPUTATE TOE(S) Left 12/24/2019     Procedure: Amputation of the great toe, left foot;  Surgeon: Ethan Montesinos DPM;  Location: [facility]    AMPUTATE TOE(S) Right 3/3/2020    Procedure: Partial amputation, second toe, right foot;  Surgeon: Ethan Montesinos DPM;  Location: [facility]     [facility] SURGICAL PATHOLOGY  2002    prostate biopsy elevated PSA    COLONOSCOPY      HC REMOVE TONSILS/ADENOIDS,<13 Y/O      T & A    IR LOWER EXTREMITY ANGIOGRAM LEFT  2/26/2020    IR LOWER EXTREMITY ANGIOGRAM LEFT  7/19/2023    IR LOWER EXTREMITY ANGIOGRAM RIGHT  12/11/2018    PHACOEMULSIFICATION WITH STANDARD INTRAOCULAR LENS IMPLANT Left 4/2/2015    Procedure: PHACOEMULSIFICATION WITH STANDARD INTRAOCULAR LENS IMPLANT;  Surgeon: Joseph Hernandez MD;  Location: [facility]    PHACOEMULSIFICATION WITH STANDARD INTRAOCULAR LENS IMPLANT Right 5/4/2015    Procedure: PHACOEMULSIFICATION WITH STANDARD INTRAOCULAR LENS IMPLANT;  Surgeon: Joseph Hernandez MD;  Location: [facility]    REPAIR HAMMER TOE Left 4/2/2019    Procedure: 5th Metatarsal Head Resection;  Surgeon: Ethan Montesinos DPM;  Location: [facility]       Social History     Tobacco Use    Smoking status: Never    Smokeless tobacco: Never   Substance Use Topics    Alcohol use: Yes     Alcohol/week: 1.7 standard drinks of alcohol     Types: 2 Standard drinks or equivalent per week     Comment: very little     Family History   Problem Relation Age of Onset    Cancer Mother         intestinal CA    Other Cancer Mother         cancer    Diabetes Father     Cerebrovascular Disease Father     Diabetes Brother     Other Cancer Brother         Pancreatic cancer    Other Cancer Son         GIST cancer    Diabetes Son     Melanoma No family hx of          Current Outpatient Medications   Medication Sig Dispense Refill    amLODIPine (NORVASC) 5 MG tablet Take 1 tablet (5 mg) by mouth daily 90 tablet 3    aspirin (ASA) 81 MG EC tablet Take 1 tablet (81 mg) by mouth daily      augmented betamethasone dipropionate (DIPROLENE  AF) 0.05 % external cream Apply sparingly to affected area twice daily as needed.  Do not apply to face. 100 g 3    blood glucose monitoring (ONE TOUCH ULTRA 2) meter device kit Use to test blood sugar 2 times daily or as directed. 1 kit 0    clopidogrel (PLAVIX) 75 MG tablet Take 1 tablet (75 mg) by mouth daily 90 tablet 3    dorzolamide-timolol (COSOPT) 2-0.5 % ophthalmic solution       hydrochlorothiazide (HYDRODIURIL) 25 MG tablet Take 1 tablet (25 mg) by mouth daily 90 tablet 3    insulin aspart (NOVOLOG PEN) 100 UNIT/ML pen Inject 3 units before breakfast, 4 units before lunch and 6 units before supper. 15 mL 3    insulin glargine (LANTUS SOLOSTAR) 100 UNIT/ML pen Inject 24 Units subcutaneously at bedtime. Hold on file until needed. 30 mL 3    insulin pen needle (ULTICARE SHORT) 31G X 8 MM miscellaneous Use 2 pen needles daily or as directed. 200 each 3    Lancets (ONETOUCH DELICA PLUS [id number]) MISC 1 each by In Vitro route See Admin Instructions Hold on file until needed. 100 each 1    Lancets (ONETOUCH DELICA PLUS [id number]) MISC 1 each by In Vitro route See Admin Instructions Hold on file until needed. 100 each 4    lisinopril (ZESTRIL) 40 MG tablet Take 1 tablet (40 mg) by mouth daily 90 tablet 3    metFORMIN (GLUCOPHAGE) 500 MG tablet Take 2 tablets (1,000 mg) by mouth 2 times daily (with meals) 360 tablet 3    Multiple Vitamins-Minerals (PRESERVISION AREDS PO) Take 1 tablet by mouth      ONETOUCH ULTRA test strip 2 strips by In Vitro route daily One touch Ultra 2. 100 strip 3    ORDER FOR DME Equipment being ordered:   Glucometer 1 Device 1    pantoprazole (PROTONIX) 20 MG EC tablet Take 1 tablet (20 mg) by mouth daily 90 tablet 3    rosuvastatin (CRESTOR) 10 MG tablet Take 1 tablet (10 mg) by mouth at bedtime 90 tablet 3    triamcinolone (KENALOG) 0.1 % external cream Apply topically 2 times daily To dry skin patches as needed.  Hold on file until needed. (Patient not taking: Reported on 10/21/2024)  80 g 1     Allergies   Allergen Reactions    Zocor [Simvastatin - High Dose]      Bilateral hip aching     Current providers sharing in care for this patient include:  Patient Care Team:  Jimmy Catalan MD as PCP - General (Family Medicine)  Stewart Mcintyre MD as MD (Urology)  Kieran Hua MD as MD (Radiation Oncology)  Evgeny Torres MD as MD (Dermatology)  Elysia Pickard PA-C as Physician Assistant (Dermatology)  Shantell Rodriguez AuD as Audiologist (Audiology)  Jus Wells MD as MD (Otolaryngology)  Sindi Washington AuD as Audiologist (Audiology)  Prudence York as Diabetes Educator (Dietitian, Registered)  Brooklynn Stevens PA-C as Physician Assistant (Family Medicine)  Raheem Griffith MD as Physician (Urology)  Jimmy Catalan MD as Assigned PCP  Devang Galicia MD as MD (Cardiovascular Disease)  Ethan Montesinos DPM as Assigned Surgical Provider  Devang Galicia MD as Assigned Heart and Vascular Provider  Kieran Hua MD as Assigned Cancer Care Provider    The following health maintenance items are reviewed in Epic and correct as of today:  Health Maintenance   Topic Date Due    ZOSTER IMMUNIZATION (1 of 2) Never done    RSV VACCINE (1 - 1-dose 75+ series) Never done    ANNUAL REVIEW OF HM ORDERS  07/05/2024    BMP  01/29/2025    DIABETIC FOOT EXAM  01/29/2025    HEMOGLOBIN  01/29/2025    EYE EXAM  03/12/2025    A1C  04/18/2025    MICROALBUMIN  04/19/2025    LIPID  10/18/2025    MEDICARE ANNUAL WELLNESS VISIT  10/21/2025    FALL RISK ASSESSMENT  10/21/2025    ADVANCE CARE PLANNING  10/21/2029    DTAP/TDAP/TD IMMUNIZATION (3 - Td or Tdap) 04/29/2034    PHQ-2 (once per calendar year)  Completed    INFLUENZA VACCINE  Completed    Pneumococcal Vaccine: 65+ Years  Completed    URINALYSIS  Completed    COVID-19 Vaccine  Completed    HPV IMMUNIZATION  Aged Out    MENINGITIS IMMUNIZATION  Aged Out    RSV  "MONOCLONAL ANTIBODY  Aged Out         Review of Systems  Constitutional, HEENT, cardiovascular, pulmonary, GI, , musculoskeletal, neuro, skin, endocrine and psych systems are negative, except as otherwise noted.     Objective    Exam  /72 (BP Location: Right arm, Patient Position: Sitting, Cuff Size: Adult Regular)   Pulse 70   Temp 97.9  F (36.6  C) (Tympanic)   Resp 16   Ht 1.753 m (5' 9\")   Wt 105.2 kg (232 lb)   SpO2 94%   BMI 34.26 kg/m     Estimated body mass index is 34.26 kg/m  as calculated from the following:    Height as of this encounter: 1.753 m (5' 9\").    Weight as of this encounter: 105.2 kg (232 lb).    Physical Exam  GENERAL APPEARANCE: obese, ambulatory w/o assist, alert and no distress  EYES: pink conj, no icterus, PERRL, EOMI  HENT: ear canals and TM's normal, nose and mouth without ulcers or lesions, oropharynx clear and oral mucous membranes moist  NECK: no adenopathy, no asymmetry, masses, or scars and thyroid normal to palpation  RESP: lungs clear to auscultation - no rales, rhonchi or wheezes  CV: regular rates and rhythm, normal S1 S2, no S3 or S4, no murmur, click or rub, no peripheral edema and peripheral pulses strong  ABDOMEN: soft, nontender, no hepatosplenomegaly, no masses and bowel sounds normal  RECTAL: deferred  MS: no musculoskeletal defects are noted and gait is age appropriate without ataxia  SKIN: no suspicious lesions or rashes  NEURO: Normal strength and tone, sensory exam grossly normal, mentation intact and speech normal                No data to display                       Signed Electronically by: Jimmy Catalan MD    "

## 2024-10-21 NOTE — PATIENT INSTRUCTIONS
Patient Education     Increase lantus to 24 units at night.  All other medications to remain the same.    Eat a small snack (few pieces of fruit) around bedtime.    Be consistent with low salt, low trans fat and low saturated fat diet.  Eat food rich in omega-3-fatty acids as you tolerate. (salmon, olive oil)  Eat 5 cups of vegetables, fruits and whole grains per day.  Limit starchy food (white rice, white bread, white pasta, white potatoes) to less than a cup per meal.  Minimize sweets, junk food and fastfood. Limit soda beverages to one serving per day; best to avoid it altogether though.    Exercise: moderate intensity sustained for at least 30 mins per episode, goal of 150 mins per week at least  Combine cardiovascular and resistance exercises.  These exercise recommendations are in addition to your daily activity at work or home.  Work on losing weight.      Regular foot care; don't walk barefoot  Annual eye exam.  Please request your eye doctor to furnish a copy of the eye exam report to the clinic to be placed in your record.  Flu vaccine by the end of October yearly.  Be sure to update any other recommended vaccinations for diabetics.    Blood tests: repeat A1c in 3 months.    Get shingles and RSV vaccines from your pharmacy and update the care team when you complete them.    Have [employer] blood sugar monitor handy when you meet with Prudence in a couple of weeks.      Preventative Care Visits include: Yearly physicals, Well-child visits, Welcome to Medicare visits, & Medicare yearly wellness exams.    The purpose of these visits is to discuss your medical history and prevent health problems before you are sick.  You may need to pay a copay, coinsurance or deductible if your visit today includes testing or treating for a new or existing condition.    Additional charges may be incurred for today's visit. If you have questions about what your insurance plan covers, please contact your Insurance Company's member  service department.  If you have questions specific to a bill you have already received from "[employer]", please contact the [facility] Billing Office at [phone].     Preventive Care Advice   This is general advice given by our system to help you stay healthy. However, your care team may have specific advice just for you. Please talk to your care team about your preventive care needs.  Nutrition  Eat 5 or more servings of fruits and vegetables each day.  Try wheat bread, brown rice and whole grain pasta (instead of white bread, rice, and pasta).  Get enough calcium and vitamin D. Check the label on foods and aim for 100% of the RDA (recommended daily allowance).  Lifestyle  Exercise at least 150 minutes each week  (30 minutes a day, 5 days a week).  Do muscle strengthening activities 2 days a week. These help control your weight and prevent disease.  No smoking.  Wear sunscreen to prevent skin cancer.  Have a dental exam and cleaning every 6 months.  Yearly exams  See your health care team every year to talk about:  Any changes in your health.  Any medicines your care team has prescribed.  Preventive care, family planning, and ways to prevent chronic diseases.  Shots (vaccines)   HPV shots (up to age 26), if you've never had them before.  Hepatitis B shots (up to age 59), if you've never had them before.  COVID-19 shot: Get this shot when it's due.  Flu shot: Get a flu shot every year.  Tetanus shot: Get a tetanus shot every 10 years.  Pneumococcal, hepatitis A, and RSV shots: Ask your care team if you need these based on your risk.  Shingles shot (for age 50 and up)  General health tests  Diabetes screening:  Starting at age 35, Get screened for diabetes at least every 3 years.  If you are younger than age 35, ask your care team if you should be screened for diabetes.  Cholesterol test: At age 39, start having a cholesterol test every 5 years, or more often if advised.  Bone density scan (DEXA): At age 50, ask  your care team if you should have this scan for osteoporosis (brittle bones).  Hepatitis C: Get tested at least once in your life.  STIs (sexually transmitted infections)  Before age 24: Ask your care team if you should be screened for STIs.  After age 24: Get screened for STIs if you're at risk. You are at risk for STIs (including HIV) if:  You are sexually active with more than one person.  You don't use condoms every time.  You or a partner was diagnosed with a sexually transmitted infection.  If you are at risk for HIV, ask about PrEP medicine to prevent HIV.  Get tested for HIV at least once in your life, whether you are at risk for HIV or not.  Cancer screening tests  Cervical cancer screening: If you have a cervix, begin getting regular cervical cancer screening tests starting at age 21.  Breast cancer scan (mammogram): If you've ever had breasts, begin having regular mammograms starting at age 40. This is a scan to check for breast cancer.  Colon cancer screening: It is important to start screening for colon cancer at age 45.  Have a colonoscopy test every 10 years (or more often if you're at risk) Or, ask your provider about stool tests like a FIT test every year or Cologuard test every 3 years.  To learn more about your testing options, visit:   .  For help making a decision, visit:   https://bit.ly/oc67225.  Prostate cancer screening test: If you have a prostate, ask your care team if a prostate cancer screening test (PSA) at age 55 is right for you.  Lung cancer screening: If you are a current or former smoker ages 50 to 80, ask your care team if ongoing lung cancer screenings are right for you.  For informational purposes only. Not to replace the advice of your health care provider. Copyright   2023 [employer]. All rights reserved. Clinically reviewed by the [facility]. Agent Partner [id number] - REV 01/24.  Learning About Activities of Daily Living  What are activities  of daily living?     Activities of daily living (ADLs) are the basic self-care tasks you do every day. These include eating, bathing, dressing, and moving around.  As you age, and if you have health problems, you may find that it's harder to do some of these tasks. If so, your doctor can suggest ideas that may help.  To measure what kind of help you may need, your doctor will ask how well you are able to do ADLs. Let your doctor know if there are any tasks that you are having trouble doing. This is an important first step to getting help. And when you have the help you need, you can stay as independent as possible.  How will a doctor assess your ADLs?  Asking about ADLs is part of a routine health checkup your doctor will likely do as you age. Your health check might be done in a doctor's office, in your home, or at a hospital. The goal is to find out if you are having any problems that could make it hard to care for yourself or that make it unsafe for you to be on your own.  To measure your ADLs, your doctor will ask how hard it is for you to do routine tasks. Your doctor may also want to know if you have changed the way you do a task because of a health problem. Your doctor may watch how you:  Walk back and forth.  Keep your balance while you stand or walk.  Move from sitting to standing or from a bed to a chair.  Button or unbutton a shirt or sweater.  Remove and put on your shoes.  It's common to feel a little worried or anxious if you find you can't do all the things you used to be able to do. Talking with your doctor about ADLs is a way to make sure you're as safe as possible and able to care for yourself as well as you can. You may want to bring a caregiver, friend, or family member to your checkup. They can help you talk to your doctor.  Follow-up care is a key part of your treatment and safety. Be sure to make and go to all appointments, and call your doctor if you are having problems. It's also a good idea  to know your test results and keep a list of the medicines you take.  Current as of: October 24, 2023  Content Version: 14.2 2024 [facility] [employer].   Care instructions adapted under license by your healthcare professional. If you have questions about a medical condition or this instruction, always ask your healthcare professional. Healthwise[employer] disclaims any warranty or liability for your use of this information.    Preventing Falls: Care Instructions  Injuries and health problems such as trouble walking or poor eyesight can increase your risk of falling. So can some medicines. But there are things you can do to help prevent falls. You can exercise to get stronger. You can also arrange your home to make it safer.    Talk to your doctor about the medicines you take. Ask if any of them increase the risk of falls and whether they can be changed or stopped.   Try to exercise regularly. It can help improve your strength and balance. This can help lower your risk of falling.         Practice fall safety and prevention.   Wear low-heeled shoes that fit well and give your feet good support. Talk to your doctor if you have foot problems that make this hard.  Carry a cellphone or wear a medical alert device that you can use to call for help.  Use stepladders instead of chairs to reach high objects. Don't climb if you're at risk for falls. Ask for help, if needed.  Wear the correct eyeglasses, if you need them.        Make your home safer.   Remove rugs, cords, clutter, and furniture from walkways.  Keep your house well lit. Use night-lights in hallways and bathrooms.  Install and use sturdy handrails on stairways.  Wear nonskid footwear, even inside. Don't walk barefoot or in socks without shoes.        Be safe outside.   Use handrails, curb cuts, and ramps whenever possible.  Keep your hands free by using a shoulder bag or backpack.  Try to walk in well-lit areas. Watch out for uneven ground, changes in  "pavement, and debris.  Be careful in the winter. Walk on the grass or gravel when sidewalks are slippery. Use de-icer on steps and walkways. Add non-slip devices to shoes.    Put grab bars and nonskid mats in your shower or tub and near the toilet. Try to use a shower chair or bath bench when bathing.   Get into a tub or shower by putting in your weaker leg first. Get out with your strong side first. Have a phone or medical alert device in the bathroom with you.   Where can you learn more?  Go to https://www.[employer].net/patiented  Enter G117 in the search box to learn more about \"Preventing Falls: Care Instructions.\"  Current as of: July 17, 2023  Content Version: 14.2 2024 [employer].   Care instructions adapted under license by your healthcare professional. If you have questions about a medical condition or this instruction, always ask your healthcare professional. [employer] disclaims any warranty or liability for your use of this information.    Hearing Loss: Care Instructions  Overview     Hearing loss is a sudden or slow decrease in how well you hear. It can range from slight to profound. Permanent hearing loss can occur with aging. It also can happen when you are exposed long-term to loud noise. Examples include listening to loud music, riding motorcycles, or being around other loud machines.  Hearing loss can affect your work and home life. It can make you feel lonely or depressed. You may feel that you have lost your independence. But hearing aids and other devices can help you hear better and feel connected to others.  Follow-up care is a key part of your treatment and safety. Be sure to make and go to all appointments, and call your doctor if you are having problems. It's also a good idea to know your test results and keep a list of the medicines you take.  How can you care for yourself at home?  Avoid loud noises whenever possible. This helps keep your hearing from getting " "worse.  Always wear hearing protection around loud noises.  Wear a hearing aid as directed.  A professional can help you pick a hearing aid that will work best for you.  You can also get hearing aids over the counter for mild to moderate hearing loss.  Have hearing tests as your doctor suggests. They can show whether your hearing has changed. Your hearing aid may need to be adjusted.  Use other devices as needed. These may include:  Telephone amplifiers and hearing aids that can connect to a television, stereo, radio, or microphone.  Devices that use lights or vibrations. These alert you to the doorbell, a ringing telephone, or a baby monitor.  Television closed-captioning. This shows the words at the bottom of the screen. Most new TVs can do this.  TTY (text telephone). This lets you type messages back and forth on the telephone instead of talking or listening. These devices are also called TDD. When messages are typed on the keyboard, they are sent over the phone line to a receiving TTY. The message is shown on a monitor.  Use text messaging, social media, and email if it is hard for you to communicate by telephone.  Try to learn a listening technique called speechreading. It is not lipreading. You pay attention to people's gestures, expressions, posture, and tone of voice. These clues can help you understand what a person is saying. Face the person you are talking to, and have them face you. Make sure the lighting is good. You need to see the other person's face clearly.  Think about counseling if you need help to adjust to your hearing loss.  When should you call for help?  Watch closely for changes in your health, and be sure to contact your doctor if:    You think your hearing is getting worse.     You have new symptoms, such as dizziness or nausea.   Where can you learn more?  Go to https://www.healthwise.net/patiented  Enter R798 in the search box to learn more about \"Hearing Loss: Care " "Instructions.\"  Current as of: September 27, 2023  Content Version: 14.2 2024 [facility] [employer], [facility].   Care instructions adapted under license by your healthcare professional. If you have questions about a medical condition or this instruction, always ask your healthcare professional. Healthwise, Inc[employer] disclaims any warranty or liability for your use of this information.       "

## 2024-10-23 DIAGNOSIS — E11.3293 TYPE 2 DIABETES MELLITUS WITH BOTH EYES AFFECTED BY MILD NONPROLIFERATIVE RETINOPATHY WITHOUT MACULAR EDEMA, WITH LONG-TERM CURRENT USE OF INSULIN (H): ICD-10-CM

## 2024-10-23 DIAGNOSIS — I10 HYPERTENSION, GOAL BELOW 140/90: ICD-10-CM

## 2024-10-23 DIAGNOSIS — Z79.4 TYPE 2 DIABETES MELLITUS WITH BOTH EYES AFFECTED BY MILD NONPROLIFERATIVE RETINOPATHY WITHOUT MACULAR EDEMA, WITH LONG-TERM CURRENT USE OF INSULIN (H): ICD-10-CM

## 2024-10-23 RX ORDER — HYDROCHLOROTHIAZIDE 25 MG/1
25 TABLET ORAL DAILY
Qty: 90 TABLET | Refills: 3 | OUTPATIENT
Start: 2024-10-23

## 2024-10-23 RX ORDER — BLOOD SUGAR DIAGNOSTIC
STRIP MISCELLANEOUS
Qty: 100 STRIP | Refills: 3 | OUTPATIENT
Start: 2024-10-23

## 2024-11-01 DIAGNOSIS — E11.3293 TYPE 2 DIABETES MELLITUS WITH BOTH EYES AFFECTED BY MILD NONPROLIFERATIVE RETINOPATHY WITHOUT MACULAR EDEMA, WITH LONG-TERM CURRENT USE OF INSULIN (H): ICD-10-CM

## 2024-11-01 DIAGNOSIS — Z79.4 TYPE 2 DIABETES MELLITUS WITH BOTH EYES AFFECTED BY MILD NONPROLIFERATIVE RETINOPATHY WITHOUT MACULAR EDEMA, WITH LONG-TERM CURRENT USE OF INSULIN (H): ICD-10-CM

## 2024-11-04 RX ORDER — LANCETS 33 GAUGE
1 EACH MISCELLANEOUS SEE ADMIN INSTRUCTIONS
Qty: 100 EACH | Refills: 3 | Status: SHIPPED | OUTPATIENT
Start: 2024-11-04

## 2024-11-12 ENCOUNTER — VIRTUAL VISIT (OUTPATIENT)
Dept: EDUCATION SERVICES | Facility: CLINIC | Age: 89
End: 2024-11-12
Payer: COMMERCIAL

## 2024-11-12 DIAGNOSIS — Z79.4 TYPE 2 DIABETES MELLITUS WITH BOTH EYES AFFECTED BY MILD NONPROLIFERATIVE RETINOPATHY WITHOUT MACULAR EDEMA, WITH LONG-TERM CURRENT USE OF INSULIN (H): Primary | ICD-10-CM

## 2024-11-12 DIAGNOSIS — E11.3293 TYPE 2 DIABETES MELLITUS WITH BOTH EYES AFFECTED BY MILD NONPROLIFERATIVE RETINOPATHY WITHOUT MACULAR EDEMA, WITH LONG-TERM CURRENT USE OF INSULIN (H): Primary | ICD-10-CM

## 2024-11-12 PROCEDURE — G0108 DIAB MANAGE TRN  PER INDIV: HCPCS | Mod: 93 | Performed by: DIETITIAN, REGISTERED

## 2024-11-12 NOTE — LETTER
11/12/2024         RE: Fawad Garcia  [address] [facility] [address]        Dear Colleague,    Thank you for referring your patient, Fawad Garcia, to the [facility]. Please see a copy of my visit note below.    Diabetes Self-Management Education & Support    Presents for: Individual review    Type of Service: Telephone Visit    Originating Location (Patient Location): Home  Distant Location (Provider Location): [facility]  Mode of Communication:  Telephone    Telephone Visit Start Time:  3:00pm  Telephone Visit End Time (telephone visit stop time): 3:40pm    How would patient like to obtain AVS? MyChart      ASSESSMENT:  -type 2 diabetes for ~31 years  -recent a1c of 8.0 (up from 7.7, previously 8.4, 8.8, 8.7)  -currently being managed with Lantus, Novolog, Metformin  -on statin therapy  -diabetes complications:  PVD, TIA, CKD, h/o toe amputation  -last seen by diabetes education 8/13/24  -home BG monitoring reveals:  21% in target, no documented lows    Primary concern: Unwilling to increase Lantus to 24 units as directed by Primary Care Provider on 10/21/24.  Taking 22-23 units.  Also, is taking Novolog 2-3-5 rather than the 3-4-6 with meals.  Does not record. Afraid of going low.  Denies having low BG between meals in the past 2 weeks.  Also, states only taking Metformin 1 tab 2 times/day and sometimes will take one more tab rather than 2 tabs 2 times/day as directed.        Discussed:  use of professional cgms.  Patient expressed understanding.      Patient's most recent   Lab Results   Component Value Date    A1C 8.0 10/18/2024    A1C 8.4 06/21/2021     is not meeting goal of <8.0    Diabetes knowledge and skills assessment:   Patient is knowledgeable in diabetes management concepts related to: Monitoring and Taking Medication    Continue education with the following diabetes management concepts: Healthy Eating, Being Active, Monitoring,  "Taking Medication, Problem Solving, Reducing Risks, and Healthy Coping    Based on learning assessment above, most appropriate setting for further diabetes education would be: Individual setting.      PLAN     Recommend wearing a professional cgms.    Continue the Lantus 22-23 units as you are currently doing.  Adjust your Novolog from 2-3-5 to 2-3-6 units.    Willing to start taking Metformin 2 tabs 2 times/day as prescribed.  5.  Follow up with Diabetes Education 1/10/25 at 12:30pm for placement of professional cgms and 1/14/25 at 12:30pm for download of professional cgms.  If you have questions you can send them through [employer] or call Diabetes Education Triage [phone].  For Scheduling call [phone].      See Care Plan for co-developed, patient-state behavior change goals.  AVS provided for patient today.    Education Materials Provided:  No new materials provided today      SUBJECTIVE/OBJECTIVE:  Presents for: Individual review  Accompanied by: Self  Diabetes education in the past 24mo: Yes  Diabetes type: Type 2  Disease course: Stable  How confident are you filling out medical forms by yourself:: Other  Cultural Influences/Ethnic Background:  Not  or       Diabetes Symptoms & Complications:  Diabetes Related Symptoms: Visual change  Weight trend: Stable  Symptom course: Stable  Disease course: Stable       Patient Problem List and Family Medical History reviewed for relevant medical history, current medical status, and diabetes risk factors.    Vitals:  There were no vitals taken for this visit.  Estimated body mass index is 34.26 kg/m  as calculated from the following:    Height as of 10/21/24: 1.753 m (5' 9\").    Weight as of 10/21/24: 105.2 kg (232 lb).   Last 3 BP:   BP Readings from Last 3 Encounters:   10/21/24 134/72   08/19/24 109/70   08/05/24 (!) 149/81       History   Smoking Status     Never   Smokeless Tobacco     Never       Labs:  Lab Results   Component Value Date    A1C " "8.0 10/18/2024    A1C 8.4 06/21/2021     Lab Results   Component Value Date     01/29/2024     10/20/2022     09/20/2021     01/04/2021     Lab Results   Component Value Date    LDL 50 10/18/2024    LDL 47 03/18/2021     HDL Cholesterol   Date Value Ref Range Status   03/18/2021 61 >39 mg/dL Final     Direct Measure HDL   Date Value Ref Range Status   10/18/2024 48 >=40 mg/dL Final   ]  GFR Estimate   Date Value Ref Range Status   01/29/2024 65 >60 mL/min/1.73m2 Final   01/04/2021 63 >60 mL/min/[1.73_m2] Final     Comment:     Non  GFR Calc  Starting 12/18/2018, serum creatinine based estimated GFR (eGFR) will be   calculated using the Chronic Kidney Disease Epidemiology Collaboration   (CKD-EPI) equation.       GFR, ESTIMATED POCT   Date Value Ref Range Status   04/08/2022 >60 >60 mL/min/1.73m2 Final     GFR Estimate If Black   Date Value Ref Range Status   01/04/2021 73 >60 mL/min/[1.73_m2] Final     Comment:      GFR Calc  Starting 12/18/2018, serum creatinine based estimated GFR (eGFR) will be   calculated using the Chronic Kidney Disease Epidemiology Collaboration   (CKD-EPI) equation.       Lab Results   Component Value Date    CR 1.10 01/29/2024    CR 1.07 01/04/2021     No results found for: \"MICROALBUMIN\"    Healthy Eating:  Healthy Eating Assessed Today: Yes  Cultural/Yazdanism diet restrictions?: No  How many times a week on average do you eat food made away from home (restaurant/take-out)?: 1  Meals include: Breakfast, Lunch, Dinner, Evening Snack  Breakfast: scone, coffee w/ cream  Lunch: soup, 2 wantons, bowl of pudding, skim milk  Dinner: egg, hashbrowns, hotdog  Snacks: no HS snack  Beverages: Milk, Coffee drinks    Being Active:  Being Active Assessed Today: Yes  Exercise:: Currently not exercising  Barrier to exercise: Physical limitation    Monitoring:  Monitoring Assessed Today: Yes  Blood Glucose Meter: Accu-chek  Times checking blood " sugar at home (number): 2  Times checking blood sugar at home (per): Day    Date Breakfast  Lunch  Dinner  Bedtime    Before After Before After Before After    11/12 154         11/11 126      246   11/10 154      214   11/9 269      254   11/8 85      341   11/7 158      255   11/6 96      235   21% in target  Pt denies having any episodes of feeling shakey, sweaty or weak in the past week  11/1 - 11/5:      Taking Medications:  Diabetes Medication(s)       Biguanides       metFORMIN (GLUCOPHAGE) 500 MG tablet Take 2 tablets (1,000 mg) by mouth 2 times daily (with meals)       Insulin       insulin aspart (NOVOLOG PEN) 100 UNIT/ML pen Inject 3 units before breakfast, 4 units before lunch and 6 units before supper.     insulin glargine (LANTUS SOLOSTAR) 100 UNIT/ML pen Inject 24 Units subcutaneously at bedtime. Hold on file until needed.        States most often taking 22 units of Lantus    Taking Medication Assessed Today: Yes  Current Treatments: Insulin Injections  Dose schedule: Pre-breakfast, Pre-lunch, Pre-dinner  Given by: Patient  Injection/Infusion sites: Abdomen  Problems taking diabetes medications regularly?: No    Problem Solving:                 Reducing Risks:  Reducing Risks Assessed Today: Yes  Diabetes Risks: Age over 45 years  Has dilated eye exam at least once a year?: Yes  Sees dentist every 6 months?: Yes  Feet checked by healthcare provider in the last year?: Yes    Healthy Coping:  Healthy Coping Assessed Today: Yes  Emotional response to diabetes: Acceptance  Informal Support system:: Children, Marcy based, Significant other  Stage of change: ACTION (Actively working towards change)  Patient Activation Measure Survey Score:      4/20/2011     9:00 AM 7/26/2011     5:00 PM   DOMONIQUE Score (Last Two)   DOMONIQUE Raw Score 44 35   Activation Score 70.8 45.2   DOMONIQUE Level 4 1         Care Plan and Education Provided:  Monitoring: Log and interpret results and Taking Medication: When to take  medication(s) and taking as prescribed.    Prudence York RD, [facility], 3:54 PM, 11/12/2024    Time Spent: 30 minutes  Encounter Type: Individual    Any diabetes medication dose changes were made via the CDE Protocol per the patient's referring provider. A copy of this encounter was shared with the provider.

## 2024-11-12 NOTE — PATIENT INSTRUCTIONS
PLAN     Recommend wearing a professional cgms.    Continue the Lantus 22-23 units as you are currently doing.  Adjust your Novolog from 2-3-5 to 2-3-6 units.    Willing to start taking Metformin 2 tabs 2 times/day as prescribed.  5.  Follow up with Diabetes Education 1/10/25 at 12:30pm for placement of professional cgms and 1/14/25 at 12:30pm for download of professional cgms.  If you have questions you can send them through [employer] or call Diabetes Education Triage [phone].  For Scheduling call [phone].    Prudence York RD, [facility], 3:55 PM, 11/12/2024

## 2024-11-12 NOTE — PROGRESS NOTES
Diabetes Self-Management Education & Support    Presents for: Individual review    Type of Service: Telephone Visit    Originating Location (Patient Location): Home  Distant Location (Provider Location): [facility]  Mode of Communication:  Telephone    Telephone Visit Start Time:  3:00pm  Telephone Visit End Time (telephone visit stop time): 3:40pm    How would patient like to obtain AVS? Kaylen      ASSESSMENT:  -type 2 diabetes for ~31 years  -recent a1c of 8.0 (up from 7.7, previously 8.4, 8.8, 8.7)  -currently being managed with Lantus, Novolog, Metformin  -on statin therapy  -diabetes complications:  PVD, TIA, CKD, h/o toe amputation  -last seen by diabetes education 8/13/24  -home BG monitoring reveals:  21% in target, no documented lows    Primary concern: Unwilling to increase Lantus to 24 units as directed by Primary Care Provider on 10/21/24.  Taking 22-23 units.  Also, is taking Novolog 2-3-5 rather than the 3-4-6 with meals.  Does not record. Afraid of going low.  Denies having low BG between meals in the past 2 weeks.  Also, states only taking Metformin 1 tab 2 times/day and sometimes will take one more tab rather than 2 tabs 2 times/day as directed.        Discussed:  use of professional cgms.  Patient expressed understanding.      Patient's most recent   Lab Results   Component Value Date    A1C 8.0 10/18/2024    A1C 8.4 06/21/2021     is not meeting goal of <8.0    Diabetes knowledge and skills assessment:   Patient is knowledgeable in diabetes management concepts related to: Monitoring and Taking Medication    Continue education with the following diabetes management concepts: Healthy Eating, Being Active, Monitoring, Taking Medication, Problem Solving, Reducing Risks, and Healthy Coping    Based on learning assessment above, most appropriate setting for further diabetes education would be: Individual setting.      PLAN     Recommend wearing a professional cgms.   "  Continue the Lantus 22-23 units as you are currently doing.  Adjust your Novolog from 2-3-5 to 2-3-6 units.    Willing to start taking Metformin 2 tabs 2 times/day as prescribed.  5.  Follow up with Diabetes Education 1/10/25 at 12:30pm for placement of professional cgms and 1/14/25 at 12:30pm for download of professional cgms.  If you have questions you can send them through [employer] or call Diabetes Education Triage [phone].  For Scheduling call [phone].      See Care Plan for co-developed, patient-state behavior change goals.  AVS provided for patient today.    Education Materials Provided:  No new materials provided today      SUBJECTIVE/OBJECTIVE:  Presents for: Individual review  Accompanied by: Self  Diabetes education in the past 24mo: Yes  Diabetes type: Type 2  Disease course: Stable  How confident are you filling out medical forms by yourself:: Other  Cultural Influences/Ethnic Background:  Not  or       Diabetes Symptoms & Complications:  Diabetes Related Symptoms: Visual change  Weight trend: Stable  Symptom course: Stable  Disease course: Stable       Patient Problem List and Family Medical History reviewed for relevant medical history, current medical status, and diabetes risk factors.    Vitals:  There were no vitals taken for this visit.  Estimated body mass index is 34.26 kg/m  as calculated from the following:    Height as of 10/21/24: 1.753 m (5' 9\").    Weight as of 10/21/24: 105.2 kg (232 lb).   Last 3 BP:   BP Readings from Last 3 Encounters:   10/21/24 134/72   08/19/24 109/70   08/05/24 (!) 149/81       History   Smoking Status    Never   Smokeless Tobacco    Never       Labs:  Lab Results   Component Value Date    A1C 8.0 10/18/2024    A1C 8.4 06/21/2021     Lab Results   Component Value Date     01/29/2024     10/20/2022     09/20/2021     01/04/2021     Lab Results   Component Value Date    LDL 50 10/18/2024    LDL 47 03/18/2021     HDL " "Cholesterol   Date Value Ref Range Status   03/18/2021 61 >39 mg/dL Final     Direct Measure HDL   Date Value Ref Range Status   10/18/2024 48 >=40 mg/dL Final   ]  GFR Estimate   Date Value Ref Range Status   01/29/2024 65 >60 mL/min/1.73m2 Final   01/04/2021 63 >60 mL/min/[1.73_m2] Final     Comment:     Non  GFR Calc  Starting 12/18/2018, serum creatinine based estimated GFR (eGFR) will be   calculated using the Chronic Kidney Disease Epidemiology Collaboration   (CKD-EPI) equation.       GFR, ESTIMATED POCT   Date Value Ref Range Status   04/08/2022 >60 >60 mL/min/1.73m2 Final     GFR Estimate If Black   Date Value Ref Range Status   01/04/2021 73 >60 mL/min/[1.73_m2] Final     Comment:      GFR Calc  Starting 12/18/2018, serum creatinine based estimated GFR (eGFR) will be   calculated using the Chronic Kidney Disease Epidemiology Collaboration   (CKD-EPI) equation.       Lab Results   Component Value Date    CR 1.10 01/29/2024    CR 1.07 01/04/2021     No results found for: \"MICROALBUMIN\"    Healthy Eating:  Healthy Eating Assessed Today: Yes  Cultural/Mormon diet restrictions?: No  How many times a week on average do you eat food made away from home (restaurant/take-out)?: 1  Meals include: Breakfast, Lunch, Dinner, Evening Snack  Breakfast: scone, coffee w/ cream  Lunch: soup, 2 wantons, bowl of pudding, skim milk  Dinner: egg, hashbrowns, hotdog  Snacks: no HS snack  Beverages: Milk, Coffee drinks    Being Active:  Being Active Assessed Today: Yes  Exercise:: Currently not exercising  Barrier to exercise: Physical limitation    Monitoring:  Monitoring Assessed Today: Yes  Blood Glucose Meter: Accu-chek  Times checking blood sugar at home (number): 2  Times checking blood sugar at home (per): Day    Date Breakfast  Lunch  Dinner  Bedtime    Before After Before After Before After    11/12 154         11/11 126      246   11/10 154      214   11/9 269      254   11/8 85      " 341   11/7 158      255   11/6 96      235   21% in target  Pt denies having any episodes of feeling shakey, sweaty or weak in the past week  11/1 - 11/5:      Taking Medications:  Diabetes Medication(s)       Biguanides       metFORMIN (GLUCOPHAGE) 500 MG tablet Take 2 tablets (1,000 mg) by mouth 2 times daily (with meals)       Insulin       insulin aspart (NOVOLOG PEN) 100 UNIT/ML pen Inject 3 units before breakfast, 4 units before lunch and 6 units before supper.     insulin glargine (LANTUS SOLOSTAR) 100 UNIT/ML pen Inject 24 Units subcutaneously at bedtime. Hold on file until needed.        States most often taking 22 units of Lantus    Taking Medication Assessed Today: Yes  Current Treatments: Insulin Injections  Dose schedule: Pre-breakfast, Pre-lunch, Pre-dinner  Given by: Patient  Injection/Infusion sites: Abdomen  Problems taking diabetes medications regularly?: No    Problem Solving:                 Reducing Risks:  Reducing Risks Assessed Today: Yes  Diabetes Risks: Age over 45 years  Has dilated eye exam at least once a year?: Yes  Sees dentist every 6 months?: Yes  Feet checked by healthcare provider in the last year?: Yes    Healthy Coping:  Healthy Coping Assessed Today: Yes  Emotional response to diabetes: Acceptance  Informal Support system:: Children, Marcy based, Significant other  Stage of change: ACTION (Actively working towards change)  Patient Activation Measure Survey Score:      4/20/2011     9:00 AM 7/26/2011     5:00 PM   DOMONIQUE Score (Last Two)   DOMONIQUE Raw Score 44 35   Activation Score 70.8 45.2   DOMONIQUE Level 4 1         Care Plan and Education Provided:  Monitoring: Log and interpret results and Taking Medication: When to take medication(s) and taking as prescribed.    Prudence York RD, [facility], 3:54 PM, 11/12/2024    Time Spent: 30 minutes  Encounter Type: Individual    Any diabetes medication dose changes were made via the CDE Protocol per the patient's referring provider. A copy of  this encounter was shared with the provider.

## 2024-12-11 NOTE — PROGRESS NOTES
-No recent episodes.  Has maintained on sotalol 80 mg twice daily.  Cannot use anticoagulation due to large GI bleed in the past.  She was recently started on aspirin after recent hospitalization for chest pain and abnormal stress test.  Monitor for any bleeding.  -Continue sotalol.   PATIENT HISTORY:  Fawad Garcia is a 88 year old male who presents to clinic for a diabetic foot evaluation.  The patient relates developing an ulcer on the left foot.  The patient was evaluated by the ER and treated with oral antibiotics.         REVIEW OF SYSTEMS:  Constitutional, HEENT, cardiovascular, pulmonary, GI, , musculoskeletal, neuro, skin, endocrine and psych systems are negative, except as otherwise noted.     PAST MEDICAL HISTORY:   Past Medical History:   Diagnosis Date    Diabetes mellitus (H)     Hypertension     Squamous cell carcinoma         PAST SURGICAL HISTORY:   Past Surgical History:   Procedure Laterality Date    AMPUTATE TOE(S) Right 7/23/2019    Procedure: Partial amputation great toe;  Surgeon: Ethan Montesinos DPM;  Location: [facility]    AMPUTATE TOE(S) Left 12/24/2019    Procedure: Amputation of the great toe, left foot;  Surgeon: Ethan Montesinos DPM;  Location: [facility]    AMPUTATE TOE(S) Right 3/3/2020    Procedure: Partial amputation, second toe, right foot;  Surgeon: Ethan Montesinos DPM;  Location: [facility]    [facility] SURGICAL PATHOLOGY  2002    prostate biopsy elevated PSA    COLONOSCOPY      HC REMOVE TONSILS/ADENOIDS,<11 Y/O      T & A    IR LOWER EXTREMITY ANGIOGRAM LEFT  2/26/2020    IR LOWER EXTREMITY ANGIOGRAM LEFT  7/19/2023    IR LOWER EXTREMITY ANGIOGRAM RIGHT  12/11/2018    PHACOEMULSIFICATION WITH STANDARD INTRAOCULAR LENS IMPLANT Left 4/2/2015    Procedure: PHACOEMULSIFICATION WITH STANDARD INTRAOCULAR LENS IMPLANT;  Surgeon: Joseph Hernandez MD;  Location: [facility]    PHACOEMULSIFICATION WITH STANDARD INTRAOCULAR LENS IMPLANT Right 5/4/2015    Procedure: PHACOEMULSIFICATION WITH STANDARD INTRAOCULAR LENS IMPLANT;  Surgeon: Joseph Hrenandez MD;  Location: [facility]    REPAIR HAMMER TOE Left 4/2/2019    Procedure: 5th Metatarsal Head Resection;  Surgeon: Ethan Montesinos DPM;  Location: [facility]        MEDICATIONS:   Current Outpatient Medications:      amLODIPine (NORVASC) 5 MG tablet, Take 1 tablet (5 mg) by mouth daily, Disp: 90 tablet, Rfl: 3    aspirin (ASA) 81 MG EC tablet, Take 1 tablet (81 mg) by mouth daily, Disp: , Rfl:     augmented betamethasone dipropionate (DIPROLENE AF) 0.05 % external cream, Apply sparingly to affected area twice daily as needed.  Do not apply to face., Disp: 100 g, Rfl: 3    blood glucose monitoring (ONE TOUCH ULTRA 2) meter device kit, Use to test blood sugar 2 times daily or as directed., Disp: 1 kit, Rfl: 0    cephALEXin (KEFLEX) 500 MG capsule, Take 1 capsule (500 mg) by mouth 4 times daily, Disp: 40 capsule, Rfl: 0    clopidogrel (PLAVIX) 75 MG tablet, Take 1 tablet (75 mg) by mouth daily, Disp: 90 tablet, Rfl: 3    dorzolamide-timolol (COSOPT) 2-0.5 % ophthalmic solution, , Disp: , Rfl:     hydrochlorothiazide (HYDRODIURIL) 25 MG tablet, Take 1 tablet (25 mg) by mouth daily, Disp: 90 tablet, Rfl: 3    insulin aspart (NOVOLOG PEN) 100 UNIT/ML pen, Inject 3 units before breakfast, 4 units before lunch and 6 units before supper., Disp: 15 mL, Rfl: 3    insulin glargine (LANTUS SOLOSTAR) 100 UNIT/ML pen, Inject 23 Units Subcutaneous at bedtime Hold on file until needed., Disp: 30 mL, Rfl: 3    insulin pen needle (ULTICARE SHORT) 31G X 8 MM miscellaneous, Use 2 pen needles daily or as directed., Disp: 200 each, Rfl: 3    Lancets (ONETOUCH DELICA PLUS [id number]) MISC, 1 each by In Vitro route See Admin Instructions Hold on file until needed., Disp: 100 each, Rfl: 1    Lancets (ONETOUCH DELICA PLUS [id number]) MISC, 1 each by In Vitro route See Admin Instructions Hold on file until needed., Disp: 100 each, Rfl: 4    lisinopril (ZESTRIL) 40 MG tablet, Take 1 tablet (40 mg) by mouth daily, Disp: 90 tablet, Rfl: 3    metFORMIN (GLUCOPHAGE) 500 MG tablet, Take 2 tablets (1,000 mg) by mouth 2 times daily (with meals), Disp: 360 tablet, Rfl: 3    Multiple Vitamins-Minerals (PRESERVISION AREDS PO), Take 1 tablet by mouth, Disp: , Rfl:      ONETOUCH ULTRA test strip, 2 strips by In Vitro route daily One touch Ultra 2., Disp: 100 strip, Rfl: 3    ORDER FOR DME, Equipment being ordered:  Glucometer, Disp: 1 Device, Rfl: 1    pantoprazole (PROTONIX) 20 MG EC tablet, Take 1 tablet (20 mg) by mouth daily, Disp: 90 tablet, Rfl: 3    rosuvastatin (CRESTOR) 10 MG tablet, Take 1 tablet (10 mg) by mouth at bedtime, Disp: 90 tablet, Rfl: 3    triamcinolone (KENALOG) 0.1 % external cream, Apply topically 2 times daily To dry skin patches as needed.  Hold on file until needed., Disp: 80 g, Rfl: 1     ALLERGIES:    Allergies   Allergen Reactions    Zocor [Simvastatin - High Dose]      Bilateral hip aching        SOCIAL HISTORY:   Social History     Socioeconomic History    Marital status:      Spouse name: Not on file    Number of children: Not on file    Years of education: Not on file    Highest education level: Not on file   Occupational History    Not on file   Tobacco Use    Smoking status: Never    Smokeless tobacco: Never   Vaping Use    Vaping status: Never Used   Substance and Sexual Activity    Alcohol use: Not Currently     Alcohol/week: 1.7 standard drinks of alcohol     Types: 2 Standard drinks or equivalent per week     Comment: very little    Drug use: No    Sexual activity: Not Currently     Partners: Female   Other Topics Concern    Parent/sibling w/ CABG, MI or angioplasty before 65F 55M? No   Social History Narrative    Not on file     Social Determinants of Health     Financial Resource Strain: Low Risk  (1/15/2024)    Financial Resource Strain     Within the past 12 months, have you or your family members you live with been unable to get utilities (heat, electricity) when it was really needed?: No   Food Insecurity: Low Risk  (1/15/2024)    Food Insecurity     Within the past 12 months, did you worry that your food would run out before you got money to buy more?: No     Within the past 12 months, did the food you bought just not last and  you didn t have money to get more?: No   Transportation Needs: Low Risk  (1/15/2024)    Transportation Needs     Within the past 12 months, has lack of transportation kept you from medical appointments, getting your medicines, non-medical meetings or appointments, work, or from getting things that you need?: No   Physical Activity: Not on file   Stress: Not on file   Social Connections: Not on file   Interpersonal Safety: Low Risk  (1/29/2024)    Interpersonal Safety     Do you feel physically and emotionally safe where you currently live?: Yes     Within the past 12 months, have you been hit, slapped, kicked or otherwise physically hurt by someone?: No     Within the past 12 months, have you been humiliated or emotionally abused in other ways by your partner or ex-partner?: No   Housing Stability: Low Risk  (1/15/2024)    Housing Stability     Do you have housing? : Yes     Are you worried about losing your housing?: No        FAMILY HISTORY:   Family History   Problem Relation Age of Onset    Cancer Mother         intestinal CA    Other Cancer Mother         cancer    Diabetes Father     Cerebrovascular Disease Father     Diabetes Brother     Other Cancer Son         GIST cancer    Other Cancer Brother         Pancreatic cancer    Diabetes Son     Melanoma No family hx of         Vitals: There were no vitals taken for this visit.       Lower Extremity Evaluation:     Dermatologic:  Noted epithelialization of the previous ulcer on the left foot.  The skin is otherwise intact to both lower extremities without significant lesions, rash or abrasion.  No paronychia or evidence of soft tissue infection is noted.  The nails are hypertrophic and discolored bilateral feet.     Vascular: DP & PT pulses are intact & regular bilaterally.   Capillary filling and skin temperature is normal to both lower extremities.  There are no varicosities, no edema and no trophic changes noted.      Neurologic:   No evidence of weakness in  the lower extremities.  Noted evidence of neuropathy with diminished sensation bilaterally.       Musculoskeletal: Patient is ambulatory without assistive device or brace.  No gross ankle deformity noted.  No foot or ankle joint effusion is noted.  One notes hammertoe contracture of the lesser toes bilaterally.           Assessment:        ICD-10-CM    1. 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  At this point, the patient was instructed to continue with the antibiotics until finished.       I have discussed with the patient the importance of diabetic foot care and daily inspection of the feet.  The patient was instructed to come in anytime if there is any concern about the feet with redness, swelling or drainage is noted.    Fawad verbalized agreement with and understanding of the rational for the diagnosis and treatment plan.  All questions were answered to best of my ability and the patient's satisfaction. The patient was advised to contact the clinic with any questions that may arise after the clinic visit.      Disclaimer: This note consists of symbols derived from keyboarding, dictation and/or voice recognition software. As a result, there may be errors in the script that have gone undetected. Please consider this when interpreting information found in this chart.        LIANG Montesinos D.P.M., [email or web address].S.

## 2024-12-16 ENCOUNTER — OFFICE VISIT (OUTPATIENT)
Dept: DERMATOLOGY | Facility: CLINIC | Age: 89
End: 2024-12-16
Payer: COMMERCIAL

## 2024-12-16 DIAGNOSIS — D18.01 ANGIOMA OF SKIN: ICD-10-CM

## 2024-12-16 DIAGNOSIS — L82.0 INFLAMED SEBORRHEIC KERATOSIS: ICD-10-CM

## 2024-12-16 DIAGNOSIS — L82.1 SEBORRHEIC KERATOSES: ICD-10-CM

## 2024-12-16 DIAGNOSIS — D23.9 DERMAL NEVUS: ICD-10-CM

## 2024-12-16 DIAGNOSIS — Z86.006 HISTORY OF MELANOMA IN SITU: ICD-10-CM

## 2024-12-16 DIAGNOSIS — L57.0 AK (ACTINIC KERATOSIS): ICD-10-CM

## 2024-12-16 DIAGNOSIS — L81.4 LENTIGO: ICD-10-CM

## 2024-12-16 DIAGNOSIS — Z85.828 HISTORY OF SKIN CANCER: Primary | ICD-10-CM

## 2024-12-16 PROCEDURE — 99213 OFFICE O/P EST LOW 20 MIN: CPT | Mod: 25 | Performed by: DERMATOLOGY

## 2024-12-16 PROCEDURE — 17003 DESTRUCT PREMALG LES 2-14: CPT | Mod: 59 | Performed by: DERMATOLOGY

## 2024-12-16 PROCEDURE — 17110 DESTRUCTION B9 LES UP TO 14: CPT | Performed by: DERMATOLOGY

## 2024-12-16 PROCEDURE — 17000 DESTRUCT PREMALG LESION: CPT | Mod: 59 | Performed by: DERMATOLOGY

## 2024-12-16 NOTE — LETTER
12/16/2024      Fawad Garcia  [address] [place] [address]      Dear Colleague,    Thank you for referring your patient, Fawad Garcia, to the [facility]. Please see a copy of my visit note below.    Fawad Garcia is an extremely pleasant 89 year old year old male patient here today for hx of non-melanoma skin cancer and melanoma in situ.  Patient has no other skin complaints today.  Remainder of the HPI, Meds, PMH, Allergies, FH, and SH was reviewed in chart.      Past Medical History:   Diagnosis Date     Diabetes mellitus (H)      Hypertension      Squamous cell carcinoma        Past Surgical History:   Procedure Laterality Date     AMPUTATE TOE(S) Right 7/23/2019    Procedure: Partial amputation great toe;  Surgeon: Ethan Montesinos DPM;  Location: [facility]     AMPUTATE TOE(S) Left 12/24/2019    Procedure: Amputation of the great toe, left foot;  Surgeon: Ethan Montesinos DPM;  Location: [facility]     AMPUTATE TOE(S) Right 3/3/2020    Procedure: Partial amputation, second toe, right foot;  Surgeon: Ethna Montesinos DPM;  Location: [facility]     [facility] SURGICAL PATHOLOGY  2002    prostate biopsy elevated PSA     COLONOSCOPY       HC REMOVE TONSILS/ADENOIDS,<11 Y/O      T & A     IR LOWER EXTREMITY ANGIOGRAM LEFT  2/26/2020     IR LOWER EXTREMITY ANGIOGRAM LEFT  7/19/2023     IR LOWER EXTREMITY ANGIOGRAM RIGHT  12/11/2018     PHACOEMULSIFICATION WITH STANDARD INTRAOCULAR LENS IMPLANT Left 4/2/2015    Procedure: PHACOEMULSIFICATION WITH STANDARD INTRAOCULAR LENS IMPLANT;  Surgeon: Joseph Hernandez MD;  Location: [facility]     PHACOEMULSIFICATION WITH STANDARD INTRAOCULAR LENS IMPLANT Right 5/4/2015    Procedure: PHACOEMULSIFICATION WITH STANDARD INTRAOCULAR LENS IMPLANT;  Surgeon: Joseph Hernandez MD;  Location: [facility]     REPAIR HAMMER TOE Left 4/2/2019    Procedure: 5th Metatarsal Head Resection;  Surgeon: Ethan Montesinos DPM;  Location: [facility]        [facility]  History   Problem Relation Age of Onset     Cancer Mother         intestinal CA     Other Cancer Mother         cancer     Diabetes Father      Cerebrovascular Disease Father      Diabetes Brother      Other Cancer Brother         Pancreatic cancer     Other Cancer Son         GIST cancer     Diabetes Son      Melanoma No family hx of        Social History     Socioeconomic History     Marital status:      Spouse name: Not on file     Number of children: Not on file     Years of education: Not on file     Highest education level: Not on file   Occupational History     Not on file   Tobacco Use     Smoking status: Never     Smokeless tobacco: Never   Vaping Use     Vaping status: Never Used   Substance and Sexual Activity     Alcohol use: Yes     Alcohol/week: 1.7 standard drinks of alcohol     Types: 2 Standard drinks or equivalent per week     Comment: very little     Drug use: No     Sexual activity: Not Currently     Partners: Female   Other Topics Concern     Parent/sibling w/ CABG, MI or angioplasty before 65F 55M? No   Social History Narrative     Not on file     Social Drivers of Health     Financial Resource Strain: Low Risk  (10/21/2024)    Financial Resource Strain      Within the past 12 months, have you or your family members you live with been unable to get utilities (heat, electricity) when it was really needed?: No   Food Insecurity: Low Risk  (10/21/2024)    Food Insecurity      Within the past 12 months, did you worry that your food would run out before you got money to buy more?: No      Within the past 12 months, did the food you bought just not last and you didn t have money to get more?: No   Transportation Needs: Low Risk  (10/21/2024)    Transportation Needs      Within the past 12 months, has lack of transportation kept you from medical appointments, getting your medicines, non-medical meetings or appointments, work, or from getting things that you need?: No   Physical Activity:  Inactive (10/21/2024)    Exercise Vital Sign      Days of Exercise per Week: 0 days      Minutes of Exercise per Session: 0 min   Stress: No Stress Concern Present (10/21/2024)    [employer] - Occupational Stress Questionnaire      Feeling of Stress : Not at all   Social Connections: Unknown (10/21/2024)    Social Connection and Isolation Panel [NHANES]      Frequency of Communication with Friends and Family: Not on file      Frequency of Social Gatherings with Friends and Family: More than three times a week      Attends Jehovah's witness Services: Not on file      Active Member of Clubs or Organizations: Not on file      Attends Club or Organization Meetings: Not on file      Marital Status: Not on file   Interpersonal Safety: Unknown (10/21/2024)    Interpersonal Safety      Do you feel physically and emotionally safe where you currently live?: Patient unable to answer      Within the past 12 months, have you been hit, slapped, kicked or otherwise physically hurt by someone?: Patient unable to answer      Within the past 12 months, have you been humiliated or emotionally abused in other ways by your partner or ex-partner?: Patient unable to answer   Housing Stability: Low Risk  (10/21/2024)    Housing Stability      Do you have housing? : Yes      Are you worried about losing your housing?: No       Outpatient Encounter Medications as of 12/16/2024   Medication Sig Dispense Refill     amLODIPine (NORVASC) 5 MG tablet Take 1 tablet (5 mg) by mouth daily 90 tablet 3     aspirin (ASA) 81 MG EC tablet Take 1 tablet (81 mg) by mouth daily       augmented betamethasone dipropionate (DIPROLENE AF) 0.05 % external cream Apply sparingly to affected area twice daily as needed.  Do not apply to face. 100 g 1     blood glucose monitoring (ONE TOUCH ULTRA 2) meter device kit Use to test blood sugar 2 times daily or as directed. 1 kit 0     clopidogrel (PLAVIX) 75 MG tablet Take 1 tablet (75 mg) by mouth  daily 90 tablet 3     dorzolamide-timolol (COSOPT) 2-0.5 % ophthalmic solution        hydrochlorothiazide (HYDRODIURIL) 25 MG tablet Take 1 tablet (25 mg) by mouth daily 90 tablet 3     insulin aspart (NOVOLOG PEN) 100 UNIT/ML pen Inject 3 units before breakfast, 4 units before lunch and 6 units before supper. 15 mL 3     insulin glargine (LANTUS SOLOSTAR) 100 UNIT/ML pen Inject 24 Units subcutaneously at bedtime. Hold on file until needed. 30 mL 3     insulin pen needle (ULTICARE SHORT) 31G X 8 MM miscellaneous Use 2 pen needles daily or as directed. 200 each 3     Lancets (ONETOUCH DELICA PLUS [id number]) MISC 1 each by In Vitro route See Admin Instructions Hold on file until needed. 100 each 3     Lancets (ONETOUCH DELICA PLUS [id number]) MISC 1 each by In Vitro route See Admin Instructions Hold on file until needed. 100 each 1     lisinopril (ZESTRIL) 40 MG tablet Take 1 tablet (40 mg) by mouth daily 90 tablet 3     metFORMIN (GLUCOPHAGE) 500 MG tablet Take 2 tablets (1,000 mg) by mouth 2 times daily (with meals) 360 tablet 3     Multiple Vitamins-Minerals (PRESERVISION AREDS PO) Take 1 tablet by mouth       ONETOUCH ULTRA test strip 2 strips by In Vitro route daily One touch Ultra 2. 100 strip 3     ORDER FOR DME Equipment being ordered:   Glucometer 1 Device 1     pantoprazole (PROTONIX) 20 MG EC tablet Take 1 tablet (20 mg) by mouth daily 90 tablet 3     rosuvastatin (CRESTOR) 10 MG tablet Take 1 tablet (10 mg) by mouth at bedtime 90 tablet 3     triamcinolone (KENALOG) 0.1 % external cream Apply topically 2 times daily To dry skin patches as needed.  Hold on file until needed. (Patient not taking: Reported on 10/21/2024) 80 g 1     No facility-administered encounter medications on file as of 12/16/2024.             O:   NAD, WDWN, Alert & Oriented, Mood & Affect wnl, Vitals stable   General appearance normal   Vitals stable   Alert, oriented and in no acute distress      Following lymph nodes palpated:  Occipital, Cervical, Supraclavicular no lad    R and L helix gritty scaly papule   Forehead inflamed seborrheic keratosis   Stuck on papules and brown macules on trunk and ext   Red papules on trunk  Flesh colored papules on trunk     The remainder of the full exam was normal; the following areas were examined:  conjunctiva/lids, , neck, peripheral vascular system, abdomen, lymph nodes, digits/nails, eccrine and apocrine glands, scalp/hair, face, neck, chest, abdomen, buttocks, back, RUE, LUE, RLE, LLE       Eyes: Conjunctivae/lids:Normal     ENT: Lips, mucosa: normal    MSK:Normal    Cardiovascular: peripheral edema none    Pulm: Breathing Normal    Lymph Nodes: No Head and Neck Lymphadenopathy     Neuro/Psych: Orientation:Alert and Orientedx3 ; Mood/Affect:normal       A/P:  1. Seborrheic keratosis, lentigo, angioma, dermal nevus, hx of melanoma in situ and non-melanoma skin cancer   2. Forehead inflamed seborrheic keratosis   LN2:  Treated with LN2 for 5s for 1-2 cycles. Warned risks of blistering, pain, pigment change, scarring, and incomplete resolution.  Advised patient to return if lesions do not completely resolve.  Wound care sheet given.  3. Actinic keratosis   L and R helix   LN2:  Treated with LN2 for 5s for 1-2 cycles. Warned risks of blistering, pain, pigment change, scarring, and incomplete resolution.  Advised patient to return if lesions do not completely resolve.  Wound care sheet given.  It was a pleasure speaking to Fawad Garcia today.  Previous clinic notes and pertinent laboratory tests were reviewed prior to Fawad Garcia's visit.  Signs and Symptoms of skin cancer discussed with patient.  Patient encouraged to perform monthly skin exams.  UV precautions reviewed with patient.  Risks of non-melanoma skin cancer discussed with patient   Return to clinic 12 months      Again, thank you for allowing me to participate in the care of your patient.        Sincerely,        Evgeny Arvizu  MD Melissa

## 2024-12-16 NOTE — PROGRESS NOTES
Fawad Garcia is an extremely pleasant 89 year old year old male patient here today for hx of non-melanoma skin cancer and melanoma in situ.  Patient has no other skin complaints today.  Remainder of the HPI, Meds, PMH, Allergies, FH, and SH was reviewed in chart.      Past Medical History:   Diagnosis Date    Diabetes mellitus (H)     Hypertension     Squamous cell carcinoma        Past Surgical History:   Procedure Laterality Date    AMPUTATE TOE(S) Right 7/23/2019    Procedure: Partial amputation great toe;  Surgeon: Ethan Montesinos DPM;  Location: [facility]    AMPUTATE TOE(S) Left 12/24/2019    Procedure: Amputation of the great toe, left foot;  Surgeon: Ethan Montesinos DPM;  Location: [facility]    AMPUTATE TOE(S) Right 3/3/2020    Procedure: Partial amputation, second toe, right foot;  Surgeon: Ethan Montesinos DPM;  Location: [facility]    [facility] SURGICAL PATHOLOGY  2002    prostate biopsy elevated PSA    COLONOSCOPY      HC REMOVE TONSILS/ADENOIDS,<13 Y/O      T & A    IR LOWER EXTREMITY ANGIOGRAM LEFT  2/26/2020    IR LOWER EXTREMITY ANGIOGRAM LEFT  7/19/2023    IR LOWER EXTREMITY ANGIOGRAM RIGHT  12/11/2018    PHACOEMULSIFICATION WITH STANDARD INTRAOCULAR LENS IMPLANT Left 4/2/2015    Procedure: PHACOEMULSIFICATION WITH STANDARD INTRAOCULAR LENS IMPLANT;  Surgeon: Joseph Hernandez MD;  Location: [facility]    PHACOEMULSIFICATION WITH STANDARD INTRAOCULAR LENS IMPLANT Right 5/4/2015    Procedure: PHACOEMULSIFICATION WITH STANDARD INTRAOCULAR LENS IMPLANT;  Surgeon: Joseph Hernandez MD;  Location: [facility]    REPAIR HAMMER TOE Left 4/2/2019    Procedure: 5th Metatarsal Head Resection;  Surgeon: Ethan Montesinos DPM;  Location: [facility]        Family History   Problem Relation Age of Onset    Cancer Mother         intestinal CA    Other Cancer Mother         cancer    Diabetes Father     Cerebrovascular Disease Father     Diabetes Brother     Other Cancer Brother         Pancreatic cancer    Other  Cancer Son         GIST cancer    Diabetes Son     Melanoma No family hx of        Social History     Socioeconomic History    Marital status:      Spouse name: Not on file    Number of children: Not on file    Years of education: Not on file    Highest education level: Not on file   Occupational History    Not on file   Tobacco Use    Smoking status: Never    Smokeless tobacco: Never   Vaping Use    Vaping status: Never Used   Substance and Sexual Activity    Alcohol use: Yes     Alcohol/week: 1.7 standard drinks of alcohol     Types: 2 Standard drinks or equivalent per week     Comment: very little    Drug use: No    Sexual activity: Not Currently     Partners: Female   Other Topics Concern    Parent/sibling w/ CABG, MI or angioplasty before 65F 55M? No   Social History Narrative    Not on file     Social Drivers of Health     Financial Resource Strain: Low Risk  (10/21/2024)    Financial Resource Strain     Within the past 12 months, have you or your family members you live with been unable to get utilities (heat, electricity) when it was really needed?: No   Food Insecurity: Low Risk  (10/21/2024)    Food Insecurity     Within the past 12 months, did you worry that your food would run out before you got money to buy more?: No     Within the past 12 months, did the food you bought just not last and you didn t have money to get more?: No   Transportation Needs: Low Risk  (10/21/2024)    Transportation Needs     Within the past 12 months, has lack of transportation kept you from medical appointments, getting your medicines, non-medical meetings or appointments, work, or from getting things that you need?: No   Physical Activity: Inactive (10/21/2024)    Exercise Vital Sign     Days of Exercise per Week: 0 days     Minutes of Exercise per Session: 0 min   Stress: No Stress Concern Present (10/21/2024)    [employer] - Occupational Stress Questionnaire     Feeling of Stress : Not  at all   Social Connections: Unknown (10/21/2024)    Social Connection and Isolation Panel [NHANES]     Frequency of Communication with Friends and Family: Not on file     Frequency of Social Gatherings with Friends and Family: More than three times a week     Attends Restoration Services: Not on file     Active Member of Clubs or Organizations: Not on file     Attends Club or Organization Meetings: Not on file     Marital Status: Not on file   Interpersonal Safety: Unknown (10/21/2024)    Interpersonal Safety     Do you feel physically and emotionally safe where you currently live?: Patient unable to answer     Within the past 12 months, have you been hit, slapped, kicked or otherwise physically hurt by someone?: Patient unable to answer     Within the past 12 months, have you been humiliated or emotionally abused in other ways by your partner or ex-partner?: Patient unable to answer   Housing Stability: Low Risk  (10/21/2024)    Housing Stability     Do you have housing? : Yes     Are you worried about losing your housing?: No       Outpatient Encounter Medications as of 12/16/2024   Medication Sig Dispense Refill    amLODIPine (NORVASC) 5 MG tablet Take 1 tablet (5 mg) by mouth daily 90 tablet 3    aspirin (ASA) 81 MG EC tablet Take 1 tablet (81 mg) by mouth daily      augmented betamethasone dipropionate (DIPROLENE AF) 0.05 % external cream Apply sparingly to affected area twice daily as needed.  Do not apply to face. 100 g 1    blood glucose monitoring (ONE TOUCH ULTRA 2) meter device kit Use to test blood sugar 2 times daily or as directed. 1 kit 0    clopidogrel (PLAVIX) 75 MG tablet Take 1 tablet (75 mg) by mouth daily 90 tablet 3    dorzolamide-timolol (COSOPT) 2-0.5 % ophthalmic solution       hydrochlorothiazide (HYDRODIURIL) 25 MG tablet Take 1 tablet (25 mg) by mouth daily 90 tablet 3    insulin aspart (NOVOLOG PEN) 100 UNIT/ML pen Inject 3 units before breakfast, 4 units before lunch and 6 units before  supper. 15 mL 3    insulin glargine (LANTUS SOLOSTAR) 100 UNIT/ML pen Inject 24 Units subcutaneously at bedtime. Hold on file until needed. 30 mL 3    insulin pen needle (ULTICARE SHORT) 31G X 8 MM miscellaneous Use 2 pen needles daily or as directed. 200 each 3    Lancets (ONETOUCH DELICA PLUS [id number]) MISC 1 each by In Vitro route See Admin Instructions Hold on file until needed. 100 each 3    Lancets (ONETOUCH DELICA PLUS [id number]) MISC 1 each by In Vitro route See Admin Instructions Hold on file until needed. 100 each 1    lisinopril (ZESTRIL) 40 MG tablet Take 1 tablet (40 mg) by mouth daily 90 tablet 3    metFORMIN (GLUCOPHAGE) 500 MG tablet Take 2 tablets (1,000 mg) by mouth 2 times daily (with meals) 360 tablet 3    Multiple Vitamins-Minerals (PRESERVISION AREDS PO) Take 1 tablet by mouth      ONETOUCH ULTRA test strip 2 strips by In Vitro route daily One touch Ultra 2. 100 strip 3    ORDER FOR DME Equipment being ordered:   Glucometer 1 Device 1    pantoprazole (PROTONIX) 20 MG EC tablet Take 1 tablet (20 mg) by mouth daily 90 tablet 3    rosuvastatin (CRESTOR) 10 MG tablet Take 1 tablet (10 mg) by mouth at bedtime 90 tablet 3    triamcinolone (KENALOG) 0.1 % external cream Apply topically 2 times daily To dry skin patches as needed.  Hold on file until needed. (Patient not taking: Reported on 10/21/2024) 80 g 1     No facility-administered encounter medications on file as of 12/16/2024.             O:   NAD, WDWN, Alert & Oriented, Mood & Affect wnl, Vitals stable   General appearance normal   Vitals stable   Alert, oriented and in no acute distress      Following lymph nodes palpated: Occipital, Cervical, Supraclavicular no lad    R and L helix gritty scaly papule   Forehead inflamed seborrheic keratosis   Stuck on papules and brown macules on trunk and ext   Red papules on trunk  Flesh colored papules on trunk     The remainder of the full exam was normal; the following areas were examined:   conjunctiva/lids, , neck, peripheral vascular system, abdomen, lymph nodes, digits/nails, eccrine and apocrine glands, scalp/hair, face, neck, chest, abdomen, buttocks, back, RUE, LUE, RLE, LLE       Eyes: Conjunctivae/lids:Normal     ENT: Lips, mucosa: normal    MSK:Normal    Cardiovascular: peripheral edema none    Pulm: Breathing Normal    Lymph Nodes: No Head and Neck Lymphadenopathy     Neuro/Psych: Orientation:Alert and Orientedx3 ; Mood/Affect:normal       A/P:  1. Seborrheic keratosis, lentigo, angioma, dermal nevus, hx of melanoma in situ and non-melanoma skin cancer   2. Forehead inflamed seborrheic keratosis   LN2:  Treated with LN2 for 5s for 1-2 cycles. Warned risks of blistering, pain, pigment change, scarring, and incomplete resolution.  Advised patient to return if lesions do not completely resolve.  Wound care sheet given.  3. Actinic keratosis   L and R helix   LN2:  Treated with LN2 for 5s for 1-2 cycles. Warned risks of blistering, pain, pigment change, scarring, and incomplete resolution.  Advised patient to return if lesions do not completely resolve.  Wound care sheet given.  It was a pleasure speaking to Fawad Garcia today.  Previous clinic notes and pertinent laboratory tests were reviewed prior to Fawad Garcia's visit.  Signs and Symptoms of skin cancer discussed with patient.  Patient encouraged to perform monthly skin exams.  UV precautions reviewed with patient.  Risks of non-melanoma skin cancer discussed with patient   Return to clinic 12 months

## 2025-01-18 DIAGNOSIS — I73.9 PERIPHERAL VASCULAR DISEASE: ICD-10-CM

## 2025-01-20 RX ORDER — CLOPIDOGREL BISULFATE 75 MG/1
75 TABLET ORAL DAILY
Qty: 90 TABLET | Refills: 3 | Status: SHIPPED | OUTPATIENT
Start: 2025-01-20

## 2025-01-25 ENCOUNTER — TELEPHONE (OUTPATIENT)
Dept: NURSING | Facility: CLINIC | Age: 89
End: 2025-01-25
Payer: COMMERCIAL

## 2025-01-25 NOTE — TELEPHONE ENCOUNTER
Patient calling with questions about location for upcoming LAB appointment. No triage completed. All questions answered.     Imelda Goddard RN on 1/25/2025 at 11:04 AM

## 2025-01-27 ENCOUNTER — TELEPHONE (OUTPATIENT)
Dept: FAMILY MEDICINE | Facility: CLINIC | Age: 89
End: 2025-01-27

## 2025-01-27 ENCOUNTER — LAB (OUTPATIENT)
Dept: LAB | Facility: CLINIC | Age: 89
End: 2025-01-27
Payer: COMMERCIAL

## 2025-01-27 DIAGNOSIS — C61 MALIGNANT NEOPLASM OF PROSTATE (H): Primary | ICD-10-CM

## 2025-01-27 DIAGNOSIS — Z79.4 TYPE 2 DIABETES MELLITUS WITH BOTH EYES AFFECTED BY MILD NONPROLIFERATIVE RETINOPATHY WITHOUT MACULAR EDEMA, WITH LONG-TERM CURRENT USE OF INSULIN (H): ICD-10-CM

## 2025-01-27 DIAGNOSIS — E11.3293 TYPE 2 DIABETES MELLITUS WITH BOTH EYES AFFECTED BY MILD NONPROLIFERATIVE RETINOPATHY WITHOUT MACULAR EDEMA, WITH LONG-TERM CURRENT USE OF INSULIN (H): ICD-10-CM

## 2025-01-27 DIAGNOSIS — I73.9 PERIPHERAL VASCULAR DISEASE: ICD-10-CM

## 2025-01-27 LAB
ERYTHROCYTE [DISTWIDTH] IN BLOOD BY AUTOMATED COUNT: 13.4 % (ref 10–15)
EST. AVERAGE GLUCOSE BLD GHB EST-MCNC: 214 MG/DL
HBA1C MFR BLD: 9.1 %
HCT VFR BLD AUTO: 39.6 % (ref 40–53)
HGB BLD-MCNC: 13.3 G/DL (ref 13.3–17.7)
MCH RBC QN AUTO: 29.6 PG (ref 26.5–33)
MCHC RBC AUTO-ENTMCNC: 33.6 G/DL (ref 31.5–36.5)
MCV RBC AUTO: 88 FL (ref 78–100)
PLATELET # BLD AUTO: 242 10E3/UL (ref 150–450)
PSA SERPL DL<=0.01 NG/ML-MCNC: 4.57 NG/ML
RBC # BLD AUTO: 4.5 10E6/UL (ref 4.4–5.9)
WBC # BLD AUTO: 8.2 10E3/UL (ref 4–11)

## 2025-01-27 PROCEDURE — 36415 COLL VENOUS BLD VENIPUNCTURE: CPT

## 2025-01-27 PROCEDURE — 83036 HEMOGLOBIN GLYCOSYLATED A1C: CPT

## 2025-01-27 PROCEDURE — 84153 ASSAY OF PSA TOTAL: CPT

## 2025-01-27 PROCEDURE — 85014 HEMATOCRIT: CPT

## 2025-01-27 PROCEDURE — 85048 AUTOMATED LEUKOCYTE COUNT: CPT

## 2025-01-27 NOTE — TELEPHONE ENCOUNTER
RN please to discuss lab results from today. Patient viewed them on mychart today.    Marianne Han MA

## 2025-01-27 NOTE — RESULT ENCOUNTER NOTE
Prudence Doe. Ray will meet you on 2/12/2025. His A1c increased. Wondering if you'd be able to get in touch with him before his appointment to see if he needs to do something prior to his appointment with you.

## 2025-01-27 NOTE — TELEPHONE ENCOUNTER
Test Results    Contacts       Contact Date/Time Type Contact Phone/Fax    01/27/2025 10:23 AM CST Phone (Incoming) Emerson Garcia (Self) [phone] (H)            Who ordered the test:  CAROLEE     Type of test: Lab    Date of test:  01/27/25    Where was the test performed:  [place]    What are your questions/concerns?:  PATIENT WOULD LIKE  TO DISCUSS RESULTS     Could we send this information to you in [employer] or would you prefer to receive a phone call?:   Patient would like to be contacted via [employer]

## 2025-01-28 ENCOUNTER — ONCOLOGY VISIT (OUTPATIENT)
Dept: ONCOLOGY | Facility: CLINIC | Age: 89
End: 2025-01-28
Attending: INTERNAL MEDICINE
Payer: COMMERCIAL

## 2025-01-28 VITALS
BODY MASS INDEX: 35.06 KG/M2 | WEIGHT: 236.7 LBS | HEIGHT: 69 IN | HEART RATE: 73 BPM | OXYGEN SATURATION: 97 % | DIASTOLIC BLOOD PRESSURE: 70 MMHG | TEMPERATURE: 97.6 F | SYSTOLIC BLOOD PRESSURE: 149 MMHG | RESPIRATION RATE: 19 BRPM

## 2025-01-28 DIAGNOSIS — C61 MALIGNANT NEOPLASM OF PROSTATE (H): Primary | ICD-10-CM

## 2025-01-28 PROCEDURE — G0463 HOSPITAL OUTPT CLINIC VISIT: HCPCS | Performed by: INTERNAL MEDICINE

## 2025-01-28 PROCEDURE — 99213 OFFICE O/P EST LOW 20 MIN: CPT | Performed by: INTERNAL MEDICINE

## 2025-01-28 PROCEDURE — G2211 COMPLEX E/M VISIT ADD ON: HCPCS | Performed by: INTERNAL MEDICINE

## 2025-01-28 ASSESSMENT — PAIN SCALES - GENERAL: PAINLEVEL_OUTOF10: NO PAIN (0)

## 2025-01-28 NOTE — PROGRESS NOTES
"Oncology Rooming Note    January 28, 2025 4:01 PM   Fawad Garcia is a 89 year old male who presents for:    Chief Complaint   Patient presents with    Oncology Clinic Visit     Malignant neoplasm of prostate - Provider visit only     Initial Vitals: BP (!) 149/70 (BP Location: Right arm, Patient Position: Sitting, Cuff Size: Adult Large)   Pulse 73   Temp 97.6  F (36.4  C) (Tympanic)   Resp 19   Ht 1.74 m (5' 8.5\")   Wt 107.4 kg (236 lb 11.2 oz)   SpO2 97%   BMI 35.47 kg/m   Estimated body mass index is 35.47 kg/m  as calculated from the following:    Height as of this encounter: 1.74 m (5' 8.5\").    Weight as of this encounter: 107.4 kg (236 lb 11.2 oz). Body surface area is 2.28 meters squared.  No Pain (0) Comment: Data Unavailable   No LMP for male patient.  Allergies reviewed: Yes  Medications reviewed: Yes    Medications: Medication refills not needed today.  Pharmacy name entered into EPIC: [facility]. - [place], [place] - [address] [place] [address]    Frailty Screening:   Is the patient here for a new oncology consult visit in cancer care? 2. No      Clinical concerns: None today.        Sujatha Sutton MA            "

## 2025-01-28 NOTE — TELEPHONE ENCOUNTER
Pt called back.  Read him results from Dr Catalan.  He is in good understanding.      Blanca Tolentino on 1/28/2025 at 11:22 AM

## 2025-01-28 NOTE — TELEPHONE ENCOUNTER
Spoke with wife who said patient is unavailable at this time. Gave wife message to have patient call back to discuss lab results.     Loreto ADEN RN  [facility]  [phone]

## 2025-01-28 NOTE — Clinical Note
"1/28/2025      Fawad Garcia  [address] [facility] [address]      Dear Colleague,    Thank you for referring your patient, Fawad Garcia, to the [facility]. Please see a copy of my visit note below.    Oncology Rooming Note    January 28, 2025 4:01 PM   Fawad Garcia is a 89 year old male who presents for:    Chief Complaint   Patient presents with    Oncology Clinic Visit     Malignant neoplasm of prostate - Provider visit only     Initial Vitals: BP (!) 149/70 (BP Location: Right arm, Patient Position: Sitting, Cuff Size: Adult Large)   Pulse 73   Temp 97.6  F (36.4  C) (Tympanic)   Resp 19   Ht 1.74 m (5' 8.5\")   Wt 107.4 kg (236 lb 11.2 oz)   SpO2 97%   BMI 35.47 kg/m   Estimated body mass index is 35.47 kg/m  as calculated from the following:    Height as of this encounter: 1.74 m (5' 8.5\").    Weight as of this encounter: 107.4 kg (236 lb 11.2 oz). Body surface area is 2.28 meters squared.  No Pain (0) Comment: Data Unavailable   No LMP for male patient.  Allergies reviewed: Yes  Medications reviewed: Yes    Medications: Medication refills not needed today.  Pharmacy name entered into EPIC: [facility]. - [place], [place] - [address] [place] [address]    Frailty Screening:   Is the patient here for a new oncology consult visit in cancer care? 2. No      Clinical concerns: None today.        Sujatha Suttno MA                Again, thank you for allowing me to participate in the care of your patient.        Sincerely,        Cheryl Barriga MD    Electronically signed"

## 2025-01-29 ENCOUNTER — TELEPHONE (OUTPATIENT)
Dept: FAMILY MEDICINE | Facility: CLINIC | Age: 89
End: 2025-01-29
Payer: COMMERCIAL

## 2025-02-05 DIAGNOSIS — Z79.4 TYPE 2 DIABETES MELLITUS WITH BOTH EYES AFFECTED BY MILD NONPROLIFERATIVE RETINOPATHY WITHOUT MACULAR EDEMA, WITH LONG-TERM CURRENT USE OF INSULIN (H): ICD-10-CM

## 2025-02-05 DIAGNOSIS — E11.3293 TYPE 2 DIABETES MELLITUS WITH BOTH EYES AFFECTED BY MILD NONPROLIFERATIVE RETINOPATHY WITHOUT MACULAR EDEMA, WITH LONG-TERM CURRENT USE OF INSULIN (H): ICD-10-CM

## 2025-02-06 RX ORDER — METHYLPREDNISOLONE SODIUM SUCCINATE 125 MG/2ML
INJECTION, POWDER, FOR SOLUTION INTRAMUSCULAR; INTRAVENOUS
Qty: 200 EACH | Refills: 0 | Status: SHIPPED | OUTPATIENT
Start: 2025-02-06

## 2025-02-11 NOTE — TELEPHONE ENCOUNTER
Form completed, signed, and My Chart note sent to patient. Copy sent to scan and copy placed on counter to be brought to .

## 2025-02-12 ENCOUNTER — VIRTUAL VISIT (OUTPATIENT)
Dept: EDUCATION SERVICES | Facility: CLINIC | Age: 89
End: 2025-02-12
Payer: COMMERCIAL

## 2025-02-12 DIAGNOSIS — E11.3293 TYPE 2 DIABETES MELLITUS WITH BOTH EYES AFFECTED BY MILD NONPROLIFERATIVE RETINOPATHY WITHOUT MACULAR EDEMA, WITH LONG-TERM CURRENT USE OF INSULIN (H): Primary | ICD-10-CM

## 2025-02-12 DIAGNOSIS — Z79.4 TYPE 2 DIABETES MELLITUS WITH BOTH EYES AFFECTED BY MILD NONPROLIFERATIVE RETINOPATHY WITHOUT MACULAR EDEMA, WITH LONG-TERM CURRENT USE OF INSULIN (H): Primary | ICD-10-CM

## 2025-02-12 NOTE — LETTER
2/12/2025         RE: Fawad Garcia  [address] [facility] [address]        Dear Colleague,    Thank you for referring your patient, Fawad Garcia, to the [facility]. Please see a copy of my visit note below.    Diabetes Education Follow-up    Subjective/Objective:    Fawad Garcia sent in blood glucose log for review. Last date of communication was: ***.    Diabetes is being managed with Diabetes Medications   Diabetes Medication(s)       Biguanides       metFORMIN (GLUCOPHAGE) 500 MG tablet Take 2 tablets (1,000 mg) by mouth 2 times daily (with meals)       Insulin       insulin aspart (NOVOLOG PEN) 100 UNIT/ML pen Inject 5 units before breakfast, 5 units before lunch and 5 units before supper.     insulin glargine (LANTUS SOLOSTAR) 100 UNIT/ML pen Inject 17 Units subcutaneously at bedtime.           Latest Reference Range & Units 01/27/25 08:37   Hemoglobin A1C <5.7 % 9.1 (H)   (H): Data is abnormally high    BG/Food Log:   Date Breakfast  Lunch  Dinner  Bedtime    Before After Before After Before After    2/12/25 129         2/11 108     196    2/10 129     115 Did eat an ice cream drumstick   2/9 107     209    2/8 116     288    2/7 137     176    2/6 127     248    Denies any episodes of feeling shakey sweaty or weak.    77%   mg/dL, no documented lows, range: 107-288 mg/dL    Assessment:    Review of BG since adjustment to insulin regimen after professional cgms.  Pt is pleased with his BG readings.  BG appears to be more stable.  Reports only once in past week eating a snack at  due to potential of dropping low over the night.  He use to do that  more often before.      No other questions or concerns.      Plan/Response:    Continue Lantus 17 units once daily in the evening.    Continue Novolog 5 units before breakfast, 5 units before lunch and 5 units before supper.  Be consistent with Metformin 500 mg 2 tabs 2 times/day.    Continue to test your  BG.    Follow up with Jimmy Catalan as directed.  Follow up with Diabetes Education 5/2/25 at 12:30pm (in person).  If you have questions you can send them through [employer] or call Diabetes Education Triage [phone].  For Scheduling call [phone].  Prudence York RD, [facility], 1:38 PM, 2/12/2025      Any diabetes medication dose changes were made via the CDE Protocol and Collaborative Practice Agreement with the patient's primary care provider. A copy of this encounter was shared with the provider.

## 2025-02-12 NOTE — LETTER
2/12/2025         RE: Fawad Garcia  [address] [facility] [address]        Dear Colleague,    Thank you for referring your patient, Fawad Garcia, to the [facility]. Please see a copy of my visit note below.    Diabetes Education Follow-up    Subjective/Objective:    Fawad Garcia sent in blood glucose log for review. Last date of communication was: ***.    Diabetes is being managed with Diabetes Medications   Diabetes Medication(s)       Biguanides       metFORMIN (GLUCOPHAGE) 500 MG tablet Take 2 tablets (1,000 mg) by mouth 2 times daily (with meals)       Insulin       insulin aspart (NOVOLOG PEN) 100 UNIT/ML pen Inject 5 units before breakfast, 5 units before lunch and 5 units before supper.     insulin glargine (LANTUS SOLOSTAR) 100 UNIT/ML pen Inject 17 Units subcutaneously at bedtime.           Latest Reference Range & Units 01/27/25 08:37   Hemoglobin A1C <5.7 % 9.1 (H)   (H): Data is abnormally high    BG/Food Log:   Date Breakfast  Lunch  Dinner  Bedtime    Before After Before After Before After    2/12/25 129         2/11 108     196    2/10 129     115 Did eat an ice cream drumstick   2/9 107     209    2/8 116     288    2/7 137     176    2/6 127     248    Denies any episodes of feeling shakey sweaty or weak.    77%   mg/dL, no documented lows, range: 107-288 mg/dL    Assessment:    Review of BG since adjustment to insulin regimen after professional cgms.  Pt is pleased with his BG readings.  BG appears to be more stable.  Reports only once in past week eating a snack at  due to potential of dropping low over the night.  He use to do that  more often before.      No other questions or concerns.      Plan/Response:    Continue Lantus 17 units once daily in the evening.    Continue Novolog 5 units before breakfast, 5 units before lunch and 5 units before supper.  Be consistent with Metformin 500 mg 2 tabs 2 times/day.    Continue to test your  BG.    Follow up with Jimmy Catalan as directed.  Follow up with Diabetes Education 5/2/25 at 12:30pm (in person).  If you have questions you can send them through [employer] or call Diabetes Education Triage [phone].  For Scheduling call [phone].    Any diabetes medication dose changes were made via the CDE Protocol and Collaborative Practice Agreement with the patient's primary care provider. A copy of this encounter was shared with the provider.

## 2025-02-12 NOTE — PROGRESS NOTES
Diabetes Education Follow-up    Subjective/Objective:    Fawad Garcia sent in blood glucose log for review. Last date of communication was: 1/24/25.    Diabetes is being managed with Diabetes Medications   Diabetes Medication(s)       Biguanides       metFORMIN (GLUCOPHAGE) 500 MG tablet Take 2 tablets (1,000 mg) by mouth 2 times daily (with meals)       Insulin       insulin aspart (NOVOLOG PEN) 100 UNIT/ML pen Inject 5 units before breakfast, 5 units before lunch and 5 units before supper.     insulin glargine (LANTUS SOLOSTAR) 100 UNIT/ML pen Inject 17 Units subcutaneously at bedtime.           Latest Reference Range & Units 01/27/25 08:37   Hemoglobin A1C <5.7 % 9.1 (H)   (H): Data is abnormally high    BG/Food Log:   Date Breakfast  Lunch  Dinner  Bedtime    Before After Before After Before After    2/12/25 129         2/11 108     196    2/10 129     115 Did eat an ice cream drumstick   2/9 107     209    2/8 116     288    2/7 137     176    2/6 127     248    Denies any episodes of feeling shakey sweaty or weak.    77%   mg/dL, no documented lows, range: 107-288 mg/dL    Assessment:    Review of BG since adjustment to insulin regimen after professional cgms.  Pt is pleased with his BG readings.  BG appears to be more stable.  Reports only once in past week eating a snack at HS due to potential of dropping low over the night.  He use to do that  more often before.      No other questions or concerns.      Plan/Response:    Continue Lantus 17 units once daily in the evening.    Continue Novolog 5 units before breakfast, 5 units before lunch and 5 units before supper.  Be consistent with Metformin 500 mg 2 tabs 2 times/day.    Continue to test your BG.    Follow up with Jimmy Catalan as directed.  Follow up with Diabetes Education 5/2/25 at 12:30pm (in person).  If you have questions you can send them through [employer] or call Diabetes Education Triage [phone].  For Scheduling call  [phone].  Prudence York RD, [facility], 1:38 PM, 2/12/2025      Any diabetes medication dose changes were made via the CDE Protocol and Collaborative Practice Agreement with the patient's primary care provider. A copy of this encounter was shared with the provider.

## 2025-02-12 NOTE — LETTER
2/12/2025         RE: Fawad Garcia  [address] [place] [facility] [address]        Dear Colleague,    Thank you for referring your patient, Fawad Garcia, to the [facility]. Please see a copy of my visit note below.    Diabetes Education Follow-up    Subjective/Objective:    Fawad Garcia sent in blood glucose log for review. Last date of communication was: 1/24/25.    Diabetes is being managed with Diabetes Medications   Diabetes Medication(s)       Biguanides       metFORMIN (GLUCOPHAGE) 500 MG tablet Take 2 tablets (1,000 mg) by mouth 2 times daily (with meals)       Insulin       insulin aspart (NOVOLOG PEN) 100 UNIT/ML pen Inject 5 units before breakfast, 5 units before lunch and 5 units before supper.     insulin glargine (LANTUS SOLOSTAR) 100 UNIT/ML pen Inject 17 Units subcutaneously at bedtime.           Latest Reference Range & Units 01/27/25 08:37   Hemoglobin A1C <5.7 % 9.1 (H)   (H): Data is abnormally high    BG/Food Log:   Date Breakfast  Lunch  Dinner  Bedtime    Before After Before After Before After    2/12/25 129         2/11 108     196    2/10 129     115 Did eat an ice cream drumstick   2/9 107     209    2/8 116     288    2/7 137     176    2/6 127     248    Denies any episodes of feeling shakey sweaty or weak.    77%   mg/dL, no documented lows, range: 107-288 mg/dL    Assessment:    Review of BG since adjustment to insulin regimen after professional cgms.  Pt is pleased with his BG readings.  BG appears to be more stable.  Reports only once in past week eating a snack at  due to potential of dropping low over the night.  He use to do that  more often before.      No other questions or concerns.      Plan/Response:    Continue Lantus 17 units once daily in the evening.    Continue Novolog 5 units before breakfast, 5 units before lunch and 5 units before supper.  Be consistent with Metformin 500 mg 2 tabs 2 times/day.    Continue to test  your BG.    Follow up with Jimmy Catalan as directed.  Follow up with Diabetes Education 5/2/25 at 12:30pm (in person).  If you have questions you can send them through [employer] or call Diabetes Education Triage [phone].  For Scheduling call [phone].  Prudence York RD, [facility], 1:38 PM, 2/12/2025      Any diabetes medication dose changes were made via the CDE Protocol and Collaborative Practice Agreement with the patient's primary care provider. A copy of this encounter was shared with the provider.

## 2025-02-12 NOTE — LETTER
2/12/2025         RE: Fawad Garcia  [address] [facility] [address]        Dear Colleague,    Thank you for referring your patient, Fawad Garcia, to the [facility]. Please see a copy of my visit note below.    Diabetes Education Follow-up    Subjective/Objective:    Fawad Garcia sent in blood glucose log for review. Last date of communication was: ***.    Diabetes is being managed with Diabetes Medications   Diabetes Medication(s)       Biguanides       metFORMIN (GLUCOPHAGE) 500 MG tablet Take 2 tablets (1,000 mg) by mouth 2 times daily (with meals)       Insulin       insulin aspart (NOVOLOG PEN) 100 UNIT/ML pen Inject 5 units before breakfast, 5 units before lunch and 5 units before supper.     insulin glargine (LANTUS SOLOSTAR) 100 UNIT/ML pen Inject 17 Units subcutaneously at bedtime.           Latest Reference Range & Units 01/27/25 08:37   Hemoglobin A1C <5.7 % 9.1 (H)   (H): Data is abnormally high    BG/Food Log:   Date Breakfast  Lunch  Dinner  Bedtime    Before After Before After Before After    2/12/25 129         2/11 108     196    2/10 129     115 Did eat an ice cream drumstick   2/9 107     209    2/8 116     288    2/7 137     176    2/6 127     248    Denies any episodes of feeling shakey sweaty or weak.    77%   mg/dL, no documented lows, range: 107-288 mg/dL    Assessment:    Review of BG since adjustment to insulin regimen after professional cgms.  Pt is pleased with his BG readings.  BG appears to be more stable.  Reports only once in past week eating a snack at  due to potential of dropping low over the night.  He use to do that  more often before.      No other questions or concerns.      Plan/Response:    Continue Lantus 17 units once daily in the evening.    Continue Novolog 5 units before breakfast, 5 units before lunch and 5 units before supper.  Be consistent with Metformin 500 mg 2 tabs 2 times/day.    Continue to test your  BG.    Follow up with Jimmy Catalan as directed.  Follow up with Diabetes Education 5/2/25 at 12:30pm (in person).  If you have questions you can send them through [employer] or call Diabetes Education Triage [phone].  For Scheduling call [phone].  Prudence York RD, [facility], 1:38 PM, 2/12/2025      Any diabetes medication dose changes were made via the CDE Protocol and Collaborative Practice Agreement with the patient's primary care provider. A copy of this encounter was shared with the provider.

## 2025-02-12 NOTE — PATIENT INSTRUCTIONS
Plan/Response:    Continue Lantus 17 units once daily in the evening.    Continue Novolog 5 units before breakfast, 5 units before lunch and 5 units before supper.  Be consistent with Metformin 500 mg 2 tabs 2 times/day.    Continue to test your BG.    Follow up with Jimmy Catalan as directed.  Follow up with Diabetes Education 5/2/25 at 12:30pm (in person).  If you have questions you can send them through [employer] or call Diabetes Education Triage [phone].  For Scheduling call [phone].    Prudence York RD, [facility], 1:34 PM, 2/12/2025

## 2025-02-12 NOTE — PROGRESS NOTES
Great results!  Agree with continuing with consistency with his current regimen then recheck A1c in May 2025. We can meet at that time too to go over any refills or any other loose ends at the time.

## 2025-02-20 ENCOUNTER — VIRTUAL VISIT (OUTPATIENT)
Dept: RADIATION THERAPY | Facility: OUTPATIENT CENTER | Age: 89
End: 2025-02-20
Payer: COMMERCIAL

## 2025-02-20 DIAGNOSIS — C61 MALIGNANT NEOPLASM OF PROSTATE (H): Primary | ICD-10-CM

## 2025-02-20 NOTE — NURSING NOTE
"Emerson is a 89 year old who is being evaluated via a billable telephone visit.    What phone number would you like to be contacted at? [phone]  How would you like to obtain your AVS? MyChart  Originating Location (pt. Location): Home    Distant Location (provider location):  On-site  Telephone visit completed due to physician preference.  Phone call duration: 5 minutes     Oncology Rooming Note    February 20, 2025 3:22 PM   Fawad Garcia is a 89 year old male who presents for:    Chief Complaint   Patient presents with    Radiation Therapy     Telephone Return visit with Dr. Hua      Initial Vitals: There were no vitals taken for this visit. Estimated body mass index is 35.47 kg/m  as calculated from the following:    Height as of 1/28/25: 1.74 m (5' 8.5\").    Weight as of 1/28/25: 107.4 kg (236 lb 11.2 oz). There is no height or weight on file to calculate BSA.  Data Unavailable Comment: Data Unavailable   No LMP for male patient.  Allergies reviewed: Yes  Medications reviewed: Yes    Medications: Medication refills not needed today.  Pharmacy name entered into EPIC: [facility] - [place], [place] - [address] [place] [address]    Frailty Screening:   Is the patient here for a new oncology consult visit in cancer care? 2. No    PHQ9:  Did this patient require a PHQ9?: No      Clinical concerns: Telephone return, recent PSA 4.57 on 1/27/25. Patient reports NOC x 3-4 and urinary frequency baseline. Denies blood in urine or stool, regular bowel movements. He states his energy is baseline, he is riding the golf cart around the yard in the winter, filling up bird feeders and getting the mail, he also has been snow blowing outside. He continues to follow with Dr. Barriga, next visit scheduled for 7/29/25 with labs before.  Dr. Hua was notified.      Adia Borja RN              "

## 2025-02-20 NOTE — LETTER
2025      Fawad Garcia  [address] [facility] [address]      Dear Colleague,    Thank you for referring your patient, Fawad Garcia, to the [facility]. Please see a copy of my visit note below.       Department of Radiation Oncology  Radiation Therapy Center  [facility]  [place] [address]  [phone]       Radiation Oncology Follow-up Visit  25    Fawad Garcia  MRN: [id number]   : 1935     DISEASE TREATED: High-risk prostate cancer. Pretreatment PSA 25.5, Simpson score of 4+3=7, clinical stage [id number].     RADIATION THERAPY GIVEN: 8041 cGy in 43 planned treatments, via IMRT     INTERVAL SINCE COMPLETION OF THERAPY: 9 years since completion on 02/10/2012.     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 He underwent biopsies in  and  and pathology was negative for malignancy. Due to the persistently rising PSA Dr. Mcintyre performed an MRI-guided biopsy of the prostate gland which revealed Simpson 4+3= 7 disease in 2 of 2 cores with 90% of the cores being positive in the right inferior base and right inferior mid gland. Androgen ablation was discussed with the patient due to his high-risk disease and the patient declined due to comorbidities of the drug. Overall, he completed radiotherapy with very little toxicity.     SUBJECTIVE:   Fawad Garcia is a 89 year old male who is scheduled today for routine follow-up.      PSA on 2025 demonstrated a value of 4.57, overall stable from  prior value of 4.61 on 2024 (zohaib =0.24).    The patient  has prior undergone a PSMA PET scan on 22 which demonstrated uptake in the prostate gland.  No clear evidence of regional or distant disease noted.  There was indeterminate 9 mm left lung nodule.  More recent PSMA PET scan  7/13/2024 demonstrated residual activity in the prostate gland without evidence of regional or distant disease.  Prior noted lung nodule was stable suggesting benign entity.    The patient has met with medical oncology team (Dr. Barriga) rediscussed observation versus consideration of initiation of ADT.  Currently continue observation with PSA lab work.    Today, he denies any pressing issues or complaints and reports that all symptoms have essentially remained stable since his last visit with us 6 months ago. AUA and MARINA not completed today (TC). Prior  AUA 18/35 (patient reported overall stable) and MARINA was 0/25. Main urinary symptoms continue to be nocturia and urgency. Patient states symptoms are worse with increased intake of fluids. Denies drinking alcohol, and has minimal caffeine. He denies any dysuria, hematuria, hematochezia, diarrhea, or loose stools.  He denies any pain or swelling.     Energy level is good and baseline.              Current Outpatient Medications   Medication     amLODIPine (NORVASC) 5 MG tablet     Blood Glucose Monitoring Suppl (ONE TOUCH ULTRA SYSTEM KIT) W/DEVICE KIT     clopidogrel (PLAVIX) 75 MG tablet     dorzolamide-timolol (COSOPT) 2-0.5 % ophthalmic solution     hydrochlorothiazide (HYDRODIURIL) 25 MG tablet     insulin glargine (LANTUS SOLOSTAR) 100 UNIT/ML pen     insulin pen needle (B-D U/F) 31G X 8 MM miscellaneous     levofloxacin (LEVAQUIN) 500 MG tablet     lisinopril (PRINIVIL/ZESTRIL) 40 MG tablet     metFORMIN (GLUCOPHAGE) 500 MG tablet     Multiple Vitamins-Minerals (PRESERVISION AREDS PO)     ONETOUCH LANCETS [facility] [place] [place]     ONETOUCH ULTRA test strip     order for DME     ORDER FOR DME     pantoprazole (PROTONIX) 20 MG EC tablet     rosuvastatin (CRESTOR) 10 MG tablet     sulfamethoxazole-trimethoprim (BACTRIM DS) 800-160 MG tablet     No current facility-administered medications for this visit.           Allergies   Allergen Reactions     Zocor [Simvastatin - High  Dose]      Bilateral hip aching       Past Medical History:   Diagnosis Date     Diabetes mellitus (H)      Hypertension      Squamous cell carcinoma          PHYSICAL EXAM:  There were no vitals taken for this visit.  No apparent distress       LABS AND IMAGING:  Reviewed.  PSA   Date Value Ref Range Status   2021 1.78 0 - 4 ug/L Final     Comment:     Assay Method:  Chemiluminescence using Siemens Vista analyzer   2020 1.53 0 - 4 ug/L Final     Comment:     Assay Method:  Chemiluminescence using Siemens Vista analyzer   2020 1.23 0 - 4 ug/L Final     Comment:     Assay Method:  Chemiluminescence using Siemens Vista analyzer   2019 1.08 0 - 4 ug/L Final     Comment:     Assay Method:  Chemiluminescence using Siemens Vista analyzer   03/15/2019 1.10 0 - 4 ug/L Final     Comment:     Assay Method:  Chemiluminescence using Siemens Vista analyzer     PSA Tumor Marker   Date Value Ref Range Status   2025 4.57 ng/mL Final     Comment:     No reference ranges have been established for patients over 80 years.   2024 4.61 ng/mL Final     Comment:     No reference ranges have been established for patients over 80 years.   2024 4.00 ng/mL Final     Comment:     No reference ranges have been established for patients over 80 years.   2023 3.68 ng/mL Final     Comment:     No reference ranges have been established for patients over 80 years.   2023 2.55 ng/mL Final     Comment:     No reference ranges have been established for patients over 80 years.   2022 2.71 0.00 - 4.00 ug/L Final   2022 2.49 0.00 - 4.00 ug/L Final   2021 1.88 0.00 - 4.00 ug/L Final     [id number]    PSMA PET    IMPRESSION:  1. Focal FDG uptake at the mid/basal anterior central prostate gland,  suspicious for residual prostate cancer.   2. No evidence of metastasis in the body.  3. 9 mm pulmonary nodule in the left upper lobe. Recommend follow-up  chest CT in 3-6  months.    1/20/23  CTC   IMPRESSION:   Stable 10 mm nodule in the central aspect of the left upper lobe. This  has been stable for approximately 9 months at this point. Continued  follow-up is recommended.    7/15/24  PSMA PET  FINDINGS: As compared to 4/8/2022, redemonstrated focal area of activity centered in the anterior prostate mid gland consistent with site of known prostate malignancy, which has not substantially changed in size or degree of uptake. Normal physiologic   radiotracer uptake elsewhere. No evidence of metastasis. No radiotracer avid adenopathy in the pelvis or elsewhere. No suspicious focal uptake in the liver or skeleton.     A subcentimeter nonradiotracer avid pulmonary nodule in the central left upper lobe is unchanged since 4/8/2022, consistent with a benign etiology. Moderate senescent intracranial changes. Marked atherosclerotic calcifications including the coronary   arteries. Mild reticular scarring and/or atelectasis in the lower lungs. Dense mitral annulus calcification. Tiny gallstones. Few colonic diverticula. Radiation seeds in the prostate. Medium size right scrotal hydrocele. Mild thoracolumbar curve.   Moderate scattered hypertrophic degenerative changes in the spine. 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                                                                      IMPRESSION:  1. No substantial change since 4/8/2022.   2. Radiotracer avid primary prostate malignancy. No evidence of metastasis.  3. Stable benign subcentimeter central left upper lobe pulmonary nodule.    IMPRESSION:   Mr. Garcia is a 88 year old male with a high-risk prostate cancer. Pretreatment PSA 25.5, [place] score of 4+3=7, clinical stage [id number]. Complete RT 2/10/2012,  8041 cGy in 43 planned treatments, via IMRT.       PLAN:     1. PSA has met [place] definition of biochemical failure (2.0 above zohaib with zohaib of 0.24).The patient underwent a PSMA PET scan on 4/8/22  which demonstrated uptake in the prostate gland.  No clear evidence of regional or distant disease noted.  There was indeterminate 9 mm left lung nodule. Follow up CT chest on 1/20/23 to evaluate previously noted left lingular mass was stable. Repeat PSMA PET scan 7/13/2024 demonstrated residual activity in the prostate gland without evidence of regional or distant disease.  Prior noted lung nodule was stable suggesting benign entity.      2. I re-discussed with the patient that it appears he has locally recurrent disease without evidence of regional or distant spread.  I discussed different options including observation versus ADT versus consideration of local retreatment in the form of reirradiation.  Given prior radiation course, I discussed additional radiation would likely come at the risk of significant toxicity to rectum and urethra. The patient has met with medical oncology team (Dr. Barriga) and discussed observation versus consideration of initiation of ADT.  Currently plan is to continue observation with PSA lab work. His PSA has risen, but overall at a slower rate. I therefore discussed  discussed that initiation of ADT could be consider. I also discussed that  given the patient's age and absence of clinical symptoms at this point, observation would not be unreasonable as well.     3. The patient will continue to follow up with our medical oncology colleagues for oncologic surveillance. Would defer start of ADT and further labwork to our medical oncology colleagues.     4. No late GI/ toxicity.    5. RTC in 6 months.    Patient location at home, provider location at [place], [place].     30 minutes spent on the date of the encounter doing chart review, review of outside records, review of test results, interpretation of tests, patient visit, documentation, discussion, and plan. Greater than 20 minutes was spent in medical discussion.      Kieran Hua M.D.  Department of Radiation Oncology  [facility]  [place]         Again, thank you for allowing me to participate in the care of your patient.        Sincerely,        Kieran Hua MD    Electronically signed

## 2025-02-20 NOTE — PROGRESS NOTES
Department of Radiation Oncology  Radiation Therapy Center  [facility] [place] [address]  [phone]       Radiation Oncology Follow-up Visit  25    Fawad Garcia  MRN: [id number]   : 1935     DISEASE TREATED: High-risk prostate cancer. Pretreatment PSA 25.5, [place] score of 4+3=7, clinical stage [id number].     RADIATION THERAPY GIVEN: 8041 cGy in 43 planned treatments, via IMRT     INTERVAL SINCE COMPLETION OF THERAPY: 9 years since completion on 02/10/2012.     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 He underwent biopsies in  and  and pathology was negative for malignancy. Due to the persistently rising PSA Dr. Mcintyre performed an MRI-guided biopsy of the prostate gland which revealed Chaka 4+3= 7 disease in 2 of 2 cores with 90% of the cores being positive in the right inferior base and right inferior mid gland. Androgen ablation was discussed with the patient due to his high-risk disease and the patient declined due to comorbidities of the drug. Overall, he completed radiotherapy with very little toxicity.     SUBJECTIVE:   Fawad Garcia is a 89 year old male who is scheduled today for routine follow-up.      PSA on 2025 demonstrated a value of 4.57, overall stable from  prior value of 4.61 on 2024 (zohaib =0.24).    The patient  has prior undergone a PSMA PET scan on 22 which demonstrated uptake in the prostate gland.  No clear evidence of regional or distant disease noted.  There was indeterminate 9 mm left lung nodule.  More recent PSMA PET scan 2024 demonstrated residual activity in the prostate gland without evidence of regional or distant disease.  Prior noted lung nodule was stable suggesting benign entity.    The patient has met with medical oncology team (Dr. Barriga)  rediscussed observation versus consideration of initiation of ADT.  Currently continue observation with PSA lab work.    Today, he denies any pressing issues or complaints and reports that all symptoms have essentially remained stable since his last visit with us 6 months ago. AUA and MARINA not completed today (TC). Prior  AUA 18/35 (patient reported overall stable) and MARINA was 0/25. Main urinary symptoms continue to be nocturia and urgency. Patient states symptoms are worse with increased intake of fluids. Denies drinking alcohol, and has minimal caffeine. He denies any dysuria, hematuria, hematochezia, diarrhea, or loose stools.  He denies any pain or swelling.     Energy level is good and baseline.              Current Outpatient Medications   Medication    amLODIPine (NORVASC) 5 MG tablet    Blood Glucose Monitoring Suppl (ONE TOUCH ULTRA SYSTEM KIT) W/DEVICE KIT    clopidogrel (PLAVIX) 75 MG tablet    dorzolamide-timolol (COSOPT) 2-0.5 % ophthalmic solution    hydrochlorothiazide (HYDRODIURIL) 25 MG tablet    insulin glargine (LANTUS SOLOSTAR) 100 UNIT/ML pen    insulin pen needle (B-D U/F) 31G X 8 MM miscellaneous    levofloxacin (LEVAQUIN) 500 MG tablet    lisinopril (PRINIVIL/ZESTRIL) 40 MG tablet    metFORMIN (GLUCOPHAGE) 500 MG tablet    Multiple Vitamins-Minerals (PRESERVISION AREDS PO)    ONETOUCH LANCETS MISC    ONETOUCH ULTRA test strip    order for DME    ORDER FOR DME    pantoprazole (PROTONIX) 20 MG EC tablet    rosuvastatin (CRESTOR) 10 MG tablet    sulfamethoxazole-trimethoprim (BACTRIM DS) 800-160 MG tablet     No current facility-administered medications for this visit.           Allergies   Allergen Reactions    Zocor [Simvastatin - High Dose]      Bilateral hip aching       Past Medical History:   Diagnosis Date    Diabetes mellitus (H)     Hypertension     Squamous cell carcinoma          PHYSICAL EXAM:  There were no vitals taken for this visit.  No apparent distress       LABS AND  IMAGING:  Reviewed.  PSA   Date Value Ref Range Status   2021 1.78 0 - 4 ug/L Final     Comment:     Assay Method:  Chemiluminescence using Siemens Vista analyzer   2020 1.53 0 - 4 ug/L Final     Comment:     Assay Method:  Chemiluminescence using Siemens Vista analyzer   2020 1.23 0 - 4 ug/L Final     Comment:     Assay Method:  Chemiluminescence using Siemens Vista analyzer   2019 1.08 0 - 4 ug/L Final     Comment:     Assay Method:  Chemiluminescence using Siemens Vista analyzer   03/15/2019 1.10 0 - 4 ug/L Final     Comment:     Assay Method:  Chemiluminescence using Siemens Vista analyzer     PSA Tumor Marker   Date Value Ref Range Status   2025 4.57 ng/mL Final     Comment:     No reference ranges have been established for patients over 80 years.   2024 4.61 ng/mL Final     Comment:     No reference ranges have been established for patients over 80 years.   2024 4.00 ng/mL Final     Comment:     No reference ranges have been established for patients over 80 years.   2023 3.68 ng/mL Final     Comment:     No reference ranges have been established for patients over 80 years.   2023 2.55 ng/mL Final     Comment:     No reference ranges have been established for patients over 80 years.   2022 2.71 0.00 - 4.00 ug/L Final   2022 2.49 0.00 - 4.00 ug/L Final   2021 1.88 0.00 - 4.00 ug/L Final     [id number]    PSMA PET    IMPRESSION:  1. Focal FDG uptake at the mid/basal anterior central prostate gland,  suspicious for residual prostate cancer.   2. No evidence of metastasis in the body.  3. 9 mm pulmonary nodule in the left upper lobe. Recommend follow-up  chest CT in 3-6 months.    23  CTC   IMPRESSION:   Stable 10 mm nodule in the central aspect of the left upper lobe. This  has been stable for approximately 9 months at this point. Continued  follow-up is recommended.    7/15/24  PSMA PET  FINDINGS: As compared to 2022,  redemonstrated focal area of activity centered in the anterior prostate mid gland consistent with site of known prostate malignancy, which has not substantially changed in size or degree of uptake. Normal physiologic   radiotracer uptake elsewhere. No evidence of metastasis. No radiotracer avid adenopathy in the pelvis or elsewhere. No suspicious focal uptake in the liver or skeleton.     A subcentimeter nonradiotracer avid pulmonary nodule in the central left upper lobe is unchanged since 4/8/2022, consistent with a benign etiology. Moderate senescent intracranial changes. Marked atherosclerotic calcifications including the coronary   arteries. Mild reticular scarring and/or atelectasis in the lower lungs. Dense mitral annulus calcification. Tiny gallstones. Few colonic diverticula. Radiation seeds in the prostate. Medium size right scrotal hydrocele. Mild thoracolumbar curve.   Moderate scattered hypertrophic degenerative changes in the spine. 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                                                                      IMPRESSION:  1. No substantial change since 4/8/2022.   2. Radiotracer avid primary prostate malignancy. No evidence of metastasis.  3. Stable benign subcentimeter central left upper lobe pulmonary nodule.    IMPRESSION:   Mr. Garcia is a 88 year old male with a high-risk prostate cancer. Pretreatment PSA 25.5, Chaka score of 4+3=7, clinical stage [id number]. Complete RT 2/10/2012,  8041 cGy in 43 planned treatments, via IMRT.       PLAN:     1. PSA has met [place] definition of biochemical failure (2.0 above zohaib with zohaib of 0.24).The patient underwent a PSMA PET scan on 4/8/22 which demonstrated uptake in the prostate gland.  No clear evidence of regional or distant disease noted.  There was indeterminate 9 mm left lung nodule. Follow up CT chest on 1/20/23 to evaluate previously noted left lingular mass was stable. Repeat PSMA PET scan  7/13/2024 demonstrated residual activity in the prostate gland without evidence of regional or distant disease.  Prior noted lung nodule was stable suggesting benign entity.      2. I re-discussed with the patient that it appears he has locally recurrent disease without evidence of regional or distant spread.  I discussed different options including observation versus ADT versus consideration of local retreatment in the form of reirradiation.  Given prior radiation course, I discussed additional radiation would likely come at the risk of significant toxicity to rectum and urethra. The patient has met with medical oncology team (Dr. Barriga) and discussed observation versus consideration of initiation of ADT.  Currently plan is to continue observation with PSA lab work. His PSA has risen, but overall at a slower rate. I therefore discussed  discussed that initiation of ADT could be consider. I also discussed that  given the patient's age and absence of clinical symptoms at this point, observation would not be unreasonable as well.     3. The patient will continue to follow up with our medical oncology colleagues for oncologic surveillance. Would defer start of ADT and further labwork to our medical oncology colleagues.     4. No late GI/ toxicity.    5. RTC in 6 months.    Patient location at home, provider location at [place], [place].     30 minutes spent on the date of the encounter doing chart review, review of outside records, review of test results, interpretation of tests, patient visit, documentation, discussion, and plan. Greater than 20 minutes was spent in medical discussion.      Kieran Hua M.D.  Department of Radiation Oncology  [facility]

## 2025-02-24 NOTE — PROGRESS NOTES
[facility]ology [facility] Oncology Outpatient Progress Note    Patient: Fawad Garcia  MRN: [id number]  Date of Service: Jan 28, 2025          Reason for Visit    Prostate cancer    Primary Oncologist: Dr. Barriga      Assessment/Plan  Locally recurrent prostate cancer  Initially treated in 2012.   Rising PSA and PSMA PET showed local prostate gland uptake (no mets) in 2021 -2022. Reirradiation not feasible, and he is not a candidate for surgery.  ADT was recommended, but he declined and chose observation only.  He's been observed for 2 years.     His PSA has been on a slow rise open doubling time is still more than 1 year.  Most recent PSMA PET scan is showing evidence of radiotracer uptake within the prostate gland consistent with local recurrence.  No evidence of any metastatic disease.  When compared to the PET scan from April 2022 there is no significant change.     PSA continues to rise.  Most recently labs from 1/27/2025 shows a PSA of 4.57 which is actually slightly down from previous value of 4.61 in July last year.  CBC is okay.  So overall no significant change from last time so plan is to continue observe with periodic PSA lab.  Again we discussed the role of ADT and second-generation antiandrogen therapy in this setting.  Technically he would be a candidate for 1 right now but due to his medical issues and based on his preference we will be holding off on treatment.  I will see him back in 6 months with repeat PSA.  He is agreeable to the plan.    FREDDIE lung nodule  Chronic/stable small nodule.     ______________________________________________________________________________    History of Present Illness/ Interval History    Mr. Fawad Garcia  is a 88 year old with locally recurrent prostate cancer, with rising PSA and no evidence of metastatic disease on the PSMA PET scan, on observation alone. Returns for 6-mth follow-up.     He is currently on observation.      No new issues  reported today.  He is here with repeat labs.  Denies any new bone pain.      ECOG Performance    1      Oncology History/Treatment  Diagnosis/Stage:   2011: early stage prostate cancer  -elevated PSA >10 since 2003. Biopsies 2002 and 2005 negative for cancer  -2011: MRI guided prostate biopsy: adenocarcinoma with a [place] score of 4+3 with 90% of the 2 of the 2 cores positive for malignancy    2021: biochemical recurrence  -rising PSA: 9/2021 1.88; 3/2022  2.49  -PSMA PET: uptake in prostate gland. No other evidence of mets    Treatment:  2011: Not a surgical candidate    2012: IMRT prostate  -declined ADT    2022: biochemical recurrence/local prostate recurrence  -re-irradiation not feasible  -pt declined ADT, opted on observation      Physical Exam    GENERAL: Alert and oriented to time place and person. Seated comfortably. In no distress.   HEENT: No icterus. No alopecia  LUNGS: Regular respiratory effort.  NEURO: Non-focal      Lab Results    No results found for this or any previous visit (from the past week).        Imaging    No results found.    The longitudinal plan of care for the diagnosis(es)/condition(s) as documented were addressed during this visit. Due to the added complexity in care, I will continue to support Ray in the subsequent management and with ongoing continuity of care.        Treatment.

## 2025-02-25 DIAGNOSIS — Z79.4 TYPE 2 DIABETES MELLITUS WITH BOTH EYES AFFECTED BY MILD NONPROLIFERATIVE RETINOPATHY WITHOUT MACULAR EDEMA, WITH LONG-TERM CURRENT USE OF INSULIN (H): ICD-10-CM

## 2025-02-25 DIAGNOSIS — I10 HYPERTENSION, GOAL BELOW 140/90: ICD-10-CM

## 2025-02-25 DIAGNOSIS — E11.3293 TYPE 2 DIABETES MELLITUS WITH BOTH EYES AFFECTED BY MILD NONPROLIFERATIVE RETINOPATHY WITHOUT MACULAR EDEMA, WITH LONG-TERM CURRENT USE OF INSULIN (H): ICD-10-CM

## 2025-02-25 DIAGNOSIS — N18.31 STAGE 3A CHRONIC KIDNEY DISEASE (H): ICD-10-CM

## 2025-02-25 RX ORDER — LISINOPRIL 40 MG/1
40 TABLET ORAL DAILY
Qty: 90 TABLET | Refills: 3 | Status: SHIPPED | OUTPATIENT
Start: 2025-02-25

## 2025-02-25 NOTE — TELEPHONE ENCOUNTER
Requested Prescriptions   Pending Prescriptions Disp Refills    lisinopril (ZESTRIL) 40 MG tablet [Pharmacy Med Name: lisinopril 40 mg tablet] 90 tablet 3     Sig: Take 1 tablet (40 mg) by mouth daily       ACE Inhibitors (Including Combos) Protocol Failed - 2/25/2025 11:59 AM        Failed - Most recent blood pressure under 140/90 in past 12 months- Clinicial or Patient Reported     BP Readings from Last 3 Encounters:   01/28/25 (!) 149/70   10/21/24 134/72   08/19/24 109/70       No data recorded            Failed - Most recent GFR on file in the past 12 months >30   GFR Estimate   Date Value Ref Range Status   01/29/2024 65 >60 mL/min/1.73m2 Final   01/04/2021 63 >60 mL/min/[1.73_m2] Final     Comment:     Non  GFR Calc  Starting 12/18/2018, serum creatinine based estimated GFR (eGFR) will be   calculated using the Chronic Kidney Disease Epidemiology Collaboration   (CKD-EPI) equation.       GFR, ESTIMATED POCT   Date Value Ref Range Status   04/08/2022 >60 >60 mL/min/1.73m2 Final          Failed - Normal serum potassium on file in past 12 months     Recent Labs   Lab Test 01/29/24  0829   POTASSIUM 4.4             Passed - Medication is active on med list and the sig matches. RN to manually verify dose and sig if red X/fail.     If the protocol passes (green check), you do not need to verify med dose and sig.    A prescription matches if they are the same clinical intention.    For Example: once daily and every morning are the same.    For all fails (red x), verify dose and sig.    If the refill does match what is on file, the RN can still proceed to approve the refill request.     If they do not match, route to the appropriate provider.             Passed - Medication indicated for associated diagnosis     Medication is associated with one or more of the following diagnoses:     Chronic Kidney Disease (CKD)   Coronary Artery Disease (CAD)   Diabetes   Heart Failure (HF)   Hypertension  (HTN)   Nephropathy   History of myocarditis   Tachycardia induced cardiomyopathy   STEMI (ST elevation myocardial infarction)   Spontaneous dissection of coronary artery   Status post percutaneous transluminal coronary angioplasty            Passed - Recent (12 mo) or future (90 days) visit within the authorizing provider's specialty     The patient must have completed an in-person or virtual visit within the past 12 months or has a future visit scheduled within the next 90 days with the authorizing provider s specialty.  Urgent care and e-visits do not qualify as an office visit for this protocol.          Passed - Patient is age 18 or older              Verbal/written post procedure instructions were given to patient/caregiver./Instructed patient/caregiver to follow-up with primary care physician./Keep the cast/splint/dressing clean and dry./Instructed patient/caregiver regarding signs and symptoms of infection.

## 2025-02-26 ENCOUNTER — TELEPHONE (OUTPATIENT)
Dept: PHARMACY | Facility: OTHER | Age: 89
End: 2025-02-26
Payer: COMMERCIAL

## 2025-02-26 ENCOUNTER — MYC MEDICAL ADVICE (OUTPATIENT)
Dept: PHARMACY | Facility: OTHER | Age: 89
End: 2025-02-26
Payer: COMMERCIAL

## 2025-02-26 NOTE — TELEPHONE ENCOUNTER
[facility] Recruitment: [facility] insurance     Referral outreach attempt #1 on February 26, 2025      Outcome: left voicemail- Call back number [phone] and [id number] outdoorst message sent    Kenyatta Zendejas  [facility]

## 2025-03-13 ENCOUNTER — TELEPHONE (OUTPATIENT)
Dept: UROLOGY | Facility: CLINIC | Age: 89
End: 2025-03-13
Payer: COMMERCIAL

## 2025-03-13 NOTE — TELEPHONE ENCOUNTER
Health Call Center    Phone Message    May a detailed message be left on voicemail: yes     Reason for Call: Patient has aspiration of hydrocele scheduled for 04/08. Patient has a meeting scheduled that same day in the afternoon. Patient is asking if there is he will be fine that same afternoon, or should his visit be rescheduled. Please call patient.    Action Taken: Other: [place] - Urology    Travel Screening: Not Applicable     Date of Service:

## 2025-03-13 NOTE — TELEPHONE ENCOUNTER
Left message on identified voicemail that pt should be ok to attend meeting after his urology appt. Advised he call if he had any questions.  Neha FERMIN RN BSN PHN  Specialty Clinics

## 2025-03-17 DIAGNOSIS — K21.9 GASTROESOPHAGEAL REFLUX DISEASE WITHOUT ESOPHAGITIS: ICD-10-CM

## 2025-03-17 RX ORDER — PANTOPRAZOLE SODIUM 20 MG/1
20 TABLET, DELAYED RELEASE ORAL DAILY
Qty: 90 TABLET | Refills: 1 | Status: SHIPPED | OUTPATIENT
Start: 2025-03-17

## 2025-03-25 DIAGNOSIS — I10 HYPERTENSION, GOAL BELOW 140/90: ICD-10-CM

## 2025-03-25 RX ORDER — HYDROCHLOROTHIAZIDE 25 MG/1
25 TABLET ORAL DAILY
Qty: 90 TABLET | Refills: 0 | Status: SHIPPED | OUTPATIENT
Start: 2025-03-25

## 2025-03-29 ENCOUNTER — HEALTH MAINTENANCE LETTER (OUTPATIENT)
Age: 89
End: 2025-03-29

## 2025-04-01 ENCOUNTER — TRANSFERRED RECORDS (OUTPATIENT)
Dept: MULTI SPECIALTY CLINIC | Facility: CLINIC | Age: 89
End: 2025-04-01

## 2025-04-01 LAB — RETINOPATHY: NORMAL

## 2025-04-05 NOTE — PROGRESS NOTES
PROCEDURE NOTE      Procedure: Hydrocele drainage   Indications: Right hydrocele   Findings: Right hydrocele      Staff: Dr. Santoyo   Resident: None  Anesthesia: 1% lidocaine   Complications: none  EBL: 1 cc      Description:   Pt gave verbal consent for procedure. His genitalia was prepped in the normal sterile fashion 10 cc of 1% lidocaine without epinephrine was injected under the skin on the anterior scrotum.  Local anesthesia was confirmed, 20G needle was inserted to the hydrocele and a total of 200 ml of clear simple fluid was aspirated.  Needle was then withdrawn. The patient remained stable and tolerated the procedure well without undue complications.    Plan  - Follow-up with Dr. Nava or Dr Griffith for BPH/PSA management, no other men's health concerns  - No need for scheduled follow up, can see me PRROSALINA Santoyo MD, MS  Department of Urology  Infertility, Sexual Medicine, [facility]

## 2025-04-08 ENCOUNTER — OFFICE VISIT (OUTPATIENT)
Dept: UROLOGY | Facility: CLINIC | Age: 89
End: 2025-04-08
Payer: COMMERCIAL

## 2025-04-08 VITALS
DIASTOLIC BLOOD PRESSURE: 82 MMHG | WEIGHT: 236 LBS | HEART RATE: 94 BPM | BODY MASS INDEX: 34.96 KG/M2 | OXYGEN SATURATION: 97 % | SYSTOLIC BLOOD PRESSURE: 158 MMHG | HEIGHT: 69 IN

## 2025-04-08 DIAGNOSIS — N43.0 ENCYSTED HYDROCELE: Primary | ICD-10-CM

## 2025-04-08 PROCEDURE — 1126F AMNT PAIN NOTED NONE PRSNT: CPT | Performed by: STUDENT IN AN ORGANIZED HEALTH CARE EDUCATION/TRAINING PROGRAM

## 2025-04-08 PROCEDURE — 55000 DRAINAGE OF HYDROCELE: CPT | Performed by: STUDENT IN AN ORGANIZED HEALTH CARE EDUCATION/TRAINING PROGRAM

## 2025-04-08 PROCEDURE — 3079F DIAST BP 80-89 MM HG: CPT | Performed by: STUDENT IN AN ORGANIZED HEALTH CARE EDUCATION/TRAINING PROGRAM

## 2025-04-08 PROCEDURE — 3077F SYST BP >= 140 MM HG: CPT | Performed by: STUDENT IN AN ORGANIZED HEALTH CARE EDUCATION/TRAINING PROGRAM

## 2025-04-08 ASSESSMENT — PAIN SCALES - GENERAL: PAINLEVEL_OUTOF10: NO PAIN (0)

## 2025-04-18 DIAGNOSIS — Z79.4 TYPE 2 DIABETES MELLITUS WITH BOTH EYES AFFECTED BY MILD NONPROLIFERATIVE RETINOPATHY WITHOUT MACULAR EDEMA, WITH LONG-TERM CURRENT USE OF INSULIN (H): ICD-10-CM

## 2025-04-18 DIAGNOSIS — I10 HYPERTENSION, GOAL BELOW 140/90: ICD-10-CM

## 2025-04-18 DIAGNOSIS — E11.3293 TYPE 2 DIABETES MELLITUS WITH BOTH EYES AFFECTED BY MILD NONPROLIFERATIVE RETINOPATHY WITHOUT MACULAR EDEMA, WITH LONG-TERM CURRENT USE OF INSULIN (H): ICD-10-CM

## 2025-04-18 NOTE — TELEPHONE ENCOUNTER
Has appointment scheduled for 5/6/25.      Requested Prescriptions   Pending Prescriptions Disp Refills    metFORMIN (GLUCOPHAGE) 500 MG tablet [Pharmacy Med Name: metformin 500 mg tablet] 360 tablet 3     Sig: Take 2 tablets (1,000 mg) by mouth 2 times daily (with meals)       Biguanide Agents Failed - 4/18/2025 11:31 AM        Failed - Has GFR on file in past 12 months and most recent value is normal        Passed - Patient is age 10 or older        Passed - Patient has documented A1c within the specified period of time.     If HgbA1C is 8 or greater, it needs to be on file within the past 3 months.  If less than 8, must be on file within the past 6 months.     Recent Labs   Lab Test 01/27/25  0837   A1C 9.1*             Passed - Patient does NOT have a diagnosis of CHF.        Passed - Medication is active on med list and the sig matches. RN to manually verify dose and sig if red X/fail.     If the protocol passes (green check), you do not need to verify med dose and sig.    A prescription matches if they are the same clinical intention.    For Example: once daily and every morning are the same.    The protocol can not identify upper and lower case letters as matching and will fail.     For Example: Take 1 tablet (50 mg) by mouth daily     TAKE 1 TABLET (50 MG) BY MOUTH DAILY    For all fails (red x), verify dose and sig.    If the refill does match what is on file, the RN can still proceed to approve the refill request.       If they do not match, route to the appropriate provider.             Passed - Medication indicated for associated diagnosis     Medication is associated with one or more of the following diagnoses:     Gestational diabetes mellitus     Hyperinsulinar obesity     Hypersecretion of ovarian androgens    Non-alcoholic fatty liver    Polycystic ovarian syndrome               Pre-diabetes (DM 2 prevention)    Type 2 diabetes mellitus     Weight gain, antipsychotic therapy-induced    Impaired  fasting glucose          Passed - Recent (6 mo) or future (90 days) visit within the authorizing provider's specialty     The patient must have completed an in-person or virtual visit within the past 6 months or has a future visit scheduled within the next 90 days with the authorizing provider s specialty.  Urgent care and e-visits do not quality as an office visit for this protocol.            amLODIPine (NORVASC) 5 MG tablet [Pharmacy Med Name: amlodipine 5 mg tablet] 90 tablet 3     Sig: Take 1 tablet (5 mg) by mouth daily       Calcium Channel Blockers Protocol  Failed - 4/18/2025 11:31 AM        Failed - Most recent blood pressure under 140/90 in past 12 months     BP Readings from Last 3 Encounters:   04/08/25 (!) 158/82   01/28/25 (!) 149/70   10/21/24 134/72       No data recorded            Failed - GFR is on file in the past 12 months and most recent GFR is normal        Passed - Medication is active on med list and the sig matches. RN to manually verify dose and sig if red X/fail.     If the protocol passes (green check), you do not need to verify med dose and sig.    A prescription matches if they are the same clinical intention.    For Example: once daily and every morning are the same.    The protocol can not identify upper and lower case letters as matching and will fail.     For Example: Take 1 tablet (50 mg) by mouth daily     TAKE 1 TABLET (50 MG) BY MOUTH DAILY    For all fails (red x), verify dose and sig.    If the refill does match what is on file, the RN can still proceed to approve the refill request.       If they do not match, route to the appropriate provider.             Passed - Medication is indicated for associated diagnosis        Passed - Recent (12 mo) or future (90 days) visit within the authorizing provider's specialty     The patient must have completed an in-person or virtual visit within the past 12 months or has a future visit scheduled within the next 90 days with the  authorizing provider s specialty.  Urgent care and e-visits do not qualify as an office visit for this protocol.          Passed - Patient is age 18 or older

## 2025-04-21 RX ORDER — AMLODIPINE BESYLATE 5 MG/1
5 TABLET ORAL DAILY
Qty: 90 TABLET | Refills: 0 | Status: SHIPPED | OUTPATIENT
Start: 2025-04-21

## 2025-04-25 ENCOUNTER — TRANSFERRED RECORDS (OUTPATIENT)
Dept: HEALTH INFORMATION MANAGEMENT | Facility: CLINIC | Age: 89
End: 2025-04-25
Payer: COMMERCIAL

## 2025-04-25 LAB — RETINOPATHY: POSITIVE

## 2025-05-06 ENCOUNTER — OFFICE VISIT (OUTPATIENT)
Dept: FAMILY MEDICINE | Facility: CLINIC | Age: 89
End: 2025-05-06
Payer: COMMERCIAL

## 2025-05-06 ENCOUNTER — VIRTUAL VISIT (OUTPATIENT)
Dept: PHARMACY | Facility: CLINIC | Age: 89
End: 2025-05-06
Payer: COMMERCIAL

## 2025-05-06 VITALS
HEART RATE: 64 BPM | DIASTOLIC BLOOD PRESSURE: 60 MMHG | WEIGHT: 232 LBS | TEMPERATURE: 97.3 F | SYSTOLIC BLOOD PRESSURE: 106 MMHG | OXYGEN SATURATION: 94 % | BODY MASS INDEX: 34.36 KG/M2 | RESPIRATION RATE: 20 BRPM | HEIGHT: 69 IN

## 2025-05-06 DIAGNOSIS — I10 HYPERTENSION, GOAL BELOW 140/90: Primary | ICD-10-CM

## 2025-05-06 DIAGNOSIS — Z79.4 TYPE 2 DIABETES MELLITUS WITH BOTH EYES AFFECTED BY MILD NONPROLIFERATIVE RETINOPATHY WITHOUT MACULAR EDEMA, WITH LONG-TERM CURRENT USE OF INSULIN (H): ICD-10-CM

## 2025-05-06 DIAGNOSIS — N18.31 STAGE 3A CHRONIC KIDNEY DISEASE (H): ICD-10-CM

## 2025-05-06 DIAGNOSIS — K62.89 PERIANAL MASS: Primary | ICD-10-CM

## 2025-05-06 DIAGNOSIS — I10 HYPERTENSION, GOAL BELOW 140/90: ICD-10-CM

## 2025-05-06 DIAGNOSIS — Z78.9 TAKES DIETARY SUPPLEMENTS: ICD-10-CM

## 2025-05-06 DIAGNOSIS — Z23 NEED FOR VACCINATION: ICD-10-CM

## 2025-05-06 DIAGNOSIS — Z79.899 MEDICATION MANAGEMENT: ICD-10-CM

## 2025-05-06 DIAGNOSIS — K21.9 GASTROESOPHAGEAL REFLUX DISEASE WITHOUT ESOPHAGITIS: ICD-10-CM

## 2025-05-06 DIAGNOSIS — E11.3293 TYPE 2 DIABETES MELLITUS WITH BOTH EYES AFFECTED BY MILD NONPROLIFERATIVE RETINOPATHY WITHOUT MACULAR EDEMA, WITH LONG-TERM CURRENT USE OF INSULIN (H): ICD-10-CM

## 2025-05-06 DIAGNOSIS — G45.9 TRANSIENT CEREBRAL ISCHEMIA, UNSPECIFIED TYPE: ICD-10-CM

## 2025-05-06 DIAGNOSIS — E78.5 HYPERLIPIDEMIA LDL GOAL <70: ICD-10-CM

## 2025-05-06 PROBLEM — L97.529 NON-PRESSURE CHRONIC ULCER OF OTHER PART OF LEFT FOOT WITH UNSPECIFIED SEVERITY (H): Status: RESOLVED | Noted: 2025-05-06 | Resolved: 2025-05-06

## 2025-05-06 PROBLEM — I70.222 CRITICAL LIMB ISCHEMIA OF LEFT LOWER EXTREMITY (H): Status: RESOLVED | Noted: 2025-05-06 | Resolved: 2025-05-06

## 2025-05-06 PROBLEM — I70.222 CRITICAL LIMB ISCHEMIA OF LEFT LOWER EXTREMITY (H): Status: ACTIVE | Noted: 2025-05-06

## 2025-05-06 PROBLEM — L97.529 NON-PRESSURE CHRONIC ULCER OF OTHER PART OF LEFT FOOT WITH UNSPECIFIED SEVERITY (H): Status: ACTIVE | Noted: 2025-05-06

## 2025-05-06 LAB
CREAT UR-MCNC: 162 MG/DL
MICROALBUMIN UR-MCNC: 20.9 MG/L
MICROALBUMIN/CREAT UR: 12.9 MG/G CR (ref 0–17)

## 2025-05-06 PROCEDURE — G2211 COMPLEX E/M VISIT ADD ON: HCPCS | Performed by: FAMILY MEDICINE

## 2025-05-06 PROCEDURE — 3078F DIAST BP <80 MM HG: CPT | Performed by: FAMILY MEDICINE

## 2025-05-06 PROCEDURE — 3074F SYST BP LT 130 MM HG: CPT | Performed by: FAMILY MEDICINE

## 2025-05-06 PROCEDURE — 82043 UR ALBUMIN QUANTITATIVE: CPT | Performed by: FAMILY MEDICINE

## 2025-05-06 PROCEDURE — 99214 OFFICE O/P EST MOD 30 MIN: CPT | Performed by: FAMILY MEDICINE

## 2025-05-06 PROCEDURE — 99605 MTMS BY PHARM NP 15 MIN: CPT | Mod: 93 | Performed by: PHARMACIST

## 2025-05-06 PROCEDURE — 99607 MTMS BY PHARM ADDL 15 MIN: CPT | Mod: 93 | Performed by: PHARMACIST

## 2025-05-06 PROCEDURE — 90480 ADMN SARSCOV2 VAC 1/ONLY CMP: CPT | Performed by: FAMILY MEDICINE

## 2025-05-06 PROCEDURE — 1126F AMNT PAIN NOTED NONE PRSNT: CPT | Performed by: FAMILY MEDICINE

## 2025-05-06 PROCEDURE — 91320 SARSCV2 VAC 30MCG TRS-SUC IM: CPT | Performed by: FAMILY MEDICINE

## 2025-05-06 PROCEDURE — 82570 ASSAY OF URINE CREATININE: CPT | Performed by: FAMILY MEDICINE

## 2025-05-06 RX ORDER — AMLODIPINE BESYLATE 5 MG/1
5 TABLET ORAL DAILY
Qty: 90 TABLET | Refills: 3 | Status: SHIPPED | OUTPATIENT
Start: 2025-05-06

## 2025-05-06 RX ORDER — HYDROCHLOROTHIAZIDE 25 MG/1
25 TABLET ORAL DAILY
Qty: 90 TABLET | Refills: 3 | Status: SHIPPED | OUTPATIENT
Start: 2025-05-06

## 2025-05-06 ASSESSMENT — PAIN SCALES - GENERAL: PAINLEVEL_OUTOF10: NO PAIN (0)

## 2025-05-06 NOTE — Clinical Note
"Recommended To-Do List      Prepared on: May 6, 2025       You can get the best results from your medications by completing the items on this \"To-Do List.\"      Bring your To-Do List when you go to your doctor. And, share it with your family or caregivers.    My To-Do List:  What we talked about: What I should do:                     "

## 2025-05-06 NOTE — LETTER
"Recommended To-Do List      Prepared on: May 6, 2025       You can get the best results from your medications by completing the items on this \"To-Do List.\"      Bring your To-Do List when you go to your doctor. And, share it with your family or caregivers.    My To-Do List:  What we talked about: What I should do:   You may no longer need -pantoprazole (it was started due to GI upset with metformin)  If you would like to try tapering off of pantoprazole - I recommend that you change from your current metformin to the extended release formulation.               "

## 2025-05-06 NOTE — LETTER
_  Medication List        Prepared on: May 6, 2025     Bring your Medication List when you go to the doctor, hospital, or   emergency room. And, share it with your family or caregivers.     Note any changes to how you take your medications.  Cross out medications when you no longer use them.    Medication How I take it Why I use it Prescriber   amLODIPine (NORVASC) 5 MG tablet Take 1 tablet (5 mg) by mouth daily. Hypertension, goal below 140/90 Jimmy Catalan MD   aspirin (ASA) 81 MG EC tablet Take 1 tablet (81 mg) by mouth daily Diabetes Patient Reported   augmented betamethasone dipropionate (DIPROLENE AF) 0.05 % external cream Apply sparingly to affected area twice daily as needed.  Do not apply to face. 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 Glaucoma Jimmy Catalan MD   hydrochlorothiazide (HYDRODIURIL) 25 MG tablet Take 1 tablet (25 mg) by mouth daily. Hypertension, goal below 140/90 Jimmy Catalan MD   insulin aspart (NOVOLOG PEN) 100 UNIT/ML pen Inject 5 units before breakfast, 5 units before lunch and 6 units before supper. Type 2 diabetes mellitus with both eyes affected by mild nonproliferative retinopathy without macular edema, with long-term current use of insulin (H) Jimmy Catalan MD   insulin glargine (LANTUS SOLOSTAR) 100 UNIT/ML pen Inject 17 Units subcutaneously at bedtime. Type 2 diabetes mellitus with both eyes affected by mild nonproliferative retinopathy without macular edema, with long-term current use of insulin (H) Jimmy Catalan MD   lisinopril (ZESTRIL) 40 MG tablet Take 1  tablet (40 mg) by mouth daily. SCHEDULE NURSE VISIT TO CHECK BLOOD PRESSURE BEFORE MARCH 15 2025. Hypertension, goal below 140/90; Type 2 diabetes mellitus with both eyes affected by mild nonproliferative retinopathy without macular edema, with long-term current use of insulin (H); Stage 3a chronic kidney disease (H) Jimmy Catalan MD   metFORMIN (GLUCOPHAGE) 500 MG tablet Take 2 tablets (1,000 mg) by mouth 2 times daily (with meals). Type 2 diabetes mellitus with both eyes affected by mild nonproliferative retinopathy without macular edema, with long-term current use of insulin (H) Jimmy Catalan MD   Multiple Vitamins-Minerals (PRESERVISION AREDS PO) Take 1 tablet by mouth General Health Patient Reported   pantoprazole (PROTONIX) 20 MG EC tablet Take 1 tablet (20 mg) by mouth daily Gastroesophageal Reflux Disease without Esophagitis Jimmy Catalan MD   rosuvastatin (CRESTOR) 10 MG tablet Take 1 tablet (10 mg) by mouth at bedtime Hyperlipidemia LDL Goal <70 Jimmy Catalan MD   triamcinolone (KENALOG) 0.1 % external cream Apply topically 2 times daily To dry skin patches as needed.  Hold on file until needed. Dermatitis Jimmy Catalan MD         Add new medications, over-the-counter drugs, herbals, vitamins, or  minerals in the blank rows below.    Medication How I take it Why I use it Prescriber                                      Allergies:      - Zocor [simvastatin - High Dose]        Side effects I have had:      Not on File        Other Information:              My notes and questions:

## 2025-05-06 NOTE — PROGRESS NOTES
"  Assessment & Plan     Perianal mass  Not bleeding. Anal polyp vs tag.  Referred to gen surg for possible removal.  - Adult Gen Surg  Referral    Hypertension, goal below 140/90  Controlled.  Low salt, low fat diet.   Exercise as tolerated.  Take meds as prescribed; call if with side effects.    - amLODIPine (NORVASC) 5 MG tablet  Dispense: 90 tablet; Refill: 3  - hydrochlorothiazide (HYDRODIURIL) 25 MG tablet  Dispense: 90 tablet; Refill: 3    Type 2 diabetes mellitus with both eyes affected by mild nonproliferative retinopathy without macular edema, with long-term current use of insulin (H)  A1c trending down. Goal of 7.5% is acceptable for his age.  Keep current meds as is.  Repeat lab in 3 months.  - metFORMIN (GLUCOPHAGE) 500 MG tablet  Dispense: 360 tablet; Refill: 3  - Albumin Random Urine Quantitative with Creat Ratio    Stage 3a chronic kidney disease (H)  Stable creatinine and eGFR.   Maintain hydration. Avoid nephrotoxins.   Keep dM and HTN under control.  - Albumin Random Urine Quantitative with Creat Ratio    Need for vaccination  Patient defers.    Medication management  Already had appt with marcos corcoran.      The longitudinal plan of care for the diagnosis(es)/condition(s) as documented were addressed during this visit. Due to the added complexity in care, I will continue to support Emerson in the subsequent management and with ongoing continuity of care.        BMI  Estimated body mass index is 34.76 kg/m  as calculated from the following:    Height as of this encounter: 1.74 m (5' 8.5\").    Weight as of this encounter: 105.2 kg (232 lb).   Weight management plan: Discussed healthy diet and exercise guidelines  Blood sugar testing frequency justification:  Uncontrolled diabetes, Adjustment of medication(s), Risk of hypoglycemia with medication(s), and Patient modifying lifestyle changes (diet, exercise) with blood sugars    Follow-up   Return in about 6 months (around 11/6/2025) for In-person " visit for next wellness exam.        Shelbi Norman is a 89 year old, presenting for the following health issues:  Diabetes (Diabetic educator Follow up), Rectal Problem (Pt states he has a mass in his rectum, similar to a hemorrhoid, that is blocking his bowel movements. ), and Hypertension        5/6/2025     3:41 PM   Additional Questions   Roomed by Cathi GUILLERMO MA   Accompanied by self         5/6/2025     3:41 PM   Patient Reported Additional Medications   Patient reports taking the following new medications none     History of Present Illness       Diabetes:   He presents for follow up of diabetes.  He is checking home blood glucose two times daily.   He checks blood glucose before and after meals.  Blood glucose is sometimes over 200 and never under 70. He is aware of hypoglycemia symptoms including shakiness, dizziness, weakness and lethargy.    He has no concerns regarding his diabetes at this time.  He is having excessive thirst.  The patient has had a diabetic eye exam in the last 12 months. Eye exam performed on April 2025. Location of last eye exam [facility] in [place].        Reason for visit:  Diabetes, bowel movement and mychart items    He eats 0-1 servings of fruits and vegetables daily.He consumes 1 sweetened beverage(s) daily.He exercises with enough effort to increase his heart rate 9 or less minutes per day.  He exercises with enough effort to increase his heart rate 3 or less days per week.   He is taking medications regularly.        Hypertension Follow-up    Do you check your blood pressure regularly outside of the clinic? No   Are you following a low salt diet? Yes  Are your blood pressures ever more than 140 on the top number (systolic) OR more   than 90 on the bottom number (diastolic), for example 140/90? N/A    BP Readings from Last 2 Encounters:   05/06/25 106/60   04/08/25 (!) 158/82     Recent Labs   Lab Test 05/02/25  1306 01/27/25  0837 10/18/24  0803 04/19/24  0801  01/29/24  0829 10/20/23  1315 07/19/23  0857 07/05/23  1517 06/12/23  0830 05/14/23  1735 01/27/23  1040 01/20/23  1201 11/11/22  1148 10/19/22  2242 03/18/21  0809 01/04/21  1733 03/02/20  1102 02/26/20  0750 07/30/18  1100 03/27/18  1012 10/04/17  0937 04/17/17  1102   A1C 7.8* 9.1* 8.0*   < > 8.8*   < > 10.3*  --    < >  --    < >  --    < > 9.8*   < >  --    < > 7.7*   < > 7.7*   < > 7.6*   LDL  --   --  50  --   --   --  53  --   --   --   --   --   --  68   < >  --    < > 85   < > 107*  --   --    HDL  --   --  48  --   --   --  52  --   --   --   --   --   --  38*   < >  --    < > 54   < > 54  --   --    TRIG  --   --  67  --   --   --  53  --   --   --   --   --   --  141   < >  --    < > 56   < > 84  --   --    ALT  --   --   --   --   --   --   --  16  --  12  --  14  --   --    < >  --   --   --   --   --   --   --    CR 1.18*  --   --   --  1.10  --  1.09 1.08  --  1.17  --  0.93  --  0.94   < > 1.07  --  0.82   < > 0.86  --  0.93   GFRESTIMATED 59*  --   --   --  65  --  66 67  --  61  --  80  --  79   < > 63  --  82   < > 85  --  78   GFRESTBLACK  --   --   --   --   --   --   --   --   --   --   --   --   --   --   --  73  --  >90   < > >90  --  >90  African American GFR Calc     POTASSIUM 4.4  --   --   --  4.4  --  4.2 4.4  --  3.9  --  4.4  --  4.8   < > 4.0  --   --    < > 3.9  --  3.8   TSH  --   --   --   --   --   --   --   --   --   --   --   --   --   --   --   --   --   --   --  1.74  --  1.80    < > = values in this interval not displayed.       Patient states in last 2 months, he feels that after BM, he feels that there is residual stool he cannot get rid off.  Denies blood when he wipes.  BM frequency: every 2 days or so  Patient reports formed, solid stool.          Review of Systems  Constitutional, HEENT, cardiovascular, pulmonary, GI, , musculoskeletal, neuro, skin, endocrine and psych systems are negative, except as otherwise noted.      Objective    /60 (BP Location: Right  "arm, Patient Position: Sitting, Cuff Size: Adult Regular)   Pulse 64   Temp 97.3  F (36.3  C) (Tympanic)   Resp 20   Ht 1.74 m (5' 8.5\")   Wt 105.2 kg (232 lb)   SpO2 94%   BMI 34.76 kg/m    Body mass index is 34.76 kg/m .  Physical Exam   GEN: alert, oriented x 3, NAD  EYES: pink conjunctivae  EXT ANAL EXAM: round smooth non-indurated non-fluctuant mass on right side of perianal area; no thrombosed hemorrhoid; no active bleeding  ANOSCOPY: deferred  DIRECT RECTAL EXAM: good sphincter tone, intact vault, no mass to reach of exam finger, no blood per exam glove.     No results found for any visits on 05/06/25.        Signed Electronically by: Jimmy Catalan MD    "

## 2025-05-06 NOTE — PROGRESS NOTES
Medication Therapy Management (MTM) Encounter    ASSESSMENT:                            Medication Adherence/Access: See below for considerations.    Hypertension/TIA blood pressure running above goal last 2 clinic visits - coming in today for visit with PCP - monitor blood pressure.     GERD patient declines tapering off of pantoprazole. Per Epic chart - omeprazole started in 2009 due to GI upset with metformin, so metformin dose could be increased. Omeprazole changed to pantoprazole due to patient starting clopidogrel. If patient decided he wants to get off of pantoprazole - recommend switching metformin to extended release to help decrease possible GI upset.     Type 2 Diabetes per patient prefers to take 3 tablets of metformin daily - continue to follow up with diabetes eduction. Meeting A1c goal of <8%.    Supplements stable    Hyperlipidemia stable    PLAN:                            1. If patient decides to try tapering off of pantoprazole - recommend switching to metformin extended release to help decrease possible GI upset.     2. Get blood pressure checked today in clinic - last 2 in clinic blood pressures above goal.     Update (5/6/2025)   BP Readings from Last 3 Encounters:   05/06/25 106/60   04/08/25 (!) 158/82   01/28/25 (!) 149/70     Follow-up: yearly    SUBJECTIVE/OBJECTIVE:                          Emerson Garcia is a 89 year old male seen for an initial visit. He was referred to me from his insurance.      Reason for visit: Comprehensive Medication Review (CMR). Patient states not totally aware of why using medications.     Allergies/ADRs: Reviewed in chart  Past Medical History: Reviewed in chart  Tobacco: He reports that he has never smoked. He has never used smokeless tobacco.  Alcohol: not currently using    Medication Adherence/Access: taking lower dose of metformin than prescribed    Hypertension/TIA   Amlodipine 5 mg daily   Aspirin 81 mg daily  Clopidogrel 75 mg daily  Hydrochlorothiazide  25 mg daily   Lisinopril 40 mg daily    Follows with cardiology. Taking hydrochlorothiazide and amlodipine in the morning and lisinopril in the evening.     Has follow up with PCP today in clinic - getting blood pressure rechecked at that visit.     BP Readings from Last 3 Encounters:   05/06/25 106/60   04/08/25 (!) 158/82   01/28/25 (!) 149/70     Patient reports no current medication side effects.    GERD  Pantoprazole 20 mg daily    Feels working fine - tolerating well. Patient is stating wanting to make sure all medications are necessary - reviewed could possibly consider stopping/tapering off of pantoprazole.     Type 2 Diabetes   Metformin  500 mg one tablet up to 3 times daily   Novolog 5 units before breakfast and lunch and 6 units before supper  Lantus 17 units once daily    Followed by diabetes education. Novolog supper dose increased.     Unclear why patient is not taking metformin 1000 mg twice daily. States taking a lot of pills. States he used to have a problem with taking his metformin, but was taking on an empty stomach. Doesn't think that he has a problem taking metformin - tolerating 3 pills a day, doesn't want to increase dose.     Testing blood sugars twice daily: morning fasting blood sugars running 100-120, bedtime blood sugars running 200-250.     Patient reports no current medication side effects.  Current diabetes symptoms: none  Eye exam: up to date (sees retina specialist every 6 months)  Foot exam: up to date     Supplements  PreserVision AREDs one tablet daily    No complaints today - states macular degeneration is stable.     Hyperlipidemia   Rosuvastatin 10 mg daily    Tolerating well.     ----------------      I spent 40 minutes with this patient today. All changes were made via collaborative practice agreement with Jimmy Catalan MD.     A summary of these recommendations was sent via [employer].    Summer Navarro, PharmD  Medication Therapy Management     Telemedicine Visit  Details  The patient's medications can be safely assessed via a telemedicine encounter.  Type of service:  Telephone visit  Originating Location (pt. Location): Home    Distant Location (provider location):  On-site  Start Time:  1:30 PM  End Time: 2:10 PM     Medication Therapy Recommendations  GERD (gastroesophageal reflux disease)   1 Current Medication: pantoprazole (PROTONIX) 20 MG EC tablet   Current Medication Sig: Take 1 tablet (20 mg) by mouth daily   Rationale: No medical indication at this time - Unnecessary medication therapy - Indication   Recommendation: Discontinue Medication   Status: Declined per Patient   Identified Date: 5/6/2025 Completed Date: 5/6/2025

## 2025-05-06 NOTE — PATIENT INSTRUCTIONS
"Recommendations from today's MTM visit:                                                    MTM (medication therapy management) is a service provided by a clinical pharmacist designed to help you get the most of out of your medicines.   Today we reviewed what your medicines are for, how to know if they are working, that your medicines are safe and how to make your medicine regimen as easy as possible.      Ray,    It was a pleasure talking with you! We discussed the followin. You started a similar medication to pantoprazole (omeprazole) in  because you were not tolerating your metformin. Metformin extended release is typically better tolerated than the immediate release form you currently are taking. You may no longer need pantoprazole if you switch to the extended release metformin. Let Dr.Silva Prudence or myself know if you would like to make this change.     2. I see that your blood pressure was at goal in clinic today - great job!    Follow-up: yearly    It was great speaking with you today.  I value your experience and would be very thankful for your time in providing feedback in our clinic survey. In the next few days, you may receive an email or text message from [employer] with a link to a survey related to your  clinical pharmacist.\"     To schedule another MTM appointment, please call the clinic directly or you may call the MTM scheduling line at [phone] or toll-free at [phone].     My Clinical Pharmacist's contact information:                                                      Please feel free to contact me with any questions or concerns you have.      Keep up the good work!!    Summer Navarro, PharmD  [phone] in clinic on Tuesday and Wednesday        "

## 2025-05-06 NOTE — PATIENT INSTRUCTIONS
Referrals to general surgery has been signed. Schedulers will call you in the next 3-5 business days.     You will be contacted in 1-2 days for results of your lab tests.     Medications refilled.    Be consistent with low salt, low trans fat and low saturated fat diet.  Eat food rich in omega-3-fatty acids as you tolerate. (salmon, olive oil)  Eat 5 cups of vegetables, fruits and whole grains per day.  Limit starchy food (white rice, white bread, white pasta, white potatoes) to less than a cup per meal.  Minimize sweets, junk food and fastfood. Limit soda beverages to one serving per day; best to avoid it altogether though.    Exercise: moderate intensity sustained for at least 30 mins per episode, goal of 150 mins per week at least  Combine cardiovascular and resistance exercises.  These exercise recommendations are in addition to your daily activity at work or home.  Work on losing weight.      Regular foot care; don't walk barefoot  Annual eye exam.  Please request your eye doctor to furnish a copy of the eye exam report to the clinic to be placed in your record.  Flu vaccine by the end of October yearly.  Be sure to update any other recommended vaccinations for diabetics.

## 2025-05-06 NOTE — LETTER
May 6, 2025  Fawad Garcia  [address] [facility] [address]    Dear Mr. Garcia, EDELMIRA [facility]     Thank you for talking with me on May 6, 2025 about your health and medications. As a follow-up to our conversation, I have included two documents:      Your Recommended To-Do List has steps you should take to get the best results from your medications.  Your Medication List will help you keep track of your medications and how to take them.    If you want to talk about these documents, please call Summer Navarro RPH at phone: [phone], Monday-Friday 8-4:30pm.    I look forward to working with you and your doctors to make sure your medications work well for you.    Sincerely,  Summer Navarro RPH  [facility] Pharmacist, [facility]

## 2025-05-07 ENCOUNTER — RESULTS FOLLOW-UP (OUTPATIENT)
Dept: FAMILY MEDICINE | Facility: CLINIC | Age: 89
End: 2025-05-07

## 2025-05-07 ENCOUNTER — PATIENT OUTREACH (OUTPATIENT)
Dept: CARE COORDINATION | Facility: CLINIC | Age: 89
End: 2025-05-07
Payer: COMMERCIAL

## 2025-05-12 ENCOUNTER — OFFICE VISIT (OUTPATIENT)
Dept: SURGERY | Facility: CLINIC | Age: 89
End: 2025-05-12
Attending: FAMILY MEDICINE
Payer: COMMERCIAL

## 2025-05-12 VITALS
DIASTOLIC BLOOD PRESSURE: 85 MMHG | SYSTOLIC BLOOD PRESSURE: 155 MMHG | HEIGHT: 69 IN | HEART RATE: 63 BPM | WEIGHT: 232 LBS | BODY MASS INDEX: 34.36 KG/M2 | OXYGEN SATURATION: 96 %

## 2025-05-12 DIAGNOSIS — N18.31 STAGE 3A CHRONIC KIDNEY DISEASE (H): ICD-10-CM

## 2025-05-12 DIAGNOSIS — K62.89 PERIANAL MASS: Primary | ICD-10-CM

## 2025-05-12 DIAGNOSIS — I35.0 MODERATE AORTIC STENOSIS: ICD-10-CM

## 2025-05-12 DIAGNOSIS — G45.9 TIA (TRANSIENT ISCHEMIC ATTACK): ICD-10-CM

## 2025-05-12 PROCEDURE — 3077F SYST BP >= 140 MM HG: CPT | Performed by: STUDENT IN AN ORGANIZED HEALTH CARE EDUCATION/TRAINING PROGRAM

## 2025-05-12 PROCEDURE — 99203 OFFICE O/P NEW LOW 30 MIN: CPT | Performed by: STUDENT IN AN ORGANIZED HEALTH CARE EDUCATION/TRAINING PROGRAM

## 2025-05-12 PROCEDURE — 3079F DIAST BP 80-89 MM HG: CPT | Performed by: STUDENT IN AN ORGANIZED HEALTH CARE EDUCATION/TRAINING PROGRAM

## 2025-05-12 NOTE — PATIENT INSTRUCTIONS
1. Plan for exam under anesthesia and excision of perianal mass in OR  2. Will need pre-op H&P with PCP prior to procedure

## 2025-05-12 NOTE — PROGRESS NOTES
Surgical Consultation/History and Physical  [facility] Surgery    Fawad is seen in consultation for a perianal mass, at the request of Jimmy Catalan MD.    Chief Complaint:  perianal mass    History of Present Illness: Fawad Garcia is a 89 year old male presents with a mass near his anus.  He states that this has been present for several years and it has not been bothersome.  He states this was previously seen by one of his providers and he was advised to leave it be as it was not bothersome.  However, over the past several months, he feels that this mass is getting larger and making it harder to clean himself after having a bowel movement.  He denies any associated pain or bleeding.  Denies any changes in continence.    Patient Active Problem List   Diagnosis    Obesity    Transient cerebral ischemia    Hyperlipidemia LDL goal <70    GERD (gastroesophageal reflux disease)    Peripheral vascular disease    Malignant neoplasm of prostate (H)    Hypertension, goal below 140/90    Senile nuclear sclerosis    Type 2 diabetes mellitus with both eyes affected by mild nonproliferative retinopathy without macular edema, with long-term current use of insulin (H)    Macular degeneration (senile) of retina    H/O amputation of lesser toe, right    TIA (transient ischemic attack)    Chronic kidney disease, stage 3 (H)    Dermatitis    Skin lesion    Tremor of left hand    Left leg weakness    Moderate aortic stenosis       Past Medical History:   Diagnosis Date    Diabetes mellitus (H)     Hypertension     Squamous cell carcinoma        Past Surgical History:   Procedure Laterality Date    AMPUTATE TOE(S) Right 7/23/2019    Procedure: Partial amputation great toe;  Surgeon: Ethan Montesinos DPM;  Location: [facility]    AMPUTATE TOE(S) Left 12/24/2019    Procedure: Amputation of the great toe, left foot;  Surgeon: Ethan Montesinos DPM;  Location: [facility]    AMPUTATE TOE(S) Right 3/3/2020     Procedure: Partial amputation, second toe, right foot;  Surgeon: Ethan Montesinos DPM;  Location: [facility] OR    CL AFF SURGICAL PATHOLOGY  2002    prostate biopsy elevated PSA    COLONOSCOPY      HC REMOVE TONSILS/ADENOIDS,<11 Y/O      T & A    IR LOWER EXTREMITY ANGIOGRAM LEFT  2/26/2020    IR LOWER EXTREMITY ANGIOGRAM LEFT  7/19/2023    IR LOWER EXTREMITY ANGIOGRAM RIGHT  12/11/2018    PHACOEMULSIFICATION WITH STANDARD INTRAOCULAR LENS IMPLANT Left 4/2/2015    Procedure: PHACOEMULSIFICATION WITH STANDARD INTRAOCULAR LENS IMPLANT;  Surgeon: Joseph Hernandez MD;  Location: [facility]    PHACOEMULSIFICATION WITH STANDARD INTRAOCULAR LENS IMPLANT Right 5/4/2015    Procedure: PHACOEMULSIFICATION WITH STANDARD INTRAOCULAR LENS IMPLANT;  Surgeon: Joseph Hernandez MD;  Location: [facility]    REPAIR HAMMER TOE Left 4/2/2019    Procedure: 5th Metatarsal Head Resection;  Surgeon: Ethan Montesinos DPM;  Location: [facility] OR       Family History   Problem Relation Age of Onset    Cancer Mother         intestinal CA    Other Cancer Mother         cancer    Diabetes Father     Cerebrovascular Disease Father     Diabetes Brother     Other Cancer Brother         Pancreatic cancer    Other Cancer Son         GIST cancer    Diabetes Son     Diabetes Brother     Diabetes Son     Melanoma No family hx of        Social History     Tobacco Use    Smoking status: Never    Smokeless tobacco: Never   Substance Use Topics    Alcohol use: Yes     Alcohol/week: 1.7 standard drinks of alcohol     Types: 2 Standard drinks or equivalent per week     Comment: very little        History   Drug Use No       Current Outpatient Medications   Medication Sig Dispense Refill    amLODIPine (NORVASC) 5 MG tablet Take 1 tablet (5 mg) by mouth daily. 90 tablet 3    aspirin (ASA) 81 MG EC tablet Take 1 tablet (81 mg) by mouth daily      augmented betamethasone dipropionate (DIPROLENE AF) 0.05 % external cream Apply sparingly to affected area twice daily as  needed.  Do not apply to face. 100 g 1    blood glucose (ONETOUCH ULTRA TEST) test strip 2 strips by In Vitro route daily One touch Ultra 2. 200 strip 3    blood glucose monitoring (ONE TOUCH ULTRA 2) meter device kit Use to test blood sugar 2 times daily or as directed. 1 kit 0    clopidogrel (PLAVIX) 75 MG tablet Take 1 tablet (75 mg) by mouth daily 90 tablet 3    dorzolamide-timolol (COSOPT) 2-0.5 % ophthalmic solution Place 1 drop into both eyes 2 times daily.      hydrochlorothiazide (HYDRODIURIL) 25 MG tablet Take 1 tablet (25 mg) by mouth daily. 90 tablet 3    insulin aspart (NOVOLOG PEN) 100 UNIT/ML pen Inject 5 units before breakfast, 5 units before lunch and 6 units before supper. 15 mL 3    insulin glargine (LANTUS SOLOSTAR) 100 UNIT/ML pen Inject 17 Units subcutaneously at bedtime. 30 mL 3    Lancets (ONETOUCH DELICA PLUS [id number]) MISC 1 each by In Vitro route See Admin Instructions Hold on file until needed. 100 each 3    Lancets (ONETOUCH DELICA PLUS [id number]) MISC 1 each by In Vitro route See Admin Instructions Hold on file until needed. 100 each 1    lisinopril (ZESTRIL) 40 MG tablet Take 1 tablet (40 mg) by mouth daily. SCHEDULE NURSE VISIT TO CHECK BLOOD PRESSURE BEFORE MARCH 15 2025. 90 tablet 3    metFORMIN (GLUCOPHAGE) 500 MG tablet Take 2 tablets (1,000 mg) by mouth 2 times daily (with meals). 360 tablet 3    Multiple Vitamins-Minerals (PRESERVISION AREDS PO) Take 1 tablet by mouth      ORDER FOR DME Equipment being ordered:   Glucometer 1 Device 1    pantoprazole (PROTONIX) 20 MG EC tablet Take 1 tablet (20 mg) by mouth daily 90 tablet 1    rosuvastatin (CRESTOR) 10 MG tablet Take 1 tablet (10 mg) by mouth at bedtime 90 tablet 3    triamcinolone (KENALOG) 0.1 % external cream Apply topically 2 times daily To dry skin patches as needed.  Hold on file until needed. 80 g 1    ULTICARE SHORT 31G X 8 MM insulin pen needle Use 2 pen needles daily or as directed. 200 each 0       Allergies  "  Allergen Reactions    Zocor [Simvastatin - High Dose]      Bilateral hip aching       Review of Systems:   10 point ROS negative other than what was listed in HPI    Physical Exam:  BP (!) 155/85   Pulse 63   Ht 1.74 m (5' 8.5\")   Wt 105.2 kg (232 lb)   SpO2 96%   BMI 34.76 kg/m      Constitutional- No acute distress, frail, non-toxic  Eyes: Anicteric, no injection.  PERRL  ENT:  Normocephalic, atraumatic, Nose midline, moist mucus membranes  Neck - supple, no LAD, Thyroid smooth, symmetric, Carotids without bruits  Respiratory- Nonlabored breathing on room air  Cardiovascular - Extremities warm and well perfused.  Abdomen - Soft, non-tender, no hepatosplenomegaly, no palpable masses  Rectal - external exam with perianal mass in right anterior quadrant. Mass is at anal verge good tone, soft, mobile, nontender. No overlying skin changes, no bleeding. Digital rectal exam with  mass palpated in right anterior quadrant, no bleeding, intact tone. Anoscope exam with mild enlargement of internal hemorrhoids without bleeding.   Neuro - No focal neuro deficits, Alert and oriented x 3  Psych: Appropriate mood and affect  Musculoskeletal: ambulates with cane, symmetric strength.  FROM upper and lower extremities.  Skin: Warm, Dry    Assessment:  1. Fawad Garcia is a 89 year old male with complex past medical history who presents to surgery clinic for evaluation of a perianal mass.  This mass is soft, nontender, not associated with any bleeding, however it has been gradually enlarging and has been more noticeable when having bowel movements.  On exam, this appears distinct from internal or external hemorrhoids, measures roughly 1 cm in size in the right anterior perianal skin.  This does not have the classic appearance of a perianal skin tag, possibly a perianal lipoma or a polyp.    To better determine what this mass is, and because it is interfering with bowel movements, I recommend proceeding with an exam under " anesthesia and excision of perianal mass in the operating room.  We discussed the risks inherent to the procedure, including risk of bleeding, infection, or iatrogenic injury to nearby structures.  Patient expressed understanding and is amenable to the proposed plan.  He will need a preop history and physical with his primary care provider to discuss holding anticoagulation for the procedure.     Plan:   1. Plan for exam under anesthesia and excision of perianal mass in OR  2. Will need pre-op H&P with PCP prior to procedure        Abdon Esparza MD on 5/12/2025 at 1:00 PM

## 2025-05-12 NOTE — NURSING NOTE
"Initial BP (!) 155/85   Pulse 63   Ht 1.74 m (5' 8.5\")   Wt 105.2 kg (232 lb)   SpO2 96%   BMI 34.76 kg/m   Estimated body mass index is 34.76 kg/m  as calculated from the following:    Height as of this encounter: 1.74 m (5' 8.5\").    Weight as of this encounter: 105.2 kg (232 lb). .  Dinora Ramos MA    "

## 2025-05-12 NOTE — LETTER
5/12/2025      Fawad Garcia  [address]  [facility] [address]      Dear Colleague,    Thank you for referring your patient, Fawad Garcia, to the [facility]. Please see a copy of my visit note below.    Surgical Consultation/History and Physical  [facility] Surgery    Fawad is seen in consultation for a perianal mass, at the request of Jimmy Catalan MD.    Chief Complaint:  perianal mass    History of Present Illness: Fawad Garcia is a 89 year old male presents with a mass near his anus.  He states that this has been present for several years and it has not been bothersome.  He states this was previously seen by one of his providers and he was advised to leave it be as it was not bothersome.  However, over the past several months, he feels that this mass is getting larger and making it harder to clean himself after having a bowel movement.  He denies any associated pain or bleeding.  Denies any changes in continence.    Patient Active Problem List   Diagnosis     Obesity     Transient cerebral ischemia     Hyperlipidemia LDL goal <70     GERD (gastroesophageal reflux disease)     Peripheral vascular disease     Malignant neoplasm of prostate (H)     Hypertension, goal below 140/90     Senile nuclear sclerosis     Type 2 diabetes mellitus with both eyes affected by mild nonproliferative retinopathy without macular edema, with long-term current use of insulin (H)     Macular degeneration (senile) of retina     H/O amputation of lesser toe, right     TIA (transient ischemic attack)     Chronic kidney disease, stage 3 (H)     Dermatitis     Skin lesion     Tremor of left hand     Left leg weakness     Moderate aortic stenosis       Past Medical History:   Diagnosis Date     Diabetes mellitus (H)      Hypertension      Squamous cell carcinoma        Past Surgical History:   Procedure Laterality Date     AMPUTATE TOE(S) Right 7/23/2019    Procedure: Partial  amputation great toe;  Surgeon: Ethan Montesinos DPM;  Location: [facility]     AMPUTATE TOE(S) Left 12/24/2019    Procedure: Amputation of the great toe, left foot;  Surgeon: Ethan Montesinos DPM;  Location: [facility]     AMPUTATE TOE(S) Right 3/3/2020    Procedure: Partial amputation, second toe, right foot;  Surgeon: Ethan Montesinos DPM;  Location: [facility]     CL AFF SURGICAL PATHOLOGY  2002    prostate biopsy elevated PSA     COLONOSCOPY       HC REMOVE TONSILS/ADENOIDS,<11 Y/O      T & A     IR LOWER EXTREMITY ANGIOGRAM LEFT  2/26/2020     IR LOWER EXTREMITY ANGIOGRAM LEFT  7/19/2023     IR LOWER EXTREMITY ANGIOGRAM RIGHT  12/11/2018     PHACOEMULSIFICATION WITH STANDARD INTRAOCULAR LENS IMPLANT Left 4/2/2015    Procedure: PHACOEMULSIFICATION WITH STANDARD INTRAOCULAR LENS IMPLANT;  Surgeon: Joseph Hernandez MD;  Location: [facility]     PHACOEMULSIFICATION WITH STANDARD INTRAOCULAR LENS IMPLANT Right 5/4/2015    Procedure: PHACOEMULSIFICATION WITH STANDARD INTRAOCULAR LENS IMPLANT;  Surgeon: Joseph Hernandez MD;  Location: [facility]     REPAIR HAMMER TOE Left 4/2/2019    Procedure: 5th Metatarsal Head Resection;  Surgeon: Ethan Montesinos DPM;  Location: [facility] [place]       Family History   Problem Relation Age of Onset     Cancer Mother         intestinal CA     Other Cancer Mother         cancer     Diabetes Father      Cerebrovascular Disease Father      Diabetes Brother      Other Cancer Brother         Pancreatic cancer     Other Cancer Son         GIST cancer     Diabetes Son      Diabetes Brother      Diabetes Son      Melanoma No family hx of        Social History     Tobacco Use     Smoking status: Never     Smokeless tobacco: Never   Substance Use Topics     Alcohol use: Yes     Alcohol/week: 1.7 standard drinks of alcohol     Types: 2 Standard drinks or equivalent per week     Comment: very little        History   Drug Use No       Current Outpatient Medications   Medication Sig Dispense Refill      amLODIPine (NORVASC) 5 MG tablet Take 1 tablet (5 mg) by mouth daily. 90 tablet 3     aspirin (ASA) 81 MG EC tablet Take 1 tablet (81 mg) by mouth daily       augmented betamethasone dipropionate (DIPROLENE AF) 0.05 % external cream Apply sparingly to affected area twice daily as needed.  Do not apply to face. 100 g 1     blood glucose (ONETOUCH ULTRA TEST) test strip 2 strips by In Vitro route daily One touch Ultra 2. 200 strip 3     blood glucose monitoring (ONE TOUCH ULTRA 2) meter device kit Use to test blood sugar 2 times daily or as directed. 1 kit 0     clopidogrel (PLAVIX) 75 MG tablet Take 1 tablet (75 mg) by mouth daily 90 tablet 3     dorzolamide-timolol (COSOPT) 2-0.5 % ophthalmic solution Place 1 drop into both eyes 2 times daily.       hydrochlorothiazide (HYDRODIURIL) 25 MG tablet Take 1 tablet (25 mg) by mouth daily. 90 tablet 3     insulin aspart (NOVOLOG PEN) 100 UNIT/ML pen Inject 5 units before breakfast, 5 units before lunch and 6 units before supper. 15 mL 3     insulin glargine (LANTUS SOLOSTAR) 100 UNIT/ML pen Inject 17 Units subcutaneously at bedtime. 30 mL 3     Lancets (ONETOUCH DELICA PLUS [id number]) MISC 1 each by In Vitro route See Admin Instructions Hold on file until needed. 100 each 3     Lancets (ONETOUCH DELICA PLUS [id number]) MISC 1 each by In Vitro route See Admin Instructions Hold on file until needed. 100 each 1     lisinopril (ZESTRIL) 40 MG tablet Take 1 tablet (40 mg) by mouth daily. SCHEDULE NURSE VISIT TO CHECK BLOOD PRESSURE BEFORE MARCH 15 2025. 90 tablet 3     metFORMIN (GLUCOPHAGE) 500 MG tablet Take 2 tablets (1,000 mg) by mouth 2 times daily (with meals). 360 tablet 3     Multiple Vitamins-Minerals (PRESERVISION AREDS PO) Take 1 tablet by mouth       ORDER FOR DME Equipment being ordered:   Glucometer 1 Device 1     pantoprazole (PROTONIX) 20 MG EC tablet Take 1 tablet (20 mg) by mouth daily 90 tablet 1     rosuvastatin (CRESTOR) 10 MG tablet Take 1 tablet (10 mg) by  "mouth at bedtime 90 tablet 3     triamcinolone (KENALOG) 0.1 % external cream Apply topically 2 times daily To dry skin patches as needed.  Hold on file until needed. 80 g 1     ULTICARE SHORT 31G X 8 MM insulin pen needle Use 2 pen needles daily or as directed. 200 each 0       Allergies   Allergen Reactions     Zocor [Simvastatin - High Dose]      Bilateral hip aching       Review of Systems:   10 point ROS negative other than what was listed in HPI    Physical Exam:  BP (!) 155/85   Pulse 63   Ht 1.74 m (5' 8.5\")   Wt 105.2 kg (232 lb)   SpO2 96%   BMI 34.76 kg/m      Constitutional- No acute distress, frail, non-toxic  Eyes: Anicteric, no injection.  PERRL  ENT:  Normocephalic, atraumatic, Nose midline, moist mucus membranes  Neck - supple, no LAD, Thyroid smooth, symmetric, Carotids without bruits  Respiratory- Nonlabored breathing on room air  Cardiovascular - Extremities warm and well perfused.  Abdomen - Soft, non-tender, no hepatosplenomegaly, no palpable masses  Rectal - external exam with perianal mass in right anterior quadrant. Mass is at anal verge good tone, soft, mobile, nontender. No overlying skin changes, no bleeding. Digital rectal exam with  mass palpated in right anterior quadrant, no bleeding, intact tone. Anoscope exam with mild enlargement of internal hemorrhoids without bleeding.   Neuro - No focal neuro deficits, Alert and oriented x 3  Psych: Appropriate mood and affect  Musculoskeletal: ambulates with cane, symmetric strength.  FROM upper and lower extremities.  Skin: Warm, Dry    Assessment:  1. Fawad Garcia is a 89 year old male with complex past medical history who presents to surgery clinic for evaluation of a perianal mass.  This mass is soft, nontender, not associated with any bleeding, however it has been gradually enlarging and has been more noticeable when having bowel movements.  On exam, this appears distinct from internal or external hemorrhoids, measures roughly 1 " cm in size in the right anterior perianal skin.  This does not have the classic appearance of a perianal skin tag, possibly a perianal lipoma or a polyp.    To better determine what this mass is, and because it is interfering with bowel movements, I recommend proceeding with an exam under anesthesia and excision of perianal mass in the operating room.  We discussed the risks inherent to the procedure, including risk of bleeding, infection, or iatrogenic injury to nearby structures.  Patient expressed understanding and is amenable to the proposed plan.  He will need a preop history and physical with his primary care provider to discuss holding anticoagulation for the procedure.     Plan:   1. Plan for exam under anesthesia and excision of perianal mass in OR  2. Will need pre-op H&P with PCP prior to procedure        Abdon Esparza MD on 5/12/2025 at 1:00 PM      Again, thank you for allowing me to participate in the care of your patient.        Sincerely,        Abdon Esparza MD    Electronically signed

## 2025-05-14 ENCOUNTER — ALLIED HEALTH/NURSE VISIT (OUTPATIENT)
Dept: EDUCATION SERVICES | Facility: CLINIC | Age: 89
End: 2025-05-14
Payer: COMMERCIAL

## 2025-05-14 DIAGNOSIS — Z79.4 TYPE 2 DIABETES MELLITUS WITH BOTH EYES AFFECTED BY MILD NONPROLIFERATIVE RETINOPATHY WITHOUT MACULAR EDEMA, WITH LONG-TERM CURRENT USE OF INSULIN (H): Primary | ICD-10-CM

## 2025-05-14 DIAGNOSIS — E11.3293 TYPE 2 DIABETES MELLITUS WITH BOTH EYES AFFECTED BY MILD NONPROLIFERATIVE RETINOPATHY WITHOUT MACULAR EDEMA, WITH LONG-TERM CURRENT USE OF INSULIN (H): Primary | ICD-10-CM

## 2025-05-14 PROCEDURE — 95250 CONT GLUC MNTR PHYS/QHP EQP: CPT | Mod: AE | Performed by: DIETITIAN, REGISTERED

## 2025-05-14 PROCEDURE — G0108 DIAB MANAGE TRN  PER INDIV: HCPCS | Mod: AE | Performed by: DIETITIAN, REGISTERED

## 2025-05-14 NOTE — PROGRESS NOTES
Diabetes Self-Management Education & Support    Presents for:      Type of Service: In Person Visit      Assessment  Here for download of professional cgms.  Did adjusting Novolog at last visit from 5 units per meal to 5 units at breakfast and lunch and increased to 6 units at supper.  Based on review of dietary intake for past 24 hours pattern reveals higher carbohydrate at breakfast and lunch compared to supper.  Therefore discussed adjusting Novolog to address the mid day highs and the higher carbohydrate intake.  Pt and son expressed understanding.  Patient is still not interested in personal cgms.  A1c is now in target <8.0.      May 10th was mowing the lawn, not eating as much    See CGM Reports in Monitoring section below.      Glucose Patterns & Trends:  Time in range of  mg/dL, 49% of the time. Patient not meeting ADA Time in Range Targets., Glucose Variability (coefficient of variation): 32.6% (goal is 36% or less to lessen risk for hypoglycemia), and pattern of significant highs between 2:45pm and 5:25pm    Care Plan and Education Provided:  Being Active: if more active with mowing the lawn, etc, be mindful of potential for low BG  Monitoring: Log and interpret results and consider use of personal cgms  Taking Medication: adjust Novolog doses  Problem Solving: Low glucose - causes, signs/symptoms, treatment and prevention, Rule of 15 and carrying a carbohydrate source at all times in case of low glucose, and When to call a health care provider  Reducing Risks: Goal for A1c, how it relates to glucose and how often to check    Patient verbalized understanding of diabetes self-management education concepts discussed, opportunities for ongoing education and support, and recommendations provided today.    Plan     Continue Lantus 17 units at bedtime.  Adjust Novolog to 6 units at breakfast and lunch and lower Novolog at supper to 5 units.    Take Metformin 4 tabs daily.  Continue to test your BG 2  "times/day.  If you have 2 or more readings less than 70 mg/dL in one week reach out to Jimmy Catalan or Diabetes Education.  Follow up with Dr. Catalan 11/3/25.  Follow up with Diabetes Education as needed.  If you have questions you can send them through [employer] or call Diabetes Education Triage [phone].  For Scheduling call [phone].  Topics to cover at upcoming visits: Healthy Eating, Being Active, Monitoring, Taking Medication, Problem Solving, Reducing Risks, and Healthy Coping    See Care Plan for co-developed, patient-state behavior change goals.    Education Materials Provided:  No new materials provided today      Subjective/Objective  Emerson is an 89 year old, presenting for the following diabetes education related to:       Cultural Influences/Ethnic Background:  Not  or       Diabetes Symptoms & Complications:          Patient Problem List and Family Medical History reviewed for relevant medical history, current medical status, and diabetes risk factors.    Vitals:  There were no vitals taken for this visit.  Estimated body mass index is 34.76 kg/m  as calculated from the following:    Height as of 5/12/25: 1.74 m (5' 8.5\").    Weight as of 5/12/25: 105.2 kg (232 lb).   Last 3 BP:   BP Readings from Last 3 Encounters:   05/12/25 (!) 155/85   05/06/25 106/60   04/08/25 (!) 158/82       History   Smoking Status    Never   Smokeless Tobacco    Never       Labs:  Lab Results   Component Value Date    A1C 7.8 05/02/2025    A1C 8.4 06/21/2021     Lab Results   Component Value Date    GLC 97 05/02/2025     10/20/2022     09/20/2021     01/04/2021     Lab Results   Component Value Date    LDL 50 10/18/2024    LDL 47 03/18/2021     HDL Cholesterol   Date Value Ref Range Status   03/18/2021 61 >39 mg/dL Final     Direct Measure HDL   Date Value Ref Range Status   10/18/2024 48 >=40 mg/dL Final     GFR Estimate   Date Value Ref Range Status   05/02/2025 59 (L) >60 " mL/min/1.73m2 Final     Comment:     eGFR calculated using 2021 CKD-EPI equation.   01/04/2021 63 >60 mL/min/[1.73_m2] Final     Comment:     Non  GFR Calc  Starting 12/18/2018, serum creatinine based estimated GFR (eGFR) will be   calculated using the Chronic Kidney Disease Epidemiology Collaboration   (CKD-EPI) equation.       GFR, ESTIMATED POCT   Date Value Ref Range Status   04/08/2022 >60 >60 mL/min/1.73m2 Final     GFR Estimate If Black   Date Value Ref Range Status   01/04/2021 73 >60 mL/min/[1.73_m2] Final     Comment:      GFR Calc  Starting 12/18/2018, serum creatinine based estimated GFR (eGFR) will be   calculated using the Chronic Kidney Disease Epidemiology Collaboration   (CKD-EPI) equation.       Lab Results   Component Value Date    CR 1.18 05/02/2025    CR 1.07 01/04/2021     Lab Results   Component Value Date    MICROL 20.9 05/06/2025    UMALCR 12.90 05/06/2025    UCRR 162.0 05/06/2025 5/14/2025   Monitoring   Monitoring Assessed Today Yes   Blood Glucose Meter Unknown   Times checking blood sugar at home (number) 5+   Times checking blood sugar at home (per) Day                 Diabetes Medication(s)       Biguanides       metFORMIN (GLUCOPHAGE) 500 MG tablet Take 2 tablets (1,000 mg) by mouth 2 times daily (with meals).       Insulin       insulin aspart (NOVOLOG PEN) 100 UNIT/ML pen Inject 5 units before breakfast, 5 units before lunch and 6 units before supper.     insulin glargine (LANTUS SOLOSTAR) 100 UNIT/ML pen Inject 17 Units subcutaneously at bedtime.              5/14/2025   Taking Medications   Taking Medication Assessed Today Yes   Current Treatments Insulin Injections   Dose schedule Pre-breakfast;Pre-lunch;Pre-dinner;At bedtime   Given by Patient   Injection/Infusion sites Abdomen         3/6/2024   Problem Solving   Patient carries a carbohydrate source Not Needed   Hypoglycemia Sleepiness   Hypoglycemia Complications Required glucagon  injection     Prudence York RD, [facility], 4:51 PM, 5/14/2025    Time Spent: 45 minutes  Encounter Type: Individual    Any diabetes medication dose changes were made via the [facility] Standing Orders under the patient's referring provider.

## 2025-05-14 NOTE — LETTER
5/14/2025         RE: Fawad Garcia  [address] [facility] [address]        Dear Colleague,    Thank you for referring your patient, Fawad Garcia, to the [facility]. Please see a copy of my visit note below.    Diabetes Self-Management Education & Support    Presents for:      Type of Service: In Person Visit      Assessment  Here for download of professional cgms.  Did adjusting Novolog at last visit from 5 units per meal to 5 units at breakfast and lunch and increased to 6 units at supper.  Based on review of dietary intake for past 24 hours pattern reveals higher carbohydrate at breakfast and lunch compared to supper.  Therefore discussed adjusting Novolog to address the mid day highs and the higher carbohydrate intake.  Pt and son expressed understanding.  Patient is still not interested in personal cgms.  A1c is now in target <8.0.      May 10th was mowing the lawn, not eating as much    See CGM Reports in Monitoring section below.      Glucose Patterns & Trends:  Time in range of  mg/dL, 49% of the time. Patient not meeting ADA Time in Range Targets., Glucose Variability (coefficient of variation): 32.6% (goal is 36% or less to lessen risk for hypoglycemia), and pattern of significant highs between 2:45pm and 5:25pm    Care Plan and Education Provided:  Being Active: if more active with mowing the lawn, etc, be mindful of potential for low BG  Monitoring: Log and interpret results and consider use of personal cgms  Taking Medication: adjust Novolog doses  Problem Solving: Low glucose - causes, signs/symptoms, treatment and prevention, Rule of 15 and carrying a carbohydrate source at all times in case of low glucose, and When to call a health care provider  Reducing Risks: Goal for A1c, how it relates to glucose and how often to check    Patient verbalized understanding of diabetes self-management education concepts discussed, opportunities for ongoing  "education and support, and recommendations provided today.    Plan     Continue Lantus 17 units at bedtime.  Adjust Novolog to 6 units at breakfast and lunch and lower Novolog at supper to 5 units.    Take Metformin 4 tabs daily.  Continue to test your BG 2 times/day.  If you have 2 or more readings less than 70 mg/dL in one week reach out to Jimmy Catalan or Diabetes Education.  Follow up with Dr. Catalan 11/3/25.  Follow up with Diabetes Education as needed.  If you have questions you can send them through [employer] or call Diabetes Education Triage [phone].  For Scheduling call [phone].  Topics to cover at upcoming visits: Healthy Eating, Being Active, Monitoring, Taking Medication, Problem Solving, Reducing Risks, and Healthy Coping    See Care Plan for co-developed, patient-state behavior change goals.    Education Materials Provided:  No new materials provided today      Subjective/Objective  Emerson is an 89 year old, presenting for the following diabetes education related to:       Cultural Influences/Ethnic Background:  Not  or       Diabetes Symptoms & Complications:          Patient Problem List and Family Medical History reviewed for relevant medical history, current medical status, and diabetes risk factors.    Vitals:  There were no vitals taken for this visit.  Estimated body mass index is 34.76 kg/m  as calculated from the following:    Height as of 5/12/25: 1.74 m (5' 8.5\").    Weight as of 5/12/25: 105.2 kg (232 lb).   Last 3 BP:   BP Readings from Last 3 Encounters:   05/12/25 (!) 155/85   05/06/25 106/60   04/08/25 (!) 158/82       History   Smoking Status     Never   Smokeless Tobacco     Never       Labs:  Lab Results   Component Value Date    A1C 7.8 05/02/2025    A1C 8.4 06/21/2021     Lab Results   Component Value Date    GLC 97 05/02/2025     10/20/2022     09/20/2021     01/04/2021     Lab Results   Component Value Date    LDL 50 10/18/2024 "    LDL 47 03/18/2021     HDL Cholesterol   Date Value Ref Range Status   03/18/2021 61 >39 mg/dL Final     Direct Measure HDL   Date Value Ref Range Status   10/18/2024 48 >=40 mg/dL Final     GFR Estimate   Date Value Ref Range Status   05/02/2025 59 (L) >60 mL/min/1.73m2 Final     Comment:     eGFR calculated using 2021 CKD-EPI equation.   01/04/2021 63 >60 mL/min/[1.73_m2] Final     Comment:     Non  GFR Calc  Starting 12/18/2018, serum creatinine based estimated GFR (eGFR) will be   calculated using the Chronic Kidney Disease Epidemiology Collaboration   (CKD-EPI) equation.       GFR, ESTIMATED POCT   Date Value Ref Range Status   04/08/2022 >60 >60 mL/min/1.73m2 Final     GFR Estimate If Black   Date Value Ref Range Status   01/04/2021 73 >60 mL/min/[1.73_m2] Final     Comment:      GFR Calc  Starting 12/18/2018, serum creatinine based estimated GFR (eGFR) will be   calculated using the Chronic Kidney Disease Epidemiology Collaboration   (CKD-EPI) equation.       Lab Results   Component Value Date    CR 1.18 05/02/2025    CR 1.07 01/04/2021     Lab Results   Component Value Date    MICROL 20.9 05/06/2025    UMALCR 12.90 05/06/2025    UCRR 162.0 05/06/2025 5/14/2025   Monitoring   Monitoring Assessed Today Yes   Blood Glucose Meter Unknown   Times checking blood sugar at home (number) 5+   Times checking blood sugar at home (per) Day                 Diabetes Medication(s)       Biguanides       metFORMIN (GLUCOPHAGE) 500 MG tablet Take 2 tablets (1,000 mg) by mouth 2 times daily (with meals).       Insulin       insulin aspart (NOVOLOG PEN) 100 UNIT/ML pen Inject 5 units before breakfast, 5 units before lunch and 6 units before supper.     insulin glargine (LANTUS SOLOSTAR) 100 UNIT/ML pen Inject 17 Units subcutaneously at bedtime.              5/14/2025   Taking Medications   Taking Medication Assessed Today Yes   Current Treatments Insulin Injections   Dose schedule  Pre-breakfast;Pre-lunch;Pre-dinner;At bedtime   Given by Patient   Injection/Infusion sites Abdomen         3/6/2024   Problem Solving   Patient carries a carbohydrate source Not Needed   Hypoglycemia Sleepiness   Hypoglycemia Complications Required glucagon injection     Prudence York RD, [facility], 4:51 PM, 5/14/2025    Time Spent: 45 minutes  Encounter Type: Individual    Any diabetes medication dose changes were made via the [facility] Standing Orders under the patient's referring provider.

## 2025-05-14 NOTE — PATIENT INSTRUCTIONS
Plan     Continue Lantus 17 units at bedtime.  Adjust Novolog to 6 units at breakfast and lunch and lower Novolog at supper to 5 units.    Take Metformin 4 tabs daily.  Continue to test your BG 2 times/day.  If you have 2 or more readings less than 70 mg/dL in one week reach out to Jimmy Catalan or Diabetes Education.  Follow up with Dr. Catalan 11/3/25.  Follow up with Diabetes Education as needed.  If you have questions you can send them through [employer] or call Diabetes Education Triage [phone].  For Scheduling call [phone].  Prudence York RD, [facility], 4:43 PM, 5/14/2025

## 2025-05-15 ENCOUNTER — TELEPHONE (OUTPATIENT)
Dept: SURGERY | Facility: CLINIC | Age: 89
End: 2025-05-15

## 2025-05-15 NOTE — TELEPHONE ENCOUNTER
Type of surgery: EXAM UNDER ANESTHESIA, RECTUM - excision of perianal mass   Location of surgery: [place] [facility]  Date and time of surgery: 06/18/2025  Surgeon: PABLO   Pre-Op Appt Date: 05/28/2025  Post-Op Appt Date: 06/30/2025   Packet sent out: Yes  Pre-cert/Authorization completed:  No  Date:

## 2025-05-19 DIAGNOSIS — Z79.4 TYPE 2 DIABETES MELLITUS WITH BOTH EYES AFFECTED BY MILD NONPROLIFERATIVE RETINOPATHY WITHOUT MACULAR EDEMA, WITH LONG-TERM CURRENT USE OF INSULIN (H): ICD-10-CM

## 2025-05-19 DIAGNOSIS — E11.3293 TYPE 2 DIABETES MELLITUS WITH BOTH EYES AFFECTED BY MILD NONPROLIFERATIVE RETINOPATHY WITHOUT MACULAR EDEMA, WITH LONG-TERM CURRENT USE OF INSULIN (H): ICD-10-CM

## 2025-05-20 RX ORDER — METHYLPREDNISOLONE SODIUM SUCCINATE 125 MG/2ML
INJECTION, POWDER, FOR SOLUTION INTRAMUSCULAR; INTRAVENOUS
Qty: 200 EACH | Refills: 1 | Status: SHIPPED | OUTPATIENT
Start: 2025-05-20

## 2025-05-28 ENCOUNTER — OFFICE VISIT (OUTPATIENT)
Dept: FAMILY MEDICINE | Facility: CLINIC | Age: 89
End: 2025-05-28
Payer: COMMERCIAL

## 2025-05-28 ENCOUNTER — RESULTS FOLLOW-UP (OUTPATIENT)
Dept: FAMILY MEDICINE | Facility: CLINIC | Age: 89
End: 2025-05-28

## 2025-05-28 VITALS
OXYGEN SATURATION: 97 % | DIASTOLIC BLOOD PRESSURE: 56 MMHG | BODY MASS INDEX: 34.08 KG/M2 | RESPIRATION RATE: 20 BRPM | SYSTOLIC BLOOD PRESSURE: 124 MMHG | HEIGHT: 69 IN | WEIGHT: 230.1 LBS | TEMPERATURE: 97.4 F | HEART RATE: 68 BPM

## 2025-05-28 DIAGNOSIS — I10 HYPERTENSION, GOAL BELOW 140/90: ICD-10-CM

## 2025-05-28 DIAGNOSIS — I73.9 PERIPHERAL VASCULAR DISEASE: ICD-10-CM

## 2025-05-28 DIAGNOSIS — N18.31 STAGE 3A CHRONIC KIDNEY DISEASE (H): ICD-10-CM

## 2025-05-28 DIAGNOSIS — Z86.73 HISTORY OF TIA (TRANSIENT ISCHEMIC ATTACK) AND STROKE: ICD-10-CM

## 2025-05-28 DIAGNOSIS — I35.0 MODERATE AORTIC STENOSIS: ICD-10-CM

## 2025-05-28 DIAGNOSIS — Z79.4 TYPE 2 DIABETES MELLITUS WITH BOTH EYES AFFECTED BY MILD NONPROLIFERATIVE RETINOPATHY WITHOUT MACULAR EDEMA, WITH LONG-TERM CURRENT USE OF INSULIN (H): ICD-10-CM

## 2025-05-28 DIAGNOSIS — E78.5 HYPERLIPIDEMIA LDL GOAL <70: ICD-10-CM

## 2025-05-28 DIAGNOSIS — K62.89 PERIANAL MASS: ICD-10-CM

## 2025-05-28 DIAGNOSIS — E11.3293 TYPE 2 DIABETES MELLITUS WITH BOTH EYES AFFECTED BY MILD NONPROLIFERATIVE RETINOPATHY WITHOUT MACULAR EDEMA, WITH LONG-TERM CURRENT USE OF INSULIN (H): ICD-10-CM

## 2025-05-28 DIAGNOSIS — Z01.818 PREOP GENERAL PHYSICAL EXAM: Primary | ICD-10-CM

## 2025-05-28 PROBLEM — G45.9 TIA (TRANSIENT ISCHEMIC ATTACK): Status: RESOLVED | Noted: 2021-07-11 | Resolved: 2025-05-28

## 2025-05-28 PROCEDURE — 3074F SYST BP LT 130 MM HG: CPT | Performed by: FAMILY MEDICINE

## 2025-05-28 PROCEDURE — 1126F AMNT PAIN NOTED NONE PRSNT: CPT | Performed by: FAMILY MEDICINE

## 2025-05-28 PROCEDURE — 3078F DIAST BP <80 MM HG: CPT | Performed by: FAMILY MEDICINE

## 2025-05-28 PROCEDURE — 99214 OFFICE O/P EST MOD 30 MIN: CPT | Performed by: FAMILY MEDICINE

## 2025-05-28 PROCEDURE — 93000 ELECTROCARDIOGRAM COMPLETE: CPT | Performed by: FAMILY MEDICINE

## 2025-05-28 ASSESSMENT — PAIN SCALES - GENERAL: PAINLEVEL_OUTOF10: NO PAIN (0)

## 2025-05-28 NOTE — PATIENT INSTRUCTIONS
How to Take Your Medication Before Surgery  Preoperative Medication Instructions   Antiplatelet or Anticoagulation Medication Instructions   - aspirin: Discontinue aspirin 7 days prior to procedure to reduce bleeding risk. It should be resumed postoperatively.    - clopidrogel (Plavix), prasugrel (Effient), ticagrelor (Brilinta): No contraindication to stopping Plavix, DO NOT TAKE 5-7 days before surgery.     Additional Medication Instructions   - Herbal medications and vitamins: DO NOT TAKE 14 days prior to surgery.   - ACE/ARB/ARNI (lisinopril, enalapril, losartan, valsartan, olmesartan, sacubritril/valsartan) : Continue without modification (e.g., MAC anesthesia, neurosurgery, spine surgery, heart failure, or labile hypertension with risk of hypertension).   - Calcium Channel Blockers (amlodipine, diltiazem, felodipine): May be continued on the day of surgery.   - Diuretics (furosemide, hydrochlorothiazide, chlorothalidone): DO NOT TAKE on the day of surgery.   - Statins (atorvastatin, simvastatin, pravastatin) : Continue taking on the day of surgery.    - Long acting insulin (e.g. glargine, detemir): Take 80% of the usual evening or morning dose before surgery. 14 units night before surgery.   - short acting insulin (e.g. regular, lispro, aspart): DO NOT TAKE on the morning of surgery.    - metformin: DO NOT TAKE day of surgery.   - Topicals: DO NOT TAKE day of surgery.         Patient Education   Preparing for Your Surgery  For Adults  Getting started  In most cases, a nurse will call to review your health history and instructions. They will give you an arrival time based on your scheduled surgery time. Please be ready to share:  Your doctor's clinic name and phone number  Your medical, surgical, and anesthesia history  A list of allergies and sensitivities  A list of medicines, including herbal treatments and over-the-counter drugs  Whether the patient has a legal guardian (ask how to send us the papers in  advance)  Note: You may not receive a call if you were seen at our PAC (Preoperative Assessment Center).  Please tell us if you're pregnant--or if there's any chance you might be pregnant. Some surgeries may injure a fetus (unborn baby), so they require a pregnancy test. Surgeries that are safe for a fetus don't always need a test, and you can choose whether to have one.   Preparing for surgery  Within 10 to 30 days of surgery: Have a pre-op exam (sometimes called an H&P, or History and Physical). This can be done at a clinic or pre-operative center.  If you're having a , you may not need this exam. Talk to your care team.  At your pre-op exam, talk to your care team about all medicines you take. (This includes CBD oil and any drugs, such as THC, marijuana, and other forms of cannabis.) If you need to stop any medicine before surgery, ask when to start taking it again.  This is for your safety. Many medicines and drugs can make you bleed too much during surgery. Some change how well surgery (anesthesia) drugs work.  Call your insurance company to let them know you're having surgery. (If you don't have insurance, call [phone].)  Call your clinic if there's any change in your health. This includes a scrape or scratch near the surgery site, or any signs of a cold (sore throat, runny nose, cough, rash, fever).  Eating and drinking guidelines  For your safety: Unless your surgeon tells you otherwise, follow the guidelines below.  Eat and drink as normal until 8 hours before you arrive for surgery. After that, no food or milk. You can spit out gum when you arrive.  Drink clear liquids until 2 hours before you arrive. These are liquids you can see through, like water, Gatorade, and Propel Water. They also include plain black coffee and tea (no cream or milk).  No alcohol for 24 hours before you arrive. The night before surgery, stop any drinks that contain THC.  If your care team tells you to take medicine on  the morning of surgery, it's okay to take it with a sip of water. No other medicines or drugs are allowed (including CBD oil)--follow your care team's instructions.  If you have questions the day of surgery, call your hospital or surgery center.   Preventing infection  Shower or bathe the night before and the morning of surgery. Follow the instructions your clinic gave you. (If no instructions, use regular soap.)  Don't shave or clip hair near your surgery site. We'll remove the hair if needed.  Don't smoke or vape the morning of surgery. No chewing tobacco for 6 hours before you arrive. A nicotine patch is okay. You may spit out nicotine gum when you arrive.  For some surgeries, the surgeon will tell you to fully quit smoking and nicotine.  We will make every effort to keep you safe from infection. We will:  Clean our hands often with soap and water (or an alcohol-based hand rub).  Clean the skin at your surgery site with a special soap that kills germs.  Give you a special gown to keep you warm. (Cold raises the risk of infection.)  Wear hair covers, masks, gowns, and gloves during surgery.  Give antibiotic medicine, if prescribed. Not all surgeries need this medicine.  What to bring on the day of surgery  Photo ID and insurance card  Copy of your health care directive, if you have one  Glasses and hearing aids (bring cases)  You can't wear contacts during surgery  Inhaler and eye drops, if you use them (tell us about these when you arrive)  CPAP machine or breathing device, if you use them  A few personal items, if spending the night  If you have . . .  A pacemaker, ICD (cardiac defibrillator), or other implant: Bring the ID card.  An implanted stimulator: Bring the remote control.  A legal guardian: Bring a copy of the certified (court-stamped) guardianship papers.  Please remove any jewelry, including body piercings. Leave jewelry and other valuables at home.  If you're going home the day of surgery  You must  have a support person drive you home. They should stay with you overnight, and they may need to help with your self-care.  If you don't have a support person, please tells us as soon as possible. We can help.  After surgery  If it's hard to control your pain or you need more pain medicine, please call your surgeon's office.  Questions?   If you have any questions for your care team, list them here:   ____________________________________________________________________________________________________________________________________________________________________________________________________________________________________________________________  For informational purposes only. Not to replace the advice of your health care provider. Copyright   2003, 2019 [facility]. All rights reserved. Clinically reviewed by Jay Gunn MD. [employer] 327708 - REV 02/25.

## 2025-05-28 NOTE — H&P (VIEW-ONLY)
Preoperative Evaluation  [facility]  [address] [address]  Phone: [phone]  Primary Provider: Jimmy Catalan MD  Pre-op Performing Provider: Jimmy Catalan MD  May 28, 2025             5/23/2025   Surgical Information   What procedure is being done? Remove bump from butt   Facility or Hospital where procedure/surgery will be performed: [place]   Who is doing the procedure / surgery? Dr. Esparza   Date of surgery / procedure: 6/18/2025   Time of surgery / procedure: 12:30 pm   Where do you plan to recover after surgery? at home with family     Fax number for surgical facility: Note does not need to be faxed, will be available electronically in Epic.    Assessment & Plan     The proposed surgical procedure is considered INTERMEDIATE risk.    Preop general physical exam  - EKG 12-lead complete w/read - Clinics    Perianal mass    Type 2 diabetes mellitus with both eyes affected by mild nonproliferative retinopathy without macular edema, with long-term current use of insulin (H)  Fair control. A1c within periop guideline parameters.  Advised patient on insulin modifications.  - EKG 12-lead complete w/read - Clinics    Hypertension, goal below 140/90  Controlled.  Low salt, low fat diet.   Exercise as tolerated.  Take meds as prescribed; call if with side effects.    - EKG 12-lead complete w/read - Clinics    Stage 3a chronic kidney disease (H)  Stable renal function on recent labs.  Maintain hydration. Avoid nephrotoxins.   - EKG 12-lead complete w/read - Clinics    Hyperlipidemia LDL goal <70  Reinforced heart healthy lifestyle.   - EKG 12-lead complete w/read - Clinics    Moderate aortic stenosis  No worsening dyspnea or dizziness  - EKG 12-lead complete w/read - Clinics    Peripheral vascular disease  No acute symptoms and no recent occlusions.  - EKG 12-lead complete w/read - Clinics    History of TIA (transient ischemic attack) and stroke  No recent  episodes in the last year.              - No identified additional risk factors other than previously addressed    Preoperative Medication Instructions  Antiplatelet or Anticoagulation Medication Instructions   - aspirin: Discontinue aspirin 7 days prior to procedure to reduce bleeding risk. It should be resumed postoperatively.    - clopidrogel (Plavix), prasugrel (Effient), ticagrelor (Brilinta): No contraindication to stopping Plavix, DO NOT TAKE 5-7 days before surgery.     Additional Medication Instructions   - Herbal medications and vitamins: DO NOT TAKE 14 days prior to surgery.   - ACE/ARB/ARNI (lisinopril, enalapril, losartan, valsartan, olmesartan, sacubritril/valsartan) : Continue without modification (e.g., MAC anesthesia, neurosurgery, spine surgery, heart failure, or labile hypertension with risk of hypertension).   - Calcium Channel Blockers (amlodipine, diltiazem, felodipine): May be continued on the day of surgery.   - Diuretics (furosemide, hydrochlorothiazide, chlorothalidone): DO NOT TAKE on the day of surgery.   - Statins (atorvastatin, simvastatin, pravastatin) : Continue taking on the day of surgery.    - Long acting insulin (e.g. glargine, detemir): Take 80% of the usual evening or morning dose before surgery. 14 units night before surgery.   - short acting insulin (e.g. regular, lispro, aspart): DO NOT TAKE on the morning of surgery.    - metformin: DO NOT TAKE day of surgery.   - Topicals: DO NOT TAKE day of surgery.    Recommendation  Approval given to proceed with proposed procedure, without further diagnostic evaluation.    Follow-up   Return if symptoms worsen or fail to improve.        Shelbi Norman is a 89 year old, presenting for the following:  Pre-Op Exam          5/28/2025     1:46 PM   Additional Questions   Roomed by Rochelle HASTINGS   Accompanied by self     HPI: Patient has chronic perianal mass that needs further examination and eventual removal.. Hence, planned surgery. Reviewed  above with patient.           5/23/2025   Pre-Op Questionnaire   Have you ever had a heart attack or stroke? No   Have you ever had surgery on your heart or blood vessels, such as a stent placement, a coronary artery bypass, or surgery on an artery in your head, neck, heart, or legs? No   Do you have chest pain with activity? No   Do you have a history of heart failure? No   Do you currently have a cold, bronchitis or symptoms of other infection? No   Do you have a cough, shortness of breath, or wheezing? No   Do you or anyone in your family have previous history of blood clots? No   Do you or does anyone in your family have a serious bleeding problem such as prolonged bleeding following surgeries or cuts? No   Have you ever had problems with anemia or been told to take iron pills? No   Have you had any abnormal blood loss such as black, tarry or bloody stools? No   Have you ever had a blood transfusion? No   Are you willing to have a blood transfusion if it is medically needed before, during, or after your surgery? Yes   Have you or any of your relatives ever had problems with anesthesia? No   Do you have sleep apnea, excessive snoring or daytime drowsiness? No   Do you have any artifical heart valves or other implanted medical devices like a pacemaker, defibrillator, or continuous glucose monitor? No   Do you have artificial joints? No   Are you allergic to latex? No     Advance Care Planning    Document on file is a Health Care Directive or POLST.    Preoperative Review of    reviewed - no record of controlled substances prescribed.      Status of Chronic Conditions:  See problem list for active medical problems.  Problems all longstanding and stable, except as noted/documented.  See ROS for pertinent symptoms related to these conditions.    DIABETES - Patient has a longstanding history of DiabetesType Type II . Patient is being treated with oral agents and insulin injections and denies significant side  effects. Control has been fair. Complicating factors include but are not limited to: hypertension, hyperlipidemia, CAD/PVD, and history of diabetic foot ulcer.     HYPERLIPIDEMIA - Patient has a long history of significant Hyperlipidemia requiring medication for treatment with recent fair control. Patient reports no problems or side effects with the medication.     HYPERTENSION - Patient has longstanding history of HTN , currently denies any symptoms referable to elevated blood pressure. Specifically denies chest pain, palpitations, dyspnea, orthopnea, PND or peripheral edema. Blood pressure readings have been in normal range. Current medication regimen is as listed below. Patient denies any side effects of medication.     RENAL INSUFFICIENCY - Patient has a longstanding history of moderate-severe chronic renal insufficiency. Last Cr mi and stable.     Patient Active Problem List    Diagnosis Date Noted    History of TIA (transient ischemic attack) and stroke 05/28/2025     Priority: Medium    Moderate aortic stenosis 10/21/2024     Priority: Medium    Tremor of left hand 06/14/2022     Priority: Medium    Left leg weakness 06/14/2022     Priority: Medium    Dermatitis 01/14/2022     Priority: Medium    Skin lesion 01/14/2022     Priority: Medium    Chronic kidney disease, stage 3 (H) 07/21/2021     Priority: Medium    H/O amputation of lesser toe, right 01/04/2021     Priority: Medium    Macular degeneration (senile) of retina 01/10/2019     Priority: Medium     Nearly legally blind per eye doctor.      Type 2 diabetes mellitus with both eyes affected by mild nonproliferative retinopathy without macular edema, with long-term current use of insulin (H) 07/30/2018     Priority: Medium    Senile nuclear sclerosis 04/01/2015     Priority: Medium    Hypertension, goal below 140/90 03/25/2015     Priority: Medium    Malignant neoplasm of prostate (H) 06/16/2014     Priority: Medium    Peripheral vascular disease  11/18/2011     Priority: Medium     Problem list name updated by automated process. Provider to review      GERD (gastroesophageal reflux disease) 11/03/2011     Priority: Medium    Hyperlipidemia LDL goal <70 10/31/2010     Priority: Medium    Obesity      Priority: Medium     Obesity  Problem list name updated by automated process. Provider to review        Past Medical History:   Diagnosis Date    Diabetes mellitus (H)     Hypertension     Squamous cell carcinoma     TIA (transient ischemic attack) 07/11/2021    Transient cerebral ischemia 09/03/2009    Diagnosis updated by automated process. Provider to review and confirm.       Past Surgical History:   Procedure Laterality Date    AMPUTATE TOE(S) Right 7/23/2019    Procedure: Partial amputation great toe;  Surgeon: Ethan Montesinos DPM;  Location: [facility] OR    AMPUTATE TOE(S) Left 12/24/2019    Procedure: Amputation of the great toe, left foot;  Surgeon: Ethan Montesinos DPM;  Location: [facility] OR    AMPUTATE TOE(S) Right 3/3/2020    Procedure: Partial amputation, second toe, right foot;  Surgeon: Ethan Montesinos DPM;  Location: [facility] OR    [facility] SURGICAL PATHOLOGY  2002    prostate biopsy elevated PSA    COLONOSCOPY      HC REMOVE TONSILS/ADENOIDS,<12 Y/O      T & A    IR LOWER EXTREMITY ANGIOGRAM LEFT  2/26/2020    IR LOWER EXTREMITY ANGIOGRAM LEFT  7/19/2023    IR LOWER EXTREMITY ANGIOGRAM RIGHT  12/11/2018    PHACOEMULSIFICATION WITH STANDARD INTRAOCULAR LENS IMPLANT Left 4/2/2015    Procedure: PHACOEMULSIFICATION WITH STANDARD INTRAOCULAR LENS IMPLANT;  Surgeon: Joseph Hernandez MD;  Location: [facility] OR    PHACOEMULSIFICATION WITH STANDARD INTRAOCULAR LENS IMPLANT Right 5/4/2015    Procedure: PHACOEMULSIFICATION WITH STANDARD INTRAOCULAR LENS IMPLANT;  Surgeon: Joseph Hernandez MD;  Location: [facility] OR    REPAIR HAMMER TOE Left 4/2/2019    Procedure: 5th Metatarsal Head Resection;  Surgeon: Ethan Montesinos DPM;  Location: [facility] OR     Current  Outpatient Medications   Medication Sig Dispense Refill    amLODIPine (NORVASC) 5 MG tablet Take 1 tablet (5 mg) by mouth daily. 90 tablet 3    aspirin (ASA) 81 MG EC tablet Take 1 tablet (81 mg) by mouth daily      augmented betamethasone dipropionate (DIPROLENE AF) 0.05 % external cream Apply sparingly to affected area twice daily as needed.  Do not apply to face. 100 g 1    blood glucose (ONETOUCH ULTRA TEST) test strip 2 strips by In Vitro route daily One touch Ultra 2. 200 strip 3    blood glucose monitoring (ONE TOUCH ULTRA 2) meter device kit Use to test blood sugar 2 times daily or as directed. 1 kit 0    clopidogrel (PLAVIX) 75 MG tablet Take 1 tablet (75 mg) by mouth daily 90 tablet 3    dorzolamide-timolol (COSOPT) 2-0.5 % ophthalmic solution Place 1 drop into both eyes 2 times daily.      hydrochlorothiazide (HYDRODIURIL) 25 MG tablet Take 1 tablet (25 mg) by mouth daily. 90 tablet 3    insulin aspart (NOVOLOG PEN) 100 UNIT/ML pen Inject 5 units before breakfast, 5 units before lunch and 6 units before supper. 15 mL 3    insulin glargine (LANTUS SOLOSTAR) 100 UNIT/ML pen Inject 17 Units subcutaneously at bedtime. 30 mL 3    insulin pen needle (ULTICARE SHORT) 31G X 8 MM miscellaneous Use 2 pen needles daily or as directed. 200 each 1    Lancets (ONETOUCH DELICA PLUS BIONCF50Q) MISC 1 each by In Vitro route See Admin Instructions Hold on file until needed. 100 each 1    lisinopril (ZESTRIL) 40 MG tablet Take 1 tablet (40 mg) by mouth daily. SCHEDULE NURSE VISIT TO CHECK BLOOD PRESSURE BEFORE MARCH 15 2025. 90 tablet 3    metFORMIN (GLUCOPHAGE) 500 MG tablet Take 2 tablets (1,000 mg) by mouth 2 times daily (with meals). 360 tablet 3    Multiple Vitamins-Minerals (PRESERVISION AREDS PO) Take 1 tablet by mouth      pantoprazole (PROTONIX) 20 MG EC tablet Take 1 tablet (20 mg) by mouth daily 90 tablet 1    rosuvastatin (CRESTOR) 10 MG tablet Take 1 tablet (10 mg) by mouth at bedtime 90 tablet 3     "triamcinolone (KENALOG) 0.1 % external cream Apply topically 2 times daily To dry skin patches as needed.  Hold on file until needed. 80 g 1    Lancets (ONETOUCH DELICA PLUS [id number]) MISC 1 each by In Vitro route See Admin Instructions Hold on file until needed. 100 each 3    ORDER FOR DME Equipment being ordered:   Glucometer 1 Device 1       Allergies   Allergen Reactions    Zocor [Simvastatin - High Dose]      Bilateral hip aching        Social History     Tobacco Use    Smoking status: Never    Smokeless tobacco: Never   Substance Use Topics    Alcohol use: Not Currently     Alcohol/week: 1.7 standard drinks of alcohol     Types: 2 Standard drinks or equivalent per week     Comment: very little     Family History   Problem Relation Age of Onset    Cancer Mother         intestinal CA    Other Cancer Mother         cancer    Diabetes Father     Cerebrovascular Disease Father     Diabetes Brother     Other Cancer Brother         Pancreatic cancer    Other Cancer Son         GIST cancer    Diabetes Son     Diabetes Brother     Diabetes Son     Melanoma No family hx of      History   Drug Use No             Review of Systems  Constitutional, HEENT, cardiovascular, pulmonary, GI, , musculoskeletal, neuro, skin, endocrine and psych systems are negative, except as otherwise noted.    Objective    /56 (BP Location: Right arm, Patient Position: Chair, Cuff Size: Adult Large)   Pulse 68   Temp 97.4  F (36.3  C) (Tympanic)   Resp 20   Ht 1.759 m (5' 9.25\")   Wt 104.4 kg (230 lb 1.6 oz)   SpO2 97%   BMI 33.73 kg/m     Estimated body mass index is 33.73 kg/m  as calculated from the following:    Height as of this encounter: 1.759 m (5' 9.25\").    Weight as of this encounter: 104.4 kg (230 lb 1.6 oz).  Physical Exam  GENERAL APPEARANCE:  alert and no distress; ambulatory w/ a cane  EYES: pink conj, no icterus, PERRL, EOMI  HENT: ear canals and TM's normal, nose and mouth without ulcers or lesions, oropharynx " clear and oral mucous membranes moist  NECK: no adenopathy, no asymmetry, masses, or scars and thyroid normal to palpation  RESP: lungs clear to auscultation - no rales, rhonchi or wheezes  CV: regular rates and rhythm, normal S1 S2, no S3 or S4, grade 2 systolic RUPSB murmur, click or rub, no peripheral edema and peripheral pulses strong  ABDOMEN: protuberant, soft, nontender, no hepatosplenomegaly, no masses and bowel sounds normal  MS: s/p amputation of some toes bilaterally; fair peal pulses bilaterally; no peripheral cyanosis or pallor; trace bipedal edema  SKIN: good turgor, no rash/jaundice/ecchymosis  NEURO: Normal strength and tone, sensory exam grossly normal, mentation intact and speech normal     Recent Labs   Lab Test 05/02/25  1306 01/27/25  0837   HGB  --  13.3   PLT  --  242     --    POTASSIUM 4.4  --    CR 1.18*  --    A1C 7.8* 9.1*        Diagnostics  No labs were ordered during this visit.   EKG required for peripheral arterial disease and not completed in the last 90 days.   EKG: appears normal, NSR, normal axis, normal intervals, no acute ST/T changes c/w ischemia, no LVH by voltage criteria, unchanged from previous tracings    Revised Cardiac Risk Index (RCRI)  The patient has the following serious cardiovascular risks for perioperative complications:   - Cerebrovascular Disease (TIA or CVA) = 1 point   - Diabetes Mellitus (on Insulin) = 1 point     RCRI Interpretation: 2 points: Class III (moderate risk - 6.6% complication rate)     Estimated Functional Capacity: Performs 4 METS exercise without symptoms (e.g., light housework, stairs, 4 mph walk, 7 mph bike, slow step dance)           Signed Electronically by: Jimmy Catalan MD  A copy of this evaluation report is provided to the requesting physician.

## 2025-05-28 NOTE — PROGRESS NOTES
Preoperative Evaluation  [facility]  [address] [address]  Phone: [phone]  Primary Provider: Jimmy Caatlan MD  Pre-op Performing Provider: Jimmy Catalan MD  May 28, 2025             5/23/2025   Surgical Information   What procedure is being done? Remove bump from butt   Facility or Hospital where procedure/surgery will be performed: [place]   Who is doing the procedure / surgery? Dr. Esparza   Date of surgery / procedure: 6/18/2025   Time of surgery / procedure: 12:30 pm   Where do you plan to recover after surgery? at home with family     Fax number for surgical facility: Note does not need to be faxed, will be available electronically in Epic.    Assessment & Plan     The proposed surgical procedure is considered INTERMEDIATE risk.    Preop general physical exam  - EKG 12-lead complete w/read - Clinics    Perianal mass    Type 2 diabetes mellitus with both eyes affected by mild nonproliferative retinopathy without macular edema, with long-term current use of insulin (H)  Fair control. A1c within periop guideline parameters.  Advised patient on insulin modifications.  - EKG 12-lead complete w/read - Clinics    Hypertension, goal below 140/90  Controlled.  Low salt, low fat diet.   Exercise as tolerated.  Take meds as prescribed; call if with side effects.    - EKG 12-lead complete w/read - Clinics    Stage 3a chronic kidney disease (H)  Stable renal function on recent labs.  Maintain hydration. Avoid nephrotoxins.   - EKG 12-lead complete w/read - Clinics    Hyperlipidemia LDL goal <70  Reinforced heart healthy lifestyle.   - EKG 12-lead complete w/read - Clinics    Moderate aortic stenosis  No worsening dyspnea or dizziness  - EKG 12-lead complete w/read - Clinics    Peripheral vascular disease  No acute symptoms and no recent occlusions.  - EKG 12-lead complete w/read - Clinics    History of TIA (transient ischemic attack) and stroke  No recent  episodes in the last year.              - No identified additional risk factors other than previously addressed    Preoperative Medication Instructions  Antiplatelet or Anticoagulation Medication Instructions   - aspirin: Discontinue aspirin 7 days prior to procedure to reduce bleeding risk. It should be resumed postoperatively.    - clopidrogel (Plavix), prasugrel (Effient), ticagrelor (Brilinta): No contraindication to stopping Plavix, DO NOT TAKE 5-7 days before surgery.     Additional Medication Instructions   - Herbal medications and vitamins: DO NOT TAKE 14 days prior to surgery.   - ACE/ARB/ARNI (lisinopril, enalapril, losartan, valsartan, olmesartan, sacubritril/valsartan) : Continue without modification (e.g., MAC anesthesia, neurosurgery, spine surgery, heart failure, or labile hypertension with risk of hypertension).   - Calcium Channel Blockers (amlodipine, diltiazem, felodipine): May be continued on the day of surgery.   - Diuretics (furosemide, hydrochlorothiazide, chlorothalidone): DO NOT TAKE on the day of surgery.   - Statins (atorvastatin, simvastatin, pravastatin) : Continue taking on the day of surgery.    - Long acting insulin (e.g. glargine, detemir): Take 80% of the usual evening or morning dose before surgery. 14 units night before surgery.   - short acting insulin (e.g. regular, lispro, aspart): DO NOT TAKE on the morning of surgery.    - metformin: DO NOT TAKE day of surgery.   - Topicals: DO NOT TAKE day of surgery.    Recommendation  Approval given to proceed with proposed procedure, without further diagnostic evaluation.    Follow-up   Return if symptoms worsen or fail to improve.        Shelbi Norman is a 89 year old, presenting for the following:  Pre-Op Exam          5/28/2025     1:46 PM   Additional Questions   Roomed by Rochelle HASTINGS   Accompanied by self     HPI: Patient has chronic perianal mass that needs further examination and eventual removal.. Hence, planned surgery. Reviewed  above with patient.           5/23/2025   Pre-Op Questionnaire   Have you ever had a heart attack or stroke? No   Have you ever had surgery on your heart or blood vessels, such as a stent placement, a coronary artery bypass, or surgery on an artery in your head, neck, heart, or legs? No   Do you have chest pain with activity? No   Do you have a history of heart failure? No   Do you currently have a cold, bronchitis or symptoms of other infection? No   Do you have a cough, shortness of breath, or wheezing? No   Do you or anyone in your family have previous history of blood clots? No   Do you or does anyone in your family have a serious bleeding problem such as prolonged bleeding following surgeries or cuts? No   Have you ever had problems with anemia or been told to take iron pills? No   Have you had any abnormal blood loss such as black, tarry or bloody stools? No   Have you ever had a blood transfusion? No   Are you willing to have a blood transfusion if it is medically needed before, during, or after your surgery? Yes   Have you or any of your relatives ever had problems with anesthesia? No   Do you have sleep apnea, excessive snoring or daytime drowsiness? No   Do you have any artifical heart valves or other implanted medical devices like a pacemaker, defibrillator, or continuous glucose monitor? No   Do you have artificial joints? No   Are you allergic to latex? No     Advance Care Planning    Document on file is a Health Care Directive or POLST.    Preoperative Review of    reviewed - no record of controlled substances prescribed.      Status of Chronic Conditions:  See problem list for active medical problems.  Problems all longstanding and stable, except as noted/documented.  See ROS for pertinent symptoms related to these conditions.    DIABETES - Patient has a longstanding history of DiabetesType Type II . Patient is being treated with oral agents and insulin injections and denies significant side  effects. Control has been fair. Complicating factors include but are not limited to: hypertension, hyperlipidemia, CAD/PVD, and history of diabetic foot ulcer.     HYPERLIPIDEMIA - Patient has a long history of significant Hyperlipidemia requiring medication for treatment with recent fair control. Patient reports no problems or side effects with the medication.     HYPERTENSION - Patient has longstanding history of HTN , currently denies any symptoms referable to elevated blood pressure. Specifically denies chest pain, palpitations, dyspnea, orthopnea, PND or peripheral edema. Blood pressure readings have been in normal range. Current medication regimen is as listed below. Patient denies any side effects of medication.     RENAL INSUFFICIENCY - Patient has a longstanding history of moderate-severe chronic renal insufficiency. Last Cr mi and stable.     Patient Active Problem List    Diagnosis Date Noted    History of TIA (transient ischemic attack) and stroke 05/28/2025     Priority: Medium    Moderate aortic stenosis 10/21/2024     Priority: Medium    Tremor of left hand 06/14/2022     Priority: Medium    Left leg weakness 06/14/2022     Priority: Medium    Dermatitis 01/14/2022     Priority: Medium    Skin lesion 01/14/2022     Priority: Medium    Chronic kidney disease, stage 3 (H) 07/21/2021     Priority: Medium    H/O amputation of lesser toe, right 01/04/2021     Priority: Medium    Macular degeneration (senile) of retina 01/10/2019     Priority: Medium     Nearly legally blind per eye doctor.      Type 2 diabetes mellitus with both eyes affected by mild nonproliferative retinopathy without macular edema, with long-term current use of insulin (H) 07/30/2018     Priority: Medium    Senile nuclear sclerosis 04/01/2015     Priority: Medium    Hypertension, goal below 140/90 03/25/2015     Priority: Medium    Malignant neoplasm of prostate (H) 06/16/2014     Priority: Medium    Peripheral vascular disease  11/18/2011     Priority: Medium     Problem list name updated by automated process. Provider to review      GERD (gastroesophageal reflux disease) 11/03/2011     Priority: Medium    Hyperlipidemia LDL goal <70 10/31/2010     Priority: Medium    Obesity      Priority: Medium     Obesity  Problem list name updated by automated process. Provider to review        Past Medical History:   Diagnosis Date    Diabetes mellitus (H)     Hypertension     Squamous cell carcinoma     TIA (transient ischemic attack) 07/11/2021    Transient cerebral ischemia 09/03/2009    Diagnosis updated by automated process. Provider to review and confirm.       Past Surgical History:   Procedure Laterality Date    AMPUTATE TOE(S) Right 7/23/2019    Procedure: Partial amputation great toe;  Surgeon: Ethan Montesinos DPM;  Location: [facility] OR    AMPUTATE TOE(S) Left 12/24/2019    Procedure: Amputation of the great toe, left foot;  Surgeon: Ethan Montesinos DPM;  Location: [facility]    AMPUTATE TOE(S) Right 3/3/2020    Procedure: Partial amputation, second toe, right foot;  Surgeon: Ethan Montesinos DPM;  Location: [facility] OR    [facility] SURGICAL PATHOLOGY  2002    prostate biopsy elevated PSA    COLONOSCOPY      HC REMOVE TONSILS/ADENOIDS,<13 Y/O      T & A    IR LOWER EXTREMITY ANGIOGRAM LEFT  2/26/2020    IR LOWER EXTREMITY ANGIOGRAM LEFT  7/19/2023    IR LOWER EXTREMITY ANGIOGRAM RIGHT  12/11/2018    PHACOEMULSIFICATION WITH STANDARD INTRAOCULAR LENS IMPLANT Left 4/2/2015    Procedure: PHACOEMULSIFICATION WITH STANDARD INTRAOCULAR LENS IMPLANT;  Surgeon: Joseph Hernandez MD;  Location: [facility]    PHACOEMULSIFICATION WITH STANDARD INTRAOCULAR LENS IMPLANT Right 5/4/2015    Procedure: PHACOEMULSIFICATION WITH STANDARD INTRAOCULAR LENS IMPLANT;  Surgeon: Joseph Hernandez MD;  Location: [facility]    REPAIR HAMMER TOE Left 4/2/2019    Procedure: 5th Metatarsal Head Resection;  Surgeon: Ethan Montesinos DPM;  Location: [facility] OR     Current  Outpatient Medications   Medication Sig Dispense Refill    amLODIPine (NORVASC) 5 MG tablet Take 1 tablet (5 mg) by mouth daily. 90 tablet 3    aspirin (ASA) 81 MG EC tablet Take 1 tablet (81 mg) by mouth daily      augmented betamethasone dipropionate (DIPROLENE AF) 0.05 % external cream Apply sparingly to affected area twice daily as needed.  Do not apply to face. 100 g 1    blood glucose (ONETOUCH ULTRA TEST) test strip 2 strips by In Vitro route daily One touch Ultra 2. 200 strip 3    blood glucose monitoring (ONE TOUCH ULTRA 2) meter device kit Use to test blood sugar 2 times daily or as directed. 1 kit 0    clopidogrel (PLAVIX) 75 MG tablet Take 1 tablet (75 mg) by mouth daily 90 tablet 3    dorzolamide-timolol (COSOPT) 2-0.5 % ophthalmic solution Place 1 drop into both eyes 2 times daily.      hydrochlorothiazide (HYDRODIURIL) 25 MG tablet Take 1 tablet (25 mg) by mouth daily. 90 tablet 3    insulin aspart (NOVOLOG PEN) 100 UNIT/ML pen Inject 5 units before breakfast, 5 units before lunch and 6 units before supper. 15 mL 3    insulin glargine (LANTUS SOLOSTAR) 100 UNIT/ML pen Inject 17 Units subcutaneously at bedtime. 30 mL 3    insulin pen needle (ULTICARE SHORT) 31G X 8 MM miscellaneous Use 2 pen needles daily or as directed. 200 each 1    Lancets (ONETOUCH DELICA PLUS [id number]) MISC 1 each by In Vitro route See Admin Instructions Hold on file until needed. 100 each 1    lisinopril (ZESTRIL) 40 MG tablet Take 1 tablet (40 mg) by mouth daily. SCHEDULE NURSE VISIT TO CHECK BLOOD PRESSURE BEFORE MARCH 15 2025. 90 tablet 3    metFORMIN (GLUCOPHAGE) 500 MG tablet Take 2 tablets (1,000 mg) by mouth 2 times daily (with meals). 360 tablet 3    Multiple Vitamins-Minerals (PRESERVISION AREDS PO) Take 1 tablet by mouth      pantoprazole (PROTONIX) 20 MG EC tablet Take 1 tablet (20 mg) by mouth daily 90 tablet 1    rosuvastatin (CRESTOR) 10 MG tablet Take 1 tablet (10 mg) by mouth at bedtime 90 tablet 3     "triamcinolone (KENALOG) 0.1 % external cream Apply topically 2 times daily To dry skin patches as needed.  Hold on file until needed. 80 g 1    Lancets (ONETOUCH DELICA PLUS [id number]) MISC 1 each by In Vitro route See Admin Instructions Hold on file until needed. 100 each 3    ORDER FOR DME Equipment being ordered:   Glucometer 1 Device 1       Allergies   Allergen Reactions    Zocor [Simvastatin - High Dose]      Bilateral hip aching        Social History     Tobacco Use    Smoking status: Never    Smokeless tobacco: Never   Substance Use Topics    Alcohol use: Not Currently     Alcohol/week: 1.7 standard drinks of alcohol     Types: 2 Standard drinks or equivalent per week     Comment: very little     Family History   Problem Relation Age of Onset    Cancer Mother         intestinal CA    Other Cancer Mother         cancer    Diabetes Father     Cerebrovascular Disease Father     Diabetes Brother     Other Cancer Brother         Pancreatic cancer    Other Cancer Son         GIST cancer    Diabetes Son     Diabetes Brother     Diabetes Son     Melanoma No family hx of      History   Drug Use No             Review of Systems  Constitutional, HEENT, cardiovascular, pulmonary, GI, , musculoskeletal, neuro, skin, endocrine and psych systems are negative, except as otherwise noted.    Objective    /56 (BP Location: Right arm, Patient Position: Chair, Cuff Size: Adult Large)   Pulse 68   Temp 97.4  F (36.3  C) (Tympanic)   Resp 20   Ht 1.759 m (5' 9.25\")   Wt 104.4 kg (230 lb 1.6 oz)   SpO2 97%   BMI 33.73 kg/m     Estimated body mass index is 33.73 kg/m  as calculated from the following:    Height as of this encounter: 1.759 m (5' 9.25\").    Weight as of this encounter: 104.4 kg (230 lb 1.6 oz).  Physical Exam  GENERAL APPEARANCE:  alert and no distress; ambulatory w/ a cane  EYES: pink conj, no icterus, PERRL, EOMI  HENT: ear canals and TM's normal, nose and mouth without ulcers or lesions, oropharynx " clear and oral mucous membranes moist  NECK: no adenopathy, no asymmetry, masses, or scars and thyroid normal to palpation  RESP: lungs clear to auscultation - no rales, rhonchi or wheezes  CV: regular rates and rhythm, normal S1 S2, no S3 or S4, grade 2 systolic RUPSB murmur, click or rub, no peripheral edema and peripheral pulses strong  ABDOMEN: protuberant, soft, nontender, no hepatosplenomegaly, no masses and bowel sounds normal  MS: s/p amputation of some toes bilaterally; fair peal pulses bilaterally; no peripheral cyanosis or pallor; trace bipedal edema  SKIN: good turgor, no rash/jaundice/ecchymosis  NEURO: Normal strength and tone, sensory exam grossly normal, mentation intact and speech normal     Recent Labs   Lab Test 05/02/25  1306 01/27/25  0837   HGB  --  13.3   PLT  --  242     --    POTASSIUM 4.4  --    CR 1.18*  --    A1C 7.8* 9.1*        Diagnostics  No labs were ordered during this visit.   EKG required for peripheral arterial disease and not completed in the last 90 days.   EKG: appears normal, NSR, normal axis, normal intervals, no acute ST/T changes c/w ischemia, no LVH by voltage criteria, unchanged from previous tracings    Revised Cardiac Risk Index (RCRI)  The patient has the following serious cardiovascular risks for perioperative complications:   - Cerebrovascular Disease (TIA or CVA) = 1 point   - Diabetes Mellitus (on Insulin) = 1 point     RCRI Interpretation: 2 points: Class III (moderate risk - 6.6% complication rate)     Estimated Functional Capacity: Performs 4 METS exercise without symptoms (e.g., light housework, stairs, 4 mph walk, 7 mph bike, slow step dance)           Signed Electronically by: Jimmy Catalan MD  A copy of this evaluation report is provided to the requesting physician.

## 2025-06-07 DIAGNOSIS — Z79.4 TYPE 2 DIABETES MELLITUS WITH BOTH EYES AFFECTED BY MILD NONPROLIFERATIVE RETINOPATHY WITHOUT MACULAR EDEMA, WITH LONG-TERM CURRENT USE OF INSULIN (H): ICD-10-CM

## 2025-06-07 DIAGNOSIS — E11.3293 TYPE 2 DIABETES MELLITUS WITH BOTH EYES AFFECTED BY MILD NONPROLIFERATIVE RETINOPATHY WITHOUT MACULAR EDEMA, WITH LONG-TERM CURRENT USE OF INSULIN (H): ICD-10-CM

## 2025-06-09 RX ORDER — LANCETS 33 GAUGE
EACH MISCELLANEOUS
Qty: 100 EACH | Refills: 3 | Status: SHIPPED | OUTPATIENT
Start: 2025-06-09

## 2025-06-11 ENCOUNTER — TELEPHONE (OUTPATIENT)
Dept: FAMILY MEDICINE | Facility: CLINIC | Age: 89
End: 2025-06-11
Payer: COMMERCIAL

## 2025-06-11 NOTE — TELEPHONE ENCOUNTER
His lantus will be decreased to 80% of his current dose, and his short acting should not be given on the morning of surgery. These are being done to prevent blood sugars to go low. If he does have blood sugars below 70, he may eat a small piece of candy to raise it or he can be prescribed glucose tablets for that purpose.

## 2025-06-11 NOTE — TELEPHONE ENCOUNTER
"\"I am a Type 2 Diabetic and having a surgery on June 18 th for a growth on my butt... The paperwork says not to eat for 8 hours before surgery, so what do I do if my blood sugar goes low?..\"     Please advise. Claudette Holden RN        "

## 2025-06-16 NOTE — TELEPHONE ENCOUNTER
Patient Returning Call    Reason for call:  Returning Call     Information relayed to patient:  Relayed provider message below  Patient verbalized understanding  No further questions/concerns     Patient has additional questions:  No    Esa Poon, Clinic RN  [facility]

## 2025-06-17 ENCOUNTER — ANESTHESIA EVENT (OUTPATIENT)
Dept: SURGERY | Facility: CLINIC | Age: 89
End: 2025-06-17
Payer: COMMERCIAL

## 2025-06-17 NOTE — ANESTHESIA PREPROCEDURE EVALUATION
Anesthesia Pre-Procedure Evaluation    Patient: Fawad Garcia   MRN: [id number] : 1935          Procedure : Procedure(s):  EXAM UNDER ANESTHESIA, RECTUM -  excision of perianal mass         Past Medical History:   Diagnosis Date    Diabetes mellitus (H)     Hypertension     Squamous cell carcinoma     TIA (transient ischemic attack) 2021    Transient cerebral ischemia 2009    Diagnosis updated by automated process. Provider to review and confirm.        Past Surgical History:   Procedure Laterality Date    AMPUTATE TOE(S) Right 2019    Procedure: Partial amputation great toe;  Surgeon: Ethan Montesinos DPM;  Location: [facility]    AMPUTATE TOE(S) Left 2019    Procedure: Amputation of the great toe, left foot;  Surgeon: Ethan Montesinos DPM;  Location: [facility]    AMPUTATE TOE(S) Right 3/3/2020    Procedure: Partial amputation, second toe, right foot;  Surgeon: Ethan Montesinos DPM;  Location: [facility]    [facility]      prostate biopsy elevated PSA    COLONOSCOPY      HC REMOVE TONSILS/ADENOIDS,<13 Y/O      T & A    IR LOWER EXTREMITY ANGIOGRAM LEFT  2020    IR LOWER EXTREMITY ANGIOGRAM LEFT  2023    IR LOWER EXTREMITY ANGIOGRAM RIGHT  2018    PHACOEMULSIFICATION WITH STANDARD INTRAOCULAR LENS IMPLANT Left 2015    Procedure: PHACOEMULSIFICATION WITH STANDARD INTRAOCULAR LENS IMPLANT;  Surgeon: Joseph Hernandez MD;  Location: [facility]    PHACOEMULSIFICATION WITH STANDARD INTRAOCULAR LENS IMPLANT Right 2015    Procedure: PHACOEMULSIFICATION WITH STANDARD INTRAOCULAR LENS IMPLANT;  Surgeon: Joseph Hernandez MD;  Location: [facility]    REPAIR HAMMER TOE Left 2019    Procedure: 5th Metatarsal Head Resection;  Surgeon: Ethan Montesinos DPM;  Location: [facility]      Allergies   Allergen Reactions    Zocor [Simvastatin - High Dose]      Bilateral hip aching      Social History     Tobacco Use    Smoking status: Never    Smokeless  tobacco: Never   Substance Use Topics    Alcohol use: Not Currently     Alcohol/week: 1.7 standard drinks of alcohol     Types: 2 Standard drinks or equivalent per week     Comment: very little      Wt Readings from Last 1 Encounters:   05/28/25 104.4 kg (230 lb 1.6 oz)        Anesthesia Evaluation   Pt has had prior anesthetic. Type: MAC and General.        ROS/MED HX  ENT/Pulmonary:  - neg pulmonary ROS     Neurologic:     (+)          CVA,    TIA,                  Cardiovascular:     (+) Dyslipidemia hypertension- Peripheral Vascular Disease-   -  - -   Taking blood thinners Pt has received instructions: Instructions Given to patient: held since 6/11.                      valvular problems/murmurs type: AS moderate AS, asymptomatic.    Previous cardiac testing   Echo: Date: 2024 Results:     1. The left ventricle is normal in size. There is moderate concentric left  ventricular hypertrophy. Left ventricular systolic function is normal. The  visual ejection fraction is 55-60%. Diastolic Doppler findings (E/E' ratio  and/or other parameters) suggest left ventricular filling pressures are  increased. No regional wall motion abnormalities noted. There is no thrombus  seen in the left ventricle.  2. The right ventricle is normal size. The right ventricular systolic function  is normal.  3. Mild mitral stenosis.  4. Moderate aortic stenosis.  5. No pericardial effusion.  6. In comparison to the previous report dated 07/12/2021, moderate aortic  stenosis is now prese    Stress Test:  Date: Results:    ECG Reviewed:  Date: 5/2025 Results:  NSR  Cath:  Date: Results:      METS/Exercise Tolerance:     Hematologic:  - neg hematologic  ROS     Musculoskeletal:  - neg musculoskeletal ROS     GI/Hepatic:     (+) GERD, Asymptomatic on medication,                  Renal/Genitourinary:     (+) renal disease, type: CRI, Pt does not require dialysis,           Endo:     (+)  type II DM,             Obesity,        Psychiatric/Substance Use:  - neg psychiatric ROS     Infectious Disease:  - neg infectious disease ROS     Malignancy:   (+) Malignancy, History of Prostate.Prostate CA Remission status post Surgery.      Other:  - neg other ROS            Physical Exam  Airway  Mallampati: II  TM distance: >3 FB  Neck ROM: full  Mouth opening: >= 4 cm    Cardiovascular - normal exam   Dental   (+) Modest Abnormalities - crowns, retainers, 1 or 2 missing teeth      Pulmonary - normal exam      Neurological - normal exam  He appears awake, alert and oriented x3.    Other Findings       OUTSIDE LABS:  CBC:   Lab Results   Component Value Date    WBC 8.2 01/27/2025    WBC 8.1 07/19/2023    HGB 13.3 01/27/2025    HGB 13.3 01/29/2024    HCT 39.6 (L) 01/27/2025    HCT 39.3 (L) 07/19/2023     01/27/2025     07/19/2023     BMP:   Lab Results   Component Value Date     05/02/2025     01/29/2024    POTASSIUM 4.4 05/02/2025    POTASSIUM 4.4 01/29/2024    CHLORIDE 103 05/02/2025    CHLORIDE 105 01/29/2024    CO2 24 05/02/2025    CO2 21 (L) 01/29/2024    BUN 34.8 (H) 05/02/2025    BUN 26.2 (H) 01/29/2024    CR 1.18 (H) 05/02/2025    CR 1.10 01/29/2024    GLC 97 05/02/2025     (H) 01/29/2024     COAGS:   Lab Results   Component Value Date    PTT 28 07/19/2023    INR 0.98 07/19/2023     POC:   Lab Results   Component Value Date     (H) 03/03/2020     HEPATIC:   Lab Results   Component Value Date    ALBUMIN 3.7 07/05/2023    PROTTOTAL 6.7 07/05/2023    ALT 16 07/05/2023    AST 20 07/05/2023    ALKPHOS 57 07/05/2023    BILITOTAL 0.4 07/05/2023     OTHER:   Lab Results   Component Value Date    LACT 1.6 10/19/2022    A1C 7.8 (H) 05/02/2025    CAIN 9.7 05/02/2025    TSH 1.74 03/27/2018    CRP 82.2 (H) 07/19/2019    SED 44 (H) 07/19/2019       Anesthesia Plan    ASA Status:  3      NPO Status: NPO Appropriate   Anesthesia Type: General.  Airway: oral.  Induction: intravenous.  Maintenance: Balanced.  "  Techniques and Equipment:       - Monitoring Plan: standard ASA monitoring     Consents    Anesthesia Plan(s) and associated risks, benefits, and realistic alternatives discussed. Questions answered and patient/representative(s) expressed understanding.     - Discussed: CRNA     - Discussed with:  Patient        - Pt is DNR/DNI Status: no DNR     Blood Consent:      - Discussed with: not discussed.     Postoperative Care    Pain management: multimodal analgesia.     Comments:                   CORINA Garza CRNA    I have reviewed the pertinent notes and labs in the chart from the past 30 days and (re)examined the patient.  Any updates or changes from those notes are reflected in this note.    Clinically Significant Risk Factors Present on Admission                   # Hypertension: Noted on problem list          # DMII: A1C = 7.8 % (Ref range: 0.0 - 5.6 %) within past 6 months    # Obesity: Estimated body mass index is 33.73 kg/m  as calculated from the following:    Height as of 5/28/25: 1.759 m (5' 9.25\").    Weight as of 5/28/25: 104.4 kg (230 lb 1.6 oz).                    "

## 2025-06-18 ENCOUNTER — HOSPITAL ENCOUNTER (OUTPATIENT)
Facility: CLINIC | Age: 89
Discharge: HOME | End: 2025-06-18
Attending: STUDENT IN AN ORGANIZED HEALTH CARE EDUCATION/TRAINING PROGRAM | Admitting: STUDENT IN AN ORGANIZED HEALTH CARE EDUCATION/TRAINING PROGRAM
Payer: COMMERCIAL

## 2025-06-18 ENCOUNTER — ANESTHESIA (OUTPATIENT)
Dept: SURGERY | Facility: CLINIC | Age: 89
End: 2025-06-18
Payer: COMMERCIAL

## 2025-06-18 VITALS
OXYGEN SATURATION: 99 % | RESPIRATION RATE: 16 BRPM | TEMPERATURE: 97.9 F | HEIGHT: 69 IN | HEART RATE: 67 BPM | SYSTOLIC BLOOD PRESSURE: 109 MMHG | WEIGHT: 230 LBS | DIASTOLIC BLOOD PRESSURE: 49 MMHG | BODY MASS INDEX: 34.07 KG/M2

## 2025-06-18 DIAGNOSIS — L98.9 SKIN LESION: Primary | ICD-10-CM

## 2025-06-18 LAB
GLUCOSE BLDC GLUCOMTR-MCNC: 120 MG/DL (ref 70–99)
GLUCOSE BLDC GLUCOMTR-MCNC: 143 MG/DL (ref 70–99)

## 2025-06-18 PROCEDURE — 370N000017 HC ANESTHESIA TECHNICAL FEE, PER MIN: Performed by: STUDENT IN AN ORGANIZED HEALTH CARE EDUCATION/TRAINING PROGRAM

## 2025-06-18 PROCEDURE — 360N000074 HC SURGERY LEVEL 1, PER MIN: Performed by: STUDENT IN AN ORGANIZED HEALTH CARE EDUCATION/TRAINING PROGRAM

## 2025-06-18 PROCEDURE — 250N000013 HC RX MED GY IP 250 OP 250 PS 637

## 2025-06-18 PROCEDURE — 250N000009 HC RX 250: Performed by: NURSE ANESTHETIST, CERTIFIED REGISTERED

## 2025-06-18 PROCEDURE — 82962 GLUCOSE BLOOD TEST: CPT

## 2025-06-18 PROCEDURE — 250N000011 HC RX IP 250 OP 636: Performed by: STUDENT IN AN ORGANIZED HEALTH CARE EDUCATION/TRAINING PROGRAM

## 2025-06-18 PROCEDURE — 710N000012 HC RECOVERY PHASE 2, PER MINUTE: Performed by: STUDENT IN AN ORGANIZED HEALTH CARE EDUCATION/TRAINING PROGRAM

## 2025-06-18 PROCEDURE — 11422 EXC H-F-NK-SP B9+MARG 1.1-2: CPT | Performed by: STUDENT IN AN ORGANIZED HEALTH CARE EDUCATION/TRAINING PROGRAM

## 2025-06-18 PROCEDURE — 272N000001 HC OR GENERAL SUPPLY STERILE: Performed by: STUDENT IN AN ORGANIZED HEALTH CARE EDUCATION/TRAINING PROGRAM

## 2025-06-18 PROCEDURE — 88304 TISSUE EXAM BY PATHOLOGIST: CPT | Mod: TC | Performed by: STUDENT IN AN ORGANIZED HEALTH CARE EDUCATION/TRAINING PROGRAM

## 2025-06-18 PROCEDURE — 250N000025 HC SEVOFLURANE, PER MIN: Performed by: STUDENT IN AN ORGANIZED HEALTH CARE EDUCATION/TRAINING PROGRAM

## 2025-06-18 PROCEDURE — 250N000011 HC RX IP 250 OP 636: Performed by: NURSE ANESTHETIST, CERTIFIED REGISTERED

## 2025-06-18 PROCEDURE — 710N000009 HC RECOVERY PHASE 1, LEVEL 1, PER MIN: Performed by: STUDENT IN AN ORGANIZED HEALTH CARE EDUCATION/TRAINING PROGRAM

## 2025-06-18 PROCEDURE — 999N000141 HC STATISTIC PRE-PROCEDURE NURSING ASSESSMENT: Performed by: STUDENT IN AN ORGANIZED HEALTH CARE EDUCATION/TRAINING PROGRAM

## 2025-06-18 PROCEDURE — 258N000003 HC RX IP 258 OP 636

## 2025-06-18 PROCEDURE — 258N000003 HC RX IP 258 OP 636: Performed by: NURSE ANESTHETIST, CERTIFIED REGISTERED

## 2025-06-18 RX ORDER — NALOXONE HYDROCHLORIDE 0.4 MG/ML
0.1 INJECTION, SOLUTION INTRAMUSCULAR; INTRAVENOUS; SUBCUTANEOUS
Status: DISCONTINUED | OUTPATIENT
Start: 2025-06-18 | End: 2025-06-18 | Stop reason: HOSPADM

## 2025-06-18 RX ORDER — ONDANSETRON 2 MG/ML
4 INJECTION INTRAMUSCULAR; INTRAVENOUS EVERY 30 MIN PRN
Status: DISCONTINUED | OUTPATIENT
Start: 2025-06-18 | End: 2025-06-18 | Stop reason: HOSPADM

## 2025-06-18 RX ORDER — OXYCODONE HYDROCHLORIDE 5 MG/1
5 TABLET ORAL
Status: DISCONTINUED | OUTPATIENT
Start: 2025-06-18 | End: 2025-06-18 | Stop reason: HOSPADM

## 2025-06-18 RX ORDER — ONDANSETRON 2 MG/ML
INJECTION INTRAMUSCULAR; INTRAVENOUS PRN
Status: DISCONTINUED | OUTPATIENT
Start: 2025-06-18 | End: 2025-06-18

## 2025-06-18 RX ORDER — METOPROLOL TARTRATE 1 MG/ML
1-2 INJECTION, SOLUTION INTRAVENOUS EVERY 5 MIN PRN
Status: DISCONTINUED | OUTPATIENT
Start: 2025-06-18 | End: 2025-06-18 | Stop reason: HOSPADM

## 2025-06-18 RX ORDER — PROPOFOL 10 MG/ML
INJECTION, EMULSION INTRAVENOUS CONTINUOUS PRN
Status: DISCONTINUED | OUTPATIENT
Start: 2025-06-18 | End: 2025-06-18

## 2025-06-18 RX ORDER — LIDOCAINE HYDROCHLORIDE 20 MG/ML
INJECTION, SOLUTION INFILTRATION; PERINEURAL PRN
Status: DISCONTINUED | OUTPATIENT
Start: 2025-06-18 | End: 2025-06-18

## 2025-06-18 RX ORDER — FENTANYL CITRATE 50 UG/ML
INJECTION, SOLUTION INTRAMUSCULAR; INTRAVENOUS PRN
Status: DISCONTINUED | OUTPATIENT
Start: 2025-06-18 | End: 2025-06-18

## 2025-06-18 RX ORDER — HYDROXYZINE HYDROCHLORIDE 10 MG/1
10 TABLET, FILM COATED ORAL EVERY 6 HOURS PRN
Status: DISCONTINUED | OUTPATIENT
Start: 2025-06-18 | End: 2025-06-18 | Stop reason: HOSPADM

## 2025-06-18 RX ORDER — OXYCODONE HYDROCHLORIDE 5 MG/1
10 TABLET ORAL
Status: DISCONTINUED | OUTPATIENT
Start: 2025-06-18 | End: 2025-06-18 | Stop reason: HOSPADM

## 2025-06-18 RX ORDER — BUPIVACAINE HYDROCHLORIDE 5 MG/ML
INJECTION, SOLUTION PERINEURAL PRN
Status: DISCONTINUED | OUTPATIENT
Start: 2025-06-18 | End: 2025-06-18 | Stop reason: HOSPADM

## 2025-06-18 RX ORDER — MEPERIDINE HYDROCHLORIDE 25 MG/ML
12.5 INJECTION INTRAMUSCULAR; INTRAVENOUS; SUBCUTANEOUS EVERY 5 MIN PRN
Refills: 0 | Status: DISCONTINUED | OUTPATIENT
Start: 2025-06-18 | End: 2025-06-18 | Stop reason: HOSPADM

## 2025-06-18 RX ORDER — AMOXICILLIN 250 MG
1-2 CAPSULE ORAL 2 TIMES DAILY
Qty: 30 TABLET | Refills: 0 | Status: SHIPPED | OUTPATIENT
Start: 2025-06-18

## 2025-06-18 RX ORDER — LIDOCAINE 40 MG/G
CREAM TOPICAL
Status: DISCONTINUED | OUTPATIENT
Start: 2025-06-18 | End: 2025-06-18 | Stop reason: HOSPADM

## 2025-06-18 RX ORDER — DEXAMETHASONE SODIUM PHOSPHATE 4 MG/ML
4 INJECTION, SOLUTION INTRA-ARTICULAR; INTRALESIONAL; INTRAMUSCULAR; INTRAVENOUS; SOFT TISSUE
Status: DISCONTINUED | OUTPATIENT
Start: 2025-06-18 | End: 2025-06-18 | Stop reason: HOSPADM

## 2025-06-18 RX ORDER — ACETAMINOPHEN 325 MG/1
975 TABLET ORAL ONCE
Status: DISCONTINUED | OUTPATIENT
Start: 2025-06-18 | End: 2025-06-18

## 2025-06-18 RX ORDER — ACETAMINOPHEN 325 MG/1
650 TABLET ORAL
Status: DISCONTINUED | OUTPATIENT
Start: 2025-06-18 | End: 2025-06-18 | Stop reason: HOSPADM

## 2025-06-18 RX ORDER — HYDROMORPHONE HCL IN WATER/PF 6 MG/30 ML
0.2 PATIENT CONTROLLED ANALGESIA SYRINGE INTRAVENOUS EVERY 5 MIN PRN
Refills: 0 | Status: DISCONTINUED | OUTPATIENT
Start: 2025-06-18 | End: 2025-06-18 | Stop reason: HOSPADM

## 2025-06-18 RX ORDER — HYDROMORPHONE HCL IN WATER/PF 6 MG/30 ML
0.4 PATIENT CONTROLLED ANALGESIA SYRINGE INTRAVENOUS EVERY 5 MIN PRN
Refills: 0 | Status: DISCONTINUED | OUTPATIENT
Start: 2025-06-18 | End: 2025-06-18 | Stop reason: HOSPADM

## 2025-06-18 RX ORDER — FENTANYL CITRATE 50 UG/ML
25 INJECTION, SOLUTION INTRAMUSCULAR; INTRAVENOUS EVERY 5 MIN PRN
Refills: 0 | Status: DISCONTINUED | OUTPATIENT
Start: 2025-06-18 | End: 2025-06-18 | Stop reason: HOSPADM

## 2025-06-18 RX ORDER — SODIUM CHLORIDE, SODIUM LACTATE, POTASSIUM CHLORIDE, CALCIUM CHLORIDE 600; 310; 30; 20 MG/100ML; MG/100ML; MG/100ML; MG/100ML
INJECTION, SOLUTION INTRAVENOUS CONTINUOUS
Status: DISCONTINUED | OUTPATIENT
Start: 2025-06-18 | End: 2025-06-18 | Stop reason: HOSPADM

## 2025-06-18 RX ORDER — LIDOCAINE HYDROCHLORIDE AND EPINEPHRINE 10; 10 MG/ML; UG/ML
INJECTION, SOLUTION INFILTRATION; PERINEURAL PRN
Status: DISCONTINUED | OUTPATIENT
Start: 2025-06-18 | End: 2025-06-18 | Stop reason: HOSPADM

## 2025-06-18 RX ORDER — ONDANSETRON 4 MG/1
4 TABLET, ORALLY DISINTEGRATING ORAL EVERY 30 MIN PRN
Status: DISCONTINUED | OUTPATIENT
Start: 2025-06-18 | End: 2025-06-18 | Stop reason: HOSPADM

## 2025-06-18 RX ORDER — PROPOFOL 10 MG/ML
INJECTION, EMULSION INTRAVENOUS PRN
Status: DISCONTINUED | OUTPATIENT
Start: 2025-06-18 | End: 2025-06-18

## 2025-06-18 RX ORDER — GABAPENTIN 100 MG/1
100 CAPSULE ORAL
Status: COMPLETED | OUTPATIENT
Start: 2025-06-18 | End: 2025-06-18

## 2025-06-18 RX ORDER — ACETAMINOPHEN 325 MG/1
650 TABLET ORAL EVERY 4 HOURS PRN
Qty: 50 TABLET | Refills: 0 | Status: SHIPPED | OUTPATIENT
Start: 2025-06-18

## 2025-06-18 RX ORDER — FENTANYL CITRATE 50 UG/ML
50 INJECTION, SOLUTION INTRAMUSCULAR; INTRAVENOUS EVERY 5 MIN PRN
Refills: 0 | Status: DISCONTINUED | OUTPATIENT
Start: 2025-06-18 | End: 2025-06-18 | Stop reason: HOSPADM

## 2025-06-18 RX ORDER — CEFAZOLIN SODIUM/WATER 2 G/20 ML
2 SYRINGE (ML) INTRAVENOUS
Status: COMPLETED | OUTPATIENT
Start: 2025-06-18 | End: 2025-06-18

## 2025-06-18 RX ORDER — OXYCODONE HYDROCHLORIDE 5 MG/1
5-10 TABLET ORAL EVERY 4 HOURS PRN
Qty: 12 TABLET | Refills: 0 | Status: SHIPPED | OUTPATIENT
Start: 2025-06-18

## 2025-06-18 RX ORDER — HEPARIN SODIUM 5000 [USP'U]/.5ML
5000 INJECTION, SOLUTION INTRAVENOUS; SUBCUTANEOUS
Status: COMPLETED | OUTPATIENT
Start: 2025-06-18 | End: 2025-06-18

## 2025-06-18 RX ORDER — ACETAMINOPHEN 325 MG/1
975 TABLET ORAL ONCE
Status: COMPLETED | OUTPATIENT
Start: 2025-06-18 | End: 2025-06-18

## 2025-06-18 RX ORDER — CEFAZOLIN SODIUM/WATER 2 G/20 ML
2 SYRINGE (ML) INTRAVENOUS SEE ADMIN INSTRUCTIONS
Status: DISCONTINUED | OUTPATIENT
Start: 2025-06-18 | End: 2025-06-18 | Stop reason: HOSPADM

## 2025-06-18 RX ADMIN — SODIUM CHLORIDE, POTASSIUM CHLORIDE, SODIUM LACTATE AND CALCIUM CHLORIDE: 600; 310; 30; 20 INJECTION, SOLUTION INTRAVENOUS at 10:39

## 2025-06-18 RX ADMIN — GABAPENTIN 100 MG: 100 CAPSULE ORAL at 10:39

## 2025-06-18 RX ADMIN — LIDOCAINE HYDROCHLORIDE 60 MG: 20 INJECTION, SOLUTION INFILTRATION; PERINEURAL at 11:46

## 2025-06-18 RX ADMIN — PROPOFOL 100 MG: 10 INJECTION, EMULSION INTRAVENOUS at 11:46

## 2025-06-18 RX ADMIN — Medication 2 G: at 11:47

## 2025-06-18 RX ADMIN — Medication 50 MG: at 11:46

## 2025-06-18 RX ADMIN — HEPARIN SODIUM 5000 UNITS: 10000 INJECTION, SOLUTION INTRAVENOUS; SUBCUTANEOUS at 11:52

## 2025-06-18 RX ADMIN — ONDANSETRON 4 MG: 2 INJECTION INTRAMUSCULAR; INTRAVENOUS at 12:06

## 2025-06-18 RX ADMIN — ACETAMINOPHEN 975 MG: 325 TABLET ORAL at 10:38

## 2025-06-18 RX ADMIN — PROPOFOL 50 MCG/KG/MIN: 10 INJECTION, EMULSION INTRAVENOUS at 11:55

## 2025-06-18 RX ADMIN — Medication 200 MG: at 12:15

## 2025-06-18 RX ADMIN — PHENYLEPHRINE HYDROCHLORIDE 100 MCG: 10 INJECTION INTRAVENOUS at 12:13

## 2025-06-18 RX ADMIN — PHENYLEPHRINE HYDROCHLORIDE 100 MCG: 10 INJECTION INTRAVENOUS at 11:46

## 2025-06-18 RX ADMIN — FENTANYL CITRATE 50 MCG: 50 INJECTION INTRAMUSCULAR; INTRAVENOUS at 11:46

## 2025-06-18 ASSESSMENT — ACTIVITIES OF DAILY LIVING (ADL)
ADLS_ACUITY_SCORE: 43

## 2025-06-18 NOTE — ANESTHESIA CARE TRANSFER NOTE
Patient: Fawad Garcia    Procedure: Procedure(s):  EXAM UNDER ANESTHESIA, RECTUM -  excision of perianal mass       Diagnosis: Perianal mass [K62.89]  Diagnosis Additional Information: No value filed.    Anesthesia Type:   No value filed.     Note:    Oropharynx: oropharynx clear of all foreign objects and spontaneously breathing  Level of Consciousness: drowsy  Oxygen Supplementation: face mask  Level of Supplemental Oxygen (L/min / FiO2): 6  Independent Airway: airway patency satisfactory and stable  Dentition: dentition unchanged  Vital Signs Stable: post-procedure vital signs reviewed and stable  Report to RN Given: handoff report given  Patient transferred to: PACU    Handoff Report: Identifed the Patient, Identified the Reponsible Provider, Reviewed the pertinent medical history, Discussed the surgical course, Reviewed Intra-OP anesthesia mangement and issues during anesthesia, Set expectations for post-procedure period and Allowed opportunity for questions and acknowledgement of understanding      Vitals:  Vitals Value Taken Time   /67 06/18/25 12:25   Temp     Pulse 58 06/18/25 12:27   Resp 22 06/18/25 12:27   SpO2 98 % 06/18/25 12:27   Vitals shown include unfiled device data.    Electronically Signed By: CORINA Padilla CRNA  June 18, 2025  12:28 PM

## 2025-06-18 NOTE — OP NOTE
Procedure Date: 06/18/25    Pre-Op Diagnosis: perianal mass  Post-Op Diagnosis: Same    Procedure: Rectal Examination Under Anesthesia, excision of perianal mass  Surgeon: Abdon Esparza MD  Assistant: Geovanni Deshpande PA-C, expert assistance was necessary for exposure and retraction during the procedure     EBL: 3 cc    Anesthesia: General    Indications: Fawad Garcia presents for a rectal examination under anesthesia, after he was found to have an uncomfortable perianal mass in clinic. The indications, risks, benefits and alternatives to surgery were discussed and he understood the counseling offered and wishes to proceed.    Procedure: After informed consent was obtained, the patient was brought to the operating room and placed in prone-jackknife position. The perineum and buttocks were prepped and draped in the standard fashion, and a procedural time out performed. The external anus was examined. There were small nonthrombosed external hemorrhoids, and no evidence of acute or chronic anal fissure. A subcutaneous perianal mass was visualized in the right posterior quadrant. Digital examination was unremarkable for significant diminished tone, stricture. The mass was palpated and felt soft and mobile. A speculum was then delivered into the anus, and circumferential evaluation of the rectum and anal canal performed. There was evidence of internal hemorrhoids at the right anterior position, right posterior position, and left lateral position. The mass was seen just proximal to the dentate line. The mass was elevated using a grasper and was excised in its entirety using electrocautery. The mass measure 1.4 x 1.7 x 1.3 cm in size and was sent to pathology. The mucosal defect was closed in a running locked fashion using a 4-0 chromic suture. Hemostasis was assured.    The speculum was removed. Local anesthetic was applied. Sterile dressings with dry gauze were applied to the anus. The patient was allowed to  recover from anesthesia, placed supine, and transferred stable to the recovery room. He tolerated the procedure well.    Abdon Esparza MD on 6/18/2025 at 12:18 PM

## 2025-06-18 NOTE — ANESTHESIA POSTPROCEDURE EVALUATION
Patient: Fawad Garcia    Procedure: Procedure(s):  EXAM UNDER ANESTHESIA, RECTUM -  excision of perianal mass       Anesthesia Type:  No value filed.    Note:  Disposition: Outpatient   Postop Pain Control: Uneventful            Sign Out: Well controlled pain   PONV: No   Neuro/Psych: Uneventful            Sign Out: Acceptable/Baseline neuro status   Airway/Respiratory: Uneventful            Sign Out: Acceptable/Baseline resp. status   CV/Hemodynamics: Uneventful            Sign Out: Acceptable CV status; No obvious hypovolemia; No obvious fluid overload   Other NRE: NONE   DID A NON-ROUTINE EVENT OCCUR? No           Last vitals:  Vitals Value Taken Time   /71 06/18/25 12:30   Temp 36.3  C (97.4  F) 06/18/25 12:26   Pulse 59 06/18/25 12:42   Resp 11 06/18/25 12:42   SpO2 98 % 06/18/25 12:42   Vitals shown include unfiled device data.    Electronically Signed By: CORINA Anderson CRNA  June 18, 2025  2:08 PM

## 2025-06-18 NOTE — DISCHARGE INSTRUCTIONS
Same Day Surgery Discharge Instructions  Special Precautions After Surgery - Adult    It is not unusual to feel lightheaded or faint, up to 24 hours after surgery or while taking pain medication.  If you have these symptoms; sit for a few minutes before standing and have someone assist you when getting up.  You should rest and relax for the next 24 hours and must have someone stay with you for at least 24 hours after your discharge.  DO NOT DRIVE any vehicle or operate mechanical equipment for 24 hours following the end of your surgery.  DO NOT DRIVE while taking narcotic pain medications that have been prescribed by your physician.  If you had a limb operated on, you must be able to use it fully to drive.  DO NOT drink alcoholic beverages for 24 hours following surgery or while taking prescription pain medication.  Drink clear liquids (apple juice, ginger ale, broth, 7-Up, etc.).  Progress to your regular diet as you feel able.  Any questions call your physician and do not make important decisions for 24 hours.    Nausea and Vomiting: Nausea and vomiting can occur any time after receiving anesthesia. If you experience nausea and vomiting we encourage you to move to a clear liquid diet and advance your diet as tolerated. If nausea and vomiting do not improve within 12 hours please call the surgeon or present to the Emergency department.     Break-through Bleeding: If your experience bleeding from your surgical site apply pressure and additional dressing per nurse instruction. For simple problems such as a saturated dressing, you may need to reinforce the dressing with more gauze and tape and put slight pressure on the site. If bleeding does not subside contact the surgeon or present to the Emergency Department.    Post-op Infection: If you develop a fever of 100.4 or greater, have pus like drainage, redness, swelling or severe pain at the surgical site not alleviated with pain medications; please  contact the surgeon or present to the Emergency Department.     Medications:  Acetaminophen (Tylenol):  Next dose: 4:30PM.  Ibuprofen (Motrin, Advil):  Next dose: Start anytime.  Oxycodone:  Next dose: Start anytime.  Follow the instructions on the bottle.  __________________________________________________________________________________________________________________________________  IMPORTANT NUMBERS:    [facility] [place]:  [phone], [phone]  Pharmacy:  [phone]  Same Day Surgery:  [phone], for general post-op questions call Monday - Thursday until 8:30 p.m., Fridays until 6:00 p.m.   Mental Health Mobile Crisis line: [phone]                                                                      [facility], podiatry:  [phone]  [facility]:  [phone]     OB Clinic:  [phone]   General Surgery:  [phone]  Urology: [phone]  Specialty Access Center: [phone]

## 2025-06-18 NOTE — INTERVAL H&P NOTE
"I have reviewed the surgical (or preoperative) H&P that is linked to this encounter, and examined the patient. There are no significant changes    Clinical Conditions Present on Arrival:  Clinically Significant Risk Factors Present on Admission                  # Drug Induced Platelet Defect: home medication list includes an antiplatelet medication      # DMII: A1C = 7.8 % (Ref range: 0.0 - 5.6 %) within past 6 months  # Obesity: Estimated body mass index is 33.97 kg/m  as calculated from the following:    Height as of this encounter: 1.753 m (5' 9\").    Weight as of this encounter: 104.3 kg (230 lb).       "

## 2025-06-18 NOTE — ANESTHESIA PROCEDURE NOTES
Airway       Patient location during procedure: OR       Procedure Start/Stop Times: 6/18/2025 11:49 AM  Staff -        CRNA: Zoran Coleman APRN CRNA       Other Anesthesia Staff: Maria Antonia Mo       Performed By: DELONTE  Consent for Airway        Urgency: elective  Indications and Patient Condition       Indications for airway management: pérez-procedural       Induction type:intravenous       Mask difficulty assessment: 1 - vent by mask    Final Airway Details       Final airway type: endotracheal airway       Successful airway: ETT - single and Oral  Endotracheal Airway Details        ETT size (mm): 8.0       Cuffed: yes       Cuff volume (mL): 8       Successful intubation technique: video laryngoscopy       VL Blade Size: Alcazar 3       Grade View of Cords: 1       Adjucts: stylet       Position: Right       Measured from: lips       Secured at (cm): 23       Bite block used: None    Post intubation assessment        Placement verified by: capnometry, equal breath sounds and chest rise        Number of attempts at approach: 1       Number of other approaches attempted: 0       Secured with: tape       Ease of procedure: easy       Dentition: Intact and Unchanged    Medication(s) Administered   Medication Administration Time: 6/18/2025 11:49 AM

## 2025-06-19 NOTE — OR NURSING
Pt reported that he was unable to get up from the tub and fell when attempting : so alternatives given for creating a sitz bath in the toilet and discussed alternative cares.

## 2025-06-23 ENCOUNTER — TELEPHONE (OUTPATIENT)
Dept: FAMILY MEDICINE | Facility: CLINIC | Age: 89
End: 2025-06-23
Payer: COMMERCIAL

## 2025-06-24 LAB
PATH REPORT.COMMENTS IMP SPEC: NORMAL
PATH REPORT.COMMENTS IMP SPEC: NORMAL
PATH REPORT.FINAL DX SPEC: NORMAL
PATH REPORT.GROSS SPEC: NORMAL
PATH REPORT.MICROSCOPIC SPEC OTHER STN: NORMAL
PATH REPORT.RELEVANT HX SPEC: NORMAL
PHOTO IMAGE: NORMAL

## 2025-06-24 PROCEDURE — 88304 TISSUE EXAM BY PATHOLOGIST: CPT | Mod: 26 | Performed by: PATHOLOGY

## 2025-06-30 ENCOUNTER — OFFICE VISIT (OUTPATIENT)
Dept: SURGERY | Facility: CLINIC | Age: 89
End: 2025-06-30
Payer: COMMERCIAL

## 2025-06-30 VITALS
DIASTOLIC BLOOD PRESSURE: 67 MMHG | HEART RATE: 74 BPM | HEIGHT: 69 IN | WEIGHT: 230 LBS | SYSTOLIC BLOOD PRESSURE: 113 MMHG | TEMPERATURE: 97.6 F | BODY MASS INDEX: 34.07 KG/M2 | OXYGEN SATURATION: 95 %

## 2025-06-30 DIAGNOSIS — K64.4 SKIN TAG OF ANUS: Primary | ICD-10-CM

## 2025-06-30 PROCEDURE — 99024 POSTOP FOLLOW-UP VISIT: CPT | Performed by: STUDENT IN AN ORGANIZED HEALTH CARE EDUCATION/TRAINING PROGRAM

## 2025-06-30 PROCEDURE — 3078F DIAST BP <80 MM HG: CPT | Performed by: STUDENT IN AN ORGANIZED HEALTH CARE EDUCATION/TRAINING PROGRAM

## 2025-06-30 PROCEDURE — 3074F SYST BP LT 130 MM HG: CPT | Performed by: STUDENT IN AN ORGANIZED HEALTH CARE EDUCATION/TRAINING PROGRAM

## 2025-06-30 NOTE — NURSING NOTE
"Initial /67   Pulse 74   Temp 97.6  F (36.4  C) (Tympanic)   Ht 1.753 m (5' 9\")   Wt 104.3 kg (230 lb)   SpO2 95%   BMI 33.97 kg/m   Estimated body mass index is 33.97 kg/m  as calculated from the following:    Height as of this encounter: 1.753 m (5' 9\").    Weight as of this encounter: 104.3 kg (230 lb). .  Dinora Ramos MA    "

## 2025-06-30 NOTE — PROGRESS NOTES
"Surgery Post-op Note  [facility]  [address] [place] [facility] [place] [address]  T: [phone]  F: [phone]    Subjective:     Fawad Garcia presents to the clinic after undergoing excision of a perianal mass on 6/18/25.  Patient is here for follow up. The patient reports no incisional pain.  Patient is currently tolerating a regular diet.  Patient states bowel habits are firm. Patient denies significant nausea or vomiting.        Objective:     Vitals:   /67   Pulse 74   Temp 97.6  F (36.4  C) (Tympanic)   Ht 1.753 m (5' 9\")   Wt 104.3 kg (230 lb)   SpO2 95%   BMI 33.97 kg/m     General Appearance:    Fawad is a well-appearing male who is in no acute distress.   Cardiac:    RRR, extremities warm and well perfused    Pulmonary:  Nonlabored breathing on room air   Rectal:    Well healed incision in perianal skin from skin tag excision. No sign of erythema or drainage        Labs/Imaging:     No new labs/imaging       Pathology:     Final Diagnosis   A.  Perianal mass:  - Anal skin tag (soft fibroma)        Assessment:     Mr.Raymond MADHAVI Garcia is status post perianal skin tag excision, doing well      Plan:   1. The patient is instructed to call the office if there is any increasing incisional pain, swelling, redness, drainage, or any other problems.   2. Ok to return to normal activities  3. Follow up in surgery clinic as needed if any further questions or concerns    Abdon Esparza MD on 6/30/2025 at 3:20 PM     "

## 2025-06-30 NOTE — LETTER
"6/30/2025      Fawad Garcia  [address]  [facility] [place] [address]      Dear Colleague,    Thank you for referring your patient, Fawad Garcia, to the [facility]. Please see a copy of my visit note below.    Surgery Post-op Note  [facility]  [address] [place] [address]  T: [phone]  F: [phone]    Subjective:     Fawad Garcia presents to the clinic after undergoing excision of a perianal mass on 6/18/25.  Patient is here for follow up. The patient reports no incisional pain.  Patient is currently tolerating a regular diet.  Patient states bowel habits are firm. Patient denies significant nausea or vomiting.        Objective:     Vitals:   /67   Pulse 74   Temp 97.6  F (36.4  C) (Tympanic)   Ht 1.753 m (5' 9\")   Wt 104.3 kg (230 lb)   SpO2 95%   BMI 33.97 kg/m     General Appearance:    Fawad is a well-appearing male who is in no acute distress.   Cardiac:    RRR, extremities warm and well perfused    Pulmonary:  Nonlabored breathing on room air   Rectal:    Well healed incision in perianal skin from skin tag excision. No sign of erythema or drainage        Labs/Imaging:     No new labs/imaging       Pathology:     Final Diagnosis   A.  Perianal mass:  - Anal skin tag (soft fibroma)        Assessment:     Mr.Raymond MADHAVI Garcia is status post perianal skin tag excision, doing well      Plan:   1. The patient is instructed to call the office if there is any increasing incisional pain, swelling, redness, drainage, or any other problems.   2. Ok to return to normal activities  3. Follow up in surgery clinic as needed if any further questions or concerns    Abdon Esparza MD on 6/30/2025 at 3:20 PM       Again, thank you for allowing me to participate in the care of your patient.        Sincerely,        Abdon Esparza MD    Electronically signed"

## 2025-07-06 NOTE — PROGRESS NOTES
UROLOGY CLINIC VISIT    Chief Complaint:   Hydrocele       Referring Provider:  Kayden Santoyo    Subjective:     Fawad Garcia is a 89 year old male with a PMHx significant for those items listed, who is presenting to clinic today to discuss hydrocele.    Has Right hydrocele was previously drained 4/2025 has since recurred. Prior ultrasounds reviewed consistent R hydrocele. Mild discomfort, bothered by mass effect mainly.    On Plavix, sees Cardiology for aortic stenosis notes reviewed no other major surgery in abdomen or pelvis     Currently the patient has no fever, chills, nausea, vomiting or other signs/symptoms suggestive of acute systemic infection.    PMH  Past Medical History:   Diagnosis Date    Diabetes mellitus (H)     Hypertension     Squamous cell carcinoma     TIA (transient ischemic attack) 07/11/2021    Transient cerebral ischemia 09/03/2009    Diagnosis updated by automated process. Provider to review and confirm.       [facility]  Past Surgical History:   Procedure Laterality Date    AMPUTATE TOE(S) Right 7/23/2019    Procedure: Partial amputation great toe;  Surgeon: Ethan Montesinos DPM;  Location: [facility] OR    AMPUTATE TOE(S) Left 12/24/2019    Procedure: Amputation of the great toe, left foot;  Surgeon: Ethan Montesinos DPM;  Location: [facility] OR    AMPUTATE TOE(S) Right 3/3/2020    Procedure: Partial amputation, second toe, right foot;  Surgeon: Ethan Montesinos DPM;  Location: [facility] OR    CL AFF SURGICAL PATHOLOGY  2002    prostate biopsy elevated PSA    COLONOSCOPY      EXAM UNDER ANESTHESIA RECTUM N/A 6/18/2025    Procedure: EXAM UNDER ANESTHESIA, RECTUM -;  Surgeon: Abdon Esparza MD;  Location: [facility] OR    EXCISE LESION RECTUM N/A 6/18/2025    Procedure: excision of perianal mass;  Surgeon: Abdon Esparza MD;  Location: [facility] OR    HC REMOVE TONSILS/ADENOIDS,<13 Y/O      T & A    IR LOWER EXTREMITY ANGIOGRAM LEFT  2/26/2020    IR LOWER EXTREMITY ANGIOGRAM LEFT  7/19/2023    IR LOWER  "EXTREMITY ANGIOGRAM RIGHT  12/11/2018    PHACOEMULSIFICATION WITH STANDARD INTRAOCULAR LENS IMPLANT Left 4/2/2015    Procedure: PHACOEMULSIFICATION WITH STANDARD INTRAOCULAR LENS IMPLANT;  Surgeon: Joseph Hernandez MD;  Location: [facility] OR    PHACOEMULSIFICATION WITH STANDARD INTRAOCULAR LENS IMPLANT Right 5/4/2015    Procedure: PHACOEMULSIFICATION WITH STANDARD INTRAOCULAR LENS IMPLANT;  Surgeon: Joseph Hernandez MD;  Location: [facility] OR    REPAIR HAMMER TOE Left 4/2/2019    Procedure: 5th Metatarsal Head Resection;  Surgeon: Ethan Montesinos DPM;  Location: [facility] OR     FAMHx  Family History   Problem Relation Age of Onset    Cancer Mother         intestinal CA    Other Cancer Mother         cancer    Diabetes Father     Cerebrovascular Disease Father     Diabetes Brother     Other Cancer Brother         Pancreatic cancer    Other Cancer Son         GIST cancer    Diabetes Son     Diabetes Brother     Diabetes Son     Melanoma No family hx of      ALLERGY  Allergies   Allergen Reactions    Zocor [Simvastatin - High Dose]      Bilateral hip aching     MEDICATIONS  has a current medication list which includes the following prescription(s): acetaminophen, amlodipine, aspirin, augmented betamethasone dipropionate, onetouch ultra test, blood glucose monitoring, clopidogrel, dorzolamide-timolol, hydrochlorothiazide, insulin aspart, lantus solostar, ulticare short, onetouch delica plus [id number], onetouch delica plus [id number], lisinopril, metformin, multiple vitamins-minerals, order for dme, oxycodone, pantoprazole, rosuvastatin, senna-docusate, and triamcinolone.  ROS:  Constitutional: negative  Eyes: negative  Ears/Nose/Throat/Mouth: negative  Respiratory: negative  Cardiovascular: negative  Gastrointestinal: negative  Genito-Urinary: as documented above in HPI  Musculoskeletal: negative  Neurological: negative  Integumentary: negative      Objective:     /69   Pulse 70   Ht 1.753 m (5' 9\")   Wt 104.3 kg (230 " "lb)   SpO2 96%   BMI 33.97 kg/m     Physical Exam:  GENERAL: Patient is AOx3, in no acute distress  CARDIAC: regular rate  LUNG: breathing non labored  ABD: soft, nondistended  : normal circumcised phallus, testes descended bilaterally   Right hydrocele, unable to adequately palpate testicle below fluid       LABORATORY:  No results found for: \"CREATININE\"  Lab Results   Component Value Date    PSA 4.57 01/27/2025    PSA 4.61 07/19/2024    PSA 4.00 01/29/2024    PSA 3.68 07/05/2023    PSA 2.55 01/06/2023       Most Recent  Urinalysis:  Recent Labs   Lab Test 07/05/23  1423   COLOR Yellow   APPEARANCE Clear   URINEGLC 250*   URINEBILI Negative   URINEKETONE Negative   SG 1.020   UBLD Negative   URINEPH 5.5   PROTEIN Negative   UROBILINOGEN 0.2   NITRITE Negative   LEUKEST Negative   RBCU None Seen   WBCU None Seen       Assessment:       Fawad Garcia is a 89 year old male with a PMHx significant for those items listed, who is presenting to clinic today to discuss Right sided hydrocele.      Plan:      Encysted hydrocele  Scrotal pain   - Plan for Right Hydrocelectomy drain placement    - Cardiac clearance, hold Plavix 7 days   - Repeat US given recurrence    We discussed in detail the risks and benefits of hydrocele surgery including but not limited to those points listed below. We discussed surgical options and drainage in clinic. Discussed although less potential risks as mentioned below, drainage in clinic does carry with it a significantly higher chance for recurrence.  Patient voiced understanding and would like to proceed with the plan as discussed.   Hydrocelectomy is shown to reduce significantly the pain that is associated to hydroceles. Although the procedure is minimally invasive, it does contain risks, including:  Bleeding  Infection- infection/scrotal abscess formation has been reported at 9% of the patients.  Persistent swelling in the scrotum occurs in 9.3% of the cases.  Chronic Pain is " rare, but can occur, and it is reported in 0.6% of the time.   Blood Clots  Reoccurrence of the Hydrocele  Injury to the testicle and nearby structures, which can lead to infertility  Risk of anesthesia (vomiting, nausea).   Inguinal hernia- the loop of intestine in the abdominal wall can lead to life threatening complications    30 minutes spent on the date of the encounter doing (chart review/review of outside records/review of test results/interpretation of tests/reviewing imaging/patient visit/documentation/discussion with other provider(s)/discussion with family.    Kayden Santoyo MD

## 2025-07-07 ENCOUNTER — OFFICE VISIT (OUTPATIENT)
Dept: UROLOGY | Facility: CLINIC | Age: 89
End: 2025-07-07
Payer: COMMERCIAL

## 2025-07-07 VITALS
BODY MASS INDEX: 34.07 KG/M2 | SYSTOLIC BLOOD PRESSURE: 115 MMHG | WEIGHT: 230 LBS | HEART RATE: 70 BPM | HEIGHT: 69 IN | DIASTOLIC BLOOD PRESSURE: 69 MMHG | OXYGEN SATURATION: 96 %

## 2025-07-07 DIAGNOSIS — N43.0 ENCYSTED HYDROCELE: Primary | ICD-10-CM

## 2025-07-07 DIAGNOSIS — N50.82 SCROTAL PAIN: ICD-10-CM

## 2025-07-07 PROCEDURE — 1126F AMNT PAIN NOTED NONE PRSNT: CPT | Performed by: STUDENT IN AN ORGANIZED HEALTH CARE EDUCATION/TRAINING PROGRAM

## 2025-07-07 PROCEDURE — 3078F DIAST BP <80 MM HG: CPT | Performed by: STUDENT IN AN ORGANIZED HEALTH CARE EDUCATION/TRAINING PROGRAM

## 2025-07-07 PROCEDURE — 3074F SYST BP LT 130 MM HG: CPT | Performed by: STUDENT IN AN ORGANIZED HEALTH CARE EDUCATION/TRAINING PROGRAM

## 2025-07-07 PROCEDURE — 99214 OFFICE O/P EST MOD 30 MIN: CPT | Performed by: STUDENT IN AN ORGANIZED HEALTH CARE EDUCATION/TRAINING PROGRAM

## 2025-07-07 ASSESSMENT — PAIN SCALES - GENERAL: PAINLEVEL_OUTOF10: NO PAIN (0)

## 2025-07-07 NOTE — PATIENT INSTRUCTIONS
Hydrocelectomy Patient Information    The risks and benefits of hydrocele surgery including but not limited to those points listed below. Although there are less potential risks as mentioned below, drainage/needle aspiration in clinic does carry with it a significantly higher chance for recurrence.  Hydrocelectomy is shown to reduce significantly the pain that is associated to hydroceles. Although the procedure is minimally invasive, it does contain risks, including:  Bleeding  Infection- infection/scrotal abscess formation has been reported at 9% of the patients.  Persistent swelling in the scrotum occurs in 9.3% of the cases.  Chronic Pain is rare, but can occur, and it is reported in 0.6% of the time.   Blood Clots  Reoccurrence of the Hydrocele  Injury to the testicle and nearby structures, which can lead to infertility  Risk of anesthesia (vomiting, nausea).   Inguinal hernia- the loop of intestine in the abdominal wall can lead to life threatening complications    Post-operative Instructions:  A small amount of blood on the incisional dressing is expected. If you feel the amount of blood is excessive, please phone the office.  A scrotal support and ice pack will be applied following surgery to reduce pain and swelling. Please leave in place for the initial 48 hours, changing the ice pack as needed.  After 48 hours you may remove the dressing and scrotal support. You may shower, not bathe, and pat dry the incision, careful to not remove the steri-strips if present, which may be removed after 2 weeks. Otherwise you will have surgical glue on the incision, you may shower with this, please allow the glue to fall off do not pick it off.   Pain medication as needed. When taking pain medication, please use caution when walking and climbing stairs. It is not unusual to experience dizziness. Do not drive or operate heavy machinery when taking pain medication.  It is recommended that you return to work in one week  following surgery. If your job involves desk work and/or light duty, you may return in 3-4 days. You will likely experience discomfort for 2-3 weeks following surgery. You may slowly resume normal activities. Please do not have sexual activity for 2 weeks and/or engage in heavy lifting or sports for 3 weeks.  A Scrotal support/athletic supporter or snug jockey shorts should be worn for 3 weeks after surgery. Only remove to shower and then reapply.  You may experience a low grade fever, 100 degrees, during the first 2-3 days following surgery. Please remember to take deep breaths, cough, and walk.  You may have bruising at the base of the penis or scrotum, this will fade in 3-4 weeks. You may also feel a ridge under the incision. This ridge is the point where the tissue was joined. It will gradually soften and flatten.      Pain medications:   - You will not be discharged with prescription pain medications   - Please cycle taking mortin/Ibuprofen and tylenol every 6 hours as needed for pain. Follow instructions on the label.       Follow-up:    Please phone the office and make an appointment to see Dr. Santoyo in 6 weeks   If you have a drain present, please follow up post op day 1 to have you drain removed     If you have any question, concerns, or problems, please feel free to contact the office.\

## 2025-07-09 ENCOUNTER — TELEPHONE (OUTPATIENT)
Dept: UROLOGY | Facility: CLINIC | Age: 89
End: 2025-07-09
Payer: COMMERCIAL

## 2025-07-09 NOTE — TELEPHONE ENCOUNTER
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  1. Healthcare associated pneumonia, suspicion for gram negative.  Continue with cefepime, azithromycin, cultures negative. swallow study was obtained and negative for aspiration.  2. Alcohol abuse.  CIWA protocol if needed  3. Tobacco dependence.  Nicotine patch was ordered  4. Diabetes, Type 2. Most recent A1c was less than 6% in March 2019. Will place on q.i.d. Accu-Cheks and correctional Humalog   5. Moderate protein calorie malnutrition.  Evaluated by dietary  6. Hypertension, hyperlipidemia, coronary artery disease. Monitor while in the hospital and continue prior to admission medications.  7. Hypothyroidism. Noted low free T4 with TSH of 12. Continue home dose of levothyroxine and recommend recheck TSH in 6-8 weeks as outpatient.  8. Elevated LFTs, presumably related to ETOH, prior imagining suggestive of steatosis, improved.   Tylenol level undetectable, hep panel negative  9. Normocytic anemia, chronic. No acute bleeding.  Monitor  10. CODE STATUS: The  Patient called wanting to schedule the surgery that was discussed on 7/7. Stated sometime in September, would like a call back   patient reports that it's her time to let her go naturally, but she wants everything done up in 2 that point of death. She'll be made a selective DO NOT RESUSCITATE with treatment if acute decompensation.    Disposition:  Hopefully tommorrow    All orders have been entered electronically through [facility].  Patient seen during multidisciplinary rounds with RN and .  Care plan communicated with patient and staff.  Care plan communicated with family.    Subjective:  Tim Moore is a 65 year old female who is being seen in follow up for Pneumonia due to infectious organism. Patient seen and examined, says she feels better overall, white blood cell count 3.1.  We did discuss possible discharge tomorrow, patient is agreeable. She appears to be eating well and tolerating diet.    Past Medical/Surgical/Social/Family Histories reviewed with patient as recorded in the admit H&P (dated 9/17/2019).  Meds and Allergies reviewed with patient as recorded in EPIC.    Scheduled meds and infusions:  • sodium chloride (PF)  2 mL Intracatheter 2 times per day   • atorvastatin  40 mg Oral Daily   • FLUoxetine  40 mg Oral Daily   • fluticasone propionate  4 puff Inhalation BID Resp   • levothyroxine  112 mcg Oral QAM AC   • magnesium oxide  400 mg Oral BID   • metoPROLOL tartrate  25 mg Oral 2 times per day   • mirabegron ER  25 mg Oral Daily   • nicotine  1 patch Transdermal Q24H   • pantoprazole  40 mg Oral QAM AC   • enoxaparin  40 mg Subcutaneous Daily   • cefepime (MAXIPIME) extended interval IVPB  2,000 mg Intravenous 2 times per day   • azithromycin  500 mg Intravenous Daily   • insulin lispro   Subcutaneous TID AC   • insulin lispro   Subcutaneous Nightly   • buPROPion  150 mg Oral 2 times per day     • sodium chloride 0.9 % with KCl 20 mEq infusion 50 mL/hr (09/19/19 0809)   • dextrose 5 % infusion         Review of Systems:  Constitutional:  (-)generalized weakness, (-)fever, (-)chills  Respiratory:  (-)cough,  (-)wheezing, (-)shortness of breath  Cardiovascular:  (-)chest pain, (-)palpitations  Gastrointestinal:  (-)abdominal pain, (-)distention, (-)nausea, (-)vomiting  Integument:  (-)skin rash  Neurologic:  (-)numbness or tingling  Lymphatic:  (-)abnormal bruising or bleeding    OBJECTIVE:  Vital 24 Hour Range Most Recent Value   Temperature Temp  Min: 97.5 °F (36.4 °C)  Max: 98.9 °F (37.2 °C) 98.2 °F (36.8 °C)   Pulse Pulse  Min: 70  Max: 77 74   Respiratory Resp  Min: 18  Max: 18 18   Blood Pressure BP  Min: 104/59  Max: 131/75 125/70   Pulse Oximetry SpO2  Min: 95 %  Max: 100 %    O2 No data recorded      Vital Most Recent Value First Value   Weight 62.5 kg (09/19/19 0235) Weight: 62.3 kg (09/17/19 0114)   BMI Body mass index is 23.65 kg/m². BMI (Calculated): 23.58 (09/17/19 0114)       Intake/Output Summary (Last 24 hours) at 9/19/2019 0958  Last data filed at 9/19/2019 0929  Gross per 24 hour   Intake 2767.01 ml   Output 1725 ml   Net 1042.01 ml     Current diet:  Cardiac, Consistent Carb Moderate (45-75 Gm/meal) Diet  2 Times/day Between Meals (am/pm); Diabetic/glucose Intolerance, [place] Oral Nutrition Supplement    Physical Exam:  General - alert, cooperative, no acute distress  HEENT.  Sclera anicteric, no conjunctivitis  Cor - S1, S2, no murmurs, no rubs  Pulmonary - appears mostly clear throughout, no wheezing, no rhonchi  Abdomen - soft, nontender, nondistended  Extremities  -  warm without clubbing or cyanosis.  No edema.  Skin - no rashes or lesions.     Ancillary Studies including laboratory results are reviewed as recorded in [facility] 9/19/2019 9:58 AM.  A subset of labs and results are listed below:  lab last 24 hrs;   Recent Results (from the past 24 hour(s))   Metered blood glucose    Collection Time: 09/18/19 12:02 PM   Result Value Ref Range    Glucose Bedside  (H) 70 - 99 mg/dL   Metered blood glucose    Collection Time: 09/18/19  5:26 PM   Result Value Ref Range    Glucose Bedside   (H) 70 - 99 mg/dL   Metered blood glucose    Collection Time: 09/18/19  8:13 PM   Result Value Ref Range    Glucose Bedside  (H) 70 - 99 mg/dL   COMPREHENSIVE METABOLIC PANEL    Collection Time: 09/19/19  5:50 AM   Result Value Ref Range    Sodium 137 135 - 145 mmol/L    Potassium 3.7 3.4 - 5.1 mmol/L    Chloride 108 (H) 98 - 107 mmol/L    Carbon Dioxide 23 21 - 32 mmol/L    Anion Gap 10 10 - 20 mmol/L    Glucose 120 (H) 65 - 99 mg/dL    BUN 8 6 - 20 mg/dL    Creatinine 0.81 0.51 - 0.95 mg/dL    GFR Estimate,  89     GFR Estimate, Non African American 77     BUN/Creatinine Ratio 10 7 - 25    CALCIUM 8.1 (L) 8.4 - 10.2 mg/dL    TOTAL BILIRUBIN 0.8 0.2 - 1.0 mg/dL    AST/SGOT 187 (H) <38 Units/L    ALT/SGPT 110 (H) <64 Units/L    ALK PHOSPHATASE 120 (H) 45 - 117 Units/L    TOTAL PROTEIN 7.2 6.4 - 8.2 g/dL    Albumin 2.6 (L) 3.6 - 5.1 g/dL    GLOBULIN 4.6 (H) 2.0 - 4.0 g/dL    A/G Ratio, Serum 0.6 (L) 1.0 - 2.4   CBC No Differential    Collection Time: 09/19/19  5:50 AM   Result Value Ref Range    WBC 3.1 (L) 4.2 - 11.0 K/mcL    RBC 3.43 (L) 4.00 - 5.20 mil/mcL    HGB 10.2 (L) 12.0 - 15.5 g/dL    HCT 32.6 (L) 36.0 - 46.5 %    MCV 95.0 78.0 - 100.0 fl    MCH 29.7 26.0 - 34.0 pg    MCHC 31.3 (L) 32.0 - 36.5 g/dL    RDW-CV 15.9 (H) 11.0 - 15.0 %     140 - 450 K/mcL    NRBC 0 0 /100 WBC   Magnesium    Collection Time: 09/19/19  5:50 AM   Result Value Ref Range    MAGNESIUM 1.7 1.7 - 2.4 mg/dL       Xr Chest Ap Or Pa    Result Date: 9/17/2019  Narrative: Exam: XR CHEST AP OR PA Indication: chest pain Comparison: September 16, 2019 Findings: Monitoring leads overlying the chest. Patchy groundglass opacities at the right lung base. No pleural effusions, pneumothorax or focal consolidation otherwise. Cardia mediastinal silhouette and visualized bony thorax is stable. 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     Impression: Impression: Atelectasis versus early infiltrate at the right  lung base.     Xr Chest Ap Or Pa    Result Date: 9/8/2019  Narrative: CHEST 1 VIEW HISTORY: sob COMPARISON:  August 5, 2019 FINDINGS: Portable AP chest is supplied.  Minimal density remains lateral right lung base that has improved. Left lung is clear. Heart size and pulmonary vascularity are within normal limits.        Impression: IMPRESSION: Improving opacity lateral right lung base.        Xr Chest Pa And Lateral    Result Date: 9/16/2019  Narrative: XR CHEST PA AND LATERAL HISTORY: Intoxicated. Shaking. Cough. COMPARISON: CT chest 9/8/2019. Chest radiograph 9/20/2019. TECHNIQUE: Frontal and lateral views of the chest submitted for interpretation. FINDINGS: Stable cardiac mediastinal silhouette and pulmonary vasculature. Calcific atherosclerosis of the thoracic aorta. Linear scar or atelectasis at the lateral right lung base. Prior examinations. No focal consolidation, pleural effusion or pneumothorax. Surgical clips in the right upper quadrant.     Impression: IMPRESSION: Right lateral base scar or atelectasis. No focal consolidation.     Ct Chest Pe Imaging    Result Date: 9/8/2019  Narrative: EXAM: CT ANGIOGRAM CHEST PE IMAGING- 3D, 9/8/2019 11:01 AM   HISTORY: Shortness of breath Evaluate for acute pulmonary emboli. TECHNIQUE: The patient was screened for the injection of iodinated contrast. Pulmonary embolus protocol was utilized. Multi-detector axial scanning performed through the thorax after the intravenous infusion of 75 mL Isovue 370. Multiplanar reformatted images and MIP reconstructions were obtained to optimize evaluation of the pulmonary arterial system. In accordance with CT policies/protocols and the ALARA principle, radiation dose reduction techniques (such as automated exposure control, adjustment of mA/kV according to patient's size and/or iterative reconstruction technique) were utilized for this examination. COMPARISON: CT chest PE protocol 01/20/2019 FINDINGS: CTPA Metric Overall exam  quality: Satisfactory. Pulmonary arterial enhancement: Adequate. Breath hold: Adequate. Significant (impacts image quality) artifacts: Not present. Artifact type (if present):    . Vascular: No intraluminal filling defect in the central, lobar, and segmental pulmonary artery branches. The main pulmonary artery remains mildly dilated as before measuring 35 mm axially. Thoracic aorta is normal in course and caliber without evidence of dissection or aneurysm. Normal heart size. No pericardial effusion. Mediastinum/esteban: No mass or lymphadenopathy. Lungs: No pulmonary airspace consolidation, pleural effusion, or pneumothorax. Left lower lung evaluation is limited by respiratory motion artifacts. Airway: The trachea and main bronchi are patent. Chest wall: No focal destructive osseous abnormality. Soft tissue structures unremarkable. Upper abdomen: No significant findings. Splenic granulomas as before.     Impression: IMPRESSION: No CT evidence of acute pulmonary emboli. No other acute cardiopulmonary process. Mildly dilated pulmonary arterial system as before which can be seen in the setting of pulmonary artery hypertension.     Ct Head Wo Contrast    Result Date: 9/17/2019  Narrative: CT HEAD WO CONTRAST HISTORY: headache/confusion. COMPARISON:  September 8, 2019. TECHNIQUE:  Thin-section axial CT images were obtained from the skull base to the vertex. FINDINGS: Periventricular and deep white matter hypodensities which are nonspecific, likely compatible with chronic small vessel ischemic changes. Unenhanced CT images of the brain demonstrate age-appropriate ventricles and sulci. There is no acute intracranial hemorrhage, mass effect, or midline shift.. MRI is a more sensitive modality for evaluation of acute ischemia. Ethmoid and sphenoid sinus mucosal disease, mastoid air cells are grossly well pneumatized. Bony calvarium is intact     Impression: IMPRESSION: No acute intracranial pathology identified.      Ct Head Wo  Contrast    Result Date: 9/8/2019  Narrative: EXAM:  CT HEAD WO CONTRAST DATE/TIME: 9/8/2019 11:00 AM. CLINICAL INDICATION: Head trauma, minor, GCS>=13, low clinical risk, initial exam. COMPARISON:  CT head without contrast 8/5/2019. TECHNIQUE: Axial CT images of the head were obtained without the administration of intravenous contrast. Scanning was performed from the cranial vertex to the foramen magnum. Coronal and sagittal reformatted images were also provided for interpretation. FINDINGS:  No acute intracranial hemorrhage, mass or midline shift. The ventricular system is normal in configuration and size. The gray-white matter differentiation is maintained. No CT evidence of acute ischemia. Please note that acute ischemia may go undetected by CT in its early stages. Mild mucosal thickening within the left sphenoid sinus. The visualized paranasal sinuses, mastoid air cells and middle ears are otherwise well aerated. The visualized globes and orbits are intact. No depressed skull fractures.     Impression: IMPRESSION:  No demonstrable acute intracranial pathology by noncontrast CT.     Fl Video Swallow Study    Result Date: 9/18/2019  Narrative: FL VIDEO SWALLOW STUDY HISTORY: concern for aspiration TECHNIQUE: The patient was placed and lateral position and real-time video fluoroscopy was provided during administration of various consistencies of oral barium. The speech pathologist was present during the study. A full written report with recommendations from the speech pathologist will be forthcoming.     Unresulted Labs (From admission, onward)     Start     Ordered    09/17/19 0504  Respiratory Culture and Smear  ONE TIME,   TD      09/17/19 0506    09/17/19 0457  Metered blood glucose  ONE TIME,   Routine      09/17/19 0500              Unresulted Procedure (From admission, onward)    None        Microbiology:  Microbiology Results  (Last 10 results in the past 7 days)    Specimen   Gram Smear   Culture Result    Status      09/17/19  1154   09/17/19  0251  09/17/19  1154     URINE URINE   NO GROWTH 1 DAY. 09/17/2019 FINAL    URINE URINE     09/17/2019 FINAL    09/17/19  0251   09/17/19  0145  09/17/19  0251     BLOOD, PERIPHERAL HAND, RIGHT   NO GROWTH 1 DAY. PENDING    09/17/19  0145    09/17/19  0145     BLOOD, PERIPHERAL ANTECUBITAL,LEFT     PENDING          El Mayers MD  Hospitalist  [facility]  9/19/2019 9:58 AM

## 2025-07-14 DIAGNOSIS — D04.22 SQUAMOUS CELL CARCINOMA IN SITU (SCCIS) OF SKIN OF HELIX OF LEFT EAR: ICD-10-CM

## 2025-07-14 DIAGNOSIS — D23.9 DERMAL NEVUS: ICD-10-CM

## 2025-07-14 DIAGNOSIS — L30.0 NUMMULAR DERMATITIS: ICD-10-CM

## 2025-07-14 DIAGNOSIS — L82.1 SEBORRHEIC KERATOSIS: ICD-10-CM

## 2025-07-14 DIAGNOSIS — D18.01 ANGIOMA OF SKIN: ICD-10-CM

## 2025-07-14 DIAGNOSIS — L81.4 LENTIGO: ICD-10-CM

## 2025-07-14 RX ORDER — BETAMETHASONE DIPROPIONATE 0.5 MG/G
CREAM TOPICAL
Qty: 100 G | Refills: 1 | Status: SHIPPED | OUTPATIENT
Start: 2025-07-14

## 2025-07-14 NOTE — TELEPHONE ENCOUNTER
Requested Prescriptions   Pending Prescriptions Disp Refills    augmented betamethasone dipropionate (DIPROLENE AF) 0.05 % external cream 100 g 1     Sig: Apply sparingly to affected area twice daily as needed.  Do not apply to face.       There is no refill protocol information for this order          Last office visit: 12/16/2024 ; last virtual visit: Visit date not found with prescribing provider:  vEgeny Torres      Future Office Visit:        Adamaris Mckeon   [facility] [place]   [facility]  [phone]

## 2025-07-18 ENCOUNTER — APPOINTMENT (OUTPATIENT)
Dept: GENERAL RADIOLOGY | Facility: CLINIC | Age: 89
End: 2025-07-18
Attending: PHYSICIAN ASSISTANT
Payer: COMMERCIAL

## 2025-07-18 ENCOUNTER — HOSPITAL ENCOUNTER (EMERGENCY)
Facility: CLINIC | Age: 89
Discharge: HOME | End: 2025-07-18
Attending: PHYSICIAN ASSISTANT | Admitting: PHYSICIAN ASSISTANT
Payer: COMMERCIAL

## 2025-07-18 VITALS
TEMPERATURE: 98.8 F | SYSTOLIC BLOOD PRESSURE: 152 MMHG | OXYGEN SATURATION: 96 % | DIASTOLIC BLOOD PRESSURE: 81 MMHG | HEART RATE: 94 BPM | RESPIRATION RATE: 22 BRPM

## 2025-07-18 DIAGNOSIS — L08.9 TOE INFECTION: ICD-10-CM

## 2025-07-18 PROCEDURE — 99213 OFFICE O/P EST LOW 20 MIN: CPT | Performed by: PHYSICIAN ASSISTANT

## 2025-07-18 PROCEDURE — 73660 X-RAY EXAM OF TOE(S): CPT | Mod: LT

## 2025-07-18 PROCEDURE — G0463 HOSPITAL OUTPT CLINIC VISIT: HCPCS | Performed by: PHYSICIAN ASSISTANT

## 2025-07-18 RX ORDER — CEPHALEXIN 500 MG/1
500 CAPSULE ORAL 4 TIMES DAILY
Qty: 28 CAPSULE | Refills: 0 | Status: SHIPPED | OUTPATIENT
Start: 2025-07-18 | End: 2025-07-25

## 2025-07-18 ASSESSMENT — COLUMBIA-SUICIDE SEVERITY RATING SCALE - C-SSRS
6. HAVE YOU EVER DONE ANYTHING, STARTED TO DO ANYTHING, OR PREPARED TO DO ANYTHING TO END YOUR LIFE?: NO
1. IN THE PAST MONTH, HAVE YOU WISHED YOU WERE DEAD OR WISHED YOU COULD GO TO SLEEP AND NOT WAKE UP?: NO
2. HAVE YOU ACTUALLY HAD ANY THOUGHTS OF KILLING YOURSELF IN THE PAST MONTH?: NO

## 2025-07-18 ASSESSMENT — ACTIVITIES OF DAILY LIVING (ADL): ADLS_ACUITY_SCORE: 46

## 2025-07-19 NOTE — ED PROVIDER NOTES
History     Chief Complaint   Patient presents with    Toe Pain     HPI  Fawad Garcia is a 89 year old male past medical history significant for type 2 diabetes mellitus, obesity, hyperlipidemia, peripheral vascular disease, GERD, chronic kidney disease, history of TIA, history of amputation of his left great toe who presents to the urgent care with concern over possible toe infection.  Patient had nails trimmed earlier today technician who performed procedure noted callus/ sore on the distal tip of his toe and erythema, swelling recommended UC evaluation.  He denies any significant pain, fever, chills, myalgias, cough, dyspnea, wheezing.      Allergies:  Allergies   Allergen Reactions    Zocor [Simvastatin - High Dose]      Bilateral hip aching       Problem List:    Patient Active Problem List    Diagnosis Date Noted    History of TIA (transient ischemic attack) and stroke 05/28/2025     Priority: Medium    Moderate aortic stenosis 10/21/2024     Priority: Medium    Tremor of left hand 06/14/2022     Priority: Medium    Left leg weakness 06/14/2022     Priority: Medium    Dermatitis 01/14/2022     Priority: Medium    Skin lesion 01/14/2022     Priority: Medium    Chronic kidney disease, stage 3 (H) 07/21/2021     Priority: Medium    H/O amputation of lesser toe, right 01/04/2021     Priority: Medium    Macular degeneration (senile) of retina 01/10/2019     Priority: Medium     Nearly legally blind per eye doctor.      Type 2 diabetes mellitus with both eyes affected by mild nonproliferative retinopathy without macular edema, with long-term current use of insulin (H) 07/30/2018     Priority: Medium    Senile nuclear sclerosis 04/01/2015     Priority: Medium    Hypertension, goal below 140/90 03/25/2015     Priority: Medium    Malignant neoplasm of prostate (H) 06/16/2014     Priority: Medium    Peripheral vascular disease 11/18/2011     Priority: Medium     Problem list name updated by automated process.  Provider to review      GERD (gastroesophageal reflux disease) 11/03/2011     Priority: Medium    Hyperlipidemia LDL goal <70 10/31/2010     Priority: Medium    Obesity      Priority: Medium     Obesity  Problem list name updated by automated process. Provider to review          Past Medical History:    Past Medical History:   Diagnosis Date    Diabetes mellitus (H)     Hypertension     Squamous cell carcinoma     TIA (transient ischemic attack) 07/11/2021    Transient cerebral ischemia 09/03/2009       Past Surgical History:    Past Surgical History:   Procedure Laterality Date    AMPUTATE TOE(S) Right 7/23/2019    Procedure: Partial amputation great toe;  Surgeon: Ethan Montesinos DPM;  Location: [facility]    AMPUTATE TOE(S) Left 12/24/2019    Procedure: Amputation of the great toe, left foot;  Surgeon: Ethan Montesinos DPM;  Location: [facility]    AMPUTATE TOE(S) Right 3/3/2020    Procedure: Partial amputation, second toe, right foot;  Surgeon: Ethan Montesinos DPM;  Location: [facility]    CL AFF SURGICAL PATHOLOGY  2002    prostate biopsy elevated PSA    COLONOSCOPY      EXAM UNDER ANESTHESIA RECTUM N/A 6/18/2025    Procedure: EXAM UNDER ANESTHESIA, RECTUM -;  Surgeon: Abdon Esparza MD;  Location: [facility]    EXCISE LESION RECTUM N/A 6/18/2025    Procedure: excision of perianal mass;  Surgeon: Abdon Esparza MD;  Location: [facility]    HC REMOVE TONSILS/ADENOIDS,<11 Y/O      T & A    IR LOWER EXTREMITY ANGIOGRAM LEFT  2/26/2020    IR LOWER EXTREMITY ANGIOGRAM LEFT  7/19/2023    IR LOWER EXTREMITY ANGIOGRAM RIGHT  12/11/2018    PHACOEMULSIFICATION WITH STANDARD INTRAOCULAR LENS IMPLANT Left 4/2/2015    Procedure: PHACOEMULSIFICATION WITH STANDARD INTRAOCULAR LENS IMPLANT;  Surgeon: Joseph Hernandez MD;  Location: [facility]    PHACOEMULSIFICATION WITH STANDARD INTRAOCULAR LENS IMPLANT Right 5/4/2015    Procedure: PHACOEMULSIFICATION WITH STANDARD INTRAOCULAR LENS IMPLANT;  Surgeon: Joseph Hernandez MD;   Location: [facility]    REPAIR HAMMER TOE Left 4/2/2019    Procedure: 5th Metatarsal Head Resection;  Surgeon: Ethan Montesinos DPM;  Location: [facility]       Family History:    Family History   Problem Relation Age of Onset    Cancer Mother         intestinal CA    Other Cancer Mother         cancer    Diabetes Father     Cerebrovascular Disease Father     Diabetes Brother     Other Cancer Brother         Pancreatic cancer    Other Cancer Son         GIST cancer    Diabetes Son     Diabetes Brother     Diabetes Son     Melanoma No family hx of        Social History:  Marital Status:   [2]  Social History     Tobacco Use    Smoking status: Never    Smokeless tobacco: Never   Vaping Use    Vaping status: Never Used   Substance Use Topics    Alcohol use: Not Currently     Alcohol/week: 1.7 standard drinks of alcohol     Types: 2 Standard drinks or equivalent per week     Comment: very little    Drug use: No        Medications:    acetaminophen (TYLENOL) 325 MG tablet  amLODIPine (NORVASC) 5 MG tablet  aspirin (ASA) 81 MG EC tablet  augmented betamethasone dipropionate (DIPROLENE AF) 0.05 % external cream  blood glucose (ONETOUCH ULTRA TEST) test strip  blood glucose monitoring (ONE TOUCH ULTRA 2) meter device kit  clopidogrel (PLAVIX) 75 MG tablet  dorzolamide-timolol (COSOPT) 2-0.5 % ophthalmic solution  hydrochlorothiazide (HYDRODIURIL) 25 MG tablet  insulin aspart (NOVOLOG PEN) 100 UNIT/ML pen  insulin glargine (LANTUS SOLOSTAR) 100 UNIT/ML pen  insulin pen needle (ULTICARE SHORT) 31G X 8 MM miscellaneous  Lancets (ONETOUCH DELICA PLUS [id number]) MISC  Lancets (ONETOUCH DELICA PLUS [id number]) MISC  lisinopril (ZESTRIL) 40 MG tablet  metFORMIN (GLUCOPHAGE) 500 MG tablet  Multiple Vitamins-Minerals (PRESERVISION AREDS PO)  ORDER FOR DME  oxyCODONE (ROXICODONE) 5 MG tablet  pantoprazole (PROTONIX) 20 MG EC tablet  rosuvastatin (CRESTOR) 10 MG tablet  senna-docusate (SENOKOT-S/PERICOLACE) 8.6-50 MG  tablet  triamcinolone (KENALOG) 0.1 % external cream      Review of Systems  CONSTITUTIONAL:NEGATIVE for fever, chills, change in weight  INTEGUMENTARY/SKIN: POSITIVE for erythema/wound left second toe   RESP:NEGATIVE for significant cough or SOB  MUSCULOSKELETAL: NEGATIVE for significant arthralgias or myalgia  NEURO: NEGATIVE for new onset numbness or paresthesias  Physical Exam   BP: (!) 152/81  Pulse: 94  Temp: 98.8  F (37.1  C)  Resp: 22  SpO2: 96 %  Physical Exam  Constitutional:       General: He is not in acute distress.     Appearance: He is not ill-appearing or toxic-appearing.   Musculoskeletal:      Left foot: Normal capillary refill. Swelling and tenderness present. No crepitus. Normal pulse.      Comments: Wound, Erythema, swelling to the left second toe as seen in photos below, scar from prior left great toe amputation   Skin:     Findings: Erythema (left second toe) and wound (distal tip of left second toe) present. No abrasion, abscess or ecchymosis.   Neurological:      Mental Status: He is alert.                 ED Course        Procedures       Critical Care time:  none  None     Recent Results (from the past 24 hours)   XR Toe Left G/E 2 Views    Narrative    EXAM: XR TOE LEFT G/E 2 VIEWS  LOCATION: [facility]  DATE: 7/18/2025    INDICATION: Erythema, swelling, rule out bony abnormality.  COMPARISON: 12/26/2019.      Impression    IMPRESSION: Three views of the 2nd toe show soft tissue swelling. There is cortical remodeling and irregularity along the distal tuft/phalanx of the 2nd toe which does raise concern for osteomyelitis.    No evidence of an acute fracture. Flexion deformity.     Medications - No data to display    Assessments & Plan (with Medical Decision Making)     I have reviewed the nursing notes.  I have reviewed the findings, diagnosis, plan and need for follow up with the patient.     Discharge Medication List as of 7/18/2025  8:53 PM        START  taking these medications    Details   cephALEXin (KEFLEX) 500 MG capsule Take 1 capsule (500 mg) by mouth 4 times daily for 7 days., Disp-28 capsule, R-0, E-Prescribe           Final diagnoses:   Toe infection     89-year-old male with significant past medical history including type 2 diabetes mellitus, peripheral vascular disease status post amputation of the toes of his right and left feet presents to the urgent care accompanied by family with concern over swelling, erythema, wound noted by  earlier today.  Physical exam findings were significant for wound to the distal tip of the right second toe diffuse erythema, swelling, minimal tenderness palpation of the toe.  X-ray was obtained and was negative for fracture.   radiologist did note soft tissue swelling and cortical remodeling and irregularity along the distal tuft/phalanx of the second toe which raises concern for osteomyelitis.  Discussed case with ED physician Dr. Mahin Wei who recommended emergent outpatient podiatry referral which was placed.  He was discharged home with prescription for keflex.  Follow up as directed. Signs of worsening infection, worrisome reasons to return to ER/UC sooner discussed.     Disclaimer: This note consists of symbols derived from keyboarding, dictation, and/or voice recognition software. As a result, there may be errors in the script that have gone undetected.  Please consider this when interpreting information found in the chart.    7/18/2025   [facility] EMERGENCY DEPT       Maki Menard PA-C  07/19/25 [id number]

## 2025-07-21 ENCOUNTER — PATIENT OUTREACH (OUTPATIENT)
Dept: CARE COORDINATION | Facility: CLINIC | Age: 89
End: 2025-07-21
Payer: COMMERCIAL

## 2025-07-22 ENCOUNTER — HOSPITAL ENCOUNTER (OUTPATIENT)
Facility: AMBULATORY SURGERY CENTER | Age: 89
End: 2025-07-22
Attending: STUDENT IN AN ORGANIZED HEALTH CARE EDUCATION/TRAINING PROGRAM
Payer: COMMERCIAL

## 2025-07-22 ENCOUNTER — OFFICE VISIT (OUTPATIENT)
Dept: PODIATRY | Facility: CLINIC | Age: 89
End: 2025-07-22
Payer: COMMERCIAL

## 2025-07-22 ENCOUNTER — TELEPHONE (OUTPATIENT)
Dept: PODIATRY | Facility: CLINIC | Age: 89
End: 2025-07-22

## 2025-07-22 DIAGNOSIS — L03.032 CELLULITIS OF LEFT TOE: ICD-10-CM

## 2025-07-22 DIAGNOSIS — L97.524 ULCER OF TOE, LEFT, WITH NECROSIS OF BONE (H): Primary | ICD-10-CM

## 2025-07-22 PROCEDURE — 99214 OFFICE O/P EST MOD 30 MIN: CPT | Performed by: STUDENT IN AN ORGANIZED HEALTH CARE EDUCATION/TRAINING PROGRAM

## 2025-07-22 RX ORDER — CEPHALEXIN 500 MG/1
500 CAPSULE ORAL 2 TIMES DAILY
Qty: 14 CAPSULE | Refills: 0 | Status: SHIPPED | OUTPATIENT
Start: 2025-07-22 | End: 2025-07-29

## 2025-07-22 NOTE — PATIENT INSTRUCTIONS
Fawad,      Your visit to [facility] for your surgery is coming soon and we look forward to seeing you! This friendly reminder and pre-procedure checklist will help to ensure your surgery goes smoothly and meets your expectations. At [facility], our goal is to provide you with a great patient experience and to deliver genuine, professional care to every patient.     Please complete all the steps in advance of your visit. If you have any questions about the items listed below, please give our office a call. We can be reached at [phone].    Procedure:       Procedure Date :     *A surgery nurse will call you 1-2 days before surgery to inform you of the time of arrival for surgery.    Surgeon:       Admission Type:       Procedure Location:   [facility]:  [address] [place] [address] [address] [place] [address] (Fax: [phone])   Please enter through the main entrance. Take the elevators to the 3rd floor. Check in at  of [address].     Post Operative Appointment:        Preparation Instructions to complete:    []  You will need a Pre-op Physical within 30 days before surgery with your primary care provider. This exam is required by the anesthesiologist to ensure a safe surgical experience.    Failure  to obtain your pre-op physical will lead to cancellation of your surgery  Call them right away to schedule this. Please ensure your Preoperative Physical is faxed to the Hospital (numbers listed above)    [] Preoperative Medication Instructions  We would like you to stop your anticoagulation medications 3-5 days before surgery HOWEVER contact your prescribing provider for instructions before discontinuing any medications. (Examples: Coumadin, Plavix, Xarelto, Eliquis, Pradaxa, Effient, Brilinta) If you are on Coumadin, we would like the goal INR <= 1.2.  IT IS OK TO STAY ON ASPIRIN PRIOR TO SURGERY.   Hold Ibuprofen 24 hours prior.   Hold Herbal Supplements and  vitamins 14 days prior.   Stop Cialis, Levitra and Viagra 2-3 days before surgery.   Please discuss with your primary care provider if you need to hold any blood pressure medications or others.   If you take these diabetic medications, please discuss with your primary doctor and follow the hold instructions:   []  Hold seven (7) days prior for once weekly injectable doses [semaglutide (Ozempic, Wegovy), dulaglutide (Trulicity), exenatide ER (Bydureon), tirzepatide (Mounjaro)]  []  Hold the day before and day of for once daily injectable GLP-1 agonists [exenatide (Byetta), liraglutide (Saxenda, Victoza)]  []  Hold seven (7) days for oral semaglutide (Rybelsus)     [] Fasting Requirements  Nothing to eat for 8 hours before surgery unless instructed differently by the surgery nurse.  You may have clear liquids up to two hours before your arrival time (coffee, tea, water, or Gatorade. No cream or milk)  If your primary care provider has instructed you to continue oral medications, you may take them on the morning of surgery with a small sip of water.    No alcohol or smoking after 12:00am the day of surgery    [] Contact your insurance regarding coverage  If you would like a [facility] [place] Estimate for your upcoming procedure at [facility] Location, contact Cost of Care Estimates   Advocates are available be phone Monday through Friday 9am - 3pm   at [phone]  You may also submit a request online at [email or web address]. Select the Pricing Form under the Pricing Inquiry Section.    For all self-pay, estimate, or anesthesia billing questions at [facility], the contact information is below:    Who to contact: Nadege FERMIN  [facility] number: [phone]  Prepay number: [phone]    The billing office at the [facility] will contact you with the facility charge estimate but you may also reach them at  [phone] with questions.    [] DO NOT BRING FMLA WITH TO SURGERY.  These should be sent to the provider's office by fax to [phone].    [] Day of Surgery  Medications - Take as indicated with sips of water.   Wear comfortable loose fitting clothes. Wear your glasses-Not contacts. Do not wear jewelry and remove body piercing's. Surgery may be cancelled if they are not removed.   You may have 1 family member wait in the lobby during your surgery. Visitor restrictions are subject to change. Please verify with the surgery nurse when they call.   If same day surgery-Have a someone come with you to surgery that can help you understand the surgeon's instructions, drive you home and stay with you overnight the first night.    [] You will receive a call from a surgery nurse 1-3 days prior to surgery. They will go over more details with you.         Frequently Asked Questions    WHAT DO I DO WHEN I COME HOME FROM SURGERY?    After surgery and/ or your hospital stay you may be tired and drowsy. Rest, but make sure to get up and walk around every couple of hours to circulate your blood. If you are non-weight bearing do not put any weight on your foot.  Be sure to take your medications as directed. Try to get a restful sleep and begin more normal activities as tolerated.    HOW MUCH PAIN SHOULD I EXPECT AND WILL I HAVE PAIN MEDICATION?    Everyone tolerates pain differently. Still, each type of surgery involves a certain level and type of pain. Generally most people feel better within a few days but keep in mind that everyone has a different tolerance to pain. All medications should be taken as directed. All medications can have side effects such as bleeding, upset stomach, headaches, dizziness, constipation, etc. If you have any major problems or allergic reaction, stop the medication and call the office. If you only have a little pain, you may simply take Tylenol or Ibuprofen as directed on the label.     WHAT ABOUT MY  DRESSING AND WHEN DO I COME BACK TO THE OFFICE?    The outer dressing stays in place for 7 days and when you come in for your first post-op we will remove it.  Some patients may have staples, zipper or sutures; these stay in for 10-14 days and will be removed at your 2nd post-op visit. After 24 hours you may shower using shower precautions.    WHAT ABOUT SWELLING, REDNESS, BRUISING OR DRAINAGE?    It is normal to have some swelling, and bruising after surgery. It gradually subsides but may be present for several weeks. Elevation and icing will help to reduce the swelling more rapidly.  If your bandage is really tight after 24 hours you may cut the bandage an inch by the toes or under your foot and by your ankle.  Ice therapy often works better than oral pain medication. Using cold therapy is recommended every hour for 20 minutes.    WHEN CAN I TAKE A BATH?    You can take a bath as long as the wound has no drainage and is fully healed without a scab. This generally takes about 2 weeks.  Unless you get the bandaged protector from above then you can take a shower when you feel up to it.    WHEN CAN I RETURN TO WORK?    You may return to work to an office-type job whenever you feel comfortable enough. The only restriction would be your own motivation and pain tolerance. If your job requires vigorous activities you may be off 1-4 weeks which is determined by what surgery you have and your operating physician.     WHAT ABOUT DRIVING OR FLYING?    As a general rule, you may resume driving as soon as you are comfortable and fully alert and off narcotic pain medications. If you are traveling by plane within 4 weeks of surgery, perform range of motion exercises of the legs and feet during the flight. Get up and walk around frequently to prevent blood clots.      WHEN CAN I EXERSICE?    You may return to normal activities such as exercising when your surgeon tells you usually 2 weeks. Walking, sitting, standing and going up  the stairs are all fine after the 2-week duncan. You may have restrictions for 1-4 weeks of no lifting, pushing, pulling in excess of 20 lbs. This is determined by what surgery you have and your surgeon.    WILL I BECOME ADDICTED TO MY PAIN MEDICATION?    Pain medications are safe and effective when used as directed. However, misuse of these products can be extremely harmful and even deadly. Pain medications must be taken only as directed by your surgeon. Do not change the dose of your pain medication without talking to your operating physician first.    POSTOPERATIVE INFORMATION: MANAGING PAIN  Measuring Your Pain    A pain scale helps you rate pain intensity. In the scale, 0 means no pain, and 10 is the worst pain possible.  You should rate your pain every 4-6 hours. You may feel some pain even with medications. It is important to tell your health care provider if medications don't reduce the pain.        Managing Post-Op Pain at Home: Medications    Pain after an operation (post-op pain) is common and expected. These guidelines can help you stay as comfortable as possible.    Taking Pain Medications    Take any prescribed pain medications as directed by your doctor.  Take pain medications with some food to avoid an upset stomach.  Be aware that narcotic pain medications cause constipation. We recommend taking Milk of Magnesia   Eating high-fiber foods and drink plenty of water.  Don t drink alcohol while using pain medications.  Possible side effects include stomach upset, nausea, and itching.    Alternating Ibuprofen with your pain medication    Ibuprofen has three actions: it reduces fever, relieves pain and fights inflammation. Alternate between your prescribed pain medication and Ibuprofen every three hours (i.e., take a dose of Ibuprofen then three hours later take your prescribed pain medication then three hours later Ibuprofen, ect.) These two medications are safe to use together like this.    POSTOPERATIVE  INFORMATION: CONSTIPATION    Constipation after surgery is a common problem and is often related to taking post-operative narcotic pain medication. Signs that you may be constipated include bloating, abdominal distention and/or pain.    Constipation Prevention & Treatment    Drink plenty of fluids! This is the most important thing you can do to help prevent constipation.  Increase physical activity as recommended by your surgeon.  Consume a high fiber diet. We recommend introducing high fiber foods pre-operatively. Some foods high in fiber include:    Oatmeal Bran  Flaxseed Dried Fruit    Carrots   Bananas Strawberries Oranges Whole Grain Bread     Bananas Prunes  Pears  Raspberries     Over the counter medication/supplements - in order of recommended treatment:   (Be sure to follow instructions on product packaging)    Fiber supplement   Bulk forming laxative - Can be used to prevent constipation  Great food sources of fiber include but are not limited to:  Colace (docusate sodium)  Osmotic stool softener - Can be used 2-3 times per day to prevent constipation  Milk of Magnesia  MIRALAX  Enema/Suppository    Patient can also  some probiotics such as Culturelle to help prevent Antibiotic associated diarrhea. They can take this 1 hour before or 2 hours after taking their antibiotic should not be taken at the same time as they can cancel out the effects.                          ** AFTER SURGERY INSTRUCTIONS **                          [] You are to remain LIMITED WEIGHT BEARING on your left foot LIMITED WEIGHT BEARING MEANS THAT IT IS ONLY OKAY FOR YOU TO APPLY LIGHT PRESSURE ON THE AFFECTED FOOT WHEN TRANSFERRING FROM YOUR ASSISTIVE DEVICE TO A CHAIR OR BED.    [] During surgery Dr. Dr. Ismael Riggins will apply a gauze dressing to your foot. This will remain intact until you are seen in clinic for follow up    [] It is NOT OKAY to get your surgical site wet while bathing, you will need to purchase a cast cover  to protect your foot from getting wet. You can purchase this at [facility] or your local pharmacy.    [] It is important that you elevate your affected foot after surgery. Proper elevation is raising your foot above your waist. The fluid in your lower extremities needs to get back to your heart so it can get pumped to your kidneys and expelled through urination. So if you notice you have to go to the bathroom more frequently when you are elevating your leg this is a good sign that it is working.     [] It is important that you increase your protein intake after surgery. Protein is essential for wound healing. We recommend you take a protein supplement twice per day. This is in addition to your normal diet. Examples of protein supplements are Ensure, Boost, Glucerna (if you are diabetic), or protein powder. You can purchase these at your local retailer or grocery store.       Notify our office right away, if you have any changes in your health status, or if you develop a cold, flu, diarrhea, infection, fever or sore throat before your scheduled surgery date. We can be reached at [phone]   Monday-Friday 8 am - 4:30 pm if you have any questions.     Thank you for trusting us with your care!      For informational purposes only. Not to replace the advice of your health care provider. Copyright   2020 [facility]. All rights reserved. Clinically reviewed by Infection Prevention and the [facility] COVID-19 Clinical Team. [employer] 330688 - Rev 09/09/21.

## 2025-07-22 NOTE — LETTER
7/22/2025      Fawad Garcia  [address] [facility] [address]      Dear Colleague,    Thank you for referring your patient, Fawad Garcia, to the [facility]. Please see a copy of my visit note below.        FOOT AND ANKLE SURGERY/PODIATRY PROGRESS NOTE    ASSESSMENT:   - Full-thickness ulceration down to bone, left second digit  - Cellulitis, left second digit    PLAN:  - Cellulitis, left second digit:  Discussed the patient's cellulitis in detail.  Discussed its association with the hammertoe.  Discussed oral versus topical treatment to this area.  Oral antibiotics, Keflex, was ordered for the patient today.    - Full-thickness ulceration down to bone, left second digit:  Discussed the full-thickness ulceration.  After verbal consent the ulceration was debrided down to subcutaneous tissue utilizing a 15 blade and nail nipper.  Bone was felt and further discussion was had regarding amputation of the toe.  Imaging was reviewed showing chronic osteomyelitis of the distal phalanx.  Discussed partial verse complete amputation of the toe.  Discussed MAC versus local anesthetic for anesthesia.  Patient would like to have this done outpatient as soon as possible and would like to go forward with a complete left second digit amputation under local.  - Surgical order placed.    30 minutes spent on the day of encounter doing chart review, history and exam, documentation, and further activities as noted.     Ismael Riggins DPM  [facility] Podiatry/Foot & Ankle Surgery      SUBJECTIVE: Fawad Garcia was seen in clinic today for evaluation of a new ulceration to his left second digit.  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  He states that he has a nurse that comes to his house to trim his nails and states that she saw a callus on the distal aspect of his left second digit and when trimming the soft states he has to immediately  go to a podiatrist as it tunnels in and looks like a wound.  Patient presents with his son today.    Past Medical History:   Diagnosis Date     Diabetes mellitus (H)      Hypertension      Squamous cell carcinoma      TIA (transient ischemic attack) 07/11/2021     Transient cerebral ischemia 09/03/2009    Diagnosis updated by automated process. Provider to review and confirm.         Past Surgical History:   Procedure Laterality Date     AMPUTATE TOE(S) Right 7/23/2019    Procedure: Partial amputation great toe;  Surgeon: Ethan Montesinos DPM;  Location: [facility]     AMPUTATE TOE(S) Left 12/24/2019    Procedure: Amputation of the great toe, left foot;  Surgeon: Ethan Montesinos DPM;  Location: [facility]     AMPUTATE TOE(S) Right 3/3/2020    Procedure: Partial amputation, second toe, right foot;  Surgeon: Ethan Montesinos DPM;  Location: [facility]     CL AFF SURGICAL PATHOLOGY  2002    prostate biopsy elevated PSA     COLONOSCOPY       EXAM UNDER ANESTHESIA RECTUM N/A 6/18/2025    Procedure: EXAM UNDER ANESTHESIA, RECTUM -;  Surgeon: Abdon Esparza MD;  Location: [facility]     EXCISE LESION RECTUM N/A 6/18/2025    Procedure: excision of perianal mass;  Surgeon: Abdon Esparza MD;  Location: [facility] OR     HC REMOVE TONSILS/ADENOIDS,<11 Y/O      T & A     IR LOWER EXTREMITY ANGIOGRAM LEFT  2/26/2020     IR LOWER EXTREMITY ANGIOGRAM LEFT  7/19/2023     IR LOWER EXTREMITY ANGIOGRAM RIGHT  12/11/2018     PHACOEMULSIFICATION WITH STANDARD INTRAOCULAR LENS IMPLANT Left 4/2/2015    Procedure: PHACOEMULSIFICATION WITH STANDARD INTRAOCULAR LENS IMPLANT;  Surgeon: Joseph Hernandez MD;  Location: [facility]     PHACOEMULSIFICATION WITH STANDARD INTRAOCULAR LENS IMPLANT Right 5/4/2015    Procedure: PHACOEMULSIFICATION WITH STANDARD INTRAOCULAR LENS IMPLANT;  Surgeon: Joseph Hernandez MD;  Location: [facility]     REPAIR HAMMER TOE Left 4/2/2019    Procedure: 5th Metatarsal Head Resection;  Surgeon: Ethan Montesinos DPM;   Location: [facility]       Allergies   Allergen Reactions     Zocor [Simvastatin - High Dose]      Bilateral hip aching         Current Outpatient Medications:      acetaminophen (TYLENOL) 325 MG tablet, Take 2 tablets (650 mg) by mouth every 4 hours as needed for mild pain., Disp: 50 tablet, Rfl: 0     amLODIPine (NORVASC) 5 MG tablet, Take 1 tablet (5 mg) by mouth daily., Disp: 90 tablet, Rfl: 3     aspirin (ASA) 81 MG EC tablet, Take 1 tablet (81 mg) by mouth daily, Disp: , Rfl:      augmented betamethasone dipropionate (DIPROLENE AF) 0.05 % external cream, Apply sparingly to affected area twice daily as needed.  Do not apply to face., Disp: 100 g, Rfl: 1     blood glucose (ONETOUCH ULTRA TEST) test strip, 2 strips by In Vitro route daily One touch Ultra 2., Disp: 200 strip, Rfl: 3     blood glucose monitoring (ONE TOUCH ULTRA 2) meter device kit, Use to test blood sugar 2 times daily or as directed., Disp: 1 kit, Rfl: 0     cephALEXin (KEFLEX) 500 MG capsule, Take 1 capsule (500 mg) by mouth 2 times daily for 7 days., Disp: 14 capsule, Rfl: 0     cephALEXin (KEFLEX) 500 MG capsule, Take 1 capsule (500 mg) by mouth 4 times daily for 7 days., Disp: 28 capsule, Rfl: 0     clopidogrel (PLAVIX) 75 MG tablet, Take 1 tablet (75 mg) by mouth daily, Disp: 90 tablet, Rfl: 3     dorzolamide-timolol (COSOPT) 2-0.5 % ophthalmic solution, Place 1 drop into both eyes 2 times daily., Disp: , Rfl:      hydrochlorothiazide (HYDRODIURIL) 25 MG tablet, Take 1 tablet (25 mg) by mouth daily., Disp: 90 tablet, Rfl: 3     insulin aspart (NOVOLOG PEN) 100 UNIT/ML pen, Inject 5 units before breakfast, 5 units before lunch and 6 units before supper., Disp: 15 mL, Rfl: 3     insulin glargine (LANTUS SOLOSTAR) 100 UNIT/ML pen, Inject 17 Units subcutaneously at bedtime., Disp: 30 mL, Rfl: 3     insulin pen needle (ULTICARE SHORT) 31G X 8 MM miscellaneous, Use 2 pen needles daily or as directed., Disp: 200 each, Rfl: 1     Lancets (ONETOUCH DELICA  PLUS [id number]) MISC, 1 each by In Vitro route See Admin Instructions, Disp: 100 each, Rfl: 3     Lancets (ONETOUCH DELICA PLUS [id number]) MISC, 1 each by In Vitro route See Admin Instructions Hold on file until needed., Disp: 100 each, Rfl: 1     lisinopril (ZESTRIL) 40 MG tablet, Take 1 tablet (40 mg) by mouth daily. SCHEDULE NURSE VISIT TO CHECK BLOOD PRESSURE BEFORE MARCH 15 2025., Disp: 90 tablet, Rfl: 3     metFORMIN (GLUCOPHAGE) 500 MG tablet, Take 2 tablets (1,000 mg) by mouth 2 times daily (with meals)., Disp: 360 tablet, Rfl: 3     Multiple Vitamins-Minerals (PRESERVISION AREDS PO), Take 1 tablet by mouth, Disp: , Rfl:      ORDER FOR DME, Equipment being ordered:  Glucometer, Disp: 1 Device, Rfl: 1     oxyCODONE (ROXICODONE) 5 MG tablet, Take 1-2 tablets (5-10 mg) by mouth every 4 hours as needed for moderate to severe pain., Disp: 12 tablet, Rfl: 0     pantoprazole (PROTONIX) 20 MG EC tablet, Take 1 tablet (20 mg) by mouth daily, Disp: 90 tablet, Rfl: 1     rosuvastatin (CRESTOR) 10 MG tablet, Take 1 tablet (10 mg) by mouth at bedtime, Disp: 90 tablet, Rfl: 3     senna-docusate (SENOKOT-S/PERICOLACE) 8.6-50 MG tablet, Take 1-2 tablets by mouth 2 times daily. Take your prescribed stool softeners twice daily while taking narcotic medication. Stop if you develop diarrhea. The goal should be to have 1 bowel movement daily, Disp: 30 tablet, Rfl: 0     triamcinolone (KENALOG) 0.1 % external cream, Apply topically 2 times daily To dry skin patches as needed.  Hold on file until needed., Disp: 80 g, Rfl: 1    Family History   Problem Relation Age of Onset     Cancer Mother         intestinal CA     Other Cancer Mother         cancer     Diabetes Father      Cerebrovascular Disease Father      Diabetes Brother      Other Cancer Brother         Pancreatic cancer     Other Cancer Son         GIST cancer     Diabetes Son      Diabetes Brother      Diabetes Son      Melanoma No family hx of        Social History      Socioeconomic History     Marital status:      Spouse name: Not on file     Number of children: Not on file     Years of education: Not on file     Highest education level: Not on file   Occupational History     Not on file   Tobacco Use     Smoking status: Never     Smokeless tobacco: Never   Vaping Use     Vaping status: Never Used   Substance and Sexual Activity     Alcohol use: Not Currently     Alcohol/week: 1.7 standard drinks of alcohol     Types: 2 Standard drinks or equivalent per week     Comment: very little     Drug use: No     Sexual activity: Not Currently     Partners: Female   Other Topics Concern     Parent/sibling w/ CABG, MI or angioplasty before 65F 55M? No   Social History Narrative     Not on file     Social Drivers of Health     Financial Resource Strain: Low Risk  (10/21/2024)    Financial Resource Strain      Within the past 12 months, have you or your family members you live with been unable to get utilities (heat, electricity) when it was really needed?: No   Food Insecurity: Low Risk  (10/21/2024)    Food Insecurity      Within the past 12 months, did you worry that your food would run out before you got money to buy more?: No      Within the past 12 months, did the food you bought just not last and you didn t have money to get more?: No   Transportation Needs: Low Risk  (10/21/2024)    Transportation Needs      Within the past 12 months, has lack of transportation kept you from medical appointments, getting your medicines, non-medical meetings or appointments, work, or from getting things that you need?: No   Physical Activity: Inactive (10/21/2024)    Exercise Vital Sign      Days of Exercise per Week: 0 days      Minutes of Exercise per Session: 0 min   Stress: No Stress Concern Present (10/21/2024)    [employer] [place] [employer] - Occupational Stress Questionnaire      Feeling of Stress : Not at all   Social Connections: Unknown (10/21/2024)    Social  Connection and Isolation Panel [NHANES]      Frequency of Communication with Friends and Family: Not on file      Frequency of Social Gatherings with Friends and Family: More than three times a week      Attends Nondenominational Services: Not on file      Active Member of Clubs or Organizations: Not on file      Attends Club or Organization Meetings: Not on file      Marital Status: Not on file   Interpersonal Safety: Low Risk  (5/6/2025)    Interpersonal Safety      Do you feel physically and emotionally safe where you currently live?: Yes      Within the past 12 months, have you been hit, slapped, kicked or otherwise physically hurt by someone?: No      Within the past 12 months, have you been humiliated or emotionally abused in other ways by your partner or ex-partner?: No   Housing Stability: Low Risk  (10/21/2024)    Housing Stability      Do you have housing? : Yes      Are you worried about losing your housing?: No       10 point Review of Systems is negative except otherwise noted in HPI.    OBJECTIVE:  Vascular: Dorsalis pedis and posterior tibial pulses are 1/4, bilaterally. Pedal hair growth present.  CFT < 3 sec from anterior tibial surface to distal digits.  Mild nonpitting edema appreciated to the left leg and left second digit.    Dermatologic: Turgor and texture are within normal limits.  Full thick ulceration appreciated to the distal aspect of the left second digit at the level of the distal tuft.  Upon debridement bone is palpable.  Serous drainage is expressed.  Concern for osteomyelitic infection.    Neurologic: All epicritic and proprioceptive sensations are grossly diminished to the bilateral lower extremity.    Musculoskeletal: History of bilateral hallux amputation.  History of right second digit amputation.  No other gross deformities appreciated.  No pain to palpation at the ulceration site of the second digit of the left foot.  Rigid claw toe deformity appreciated to all remaining lesser  digits.    Imaging:     XR Toe Left G/E 2 Views  Result Date: 7/18/2025  EXAM: XR TOE LEFT G/E 2 VIEWS LOCATION: [facility] DATE: 7/18/2025 INDICATION: Erythema, swelling, rule out bony abnormality. COMPARISON: 12/26/2019.     IMPRESSION: Three views of the 2nd toe show soft tissue swelling. There is cortical remodeling and irregularity along the distal tuft/phalanx of the 2nd toe which does raise concern for osteomyelitis. No evidence of an acute fracture. Flexion deformity.       Again, thank you for allowing me to participate in the care of your patient.        Sincerely,        Stewatr Riggins DPM    Electronically signed

## 2025-07-22 NOTE — PROGRESS NOTES
FOOT AND ANKLE SURGERY/PODIATRY PROGRESS NOTE    ASSESSMENT:   - Full-thickness ulceration down to bone, left second digit  - Cellulitis, left second digit    PLAN:  - Cellulitis, left second digit:  Discussed the patient's cellulitis in detail.  Discussed its association with the hammertoe.  Discussed oral versus topical treatment to this area.  Oral antibiotics, Keflex, was ordered for the patient today.    - Full-thickness ulceration down to bone, left second digit:  Discussed the full-thickness ulceration.  After verbal consent the ulceration was debrided down to subcutaneous tissue utilizing a 15 blade and nail nipper.  Bone was felt and further discussion was had regarding amputation of the toe.  Imaging was reviewed showing chronic osteomyelitis of the distal phalanx.  Discussed partial verse complete amputation of the toe.  Discussed MAC versus local anesthetic for anesthesia.  Patient would like to have this done outpatient as soon as possible and would like to go forward with a complete left second digit amputation under local.  - Surgical order placed.    30 minutes spent on the day of encounter doing chart review, history and exam, documentation, and further activities as noted.     Ismael Riggins DPM  [facility] Podiatry/Foot & Ankle Surgery      SUBJECTIVE: Fawad Garcia was seen in clinic today for evaluation of a new ulceration to his left second digit.  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  He states that he has a nurse that comes to his house to trim his nails and states that she saw a callus on the distal aspect of his left second digit and when trimming the soft states he has to immediately go to a podiatrist as it tunnels in and looks like a wound.  Patient presents with his son today.    Past Medical History:   Diagnosis Date    Diabetes mellitus (H)     Hypertension     Squamous cell carcinoma     TIA (transient ischemic  attack) 07/11/2021    Transient cerebral ischemia 09/03/2009    Diagnosis updated by automated process. Provider to review and confirm.         Past Surgical History:   Procedure Laterality Date    AMPUTATE TOE(S) Right 7/23/2019    Procedure: Partial amputation great toe;  Surgeon: Ethan Montesinos DPM;  Location: [facility]    AMPUTATE TOE(S) Left 12/24/2019    Procedure: Amputation of the great toe, left foot;  Surgeon: Ethan Montesinos DPM;  Location: [facility]    AMPUTATE TOE(S) Right 3/3/2020    Procedure: Partial amputation, second toe, right foot;  Surgeon: Ethan Montesinos DPM;  Location: [facility]    CL AFF SURGICAL PATHOLOGY  2002    prostate biopsy elevated PSA    COLONOSCOPY      EXAM UNDER ANESTHESIA RECTUM N/A 6/18/2025    Procedure: EXAM UNDER ANESTHESIA, RECTUM -;  Surgeon: Abdon Esparza MD;  Location: [facility]    EXCISE LESION RECTUM N/A 6/18/2025    Procedure: excision of perianal mass;  Surgeon: Abdon Esparza MD;  Location: [facility]    HC REMOVE TONSILS/ADENOIDS,<13 Y/O      T & A    IR LOWER EXTREMITY ANGIOGRAM LEFT  2/26/2020    IR LOWER EXTREMITY ANGIOGRAM LEFT  7/19/2023    IR LOWER EXTREMITY ANGIOGRAM RIGHT  12/11/2018    PHACOEMULSIFICATION WITH STANDARD INTRAOCULAR LENS IMPLANT Left 4/2/2015    Procedure: PHACOEMULSIFICATION WITH STANDARD INTRAOCULAR LENS IMPLANT;  Surgeon: Joseph Hernandez MD;  Location: [facility]    PHACOEMULSIFICATION WITH STANDARD INTRAOCULAR LENS IMPLANT Right 5/4/2015    Procedure: PHACOEMULSIFICATION WITH STANDARD INTRAOCULAR LENS IMPLANT;  Surgeon: Joseph Hernandez MD;  Location: [facility]    REPAIR HAMMER TOE Left 4/2/2019    Procedure: 5th Metatarsal Head Resection;  Surgeon: Ethan Montesinos DPM;  Location: [facility]       Allergies   Allergen Reactions    Zocor [Simvastatin - High Dose]      Bilateral hip aching         Current Outpatient Medications:     acetaminophen (TYLENOL) 325 MG tablet, Take 2 tablets (650 mg) by mouth every 4 hours as needed for mild  pain., Disp: 50 tablet, Rfl: 0    amLODIPine (NORVASC) 5 MG tablet, Take 1 tablet (5 mg) by mouth daily., Disp: 90 tablet, Rfl: 3    aspirin (ASA) 81 MG EC tablet, Take 1 tablet (81 mg) by mouth daily, Disp: , Rfl:     augmented betamethasone dipropionate (DIPROLENE AF) 0.05 % external cream, Apply sparingly to affected area twice daily as needed.  Do not apply to face., Disp: 100 g, Rfl: 1    blood glucose (ONETOUCH ULTRA TEST) test strip, 2 strips by In Vitro route daily One touch Ultra 2., Disp: 200 strip, Rfl: 3    blood glucose monitoring (ONE TOUCH ULTRA 2) meter device kit, Use to test blood sugar 2 times daily or as directed., Disp: 1 kit, Rfl: 0    cephALEXin (KEFLEX) 500 MG capsule, Take 1 capsule (500 mg) by mouth 2 times daily for 7 days., Disp: 14 capsule, Rfl: 0    cephALEXin (KEFLEX) 500 MG capsule, Take 1 capsule (500 mg) by mouth 4 times daily for 7 days., Disp: 28 capsule, Rfl: 0    clopidogrel (PLAVIX) 75 MG tablet, Take 1 tablet (75 mg) by mouth daily, Disp: 90 tablet, Rfl: 3    dorzolamide-timolol (COSOPT) 2-0.5 % ophthalmic solution, Place 1 drop into both eyes 2 times daily., Disp: , Rfl:     hydrochlorothiazide (HYDRODIURIL) 25 MG tablet, Take 1 tablet (25 mg) by mouth daily., Disp: 90 tablet, Rfl: 3    insulin aspart (NOVOLOG PEN) 100 UNIT/ML pen, Inject 5 units before breakfast, 5 units before lunch and 6 units before supper., Disp: 15 mL, Rfl: 3    insulin glargine (LANTUS SOLOSTAR) 100 UNIT/ML pen, Inject 17 Units subcutaneously at bedtime., Disp: 30 mL, Rfl: 3    insulin pen needle (ULTICARE SHORT) 31G X 8 MM miscellaneous, Use 2 pen needles daily or as directed., Disp: 200 each, Rfl: 1    Lancets (ONETOUCH DELICA PLUS HUXECC67C) MISC, 1 each by In Vitro route See Admin Instructions, Disp: 100 each, Rfl: 3    Lancets (ONETOUCH DELICA PLUS [id number]) MISC, 1 each by In Vitro route See Admin Instructions Hold on file until needed., Disp: 100 each, Rfl: 1    lisinopril (ZESTRIL) 40 MG  tablet, Take 1 tablet (40 mg) by mouth daily. SCHEDULE NURSE VISIT TO CHECK BLOOD PRESSURE BEFORE MARCH 15 2025., Disp: 90 tablet, Rfl: 3    metFORMIN (GLUCOPHAGE) 500 MG tablet, Take 2 tablets (1,000 mg) by mouth 2 times daily (with meals)., Disp: 360 tablet, Rfl: 3    Multiple Vitamins-Minerals (PRESERVISION AREDS PO), Take 1 tablet by mouth, Disp: , Rfl:     ORDER FOR DME, Equipment being ordered:  Glucometer, Disp: 1 Device, Rfl: 1    oxyCODONE (ROXICODONE) 5 MG tablet, Take 1-2 tablets (5-10 mg) by mouth every 4 hours as needed for moderate to severe pain., Disp: 12 tablet, Rfl: 0    pantoprazole (PROTONIX) 20 MG EC tablet, Take 1 tablet (20 mg) by mouth daily, Disp: 90 tablet, Rfl: 1    rosuvastatin (CRESTOR) 10 MG tablet, Take 1 tablet (10 mg) by mouth at bedtime, Disp: 90 tablet, Rfl: 3    senna-docusate (SENOKOT-S/PERICOLACE) 8.6-50 MG tablet, Take 1-2 tablets by mouth 2 times daily. Take your prescribed stool softeners twice daily while taking narcotic medication. Stop if you develop diarrhea. The goal should be to have 1 bowel movement daily, Disp: 30 tablet, Rfl: 0    triamcinolone (KENALOG) 0.1 % external cream, Apply topically 2 times daily To dry skin patches as needed.  Hold on file until needed., Disp: 80 g, Rfl: 1    Family History   Problem Relation Age of Onset    Cancer Mother         intestinal CA    Other Cancer Mother         cancer    Diabetes Father     Cerebrovascular Disease Father     Diabetes Brother     Other Cancer Brother         Pancreatic cancer    Other Cancer Son         GIST cancer    Diabetes Son     Diabetes Brother     Diabetes Son     Melanoma No family hx of        Social History     Socioeconomic History    Marital status:      Spouse name: Not on file    Number of children: Not on file    Years of education: Not on file    Highest education level: Not on file   Occupational History    Not on file   Tobacco Use    Smoking status: Never    Smokeless tobacco: Never    Vaping Use    Vaping status: Never Used   Substance and Sexual Activity    Alcohol use: Not Currently     Alcohol/week: 1.7 standard drinks of alcohol     Types: 2 Standard drinks or equivalent per week     Comment: very little    Drug use: No    Sexual activity: Not Currently     Partners: Female   Other Topics Concern    Parent/sibling w/ CABG, MI or angioplasty before 65F 55M? No   Social History Narrative    Not on file     Social Drivers of Health     Financial Resource Strain: Low Risk  (10/21/2024)    Financial Resource Strain     Within the past 12 months, have you or your family members you live with been unable to get utilities (heat, electricity) when it was really needed?: No   Food Insecurity: Low Risk  (10/21/2024)    Food Insecurity     Within the past 12 months, did you worry that your food would run out before you got money to buy more?: No     Within the past 12 months, did the food you bought just not last and you didn t have money to get more?: No   Transportation Needs: Low Risk  (10/21/2024)    Transportation Needs     Within the past 12 months, has lack of transportation kept you from medical appointments, getting your medicines, non-medical meetings or appointments, work, or from getting things that you need?: No   Physical Activity: Inactive (10/21/2024)    Exercise Vital Sign     Days of Exercise per Week: 0 days     Minutes of Exercise per Session: 0 min   Stress: No Stress Concern Present (10/21/2024)    [employer] [place] [employer] - Occupational Stress Questionnaire     Feeling of Stress : Not at all   Social Connections: Unknown (10/21/2024)    Social Connection and Isolation Panel [NHANES]     Frequency of Communication with Friends and Family: Not on file     Frequency of Social Gatherings with Friends and Family: More than three times a week     Attends Yazdanism Services: Not on file     Active Member of Clubs or Organizations: Not on file     Attends Club or  Organization Meetings: Not on file     Marital Status: Not on file   Interpersonal Safety: Low Risk  (5/6/2025)    Interpersonal Safety     Do you feel physically and emotionally safe where you currently live?: Yes     Within the past 12 months, have you been hit, slapped, kicked or otherwise physically hurt by someone?: No     Within the past 12 months, have you been humiliated or emotionally abused in other ways by your partner or ex-partner?: No   Housing Stability: Low Risk  (10/21/2024)    Housing Stability     Do you have housing? : Yes     Are you worried about losing your housing?: No       10 point Review of Systems is negative except otherwise noted in HPI.    OBJECTIVE:  Vascular: Dorsalis pedis and posterior tibial pulses are 1/4, bilaterally. Pedal hair growth present.  CFT < 3 sec from anterior tibial surface to distal digits.  Mild nonpitting edema appreciated to the left leg and left second digit.    Dermatologic: Turgor and texture are within normal limits.  Full thick ulceration appreciated to the distal aspect of the left second digit at the level of the distal tuft.  Upon debridement bone is palpable.  Serous drainage is expressed.  Concern for osteomyelitic infection.    Neurologic: All epicritic and proprioceptive sensations are grossly diminished to the bilateral lower extremity.    Musculoskeletal: History of bilateral hallux amputation.  History of right second digit amputation.  No other gross deformities appreciated.  No pain to palpation at the ulceration site of the second digit of the left foot.  Rigid claw toe deformity appreciated to all remaining lesser digits.    Imaging:     XR Toe Left G/E 2 Views  Result Date: 7/18/2025  EXAM: XR TOE LEFT G/E 2 VIEWS LOCATION: [facility] DATE: 7/18/2025 INDICATION: Erythema, swelling, rule out bony abnormality. COMPARISON: 12/26/2019.     IMPRESSION: Three views of the 2nd toe show soft tissue swelling. There is  cortical remodeling and irregularity along the distal tuft/phalanx of the 2nd toe which does raise concern for osteomyelitis. No evidence of an acute fracture. Flexion deformity.

## 2025-07-22 NOTE — TELEPHONE ENCOUNTER
Reviewed Blood Thinners and plan for holding, continuing and/or bridging: Aspirin: (Podiatry only) Ok to continue taking unless your primary has said otherwise.  and Plavix: -If vascular intervention, OK to stay on. (Preferably would like held if possible)     Message sent to primary and support pool regarding hold request    Reviewed Diabetic medications that are GLP-1 agonists: NA  Please discuss with your primary doctor and follow the hold instructions:   []  Hold seven (7) days prior for once weekly injectable doses [semaglutide (Ozempic, Wegovy), dulaglutide (Trulicity), exenatide ER (Bydureon), tirzepatide (Mounjaro)]  []  Hold the day before and day of for once daily injectable GLP-1 agonists [exenatide (Byetta), liraglutide (Saxenda, Victoza)]  []  Hold seven (7) days for oral semaglutide (Rybelsus)

## 2025-07-22 NOTE — TELEPHONE ENCOUNTER
Typically these are addressed in a preop visit.  In the past patient held aspirin 7 days before surgery and clopidogrel at least 5 days before surgery.  I have not seen patient since his last preop for his perianal mass, hence cannot attest to his cardiovascular status and if the above recs appply to him.  If the surgery is urgent and cannot be postponed for orhopedic reasons/complications, holding the two antiplatelets for 2 days may slightly reduce his risk of bleeding, but he may still be at risk for prolonged/heavy bleeding perioperatively.

## 2025-07-22 NOTE — TELEPHONE ENCOUNTER
Dr. Riggins, see message below. History of PAD, not been seen by vascular since '23. Followed by cardiology for aortic stenosis last seen 8 '24.     Ok to contact pt for 2 days hold on ASA and Plavix and proceed with surgery on 7/25?    Kamille Calzada RN, CWOCN

## 2025-07-22 NOTE — TELEPHONE ENCOUNTER
Surgery Scheduled    Discussed surgery plans/instructions. The pt informed the , no pre-op was needed per provider.  reached out to the provider to confirm. Will send surgery packet once medications have been reviewed.    Surgery/Procedure: AMPUTATION, TOE, LEFT 2ND DIGIT (Left)     CPT: 19907  Special Equipment: Culture and Pathology. TUR tubing and 1L saline.  Time: 20mins    Location: [facility]:  [address] [place] [address] [address] [place] [address] (Fax: [phone])   Please enter through the main entrance. Take the elevators to the 3rd floor. Check in at  of [address].     Date: 7/25/2025    Time: 12:00pm     Admission Type: Outpatient    Surgeon: Dr. Ismael Riggins    OR Confirmed & :  Yes sent to [facility] Case Coordination on 7/22/2025    IR Tech Needed:  No     Entered on provider calendar:  Yes    Post Op: See appt desk    Wound Vac Needed: No    Home Care Needed: No    Ultrasound Scheduled: Not needed    Reps Contacted: Not needed.     Blood Thinners Addressed: Message sent to support pool to review the patients medications.

## 2025-07-23 ENCOUNTER — PATIENT OUTREACH (OUTPATIENT)
Dept: CARE COORDINATION | Facility: CLINIC | Age: 89
End: 2025-07-23
Payer: COMMERCIAL

## 2025-07-23 NOTE — TELEPHONE ENCOUNTER
Confirmed with patient's son that per Dr. Riggins, no blood thinners need to be held prior to surgery. He has been in communication with the [facility] pre-op nurse team as well. No further questions.

## 2025-07-24 ENCOUNTER — TELEPHONE (OUTPATIENT)
Dept: PODIATRY | Facility: CLINIC | Age: 89
End: 2025-07-24
Payer: COMMERCIAL

## 2025-07-24 NOTE — TELEPHONE ENCOUNTER
Other: Patient called and is wanting to know the dos and don'ts before surgery tomorrow. Please call patient back.     Could we send this information to you in [facility] or would you prefer to receive a phone call?:   Patient would prefer a phone call   Okay to leave a detailed message?: Yes at Cell number on file:    Telephone Information:   Mobile [phone]

## 2025-07-25 ENCOUNTER — LAB REQUISITION (OUTPATIENT)
Dept: LAB | Facility: CLINIC | Age: 89
End: 2025-07-25
Payer: COMMERCIAL

## 2025-07-25 DIAGNOSIS — L03.032 CELLULITIS OF LEFT TOE: ICD-10-CM

## 2025-07-25 PROCEDURE — 87186 SC STD MICRODIL/AGAR DIL: CPT | Mod: ORL | Performed by: STUDENT IN AN ORGANIZED HEALTH CARE EDUCATION/TRAINING PROGRAM

## 2025-07-25 PROCEDURE — 28820 AMPUTATION OF TOE: CPT | Mod: T2 | Performed by: STUDENT IN AN ORGANIZED HEALTH CARE EDUCATION/TRAINING PROGRAM

## 2025-07-25 PROCEDURE — 87075 CULTR BACTERIA EXCEPT BLOOD: CPT | Mod: ORL | Performed by: STUDENT IN AN ORGANIZED HEALTH CARE EDUCATION/TRAINING PROGRAM

## 2025-07-25 RX ORDER — CEPHALEXIN 500 MG/1
500 CAPSULE ORAL 2 TIMES DAILY
Qty: 14 CAPSULE | Refills: 0 | Status: SHIPPED | OUTPATIENT
Start: 2025-07-25 | End: 2025-08-01

## 2025-07-25 NOTE — OP NOTE
Patient name: Fawad Garcia  MRN: [id number]    Date of Surgery: 2025    Surgeon: Stewart Riggins DPM    Assistants: None    PREOPERATIVE DIAGNOSIS:   1. Full-thickness ulceration down to necrosis of bone, left second digit  2. Osteomyelitis, left second digit  3. Cellulitis, left second digit    POSTOPERATIVE DIAGNOSIS:   1. Full-thickness ulceration down to necrosis of bone, left second digit  2. Osteomyelitis, left second digit  3. Cellulitis, left second digit    OPERATION:   1. Amputation of left second digit down to the MPJ    STAFF: * No surgical staff found *    TYPE OF ANESTHESIA: Local block of 10 cc of 0.5% Marcaine plain    IMPLANTS: * No implants in log *    ESTIMATED BLOOD LOSS: 10 cc    TOURNIQUET TIME: Not applied    COMPLICATION: None.     INDICATIONS: Fawad Garcia is a 89 year old male who has a full-thickness ulceration down to necrosis of bone to the distal tuft of his left second digit with cellulitis to the level of the MPJ.  After a thorough evaluation, the patient was indicated for operative intervention. The risks, benefits, and alternatives were discussed with the patient.  The patient verbalized understanding of the treatment plan and signed a written consent.     DESCRIPTION OF PROCEDURE: The left lower extremity was identified and marked by myself in the preop holing area.The patient was taken to the operating room and placed the supine position on the operating table.  10 cc of 0.5% Marcaine plain was placed in a digital block to the left second digit.      The left foot was prepped and draped in the usual sterile fashion.  A timeout was performed, indicating the patient name, , operative side and procedure to be performed, with all OR staff and all were in agreement.     Attention was drawn to the left second digit at the level of the MPJ.  The toe was disarticulated at this level and removed without complication.  Remaining cartilage and bone to the second  metatarsal head appeared to be firm, white, and appropriate.  Utilizing a rongeur bone from the tip of the distal phalanx was removed and sent for aerobic, anaerobic, and Gram stain.  The digit was sent to pathology.  The digit was irrigated with TUR tubing and 3 L of sterile saline.    The tourniquet was let down and a hyperexmic response was appreciated to the lower extremity. Sterile dressings were applied. The patient was taken to the recovery room in stable condition.    Stewart Riggins DPM

## 2025-07-26 ENCOUNTER — HOSPITAL ENCOUNTER (EMERGENCY)
Facility: CLINIC | Age: 89
Discharge: HOME | End: 2025-07-26
Attending: EMERGENCY MEDICINE | Admitting: EMERGENCY MEDICINE
Payer: COMMERCIAL

## 2025-07-26 VITALS
HEART RATE: 82 BPM | SYSTOLIC BLOOD PRESSURE: 138 MMHG | OXYGEN SATURATION: 96 % | DIASTOLIC BLOOD PRESSURE: 79 MMHG | RESPIRATION RATE: 18 BRPM | TEMPERATURE: 97.1 F

## 2025-07-26 DIAGNOSIS — Z48.89 ENCOUNTER FOR POSTOPERATIVE WOUND CHECK: Primary | ICD-10-CM

## 2025-07-26 PROCEDURE — 99282 EMERGENCY DEPT VISIT SF MDM: CPT | Performed by: EMERGENCY MEDICINE

## 2025-07-26 ASSESSMENT — COLUMBIA-SUICIDE SEVERITY RATING SCALE - C-SSRS
1. IN THE PAST MONTH, HAVE YOU WISHED YOU WERE DEAD OR WISHED YOU COULD GO TO SLEEP AND NOT WAKE UP?: NO
2. HAVE YOU ACTUALLY HAD ANY THOUGHTS OF KILLING YOURSELF IN THE PAST MONTH?: NO
6. HAVE YOU EVER DONE ANYTHING, STARTED TO DO ANYTHING, OR PREPARED TO DO ANYTHING TO END YOUR LIFE?: NO

## 2025-07-26 ASSESSMENT — ACTIVITIES OF DAILY LIVING (ADL): ADLS_ACUITY_SCORE: 46

## 2025-07-26 NOTE — ED TRIAGE NOTES
Pt had infection in a toe of left foot and pt had toe removed yesterday and there is a fair amount of bleeding and was told to come in . Pt is on plavix    Triage Assessment (Adult)       Row Name 07/26/25 1704          Triage Assessment    Airway WDL WDL        Respiratory WDL    Respiratory WDL WDL        Skin Circulation/Temperature WDL    Skin Circulation/Temperature WDL X  left foot        Cardiac WDL    Cardiac WDL WDL        Peripheral/Neurovascular WDL    Peripheral Neurovascular WDL WDL        Cognitive/Neuro/Behavioral WDL    Cognitive/Neuro/Behavioral WDL WDL

## 2025-07-26 NOTE — ED PROVIDER NOTES
[facility] EMERGENCY DEPT  PHYSICIAN NOTE    MRN: [id number]    FINAL IMPRESSION     1. Encounter for postoperative wound check          ED COURSE & MDM     Patient presented for evaluation of bleeding from a surgical wound.  Initial vital signs reassuring.  Appears to be healing well, in the amount of bleeding is as I would expect for someone on aspirin and Plavix.  I considered application of topical pro clotting measures including topical TXA, but due to the low degree of bleeding I do not believe this is necessary.  I reassured the patient that his wound appears to be healing well, advised him to continue following the instructions of his surgeon.  Patient discharged in stable condition.  Return precautions provided.  All questions answered.    Medications Administered During This ED Encounter  Medications - No data to display      ===================================================================    HPI     Fawad Garcia is a 89 year old male with relevant PMH significant for hypertension, diabetes mellitus, hyperlipidemia, prostate cancer, CKD, and CVA presenting with bleeding from his surgical wound.  Patient states he had a left second toe amputation yesterday and has had ongoing bleeding that is saturated the dressing and Ace wrap.  He called his podiatrist who directed him to be evaluated in the ED.  He is not having increased pain, no fever.  He is on aspirin and Plavix, but forgot to take them yesterday and today.    I reviewed applicable documentation in the patient's chart including the surgical report from yesterday your.    ROS  See HPI.    Problem list, medications, allergies, PMH, PSH, family history, and social history reviewed and updated as able in Epic.      PHYSICAL EXAM     Vitals:    07/26/25 1652   BP: 138/79   Pulse: 82   Resp: 18   Temp: 97.1  F (36.2  C)   TempSrc: Tympanic   SpO2: 96%        Constitutional: Alert, no acute distress.  Pulm: Non-labored  respirations.  Neuro: Oriented to person, place, and time. Normal speech. No focal deficits.  Skin: Surgical incision over left second MTP joint is intact, no surrounding erythema or warmth, no discharge from the wound.  There is no active bleeding, but there is mild oozing when observed over several minutes.      TESTING   All testing reviewed and independently interpreted.    EKG  None    LABS  Labs Ordered and Resulted from Time of ED Arrival to Time of ED Departure - No data to display    IMAGING  No orders to display              Mikael Vogt MD  07/26/25 0688

## 2025-07-26 NOTE — DISCHARGE INSTRUCTIONS
Your wound appears to be healing appropriately.  You are going to have more bleeding than the average person because of your medications, so this is okay as long as you are not getting dizzy, having chest pain, or shortness of breath.  Keep the new dressing on until Thursday as instructed by Dr. Riggins.  Keep the leg elevated to help decrease swelling.

## 2025-07-28 ENCOUNTER — LAB (OUTPATIENT)
Dept: LAB | Facility: CLINIC | Age: 89
End: 2025-07-28
Payer: COMMERCIAL

## 2025-07-28 DIAGNOSIS — C61 MALIGNANT NEOPLASM OF PROSTATE (H): ICD-10-CM

## 2025-07-28 LAB
ALBUMIN SERPL BCG-MCNC: 3.4 G/DL (ref 3.5–5.2)
ALP SERPL-CCNC: 70 U/L (ref 40–150)
ALT SERPL W P-5'-P-CCNC: 9 U/L (ref 0–70)
ANION GAP SERPL CALCULATED.3IONS-SCNC: 12 MMOL/L (ref 7–15)
AST SERPL W P-5'-P-CCNC: 19 U/L (ref 0–45)
BACTERIA BONE ANAEROBE+AEROBE CULT: ABNORMAL
BACTERIA BONE ANAEROBE+AEROBE CULT: ABNORMAL
BASOPHILS # BLD AUTO: 0.1 10E3/UL (ref 0–0.2)
BASOPHILS NFR BLD AUTO: 1 %
BILIRUB SERPL-MCNC: 0.5 MG/DL
BUN SERPL-MCNC: 29.1 MG/DL (ref 8–23)
CALCIUM SERPL-MCNC: 9.1 MG/DL (ref 8.8–10.4)
CHLORIDE SERPL-SCNC: 103 MMOL/L (ref 98–107)
CREAT SERPL-MCNC: 1.01 MG/DL (ref 0.67–1.17)
EGFRCR SERPLBLD CKD-EPI 2021: 72 ML/MIN/1.73M2
EOSINOPHIL # BLD AUTO: 0.7 10E3/UL (ref 0–0.7)
EOSINOPHIL NFR BLD AUTO: 8 %
ERYTHROCYTE [DISTWIDTH] IN BLOOD BY AUTOMATED COUNT: 13.2 % (ref 10–15)
GLUCOSE SERPL-MCNC: 174 MG/DL (ref 70–99)
HCO3 SERPL-SCNC: 20 MMOL/L (ref 22–29)
HCT VFR BLD AUTO: 38.6 % (ref 40–53)
HGB BLD-MCNC: 12.8 G/DL (ref 13.3–17.7)
IMM GRANULOCYTES # BLD: 0 10E3/UL
IMM GRANULOCYTES NFR BLD: 0 %
LYMPHOCYTES # BLD AUTO: 1.5 10E3/UL (ref 0.8–5.3)
LYMPHOCYTES NFR BLD AUTO: 18 %
MCH RBC QN AUTO: 29.4 PG (ref 26.5–33)
MCHC RBC AUTO-ENTMCNC: 33.2 G/DL (ref 31.5–36.5)
MCV RBC AUTO: 89 FL (ref 78–100)
MONOCYTES # BLD AUTO: 0.7 10E3/UL (ref 0–1.3)
MONOCYTES NFR BLD AUTO: 9 %
NEUTROPHILS # BLD AUTO: 5.3 10E3/UL (ref 1.6–8.3)
NEUTROPHILS NFR BLD AUTO: 64 %
NRBC # BLD AUTO: 0 10E3/UL
NRBC BLD AUTO-RTO: 0 /100
PLATELET # BLD AUTO: 298 10E3/UL (ref 150–450)
POTASSIUM SERPL-SCNC: 4.1 MMOL/L (ref 3.4–5.3)
PROT SERPL-MCNC: 6.5 G/DL (ref 6.4–8.3)
PSA SERPL DL<=0.01 NG/ML-MCNC: 5.28 NG/ML
RBC # BLD AUTO: 4.35 10E6/UL (ref 4.4–5.9)
SODIUM SERPL-SCNC: 135 MMOL/L (ref 135–145)
WBC # BLD AUTO: 8.3 10E3/UL (ref 4–11)

## 2025-07-28 PROCEDURE — 36415 COLL VENOUS BLD VENIPUNCTURE: CPT

## 2025-07-28 PROCEDURE — 85004 AUTOMATED DIFF WBC COUNT: CPT

## 2025-07-28 PROCEDURE — 84153 ASSAY OF PSA TOTAL: CPT

## 2025-07-28 PROCEDURE — 80051 ELECTROLYTE PANEL: CPT

## 2025-07-28 PROCEDURE — 84155 ASSAY OF PROTEIN SERUM: CPT

## 2025-07-29 ENCOUNTER — ONCOLOGY VISIT (OUTPATIENT)
Dept: ONCOLOGY | Facility: CLINIC | Age: 89
End: 2025-07-29
Attending: INTERNAL MEDICINE
Payer: COMMERCIAL

## 2025-07-29 VITALS
TEMPERATURE: 97.6 F | WEIGHT: 230 LBS | RESPIRATION RATE: 16 BRPM | DIASTOLIC BLOOD PRESSURE: 53 MMHG | HEIGHT: 69 IN | BODY MASS INDEX: 34.07 KG/M2 | HEART RATE: 63 BPM | SYSTOLIC BLOOD PRESSURE: 97 MMHG | OXYGEN SATURATION: 96 %

## 2025-07-29 DIAGNOSIS — C61 MALIGNANT NEOPLASM OF PROSTATE (H): Primary | ICD-10-CM

## 2025-07-29 PROCEDURE — G2211 COMPLEX E/M VISIT ADD ON: HCPCS | Performed by: INTERNAL MEDICINE

## 2025-07-29 PROCEDURE — G0463 HOSPITAL OUTPT CLINIC VISIT: HCPCS | Performed by: INTERNAL MEDICINE

## 2025-07-29 PROCEDURE — 99214 OFFICE O/P EST MOD 30 MIN: CPT | Performed by: INTERNAL MEDICINE

## 2025-07-29 ASSESSMENT — PAIN SCALES - GENERAL: PAINLEVEL_OUTOF10: NO PAIN (0)

## 2025-08-01 LAB — BACTERIA BONE ANAEROBE+AEROBE CULT: ABNORMAL

## 2025-08-07 ENCOUNTER — OFFICE VISIT (OUTPATIENT)
Dept: PODIATRY | Facility: CLINIC | Age: 89
End: 2025-08-07
Payer: COMMERCIAL

## 2025-08-07 DIAGNOSIS — Z48.89 OTHER SPECIFIED AFTERCARE FOLLOWING SURGERY: Primary | ICD-10-CM

## 2025-08-22 ENCOUNTER — OFFICE VISIT (OUTPATIENT)
Dept: PODIATRY | Facility: CLINIC | Age: 89
End: 2025-08-22
Payer: COMMERCIAL

## 2025-08-22 DIAGNOSIS — Z48.89 OTHER SPECIFIED AFTERCARE FOLLOWING SURGERY: Primary | ICD-10-CM

## 2025-08-22 PROCEDURE — 99213 OFFICE O/P EST LOW 20 MIN: CPT | Performed by: STUDENT IN AN ORGANIZED HEALTH CARE EDUCATION/TRAINING PROGRAM

## 2025-09-03 ENCOUNTER — TELEPHONE (OUTPATIENT)
Dept: FAMILY MEDICINE | Facility: CLINIC | Age: 89
End: 2025-09-03
Payer: COMMERCIAL

## 2025-09-04 ENCOUNTER — OFFICE VISIT (OUTPATIENT)
Dept: FAMILY MEDICINE | Facility: CLINIC | Age: 89
End: 2025-09-04
Payer: COMMERCIAL

## 2025-09-04 VITALS
BODY MASS INDEX: 34.17 KG/M2 | DIASTOLIC BLOOD PRESSURE: 83 MMHG | HEART RATE: 90 BPM | TEMPERATURE: 97.6 F | WEIGHT: 231.4 LBS | OXYGEN SATURATION: 96 % | SYSTOLIC BLOOD PRESSURE: 134 MMHG

## 2025-09-04 DIAGNOSIS — I87.2 STASIS DERMATITIS: ICD-10-CM

## 2025-09-04 DIAGNOSIS — M79.89 LEFT LEG SWELLING: Primary | ICD-10-CM

## 2025-09-04 RX ORDER — TRIAMCINOLONE ACETONIDE 1 MG/G
CREAM TOPICAL 2 TIMES DAILY
Qty: 80 G | Refills: 0 | Status: SHIPPED | OUTPATIENT
Start: 2025-09-04

## 2025-09-04 ASSESSMENT — PAIN SCALES - GENERAL: PAINLEVEL_OUTOF10: NO PAIN (0)

## (undated) DEVICE — SU ETHILON 3-0 FS-1 18" 669H

## (undated) DEVICE — GLOVE BIOGEL PI MICRO SZ 7.5 48575

## (undated) DEVICE — DRAPE SHEET REV FOLD 3/4 9349

## (undated) DEVICE — BLADE SAW OSCIL/SAG STRK MICRO 5.5X18X0.38MM 2296-003-412

## (undated) DEVICE — DRAPE EXTREMITY W/ARMBOARD 29405

## (undated) DEVICE — SOL NACL 0.9% IRRIG 1000ML BOTTLE 07138-09

## (undated) DEVICE — DECANTER VIAL 2006S

## (undated) DEVICE — SUCTION TIP YANKAUER STR K87

## (undated) DEVICE — DRSG GAUZE 4X4" TRAY

## (undated) DEVICE — BLADE KNIFE SURG 10 371110

## (undated) DEVICE — PREP POVIDONE IODINE SOLUTION 10% 120ML

## (undated) DEVICE — DRSG JUMPSTART ANTIMICROBIAL 4X4" ABS-4004

## (undated) DEVICE — BLADE KNIFE SURG 15 371115

## (undated) DEVICE — GOWN XLG DISP 9545

## (undated) DEVICE — SU CHROMIC 4-0 RB-1 27" U203H

## (undated) DEVICE — GLOVE PROTEXIS BLUE W/NEU-THERA 7.0  2D73EB70

## (undated) DEVICE — PREP POVIDONE IODINE SCRUB 7.5% 120ML

## (undated) DEVICE — DRSG ABDOMINAL 07 1/2X8" 7197D

## (undated) DEVICE — DRSG XEROFORM 1X8"

## (undated) DEVICE — PANTIES MESH LG/XLG 2PK 706M2

## (undated) DEVICE — GLOVE PROTEXIS W/NEU-THERA 8.0  2D73TE80

## (undated) DEVICE — PACK EXTREMITY LATEX FREE SOP32HFFCS

## (undated) DEVICE — GLOVE PROTEXIS BLUE W/NEU-THERA 8.0  2D73EB80

## (undated) DEVICE — DRAPE STOCKINETTE IMPERVIOUS 12" 1587

## (undated) DEVICE — SU ETHILON 3-0 PS-2 18" 1669H

## (undated) DEVICE — STRAP POSITIONING 60X31" BODY KNEE KBS 01

## (undated) DEVICE — CAST PADDING 4" STERILE 9044S

## (undated) DEVICE — PREP SKIN SCRUB TRAY 4461A

## (undated) DEVICE — PACKING NUGAUZE 1/4" PLAIN 7631

## (undated) DEVICE — PREP PAD ALCOHOL 6818

## (undated) DEVICE — TUBING SUCTION 12"X1/4" N612

## (undated) DEVICE — GLOVE PROTEXIS W/NEU-THERA 7.0  2D73TE70

## (undated) DEVICE — LUBRICATING JELLY 4.25OZ

## (undated) DEVICE — BNDG ELASTIC 4"X5YDS UNSTERILE 6611-40

## (undated) DEVICE — SPONGE BALL KERLIX ROUND XL W/O STRING LATEX 4935

## (undated) DEVICE — BLADE SAW OSCILLATING STRYK MED 9.0X25X0.38MM 2296-003-111

## (undated) DEVICE — DRSG KERLIX FLUFFS X5

## (undated) DEVICE — SYR 20ML LL W/O NDL

## (undated) DEVICE — GLOVE BIOGEL PI MICRO INDICATOR UNDERGLOVE SZ 8.0 48980

## (undated) DEVICE — DRSG GAUZE 4X4" 3033

## (undated) DEVICE — SYR 10ML LL W/O NDL

## (undated) DEVICE — PACKING IODOFORM STRIP 1/4" 7831

## (undated) DEVICE — NEEDLE HYPO 23GA 1.5IN BD REG BVL STRL 305194

## (undated) DEVICE — PREP CHLORAPREP 26ML TINTED ORANGE  260815

## (undated) DEVICE — GOWN LG DISP 9515

## (undated) DEVICE — SUCTION MANIFOLD NEPTUNE 2 SYS 1 PORT 702-025-000

## (undated) DEVICE — PACK LAPAROSCOPY/PELVISCOPY STD

## (undated) DEVICE — SU ETHILON 4-0 PS-2 18" 1667G

## (undated) DEVICE — SYRINGE EAR/ULCER STERILE 2 OZ BULB 4172

## (undated) DEVICE — NDL 25GA 1.5" 305127

## (undated) DEVICE — NDL 18GA 1.5" 305196

## (undated) RX ORDER — LIDOCAINE HYDROCHLORIDE 10 MG/ML
INJECTION, SOLUTION INFILTRATION; PERINEURAL
Status: DISPENSED
Start: 2019-07-23

## (undated) RX ORDER — ONDANSETRON 2 MG/ML
INJECTION INTRAMUSCULAR; INTRAVENOUS
Status: DISPENSED
Start: 2019-07-23

## (undated) RX ORDER — CEFAZOLIN SODIUM 2 G/100ML
INJECTION, SOLUTION INTRAVENOUS
Status: DISPENSED
Start: 2019-07-23

## (undated) RX ORDER — PROPOFOL 10 MG/ML
INJECTION, EMULSION INTRAVENOUS
Status: DISPENSED
Start: 2025-06-18

## (undated) RX ORDER — PROPOFOL 10 MG/ML
INJECTION, EMULSION INTRAVENOUS
Status: DISPENSED
Start: 2019-04-02

## (undated) RX ORDER — ONDANSETRON 2 MG/ML
INJECTION INTRAMUSCULAR; INTRAVENOUS
Status: DISPENSED
Start: 2019-12-24

## (undated) RX ORDER — FENTANYL CITRATE 50 UG/ML
INJECTION, SOLUTION INTRAMUSCULAR; INTRAVENOUS
Status: DISPENSED
Start: 2019-07-23

## (undated) RX ORDER — GINSENG 100 MG
CAPSULE ORAL
Status: DISPENSED
Start: 2019-07-23

## (undated) RX ORDER — FENTANYL CITRATE 50 UG/ML
INJECTION, SOLUTION INTRAMUSCULAR; INTRAVENOUS
Status: DISPENSED
Start: 2023-07-19

## (undated) RX ORDER — DEXAMETHASONE SODIUM PHOSPHATE 4 MG/ML
INJECTION, SOLUTION INTRA-ARTICULAR; INTRALESIONAL; INTRAMUSCULAR; INTRAVENOUS; SOFT TISSUE
Status: DISPENSED
Start: 2019-12-24

## (undated) RX ORDER — CEFAZOLIN SODIUM 2 G/100ML
INJECTION, SOLUTION INTRAVENOUS
Status: DISPENSED
Start: 2020-03-03

## (undated) RX ORDER — HEPARIN SODIUM 5000 [USP'U]/.5ML
INJECTION, SOLUTION INTRAVENOUS; SUBCUTANEOUS
Status: DISPENSED
Start: 2025-06-18

## (undated) RX ORDER — LIDOCAINE HYDROCHLORIDE 10 MG/ML
INJECTION, SOLUTION INFILTRATION; PERINEURAL
Status: DISPENSED
Start: 2023-07-19

## (undated) RX ORDER — CLOPIDOGREL 300 MG/1
TABLET, FILM COATED ORAL
Status: DISPENSED
Start: 2020-02-26

## (undated) RX ORDER — CEFAZOLIN SODIUM/WATER 2 G/20 ML
SYRINGE (ML) INTRAVENOUS
Status: DISPENSED
Start: 2025-06-18

## (undated) RX ORDER — CEFAZOLIN SODIUM 2 G/100ML
INJECTION, SOLUTION INTRAVENOUS
Status: DISPENSED
Start: 2019-12-24

## (undated) RX ORDER — ONDANSETRON 2 MG/ML
INJECTION INTRAMUSCULAR; INTRAVENOUS
Status: DISPENSED
Start: 2025-06-18

## (undated) RX ORDER — DEXAMETHASONE SODIUM PHOSPHATE 4 MG/ML
INJECTION, SOLUTION INTRA-ARTICULAR; INTRALESIONAL; INTRAMUSCULAR; INTRAVENOUS; SOFT TISSUE
Status: DISPENSED
Start: 2025-06-18

## (undated) RX ORDER — BUPIVACAINE HYDROCHLORIDE 5 MG/ML
INJECTION, SOLUTION PERINEURAL
Status: DISPENSED
Start: 2019-12-24

## (undated) RX ORDER — GABAPENTIN 100 MG/1
CAPSULE ORAL
Status: DISPENSED
Start: 2025-06-18

## (undated) RX ORDER — GINSENG 100 MG
CAPSULE ORAL
Status: DISPENSED
Start: 2019-12-24

## (undated) RX ORDER — LIDOCAINE HYDROCHLORIDE 10 MG/ML
INJECTION, SOLUTION INFILTRATION; PERINEURAL
Status: DISPENSED
Start: 2020-03-03

## (undated) RX ORDER — GINSENG 100 MG
CAPSULE ORAL
Status: DISPENSED
Start: 2020-03-03

## (undated) RX ORDER — PROPOFOL 10 MG/ML
INJECTION, EMULSION INTRAVENOUS
Status: DISPENSED
Start: 2019-12-24

## (undated) RX ORDER — ACETAMINOPHEN 325 MG/1
TABLET ORAL
Status: DISPENSED
Start: 2025-06-18

## (undated) RX ORDER — FENTANYL CITRATE 50 UG/ML
INJECTION, SOLUTION INTRAMUSCULAR; INTRAVENOUS
Status: DISPENSED
Start: 2019-04-02

## (undated) RX ORDER — LIDOCAINE HYDROCHLORIDE 10 MG/ML
INJECTION, SOLUTION EPIDURAL; INFILTRATION; INTRACAUDAL; PERINEURAL
Status: DISPENSED
Start: 2019-12-24

## (undated) RX ORDER — LIDOCAINE HYDROCHLORIDE 10 MG/ML
INJECTION, SOLUTION EPIDURAL; INFILTRATION; INTRACAUDAL; PERINEURAL
Status: DISPENSED
Start: 2019-04-02

## (undated) RX ORDER — ONDANSETRON 2 MG/ML
INJECTION INTRAMUSCULAR; INTRAVENOUS
Status: DISPENSED
Start: 2019-04-02

## (undated) RX ORDER — BUPIVACAINE HYDROCHLORIDE 5 MG/ML
INJECTION, SOLUTION PERINEURAL
Status: DISPENSED
Start: 2019-07-23

## (undated) RX ORDER — FENTANYL CITRATE 50 UG/ML
INJECTION, SOLUTION INTRAMUSCULAR; INTRAVENOUS
Status: DISPENSED
Start: 2019-12-24

## (undated) RX ORDER — BUPIVACAINE HYDROCHLORIDE 5 MG/ML
INJECTION, SOLUTION PERINEURAL
Status: DISPENSED
Start: 2019-04-02

## (undated) RX ORDER — CEFAZOLIN SODIUM 2 G/100ML
INJECTION, SOLUTION INTRAVENOUS
Status: DISPENSED
Start: 2019-04-02

## (undated) RX ORDER — LIDOCAINE HYDROCHLORIDE 10 MG/ML
INJECTION, SOLUTION INFILTRATION; PERINEURAL
Status: DISPENSED
Start: 2019-04-02

## (undated) RX ORDER — NITROGLYCERIN 5 MG/ML
VIAL (ML) INTRAVENOUS
Status: DISPENSED
Start: 2023-07-19

## (undated) RX ORDER — LIDOCAINE HYDROCHLORIDE AND EPINEPHRINE 10; 10 MG/ML; UG/ML
INJECTION, SOLUTION INFILTRATION; PERINEURAL
Status: DISPENSED
Start: 2025-06-18

## (undated) RX ORDER — BUPIVACAINE HYDROCHLORIDE 5 MG/ML
INJECTION, SOLUTION PERINEURAL
Status: DISPENSED
Start: 2020-03-03

## (undated) RX ORDER — LIDOCAINE HYDROCHLORIDE 10 MG/ML
INJECTION, SOLUTION EPIDURAL; INFILTRATION; INTRACAUDAL; PERINEURAL
Status: DISPENSED
Start: 2019-07-23

## (undated) RX ORDER — FENTANYL CITRATE 50 UG/ML
INJECTION, SOLUTION INTRAMUSCULAR; INTRAVENOUS
Status: DISPENSED
Start: 2025-06-18

## (undated) RX ORDER — BUPIVACAINE HYDROCHLORIDE 5 MG/ML
INJECTION, SOLUTION EPIDURAL; INTRACAUDAL; PERINEURAL
Status: DISPENSED
Start: 2025-06-18

## (undated) RX ORDER — GINSENG 100 MG
CAPSULE ORAL
Status: DISPENSED
Start: 2019-04-02

## (undated) RX ORDER — HEPARIN SODIUM 200 [USP'U]/100ML
INJECTION, SOLUTION INTRAVENOUS
Status: DISPENSED
Start: 2023-07-19

## (undated) RX ORDER — PROPOFOL 10 MG/ML
INJECTION, EMULSION INTRAVENOUS
Status: DISPENSED
Start: 2019-07-23